# Patient Record
Sex: MALE | Race: WHITE | Employment: UNEMPLOYED | URBAN - METROPOLITAN AREA
[De-identification: names, ages, dates, MRNs, and addresses within clinical notes are randomized per-mention and may not be internally consistent; named-entity substitution may affect disease eponyms.]

---

## 2017-11-29 PROBLEM — E10.10 DKA, TYPE 1, NOT AT GOAL (HCC): Status: ACTIVE | Noted: 2017-11-29

## 2018-03-14 ENCOUNTER — HOSPITAL ENCOUNTER (INPATIENT)
Age: 48
LOS: 2 days | Discharge: HOME | DRG: 420 | End: 2018-03-16
Attending: EMERGENCY MEDICINE | Admitting: INTERNAL MEDICINE
Payer: MEDICARE

## 2018-03-14 ENCOUNTER — APPOINTMENT (OUTPATIENT)
Dept: GENERAL RADIOLOGY | Age: 48
DRG: 420 | End: 2018-03-14
Payer: MEDICARE

## 2018-03-14 DIAGNOSIS — E10.10 DIABETIC KETOACIDOSIS WITHOUT COMA ASSOCIATED WITH TYPE 1 DIABETES MELLITUS (HCC): Primary | ICD-10-CM

## 2018-03-14 LAB
ALBUMIN SERPL-MCNC: 5.8 G/DL (ref 3.5–5.2)
ALP BLD-CCNC: 129 U/L (ref 40–129)
ALT SERPL-CCNC: 28 U/L (ref 0–40)
ANION GAP SERPL CALCULATED.3IONS-SCNC: 35 MMOL/L (ref 7–16)
ANION GAP SERPL CALCULATED.3IONS-SCNC: 46 MMOL/L (ref 7–16)
AST SERPL-CCNC: 19 U/L (ref 0–39)
BACTERIA: ABNORMAL /HPF
BETA-HYDROXYBUTYRATE: >4.5 MMOL/L (ref 0.02–0.27)
BILIRUB SERPL-MCNC: 1 MG/DL (ref 0–1.2)
BILIRUBIN URINE: ABNORMAL
BLOOD, URINE: ABNORMAL
BUN BLDV-MCNC: 24 MG/DL (ref 6–20)
BUN BLDV-MCNC: 27 MG/DL (ref 6–20)
CALCIUM SERPL-MCNC: 8.4 MG/DL (ref 8.6–10.2)
CALCIUM SERPL-MCNC: 9.8 MG/DL (ref 8.6–10.2)
CHLORIDE BLD-SCNC: 80 MMOL/L (ref 98–107)
CHLORIDE BLD-SCNC: 91 MMOL/L (ref 98–107)
CLARITY: CLEAR
CO2: 7 MMOL/L (ref 22–29)
CO2: 8 MMOL/L (ref 22–29)
COLOR: YELLOW
CREAT SERPL-MCNC: 1.1 MG/DL (ref 0.7–1.2)
CREAT SERPL-MCNC: 1.5 MG/DL (ref 0.7–1.2)
GFR AFRICAN AMERICAN: 44
GFR AFRICAN AMERICAN: >60
GFR AFRICAN AMERICAN: >60
GFR NON-AFRICAN AMERICAN: 36 ML/MIN/1.73
GFR NON-AFRICAN AMERICAN: 50 ML/MIN/1.73
GFR NON-AFRICAN AMERICAN: >60 ML/MIN/1.73
GLUCOSE BLD-MCNC: 396 MG/DL (ref 74–109)
GLUCOSE BLD-MCNC: 594 MG/DL (ref 74–109)
GLUCOSE BLD-MCNC: 622 MG/DL (ref 74–109)
GLUCOSE URINE: >=1000 MG/DL
HCT VFR BLD CALC: 53.3 % (ref 37–54)
HEMOGLOBIN: 17.1 G/DL (ref 12.5–16.5)
KETONES, URINE: >=80 MG/DL
LACTIC ACID: 3.2 MMOL/L (ref 0.5–2.2)
LEUKOCYTE ESTERASE, URINE: NEGATIVE
MAGNESIUM: 2 MG/DL (ref 1.6–2.6)
MCH RBC QN AUTO: 29.9 PG (ref 26–35)
MCHC RBC AUTO-ENTMCNC: 32.1 % (ref 32–34.5)
MCV RBC AUTO: 93.2 FL (ref 80–99.9)
METER GLUCOSE: 246 MG/DL (ref 70–110)
METER GLUCOSE: 285 MG/DL (ref 70–110)
METER GLUCOSE: 340 MG/DL (ref 70–110)
METER GLUCOSE: 410 MG/DL (ref 70–110)
METER GLUCOSE: >500 MG/DL (ref 70–110)
NITRITE, URINE: NEGATIVE
PDW BLD-RTO: 13.6 FL (ref 11.5–15)
PH UA: 5.5 (ref 5–9)
PH VENOUS: 7.1 (ref 7.3–7.42)
PHOSPHORUS: 3 MG/DL (ref 2.5–4.5)
PLATELET # BLD: 419 E9/L (ref 130–450)
PMV BLD AUTO: 8.7 FL (ref 7–12)
POC CHLORIDE: 103 MMOL/L (ref 100–108)
POC CREATININE: 2 MG/DL (ref 0.7–1.2)
POC POTASSIUM: 5.6 MMOL/L (ref 3.5–5)
POC SODIUM: 127 MMOL/L (ref 132–146)
POTASSIUM REFLEX MAGNESIUM: 4.7 MMOL/L (ref 3.5–5)
POTASSIUM SERPL-SCNC: 5.5 MMOL/L (ref 3.5–5)
PROTEIN UA: ABNORMAL MG/DL
RBC # BLD: 5.72 E12/L (ref 3.8–5.8)
RBC UA: ABNORMAL /HPF (ref 0–2)
SODIUM BLD-SCNC: 133 MMOL/L (ref 132–146)
SODIUM BLD-SCNC: 134 MMOL/L (ref 132–146)
SPECIFIC GRAVITY UA: 1.02 (ref 1–1.03)
TOTAL PROTEIN: 9 G/DL (ref 6.4–8.3)
TROPONIN: <0.01 NG/ML (ref 0–0.03)
UROBILINOGEN, URINE: 0.2 E.U./DL
WBC # BLD: 14.7 E9/L (ref 4.5–11.5)
WBC UA: ABNORMAL /HPF (ref 0–5)

## 2018-03-14 PROCEDURE — 82947 ASSAY GLUCOSE BLOOD QUANT: CPT

## 2018-03-14 PROCEDURE — 96375 TX/PRO/DX INJ NEW DRUG ADDON: CPT

## 2018-03-14 PROCEDURE — 99291 CRITICAL CARE FIRST HOUR: CPT

## 2018-03-14 PROCEDURE — 96365 THER/PROPH/DIAG IV INF INIT: CPT

## 2018-03-14 PROCEDURE — 82962 GLUCOSE BLOOD TEST: CPT

## 2018-03-14 PROCEDURE — 84295 ASSAY OF SERUM SODIUM: CPT

## 2018-03-14 PROCEDURE — 87486 CHLMYD PNEUM DNA AMP PROBE: CPT

## 2018-03-14 PROCEDURE — 94761 N-INVAS EAR/PLS OXIMETRY MLT: CPT

## 2018-03-14 PROCEDURE — 80307 DRUG TEST PRSMV CHEM ANLYZR: CPT

## 2018-03-14 PROCEDURE — 87501 INFLUENZA DNA AMP PROB 1+: CPT

## 2018-03-14 PROCEDURE — 84484 ASSAY OF TROPONIN QUANT: CPT

## 2018-03-14 PROCEDURE — 82010 KETONE BODYS QUAN: CPT

## 2018-03-14 PROCEDURE — 85027 COMPLETE CBC AUTOMATED: CPT

## 2018-03-14 PROCEDURE — 80053 COMPREHEN METABOLIC PANEL: CPT

## 2018-03-14 PROCEDURE — 83735 ASSAY OF MAGNESIUM: CPT

## 2018-03-14 PROCEDURE — 81001 URINALYSIS AUTO W/SCOPE: CPT

## 2018-03-14 PROCEDURE — 6360000002 HC RX W HCPCS: Performed by: EMERGENCY MEDICINE

## 2018-03-14 PROCEDURE — 82565 ASSAY OF CREATININE: CPT

## 2018-03-14 PROCEDURE — 84100 ASSAY OF PHOSPHORUS: CPT

## 2018-03-14 PROCEDURE — 2580000003 HC RX 258: Performed by: EMERGENCY MEDICINE

## 2018-03-14 PROCEDURE — 6370000000 HC RX 637 (ALT 250 FOR IP): Performed by: EMERGENCY MEDICINE

## 2018-03-14 PROCEDURE — 83690 ASSAY OF LIPASE: CPT

## 2018-03-14 PROCEDURE — 83930 ASSAY OF BLOOD OSMOLALITY: CPT

## 2018-03-14 PROCEDURE — 2500000003 HC RX 250 WO HCPCS: Performed by: EMERGENCY MEDICINE

## 2018-03-14 PROCEDURE — 83605 ASSAY OF LACTIC ACID: CPT

## 2018-03-14 PROCEDURE — 87503 INFLUENZA DNA AMP PROB ADDL: CPT

## 2018-03-14 PROCEDURE — 87798 DETECT AGENT NOS DNA AMP: CPT

## 2018-03-14 PROCEDURE — 84132 ASSAY OF SERUM POTASSIUM: CPT

## 2018-03-14 PROCEDURE — 2000000000 HC ICU R&B

## 2018-03-14 PROCEDURE — 82435 ASSAY OF BLOOD CHLORIDE: CPT

## 2018-03-14 PROCEDURE — 82800 BLOOD PH: CPT

## 2018-03-14 PROCEDURE — 71045 X-RAY EXAM CHEST 1 VIEW: CPT

## 2018-03-14 PROCEDURE — 96366 THER/PROPH/DIAG IV INF ADDON: CPT

## 2018-03-14 PROCEDURE — 80048 BASIC METABOLIC PNL TOTAL CA: CPT

## 2018-03-14 PROCEDURE — 93005 ELECTROCARDIOGRAM TRACING: CPT | Performed by: NURSE PRACTITIONER

## 2018-03-14 PROCEDURE — 36415 COLL VENOUS BLD VENIPUNCTURE: CPT

## 2018-03-14 PROCEDURE — 87581 M.PNEUMON DNA AMP PROBE: CPT

## 2018-03-14 RX ORDER — SODIUM CHLORIDE 0.9 % (FLUSH) 0.9 %
10 SYRINGE (ML) INJECTION EVERY 12 HOURS SCHEDULED
Status: DISCONTINUED | OUTPATIENT
Start: 2018-03-14 | End: 2018-03-16 | Stop reason: HOSPADM

## 2018-03-14 RX ORDER — DEXTROSE MONOHYDRATE 25 G/50ML
12.5 INJECTION, SOLUTION INTRAVENOUS PRN
Status: DISCONTINUED | OUTPATIENT
Start: 2018-03-14 | End: 2018-03-15

## 2018-03-14 RX ORDER — POTASSIUM CHLORIDE 7.45 MG/ML
10 INJECTION INTRAVENOUS PRN
Status: DISCONTINUED | OUTPATIENT
Start: 2018-03-14 | End: 2018-03-15

## 2018-03-14 RX ORDER — ONDANSETRON 2 MG/ML
4 INJECTION INTRAMUSCULAR; INTRAVENOUS ONCE
Status: COMPLETED | OUTPATIENT
Start: 2018-03-14 | End: 2018-03-14

## 2018-03-14 RX ORDER — DEXTROSE, SODIUM CHLORIDE, AND POTASSIUM CHLORIDE 5; .45; .15 G/100ML; G/100ML; G/100ML
INJECTION INTRAVENOUS CONTINUOUS PRN
Status: DISCONTINUED | OUTPATIENT
Start: 2018-03-14 | End: 2018-03-15

## 2018-03-14 RX ORDER — 0.9 % SODIUM CHLORIDE 0.9 %
1000 INTRAVENOUS SOLUTION INTRAVENOUS ONCE
Status: COMPLETED | OUTPATIENT
Start: 2018-03-14 | End: 2018-03-14

## 2018-03-14 RX ORDER — SODIUM CHLORIDE 0.9 % (FLUSH) 0.9 %
10 SYRINGE (ML) INJECTION PRN
Status: DISCONTINUED | OUTPATIENT
Start: 2018-03-14 | End: 2018-03-14 | Stop reason: SDUPTHER

## 2018-03-14 RX ORDER — MAGNESIUM SULFATE 1 G/100ML
1 INJECTION INTRAVENOUS PRN
Status: DISCONTINUED | OUTPATIENT
Start: 2018-03-14 | End: 2018-03-15

## 2018-03-14 RX ORDER — SODIUM CHLORIDE 450 MG/100ML
INJECTION, SOLUTION INTRAVENOUS CONTINUOUS
Status: DISCONTINUED | OUTPATIENT
Start: 2018-03-14 | End: 2018-03-15

## 2018-03-14 RX ORDER — SODIUM CHLORIDE 0.9 % (FLUSH) 0.9 %
10 SYRINGE (ML) INJECTION EVERY 12 HOURS SCHEDULED
Status: DISCONTINUED | OUTPATIENT
Start: 2018-03-14 | End: 2018-03-14 | Stop reason: SDUPTHER

## 2018-03-14 RX ORDER — ACETAMINOPHEN 325 MG/1
650 TABLET ORAL EVERY 4 HOURS PRN
Status: DISCONTINUED | OUTPATIENT
Start: 2018-03-14 | End: 2018-03-14 | Stop reason: SDUPTHER

## 2018-03-14 RX ORDER — ACETAMINOPHEN 325 MG/1
650 TABLET ORAL EVERY 4 HOURS PRN
Status: DISCONTINUED | OUTPATIENT
Start: 2018-03-14 | End: 2018-03-16 | Stop reason: HOSPADM

## 2018-03-14 RX ORDER — SODIUM CHLORIDE 0.9 % (FLUSH) 0.9 %
10 SYRINGE (ML) INJECTION PRN
Status: DISCONTINUED | OUTPATIENT
Start: 2018-03-14 | End: 2018-03-16 | Stop reason: HOSPADM

## 2018-03-14 RX ORDER — METOCLOPRAMIDE HYDROCHLORIDE 5 MG/ML
10 INJECTION INTRAMUSCULAR; INTRAVENOUS ONCE
Status: DISCONTINUED | OUTPATIENT
Start: 2018-03-14 | End: 2018-03-15

## 2018-03-14 RX ORDER — ONDANSETRON 2 MG/ML
4 INJECTION INTRAMUSCULAR; INTRAVENOUS EVERY 6 HOURS PRN
Status: DISCONTINUED | OUTPATIENT
Start: 2018-03-14 | End: 2018-03-16 | Stop reason: HOSPADM

## 2018-03-14 RX ADMIN — SODIUM CHLORIDE: 4.5 INJECTION, SOLUTION INTRAVENOUS at 20:02

## 2018-03-14 RX ADMIN — SODIUM CHLORIDE 1000 ML: 9 INJECTION, SOLUTION INTRAVENOUS at 18:27

## 2018-03-14 RX ADMIN — SODIUM CHLORIDE 0.1 UNITS/KG/HR: 9 INJECTION, SOLUTION INTRAVENOUS at 18:39

## 2018-03-14 RX ADMIN — SODIUM CHLORIDE 1000 ML: 9 INJECTION, SOLUTION INTRAVENOUS at 17:21

## 2018-03-14 RX ADMIN — DEXTROSE, SODIUM CHLORIDE, AND POTASSIUM CHLORIDE: 5; .45; .15 INJECTION INTRAVENOUS at 23:06

## 2018-03-14 RX ADMIN — ONDANSETRON 4 MG: 2 INJECTION INTRAMUSCULAR; INTRAVENOUS at 17:35

## 2018-03-14 ASSESSMENT — PAIN SCALES - GENERAL: PAINLEVEL_OUTOF10: 0

## 2018-03-14 ASSESSMENT — ENCOUNTER SYMPTOMS
SINUS PRESSURE: 0
COUGH: 0
CONSTIPATION: 0
EYE DISCHARGE: 0
DIARRHEA: 0
NAUSEA: 1
EYE REDNESS: 0
ABDOMINAL PAIN: 0
BLOOD IN STOOL: 0
RHINORRHEA: 0
BACK PAIN: 0
VOMITING: 1
WHEEZING: 0
EYE PAIN: 0
SORE THROAT: 0
SHORTNESS OF BREATH: 1

## 2018-03-14 NOTE — ED NOTES
FIRST PROVIDER CONTACT ASSESSMENT NOTE      Department of Emergency Medicine   3/14/18  3:26 PM    Chief Complaint: Emesis (patient has been throwing up, thinks he may in DKA, has been having increased BGL.)      History of Present Illness:    Nadege Campbell is a 56 y.o. male who presents to the ED by private car for Hyperglycemia and vomiting. Patient believes he is in DKA. Focused Screening Exam:  Constitutional:  Alert, ill appearing. NAD  CV:  tachycardia    *ALLERGIES*     Patient has no known allergies.      ED Triage Vitals [03/14/18 1524]   BP Temp Temp src Pulse Resp SpO2 Height Weight   (!) 149/103 98.2 °F (36.8 °C) -- 130 12 100 % 5' 6\" (1.676 m) 145 lb (65.8 kg)        Initial Plan of Care:  Initiate Treatment-Testing, Proceed toTreatment Area When Bed Available for ED Attending/MLP to Continue Care    -----------------END OF FIRST PROVIDER CONTACT ASSESSMENT NOTE--------------  Electronically signed by CHAYO Sharma   DD: 3/14/18       CHAYO Sharma  03/14/18 1520

## 2018-03-14 NOTE — ED PROVIDER NOTES
Patient is a 60-year-old male here with nausea and vomiting. He states that he was nauseated prior to arrival to the ED and vomited couple of times after arrival to the ER. He also complains of intermittent shortness of breath. He states nothing makes his symptoms worse or better. He denies fever, chest pain/pressure, abdominal pain, diarrhea, constipation, blood in stool or urine. He states he has insulin pump and has been using his insulin normally. He denies any other medical problems. He denies history of heart problems. He states he smokes weed sometimes. He states he occasionally drinks alcohol. He denies use of any other recreational drugs. Review of Systems   Constitutional: Positive for chills and diaphoresis. Negative for fever. HENT: Negative for ear pain, hearing loss, rhinorrhea, sinus pressure and sore throat. Eyes: Negative for pain, discharge, redness and visual disturbance. Respiratory: Positive for shortness of breath. Negative for cough and wheezing. Cardiovascular: Negative for chest pain. Gastrointestinal: Positive for nausea and vomiting. Negative for abdominal pain, blood in stool, constipation and diarrhea. Genitourinary: Negative for dysuria, flank pain, frequency and hematuria. Musculoskeletal: Negative for arthralgias, back pain and neck pain. Skin: Negative for rash and wound. Neurological: Negative for syncope, weakness, light-headedness, numbness and headaches. Hematological: Negative for adenopathy. Psychiatric/Behavioral: Negative for confusion. All other systems reviewed and are negative. Physical Exam   Constitutional: He is oriented to person, place, and time. He appears well-developed and well-nourished. HENT:   Head: Normocephalic and atraumatic. Head is without raccoon's eyes and without Lowe's sign. Nose: No rhinorrhea or nasal deformity. Mouth/Throat: Uvula is midline. Mucous membranes are dry.  No posterior oropharyngeal Nitrite, Urine Negative Negative    Leukocyte Esterase, Urine Negative Negative   Troponin   Result Value Ref Range    Troponin <0.01 0.00 - 0.03 ng/mL   pH, venous   Result Value Ref Range    pH, Gage 7.10 (LL) 7.30 - 7.42   Microscopic Urinalysis   Result Value Ref Range    WBC, UA 0-1 0 - 5 /HPF    RBC, UA 0-1 0 - 2 /HPF    Bacteria, UA RARE (A) /HPF   POCT Venous   Result Value Ref Range    POC Sodium 127 (L) 132 - 146 mmol/L    POC Potassium 5.6 (H) 3.5 - 5.0 mmol/L    POC Chloride 103 100 - 108 mmol/L    POC Glucose 622 (HH) 74 - 109 mg/dl    POC Creatinine 2.0 (H) 0.7 - 1.2 mg/dL    GFR Non-African American 36 >=60 mL/min/1.73    GFR African American 44    POCT Glucose   Result Value Ref Range    Meter Glucose >500 (H) 70 - 110 mg/dL   POCT Glucose   Result Value Ref Range    Meter Glucose 410 (H) 70 - 110 mg/dL       Radiology  No orders to display       ------------------------- NURSING NOTES AND VITALS REVIEWED ---------------------------  Date / Time Roomed:  3/14/2018  4:39 PM  ED Bed Assignment:  11/11    The nursing notes within the ED encounter and vital signs as below have been reviewed.    Patient Vitals for the past 24 hrs:   BP Temp Pulse Resp SpO2 Height Weight   03/14/18 1945 (!) 156/86 - 125 18 100 % - -   03/14/18 1841 (!) 150/85 - 126 16 - - -   03/14/18 1747 - - 119 16 100 % - -   03/14/18 1524 (!) 149/103 98.2 °F (36.8 °C) 130 12 100 % 5' 6\" (1.676 m) 145 lb (65.8 kg)       Oxygen Saturation Interpretation: Normal      ------------------------------------------ PROGRESS NOTES ------------------------------------------  Re-evaluation(s):  Time: 5:23 PM.  Patients symptoms show no change  Repeat physical examination is not changed    Time: 6:14 PM.  Patients symptoms show no change  Repeat physical examination is not changed    Time: 7:49 PM.  Patients symptoms show no change  Repeat physical examination is not changed    I have spoken with the patient and discussed todays results, in

## 2018-03-15 LAB
ACETAMINOPHEN LEVEL: <15 MCG/ML (ref 10–30)
ANION GAP SERPL CALCULATED.3IONS-SCNC: 12 MMOL/L (ref 7–16)
ANION GAP SERPL CALCULATED.3IONS-SCNC: 14 MMOL/L (ref 7–16)
ANION GAP SERPL CALCULATED.3IONS-SCNC: 15 MMOL/L (ref 7–16)
ANION GAP SERPL CALCULATED.3IONS-SCNC: 16 MMOL/L (ref 7–16)
ANION GAP SERPL CALCULATED.3IONS-SCNC: 16 MMOL/L (ref 7–16)
ANION GAP SERPL CALCULATED.3IONS-SCNC: 26 MMOL/L (ref 7–16)
BUN BLDV-MCNC: 11 MG/DL (ref 6–20)
BUN BLDV-MCNC: 12 MG/DL (ref 6–20)
BUN BLDV-MCNC: 14 MG/DL (ref 6–20)
BUN BLDV-MCNC: 16 MG/DL (ref 6–20)
BUN BLDV-MCNC: 20 MG/DL (ref 6–20)
BUN BLDV-MCNC: 23 MG/DL (ref 6–20)
CALCIUM SERPL-MCNC: 8.2 MG/DL (ref 8.6–10.2)
CALCIUM SERPL-MCNC: 8.2 MG/DL (ref 8.6–10.2)
CALCIUM SERPL-MCNC: 8.4 MG/DL (ref 8.6–10.2)
CALCIUM SERPL-MCNC: 8.6 MG/DL (ref 8.6–10.2)
CALCIUM SERPL-MCNC: 8.6 MG/DL (ref 8.6–10.2)
CALCIUM SERPL-MCNC: 8.7 MG/DL (ref 8.6–10.2)
CHLORIDE BLD-SCNC: 100 MMOL/L (ref 98–107)
CHLORIDE BLD-SCNC: 100 MMOL/L (ref 98–107)
CHLORIDE BLD-SCNC: 95 MMOL/L (ref 98–107)
CHLORIDE BLD-SCNC: 96 MMOL/L (ref 98–107)
CHLORIDE BLD-SCNC: 98 MMOL/L (ref 98–107)
CHLORIDE BLD-SCNC: 99 MMOL/L (ref 98–107)
CO2: 12 MMOL/L (ref 22–29)
CO2: 16 MMOL/L (ref 22–29)
CO2: 17 MMOL/L (ref 22–29)
CO2: 19 MMOL/L (ref 22–29)
CO2: 20 MMOL/L (ref 22–29)
CO2: 21 MMOL/L (ref 22–29)
CREAT SERPL-MCNC: 0.5 MG/DL (ref 0.7–1.2)
CREAT SERPL-MCNC: 0.6 MG/DL (ref 0.7–1.2)
CREAT SERPL-MCNC: 0.7 MG/DL (ref 0.7–1.2)
CREAT SERPL-MCNC: 0.9 MG/DL (ref 0.7–1.2)
ETHANOL: <10 MG/DL (ref 0–0.08)
FILM ARRAY ADENOVIRUS: NORMAL
FILM ARRAY BORDETELLA PERTUSSIS: NORMAL
FILM ARRAY CHLAMYDOPHILIA PNEUMONIAE: NORMAL
FILM ARRAY CORONAVIRUS 229E: NORMAL
FILM ARRAY CORONAVIRUS HKU1: NORMAL
FILM ARRAY CORONAVIRUS NL63: NORMAL
FILM ARRAY CORONAVIRUS OC43: NORMAL
FILM ARRAY INFLUENZA A VIRUS 09H1: NORMAL
FILM ARRAY INFLUENZA A VIRUS H1: NORMAL
FILM ARRAY INFLUENZA A VIRUS H3: NORMAL
FILM ARRAY INFLUENZA A VIRUS: NORMAL
FILM ARRAY INFLUENZA B: NORMAL
FILM ARRAY METAPNEUMOVIRUS: NORMAL
FILM ARRAY MYCOPLASMA PNEUMONIAE: NORMAL
FILM ARRAY PARAINFLUENZA VIRUS 1: NORMAL
FILM ARRAY PARAINFLUENZA VIRUS 2: NORMAL
FILM ARRAY PARAINFLUENZA VIRUS 3: NORMAL
FILM ARRAY PARAINFLUENZA VIRUS 4: NORMAL
FILM ARRAY RESPIRATORY SYNCITIAL VIRUS: NORMAL
FILM ARRAY RHINOVIRUS/ENTEROVIRUS: NORMAL
GFR AFRICAN AMERICAN: >60
GFR NON-AFRICAN AMERICAN: >60 ML/MIN/1.73
GLUCOSE BLD-MCNC: 114 MG/DL (ref 74–109)
GLUCOSE BLD-MCNC: 148 MG/DL (ref 74–109)
GLUCOSE BLD-MCNC: 155 MG/DL (ref 74–109)
GLUCOSE BLD-MCNC: 160 MG/DL (ref 74–109)
GLUCOSE BLD-MCNC: 184 MG/DL (ref 74–109)
GLUCOSE BLD-MCNC: 258 MG/DL (ref 74–109)
HBA1C MFR BLD: 9.7 % (ref 4.8–5.9)
LACTIC ACID: 1 MMOL/L (ref 0.5–2.2)
LIPASE: 68 U/L (ref 13–60)
MAGNESIUM: 1.9 MG/DL (ref 1.6–2.6)
MAGNESIUM: 1.9 MG/DL (ref 1.6–2.6)
MAGNESIUM: 2 MG/DL (ref 1.6–2.6)
MAGNESIUM: 2.1 MG/DL (ref 1.6–2.6)
METER GLUCOSE: 108 MG/DL (ref 70–110)
METER GLUCOSE: 109 MG/DL (ref 70–110)
METER GLUCOSE: 110 MG/DL (ref 70–110)
METER GLUCOSE: 134 MG/DL (ref 70–110)
METER GLUCOSE: 135 MG/DL (ref 70–110)
METER GLUCOSE: 137 MG/DL (ref 70–110)
METER GLUCOSE: 141 MG/DL (ref 70–110)
METER GLUCOSE: 145 MG/DL (ref 70–110)
METER GLUCOSE: 151 MG/DL (ref 70–110)
METER GLUCOSE: 156 MG/DL (ref 70–110)
METER GLUCOSE: 169 MG/DL (ref 70–110)
METER GLUCOSE: 170 MG/DL (ref 70–110)
METER GLUCOSE: 173 MG/DL (ref 70–110)
METER GLUCOSE: 176 MG/DL (ref 70–110)
METER GLUCOSE: 178 MG/DL (ref 70–110)
METER GLUCOSE: 182 MG/DL (ref 70–110)
METER GLUCOSE: 219 MG/DL (ref 70–110)
METER GLUCOSE: 244 MG/DL (ref 70–110)
METER GLUCOSE: 254 MG/DL (ref 70–110)
METER GLUCOSE: 95 MG/DL (ref 70–110)
OSMOLALITY: 308 MOSM/KG (ref 285–310)
PHOSPHORUS: 1.2 MG/DL (ref 2.5–4.5)
PHOSPHORUS: 1.5 MG/DL (ref 2.5–4.5)
PHOSPHORUS: 2.1 MG/DL (ref 2.5–4.5)
PHOSPHORUS: 2.3 MG/DL (ref 2.5–4.5)
PHOSPHORUS: 2.4 MG/DL (ref 2.5–4.5)
PHOSPHORUS: 2.7 MG/DL (ref 2.5–4.5)
POTASSIUM REFLEX MAGNESIUM: 3.9 MMOL/L (ref 3.5–5)
POTASSIUM REFLEX MAGNESIUM: 4 MMOL/L (ref 3.5–5)
POTASSIUM REFLEX MAGNESIUM: 4.2 MMOL/L (ref 3.5–5)
POTASSIUM REFLEX MAGNESIUM: 4.2 MMOL/L (ref 3.5–5)
POTASSIUM REFLEX MAGNESIUM: 5.2 MMOL/L (ref 3.5–5)
POTASSIUM REFLEX MAGNESIUM: 6.9 MMOL/L (ref 3.5–5)
PROCALCITONIN: 0.16 NG/ML (ref 0–0.08)
REPORT: NORMAL
REPORT: NORMAL
SALICYLATE, SERUM: <0.3 MG/DL (ref 0–30)
SODIUM BLD-SCNC: 129 MMOL/L (ref 132–146)
SODIUM BLD-SCNC: 131 MMOL/L (ref 132–146)
SODIUM BLD-SCNC: 132 MMOL/L (ref 132–146)
SODIUM BLD-SCNC: 133 MMOL/L (ref 132–146)
SODIUM BLD-SCNC: 133 MMOL/L (ref 132–146)
SODIUM BLD-SCNC: 134 MMOL/L (ref 132–146)
TRICYCLIC ANTIDEPRESSANTS SCREEN SERUM: NEGATIVE NG/ML
TROPONIN: <0.01 NG/ML (ref 0–0.03)

## 2018-03-15 PROCEDURE — 2580000003 HC RX 258: Performed by: INTERNAL MEDICINE

## 2018-03-15 PROCEDURE — 36415 COLL VENOUS BLD VENIPUNCTURE: CPT

## 2018-03-15 PROCEDURE — 99255 IP/OBS CONSLTJ NEW/EST HI 80: CPT | Performed by: INTERNAL MEDICINE

## 2018-03-15 PROCEDURE — 87040 BLOOD CULTURE FOR BACTERIA: CPT

## 2018-03-15 PROCEDURE — 2580000003 HC RX 258: Performed by: EMERGENCY MEDICINE

## 2018-03-15 PROCEDURE — 87186 SC STD MICRODIL/AGAR DIL: CPT

## 2018-03-15 PROCEDURE — 82962 GLUCOSE BLOOD TEST: CPT

## 2018-03-15 PROCEDURE — 84100 ASSAY OF PHOSPHORUS: CPT

## 2018-03-15 PROCEDURE — 80048 BASIC METABOLIC PNL TOTAL CA: CPT

## 2018-03-15 PROCEDURE — 83735 ASSAY OF MAGNESIUM: CPT

## 2018-03-15 PROCEDURE — 6360000002 HC RX W HCPCS: Performed by: INTERNAL MEDICINE

## 2018-03-15 PROCEDURE — 6360000002 HC RX W HCPCS: Performed by: EMERGENCY MEDICINE

## 2018-03-15 PROCEDURE — 2500000003 HC RX 250 WO HCPCS: Performed by: EMERGENCY MEDICINE

## 2018-03-15 PROCEDURE — 82693 ASSAY OF ETHYLENE GLYCOL: CPT

## 2018-03-15 PROCEDURE — 83036 HEMOGLOBIN GLYCOSYLATED A1C: CPT

## 2018-03-15 PROCEDURE — 87088 URINE BACTERIA CULTURE: CPT

## 2018-03-15 PROCEDURE — G0480 DRUG TEST DEF 1-7 CLASSES: HCPCS

## 2018-03-15 PROCEDURE — 6370000000 HC RX 637 (ALT 250 FOR IP): Performed by: EMERGENCY MEDICINE

## 2018-03-15 PROCEDURE — 84145 PROCALCITONIN (PCT): CPT

## 2018-03-15 PROCEDURE — 94761 N-INVAS EAR/PLS OXIMETRY MLT: CPT

## 2018-03-15 PROCEDURE — 6370000000 HC RX 637 (ALT 250 FOR IP): Performed by: INTERNAL MEDICINE

## 2018-03-15 PROCEDURE — 87077 CULTURE AEROBIC IDENTIFY: CPT

## 2018-03-15 PROCEDURE — 2000000000 HC ICU R&B

## 2018-03-15 RX ORDER — DEXTROSE MONOHYDRATE 25 G/50ML
25 INJECTION, SOLUTION INTRAVENOUS PRN
Status: DISCONTINUED | OUTPATIENT
Start: 2018-03-15 | End: 2018-03-16 | Stop reason: HOSPADM

## 2018-03-15 RX ORDER — INSULIN GLARGINE 100 [IU]/ML
20 INJECTION, SOLUTION SUBCUTANEOUS 2 TIMES DAILY
Status: DISCONTINUED | OUTPATIENT
Start: 2018-03-15 | End: 2018-03-16 | Stop reason: HOSPADM

## 2018-03-15 RX ORDER — DEXTROSE MONOHYDRATE 25 G/50ML
12.5 INJECTION, SOLUTION INTRAVENOUS PRN
Status: DISCONTINUED | OUTPATIENT
Start: 2018-03-15 | End: 2018-03-15

## 2018-03-15 RX ORDER — DEXTROSE MONOHYDRATE 50 MG/ML
100 INJECTION, SOLUTION INTRAVENOUS PRN
Status: DISCONTINUED | OUTPATIENT
Start: 2018-03-15 | End: 2018-03-16 | Stop reason: HOSPADM

## 2018-03-15 RX ORDER — NICOTINE POLACRILEX 4 MG
15 LOZENGE BUCCAL PRN
Status: DISCONTINUED | OUTPATIENT
Start: 2018-03-15 | End: 2018-03-16 | Stop reason: HOSPADM

## 2018-03-15 RX ADMIN — Medication 10 ML: at 08:26

## 2018-03-15 RX ADMIN — INSULIN LISPRO 2 UNITS: 100 INJECTION, SOLUTION INTRAVENOUS; SUBCUTANEOUS at 21:16

## 2018-03-15 RX ADMIN — SODIUM CHLORIDE 2.46 UNITS/HR: 9 INJECTION, SOLUTION INTRAVENOUS at 13:58

## 2018-03-15 RX ADMIN — POTASSIUM CHLORIDE 10 MEQ: 10 INJECTION, SOLUTION INTRAVENOUS at 06:12

## 2018-03-15 RX ADMIN — INSULIN GLARGINE 20 UNITS: 100 INJECTION, SOLUTION SUBCUTANEOUS at 18:20

## 2018-03-15 RX ADMIN — ENOXAPARIN SODIUM 40 MG: 40 INJECTION SUBCUTANEOUS at 08:26

## 2018-03-15 RX ADMIN — FOMEPIZOLE 990 MG: 1 INJECTION, SOLUTION INTRAVENOUS at 02:31

## 2018-03-15 RX ADMIN — SODIUM CHLORIDE 2 UNITS/HR: 9 INJECTION, SOLUTION INTRAVENOUS at 07:22

## 2018-03-15 RX ADMIN — POTASSIUM CHLORIDE 10 MEQ: 10 INJECTION, SOLUTION INTRAVENOUS at 07:22

## 2018-03-15 RX ADMIN — Medication 10 ML: at 06:35

## 2018-03-15 RX ADMIN — DEXTROSE, SODIUM CHLORIDE, AND POTASSIUM CHLORIDE: 5; .45; .15 INJECTION INTRAVENOUS at 12:50

## 2018-03-15 RX ADMIN — DEXTROSE, SODIUM CHLORIDE, AND POTASSIUM CHLORIDE: 5; .45; .15 INJECTION INTRAVENOUS at 05:38

## 2018-03-15 RX ADMIN — SODIUM PHOSPHATE, MONOBASIC, MONOHYDRATE 20 MMOL: 276; 142 INJECTION, SOLUTION INTRAVENOUS at 06:32

## 2018-03-15 RX ADMIN — POTASSIUM CHLORIDE 10 MEQ: 10 INJECTION, SOLUTION INTRAVENOUS at 09:12

## 2018-03-15 ASSESSMENT — PAIN SCALES - GENERAL
PAINLEVEL_OUTOF10: 0

## 2018-03-15 NOTE — H&P
[address]  Department of Internal Medicine  Internal Medicine Residency Program  Critical care Consult    Patient:  Venita Saleh 56 y.o. male MRN: [id number]     Date of Service: 3/14/2018      Chief complaint: Diarrhea / N / V    History obtained from: the patient    History of Present Illness     The patient is a 56 y.o. male with PMH of diabetes mellitus with insulin dependence, hypertension, peripheral neuropathy and polysubstance abuse presented to the ER complaining of nausea and vomiting. The patient got a history of type II diabetes currently having insulin pump. He said he has developed some episodes of diarrhea for the last few days, twice a day, and kind of watery. He has not had bowel movements since Monday. His appetite decreased since then and he has not eaten anything. He states that his insulin problems is working normally, and his glucose levels at home run around 200s mg/dL. Today, he felt more fatigue, nauseated and vomited with dark biliary like vomitus, but no bloody emesis. He also complains of thirsty. He decided to come into the ED. Otherwise, he denies chest pain, cough, shortness of breath, palpitation, fever, headache, new onset focal weaknesses, any active pain. Normal urination. ED Course: Vitals are stable. Labs were remarkable for glucose of 622 mg/dL, CO2 7, AG 46, ABG pH 7.1, BHB > 4.5. Insulin drip with DKA protocol was initiated. He was transferred to MICU for further management. Past Medical History:      Diagnosis Date    Alcoholism ([facility] [address]     Cocaine abuse     Diabetes mellitus ([facility] [address]     Hypertension     Idiopathic peripheral neuropathy 9/21/2016    Marijuana abuse        Past Surgical History:    No past surgical history on file. Medications Prior to Admission:    Prior to Admission medications    Medication Sig Start Date End Date Taking?  Authorizing Provider   Insulin Pump - insulin lispro Inject 1 Units/hr into the skin continuous Indications: basal rate 1.05 units/hr, bolus dose is 1 unit per 10 grams of carb, insulin sensitivity for bolus wizard is 35:1   Yes Historical Provider, MD   Insulin Infusion Pump KIT by Does not apply route    Historical Provider, MD       Allergies:  Patient has no known allergies. Social History:   TOBACCO:   reports that he has been smoking Cigarettes. He has a 15.00 pack-year smoking history. He has never used smokeless tobacco.  ETOH:   reports that he drinks alcohol. OCCUPATION:      Family History:       Problem Relation Age of Onset    Cancer Maternal Grandmother        REVIEW OF SYSTEMS:  · Constitutional: No fever, no chill or change in weight; good appetite  · HEENT: No blurred vision, no ear problems, no sore throat, no running nose. · Respiratory: No cough, no sputum, no pleuritic chest pain, no shortness of breath  · Cardiology: No angina, no dyspnea on exertion, no paroxysmal nocturnal dyspnea, no orthopnea, no palpitation, no leg swelling. · Gastroenterology: nausea / vomiting / abdominal pain / decreased appetite  · Genitourinary: No dysuria, no frequency, hesitancy; no hematuria  · Musculoskeletal: no joint pain, no myalgia, no change in range of movement  · Neurology: no focal weakness in extremities, no slurred speech, no double vision, no tingling or numbness sensation. · Endocrinology: polyuria & thirsty, no temperature intolerance, no polyphagia. · Hematology: no increased bleeding, no bruising, no lymphadenopathy  · Skin: no skin change noticed by patient  · Psychology: no depressed mood, no suicidal ideation    Physical Exam   · Vitals: BP (!) 143/81   Pulse 110   Temp 99.9 °F (37.7 °C) (Temporal)   Resp 18   Ht 5' 6\" (1.676 m)   Wt 145 lb (65.8 kg)   SpO2 99%   BMI 23.40 kg/m²   · General Appearance: Alert, cooperative, no acute distress. · HEENT: Normocephalic, atraumatic. MARINA, conjunctiva/corneas clear, EOM's intact, no pallor  or icterus.    · Neck: Supple, symmetrical, trachea midline, no cervical LAD. No thyroid enlargement/tenderness/nodules. No carotid bruit or JVD  · Chest wall: No tenderness or deformity, full & symmetric excursion  · Lung: Clear to auscultation bilaterally,  respirations unlabored. No rales/wheezing/rubs  · Heart: Tachycardia, regular rate and rhythm, S1 and S2 normal, no murmur. · Abdomen: Soft, non-tender, bowel sounds active all four quadrants, no masses, no organomegaly, insulin pump on site at [facility]. · Genital and rectal exam: deferred  · Extremities:  Extremities normal, atraumatic, no cyanosis or edema. Pulses equal bilaterally  · Skin: Skin color, texture, turgor normal, no rashes or lesions  · Musculokeletal: No joint swelling, no muscle tenderness. ROM normal in all joints of extremities. · Lymph nodes: no lymph node enlargement appreciated  · Neurologic:   Alert&Oriented. CNII-XII intact. Normal and symmetric  strength in UEs and LEs,   DTRs 2+ and symmetric, Babinski sign is absent (flexor)  normal gait, coordination with finger to nose, heel to shin and repetitive movement is intact.  Romberg negative  sensation intact     Labs and Imaging Studies   Basic Labs  CBC:   Lab Results   Component Value Date    WBC 14.7 03/14/2018    RBC 5.72 03/14/2018    HGB 17.1 03/14/2018    HCT 53.3 03/14/2018     03/14/2018    MCV 93.2 03/14/2018     BMP:    Lab Results   Component Value Date     03/14/2018    K 4.7 03/14/2018    CL 91 03/14/2018    CO2 8 03/14/2018    BUN 24 03/14/2018    CREATININE 1.1 03/14/2018    GLUCOSE 396 03/14/2018    GLUCOSE 83 04/11/2012    CALCIUM 8.4 03/14/2018     CBC:   Lab Results   Component Value Date    WBC 14.7 03/14/2018    RBC 5.72 03/14/2018    HGB 17.1 03/14/2018    HCT 53.3 03/14/2018    MCV 93.2 03/14/2018    MCH 29.9 03/14/2018    MCHC 32.1 03/14/2018    RDW 13.6 03/14/2018     03/14/2018    MPV 8.7 03/14/2018     CMP:    Lab Results   Component Value Date     factors: pan-cultures, CXR, cardiac enzyme trending, UDS-SDS, resp panel, lipase    HAGMA  2/2 lactic acidosis & DKA  Trend lactate q6hr  Manage underlying conditions  F/u AG    KIRSTEN   Cr 1.5 -> 1.1  Pre-renal azotemia 2/2 dehydration from hyperglycemic crisis  Cont IVF   Might not need urine studies yield that KIRSTEN resolving  BMP per DKA protocol, check urine output, I/O    HTN  BP (!) 143/81   Goal < 140/90 at this age  No home regimen noticed  Might initiate BP meds later once stabilized    Polysubstance abuse  H/o cocain abuse  Using marijuana  Check UDS & SDS    Diabetic neuropathy  Optimize diabetes management  Might add gapapentinoids    DVT/GI Prophylaxis: lovenox / not indicated    Disposition: MICU    Case discussed with Dr. Lele Quijano M.D., PGY 1    Attending physician: Dr. Serafin Ly     Senior Resident Statement  I have seen and examined the patient with the intern. I have discussed the case, including pertinent history and exam findings with the intern. I agree with the assessment, plan and orders as documented by the intern. A/P    1. DKA-severe  2. AGMA- likely lactic acid and ketones with elevated OSM gap  3. KIRSTEN- prerenal  4. Leukocytosis  5. Sinus tachycardia  6. Polysubstance abuse    1. DKA protocol. Does not admit to infectious symptoms, but has leukocytosis, will check Cultures and procalcitonin, EKG no signs of ischemia, trend troponin x 2. Check SDS, UDS. Etiology could be malfunctioning insulin pump, but pt states he checks it on a regular basis, changes his site every 3 days and follows with an Endocrinologist recently. 2. Improving with IVF and Insulin, check SDS and UDS, check urine and serum osm, . OSM gap elevated, EtOH negative, will give Antizol 15mg/kg then 10mg/kg q12hr, STAT Methyl alcohol and Ethylene glycol sent  3. Resolved  4. Check procalcitonin and panculture, could just be due to volume depletion  5. Likely 2/2 volume depletion, improving with IVF  6.  Admits to

## 2018-03-16 VITALS
TEMPERATURE: 99.4 F | HEIGHT: 66 IN | SYSTOLIC BLOOD PRESSURE: 142 MMHG | HEART RATE: 104 BPM | RESPIRATION RATE: 16 BRPM | WEIGHT: 140.3 LBS | BODY MASS INDEX: 22.55 KG/M2 | DIASTOLIC BLOOD PRESSURE: 72 MMHG | OXYGEN SATURATION: 98 %

## 2018-03-16 LAB
ANION GAP SERPL CALCULATED.3IONS-SCNC: 14 MMOL/L (ref 7–16)
BUN BLDV-MCNC: 7 MG/DL (ref 6–20)
CALCIUM SERPL-MCNC: 8.7 MG/DL (ref 8.6–10.2)
CHLORIDE BLD-SCNC: 97 MMOL/L (ref 98–107)
CO2: 22 MMOL/L (ref 22–29)
CREAT SERPL-MCNC: 0.5 MG/DL (ref 0.7–1.2)
GFR AFRICAN AMERICAN: >60
GFR NON-AFRICAN AMERICAN: >60 ML/MIN/1.73
GLUCOSE BLD-MCNC: 137 MG/DL (ref 74–109)
METER GLUCOSE: 102 MG/DL (ref 70–110)
METER GLUCOSE: 162 MG/DL (ref 70–110)
METER GLUCOSE: 217 MG/DL (ref 70–110)
METER GLUCOSE: 48 MG/DL (ref 70–110)
PHOSPHORUS: 2.7 MG/DL (ref 2.5–4.5)
POTASSIUM REFLEX MAGNESIUM: 4.1 MMOL/L (ref 3.5–5)
SODIUM BLD-SCNC: 133 MMOL/L (ref 132–146)

## 2018-03-16 PROCEDURE — 2580000003 HC RX 258: Performed by: INTERNAL MEDICINE

## 2018-03-16 PROCEDURE — 84100 ASSAY OF PHOSPHORUS: CPT

## 2018-03-16 PROCEDURE — 6370000000 HC RX 637 (ALT 250 FOR IP): Performed by: INTERNAL MEDICINE

## 2018-03-16 PROCEDURE — 2500000003 HC RX 250 WO HCPCS: Performed by: INTERNAL MEDICINE

## 2018-03-16 PROCEDURE — 6360000002 HC RX W HCPCS: Performed by: INTERNAL MEDICINE

## 2018-03-16 PROCEDURE — 82962 GLUCOSE BLOOD TEST: CPT

## 2018-03-16 PROCEDURE — 80048 BASIC METABOLIC PNL TOTAL CA: CPT

## 2018-03-16 PROCEDURE — 36415 COLL VENOUS BLD VENIPUNCTURE: CPT

## 2018-03-16 RX ORDER — HYDRALAZINE HYDROCHLORIDE 20 MG/ML
5 INJECTION INTRAMUSCULAR; INTRAVENOUS ONCE
Status: COMPLETED | OUTPATIENT
Start: 2018-03-16 | End: 2018-03-16

## 2018-03-16 RX ADMIN — INSULIN LISPRO 6 UNITS: 100 INJECTION, SOLUTION INTRAVENOUS; SUBCUTANEOUS at 09:02

## 2018-03-16 RX ADMIN — HYDRALAZINE HYDROCHLORIDE 5 MG: 20 INJECTION INTRAMUSCULAR; INTRAVENOUS at 00:57

## 2018-03-16 RX ADMIN — Medication 10 ML: at 01:00

## 2018-03-16 RX ADMIN — SODIUM PHOSPHATE, MONOBASIC, MONOHYDRATE 20 MMOL: 276; 142 INJECTION, SOLUTION INTRAVENOUS at 03:40

## 2018-03-16 ASSESSMENT — PAIN SCALES - GENERAL
PAINLEVEL_OUTOF10: 0
PAINLEVEL_OUTOF10: 0

## 2018-03-16 NOTE — CONSULTS
[address]  Department of Internal Medicine  Internal Medicine Residency Program  Critical care Consult    Patient:  Jarett Rubi 56 y.o. male MRN: [id number]     Date of Service: 3/15/2018      Chief complaint: Diarrhea / N / V    History obtained from: the patient    History of Present Illness     The patient is a 56 y.o. male with PMH of diabetes mellitus with insulin dependence, hypertension, peripheral neuropathy and polysubstance abuse presented to the ER complaining of nausea and vomiting. The patient got a history of type II diabetes currently having insulin pump. He said he has developed some episodes of diarrhea for the last few days, twice a day, and kind of watery. He has not had bowel movements since Monday. His appetite decreased since then and he has not eaten anything. He states that his insulin problems is working normally, and his glucose levels at home run around 200s mg/dL. Today, he felt more fatigue, nauseated and vomited with dark biliary like vomitus, but no bloody emesis. He also complains of thirsty. He decided to come into the ED. Otherwise, he denies chest pain, cough, shortness of breath, palpitation, fever, headache, new onset focal weaknesses, any active pain. Normal urination. ED Course: Vitals are stable. Labs were remarkable for glucose of 622 mg/dL, CO2 7, AG 46, ABG pH 7.1, BHB > 4.5. Insulin drip with DKA protocol was initiated. He was transferred to MICU for further management. Past Medical History:      Diagnosis Date    [facility]ism ([facility] [address]     Cocaine abuse     Diabetes mellitus ([facility] [address]     Hypertension     Idiopathic peripheral neuropathy 9/21/2016    Marijuana abuse        Past Surgical History:    History reviewed. No pertinent surgical history. Medications Prior to Admission:    Prior to Admission medications    Medication Sig Start Date End Date Taking?  Authorizing Provider   Insulin Pump - insulin lispro Inject 1 Units/hr icterus. · Neck: Supple, symmetrical, trachea midline, no cervical LAD. No thyroid enlargement/tenderness/nodules. No carotid bruit or JVD  · Chest wall: No tenderness or deformity, full & symmetric excursion  · Lung: Clear to auscultation bilaterally,  respirations unlabored. No rales/wheezing/rubs  · Heart: Tachycardia, regular rate and rhythm, S1 and S2 normal, no murmur. · Abdomen: Soft, non-tender, bowel sounds active all four quadrants, no masses, no organomegaly, insulin pump [facility]. · Genital and rectal exam: deferred  · Extremities:  Extremities normal, atraumatic, no cyanosis or edema. Pulses equal bilaterally  · Skin: Skin color, texture, turgor normal, no rashes or lesions  · Musculokeletal: No joint swelling, no muscle tenderness. ROM normal in all joints of extremities. · Lymph nodes: no lymph node enlargement appreciated  · Neurologic:   Alert&Oriented. CNII-XII intact. Normal and symmetric  strength in UEs and LEs,   DTRs 2+ and symmetric, Babinski sign is absent (flexor)  normal gait, coordination with finger to nose, heel to shin and repetitive movement is intact.  Romberg negative  sensation intact     Labs and Imaging Studies   Basic Labs  CBC:   Lab Results   Component Value Date    WBC 14.7 03/14/2018    RBC 5.72 03/14/2018    HGB 17.1 03/14/2018    HCT 53.3 03/14/2018     03/14/2018    MCV 93.2 03/14/2018     BMP:    Lab Results   Component Value Date     03/15/2018    K 3.9 03/15/2018    CL 96 03/15/2018    CO2 21 03/15/2018    BUN 11 03/15/2018    CREATININE 0.6 03/15/2018    GLUCOSE 148 03/15/2018    GLUCOSE 83 04/11/2012    CALCIUM 8.4 03/15/2018     CBC:   Lab Results   Component Value Date    WBC 14.7 03/14/2018    RBC 5.72 03/14/2018    HGB 17.1 03/14/2018    HCT 53.3 03/14/2018    MCV 93.2 03/14/2018    MCH 29.9 03/14/2018    MCHC 32.1 03/14/2018    RDW 13.6 03/14/2018     03/14/2018    MPV 8.7 03/14/2018     CMP:    Lab Results   Component Value

## 2018-03-18 LAB
ORGANISM: ABNORMAL
ORGANISM: ABNORMAL
URINE CULTURE, ROUTINE: ABNORMAL

## 2018-03-20 LAB
BLOOD CULTURE, ROUTINE: NORMAL
CULTURE, BLOOD 2: NORMAL

## 2018-04-07 LAB
EKG ATRIAL RATE: 120 BPM
EKG P AXIS: 77 DEGREES
EKG P-R INTERVAL: 120 MS
EKG Q-T INTERVAL: 324 MS
EKG QRS DURATION: 84 MS
EKG QTC CALCULATION (BAZETT): 457 MS
EKG R AXIS: 78 DEGREES
EKG T AXIS: 5 DEGREES
EKG VENTRICULAR RATE: 120 BPM

## 2018-06-08 ENCOUNTER — HOSPITAL ENCOUNTER (INPATIENT)
Age: 48
LOS: 3 days | Discharge: HOME | DRG: 420 | End: 2018-06-12
Attending: EMERGENCY MEDICINE | Admitting: INTERNAL MEDICINE
Payer: MEDICARE

## 2018-06-08 DIAGNOSIS — E87.29 HIGH ANION GAP METABOLIC ACIDOSIS: ICD-10-CM

## 2018-06-08 DIAGNOSIS — E10.10 DIABETIC KETOACIDOSIS WITHOUT COMA ASSOCIATED WITH TYPE 1 DIABETES MELLITUS (HCC): Primary | ICD-10-CM

## 2018-06-08 LAB
ALBUMIN SERPL-MCNC: 5.1 G/DL (ref 3.5–5.2)
ALP BLD-CCNC: 103 U/L (ref 40–129)
ALT SERPL-CCNC: 30 U/L (ref 0–40)
ANION GAP SERPL CALCULATED.3IONS-SCNC: 37 MMOL/L (ref 7–16)
AST SERPL-CCNC: 17 U/L (ref 0–39)
BACTERIA: NORMAL /HPF
BASOPHILS ABSOLUTE: 0.08 E9/L (ref 0–0.2)
BASOPHILS RELATIVE PERCENT: 0.9 % (ref 0–2)
BETA-HYDROXYBUTYRATE: >4.5 MMOL/L (ref 0.02–0.27)
BILIRUB SERPL-MCNC: 0.6 MG/DL (ref 0–1.2)
BILIRUBIN URINE: ABNORMAL
BLOOD, URINE: ABNORMAL
BUN BLDV-MCNC: 15 MG/DL (ref 6–20)
CALCIUM SERPL-MCNC: 9.3 MG/DL (ref 8.6–10.2)
CASTS: NORMAL /LPF
CHLORIDE BLD-SCNC: 88 MMOL/L (ref 98–107)
CLARITY: CLEAR
CO2: 8 MMOL/L (ref 22–29)
COLOR: YELLOW
CREAT SERPL-MCNC: 0.9 MG/DL (ref 0.7–1.2)
EKG ATRIAL RATE: 112 BPM
EKG P AXIS: 74 DEGREES
EKG P-R INTERVAL: 124 MS
EKG Q-T INTERVAL: 330 MS
EKG QRS DURATION: 98 MS
EKG QTC CALCULATION (BAZETT): 450 MS
EKG R AXIS: 73 DEGREES
EKG T AXIS: -32 DEGREES
EKG VENTRICULAR RATE: 112 BPM
EOSINOPHILS ABSOLUTE: 0.09 E9/L (ref 0.05–0.5)
EOSINOPHILS RELATIVE PERCENT: 1 % (ref 0–6)
GFR AFRICAN AMERICAN: >60
GFR NON-AFRICAN AMERICAN: >60 ML/MIN/1.73
GLUCOSE BLD-MCNC: 398 MG/DL (ref 74–109)
GLUCOSE URINE: >=1000 MG/DL
HCT VFR BLD CALC: 50.6 % (ref 37–54)
HEMOGLOBIN: 16.8 G/DL (ref 12.5–16.5)
IMMATURE GRANULOCYTES #: 0.05 E9/L
IMMATURE GRANULOCYTES %: 0.6 % (ref 0–5)
KETONES, URINE: >=80 MG/DL
LACTIC ACID: 1.6 MMOL/L (ref 0.5–2.2)
LEUKOCYTE ESTERASE, URINE: NEGATIVE
LYMPHOCYTES ABSOLUTE: 1.85 E9/L (ref 1.5–4)
LYMPHOCYTES RELATIVE PERCENT: 21.1 % (ref 20–42)
MAGNESIUM: 2.1 MG/DL (ref 1.6–2.6)
MCH RBC QN AUTO: 31.2 PG (ref 26–35)
MCHC RBC AUTO-ENTMCNC: 33.2 % (ref 32–34.5)
MCV RBC AUTO: 93.9 FL (ref 80–99.9)
MONOCYTES ABSOLUTE: 0.6 E9/L (ref 0.1–0.95)
MONOCYTES RELATIVE PERCENT: 6.8 % (ref 2–12)
NEUTROPHILS ABSOLUTE: 6.11 E9/L (ref 1.8–7.3)
NEUTROPHILS RELATIVE PERCENT: 69.6 % (ref 43–80)
NITRITE, URINE: NEGATIVE
PDW BLD-RTO: 12.8 FL (ref 11.5–15)
PH UA: 5.5 (ref 5–9)
PH VENOUS: 7.07 (ref 7.3–7.42)
PLATELET # BLD: 517 E9/L (ref 130–450)
PMV BLD AUTO: 8.8 FL (ref 7–12)
POTASSIUM SERPL-SCNC: 4.6 MMOL/L (ref 3.5–5)
PROTEIN UA: 100 MG/DL
RBC # BLD: 5.39 E12/L (ref 3.8–5.8)
RBC UA: NORMAL /HPF (ref 0–2)
SODIUM BLD-SCNC: 133 MMOL/L (ref 132–146)
SPECIFIC GRAVITY UA: >=1.03 (ref 1–1.03)
TOTAL PROTEIN: 8 G/DL (ref 6.4–8.3)
TROPONIN: <0.01 NG/ML (ref 0–0.03)
UROBILINOGEN, URINE: 0.2 E.U./DL
WBC # BLD: 8.8 E9/L (ref 4.5–11.5)
WBC UA: NORMAL /HPF (ref 0–5)

## 2018-06-08 PROCEDURE — 85025 COMPLETE CBC W/AUTO DIFF WBC: CPT

## 2018-06-08 PROCEDURE — 83605 ASSAY OF LACTIC ACID: CPT

## 2018-06-08 PROCEDURE — 94761 N-INVAS EAR/PLS OXIMETRY MLT: CPT

## 2018-06-08 PROCEDURE — 84484 ASSAY OF TROPONIN QUANT: CPT

## 2018-06-08 PROCEDURE — 02HV33Z INSERTION OF INFUSION DEVICE INTO SUPERIOR VENA CAVA, PERCUTANEOUS APPROACH: ICD-10-PCS | Performed by: EMERGENCY MEDICINE

## 2018-06-08 PROCEDURE — 80053 COMPREHEN METABOLIC PANEL: CPT

## 2018-06-08 PROCEDURE — 82010 KETONE BODYS QUAN: CPT

## 2018-06-08 PROCEDURE — 36556 INSERT NON-TUNNEL CV CATH: CPT

## 2018-06-08 PROCEDURE — 81001 URINALYSIS AUTO W/SCOPE: CPT

## 2018-06-08 PROCEDURE — 83735 ASSAY OF MAGNESIUM: CPT

## 2018-06-08 PROCEDURE — 36415 COLL VENOUS BLD VENIPUNCTURE: CPT

## 2018-06-08 PROCEDURE — 99285 EMERGENCY DEPT VISIT HI MDM: CPT

## 2018-06-08 PROCEDURE — 93005 ELECTROCARDIOGRAM TRACING: CPT | Performed by: NURSE PRACTITIONER

## 2018-06-08 PROCEDURE — 82800 BLOOD PH: CPT

## 2018-06-08 RX ORDER — SODIUM CHLORIDE 9 MG/ML
INJECTION, SOLUTION INTRAVENOUS CONTINUOUS
Status: DISCONTINUED | OUTPATIENT
Start: 2018-06-09 | End: 2018-06-10

## 2018-06-08 RX ORDER — MAGNESIUM SULFATE 1 G/100ML
1 INJECTION INTRAVENOUS PRN
Status: DISCONTINUED | OUTPATIENT
Start: 2018-06-08 | End: 2018-06-11

## 2018-06-08 RX ORDER — DEXTROSE MONOHYDRATE 25 G/50ML
12.5 INJECTION, SOLUTION INTRAVENOUS PRN
Status: DISCONTINUED | OUTPATIENT
Start: 2018-06-08 | End: 2018-06-11

## 2018-06-08 RX ORDER — 0.9 % SODIUM CHLORIDE 0.9 %
30 INTRAVENOUS SOLUTION INTRAVENOUS ONCE
Status: COMPLETED | OUTPATIENT
Start: 2018-06-08 | End: 2018-06-09

## 2018-06-08 RX ORDER — POTASSIUM CHLORIDE 7.45 MG/ML
10 INJECTION INTRAVENOUS PRN
Status: DISCONTINUED | OUTPATIENT
Start: 2018-06-08 | End: 2018-06-11

## 2018-06-08 RX ORDER — DEXTROSE AND SODIUM CHLORIDE 5; .45 G/100ML; G/100ML
INJECTION, SOLUTION INTRAVENOUS CONTINUOUS PRN
Status: DISCONTINUED | OUTPATIENT
Start: 2018-06-08 | End: 2018-06-11

## 2018-06-09 ENCOUNTER — APPOINTMENT (OUTPATIENT)
Dept: GENERAL RADIOLOGY | Age: 48
DRG: 420 | End: 2018-06-09
Payer: MEDICARE

## 2018-06-09 LAB
ANION GAP SERPL CALCULATED.3IONS-SCNC: 13 MMOL/L (ref 7–16)
ANION GAP SERPL CALCULATED.3IONS-SCNC: 16 MMOL/L (ref 7–16)
ANION GAP SERPL CALCULATED.3IONS-SCNC: 25 MMOL/L (ref 7–16)
ANION GAP SERPL CALCULATED.3IONS-SCNC: 27 MMOL/L (ref 7–16)
BUN BLDV-MCNC: 11 MG/DL (ref 6–20)
BUN BLDV-MCNC: 12 MG/DL (ref 6–20)
BUN BLDV-MCNC: 14 MG/DL (ref 6–20)
BUN BLDV-MCNC: 14 MG/DL (ref 6–20)
CALCIUM SERPL-MCNC: 7.5 MG/DL (ref 8.6–10.2)
CALCIUM SERPL-MCNC: 8 MG/DL (ref 8.6–10.2)
CALCIUM SERPL-MCNC: 8.1 MG/DL (ref 8.6–10.2)
CALCIUM SERPL-MCNC: 8.2 MG/DL (ref 8.6–10.2)
CHLORIDE BLD-SCNC: 93 MMOL/L (ref 98–107)
CHLORIDE BLD-SCNC: 95 MMOL/L (ref 98–107)
CHLORIDE BLD-SCNC: 96 MMOL/L (ref 98–107)
CHLORIDE BLD-SCNC: 99 MMOL/L (ref 98–107)
CHP ED QC CHECK: YES
CO2: 10 MMOL/L (ref 22–29)
CO2: 11 MMOL/L (ref 22–29)
CO2: 18 MMOL/L (ref 22–29)
CO2: 19 MMOL/L (ref 22–29)
CREAT SERPL-MCNC: 0.6 MG/DL (ref 0.7–1.2)
CREAT SERPL-MCNC: 0.7 MG/DL (ref 0.7–1.2)
GFR AFRICAN AMERICAN: >60
GFR NON-AFRICAN AMERICAN: >60 ML/MIN/1.73
GLUCOSE BLD-MCNC: 140 MG/DL
GLUCOSE BLD-MCNC: 141 MG/DL
GLUCOSE BLD-MCNC: 144 MG/DL (ref 74–109)
GLUCOSE BLD-MCNC: 164 MG/DL
GLUCOSE BLD-MCNC: 189 MG/DL (ref 74–109)
GLUCOSE BLD-MCNC: 227 MG/DL (ref 74–109)
GLUCOSE BLD-MCNC: 261 MG/DL (ref 74–109)
GLUCOSE BLD-MCNC: 381 MG/DL (ref 74–109)
MAGNESIUM: 1.7 MG/DL (ref 1.6–2.6)
MAGNESIUM: 1.8 MG/DL (ref 1.6–2.6)
METER GLUCOSE: 140 MG/DL (ref 70–110)
METER GLUCOSE: 141 MG/DL (ref 70–110)
METER GLUCOSE: 164 MG/DL (ref 70–110)
METER GLUCOSE: 186 MG/DL (ref 70–110)
METER GLUCOSE: 189 MG/DL (ref 70–110)
METER GLUCOSE: 210 MG/DL (ref 70–110)
METER GLUCOSE: 219 MG/DL (ref 70–110)
METER GLUCOSE: 250 MG/DL (ref 70–110)
METER GLUCOSE: 269 MG/DL (ref 70–110)
METER GLUCOSE: 310 MG/DL (ref 70–110)
METER GLUCOSE: 334 MG/DL (ref 70–110)
METER GLUCOSE: 389 MG/DL (ref 70–110)
METER GLUCOSE: 426 MG/DL (ref 70–110)
PHOSPHORUS: 1.5 MG/DL (ref 2.5–4.5)
PHOSPHORUS: 1.9 MG/DL (ref 2.5–4.5)
PHOSPHORUS: 2.4 MG/DL (ref 2.5–4.5)
PHOSPHORUS: 3 MG/DL (ref 2.5–4.5)
POC CHLORIDE: 104 MMOL/L (ref 100–108)
POC CREATININE: 0.6 MG/DL (ref 0.7–1.2)
POC POTASSIUM: 3.8 MMOL/L (ref 3.5–5)
POC SODIUM: 135 MMOL/L (ref 132–146)
POTASSIUM SERPL-SCNC: 4 MMOL/L (ref 3.5–5)
POTASSIUM SERPL-SCNC: 4.1 MMOL/L (ref 3.5–5)
POTASSIUM SERPL-SCNC: 4.4 MMOL/L (ref 3.5–5)
POTASSIUM SERPL-SCNC: 4.6 MMOL/L (ref 3.5–5)
SODIUM BLD-SCNC: 130 MMOL/L (ref 132–146)
SODIUM BLD-SCNC: 130 MMOL/L (ref 132–146)
SODIUM BLD-SCNC: 131 MMOL/L (ref 132–146)
SODIUM BLD-SCNC: 131 MMOL/L (ref 132–146)

## 2018-06-09 PROCEDURE — 84132 ASSAY OF SERUM POTASSIUM: CPT

## 2018-06-09 PROCEDURE — G0378 HOSPITAL OBSERVATION PER HR: HCPCS

## 2018-06-09 PROCEDURE — 71045 X-RAY EXAM CHEST 1 VIEW: CPT

## 2018-06-09 PROCEDURE — 83735 ASSAY OF MAGNESIUM: CPT

## 2018-06-09 PROCEDURE — 84295 ASSAY OF SERUM SODIUM: CPT

## 2018-06-09 PROCEDURE — 6370000000 HC RX 637 (ALT 250 FOR IP): Performed by: EMERGENCY MEDICINE

## 2018-06-09 PROCEDURE — 84100 ASSAY OF PHOSPHORUS: CPT

## 2018-06-09 PROCEDURE — 99223 1ST HOSP IP/OBS HIGH 75: CPT | Performed by: INTERNAL MEDICINE

## 2018-06-09 PROCEDURE — 6370000000 HC RX 637 (ALT 250 FOR IP): Performed by: STUDENT IN AN ORGANIZED HEALTH CARE EDUCATION/TRAINING PROGRAM

## 2018-06-09 PROCEDURE — 82435 ASSAY OF BLOOD CHLORIDE: CPT

## 2018-06-09 PROCEDURE — 6360000002 HC RX W HCPCS: Performed by: EMERGENCY MEDICINE

## 2018-06-09 PROCEDURE — 82962 GLUCOSE BLOOD TEST: CPT

## 2018-06-09 PROCEDURE — 2580000003 HC RX 258: Performed by: EMERGENCY MEDICINE

## 2018-06-09 PROCEDURE — 82565 ASSAY OF CREATININE: CPT

## 2018-06-09 PROCEDURE — 82947 ASSAY GLUCOSE BLOOD QUANT: CPT

## 2018-06-09 PROCEDURE — 80048 BASIC METABOLIC PNL TOTAL CA: CPT

## 2018-06-09 PROCEDURE — 36415 COLL VENOUS BLD VENIPUNCTURE: CPT

## 2018-06-09 PROCEDURE — 2580000003 HC RX 258: Performed by: STUDENT IN AN ORGANIZED HEALTH CARE EDUCATION/TRAINING PROGRAM

## 2018-06-09 RX ORDER — PANTOPRAZOLE SODIUM 40 MG/1
40 TABLET, DELAYED RELEASE ORAL
Status: DISCONTINUED | OUTPATIENT
Start: 2018-06-10 | End: 2018-06-12 | Stop reason: HOSPADM

## 2018-06-09 RX ADMIN — SODIUM CHLORIDE 0.01 UNITS/KG/HR: 9 INJECTION, SOLUTION INTRAVENOUS at 09:39

## 2018-06-09 RX ADMIN — SODIUM CHLORIDE 1905 ML: 9 INJECTION, SOLUTION INTRAVENOUS at 00:24

## 2018-06-09 RX ADMIN — LIDOCAINE HYDROCHLORIDE: 20 SOLUTION ORAL; TOPICAL at 01:43

## 2018-06-09 RX ADMIN — SODIUM CHLORIDE: 9 INJECTION, SOLUTION INTRAVENOUS at 20:39

## 2018-06-09 RX ADMIN — POTASSIUM CHLORIDE 10 MEQ: 10 INJECTION, SOLUTION INTRAVENOUS at 05:38

## 2018-06-09 RX ADMIN — POTASSIUM CHLORIDE 10 MEQ: 10 INJECTION, SOLUTION INTRAVENOUS at 06:15

## 2018-06-09 RX ADMIN — DEXTROSE AND SODIUM CHLORIDE: 5; 450 INJECTION, SOLUTION INTRAVENOUS at 04:07

## 2018-06-09 RX ADMIN — POTASSIUM CHLORIDE 30 MEQ: 10 INJECTION, SOLUTION INTRAVENOUS at 04:59

## 2018-06-09 RX ADMIN — SODIUM CHLORIDE 0.1 UNITS/KG/HR: 9 INJECTION, SOLUTION INTRAVENOUS at 00:38

## 2018-06-09 RX ADMIN — SODIUM CHLORIDE: 9 INJECTION, SOLUTION INTRAVENOUS at 03:02

## 2018-06-09 ASSESSMENT — PAIN SCALES - GENERAL: PAINLEVEL_OUTOF10: 0

## 2018-06-09 ASSESSMENT — ENCOUNTER SYMPTOMS
COUGH: 0
COLOR CHANGE: 0
CONSTIPATION: 0
BACK PAIN: 0
WHEEZING: 0
CHOKING: 0
DIARRHEA: 0
VOMITING: 1
SHORTNESS OF BREATH: 0
NAUSEA: 1

## 2018-06-10 LAB
ANION GAP SERPL CALCULATED.3IONS-SCNC: 11 MMOL/L (ref 7–16)
ANION GAP SERPL CALCULATED.3IONS-SCNC: 12 MMOL/L (ref 7–16)
ANION GAP SERPL CALCULATED.3IONS-SCNC: 14 MMOL/L (ref 7–16)
ANION GAP SERPL CALCULATED.3IONS-SCNC: 16 MMOL/L (ref 7–16)
ANION GAP SERPL CALCULATED.3IONS-SCNC: 30 MMOL/L (ref 7–16)
B.E.: -9.2 MMOL/L (ref -3–3)
BUN BLDV-MCNC: 10 MG/DL (ref 6–20)
BUN BLDV-MCNC: 10 MG/DL (ref 6–20)
BUN BLDV-MCNC: 12 MG/DL (ref 6–20)
BUN BLDV-MCNC: 8 MG/DL (ref 6–20)
BUN BLDV-MCNC: 8 MG/DL (ref 6–20)
C-REACTIVE PROTEIN: <0.1 MG/DL (ref 0–0.4)
CALCIUM SERPL-MCNC: 7.6 MG/DL (ref 8.6–10.2)
CALCIUM SERPL-MCNC: 7.7 MG/DL (ref 8.6–10.2)
CALCIUM SERPL-MCNC: 7.8 MG/DL (ref 8.6–10.2)
CALCIUM SERPL-MCNC: 8.1 MG/DL (ref 8.6–10.2)
CALCIUM SERPL-MCNC: 8.3 MG/DL (ref 8.6–10.2)
CHLORIDE BLD-SCNC: 100 MMOL/L (ref 98–107)
CHLORIDE BLD-SCNC: 100 MMOL/L (ref 98–107)
CHLORIDE BLD-SCNC: 102 MMOL/L (ref 98–107)
CHLORIDE BLD-SCNC: 103 MMOL/L (ref 98–107)
CHLORIDE BLD-SCNC: 93 MMOL/L (ref 98–107)
CO2: 17 MMOL/L (ref 22–29)
CO2: 18 MMOL/L (ref 22–29)
CO2: 20 MMOL/L (ref 22–29)
CO2: 21 MMOL/L (ref 22–29)
CO2: 6 MMOL/L (ref 22–29)
COHB: 0.3 % (ref 0–1.5)
CREAT SERPL-MCNC: 0.5 MG/DL (ref 0.7–1.2)
CREAT SERPL-MCNC: 0.5 MG/DL (ref 0.7–1.2)
CREAT SERPL-MCNC: 0.6 MG/DL (ref 0.7–1.2)
CREAT SERPL-MCNC: 0.6 MG/DL (ref 0.7–1.2)
CREAT SERPL-MCNC: 0.8 MG/DL (ref 0.7–1.2)
CREATININE URINE: 26 MG/DL (ref 40–278)
CRITICAL: ABNORMAL
DATE ANALYZED: ABNORMAL
DATE OF COLLECTION: ABNORMAL
GFR AFRICAN AMERICAN: >60
GFR NON-AFRICAN AMERICAN: >60 ML/MIN/1.73
GLUCOSE BLD-MCNC: 119 MG/DL (ref 74–109)
GLUCOSE BLD-MCNC: 121 MG/DL (ref 74–109)
GLUCOSE BLD-MCNC: 196 MG/DL (ref 74–109)
GLUCOSE BLD-MCNC: 244 MG/DL (ref 74–109)
GLUCOSE BLD-MCNC: 300 MG/DL (ref 74–109)
HBA1C MFR BLD: 10.2 % (ref 4.8–5.9)
HCO3: 15.1 MMOL/L (ref 22–26)
HHB: 3 % (ref 0–5)
LAB: 9558
Lab: 636
MAGNESIUM: 1.7 MG/DL (ref 1.6–2.6)
MAGNESIUM: 1.8 MG/DL (ref 1.6–2.6)
MAGNESIUM: 2.1 MG/DL (ref 1.6–2.6)
METER GLUCOSE: 115 MG/DL (ref 70–110)
METER GLUCOSE: 115 MG/DL (ref 70–110)
METER GLUCOSE: 118 MG/DL (ref 70–110)
METER GLUCOSE: 119 MG/DL (ref 70–110)
METER GLUCOSE: 152 MG/DL (ref 70–110)
METER GLUCOSE: 181 MG/DL (ref 70–110)
METER GLUCOSE: 191 MG/DL (ref 70–110)
METER GLUCOSE: 203 MG/DL (ref 70–110)
METER GLUCOSE: 205 MG/DL (ref 70–110)
METER GLUCOSE: 208 MG/DL (ref 70–110)
METER GLUCOSE: 221 MG/DL (ref 70–110)
METER GLUCOSE: 262 MG/DL (ref 70–110)
METER GLUCOSE: 291 MG/DL (ref 70–110)
METER GLUCOSE: 370 MG/DL (ref 70–110)
METHB: 0.2 % (ref 0–1.5)
MICROALBUMIN UR-MCNC: <12 MG/L
MICROALBUMIN/CREAT UR-RTO: ABNORMAL (ref 0–30)
MODE: ABNORMAL
O2 CONTENT: 17.3 ML/DL
O2 SATURATION: 97 % (ref 92–98.5)
O2HB: 96.5 % (ref 94–97)
OPERATOR ID: ABNORMAL
OSMOLALITY: 290 MOSM/KG (ref 285–310)
PATIENT TEMP: 37 C
PCO2: 28.5 MMHG (ref 35–45)
PH BLOOD GAS: 7.34 (ref 7.35–7.45)
PHOSPHORUS: 1.9 MG/DL (ref 2.5–4.5)
PHOSPHORUS: 1.9 MG/DL (ref 2.5–4.5)
PHOSPHORUS: 2.4 MG/DL (ref 2.5–4.5)
PHOSPHORUS: 2.5 MG/DL (ref 2.5–4.5)
PHOSPHORUS: 3.3 MG/DL (ref 2.5–4.5)
PO2: 82.5 MMHG (ref 60–100)
POTASSIUM SERPL-SCNC: 3.8 MMOL/L (ref 3.5–5)
POTASSIUM SERPL-SCNC: 3.8 MMOL/L (ref 3.5–5)
POTASSIUM SERPL-SCNC: 4.1 MMOL/L (ref 3.5–5)
POTASSIUM SERPL-SCNC: 4.2 MMOL/L (ref 3.5–5)
POTASSIUM SERPL-SCNC: 4.4 MMOL/L (ref 3.5–5)
PRO-BNP: 22 PG/ML (ref 0–125)
PROCALCITONIN: 0.11 NG/ML (ref 0–0.08)
SODIUM BLD-SCNC: 129 MMOL/L (ref 132–146)
SODIUM BLD-SCNC: 131 MMOL/L (ref 132–146)
SODIUM BLD-SCNC: 132 MMOL/L (ref 132–146)
SODIUM BLD-SCNC: 135 MMOL/L (ref 132–146)
SODIUM BLD-SCNC: 136 MMOL/L (ref 132–146)
SOURCE, BLOOD GAS: ABNORMAL
THB: 12.7 G/DL (ref 11.5–16.5)
TIME ANALYZED: 636

## 2018-06-10 PROCEDURE — 84145 PROCALCITONIN (PCT): CPT

## 2018-06-10 PROCEDURE — 86140 C-REACTIVE PROTEIN: CPT

## 2018-06-10 PROCEDURE — 6370000000 HC RX 637 (ALT 250 FOR IP)

## 2018-06-10 PROCEDURE — 84100 ASSAY OF PHOSPHORUS: CPT

## 2018-06-10 PROCEDURE — 2500000003 HC RX 250 WO HCPCS: Performed by: INTERNAL MEDICINE

## 2018-06-10 PROCEDURE — 83880 ASSAY OF NATRIURETIC PEPTIDE: CPT

## 2018-06-10 PROCEDURE — 80048 BASIC METABOLIC PNL TOTAL CA: CPT

## 2018-06-10 PROCEDURE — 6360000002 HC RX W HCPCS: Performed by: INTERNAL MEDICINE

## 2018-06-10 PROCEDURE — 82044 UR ALBUMIN SEMIQUANTITATIVE: CPT

## 2018-06-10 PROCEDURE — 87081 CULTURE SCREEN ONLY: CPT

## 2018-06-10 PROCEDURE — 83735 ASSAY OF MAGNESIUM: CPT

## 2018-06-10 PROCEDURE — 2580000003 HC RX 258: Performed by: INTERNAL MEDICINE

## 2018-06-10 PROCEDURE — 6370000000 HC RX 637 (ALT 250 FOR IP): Performed by: INTERNAL MEDICINE

## 2018-06-10 PROCEDURE — 6360000002 HC RX W HCPCS: Performed by: EMERGENCY MEDICINE

## 2018-06-10 PROCEDURE — 82570 ASSAY OF URINE CREATININE: CPT

## 2018-06-10 PROCEDURE — 82962 GLUCOSE BLOOD TEST: CPT

## 2018-06-10 PROCEDURE — 2000000000 HC ICU R&B

## 2018-06-10 PROCEDURE — 99233 SBSQ HOSP IP/OBS HIGH 50: CPT | Performed by: INTERNAL MEDICINE

## 2018-06-10 PROCEDURE — 82805 BLOOD GASES W/O2 SATURATION: CPT

## 2018-06-10 PROCEDURE — 6370000000 HC RX 637 (ALT 250 FOR IP): Performed by: EMERGENCY MEDICINE

## 2018-06-10 PROCEDURE — 6360000002 HC RX W HCPCS: Performed by: STUDENT IN AN ORGANIZED HEALTH CARE EDUCATION/TRAINING PROGRAM

## 2018-06-10 PROCEDURE — 83930 ASSAY OF BLOOD OSMOLALITY: CPT

## 2018-06-10 PROCEDURE — 36415 COLL VENOUS BLD VENIPUNCTURE: CPT

## 2018-06-10 PROCEDURE — 87040 BLOOD CULTURE FOR BACTERIA: CPT

## 2018-06-10 PROCEDURE — 2580000003 HC RX 258: Performed by: EMERGENCY MEDICINE

## 2018-06-10 PROCEDURE — 83036 HEMOGLOBIN GLYCOSYLATED A1C: CPT

## 2018-06-10 RX ORDER — DEXTROSE MONOHYDRATE 50 MG/ML
100 INJECTION, SOLUTION INTRAVENOUS PRN
Status: DISCONTINUED | OUTPATIENT
Start: 2018-06-10 | End: 2018-06-11

## 2018-06-10 RX ORDER — INSULIN GLARGINE 100 [IU]/ML
20 INJECTION, SOLUTION SUBCUTANEOUS NIGHTLY
Status: DISCONTINUED | OUTPATIENT
Start: 2018-06-10 | End: 2018-06-11

## 2018-06-10 RX ORDER — DEXTROSE MONOHYDRATE 25 G/50ML
12.5 INJECTION, SOLUTION INTRAVENOUS PRN
Status: DISCONTINUED | OUTPATIENT
Start: 2018-06-10 | End: 2018-06-11

## 2018-06-10 RX ORDER — MAGNESIUM SULFATE IN WATER 40 MG/ML
2 INJECTION, SOLUTION INTRAVENOUS ONCE
Status: COMPLETED | OUTPATIENT
Start: 2018-06-10 | End: 2018-06-10

## 2018-06-10 RX ORDER — INSULIN GLARGINE 100 [IU]/ML
20 INJECTION, SOLUTION SUBCUTANEOUS 2 TIMES DAILY
Status: DISCONTINUED | OUTPATIENT
Start: 2018-06-10 | End: 2018-06-10

## 2018-06-10 RX ORDER — NICOTINE POLACRILEX 4 MG
15 LOZENGE BUCCAL PRN
Status: DISCONTINUED | OUTPATIENT
Start: 2018-06-10 | End: 2018-06-12 | Stop reason: HOSPADM

## 2018-06-10 RX ADMIN — SODIUM CHLORIDE: 9 INJECTION, SOLUTION INTRAVENOUS at 01:15

## 2018-06-10 RX ADMIN — POTASSIUM CHLORIDE 10 MEQ: 10 INJECTION, SOLUTION INTRAVENOUS at 04:16

## 2018-06-10 RX ADMIN — ENOXAPARIN SODIUM 40 MG: 100 INJECTION SUBCUTANEOUS at 08:30

## 2018-06-10 RX ADMIN — POTASSIUM CHLORIDE 10 MEQ: 10 INJECTION, SOLUTION INTRAVENOUS at 10:10

## 2018-06-10 RX ADMIN — SODIUM CHLORIDE 6.35 UNITS/HR: 9 INJECTION, SOLUTION INTRAVENOUS at 00:59

## 2018-06-10 RX ADMIN — DEXTROSE AND SODIUM CHLORIDE: 5; 450 INJECTION, SOLUTION INTRAVENOUS at 03:17

## 2018-06-10 RX ADMIN — INSULIN LISPRO 2 UNITS: 100 INJECTION, SOLUTION INTRAVENOUS; SUBCUTANEOUS at 17:08

## 2018-06-10 RX ADMIN — POTASSIUM CHLORIDE 10 MEQ: 10 INJECTION, SOLUTION INTRAVENOUS at 03:30

## 2018-06-10 RX ADMIN — MAGNESIUM SULFATE HEPTAHYDRATE 2 G: 40 INJECTION, SOLUTION INTRAVENOUS at 14:07

## 2018-06-10 RX ADMIN — INSULIN GLARGINE 20 UNITS: 100 INJECTION, SOLUTION SUBCUTANEOUS at 12:19

## 2018-06-10 RX ADMIN — SODIUM PHOSPHATE, MONOBASIC, MONOHYDRATE 20 MMOL: 276; 142 INJECTION, SOLUTION INTRAVENOUS at 03:20

## 2018-06-10 RX ADMIN — SODIUM BICARBONATE: 84 INJECTION, SOLUTION INTRAVENOUS at 06:34

## 2018-06-10 RX ADMIN — INSULIN LISPRO 2 UNITS: 100 INJECTION, SOLUTION INTRAVENOUS; SUBCUTANEOUS at 21:16

## 2018-06-10 RX ADMIN — POTASSIUM CHLORIDE 10 MEQ: 10 INJECTION, SOLUTION INTRAVENOUS at 09:15

## 2018-06-10 RX ADMIN — POTASSIUM CHLORIDE 10 MEQ: 10 INJECTION, SOLUTION INTRAVENOUS at 02:33

## 2018-06-10 RX ADMIN — DEXTROSE AND SODIUM CHLORIDE: 5; 450 INJECTION, SOLUTION INTRAVENOUS at 10:11

## 2018-06-10 RX ADMIN — PANTOPRAZOLE SODIUM 40 MG: 40 TABLET, DELAYED RELEASE ORAL at 06:51

## 2018-06-10 RX ADMIN — POTASSIUM CHLORIDE 10 MEQ: 10 INJECTION, SOLUTION INTRAVENOUS at 08:19

## 2018-06-10 ASSESSMENT — PAIN SCALES - GENERAL
PAINLEVEL_OUTOF10: 0

## 2018-06-11 ENCOUNTER — APPOINTMENT (OUTPATIENT)
Dept: CT IMAGING | Age: 48
DRG: 420 | End: 2018-06-11
Payer: MEDICARE

## 2018-06-11 PROBLEM — D69.6 THROMBOCYTOPENIA (HCC): Status: ACTIVE | Noted: 2018-06-11

## 2018-06-11 LAB
ANION GAP SERPL CALCULATED.3IONS-SCNC: 10 MMOL/L (ref 7–16)
ANION GAP SERPL CALCULATED.3IONS-SCNC: 11 MMOL/L (ref 7–16)
BUN BLDV-MCNC: 14 MG/DL (ref 6–20)
BUN BLDV-MCNC: 8 MG/DL (ref 6–20)
CALCIUM SERPL-MCNC: 8.4 MG/DL (ref 8.6–10.2)
CALCIUM SERPL-MCNC: 8.8 MG/DL (ref 8.6–10.2)
CHLORIDE BLD-SCNC: 97 MMOL/L (ref 98–107)
CHLORIDE BLD-SCNC: 99 MMOL/L (ref 98–107)
CO2: 25 MMOL/L (ref 22–29)
CO2: 26 MMOL/L (ref 22–29)
CREAT SERPL-MCNC: 0.5 MG/DL (ref 0.7–1.2)
CREAT SERPL-MCNC: 0.6 MG/DL (ref 0.7–1.2)
GFR AFRICAN AMERICAN: >60
GFR AFRICAN AMERICAN: >60
GFR NON-AFRICAN AMERICAN: >60 ML/MIN/1.73
GFR NON-AFRICAN AMERICAN: >60 ML/MIN/1.73
GLUCOSE BLD-MCNC: 347 MG/DL (ref 74–109)
GLUCOSE BLD-MCNC: 92 MG/DL (ref 74–109)
MAGNESIUM: 1.8 MG/DL (ref 1.6–2.6)
METER GLUCOSE: 221 MG/DL (ref 70–110)
METER GLUCOSE: 351 MG/DL (ref 70–110)
METER GLUCOSE: 383 MG/DL (ref 70–110)
MRSA CULTURE ONLY: NORMAL
PHOSPHORUS: 2.7 MG/DL (ref 2.5–4.5)
POTASSIUM SERPL-SCNC: 3.6 MMOL/L (ref 3.5–5)
POTASSIUM SERPL-SCNC: 4 MMOL/L (ref 3.5–5)
SODIUM BLD-SCNC: 133 MMOL/L (ref 132–146)
SODIUM BLD-SCNC: 135 MMOL/L (ref 132–146)

## 2018-06-11 PROCEDURE — 83735 ASSAY OF MAGNESIUM: CPT

## 2018-06-11 PROCEDURE — 36592 COLLECT BLOOD FROM PICC: CPT

## 2018-06-11 PROCEDURE — 6360000002 HC RX W HCPCS: Performed by: INTERNAL MEDICINE

## 2018-06-11 PROCEDURE — 74150 CT ABDOMEN W/O CONTRAST: CPT

## 2018-06-11 PROCEDURE — 6370000000 HC RX 637 (ALT 250 FOR IP): Performed by: INTERNAL MEDICINE

## 2018-06-11 PROCEDURE — 82962 GLUCOSE BLOOD TEST: CPT

## 2018-06-11 PROCEDURE — 6370000000 HC RX 637 (ALT 250 FOR IP): Performed by: STUDENT IN AN ORGANIZED HEALTH CARE EDUCATION/TRAINING PROGRAM

## 2018-06-11 PROCEDURE — 84100 ASSAY OF PHOSPHORUS: CPT

## 2018-06-11 PROCEDURE — 1200000000 HC SEMI PRIVATE

## 2018-06-11 PROCEDURE — 36415 COLL VENOUS BLD VENIPUNCTURE: CPT

## 2018-06-11 PROCEDURE — 80048 BASIC METABOLIC PNL TOTAL CA: CPT

## 2018-06-11 PROCEDURE — 6370000000 HC RX 637 (ALT 250 FOR IP)

## 2018-06-11 RX ORDER — INSULIN GLARGINE 100 [IU]/ML
30 INJECTION, SOLUTION SUBCUTANEOUS NIGHTLY
Status: DISCONTINUED | OUTPATIENT
Start: 2018-06-11 | End: 2018-06-12 | Stop reason: HOSPADM

## 2018-06-11 RX ORDER — LISINOPRIL 10 MG/1
10 TABLET ORAL DAILY
Status: DISCONTINUED | OUTPATIENT
Start: 2018-06-11 | End: 2018-06-11

## 2018-06-11 RX ORDER — AMLODIPINE BESYLATE 10 MG/1
10 TABLET ORAL DAILY
Status: DISCONTINUED | OUTPATIENT
Start: 2018-06-11 | End: 2018-06-11

## 2018-06-11 RX ORDER — SODIUM CHLORIDE 0.9 % (FLUSH) 0.9 %
SYRINGE (ML) INJECTION
Status: DISCONTINUED
Start: 2018-06-11 | End: 2018-06-11

## 2018-06-11 RX ORDER — AMLODIPINE BESYLATE 5 MG/1
5 TABLET ORAL DAILY
Status: DISCONTINUED | OUTPATIENT
Start: 2018-06-11 | End: 2018-06-12 | Stop reason: HOSPADM

## 2018-06-11 RX ORDER — LISINOPRIL 10 MG/1
10 TABLET ORAL DAILY
Status: DISCONTINUED | OUTPATIENT
Start: 2018-06-12 | End: 2018-06-12 | Stop reason: HOSPADM

## 2018-06-11 RX ORDER — LISINOPRIL 5 MG/1
2.5 TABLET ORAL DAILY
Status: DISCONTINUED | OUTPATIENT
Start: 2018-06-11 | End: 2018-06-11

## 2018-06-11 RX ORDER — HYDRALAZINE HYDROCHLORIDE 20 MG/ML
10 INJECTION INTRAMUSCULAR; INTRAVENOUS EVERY 6 HOURS PRN
Status: DISCONTINUED | OUTPATIENT
Start: 2018-06-11 | End: 2018-06-11

## 2018-06-11 RX ADMIN — HYDRALAZINE HYDROCHLORIDE 10 MG: 20 INJECTION INTRAMUSCULAR; INTRAVENOUS at 07:16

## 2018-06-11 RX ADMIN — LISINOPRIL 2.5 MG: 5 TABLET ORAL at 11:50

## 2018-06-11 RX ADMIN — PANTOPRAZOLE SODIUM 40 MG: 40 TABLET, DELAYED RELEASE ORAL at 07:15

## 2018-06-11 RX ADMIN — INSULIN LISPRO 2 UNITS: 100 INJECTION, SOLUTION INTRAVENOUS; SUBCUTANEOUS at 20:52

## 2018-06-11 RX ADMIN — INSULIN LISPRO 5 UNITS: 100 INJECTION, SOLUTION INTRAVENOUS; SUBCUTANEOUS at 16:38

## 2018-06-11 RX ADMIN — INSULIN GLARGINE 30 UNITS: 100 INJECTION, SOLUTION SUBCUTANEOUS at 20:52

## 2018-06-11 RX ADMIN — INSULIN LISPRO 10 UNITS: 100 INJECTION, SOLUTION INTRAVENOUS; SUBCUTANEOUS at 13:37

## 2018-06-11 RX ADMIN — ENOXAPARIN SODIUM 40 MG: 100 INJECTION SUBCUTANEOUS at 11:50

## 2018-06-11 RX ADMIN — AMLODIPINE BESYLATE 5 MG: 5 TABLET ORAL at 12:24

## 2018-06-11 RX ADMIN — INSULIN LISPRO 10 UNITS: 100 INJECTION, SOLUTION INTRAVENOUS; SUBCUTANEOUS at 16:38

## 2018-06-11 ASSESSMENT — PAIN SCALES - GENERAL
PAINLEVEL_OUTOF10: 0

## 2018-06-12 VITALS
RESPIRATION RATE: 16 BRPM | BODY MASS INDEX: 22.5 KG/M2 | OXYGEN SATURATION: 99 % | HEART RATE: 96 BPM | HEIGHT: 66 IN | TEMPERATURE: 97.8 F | WEIGHT: 140 LBS | DIASTOLIC BLOOD PRESSURE: 68 MMHG | SYSTOLIC BLOOD PRESSURE: 110 MMHG

## 2018-06-12 LAB
ANION GAP SERPL CALCULATED.3IONS-SCNC: 10 MMOL/L (ref 7–16)
BUN BLDV-MCNC: 14 MG/DL (ref 6–20)
CALCIUM SERPL-MCNC: 8.9 MG/DL (ref 8.6–10.2)
CHLORIDE BLD-SCNC: 99 MMOL/L (ref 98–107)
CO2: 30 MMOL/L (ref 22–29)
CREAT SERPL-MCNC: 0.6 MG/DL (ref 0.7–1.2)
GFR AFRICAN AMERICAN: >60
GFR NON-AFRICAN AMERICAN: >60 ML/MIN/1.73
GLUCOSE BLD-MCNC: 44 MG/DL (ref 74–109)
MAGNESIUM: 1.9 MG/DL (ref 1.6–2.6)
METER GLUCOSE: 128 MG/DL (ref 70–110)
METER GLUCOSE: 134 MG/DL (ref 70–110)
METER GLUCOSE: 82 MG/DL (ref 70–110)
PHOSPHORUS: 4.1 MG/DL (ref 2.5–4.5)
POTASSIUM SERPL-SCNC: 3.4 MMOL/L (ref 3.5–5)
SODIUM BLD-SCNC: 139 MMOL/L (ref 132–146)

## 2018-06-12 PROCEDURE — 6370000000 HC RX 637 (ALT 250 FOR IP)

## 2018-06-12 PROCEDURE — 36415 COLL VENOUS BLD VENIPUNCTURE: CPT

## 2018-06-12 PROCEDURE — 80048 BASIC METABOLIC PNL TOTAL CA: CPT

## 2018-06-12 PROCEDURE — 83735 ASSAY OF MAGNESIUM: CPT

## 2018-06-12 PROCEDURE — 6370000000 HC RX 637 (ALT 250 FOR IP): Performed by: STUDENT IN AN ORGANIZED HEALTH CARE EDUCATION/TRAINING PROGRAM

## 2018-06-12 PROCEDURE — 6370000000 HC RX 637 (ALT 250 FOR IP): Performed by: INTERNAL MEDICINE

## 2018-06-12 PROCEDURE — 82962 GLUCOSE BLOOD TEST: CPT

## 2018-06-12 PROCEDURE — 99232 SBSQ HOSP IP/OBS MODERATE 35: CPT | Performed by: INTERNAL MEDICINE

## 2018-06-12 PROCEDURE — 84100 ASSAY OF PHOSPHORUS: CPT

## 2018-06-12 RX ORDER — INSULIN GLARGINE 100 [IU]/ML
20 INJECTION, SOLUTION SUBCUTANEOUS NIGHTLY
Qty: 1 VIAL | Refills: 3 | Status: ON HOLD | OUTPATIENT
Start: 2018-06-12 | End: 2018-11-12 | Stop reason: HOSPADM

## 2018-06-12 RX ORDER — LISINOPRIL 10 MG/1
2.5 TABLET ORAL DAILY
Qty: 30 TABLET | Refills: 3 | Status: ON HOLD | OUTPATIENT
Start: 2018-06-13 | End: 2018-11-12 | Stop reason: HOSPADM

## 2018-06-12 RX ADMIN — INSULIN LISPRO 5 UNITS: 100 INJECTION, SOLUTION INTRAVENOUS; SUBCUTANEOUS at 08:48

## 2018-06-12 RX ADMIN — PANTOPRAZOLE SODIUM 40 MG: 40 TABLET, DELAYED RELEASE ORAL at 06:43

## 2018-06-12 RX ADMIN — AMLODIPINE BESYLATE 5 MG: 5 TABLET ORAL at 08:47

## 2018-06-12 RX ADMIN — LISINOPRIL 10 MG: 10 TABLET ORAL at 08:47

## 2018-06-12 ASSESSMENT — PAIN SCALES - GENERAL: PAINLEVEL_OUTOF10: 0

## 2018-06-16 LAB — BLOOD CULTURE, ROUTINE: NORMAL

## 2018-11-10 ENCOUNTER — APPOINTMENT (OUTPATIENT)
Dept: CT IMAGING | Age: 48
DRG: 064 | End: 2018-11-10

## 2018-11-10 ENCOUNTER — HOSPITAL ENCOUNTER (INPATIENT)
Age: 48
LOS: 2 days | Discharge: HOME | DRG: 064 | End: 2018-11-12
Attending: EMERGENCY MEDICINE | Admitting: INTERNAL MEDICINE

## 2018-11-10 DIAGNOSIS — G45.9 TIA (TRANSIENT ISCHEMIC ATTACK): Primary | ICD-10-CM

## 2018-11-10 LAB
ALBUMIN SERPL-MCNC: 4.3 G/DL (ref 3.5–5.2)
ALP BLD-CCNC: 79 U/L (ref 40–129)
ALT SERPL-CCNC: 20 U/L (ref 0–40)
AMPHETAMINE SCREEN, URINE: NOT DETECTED
ANION GAP SERPL CALCULATED.3IONS-SCNC: 15 MMOL/L (ref 7–16)
ANION GAP SERPL CALCULATED.3IONS-SCNC: 23 MMOL/L (ref 7–16)
AST SERPL-CCNC: 17 U/L (ref 0–39)
BACTERIA: NORMAL /HPF
BARBITURATE SCREEN URINE: NOT DETECTED
BASOPHILS ABSOLUTE: 0.06 E9/L (ref 0–0.2)
BASOPHILS RELATIVE PERCENT: 0.6 % (ref 0–2)
BENZODIAZEPINE SCREEN, URINE: NOT DETECTED
BETA-HYDROXYBUTYRATE: >4.5 MMOL/L (ref 0.02–0.27)
BILIRUB SERPL-MCNC: 1 MG/DL (ref 0–1.2)
BILIRUBIN URINE: ABNORMAL
BLOOD, URINE: ABNORMAL
BUN BLDV-MCNC: 12 MG/DL (ref 6–20)
BUN BLDV-MCNC: 15 MG/DL (ref 6–20)
CALCIUM SERPL-MCNC: 9 MG/DL (ref 8.6–10.2)
CALCIUM SERPL-MCNC: 9.2 MG/DL (ref 8.6–10.2)
CANNABINOID SCREEN URINE: POSITIVE
CHLORIDE BLD-SCNC: 95 MMOL/L (ref 98–107)
CHLORIDE BLD-SCNC: 98 MMOL/L (ref 98–107)
CLARITY: CLEAR
CO2: 18 MMOL/L (ref 22–29)
CO2: 25 MMOL/L (ref 22–29)
COCAINE METABOLITE SCREEN URINE: NOT DETECTED
COLOR: YELLOW
CREAT SERPL-MCNC: 0.6 MG/DL (ref 0.7–1.2)
CREAT SERPL-MCNC: 0.8 MG/DL (ref 0.7–1.2)
EOSINOPHILS ABSOLUTE: 0.15 E9/L (ref 0.05–0.5)
EOSINOPHILS RELATIVE PERCENT: 1.6 % (ref 0–6)
GFR AFRICAN AMERICAN: >60
GFR AFRICAN AMERICAN: >60
GFR NON-AFRICAN AMERICAN: >60 ML/MIN/1.73
GFR NON-AFRICAN AMERICAN: >60 ML/MIN/1.73
GLUCOSE BLD-MCNC: 264 MG/DL (ref 74–99)
GLUCOSE BLD-MCNC: 266 MG/DL (ref 74–99)
GLUCOSE URINE: >=1000 MG/DL
HCT VFR BLD CALC: 42.5 % (ref 37–54)
HEMOGLOBIN: 14.3 G/DL (ref 12.5–16.5)
IMMATURE GRANULOCYTES #: 0.03 E9/L
IMMATURE GRANULOCYTES %: 0.3 % (ref 0–5)
INR BLD: 1
KETONES, URINE: >=80 MG/DL
LACTIC ACID: 0.8 MMOL/L (ref 0.5–2.2)
LEUKOCYTE ESTERASE, URINE: NEGATIVE
LYMPHOCYTES ABSOLUTE: 1.29 E9/L (ref 1.5–4)
LYMPHOCYTES RELATIVE PERCENT: 13.5 % (ref 20–42)
MAGNESIUM: 1.9 MG/DL (ref 1.6–2.6)
MCH RBC QN AUTO: 29.7 PG (ref 26–35)
MCHC RBC AUTO-ENTMCNC: 33.6 % (ref 32–34.5)
MCV RBC AUTO: 88.4 FL (ref 80–99.9)
METER GLUCOSE: 173 MG/DL (ref 74–99)
METER GLUCOSE: 173 MG/DL (ref 74–99)
METER GLUCOSE: 211 MG/DL (ref 74–99)
METER GLUCOSE: 245 MG/DL (ref 74–99)
METER GLUCOSE: 259 MG/DL (ref 74–99)
METER GLUCOSE: 317 MG/DL (ref 74–99)
METHADONE SCREEN, URINE: NOT DETECTED
MONOCYTES ABSOLUTE: 0.62 E9/L (ref 0.1–0.95)
MONOCYTES RELATIVE PERCENT: 6.5 % (ref 2–12)
NEUTROPHILS ABSOLUTE: 7.38 E9/L (ref 1.8–7.3)
NEUTROPHILS RELATIVE PERCENT: 77.5 % (ref 43–80)
NITRITE, URINE: NEGATIVE
OPIATE SCREEN URINE: NOT DETECTED
OSMOLALITY URINE: 671 MOSM/KG (ref 300–900)
OSMOLALITY: 299 MOSM/KG (ref 285–310)
PDW BLD-RTO: 13.2 FL (ref 11.5–15)
PH UA: 5.5 (ref 5–9)
PHENCYCLIDINE SCREEN URINE: NOT DETECTED
PHOSPHORUS: 3.3 MG/DL (ref 2.5–4.5)
PLATELET # BLD: 366 E9/L (ref 130–450)
PMV BLD AUTO: 8.4 FL (ref 7–12)
POTASSIUM REFLEX MAGNESIUM: 4.6 MMOL/L (ref 3.5–5)
POTASSIUM SERPL-SCNC: 4.2 MMOL/L (ref 3.5–5)
PROPOXYPHENE SCREEN: NOT DETECTED
PROTEIN UA: NEGATIVE MG/DL
PROTHROMBIN TIME: 11.3 SEC (ref 9.3–12.4)
RBC # BLD: 4.81 E12/L (ref 3.8–5.8)
RBC UA: NORMAL /HPF (ref 0–2)
SODIUM BLD-SCNC: 136 MMOL/L (ref 132–146)
SODIUM BLD-SCNC: 138 MMOL/L (ref 132–146)
SPECIFIC GRAVITY UA: 1.02 (ref 1–1.03)
TOTAL PROTEIN: 6.6 G/DL (ref 6.4–8.3)
UROBILINOGEN, URINE: 0.2 E.U./DL
WBC # BLD: 9.5 E9/L (ref 4.5–11.5)
WBC UA: NORMAL /HPF (ref 0–5)

## 2018-11-10 PROCEDURE — 85610 PROTHROMBIN TIME: CPT

## 2018-11-10 PROCEDURE — 2580000003 HC RX 258: Performed by: INTERNAL MEDICINE

## 2018-11-10 PROCEDURE — G0480 DRUG TEST DEF 1-7 CLASSES: HCPCS

## 2018-11-10 PROCEDURE — 85025 COMPLETE CBC W/AUTO DIFF WBC: CPT

## 2018-11-10 PROCEDURE — 82010 KETONE BODYS QUAN: CPT

## 2018-11-10 PROCEDURE — 6370000000 HC RX 637 (ALT 250 FOR IP): Performed by: INTERNAL MEDICINE

## 2018-11-10 PROCEDURE — 2580000003 HC RX 258: Performed by: EMERGENCY MEDICINE

## 2018-11-10 PROCEDURE — 84100 ASSAY OF PHOSPHORUS: CPT

## 2018-11-10 PROCEDURE — 81001 URINALYSIS AUTO W/SCOPE: CPT

## 2018-11-10 PROCEDURE — 80048 BASIC METABOLIC PNL TOTAL CA: CPT

## 2018-11-10 PROCEDURE — 94761 N-INVAS EAR/PLS OXIMETRY MLT: CPT

## 2018-11-10 PROCEDURE — 99285 EMERGENCY DEPT VISIT HI MDM: CPT

## 2018-11-10 PROCEDURE — 70450 CT HEAD/BRAIN W/O DYE: CPT

## 2018-11-10 PROCEDURE — 36415 COLL VENOUS BLD VENIPUNCTURE: CPT

## 2018-11-10 PROCEDURE — 83735 ASSAY OF MAGNESIUM: CPT

## 2018-11-10 PROCEDURE — 80307 DRUG TEST PRSMV CHEM ANLYZR: CPT

## 2018-11-10 PROCEDURE — 82962 GLUCOSE BLOOD TEST: CPT

## 2018-11-10 PROCEDURE — 83935 ASSAY OF URINE OSMOLALITY: CPT

## 2018-11-10 PROCEDURE — 83605 ASSAY OF LACTIC ACID: CPT

## 2018-11-10 PROCEDURE — 2000000000 HC ICU R&B

## 2018-11-10 PROCEDURE — 80053 COMPREHEN METABOLIC PANEL: CPT

## 2018-11-10 PROCEDURE — 83930 ASSAY OF BLOOD OSMOLALITY: CPT

## 2018-11-10 RX ORDER — ATORVASTATIN CALCIUM 40 MG/1
40 TABLET, FILM COATED ORAL NIGHTLY
Status: DISCONTINUED | OUTPATIENT
Start: 2018-11-11 | End: 2018-11-12

## 2018-11-10 RX ORDER — POTASSIUM CHLORIDE 7.45 MG/ML
10 INJECTION INTRAVENOUS PRN
Status: DISCONTINUED | OUTPATIENT
Start: 2018-11-10 | End: 2018-11-12 | Stop reason: HOSPADM

## 2018-11-10 RX ORDER — DEXTROSE MONOHYDRATE 25 G/50ML
12.5 INJECTION, SOLUTION INTRAVENOUS PRN
Status: DISCONTINUED | OUTPATIENT
Start: 2018-11-10 | End: 2018-11-12 | Stop reason: HOSPADM

## 2018-11-10 RX ORDER — SODIUM CHLORIDE 0.9 % (FLUSH) 0.9 %
10 SYRINGE (ML) INJECTION EVERY 12 HOURS SCHEDULED
Status: DISCONTINUED | OUTPATIENT
Start: 2018-11-10 | End: 2018-11-12 | Stop reason: HOSPADM

## 2018-11-10 RX ORDER — THIAMINE HYDROCHLORIDE 100 MG/ML
100 INJECTION, SOLUTION INTRAMUSCULAR; INTRAVENOUS DAILY
Status: DISCONTINUED | OUTPATIENT
Start: 2018-11-11 | End: 2018-11-12 | Stop reason: CLARIF

## 2018-11-10 RX ORDER — LISINOPRIL 10 MG/1
10 TABLET ORAL DAILY
Status: DISCONTINUED | OUTPATIENT
Start: 2018-11-11 | End: 2018-11-12 | Stop reason: HOSPADM

## 2018-11-10 RX ORDER — SODIUM CHLORIDE 0.9 % (FLUSH) 0.9 %
10 SYRINGE (ML) INJECTION PRN
Status: DISCONTINUED | OUTPATIENT
Start: 2018-11-10 | End: 2018-11-12 | Stop reason: HOSPADM

## 2018-11-10 RX ORDER — DEXTROSE AND SODIUM CHLORIDE 5; .45 G/100ML; G/100ML
INJECTION, SOLUTION INTRAVENOUS CONTINUOUS PRN
Status: DISCONTINUED | OUTPATIENT
Start: 2018-11-10 | End: 2018-11-11

## 2018-11-10 RX ORDER — THIAMINE MONONITRATE (VIT B1) 100 MG
100 TABLET ORAL DAILY
Status: CANCELLED | OUTPATIENT
Start: 2018-11-10

## 2018-11-10 RX ORDER — SODIUM CHLORIDE 9 MG/ML
INJECTION, SOLUTION INTRAVENOUS CONTINUOUS
Status: DISCONTINUED | OUTPATIENT
Start: 2018-11-10 | End: 2018-11-11

## 2018-11-10 RX ORDER — 0.9 % SODIUM CHLORIDE 0.9 %
1000 INTRAVENOUS SOLUTION INTRAVENOUS ONCE
Status: COMPLETED | OUTPATIENT
Start: 2018-11-10 | End: 2018-11-10

## 2018-11-10 RX ORDER — ASPIRIN 81 MG/1
81 TABLET ORAL DAILY
Status: DISCONTINUED | OUTPATIENT
Start: 2018-11-10 | End: 2018-11-12 | Stop reason: HOSPADM

## 2018-11-10 RX ORDER — MAGNESIUM SULFATE 1 G/100ML
1 INJECTION INTRAVENOUS PRN
Status: DISCONTINUED | OUTPATIENT
Start: 2018-11-10 | End: 2018-11-12 | Stop reason: HOSPADM

## 2018-11-10 RX ORDER — ONDANSETRON 2 MG/ML
4 INJECTION INTRAMUSCULAR; INTRAVENOUS EVERY 4 HOURS PRN
Status: DISCONTINUED | OUTPATIENT
Start: 2018-11-10 | End: 2018-11-12 | Stop reason: HOSPADM

## 2018-11-10 RX ADMIN — INSULIN HUMAN 10 UNITS: 100 INJECTION, SOLUTION PARENTERAL at 18:45

## 2018-11-10 RX ADMIN — ASPIRIN 81 MG: 81 TABLET, COATED ORAL at 21:07

## 2018-11-10 RX ADMIN — SODIUM CHLORIDE 1000 ML: 9 INJECTION, SOLUTION INTRAVENOUS at 18:44

## 2018-11-10 RX ADMIN — DEXTROSE AND SODIUM CHLORIDE: 5; 450 INJECTION, SOLUTION INTRAVENOUS at 19:46

## 2018-11-10 RX ADMIN — Medication 10 ML: at 23:00

## 2018-11-10 RX ADMIN — SODIUM CHLORIDE 6.8 UNITS/HR: 9 INJECTION, SOLUTION INTRAVENOUS at 19:49

## 2018-11-10 ASSESSMENT — ENCOUNTER SYMPTOMS
COUGH: 0
CHOKING: 0
SHORTNESS OF BREATH: 0
VOMITING: 0
STRIDOR: 0
NAUSEA: 0
ABDOMINAL PAIN: 0
CONSTIPATION: 0
CHEST TIGHTNESS: 0
ABDOMINAL DISTENTION: 0
DIARRHEA: 0
WHEEZING: 0

## 2018-11-10 ASSESSMENT — PAIN SCALES - GENERAL: PAINLEVEL_OUTOF10: 0

## 2018-11-10 NOTE — H&P
symmetric and unremarkable. No acute calvarial fracture evident. No more substantial inflammatory disease evident at partially and incidentally visible portions of paranasal sinus chambers. CONCLUSION: NO ACUTE INTRACRANIAL ABNORMALITY EVIDENT. EKG: NSR    Resident's Assessment and Plan     Aparna Abbott is a 53 y.o. male    Neurology  Stroke vs TIA   - hx of right arm weakness, numbness, slurred speech almost completely resolved prior to ED visit, still with some mild residual right sided weakness  - CT negative for any acute intracranial process  - EKG showed showed normal sinus rhythym  - hx of tobacco and marijuana smoking, HTN, Type 1 DM  - start ASA 81 mg now and Lipitor 40 mg tomorrow  - start high dose statin therapy  - obtain bilateral carotid US  - calculate ASCVD risk score  - obtain lipid profile, HbA1c  - obtain MRI  - neuro checks    Hx of polyneuropathy  - likely 2/2 DM vs alcohol related  - recheck b12, folate levels  - start thiamine   - consider neurontin if symptoms worsen    Endocrinology  DKA  - in a type 2 diabetic, poorly controlled, in the setting of ? TIA vs stroke with alcohol and marijuana abuse  - BG on presentation 317, AG 23, HCO3 19, Betahydroxybutyrate >4.5, glycosuria >1000, ketonuria >80, LA normal  - UDS positive for cannabinoids  - A1C 10.2 06/18  - started on DKA protocol, currently insulin gtt with D5 as blood glucose is 243  - bridge with home regimen upon AG closing x2  - NPO for now  - monitor blood glucose, BMP, Mg/Po4  Q 4hr  - repeat A1C  - recheck ABG in am     Cardiology  HTN  - blood pressure elevated at presentation 171/112  - now down to 140/86  - increase lisinopril to 10 mg daily    DVT prophylaxis: Lovenox  Disposition: MILLA Valnecia MD, PGY-1   Attending physician: Dr. Patricio Gómez

## 2018-11-11 ENCOUNTER — APPOINTMENT (OUTPATIENT)
Dept: ULTRASOUND IMAGING | Age: 48
DRG: 064 | End: 2018-11-11

## 2018-11-11 LAB
ANION GAP SERPL CALCULATED.3IONS-SCNC: 12 MMOL/L (ref 7–16)
ANION GAP SERPL CALCULATED.3IONS-SCNC: 13 MMOL/L (ref 7–16)
ANION GAP SERPL CALCULATED.3IONS-SCNC: 14 MMOL/L (ref 7–16)
ANION GAP SERPL CALCULATED.3IONS-SCNC: 14 MMOL/L (ref 7–16)
BASOPHILS ABSOLUTE: 0.05 E9/L (ref 0–0.2)
BASOPHILS RELATIVE PERCENT: 0.7 % (ref 0–2)
BUN BLDV-MCNC: 11 MG/DL (ref 6–20)
BUN BLDV-MCNC: 13 MG/DL (ref 6–20)
BUN BLDV-MCNC: 13 MG/DL (ref 6–20)
BUN BLDV-MCNC: 16 MG/DL (ref 6–20)
BUN BLDV-MCNC: 9 MG/DL (ref 6–20)
BUN BLDV-MCNC: 9 MG/DL (ref 6–20)
CALCIUM SERPL-MCNC: 8 MG/DL (ref 8.6–10.2)
CALCIUM SERPL-MCNC: 8.3 MG/DL (ref 8.6–10.2)
CALCIUM SERPL-MCNC: 8.4 MG/DL (ref 8.6–10.2)
CALCIUM SERPL-MCNC: 8.5 MG/DL (ref 8.6–10.2)
CALCIUM SERPL-MCNC: 8.6 MG/DL (ref 8.6–10.2)
CALCIUM SERPL-MCNC: 8.8 MG/DL (ref 8.6–10.2)
CHLORIDE BLD-SCNC: 95 MMOL/L (ref 98–107)
CHLORIDE BLD-SCNC: 96 MMOL/L (ref 98–107)
CHLORIDE BLD-SCNC: 98 MMOL/L (ref 98–107)
CHOLESTEROL, TOTAL: 144 MG/DL (ref 0–199)
CO2: 21 MMOL/L (ref 22–29)
CO2: 22 MMOL/L (ref 22–29)
CO2: 23 MMOL/L (ref 22–29)
CO2: 23 MMOL/L (ref 22–29)
CREAT SERPL-MCNC: 0.5 MG/DL (ref 0.7–1.2)
CREAT SERPL-MCNC: 0.5 MG/DL (ref 0.7–1.2)
CREAT SERPL-MCNC: 0.6 MG/DL (ref 0.7–1.2)
EOSINOPHILS ABSOLUTE: 0.3 E9/L (ref 0.05–0.5)
EOSINOPHILS RELATIVE PERCENT: 4 % (ref 0–6)
FOLATE: 9.1 NG/ML (ref 4.8–24.2)
GFR AFRICAN AMERICAN: >60
GFR NON-AFRICAN AMERICAN: >60 ML/MIN/1.73
GLUCOSE BLD-MCNC: 118 MG/DL (ref 74–99)
GLUCOSE BLD-MCNC: 128 MG/DL (ref 74–99)
GLUCOSE BLD-MCNC: 209 MG/DL (ref 74–99)
GLUCOSE BLD-MCNC: 221 MG/DL (ref 74–99)
GLUCOSE BLD-MCNC: 264 MG/DL (ref 74–99)
GLUCOSE BLD-MCNC: 330 MG/DL (ref 74–99)
HBA1C MFR BLD: 10.6 % (ref 4–5.6)
HCT VFR BLD CALC: 36.7 % (ref 37–54)
HDLC SERPL-MCNC: 65 MG/DL
HEMOGLOBIN: 12.1 G/DL (ref 12.5–16.5)
IMMATURE GRANULOCYTES #: 0.01 E9/L
IMMATURE GRANULOCYTES %: 0.1 % (ref 0–5)
LDL CHOLESTEROL CALCULATED: 64 MG/DL (ref 0–99)
LYMPHOCYTES ABSOLUTE: 2.11 E9/L (ref 1.5–4)
LYMPHOCYTES RELATIVE PERCENT: 27.9 % (ref 20–42)
MAGNESIUM: 1.8 MG/DL (ref 1.6–2.6)
MAGNESIUM: 1.8 MG/DL (ref 1.6–2.6)
MAGNESIUM: 1.9 MG/DL (ref 1.6–2.6)
MCH RBC QN AUTO: 29.9 PG (ref 26–35)
MCHC RBC AUTO-ENTMCNC: 33 % (ref 32–34.5)
MCV RBC AUTO: 90.6 FL (ref 80–99.9)
METER GLUCOSE: 127 MG/DL (ref 74–99)
METER GLUCOSE: 206 MG/DL (ref 74–99)
METER GLUCOSE: 210 MG/DL (ref 74–99)
METER GLUCOSE: 212 MG/DL (ref 74–99)
METER GLUCOSE: 213 MG/DL (ref 74–99)
METER GLUCOSE: 227 MG/DL (ref 74–99)
METER GLUCOSE: 233 MG/DL (ref 74–99)
METER GLUCOSE: 235 MG/DL (ref 74–99)
METER GLUCOSE: 261 MG/DL (ref 74–99)
METER GLUCOSE: 286 MG/DL (ref 74–99)
METER GLUCOSE: 288 MG/DL (ref 74–99)
METER GLUCOSE: 292 MG/DL (ref 74–99)
METER GLUCOSE: 308 MG/DL (ref 74–99)
METER GLUCOSE: 65 MG/DL (ref 74–99)
METER GLUCOSE: 80 MG/DL (ref 74–99)
METER GLUCOSE: 92 MG/DL (ref 74–99)
MONOCYTES ABSOLUTE: 0.65 E9/L (ref 0.1–0.95)
MONOCYTES RELATIVE PERCENT: 8.6 % (ref 2–12)
NEUTROPHILS ABSOLUTE: 4.43 E9/L (ref 1.8–7.3)
NEUTROPHILS RELATIVE PERCENT: 58.7 % (ref 43–80)
PDW BLD-RTO: 13.4 FL (ref 11.5–15)
PHOSPHORUS: 2.3 MG/DL (ref 2.5–4.5)
PHOSPHORUS: 3.3 MG/DL (ref 2.5–4.5)
PHOSPHORUS: 3.6 MG/DL (ref 2.5–4.5)
PHOSPHORUS: 3.8 MG/DL (ref 2.5–4.5)
PHOSPHORUS: 4.6 MG/DL (ref 2.5–4.5)
PLATELET # BLD: 324 E9/L (ref 130–450)
PMV BLD AUTO: 9.2 FL (ref 7–12)
POTASSIUM REFLEX MAGNESIUM: 4.3 MMOL/L (ref 3.5–5)
POTASSIUM SERPL-SCNC: 3.8 MMOL/L (ref 3.5–5)
POTASSIUM SERPL-SCNC: 4 MMOL/L (ref 3.5–5)
POTASSIUM SERPL-SCNC: 4.1 MMOL/L (ref 3.5–5)
POTASSIUM SERPL-SCNC: 4.6 MMOL/L (ref 3.5–5)
POTASSIUM SERPL-SCNC: >10 MMOL/L (ref 3.5–5)
RBC # BLD: 4.05 E12/L (ref 3.8–5.8)
SODIUM BLD-SCNC: 129 MMOL/L (ref 132–146)
SODIUM BLD-SCNC: 132 MMOL/L (ref 132–146)
SODIUM BLD-SCNC: 132 MMOL/L (ref 132–146)
SODIUM BLD-SCNC: 133 MMOL/L (ref 132–146)
SODIUM BLD-SCNC: 134 MMOL/L (ref 132–146)
SODIUM BLD-SCNC: 135 MMOL/L (ref 132–146)
TRIGL SERPL-MCNC: 73 MG/DL (ref 0–149)
TROPONIN: <0.01 NG/ML (ref 0–0.03)
VITAMIN B-12: 451 PG/ML (ref 211–946)
VLDLC SERPL CALC-MCNC: 15 MG/DL
WBC # BLD: 7.6 E9/L (ref 4.5–11.5)

## 2018-11-11 PROCEDURE — 6370000000 HC RX 637 (ALT 250 FOR IP): Performed by: INTERNAL MEDICINE

## 2018-11-11 PROCEDURE — 80061 LIPID PANEL: CPT

## 2018-11-11 PROCEDURE — 80048 BASIC METABOLIC PNL TOTAL CA: CPT

## 2018-11-11 PROCEDURE — 84100 ASSAY OF PHOSPHORUS: CPT

## 2018-11-11 PROCEDURE — 2060000000 HC ICU INTERMEDIATE R&B

## 2018-11-11 PROCEDURE — 2580000003 HC RX 258: Performed by: INTERNAL MEDICINE

## 2018-11-11 PROCEDURE — 85025 COMPLETE CBC W/AUTO DIFF WBC: CPT

## 2018-11-11 PROCEDURE — 99222 1ST HOSP IP/OBS MODERATE 55: CPT | Performed by: INTERNAL MEDICINE

## 2018-11-11 PROCEDURE — 83735 ASSAY OF MAGNESIUM: CPT

## 2018-11-11 PROCEDURE — 82746 ASSAY OF FOLIC ACID SERUM: CPT

## 2018-11-11 PROCEDURE — 82607 VITAMIN B-12: CPT

## 2018-11-11 PROCEDURE — 36415 COLL VENOUS BLD VENIPUNCTURE: CPT

## 2018-11-11 PROCEDURE — 87040 BLOOD CULTURE FOR BACTERIA: CPT

## 2018-11-11 PROCEDURE — 83036 HEMOGLOBIN GLYCOSYLATED A1C: CPT

## 2018-11-11 PROCEDURE — 84484 ASSAY OF TROPONIN QUANT: CPT

## 2018-11-11 PROCEDURE — 6360000002 HC RX W HCPCS: Performed by: INTERNAL MEDICINE

## 2018-11-11 PROCEDURE — 93880 EXTRACRANIAL BILAT STUDY: CPT

## 2018-11-11 PROCEDURE — 82962 GLUCOSE BLOOD TEST: CPT

## 2018-11-11 RX ADMIN — Medication 10 ML: at 09:15

## 2018-11-11 RX ADMIN — POTASSIUM CHLORIDE 10 MEQ: 7.46 INJECTION, SOLUTION INTRAVENOUS at 02:20

## 2018-11-11 RX ADMIN — THIAMINE HYDROCHLORIDE 100 MG: 100 INJECTION, SOLUTION INTRAMUSCULAR; INTRAVENOUS at 09:15

## 2018-11-11 RX ADMIN — ASPIRIN 81 MG: 81 TABLET, COATED ORAL at 09:14

## 2018-11-11 RX ADMIN — POTASSIUM CHLORIDE 10 MEQ: 7.46 INJECTION, SOLUTION INTRAVENOUS at 06:46

## 2018-11-11 RX ADMIN — SODIUM CHLORIDE 3.46 UNITS/HR: 9 INJECTION, SOLUTION INTRAVENOUS at 03:19

## 2018-11-11 RX ADMIN — Medication 10 ML: at 20:54

## 2018-11-11 RX ADMIN — POTASSIUM CHLORIDE 10 MEQ: 7.46 INJECTION, SOLUTION INTRAVENOUS at 08:03

## 2018-11-11 RX ADMIN — DEXTROSE AND SODIUM CHLORIDE: 5; 450 INJECTION, SOLUTION INTRAVENOUS at 09:15

## 2018-11-11 RX ADMIN — ATORVASTATIN CALCIUM 40 MG: 40 TABLET, FILM COATED ORAL at 21:42

## 2018-11-11 RX ADMIN — ENOXAPARIN SODIUM 40 MG: 40 INJECTION SUBCUTANEOUS at 09:14

## 2018-11-11 RX ADMIN — LISINOPRIL 10 MG: 10 TABLET ORAL at 09:14

## 2018-11-11 RX ADMIN — POTASSIUM CHLORIDE 10 MEQ: 7.46 INJECTION, SOLUTION INTRAVENOUS at 01:13

## 2018-11-11 RX ADMIN — POTASSIUM CHLORIDE 10 MEQ: 7.46 INJECTION, SOLUTION INTRAVENOUS at 08:02

## 2018-11-11 ASSESSMENT — PAIN SCALES - GENERAL
PAINLEVEL_OUTOF10: 0

## 2018-11-11 NOTE — PROGRESS NOTES
[facility]  Internal Medicine Residency Program  Progress Note - House Team 1    Patient:  Bradley Jacobo 53 y.o. male MRN: [id number]     Date of Service: 11/11/2018     CC: Stroke-like symptoms f/u DKA  Overnight events: none     Subjective     Patient was seen and examined today. Patient stated that his symptoms have resolved and that he currently feels better. We discussed HPI and he stated it didn't feel like DKA because he has been in DKA before and could tell. Patient states he is feeling hungry and would like to eat. He is able to ambulate to the rest room. Objective     Physical Exam:  · Vitals: BP (!) 141/91   Pulse 73   Temp 98.3 °F (36.8 °C) (Temporal)   Resp 20   Ht 5' 6\" (1.676 m)   Wt 147 lb 4.3 oz (66.8 kg)   SpO2 96%   BMI 23.77 kg/m²     · I & O - 24hr: I/O this shift:  · In: -   · Out: 400 [Urine:400]   · General Appearance: alert, appears stated age, cooperative and no distress  · HEENT:  Head: Normocephalic, no lesions, without obvious abnormality. · Neck: no adenopathy, no carotid bruit, no JVD and thyroid not enlarged, symmetric, no tenderness/mass/nodules  · Lung: clear to auscultation bilaterally  · Heart: regular rate and rhythm, S1, S2 normal, no murmur, click, rub or gallop  · Abdomen: soft, non-tender; bowel sounds normal; no masses,  no organomegaly  · Extremities:  extremities normal, atraumatic, no cyanosis or edema  · Musculokeletal: No joint swelling, no muscle tenderness. ROM normal in all joints of extremities. · Neurologic: Mental status: Alert, oriented, thought content appropriate, Good insight; Good function of R. UE.   No weakness, noted 5/5 strength, Speech normal  Subject  Pertinent Labs & Imaging Studies   chika  CBC with Differential:    Lab Results   Component Value Date    WBC 7.6 11/11/2018    RBC 4.05 11/11/2018    HGB 12.1 11/11/2018    HCT 36.7 11/11/2018     11/11/2018    MCV 90.6 11/11/2018    MCH 29.9 11/11/2018 home regimen upon AG closing x2  - monitor blood glucose, BMP, Mg/Po4  Q 4hr  - F/u blood cultures  - 3 episode of DKA this year; consider diabetes educator     HTN  - blood pressure elevated at presentation 171/112  - Stable in 140's  - increase lisinopril to 10 mg daily    Hx of polyneuropathy  - likely 2/2 DM vs alcohol related  - recheck b12, folate levels  - on thiamine    PT/OT evaluation: none  DVT prophylaxis/ GI prophylaxis: Lovenox/ Diet  Disposition: continue ICU    Isabelle Matute MD  PGY-1  Attending physician: Dr. Rosaura Hand

## 2018-11-11 NOTE — PLAN OF CARE
Patient bridged this am by restarting insulin pump and overlapping with insulin gtt for 30 minutes. Diet has been resumed. Will repeat BMP x 2 more, if bicarb and AG remain stable, he can be transferred. Continue stroke work up.      Electronically signed by Anthony Moy MD on 11/11/2018 at 1:28 PM Stable

## 2018-11-11 NOTE — CONSULTS
11/11/2018     Phosphorus:   Lab Results   Component Value Date    PHOS 4.6 11/11/2018     Ionized Calcium:   Lab Results   Component Value Date    CAION 1.23 02/17/2017      Troponin:   Recent Labs      11/11/18   0015   TROPONINI  <0.01       Microbiology:  Cultures during this admission:     Blood cultures:                 [] Drawn      [] Negative             []  Positive (Details:  )  Urine Culture:                   [] Drawn     [] Negative             []  Positive (Details:  )  Sputum Culture:               [] Drawn       [] Negative             []  Positive (Details:  )   Endotracheal aspirate:     [] Drawn       [] Negative             []  Positive (Details:  )       ASSESSMENT  And PLAN:     DKA   - in a type 2 diabetic, poorly controlled, in the setting of ? TIA vs stroke with alcohol and marijuana abuse  - BG on presentation 317, AG 23, HCO3 19, Betahydroxybutyrate >4.5, glycosuria >1000, ketonuria >80, LA normal  - UDS positive for cannabinoids  - A1C 10.2 06/18  - started on DKA protocol, currently insulin gtt with D5 as blood glucose is 243  - bridge with home regimen upon AG closing x2  - NPO for now  - monitor blood glucose, BMP, Mg/Po4  Q 4hr  - repeat A1C    Stroke vs TIA   - hx of right arm weakness, numbness, slurred speech almost completely resolved prior to ED visit, still with some mild right sided weakness  - CT negative for any acute intracranial process  - EKG showed showed normal sinus rhythym  - hx of tobacco and marijuana smoking, HTN, Type 1 DM  - start ASA 81 mg now and Lipitor 40 mg tomorrow  - start high dose statin therapy  - obtain bilateral carotid US  - calculate ASCVD risk score  - obtain lipid profile, HbA1c  - obtain MRI brain wo contrast  - neuro checks     Hx of polyneuropathy  - likely 2/2 DM vs alcohol related  - recheck b12, folate levels  - start thiamine   - consider neurontin if symptoms worsen    HTN  - blood pressure elevated at presentation 171/112  - now down to

## 2018-11-12 ENCOUNTER — APPOINTMENT (OUTPATIENT)
Dept: GENERAL RADIOLOGY | Age: 48
DRG: 064 | End: 2018-11-12

## 2018-11-12 ENCOUNTER — APPOINTMENT (OUTPATIENT)
Dept: MRI IMAGING | Age: 48
DRG: 064 | End: 2018-11-12

## 2018-11-12 VITALS
WEIGHT: 142.2 LBS | SYSTOLIC BLOOD PRESSURE: 150 MMHG | HEART RATE: 84 BPM | DIASTOLIC BLOOD PRESSURE: 90 MMHG | OXYGEN SATURATION: 98 % | BODY MASS INDEX: 22.85 KG/M2 | HEIGHT: 66 IN | RESPIRATION RATE: 16 BRPM | TEMPERATURE: 97.6 F

## 2018-11-12 PROBLEM — G81.94 LEFT HEMIPLEGIA (HCC): Status: ACTIVE | Noted: 2018-11-12

## 2018-11-12 PROBLEM — I63.89 ACUTE ISCHEMIC MULTIFOCAL MULTIPLE VASCULAR TERRITORIES STROKE (HCC): Status: ACTIVE | Noted: 2018-11-10

## 2018-11-12 LAB
ANION GAP SERPL CALCULATED.3IONS-SCNC: 16 MMOL/L (ref 7–16)
BUN BLDV-MCNC: 12 MG/DL (ref 6–20)
CALCIUM SERPL-MCNC: 9 MG/DL (ref 8.6–10.2)
CHLORIDE BLD-SCNC: 92 MMOL/L (ref 98–107)
CO2: 24 MMOL/L (ref 22–29)
CREAT SERPL-MCNC: 0.8 MG/DL (ref 0.7–1.2)
GFR AFRICAN AMERICAN: >60
GFR NON-AFRICAN AMERICAN: >60 ML/MIN/1.73
GLUCOSE BLD-MCNC: 275 MG/DL (ref 74–99)
METER GLUCOSE: 241 MG/DL (ref 74–99)
METER GLUCOSE: 261 MG/DL (ref 74–99)
METER GLUCOSE: 293 MG/DL (ref 74–99)
METER GLUCOSE: 310 MG/DL (ref 74–99)
POTASSIUM SERPL-SCNC: 4.4 MMOL/L (ref 3.5–5)
SODIUM BLD-SCNC: 132 MMOL/L (ref 132–146)

## 2018-11-12 PROCEDURE — G8988 SELF CARE GOAL STATUS: HCPCS

## 2018-11-12 PROCEDURE — 6360000002 HC RX W HCPCS: Performed by: INTERNAL MEDICINE

## 2018-11-12 PROCEDURE — 6370000000 HC RX 637 (ALT 250 FOR IP): Performed by: INTERNAL MEDICINE

## 2018-11-12 PROCEDURE — 2580000003 HC RX 258: Performed by: INTERNAL MEDICINE

## 2018-11-12 PROCEDURE — G8979 MOBILITY GOAL STATUS: HCPCS

## 2018-11-12 PROCEDURE — 97161 PT EVAL LOW COMPLEX 20 MIN: CPT

## 2018-11-12 PROCEDURE — 97165 OT EVAL LOW COMPLEX 30 MIN: CPT

## 2018-11-12 PROCEDURE — 82962 GLUCOSE BLOOD TEST: CPT

## 2018-11-12 PROCEDURE — G8978 MOBILITY CURRENT STATUS: HCPCS

## 2018-11-12 PROCEDURE — 80048 BASIC METABOLIC PNL TOTAL CA: CPT

## 2018-11-12 PROCEDURE — G8989 SELF CARE D/C STATUS: HCPCS

## 2018-11-12 PROCEDURE — 99232 SBSQ HOSP IP/OBS MODERATE 35: CPT | Performed by: INTERNAL MEDICINE

## 2018-11-12 PROCEDURE — G8980 MOBILITY D/C STATUS: HCPCS

## 2018-11-12 PROCEDURE — G8987 SELF CARE CURRENT STATUS: HCPCS

## 2018-11-12 PROCEDURE — 70551 MRI BRAIN STEM W/O DYE: CPT

## 2018-11-12 PROCEDURE — 70030 X-RAY EYE FOR FOREIGN BODY: CPT

## 2018-11-12 PROCEDURE — 36415 COLL VENOUS BLD VENIPUNCTURE: CPT

## 2018-11-12 RX ORDER — LISINOPRIL 10 MG/1
10 TABLET ORAL DAILY
Qty: 30 TABLET | Refills: 3 | Status: ON HOLD | OUTPATIENT
Start: 2018-11-13 | End: 2018-12-28 | Stop reason: HOSPADM

## 2018-11-12 RX ORDER — ATORVASTATIN CALCIUM 80 MG/1
80 TABLET, FILM COATED ORAL NIGHTLY
Qty: 30 TABLET | Refills: 3 | Status: SHIPPED | OUTPATIENT
Start: 2018-11-12 | End: 2018-11-27

## 2018-11-12 RX ORDER — AMLODIPINE BESYLATE 5 MG/1
5 TABLET ORAL DAILY
Qty: 30 TABLET | Refills: 3 | Status: SHIPPED | OUTPATIENT
Start: 2018-11-12 | End: 2018-11-27

## 2018-11-12 RX ORDER — ATORVASTATIN CALCIUM 40 MG/1
80 TABLET, FILM COATED ORAL NIGHTLY
Status: DISCONTINUED | OUTPATIENT
Start: 2018-11-12 | End: 2018-11-12 | Stop reason: HOSPADM

## 2018-11-12 RX ORDER — AMLODIPINE BESYLATE 5 MG/1
5 TABLET ORAL DAILY
Status: DISCONTINUED | OUTPATIENT
Start: 2018-11-12 | End: 2018-11-12 | Stop reason: HOSPADM

## 2018-11-12 RX ORDER — ASPIRIN 81 MG/1
81 TABLET ORAL DAILY
Qty: 30 TABLET | Refills: 3 | Status: ON HOLD | OUTPATIENT
Start: 2018-11-13 | End: 2021-09-26

## 2018-11-12 RX ORDER — THIAMINE MONONITRATE (VIT B1) 100 MG
100 TABLET ORAL DAILY
Status: DISCONTINUED | OUTPATIENT
Start: 2018-11-13 | End: 2018-11-12 | Stop reason: HOSPADM

## 2018-11-12 RX ADMIN — LISINOPRIL 10 MG: 10 TABLET ORAL at 09:42

## 2018-11-12 RX ADMIN — AMLODIPINE BESYLATE 5 MG: 5 TABLET ORAL at 14:12

## 2018-11-12 RX ADMIN — ASPIRIN 81 MG: 81 TABLET, COATED ORAL at 09:42

## 2018-11-12 RX ADMIN — ENOXAPARIN SODIUM 40 MG: 40 INJECTION SUBCUTANEOUS at 09:43

## 2018-11-12 RX ADMIN — THIAMINE HYDROCHLORIDE 100 MG: 100 INJECTION, SOLUTION INTRAMUSCULAR; INTRAVENOUS at 09:43

## 2018-11-12 RX ADMIN — Medication 10 ML: at 09:43

## 2018-11-12 ASSESSMENT — ENCOUNTER SYMPTOMS
DIARRHEA: 0
RESPIRATORY NEGATIVE: 1
ABDOMINAL PAIN: 0
WHEEZING: 0
NAUSEA: 0
EYES NEGATIVE: 1
BACK PAIN: 0
CONSTIPATION: 0
VOMITING: 0
COUGH: 0

## 2018-11-12 ASSESSMENT — PAIN SCALES - GENERAL
PAINLEVEL_OUTOF10: 0
PAINLEVEL_OUTOF10: 0

## 2018-11-12 NOTE — PROGRESS NOTES
[facility]  Internal Medicine Residency Program  Progress Note  - House Team 1    Patient:  Sid Samaniego 53 y.o. male MRN: [id number]     Date of Service: 11/12/2018     CC: Stroke-like symptoms   Overnight events: Patient was bridged- restarting insulin pump and overlapping insulin gtt for 30 minutes     Subjective       Patient was examined at bedside this AM.  Patient does not complain of any ongoing weakness nor has he had any aphasic episodes since admission. Patient states that he isn't experiencing any lightheadedness, SOB, tingling sensations in fingers, fevers, chills, nightsweats, headaches. Does not need anything at this time. Objective     Physical Exam:  · Vitals: BP (!) 150/90   Pulse 84   Temp 97.6 °F (36.4 °C) (Temporal)   Resp 16   Ht 5' 6\" (1.676 m)   Wt 142 lb 3.2 oz (64.5 kg)   SpO2 98%   BMI 22.95 kg/m²     · I & O - 24hr: No intake/output data recorded. · General Appearance: alert, appears stated age and cooperative  · Lung: clear to auscultation bilaterally  · Heart: regular rate and rhythm, S1, S2 normal, no murmur, click, rub or gallop  · Extremities:  extremities normal, atraumatic, no cyanosis or edema  · Musculokeletal: No joint swelling, no muscle tenderness. ROM normal in all joints of extremities.  Patient exhibited symmetric muscle strength 5/5 bilateral, no drift, intact sensation throughout  · Neurologic: Mental status: Alert, oriented, thought content appropriate    Pertinent Labs & Imaging Studies     CBC:   Lab Results   Component Value Date    WBC 7.6 11/11/2018    RBC 4.05 11/11/2018    HGB 12.1 11/11/2018    HCT 36.7 11/11/2018    MCV 90.6 11/11/2018    MCH 29.9 11/11/2018    MCHC 33.0 11/11/2018    RDW 13.4 11/11/2018     11/11/2018    MPV 9.2 11/11/2018     CMP:    Lab Results   Component Value Date     11/11/2018    K 4.3 11/11/2018    CL 96 11/11/2018    CO2 23 11/11/2018    BUN 9 11/11/2018    CREATININE 0.6 11/11/2018 10mg daily on 11/10  - consider increase to lisinopril 20mg daily    PT/OT evaluation: patient able to ambulate on his own  DVT prophylaxis/ GI prophylaxis: N/A  Disposition: discharge to home    Trever Jacobo  MS3  Attending physician: Dr. Ronny Hatch

## 2018-11-12 NOTE — PROGRESS NOTES
on pt's current level of functional independence, this pt is not a candidate for continued skilled PT services. Please re-consult if pt experiences functional decline. Thank you.     Time in: 1350  Time out: 584 Luis Bronson, PT, [place]  [id number]

## 2018-11-12 NOTE — PROGRESS NOTES
distress. Neurologic deficits have resolved. Recent Labs      11/11/18   0930  11/11/18   1400  11/11/18   1713   NA  132  133  132   K  4.0  4.6  4.3   CL  98  98  96*   CO2  22  22  23   BUN  11  9  9   CREATININE  0.5*  0.6*  0.6*   GLUCOSE  118*  221*  209*   CALCIUM  8.3*  8.6  8.8     Recent Labs      11/10/18   1500  11/11/18   0410   WBC  9.5  7.6   RBC  4.81  4.05   HGB  14.3  12.1*   HCT  42.5  36.7*   MCV  88.4  90.6   MCH  29.7  29.9   MCHC  33.6  33.0   RDW  13.2  13.4   PLT  366  324   MPV  8.4  9.2           Active Problems:    DKA, type 1, not at goal [facility])    Acute ischemic multifocal multiple vascular territories stroke    Left hemiplegia (HCC)  Resolved Problems:    * No resolved hospital problems. *      Plan:  Patient MRI indicating an acute ischemic stroke. There are multiple areas of acute and chronic stroke. Neurology consultation prior to potential discharge. 2-D echo can be performed as outpatient.   Improve insulin pump/glycemic control  Possible discharge in 12 hours    Electronically signed by Kate Vizcaino MD on 11/12/2018 at 10:59 AM

## 2018-11-12 NOTE — PROGRESS NOTES
OCCUPATIONAL THERAPY INITIAL EVALUATION      Date:2018  Patient Name: Stephan Ferrara  MRN: [id number]  : 1970  Room: [address] [place] [address]      Evaluating OT: NILA Lucas/L #[id number]    AM-PAC Daily Activity Raw Score:   G-Code 8987: [address]  Recommended Adaptive Equipment: none     Diagnosis:   1. TIA (transient ischemic attack)          Precautions:  Falls,      Home Living: Pt lives alone in a 1 story with 8  step(s) to enter and B rail(s); bed/bath on maine   Bathroom setup: tub shower  Equipment owned: none  Prior Level of Function: Independent  with ADLs , Independent  with IADLs; using no AD for ambulation. Driving: yes    Pain Level: 0/10   Cognition: A&O: 4/4; Follows 2 step directions   Memory:  good    Sequencing:  good    Problem solving:  good    Judgement/safety:  good      Functional Assessment:   Initial Eval Status  Date: 18   Feeding Independent    Grooming Independent    UB Dressing Independent    LB Dressing Independent    Bathing Independent   Toileting Independent    Bed Mobility  Supine to sit: Independent   Sit to supine: Independent    Functional Transfers Sit to stand: Independent   Stand to sit: Independent   Stand pivot: Independent    Functional Mobility indep with noAD   Balance Sitting:     Static:  wlf    Dynamic:wfl  Standing: wfl   Activity Tolerance wfl   Visual/  Perceptual Glasses: reading            Hand dominance: R  UE ROM: RUE:  WFL  LUE:  WFL  Strength: RUE: grossly 4/5 LUE: grossly 5/5   Strength: B WFL  Fine Motor Coordination:  WFL    Hearing: WFL  Sensation:  c/o numbness or tingling B feet  Tone:  WFL  Edema: none                            Comments/Treatment: Upon arrival, patient in bed. No OT needs at this time. D/c OT. At end of session, patient in bed with call light and phone within reach, all lines and tubes intact.       Eval Complexity: Low      Time in: 1:50  Time out: 200  Evaluation + 0 timed treatment minutes    NILA Lucas/KAILASH

## 2018-11-15 LAB — CANNABINOIDS CONF, URINE: 101 NG/ML

## 2018-11-16 ENCOUNTER — TELEPHONE (OUTPATIENT)
Dept: ADMINISTRATIVE | Age: 48
End: 2018-11-16

## 2018-11-16 DIAGNOSIS — I47.1 SUPRAVENTRICULAR TACHYCARDIA (HCC): Primary | ICD-10-CM

## 2018-11-16 LAB
BLOOD CULTURE, ROUTINE: NORMAL
CULTURE, BLOOD 2: NORMAL

## 2018-11-18 LAB
EKG ATRIAL RATE: 83 BPM
EKG P AXIS: 58 DEGREES
EKG P-R INTERVAL: 132 MS
EKG Q-T INTERVAL: 384 MS
EKG QRS DURATION: 84 MS
EKG QTC CALCULATION (BAZETT): 451 MS
EKG R AXIS: 41 DEGREES
EKG T AXIS: 46 DEGREES
EKG VENTRICULAR RATE: 83 BPM

## 2018-11-19 ENCOUNTER — NURSE ONLY (OUTPATIENT)
Dept: CARDIOLOGY CLINIC | Age: 48
End: 2018-11-19

## 2018-11-19 ENCOUNTER — OFFICE VISIT (OUTPATIENT)
Dept: CARDIOLOGY CLINIC | Age: 48
End: 2018-11-19

## 2018-11-19 VITALS
WEIGHT: 142.2 LBS | HEIGHT: 66 IN | BODY MASS INDEX: 22.85 KG/M2 | RESPIRATION RATE: 18 BRPM | HEART RATE: 86 BPM | DIASTOLIC BLOOD PRESSURE: 80 MMHG | SYSTOLIC BLOOD PRESSURE: 118 MMHG

## 2018-11-19 DIAGNOSIS — R06.02 SHORTNESS OF BREATH: ICD-10-CM

## 2018-11-19 DIAGNOSIS — R00.2 PALPITATIONS: ICD-10-CM

## 2018-11-19 DIAGNOSIS — R00.0 TACHYCARDIA: Primary | ICD-10-CM

## 2018-11-19 PROCEDURE — 99203 OFFICE O/P NEW LOW 30 MIN: CPT | Performed by: INTERNAL MEDICINE

## 2018-11-19 PROCEDURE — 93000 ELECTROCARDIOGRAM COMPLETE: CPT | Performed by: INTERNAL MEDICINE

## 2018-11-19 RX ORDER — DIGOXIN 125 MCG
125 TABLET ORAL DAILY
Status: ON HOLD | COMMUNITY
End: 2020-01-11 | Stop reason: HOSPADM

## 2018-11-19 NOTE — PROGRESS NOTES
tablet by mouth daily (Patient taking differently: Take 10 mg by mouth Taking 1/2 tablet daily), Disp: 30 tablet, Rfl: 3      Jovita Waldrop MD

## 2018-12-05 ENCOUNTER — OFFICE VISIT (OUTPATIENT)
Dept: NEUROLOGY | Age: 48
End: 2018-12-05

## 2018-12-05 VITALS
DIASTOLIC BLOOD PRESSURE: 89 MMHG | SYSTOLIC BLOOD PRESSURE: 138 MMHG | HEIGHT: 66 IN | OXYGEN SATURATION: 96 % | HEART RATE: 96 BPM | BODY MASS INDEX: 23.63 KG/M2 | WEIGHT: 147 LBS

## 2018-12-05 DIAGNOSIS — G63 POLYNEUROPATHY ASSOCIATED WITH UNDERLYING DISEASE (HCC): ICD-10-CM

## 2018-12-05 DIAGNOSIS — I63.81 CEREBROVASCULAR ACCIDENT (CVA) DUE TO OCCLUSION OF SMALL ARTERY (HCC): Primary | ICD-10-CM

## 2018-12-05 DIAGNOSIS — H53.8 BLURRED VISION, BILATERAL: ICD-10-CM

## 2018-12-05 PROCEDURE — 99214 OFFICE O/P EST MOD 30 MIN: CPT | Performed by: NURSE PRACTITIONER

## 2018-12-05 NOTE — PROGRESS NOTES
XI: sternocleidomastoid strength 5/5   XI: neck extension strength  5/5   XII: tongue strength  Normal     Motor:  5/5 throughout  No abnormal movements  No drift   Normal bulk and tone     Sensory:  LT decreased below mid-thigh  Vibration severely decreased below the knee     Coordination:   FN and FFM normal   HS normal    Gait:  Normal    DTR:   Right Brachioradialis reflex 1+  Left Brachioradialis reflex 1+  Right Biceps reflex 2+  Left Biceps reflex 2+  Right Quadriceps reflex 2+   Left Quadriceps reflex 2+  Right Achilles reflex 0  Left Achilles reflex 0  No Neal's  No other pathological reflexes    Laboratory/Radiology:     CBC:   Lab Results   Component Value Date    WBC 7.6 11/11/2018    RBC 4.05 11/11/2018    HGB 12.1 11/11/2018    HCT 36.7 11/11/2018    MCV 90.6 11/11/2018    MCH 29.9 11/11/2018    MCHC 33.0 11/11/2018    RDW 13.4 11/11/2018     11/11/2018    MPV 9.2 11/11/2018     CMP:    Lab Results   Component Value Date     11/12/2018    K 4.4 11/12/2018    K 4.3 11/11/2018    CL 92 11/12/2018    CO2 24 11/12/2018    BUN 12 11/12/2018    CREATININE 0.8 11/12/2018    GFRAA >60 11/12/2018    LABGLOM >60 11/12/2018    GLUCOSE 275 11/12/2018    PROT 6.6 11/10/2018    LABALBU 4.3 11/10/2018    CALCIUM 9.0 11/12/2018    BILITOT 1.0 11/10/2018    ALKPHOS 79 11/10/2018    AST 17 11/10/2018    ALT 20 11/10/2018     HgBA1c:    Lab Results   Component Value Date    LABA1C 10.6 11/11/2018     FLP:    Lab Results   Component Value Date    TRIG 73 11/11/2018    HDL 65 11/11/2018    LDLCALC 64 11/11/2018    LABVLDL 15 11/11/2018       CT head:  NO ACUTE INTRACRANIAL ABNORMALITY EVIDENT. CTA head/neck:  1. No evidence to suggest hemodynamically significant stenosis. MRI brain:  1. Acute nonhemorrhagic lacunar infarcts of the posterior lateral left   thalamus and posterior left internal capsule. 2. Remote lacunar infarct of the right corona radiata.    3. Scattered hyperintensities on FLAIR/T2 identified are:        [x] Hypertension       [] High cholesterol       [x] Smoking       [] Overweight       [x] Lack of Exercise       [] Atrial fibrillation       [x] Diabetes       [x] Excessive alcohol use       [x] Use of illicit drugs       [] Sleep apnea       [x] Personal history of previous TIA or stroke       [] Personal history of heart disease       [] Family history of stroke or heart disease    Advised patient that you can reduce your risk for stroke/TIA by modifying/controlling the risk factors that you have. Patient advised to take the medications as prescribed, which will be detailed in the discharge instructions, and to not stop taking them without consulting their physician. In addition, pt. advised to maintain a healthy diet, exercise regularly and to not smoke. Jane Russo MSN, APRN, FNP-C  7:58 AM  12/5/18    I spent 25 minutes with this patient obtaining the HPI, discussing the exam as well as discussing our plan of action. All questions were answered prior to leaving my office.

## 2018-12-14 ENCOUNTER — TELEPHONE (OUTPATIENT)
Dept: CARDIOLOGY CLINIC | Age: 48
End: 2018-12-14

## 2018-12-14 DIAGNOSIS — R00.2 PALPITATIONS: ICD-10-CM

## 2018-12-14 DIAGNOSIS — R06.02 SHORTNESS OF BREATH: ICD-10-CM

## 2018-12-24 ENCOUNTER — HOSPITAL ENCOUNTER (INPATIENT)
Age: 48
LOS: 4 days | Discharge: HOME | DRG: 871 | End: 2018-12-28
Attending: EMERGENCY MEDICINE | Admitting: INTERNAL MEDICINE

## 2018-12-24 ENCOUNTER — APPOINTMENT (OUTPATIENT)
Dept: GENERAL RADIOLOGY | Age: 48
DRG: 871 | End: 2018-12-24

## 2018-12-24 DIAGNOSIS — N17.9 ACUTE KIDNEY INJURY (HCC): ICD-10-CM

## 2018-12-24 DIAGNOSIS — E10.10 TYPE 1 DIABETES MELLITUS WITH KETOACIDOSIS WITHOUT COMA (HCC): Primary | ICD-10-CM

## 2018-12-24 PROBLEM — E10.11 DIABETIC KETOACIDOSIS WITH COMA ASSOCIATED WITH TYPE 1 DIABETES MELLITUS (HCC): Status: ACTIVE | Noted: 2018-12-24

## 2018-12-24 LAB
ACETAMINOPHEN LEVEL: <5 MCG/ML (ref 10–30)
ALBUMIN SERPL-MCNC: 4.4 G/DL (ref 3.5–5.2)
ALP BLD-CCNC: 138 U/L (ref 40–129)
ALT SERPL-CCNC: 20 U/L (ref 0–40)
AMPHETAMINE SCREEN, URINE: NOT DETECTED
ANION GAP SERPL CALCULATED.3IONS-SCNC: 31 MMOL/L (ref 7–16)
ANION GAP SERPL CALCULATED.3IONS-SCNC: 47 MMOL/L (ref 7–16)
ANION GAP SERPL CALCULATED.3IONS-SCNC: 53 MMOL/L (ref 7–16)
AST SERPL-CCNC: 31 U/L (ref 0–39)
BACTERIA: ABNORMAL /HPF
BACTERIA: ABNORMAL /HPF
BARBITURATE SCREEN URINE: NOT DETECTED
BASOPHILS ABSOLUTE: 0 E9/L (ref 0–0.2)
BASOPHILS RELATIVE PERCENT: 0.6 % (ref 0–2)
BENZODIAZEPINE SCREEN, URINE: NOT DETECTED
BETA-HYDROXYBUTYRATE: >4.5 MMOL/L (ref 0.02–0.27)
BILIRUB SERPL-MCNC: 0.5 MG/DL (ref 0–1.2)
BILIRUBIN URINE: NEGATIVE
BILIRUBIN URINE: NEGATIVE
BLOOD, URINE: ABNORMAL
BLOOD, URINE: ABNORMAL
BUN BLDV-MCNC: 44 MG/DL (ref 6–20)
BUN BLDV-MCNC: 48 MG/DL (ref 6–20)
BUN BLDV-MCNC: 50 MG/DL (ref 6–20)
BURR CELLS: ABNORMAL
CALCIUM SERPL-MCNC: 7.3 MG/DL (ref 8.6–10.2)
CALCIUM SERPL-MCNC: 7.7 MG/DL (ref 8.6–10.2)
CALCIUM SERPL-MCNC: 8.6 MG/DL (ref 8.6–10.2)
CANNABINOID SCREEN URINE: NOT DETECTED
CHLORIDE BLD-SCNC: 70 MMOL/L (ref 98–107)
CHLORIDE BLD-SCNC: 80 MMOL/L (ref 98–107)
CHLORIDE BLD-SCNC: 96 MMOL/L (ref 98–107)
CHLORIDE URINE RANDOM: <20 MMOL/L
CHP ED QC CHECK: YES
CLARITY: CLEAR
CLARITY: CLEAR
CO2: 10 MMOL/L (ref 22–29)
CO2: 3 MMOL/L (ref 22–29)
CO2: 4 MMOL/L (ref 22–29)
COCAINE METABOLITE SCREEN URINE: NOT DETECTED
COHB: 0.1 % (ref 0–1.5)
COLOR: YELLOW
COLOR: YELLOW
CREAT SERPL-MCNC: 1.6 MG/DL (ref 0.7–1.2)
CREAT SERPL-MCNC: 2.2 MG/DL (ref 0.7–1.2)
CREAT SERPL-MCNC: 2.3 MG/DL (ref 0.7–1.2)
CREATININE URINE: 17 MG/DL (ref 40–278)
CRITICAL: ABNORMAL
DATE ANALYZED: ABNORMAL
DATE OF COLLECTION: ABNORMAL
DIGOXIN LEVEL: <0.3 NG/ML (ref 0.8–2)
EOSINOPHILS ABSOLUTE: 0 E9/L (ref 0.05–0.5)
EOSINOPHILS RELATIVE PERCENT: 0.1 % (ref 0–6)
ETHANOL: <10 MG/DL (ref 0–0.08)
GFR AFRICAN AMERICAN: 28
GFR AFRICAN AMERICAN: 37
GFR AFRICAN AMERICAN: 39
GFR AFRICAN AMERICAN: 56
GFR NON-AFRICAN AMERICAN: 23 ML/MIN/1.73
GFR NON-AFRICAN AMERICAN: 31 ML/MIN/1.73
GFR NON-AFRICAN AMERICAN: 32 ML/MIN/1.73
GFR NON-AFRICAN AMERICAN: 46 ML/MIN/1.73
GLUCOSE BLD-MCNC: 1152 MG/DL (ref 74–99)
GLUCOSE BLD-MCNC: 542 MG/DL (ref 74–99)
GLUCOSE BLD-MCNC: 597 MG/DL (ref 74–99)
GLUCOSE BLD-MCNC: 603 MG/DL (ref 74–99)
GLUCOSE BLD-MCNC: 720 MG/DL (ref 74–99)
GLUCOSE BLD-MCNC: 925 MG/DL (ref 74–99)
GLUCOSE BLD-MCNC: >700 MG/DL (ref 74–99)
GLUCOSE BLD-MCNC: NORMAL MG/DL
GLUCOSE URINE: >=1000 MG/DL
GLUCOSE URINE: >=1000 MG/DL
HBA1C MFR BLD: 9.1 % (ref 4–5.6)
HCT VFR BLD CALC: 45.2 % (ref 37–54)
HEMOGLOBIN: 13.2 G/DL (ref 12.5–16.5)
HHB: 2.3 % (ref 0–5)
KETONES, URINE: >=80 MG/DL
KETONES, URINE: >=80 MG/DL
LAB: ABNORMAL
LACTIC ACID: 1.4 MMOL/L (ref 0.5–2.2)
LACTIC ACID: 2.9 MMOL/L (ref 0.5–2.2)
LACTIC ACID: 6.5 MMOL/L (ref 0.5–2.2)
LEUKOCYTE ESTERASE, URINE: NEGATIVE
LEUKOCYTE ESTERASE, URINE: NEGATIVE
LIPASE: 94 U/L (ref 13–60)
LYMPHOCYTES ABSOLUTE: 2 E9/L (ref 1.5–4)
LYMPHOCYTES RELATIVE PERCENT: 6.1 % (ref 20–42)
Lab: ABNORMAL
MAGNESIUM: 2.2 MG/DL (ref 1.6–2.6)
MAGNESIUM: 2.6 MG/DL (ref 1.6–2.6)
MCH RBC QN AUTO: 30.6 PG (ref 26–35)
MCHC RBC AUTO-ENTMCNC: 29.2 % (ref 32–34.5)
MCV RBC AUTO: 104.6 FL (ref 80–99.9)
METER GLUCOSE: 430 MG/DL (ref 74–99)
METER GLUCOSE: 447 MG/DL (ref 74–99)
METER GLUCOSE: 454 MG/DL (ref 74–99)
METER GLUCOSE: >500 MG/DL (ref 74–99)
METHADONE SCREEN, URINE: NOT DETECTED
METHB: 0.5 % (ref 0–1.5)
MODE: ABNORMAL
MONOCYTES ABSOLUTE: 2 E9/L (ref 0.1–0.95)
MONOCYTES RELATIVE PERCENT: 6.1 % (ref 2–12)
MYELOCYTE PERCENT: 0.9 % (ref 0–0)
NEUTROPHILS ABSOLUTE: 29.39 E9/L (ref 1.8–7.3)
NEUTROPHILS RELATIVE PERCENT: 87 % (ref 43–80)
NITRITE, URINE: NEGATIVE
NITRITE, URINE: NEGATIVE
O2 CONTENT: 19.3 ML/DL
O2 SATURATION: 97.7 % (ref 92–98.5)
O2HB: 97.1 % (ref 94–97)
OPERATOR ID: 2593
OPIATE SCREEN URINE: NOT DETECTED
OSMOLALITY: 359 MOSM/KG (ref 285–310)
PATIENT TEMP: 37 C
PCO2: <15 MMHG (ref 35–45)
PDW BLD-RTO: 15.1 FL (ref 11.5–15)
PH BLOOD GAS: 6.97 (ref 7.35–7.45)
PH UA: 5.5 (ref 5–9)
PH UA: 5.5 (ref 5–9)
PHENCYCLIDINE SCREEN URINE: NOT DETECTED
PHOSPHORUS: 2 MG/DL (ref 2.5–4.5)
PHOSPHORUS: 8.5 MG/DL (ref 2.5–4.5)
PLATELET # BLD: 540 E9/L (ref 130–450)
PMV BLD AUTO: 9.6 FL (ref 7–12)
PO2: 136.3 MMHG (ref 60–100)
POC CHLORIDE: 97 MMOL/L (ref 100–108)
POC CREATININE: 2.9 MG/DL (ref 0.7–1.2)
POC POTASSIUM: 6.9 MMOL/L (ref 3.5–5)
POC SODIUM: 118 MMOL/L (ref 132–146)
POIKILOCYTES: ABNORMAL
POLYCHROMASIA: ABNORMAL
POTASSIUM SERPL-SCNC: 4.2 MMOL/L (ref 3.5–5)
POTASSIUM SERPL-SCNC: 4.6 MMOL/L (ref 3.5–5)
POTASSIUM SERPL-SCNC: 5.42 MMOL/L (ref 3.3–5.1)
POTASSIUM SERPL-SCNC: 6.4 MMOL/L (ref 3.5–5)
POTASSIUM, UR: 13.7 MMOL/L
PROPOXYPHENE SCREEN: NOT DETECTED
PROTEIN UA: NEGATIVE MG/DL
PROTEIN UA: NEGATIVE MG/DL
RBC # BLD: 4.32 E12/L (ref 3.8–5.8)
RBC UA: ABNORMAL /HPF (ref 0–2)
RBC UA: ABNORMAL /HPF (ref 0–2)
SALICYLATE, SERUM: <0.3 MG/DL (ref 0–30)
SCHISTOCYTES: ABNORMAL
SODIUM BLD-SCNC: 126 MMOL/L (ref 132–146)
SODIUM BLD-SCNC: 131 MMOL/L (ref 132–146)
SODIUM BLD-SCNC: 137 MMOL/L (ref 132–146)
SODIUM URINE: 45 MMOL/L
SOURCE, BLOOD GAS: ABNORMAL
SPECIFIC GRAVITY UA: 1.02 (ref 1–1.03)
SPECIFIC GRAVITY UA: 1.02 (ref 1–1.03)
THB: 14 G/DL (ref 11.5–16.5)
TIME ANALYZED: 1142
TOTAL PROTEIN: 7.2 G/DL (ref 6.4–8.3)
TRICYCLIC ANTIDEPRESSANTS SCREEN SERUM: NEGATIVE NG/ML
TROPONIN: 0.01 NG/ML (ref 0–0.03)
TROPONIN: 0.02 NG/ML (ref 0–0.03)
UREA NITROGEN, UR: 197 MG/DL (ref 800–1666)
UROBILINOGEN, URINE: 0.2 E.U./DL
UROBILINOGEN, URINE: 0.2 E.U./DL
WBC # BLD: 33.4 E9/L (ref 4.5–11.5)
WBC UA: ABNORMAL /HPF (ref 0–5)
WBC UA: ABNORMAL /HPF (ref 0–5)

## 2018-12-24 PROCEDURE — 82010 KETONE BODYS QUAN: CPT

## 2018-12-24 PROCEDURE — 80162 ASSAY OF DIGOXIN TOTAL: CPT

## 2018-12-24 PROCEDURE — 6370000000 HC RX 637 (ALT 250 FOR IP): Performed by: EMERGENCY MEDICINE

## 2018-12-24 PROCEDURE — 99285 EMERGENCY DEPT VISIT HI MDM: CPT

## 2018-12-24 PROCEDURE — 82947 ASSAY GLUCOSE BLOOD QUANT: CPT

## 2018-12-24 PROCEDURE — 82436 ASSAY OF URINE CHLORIDE: CPT

## 2018-12-24 PROCEDURE — 2580000003 HC RX 258: Performed by: INTERNAL MEDICINE

## 2018-12-24 PROCEDURE — 80048 BASIC METABOLIC PNL TOTAL CA: CPT

## 2018-12-24 PROCEDURE — 6360000002 HC RX W HCPCS: Performed by: INTERNAL MEDICINE

## 2018-12-24 PROCEDURE — 84133 ASSAY OF URINE POTASSIUM: CPT

## 2018-12-24 PROCEDURE — 2500000003 HC RX 250 WO HCPCS: Performed by: INTERNAL MEDICINE

## 2018-12-24 PROCEDURE — 2500000003 HC RX 250 WO HCPCS: Performed by: EMERGENCY MEDICINE

## 2018-12-24 PROCEDURE — 6360000002 HC RX W HCPCS: Performed by: EMERGENCY MEDICINE

## 2018-12-24 PROCEDURE — 84300 ASSAY OF URINE SODIUM: CPT

## 2018-12-24 PROCEDURE — 84540 ASSAY OF URINE/UREA-N: CPT

## 2018-12-24 PROCEDURE — 2580000003 HC RX 258: Performed by: EMERGENCY MEDICINE

## 2018-12-24 PROCEDURE — 84295 ASSAY OF SERUM SODIUM: CPT

## 2018-12-24 PROCEDURE — 82805 BLOOD GASES W/O2 SATURATION: CPT

## 2018-12-24 PROCEDURE — 84132 ASSAY OF SERUM POTASSIUM: CPT

## 2018-12-24 PROCEDURE — 83930 ASSAY OF BLOOD OSMOLALITY: CPT

## 2018-12-24 PROCEDURE — 85025 COMPLETE CBC W/AUTO DIFF WBC: CPT

## 2018-12-24 PROCEDURE — C9113 INJ PANTOPRAZOLE SODIUM, VIA: HCPCS | Performed by: INTERNAL MEDICINE

## 2018-12-24 PROCEDURE — 83690 ASSAY OF LIPASE: CPT

## 2018-12-24 PROCEDURE — 83735 ASSAY OF MAGNESIUM: CPT

## 2018-12-24 PROCEDURE — 51702 INSERT TEMP BLADDER CATH: CPT

## 2018-12-24 PROCEDURE — 36415 COLL VENOUS BLD VENIPUNCTURE: CPT

## 2018-12-24 PROCEDURE — 82435 ASSAY OF BLOOD CHLORIDE: CPT

## 2018-12-24 PROCEDURE — 80307 DRUG TEST PRSMV CHEM ANLYZR: CPT

## 2018-12-24 PROCEDURE — G0480 DRUG TEST DEF 1-7 CLASSES: HCPCS

## 2018-12-24 PROCEDURE — 83036 HEMOGLOBIN GLYCOSYLATED A1C: CPT

## 2018-12-24 PROCEDURE — 82570 ASSAY OF URINE CREATININE: CPT

## 2018-12-24 PROCEDURE — 93005 ELECTROCARDIOGRAM TRACING: CPT | Performed by: INTERNAL MEDICINE

## 2018-12-24 PROCEDURE — 80053 COMPREHEN METABOLIC PANEL: CPT

## 2018-12-24 PROCEDURE — 81001 URINALYSIS AUTO W/SCOPE: CPT

## 2018-12-24 PROCEDURE — 71045 X-RAY EXAM CHEST 1 VIEW: CPT

## 2018-12-24 PROCEDURE — 84484 ASSAY OF TROPONIN QUANT: CPT

## 2018-12-24 PROCEDURE — 82565 ASSAY OF CREATININE: CPT

## 2018-12-24 PROCEDURE — 82962 GLUCOSE BLOOD TEST: CPT

## 2018-12-24 PROCEDURE — 87040 BLOOD CULTURE FOR BACTERIA: CPT

## 2018-12-24 PROCEDURE — 2500000003 HC RX 250 WO HCPCS

## 2018-12-24 PROCEDURE — 84100 ASSAY OF PHOSPHORUS: CPT

## 2018-12-24 PROCEDURE — 83605 ASSAY OF LACTIC ACID: CPT

## 2018-12-24 PROCEDURE — 2000000000 HC ICU R&B

## 2018-12-24 RX ORDER — PANTOPRAZOLE SODIUM 40 MG/10ML
40 INJECTION, POWDER, LYOPHILIZED, FOR SOLUTION INTRAVENOUS DAILY
Status: DISCONTINUED | OUTPATIENT
Start: 2018-12-24 | End: 2018-12-26 | Stop reason: CLARIF

## 2018-12-24 RX ORDER — 0.9 % SODIUM CHLORIDE 0.9 %
10 VIAL (ML) INJECTION DAILY
Status: DISCONTINUED | OUTPATIENT
Start: 2018-12-24 | End: 2018-12-26 | Stop reason: CLARIF

## 2018-12-24 RX ORDER — DEXTROSE MONOHYDRATE 25 G/50ML
12.5 INJECTION, SOLUTION INTRAVENOUS PRN
Status: DISCONTINUED | OUTPATIENT
Start: 2018-12-24 | End: 2018-12-26 | Stop reason: SDUPTHER

## 2018-12-24 RX ORDER — MAGNESIUM SULFATE 1 G/100ML
1 INJECTION INTRAVENOUS PRN
Status: DISCONTINUED | OUTPATIENT
Start: 2018-12-24 | End: 2018-12-28 | Stop reason: HOSPADM

## 2018-12-24 RX ORDER — ASPIRIN 81 MG/1
81 TABLET ORAL DAILY
Status: DISCONTINUED | OUTPATIENT
Start: 2018-12-24 | End: 2018-12-28 | Stop reason: HOSPADM

## 2018-12-24 RX ORDER — POTASSIUM CHLORIDE 7.45 MG/ML
10 INJECTION INTRAVENOUS PRN
Status: DISCONTINUED | OUTPATIENT
Start: 2018-12-24 | End: 2018-12-28 | Stop reason: HOSPADM

## 2018-12-24 RX ORDER — SODIUM CHLORIDE 9 MG/ML
INJECTION, SOLUTION INTRAVENOUS CONTINUOUS
Status: DISCONTINUED | OUTPATIENT
Start: 2018-12-24 | End: 2018-12-25

## 2018-12-24 RX ORDER — ONDANSETRON 2 MG/ML
4 INJECTION INTRAMUSCULAR; INTRAVENOUS EVERY 6 HOURS PRN
Status: DISCONTINUED | OUTPATIENT
Start: 2018-12-24 | End: 2018-12-25

## 2018-12-24 RX ORDER — SODIUM CHLORIDE 0.9 % (FLUSH) 0.9 %
10 SYRINGE (ML) INJECTION PRN
Status: DISCONTINUED | OUTPATIENT
Start: 2018-12-24 | End: 2018-12-28 | Stop reason: HOSPADM

## 2018-12-24 RX ORDER — SODIUM CHLORIDE 0.9 % (FLUSH) 0.9 %
10 SYRINGE (ML) INJECTION EVERY 12 HOURS SCHEDULED
Status: DISCONTINUED | OUTPATIENT
Start: 2018-12-24 | End: 2018-12-28 | Stop reason: HOSPADM

## 2018-12-24 RX ORDER — PIPERACILLIN SODIUM, TAZOBACTAM SODIUM 3; .375 G/15ML; G/15ML
3.38 INJECTION, POWDER, LYOPHILIZED, FOR SOLUTION INTRAVENOUS EVERY 6 HOURS
Status: DISCONTINUED | OUTPATIENT
Start: 2018-12-24 | End: 2018-12-24 | Stop reason: CLARIF

## 2018-12-24 RX ORDER — 0.9 % SODIUM CHLORIDE 0.9 %
1000 INTRAVENOUS SOLUTION INTRAVENOUS ONCE
Status: COMPLETED | OUTPATIENT
Start: 2018-12-24 | End: 2018-12-24

## 2018-12-24 RX ORDER — DEXTROSE, SODIUM CHLORIDE, AND POTASSIUM CHLORIDE 5; .45; .15 G/100ML; G/100ML; G/100ML
INJECTION INTRAVENOUS CONTINUOUS PRN
Status: DISCONTINUED | OUTPATIENT
Start: 2018-12-24 | End: 2018-12-28 | Stop reason: HOSPADM

## 2018-12-24 RX ORDER — 0.9 % SODIUM CHLORIDE 0.9 %
15 INTRAVENOUS SOLUTION INTRAVENOUS ONCE
Status: COMPLETED | OUTPATIENT
Start: 2018-12-24 | End: 2018-12-24

## 2018-12-24 RX ADMIN — SODIUM CHLORIDE: 9 INJECTION, SOLUTION INTRAVENOUS at 19:03

## 2018-12-24 RX ADMIN — SODIUM CHLORIDE 0.1 UNITS/KG/HR: 9 INJECTION, SOLUTION INTRAVENOUS at 12:18

## 2018-12-24 RX ADMIN — SODIUM BICARBONATE: 84 INJECTION, SOLUTION INTRAVENOUS at 12:50

## 2018-12-24 RX ADMIN — Medication 50 MEQ: at 11:55

## 2018-12-24 RX ADMIN — POTASSIUM PHOSPHATE, MONOBASIC AND POTASSIUM PHOSPHATE, DIBASIC 15 MMOL: 224; 236 INJECTION, SOLUTION, CONCENTRATE INTRAVENOUS at 21:03

## 2018-12-24 RX ADMIN — Medication 10 ML: at 16:52

## 2018-12-24 RX ADMIN — PANTOPRAZOLE SODIUM 40 MG: 40 INJECTION, POWDER, FOR SOLUTION INTRAVENOUS at 16:52

## 2018-12-24 RX ADMIN — SODIUM BICARBONATE: 84 INJECTION, SOLUTION INTRAVENOUS at 21:03

## 2018-12-24 RX ADMIN — PIPERACILLIN SODIUM, TAZOBACTAM SODIUM 3.38 G: 3; .375 INJECTION, POWDER, LYOPHILIZED, FOR SOLUTION INTRAVENOUS at 16:44

## 2018-12-24 RX ADMIN — SODIUM CHLORIDE: 9 INJECTION, SOLUTION INTRAVENOUS at 23:04

## 2018-12-24 RX ADMIN — CEFEPIME HYDROCHLORIDE 2 G: 2 INJECTION, POWDER, FOR SOLUTION INTRAVENOUS at 13:11

## 2018-12-24 RX ADMIN — SODIUM CHLORIDE 1000 ML: 9 INJECTION, SOLUTION INTRAVENOUS at 11:41

## 2018-12-24 RX ADMIN — SODIUM CHLORIDE 1089 ML: 9 INJECTION, SOLUTION INTRAVENOUS at 11:55

## 2018-12-24 RX ADMIN — POTASSIUM CHLORIDE 10 MEQ: 7.46 INJECTION, SOLUTION INTRAVENOUS at 21:16

## 2018-12-24 RX ADMIN — SODIUM CHLORIDE: 9 INJECTION, SOLUTION INTRAVENOUS at 15:14

## 2018-12-24 RX ADMIN — POTASSIUM CHLORIDE 10 MEQ: 7.46 INJECTION, SOLUTION INTRAVENOUS at 19:51

## 2018-12-24 RX ADMIN — VANCOMYCIN HYDROCHLORIDE 1.5 G: 10 INJECTION, POWDER, LYOPHILIZED, FOR SOLUTION INTRAVENOUS at 15:18

## 2018-12-24 RX ADMIN — Medication 10 ML: at 21:03

## 2018-12-24 RX ADMIN — SODIUM BICARBONATE 50 MEQ: 84 INJECTION, SOLUTION INTRAVENOUS at 11:55

## 2018-12-24 RX ADMIN — POTASSIUM CHLORIDE 10 MEQ: 7.46 INJECTION, SOLUTION INTRAVENOUS at 16:39

## 2018-12-24 ASSESSMENT — PAIN SCALES - GENERAL
PAINLEVEL_OUTOF10: 0

## 2018-12-25 ENCOUNTER — APPOINTMENT (OUTPATIENT)
Dept: GENERAL RADIOLOGY | Age: 48
DRG: 871 | End: 2018-12-25

## 2018-12-25 LAB
ALBUMIN SERPL-MCNC: 3.6 G/DL (ref 3.5–5.2)
ALP BLD-CCNC: 87 U/L (ref 40–129)
ALT SERPL-CCNC: 17 U/L (ref 0–40)
ANION GAP SERPL CALCULATED.3IONS-SCNC: 14 MMOL/L (ref 7–16)
ANION GAP SERPL CALCULATED.3IONS-SCNC: 15 MMOL/L (ref 7–16)
ANION GAP SERPL CALCULATED.3IONS-SCNC: 21 MMOL/L (ref 7–16)
ANION GAP SERPL CALCULATED.3IONS-SCNC: 28 MMOL/L (ref 7–16)
ANION GAP SERPL CALCULATED.3IONS-SCNC: 36 MMOL/L (ref 7–16)
ANION GAP SERPL CALCULATED.3IONS-SCNC: 9 MMOL/L (ref 7–16)
ANISOCYTOSIS: ABNORMAL
AST SERPL-CCNC: 30 U/L (ref 0–39)
B.E.: -1.8 MMOL/L (ref -3–3)
B.E.: -18.2 MMOL/L (ref -3–3)
BASOPHILS ABSOLUTE: 0.04 E9/L (ref 0–0.2)
BASOPHILS RELATIVE PERCENT: 0.2 % (ref 0–2)
BILIRUB SERPL-MCNC: 1.4 MG/DL (ref 0–1.2)
BUN BLDV-MCNC: 16 MG/DL (ref 6–20)
BUN BLDV-MCNC: 20 MG/DL (ref 6–20)
BUN BLDV-MCNC: 25 MG/DL (ref 6–20)
BUN BLDV-MCNC: 32 MG/DL (ref 6–20)
BUN BLDV-MCNC: 36 MG/DL (ref 6–20)
BUN BLDV-MCNC: 37 MG/DL (ref 6–20)
BURR CELLS: ABNORMAL
CALCIUM SERPL-MCNC: 7.2 MG/DL (ref 8.6–10.2)
CALCIUM SERPL-MCNC: 7.4 MG/DL (ref 8.6–10.2)
CALCIUM SERPL-MCNC: 7.4 MG/DL (ref 8.6–10.2)
CALCIUM SERPL-MCNC: 7.5 MG/DL (ref 8.6–10.2)
CALCIUM SERPL-MCNC: 7.6 MG/DL (ref 8.6–10.2)
CALCIUM SERPL-MCNC: 7.6 MG/DL (ref 8.6–10.2)
CHLORIDE BLD-SCNC: 102 MMOL/L (ref 98–107)
CHLORIDE BLD-SCNC: 102 MMOL/L (ref 98–107)
CHLORIDE BLD-SCNC: 103 MMOL/L (ref 98–107)
CHLORIDE BLD-SCNC: 107 MMOL/L (ref 98–107)
CHLORIDE BLD-SCNC: 99 MMOL/L (ref 98–107)
CHLORIDE BLD-SCNC: 99 MMOL/L (ref 98–107)
CO2: 13 MMOL/L (ref 22–29)
CO2: 14 MMOL/L (ref 22–29)
CO2: 18 MMOL/L (ref 22–29)
CO2: 20 MMOL/L (ref 22–29)
CO2: 21 MMOL/L (ref 22–29)
CO2: 6 MMOL/L (ref 22–29)
COHB: 1.1 % (ref 0–1.5)
COHB: 1.3 % (ref 0–1.5)
CREAT SERPL-MCNC: 0.7 MG/DL (ref 0.7–1.2)
CREAT SERPL-MCNC: 0.7 MG/DL (ref 0.7–1.2)
CREAT SERPL-MCNC: 0.8 MG/DL (ref 0.7–1.2)
CREAT SERPL-MCNC: 1 MG/DL (ref 0.7–1.2)
CREAT SERPL-MCNC: 1.2 MG/DL (ref 0.7–1.2)
CREAT SERPL-MCNC: 1.3 MG/DL (ref 0.7–1.2)
CRITICAL: ABNORMAL
CRITICAL: ABNORMAL
DATE ANALYZED: ABNORMAL
DATE ANALYZED: ABNORMAL
DATE OF COLLECTION: ABNORMAL
DATE OF COLLECTION: ABNORMAL
EOSINOPHILS ABSOLUTE: 0.01 E9/L (ref 0.05–0.5)
EOSINOPHILS RELATIVE PERCENT: 0 % (ref 0–6)
ETHANOL: <10 MG/DL (ref 0–0.08)
FILM ARRAY ADENOVIRUS: NORMAL
FILM ARRAY BORDETELLA PERTUSSIS: NORMAL
FILM ARRAY CHLAMYDOPHILIA PNEUMONIAE: NORMAL
FILM ARRAY CORONAVIRUS 229E: NORMAL
FILM ARRAY CORONAVIRUS HKU1: NORMAL
FILM ARRAY CORONAVIRUS NL63: NORMAL
FILM ARRAY CORONAVIRUS OC43: NORMAL
FILM ARRAY INFLUENZA A VIRUS 09H1: NORMAL
FILM ARRAY INFLUENZA A VIRUS H1: NORMAL
FILM ARRAY INFLUENZA A VIRUS H3: NORMAL
FILM ARRAY INFLUENZA A VIRUS: NORMAL
FILM ARRAY INFLUENZA B: NORMAL
FILM ARRAY METAPNEUMOVIRUS: NORMAL
FILM ARRAY MYCOPLASMA PNEUMONIAE: NORMAL
FILM ARRAY PARAINFLUENZA VIRUS 1: NORMAL
FILM ARRAY PARAINFLUENZA VIRUS 2: NORMAL
FILM ARRAY PARAINFLUENZA VIRUS 3: NORMAL
FILM ARRAY PARAINFLUENZA VIRUS 4: NORMAL
FILM ARRAY RESPIRATORY SYNCITIAL VIRUS: NORMAL
FILM ARRAY RHINOVIRUS/ENTEROVIRUS: NORMAL
GFR AFRICAN AMERICAN: >60
GFR NON-AFRICAN AMERICAN: 59 ML/MIN/1.73
GFR NON-AFRICAN AMERICAN: >60 ML/MIN/1.73
GLUCOSE BLD-MCNC: 166 MG/DL (ref 74–99)
GLUCOSE BLD-MCNC: 181 MG/DL (ref 74–99)
GLUCOSE BLD-MCNC: 187 MG/DL (ref 74–99)
GLUCOSE BLD-MCNC: 272 MG/DL (ref 74–99)
GLUCOSE BLD-MCNC: 446 MG/DL (ref 74–99)
GLUCOSE BLD-MCNC: 522 MG/DL (ref 74–99)
HCO3: 21.1 MMOL/L (ref 22–26)
HCO3: 7.2 MMOL/L (ref 22–26)
HCT VFR BLD CALC: 34.2 % (ref 37–54)
HEMOGLOBIN: 10.9 G/DL (ref 12.5–16.5)
HHB: 7.1 % (ref 0–5)
HHB: 7.2 % (ref 0–5)
IMMATURE GRANULOCYTES #: 0.5 E9/L
IMMATURE GRANULOCYTES %: 2.2 % (ref 0–5)
LAB: ABNORMAL
LAB: ABNORMAL
LACTIC ACID: 1.1 MMOL/L (ref 0.5–2.2)
LYMPHOCYTES ABSOLUTE: 0.81 E9/L (ref 1.5–4)
LYMPHOCYTES RELATIVE PERCENT: 3.6 % (ref 20–42)
Lab: ABNORMAL
Lab: ABNORMAL
MAGNESIUM: 1.9 MG/DL (ref 1.6–2.6)
MAGNESIUM: 2.2 MG/DL (ref 1.6–2.6)
MCH RBC QN AUTO: 30.2 PG (ref 26–35)
MCHC RBC AUTO-ENTMCNC: 31.9 % (ref 32–34.5)
MCV RBC AUTO: 94.7 FL (ref 80–99.9)
METER GLUCOSE: 128 MG/DL (ref 74–99)
METER GLUCOSE: 137 MG/DL (ref 74–99)
METER GLUCOSE: 141 MG/DL (ref 74–99)
METER GLUCOSE: 142 MG/DL (ref 74–99)
METER GLUCOSE: 160 MG/DL (ref 74–99)
METER GLUCOSE: 168 MG/DL (ref 74–99)
METER GLUCOSE: 169 MG/DL (ref 74–99)
METER GLUCOSE: 174 MG/DL (ref 74–99)
METER GLUCOSE: 184 MG/DL (ref 74–99)
METER GLUCOSE: 185 MG/DL (ref 74–99)
METER GLUCOSE: 185 MG/DL (ref 74–99)
METER GLUCOSE: 186 MG/DL (ref 74–99)
METER GLUCOSE: 200 MG/DL (ref 74–99)
METER GLUCOSE: 203 MG/DL (ref 74–99)
METER GLUCOSE: 238 MG/DL (ref 74–99)
METER GLUCOSE: 267 MG/DL (ref 74–99)
METER GLUCOSE: 306 MG/DL (ref 74–99)
METER GLUCOSE: 376 MG/DL (ref 74–99)
METER GLUCOSE: 413 MG/DL (ref 74–99)
METER GLUCOSE: 424 MG/DL (ref 74–99)
METER GLUCOSE: 426 MG/DL (ref 74–99)
METER GLUCOSE: 454 MG/DL (ref 74–99)
METER GLUCOSE: 468 MG/DL (ref 74–99)
METER GLUCOSE: 489 MG/DL (ref 74–99)
METER GLUCOSE: >500 MG/DL (ref 74–99)
METHB: 0.3 % (ref 0–1.5)
METHB: 0.4 % (ref 0–1.5)
MODE: ABNORMAL
MODE: ABNORMAL
MONOCYTES ABSOLUTE: 2.16 E9/L (ref 0.1–0.95)
MONOCYTES RELATIVE PERCENT: 9.6 % (ref 2–12)
NEUTROPHILS ABSOLUTE: 19.03 E9/L (ref 1.8–7.3)
NEUTROPHILS RELATIVE PERCENT: 84.4 % (ref 43–80)
O2 SATURATION: 92.7 % (ref 92–98.5)
O2 SATURATION: 92.8 % (ref 92–98.5)
O2HB: 91.2 % (ref 94–97)
O2HB: 91.4 % (ref 94–97)
OPERATOR ID: 1874
OPERATOR ID: ABNORMAL
PATIENT TEMP: 37 C
PATIENT TEMP: 37 C
PCO2: 17.4 MMHG (ref 35–45)
PCO2: 29.9 MMHG (ref 35–45)
PDW BLD-RTO: 14.9 FL (ref 11.5–15)
PH BLOOD GAS: 7.23 (ref 7.35–7.45)
PH BLOOD GAS: 7.47 (ref 7.35–7.45)
PHOSPHORUS: 1.5 MG/DL (ref 2.5–4.5)
PHOSPHORUS: 1.6 MG/DL (ref 2.5–4.5)
PHOSPHORUS: 1.7 MG/DL (ref 2.5–4.5)
PHOSPHORUS: 2 MG/DL (ref 2.5–4.5)
PHOSPHORUS: 2.8 MG/DL (ref 2.5–4.5)
PHOSPHORUS: 4.1 MG/DL (ref 2.5–4.5)
PLATELET # BLD: 376 E9/L (ref 130–450)
PMV BLD AUTO: 9.2 FL (ref 7–12)
PO2: 59.7 MMHG (ref 60–100)
PO2: 71.7 MMHG (ref 60–100)
POIKILOCYTES: ABNORMAL
POLYCHROMASIA: ABNORMAL
POTASSIUM REFLEX MAGNESIUM: 5.1 MMOL/L (ref 3.5–5)
POTASSIUM SERPL-SCNC: 3.7 MMOL/L (ref 3.5–5)
POTASSIUM SERPL-SCNC: 3.9 MMOL/L (ref 3.5–5)
POTASSIUM SERPL-SCNC: 4.2 MMOL/L (ref 3.5–5)
POTASSIUM SERPL-SCNC: 4.7 MMOL/L (ref 3.5–5)
POTASSIUM SERPL-SCNC: 5.3 MMOL/L (ref 3.5–5)
PROCALCITONIN: 1.14 NG/ML (ref 0–0.08)
PROCALCITONIN: 1.3 NG/ML (ref 0–0.08)
RBC # BLD: 3.61 E12/L (ref 3.8–5.8)
SODIUM BLD-SCNC: 133 MMOL/L (ref 132–146)
SODIUM BLD-SCNC: 135 MMOL/L (ref 132–146)
SODIUM BLD-SCNC: 136 MMOL/L (ref 132–146)
SODIUM BLD-SCNC: 140 MMOL/L (ref 132–146)
SODIUM BLD-SCNC: 141 MMOL/L (ref 132–146)
SODIUM BLD-SCNC: 142 MMOL/L (ref 132–146)
SOURCE, BLOOD GAS: ABNORMAL
SOURCE, BLOOD GAS: ABNORMAL
THB: 11 G/DL (ref 11.5–16.5)
THB: 11.9 G/DL (ref 11.5–16.5)
TIME ANALYZED: 1414
TIME ANALYZED: 411
TOTAL PROTEIN: 5.5 G/DL (ref 6.4–8.3)
TROPONIN: <0.01 NG/ML (ref 0–0.03)
WBC # BLD: 22.6 E9/L (ref 4.5–11.5)

## 2018-12-25 PROCEDURE — 82805 BLOOD GASES W/O2 SATURATION: CPT

## 2018-12-25 PROCEDURE — 82693 ASSAY OF ETHYLENE GLYCOL: CPT

## 2018-12-25 PROCEDURE — 80048 BASIC METABOLIC PNL TOTAL CA: CPT

## 2018-12-25 PROCEDURE — 99223 1ST HOSP IP/OBS HIGH 75: CPT | Performed by: INTERNAL MEDICINE

## 2018-12-25 PROCEDURE — 6370000000 HC RX 637 (ALT 250 FOR IP): Performed by: EMERGENCY MEDICINE

## 2018-12-25 PROCEDURE — C9113 INJ PANTOPRAZOLE SODIUM, VIA: HCPCS | Performed by: INTERNAL MEDICINE

## 2018-12-25 PROCEDURE — 6360000002 HC RX W HCPCS: Performed by: INTERNAL MEDICINE

## 2018-12-25 PROCEDURE — 83735 ASSAY OF MAGNESIUM: CPT

## 2018-12-25 PROCEDURE — G0480 DRUG TEST DEF 1-7 CLASSES: HCPCS

## 2018-12-25 PROCEDURE — 71045 X-RAY EXAM CHEST 1 VIEW: CPT

## 2018-12-25 PROCEDURE — 6360000002 HC RX W HCPCS: Performed by: EMERGENCY MEDICINE

## 2018-12-25 PROCEDURE — 84484 ASSAY OF TROPONIN QUANT: CPT

## 2018-12-25 PROCEDURE — 2580000003 HC RX 258: Performed by: INTERNAL MEDICINE

## 2018-12-25 PROCEDURE — 6370000000 HC RX 637 (ALT 250 FOR IP): Performed by: INTERNAL MEDICINE

## 2018-12-25 PROCEDURE — 2500000003 HC RX 250 WO HCPCS: Performed by: INTERNAL MEDICINE

## 2018-12-25 PROCEDURE — 84100 ASSAY OF PHOSPHORUS: CPT

## 2018-12-25 PROCEDURE — 36415 COLL VENOUS BLD VENIPUNCTURE: CPT

## 2018-12-25 PROCEDURE — 82962 GLUCOSE BLOOD TEST: CPT

## 2018-12-25 PROCEDURE — 84145 PROCALCITONIN (PCT): CPT

## 2018-12-25 PROCEDURE — 85025 COMPLETE CBC W/AUTO DIFF WBC: CPT

## 2018-12-25 PROCEDURE — 87486 CHLMYD PNEUM DNA AMP PROBE: CPT

## 2018-12-25 PROCEDURE — 2580000003 HC RX 258: Performed by: EMERGENCY MEDICINE

## 2018-12-25 PROCEDURE — 87633 RESP VIRUS 12-25 TARGETS: CPT

## 2018-12-25 PROCEDURE — 2500000003 HC RX 250 WO HCPCS: Performed by: EMERGENCY MEDICINE

## 2018-12-25 PROCEDURE — 87581 M.PNEUMON DNA AMP PROBE: CPT

## 2018-12-25 PROCEDURE — 80053 COMPREHEN METABOLIC PANEL: CPT

## 2018-12-25 PROCEDURE — 87798 DETECT AGENT NOS DNA AMP: CPT

## 2018-12-25 PROCEDURE — 2000000000 HC ICU R&B

## 2018-12-25 PROCEDURE — 83605 ASSAY OF LACTIC ACID: CPT

## 2018-12-25 RX ORDER — FOLIC ACID 1 MG/1
1 TABLET ORAL DAILY
Status: DISCONTINUED | OUTPATIENT
Start: 2018-12-25 | End: 2018-12-28 | Stop reason: HOSPADM

## 2018-12-25 RX ORDER — THIAMINE MONONITRATE (VIT B1) 100 MG
100 TABLET ORAL DAILY
Status: DISCONTINUED | OUTPATIENT
Start: 2018-12-25 | End: 2018-12-28 | Stop reason: HOSPADM

## 2018-12-25 RX ORDER — ATORVASTATIN CALCIUM 40 MG/1
80 TABLET, FILM COATED ORAL NIGHTLY
Status: DISCONTINUED | OUTPATIENT
Start: 2018-12-25 | End: 2018-12-28 | Stop reason: HOSPADM

## 2018-12-25 RX ADMIN — POTASSIUM CHLORIDE 10 MEQ: 7.46 INJECTION, SOLUTION INTRAVENOUS at 13:19

## 2018-12-25 RX ADMIN — DEXTROSE, SODIUM CHLORIDE, AND POTASSIUM CHLORIDE: 5; .45; .15 INJECTION INTRAVENOUS at 15:58

## 2018-12-25 RX ADMIN — Medication 10 ML: at 07:54

## 2018-12-25 RX ADMIN — POTASSIUM CHLORIDE 10 MEQ: 7.46 INJECTION, SOLUTION INTRAVENOUS at 14:19

## 2018-12-25 RX ADMIN — Medication 10 ML: at 20:19

## 2018-12-25 RX ADMIN — Medication 100 MG: at 09:33

## 2018-12-25 RX ADMIN — SODIUM CHLORIDE 12.2 UNITS/HR: 9 INJECTION, SOLUTION INTRAVENOUS at 08:08

## 2018-12-25 RX ADMIN — DEXTROSE, SODIUM CHLORIDE, AND POTASSIUM CHLORIDE: 5; .45; .15 INJECTION INTRAVENOUS at 09:06

## 2018-12-25 RX ADMIN — PIPERACILLIN SODIUM, TAZOBACTAM SODIUM 3.38 G: 3; .375 INJECTION, POWDER, LYOPHILIZED, FOR SOLUTION INTRAVENOUS at 23:57

## 2018-12-25 RX ADMIN — SODIUM BICARBONATE: 84 INJECTION, SOLUTION INTRAVENOUS at 13:59

## 2018-12-25 RX ADMIN — ASPIRIN 81 MG: 81 TABLET, COATED ORAL at 07:54

## 2018-12-25 RX ADMIN — Medication 10 ML: at 07:55

## 2018-12-25 RX ADMIN — SODIUM CHLORIDE: 9 INJECTION, SOLUTION INTRAVENOUS at 07:08

## 2018-12-25 RX ADMIN — POTASSIUM CHLORIDE 10 MEQ: 7.46 INJECTION, SOLUTION INTRAVENOUS at 02:17

## 2018-12-25 RX ADMIN — VANCOMYCIN HYDROCHLORIDE 1000 MG: 1 INJECTION, POWDER, LYOPHILIZED, FOR SOLUTION INTRAVENOUS at 13:19

## 2018-12-25 RX ADMIN — POTASSIUM CHLORIDE 10 MEQ: 7.46 INJECTION, SOLUTION INTRAVENOUS at 17:38

## 2018-12-25 RX ADMIN — PIPERACILLIN SODIUM, TAZOBACTAM SODIUM 3.38 G: 3; .375 INJECTION, POWDER, LYOPHILIZED, FOR SOLUTION INTRAVENOUS at 07:54

## 2018-12-25 RX ADMIN — PANTOPRAZOLE SODIUM 40 MG: 40 INJECTION, POWDER, FOR SOLUTION INTRAVENOUS at 07:55

## 2018-12-25 RX ADMIN — SODIUM CHLORIDE: 9 INJECTION, SOLUTION INTRAVENOUS at 03:08

## 2018-12-25 RX ADMIN — PIPERACILLIN SODIUM, TAZOBACTAM SODIUM 3.38 G: 3; .375 INJECTION, POWDER, LYOPHILIZED, FOR SOLUTION INTRAVENOUS at 15:41

## 2018-12-25 RX ADMIN — SODIUM BICARBONATE: 84 INJECTION, SOLUTION INTRAVENOUS at 06:04

## 2018-12-25 RX ADMIN — POTASSIUM CHLORIDE 10 MEQ: 7.46 INJECTION, SOLUTION INTRAVENOUS at 19:01

## 2018-12-25 RX ADMIN — POTASSIUM CHLORIDE 10 MEQ: 7.46 INJECTION, SOLUTION INTRAVENOUS at 14:52

## 2018-12-25 RX ADMIN — POTASSIUM CHLORIDE 10 MEQ: 7.46 INJECTION, SOLUTION INTRAVENOUS at 10:34

## 2018-12-25 RX ADMIN — PIPERACILLIN SODIUM, TAZOBACTAM SODIUM 3.38 G: 3; .375 INJECTION, POWDER, LYOPHILIZED, FOR SOLUTION INTRAVENOUS at 00:24

## 2018-12-25 RX ADMIN — Medication 10 ML: at 23:57

## 2018-12-25 RX ADMIN — POTASSIUM CHLORIDE 10 MEQ: 7.46 INJECTION, SOLUTION INTRAVENOUS at 18:09

## 2018-12-25 RX ADMIN — POTASSIUM PHOSPHATE, MONOBASIC AND POTASSIUM PHOSPHATE, DIBASIC 15 MMOL: 224; 236 INJECTION, SOLUTION, CONCENTRATE INTRAVENOUS at 12:44

## 2018-12-25 RX ADMIN — POTASSIUM PHOSPHATE, MONOBASIC AND POTASSIUM PHOSPHATE, DIBASIC 15 MMOL: 224; 236 INJECTION, SOLUTION, CONCENTRATE INTRAVENOUS at 21:57

## 2018-12-25 RX ADMIN — POTASSIUM CHLORIDE 10 MEQ: 7.46 INJECTION, SOLUTION INTRAVENOUS at 11:51

## 2018-12-25 RX ADMIN — POTASSIUM CHLORIDE 10 MEQ: 7.46 INJECTION, SOLUTION INTRAVENOUS at 09:27

## 2018-12-25 RX ADMIN — ATORVASTATIN CALCIUM 80 MG: 40 TABLET, FILM COATED ORAL at 20:18

## 2018-12-25 RX ADMIN — DEXTROSE, SODIUM CHLORIDE, AND POTASSIUM CHLORIDE: 5; .45; .15 INJECTION INTRAVENOUS at 22:04

## 2018-12-25 RX ADMIN — FOLIC ACID 1 MG: 1 TABLET ORAL at 09:33

## 2018-12-25 RX ADMIN — Medication 10 ML: at 00:24

## 2018-12-25 ASSESSMENT — PAIN SCALES - GENERAL
PAINLEVEL_OUTOF10: 0

## 2018-12-26 LAB
ALBUMIN SERPL-MCNC: 3.2 G/DL (ref 3.5–5.2)
ALP BLD-CCNC: 72 U/L (ref 40–129)
ALT SERPL-CCNC: 22 U/L (ref 0–40)
ANION GAP SERPL CALCULATED.3IONS-SCNC: 13 MMOL/L (ref 7–16)
ANION GAP SERPL CALCULATED.3IONS-SCNC: 13 MMOL/L (ref 7–16)
ANION GAP SERPL CALCULATED.3IONS-SCNC: 14 MMOL/L (ref 7–16)
AST SERPL-CCNC: 33 U/L (ref 0–39)
BASOPHILS ABSOLUTE: 0.03 E9/L (ref 0–0.2)
BASOPHILS RELATIVE PERCENT: 0.2 % (ref 0–2)
BILIRUB SERPL-MCNC: 1.4 MG/DL (ref 0–1.2)
BUN BLDV-MCNC: 10 MG/DL (ref 6–20)
BUN BLDV-MCNC: 10 MG/DL (ref 6–20)
BUN BLDV-MCNC: 13 MG/DL (ref 6–20)
C DIFFICILE TOXIN, EIA: NORMAL
CALCIUM SERPL-MCNC: 7.7 MG/DL (ref 8.6–10.2)
CALCIUM SERPL-MCNC: 7.8 MG/DL (ref 8.6–10.2)
CALCIUM SERPL-MCNC: 7.9 MG/DL (ref 8.6–10.2)
CHLORIDE BLD-SCNC: 100 MMOL/L (ref 98–107)
CHLORIDE BLD-SCNC: 101 MMOL/L (ref 98–107)
CHLORIDE BLD-SCNC: 102 MMOL/L (ref 98–107)
CO2: 19 MMOL/L (ref 22–29)
CO2: 19 MMOL/L (ref 22–29)
CO2: 20 MMOL/L (ref 22–29)
CREAT SERPL-MCNC: 0.6 MG/DL (ref 0.7–1.2)
EOSINOPHILS ABSOLUTE: 0.05 E9/L (ref 0.05–0.5)
EOSINOPHILS RELATIVE PERCENT: 0.3 % (ref 0–6)
GFR AFRICAN AMERICAN: >60
GFR NON-AFRICAN AMERICAN: >60 ML/MIN/1.73
GLUCOSE BLD-MCNC: 197 MG/DL (ref 74–99)
GLUCOSE BLD-MCNC: 197 MG/DL (ref 74–99)
GLUCOSE BLD-MCNC: 225 MG/DL (ref 74–99)
HCT VFR BLD CALC: 32 % (ref 37–54)
HEMOGLOBIN: 10.7 G/DL (ref 12.5–16.5)
IMMATURE GRANULOCYTES #: 0.11 E9/L
IMMATURE GRANULOCYTES %: 0.7 % (ref 0–5)
LYMPHOCYTES ABSOLUTE: 1.29 E9/L (ref 1.5–4)
LYMPHOCYTES RELATIVE PERCENT: 8.6 % (ref 20–42)
MAGNESIUM: 1.9 MG/DL (ref 1.6–2.6)
MAGNESIUM: 1.9 MG/DL (ref 1.6–2.6)
MCH RBC QN AUTO: 30.3 PG (ref 26–35)
MCHC RBC AUTO-ENTMCNC: 33.4 % (ref 32–34.5)
MCV RBC AUTO: 90.7 FL (ref 80–99.9)
METER GLUCOSE: 159 MG/DL (ref 74–99)
METER GLUCOSE: 177 MG/DL (ref 74–99)
METER GLUCOSE: 189 MG/DL (ref 74–99)
METER GLUCOSE: 200 MG/DL (ref 74–99)
METER GLUCOSE: 200 MG/DL (ref 74–99)
METER GLUCOSE: 208 MG/DL (ref 74–99)
METER GLUCOSE: 209 MG/DL (ref 74–99)
METER GLUCOSE: 209 MG/DL (ref 74–99)
METER GLUCOSE: 229 MG/DL (ref 74–99)
MONOCYTES ABSOLUTE: 0.83 E9/L (ref 0.1–0.95)
MONOCYTES RELATIVE PERCENT: 5.5 % (ref 2–12)
NEUTROPHILS ABSOLUTE: 12.72 E9/L (ref 1.8–7.3)
NEUTROPHILS RELATIVE PERCENT: 84.7 % (ref 43–80)
PDW BLD-RTO: 15.3 FL (ref 11.5–15)
PHOSPHORUS: 1.4 MG/DL (ref 2.5–4.5)
PHOSPHORUS: 1.6 MG/DL (ref 2.5–4.5)
PLATELET # BLD: 291 E9/L (ref 130–450)
PMV BLD AUTO: 9 FL (ref 7–12)
POTASSIUM REFLEX MAGNESIUM: 3.8 MMOL/L (ref 3.5–5)
POTASSIUM SERPL-SCNC: 3.8 MMOL/L (ref 3.5–5)
POTASSIUM SERPL-SCNC: 4.1 MMOL/L (ref 3.5–5)
RBC # BLD: 3.53 E12/L (ref 3.8–5.8)
REPORT: NORMAL
REPORT: NORMAL
SODIUM BLD-SCNC: 133 MMOL/L (ref 132–146)
SODIUM BLD-SCNC: 134 MMOL/L (ref 132–146)
SODIUM BLD-SCNC: 134 MMOL/L (ref 132–146)
TOTAL PROTEIN: 5.2 G/DL (ref 6.4–8.3)
WBC # BLD: 15 E9/L (ref 4.5–11.5)

## 2018-12-26 PROCEDURE — 6360000002 HC RX W HCPCS: Performed by: INTERNAL MEDICINE

## 2018-12-26 PROCEDURE — 2580000003 HC RX 258: Performed by: INTERNAL MEDICINE

## 2018-12-26 PROCEDURE — 6370000000 HC RX 637 (ALT 250 FOR IP): Performed by: INTERNAL MEDICINE

## 2018-12-26 PROCEDURE — C9113 INJ PANTOPRAZOLE SODIUM, VIA: HCPCS | Performed by: INTERNAL MEDICINE

## 2018-12-26 PROCEDURE — 2060000000 HC ICU INTERMEDIATE R&B

## 2018-12-26 PROCEDURE — 85025 COMPLETE CBC W/AUTO DIFF WBC: CPT

## 2018-12-26 PROCEDURE — 84100 ASSAY OF PHOSPHORUS: CPT

## 2018-12-26 PROCEDURE — 99231 SBSQ HOSP IP/OBS SF/LOW 25: CPT | Performed by: INTERNAL MEDICINE

## 2018-12-26 PROCEDURE — 82962 GLUCOSE BLOOD TEST: CPT

## 2018-12-26 PROCEDURE — 36415 COLL VENOUS BLD VENIPUNCTURE: CPT

## 2018-12-26 PROCEDURE — 93005 ELECTROCARDIOGRAM TRACING: CPT | Performed by: INTERNAL MEDICINE

## 2018-12-26 PROCEDURE — 80053 COMPREHEN METABOLIC PANEL: CPT

## 2018-12-26 PROCEDURE — 87324 CLOSTRIDIUM AG IA: CPT

## 2018-12-26 PROCEDURE — 80048 BASIC METABOLIC PNL TOTAL CA: CPT

## 2018-12-26 PROCEDURE — 83735 ASSAY OF MAGNESIUM: CPT

## 2018-12-26 RX ORDER — PANTOPRAZOLE SODIUM 40 MG/1
40 TABLET, DELAYED RELEASE ORAL
Status: DISCONTINUED | OUTPATIENT
Start: 2018-12-27 | End: 2018-12-28 | Stop reason: HOSPADM

## 2018-12-26 RX ORDER — DIPHENOXYLATE HCL/ATROPINE 2.5-.025/5
5 LIQUID (ML) ORAL 4 TIMES DAILY PRN
Status: DISCONTINUED | OUTPATIENT
Start: 2018-12-26 | End: 2018-12-26

## 2018-12-26 RX ORDER — LOPERAMIDE HYDROCHLORIDE 2 MG/1
2 CAPSULE ORAL 4 TIMES DAILY PRN
Status: DISCONTINUED | OUTPATIENT
Start: 2018-12-26 | End: 2018-12-28 | Stop reason: HOSPADM

## 2018-12-26 RX ORDER — LISINOPRIL 10 MG/1
10 TABLET ORAL DAILY
Status: DISCONTINUED | OUTPATIENT
Start: 2018-12-26 | End: 2018-12-27

## 2018-12-26 RX ORDER — DEXTROSE MONOHYDRATE 25 G/50ML
12.5 INJECTION, SOLUTION INTRAVENOUS PRN
Status: DISCONTINUED | OUTPATIENT
Start: 2018-12-26 | End: 2018-12-28 | Stop reason: HOSPADM

## 2018-12-26 RX ORDER — INSULIN GLARGINE 100 [IU]/ML
17 INJECTION, SOLUTION SUBCUTANEOUS NIGHTLY
Status: DISCONTINUED | OUTPATIENT
Start: 2018-12-26 | End: 2018-12-27

## 2018-12-26 RX ORDER — LEVOFLOXACIN 5 MG/ML
500 INJECTION, SOLUTION INTRAVENOUS EVERY 24 HOURS
Status: DISCONTINUED | OUTPATIENT
Start: 2018-12-26 | End: 2018-12-27

## 2018-12-26 RX ORDER — NICOTINE POLACRILEX 4 MG
15 LOZENGE BUCCAL PRN
Status: DISCONTINUED | OUTPATIENT
Start: 2018-12-26 | End: 2018-12-28 | Stop reason: HOSPADM

## 2018-12-26 RX ORDER — DEXTROSE MONOHYDRATE 50 MG/ML
100 INJECTION, SOLUTION INTRAVENOUS PRN
Status: DISCONTINUED | OUTPATIENT
Start: 2018-12-26 | End: 2018-12-28 | Stop reason: HOSPADM

## 2018-12-26 RX ADMIN — FOLIC ACID 1 MG: 1 TABLET ORAL at 08:57

## 2018-12-26 RX ADMIN — INSULIN LISPRO 1 UNITS: 100 INJECTION, SOLUTION INTRAVENOUS; SUBCUTANEOUS at 17:28

## 2018-12-26 RX ADMIN — PIPERACILLIN SODIUM, TAZOBACTAM SODIUM 3.38 G: 3; .375 INJECTION, POWDER, LYOPHILIZED, FOR SOLUTION INTRAVENOUS at 08:42

## 2018-12-26 RX ADMIN — VANCOMYCIN HYDROCHLORIDE 1000 MG: 1 INJECTION, POWDER, LYOPHILIZED, FOR SOLUTION INTRAVENOUS at 18:31

## 2018-12-26 RX ADMIN — LISINOPRIL 10 MG: 10 TABLET ORAL at 11:23

## 2018-12-26 RX ADMIN — PANTOPRAZOLE SODIUM 40 MG: 40 INJECTION, POWDER, FOR SOLUTION INTRAVENOUS at 08:58

## 2018-12-26 RX ADMIN — LEVOFLOXACIN 500 MG: 5 INJECTION, SOLUTION INTRAVENOUS at 17:38

## 2018-12-26 RX ADMIN — Medication 100 MG: at 08:58

## 2018-12-26 RX ADMIN — PIPERACILLIN SODIUM, TAZOBACTAM SODIUM 3.38 G: 3; .375 INJECTION, POWDER, LYOPHILIZED, FOR SOLUTION INTRAVENOUS at 20:43

## 2018-12-26 RX ADMIN — INSULIN GLARGINE 17 UNITS: 100 INJECTION, SOLUTION SUBCUTANEOUS at 20:43

## 2018-12-26 RX ADMIN — INSULIN LISPRO 2 UNITS: 100 INJECTION, SOLUTION INTRAVENOUS; SUBCUTANEOUS at 11:46

## 2018-12-26 RX ADMIN — Medication 10 ML: at 08:58

## 2018-12-26 RX ADMIN — INSULIN LISPRO 1 UNITS: 100 INJECTION, SOLUTION INTRAVENOUS; SUBCUTANEOUS at 08:37

## 2018-12-26 RX ADMIN — LOPERAMIDE HYDROCHLORIDE 2 MG: 2 CAPSULE ORAL at 06:32

## 2018-12-26 RX ADMIN — ASPIRIN 81 MG: 81 TABLET, COATED ORAL at 08:57

## 2018-12-26 RX ADMIN — INSULIN GLARGINE 17 UNITS: 100 INJECTION, SOLUTION SUBCUTANEOUS at 02:08

## 2018-12-26 ASSESSMENT — PAIN SCALES - GENERAL
PAINLEVEL_OUTOF10: 0

## 2018-12-27 LAB
ALBUMIN SERPL-MCNC: 3.3 G/DL (ref 3.5–5.2)
ALP BLD-CCNC: 76 U/L (ref 40–129)
ALT SERPL-CCNC: 27 U/L (ref 0–40)
ANION GAP SERPL CALCULATED.3IONS-SCNC: 10 MMOL/L (ref 7–16)
AST SERPL-CCNC: 33 U/L (ref 0–39)
BASOPHILS ABSOLUTE: 0.03 E9/L (ref 0–0.2)
BASOPHILS RELATIVE PERCENT: 0.3 % (ref 0–2)
BILIRUB SERPL-MCNC: 1.7 MG/DL (ref 0–1.2)
BUN BLDV-MCNC: 6 MG/DL (ref 6–20)
CALCIUM SERPL-MCNC: 8.3 MG/DL (ref 8.6–10.2)
CHLORIDE BLD-SCNC: 100 MMOL/L (ref 98–107)
CO2: 25 MMOL/L (ref 22–29)
CREAT SERPL-MCNC: 0.6 MG/DL (ref 0.7–1.2)
EKG ATRIAL RATE: 110 BPM
EKG ATRIAL RATE: 93 BPM
EKG P AXIS: 62 DEGREES
EKG P-R INTERVAL: 124 MS
EKG Q-T INTERVAL: 374 MS
EKG Q-T INTERVAL: 450 MS
EKG QRS DURATION: 82 MS
EKG QRS DURATION: 94 MS
EKG QTC CALCULATION (BAZETT): 465 MS
EKG QTC CALCULATION (BAZETT): 622 MS
EKG R AXIS: 59 DEGREES
EKG R AXIS: 70 DEGREES
EKG T AXIS: 22 DEGREES
EKG T AXIS: 48 DEGREES
EKG VENTRICULAR RATE: 115 BPM
EKG VENTRICULAR RATE: 93 BPM
EOSINOPHILS ABSOLUTE: 0.1 E9/L (ref 0.05–0.5)
EOSINOPHILS RELATIVE PERCENT: 1.1 % (ref 0–6)
GFR AFRICAN AMERICAN: >60
GFR NON-AFRICAN AMERICAN: >60 ML/MIN/1.73
GLUCOSE BLD-MCNC: 161 MG/DL (ref 74–99)
HCT VFR BLD CALC: 33.3 % (ref 37–54)
HEMOGLOBIN: 11.4 G/DL (ref 12.5–16.5)
IMMATURE GRANULOCYTES #: 0.04 E9/L
IMMATURE GRANULOCYTES %: 0.5 % (ref 0–5)
LYMPHOCYTES ABSOLUTE: 1.39 E9/L (ref 1.5–4)
LYMPHOCYTES RELATIVE PERCENT: 15.7 % (ref 20–42)
MAGNESIUM: 2.1 MG/DL (ref 1.6–2.6)
MCH RBC QN AUTO: 30.5 PG (ref 26–35)
MCHC RBC AUTO-ENTMCNC: 34.2 % (ref 32–34.5)
MCV RBC AUTO: 89 FL (ref 80–99.9)
METER GLUCOSE: 139 MG/DL (ref 74–99)
METER GLUCOSE: 154 MG/DL (ref 74–99)
METER GLUCOSE: 202 MG/DL (ref 74–99)
METER GLUCOSE: 207 MG/DL (ref 74–99)
MONOCYTES ABSOLUTE: 0.55 E9/L (ref 0.1–0.95)
MONOCYTES RELATIVE PERCENT: 6.2 % (ref 2–12)
NEUTROPHILS ABSOLUTE: 6.77 E9/L (ref 1.8–7.3)
NEUTROPHILS RELATIVE PERCENT: 76.2 % (ref 43–80)
PDW BLD-RTO: 14.2 FL (ref 11.5–15)
PHOSPHORUS: 1.6 MG/DL (ref 2.5–4.5)
PLATELET # BLD: 276 E9/L (ref 130–450)
PMV BLD AUTO: 8.7 FL (ref 7–12)
POTASSIUM REFLEX MAGNESIUM: 3.5 MMOL/L (ref 3.5–5)
POTASSIUM SERPL-SCNC: 3.5 MMOL/L (ref 3.5–5)
PROCALCITONIN: 0.2 NG/ML (ref 0–0.08)
RBC # BLD: 3.74 E12/L (ref 3.8–5.8)
SODIUM BLD-SCNC: 135 MMOL/L (ref 132–146)
TOTAL PROTEIN: 5.4 G/DL (ref 6.4–8.3)
VITAMIN D 25-HYDROXY: 11 NG/ML (ref 30–100)
WBC # BLD: 8.9 E9/L (ref 4.5–11.5)

## 2018-12-27 PROCEDURE — 83735 ASSAY OF MAGNESIUM: CPT

## 2018-12-27 PROCEDURE — 80048 BASIC METABOLIC PNL TOTAL CA: CPT

## 2018-12-27 PROCEDURE — 80053 COMPREHEN METABOLIC PANEL: CPT

## 2018-12-27 PROCEDURE — 99406 BEHAV CHNG SMOKING 3-10 MIN: CPT

## 2018-12-27 PROCEDURE — 6370000000 HC RX 637 (ALT 250 FOR IP): Performed by: INTERNAL MEDICINE

## 2018-12-27 PROCEDURE — 84100 ASSAY OF PHOSPHORUS: CPT

## 2018-12-27 PROCEDURE — 6360000002 HC RX W HCPCS: Performed by: INTERNAL MEDICINE

## 2018-12-27 PROCEDURE — 2580000003 HC RX 258: Performed by: INTERNAL MEDICINE

## 2018-12-27 PROCEDURE — 93010 ELECTROCARDIOGRAM REPORT: CPT | Performed by: INTERNAL MEDICINE

## 2018-12-27 PROCEDURE — 84145 PROCALCITONIN (PCT): CPT

## 2018-12-27 PROCEDURE — 85025 COMPLETE CBC W/AUTO DIFF WBC: CPT

## 2018-12-27 PROCEDURE — 82306 VITAMIN D 25 HYDROXY: CPT

## 2018-12-27 PROCEDURE — 36415 COLL VENOUS BLD VENIPUNCTURE: CPT

## 2018-12-27 PROCEDURE — 82962 GLUCOSE BLOOD TEST: CPT

## 2018-12-27 PROCEDURE — 2060000000 HC ICU INTERMEDIATE R&B

## 2018-12-27 RX ORDER — ERGOCALCIFEROL 1.25 MG/1
50000 CAPSULE ORAL WEEKLY
Status: DISCONTINUED | OUTPATIENT
Start: 2018-12-27 | End: 2018-12-28 | Stop reason: HOSPADM

## 2018-12-27 RX ORDER — LISINOPRIL 20 MG/1
20 TABLET ORAL DAILY
Status: DISCONTINUED | OUTPATIENT
Start: 2018-12-28 | End: 2018-12-28 | Stop reason: HOSPADM

## 2018-12-27 RX ORDER — ALBUTEROL SULFATE 2.5 MG/3ML
2.5 SOLUTION RESPIRATORY (INHALATION) EVERY 4 HOURS PRN
Status: DISCONTINUED | OUTPATIENT
Start: 2018-12-27 | End: 2018-12-28 | Stop reason: HOSPADM

## 2018-12-27 RX ORDER — POTASSIUM CHLORIDE 20 MEQ/1
40 TABLET, EXTENDED RELEASE ORAL ONCE
Status: COMPLETED | OUTPATIENT
Start: 2018-12-27 | End: 2018-12-27

## 2018-12-27 RX ADMIN — INSULIN LISPRO 1.7 UNITS: 100 INJECTION, SOLUTION INTRAVENOUS; SUBCUTANEOUS at 11:57

## 2018-12-27 RX ADMIN — Medication 10 ML: at 08:03

## 2018-12-27 RX ADMIN — FOLIC ACID 1 MG: 1 TABLET ORAL at 08:03

## 2018-12-27 RX ADMIN — PIPERACILLIN SODIUM, TAZOBACTAM SODIUM 3.38 G: 3; .375 INJECTION, POWDER, LYOPHILIZED, FOR SOLUTION INTRAVENOUS at 08:11

## 2018-12-27 RX ADMIN — Medication 100 MG: at 08:04

## 2018-12-27 RX ADMIN — ATORVASTATIN CALCIUM 80 MG: 40 TABLET, FILM COATED ORAL at 22:21

## 2018-12-27 RX ADMIN — ASPIRIN 81 MG: 81 TABLET, COATED ORAL at 08:03

## 2018-12-27 RX ADMIN — Medication 10 ML: at 22:21

## 2018-12-27 RX ADMIN — LISINOPRIL 10 MG: 10 TABLET ORAL at 08:03

## 2018-12-27 RX ADMIN — POTASSIUM CHLORIDE 40 MEQ: 20 TABLET, EXTENDED RELEASE ORAL at 08:21

## 2018-12-27 RX ADMIN — ERGOCALCIFEROL 50000 UNITS: 1.25 CAPSULE, LIQUID FILLED ORAL at 15:09

## 2018-12-27 RX ADMIN — PANTOPRAZOLE SODIUM 40 MG: 40 TABLET, DELAYED RELEASE ORAL at 06:32

## 2018-12-27 RX ADMIN — INSULIN LISPRO 1 UNITS: 100 INJECTION, SOLUTION INTRAVENOUS; SUBCUTANEOUS at 08:04

## 2018-12-27 ASSESSMENT — PAIN SCALES - GENERAL
PAINLEVEL_OUTOF10: 0

## 2018-12-28 VITALS
HEIGHT: 66 IN | TEMPERATURE: 97.6 F | RESPIRATION RATE: 13 BRPM | HEART RATE: 96 BPM | SYSTOLIC BLOOD PRESSURE: 134 MMHG | DIASTOLIC BLOOD PRESSURE: 90 MMHG | BODY MASS INDEX: 23.42 KG/M2 | OXYGEN SATURATION: 96 % | WEIGHT: 145.7 LBS

## 2018-12-28 LAB
ANION GAP SERPL CALCULATED.3IONS-SCNC: 10 MMOL/L (ref 7–16)
BUN BLDV-MCNC: 6 MG/DL (ref 6–20)
CALCIUM SERPL-MCNC: 8.6 MG/DL (ref 8.6–10.2)
CHLORIDE BLD-SCNC: 102 MMOL/L (ref 98–107)
CO2: 25 MMOL/L (ref 22–29)
CREAT SERPL-MCNC: 0.6 MG/DL (ref 0.7–1.2)
GFR AFRICAN AMERICAN: >60
GFR NON-AFRICAN AMERICAN: >60 ML/MIN/1.73
GLUCOSE BLD-MCNC: 48 MG/DL (ref 74–99)
MAGNESIUM: 2.1 MG/DL (ref 1.6–2.6)
METER GLUCOSE: 105 MG/DL (ref 74–99)
METER GLUCOSE: 124 MG/DL (ref 74–99)
POTASSIUM SERPL-SCNC: 3.4 MMOL/L (ref 3.5–5)
SODIUM BLD-SCNC: 137 MMOL/L (ref 132–146)

## 2018-12-28 PROCEDURE — 2580000003 HC RX 258: Performed by: INTERNAL MEDICINE

## 2018-12-28 PROCEDURE — 6370000000 HC RX 637 (ALT 250 FOR IP): Performed by: INTERNAL MEDICINE

## 2018-12-28 PROCEDURE — 80048 BASIC METABOLIC PNL TOTAL CA: CPT

## 2018-12-28 PROCEDURE — 36415 COLL VENOUS BLD VENIPUNCTURE: CPT

## 2018-12-28 PROCEDURE — 83735 ASSAY OF MAGNESIUM: CPT

## 2018-12-28 PROCEDURE — 82962 GLUCOSE BLOOD TEST: CPT

## 2018-12-28 RX ORDER — ERGOCALCIFEROL (VITAMIN D2) 1250 MCG
50000 CAPSULE ORAL WEEKLY
Qty: 4 CAPSULE | Refills: 2 | Status: SHIPPED | OUTPATIENT
Start: 2019-01-03 | End: 2019-04-12

## 2018-12-28 RX ORDER — ATORVASTATIN CALCIUM 80 MG/1
80 TABLET, FILM COATED ORAL NIGHTLY
Qty: 30 TABLET | Refills: 3 | Status: ON HOLD | OUTPATIENT
Start: 2018-12-28 | End: 2021-09-26

## 2018-12-28 RX ORDER — PANTOPRAZOLE SODIUM 40 MG/1
40 TABLET, DELAYED RELEASE ORAL
Qty: 30 TABLET | Refills: 3 | Status: SHIPPED | OUTPATIENT
Start: 2018-12-29 | End: 2019-04-12

## 2018-12-28 RX ORDER — POTASSIUM CHLORIDE 20 MEQ/1
20 TABLET, EXTENDED RELEASE ORAL 2 TIMES DAILY WITH MEALS
Status: DISCONTINUED | OUTPATIENT
Start: 2018-12-28 | End: 2018-12-28 | Stop reason: HOSPADM

## 2018-12-28 RX ORDER — FOLIC ACID 1 MG/1
1 TABLET ORAL DAILY
Qty: 30 TABLET | Refills: 3 | Status: SHIPPED | OUTPATIENT
Start: 2018-12-29 | End: 2019-04-12

## 2018-12-28 RX ORDER — LANOLIN ALCOHOL/MO/W.PET/CERES
100 CREAM (GRAM) TOPICAL DAILY
Qty: 30 TABLET | Refills: 3 | Status: SHIPPED | OUTPATIENT
Start: 2018-12-29 | End: 2019-04-12

## 2018-12-28 RX ORDER — LISINOPRIL 20 MG/1
20 TABLET ORAL DAILY
Qty: 30 TABLET | Refills: 3 | Status: ON HOLD | OUTPATIENT
Start: 2018-12-29 | End: 2020-07-22 | Stop reason: HOSPADM

## 2018-12-28 RX ORDER — ALBUTEROL SULFATE 90 UG/1
2 AEROSOL, METERED RESPIRATORY (INHALATION) EVERY 6 HOURS PRN
Qty: 1 INHALER | Refills: 0 | Status: ON HOLD | OUTPATIENT
Start: 2018-12-28 | End: 2019-07-26

## 2018-12-28 RX ADMIN — Medication 100 MG: at 09:26

## 2018-12-28 RX ADMIN — LISINOPRIL 20 MG: 20 TABLET ORAL at 09:26

## 2018-12-28 RX ADMIN — ASPIRIN 81 MG: 81 TABLET, COATED ORAL at 09:25

## 2018-12-28 RX ADMIN — PANTOPRAZOLE SODIUM 40 MG: 40 TABLET, DELAYED RELEASE ORAL at 05:14

## 2018-12-28 RX ADMIN — Medication 10 ML: at 09:26

## 2018-12-28 RX ADMIN — POTASSIUM CHLORIDE 20 MEQ: 20 TABLET, EXTENDED RELEASE ORAL at 09:26

## 2018-12-28 RX ADMIN — FOLIC ACID 1 MG: 1 TABLET ORAL at 09:26

## 2018-12-28 ASSESSMENT — PAIN SCALES - GENERAL
PAINLEVEL_OUTOF10: 0

## 2018-12-29 LAB
BLOOD CULTURE, ROUTINE: NORMAL
CULTURE, BLOOD 2: NORMAL

## 2019-02-08 ENCOUNTER — HOSPITAL ENCOUNTER (INPATIENT)
Age: 49
LOS: 2 days | Discharge: HOME | DRG: 638 | End: 2019-02-11
Attending: EMERGENCY MEDICINE | Admitting: INTERNAL MEDICINE

## 2019-02-08 DIAGNOSIS — E10.10 DIABETIC KETOACIDOSIS WITHOUT COMA ASSOCIATED WITH TYPE 1 DIABETES MELLITUS (HCC): Primary | ICD-10-CM

## 2019-02-08 LAB
GFR AFRICAN AMERICAN: >60
GFR NON-AFRICAN AMERICAN: >60 ML/MIN/1.73
GLUCOSE BLD-MCNC: 689 MG/DL (ref 74–99)
LACTIC ACID: 3.9 MMOL/L (ref 0.5–2.2)
POC CHLORIDE: 104 MMOL/L (ref 100–108)
POC CREATININE: 1.1 MG/DL (ref 0.7–1.2)
POC POTASSIUM: 5.6 MMOL/L (ref 3.5–5)
POC SODIUM: 125 MMOL/L (ref 132–146)

## 2019-02-08 PROCEDURE — 82010 KETONE BODYS QUAN: CPT

## 2019-02-08 PROCEDURE — 84132 ASSAY OF SERUM POTASSIUM: CPT

## 2019-02-08 PROCEDURE — 93005 ELECTROCARDIOGRAM TRACING: CPT | Performed by: PHYSICIAN ASSISTANT

## 2019-02-08 PROCEDURE — 99285 EMERGENCY DEPT VISIT HI MDM: CPT

## 2019-02-08 PROCEDURE — 82565 ASSAY OF CREATININE: CPT

## 2019-02-08 PROCEDURE — 83605 ASSAY OF LACTIC ACID: CPT

## 2019-02-08 PROCEDURE — 6370000000 HC RX 637 (ALT 250 FOR IP): Performed by: PHYSICIAN ASSISTANT

## 2019-02-08 PROCEDURE — 82947 ASSAY GLUCOSE BLOOD QUANT: CPT

## 2019-02-08 PROCEDURE — 85025 COMPLETE CBC W/AUTO DIFF WBC: CPT

## 2019-02-08 PROCEDURE — 36415 COLL VENOUS BLD VENIPUNCTURE: CPT

## 2019-02-08 PROCEDURE — 84295 ASSAY OF SERUM SODIUM: CPT

## 2019-02-08 PROCEDURE — 80048 BASIC METABOLIC PNL TOTAL CA: CPT

## 2019-02-08 PROCEDURE — 82435 ASSAY OF BLOOD CHLORIDE: CPT

## 2019-02-08 RX ORDER — NICOTINE POLACRILEX 4 MG
15 LOZENGE BUCCAL PRN
Status: DISCONTINUED | OUTPATIENT
Start: 2019-02-08 | End: 2019-02-11 | Stop reason: HOSPADM

## 2019-02-08 RX ORDER — DEXTROSE MONOHYDRATE 25 G/50ML
12.5 INJECTION, SOLUTION INTRAVENOUS PRN
Status: DISCONTINUED | OUTPATIENT
Start: 2019-02-08 | End: 2019-02-09 | Stop reason: SDUPTHER

## 2019-02-08 RX ORDER — ONDANSETRON 4 MG/1
4 TABLET, ORALLY DISINTEGRATING ORAL ONCE
Status: COMPLETED | OUTPATIENT
Start: 2019-02-08 | End: 2019-02-08

## 2019-02-08 RX ORDER — DEXTROSE MONOHYDRATE 50 MG/ML
100 INJECTION, SOLUTION INTRAVENOUS PRN
Status: DISCONTINUED | OUTPATIENT
Start: 2019-02-08 | End: 2019-02-11 | Stop reason: HOSPADM

## 2019-02-08 RX ORDER — 0.9 % SODIUM CHLORIDE 0.9 %
30 INTRAVENOUS SOLUTION INTRAVENOUS ONCE
Status: COMPLETED | OUTPATIENT
Start: 2019-02-08 | End: 2019-02-09

## 2019-02-08 RX ORDER — 0.9 % SODIUM CHLORIDE 0.9 %
1000 INTRAVENOUS SOLUTION INTRAVENOUS ONCE
Status: DISCONTINUED | OUTPATIENT
Start: 2019-02-08 | End: 2019-02-08

## 2019-02-08 RX ADMIN — ONDANSETRON 4 MG: 4 TABLET, ORALLY DISINTEGRATING ORAL at 23:55

## 2019-02-08 ASSESSMENT — ENCOUNTER SYMPTOMS
EYE REDNESS: 0
ABDOMINAL PAIN: 0
EYE DISCHARGE: 0
EYE PAIN: 0
BACK PAIN: 0
SORE THROAT: 0
COUGH: 1
VOMITING: 1
WHEEZING: 0
SHORTNESS OF BREATH: 0
NAUSEA: 1
SINUS PRESSURE: 0
DIARRHEA: 0

## 2019-02-09 ENCOUNTER — APPOINTMENT (OUTPATIENT)
Dept: GENERAL RADIOLOGY | Age: 49
DRG: 638 | End: 2019-02-09

## 2019-02-09 PROBLEM — E10.10 DKA, TYPE 1, NOT AT GOAL (HCC): Status: ACTIVE | Noted: 2019-02-09

## 2019-02-09 LAB
ACETAMINOPHEN LEVEL: <5 MCG/ML (ref 10–30)
AMPHETAMINE SCREEN, URINE: NOT DETECTED
ANION GAP SERPL CALCULATED.3IONS-SCNC: -1 MMOL/L (ref 7–16)
ANION GAP SERPL CALCULATED.3IONS-SCNC: 14 MMOL/L (ref 7–16)
ANION GAP SERPL CALCULATED.3IONS-SCNC: 31 MMOL/L (ref 7–16)
ANION GAP SERPL CALCULATED.3IONS-SCNC: 34 MMOL/L (ref 7–16)
ANION GAP SERPL CALCULATED.3IONS-SCNC: 36 MMOL/L (ref 7–16)
ANION GAP SERPL CALCULATED.3IONS-SCNC: 8 MMOL/L (ref 7–16)
ANION GAP SERPL CALCULATED.3IONS-SCNC: 9 MMOL/L (ref 7–16)
BACTERIA: NORMAL /HPF
BARBITURATE SCREEN URINE: NOT DETECTED
BASOPHILS ABSOLUTE: 0.05 E9/L (ref 0–0.2)
BASOPHILS ABSOLUTE: 0.06 E9/L (ref 0–0.2)
BASOPHILS RELATIVE PERCENT: 0.3 % (ref 0–2)
BASOPHILS RELATIVE PERCENT: 0.3 % (ref 0–2)
BENZODIAZEPINE SCREEN, URINE: NOT DETECTED
BETA-HYDROXYBUTYRATE: >4.5 MMOL/L (ref 0.02–0.27)
BILIRUBIN URINE: ABNORMAL
BLOOD, URINE: ABNORMAL
BUN BLDV-MCNC: 13 MG/DL (ref 6–20)
BUN BLDV-MCNC: 16 MG/DL (ref 6–20)
BUN BLDV-MCNC: 17 MG/DL (ref 6–20)
BUN BLDV-MCNC: 21 MG/DL (ref 6–20)
BUN BLDV-MCNC: 23 MG/DL (ref 6–20)
BUN BLDV-MCNC: 25 MG/DL (ref 6–20)
BUN BLDV-MCNC: 26 MG/DL (ref 6–20)
CALCIUM SERPL-MCNC: 7 MG/DL (ref 8.6–10.2)
CALCIUM SERPL-MCNC: 7.9 MG/DL (ref 8.6–10.2)
CALCIUM SERPL-MCNC: 8.1 MG/DL (ref 8.6–10.2)
CALCIUM SERPL-MCNC: 8.2 MG/DL (ref 8.6–10.2)
CALCIUM SERPL-MCNC: 8.2 MG/DL (ref 8.6–10.2)
CALCIUM SERPL-MCNC: 8.9 MG/DL (ref 8.6–10.2)
CALCIUM SERPL-MCNC: 9.4 MG/DL (ref 8.6–10.2)
CANNABINOID SCREEN URINE: POSITIVE
CHLORIDE BLD-SCNC: 100 MMOL/L (ref 98–107)
CHLORIDE BLD-SCNC: 101 MMOL/L (ref 98–107)
CHLORIDE BLD-SCNC: 103 MMOL/L (ref 98–107)
CHLORIDE BLD-SCNC: 85 MMOL/L (ref 98–107)
CHLORIDE BLD-SCNC: 87 MMOL/L (ref 98–107)
CHLORIDE BLD-SCNC: 96 MMOL/L (ref 98–107)
CHLORIDE BLD-SCNC: 99 MMOL/L (ref 98–107)
CHP ED QC CHECK: YES
CLARITY: CLEAR
CO2: 10 MMOL/L (ref 22–29)
CO2: 17 MMOL/L (ref 22–29)
CO2: 18 MMOL/L (ref 22–29)
CO2: 19 MMOL/L (ref 22–29)
CO2: 20 MMOL/L (ref 22–29)
CO2: 5 MMOL/L (ref 22–29)
CO2: 7 MMOL/L (ref 22–29)
COCAINE METABOLITE SCREEN URINE: NOT DETECTED
COLOR: ABNORMAL
CREAT SERPL-MCNC: 0.6 MG/DL (ref 0.7–1.2)
CREAT SERPL-MCNC: 0.7 MG/DL (ref 0.7–1.2)
CREAT SERPL-MCNC: 0.7 MG/DL (ref 0.7–1.2)
CREAT SERPL-MCNC: 0.9 MG/DL (ref 0.7–1.2)
CREAT SERPL-MCNC: 1.2 MG/DL (ref 0.7–1.2)
EKG ATRIAL RATE: 125 BPM
EKG P AXIS: 80 DEGREES
EKG P-R INTERVAL: 128 MS
EKG Q-T INTERVAL: 336 MS
EKG QRS DURATION: 94 MS
EKG QTC CALCULATION (BAZETT): 484 MS
EKG R AXIS: 76 DEGREES
EKG T AXIS: 24 DEGREES
EKG VENTRICULAR RATE: 125 BPM
EOSINOPHILS ABSOLUTE: 0.01 E9/L (ref 0.05–0.5)
EOSINOPHILS ABSOLUTE: 0.02 E9/L (ref 0.05–0.5)
EOSINOPHILS RELATIVE PERCENT: 0.1 % (ref 0–6)
EOSINOPHILS RELATIVE PERCENT: 0.1 % (ref 0–6)
ETHANOL: <10 MG/DL (ref 0–0.08)
FILM ARRAY ADENOVIRUS: NORMAL
FILM ARRAY BORDETELLA PERTUSSIS: NORMAL
FILM ARRAY CHLAMYDOPHILIA PNEUMONIAE: NORMAL
FILM ARRAY CORONAVIRUS 229E: NORMAL
FILM ARRAY CORONAVIRUS HKU1: NORMAL
FILM ARRAY CORONAVIRUS NL63: NORMAL
FILM ARRAY CORONAVIRUS OC43: NORMAL
FILM ARRAY INFLUENZA A VIRUS 09H1: NORMAL
FILM ARRAY INFLUENZA A VIRUS H1: NORMAL
FILM ARRAY INFLUENZA A VIRUS H3: NORMAL
FILM ARRAY INFLUENZA A VIRUS: NORMAL
FILM ARRAY INFLUENZA B: NORMAL
FILM ARRAY METAPNEUMOVIRUS: NORMAL
FILM ARRAY MYCOPLASMA PNEUMONIAE: NORMAL
FILM ARRAY PARAINFLUENZA VIRUS 1: NORMAL
FILM ARRAY PARAINFLUENZA VIRUS 2: NORMAL
FILM ARRAY PARAINFLUENZA VIRUS 3: NORMAL
FILM ARRAY PARAINFLUENZA VIRUS 4: NORMAL
FILM ARRAY RESPIRATORY SYNCITIAL VIRUS: NORMAL
FILM ARRAY RHINOVIRUS/ENTEROVIRUS: NORMAL
GFR AFRICAN AMERICAN: >60
GFR NON-AFRICAN AMERICAN: >60 ML/MIN/1.73
GLUCOSE BLD-MCNC: 154 MG/DL (ref 74–99)
GLUCOSE BLD-MCNC: 178 MG/DL (ref 74–99)
GLUCOSE BLD-MCNC: 226 MG/DL (ref 74–99)
GLUCOSE BLD-MCNC: 233 MG/DL (ref 74–99)
GLUCOSE BLD-MCNC: 429 MG/DL (ref 74–99)
GLUCOSE BLD-MCNC: 513 MG/DL (ref 74–99)
GLUCOSE BLD-MCNC: 566 MG/DL (ref 74–99)
GLUCOSE BLD-MCNC: 574 MG/DL (ref 74–99)
GLUCOSE BLD-MCNC: 600 MG/DL
GLUCOSE URINE: >=1000 MG/DL
HBA1C MFR BLD: 8.2 % (ref 4–5.6)
HCT VFR BLD CALC: 38.6 % (ref 37–54)
HCT VFR BLD CALC: 46.9 % (ref 37–54)
HEMOGLOBIN: 12.6 G/DL (ref 12.5–16.5)
HEMOGLOBIN: 14.7 G/DL (ref 12.5–16.5)
IMMATURE GRANULOCYTES #: 0.13 E9/L
IMMATURE GRANULOCYTES #: 0.16 E9/L
IMMATURE GRANULOCYTES %: 0.7 % (ref 0–5)
IMMATURE GRANULOCYTES %: 0.9 % (ref 0–5)
KETONES, URINE: >=80 MG/DL
LACTIC ACID: 0.6 MMOL/L (ref 0.5–2.2)
LACTIC ACID: 0.6 MMOL/L (ref 0.5–2.2)
LACTIC ACID: 1.9 MMOL/L (ref 0.5–2.2)
LEUKOCYTE ESTERASE, URINE: NEGATIVE
LYMPHOCYTES ABSOLUTE: 1.09 E9/L (ref 1.5–4)
LYMPHOCYTES ABSOLUTE: 1.82 E9/L (ref 1.5–4)
LYMPHOCYTES RELATIVE PERCENT: 10 % (ref 20–42)
LYMPHOCYTES RELATIVE PERCENT: 6.1 % (ref 20–42)
MAGNESIUM: 1.7 MG/DL (ref 1.6–2.6)
MAGNESIUM: 1.9 MG/DL (ref 1.6–2.6)
MAGNESIUM: 1.9 MG/DL (ref 1.6–2.6)
MAGNESIUM: 2.2 MG/DL (ref 1.6–2.6)
MCH RBC QN AUTO: 30.4 PG (ref 26–35)
MCH RBC QN AUTO: 30.4 PG (ref 26–35)
MCHC RBC AUTO-ENTMCNC: 31.3 % (ref 32–34.5)
MCHC RBC AUTO-ENTMCNC: 32.6 % (ref 32–34.5)
MCV RBC AUTO: 93.2 FL (ref 80–99.9)
MCV RBC AUTO: 97.1 FL (ref 80–99.9)
METER GLUCOSE: 138 MG/DL (ref 74–99)
METER GLUCOSE: 147 MG/DL (ref 74–99)
METER GLUCOSE: 176 MG/DL (ref 74–99)
METER GLUCOSE: 187 MG/DL (ref 74–99)
METER GLUCOSE: 191 MG/DL (ref 74–99)
METER GLUCOSE: 197 MG/DL (ref 74–99)
METER GLUCOSE: 210 MG/DL (ref 74–99)
METER GLUCOSE: 210 MG/DL (ref 74–99)
METER GLUCOSE: 221 MG/DL (ref 74–99)
METER GLUCOSE: 259 MG/DL (ref 74–99)
METER GLUCOSE: 292 MG/DL (ref 74–99)
METER GLUCOSE: 341 MG/DL (ref 74–99)
METER GLUCOSE: 400 MG/DL (ref 74–99)
METER GLUCOSE: 417 MG/DL (ref 74–99)
METER GLUCOSE: >500 MG/DL (ref 74–99)
METER GLUCOSE: >500 MG/DL (ref 74–99)
METHADONE SCREEN, URINE: NOT DETECTED
MONOCYTES ABSOLUTE: 1.05 E9/L (ref 0.1–0.95)
MONOCYTES ABSOLUTE: 1.2 E9/L (ref 0.1–0.95)
MONOCYTES RELATIVE PERCENT: 5.8 % (ref 2–12)
MONOCYTES RELATIVE PERCENT: 6.6 % (ref 2–12)
NEUTROPHILS ABSOLUTE: 15.07 E9/L (ref 1.8–7.3)
NEUTROPHILS ABSOLUTE: 15.57 E9/L (ref 1.8–7.3)
NEUTROPHILS RELATIVE PERCENT: 82.3 % (ref 43–80)
NEUTROPHILS RELATIVE PERCENT: 86.8 % (ref 43–80)
NITRITE, URINE: NEGATIVE
OPIATE SCREEN URINE: NOT DETECTED
PDW BLD-RTO: 13.4 FL (ref 11.5–15)
PDW BLD-RTO: 13.7 FL (ref 11.5–15)
PH UA: 5.5 (ref 5–9)
PH VENOUS: 7.08 (ref 7.3–7.42)
PHENCYCLIDINE SCREEN URINE: NOT DETECTED
PHOSPHORUS: 2 MG/DL (ref 2.5–4.5)
PHOSPHORUS: 2.1 MG/DL (ref 2.5–4.5)
PHOSPHORUS: 2.2 MG/DL (ref 2.5–4.5)
PHOSPHORUS: 3.4 MG/DL (ref 2.5–4.5)
PLATELET # BLD: 420 E9/L (ref 130–450)
PLATELET # BLD: 472 E9/L (ref 130–450)
PMV BLD AUTO: 8.5 FL (ref 7–12)
PMV BLD AUTO: 9 FL (ref 7–12)
POTASSIUM REFLEX MAGNESIUM: 5.6 MMOL/L (ref 3.5–5)
POTASSIUM REFLEX MAGNESIUM: 5.6 MMOL/L (ref 3.5–5)
POTASSIUM SERPL-SCNC: 4.1 MMOL/L (ref 3.5–5)
POTASSIUM SERPL-SCNC: 4.1 MMOL/L (ref 3.5–5)
POTASSIUM SERPL-SCNC: 4.2 MMOL/L (ref 3.5–5)
POTASSIUM SERPL-SCNC: 4.8 MMOL/L (ref 3.5–5)
POTASSIUM SERPL-SCNC: >10 MMOL/L (ref 3.5–5)
PROCALCITONIN: 0.28 NG/ML (ref 0–0.08)
PROPOXYPHENE SCREEN: NOT DETECTED
PROTEIN UA: ABNORMAL MG/DL
RBC # BLD: 4.14 E12/L (ref 3.8–5.8)
RBC # BLD: 4.83 E12/L (ref 3.8–5.8)
RBC UA: NORMAL /HPF (ref 0–2)
SALICYLATE, SERUM: <0.3 MG/DL (ref 0–30)
SODIUM BLD-SCNC: 120 MMOL/L (ref 132–146)
SODIUM BLD-SCNC: 126 MMOL/L (ref 132–146)
SODIUM BLD-SCNC: 127 MMOL/L (ref 132–146)
SODIUM BLD-SCNC: 128 MMOL/L (ref 132–146)
SODIUM BLD-SCNC: 128 MMOL/L (ref 132–146)
SODIUM BLD-SCNC: 132 MMOL/L (ref 132–146)
SODIUM BLD-SCNC: 137 MMOL/L (ref 132–146)
SPECIFIC GRAVITY UA: 1.02 (ref 1–1.03)
TRICYCLIC ANTIDEPRESSANTS SCREEN SERUM: NEGATIVE NG/ML
UROBILINOGEN, URINE: 0.2 E.U./DL
WBC # BLD: 18 E9/L (ref 4.5–11.5)
WBC # BLD: 18.3 E9/L (ref 4.5–11.5)
WBC UA: NORMAL /HPF (ref 0–5)

## 2019-02-09 PROCEDURE — 83036 HEMOGLOBIN GLYCOSYLATED A1C: CPT

## 2019-02-09 PROCEDURE — 87633 RESP VIRUS 12-25 TARGETS: CPT

## 2019-02-09 PROCEDURE — 2580000003 HC RX 258: Performed by: INTERNAL MEDICINE

## 2019-02-09 PROCEDURE — 6360000002 HC RX W HCPCS: Performed by: INTERNAL MEDICINE

## 2019-02-09 PROCEDURE — 87486 CHLMYD PNEUM DNA AMP PROBE: CPT

## 2019-02-09 PROCEDURE — 36415 COLL VENOUS BLD VENIPUNCTURE: CPT

## 2019-02-09 PROCEDURE — 83735 ASSAY OF MAGNESIUM: CPT

## 2019-02-09 PROCEDURE — 80048 BASIC METABOLIC PNL TOTAL CA: CPT

## 2019-02-09 PROCEDURE — 85025 COMPLETE CBC W/AUTO DIFF WBC: CPT

## 2019-02-09 PROCEDURE — 2000000000 HC ICU R&B

## 2019-02-09 PROCEDURE — G0480 DRUG TEST DEF 1-7 CLASSES: HCPCS

## 2019-02-09 PROCEDURE — 71045 X-RAY EXAM CHEST 1 VIEW: CPT

## 2019-02-09 PROCEDURE — 99223 1ST HOSP IP/OBS HIGH 75: CPT | Performed by: INTERNAL MEDICINE

## 2019-02-09 PROCEDURE — 83605 ASSAY OF LACTIC ACID: CPT

## 2019-02-09 PROCEDURE — C9113 INJ PANTOPRAZOLE SODIUM, VIA: HCPCS | Performed by: INTERNAL MEDICINE

## 2019-02-09 PROCEDURE — 82962 GLUCOSE BLOOD TEST: CPT

## 2019-02-09 PROCEDURE — 81001 URINALYSIS AUTO W/SCOPE: CPT

## 2019-02-09 PROCEDURE — 80307 DRUG TEST PRSMV CHEM ANLYZR: CPT

## 2019-02-09 PROCEDURE — 87798 DETECT AGENT NOS DNA AMP: CPT

## 2019-02-09 PROCEDURE — 82800 BLOOD PH: CPT

## 2019-02-09 PROCEDURE — 2500000003 HC RX 250 WO HCPCS: Performed by: INTERNAL MEDICINE

## 2019-02-09 PROCEDURE — 2580000003 HC RX 258: Performed by: EMERGENCY MEDICINE

## 2019-02-09 PROCEDURE — 87581 M.PNEUMON DNA AMP PROBE: CPT

## 2019-02-09 PROCEDURE — 93005 ELECTROCARDIOGRAM TRACING: CPT | Performed by: INTERNAL MEDICINE

## 2019-02-09 PROCEDURE — 84100 ASSAY OF PHOSPHORUS: CPT

## 2019-02-09 PROCEDURE — 87081 CULTURE SCREEN ONLY: CPT

## 2019-02-09 PROCEDURE — 82947 ASSAY GLUCOSE BLOOD QUANT: CPT

## 2019-02-09 PROCEDURE — 6370000000 HC RX 637 (ALT 250 FOR IP): Performed by: EMERGENCY MEDICINE

## 2019-02-09 PROCEDURE — 6370000000 HC RX 637 (ALT 250 FOR IP): Performed by: INTERNAL MEDICINE

## 2019-02-09 PROCEDURE — 84145 PROCALCITONIN (PCT): CPT

## 2019-02-09 RX ORDER — PANTOPRAZOLE SODIUM 40 MG/10ML
40 INJECTION, POWDER, LYOPHILIZED, FOR SOLUTION INTRAVENOUS DAILY
Status: DISCONTINUED | OUTPATIENT
Start: 2019-02-09 | End: 2019-02-11 | Stop reason: CLARIF

## 2019-02-09 RX ORDER — DIGOXIN 125 MCG
125 TABLET ORAL DAILY
Status: DISCONTINUED | OUTPATIENT
Start: 2019-02-09 | End: 2019-02-11 | Stop reason: HOSPADM

## 2019-02-09 RX ORDER — DEXTROSE MONOHYDRATE 25 G/50ML
12.5 INJECTION, SOLUTION INTRAVENOUS PRN
Status: DISCONTINUED | OUTPATIENT
Start: 2019-02-09 | End: 2019-02-11 | Stop reason: HOSPADM

## 2019-02-09 RX ORDER — SODIUM CHLORIDE 9 MG/ML
INJECTION, SOLUTION INTRAVENOUS CONTINUOUS
Status: DISCONTINUED | OUTPATIENT
Start: 2019-02-09 | End: 2019-02-10 | Stop reason: SDUPTHER

## 2019-02-09 RX ORDER — DEXTROSE AND SODIUM CHLORIDE 5; .45 G/100ML; G/100ML
INJECTION, SOLUTION INTRAVENOUS CONTINUOUS PRN
Status: DISCONTINUED | OUTPATIENT
Start: 2019-02-09 | End: 2019-02-10

## 2019-02-09 RX ORDER — MAGNESIUM SULFATE 1 G/100ML
1 INJECTION INTRAVENOUS PRN
Status: DISCONTINUED | OUTPATIENT
Start: 2019-02-09 | End: 2019-02-10

## 2019-02-09 RX ORDER — 0.9 % SODIUM CHLORIDE 0.9 %
10 VIAL (ML) INJECTION DAILY
Status: DISCONTINUED | OUTPATIENT
Start: 2019-02-09 | End: 2019-02-11 | Stop reason: CLARIF

## 2019-02-09 RX ORDER — POTASSIUM CHLORIDE 7.45 MG/ML
10 INJECTION INTRAVENOUS PRN
Status: DISCONTINUED | OUTPATIENT
Start: 2019-02-09 | End: 2019-02-10

## 2019-02-09 RX ORDER — THIAMINE MONONITRATE (VIT B1) 100 MG
100 TABLET ORAL DAILY
Status: DISCONTINUED | OUTPATIENT
Start: 2019-02-09 | End: 2019-02-11 | Stop reason: HOSPADM

## 2019-02-09 RX ORDER — 0.9 % SODIUM CHLORIDE 0.9 %
15 INTRAVENOUS SOLUTION INTRAVENOUS ONCE
Status: COMPLETED | OUTPATIENT
Start: 2019-02-09 | End: 2019-02-09

## 2019-02-09 RX ORDER — ATORVASTATIN CALCIUM 40 MG/1
80 TABLET, FILM COATED ORAL NIGHTLY
Status: DISCONTINUED | OUTPATIENT
Start: 2019-02-09 | End: 2019-02-11 | Stop reason: HOSPADM

## 2019-02-09 RX ORDER — FOLIC ACID 1 MG/1
1 TABLET ORAL DAILY
Status: DISCONTINUED | OUTPATIENT
Start: 2019-02-09 | End: 2019-02-11 | Stop reason: HOSPADM

## 2019-02-09 RX ORDER — LISINOPRIL 20 MG/1
20 TABLET ORAL DAILY
Status: DISCONTINUED | OUTPATIENT
Start: 2019-02-09 | End: 2019-02-11 | Stop reason: HOSPADM

## 2019-02-09 RX ADMIN — DIGOXIN 125 MCG: 125 TABLET ORAL at 09:00

## 2019-02-09 RX ADMIN — SODIUM CHLORIDE 0.1 UNITS/KG/HR: 9 INJECTION, SOLUTION INTRAVENOUS at 01:08

## 2019-02-09 RX ADMIN — POTASSIUM CHLORIDE 10 MEQ: 7.46 INJECTION, SOLUTION INTRAVENOUS at 08:07

## 2019-02-09 RX ADMIN — ATORVASTATIN CALCIUM 80 MG: 40 TABLET, FILM COATED ORAL at 20:48

## 2019-02-09 RX ADMIN — SODIUM PHOSPHATE, MONOBASIC, MONOHYDRATE 15 MMOL: 276; 142 INJECTION, SOLUTION INTRAVENOUS at 11:19

## 2019-02-09 RX ADMIN — SODIUM CHLORIDE 884 ML: 9 INJECTION, SOLUTION INTRAVENOUS at 04:15

## 2019-02-09 RX ADMIN — FOLIC ACID 1 MG: 1 TABLET ORAL at 08:59

## 2019-02-09 RX ADMIN — Medication 100 MG: at 08:59

## 2019-02-09 RX ADMIN — PANTOPRAZOLE SODIUM 40 MG: 40 INJECTION, POWDER, FOR SOLUTION INTRAVENOUS at 09:00

## 2019-02-09 RX ADMIN — DEXTROSE AND SODIUM CHLORIDE: 5; 450 INJECTION, SOLUTION INTRAVENOUS at 07:09

## 2019-02-09 RX ADMIN — POTASSIUM CHLORIDE 10 MEQ: 7.46 INJECTION, SOLUTION INTRAVENOUS at 06:34

## 2019-02-09 RX ADMIN — ENOXAPARIN SODIUM 40 MG: 40 INJECTION SUBCUTANEOUS at 08:59

## 2019-02-09 RX ADMIN — POTASSIUM CHLORIDE 10 MEQ: 7.46 INJECTION, SOLUTION INTRAVENOUS at 12:06

## 2019-02-09 RX ADMIN — SODIUM CHLORIDE 2040 ML: 9 INJECTION, SOLUTION INTRAVENOUS at 00:56

## 2019-02-09 RX ADMIN — LISINOPRIL 20 MG: 20 TABLET ORAL at 08:59

## 2019-02-09 RX ADMIN — SODIUM CHLORIDE: 9 INJECTION, SOLUTION INTRAVENOUS at 05:09

## 2019-02-09 RX ADMIN — Medication 10 ML: at 09:00

## 2019-02-09 RX ADMIN — POTASSIUM CHLORIDE 10 MEQ: 7.46 INJECTION, SOLUTION INTRAVENOUS at 10:26

## 2019-02-09 ASSESSMENT — PAIN SCALES - GENERAL
PAINLEVEL_OUTOF10: 0

## 2019-02-10 LAB
ANION GAP SERPL CALCULATED.3IONS-SCNC: 18 MMOL/L (ref 7–16)
BUN BLDV-MCNC: 10 MG/DL (ref 6–20)
CALCIUM IONIZED: 1.23 MMOL/L (ref 1.15–1.33)
CALCIUM SERPL-MCNC: 8.5 MG/DL (ref 8.6–10.2)
CHLORIDE BLD-SCNC: 96 MMOL/L (ref 98–107)
CO2: 19 MMOL/L (ref 22–29)
CREAT SERPL-MCNC: 0.6 MG/DL (ref 0.7–1.2)
EKG ATRIAL RATE: 111 BPM
EKG P AXIS: 72 DEGREES
EKG P-R INTERVAL: 126 MS
EKG Q-T INTERVAL: 312 MS
EKG QRS DURATION: 80 MS
EKG QTC CALCULATION (BAZETT): 424 MS
EKG R AXIS: 63 DEGREES
EKG T AXIS: -8 DEGREES
EKG VENTRICULAR RATE: 111 BPM
GFR AFRICAN AMERICAN: >60
GFR NON-AFRICAN AMERICAN: >60 ML/MIN/1.73
GLUCOSE BLD-MCNC: 326 MG/DL (ref 74–99)
MAGNESIUM: 2.1 MG/DL (ref 1.6–2.6)
METER GLUCOSE: 100 MG/DL (ref 74–99)
METER GLUCOSE: 133 MG/DL (ref 74–99)
METER GLUCOSE: 224 MG/DL (ref 74–99)
METER GLUCOSE: 283 MG/DL (ref 74–99)
METER GLUCOSE: 283 MG/DL (ref 74–99)
METER GLUCOSE: 295 MG/DL (ref 74–99)
MRSA CULTURE ONLY: NORMAL
PHOSPHORUS: 1.7 MG/DL (ref 2.5–4.5)
POTASSIUM SERPL-SCNC: 4.4 MMOL/L (ref 3.5–5)
SODIUM BLD-SCNC: 133 MMOL/L (ref 132–146)

## 2019-02-10 PROCEDURE — 6370000000 HC RX 637 (ALT 250 FOR IP): Performed by: INTERNAL MEDICINE

## 2019-02-10 PROCEDURE — 2580000003 HC RX 258: Performed by: INTERNAL MEDICINE

## 2019-02-10 PROCEDURE — 2500000003 HC RX 250 WO HCPCS: Performed by: INTERNAL MEDICINE

## 2019-02-10 PROCEDURE — 36415 COLL VENOUS BLD VENIPUNCTURE: CPT

## 2019-02-10 PROCEDURE — 83735 ASSAY OF MAGNESIUM: CPT

## 2019-02-10 PROCEDURE — 6360000002 HC RX W HCPCS: Performed by: INTERNAL MEDICINE

## 2019-02-10 PROCEDURE — 84100 ASSAY OF PHOSPHORUS: CPT

## 2019-02-10 PROCEDURE — 99406 BEHAV CHNG SMOKING 3-10 MIN: CPT

## 2019-02-10 PROCEDURE — 80048 BASIC METABOLIC PNL TOTAL CA: CPT

## 2019-02-10 PROCEDURE — C9113 INJ PANTOPRAZOLE SODIUM, VIA: HCPCS | Performed by: INTERNAL MEDICINE

## 2019-02-10 PROCEDURE — 1200000000 HC SEMI PRIVATE

## 2019-02-10 PROCEDURE — 93010 ELECTROCARDIOGRAM REPORT: CPT | Performed by: INTERNAL MEDICINE

## 2019-02-10 PROCEDURE — 82330 ASSAY OF CALCIUM: CPT

## 2019-02-10 PROCEDURE — 82962 GLUCOSE BLOOD TEST: CPT

## 2019-02-10 PROCEDURE — 99232 SBSQ HOSP IP/OBS MODERATE 35: CPT | Performed by: INTERNAL MEDICINE

## 2019-02-10 RX ORDER — SODIUM CHLORIDE 9 MG/ML
INJECTION, SOLUTION INTRAVENOUS CONTINUOUS
Status: DISCONTINUED | OUTPATIENT
Start: 2019-02-10 | End: 2019-02-10

## 2019-02-10 RX ORDER — SODIUM CHLORIDE 9 MG/ML
INJECTION, SOLUTION INTRAVENOUS CONTINUOUS
Status: DISCONTINUED | OUTPATIENT
Start: 2019-02-10 | End: 2019-02-11

## 2019-02-10 RX ORDER — SODIUM CHLORIDE 9 MG/ML
INJECTION, SOLUTION INTRAVENOUS CONTINUOUS
Status: DISCONTINUED | OUTPATIENT
Start: 2019-02-10 | End: 2019-02-10 | Stop reason: SDUPTHER

## 2019-02-10 RX ADMIN — POTASSIUM PHOSPHATE, MONOBASIC AND POTASSIUM PHOSPHATE, DIBASIC 20 MMOL: 224; 236 INJECTION, SOLUTION, CONCENTRATE INTRAVENOUS at 11:38

## 2019-02-10 RX ADMIN — LISINOPRIL 20 MG: 20 TABLET ORAL at 09:08

## 2019-02-10 RX ADMIN — ATORVASTATIN CALCIUM 80 MG: 40 TABLET, FILM COATED ORAL at 20:44

## 2019-02-10 RX ADMIN — INSULIN LISPRO 3 UNITS: 100 INJECTION, SOLUTION INTRAVENOUS; SUBCUTANEOUS at 11:32

## 2019-02-10 RX ADMIN — INSULIN LISPRO 2 UNITS: 100 INJECTION, SOLUTION INTRAVENOUS; SUBCUTANEOUS at 15:58

## 2019-02-10 RX ADMIN — Medication 10 ML: at 09:07

## 2019-02-10 RX ADMIN — Medication 100 MG: at 09:08

## 2019-02-10 RX ADMIN — SODIUM CHLORIDE: 9 INJECTION, SOLUTION INTRAVENOUS at 09:06

## 2019-02-10 RX ADMIN — ENOXAPARIN SODIUM 40 MG: 40 INJECTION SUBCUTANEOUS at 09:07

## 2019-02-10 RX ADMIN — FOLIC ACID 1 MG: 1 TABLET ORAL at 09:08

## 2019-02-10 RX ADMIN — PANTOPRAZOLE SODIUM 40 MG: 40 INJECTION, POWDER, FOR SOLUTION INTRAVENOUS at 09:07

## 2019-02-10 RX ADMIN — DIGOXIN 125 MCG: 125 TABLET ORAL at 09:08

## 2019-02-10 RX ADMIN — INSULIN HUMAN 5 UNITS: 100 INJECTION, SOLUTION PARENTERAL at 11:33

## 2019-02-10 ASSESSMENT — PAIN SCALES - GENERAL
PAINLEVEL_OUTOF10: 0

## 2019-02-11 VITALS
SYSTOLIC BLOOD PRESSURE: 122 MMHG | OXYGEN SATURATION: 97 % | HEIGHT: 66 IN | WEIGHT: 141.3 LBS | BODY MASS INDEX: 22.71 KG/M2 | TEMPERATURE: 98.1 F | DIASTOLIC BLOOD PRESSURE: 78 MMHG | RESPIRATION RATE: 16 BRPM | HEART RATE: 69 BPM

## 2019-02-11 LAB
ANION GAP SERPL CALCULATED.3IONS-SCNC: 10 MMOL/L (ref 7–16)
BUN BLDV-MCNC: 9 MG/DL (ref 6–20)
CALCIUM SERPL-MCNC: 8.8 MG/DL (ref 8.6–10.2)
CHLORIDE BLD-SCNC: 99 MMOL/L (ref 98–107)
CO2: 27 MMOL/L (ref 22–29)
CREAT SERPL-MCNC: 0.6 MG/DL (ref 0.7–1.2)
DIGOXIN LEVEL: 0.5 NG/ML (ref 0.8–2)
GFR AFRICAN AMERICAN: >60
GFR NON-AFRICAN AMERICAN: >60 ML/MIN/1.73
GLUCOSE BLD-MCNC: 194 MG/DL (ref 74–99)
MAGNESIUM: 1.9 MG/DL (ref 1.6–2.6)
METER GLUCOSE: 180 MG/DL (ref 74–99)
METER GLUCOSE: 215 MG/DL (ref 74–99)
METER GLUCOSE: 227 MG/DL (ref 74–99)
METER GLUCOSE: 59 MG/DL (ref 74–99)
PHOSPHORUS: 2.6 MG/DL (ref 2.5–4.5)
POTASSIUM SERPL-SCNC: 4.1 MMOL/L (ref 3.5–5)
SODIUM BLD-SCNC: 136 MMOL/L (ref 132–146)

## 2019-02-11 PROCEDURE — 80162 ASSAY OF DIGOXIN TOTAL: CPT

## 2019-02-11 PROCEDURE — C9113 INJ PANTOPRAZOLE SODIUM, VIA: HCPCS | Performed by: INTERNAL MEDICINE

## 2019-02-11 PROCEDURE — 6360000002 HC RX W HCPCS: Performed by: INTERNAL MEDICINE

## 2019-02-11 PROCEDURE — 80048 BASIC METABOLIC PNL TOTAL CA: CPT

## 2019-02-11 PROCEDURE — 82962 GLUCOSE BLOOD TEST: CPT

## 2019-02-11 PROCEDURE — 83735 ASSAY OF MAGNESIUM: CPT

## 2019-02-11 PROCEDURE — 36415 COLL VENOUS BLD VENIPUNCTURE: CPT

## 2019-02-11 PROCEDURE — 2580000003 HC RX 258: Performed by: INTERNAL MEDICINE

## 2019-02-11 PROCEDURE — 99231 SBSQ HOSP IP/OBS SF/LOW 25: CPT | Performed by: INTERNAL MEDICINE

## 2019-02-11 PROCEDURE — 6370000000 HC RX 637 (ALT 250 FOR IP): Performed by: INTERNAL MEDICINE

## 2019-02-11 PROCEDURE — 84100 ASSAY OF PHOSPHORUS: CPT

## 2019-02-11 RX ORDER — PANTOPRAZOLE SODIUM 40 MG/1
40 TABLET, DELAYED RELEASE ORAL
Status: DISCONTINUED | OUTPATIENT
Start: 2019-02-12 | End: 2019-02-11 | Stop reason: HOSPADM

## 2019-02-11 RX ADMIN — INSULIN LISPRO 2 UNITS: 100 INJECTION, SOLUTION INTRAVENOUS; SUBCUTANEOUS at 11:13

## 2019-02-11 RX ADMIN — FOLIC ACID 1 MG: 1 TABLET ORAL at 08:36

## 2019-02-11 RX ADMIN — DIGOXIN 125 MCG: 125 TABLET ORAL at 08:36

## 2019-02-11 RX ADMIN — Medication 100 MG: at 08:36

## 2019-02-11 RX ADMIN — INSULIN LISPRO 2 UNITS: 100 INJECTION, SOLUTION INTRAVENOUS; SUBCUTANEOUS at 08:45

## 2019-02-11 RX ADMIN — LISINOPRIL 20 MG: 20 TABLET ORAL at 08:36

## 2019-02-11 RX ADMIN — ENOXAPARIN SODIUM 40 MG: 40 INJECTION SUBCUTANEOUS at 08:36

## 2019-02-11 RX ADMIN — PANTOPRAZOLE SODIUM 40 MG: 40 INJECTION, POWDER, FOR SOLUTION INTRAVENOUS at 08:36

## 2019-02-11 RX ADMIN — Medication 10 ML: at 08:36

## 2019-02-11 ASSESSMENT — PAIN SCALES - GENERAL
PAINLEVEL_OUTOF10: 0
PAINLEVEL_OUTOF10: 0

## 2019-02-16 LAB — CANNABINOIDS CONF, URINE: 16 NG/ML

## 2019-03-06 ENCOUNTER — TELEPHONE (OUTPATIENT)
Dept: CARDIOLOGY | Age: 49
End: 2019-03-06

## 2019-03-26 ENCOUNTER — TELEPHONE (OUTPATIENT)
Dept: CARDIOLOGY | Age: 49
End: 2019-03-26

## 2019-04-12 ENCOUNTER — APPOINTMENT (OUTPATIENT)
Dept: GENERAL RADIOLOGY | Age: 49
DRG: 638 | End: 2019-04-12

## 2019-04-12 ENCOUNTER — HOSPITAL ENCOUNTER (INPATIENT)
Age: 49
LOS: 2 days | Discharge: HOME | DRG: 638 | End: 2019-04-14
Attending: EMERGENCY MEDICINE | Admitting: INTERNAL MEDICINE

## 2019-04-12 DIAGNOSIS — E10.10 TYPE 1 DIABETES MELLITUS WITH KETOACIDOSIS WITHOUT COMA (HCC): Primary | ICD-10-CM

## 2019-04-12 LAB
ACETAMINOPHEN LEVEL: <5 MCG/ML (ref 10–30)
AMPHETAMINE SCREEN, URINE: NOT DETECTED
ANION GAP SERPL CALCULATED.3IONS-SCNC: 27 MMOL/L (ref 7–16)
ANION GAP SERPL CALCULATED.3IONS-SCNC: 38 MMOL/L (ref 7–16)
ANISOCYTOSIS: ABNORMAL
BACTERIA: NORMAL /HPF
BARBITURATE SCREEN URINE: NOT DETECTED
BASOPHILS ABSOLUTE: 0.07 E9/L (ref 0–0.2)
BASOPHILS ABSOLUTE: 0.1 E9/L (ref 0–0.2)
BASOPHILS RELATIVE PERCENT: 0.2 % (ref 0–2)
BASOPHILS RELATIVE PERCENT: 0.4 % (ref 0–2)
BENZODIAZEPINE SCREEN, URINE: NOT DETECTED
BETA-HYDROXYBUTYRATE: >4.5 MMOL/L (ref 0.02–0.27)
BILIRUBIN URINE: ABNORMAL
BLOOD, URINE: ABNORMAL
BUN BLDV-MCNC: 32 MG/DL (ref 6–20)
BUN BLDV-MCNC: 33 MG/DL (ref 6–20)
BURR CELLS: ABNORMAL
BURR CELLS: ABNORMAL
CALCIUM SERPL-MCNC: 8.5 MG/DL (ref 8.6–10.2)
CALCIUM SERPL-MCNC: 8.5 MG/DL (ref 8.6–10.2)
CANNABINOID SCREEN URINE: POSITIVE
CHLORIDE BLD-SCNC: 89 MMOL/L (ref 98–107)
CHLORIDE BLD-SCNC: 97 MMOL/L (ref 98–107)
CHP ED QC CHECK: YES
CLARITY: CLEAR
CO2: 11 MMOL/L (ref 22–29)
CO2: 5 MMOL/L (ref 22–29)
COCAINE METABOLITE SCREEN URINE: NOT DETECTED
COLOR: YELLOW
CREAT SERPL-MCNC: 1.2 MG/DL (ref 0.7–1.2)
CREAT SERPL-MCNC: 1.3 MG/DL (ref 0.7–1.2)
EOSINOPHILS ABSOLUTE: 0.01 E9/L (ref 0.05–0.5)
EOSINOPHILS ABSOLUTE: 0.06 E9/L (ref 0.05–0.5)
EOSINOPHILS RELATIVE PERCENT: 0 % (ref 0–6)
EOSINOPHILS RELATIVE PERCENT: 0.3 % (ref 0–6)
EPITHELIAL CELLS, UA: NORMAL /HPF
ETHANOL: <10 MG/DL (ref 0–0.08)
GFR AFRICAN AMERICAN: >60
GFR NON-AFRICAN AMERICAN: 59 ML/MIN/1.73
GFR NON-AFRICAN AMERICAN: >60 ML/MIN/1.73
GFR NON-AFRICAN AMERICAN: >60 ML/MIN/1.73
GLUCOSE BLD-MCNC: 359 MG/DL (ref 74–99)
GLUCOSE BLD-MCNC: 501 MG/DL (ref 74–99)
GLUCOSE BLD-MCNC: 616 MG/DL (ref 74–99)
GLUCOSE BLD-MCNC: NORMAL MG/DL
GLUCOSE URINE: >=1000 MG/DL
HBA1C MFR BLD: 9 % (ref 4–5.6)
HCT VFR BLD CALC: 43.5 % (ref 37–54)
HCT VFR BLD CALC: 48.6 % (ref 37–54)
HEMOGLOBIN: 14.4 G/DL (ref 12.5–16.5)
HEMOGLOBIN: 15.5 G/DL (ref 12.5–16.5)
HOWELL-JOLLY BODIES: ABNORMAL
IMMATURE GRANULOCYTES #: 0.4 E9/L
IMMATURE GRANULOCYTES #: 0.45 E9/L
IMMATURE GRANULOCYTES %: 1.4 % (ref 0–5)
IMMATURE GRANULOCYTES %: 1.9 % (ref 0–5)
INFLUENZA A BY PCR: NOT DETECTED
INFLUENZA B BY PCR: NOT DETECTED
KETONES, URINE: >=80 MG/DL
LACTIC ACID: 3.9 MMOL/L (ref 0.5–2.2)
LACTIC ACID: 4.1 MMOL/L (ref 0.5–2.2)
LEUKOCYTE ESTERASE, URINE: NEGATIVE
LYMPHOCYTES ABSOLUTE: 0.94 E9/L (ref 1.5–4)
LYMPHOCYTES ABSOLUTE: 1.63 E9/L (ref 1.5–4)
LYMPHOCYTES RELATIVE PERCENT: 4 % (ref 20–42)
LYMPHOCYTES RELATIVE PERCENT: 5.7 % (ref 20–42)
MAGNESIUM: 1.9 MG/DL (ref 1.6–2.6)
MAGNESIUM: 2 MG/DL (ref 1.6–2.6)
MCH RBC QN AUTO: 30.3 PG (ref 26–35)
MCH RBC QN AUTO: 31.3 PG (ref 26–35)
MCHC RBC AUTO-ENTMCNC: 31.9 % (ref 32–34.5)
MCHC RBC AUTO-ENTMCNC: 33.1 % (ref 32–34.5)
MCV RBC AUTO: 94.6 FL (ref 80–99.9)
MCV RBC AUTO: 95.1 FL (ref 80–99.9)
METER GLUCOSE: 282 MG/DL (ref 74–99)
METER GLUCOSE: 345 MG/DL (ref 74–99)
METER GLUCOSE: 375 MG/DL (ref 74–99)
METER GLUCOSE: 492 MG/DL (ref 74–99)
METER GLUCOSE: >500 MG/DL (ref 74–99)
METHADONE SCREEN, URINE: NOT DETECTED
MONOCYTES ABSOLUTE: 1.71 E9/L (ref 0.1–0.95)
MONOCYTES ABSOLUTE: 1.74 E9/L (ref 0.1–0.95)
MONOCYTES RELATIVE PERCENT: 5.9 % (ref 2–12)
MONOCYTES RELATIVE PERCENT: 7.4 % (ref 2–12)
NEUTROPHILS ABSOLUTE: 20.24 E9/L (ref 1.8–7.3)
NEUTROPHILS ABSOLUTE: 24.96 E9/L (ref 1.8–7.3)
NEUTROPHILS RELATIVE PERCENT: 86 % (ref 43–80)
NEUTROPHILS RELATIVE PERCENT: 86.8 % (ref 43–80)
NITRITE, URINE: NEGATIVE
OPIATE SCREEN URINE: NOT DETECTED
OSMOLALITY: 325 MOSM/KG (ref 285–310)
PDW BLD-RTO: 14.2 FL (ref 11.5–15)
PDW BLD-RTO: 14.2 FL (ref 11.5–15)
PERFORMED ON: ABNORMAL
PH UA: 5.5 (ref 5–9)
PH VENOUS: 7.06 (ref 7.3–7.42)
PHENCYCLIDINE SCREEN URINE: NOT DETECTED
PHOSPHORUS: 3.4 MG/DL (ref 2.5–4.5)
PLATELET # BLD: 382 E9/L (ref 130–450)
PLATELET # BLD: 415 E9/L (ref 130–450)
PMV BLD AUTO: 9 FL (ref 7–12)
PMV BLD AUTO: 9.3 FL (ref 7–12)
POC CHLORIDE: 111 MMOL/L (ref 100–108)
POC CREATININE: 1.2 MG/DL (ref 0.7–1.2)
POC POTASSIUM: 4.2 MMOL/L (ref 3.5–5)
POC SODIUM: 130 MMOL/L (ref 132–146)
POIKILOCYTES: ABNORMAL
POIKILOCYTES: ABNORMAL
POLYCHROMASIA: ABNORMAL
POLYCHROMASIA: ABNORMAL
POTASSIUM SERPL-SCNC: 4.5 MMOL/L (ref 3.5–5)
POTASSIUM SERPL-SCNC: 5.7 MMOL/L (ref 3.5–5)
PROPOXYPHENE SCREEN: NOT DETECTED
PROTEIN UA: 30 MG/DL
RBC # BLD: 4.6 E12/L (ref 3.8–5.8)
RBC # BLD: 5.11 E12/L (ref 3.8–5.8)
RBC UA: NORMAL /HPF (ref 0–2)
REASON FOR REJECTION: NORMAL
REASON FOR REJECTION: NORMAL
REJECTED TEST: NORMAL
REJECTED TEST: NORMAL
SALICYLATE, SERUM: <0.3 MG/DL (ref 0–30)
SODIUM BLD-SCNC: 132 MMOL/L (ref 132–146)
SODIUM BLD-SCNC: 135 MMOL/L (ref 132–146)
SPECIFIC GRAVITY UA: 1.02 (ref 1–1.03)
TEAR DROP CELLS: ABNORMAL
TRICYCLIC ANTIDEPRESSANTS SCREEN SERUM: NEGATIVE NG/ML
TROPONIN: <0.01 NG/ML (ref 0–0.03)
UROBILINOGEN, URINE: 0.2 E.U./DL
WBC # BLD: 23.5 E9/L (ref 4.5–11.5)
WBC # BLD: 28.8 E9/L (ref 4.5–11.5)
WBC UA: NORMAL /HPF (ref 0–5)

## 2019-04-12 PROCEDURE — 96374 THER/PROPH/DIAG INJ IV PUSH: CPT

## 2019-04-12 PROCEDURE — 82565 ASSAY OF CREATININE: CPT

## 2019-04-12 PROCEDURE — 2580000003 HC RX 258: Performed by: EMERGENCY MEDICINE

## 2019-04-12 PROCEDURE — 96375 TX/PRO/DX INJ NEW DRUG ADDON: CPT

## 2019-04-12 PROCEDURE — 82435 ASSAY OF BLOOD CHLORIDE: CPT

## 2019-04-12 PROCEDURE — 6360000002 HC RX W HCPCS: Performed by: STUDENT IN AN ORGANIZED HEALTH CARE EDUCATION/TRAINING PROGRAM

## 2019-04-12 PROCEDURE — 83735 ASSAY OF MAGNESIUM: CPT

## 2019-04-12 PROCEDURE — 99285 EMERGENCY DEPT VISIT HI MDM: CPT

## 2019-04-12 PROCEDURE — 6360000002 HC RX W HCPCS: Performed by: INTERNAL MEDICINE

## 2019-04-12 PROCEDURE — 84100 ASSAY OF PHOSPHORUS: CPT

## 2019-04-12 PROCEDURE — 6370000000 HC RX 637 (ALT 250 FOR IP): Performed by: EMERGENCY MEDICINE

## 2019-04-12 PROCEDURE — 84132 ASSAY OF SERUM POTASSIUM: CPT

## 2019-04-12 PROCEDURE — 83036 HEMOGLOBIN GLYCOSYLATED A1C: CPT

## 2019-04-12 PROCEDURE — 80307 DRUG TEST PRSMV CHEM ANLYZR: CPT

## 2019-04-12 PROCEDURE — 84295 ASSAY OF SERUM SODIUM: CPT

## 2019-04-12 PROCEDURE — 82800 BLOOD PH: CPT

## 2019-04-12 PROCEDURE — 83605 ASSAY OF LACTIC ACID: CPT

## 2019-04-12 PROCEDURE — 82010 KETONE BODYS QUAN: CPT

## 2019-04-12 PROCEDURE — 2000000000 HC ICU R&B

## 2019-04-12 PROCEDURE — 2580000003 HC RX 258: Performed by: INTERNAL MEDICINE

## 2019-04-12 PROCEDURE — 85025 COMPLETE CBC W/AUTO DIFF WBC: CPT

## 2019-04-12 PROCEDURE — 87088 URINE BACTERIA CULTURE: CPT

## 2019-04-12 PROCEDURE — 81001 URINALYSIS AUTO W/SCOPE: CPT

## 2019-04-12 PROCEDURE — 6370000000 HC RX 637 (ALT 250 FOR IP): Performed by: INTERNAL MEDICINE

## 2019-04-12 PROCEDURE — 82962 GLUCOSE BLOOD TEST: CPT

## 2019-04-12 PROCEDURE — 71045 X-RAY EXAM CHEST 1 VIEW: CPT

## 2019-04-12 PROCEDURE — 82947 ASSAY GLUCOSE BLOOD QUANT: CPT

## 2019-04-12 PROCEDURE — 83930 ASSAY OF BLOOD OSMOLALITY: CPT

## 2019-04-12 PROCEDURE — 87040 BLOOD CULTURE FOR BACTERIA: CPT

## 2019-04-12 PROCEDURE — 84484 ASSAY OF TROPONIN QUANT: CPT

## 2019-04-12 PROCEDURE — 87502 INFLUENZA DNA AMP PROBE: CPT

## 2019-04-12 PROCEDURE — 36415 COLL VENOUS BLD VENIPUNCTURE: CPT

## 2019-04-12 PROCEDURE — 2500000003 HC RX 250 WO HCPCS: Performed by: EMERGENCY MEDICINE

## 2019-04-12 PROCEDURE — 2580000003 HC RX 258: Performed by: STUDENT IN AN ORGANIZED HEALTH CARE EDUCATION/TRAINING PROGRAM

## 2019-04-12 PROCEDURE — 80048 BASIC METABOLIC PNL TOTAL CA: CPT

## 2019-04-12 PROCEDURE — G0480 DRUG TEST DEF 1-7 CLASSES: HCPCS

## 2019-04-12 PROCEDURE — 93005 ELECTROCARDIOGRAM TRACING: CPT

## 2019-04-12 PROCEDURE — 6360000002 HC RX W HCPCS: Performed by: EMERGENCY MEDICINE

## 2019-04-12 RX ORDER — SODIUM CHLORIDE 450 MG/100ML
INJECTION, SOLUTION INTRAVENOUS CONTINUOUS
Status: DISCONTINUED | OUTPATIENT
Start: 2019-04-12 | End: 2019-04-13

## 2019-04-12 RX ORDER — SULFAMETHOXAZOLE AND TRIMETHOPRIM 800; 160 MG/1; MG/1
1 TABLET ORAL EVERY 12 HOURS SCHEDULED
Status: DISCONTINUED | OUTPATIENT
Start: 2019-04-12 | End: 2019-04-12

## 2019-04-12 RX ORDER — CEPHALEXIN 250 MG/1
250 CAPSULE ORAL EVERY 6 HOURS SCHEDULED
Status: DISCONTINUED | OUTPATIENT
Start: 2019-04-12 | End: 2019-04-12

## 2019-04-12 RX ORDER — 0.9 % SODIUM CHLORIDE 0.9 %
1000 INTRAVENOUS SOLUTION INTRAVENOUS ONCE
Status: COMPLETED | OUTPATIENT
Start: 2019-04-12 | End: 2019-04-12

## 2019-04-12 RX ORDER — POTASSIUM CHLORIDE 7.45 MG/ML
10 INJECTION INTRAVENOUS PRN
Status: DISCONTINUED | OUTPATIENT
Start: 2019-04-12 | End: 2019-04-13

## 2019-04-12 RX ORDER — DEXTROSE MONOHYDRATE 25 G/50ML
12.5 INJECTION, SOLUTION INTRAVENOUS PRN
Status: DISCONTINUED | OUTPATIENT
Start: 2019-04-12 | End: 2019-04-14 | Stop reason: HOSPADM

## 2019-04-12 RX ORDER — PROMETHAZINE HYDROCHLORIDE 25 MG/ML
12.5 INJECTION, SOLUTION INTRAMUSCULAR; INTRAVENOUS ONCE
Status: COMPLETED | OUTPATIENT
Start: 2019-04-12 | End: 2019-04-12

## 2019-04-12 RX ORDER — DEXTROSE AND SODIUM CHLORIDE 5; .45 G/100ML; G/100ML
INJECTION, SOLUTION INTRAVENOUS CONTINUOUS PRN
Status: DISCONTINUED | OUTPATIENT
Start: 2019-04-12 | End: 2019-04-13

## 2019-04-12 RX ORDER — MAGNESIUM SULFATE 1 G/100ML
1 INJECTION INTRAVENOUS PRN
Status: DISCONTINUED | OUTPATIENT
Start: 2019-04-12 | End: 2019-04-13

## 2019-04-12 RX ORDER — ONDANSETRON 2 MG/ML
4 INJECTION INTRAMUSCULAR; INTRAVENOUS EVERY 6 HOURS PRN
Status: DISCONTINUED | OUTPATIENT
Start: 2019-04-12 | End: 2019-04-14 | Stop reason: HOSPADM

## 2019-04-12 RX ADMIN — PROMETHAZINE HYDROCHLORIDE 12.5 MG: 25 INJECTION INTRAMUSCULAR; INTRAVENOUS at 16:17

## 2019-04-12 RX ADMIN — Medication: at 17:30

## 2019-04-12 RX ADMIN — SULFAMETHOXAZOLE AND TRIMETHOPRIM 1 TABLET: 800; 160 TABLET ORAL at 22:33

## 2019-04-12 RX ADMIN — CEPHALEXIN 250 MG: 250 CAPSULE ORAL at 23:00

## 2019-04-12 RX ADMIN — INSULIN HUMAN 10 UNITS: 100 INJECTION, SOLUTION PARENTERAL at 17:29

## 2019-04-12 RX ADMIN — POTASSIUM CHLORIDE 10 MEQ: 7.46 INJECTION, SOLUTION INTRAVENOUS at 22:33

## 2019-04-12 RX ADMIN — CEFTRIAXONE SODIUM 1 G: 1 INJECTION, POWDER, FOR SOLUTION INTRAMUSCULAR; INTRAVENOUS at 20:39

## 2019-04-12 RX ADMIN — SODIUM CHLORIDE 1000 ML: 9 INJECTION, SOLUTION INTRAVENOUS at 15:38

## 2019-04-12 RX ADMIN — SODIUM CHLORIDE 6.33 UNITS/HR: 9 INJECTION, SOLUTION INTRAVENOUS at 17:30

## 2019-04-12 RX ADMIN — SODIUM CHLORIDE: 4.5 INJECTION, SOLUTION INTRAVENOUS at 17:31

## 2019-04-12 RX ADMIN — SODIUM CHLORIDE 6.33 UNITS/HR: 9 INJECTION, SOLUTION INTRAVENOUS at 20:11

## 2019-04-12 RX ADMIN — SODIUM CHLORIDE 1000 ML: 9 INJECTION, SOLUTION INTRAVENOUS at 14:20

## 2019-04-12 RX ADMIN — ONDANSETRON 4 MG: 2 INJECTION INTRAMUSCULAR; INTRAVENOUS at 14:20

## 2019-04-12 RX ADMIN — VANCOMYCIN HYDROCHLORIDE 1250 MG: 10 INJECTION, POWDER, LYOPHILIZED, FOR SOLUTION INTRAVENOUS at 23:04

## 2019-04-12 ASSESSMENT — PAIN DESCRIPTION - DESCRIPTORS: DESCRIPTORS: ACHING

## 2019-04-12 ASSESSMENT — PAIN DESCRIPTION - LOCATION: LOCATION: ABDOMEN

## 2019-04-12 ASSESSMENT — ENCOUNTER SYMPTOMS
VOMITING: 1
RHINORRHEA: 0
CONSTIPATION: 0
COUGH: 0
CHEST TIGHTNESS: 1
NAUSEA: 1
EYE DISCHARGE: 0
SHORTNESS OF BREATH: 0
BLOOD IN STOOL: 0
ABDOMINAL PAIN: 1
SORE THROAT: 0
WHEEZING: 0

## 2019-04-12 ASSESSMENT — PAIN SCALES - GENERAL
PAINLEVEL_OUTOF10: 3
PAINLEVEL_OUTOF10: 0

## 2019-04-12 ASSESSMENT — PAIN DESCRIPTION - FREQUENCY: FREQUENCY: CONTINUOUS

## 2019-04-12 ASSESSMENT — PAIN DESCRIPTION - PAIN TYPE: TYPE: ACUTE PAIN

## 2019-04-12 NOTE — ED PROVIDER NOTES
file     Minutes per session: Not on file    Stress: Not on file   Relationships    Social connections:     Talks on phone: Not on file     Gets together: Not on file     Attends Mu-ism service: Not on file     Active member of club or organization: Not on file     Attends meetings of clubs or organizations: Not on file     Relationship status: Not on file    Intimate partner violence:     Fear of current or ex partner: Not on file     Emotionally abused: Not on file     Physically abused: Not on file     Forced sexual activity: Not on file   Other Topics Concern    Not on file   Social History Narrative    Not on file        Current Facility-Administered Medications:     0.9 % sodium chloride bolus, 1,000 mL, Intravenous, Once, Daisha Blackburn MD, Last Rate: 1,000 mL/hr at 04/12/19 1420, 1,000 mL at 04/12/19 1420    ondansetron (ZOFRAN) injection 4 mg, 4 mg, Intravenous, Q6H PRN, Daisha Blackburn MD, 4 mg at 04/12/19 1420    Current Outpatient Medications:     albuterol sulfate  (90 Base) MCG/ACT inhaler, Inhale 2 puffs into the lungs every 6 hours as needed for Wheezing or Shortness of Breath, Disp: 1 Inhaler, Rfl: 0    atorvastatin (LIPITOR) 80 MG tablet, Take 1 tablet by mouth nightly, Disp: 30 tablet, Rfl: 3    lisinopril (PRINIVIL;ZESTRIL) 20 MG tablet, Take 1 tablet by mouth daily, Disp: 30 tablet, Rfl: 3    digoxin (LANOXIN) 125 MCG tablet, Take 125 mcg by mouth daily, Disp: , Rfl:     aspirin 81 MG EC tablet, Take 1 tablet by mouth daily, Disp: 30 tablet, Rfl: 3    insulin aspart (NOVOLOG) 100 UNIT/ML injection vial, Inject 4 Units into the skin 3 times daily (before meals) Insulin pump, Disp: 1 vial, Rfl: 3   Allergies   Allergen Reactions    No Known Allergies         Review of Systems:   Review of Systems   Constitutional: Positive for chills. Negative for appetite change, fatigue and fever.    HENT: Negative for congestion, ear discharge, postnasal drip, rhinorrhea, sneezing and sore throat. Eyes: Negative for discharge. Respiratory: Positive for chest tightness. Negative for cough, shortness of breath and wheezing. Cardiovascular: Negative for chest pain, palpitations and leg swelling. Gastrointestinal: Positive for abdominal pain, nausea and vomiting. Negative for blood in stool and constipation. Endocrine: Negative for polyuria. Genitourinary: Negative for dysuria and frequency. Musculoskeletal: Negative for arthralgias and myalgias. Physical Exam:   BP (!) 141/87   Pulse 131   Temp 97.8 °F (36.6 °C) (Axillary)   Resp 18   Ht 5' 6\" (1.676 m)   Wt 139 lb 8.8 oz (63.3 kg)   SpO2 99%   BMI 22.52 kg/m²      Physical Exam (PE)   · Constitutional: A&Ox3, NAD, mild to moderate dehydrated. · HEENT: Normocephalic, Atraumatic, PERRLA, EOMI, Conjunctiva pink and clear. No epistaxis or rhinorrhea. Throat clear. Airway patent. MMM. · Neck: FROM active without pain. Supple with no midline tenderness. · Respiratory: Bilateral CTA, good air entry, no retractions, no wheezes, rales or rhonchi. · CV: RRR. (+) S1S2.   · GI: Soft. No tenderness. No rebound, guarding, distention. Bowel sounds normal. No masses - solid or pulsatile. · Chest: No lesions or deformity. Symmetrical expansion. · Back: No lesions or deformity. No CVAT. No midline tenderness. · Skin: Normal for age. Mild dehydration. · Psych: Speech fluent, appropriate and non-pressured. Mood and Affect normal for environment. Impression/Plan:  1.  DKA 2/2 likely Sepsis  Lab testing - POCT glucose, BMP, Beta-hydroxybutyrate, serum-osm, Urine ketone, Blood culture, troponin, Rapid-flu, VBG, EKG - Hyperglycemia, Glycosuria, ketonuria, hyperosmolar serum, Beta hydroxy butyrate - greater than 4.5  Imaging - CXR  Med's - Normal saline bolus  DKA protocol  Decision to admit - Inpatient consult to Internal medicine, Inpatient consult to MICU       Keila Asnari MD  Resident  04/12/19 [id number]

## 2019-04-12 NOTE — ED NOTES
Bed: 11  Expected date:   Expected time:   Means of arrival:   Comments:  Triage       Jacobo James RN  04/12/19 [id number]

## 2019-04-12 NOTE — ED NOTES
No change in insulin rate at this time, [facility] drawn     Delvis Newman RN  04/12/19 0616

## 2019-04-13 PROBLEM — F12.10 MARIJUANA ABUSE: Status: ACTIVE | Noted: 2019-04-13

## 2019-04-13 PROBLEM — E87.20 LACTIC ACIDOSIS: Status: ACTIVE | Noted: 2019-04-13

## 2019-04-13 LAB
ANION GAP SERPL CALCULATED.3IONS-SCNC: 10 MMOL/L (ref 7–16)
ANION GAP SERPL CALCULATED.3IONS-SCNC: 11 MMOL/L (ref 7–16)
ANION GAP SERPL CALCULATED.3IONS-SCNC: 12 MMOL/L (ref 7–16)
ANION GAP SERPL CALCULATED.3IONS-SCNC: 14 MMOL/L (ref 7–16)
ANION GAP SERPL CALCULATED.3IONS-SCNC: 19 MMOL/L (ref 7–16)
ANION GAP SERPL CALCULATED.3IONS-SCNC: 9 MMOL/L (ref 7–16)
ANISOCYTOSIS: ABNORMAL
BASOPHILS ABSOLUTE: 0.04 E9/L (ref 0–0.2)
BASOPHILS RELATIVE PERCENT: 0.2 % (ref 0–2)
BUN BLDV-MCNC: 12 MG/DL (ref 6–20)
BUN BLDV-MCNC: 14 MG/DL (ref 6–20)
BUN BLDV-MCNC: 17 MG/DL (ref 6–20)
BUN BLDV-MCNC: 21 MG/DL (ref 6–20)
BUN BLDV-MCNC: 27 MG/DL (ref 6–20)
BUN BLDV-MCNC: 9 MG/DL (ref 6–20)
CALCIUM SERPL-MCNC: 8.1 MG/DL (ref 8.6–10.2)
CALCIUM SERPL-MCNC: 8.2 MG/DL (ref 8.6–10.2)
CALCIUM SERPL-MCNC: 8.3 MG/DL (ref 8.6–10.2)
CALCIUM SERPL-MCNC: 8.3 MG/DL (ref 8.6–10.2)
CALCIUM SERPL-MCNC: 8.4 MG/DL (ref 8.6–10.2)
CALCIUM SERPL-MCNC: 8.5 MG/DL (ref 8.6–10.2)
CHLORIDE BLD-SCNC: 100 MMOL/L (ref 98–107)
CHLORIDE BLD-SCNC: 101 MMOL/L (ref 98–107)
CHLORIDE BLD-SCNC: 102 MMOL/L (ref 98–107)
CHLORIDE BLD-SCNC: 103 MMOL/L (ref 98–107)
CHLORIDE BLD-SCNC: 99 MMOL/L (ref 98–107)
CHLORIDE BLD-SCNC: 99 MMOL/L (ref 98–107)
CO2: 14 MMOL/L (ref 22–29)
CO2: 17 MMOL/L (ref 22–29)
CO2: 19 MMOL/L (ref 22–29)
CO2: 20 MMOL/L (ref 22–29)
CO2: 22 MMOL/L (ref 22–29)
CO2: 23 MMOL/L (ref 22–29)
CREAT SERPL-MCNC: 0.7 MG/DL (ref 0.7–1.2)
CREAT SERPL-MCNC: 0.8 MG/DL (ref 0.7–1.2)
CREAT SERPL-MCNC: 0.8 MG/DL (ref 0.7–1.2)
CREAT SERPL-MCNC: 1 MG/DL (ref 0.7–1.2)
EOSINOPHILS ABSOLUTE: 0.02 E9/L (ref 0.05–0.5)
EOSINOPHILS RELATIVE PERCENT: 0.1 % (ref 0–6)
FILM ARRAY ADENOVIRUS: NORMAL
FILM ARRAY BORDETELLA PERTUSSIS: NORMAL
FILM ARRAY CHLAMYDOPHILIA PNEUMONIAE: NORMAL
FILM ARRAY CORONAVIRUS 229E: NORMAL
FILM ARRAY CORONAVIRUS HKU1: NORMAL
FILM ARRAY CORONAVIRUS NL63: NORMAL
FILM ARRAY CORONAVIRUS OC43: NORMAL
FILM ARRAY INFLUENZA A VIRUS 09H1: NORMAL
FILM ARRAY INFLUENZA A VIRUS H1: NORMAL
FILM ARRAY INFLUENZA A VIRUS H3: NORMAL
FILM ARRAY INFLUENZA A VIRUS: NORMAL
FILM ARRAY INFLUENZA B: NORMAL
FILM ARRAY METAPNEUMOVIRUS: NORMAL
FILM ARRAY MYCOPLASMA PNEUMONIAE: NORMAL
FILM ARRAY PARAINFLUENZA VIRUS 1: NORMAL
FILM ARRAY PARAINFLUENZA VIRUS 2: NORMAL
FILM ARRAY PARAINFLUENZA VIRUS 3: NORMAL
FILM ARRAY PARAINFLUENZA VIRUS 4: NORMAL
FILM ARRAY RESPIRATORY SYNCITIAL VIRUS: NORMAL
FILM ARRAY RHINOVIRUS/ENTEROVIRUS: NORMAL
GFR AFRICAN AMERICAN: >60
GFR NON-AFRICAN AMERICAN: >60 ML/MIN/1.73
GLUCOSE BLD-MCNC: 106 MG/DL (ref 74–99)
GLUCOSE BLD-MCNC: 121 MG/DL (ref 74–99)
GLUCOSE BLD-MCNC: 179 MG/DL (ref 74–99)
GLUCOSE BLD-MCNC: 230 MG/DL (ref 74–99)
GLUCOSE BLD-MCNC: 254 MG/DL (ref 74–99)
GLUCOSE BLD-MCNC: 286 MG/DL (ref 74–99)
HCT VFR BLD CALC: 38.6 % (ref 37–54)
HEMOGLOBIN: 12.8 G/DL (ref 12.5–16.5)
IMMATURE GRANULOCYTES #: 0.16 E9/L
IMMATURE GRANULOCYTES %: 0.7 % (ref 0–5)
LACTIC ACID: 0.8 MMOL/L (ref 0.5–2.2)
LACTIC ACID: 1.1 MMOL/L (ref 0.5–2.2)
LACTIC ACID: 1.4 MMOL/L (ref 0.5–2.2)
LYMPHOCYTES ABSOLUTE: 1.49 E9/L (ref 1.5–4)
LYMPHOCYTES RELATIVE PERCENT: 6.7 % (ref 20–42)
MAGNESIUM: 1.6 MG/DL (ref 1.6–2.6)
MAGNESIUM: 1.7 MG/DL (ref 1.6–2.6)
MAGNESIUM: 1.8 MG/DL (ref 1.6–2.6)
MAGNESIUM: 2.2 MG/DL (ref 1.6–2.6)
MCH RBC QN AUTO: 30.9 PG (ref 26–35)
MCHC RBC AUTO-ENTMCNC: 33.2 % (ref 32–34.5)
MCV RBC AUTO: 93.2 FL (ref 80–99.9)
METER GLUCOSE: 100 MG/DL (ref 74–99)
METER GLUCOSE: 118 MG/DL (ref 74–99)
METER GLUCOSE: 119 MG/DL (ref 74–99)
METER GLUCOSE: 125 MG/DL (ref 74–99)
METER GLUCOSE: 164 MG/DL (ref 74–99)
METER GLUCOSE: 171 MG/DL (ref 74–99)
METER GLUCOSE: 187 MG/DL (ref 74–99)
METER GLUCOSE: 198 MG/DL (ref 74–99)
METER GLUCOSE: 202 MG/DL (ref 74–99)
METER GLUCOSE: 208 MG/DL (ref 74–99)
METER GLUCOSE: 221 MG/DL (ref 74–99)
METER GLUCOSE: 232 MG/DL (ref 74–99)
METER GLUCOSE: 233 MG/DL (ref 74–99)
METER GLUCOSE: 240 MG/DL (ref 74–99)
METER GLUCOSE: 244 MG/DL (ref 74–99)
METER GLUCOSE: 249 MG/DL (ref 74–99)
METER GLUCOSE: 261 MG/DL (ref 74–99)
METER GLUCOSE: 265 MG/DL (ref 74–99)
METER GLUCOSE: 274 MG/DL (ref 74–99)
METER GLUCOSE: 297 MG/DL (ref 74–99)
METER GLUCOSE: 97 MG/DL (ref 74–99)
MONOCYTES ABSOLUTE: 1.53 E9/L (ref 0.1–0.95)
MONOCYTES RELATIVE PERCENT: 6.9 % (ref 2–12)
NEUTROPHILS ABSOLUTE: 19.09 E9/L (ref 1.8–7.3)
NEUTROPHILS RELATIVE PERCENT: 85.4 % (ref 43–80)
PDW BLD-RTO: 14.2 FL (ref 11.5–15)
PHOSPHORUS: 1.8 MG/DL (ref 2.5–4.5)
PHOSPHORUS: 2.1 MG/DL (ref 2.5–4.5)
PHOSPHORUS: 2.2 MG/DL (ref 2.5–4.5)
PHOSPHORUS: 2.2 MG/DL (ref 2.5–4.5)
PHOSPHORUS: 2.4 MG/DL (ref 2.5–4.5)
PHOSPHORUS: 2.9 MG/DL (ref 2.5–4.5)
PLATELET # BLD: 302 E9/L (ref 130–450)
PMV BLD AUTO: 9.3 FL (ref 7–12)
POIKILOCYTES: ABNORMAL
POLYCHROMASIA: ABNORMAL
POTASSIUM SERPL-SCNC: 3.9 MMOL/L (ref 3.5–5)
POTASSIUM SERPL-SCNC: 3.9 MMOL/L (ref 3.5–5)
POTASSIUM SERPL-SCNC: 4.2 MMOL/L (ref 3.5–5)
POTASSIUM SERPL-SCNC: 4.2 MMOL/L (ref 3.5–5)
POTASSIUM SERPL-SCNC: 4.3 MMOL/L (ref 3.5–5)
POTASSIUM SERPL-SCNC: 5.3 MMOL/L (ref 3.5–5)
RBC # BLD: 4.14 E12/L (ref 3.8–5.8)
SODIUM BLD-SCNC: 130 MMOL/L (ref 132–146)
SODIUM BLD-SCNC: 131 MMOL/L (ref 132–146)
SODIUM BLD-SCNC: 132 MMOL/L (ref 132–146)
SODIUM BLD-SCNC: 132 MMOL/L (ref 132–146)
SODIUM BLD-SCNC: 134 MMOL/L (ref 132–146)
SODIUM BLD-SCNC: 135 MMOL/L (ref 132–146)
WBC # BLD: 22.3 E9/L (ref 4.5–11.5)

## 2019-04-13 PROCEDURE — 2500000003 HC RX 250 WO HCPCS: Performed by: INTERNAL MEDICINE

## 2019-04-13 PROCEDURE — 0H91XZX DRAINAGE OF FACE SKIN, EXTERNAL APPROACH, DIAGNOSTIC: ICD-10-PCS | Performed by: SURGERY

## 2019-04-13 PROCEDURE — 85025 COMPLETE CBC W/AUTO DIFF WBC: CPT

## 2019-04-13 PROCEDURE — 83605 ASSAY OF LACTIC ACID: CPT

## 2019-04-13 PROCEDURE — 87581 M.PNEUMON DNA AMP PROBE: CPT

## 2019-04-13 PROCEDURE — 2500000003 HC RX 250 WO HCPCS: Performed by: EMERGENCY MEDICINE

## 2019-04-13 PROCEDURE — 84100 ASSAY OF PHOSPHORUS: CPT

## 2019-04-13 PROCEDURE — 99253 IP/OBS CNSLTJ NEW/EST LOW 45: CPT | Performed by: SURGERY

## 2019-04-13 PROCEDURE — 83735 ASSAY OF MAGNESIUM: CPT

## 2019-04-13 PROCEDURE — 6370000000 HC RX 637 (ALT 250 FOR IP): Performed by: INTERNAL MEDICINE

## 2019-04-13 PROCEDURE — 2000000000 HC ICU R&B

## 2019-04-13 PROCEDURE — 6360000002 HC RX W HCPCS: Performed by: INTERNAL MEDICINE

## 2019-04-13 PROCEDURE — 36415 COLL VENOUS BLD VENIPUNCTURE: CPT

## 2019-04-13 PROCEDURE — 10061 I&D ABSCESS COMP/MULTIPLE: CPT | Performed by: SURGERY

## 2019-04-13 PROCEDURE — 87798 DETECT AGENT NOS DNA AMP: CPT

## 2019-04-13 PROCEDURE — 80048 BASIC METABOLIC PNL TOTAL CA: CPT

## 2019-04-13 PROCEDURE — 0H94XZX DRAINAGE OF NECK SKIN, EXTERNAL APPROACH, DIAGNOSTIC: ICD-10-PCS | Performed by: SURGERY

## 2019-04-13 PROCEDURE — 82962 GLUCOSE BLOOD TEST: CPT

## 2019-04-13 PROCEDURE — 87486 CHLMYD PNEUM DNA AMP PROBE: CPT

## 2019-04-13 PROCEDURE — 6360000002 HC RX W HCPCS: Performed by: EMERGENCY MEDICINE

## 2019-04-13 PROCEDURE — 2580000003 HC RX 258: Performed by: INTERNAL MEDICINE

## 2019-04-13 PROCEDURE — 6370000000 HC RX 637 (ALT 250 FOR IP): Performed by: EMERGENCY MEDICINE

## 2019-04-13 PROCEDURE — 87633 RESP VIRUS 12-25 TARGETS: CPT

## 2019-04-13 PROCEDURE — 2580000003 HC RX 258: Performed by: EMERGENCY MEDICINE

## 2019-04-13 RX ORDER — THIAMINE HYDROCHLORIDE 100 MG/ML
100 INJECTION, SOLUTION INTRAMUSCULAR; INTRAVENOUS DAILY
Status: DISCONTINUED | OUTPATIENT
Start: 2019-04-13 | End: 2019-04-13

## 2019-04-13 RX ORDER — FOLIC ACID 1 MG/1
1 TABLET ORAL DAILY
Status: DISCONTINUED | OUTPATIENT
Start: 2019-04-14 | End: 2019-04-14 | Stop reason: HOSPADM

## 2019-04-13 RX ORDER — ATORVASTATIN CALCIUM 40 MG/1
80 TABLET, FILM COATED ORAL NIGHTLY
Status: DISCONTINUED | OUTPATIENT
Start: 2019-04-13 | End: 2019-04-14 | Stop reason: HOSPADM

## 2019-04-13 RX ORDER — LISINOPRIL 20 MG/1
20 TABLET ORAL DAILY
Status: DISCONTINUED | OUTPATIENT
Start: 2019-04-13 | End: 2019-04-14 | Stop reason: HOSPADM

## 2019-04-13 RX ORDER — THIAMINE MONONITRATE (VIT B1) 100 MG
100 TABLET ORAL DAILY
Status: CANCELLED | OUTPATIENT
Start: 2019-04-14

## 2019-04-13 RX ORDER — PANTOPRAZOLE SODIUM 40 MG/1
40 TABLET, DELAYED RELEASE ORAL
Status: DISCONTINUED | OUTPATIENT
Start: 2019-04-13 | End: 2019-04-14 | Stop reason: HOSPADM

## 2019-04-13 RX ORDER — FOLIC ACID 5 MG/ML
1 INJECTION, SOLUTION INTRAMUSCULAR; INTRAVENOUS; SUBCUTANEOUS DAILY
Status: DISCONTINUED | OUTPATIENT
Start: 2019-04-13 | End: 2019-04-13

## 2019-04-13 RX ORDER — FOLIC ACID 1 MG/1
1 TABLET ORAL DAILY
Status: CANCELLED | OUTPATIENT
Start: 2019-04-14

## 2019-04-13 RX ORDER — SODIUM CHLORIDE 0.9 % (FLUSH) 0.9 %
10 SYRINGE (ML) INJECTION PRN
Status: DISCONTINUED | OUTPATIENT
Start: 2019-04-13 | End: 2019-04-14 | Stop reason: HOSPADM

## 2019-04-13 RX ORDER — SODIUM CHLORIDE 0.9 % (FLUSH) 0.9 %
10 SYRINGE (ML) INJECTION EVERY 12 HOURS SCHEDULED
Status: DISCONTINUED | OUTPATIENT
Start: 2019-04-13 | End: 2019-04-14 | Stop reason: HOSPADM

## 2019-04-13 RX ORDER — ASPIRIN 81 MG/1
81 TABLET ORAL DAILY
Status: DISCONTINUED | OUTPATIENT
Start: 2019-04-13 | End: 2019-04-14 | Stop reason: HOSPADM

## 2019-04-13 RX ORDER — THIAMINE MONONITRATE (VIT B1) 100 MG
100 TABLET ORAL DAILY
Status: DISCONTINUED | OUTPATIENT
Start: 2019-04-14 | End: 2019-04-14 | Stop reason: HOSPADM

## 2019-04-13 RX ADMIN — DEXTROSE AND SODIUM CHLORIDE: 5; 450 INJECTION, SOLUTION INTRAVENOUS at 08:01

## 2019-04-13 RX ADMIN — SODIUM CHLORIDE 11 UNITS/HR: 9 INJECTION, SOLUTION INTRAVENOUS at 05:05

## 2019-04-13 RX ADMIN — PANTOPRAZOLE SODIUM 40 MG: 40 TABLET, DELAYED RELEASE ORAL at 07:53

## 2019-04-13 RX ADMIN — POTASSIUM CHLORIDE 10 MEQ: 7.46 INJECTION, SOLUTION INTRAVENOUS at 08:14

## 2019-04-13 RX ADMIN — POTASSIUM CHLORIDE 10 MEQ: 7.46 INJECTION, SOLUTION INTRAVENOUS at 07:07

## 2019-04-13 RX ADMIN — MAGNESIUM SULFATE HEPTAHYDRATE 1 G: 1 INJECTION, SOLUTION INTRAVENOUS at 17:52

## 2019-04-13 RX ADMIN — SODIUM PHOSPHATE, MONOBASIC, MONOHYDRATE 15 MMOL: 276; 142 INJECTION, SOLUTION INTRAVENOUS at 11:58

## 2019-04-13 RX ADMIN — ASPIRIN 81 MG: 81 TABLET ORAL at 07:54

## 2019-04-13 RX ADMIN — Medication 10 ML: at 07:53

## 2019-04-13 RX ADMIN — POTASSIUM CHLORIDE 10 MEQ: 7.46 INJECTION, SOLUTION INTRAVENOUS at 04:06

## 2019-04-13 RX ADMIN — LISINOPRIL 20 MG: 20 TABLET ORAL at 20:58

## 2019-04-13 RX ADMIN — ENOXAPARIN SODIUM 40 MG: 40 INJECTION SUBCUTANEOUS at 07:54

## 2019-04-13 RX ADMIN — ATORVASTATIN CALCIUM 80 MG: 40 TABLET, FILM COATED ORAL at 20:58

## 2019-04-13 RX ADMIN — Medication: at 04:06

## 2019-04-13 RX ADMIN — POTASSIUM CHLORIDE 10 MEQ: 7.46 INJECTION, SOLUTION INTRAVENOUS at 18:49

## 2019-04-13 RX ADMIN — THIAMINE HYDROCHLORIDE 100 MG: 100 INJECTION, SOLUTION INTRAMUSCULAR; INTRAVENOUS at 07:54

## 2019-04-13 RX ADMIN — POTASSIUM CHLORIDE 10 MEQ: 7.46 INJECTION, SOLUTION INTRAVENOUS at 10:38

## 2019-04-13 RX ADMIN — SODIUM PHOSPHATE, MONOBASIC, MONOHYDRATE 15 MMOL: 276; 142 INJECTION, SOLUTION INTRAVENOUS at 04:12

## 2019-04-13 RX ADMIN — POTASSIUM CHLORIDE 10 MEQ: 7.46 INJECTION, SOLUTION INTRAVENOUS at 11:58

## 2019-04-13 RX ADMIN — POTASSIUM CHLORIDE 10 MEQ: 7.46 INJECTION, SOLUTION INTRAVENOUS at 09:21

## 2019-04-13 RX ADMIN — FOLIC ACID 1 MG: 5 INJECTION, SOLUTION INTRAMUSCULAR; INTRAVENOUS; SUBCUTANEOUS at 09:05

## 2019-04-13 RX ADMIN — DEXTROSE AND SODIUM CHLORIDE: 5; 450 INJECTION, SOLUTION INTRAVENOUS at 01:09

## 2019-04-13 RX ADMIN — POTASSIUM CHLORIDE 10 MEQ: 7.46 INJECTION, SOLUTION INTRAVENOUS at 05:14

## 2019-04-13 RX ADMIN — MAGNESIUM SULFATE HEPTAHYDRATE 1 G: 1 INJECTION, SOLUTION INTRAVENOUS at 16:32

## 2019-04-13 RX ADMIN — POTASSIUM CHLORIDE 10 MEQ: 7.46 INJECTION, SOLUTION INTRAVENOUS at 02:47

## 2019-04-13 RX ADMIN — POTASSIUM CHLORIDE 10 MEQ: 7.46 INJECTION, SOLUTION INTRAVENOUS at 10:04

## 2019-04-13 ASSESSMENT — PAIN SCALES - GENERAL
PAINLEVEL_OUTOF10: 0

## 2019-04-13 NOTE — CONSULTS
GENERAL SURGERY  CONSULT NOTE  4/13/2019    Physician Consulted: Dr. Ese Perkins  Reason for Consult: \"face nodules\"  Referring Physician: Dr. Harrison MAYA  Brandy Andujar is a 53 y.o. male who presents for evaluation of facial nodules. He is admitted for DKA with a leukocytosis. He has chronic cysts on his face which he pops from time to time and general surgery was consulted to rule out infectious source. He gets these and these are chronic he states. Past Medical History:   Diagnosis Date    [facility]ism ([facility]     [facility]     Diabetes mellitus ([facility]     Hypertension     Idiopathic peripheral neuropathy 9/21/2016    Marijuana abuse        History reviewed. No pertinent surgical history. Medications Prior to Admission:    Prior to Admission medications    Medication Sig Start Date End Date Taking? Authorizing Provider   albuterol sulfate  (90 Base) MCG/ACT inhaler Inhale 2 puffs into the lungs every 6 hours as needed for Wheezing or Shortness of Breath 12/28/18  Yes Eleonora Orellana MD   atorvastatin (LIPITOR) 80 MG tablet Take 1 tablet by mouth nightly 12/28/18  Yes Twin Sanders MD   lisinopril (PRINIVIL;ZESTRIL) 20 MG tablet Take 1 tablet by mouth daily 12/29/18  Yes Twin Sanders MD   digoxin (LANOXIN) 125 MCG tablet Take 125 mcg by mouth daily   Yes Historical Provider, MD   aspirin 81 MG EC tablet Take 1 tablet by mouth daily 11/13/18  Yes Adolfo Vann MD   insulin aspart (NOVOLOG) 100 UNIT/ML injection vial Inject 4 Units into the skin 3 times daily (before meals) Insulin pump 12/28/18   Twin Sanders MD       Allergies   Allergen Reactions    No Known Allergies        Family History   Problem Relation Age of Onset    Cancer Maternal Grandmother        Social History     Tobacco Use    Smoking status: Current Every Day Smoker     Packs/day: 1.00     Years: 15.00     Pack years: 15.00     Types: Cigarettes    Smokeless tobacco: Never Used   Substance Use Topics    Alcohol use:  Yes signed by Yao Walker DO on 4/13/19 at 1:15 AM

## 2019-04-13 NOTE — H&P
[address] [facility]   Department of Internal Medicine   Internal Medicine Residency   H&P Note    Patient:  Delfina Sue 53 y.o. male   MRN: [id number]       Date of Service: 4/13/2019      Chief Complaint:  Hyperglycemia and DKA symptoms    History of Present Illness   - The History is taken from patient    The patient is a 53 y. o. male who presented to ED for elevated blood glucose. He has PMH of type I DM on insulin pump (1.2 unit/hr during the day, and 1.8 unit/hr during night, HTN, recent ischemic stroke(possibly from ? AF), polysubstance abuse, and alcohol use. Patient didn't skip his insulin, and his pump was functioning well. Last night he drank couple of beers, pops and ate hamburger and went to sleep normally. This morning he woke up and felt he is going to A, and his sugar was > 500. He came to ER. He reported nausea vomiting, abdominal pain. He denied recent sickness. No cough. When I went to through [facility], he mentioned he has facial lump over his right face and left neck for months, and he can smell bad odor. He said those lump has been stable for couple of months. He sometimes squeezes them with purulent drainage. He drinks couple of times a week, 3X 16 oz beer each time. He denied any history of alcohol withdrawal.         ED Course:  /67   Pulse 108   Temp 98.9 °F (37.2 °C)   Resp 28   Ht 5' 6\" (1.676 m)   Wt 141 lb 14.4 oz (64.4 kg)   SpO2 93%   BMI 22.90 kg/m²   He was started on DKA protocol in ER    Past Medical History:      Diagnosis Date    Alcoholism ([address]     [facility] abuse ([facility]     Diabetes mellitus ([facility]     Hypertension     Idiopathic peripheral neuropathy 9/21/2016    Marijuana abuse        Past Surgical History:    History reviewed. No pertinent surgical history. Prior to Admission medications    Medication Sig Start Date End Date Taking?  Authorizing Provider   albuterol sulfate  (90 Base) MCG/ACT inhaler Inhale 2 puffs into the lungs every 6 hours as needed for Wheezing or Shortness of Breath 12/28/18  Yes Franco Gonzales MD   atorvastatin (LIPITOR) 80 MG tablet Take 1 tablet by mouth nightly 12/28/18  Yes Seamus Finley MD   lisinopril (PRINIVIL;ZESTRIL) 20 MG tablet Take 1 tablet by mouth daily 12/29/18  Yes Seamus Finley MD   digoxin (LANOXIN) 125 MCG tablet Take 125 mcg by mouth daily   Yes Historical MD Nanette   aspirin 81 MG EC tablet Take 1 tablet by mouth daily 11/13/18  Yes Aristeo Montanez MD   insulin aspart (NOVOLOG) 100 UNIT/ML injection vial Inject 4 Units into the skin 3 times daily (before meals) Insulin pump 12/28/18   Seamus Finley MD       Allergies:  No known allergies    Social History:   TOBACCO:   reports that he has been smoking cigarettes. He has a 15.00 pack-year smoking history. He has never used smokeless tobacco.  ETOH:   reports that he drinks about 3.0 oz of alcohol per week. OCCUPATION:      Family History:       Problem Relation Age of Onset    Cancer Maternal Grandmother       REVIEW OF SYSTEMS:  Constitutional: No fever, no chill or change in weight; good appetite  HEENT: No blurred vision, no ear problems, no sore throat, no running nose. Respiratory: scattered dry cough for long time, no recent changess  Cardiology: No angina, no dyspnea on exertion, no paroxysmal nocturnal dyspnea, no orthopnea, no palpitation, no leg swelling. Gastroenterology: nausea vomiting abdominal pain. Genitourinary: No dysuria, no frequency, hesitancy; no hematuria  Musculoskeletal: no joint pain, no myalgia, no change in range of movement  Neurology: no focal weakness in extremities, no slurred speech, no double vision, no tingling or numbness sensation.    Endocrinology: no temperature intolerance, no polyphagia, polydipsia or polyuria  Hematology: no increased bleeding, no bruising, no lymphadenopathy  Skin: skin lump as mentioned in HPI part  Psychology: no depressed mood, no suicidal ideation    Physical Exam       VS: /67 Pulse 108   Temp 98.9 °F (37.2 °C)   Resp 28   Ht 5' 6\" (1.676 m)   Wt 141 lb 14.4 oz (64.4 kg)   SpO2 93%   BMI 22.90 kg/m²   General Appearance:    Alert, cooperative, no acute distress. HEENT:   NC/AT, PERRLA, no pallor no icterus, moist mucous membrane   Neck:   Multiple nodular lumps palpable over right face with heavy beard, mild purulent fluid seen, over left neck nodules palpable, not draining   Resp:     CTAB, no wheezes no rhonchi   Heart:    RRR, S1 & S2 normal, no murmur, rub or gallop.    Abdomen:     Soft, non-tender, BS +, no organomegaly   Extremities:   Normal, atraumatic, no cyanosis or edema   Pulses:   2+ and symmetric all extremities   Skin:   No needle marks seen   Neurologic:   AAOx3, no focal motor deficit     Labs     CBC:   Lab Results   Component Value Date    WBC 22.3 04/13/2019    RBC 4.14 04/13/2019    HGB 12.8 04/13/2019    HCT 38.6 04/13/2019     04/13/2019    MCV 93.2 04/13/2019     BMP:    Lab Results   Component Value Date     04/13/2019    K 4.2 04/13/2019    K 5.6 02/09/2019     04/13/2019    CO2 17 04/13/2019    BUN 21 04/13/2019    CREATININE 0.8 04/13/2019    GLUCOSE 254 04/13/2019    GLUCOSE 83 04/11/2012    CALCIUM 8.1 04/13/2019     Hepatic Function Panel:    Lab Results   Component Value Date    ALKPHOS 76 12/27/2018    AST 33 12/27/2018    ALT 27 12/27/2018    PROT 5.4 12/27/2018    LABALBU 3.3 12/27/2018    LABALBU 4.3 12/30/2011    BILITOT 1.7 12/27/2018     Cardiac Enzymes:   Lab Results   Component Value Date    CKTOTAL 65 11/29/2017    CKTOTAL 56 03/02/2017    CKTOTAL 433 (H) 02/11/2017    CKMB 3.7 11/29/2017    CKMB 3.2 03/02/2017    CKMB 12.5 (H) 02/11/2017    TROPONINI <0.01 04/12/2019    TROPONINI <0.01 12/25/2018    TROPONINI 0.01 12/24/2018     PT/INR:    Lab Results   Component Value Date    PROTIME 11.3 11/10/2018    INR 1.0 11/10/2018     ABG:    Lab Results   Component Value Date    F8HGVFTK 88.4 07/19/2012     TSH:    Lab Results Component Value Date    TSH 2.910 2017     Xr Chest Portable    Result Date: 2019  Patient MRN: [id number] : 1970 Age:  53 years Gender: Male Order Date: 2019 2:15 PM Exam: XR CHEST PORTABLE Number of Images: 1 views Indication:   DKA suspected DKA suspected Comparison: 2019 Findings: Portable anterior view demonstrates normal size heart with normal pulmonary vascularity. Lungs are clear. There has been no interim change. No active process and no interim change     Resident's Assessment & Plan     Assessment  DKA, type I, cause unsure, apparently he has significantly leukocytosis, with several face and neck lumps, per general surgery, they are cyst rather than furuncles/carbuncles, CXR was unremarkable, lung sounds clear, urinalysis showed no sign of UTI  Leukocytosis with WBC trending up from 23 k to 28k  Lactic acidosis, trending down  Type I DM, on insulin pump, glucose not controlled well  Alcoholism  Hx of HTN, on lisinopril at home, will hold for now    Plan  On DKA protocol with BMP Mg, Phos every 6 hours  Will restart insulin pump once AG closed  Gave one dose of vanc and rocephin, because I was concerning he might have some skin abscess, however, GS evaluated him, don't think he has skin infection, will discuss again in the morning about abx coverage  Blood culture sent, will follow  Resume lisinopril once DKA resolved  Resume lipitor  Resume folic acid and thiamine  Watch for alcohol withdrawal         - GI & DVT prophylaxis: protonix/lovenox      Eli Monroy M.D., PGY-2  Internal Medicine resident    Attending Physician: Dr. Mary Morrell  [facility]    Attending Physician Statement:  Belem Valdez M.D., F.A.C.P. I have discussed the case, including pertinent history and exam findings with the resident/NP. I have seen and examined the patient and the key elements of the encounter have been performed by me.   I agree with the resident ROS, PMHx, PSHx, meds reviewed and assessment, plan and orders as documented by the resident/NP      Hospital charts reviewed, including other providers notes, relevant labs and imaging. DM1- recurrent dka. - on pump  -- 9.0 aic -- poorly controlled dm easily provoked, with certain life style choices -- ?binging etoh ketosis/dehydration. ?provoked DKA (case reports s/p marijuana -- ketoacidosis higher risk,  Also went out drinking)  Rule out infection-- sebaceous cyst likely- not pus, sebaceous material -- will hold off abx? [employer] [facility] WBC, viral panel etc. pending  Abuse use disorder    ponce - dehydration and dka, LA much improved AG 14 down to 11--cr improving  >50% of time spent coordinating care with other providers and/or counseling patient/family  Remainder of medical problems as per resident note.

## 2019-04-13 NOTE — CONSULTS
[facility]   Department of Internal Medicine   Internal Medicine Residency  MICU H&P Note    Patient:  Alison Alex 53 y.o. male   MRN: [id number]       Date of Service: 4/12/2019      Chief Complaint:  Hyperglycemia and DKA symptoms    History of Present Illness   - The History is taken from patient    The patient is a 53 y. o. male who presented to ED for elevated blood glucose. He has PMH of type I DM on insulin pump (1.2 unit/hr during the day, and 1.8 unit/hr during night, HTN, recent ischemic stroke(possibly from ? AF), polysubstance abuse, and alcohol use. Patient didn't skip his insulin, and his pump was functioning well. Last night he drank couple of beers, pops and ate hamburger and went to sleep normally. This morning he woke up and felt he is going to DKA, and his sugar was > 500. He came to ER. He reported nausea vomiting, abdominal pain. He denied recent sickness. No cough. When I went to through [facility], he mentioned he has facial lump over his right face and left neck for months, and he can smell bad odor. He said those lump has been stable for couple of months. He sometimes squeezes them with purulent drainage. He drinks couple of times a week, 3X 16 oz beer each time. He denied any history of alcohol withdrawal.         ED Course:  BP (!) 159/98   Pulse 133   Temp 99 °F (37.2 °C) (Axillary)   Resp 25   Ht 5' 6\" (1.676 m)   Wt 141 lb 14.4 oz (64.4 kg)   SpO2 98%   BMI 22.90 kg/m²   He was started on DKA protocol in ER    Past Medical History:      Diagnosis Date    Alcoholism ([address]     Cocaine abuse ([facility] [address]     Diabetes mellitus ([facility] [address]     Hypertension     Idiopathic peripheral neuropathy 9/21/2016    Marijuana abuse        Past Surgical History:    History reviewed. No pertinent surgical history. Prior to Admission medications    Medication Sig Start Date End Date Taking?  Authorizing Provider   albuterol sulfate  (90 Base) MCG/ACT inhaler Inhale 2 puffs into the lungs every 6 hours as needed for Wheezing or Shortness of Breath 12/28/18  Yes Rodney Phelan MD   atorvastatin (LIPITOR) 80 MG tablet Take 1 tablet by mouth nightly 12/28/18  Yes Benjamin Sims MD   lisinopril (PRINIVIL;ZESTRIL) 20 MG tablet Take 1 tablet by mouth daily 12/29/18  Yes Benjamin Sims MD   digoxin (LANOXIN) 125 MCG tablet Take 125 mcg by mouth daily   Yes Demarcus Fitzpatrick MD   aspirin 81 MG EC tablet Take 1 tablet by mouth daily 11/13/18  Yes Ana Paula Avila MD   insulin aspart (NOVOLOG) 100 UNIT/ML injection vial Inject 4 Units into the skin 3 times daily (before meals) Insulin pump 12/28/18   Benjamin Sims MD       Allergies:  No known allergies    Social History:   TOBACCO:   reports that he has been smoking cigarettes. He has a 15.00 pack-year smoking history. He has never used smokeless tobacco.  ETOH:   reports that he drinks about 3.0 oz of alcohol per week. OCCUPATION:      Family History:       Problem Relation Age of Onset    Cancer Maternal Grandmother       REVIEW OF SYSTEMS:  Constitutional: No fever, no chill or change in weight; good appetite  HEENT: No blurred vision, no ear problems, no sore throat, no running nose. Respiratory: scattered dry cough for long time, no recent changess  Cardiology: No angina, no dyspnea on exertion, no paroxysmal nocturnal dyspnea, no orthopnea, no palpitation, no leg swelling. Gastroenterology: nausea vomiting abdominal pain. Genitourinary: No dysuria, no frequency, hesitancy; no hematuria  Musculoskeletal: no joint pain, no myalgia, no change in range of movement  Neurology: no focal weakness in extremities, no slurred speech, no double vision, no tingling or numbness sensation.    Endocrinology: no temperature intolerance, no polyphagia, polydipsia or polyuria  Hematology: no increased bleeding, no bruising, no lymphadenopathy  Skin: skin lump as mentioned in HPI part  Psychology: no depressed mood, no suicidal ideation    Physical Exam       VS: BP (!) 159/98   Pulse 133   Temp 99 °F (37.2 °C) (Axillary)   Resp 25   Ht 5' 6\" (1.676 m)   Wt 141 lb 14.4 oz (64.4 kg)   SpO2 98%   BMI 22.90 kg/m²   General Appearance:    Alert, cooperative, no acute distress. HEENT:   NC/AT, PERRLA, no pallor no icterus, moist mucous membrane   Neck:   Multiple nodular lumps palpable over right face with heavy beard, mild purulent fluid seen, over left neck nodules palpable, not draining   Resp:     CTAB, no wheezes no rhonchi   Heart:    RRR, S1 & S2 normal, no murmur, rub or gallop.    Abdomen:     Soft, non-tender, BS +, no organomegaly   Extremities:   Normal, atraumatic, no cyanosis or edema   Pulses:   2+ and symmetric all extremities   Skin:   No needle marks seen   Neurologic:   AAOx3, no focal motor deficit     Labs     CBC:   Lab Results   Component Value Date    WBC 23.5 04/12/2019    RBC 5.11 04/12/2019    HGB 15.5 04/12/2019    HCT 48.6 04/12/2019     04/12/2019    MCV 95.1 04/12/2019     BMP:    Lab Results   Component Value Date     04/12/2019    K 5.7 04/12/2019    K 5.6 02/09/2019    CL 89 04/12/2019    CO2 5 04/12/2019    BUN 33 04/12/2019    CREATININE 1.2 04/12/2019    CREATININE 1.3 04/12/2019    GLUCOSE  04/12/2019      Comment:      RRHI    GLUCOSE 83 04/11/2012    CALCIUM 8.5 04/12/2019     Hepatic Function Panel:    Lab Results   Component Value Date    ALKPHOS 76 12/27/2018    AST 33 12/27/2018    ALT 27 12/27/2018    PROT 5.4 12/27/2018    LABALBU 3.3 12/27/2018    LABALBU 4.3 12/30/2011    BILITOT 1.7 12/27/2018     Cardiac Enzymes:   Lab Results   Component Value Date    CKTOTAL 65 11/29/2017    CKTOTAL 56 03/02/2017    CKTOTAL 433 (H) 02/11/2017    CKMB 3.7 11/29/2017    CKMB 3.2 03/02/2017    CKMB 12.5 (H) 02/11/2017    TROPONINI <0.01 04/12/2019    TROPONINI <0.01 12/25/2018    TROPONINI 0.01 12/24/2018     PT/INR:    Lab Results   Component Value Date    PROTIME 11.3 11/10/2018    INR 1.0 11/10/2018     ABG:    Lab Results Component Value Date    A7RHPIIF 88.4 2012     TSH:    Lab Results   Component Value Date    TSH 2.910 2017     Xr Chest Portable    Result Date: 2019  Patient MRN: [id number] : 1970 Age:  53 years Gender: Male Order Date: 2019 2:15 PM Exam: XR CHEST PORTABLE Number of Images: 1 views Indication:   DKA suspected DKA suspected Comparison: 2019 Findings: Portable anterior view demonstrates normal size heart with normal pulmonary vascularity. Lungs are clear. There has been no interim change. No active process and no interim change     Resident's Assessment & Plan     Assessment  DKA, type I, cause unsure, apparently he has significantly leukocytosis, with several face and neck lumps, per general surgery, they are cyst rather than furuncles/carbuncles, CXR was unremarkable, lung sounds clear, urinalysis showed no sign of UTI  Leukocytosis with WBC trending up from 23 k to 28k  Lactic acidosis, trending down  Type I DM, on insulin pump, glucose not controlled well  Alcoholism  Hx of HTN, on lisinopril at home, will hold for now    Plan  On DKA protocol with BMP Mg, Phos every 6 hours  Will restart insulin pump once AG closed  Gave one dose of vanc and rocephin, because I was concerning he might have some skin abscess, however, GS evaluated him, don't think he has skin infection, will discuss again in the morning about abx coverage  Blood culture sent, will follow  Resume lisinopril once DKA resolved  Resume lipitor  Resume folic acid and thiamine  Watch for alcohol withdrawal         - GI & DVT prophylaxis: protonix/lovenox    Case discussed with ICU attending Dr. Fuad Vicente M.D., PGY-2  Internal Medicine resident    Attending Physician: Dr. Wood So    I personally saw, examined and provided care for the patient. Radiographs, labs and medication list were reviewed by me independently. I spoke with bedside nursing, therapists and consultants.  Critical care services and

## 2019-04-13 NOTE — PLAN OF CARE
Problem: Fluid Volume - Imbalance:  Goal: Will remain free of signs and symptoms of dehydration  Description  Will remain free of signs and symptoms of dehydration  4/12/2019 2363 by Feliciano Ordonez RN  Outcome: Ongoing  4/12/2019 2009 by Karlie Dutton RN  Outcome: Met This Shift  Goal: Absence of imbalanced fluid volume signs and symptoms  Description  Absence of imbalanced fluid volume signs and symptoms  Outcome: Ongoing     Problem: Serum Glucose Level - Abnormal:  Goal: Ability to maintain appropriate glucose levels will improve  Description  Ability to maintain appropriate glucose levels will improve  Outcome: Ongoing     Problem: Injury - Acid Base Imbalance:  Goal: Acid-base balance  Description  Acid-base balance  Outcome: Ongoing

## 2019-04-14 VITALS
HEIGHT: 66 IN | RESPIRATION RATE: 18 BRPM | DIASTOLIC BLOOD PRESSURE: 79 MMHG | TEMPERATURE: 98.1 F | OXYGEN SATURATION: 96 % | SYSTOLIC BLOOD PRESSURE: 125 MMHG | BODY MASS INDEX: 24.04 KG/M2 | WEIGHT: 149.6 LBS | HEART RATE: 94 BPM

## 2019-04-14 LAB
ANION GAP SERPL CALCULATED.3IONS-SCNC: 10 MMOL/L (ref 7–16)
BASOPHILS ABSOLUTE: 0.03 E9/L (ref 0–0.2)
BASOPHILS RELATIVE PERCENT: 0.2 % (ref 0–2)
BUN BLDV-MCNC: 7 MG/DL (ref 6–20)
CALCIUM SERPL-MCNC: 8.8 MG/DL (ref 8.6–10.2)
CHLORIDE BLD-SCNC: 106 MMOL/L (ref 98–107)
CO2: 24 MMOL/L (ref 22–29)
CREAT SERPL-MCNC: 0.6 MG/DL (ref 0.7–1.2)
EOSINOPHILS ABSOLUTE: 0.08 E9/L (ref 0.05–0.5)
EOSINOPHILS RELATIVE PERCENT: 0.6 % (ref 0–6)
GFR AFRICAN AMERICAN: >60
GFR NON-AFRICAN AMERICAN: >60 ML/MIN/1.73
GLUCOSE BLD-MCNC: 84 MG/DL (ref 74–99)
HCT VFR BLD CALC: 37.7 % (ref 37–54)
HEMOGLOBIN: 13 G/DL (ref 12.5–16.5)
IMMATURE GRANULOCYTES #: 0.06 E9/L
IMMATURE GRANULOCYTES %: 0.4 % (ref 0–5)
LYMPHOCYTES ABSOLUTE: 1.93 E9/L (ref 1.5–4)
LYMPHOCYTES RELATIVE PERCENT: 13.7 % (ref 20–42)
MAGNESIUM: 2.2 MG/DL (ref 1.6–2.6)
MCH RBC QN AUTO: 30.2 PG (ref 26–35)
MCHC RBC AUTO-ENTMCNC: 34.5 % (ref 32–34.5)
MCV RBC AUTO: 87.5 FL (ref 80–99.9)
METER GLUCOSE: 85 MG/DL (ref 74–99)
METER GLUCOSE: 95 MG/DL (ref 74–99)
MONOCYTES ABSOLUTE: 0.88 E9/L (ref 0.1–0.95)
MONOCYTES RELATIVE PERCENT: 6.3 % (ref 2–12)
NEUTROPHILS ABSOLUTE: 11.09 E9/L (ref 1.8–7.3)
NEUTROPHILS RELATIVE PERCENT: 78.8 % (ref 43–80)
PDW BLD-RTO: 14 FL (ref 11.5–15)
PHOSPHORUS: 2.4 MG/DL (ref 2.5–4.5)
PLATELET # BLD: 325 E9/L (ref 130–450)
PMV BLD AUTO: 8.4 FL (ref 7–12)
POTASSIUM SERPL-SCNC: 3.9 MMOL/L (ref 3.5–5)
RBC # BLD: 4.31 E12/L (ref 3.8–5.8)
SODIUM BLD-SCNC: 140 MMOL/L (ref 132–146)
URINE CULTURE, ROUTINE: NORMAL
WBC # BLD: 14.1 E9/L (ref 4.5–11.5)

## 2019-04-14 PROCEDURE — 82962 GLUCOSE BLOOD TEST: CPT

## 2019-04-14 PROCEDURE — 6370000000 HC RX 637 (ALT 250 FOR IP): Performed by: INTERNAL MEDICINE

## 2019-04-14 PROCEDURE — 2580000003 HC RX 258: Performed by: INTERNAL MEDICINE

## 2019-04-14 PROCEDURE — 6360000002 HC RX W HCPCS: Performed by: INTERNAL MEDICINE

## 2019-04-14 PROCEDURE — 36415 COLL VENOUS BLD VENIPUNCTURE: CPT

## 2019-04-14 PROCEDURE — 80048 BASIC METABOLIC PNL TOTAL CA: CPT

## 2019-04-14 PROCEDURE — 99232 SBSQ HOSP IP/OBS MODERATE 35: CPT | Performed by: INTERNAL MEDICINE

## 2019-04-14 PROCEDURE — 85025 COMPLETE CBC W/AUTO DIFF WBC: CPT

## 2019-04-14 PROCEDURE — 84100 ASSAY OF PHOSPHORUS: CPT

## 2019-04-14 PROCEDURE — 83735 ASSAY OF MAGNESIUM: CPT

## 2019-04-14 RX ADMIN — ENOXAPARIN SODIUM 40 MG: 40 INJECTION SUBCUTANEOUS at 08:54

## 2019-04-14 RX ADMIN — LISINOPRIL 20 MG: 20 TABLET ORAL at 08:55

## 2019-04-14 RX ADMIN — ASPIRIN 81 MG: 81 TABLET ORAL at 08:53

## 2019-04-14 RX ADMIN — Medication 10 ML: at 08:55

## 2019-04-14 RX ADMIN — Medication 100 MG: at 08:55

## 2019-04-14 RX ADMIN — PANTOPRAZOLE SODIUM 40 MG: 40 TABLET, DELAYED RELEASE ORAL at 05:30

## 2019-04-14 RX ADMIN — FOLIC ACID 1 MG: 1 TABLET ORAL at 08:55

## 2019-04-14 ASSESSMENT — PAIN SCALES - GENERAL: PAINLEVEL_OUTOF10: 0

## 2019-04-14 NOTE — PLAN OF CARE
Problem: Fluid Volume - Imbalance:  Goal: Will remain free of signs and symptoms of dehydration  Description  Will remain free of signs and symptoms of dehydration  Outcome: Met This Shift     Problem: Serum Glucose Level - Abnormal:  Goal: Ability to maintain appropriate glucose levels will improve  Description  Ability to maintain appropriate glucose levels will improve  Outcome: Met This Shift     Problem: Falls - Risk of:  Goal: Will remain free from falls  Description  Will remain free from falls  Outcome: Met This Shift     Problem: Falls - Risk of:  Goal: Absence of physical injury  Description  Absence of physical injury  Outcome: Met This Shift   Plan of care discussed with patient / family.

## 2019-04-14 NOTE — DISCHARGE SUMMARY
and felt he is going to DKA, and his sugar was > 500. He came to ER. He reported nausea vomiting, abdominal pain. He denied recent sickness. No cough. Was found to be markedly hyperglycemic and in DKA. He was admitted to MICU. He also noted worsening facial lumps over right face and left neck, with drainage. He was given 1x Vancomycin and Rocephn due to concern about abscess and he had leukocytosis. Surgery was consulted and performed I&D and believed it was just cyst and did not require further antibiotics. His leukocytosis by next next was much better. Patient underwent DKA protocol in the MICU without complication. His anion gap closed, his glucose levels improved, and he was tolerating diet. His insulin pump was adjusted to 1.4 unit/hr daytime and nighttime stayed same at 1.8 unit/hr. He understood importance of follow up with his endocrinologist and PCP. He was stable for discharge to home. Discharge Exam:  Please see exam of progress note    I/O last 3 completed shifts:   In: 0064 [P.O.:1080; I.V.:3484]  Out: 4770 [Urine:4770]  I/O this shift:  In: 300 [P.O.:300]  Out: 350 [Urine:350]      LABS:  Recent Labs     19  1550 19  2100 19  0530   * 134 140   K 4.3 3.9 3.9   CL 99 102 106   CO2 19* 23 24   BUN 12 9 7   CREATININE 0.7 0.8 0.6*   GLUCOSE 230* 179* 84   CALCIUM 8.3* 8.5* 8.8       Recent Labs     19  2152 19  0510 19  0530   WBC 28.8* 22.3* 14.1*   RBC 4.60 4.14 4.31   HGB 14.4 12.8 13.0   HCT 43.5 38.6 37.7   MCV 94.6 93.2 87.5   MCH 31.3 30.9 30.2   MCHC 33.1 33.2 34.5   RDW 14.2 14.2 14.0    302 325   MPV 9.0 9.3 8.4       Recent Labs     19  0836   GLUMET 95           Imaging:  Xr Chest Portable    Result Date: 2019  Patient MRN: [id number] : 1970 Age:  53 years Gender: Male Order Date: 2019 2:15 PM Exam: XR CHEST PORTABLE Number of Images: 1 views Indication:   DKA suspected DKA suspected Comparison: 2019 Findings: Portable anterior view demonstrates normal size heart with normal pulmonary vascularity. Lungs are clear. There has been no interim change.      No active process and no interim change       Patient Instructions:   Discharge Medication List as of 4/14/2019  9:55 AM      CONTINUE these medications which have NOT CHANGED    Details   albuterol sulfate  (90 Base) MCG/ACT inhaler Inhale 2 puffs into the lungs every 6 hours as needed for Wheezing or Shortness of Breath, Disp-1 Inhaler, R-0Normal      atorvastatin (LIPITOR) 80 MG tablet Take 1 tablet by mouth nightly, Disp-30 tablet, R-3Normal      lisinopril (PRINIVIL;ZESTRIL) 20 MG tablet Take 1 tablet by mouth daily, Disp-30 tablet, R-3Normal      digoxin (LANOXIN) 125 MCG tablet Take 125 mcg by mouth dailyHistorical Med      aspirin 81 MG EC tablet Take 1 tablet by mouth daily, Disp-30 tablet, R-3Normal      insulin aspart (NOVOLOG) 100 UNIT/ML injection vial Inject 4 Units into the skin 3 times daily (before meals) Insulin pump, Disp-1 vial, R-3Normal         STOP taking these medications       folic acid (FOLVITE) 1 MG tablet Comments:   Reason for Stopping:         pantoprazole (PROTONIX) 40 MG tablet Comments:   Reason for Stopping:         vitamin B-1 100 MG tablet Comments:   Reason for Stopping:         vitamin D (ERGOCALCIFEROL) 96203 units capsule Comments:   Reason for Stopping:                 Diet: diabetic  Activity: as tolerated  Disposition: stable for discharge to home  Please take medication as prescribed      Attending Physician: Dr. Jaye Calix DO PGY1  4/14/2019 12:28 PM

## 2019-04-14 NOTE — PROGRESS NOTES
[facility]  Internal Medicine Residency Program  Progress Note - House Team 2    Patient:  Jessica Avila 53 y.o. male MRN: [id number]     Date of Service: 4/14/2019     CC: hyperglycemia, DKA symptoms  Overnight events: Colt Coronado is resting comfortably in bed this morning. Says he is feeling much better, no nausea, vomiting, or abdominal pain. Says he has started diet and is tolerating it well. Anxious to go home. Denies any pain. No new complaints. Objective     Physical Exam:  · Vitals: BP (!) 151/92   Pulse 71   Temp 98.6 °F (37 °C)   Resp 23   Ht 5' 6\" (1.676 m)   Wt 149 lb 9.6 oz (67.9 kg)   SpO2 98%   BMI 24.15 kg/m²     · I & O - 24hr: No intake/output data recorded. General Appearance:    Alert, cooperative, no acute distress. HEENT:   NC/AT, PERRLA, no pallor no icterus, moist mucous membrane   Neck:   Multiple small nodular lumps palpable over right face with heavy beard, over left neck nodules palpable, not draining, s/p I&D   Resp:     CTAB, no wheezes no rhonchi   Heart:    RRR, S1 & S2 normal, no murmur, rub or gallop.    Abdomen:     Soft, non-tender, BS +, no organomegaly   Extremities:   Normal, atraumatic, no cyanosis or edema   Pulses:   2+ and symmetric all extremities   Skin:   No needle marks seen   Neurologic:   AAOx3, no focal motor deficit   Subject  Pertinent Labs & Imaging Studies   chika  Basic Labs  CBC:   Lab Results   Component Value Date    WBC 14.1 04/14/2019    RBC 4.31 04/14/2019    HGB 13.0 04/14/2019    HCT 37.7 04/14/2019     04/14/2019    MCV 87.5 04/14/2019     BMP:    Lab Results   Component Value Date     04/14/2019    K 3.9 04/14/2019    K 5.6 02/09/2019     04/14/2019    CO2 24 04/14/2019    BUN 7 04/14/2019    CREATININE 0.6 04/14/2019    GLUCOSE 84 04/14/2019    GLUCOSE 83 04/11/2012    CALCIUM 8.8 04/14/2019     Hepatic Function Panel:    Lab Results   Component Value Date    ALKPHOS 76 12/27/2018 AST 33 2018    ALT 27 2018    PROT 5.4 2018    LABALBU 3.3 2018    LABALBU 4.3 2011    BILITOT 1.7 2018     Cardiac Enzymes:   Lab Results   Component Value Date    CKTOTAL 65 2017    CKTOTAL 56 2017    CKTOTAL 433 (H) 2017    CKMB 3.7 2017    CKMB 3.2 2017    CKMB 12.5 (H) 2017    TROPONINI <0.01 2019    TROPONINI <0.01 2018    TROPONINI 0.01 2018     PT/INR:    Lab Results   Component Value Date    PROTIME 11.3 11/10/2018    INR 1.0 11/10/2018     TSH:    Lab Results   Component Value Date    TSH 2.910 2017       Imaging Studies:     Xr Chest Portable    Result Date: 2019  Patient MRN: [id number] : 1970 Age:  53 years Gender: Male Order Date: 2019 2:15 PM Exam: XR CHEST PORTABLE Number of Images: 1 views Indication:   DKA suspected DKA suspected Comparison: 2019 Findings: Portable anterior view demonstrates normal size heart with normal pulmonary vascularity. Lungs are clear. There has been no interim change. No active process and no interim change       Resident's Assessment and Plan     Dre Alexandra is a 53 y.o. male with:    1. DKA, type I, resolved and bridged back to insulin pump and tolerating diet  2. Facial cysts, s/p I&D per gen surg  3. Leukocytosis with WBC trending down, possibly due to cysts, CXR and urine benign  4. Lactic acidosis, resolved  5. Type I DM, on insulin pump, glucose not controlled well, rates adjusted to 1.4 u/hr day and remain on 1.8 u/hr night  6. Alcoholism, on thiamine/folate  7. Hypertension      Plan:    1. Off DKA protocol, bridged back to insulin pump, tolerating diet  2. Insulin pump rates adjusted to 1.4 u/hr day and remain on 1.8 u/hr night  3. Glucose check q4 hours  4. Continue folate/thiamine, watch for alcohol withdrawals  5. Continue home BP meds, statin  6. Monitor BMP, CBCs  7.  Rest of care per ICU team          DVT / GI prophylaxis: Lovenox/diet  Disposition: possible transfer out of ICU    Elizabeth Todd  , PGY-1  Attending physician: Dr. Adelso Solomon  [facility]    Attending Physician Statement:  Jeanie Erickson M.D., F.A.C.P. I have discussed the case, including pertinent history and exam findings with the resident/NP. I have seen and examined the patient and the key elements of the encounter have been performed by me. I agree with the resident ROS, PMHx, PSHx, meds reviewed and assessment, plan and orders as documented by the resident/NP      Hospital charts reviewed, including other providers notes, relevant labs and imaging. dka much improved  Back on insulin pump  Discussed dehydration issues, inc pump insulin requirements etc.    >50% of time spent coordinating care with other providers and/or counseling patient/family  Remainder of medical problems as per resident note.

## 2019-04-14 NOTE — PROGRESS NOTES
Pulse 74   Temp 98.1 °F (36.7 °C) (Temporal)   Resp 21   Ht 5' 6\" (1.676 m)   Wt 149 lb 9.6 oz (67.9 kg)   SpO2 96%   BMI 24.15 kg/m²   General Appearance:    Alert, cooperative, no acute distress. HEENT:   NC/AT, PERRLA, no pallor no icterus, moist mucous membrane   Neck:   Multiple nodular lumps palpable over right face with heavy beard, mild purulent fluid seen, over left neck nodules palpable, not draining   Resp:     CTAB, no wheezes no rhonchi   Heart:    RRR, S1 & S2 normal, no murmur, rub or gallop.    Abdomen:     Soft, non-tender, BS +, no organomegaly   Extremities:   Normal, atraumatic, no cyanosis or edema   Pulses:   2+ and symmetric all extremities   Skin:   No needle marks seen   Neurologic:   AAOx3, no focal motor deficit     Labs     CBC:   Lab Results   Component Value Date    WBC 14.1 04/14/2019    RBC 4.31 04/14/2019    HGB 13.0 04/14/2019    HCT 37.7 04/14/2019     04/14/2019    MCV 87.5 04/14/2019     BMP:    Lab Results   Component Value Date     04/14/2019    K 3.9 04/14/2019    K 5.6 02/09/2019     04/14/2019    CO2 24 04/14/2019    BUN 7 04/14/2019    CREATININE 0.6 04/14/2019    GLUCOSE 84 04/14/2019    GLUCOSE 83 04/11/2012    CALCIUM 8.8 04/14/2019     Hepatic Function Panel:    Lab Results   Component Value Date    ALKPHOS 76 12/27/2018    AST 33 12/27/2018    ALT 27 12/27/2018    PROT 5.4 12/27/2018    LABALBU 3.3 12/27/2018    LABALBU 4.3 12/30/2011    BILITOT 1.7 12/27/2018     Cardiac Enzymes:   Lab Results   Component Value Date    CKTOTAL 65 11/29/2017    CKTOTAL 56 03/02/2017    CKTOTAL 433 (H) 02/11/2017    CKMB 3.7 11/29/2017    CKMB 3.2 03/02/2017    CKMB 12.5 (H) 02/11/2017    TROPONINI <0.01 04/12/2019    TROPONINI <0.01 12/25/2018    TROPONINI 0.01 12/24/2018     PT/INR:    Lab Results   Component Value Date    PROTIME 11.3 11/10/2018    INR 1.0 11/10/2018     ABG:    Lab Results   Component Value Date    V8HTAJJN 88.4 07/19/2012     TSH:    Lab Results   Component Value Date    TSH 2.910 2017     Xr Chest Portable    Result Date: 2019  Patient MRN: [id number] : 1970 Age:  53 years Gender: Male Order Date: 2019 2:15 PM Exam: XR CHEST PORTABLE Number of Images: 1 views Indication:   DKA suspected DKA suspected Comparison: 2019 Findings: Portable anterior view demonstrates normal size heart with normal pulmonary vascularity. Lungs are clear. There has been no interim change. No active process and no interim change     Resident's Assessment & Plan      53 y. o. male who presented to ED for elevated blood glucose. He has PMH of type I DM on insulin pump (1.2 unit/hr during the day, and 1.8 unit/hr during night, HTN, recent ischemic stroke(possibly from ? AF), polysubstance abuse, and alcohol use admitted to the ICU for DKA management. Assessment  DKA, type I, possibly 2/2 furuncle?, resolved  Type I DM, on insulin pump, glucose not controlled well  Alcoholism  Hx of HTN, controlled    Plan  I&D per gen surg  S/p DKA protocol  Off abx  Continued on insulin pump   Resume home meds  Watch for alcohol withdrawal      - GI & DVT prophylaxis: protonix/lovenox    Transfer out of the ICU today. Andi Moreau M.D., PGY-2  Internal Medicine resident    Attending Physician: Dr. Bryan Patient    I personally saw, examined and provided care for the patient. Radiographs, labs and medication list were reviewed by me independently. I spoke with bedside nursing, therapists and consultants. Critical care services and times documented are independent of procedures and multidisciplinary rounds with Residents. Additionally comprehensive, multidisciplinary rounds were conducted with the MICU team. The case was discussed in detail and plans for care were established. Review of Residents documentation was conducted and revisions were made as appropriate. I agree with the above documented exam, problem list and plan of care.     Transfer to general

## 2019-04-17 LAB
BLOOD CULTURE, ROUTINE: NORMAL
CULTURE, BLOOD 2: NORMAL
EKG ATRIAL RATE: 128 BPM
EKG P AXIS: 78 DEGREES
EKG P-R INTERVAL: 128 MS
EKG Q-T INTERVAL: 336 MS
EKG QRS DURATION: 92 MS
EKG QTC CALCULATION (BAZETT): 490 MS
EKG R AXIS: 74 DEGREES
EKG T AXIS: 58 DEGREES
EKG VENTRICULAR RATE: 128 BPM

## 2019-04-18 LAB — CANNABINOIDS CONF, URINE: 16 NG/ML

## 2019-07-26 ENCOUNTER — HOSPITAL ENCOUNTER (INPATIENT)
Age: 49
LOS: 3 days | Discharge: HOME | DRG: 871 | End: 2019-07-29
Attending: EMERGENCY MEDICINE | Admitting: INTERNAL MEDICINE

## 2019-07-26 ENCOUNTER — APPOINTMENT (OUTPATIENT)
Dept: GENERAL RADIOLOGY | Age: 49
DRG: 871 | End: 2019-07-26

## 2019-07-26 DIAGNOSIS — E10.10 DKA, TYPE 1, NOT AT GOAL (HCC): ICD-10-CM

## 2019-07-26 DIAGNOSIS — N28.9 ACUTE RENAL INSUFFICIENCY: ICD-10-CM

## 2019-07-26 DIAGNOSIS — E10.10 DIABETIC KETOACIDOSIS WITHOUT COMA ASSOCIATED WITH TYPE 1 DIABETES MELLITUS (HCC): Primary | ICD-10-CM

## 2019-07-26 DIAGNOSIS — E87.5 HYPERKALEMIA: ICD-10-CM

## 2019-07-26 LAB
AMORPHOUS: ABNORMAL
AMPHETAMINE SCREEN, URINE: NOT DETECTED
ANION GAP SERPL CALCULATED.3IONS-SCNC: 18 MMOL/L (ref 7–16)
ANION GAP SERPL CALCULATED.3IONS-SCNC: 21 MMOL/L (ref 7–16)
ANION GAP SERPL CALCULATED.3IONS-SCNC: 25 MMOL/L (ref 7–16)
ANION GAP SERPL CALCULATED.3IONS-SCNC: 32 MMOL/L (ref 7–16)
ANION GAP SERPL CALCULATED.3IONS-SCNC: 37 MMOL/L (ref 7–16)
ANION GAP SERPL CALCULATED.3IONS-SCNC: 46 MMOL/L (ref 7–16)
B.E.: -13.6 MMOL/L
BACTERIA: ABNORMAL /HPF
BARBITURATE SCREEN URINE: NOT DETECTED
BENZODIAZEPINE SCREEN, URINE: NOT DETECTED
BILIRUBIN URINE: NEGATIVE
BLOOD, URINE: ABNORMAL
BUN BLDV-MCNC: 41 MG/DL (ref 6–20)
BUN BLDV-MCNC: 43 MG/DL (ref 6–20)
BUN BLDV-MCNC: 49 MG/DL (ref 6–20)
BUN BLDV-MCNC: 51 MG/DL (ref 6–20)
BUN BLDV-MCNC: 52 MG/DL (ref 6–20)
BUN BLDV-MCNC: 52 MG/DL (ref 6–20)
CALCIUM SERPL-MCNC: 7.6 MG/DL (ref 8.6–10.2)
CALCIUM SERPL-MCNC: 7.6 MG/DL (ref 8.6–10.2)
CALCIUM SERPL-MCNC: 7.7 MG/DL (ref 8.6–10.2)
CALCIUM SERPL-MCNC: 7.9 MG/DL (ref 8.6–10.2)
CALCIUM SERPL-MCNC: 8.2 MG/DL (ref 8.6–10.2)
CALCIUM SERPL-MCNC: 8.4 MG/DL (ref 8.6–10.2)
CANNABINOID SCREEN URINE: NOT DETECTED
CHLORIDE BLD-SCNC: 100 MMOL/L (ref 98–107)
CHLORIDE BLD-SCNC: 102 MMOL/L (ref 98–107)
CHLORIDE BLD-SCNC: 102 MMOL/L (ref 98–107)
CHLORIDE BLD-SCNC: 106 MMOL/L (ref 98–107)
CHLORIDE BLD-SCNC: 86 MMOL/L (ref 98–107)
CHLORIDE BLD-SCNC: 96 MMOL/L (ref 98–107)
CHP ED QC CHECK: YES
CK MB: 15 NG/ML (ref 0–7.7)
CK MB: 16.5 NG/ML (ref 0–7.7)
CLARITY: CLEAR
CO2: 11 MMOL/L (ref 22–29)
CO2: 12 MMOL/L (ref 22–29)
CO2: 16 MMOL/L (ref 22–29)
CO2: 3 MMOL/L (ref 22–29)
CO2: 7 MMOL/L (ref 22–29)
CO2: 9 MMOL/L (ref 22–29)
COCAINE METABOLITE SCREEN URINE: NOT DETECTED
COHB: 0.3 % (ref 0–1.5)
COHB: 1.2 % (ref 0–1.5)
COLOR: YELLOW
COMMENT: ABNORMAL
CREAT SERPL-MCNC: 1.4 MG/DL (ref 0.7–1.2)
CREAT SERPL-MCNC: 1.4 MG/DL (ref 0.7–1.2)
CREAT SERPL-MCNC: 1.8 MG/DL (ref 0.7–1.2)
CREAT SERPL-MCNC: 2.5 MG/DL (ref 0.7–1.2)
CREAT SERPL-MCNC: 2.5 MG/DL (ref 0.7–1.2)
CREAT SERPL-MCNC: 3.2 MG/DL (ref 0.7–1.2)
CRITICAL: ABNORMAL
CRITICAL: ABNORMAL
DATE ANALYZED: ABNORMAL
DATE ANALYZED: ABNORMAL
DATE OF COLLECTION: ABNORMAL
DATE OF COLLECTION: ABNORMAL
DIGOXIN LEVEL: <0.3 NG/ML (ref 0.8–2)
EKG ATRIAL RATE: 100 BPM
EKG P AXIS: 68 DEGREES
EKG P-R INTERVAL: 164 MS
EKG Q-T INTERVAL: 376 MS
EKG QRS DURATION: 110 MS
EKG QTC CALCULATION (BAZETT): 485 MS
EKG R AXIS: 74 DEGREES
EKG T AXIS: 21 DEGREES
EKG VENTRICULAR RATE: 100 BPM
FOLATE: 12 NG/ML (ref 4.8–24.2)
GFR AFRICAN AMERICAN: 25
GFR AFRICAN AMERICAN: 33
GFR AFRICAN AMERICAN: 33
GFR AFRICAN AMERICAN: 49
GFR AFRICAN AMERICAN: >60
GFR AFRICAN AMERICAN: >60
GFR NON-AFRICAN AMERICAN: 21 ML/MIN/1.73
GFR NON-AFRICAN AMERICAN: 28 ML/MIN/1.73
GFR NON-AFRICAN AMERICAN: 28 ML/MIN/1.73
GFR NON-AFRICAN AMERICAN: 40 ML/MIN/1.73
GFR NON-AFRICAN AMERICAN: 54 ML/MIN/1.73
GFR NON-AFRICAN AMERICAN: 54 ML/MIN/1.73
GLUCOSE BLD-MCNC: 1030 MG/DL (ref 74–99)
GLUCOSE BLD-MCNC: 395 MG/DL (ref 74–99)
GLUCOSE BLD-MCNC: 414 MG/DL (ref 74–99)
GLUCOSE BLD-MCNC: 515 MG/DL (ref 74–99)
GLUCOSE BLD-MCNC: 678 MG/DL (ref 74–99)
GLUCOSE BLD-MCNC: 706 MG/DL (ref 74–99)
GLUCOSE BLD-MCNC: 865 MG/DL (ref 74–99)
GLUCOSE BLD-MCNC: NORMAL MG/DL
GLUCOSE URINE: >=1000 MG/DL
HBA1C MFR BLD: 9.1 % (ref 4–5.6)
HCO3: 11 MMOL/L
HHB: 3.5 % (ref 0–5)
HHB: 31.4 %
KETONES, URINE: 15 MG/DL
LAB: ABNORMAL
LAB: ABNORMAL
LACTIC ACID: 1 MMOL/L (ref 0.5–2.2)
LACTIC ACID: 1.5 MMOL/L (ref 0.5–2.2)
LEUKOCYTE ESTERASE, URINE: NEGATIVE
Lab: ABNORMAL
Lab: ABNORMAL
MAGNESIUM: 2 MG/DL (ref 1.6–2.6)
MAGNESIUM: 2.2 MG/DL (ref 1.6–2.6)
MAGNESIUM: 2.4 MG/DL (ref 1.6–2.6)
MAGNESIUM: 2.4 MG/DL (ref 1.6–2.6)
METER GLUCOSE: 318 MG/DL (ref 74–99)
METER GLUCOSE: 336 MG/DL (ref 74–99)
METER GLUCOSE: 351 MG/DL (ref 74–99)
METER GLUCOSE: 393 MG/DL (ref 74–99)
METER GLUCOSE: 397 MG/DL (ref 74–99)
METER GLUCOSE: 413 MG/DL (ref 74–99)
METER GLUCOSE: 451 MG/DL (ref 74–99)
METER GLUCOSE: 492 MG/DL (ref 74–99)
METER GLUCOSE: >500 MG/DL (ref 74–99)
METHADONE SCREEN, URINE: NOT DETECTED
METHB: 0.4 % (ref 0–1.5)
METHB: 0.6 % (ref 0–1.5)
MODE: ABNORMAL
NITRITE, URINE: NEGATIVE
O2 CONTENT: 17.7 ML/DL
O2 SATURATION: 68.1 %
O2 SATURATION: 96.5 % (ref 92–98.5)
O2HB: 67 %
O2HB: 95.6 % (ref 94–97)
OPERATOR ID: 1868
OPERATOR ID: 1874
OPIATE SCREEN URINE: NOT DETECTED
PATIENT TEMP: 37 C
PATIENT TEMP: 37 C
PCO2: 23.5 MMHG
PCO2: <15 MMHG (ref 35–45)
PH BLOOD GAS: 6.91 (ref 7.35–7.45)
PH BLOOD GAS: 7.29 (ref 7.3–7.42)
PH UA: 5 (ref 5–9)
PHENCYCLIDINE SCREEN URINE: NOT DETECTED
PHOSPHORUS: 10.6 MG/DL (ref 2.5–4.5)
PHOSPHORUS: 2 MG/DL (ref 2.5–4.5)
PHOSPHORUS: 2.7 MG/DL (ref 2.5–4.5)
PHOSPHORUS: 2.7 MG/DL (ref 2.5–4.5)
PO2: 103.5 MMHG (ref 60–100)
PO2: 31.8 MMHG
POTASSIUM SERPL-SCNC: 4.2 MMOL/L (ref 3.5–5)
POTASSIUM SERPL-SCNC: 4.3 MMOL/L (ref 3.5–5)
POTASSIUM SERPL-SCNC: 4.4 MMOL/L (ref 3.5–5)
POTASSIUM SERPL-SCNC: 4.5 MMOL/L (ref 3.5–5)
POTASSIUM SERPL-SCNC: 5.4 MMOL/L (ref 3.5–5)
POTASSIUM SERPL-SCNC: 6.9 MMOL/L (ref 3.5–5)
PROCALCITONIN: 10.46 NG/ML (ref 0–0.08)
PROPOXYPHENE SCREEN: NOT DETECTED
PROTEIN UA: ABNORMAL MG/DL
RBC UA: ABNORMAL /HPF (ref 0–2)
SODIUM BLD-SCNC: 135 MMOL/L (ref 132–146)
SODIUM BLD-SCNC: 135 MMOL/L (ref 132–146)
SODIUM BLD-SCNC: 136 MMOL/L (ref 132–146)
SODIUM BLD-SCNC: 140 MMOL/L (ref 132–146)
SODIUM BLD-SCNC: 141 MMOL/L (ref 132–146)
SODIUM BLD-SCNC: 142 MMOL/L (ref 132–146)
SOURCE, BLOOD GAS: ABNORMAL
SOURCE, BLOOD GAS: ABNORMAL
SPECIFIC GRAVITY UA: 1.02 (ref 1–1.03)
THB: 12.8 G/DL (ref 11.5–16.5)
THB: 13.1 G/DL (ref 11.5–16.5)
TIME ANALYZED: 1608
TIME ANALYZED: 548
TOTAL CK: 499 U/L (ref 20–200)
TOTAL CK: 502 U/L (ref 20–200)
TROPONIN: 0.07 NG/ML (ref 0–0.03)
TROPONIN: 0.08 NG/ML (ref 0–0.03)
TROPONIN: 0.08 NG/ML (ref 0–0.03)
UROBILINOGEN, URINE: 0.2 E.U./DL
VITAMIN B-12: 542 PG/ML (ref 211–946)
WBC UA: ABNORMAL /HPF (ref 0–5)

## 2019-07-26 PROCEDURE — 87081 CULTURE SCREEN ONLY: CPT

## 2019-07-26 PROCEDURE — 81001 URINALYSIS AUTO W/SCOPE: CPT

## 2019-07-26 PROCEDURE — 87088 URINE BACTERIA CULTURE: CPT

## 2019-07-26 PROCEDURE — 82746 ASSAY OF FOLIC ACID SERUM: CPT

## 2019-07-26 PROCEDURE — C9113 INJ PANTOPRAZOLE SODIUM, VIA: HCPCS | Performed by: INTERNAL MEDICINE

## 2019-07-26 PROCEDURE — 83605 ASSAY OF LACTIC ACID: CPT

## 2019-07-26 PROCEDURE — 87040 BLOOD CULTURE FOR BACTERIA: CPT

## 2019-07-26 PROCEDURE — 83690 ASSAY OF LIPASE: CPT

## 2019-07-26 PROCEDURE — 82805 BLOOD GASES W/O2 SATURATION: CPT

## 2019-07-26 PROCEDURE — 6370000000 HC RX 637 (ALT 250 FOR IP): Performed by: INTERNAL MEDICINE

## 2019-07-26 PROCEDURE — 2580000003 HC RX 258: Performed by: INTERNAL MEDICINE

## 2019-07-26 PROCEDURE — 99223 1ST HOSP IP/OBS HIGH 75: CPT | Performed by: INTERNAL MEDICINE

## 2019-07-26 PROCEDURE — 71045 X-RAY EXAM CHEST 1 VIEW: CPT

## 2019-07-26 PROCEDURE — 6360000002 HC RX W HCPCS: Performed by: EMERGENCY MEDICINE

## 2019-07-26 PROCEDURE — 82962 GLUCOSE BLOOD TEST: CPT

## 2019-07-26 PROCEDURE — 2000000000 HC ICU R&B

## 2019-07-26 PROCEDURE — G0480 DRUG TEST DEF 1-7 CLASSES: HCPCS

## 2019-07-26 PROCEDURE — 96365 THER/PROPH/DIAG IV INF INIT: CPT

## 2019-07-26 PROCEDURE — 6370000000 HC RX 637 (ALT 250 FOR IP): Performed by: EMERGENCY MEDICINE

## 2019-07-26 PROCEDURE — 84100 ASSAY OF PHOSPHORUS: CPT

## 2019-07-26 PROCEDURE — 80053 COMPREHEN METABOLIC PANEL: CPT

## 2019-07-26 PROCEDURE — 82550 ASSAY OF CK (CPK): CPT

## 2019-07-26 PROCEDURE — 99255 IP/OBS CONSLTJ NEW/EST HI 80: CPT | Performed by: INTERNAL MEDICINE

## 2019-07-26 PROCEDURE — 85027 COMPLETE CBC AUTOMATED: CPT

## 2019-07-26 PROCEDURE — 82607 VITAMIN B-12: CPT

## 2019-07-26 PROCEDURE — 6360000002 HC RX W HCPCS: Performed by: INTERNAL MEDICINE

## 2019-07-26 PROCEDURE — 87450 HC DIRECT STREP B ANTIGEN: CPT

## 2019-07-26 PROCEDURE — 83735 ASSAY OF MAGNESIUM: CPT

## 2019-07-26 PROCEDURE — 82553 CREATINE MB FRACTION: CPT

## 2019-07-26 PROCEDURE — 93005 ELECTROCARDIOGRAM TRACING: CPT | Performed by: EMERGENCY MEDICINE

## 2019-07-26 PROCEDURE — 36415 COLL VENOUS BLD VENIPUNCTURE: CPT

## 2019-07-26 PROCEDURE — 99285 EMERGENCY DEPT VISIT HI MDM: CPT

## 2019-07-26 PROCEDURE — 2500000003 HC RX 250 WO HCPCS: Performed by: INTERNAL MEDICINE

## 2019-07-26 PROCEDURE — 2580000003 HC RX 258: Performed by: EMERGENCY MEDICINE

## 2019-07-26 PROCEDURE — 93010 ELECTROCARDIOGRAM REPORT: CPT | Performed by: INTERNAL MEDICINE

## 2019-07-26 PROCEDURE — 80162 ASSAY OF DIGOXIN TOTAL: CPT

## 2019-07-26 PROCEDURE — 82947 ASSAY GLUCOSE BLOOD QUANT: CPT

## 2019-07-26 PROCEDURE — 51702 INSERT TEMP BLADDER CATH: CPT

## 2019-07-26 PROCEDURE — 2500000003 HC RX 250 WO HCPCS: Performed by: EMERGENCY MEDICINE

## 2019-07-26 PROCEDURE — 80307 DRUG TEST PRSMV CHEM ANLYZR: CPT

## 2019-07-26 PROCEDURE — 83036 HEMOGLOBIN GLYCOSYLATED A1C: CPT

## 2019-07-26 PROCEDURE — 84484 ASSAY OF TROPONIN QUANT: CPT

## 2019-07-26 PROCEDURE — 84145 PROCALCITONIN (PCT): CPT

## 2019-07-26 PROCEDURE — 96375 TX/PRO/DX INJ NEW DRUG ADDON: CPT

## 2019-07-26 PROCEDURE — 99195 PHLEBOTOMY: CPT

## 2019-07-26 PROCEDURE — 80048 BASIC METABOLIC PNL TOTAL CA: CPT

## 2019-07-26 RX ORDER — 0.9 % SODIUM CHLORIDE 0.9 %
10 VIAL (ML) INJECTION DAILY
Status: DISCONTINUED | OUTPATIENT
Start: 2019-07-26 | End: 2019-07-28

## 2019-07-26 RX ORDER — DEXTROSE AND SODIUM CHLORIDE 5; .45 G/100ML; G/100ML
INJECTION, SOLUTION INTRAVENOUS CONTINUOUS PRN
Status: CANCELLED | OUTPATIENT
Start: 2019-07-26

## 2019-07-26 RX ORDER — MAGNESIUM SULFATE 1 G/100ML
1 INJECTION INTRAVENOUS PRN
Status: DISCONTINUED | OUTPATIENT
Start: 2019-07-26 | End: 2019-07-29 | Stop reason: HOSPADM

## 2019-07-26 RX ORDER — 0.9 % SODIUM CHLORIDE 0.9 %
1000 INTRAVENOUS SOLUTION INTRAVENOUS ONCE
Status: DISCONTINUED | OUTPATIENT
Start: 2019-07-26 | End: 2019-07-26

## 2019-07-26 RX ORDER — DEXTROSE MONOHYDRATE 25 G/50ML
12.5 INJECTION, SOLUTION INTRAVENOUS PRN
Status: DISCONTINUED | OUTPATIENT
Start: 2019-07-26 | End: 2019-07-29 | Stop reason: HOSPADM

## 2019-07-26 RX ORDER — POTASSIUM CHLORIDE 7.45 MG/ML
10 INJECTION INTRAVENOUS PRN
Status: DISCONTINUED | OUTPATIENT
Start: 2019-07-26 | End: 2019-07-29

## 2019-07-26 RX ORDER — SODIUM CHLORIDE 9 MG/ML
INJECTION, SOLUTION INTRAVENOUS CONTINUOUS
Status: CANCELLED | OUTPATIENT
Start: 2019-07-26

## 2019-07-26 RX ORDER — HEPARIN SODIUM 10000 [USP'U]/ML
5000 INJECTION, SOLUTION INTRAVENOUS; SUBCUTANEOUS EVERY 8 HOURS SCHEDULED
Status: DISCONTINUED | OUTPATIENT
Start: 2019-07-26 | End: 2019-07-29 | Stop reason: HOSPADM

## 2019-07-26 RX ORDER — ATORVASTATIN CALCIUM 40 MG/1
80 TABLET, FILM COATED ORAL NIGHTLY
Status: DISCONTINUED | OUTPATIENT
Start: 2019-07-26 | End: 2019-07-29 | Stop reason: HOSPADM

## 2019-07-26 RX ORDER — SODIUM CHLORIDE 9 MG/ML
INJECTION, SOLUTION INTRAVENOUS CONTINUOUS
Status: DISCONTINUED | OUTPATIENT
Start: 2019-07-26 | End: 2019-07-28

## 2019-07-26 RX ORDER — PANTOPRAZOLE SODIUM 40 MG/10ML
40 INJECTION, POWDER, LYOPHILIZED, FOR SOLUTION INTRAVENOUS DAILY
Status: DISCONTINUED | OUTPATIENT
Start: 2019-07-26 | End: 2019-07-28

## 2019-07-26 RX ORDER — 0.9 % SODIUM CHLORIDE 0.9 %
15 INTRAVENOUS SOLUTION INTRAVENOUS ONCE
Status: CANCELLED | OUTPATIENT
Start: 2019-07-26 | End: 2019-07-26

## 2019-07-26 RX ORDER — SODIUM CHLORIDE 0.9 % (FLUSH) 0.9 %
10 SYRINGE (ML) INJECTION EVERY 12 HOURS SCHEDULED
Status: DISCONTINUED | OUTPATIENT
Start: 2019-07-26 | End: 2019-07-28

## 2019-07-26 RX ORDER — THIAMINE HYDROCHLORIDE 100 MG/ML
100 INJECTION, SOLUTION INTRAMUSCULAR; INTRAVENOUS DAILY
Status: DISCONTINUED | OUTPATIENT
Start: 2019-07-26 | End: 2019-07-29 | Stop reason: CLARIF

## 2019-07-26 RX ORDER — ASPIRIN 81 MG/1
81 TABLET ORAL DAILY
Status: DISCONTINUED | OUTPATIENT
Start: 2019-07-26 | End: 2019-07-29 | Stop reason: HOSPADM

## 2019-07-26 RX ORDER — 0.9 % SODIUM CHLORIDE 0.9 %
15 INTRAVENOUS SOLUTION INTRAVENOUS ONCE
Status: COMPLETED | OUTPATIENT
Start: 2019-07-26 | End: 2019-07-26

## 2019-07-26 RX ORDER — SODIUM CHLORIDE 0.9 % (FLUSH) 0.9 %
10 SYRINGE (ML) INJECTION PRN
Status: DISCONTINUED | OUTPATIENT
Start: 2019-07-26 | End: 2019-07-29 | Stop reason: HOSPADM

## 2019-07-26 RX ORDER — ALBUTEROL SULFATE 2.5 MG/3ML
2.5 SOLUTION RESPIRATORY (INHALATION) EVERY 6 HOURS PRN
Status: DISCONTINUED | OUTPATIENT
Start: 2019-07-26 | End: 2019-07-29 | Stop reason: HOSPADM

## 2019-07-26 RX ORDER — DEXTROSE AND SODIUM CHLORIDE 5; .45 G/100ML; G/100ML
INJECTION, SOLUTION INTRAVENOUS CONTINUOUS PRN
Status: DISCONTINUED | OUTPATIENT
Start: 2019-07-26 | End: 2019-07-28

## 2019-07-26 RX ORDER — ONDANSETRON 2 MG/ML
4 INJECTION INTRAMUSCULAR; INTRAVENOUS EVERY 6 HOURS PRN
Status: DISCONTINUED | OUTPATIENT
Start: 2019-07-26 | End: 2019-07-29 | Stop reason: HOSPADM

## 2019-07-26 RX ADMIN — VANCOMYCIN HYDROCHLORIDE 1000 MG: 1 INJECTION, POWDER, LYOPHILIZED, FOR SOLUTION INTRAVENOUS at 09:29

## 2019-07-26 RX ADMIN — SODIUM CHLORIDE: 9 INJECTION, SOLUTION INTRAVENOUS at 12:02

## 2019-07-26 RX ADMIN — HEPARIN SODIUM 5000 UNITS: 10000 INJECTION INTRAVENOUS; SUBCUTANEOUS at 13:38

## 2019-07-26 RX ADMIN — THIAMINE HYDROCHLORIDE 100 MG: 100 INJECTION, SOLUTION INTRAMUSCULAR; INTRAVENOUS at 17:19

## 2019-07-26 RX ADMIN — SODIUM CHLORIDE: 9 INJECTION, SOLUTION INTRAVENOUS at 14:54

## 2019-07-26 RX ADMIN — SODIUM CHLORIDE 915 ML: 9 INJECTION, SOLUTION INTRAVENOUS at 07:02

## 2019-07-26 RX ADMIN — PANTOPRAZOLE SODIUM 40 MG: 40 INJECTION, POWDER, FOR SOLUTION INTRAVENOUS at 13:38

## 2019-07-26 RX ADMIN — POTASSIUM CHLORIDE 10 MEQ: 7.46 INJECTION, SOLUTION INTRAVENOUS at 17:01

## 2019-07-26 RX ADMIN — POTASSIUM CHLORIDE 10 MEQ: 7.46 INJECTION, SOLUTION INTRAVENOUS at 22:39

## 2019-07-26 RX ADMIN — POTASSIUM CHLORIDE 20 MEQ: 7.46 INJECTION, SOLUTION INTRAVENOUS at 14:55

## 2019-07-26 RX ADMIN — ASPIRIN 81 MG: 81 TABLET ORAL at 09:29

## 2019-07-26 RX ADMIN — CALCIUM GLUCONATE 1 G: 98 INJECTION, SOLUTION INTRAVENOUS at 07:21

## 2019-07-26 RX ADMIN — SODIUM PHOSPHATE, MONOBASIC, MONOHYDRATE 10 MMOL: 276; 142 INJECTION, SOLUTION INTRAVENOUS at 17:13

## 2019-07-26 RX ADMIN — SODIUM CHLORIDE: 9 INJECTION, SOLUTION INTRAVENOUS at 19:02

## 2019-07-26 RX ADMIN — POTASSIUM CHLORIDE 10 MEQ: 7.46 INJECTION, SOLUTION INTRAVENOUS at 18:11

## 2019-07-26 RX ADMIN — HEPARIN SODIUM 5000 UNITS: 10000 INJECTION INTRAVENOUS; SUBCUTANEOUS at 22:13

## 2019-07-26 RX ADMIN — Medication 10 ML: at 20:37

## 2019-07-26 RX ADMIN — Medication 10 ML: at 13:39

## 2019-07-26 RX ADMIN — SODIUM CHLORIDE: 9 INJECTION, SOLUTION INTRAVENOUS at 07:38

## 2019-07-26 RX ADMIN — ATORVASTATIN CALCIUM 80 MG: 40 TABLET, FILM COATED ORAL at 20:37

## 2019-07-26 RX ADMIN — SODIUM CHLORIDE 1000 ML: 9 INJECTION, SOLUTION INTRAVENOUS at 05:58

## 2019-07-26 RX ADMIN — CEFEPIME 2 G: 2 INJECTION, POWDER, FOR SOLUTION INTRAVENOUS at 19:57

## 2019-07-26 RX ADMIN — SODIUM CHLORIDE 6.1 UNITS/HR: 9 INJECTION, SOLUTION INTRAVENOUS at 07:09

## 2019-07-26 RX ADMIN — ENOXAPARIN SODIUM 30 MG: 30 INJECTION SUBCUTANEOUS at 09:29

## 2019-07-26 RX ADMIN — SODIUM PHOSPHATE, MONOBASIC, MONOHYDRATE 10 MMOL: 276; 142 INJECTION, SOLUTION INTRAVENOUS at 21:43

## 2019-07-26 RX ADMIN — INSULIN HUMAN 6 UNITS: 100 INJECTION, SOLUTION PARENTERAL at 06:08

## 2019-07-26 RX ADMIN — CEFEPIME HYDROCHLORIDE 2 G: 2 INJECTION, POWDER, FOR SOLUTION INTRAVENOUS at 08:19

## 2019-07-26 RX ADMIN — Medication 50 MEQ: at 07:15

## 2019-07-26 ASSESSMENT — PAIN SCALES - GENERAL
PAINLEVEL_OUTOF10: 0

## 2019-07-26 NOTE — ED PROVIDER NOTES
IVPB (has no administration in time range)     Or   sodium phosphate 15 mmol in dextrose 5 % 250 mL IVPB (has no administration in time range)     Or   sodium phosphate 20 mmol in dextrose 5 % 500 mL IVPB (has no administration in time range)   0.9 % sodium chloride bolus (915 mLs Intravenous New Bag 7/26/19 0702)     Followed by   0.9 % sodium chloride infusion (has no administration in time range)   dextrose 5 % and 0.45 % sodium chloride infusion (has no administration in time range)   insulin regular (HUMULIN R;NOVOLIN R) 100 Units in sodium chloride 0.9 % 100 mL infusion (6.1 Units/hr Intravenous New Bag 7/26/19 0709)   insulin regular (HUMULIN R;NOVOLIN R) injection 6 Units (6 Units Intravenous Given 7/26/19 0608)   calcium gluconate 1 g in dextrose 5 % 100 mL IVPB (0 g Intravenous Stopped 7/26/19 0735)   sodium bicarbonate 8.4 % injection 50 mEq (50 mEq Intravenous Given 7/26/19 0715)             Medical Decision Making:    Patient's labs reviewed and patient hyperkalemic. Patient treated with insulin as well as calcium and bicarb. At the time of this dictation chest x-ray is still pending and awaiting for x-ray to be done. Pulse ox within normal limits    Re-Evaluations:             Patient's vital signs noted patient was started on IV fluids and his pump was deactivated and patient was started on insulin drip here patient awake alert oriented to person and place and at times again has garbled speech but currently speech is clear and patient moving all extremities. Patient reporting no chest pain or abdominal pain. Abdomen is soft and nontender. Made aware of findings and admission      Consultations:             spoke to house attending and resident as well as intensivist    Critical Care:       Please note that the withdrawal or failure to initiate urgent interventions for this patient would likely result in a life threatening deterioration or permanent disability.       Accordingly this patient received 30 minutes of critical care time, excluding separately billable procedures. This patient's ED course included: a personal history and physicial eaxmination    This patient has been closely monitored during their ED course. Counseling: The emergency provider has spoken with the patient and discussed todays results, in addition to providing specific details for the plan of care and counseling regarding the diagnosis and prognosis. Questions are answered at this time and they are agreeable with the plan.       --------------------------------- IMPRESSION AND DISPOSITION ---------------------------------    IMPRESSION  1. Diabetic ketoacidosis without coma associated with type 1 diabetes mellitus ([facility] [address]    2. Hyperkalemia    3. Acute renal insufficiency        DISPOSITION  Disposition: admit to ICU  Patient condition is fair        NOTE: This report was transcribed using voice recognition software.  Every effort was made to ensure accuracy; however, inadvertent computerized transcription errors may be present          Reza Doan MD  07/26/19 17300 Donny Tao MD  07/26/19 1241

## 2019-07-26 NOTE — ED NOTES
Insulin pump removed from pt placed in sealed plastic bag with optio.      Rafael Dozier RN  07/26/19 0152

## 2019-07-26 NOTE — PLAN OF CARE
Problem: Fluid Volume - Imbalance:  Goal: Will remain free of signs and symptoms of dehydration  Description  Will remain free of signs and symptoms of dehydration  Outcome: Not Met This Shift  Goal: Absence of imbalanced fluid volume signs and symptoms  Description  Absence of imbalanced fluid volume signs and symptoms  Outcome: Not Met This Shift     Problem: Serum Glucose Level - Abnormal:  Goal: Ability to maintain appropriate glucose levels will improve  Description  Ability to maintain appropriate glucose levels will improve  Outcome: Not Met This Shift     Problem: Injury - Acid Base Imbalance:  Goal: Acid-base balance  Description  Acid-base balance  Outcome: Not Met This Shift     Problem: Fluid Volume - Imbalance:  Goal: Will remain free of signs and symptoms of dehydration  Description  Will remain free of signs and symptoms of dehydration  Outcome: Not Met This Shift  Goal: Absence of imbalanced fluid volume signs and symptoms  Description  Absence of imbalanced fluid volume signs and symptoms  Outcome: Not Met This Shift     Problem: Serum Glucose Level - Abnormal:  Goal: Ability to maintain appropriate glucose levels will improve  Description  Ability to maintain appropriate glucose levels will improve  Outcome: Not Met This Shift     Problem: Injury - Acid Base Imbalance:  Goal: Acid-base balance  Description  Acid-base balance  Outcome: Not Met This Shift  Problem: Fluid Volume - Imbalance:  Goal: Will remain free of signs and symptoms of dehydration  Description  Will remain free of signs and symptoms of dehydration  Outcome: Not Met This Shift  Goal: Absence of imbalanced fluid volume signs and symptoms  Description  Absence of imbalanced fluid volume signs and symptoms  Outcome: Not Met This Shift     Problem: Serum Glucose Level - Abnormal:  Goal: Ability to maintain appropriate glucose levels will improve  Description  Ability to maintain appropriate glucose levels will improve  Outcome: Not

## 2019-07-27 LAB
ALBUMIN SERPL-MCNC: 3.3 G/DL (ref 3.5–5.2)
ALP BLD-CCNC: 80 U/L (ref 40–129)
ALT SERPL-CCNC: 27 U/L (ref 0–40)
ANION GAP SERPL CALCULATED.3IONS-SCNC: 12 MMOL/L (ref 7–16)
ANION GAP SERPL CALCULATED.3IONS-SCNC: 18 MMOL/L (ref 7–16)
AST SERPL-CCNC: 40 U/L (ref 0–39)
BILIRUB SERPL-MCNC: 0.7 MG/DL (ref 0–1.2)
BUN BLDV-MCNC: 23 MG/DL (ref 6–20)
BUN BLDV-MCNC: 26 MG/DL (ref 6–20)
BUN BLDV-MCNC: 31 MG/DL (ref 6–20)
BUN BLDV-MCNC: 35 MG/DL (ref 6–20)
CALCIUM SERPL-MCNC: 7.9 MG/DL (ref 8.6–10.2)
CALCIUM SERPL-MCNC: 8 MG/DL (ref 8.6–10.2)
CALCIUM SERPL-MCNC: 8.1 MG/DL (ref 8.6–10.2)
CALCIUM SERPL-MCNC: 8.3 MG/DL (ref 8.6–10.2)
CHLORIDE BLD-SCNC: 104 MMOL/L (ref 98–107)
CHLORIDE BLD-SCNC: 104 MMOL/L (ref 98–107)
CHLORIDE BLD-SCNC: 105 MMOL/L (ref 98–107)
CHLORIDE BLD-SCNC: 107 MMOL/L (ref 98–107)
CO2: 16 MMOL/L (ref 22–29)
CO2: 18 MMOL/L (ref 22–29)
CO2: 19 MMOL/L (ref 22–29)
CO2: 19 MMOL/L (ref 22–29)
CREAT SERPL-MCNC: 0.8 MG/DL (ref 0.7–1.2)
CREAT SERPL-MCNC: 0.9 MG/DL (ref 0.7–1.2)
CREAT SERPL-MCNC: 1 MG/DL (ref 0.7–1.2)
CREAT SERPL-MCNC: 1.2 MG/DL (ref 0.7–1.2)
GFR AFRICAN AMERICAN: >60
GFR NON-AFRICAN AMERICAN: >60 ML/MIN/1.73
GLUCOSE BLD-MCNC: 164 MG/DL (ref 74–99)
GLUCOSE BLD-MCNC: 193 MG/DL (ref 74–99)
GLUCOSE BLD-MCNC: 213 MG/DL (ref 74–99)
GLUCOSE BLD-MCNC: 274 MG/DL (ref 74–99)
L. PNEUMOPHILA SEROGP 1 UR AG: NORMAL
LACTIC ACID: 0.9 MMOL/L (ref 0.5–2.2)
LACTIC ACID: 1 MMOL/L (ref 0.5–2.2)
MAGNESIUM: 1.8 MG/DL (ref 1.6–2.6)
MAGNESIUM: 1.9 MG/DL (ref 1.6–2.6)
MAGNESIUM: 1.9 MG/DL (ref 1.6–2.6)
MAGNESIUM: 2 MG/DL (ref 1.6–2.6)
METER GLUCOSE: 146 MG/DL (ref 74–99)
METER GLUCOSE: 159 MG/DL (ref 74–99)
METER GLUCOSE: 170 MG/DL (ref 74–99)
METER GLUCOSE: 177 MG/DL (ref 74–99)
METER GLUCOSE: 182 MG/DL (ref 74–99)
METER GLUCOSE: 184 MG/DL (ref 74–99)
METER GLUCOSE: 186 MG/DL (ref 74–99)
METER GLUCOSE: 189 MG/DL (ref 74–99)
METER GLUCOSE: 197 MG/DL (ref 74–99)
METER GLUCOSE: 209 MG/DL (ref 74–99)
METER GLUCOSE: 213 MG/DL (ref 74–99)
METER GLUCOSE: 215 MG/DL (ref 74–99)
METER GLUCOSE: 253 MG/DL (ref 74–99)
METER GLUCOSE: 287 MG/DL (ref 74–99)
METER GLUCOSE: 288 MG/DL (ref 74–99)
MRSA CULTURE ONLY: NORMAL
PHOSPHORUS: 1.8 MG/DL (ref 2.5–4.5)
PHOSPHORUS: 2.1 MG/DL (ref 2.5–4.5)
PHOSPHORUS: 2.2 MG/DL (ref 2.5–4.5)
PHOSPHORUS: 2.3 MG/DL (ref 2.5–4.5)
POTASSIUM SERPL-SCNC: 3.6 MMOL/L (ref 3.5–5)
POTASSIUM SERPL-SCNC: 3.9 MMOL/L (ref 3.5–5)
POTASSIUM SERPL-SCNC: 4.1 MMOL/L (ref 3.5–5)
POTASSIUM SERPL-SCNC: 4.1 MMOL/L (ref 3.5–5)
REASON FOR REJECTION: NORMAL
REASON FOR REJECTION: NORMAL
REJECTED TEST: NORMAL
REJECTED TEST: NORMAL
SODIUM BLD-SCNC: 135 MMOL/L (ref 132–146)
SODIUM BLD-SCNC: 136 MMOL/L (ref 132–146)
SODIUM BLD-SCNC: 137 MMOL/L (ref 132–146)
SODIUM BLD-SCNC: 138 MMOL/L (ref 132–146)
STREP PNEUMONIAE ANTIGEN, URINE: NORMAL
TOTAL PROTEIN: 5.3 G/DL (ref 6.4–8.3)
TROPONIN: 0.04 NG/ML (ref 0–0.03)
TROPONIN: 0.05 NG/ML (ref 0–0.03)
TROPONIN: 0.06 NG/ML (ref 0–0.03)

## 2019-07-27 PROCEDURE — C9113 INJ PANTOPRAZOLE SODIUM, VIA: HCPCS | Performed by: INTERNAL MEDICINE

## 2019-07-27 PROCEDURE — 99233 SBSQ HOSP IP/OBS HIGH 50: CPT | Performed by: INTERNAL MEDICINE

## 2019-07-27 PROCEDURE — 2580000003 HC RX 258: Performed by: INTERNAL MEDICINE

## 2019-07-27 PROCEDURE — 2500000003 HC RX 250 WO HCPCS: Performed by: INTERNAL MEDICINE

## 2019-07-27 PROCEDURE — 84484 ASSAY OF TROPONIN QUANT: CPT

## 2019-07-27 PROCEDURE — 51702 INSERT TEMP BLADDER CATH: CPT

## 2019-07-27 PROCEDURE — 83735 ASSAY OF MAGNESIUM: CPT

## 2019-07-27 PROCEDURE — 84100 ASSAY OF PHOSPHORUS: CPT

## 2019-07-27 PROCEDURE — 82962 GLUCOSE BLOOD TEST: CPT

## 2019-07-27 PROCEDURE — 6360000002 HC RX W HCPCS: Performed by: INTERNAL MEDICINE

## 2019-07-27 PROCEDURE — 80048 BASIC METABOLIC PNL TOTAL CA: CPT

## 2019-07-27 PROCEDURE — 80053 COMPREHEN METABOLIC PANEL: CPT

## 2019-07-27 PROCEDURE — 36415 COLL VENOUS BLD VENIPUNCTURE: CPT

## 2019-07-27 PROCEDURE — 6370000000 HC RX 637 (ALT 250 FOR IP): Performed by: INTERNAL MEDICINE

## 2019-07-27 PROCEDURE — 83605 ASSAY OF LACTIC ACID: CPT

## 2019-07-27 PROCEDURE — 1200000000 HC SEMI PRIVATE

## 2019-07-27 PROCEDURE — 99232 SBSQ HOSP IP/OBS MODERATE 35: CPT | Performed by: INTERNAL MEDICINE

## 2019-07-27 RX ORDER — NICOTINE POLACRILEX 4 MG
15 LOZENGE BUCCAL PRN
Status: DISCONTINUED | OUTPATIENT
Start: 2019-07-27 | End: 2019-07-29 | Stop reason: HOSPADM

## 2019-07-27 RX ORDER — INSULIN GLARGINE 100 [IU]/ML
25 INJECTION, SOLUTION SUBCUTANEOUS NIGHTLY
Status: CANCELLED | OUTPATIENT
Start: 2019-07-27

## 2019-07-27 RX ORDER — DEXTROSE MONOHYDRATE 50 MG/ML
100 INJECTION, SOLUTION INTRAVENOUS PRN
Status: DISCONTINUED | OUTPATIENT
Start: 2019-07-27 | End: 2019-07-29 | Stop reason: HOSPADM

## 2019-07-27 RX ORDER — INSULIN GLARGINE 100 [IU]/ML
10 INJECTION, SOLUTION SUBCUTANEOUS NIGHTLY
Status: DISCONTINUED | OUTPATIENT
Start: 2019-07-27 | End: 2019-07-27

## 2019-07-27 RX ORDER — INSULIN GLARGINE 100 [IU]/ML
20 INJECTION, SOLUTION SUBCUTANEOUS NIGHTLY
Status: DISCONTINUED | OUTPATIENT
Start: 2019-07-27 | End: 2019-07-28

## 2019-07-27 RX ORDER — DEXTROSE MONOHYDRATE 25 G/50ML
12.5 INJECTION, SOLUTION INTRAVENOUS PRN
Status: DISCONTINUED | OUTPATIENT
Start: 2019-07-27 | End: 2019-07-29 | Stop reason: HOSPADM

## 2019-07-27 RX ORDER — LISINOPRIL 20 MG/1
20 TABLET ORAL DAILY
Status: DISCONTINUED | OUTPATIENT
Start: 2019-07-27 | End: 2019-07-29 | Stop reason: HOSPADM

## 2019-07-27 RX ADMIN — Medication 10 ML: at 09:14

## 2019-07-27 RX ADMIN — PANTOPRAZOLE SODIUM 40 MG: 40 INJECTION, POWDER, FOR SOLUTION INTRAVENOUS at 08:05

## 2019-07-27 RX ADMIN — SODIUM PHOSPHATE, MONOBASIC, MONOHYDRATE 10 MMOL: 276; 142 INJECTION, SOLUTION INTRAVENOUS at 04:17

## 2019-07-27 RX ADMIN — INSULIN GLARGINE 10 UNITS: 100 INJECTION, SOLUTION SUBCUTANEOUS at 11:39

## 2019-07-27 RX ADMIN — POTASSIUM CHLORIDE 10 MEQ: 7.46 INJECTION, SOLUTION INTRAVENOUS at 03:11

## 2019-07-27 RX ADMIN — INSULIN LISPRO 1 UNITS: 100 INJECTION, SOLUTION INTRAVENOUS; SUBCUTANEOUS at 16:29

## 2019-07-27 RX ADMIN — INSULIN GLARGINE 20 UNITS: 100 INJECTION, SOLUTION SUBCUTANEOUS at 23:58

## 2019-07-27 RX ADMIN — THIAMINE HYDROCHLORIDE 100 MG: 100 INJECTION, SOLUTION INTRAMUSCULAR; INTRAVENOUS at 08:05

## 2019-07-27 RX ADMIN — Medication 10 ML: at 19:14

## 2019-07-27 RX ADMIN — HEPARIN SODIUM 5000 UNITS: 10000 INJECTION INTRAVENOUS; SUBCUTANEOUS at 21:46

## 2019-07-27 RX ADMIN — POTASSIUM CHLORIDE 10 MEQ: 7.46 INJECTION, SOLUTION INTRAVENOUS at 06:18

## 2019-07-27 RX ADMIN — Medication 10 ML: at 08:06

## 2019-07-27 RX ADMIN — CEFEPIME 2 G: 2 INJECTION, POWDER, FOR SOLUTION INTRAVENOUS at 19:30

## 2019-07-27 RX ADMIN — POTASSIUM CHLORIDE 10 MEQ: 7.46 INJECTION, SOLUTION INTRAVENOUS at 07:07

## 2019-07-27 RX ADMIN — HEPARIN SODIUM 5000 UNITS: 10000 INJECTION INTRAVENOUS; SUBCUTANEOUS at 06:20

## 2019-07-27 RX ADMIN — Medication 10 ML: at 20:59

## 2019-07-27 RX ADMIN — DEXTROSE AND SODIUM CHLORIDE: 5; 450 INJECTION, SOLUTION INTRAVENOUS at 02:12

## 2019-07-27 RX ADMIN — SODIUM CHLORIDE 1500 MG: 9 INJECTION, SOLUTION INTRAVENOUS at 08:05

## 2019-07-27 RX ADMIN — POTASSIUM CHLORIDE 10 MEQ: 7.46 INJECTION, SOLUTION INTRAVENOUS at 04:08

## 2019-07-27 RX ADMIN — LISINOPRIL 20 MG: 20 TABLET ORAL at 19:10

## 2019-07-27 RX ADMIN — HEPARIN SODIUM 5000 UNITS: 10000 INJECTION INTRAVENOUS; SUBCUTANEOUS at 13:48

## 2019-07-27 RX ADMIN — SODIUM CHLORIDE: 9 INJECTION, SOLUTION INTRAVENOUS at 00:25

## 2019-07-27 RX ADMIN — INSULIN LISPRO 2 UNITS: 100 INJECTION, SOLUTION INTRAVENOUS; SUBCUTANEOUS at 20:57

## 2019-07-27 RX ADMIN — SODIUM CHLORIDE 11.4 UNITS/HR: 9 INJECTION, SOLUTION INTRAVENOUS at 00:24

## 2019-07-27 RX ADMIN — ASPIRIN 81 MG: 81 TABLET ORAL at 08:04

## 2019-07-27 RX ADMIN — POTASSIUM CHLORIDE 10 MEQ: 7.46 INJECTION, SOLUTION INTRAVENOUS at 05:00

## 2019-07-27 RX ADMIN — CEFEPIME 2 G: 2 INJECTION, POWDER, FOR SOLUTION INTRAVENOUS at 08:05

## 2019-07-27 RX ADMIN — POTASSIUM CHLORIDE 10 MEQ: 7.46 INJECTION, SOLUTION INTRAVENOUS at 02:16

## 2019-07-27 RX ADMIN — ATORVASTATIN CALCIUM 80 MG: 40 TABLET, FILM COATED ORAL at 20:57

## 2019-07-27 ASSESSMENT — PAIN SCALES - GENERAL
PAINLEVEL_OUTOF10: 0

## 2019-07-27 NOTE — PLAN OF CARE
Problem: Fluid Volume - Imbalance:  Goal: Will remain free of signs and symptoms of dehydration  Description  Will remain free of signs and symptoms of dehydration  Outcome: Met This Shift  Goal: Absence of imbalanced fluid volume signs and symptoms  Description  Absence of imbalanced fluid volume signs and symptoms  Outcome: Met This Shift     Problem: Serum Glucose Level - Abnormal:  Goal: Ability to maintain appropriate glucose levels will improve  Description  Ability to maintain appropriate glucose levels will improve  Outcome: Met This Shift     Problem: Injury - Acid Base Imbalance:  Goal: Acid-base balance  Description  Acid-base balance  Outcome: Met This Shift

## 2019-07-27 NOTE — CONSULTS
DKA  Hyperkalemia  Refer to resident note for full consult  Work up for infection  Cover for pneumonia  Further details to follow  Stop his pump,he said he is compliant  DKA protocol
rash or erythema  · Head: normocephalic and atraumatic  · Eyes: pupils equal, round, and reactive to light, extraocular eye movements intact, conjunctivae normal  · ENT: tympanic membrane, external ear and ear canal normal bilaterally, oropharynx clear and moist with normal mucous membranes  · Neck: neck supple and non tender without mass, no thyromegaly or thyroid nodules, no cervical lymphadenopathy   · Pulmonary/Chest: crackles bilaterally  · Cardiovascular: normal rate, normal S1 and S2, no gallops, intact distal pulses and no carotid bruits  · Abdomen: soft, non-tender, non-distended, normal bowel sounds, no masses or organomegaly  · Extremities: no cyanosis and no clubbing     Lines     site day    Art line   None    TLC None    PICC None    Hemoaccess None    Oxygen:    Lab Results   Component Value Date    PH 6.914 2019    PH 7.208 2012    PCO2 <15.0 2019    PO2 103.5 2019    HCO3 21.1 2018    BE -1.8 2018    THB 13.1 2019    K6MYSCZK 88.4 2012    O2SAT 96.5 2019     Labs and Imaging Studies   Basic Labs  CBC:   Lab Results   Component Value Date    WBC 33.7 2019    RBC 4.16 2019    HGB 13.0 2019    HCT 45.6 2019    .6 2019    RDW 14.6 2019     2019     CMP:  Lab Results   Component Value Date     2019    K 4.1 2019    K 5.6 2019     2019    CO2 18 2019    BUN 26 2019    PROT 5.3 2019       Imaging Studies:     Xr Chest Portable    Result Date: 2019  Patient MRN: [id number] : 1970 Age:  51 years Gender: Male Order Date: 2019 6:30 AM Exam: XR CHEST PORTABLE Number of Views: 1 Indication:   Diabetic ketoacidosis Comparison: 2019 Findings: There is a stable cardiomediastinal silhouette with airspace disease in the right lung base with mild central pulmonary vascular congestion and thoracic aortic vascular calcifications. . No

## 2019-07-27 NOTE — PLAN OF CARE
[facility] Quality Flow/Interdisciplinary Rounds Progress Note        Quality Flow Rounds held on July 27, 2019    Disciplines Attending:  Disciplines Attending:  Bedside Nurse, Nursing Unit Leadership, Dr. Sai Miranda and ICU team  Luis Enrique Alejandro was admitted on 7/26/2019  5:26 AM    Anticipated Discharge Date:  Expected Discharge Date: 07/31/19    Disposition: Telemetry    Roberto Score:  Roberto Scale Score: 21    Readmission Risk              Risk of Unplanned Readmission:        32           Discussed patient goal for the day, patient clinical progression, and barriers to discharge. The following Goal(s) of the Day/Commitment(s) have been identified: Bridge to Lantus. Transfer this afternoon.    Zeus Kohler  July 27, 2019

## 2019-07-28 LAB
ACETAMINOPHEN LEVEL: ABNORMAL MCG/ML (ref 10–30)
ALBUMIN SERPL-MCNC: ABNORMAL G/DL (ref 3.5–5.2)
ALP BLD-CCNC: ABNORMAL U/L (ref 40–129)
ALT SERPL-CCNC: ABNORMAL U/L (ref 0–40)
ANION GAP SERPL CALCULATED.3IONS-SCNC: 11 MMOL/L (ref 7–16)
ANION GAP SERPL CALCULATED.3IONS-SCNC: 14 MMOL/L (ref 7–16)
ANION GAP SERPL CALCULATED.3IONS-SCNC: ABNORMAL MMOL/L (ref 7–16)
AST SERPL-CCNC: ABNORMAL U/L (ref 0–39)
BASOPHILS ABSOLUTE: 0.04 E9/L (ref 0–0.2)
BASOPHILS RELATIVE PERCENT: 0.3 % (ref 0–2)
BILIRUB SERPL-MCNC: ABNORMAL MG/DL (ref 0–1.2)
BUN BLDV-MCNC: 11 MG/DL (ref 6–20)
BUN BLDV-MCNC: 15 MG/DL (ref 6–20)
BUN BLDV-MCNC: ABNORMAL MG/DL (ref 6–20)
CALCIUM SERPL-MCNC: 8.7 MG/DL (ref 8.6–10.2)
CALCIUM SERPL-MCNC: 9.2 MG/DL (ref 8.6–10.2)
CALCIUM SERPL-MCNC: ABNORMAL MG/DL (ref 8.6–10.2)
CHLORIDE BLD-SCNC: 103 MMOL/L (ref 98–107)
CHLORIDE BLD-SCNC: 103 MMOL/L (ref 98–107)
CHLORIDE BLD-SCNC: ABNORMAL MMOL/L (ref 98–107)
CO2: 19 MMOL/L (ref 22–29)
CO2: 26 MMOL/L (ref 22–29)
CO2: ABNORMAL MMOL/L (ref 22–29)
CREAT SERPL-MCNC: 0.7 MG/DL (ref 0.7–1.2)
CREAT SERPL-MCNC: 0.7 MG/DL (ref 0.7–1.2)
CREAT SERPL-MCNC: ABNORMAL MG/DL (ref 0.7–1.2)
EOSINOPHILS ABSOLUTE: 0.46 E9/L (ref 0.05–0.5)
EOSINOPHILS RELATIVE PERCENT: 3.7 % (ref 0–6)
ETHANOL: ABNORMAL MG/DL (ref 0–0.08)
GFR AFRICAN AMERICAN: >60
GFR AFRICAN AMERICAN: >60
GFR AFRICAN AMERICAN: ABNORMAL
GFR NON-AFRICAN AMERICAN: >60 ML/MIN/1.73
GFR NON-AFRICAN AMERICAN: >60 ML/MIN/1.73
GFR NON-AFRICAN AMERICAN: ABNORMAL ML/MIN/1.73
GLUCOSE BLD-MCNC: 159 MG/DL (ref 74–99)
GLUCOSE BLD-MCNC: 240 MG/DL (ref 74–99)
GLUCOSE BLD-MCNC: ABNORMAL MG/DL (ref 74–99)
HCT VFR BLD CALC: 35 % (ref 37–54)
HCT VFR BLD CALC: ABNORMAL % (ref 37–54)
HEMOGLOBIN: 11.8 G/DL (ref 12.5–16.5)
HEMOGLOBIN: ABNORMAL G/DL (ref 12.5–16.5)
IMMATURE GRANULOCYTES #: 0.07 E9/L
IMMATURE GRANULOCYTES %: 0.6 % (ref 0–5)
LACTIC ACID: ABNORMAL MMOL/L (ref 0.5–2.2)
LIPASE: ABNORMAL U/L (ref 13–60)
LYMPHOCYTES ABSOLUTE: 1.21 E9/L (ref 1.5–4)
LYMPHOCYTES RELATIVE PERCENT: 9.8 % (ref 20–42)
MAGNESIUM: 2 MG/DL (ref 1.6–2.6)
MCH RBC QN AUTO: 30.6 PG (ref 26–35)
MCH RBC QN AUTO: ABNORMAL PG (ref 26–35)
MCHC RBC AUTO-ENTMCNC: 33.7 % (ref 32–34.5)
MCHC RBC AUTO-ENTMCNC: ABNORMAL % (ref 32–34.5)
MCV RBC AUTO: 90.7 FL (ref 80–99.9)
MCV RBC AUTO: ABNORMAL FL (ref 80–99.9)
METER GLUCOSE: 153 MG/DL (ref 74–99)
METER GLUCOSE: 219 MG/DL (ref 74–99)
METER GLUCOSE: 228 MG/DL (ref 74–99)
METER GLUCOSE: 242 MG/DL (ref 74–99)
METER GLUCOSE: 244 MG/DL (ref 74–99)
MONOCYTES ABSOLUTE: 0.69 E9/L (ref 0.1–0.95)
MONOCYTES RELATIVE PERCENT: 5.6 % (ref 2–12)
NEUTROPHILS ABSOLUTE: 9.88 E9/L (ref 1.8–7.3)
NEUTROPHILS RELATIVE PERCENT: 80 % (ref 43–80)
PDW BLD-RTO: 14.3 FL (ref 11.5–15)
PDW BLD-RTO: ABNORMAL FL (ref 11.5–15)
PHOSPHORUS: 1.2 MG/DL (ref 2.5–4.5)
PLATELET # BLD: 269 E9/L (ref 130–450)
PLATELET # BLD: ABNORMAL E9/L (ref 130–450)
PMV BLD AUTO: 8.8 FL (ref 7–12)
PMV BLD AUTO: ABNORMAL FL (ref 7–12)
POTASSIUM SERPL-SCNC: 4.1 MMOL/L (ref 3.5–5)
POTASSIUM SERPL-SCNC: 4.1 MMOL/L (ref 3.5–5)
POTASSIUM SERPL-SCNC: ABNORMAL MMOL/L (ref 3.5–5)
PROCALCITONIN: 1.23 NG/ML (ref 0–0.08)
RBC # BLD: 3.86 E12/L (ref 3.8–5.8)
RBC # BLD: ABNORMAL E12/L (ref 3.8–5.8)
SALICYLATE, SERUM: ABNORMAL MG/DL (ref 0–30)
SODIUM BLD-SCNC: 136 MMOL/L (ref 132–146)
SODIUM BLD-SCNC: 140 MMOL/L (ref 132–146)
SODIUM BLD-SCNC: ABNORMAL MMOL/L (ref 132–146)
TOTAL PROTEIN: ABNORMAL G/DL (ref 6.4–8.3)
TRICYCLIC ANTIDEPRESSANTS SCREEN SERUM: ABNORMAL NG/ML
URINE CULTURE, ROUTINE: NORMAL
VANCOMYCIN TROUGH: <4 MCG/ML (ref 5–16)
WBC # BLD: 12.4 E9/L (ref 4.5–11.5)
WBC # BLD: ABNORMAL E9/L (ref 4.5–11.5)

## 2019-07-28 PROCEDURE — 85025 COMPLETE CBC W/AUTO DIFF WBC: CPT

## 2019-07-28 PROCEDURE — 6360000002 HC RX W HCPCS: Performed by: INTERNAL MEDICINE

## 2019-07-28 PROCEDURE — 83735 ASSAY OF MAGNESIUM: CPT

## 2019-07-28 PROCEDURE — 36415 COLL VENOUS BLD VENIPUNCTURE: CPT

## 2019-07-28 PROCEDURE — 82962 GLUCOSE BLOOD TEST: CPT

## 2019-07-28 PROCEDURE — 6370000000 HC RX 637 (ALT 250 FOR IP): Performed by: INTERNAL MEDICINE

## 2019-07-28 PROCEDURE — 84145 PROCALCITONIN (PCT): CPT

## 2019-07-28 PROCEDURE — 84100 ASSAY OF PHOSPHORUS: CPT

## 2019-07-28 PROCEDURE — 80202 ASSAY OF VANCOMYCIN: CPT

## 2019-07-28 PROCEDURE — 1200000000 HC SEMI PRIVATE

## 2019-07-28 PROCEDURE — 2580000003 HC RX 258: Performed by: INTERNAL MEDICINE

## 2019-07-28 PROCEDURE — 80048 BASIC METABOLIC PNL TOTAL CA: CPT

## 2019-07-28 PROCEDURE — 99232 SBSQ HOSP IP/OBS MODERATE 35: CPT | Performed by: INTERNAL MEDICINE

## 2019-07-28 RX ORDER — INSULIN GLARGINE 100 [IU]/ML
35 INJECTION, SOLUTION SUBCUTANEOUS NIGHTLY
Status: DISCONTINUED | OUTPATIENT
Start: 2019-07-28 | End: 2019-07-28

## 2019-07-28 RX ORDER — PANTOPRAZOLE SODIUM 40 MG/1
40 TABLET, DELAYED RELEASE ORAL
Status: DISCONTINUED | OUTPATIENT
Start: 2019-07-28 | End: 2019-07-29 | Stop reason: HOSPADM

## 2019-07-28 RX ORDER — AMOXICILLIN AND CLAVULANATE POTASSIUM 875; 125 MG/1; MG/1
1 TABLET, FILM COATED ORAL EVERY 12 HOURS SCHEDULED
Status: DISCONTINUED | OUTPATIENT
Start: 2019-07-28 | End: 2019-07-29 | Stop reason: HOSPADM

## 2019-07-28 RX ORDER — INSULIN GLARGINE 100 [IU]/ML
15 INJECTION, SOLUTION SUBCUTANEOUS 2 TIMES DAILY
Status: DISCONTINUED | OUTPATIENT
Start: 2019-07-28 | End: 2019-07-29 | Stop reason: HOSPADM

## 2019-07-28 RX ADMIN — LISINOPRIL 20 MG: 20 TABLET ORAL at 09:21

## 2019-07-28 RX ADMIN — Medication 10 ML: at 21:51

## 2019-07-28 RX ADMIN — INSULIN GLARGINE 15 UNITS: 100 INJECTION, SOLUTION SUBCUTANEOUS at 12:54

## 2019-07-28 RX ADMIN — AMOXICILLIN AND CLAVULANATE POTASSIUM 1 TABLET: 875; 125 TABLET, FILM COATED ORAL at 21:48

## 2019-07-28 RX ADMIN — INSULIN GLARGINE 15 UNITS: 100 INJECTION, SOLUTION SUBCUTANEOUS at 21:54

## 2019-07-28 RX ADMIN — Medication 10 ML: at 09:21

## 2019-07-28 RX ADMIN — ASPIRIN 81 MG: 81 TABLET ORAL at 09:21

## 2019-07-28 RX ADMIN — INSULIN LISPRO 2 UNITS: 100 INJECTION, SOLUTION INTRAVENOUS; SUBCUTANEOUS at 03:10

## 2019-07-28 RX ADMIN — INSULIN LISPRO 4 UNITS: 100 INJECTION, SOLUTION INTRAVENOUS; SUBCUTANEOUS at 12:53

## 2019-07-28 RX ADMIN — HEPARIN SODIUM 5000 UNITS: 10000 INJECTION INTRAVENOUS; SUBCUTANEOUS at 05:47

## 2019-07-28 RX ADMIN — AMOXICILLIN AND CLAVULANATE POTASSIUM 1 TABLET: 875; 125 TABLET, FILM COATED ORAL at 12:53

## 2019-07-28 RX ADMIN — INSULIN LISPRO 2 UNITS: 100 INJECTION, SOLUTION INTRAVENOUS; SUBCUTANEOUS at 21:55

## 2019-07-28 RX ADMIN — PANTOPRAZOLE SODIUM 40 MG: 40 TABLET, DELAYED RELEASE ORAL at 05:47

## 2019-07-28 RX ADMIN — CEFEPIME 2 G: 2 INJECTION, POWDER, FOR SOLUTION INTRAVENOUS at 09:21

## 2019-07-28 RX ADMIN — ATORVASTATIN CALCIUM 80 MG: 40 TABLET, FILM COATED ORAL at 21:49

## 2019-07-28 RX ADMIN — THIAMINE HYDROCHLORIDE 100 MG: 100 INJECTION, SOLUTION INTRAMUSCULAR; INTRAVENOUS at 09:21

## 2019-07-28 RX ADMIN — HEPARIN SODIUM 5000 UNITS: 10000 INJECTION INTRAVENOUS; SUBCUTANEOUS at 21:51

## 2019-07-28 RX ADMIN — INSULIN LISPRO 2 UNITS: 100 INJECTION, SOLUTION INTRAVENOUS; SUBCUTANEOUS at 09:22

## 2019-07-28 RX ADMIN — INSULIN LISPRO 4 UNITS: 100 INJECTION, SOLUTION INTRAVENOUS; SUBCUTANEOUS at 18:16

## 2019-07-28 RX ADMIN — HEPARIN SODIUM 5000 UNITS: 10000 INJECTION INTRAVENOUS; SUBCUTANEOUS at 15:37

## 2019-07-28 ASSESSMENT — PAIN SCALES - GENERAL
PAINLEVEL_OUTOF10: 0

## 2019-07-29 VITALS
WEIGHT: 139.7 LBS | HEART RATE: 84 BPM | DIASTOLIC BLOOD PRESSURE: 98 MMHG | BODY MASS INDEX: 22.45 KG/M2 | SYSTOLIC BLOOD PRESSURE: 150 MMHG | HEIGHT: 66 IN | OXYGEN SATURATION: 97 % | TEMPERATURE: 98.1 F | RESPIRATION RATE: 16 BRPM

## 2019-07-29 LAB
ANION GAP SERPL CALCULATED.3IONS-SCNC: 11 MMOL/L (ref 7–16)
BUN BLDV-MCNC: 9 MG/DL (ref 6–20)
CALCIUM SERPL-MCNC: 9 MG/DL (ref 8.6–10.2)
CHLORIDE BLD-SCNC: 99 MMOL/L (ref 98–107)
CO2: 27 MMOL/L (ref 22–29)
CREAT SERPL-MCNC: 0.6 MG/DL (ref 0.7–1.2)
GFR AFRICAN AMERICAN: >60
GFR NON-AFRICAN AMERICAN: >60 ML/MIN/1.73
GLUCOSE BLD-MCNC: 71 MG/DL (ref 74–99)
MAGNESIUM: 2 MG/DL (ref 1.6–2.6)
METER GLUCOSE: 130 MG/DL (ref 74–99)
METER GLUCOSE: 75 MG/DL (ref 74–99)
METER GLUCOSE: 84 MG/DL (ref 74–99)
PHOSPHORUS: 1.8 MG/DL (ref 2.5–4.5)
POTASSIUM SERPL-SCNC: 3.2 MMOL/L (ref 3.5–5)
SODIUM BLD-SCNC: 137 MMOL/L (ref 132–146)

## 2019-07-29 PROCEDURE — 6370000000 HC RX 637 (ALT 250 FOR IP): Performed by: INTERNAL MEDICINE

## 2019-07-29 PROCEDURE — 83735 ASSAY OF MAGNESIUM: CPT

## 2019-07-29 PROCEDURE — 82962 GLUCOSE BLOOD TEST: CPT

## 2019-07-29 PROCEDURE — 84100 ASSAY OF PHOSPHORUS: CPT

## 2019-07-29 PROCEDURE — 80048 BASIC METABOLIC PNL TOTAL CA: CPT

## 2019-07-29 PROCEDURE — 99238 HOSP IP/OBS DSCHRG MGMT 30/<: CPT | Performed by: INTERNAL MEDICINE

## 2019-07-29 PROCEDURE — 36415 COLL VENOUS BLD VENIPUNCTURE: CPT

## 2019-07-29 PROCEDURE — 6360000002 HC RX W HCPCS: Performed by: INTERNAL MEDICINE

## 2019-07-29 RX ORDER — LANCETS 30 GAUGE
1 EACH MISCELLANEOUS 2 TIMES DAILY
Qty: 300 EACH | Refills: 3 | Status: ON HOLD | OUTPATIENT
Start: 2019-07-29 | End: 2021-09-28

## 2019-07-29 RX ORDER — THIAMINE MONONITRATE (VIT B1) 100 MG
100 TABLET ORAL DAILY
Status: DISCONTINUED | OUTPATIENT
Start: 2019-07-29 | End: 2019-07-29 | Stop reason: HOSPADM

## 2019-07-29 RX ORDER — POTASSIUM CHLORIDE 20 MEQ/1
40 TABLET, EXTENDED RELEASE ORAL ONCE
Status: COMPLETED | OUTPATIENT
Start: 2019-07-29 | End: 2019-07-29

## 2019-07-29 RX ORDER — AMOXICILLIN AND CLAVULANATE POTASSIUM 875; 125 MG/1; MG/1
1 TABLET, FILM COATED ORAL EVERY 12 HOURS SCHEDULED
Qty: 8 TABLET | Refills: 0 | Status: ON HOLD | OUTPATIENT
Start: 2019-07-29 | End: 2019-08-04 | Stop reason: HOSPADM

## 2019-07-29 RX ORDER — ALBUTEROL SULFATE 90 UG/1
2 AEROSOL, METERED RESPIRATORY (INHALATION) EVERY 6 HOURS PRN
Qty: 1 INHALER | Refills: 0 | Status: SHIPPED | OUTPATIENT
Start: 2019-07-29 | End: 2019-12-08

## 2019-07-29 RX ADMIN — LISINOPRIL 20 MG: 20 TABLET ORAL at 09:46

## 2019-07-29 RX ADMIN — ASPIRIN 81 MG: 81 TABLET ORAL at 09:46

## 2019-07-29 RX ADMIN — INSULIN GLARGINE 15 UNITS: 100 INJECTION, SOLUTION SUBCUTANEOUS at 09:46

## 2019-07-29 RX ADMIN — POTASSIUM & SODIUM PHOSPHATES POWDER PACK 280-160-250 MG 500 MG: 280-160-250 PACK at 12:21

## 2019-07-29 RX ADMIN — AMOXICILLIN AND CLAVULANATE POTASSIUM 1 TABLET: 875; 125 TABLET, FILM COATED ORAL at 09:46

## 2019-07-29 RX ADMIN — Medication 100 MG: at 12:19

## 2019-07-29 RX ADMIN — HEPARIN SODIUM 5000 UNITS: 10000 INJECTION INTRAVENOUS; SUBCUTANEOUS at 05:50

## 2019-07-29 RX ADMIN — POTASSIUM CHLORIDE 40 MEQ: 20 TABLET, EXTENDED RELEASE ORAL at 12:21

## 2019-07-29 RX ADMIN — PANTOPRAZOLE SODIUM 40 MG: 40 TABLET, DELAYED RELEASE ORAL at 05:51

## 2019-07-29 NOTE — PROGRESS NOTES
[facility]  Internal Medicine Residency / [address]    Attending Physician Statement  I have discussed the case, including pertinent history and exam findings with the resident and the team.  I have seen and examined the patient and the key elements of the encounter have been performed by me. I agree with the assessment, plan and orders as documented by the resident. Case Discussed During AM Rounds   Covering for Dr. Kirsten Rosas and House Team 2 this weekend    Presented for DKA admission- unclear etiology- ? Pump failure   No longer following with previous Endocrinologist   States receiving refills of insulin via pump by PCP- Dr. Car Moore    DKA protocol initiated and patient was transitioned to ICU    Continued management with insulin gtt this am    States feeling improved- but still fatigued   No noted N/V/abdominal pain     DKA with history of Type I DM    Insulin gtt per protocol continued    Plan for transition to subcutaneous insulin   Coordinate with ICU team for further discussion   Monitor for hypoglycemia and recurrent DKA   Advance diet when conversion to take place     Aspiration Pneumonia    Empiric coverage continued     Lactic Acidosis- resolved     Leukocytosis likely related to above   + empiric aspiration pneumonia coverage   Repeat CBC     Nonanion gap metabolic acidosis    Hyperchloremic finding likely etiology from IVF resuscitation    Monitor     Disposition- continue inpatient management     Remainder of medical problems as per resident note.       Haylie Anders  Internal Medicine Residency Faculty
[facility]  Internal Medicine Residency Program  Progress Note - House Team 2    Patient:  Lia Styles 51 y.o. male MRN: [id number]     Date of Service: 7/28/2019     CC: Lethargy, AMS, high blood glucose  Overnight events: Patient was put on DKA protocol on admission on 7/26; protocol completed on 7/27, started on lantus 10 units at 11:39, received 3units of lispro through the day on low dose sliding scale and was given 20units lantus at 23:01. BG overnight stable ranging from  170 to 240. Subjective     The patient was seen and examined in the morning. Lying comfortably in the bed. Well oriented to time, place, and person. He doesn't have any new complaints and says he's ready for being discharged. We want the patient to see an endocrinologist on outpatient basis before he is started on his insulin pump. He is currently started on Lantus 15 units BD along with medium dose sliding scale. He will be set up with an endocrinologist at [facility] for follow up at his discharge tomorrow. Hospital course / Updated HPI:  7/26 5:30am: Patient presented to ED for hyperglycemia, not oriented to time or place and only able to answer yes or not to questions. Patient unsure if his insulin pump is working properly. Denies any symptoms of URI, chest pain, dysuria, diarrhea. In ED, patient was hypotensive 90/51, BG 1193, ketonuria, pH 6.9, bicarb 4,  K 7.2 (partially hemolyzed), anion gap of 50, tall peaked T waves (no STEMI), troponin0.08, Cr 3.4 (KIRSTEN, baseline 0.6), urine drug screen negative, leukocytosis and procalcitonin 10.46. Patient started on DKA protocol, bicarb drip, Ca gluconate  and admitted to ICU for further management. Patients urine analysis shows no signs of UTI, CXR shows some infiltrates on right lower lung region. Patient started on cefepime and vancomycin for possible aspiration pneumonia. Patient started on thiamine 100mg IV OD for 5days (macrocytosis and alcohol abuse).   At
Report called for [id number] to RADHA Whittaker Askew at this time
Range: 1.6 - 2.6 mg/dL 2.0   Glucose Latest Ref Range: 74 - 99 mg/dL 71 (L)   Calcium Latest Ref Range: 8.6 - 10.2 mg/dL 9.0   Phosphorus Latest Ref Range: 2.5 - 4.5 mg/dL 1.8 (L)     CBC:   Ref. Range 7/28/2019 09:18   WBC Latest Ref Range: 4.5 - 11.5 E9/L 12.4 (H)   RBC Latest Ref Range: 3.80 - 5.80 E12/L 3.86   Hemoglobin Quant Latest Ref Range: 12.5 - 16.5 g/dL 11.8 (L)   Hematocrit Latest Ref Range: 37.0 - 54.0 % 35.0 (L)   MCV Latest Ref Range: 80.0 - 99.9 fL 90.7   MCH Latest Ref Range: 26.0 - 35.0 pg 30.6   MCHC Latest Ref Range: 32.0 - 34.5 % 33.7   MPV Latest Ref Range: 7.0 - 12.0 fL 8.8   RDW Latest Ref Range: 11.5 - 15.0 fL 14.3   Platelet Count Latest Ref Range: 130 - 450 E9/L 269   Neutrophils % Latest Ref Range: 43.0 - 80.0 % 80. 0   Immature Granulocytes % Latest Ref Range: 0.0 - 5.0 % 0.6   Lymphocyte % Latest Ref Range: 20.0 - 42.0 % 9.8 (L)   Monocytes % Latest Ref Range: 2.0 - 12.0 % 5.6   Eosinophils % Latest Ref Range: 0.0 - 6.0 % 3.7   Basophils % Latest Ref Range: 0.0 - 2.0 % 0.3   Neutrophils # Latest Ref Range: 1.80 - 7.30 E9/L 9.88 (H)   Immature Granulocytes # Latest Units: E9/L 0.07   Lymphocytes # Latest Ref Range: 1.50 - 4.00 E9/L 1.21 (L)   Monocytes # Latest Ref Range: 0.10 - 0.95 E9/L 0.69   Eosinophils # Latest Ref Range: 0.05 - 0.50 E9/L 0.46   Basophils # Latest Ref Range: 0.00 - 0.20 E9/L 0.04         Resident's Assessment and Plan     49yo male presented to the ED with lethargy, AMS, high blood glucose. Patient started on DKA protocol.  PMH of IDDM, on insulin pump 1.2/h 6am-12midnight and 1.6/hr midnight to 6am, HTN (on lisinopril), lacunar stroke, L hemiplagia (on ASA, Statin), alcohol and marijuana abuse.      Acute metabolic encephalopathy - resolved  -2/2 to DKA  -Patient alert, oriented now  -DKA protocol completed: patient given Lantus, completed insulin drip, low dose corrective insulin lispro started  -Continue Lantus 15units BID along with medium dose sliding
BILIRUBINUR NEGATIVE 04/09/2012    BLOODU LARGE 07/26/2019    GLUCOSEU >=1000 07/26/2019    GLUCOSEU >=1000 04/09/2012    AMORPHOUS MODERATE 07/26/2019     HgBA1c:    Lab Results   Component Value Date    LABA1C 9.1 07/26/2019       Resident's Assessment and Plan     1. DKA, DMT1, on insulin pump, to r/o malfunction vs infectious source. 2. KIRSTEN, 2/2 dehydration in the setting of #1, resolved  3. HAGMA 2/2 DKA , resolved  4. Leukocytosis dehydration vs Sepsis? Pending cultures, currently no growth. 5. Alcohol abuse,     Plan:   DKA protocol complete, bridged with Lantus, will continue Lantus 35 nightly and MDSS. BG w meals.  Continue cefepime and vancomycin until tomorrow and deescalate or stop if no source found.  Follow cbc in the morning   Consider restarting insulin pump.      DVT/GI prophylaxis: heparin/PPI  PT/OT: not ordered  Code status: Full  Disposition:MICU transfer to gen floor    Lazarus Blind, MD, PGY-2    Attending physician: Dr. Tammy Vinson

## 2019-07-29 NOTE — PLAN OF CARE
Problem: Fluid Volume - Imbalance:  Goal: Will remain free of signs and symptoms of dehydration  Description  Will remain free of signs and symptoms of dehydration  Outcome: Met This Shift     Problem: Falls - Risk of:  Goal: Will remain free from falls  Description  Will remain free from falls  Outcome: Met This Shift     Problem: Falls - Risk of:  Goal: Absence of physical injury  Description  Absence of physical injury  Outcome: Met This Shift

## 2019-07-30 ENCOUNTER — OFFICE VISIT (OUTPATIENT)
Dept: ENDOCRINOLOGY | Age: 49
End: 2019-07-30

## 2019-07-30 VITALS
OXYGEN SATURATION: 98 % | DIASTOLIC BLOOD PRESSURE: 76 MMHG | WEIGHT: 140.8 LBS | RESPIRATION RATE: 16 BRPM | HEART RATE: 99 BPM | HEIGHT: 66 IN | BODY MASS INDEX: 22.63 KG/M2 | SYSTOLIC BLOOD PRESSURE: 126 MMHG

## 2019-07-30 DIAGNOSIS — E10.3399: Primary | Chronic | ICD-10-CM

## 2019-07-30 DIAGNOSIS — E10.8 TYPE 1 DIABETES MELLITUS WITH COMPLICATION (HCC): ICD-10-CM

## 2019-07-30 PROCEDURE — 99203 OFFICE O/P NEW LOW 30 MIN: CPT | Performed by: INTERNAL MEDICINE

## 2019-07-30 NOTE — PATIENT INSTRUCTIONS
Recommendations for today's visit  · Tonight take only 7 units of basaglar   · Today continue using Humalog 1 u:10g carbs + sliding scale  If blood sugars are 150-200 -add 1 units of Humalog   If blood sugars are 201-250 - add 2 units of Humalog   If blood sugars are 251-300 - add 3 units of Humalog   If blood sugars are 301-350 - add 4 units of Humalog   If blood sugars are above 350 - add 5 units of Humalog  · Restart insulin tomorrow morning    · Check Blood sugar 3 times/day before meals and at bedtime and send us sugar log in a week or two    These are your blood sugar, blood pressure, cholesterol and A1c goals:  · Blood sugar fastin mg/dl to 130 mg/dl  · Blood sugar before meals: <150 mg/dl  · Peak blood sugar lower than 180 mg/dl  · Bad cholesterol (LDL cholesterol): less than 100 mg/dl  · Blood pressure: less than 140/80 mmHg\  · A1c: between 6.5 - 7%      Steps for managing low blood sugar  1. Eat 15 grams of glucose of simple carbohydrate, as found in:   1 tablespoon sugar, Jennifer or corn syrup    4 oz (1/2 cup) of juice or regular soda   Glucose Tablet or gel (follow package instruction)   2. Wait 15 min and check blood sugar again   3. Repeat until blood sugar within range  4.  Once within range, follow up with snack or meal within 1 hour      I you have any questions please call Dr. Ozzie Lira office       Alexandr Yañez MD  Endocrinologist, [facility])   [facility] [address] [facility] [address]   Phone: [phone]  Fax: [phone]

## 2019-07-30 NOTE — PROGRESS NOTES
[address] [facility] Department of Endocrinology Diabetes and Metabolism   [address] [facility] [address]   Phone: [phone]  Fax: [phone]    Date of Service: 7/30/2019    Primary Care Physician: Dae Mauricio MD  Referring physician: Emerald Dickerson MD  Provider: Alan Abreu MD     Reason for the visit:  DM type 1     History of Present Illness: The history is provided by the patient. No  was used. Accuracy of the patient data is excellent. Rigoberto Baron is a very pleasant 51 y.o. male seen in Endocrine clinic today for diabetes management     Rigoberto Baron was diagnosed with diabetes at age 30   The patient is currently on Basaglar 15 units BID, Humalog 1:10      The patient was recently admitted with DKA. Pump was discotnihued during recent hospitalization  Pt has no insurance and self pay for insulin and pump supplies   His Pump settings: basal rate 12a 1.8, 7a 1.4, CR 10, ISF 35, Goal 120-140, active insulin time 4 hrs     The patient rarely checks BS due to cost of test strips. When checks BS usually running high   Most recent A1c results summarized below  Lab Results   Component Value Date    LABA1C 9.1 07/26/2019    LABA1C 9.0 04/12/2019    LABA1C 8.2 02/09/2019     Patient reported hypoglycemic episodes  The patient hasn't been mindful of what has been eating and wasn't following diabetes diet as encouraged   I reviewed current medications and the patient has no issues with diabetes medications  Rigoberto Baron is due for an eye exam and denied any history of diabetic retinopathy.  + retinopathy s/p laser treatment   The patient performs his own feet care and not seeing podiatry   Microvascular complications:  + Retinopathy, Nephropathy + Neuropathy   Macrovascular complications: no CAD, PVD, + TIA 2/2019   The patient receives Flushot every year and up to date with the Pneumonia vaccine     PAST MEDICAL HISTORY   Past Medical History: Diagnosis Date    Alcoholism ([facility]     Cocaine abuse ([facility]     Diabetes mellitus ([facility]     Hypertension     Idiopathic peripheral neuropathy 9/21/2016    Marijuana abuse      PAST SURGICAL HISTORY   No past surgical history on file. SOCIAL HISTORY   Social History     Socioeconomic History    Marital status: Single     Spouse name: Not on file    Number of children: Not on file    Years of education: Not on file    Highest education level: Not on file   Occupational History    Not on file   Social Needs    Financial resource strain: Not on file    Food insecurity:     Worry: Not on file     Inability: Not on file    Transportation needs:     Medical: Not on file     Non-medical: Not on file   Tobacco Use    Smoking status: Current Every Day Smoker     Packs/day: 1.00     Years: 15.00     Pack years: 15.00     Types: Cigarettes    Smokeless tobacco: Never Used   Substance and Sexual Activity    Alcohol use:  Yes     Alcohol/week: 5.0 standard drinks     Types: 5 Cans of beer per week    Drug use: Yes     Types: Marijuana     Comment: smokes week a couple times a day per pt    Sexual activity: Not on file   Lifestyle    Physical activity:     Days per week: Not on file     Minutes per session: Not on file    Stress: Not on file   Relationships    Social connections:     Talks on phone: Not on file     Gets together: Not on file     Attends Worship service: Not on file     Active member of club or organization: Not on file     Attends meetings of clubs or organizations: Not on file     Relationship status: Not on file    Intimate partner violence:     Fear of current or ex partner: Not on file     Emotionally abused: Not on file     Physically abused: Not on file     Forced sexual activity: Not on file   Other Topics Concern    Not on file   Social History Narrative    Not on file     FAMILY HISTORY   Family History   Problem Relation Age of Onset    Diabetes type 2  Mother     Cancer self-blood glucose monitoring and eating consistent carb diet to avoid blood sugar fluctuations   · Patient is due for routine diabetes maintenance and prevention  · Discussed lifestyle changes including diet and exercise with patient; recommended 150 minutes of moderate intensity exercise per week. · Diabetes labs before next visit     HLP  · Continue statin     HTN  · At goal, continue lisinopril     Return in about 8 weeks (around 9/24/2019) for DM type 1 on insulin Pump . The above issues were reviewed with the patient who understood and agreed with the plan. Thank you for allowing us to participate in the care of this patient. Please do not hesitate to contact us with any additional questions. Diagnosis Orders   1. Moderate nonproliferative diabetic retinopathy associated with type 1 diabetes mellitus, macular edema presence unspecified, unspecified laterality ([facility] [address]     2.  Type 1 diabetes mellitus with complication (HCC)       Abner Barrientos MD  Endocrinologist, [facility]   [address] [place] [address]   Phone: [phone]  Fax: [phone]  --------------------------------------------  Electronically signed by Krysta Menezes MD

## 2019-07-31 PROBLEM — E10.8 TYPE 1 DIABETES MELLITUS WITH COMPLICATION (HCC): Status: ACTIVE | Noted: 2019-02-09

## 2019-07-31 LAB
BLOOD CULTURE, ROUTINE: NORMAL
CULTURE, BLOOD 2: NORMAL

## 2019-08-01 ENCOUNTER — APPOINTMENT (OUTPATIENT)
Dept: GENERAL RADIOLOGY | Age: 49
DRG: 637 | End: 2019-08-01

## 2019-08-01 ENCOUNTER — HOSPITAL ENCOUNTER (INPATIENT)
Age: 49
LOS: 3 days | Discharge: HOME | DRG: 637 | End: 2019-08-04
Attending: EMERGENCY MEDICINE | Admitting: INTERNAL MEDICINE

## 2019-08-01 DIAGNOSIS — D72.829 LEUKOCYTOSIS, UNSPECIFIED TYPE: ICD-10-CM

## 2019-08-01 DIAGNOSIS — E10.10 DIABETIC KETOACIDOSIS WITHOUT COMA ASSOCIATED WITH TYPE 1 DIABETES MELLITUS (HCC): Primary | ICD-10-CM

## 2019-08-01 DIAGNOSIS — E87.20 LACTIC ACIDOSIS: ICD-10-CM

## 2019-08-01 DIAGNOSIS — E87.5 HYPERKALEMIA: ICD-10-CM

## 2019-08-01 DIAGNOSIS — N17.9 AKI (ACUTE KIDNEY INJURY) (HCC): ICD-10-CM

## 2019-08-01 LAB
ACETAMINOPHEN LEVEL: <5 MCG/ML (ref 10–30)
ALBUMIN SERPL-MCNC: 4.5 G/DL (ref 3.5–5.2)
ALP BLD-CCNC: 136 U/L (ref 40–129)
ALT SERPL-CCNC: 96 U/L (ref 0–40)
ANION GAP SERPL CALCULATED.3IONS-SCNC: 43 MMOL/L (ref 7–16)
ANION GAP SERPL CALCULATED.3IONS-SCNC: 50 MMOL/L (ref 7–16)
ANION GAP SERPL CALCULATED.3IONS-SCNC: 52 MMOL/L (ref 7–16)
ANISOCYTOSIS: ABNORMAL
AST SERPL-CCNC: 76 U/L (ref 0–39)
BASOPHILS ABSOLUTE: 0 E9/L (ref 0–0.2)
BASOPHILS RELATIVE PERCENT: 0.6 % (ref 0–2)
BETA-HYDROXYBUTYRATE: >4.5 MMOL/L (ref 0.02–0.27)
BILIRUB SERPL-MCNC: 0.6 MG/DL (ref 0–1.2)
BILIRUBIN URINE: NEGATIVE
BLOOD, URINE: NEGATIVE
BUN BLDV-MCNC: 41 MG/DL (ref 6–20)
BUN BLDV-MCNC: 43 MG/DL (ref 6–20)
BUN BLDV-MCNC: 44 MG/DL (ref 6–20)
CALCIUM SERPL-MCNC: 7.6 MG/DL (ref 8.6–10.2)
CALCIUM SERPL-MCNC: 9.3 MG/DL (ref 8.6–10.2)
CALCIUM SERPL-MCNC: 9.8 MG/DL (ref 8.6–10.2)
CHLORIDE BLD-SCNC: 79 MMOL/L (ref 98–107)
CHLORIDE BLD-SCNC: 80 MMOL/L (ref 98–107)
CHLORIDE BLD-SCNC: 88 MMOL/L (ref 98–107)
CK MB: 6.1 NG/ML (ref 0–7.7)
CLARITY: CLEAR
CO2: 4 MMOL/L (ref 22–29)
CO2: 5 MMOL/L (ref 22–29)
CO2: 6 MMOL/L (ref 22–29)
COLOR: YELLOW
CREAT SERPL-MCNC: 1.3 MG/DL (ref 0.7–1.2)
CREAT SERPL-MCNC: 1.4 MG/DL (ref 0.7–1.2)
CREAT SERPL-MCNC: 1.5 MG/DL (ref 0.7–1.2)
DIGOXIN LEVEL: <0.3 NG/ML (ref 0.8–2)
EKG ATRIAL RATE: 114 BPM
EKG P AXIS: 64 DEGREES
EKG P-R INTERVAL: 130 MS
EKG Q-T INTERVAL: 370 MS
EKG QRS DURATION: 88 MS
EKG QTC CALCULATION (BAZETT): 509 MS
EKG R AXIS: 68 DEGREES
EKG T AXIS: 52 DEGREES
EKG VENTRICULAR RATE: 114 BPM
EOSINOPHILS ABSOLUTE: 0 E9/L (ref 0.05–0.5)
EOSINOPHILS RELATIVE PERCENT: 0.2 % (ref 0–6)
ETHANOL: <10 MG/DL (ref 0–0.08)
ETHANOL: <10 MG/DL (ref 0–0.08)
GFR AFRICAN AMERICAN: >60
GFR NON-AFRICAN AMERICAN: 50 ML/MIN/1.73
GFR NON-AFRICAN AMERICAN: 54 ML/MIN/1.73
GFR NON-AFRICAN AMERICAN: 59 ML/MIN/1.73
GLUCOSE BLD-MCNC: 573 MG/DL (ref 74–99)
GLUCOSE BLD-MCNC: 622 MG/DL (ref 74–99)
GLUCOSE BLD-MCNC: 660 MG/DL (ref 74–99)
GLUCOSE URINE: >=1000 MG/DL
HCT VFR BLD CALC: 41.6 % (ref 37–54)
HEMOGLOBIN: 13 G/DL (ref 12.5–16.5)
KETONES, URINE: >=80 MG/DL
LACTIC ACID: 3.9 MMOL/L (ref 0.5–2.2)
LEUKOCYTE ESTERASE, URINE: NEGATIVE
LIPASE: 63 U/L (ref 13–60)
LYMPHOCYTES ABSOLUTE: 1.5 E9/L (ref 1.5–4)
LYMPHOCYTES RELATIVE PERCENT: 7.9 % (ref 20–42)
MAGNESIUM: 2.4 MG/DL (ref 1.6–2.6)
MAGNESIUM: 2.8 MG/DL (ref 1.6–2.6)
MCH RBC QN AUTO: 31 PG (ref 26–35)
MCHC RBC AUTO-ENTMCNC: 31.3 % (ref 32–34.5)
MCV RBC AUTO: 99 FL (ref 80–99.9)
METAMYELOCYTES RELATIVE PERCENT: 1.8 % (ref 0–1)
METER GLUCOSE: 327 MG/DL (ref 74–99)
METER GLUCOSE: 346 MG/DL (ref 74–99)
METER GLUCOSE: 357 MG/DL (ref 74–99)
METER GLUCOSE: 479 MG/DL (ref 74–99)
METER GLUCOSE: 490 MG/DL (ref 74–99)
METER GLUCOSE: >500 MG/DL (ref 74–99)
METER GLUCOSE: >500 MG/DL (ref 74–99)
MONOCYTES ABSOLUTE: 1.31 E9/L (ref 0.1–0.95)
MONOCYTES RELATIVE PERCENT: 7 % (ref 2–12)
NEUTROPHILS ABSOLUTE: 15.9 E9/L (ref 1.8–7.3)
NEUTROPHILS RELATIVE PERCENT: 83.3 % (ref 43–80)
NITRITE, URINE: NEGATIVE
OVALOCYTES: ABNORMAL
PDW BLD-RTO: 14.1 FL (ref 11.5–15)
PH UA: 5 (ref 5–9)
PH VENOUS: 7.14 (ref 7.35–7.45)
PHOSPHORUS: 6.3 MG/DL (ref 2.5–4.5)
PHOSPHORUS: 8.3 MG/DL (ref 2.5–4.5)
PLATELET # BLD: 546 E9/L (ref 130–450)
PMV BLD AUTO: 9.1 FL (ref 7–12)
POIKILOCYTES: ABNORMAL
POLYCHROMASIA: ABNORMAL
POTASSIUM SERPL-SCNC: 5 MMOL/L (ref 3.5–5)
POTASSIUM SERPL-SCNC: 5.8 MMOL/L (ref 3.5–5)
POTASSIUM SERPL-SCNC: 6.7 MMOL/L (ref 3.5–5)
PROCALCITONIN: 0.45 NG/ML (ref 0–0.08)
PROTEIN UA: NEGATIVE MG/DL
RBC # BLD: 4.2 E12/L (ref 3.8–5.8)
SALICYLATE, SERUM: <0.3 MG/DL (ref 0–30)
SCHISTOCYTES: ABNORMAL
SODIUM BLD-SCNC: 135 MMOL/L (ref 132–146)
SODIUM BLD-SCNC: 136 MMOL/L (ref 132–146)
SODIUM BLD-SCNC: 136 MMOL/L (ref 132–146)
SPECIFIC GRAVITY UA: 1.02 (ref 1–1.03)
TOTAL CK: 80 U/L (ref 20–200)
TOTAL PROTEIN: 7.7 G/DL (ref 6.4–8.3)
TRICYCLIC ANTIDEPRESSANTS SCREEN SERUM: NEGATIVE NG/ML
TROPONIN: 0.03 NG/ML (ref 0–0.03)
TROPONIN: 0.07 NG/ML (ref 0–0.03)
UROBILINOGEN, URINE: 0.2 E.U./DL
WBC # BLD: 18.7 E9/L (ref 4.5–11.5)

## 2019-08-01 PROCEDURE — 83605 ASSAY OF LACTIC ACID: CPT

## 2019-08-01 PROCEDURE — 2580000003 HC RX 258: Performed by: INTERNAL MEDICINE

## 2019-08-01 PROCEDURE — 82800 BLOOD PH: CPT

## 2019-08-01 PROCEDURE — 2500000003 HC RX 250 WO HCPCS: Performed by: STUDENT IN AN ORGANIZED HEALTH CARE EDUCATION/TRAINING PROGRAM

## 2019-08-01 PROCEDURE — 82436 ASSAY OF URINE CHLORIDE: CPT

## 2019-08-01 PROCEDURE — 85025 COMPLETE CBC W/AUTO DIFF WBC: CPT

## 2019-08-01 PROCEDURE — 84540 ASSAY OF URINE/UREA-N: CPT

## 2019-08-01 PROCEDURE — 87486 CHLMYD PNEUM DNA AMP PROBE: CPT

## 2019-08-01 PROCEDURE — 87633 RESP VIRUS 12-25 TARGETS: CPT

## 2019-08-01 PROCEDURE — 2580000003 HC RX 258: Performed by: PHYSICIAN ASSISTANT

## 2019-08-01 PROCEDURE — 80162 ASSAY OF DIGOXIN TOTAL: CPT

## 2019-08-01 PROCEDURE — 87081 CULTURE SCREEN ONLY: CPT

## 2019-08-01 PROCEDURE — 84145 PROCALCITONIN (PCT): CPT

## 2019-08-01 PROCEDURE — 84484 ASSAY OF TROPONIN QUANT: CPT

## 2019-08-01 PROCEDURE — 84300 ASSAY OF URINE SODIUM: CPT

## 2019-08-01 PROCEDURE — 82553 CREATINE MB FRACTION: CPT

## 2019-08-01 PROCEDURE — 2000000000 HC ICU R&B

## 2019-08-01 PROCEDURE — 82962 GLUCOSE BLOOD TEST: CPT

## 2019-08-01 PROCEDURE — 2580000003 HC RX 258: Performed by: STUDENT IN AN ORGANIZED HEALTH CARE EDUCATION/TRAINING PROGRAM

## 2019-08-01 PROCEDURE — 80048 BASIC METABOLIC PNL TOTAL CA: CPT

## 2019-08-01 PROCEDURE — 87581 M.PNEUMON DNA AMP PROBE: CPT

## 2019-08-01 PROCEDURE — 83690 ASSAY OF LIPASE: CPT

## 2019-08-01 PROCEDURE — G0480 DRUG TEST DEF 1-7 CLASSES: HCPCS

## 2019-08-01 PROCEDURE — 93010 ELECTROCARDIOGRAM REPORT: CPT | Performed by: INTERNAL MEDICINE

## 2019-08-01 PROCEDURE — 83735 ASSAY OF MAGNESIUM: CPT

## 2019-08-01 PROCEDURE — 99285 EMERGENCY DEPT VISIT HI MDM: CPT

## 2019-08-01 PROCEDURE — 6360000002 HC RX W HCPCS: Performed by: INTERNAL MEDICINE

## 2019-08-01 PROCEDURE — 84100 ASSAY OF PHOSPHORUS: CPT

## 2019-08-01 PROCEDURE — 6370000000 HC RX 637 (ALT 250 FOR IP): Performed by: INTERNAL MEDICINE

## 2019-08-01 PROCEDURE — 87798 DETECT AGENT NOS DNA AMP: CPT

## 2019-08-01 PROCEDURE — 84133 ASSAY OF URINE POTASSIUM: CPT

## 2019-08-01 PROCEDURE — 94664 DEMO&/EVAL PT USE INHALER: CPT

## 2019-08-01 PROCEDURE — 82010 KETONE BODYS QUAN: CPT

## 2019-08-01 PROCEDURE — 6360000002 HC RX W HCPCS: Performed by: EMERGENCY MEDICINE

## 2019-08-01 PROCEDURE — 81003 URINALYSIS AUTO W/O SCOPE: CPT

## 2019-08-01 PROCEDURE — 80307 DRUG TEST PRSMV CHEM ANLYZR: CPT

## 2019-08-01 PROCEDURE — 87450 HC DIRECT STREP B ANTIGEN: CPT

## 2019-08-01 PROCEDURE — 71045 X-RAY EXAM CHEST 1 VIEW: CPT

## 2019-08-01 PROCEDURE — 80053 COMPREHEN METABOLIC PANEL: CPT

## 2019-08-01 PROCEDURE — 82570 ASSAY OF URINE CREATININE: CPT

## 2019-08-01 PROCEDURE — 6360000002 HC RX W HCPCS: Performed by: STUDENT IN AN ORGANIZED HEALTH CARE EDUCATION/TRAINING PROGRAM

## 2019-08-01 PROCEDURE — 93005 ELECTROCARDIOGRAM TRACING: CPT | Performed by: STUDENT IN AN ORGANIZED HEALTH CARE EDUCATION/TRAINING PROGRAM

## 2019-08-01 PROCEDURE — 02HV33Z INSERTION OF INFUSION DEVICE INTO SUPERIOR VENA CAVA, PERCUTANEOUS APPROACH: ICD-10-PCS | Performed by: INTERNAL MEDICINE

## 2019-08-01 PROCEDURE — 82550 ASSAY OF CK (CPK): CPT

## 2019-08-01 PROCEDURE — 87040 BLOOD CULTURE FOR BACTERIA: CPT

## 2019-08-01 PROCEDURE — 36415 COLL VENOUS BLD VENIPUNCTURE: CPT

## 2019-08-01 PROCEDURE — 6370000000 HC RX 637 (ALT 250 FOR IP): Performed by: STUDENT IN AN ORGANIZED HEALTH CARE EDUCATION/TRAINING PROGRAM

## 2019-08-01 RX ORDER — HEPARIN SODIUM 5000 [USP'U]/ML
5000 INJECTION, SOLUTION INTRAVENOUS; SUBCUTANEOUS EVERY 8 HOURS SCHEDULED
Status: DISCONTINUED | OUTPATIENT
Start: 2019-08-01 | End: 2019-08-01 | Stop reason: SDUPTHER

## 2019-08-01 RX ORDER — MAGNESIUM SULFATE 1 G/100ML
1 INJECTION INTRAVENOUS PRN
Status: DISCONTINUED | OUTPATIENT
Start: 2019-08-01 | End: 2019-08-01

## 2019-08-01 RX ORDER — DEXTROSE AND SODIUM CHLORIDE 5; .45 G/100ML; G/100ML
INJECTION, SOLUTION INTRAVENOUS CONTINUOUS PRN
Status: DISCONTINUED | OUTPATIENT
Start: 2019-08-01 | End: 2019-08-01

## 2019-08-01 RX ORDER — SODIUM CHLORIDE 450 MG/100ML
INJECTION, SOLUTION INTRAVENOUS CONTINUOUS
Status: DISCONTINUED | OUTPATIENT
Start: 2019-08-01 | End: 2019-08-03

## 2019-08-01 RX ORDER — PROCHLORPERAZINE EDISYLATE 5 MG/ML
10 INJECTION INTRAMUSCULAR; INTRAVENOUS ONCE
Status: COMPLETED | OUTPATIENT
Start: 2019-08-01 | End: 2019-08-01

## 2019-08-01 RX ORDER — ATORVASTATIN CALCIUM 40 MG/1
80 TABLET, FILM COATED ORAL NIGHTLY
Status: DISCONTINUED | OUTPATIENT
Start: 2019-08-01 | End: 2019-08-04 | Stop reason: HOSPADM

## 2019-08-01 RX ORDER — DIGOXIN 125 MCG
125 TABLET ORAL DAILY
Status: DISCONTINUED | OUTPATIENT
Start: 2019-08-02 | End: 2019-08-03

## 2019-08-01 RX ORDER — DEXTROSE MONOHYDRATE 25 G/50ML
12.5 INJECTION, SOLUTION INTRAVENOUS PRN
Status: DISCONTINUED | OUTPATIENT
Start: 2019-08-01 | End: 2019-08-01

## 2019-08-01 RX ORDER — HEPARIN SODIUM 10000 [USP'U]/ML
5000 INJECTION, SOLUTION INTRAVENOUS; SUBCUTANEOUS EVERY 8 HOURS SCHEDULED
Status: DISCONTINUED | OUTPATIENT
Start: 2019-08-01 | End: 2019-08-04 | Stop reason: HOSPADM

## 2019-08-01 RX ORDER — POTASSIUM CHLORIDE 7.45 MG/ML
10 INJECTION INTRAVENOUS PRN
Status: DISCONTINUED | OUTPATIENT
Start: 2019-08-01 | End: 2019-08-01

## 2019-08-01 RX ORDER — SODIUM CHLORIDE 9 MG/ML
INJECTION, SOLUTION INTRAVENOUS ONCE
Status: COMPLETED | OUTPATIENT
Start: 2019-08-01 | End: 2019-08-01

## 2019-08-01 RX ORDER — POTASSIUM CHLORIDE 7.45 MG/ML
10 INJECTION INTRAVENOUS PRN
Status: DISCONTINUED | OUTPATIENT
Start: 2019-08-01 | End: 2019-08-03

## 2019-08-01 RX ORDER — ONDANSETRON 2 MG/ML
4 INJECTION INTRAMUSCULAR; INTRAVENOUS ONCE
Status: COMPLETED | OUTPATIENT
Start: 2019-08-01 | End: 2019-08-01

## 2019-08-01 RX ORDER — DEXTROSE AND SODIUM CHLORIDE 5; .45 G/100ML; G/100ML
INJECTION, SOLUTION INTRAVENOUS CONTINUOUS PRN
Status: DISCONTINUED | OUTPATIENT
Start: 2019-08-01 | End: 2019-08-04 | Stop reason: HOSPADM

## 2019-08-01 RX ORDER — SODIUM CHLORIDE 9 MG/ML
INJECTION, SOLUTION INTRAVENOUS CONTINUOUS
Status: DISCONTINUED | OUTPATIENT
Start: 2019-08-01 | End: 2019-08-01

## 2019-08-01 RX ORDER — LISINOPRIL 20 MG/1
20 TABLET ORAL DAILY
Status: DISCONTINUED | OUTPATIENT
Start: 2019-08-02 | End: 2019-08-04 | Stop reason: HOSPADM

## 2019-08-01 RX ORDER — MAGNESIUM SULFATE 1 G/100ML
1 INJECTION INTRAVENOUS PRN
Status: DISCONTINUED | OUTPATIENT
Start: 2019-08-01 | End: 2019-08-03

## 2019-08-01 RX ORDER — ASPIRIN 81 MG/1
81 TABLET ORAL DAILY
Status: DISCONTINUED | OUTPATIENT
Start: 2019-08-01 | End: 2019-08-04 | Stop reason: HOSPADM

## 2019-08-01 RX ORDER — 0.9 % SODIUM CHLORIDE 0.9 %
15 INTRAVENOUS SOLUTION INTRAVENOUS ONCE
Status: COMPLETED | OUTPATIENT
Start: 2019-08-01 | End: 2019-08-01

## 2019-08-01 RX ORDER — DEXTROSE MONOHYDRATE 25 G/50ML
12.5 INJECTION, SOLUTION INTRAVENOUS PRN
Status: DISCONTINUED | OUTPATIENT
Start: 2019-08-01 | End: 2019-08-02 | Stop reason: SDUPTHER

## 2019-08-01 RX ADMIN — POTASSIUM CHLORIDE 10 MEQ: 7.46 INJECTION, SOLUTION INTRAVENOUS at 20:58

## 2019-08-01 RX ADMIN — HEPARIN SODIUM 5000 UNITS: 10000 INJECTION INTRAVENOUS; SUBCUTANEOUS at 23:32

## 2019-08-01 RX ADMIN — SODIUM CHLORIDE: 9 INJECTION, SOLUTION INTRAVENOUS at 14:47

## 2019-08-01 RX ADMIN — ATORVASTATIN CALCIUM 80 MG: 40 TABLET, FILM COATED ORAL at 20:57

## 2019-08-01 RX ADMIN — Medication 50 MEQ: at 16:33

## 2019-08-01 RX ADMIN — ASPIRIN 81 MG: 81 TABLET ORAL at 20:57

## 2019-08-01 RX ADMIN — POTASSIUM CHLORIDE 10 MEQ: 7.46 INJECTION, SOLUTION INTRAVENOUS at 19:51

## 2019-08-01 RX ADMIN — SODIUM CHLORIDE 3.4 UNITS/HR: 9 INJECTION, SOLUTION INTRAVENOUS at 19:00

## 2019-08-01 RX ADMIN — SODIUM CHLORIDE: 4.5 INJECTION, SOLUTION INTRAVENOUS at 20:57

## 2019-08-01 RX ADMIN — IPRATROPIUM BROMIDE 0.5 MG: 0.5 SOLUTION RESPIRATORY (INHALATION) at 20:45

## 2019-08-01 RX ADMIN — SODIUM CHLORIDE 0.1 UNITS/KG/HR: 9 INJECTION, SOLUTION INTRAVENOUS at 18:35

## 2019-08-01 RX ADMIN — SODIUM CHLORIDE 1000 ML: 9 INJECTION, SOLUTION INTRAVENOUS at 16:08

## 2019-08-01 RX ADMIN — PROCHLORPERAZINE EDISYLATE 10 MG: 5 INJECTION INTRAMUSCULAR; INTRAVENOUS at 17:15

## 2019-08-01 RX ADMIN — SODIUM CHLORIDE: 9 INJECTION, SOLUTION INTRAVENOUS at 18:33

## 2019-08-01 RX ADMIN — ONDANSETRON HYDROCHLORIDE 4 MG: 2 SOLUTION INTRAMUSCULAR; INTRAVENOUS at 16:07

## 2019-08-01 ASSESSMENT — PAIN DESCRIPTION - LOCATION
LOCATION: CHEST
LOCATION: ABDOMEN;CHEST

## 2019-08-01 ASSESSMENT — ENCOUNTER SYMPTOMS
BLURRED VISION: 0
SINUS PRESSURE: 0
EYE PAIN: 0
WHEEZING: 0
COUGH: 0
ABDOMINAL PAIN: 1
SHORTNESS OF BREATH: 0
EYE DISCHARGE: 0
BACK PAIN: 0
DIARRHEA: 0
SORE THROAT: 0
NAUSEA: 1
EYE REDNESS: 0
VOMITING: 1

## 2019-08-01 ASSESSMENT — PAIN SCALES - GENERAL
PAINLEVEL_OUTOF10: 7
PAINLEVEL_OUTOF10: 6

## 2019-08-01 ASSESSMENT — PAIN DESCRIPTION - PAIN TYPE: TYPE: ACUTE PAIN

## 2019-08-01 NOTE — ED NOTES
Dr. Alba Walker notified of all critical labs called in for this patient. Dayana Calloway Bun  08/01/19 1551

## 2019-08-01 NOTE — ED PROVIDER NOTES
however, patient's peripheral line infiltrated without nursing staff knowing. The patient is found to be hyperkalemic with potassium of 5.8, hyperglycemic with a glucose of 660, leukocytosis of 18.7, AK I with a creatinine of 1.4, bicarb is 4, anion gap is 50. Central line was placed in the right internal jugular vein, patient then finally received his initial IV fluid bolus. The patient received a total of 4 L of IV fluids in the emergency department, 1 amp of bicarb, and insulin in the emergency department prior to admission. Consulted with ICU who agreed to admit the patient. Discussed plan of care with patient and family at bedside, they verbalized understanding and agreement to treatment plan. EKG Interpretation. EKG: This EKG is signed and interpreted by me. Rate: 114  Rhythm: Sinus  Interpretation: Sinus tachycardia, peaked T waves in the septal leads  Comparison: stable as compared to patient's most recent EKG 04/26/19    ED Course as of Aug 01 1808   Thu Aug 01, 2019   1645 Consulted with Dr. Suman Magana, house med attending, who agreed on admission. [KG]   80 Consulted with Dr. Alton Mata, who agreed to admit the patient to the ICU    [KG]   1736 Dr. Carolina Perez consulted with Dr. Kwame Wise, resident. [KG]   1736 Reevaluated the patient. He currently has received 2 L of IV fluids. The second 2 L of IV fluids are in process at this time. Discussed with nurse to give the insulin after these 2 L are administered. The patient was also given an amp of bicarb. [KG]      ED Course User Index  [KG] Carol Moralez,        --------------------------------------------- PAST HISTORY ---------------------------------------------  Past Medical History:  has a past medical history of Alcoholism ([facility] Cocaine abuse ([facility] Diabetes mellitus ([facility] Hypertension, Idiopathic peripheral neuropathy, and Marijuana abuse. Past Surgical History:  has no past surgical history on file.     Social History:  reports that he has been smoking cigarettes. He has a 30.00 pack-year smoking history. He has never used smokeless tobacco. He reports that he drinks about 5.0 standard drinks of alcohol per week. He reports that he has current or past drug history. Drug: Marijuana. Family History: family history includes Cancer in his maternal grandmother; Diabetes type 2  in his mother and nephew. The patients home medications have been reviewed.     Allergies: No known allergies    -------------------------------------------------- RESULTS -------------------------------------------------    LABS:  Results for orders placed or performed during the hospital encounter of 08/01/19   CBC Auto Differential   Result Value Ref Range    WBC 18.7 (H) 4.5 - 11.5 E9/L    RBC 4.20 3.80 - 5.80 E12/L    Hemoglobin 13.0 12.5 - 16.5 g/dL    Hematocrit 41.6 37.0 - 54.0 %    MCV 99.0 80.0 - 99.9 fL    MCH 31.0 26.0 - 35.0 pg    MCHC 31.3 (L) 32.0 - 34.5 %    RDW 14.1 11.5 - 15.0 fL    Platelets 318 (H) 753 - 450 E9/L    MPV 9.1 7.0 - 12.0 fL   Comprehensive Metabolic Panel   Result Value Ref Range    Sodium 136 132 - 146 mmol/L    Potassium 6.7 (HH) 3.5 - 5.0 mmol/L    Chloride 80 (L) 98 - 107 mmol/L    CO2 6 (LL) 22 - 29 mmol/L    Anion Gap 50 (H) 7 - 16 mmol/L    Glucose 622 (HH) 74 - 99 mg/dL    BUN 43 (H) 6 - 20 mg/dL    CREATININE 1.4 (H) 0.7 - 1.2 mg/dL    GFR Non-African American 54 >=60 mL/min/1.73    GFR African American >60     Calcium 9.8 8.6 - 10.2 mg/dL    Total Protein 7.7 6.4 - 8.3 g/dL    Alb 4.5 3.5 - 5.2 g/dL    Total Bilirubin 0.6 0.0 - 1.2 mg/dL    Alkaline Phosphatase 136 (H) 40 - 129 U/L    ALT 96 (H) 0 - 40 U/L    AST 76 (H) 0 - 39 U/L   Lipase   Result Value Ref Range    Lipase 63 (H) 13 - 60 U/L   pH, venous   Result Value Ref Range    pH, Gage 7.14 (LL) 7.35 - 7.45   Beta-Hydroxybutyrate   Result Value Ref Range    Beta-Hydroxybutyrate >4.50 (H) 0.02 - 0.27 mmol/L   Urinalysis   Result Value Ref Range Color, UA Yellow Straw/Yellow    Clarity, UA Clear Clear    Glucose, Ur >=1000 (A) Negative mg/dL    Bilirubin Urine Negative Negative    Ketones, Urine >=80 (A) Negative mg/dL    Specific Gravity, UA 1.025 1.005 - 1.030    Blood, Urine Negative Negative    pH, UA 5.0 5.0 - 9.0    Protein, UA Negative Negative mg/dL    Urobilinogen, Urine 0.2 <2.0 E.U./dL    Nitrite, Urine Negative Negative    Leukocyte Esterase, Urine Negative Negative   Lactic Acid, Plasma   Result Value Ref Range    Lactic Acid 3.9 (H) 0.5 - 2.2 mmol/L   Troponin   Result Value Ref Range    Troponin 0.07 (H) 0.00 - 0.03 ng/mL   Ethanol   Result Value Ref Range    Ethanol Lvl <10 mg/dL   Basic Metabolic Panel   Result Value Ref Range    Sodium 135 132 - 146 mmol/L    Potassium 5.8 (H) 3.5 - 5.0 mmol/L    Chloride 79 (L) 98 - 107 mmol/L    CO2 4 (LL) 22 - 29 mmol/L    Anion Gap 52 (H) 7 - 16 mmol/L    Glucose 660 (HH) 74 - 99 mg/dL    BUN 44 (H) 6 - 20 mg/dL    CREATININE 1.5 (H) 0.7 - 1.2 mg/dL    GFR Non-African American 50 >=60 mL/min/1.73    GFR African American >60     Calcium 9.3 8.6 - 10.2 mg/dL   Magnesium   Result Value Ref Range    Magnesium 2.8 (H) 1.6 - 2.6 mg/dL   Phosphorus   Result Value Ref Range    Phosphorus 8.3 (H) 2.5 - 4.5 mg/dL   POCT Glucose   Result Value Ref Range    Meter Glucose >500 (H) 74 - 99 mg/dL   EKG 12 Lead   Result Value Ref Range    Ventricular Rate 114 BPM    Atrial Rate 114 BPM    P-R Interval 130 ms    QRS Duration 88 ms    Q-T Interval 370 ms    QTc Calculation (Bazett) 509 ms    P Axis 64 degrees    R Axis 68 degrees    T Axis 52 degrees       RADIOLOGY:  XR CHEST PORTABLE   Final Result   Right internal jugular central venous catheter tip in SVC      No pneumothorax on the right or on the left.       XR CHEST PORTABLE   Final Result      No airspace opacities or pleural effusion.                    ------------------------- NURSING NOTES AND VITALS REVIEWED ---------------------------  Date / Time Roomed:

## 2019-08-01 NOTE — H&P
electrolytes to calculate FENA  - Cr today was 1.4 and baseline is . 9  - will continue to trend cr    3. Leukocytosis   -WBC was 18.7 on admission  - pt reports cough and white sputum production  - f/u with blood cultures, legionella antigen, strep penumo antigen, resp panel with culture, pro calcitonin and gram stain    4. Paroxismal Atrial fibrillation   - EKG shows sinus tachycardia  - HOLD digoxin for now in setting of KIRSTEN because digoxin is renally cleared   -  digoxin level ordered  - may consider adding B Blocker if pt is tachycardic  - pt reports to being on eliquis but it was discontinued at some point  - CADVASC score is 4 and considered a moderate to high risk and should be anticoagulated    5. Essential HTN  HOLD lisinopril in setting of KIRSTEN  Continue to monitor BP    6. Elevated troponin  - troponin >.07   - ordered CK and CKmb   - continue to trend and monitor    Elevated liver enzymes 2/2 ETOH abuse  - Alk phos 136, Alt 96 and ast 76  - continue to follow    7. Polysubstance abuse  - ordered urine drug screen to assess    8 Tobacco abuse  - 25 pack yo smoking hx  - educate pt on smoking cessation    9. Hx of stroke  - continue on Asprin and atorvastatin 80 mg     10.  ETOH abuse  -watch for withdrawal and consider CIWA proticol      PT/OT evaluation: not ordered  DVT prophylaxis: heparin  Disposition: icu     Arianne Garcia DO, PGY-1   Attending physician: Dr. Darius Castillo

## 2019-08-02 ENCOUNTER — APPOINTMENT (OUTPATIENT)
Dept: GENERAL RADIOLOGY | Age: 49
DRG: 637 | End: 2019-08-02

## 2019-08-02 LAB
AMPHETAMINE SCREEN, URINE: NOT DETECTED
ANION GAP SERPL CALCULATED.3IONS-SCNC: 10 MMOL/L (ref 7–16)
ANION GAP SERPL CALCULATED.3IONS-SCNC: 12 MMOL/L (ref 7–16)
ANION GAP SERPL CALCULATED.3IONS-SCNC: 12 MMOL/L (ref 7–16)
ANION GAP SERPL CALCULATED.3IONS-SCNC: 23 MMOL/L (ref 7–16)
ANION GAP SERPL CALCULATED.3IONS-SCNC: 35 MMOL/L (ref 7–16)
ANION GAP SERPL CALCULATED.3IONS-SCNC: 9 MMOL/L (ref 7–16)
ANISOCYTOSIS: ABNORMAL
BARBITURATE SCREEN URINE: NOT DETECTED
BASOPHILS ABSOLUTE: 0 E9/L (ref 0–0.2)
BASOPHILS RELATIVE PERCENT: 0.2 % (ref 0–2)
BENZODIAZEPINE SCREEN, URINE: NOT DETECTED
BUN BLDV-MCNC: 12 MG/DL (ref 6–20)
BUN BLDV-MCNC: 14 MG/DL (ref 6–20)
BUN BLDV-MCNC: 17 MG/DL (ref 6–20)
BUN BLDV-MCNC: 22 MG/DL (ref 6–20)
BUN BLDV-MCNC: 27 MG/DL (ref 6–20)
BUN BLDV-MCNC: 34 MG/DL (ref 6–20)
CALCIUM SERPL-MCNC: 7.5 MG/DL (ref 8.6–10.2)
CALCIUM SERPL-MCNC: 7.6 MG/DL (ref 8.6–10.2)
CALCIUM SERPL-MCNC: 7.6 MG/DL (ref 8.6–10.2)
CALCIUM SERPL-MCNC: 7.9 MG/DL (ref 8.6–10.2)
CALCIUM SERPL-MCNC: 8 MG/DL (ref 8.6–10.2)
CALCIUM SERPL-MCNC: 8.1 MG/DL (ref 8.6–10.2)
CANNABINOID SCREEN URINE: NOT DETECTED
CHLORIDE BLD-SCNC: 100 MMOL/L (ref 98–107)
CHLORIDE BLD-SCNC: 101 MMOL/L (ref 98–107)
CHLORIDE BLD-SCNC: 101 MMOL/L (ref 98–107)
CHLORIDE BLD-SCNC: 95 MMOL/L (ref 98–107)
CHLORIDE BLD-SCNC: 96 MMOL/L (ref 98–107)
CHLORIDE BLD-SCNC: 97 MMOL/L (ref 98–107)
CHLORIDE URINE RANDOM: <20 MMOL/L
CK MB: 4.5 NG/ML (ref 0–7.7)
CK MB: 4.9 NG/ML (ref 0–7.7)
CO2: 14 MMOL/L (ref 22–29)
CO2: 22 MMOL/L (ref 22–29)
CO2: 23 MMOL/L (ref 22–29)
CO2: 23 MMOL/L (ref 22–29)
CO2: 24 MMOL/L (ref 22–29)
CO2: 6 MMOL/L (ref 22–29)
COCAINE METABOLITE SCREEN URINE: POSITIVE
CREAT SERPL-MCNC: 0.7 MG/DL (ref 0.7–1.2)
CREAT SERPL-MCNC: 0.8 MG/DL (ref 0.7–1.2)
CREAT SERPL-MCNC: 0.9 MG/DL (ref 0.7–1.2)
CREAT SERPL-MCNC: 1.1 MG/DL (ref 0.7–1.2)
CREATININE URINE: 27 MG/DL (ref 40–278)
DIGOXIN LEVEL: 0.3 NG/ML (ref 0.8–2)
EOSINOPHILS ABSOLUTE: 0 E9/L (ref 0.05–0.5)
EOSINOPHILS RELATIVE PERCENT: 0.2 % (ref 0–6)
FILM ARRAY ADENOVIRUS: NORMAL
FILM ARRAY BORDETELLA PERTUSSIS: NORMAL
FILM ARRAY CHLAMYDOPHILIA PNEUMONIAE: NORMAL
FILM ARRAY CORONAVIRUS 229E: NORMAL
FILM ARRAY CORONAVIRUS HKU1: NORMAL
FILM ARRAY CORONAVIRUS NL63: NORMAL
FILM ARRAY CORONAVIRUS OC43: NORMAL
FILM ARRAY INFLUENZA A VIRUS 09H1: NORMAL
FILM ARRAY INFLUENZA A VIRUS H1: NORMAL
FILM ARRAY INFLUENZA A VIRUS H3: NORMAL
FILM ARRAY INFLUENZA A VIRUS: NORMAL
FILM ARRAY INFLUENZA B: NORMAL
FILM ARRAY METAPNEUMOVIRUS: NORMAL
FILM ARRAY MYCOPLASMA PNEUMONIAE: NORMAL
FILM ARRAY PARAINFLUENZA VIRUS 1: NORMAL
FILM ARRAY PARAINFLUENZA VIRUS 2: NORMAL
FILM ARRAY PARAINFLUENZA VIRUS 3: NORMAL
FILM ARRAY PARAINFLUENZA VIRUS 4: NORMAL
FILM ARRAY RESPIRATORY SYNCITIAL VIRUS: NORMAL
FILM ARRAY RHINOVIRUS/ENTEROVIRUS: NORMAL
GFR AFRICAN AMERICAN: >60
GFR NON-AFRICAN AMERICAN: >60 ML/MIN/1.73
GLUCOSE BLD-MCNC: 151 MG/DL (ref 74–99)
GLUCOSE BLD-MCNC: 200 MG/DL (ref 74–99)
GLUCOSE BLD-MCNC: 241 MG/DL (ref 74–99)
GLUCOSE BLD-MCNC: 268 MG/DL (ref 74–99)
GLUCOSE BLD-MCNC: 302 MG/DL (ref 74–99)
GLUCOSE BLD-MCNC: 337 MG/DL (ref 74–99)
HCT VFR BLD CALC: 29 % (ref 37–54)
HEMOGLOBIN: 9.5 G/DL (ref 12.5–16.5)
HYPOCHROMIA: ABNORMAL
L. PNEUMOPHILA SEROGP 1 UR AG: NORMAL
LACTIC ACID: 0.8 MMOL/L (ref 0.5–2.2)
LACTIC ACID: 0.9 MMOL/L (ref 0.5–2.2)
LACTIC ACID: 1.2 MMOL/L (ref 0.5–2.2)
LYMPHOCYTES ABSOLUTE: 1.93 E9/L (ref 1.5–4)
LYMPHOCYTES RELATIVE PERCENT: 10.4 % (ref 20–42)
MAGNESIUM: 2 MG/DL (ref 1.6–2.6)
MAGNESIUM: 2.1 MG/DL (ref 1.6–2.6)
MAGNESIUM: 2.2 MG/DL (ref 1.6–2.6)
MCH RBC QN AUTO: 30.9 PG (ref 26–35)
MCHC RBC AUTO-ENTMCNC: 32.8 % (ref 32–34.5)
MCV RBC AUTO: 94.5 FL (ref 80–99.9)
METAMYELOCYTES RELATIVE PERCENT: 0.9 % (ref 0–1)
METER GLUCOSE: 113 MG/DL (ref 74–99)
METER GLUCOSE: 114 MG/DL (ref 74–99)
METER GLUCOSE: 117 MG/DL (ref 74–99)
METER GLUCOSE: 151 MG/DL (ref 74–99)
METER GLUCOSE: 167 MG/DL (ref 74–99)
METER GLUCOSE: 177 MG/DL (ref 74–99)
METER GLUCOSE: 194 MG/DL (ref 74–99)
METER GLUCOSE: 214 MG/DL (ref 74–99)
METER GLUCOSE: 225 MG/DL (ref 74–99)
METER GLUCOSE: 226 MG/DL (ref 74–99)
METER GLUCOSE: 232 MG/DL (ref 74–99)
METER GLUCOSE: 246 MG/DL (ref 74–99)
METER GLUCOSE: 247 MG/DL (ref 74–99)
METER GLUCOSE: 270 MG/DL (ref 74–99)
METER GLUCOSE: 270 MG/DL (ref 74–99)
METER GLUCOSE: 277 MG/DL (ref 74–99)
METER GLUCOSE: 283 MG/DL (ref 74–99)
METER GLUCOSE: 288 MG/DL (ref 74–99)
METER GLUCOSE: 292 MG/DL (ref 74–99)
METER GLUCOSE: 302 MG/DL (ref 74–99)
METHADONE SCREEN, URINE: NOT DETECTED
MONOCYTES ABSOLUTE: 1.74 E9/L (ref 0.1–0.95)
MONOCYTES RELATIVE PERCENT: 8.7 % (ref 2–12)
MYELOCYTE PERCENT: 0.9 % (ref 0–0)
NEUTROPHILS ABSOLUTE: 15.63 E9/L (ref 1.8–7.3)
NEUTROPHILS RELATIVE PERCENT: 79.1 % (ref 43–80)
OPIATE SCREEN URINE: NOT DETECTED
PDW BLD-RTO: 14.2 FL (ref 11.5–15)
PHENCYCLIDINE SCREEN URINE: NOT DETECTED
PHOSPHORUS: 1.6 MG/DL (ref 2.5–4.5)
PHOSPHORUS: 1.6 MG/DL (ref 2.5–4.5)
PHOSPHORUS: 1.9 MG/DL (ref 2.5–4.5)
PHOSPHORUS: 2.1 MG/DL (ref 2.5–4.5)
PHOSPHORUS: 2.3 MG/DL (ref 2.5–4.5)
PLATELET # BLD: 420 E9/L (ref 130–450)
PMV BLD AUTO: 8.4 FL (ref 7–12)
POIKILOCYTES: ABNORMAL
POLYCHROMASIA: ABNORMAL
POTASSIUM SERPL-SCNC: 3.4 MMOL/L (ref 3.5–5)
POTASSIUM SERPL-SCNC: 3.8 MMOL/L (ref 3.5–5)
POTASSIUM SERPL-SCNC: 3.9 MMOL/L (ref 3.5–5)
POTASSIUM SERPL-SCNC: 4 MMOL/L (ref 3.5–5)
POTASSIUM SERPL-SCNC: 4.2 MMOL/L (ref 3.5–5)
POTASSIUM SERPL-SCNC: 4.5 MMOL/L (ref 3.5–5)
POTASSIUM, UR: 38.6 MMOL/L
PROPOXYPHENE SCREEN: NOT DETECTED
RBC # BLD: 3.07 E12/L (ref 3.8–5.8)
SODIUM BLD-SCNC: 132 MMOL/L (ref 132–146)
SODIUM BLD-SCNC: 132 MMOL/L (ref 132–146)
SODIUM BLD-SCNC: 133 MMOL/L (ref 132–146)
SODIUM BLD-SCNC: 135 MMOL/L (ref 132–146)
SODIUM BLD-SCNC: 135 MMOL/L (ref 132–146)
SODIUM BLD-SCNC: 136 MMOL/L (ref 132–146)
SODIUM URINE: 64 MMOL/L
STREP PNEUMONIAE ANTIGEN, URINE: NORMAL
TOTAL CK: 107 U/L (ref 20–200)
TOTAL CK: 93 U/L (ref 20–200)
TROPONIN: 0.02 NG/ML (ref 0–0.03)
TROPONIN: 0.02 NG/ML (ref 0–0.03)
UREA NITROGEN, UR: 185 MG/DL (ref 800–1666)
VITAMIN D 25-HYDROXY: 23 NG/ML (ref 30–100)
WBC # BLD: 19.3 E9/L (ref 4.5–11.5)

## 2019-08-02 PROCEDURE — 36592 COLLECT BLOOD FROM PICC: CPT

## 2019-08-02 PROCEDURE — 94640 AIRWAY INHALATION TREATMENT: CPT

## 2019-08-02 PROCEDURE — 6360000002 HC RX W HCPCS: Performed by: INTERNAL MEDICINE

## 2019-08-02 PROCEDURE — 93005 ELECTROCARDIOGRAM TRACING: CPT | Performed by: INTERNAL MEDICINE

## 2019-08-02 PROCEDURE — 2500000003 HC RX 250 WO HCPCS: Performed by: INTERNAL MEDICINE

## 2019-08-02 PROCEDURE — 2000000000 HC ICU R&B

## 2019-08-02 PROCEDURE — 71045 X-RAY EXAM CHEST 1 VIEW: CPT

## 2019-08-02 PROCEDURE — 83735 ASSAY OF MAGNESIUM: CPT

## 2019-08-02 PROCEDURE — 84100 ASSAY OF PHOSPHORUS: CPT

## 2019-08-02 PROCEDURE — 82553 CREATINE MB FRACTION: CPT

## 2019-08-02 PROCEDURE — 99223 1ST HOSP IP/OBS HIGH 75: CPT | Performed by: INTERNAL MEDICINE

## 2019-08-02 PROCEDURE — 2580000003 HC RX 258

## 2019-08-02 PROCEDURE — 82306 VITAMIN D 25 HYDROXY: CPT

## 2019-08-02 PROCEDURE — 85025 COMPLETE CBC W/AUTO DIFF WBC: CPT

## 2019-08-02 PROCEDURE — 83605 ASSAY OF LACTIC ACID: CPT

## 2019-08-02 PROCEDURE — 2500000003 HC RX 250 WO HCPCS

## 2019-08-02 PROCEDURE — 2580000003 HC RX 258: Performed by: INTERNAL MEDICINE

## 2019-08-02 PROCEDURE — 84484 ASSAY OF TROPONIN QUANT: CPT

## 2019-08-02 PROCEDURE — 80162 ASSAY OF DIGOXIN TOTAL: CPT

## 2019-08-02 PROCEDURE — 6370000000 HC RX 637 (ALT 250 FOR IP): Performed by: INTERNAL MEDICINE

## 2019-08-02 PROCEDURE — 36415 COLL VENOUS BLD VENIPUNCTURE: CPT

## 2019-08-02 PROCEDURE — 80048 BASIC METABOLIC PNL TOTAL CA: CPT

## 2019-08-02 PROCEDURE — 82962 GLUCOSE BLOOD TEST: CPT

## 2019-08-02 PROCEDURE — 82550 ASSAY OF CK (CPK): CPT

## 2019-08-02 RX ORDER — DEXTROSE MONOHYDRATE 25 G/50ML
12.5 INJECTION, SOLUTION INTRAVENOUS PRN
Status: DISCONTINUED | OUTPATIENT
Start: 2019-08-02 | End: 2019-08-04 | Stop reason: HOSPADM

## 2019-08-02 RX ORDER — INSULIN GLARGINE 100 [IU]/ML
20 INJECTION, SOLUTION SUBCUTANEOUS NIGHTLY
Status: DISCONTINUED | OUTPATIENT
Start: 2019-08-02 | End: 2019-08-02

## 2019-08-02 RX ORDER — NICOTINE POLACRILEX 4 MG
15 LOZENGE BUCCAL PRN
Status: DISCONTINUED | OUTPATIENT
Start: 2019-08-02 | End: 2019-08-02 | Stop reason: SDUPTHER

## 2019-08-02 RX ORDER — DEXTROSE MONOHYDRATE 50 MG/ML
100 INJECTION, SOLUTION INTRAVENOUS PRN
Status: DISCONTINUED | OUTPATIENT
Start: 2019-08-02 | End: 2019-08-04 | Stop reason: HOSPADM

## 2019-08-02 RX ORDER — NICOTINE POLACRILEX 4 MG
15 LOZENGE BUCCAL PRN
Status: DISCONTINUED | OUTPATIENT
Start: 2019-08-02 | End: 2019-08-04 | Stop reason: HOSPADM

## 2019-08-02 RX ORDER — DIGOXIN 125 MCG
125 TABLET ORAL ONCE
Status: COMPLETED | OUTPATIENT
Start: 2019-08-02 | End: 2019-08-02

## 2019-08-02 RX ORDER — SODIUM CHLORIDE 0.9 % (FLUSH) 0.9 %
SYRINGE (ML) INJECTION
Status: COMPLETED
Start: 2019-08-02 | End: 2019-08-02

## 2019-08-02 RX ORDER — PANTOPRAZOLE SODIUM 40 MG/1
40 TABLET, DELAYED RELEASE ORAL
Status: DISCONTINUED | OUTPATIENT
Start: 2019-08-02 | End: 2019-08-03

## 2019-08-02 RX ORDER — INSULIN GLARGINE 100 [IU]/ML
36 INJECTION, SOLUTION SUBCUTANEOUS NIGHTLY
Status: DISCONTINUED | OUTPATIENT
Start: 2019-08-02 | End: 2019-08-04 | Stop reason: HOSPADM

## 2019-08-02 RX ORDER — DEXTROSE MONOHYDRATE 25 G/50ML
12.5 INJECTION, SOLUTION INTRAVENOUS PRN
Status: DISCONTINUED | OUTPATIENT
Start: 2019-08-02 | End: 2019-08-02 | Stop reason: SDUPTHER

## 2019-08-02 RX ADMIN — VANCOMYCIN HYDROCHLORIDE 1250 MG: 10 INJECTION, POWDER, LYOPHILIZED, FOR SOLUTION INTRAVENOUS at 00:19

## 2019-08-02 RX ADMIN — INSULIN GLARGINE 36 UNITS: 100 INJECTION, SOLUTION SUBCUTANEOUS at 13:53

## 2019-08-02 RX ADMIN — INSULIN LISPRO 4 UNITS: 100 INJECTION, SOLUTION INTRAVENOUS; SUBCUTANEOUS at 16:25

## 2019-08-02 RX ADMIN — PIPERACILLIN SODIUM AND TAZOBACTAM SODIUM 3.38 G: 3; .375 INJECTION, POWDER, LYOPHILIZED, FOR SOLUTION INTRAVENOUS at 08:10

## 2019-08-02 RX ADMIN — PIPERACILLIN SODIUM AND TAZOBACTAM SODIUM 3.38 G: 3; .375 INJECTION, POWDER, LYOPHILIZED, FOR SOLUTION INTRAVENOUS at 17:15

## 2019-08-02 RX ADMIN — ATORVASTATIN CALCIUM 80 MG: 40 TABLET, FILM COATED ORAL at 20:34

## 2019-08-02 RX ADMIN — IPRATROPIUM BROMIDE 0.5 MG: 0.5 SOLUTION RESPIRATORY (INHALATION) at 08:12

## 2019-08-02 RX ADMIN — POTASSIUM CHLORIDE 10 MEQ: 7.46 INJECTION, SOLUTION INTRAVENOUS at 06:16

## 2019-08-02 RX ADMIN — ASPIRIN 81 MG: 81 TABLET ORAL at 08:14

## 2019-08-02 RX ADMIN — DIGOXIN 125 MCG: 125 TABLET ORAL at 09:46

## 2019-08-02 RX ADMIN — Medication 10 ML: at 08:12

## 2019-08-02 RX ADMIN — PANTOPRAZOLE SODIUM 40 MG: 40 TABLET, DELAYED RELEASE ORAL at 08:14

## 2019-08-02 RX ADMIN — SODIUM PHOSPHATE, MONOBASIC, MONOHYDRATE 10 MMOL: 276; 142 INJECTION, SOLUTION INTRAVENOUS at 02:32

## 2019-08-02 RX ADMIN — DIGOXIN 125 MCG: 125 TABLET ORAL at 02:00

## 2019-08-02 RX ADMIN — LISINOPRIL 20 MG: 20 TABLET ORAL at 08:14

## 2019-08-02 RX ADMIN — IPRATROPIUM BROMIDE 0.5 MG: 0.5 SOLUTION RESPIRATORY (INHALATION) at 13:29

## 2019-08-02 RX ADMIN — HEPARIN SODIUM 5000 UNITS: 10000 INJECTION INTRAVENOUS; SUBCUTANEOUS at 22:02

## 2019-08-02 RX ADMIN — SODIUM PHOSPHATE, MONOBASIC, MONOHYDRATE 20 MMOL: 276; 142 INJECTION, SOLUTION INTRAVENOUS at 16:36

## 2019-08-02 RX ADMIN — IPRATROPIUM BROMIDE 0.5 MG: 0.5 SOLUTION RESPIRATORY (INHALATION) at 21:14

## 2019-08-02 RX ADMIN — SODIUM PHOSPHATE, MONOBASIC, MONOHYDRATE 15 MMOL: 276; 142 INJECTION, SOLUTION INTRAVENOUS at 06:16

## 2019-08-02 RX ADMIN — INSULIN LISPRO 5 UNITS: 100 INJECTION, SOLUTION INTRAVENOUS; SUBCUTANEOUS at 20:34

## 2019-08-02 RX ADMIN — CALCIUM GLUCONATE 1 G: 98 INJECTION, SOLUTION INTRAVENOUS at 16:32

## 2019-08-02 RX ADMIN — SODIUM CHLORIDE 0.17 UNITS/KG/HR: 9 INJECTION, SOLUTION INTRAVENOUS at 07:53

## 2019-08-02 RX ADMIN — POTASSIUM CHLORIDE 10 MEQ: 7.46 INJECTION, SOLUTION INTRAVENOUS at 02:15

## 2019-08-02 RX ADMIN — POTASSIUM CHLORIDE 10 MEQ: 7.46 INJECTION, SOLUTION INTRAVENOUS at 06:41

## 2019-08-02 RX ADMIN — HEPARIN SODIUM 5000 UNITS: 10000 INJECTION INTRAVENOUS; SUBCUTANEOUS at 06:39

## 2019-08-02 RX ADMIN — Medication 50 MEQ: at 01:48

## 2019-08-02 RX ADMIN — CALCIUM GLUCONATE 1 G: 98 INJECTION, SOLUTION INTRAVENOUS at 11:00

## 2019-08-02 RX ADMIN — PIPERACILLIN SODIUM AND TAZOBACTAM SODIUM 3.38 G: 3; .375 INJECTION, POWDER, LYOPHILIZED, FOR SOLUTION INTRAVENOUS at 00:03

## 2019-08-02 RX ADMIN — IPRATROPIUM BROMIDE 0.5 MG: 0.5 SOLUTION RESPIRATORY (INHALATION) at 16:01

## 2019-08-02 RX ADMIN — HEPARIN SODIUM 5000 UNITS: 10000 INJECTION INTRAVENOUS; SUBCUTANEOUS at 13:52

## 2019-08-02 RX ADMIN — DEXTROSE AND SODIUM CHLORIDE: 5; 450 INJECTION, SOLUTION INTRAVENOUS at 18:21

## 2019-08-02 RX ADMIN — DEXTROSE AND SODIUM CHLORIDE: 5; 450 INJECTION, SOLUTION INTRAVENOUS at 11:00

## 2019-08-02 RX ADMIN — SODIUM CHLORIDE: 4.5 INJECTION, SOLUTION INTRAVENOUS at 01:29

## 2019-08-02 RX ADMIN — POTASSIUM CHLORIDE 10 MEQ: 7.46 INJECTION, SOLUTION INTRAVENOUS at 01:52

## 2019-08-02 RX ADMIN — POTASSIUM CHLORIDE 10 MEQ: 7.46 INJECTION, SOLUTION INTRAVENOUS at 07:17

## 2019-08-02 RX ADMIN — DEXTROSE AND SODIUM CHLORIDE: 5; 450 INJECTION, SOLUTION INTRAVENOUS at 03:22

## 2019-08-02 ASSESSMENT — ENCOUNTER SYMPTOMS
NAUSEA: 1
ABDOMINAL PAIN: 0
WHEEZING: 0
SHORTNESS OF BREATH: 0
DIARRHEA: 0
SINUS PAIN: 0
VOMITING: 1
CONSTIPATION: 0
BLOOD IN STOOL: 0
RHINORRHEA: 0
COUGH: 1
STRIDOR: 0

## 2019-08-02 ASSESSMENT — PAIN SCALES - GENERAL
PAINLEVEL_OUTOF10: 0

## 2019-08-03 ENCOUNTER — APPOINTMENT (OUTPATIENT)
Dept: GENERAL RADIOLOGY | Age: 49
DRG: 637 | End: 2019-08-03

## 2019-08-03 LAB
ANION GAP SERPL CALCULATED.3IONS-SCNC: 11 MMOL/L (ref 7–16)
ANION GAP SERPL CALCULATED.3IONS-SCNC: 11 MMOL/L (ref 7–16)
BASOPHILS ABSOLUTE: 0.01 E9/L (ref 0–0.2)
BASOPHILS RELATIVE PERCENT: 0.1 % (ref 0–2)
BUN BLDV-MCNC: 10 MG/DL (ref 6–20)
BUN BLDV-MCNC: 9 MG/DL (ref 6–20)
CALCIUM SERPL-MCNC: 8 MG/DL (ref 8.6–10.2)
CALCIUM SERPL-MCNC: 8.1 MG/DL (ref 8.6–10.2)
CHLORIDE BLD-SCNC: 100 MMOL/L (ref 98–107)
CHLORIDE BLD-SCNC: 99 MMOL/L (ref 98–107)
CO2: 23 MMOL/L (ref 22–29)
CO2: 24 MMOL/L (ref 22–29)
CREAT SERPL-MCNC: 0.6 MG/DL (ref 0.7–1.2)
CREAT SERPL-MCNC: 0.7 MG/DL (ref 0.7–1.2)
DIGOXIN LEVEL: <0.3 NG/ML (ref 0.8–2)
EOSINOPHILS ABSOLUTE: 0.05 E9/L (ref 0.05–0.5)
EOSINOPHILS RELATIVE PERCENT: 0.5 % (ref 0–6)
GFR AFRICAN AMERICAN: >60
GFR AFRICAN AMERICAN: >60
GFR NON-AFRICAN AMERICAN: >60 ML/MIN/1.73
GFR NON-AFRICAN AMERICAN: >60 ML/MIN/1.73
GLUCOSE BLD-MCNC: 219 MG/DL (ref 74–99)
GLUCOSE BLD-MCNC: 235 MG/DL (ref 74–99)
HCT VFR BLD CALC: 29.1 % (ref 37–54)
HEMOGLOBIN: 9.8 G/DL (ref 12.5–16.5)
IMMATURE GRANULOCYTES #: 0.06 E9/L
IMMATURE GRANULOCYTES %: 0.7 % (ref 0–5)
LYMPHOCYTES ABSOLUTE: 1.13 E9/L (ref 1.5–4)
LYMPHOCYTES RELATIVE PERCENT: 12.3 % (ref 20–42)
MAGNESIUM: 2.1 MG/DL (ref 1.6–2.6)
MAGNESIUM: 2.1 MG/DL (ref 1.6–2.6)
MCH RBC QN AUTO: 30.9 PG (ref 26–35)
MCHC RBC AUTO-ENTMCNC: 33.7 % (ref 32–34.5)
MCV RBC AUTO: 91.8 FL (ref 80–99.9)
METER GLUCOSE: 172 MG/DL (ref 74–99)
METER GLUCOSE: 174 MG/DL (ref 74–99)
METER GLUCOSE: 185 MG/DL (ref 74–99)
METER GLUCOSE: 197 MG/DL (ref 74–99)
METER GLUCOSE: 213 MG/DL (ref 74–99)
METER GLUCOSE: 221 MG/DL (ref 74–99)
METER GLUCOSE: 232 MG/DL (ref 74–99)
MONOCYTES ABSOLUTE: 1.18 E9/L (ref 0.1–0.95)
MONOCYTES RELATIVE PERCENT: 12.9 % (ref 2–12)
MRSA CULTURE ONLY: NORMAL
NEUTROPHILS ABSOLUTE: 6.74 E9/L (ref 1.8–7.3)
NEUTROPHILS RELATIVE PERCENT: 73.5 % (ref 43–80)
PDW BLD-RTO: 13.7 FL (ref 11.5–15)
PHOSPHORUS: 1.3 MG/DL (ref 2.5–4.5)
PHOSPHORUS: 1.6 MG/DL (ref 2.5–4.5)
PLATELET # BLD: 407 E9/L (ref 130–450)
PMV BLD AUTO: 8.6 FL (ref 7–12)
POTASSIUM SERPL-SCNC: 3.4 MMOL/L (ref 3.5–5)
POTASSIUM SERPL-SCNC: 3.5 MMOL/L (ref 3.5–5)
RBC # BLD: 3.17 E12/L (ref 3.8–5.8)
SODIUM BLD-SCNC: 133 MMOL/L (ref 132–146)
SODIUM BLD-SCNC: 135 MMOL/L (ref 132–146)
WBC # BLD: 9.2 E9/L (ref 4.5–11.5)

## 2019-08-03 PROCEDURE — 87070 CULTURE OTHR SPECIMN AEROBIC: CPT

## 2019-08-03 PROCEDURE — 2140000000 HC CCU INTERMEDIATE R&B

## 2019-08-03 PROCEDURE — 36415 COLL VENOUS BLD VENIPUNCTURE: CPT

## 2019-08-03 PROCEDURE — 6370000000 HC RX 637 (ALT 250 FOR IP): Performed by: INTERNAL MEDICINE

## 2019-08-03 PROCEDURE — 83735 ASSAY OF MAGNESIUM: CPT

## 2019-08-03 PROCEDURE — 87205 SMEAR GRAM STAIN: CPT

## 2019-08-03 PROCEDURE — 2580000003 HC RX 258: Performed by: INTERNAL MEDICINE

## 2019-08-03 PROCEDURE — 6360000002 HC RX W HCPCS: Performed by: INTERNAL MEDICINE

## 2019-08-03 PROCEDURE — 36592 COLLECT BLOOD FROM PICC: CPT

## 2019-08-03 PROCEDURE — 82962 GLUCOSE BLOOD TEST: CPT

## 2019-08-03 PROCEDURE — 2500000003 HC RX 250 WO HCPCS: Performed by: INTERNAL MEDICINE

## 2019-08-03 PROCEDURE — 93005 ELECTROCARDIOGRAM TRACING: CPT | Performed by: INTERNAL MEDICINE

## 2019-08-03 PROCEDURE — 71045 X-RAY EXAM CHEST 1 VIEW: CPT

## 2019-08-03 PROCEDURE — 87206 SMEAR FLUORESCENT/ACID STAI: CPT

## 2019-08-03 PROCEDURE — 99232 SBSQ HOSP IP/OBS MODERATE 35: CPT | Performed by: INTERNAL MEDICINE

## 2019-08-03 PROCEDURE — 80048 BASIC METABOLIC PNL TOTAL CA: CPT

## 2019-08-03 PROCEDURE — 85025 COMPLETE CBC W/AUTO DIFF WBC: CPT

## 2019-08-03 PROCEDURE — 94640 AIRWAY INHALATION TREATMENT: CPT

## 2019-08-03 PROCEDURE — 84100 ASSAY OF PHOSPHORUS: CPT

## 2019-08-03 PROCEDURE — 80162 ASSAY OF DIGOXIN TOTAL: CPT

## 2019-08-03 RX ORDER — PANTOPRAZOLE SODIUM 40 MG/1
40 TABLET, DELAYED RELEASE ORAL
Status: DISCONTINUED | OUTPATIENT
Start: 2019-08-03 | End: 2019-08-04 | Stop reason: HOSPADM

## 2019-08-03 RX ORDER — DIPHENHYDRAMINE HCL 25 MG
25 TABLET ORAL ONCE
Status: COMPLETED | OUTPATIENT
Start: 2019-08-04 | End: 2019-08-03

## 2019-08-03 RX ADMIN — SODIUM PHOSPHATE, MONOBASIC, MONOHYDRATE 20 MMOL: 276; 142 INJECTION, SOLUTION INTRAVENOUS at 02:27

## 2019-08-03 RX ADMIN — PIPERACILLIN SODIUM AND TAZOBACTAM SODIUM 3.38 G: 3; .375 INJECTION, POWDER, LYOPHILIZED, FOR SOLUTION INTRAVENOUS at 08:35

## 2019-08-03 RX ADMIN — INSULIN LISPRO 5 UNITS: 100 INJECTION, SOLUTION INTRAVENOUS; SUBCUTANEOUS at 16:46

## 2019-08-03 RX ADMIN — POTASSIUM CHLORIDE 10 MEQ: 7.46 INJECTION, SOLUTION INTRAVENOUS at 07:40

## 2019-08-03 RX ADMIN — PANTOPRAZOLE SODIUM 40 MG: 40 TABLET, DELAYED RELEASE ORAL at 06:16

## 2019-08-03 RX ADMIN — POTASSIUM CHLORIDE 10 MEQ: 7.46 INJECTION, SOLUTION INTRAVENOUS at 08:36

## 2019-08-03 RX ADMIN — LISINOPRIL 20 MG: 20 TABLET ORAL at 08:35

## 2019-08-03 RX ADMIN — INSULIN GLARGINE 36 UNITS: 100 INJECTION, SOLUTION SUBCUTANEOUS at 21:06

## 2019-08-03 RX ADMIN — PANTOPRAZOLE SODIUM 40 MG: 40 TABLET, DELAYED RELEASE ORAL at 16:45

## 2019-08-03 RX ADMIN — INSULIN LISPRO 1 UNITS: 100 INJECTION, SOLUTION INTRAVENOUS; SUBCUTANEOUS at 21:06

## 2019-08-03 RX ADMIN — INSULIN LISPRO 2 UNITS: 100 INJECTION, SOLUTION INTRAVENOUS; SUBCUTANEOUS at 08:30

## 2019-08-03 RX ADMIN — INSULIN LISPRO 5 UNITS: 100 INJECTION, SOLUTION INTRAVENOUS; SUBCUTANEOUS at 13:27

## 2019-08-03 RX ADMIN — DIPHENHYDRAMINE HCL 25 MG: 25 TABLET ORAL at 23:56

## 2019-08-03 RX ADMIN — IPRATROPIUM BROMIDE 0.5 MG: 0.5 SOLUTION RESPIRATORY (INHALATION) at 08:34

## 2019-08-03 RX ADMIN — ATORVASTATIN CALCIUM 80 MG: 40 TABLET, FILM COATED ORAL at 21:01

## 2019-08-03 RX ADMIN — PIPERACILLIN SODIUM AND TAZOBACTAM SODIUM 3.38 G: 3; .375 INJECTION, POWDER, LYOPHILIZED, FOR SOLUTION INTRAVENOUS at 00:05

## 2019-08-03 RX ADMIN — INSULIN LISPRO 4 UNITS: 100 INJECTION, SOLUTION INTRAVENOUS; SUBCUTANEOUS at 13:28

## 2019-08-03 RX ADMIN — DIGOXIN 125 MCG: 125 TABLET ORAL at 08:35

## 2019-08-03 RX ADMIN — POTASSIUM CHLORIDE 10 MEQ: 7.46 INJECTION, SOLUTION INTRAVENOUS at 06:16

## 2019-08-03 RX ADMIN — INSULIN LISPRO 2 UNITS: 100 INJECTION, SOLUTION INTRAVENOUS; SUBCUTANEOUS at 16:52

## 2019-08-03 RX ADMIN — PIPERACILLIN SODIUM AND TAZOBACTAM SODIUM 3.38 G: 3; .375 INJECTION, POWDER, LYOPHILIZED, FOR SOLUTION INTRAVENOUS at 23:59

## 2019-08-03 RX ADMIN — PIPERACILLIN SODIUM AND TAZOBACTAM SODIUM 3.38 G: 3; .375 INJECTION, POWDER, LYOPHILIZED, FOR SOLUTION INTRAVENOUS at 16:45

## 2019-08-03 RX ADMIN — HEPARIN SODIUM 5000 UNITS: 10000 INJECTION INTRAVENOUS; SUBCUTANEOUS at 06:16

## 2019-08-03 RX ADMIN — ASPIRIN 81 MG: 81 TABLET ORAL at 08:35

## 2019-08-03 ASSESSMENT — PAIN SCALES - GENERAL
PAINLEVEL_OUTOF10: 0

## 2019-08-04 VITALS
SYSTOLIC BLOOD PRESSURE: 154 MMHG | WEIGHT: 138.7 LBS | RESPIRATION RATE: 18 BRPM | HEIGHT: 66 IN | HEART RATE: 92 BPM | OXYGEN SATURATION: 96 % | TEMPERATURE: 99 F | BODY MASS INDEX: 22.29 KG/M2 | DIASTOLIC BLOOD PRESSURE: 86 MMHG

## 2019-08-04 LAB
AMPHETAMINE SCREEN, URINE: NOT DETECTED
ANION GAP SERPL CALCULATED.3IONS-SCNC: 16 MMOL/L (ref 7–16)
BARBITURATE SCREEN URINE: NOT DETECTED
BASOPHILS ABSOLUTE: 0.02 E9/L (ref 0–0.2)
BASOPHILS RELATIVE PERCENT: 0.3 % (ref 0–2)
BENZODIAZEPINE SCREEN, URINE: NOT DETECTED
BUN BLDV-MCNC: 6 MG/DL (ref 6–20)
CALCIUM SERPL-MCNC: 8.6 MG/DL (ref 8.6–10.2)
CANNABINOID SCREEN URINE: NOT DETECTED
CHLORIDE BLD-SCNC: 101 MMOL/L (ref 98–107)
CO2: 22 MMOL/L (ref 22–29)
COCAINE METABOLITE SCREEN URINE: NOT DETECTED
CREAT SERPL-MCNC: 0.6 MG/DL (ref 0.7–1.2)
EOSINOPHILS ABSOLUTE: 0.08 E9/L (ref 0.05–0.5)
EOSINOPHILS RELATIVE PERCENT: 1.1 % (ref 0–6)
GFR AFRICAN AMERICAN: >60
GFR NON-AFRICAN AMERICAN: >60 ML/MIN/1.73
GLUCOSE BLD-MCNC: 192 MG/DL (ref 74–99)
GRAM STAIN ORDERABLE: NORMAL
HCT VFR BLD CALC: 32.5 % (ref 37–54)
HEMOGLOBIN: 10.9 G/DL (ref 12.5–16.5)
IMMATURE GRANULOCYTES #: 0.05 E9/L
IMMATURE GRANULOCYTES %: 0.7 % (ref 0–5)
LYMPHOCYTES ABSOLUTE: 0.89 E9/L (ref 1.5–4)
LYMPHOCYTES RELATIVE PERCENT: 12.7 % (ref 20–42)
MAGNESIUM: 2 MG/DL (ref 1.6–2.6)
MCH RBC QN AUTO: 30.9 PG (ref 26–35)
MCHC RBC AUTO-ENTMCNC: 33.5 % (ref 32–34.5)
MCV RBC AUTO: 92.1 FL (ref 80–99.9)
METER GLUCOSE: 118 MG/DL (ref 74–99)
METER GLUCOSE: 210 MG/DL (ref 74–99)
METER GLUCOSE: 227 MG/DL (ref 74–99)
METHADONE SCREEN, URINE: NOT DETECTED
MONOCYTES ABSOLUTE: 0.63 E9/L (ref 0.1–0.95)
MONOCYTES RELATIVE PERCENT: 9 % (ref 2–12)
NEUTROPHILS ABSOLUTE: 5.36 E9/L (ref 1.8–7.3)
NEUTROPHILS RELATIVE PERCENT: 76.2 % (ref 43–80)
OPIATE SCREEN URINE: NOT DETECTED
PDW BLD-RTO: 13.7 FL (ref 11.5–15)
PHENCYCLIDINE SCREEN URINE: NOT DETECTED
PLATELET # BLD: 410 E9/L (ref 130–450)
PMV BLD AUTO: 8.6 FL (ref 7–12)
POTASSIUM SERPL-SCNC: 4 MMOL/L (ref 3.5–5)
PROPOXYPHENE SCREEN: NOT DETECTED
RBC # BLD: 3.53 E12/L (ref 3.8–5.8)
SODIUM BLD-SCNC: 139 MMOL/L (ref 132–146)
WBC # BLD: 7 E9/L (ref 4.5–11.5)

## 2019-08-04 PROCEDURE — 6370000000 HC RX 637 (ALT 250 FOR IP): Performed by: INTERNAL MEDICINE

## 2019-08-04 PROCEDURE — 99232 SBSQ HOSP IP/OBS MODERATE 35: CPT | Performed by: INTERNAL MEDICINE

## 2019-08-04 PROCEDURE — 36415 COLL VENOUS BLD VENIPUNCTURE: CPT

## 2019-08-04 PROCEDURE — 83735 ASSAY OF MAGNESIUM: CPT

## 2019-08-04 PROCEDURE — 85025 COMPLETE CBC W/AUTO DIFF WBC: CPT

## 2019-08-04 PROCEDURE — 82962 GLUCOSE BLOOD TEST: CPT

## 2019-08-04 PROCEDURE — 80048 BASIC METABOLIC PNL TOTAL CA: CPT

## 2019-08-04 PROCEDURE — 80307 DRUG TEST PRSMV CHEM ANLYZR: CPT

## 2019-08-04 RX ORDER — DIPHENHYDRAMINE HCL 25 MG
25 TABLET ORAL EVERY 6 HOURS PRN
Status: DISCONTINUED | OUTPATIENT
Start: 2019-08-04 | End: 2019-08-04 | Stop reason: HOSPADM

## 2019-08-04 RX ORDER — AMOXICILLIN AND CLAVULANATE POTASSIUM 875; 125 MG/1; MG/1
1 TABLET, FILM COATED ORAL EVERY 12 HOURS SCHEDULED
Qty: 20 TABLET | Refills: 0 | Status: SHIPPED | OUTPATIENT
Start: 2019-08-04 | End: 2019-08-04

## 2019-08-04 RX ORDER — AMOXICILLIN AND CLAVULANATE POTASSIUM 875; 125 MG/1; MG/1
1 TABLET, FILM COATED ORAL EVERY 12 HOURS SCHEDULED
Qty: 20 TABLET | Refills: 0 | Status: SHIPPED | OUTPATIENT
Start: 2019-08-04 | End: 2019-08-14

## 2019-08-04 RX ORDER — AMOXICILLIN AND CLAVULANATE POTASSIUM 875; 125 MG/1; MG/1
1 TABLET, FILM COATED ORAL EVERY 12 HOURS SCHEDULED
Status: DISCONTINUED | OUTPATIENT
Start: 2019-08-04 | End: 2019-08-04 | Stop reason: HOSPADM

## 2019-08-04 RX ADMIN — ASPIRIN 81 MG: 81 TABLET ORAL at 09:31

## 2019-08-04 RX ADMIN — INSULIN LISPRO 5 UNITS: 100 INJECTION, SOLUTION INTRAVENOUS; SUBCUTANEOUS at 06:57

## 2019-08-04 RX ADMIN — INSULIN LISPRO 4 UNITS: 100 INJECTION, SOLUTION INTRAVENOUS; SUBCUTANEOUS at 11:27

## 2019-08-04 RX ADMIN — LISINOPRIL 20 MG: 20 TABLET ORAL at 09:31

## 2019-08-04 RX ADMIN — INSULIN LISPRO 10 UNITS: 100 INJECTION, SOLUTION INTRAVENOUS; SUBCUTANEOUS at 17:28

## 2019-08-04 RX ADMIN — DIPHENHYDRAMINE HCL 25 MG: 25 TABLET ORAL at 10:01

## 2019-08-04 RX ADMIN — PANTOPRAZOLE SODIUM 40 MG: 40 TABLET, DELAYED RELEASE ORAL at 06:56

## 2019-08-04 RX ADMIN — INSULIN LISPRO 4 UNITS: 100 INJECTION, SOLUTION INTRAVENOUS; SUBCUTANEOUS at 17:27

## 2019-08-04 RX ADMIN — AMOXICILLIN AND CLAVULANATE POTASSIUM 1 TABLET: 875; 125 TABLET, FILM COATED ORAL at 11:28

## 2019-08-04 RX ADMIN — INSULIN LISPRO 10 UNITS: 100 INJECTION, SOLUTION INTRAVENOUS; SUBCUTANEOUS at 11:26

## 2019-08-04 RX ADMIN — PANTOPRAZOLE SODIUM 40 MG: 40 TABLET, DELAYED RELEASE ORAL at 17:26

## 2019-08-04 ASSESSMENT — PAIN SCALES - GENERAL: PAINLEVEL_OUTOF10: 0

## 2019-08-04 NOTE — PROGRESS NOTES
Discharge instructions given. Discussed medications and follow up appointments. No further questions at this time.  IV and monitor d/c
Dr. Minal Rodriguez updated on pt POC BG and recent labs.
Pt states he is leaving the floor to go smoke. Pt advised against it, education provided. States he \"doesn't care and is going anyways. \"
Remains on DKA protocol.
Transport requested. TLC removed.
Component Value Date    WBC 19.3 08/02/2019    RBC 3.07 08/02/2019    HGB 9.5 08/02/2019    HCT 29.0 08/02/2019     08/02/2019    MCV 94.5 08/02/2019    MCH 30.9 08/02/2019    MCHC 32.8 08/02/2019    RDW 14.2 08/02/2019    SEGSPCT 53 01/25/2014    SEGSPCT 86 10/11/2010    BANDSPCT 3 10/10/2010    METASPCT 0.9 08/02/2019    LYMPHOPCT 10.4 08/02/2019    MONOPCT 8.7 08/02/2019    MYELOPCT 0.9 08/02/2019    EOSPCT 2 10/12/2010    BASOPCT 0.2 08/02/2019    MONOSABS 1.74 08/02/2019    LYMPHSABS 1.93 08/02/2019    EOSABS 0.00 08/02/2019    BASOSABS 0.00 08/02/2019     CMP:    Lab Results   Component Value Date     08/02/2019    K 4.0 08/02/2019    K 5.6 02/09/2019     08/02/2019    CO2 23 08/02/2019    BUN 22 08/02/2019    CREATININE 0.8 08/02/2019    GFRAA >60 08/02/2019    LABGLOM >60 08/02/2019    GLUCOSE 200 08/02/2019    GLUCOSE 83 04/11/2012    PROT 7.7 08/01/2019    LABALBU 4.5 08/01/2019    LABALBU 4.3 12/30/2011    CALCIUM 7.5 08/02/2019    BILITOT 0.6 08/01/2019    ALKPHOS 136 08/01/2019    AST 76 08/01/2019    ALT 96 08/01/2019     Calcium:    Lab Results   Component Value Date    CALCIUM 7.5 08/02/2019     Magnesium:    Lab Results   Component Value Date    MG 2.0 08/02/2019     Phosphorus:    Lab Results   Component Value Date    PHOS 1.9 08/02/2019     Last 3 Troponin:    Lab Results   Component Value Date    TROPONINI 0.02 08/02/2019    TROPONINI 0.02 08/02/2019    TROPONINI 0.03 08/01/2019     VBG:    Lab Results   Component Value Date    PHVEN 7.14 08/01/2019     HgBA1c:    Lab Results   Component Value Date    LABA1C 9.1 07/26/2019     CXR 8/2  1. Nonspecific left basilar airspace disease, findings can be seen   infiltrate/pneumonia and/or atelectasis, slightly more prominent than   on the previous exam.         Student's Assessment and Plan     Evelio Moses is a 51 y.o. male admitted with DKA, cough with brown sputum, nausea, and vomiting.  His most recent admission was for DKA
HCAP  -WBC was 18.7 on admission- today WBC 19.3  -LA 3.9-> .8  - pt reports cough and brown sputum production  - f/u with blood cultures, legionella antigen, strep penumo antigen, resp panel with culture, pro calcitonin and gram stain  - chest x ray shows nonspecific left basilar airspace disease  - repeat chest x ray tomorrow  - Day 1 of vancomycin and zosyn to cover for HCAP    4. Possible hx of Paroxismal Atrial fibrillation   - EKG shows sinus tachycardia  -  digoxin was held for in setting of KIRSTEN because digoxin is renally cleared   -  digoxin level ordered was   - restarted digoxin  - pt reports to being on eliquis but it was discontinued at some point  - CADVASC score is 4 and considered a moderate to high risk and should be anticoagulated  - pt was evaluated for the need for eliquis, a 48 hr Holter monitor done in 2018 done by cardiology deemed he had no ongoing need for anticoagulation     5. Essential HTN  -held lisinopril in setting of KIRSTEN,   - resumed lisinopril today   -Continue to monitor BP  - BP today 113/65    6. Possible hx of COPD  - pt  is not on home o2 and does not feel short of breath  - continue on atrovent nebulizers  - requiring o2 on 3L sat in 100%  - may consider outpatient work up      6. Elevated troponin-RESOLVED  - troponin .02>.07   - ordered CK 93 and CKmb 4.9  - continue to trend and monitor  - pt remains asymptomatic     Elevated liver enzymes 2/2 ETOH abuse vs   - Alk phos 136, Alt 96 and ast 76  - continue to follow      7. Polysubstance abuse  - urine drug screen + cocaine     8 Tobacco abuse  - 25 pack yo smoking hx  - educate pt on smoking cessation     9.  Hx of stroke  - continue on Asprin and atorvastatin 80 mg                10. ETOH abuse  -watch for withdrawal and consider CIWA proticol    PT/OT evaluation: not ordered  DVT prophylaxis: heparin,   Disposition: pending    Raphael Knutson DO, PGY-1  Attending physician: Dr. Stella Burton
resolved  After evaluation in 2018 by cardiology need for Eliquis not found      5. Essential hypertension  Patient on lisinopril now  Monitpr BP    6. Elevated troponin, resolved   Troponin0.02>0.07  Continue to monitor    7. Elevated liver enzymes  Likely secondary to ETOH use   Monitor     8. Polysubstance abuse  The patient has a habit going out of the unit to smoke  UDS repeated in the background of new rash     9. Hx of stroke  - continue on aspirin and atorvastatin        PT/OT evaluation:  DVT prophylaxis/ GI prophylaxis: heparin   Disposition: Milana Ashton MD, PGY-1  Attending physician: Dr. Daniel Philip

## 2019-08-04 NOTE — DISCHARGE SUMMARY
[address]  Discharge Summary    PCP: Jc Mabry MD    Admit Date:8/1/2019  Discharge Date: 8/4/2019    Admission Diagnosis:   1. DKA   2. KIRSTEN  3. Hypertension  4. Paroxysmal atrial fibrillation  5. Polysubstance abuse   6. Hx of stroke     Discharge Diagnosis:  1. DKA, resolved  2. KIRSTEN, resolved  3. Hypertension, stable  4. Paroxysmal AF, controlled  5. Polysubstance abuse, counselled  6. Hx of stroke, on aspirin  7. Urticarial rash, resolved     Hospital Course:   Kevin Aragon is a 51 y.o. male with PMH of paroxysmal afib, Type I DM, HTN and ETOh abuse and neuropathy presented to the ED on 8/1 with BS>500 in his home glucose monitoring. He had multiple episodes of vomiting complicated with weakness and lightheadedness. He has had a cough which sarted on Monday and had been bringing up whitish sputum. He was recently admitted for DKA likely due to complications of insulin pump. And it was discontinued during the hospital visit and he was sent home on basaglar and humalog on 7/29/19. He followed up with Dr Mally Huerta on 7/30/19 whose notes reflects instructions to to restart the insulin pump at a basal rate of 12:00a 1.8, 7:00 1.4, CR 10, ISF 35, Goal 120-140, active insulin time 4 hrs a day. The patient however tells he was told to take 7 U of Basaglar the evening of 7/31/19 and restart pump on 8/1. In the ED the patient was stable. He was afebrile , Bp>125/73. He was found to have , troponin 0.07, hydroxybutyrate >4.5, AG 50 and WBC 18.7 and K 6.7 Cr1. 4 . DKA protocol was started and 1 amp od bicarbonate was given. He was transferred to the ICU for further management. Dr Mally Huerta was contacted regarding plan for him and he suggested lantus 36 U daily and Humalog 10 U before meals+ high dose sliding scale. Urine electrolytes showed FENA of 1.6% suggesting intrinsic renal due to ATN. Cr trended down to 0.9 which was his baseline after a day of admission.  CXR

## 2019-08-05 LAB
CULTURE, RESPIRATORY: NORMAL
EKG ATRIAL RATE: 109 BPM
EKG ATRIAL RATE: 77 BPM
EKG P AXIS: 39 DEGREES
EKG P AXIS: 71 DEGREES
EKG P-R INTERVAL: 120 MS
EKG P-R INTERVAL: 124 MS
EKG Q-T INTERVAL: 344 MS
EKG Q-T INTERVAL: 430 MS
EKG QRS DURATION: 100 MS
EKG QRS DURATION: 104 MS
EKG QTC CALCULATION (BAZETT): 463 MS
EKG QTC CALCULATION (BAZETT): 486 MS
EKG R AXIS: 33 DEGREES
EKG R AXIS: 59 DEGREES
EKG T AXIS: -60 DEGREES
EKG T AXIS: 38 DEGREES
EKG VENTRICULAR RATE: 109 BPM
EKG VENTRICULAR RATE: 77 BPM
SMEAR, RESPIRATORY: NORMAL

## 2019-08-05 PROCEDURE — 93010 ELECTROCARDIOGRAM REPORT: CPT | Performed by: INTERNAL MEDICINE

## 2019-08-06 LAB
BLOOD CULTURE, ROUTINE: NORMAL
CULTURE, BLOOD 2: NORMAL

## 2019-08-07 LAB — COCAINE, CONFIRM, URINE: 199 NG/ML

## 2019-09-12 ENCOUNTER — TELEPHONE (OUTPATIENT)
Dept: ENDOCRINOLOGY | Age: 49
End: 2019-09-12

## 2019-09-13 NOTE — TELEPHONE ENCOUNTER
I contacted Whitney Toscano to meet with pt along with the pt's contact info and . I also left a message on the pt's voicemail to expect a phone call from her.

## 2019-09-25 ENCOUNTER — OFFICE VISIT (OUTPATIENT)
Dept: ENDOCRINOLOGY | Age: 49
End: 2019-09-25

## 2019-09-25 VITALS
WEIGHT: 144.4 LBS | HEIGHT: 66 IN | HEART RATE: 84 BPM | RESPIRATION RATE: 16 BRPM | BODY MASS INDEX: 23.21 KG/M2 | DIASTOLIC BLOOD PRESSURE: 84 MMHG | SYSTOLIC BLOOD PRESSURE: 142 MMHG

## 2019-09-25 DIAGNOSIS — E10.8 TYPE 1 DIABETES MELLITUS WITH COMPLICATION (HCC): Primary | ICD-10-CM

## 2019-09-25 DIAGNOSIS — Z96.41 INSULIN PUMP IN PLACE: ICD-10-CM

## 2019-09-25 DIAGNOSIS — E55.9 VITAMIN D DEFICIENCY: ICD-10-CM

## 2019-09-25 PROCEDURE — 99213 OFFICE O/P EST LOW 20 MIN: CPT | Performed by: INTERNAL MEDICINE

## 2019-09-25 NOTE — PROGRESS NOTES
Cocaine abuse ([facility]     Diabetes mellitus ([facility]     Hypertension     Idiopathic peripheral neuropathy 9/21/2016    Marijuana abuse      PAST SURGICAL HISTORY   No past surgical history on file. SOCIAL HISTORY   Social History     Socioeconomic History    Marital status: Single     Spouse name: Not on file    Number of children: Not on file    Years of education: Not on file    Highest education level: Not on file   Occupational History    Not on file   Social Needs    Financial resource strain: Not on file    Food insecurity:     Worry: Not on file     Inability: Not on file    Transportation needs:     Medical: Not on file     Non-medical: Not on file   Tobacco Use    Smoking status: Current Every Day Smoker     Packs/day: 2.00     Years: 15.00     Pack years: 30.00     Types: Cigarettes    Smokeless tobacco: Never Used   Substance and Sexual Activity    Alcohol use:  Yes     Alcohol/week: 5.0 standard drinks     Types: 5 Cans of beer per week    Drug use: Yes     Types: Marijuana     Comment: smokes week a couple times a day per pt    Sexual activity: Not on file   Lifestyle    Physical activity:     Days per week: Not on file     Minutes per session: Not on file    Stress: Not on file   Relationships    Social connections:     Talks on phone: Not on file     Gets together: Not on file     Attends [employer] service: Not on file     Active member of club or organization: Not on file     Attends meetings of clubs or organizations: Not on file     Relationship status: Not on file    Intimate partner violence:     Fear of current or ex partner: Not on file     Emotionally abused: Not on file     Physically abused: Not on file     Forced sexual activity: Not on file   Other Topics Concern    Not on file   Social History Narrative    Not on file     FAMILY HISTORY   Family History   Problem Relation Age of Onset    Diabetes type 2  Mother     Cancer Maternal Grandmother     Diabetes type 2 Nephew      ALLERGIES AND DRUG REACTIONS   Allergies   Allergen Reactions    No Known Allergies        CURRENT MEDICATIONS   Current Outpatient Medications   Medication Sig Dispense Refill    insulin glargine (BASAGLAR KWIKPEN) 100 UNIT/ML injection pen Inject 36 Units into the skin nightly 5 pen 3    insulin lispro (HUMALOG) 100 UNIT/ML injection vial Inject 10 Units into the skin 3 times daily (with meals) 1 vial 3    albuterol sulfate  (90 Base) MCG/ACT inhaler Inhale 2 puffs into the lungs every 6 hours as needed for Wheezing or Shortness of Breath 1 Inhaler 0    Insulin Syringe-Needle U-100 28G X 1/2\" 1 ML MISC Use as directed 100 each 11    Lancets MISC 1 each by Does not apply route 2 times daily 300 each 3    atorvastatin (LIPITOR) 80 MG tablet Take 1 tablet by mouth nightly 30 tablet 3    lisinopril (PRINIVIL;ZESTRIL) 20 MG tablet Take 1 tablet by mouth daily 30 tablet 3    digoxin (LANOXIN) 125 MCG tablet Take 125 mcg by mouth daily      aspirin 81 MG EC tablet Take 1 tablet by mouth daily 30 tablet 3     No current facility-administered medications for this visit. Review of Systems  Constitutional: No fever, no chills, no diaphoresis, no generalized weakness. HEENT: No blurred vision, No sore throat, no ear pain, no hair loss  Neck: denied any neck swelling, difficulty swallowing,   Cardio-pulmonary: No CP, SOB or palpitation, No orthopnea or PND. No cough or wheezing. GI: No N/V/D, no constipation, No abdominal pain, no melena or hematochezia   : Denied any dysuria, hematuria, flank pain, discharge, or incontinence. Skin: denied any rash, ulcer, Hirsute, or hyperpigmentation. MSK: denied any joint deformity, joint pain/swelling, muscle pain, or back pain.   Neuro: no numbness, no tingling, no weakness, _    OBJECTIVE    BP (!) 142/84   Pulse 84   Resp 16   Ht 5' 6\" (1.676 m)   Wt 144 lb 6.4 oz (65.5 kg)   BMI 23.31 kg/m²   BP Readings from Last 4 Encounters:   09/25/19 Bryon Kruse ) 142/84   08/04/19 (!) 154/86   07/30/19 126/76   07/29/19 (!) 150/98     Wt Readings from Last 6 Encounters:   09/25/19 144 lb 6.4 oz (65.5 kg)   08/04/19 138 lb 11.2 oz (62.9 kg)   07/30/19 140 lb 12.8 oz (63.9 kg)   07/29/19 139 lb 11.2 oz (63.4 kg)   04/14/19 149 lb 9.6 oz (67.9 kg)   02/11/19 141 lb 4.8 oz (64.1 kg)       Physical examination:  General: awake alert, oriented x3, no abnormal position or movements. HEENT: normocephalic non-traumatic, no exophthalmos   Neck: supple, no LN enlargement, no thyromegaly, no thyroid tenderness, no JVD. Pulm: Clear equal air entry no added sounds, no wheezing or rhonchi    CVS: S1 + S2, no murmur, no heave. Dorsalis pedis pulse palpable   Abd: soft lax, no tenderness, no organomegaly, audible bowel sounds. Skin: warm, no lesions, no rash.  Long toe nails, + callus, no Ulcers, No acanthosis nigricans  Musculoskeletal: No back tenderness, no kyphosis/scoliosis    Neuro: CN intact, Monofilament sensation decreased bilateral , muscle power normal  Psych: normal mood, and affect      Review of Laboratory Data:  I personally reviewed the following lab:  Lab Results   Component Value Date/Time    WBC 7.0 08/04/2019 08:51 AM    RBC 3.53 (L) 08/04/2019 08:51 AM    HGB 10.9 (L) 08/04/2019 08:51 AM    HCT 32.5 (L) 08/04/2019 08:51 AM    MCV 92.1 08/04/2019 08:51 AM    MCH 30.9 08/04/2019 08:51 AM    MCHC 33.5 08/04/2019 08:51 AM    RDW 13.7 08/04/2019 08:51 AM     08/04/2019 08:51 AM    MPV 8.6 08/04/2019 08:51 AM    BANDS 1 04/10/2012 04:30 AM      Lab Results   Component Value Date/Time     08/04/2019 08:51 AM    K 4.0 08/04/2019 08:51 AM    K 5.6 (H) 02/09/2019 12:58 AM    CO2 22 08/04/2019 08:51 AM    BUN 6 08/04/2019 08:51 AM    CREATININE 0.6 (L) 08/04/2019 08:51 AM    CALCIUM 8.6 08/04/2019 08:51 AM    LABGLOM >60 08/04/2019 08:51 AM    GFRAA >60 08/04/2019 08:51 AM      Lab Results   Component Value Date/Time    TSH 2.910 03/02/2017 05:41 AM    T4FREE 0.97 07/19/2012 02:00 AM     Lab Results   Component Value Date    LABA1C 9.1 07/26/2019    GLUCOSE 192 08/04/2019    GLUCOSE 83 04/11/2012    MALBCR - 06/10/2018    LABMICR <12.0 06/10/2018    LABCREA 27 08/01/2019     Lab Results   Component Value Date    LABA1C 9.1 07/26/2019    LABA1C 9.0 04/12/2019    LABA1C 8.2 02/09/2019     Lab Results   Component Value Date    CHOL 144 11/11/2018    CHOL 113 02/18/2011    CHOL 162 10/11/2010    TRIG 73 11/11/2018    TRIG 94 02/18/2011    HDL 65 11/11/2018    HDL 46.0 02/18/2011     Lab Results   Component Value Date    VITD25 23 08/02/2019    VITD25 11 12/27/2018       Medical Records/Labs/Images review:   I personally reviewed and summarized previous records   All labs were independently reviewed     [id number] [facility] [address] [facility] 51 [facility] seen in for the following issues     Diabetes Mellitus Type 1     · Patient's diabetes is poorly controlled with both micro and macrovascular complications. Last A1c 9.1% 7/26/2019   · Pt has no insurance at this time and self pay for all DM and pump supplies. We are helping him as much as we can by providing free samples of insulin and Pump samples but he will need to get insurance to be able to take care of his diabetes   · Will adjust insulin pump settings to  basal rate 12a 1.0 CR 15, , ISF 55 , Goal 110-130, active insulin time 4 hrs   · The patient was advised to check blood sugars 4 times a day before meals and at bedtime and bring pump for download in a week   · Discussed with patient A1c and blood sugar goals   · Optimal blood sugars: 100-140 pre-prandial, < 180 peak post-prandial  · The patient counseled about the complications of uncontrolled diabetes   · Patient was counselled about the importance of self-blood glucose monitoring and eating consistent carb diet to avoid blood sugar fluctuations   · Patient is due for routine diabetes maintenance and prevention.  He has no insurance to complete eye

## 2019-10-18 ENCOUNTER — APPOINTMENT (OUTPATIENT)
Dept: GENERAL RADIOLOGY | Age: 49
DRG: 638 | End: 2019-10-18

## 2019-10-18 ENCOUNTER — HOSPITAL ENCOUNTER (INPATIENT)
Age: 49
LOS: 3 days | Discharge: HOME | DRG: 638 | End: 2019-10-21
Attending: EMERGENCY MEDICINE | Admitting: INTERNAL MEDICINE

## 2019-10-18 DIAGNOSIS — R00.0 TACHYCARDIA: ICD-10-CM

## 2019-10-18 DIAGNOSIS — N17.9 AKI (ACUTE KIDNEY INJURY) (HCC): ICD-10-CM

## 2019-10-18 DIAGNOSIS — E87.20 LACTIC ACIDOSIS: ICD-10-CM

## 2019-10-18 DIAGNOSIS — E10.10 TYPE 1 DIABETES MELLITUS WITH KETOACIDOSIS WITHOUT COMA (HCC): Primary | ICD-10-CM

## 2019-10-18 DIAGNOSIS — D72.829 LEUKOCYTOSIS, UNSPECIFIED TYPE: ICD-10-CM

## 2019-10-18 LAB
ALBUMIN SERPL-MCNC: 4.6 G/DL (ref 3.5–5.2)
ALBUMIN SERPL-MCNC: 5.3 G/DL (ref 3.5–5.2)
ALP BLD-CCNC: 110 U/L (ref 40–129)
ALP BLD-CCNC: 125 U/L (ref 40–129)
ALT SERPL-CCNC: 15 U/L (ref 0–40)
ALT SERPL-CCNC: 21 U/L (ref 0–40)
AMPHETAMINE SCREEN, URINE: NOT DETECTED
ANION GAP SERPL CALCULATED.3IONS-SCNC: 43 MMOL/L (ref 7–16)
ANION GAP SERPL CALCULATED.3IONS-SCNC: 45 MMOL/L (ref 7–16)
ANION GAP SERPL CALCULATED.3IONS-SCNC: 47 MMOL/L (ref 7–16)
ANISOCYTOSIS: ABNORMAL
AST SERPL-CCNC: 12 U/L (ref 0–39)
AST SERPL-CCNC: 27 U/L (ref 0–39)
BACTERIA: ABNORMAL /HPF
BARBITURATE SCREEN URINE: NOT DETECTED
BASOPHILS ABSOLUTE: 0.15 E9/L (ref 0–0.2)
BASOPHILS RELATIVE PERCENT: 0.8 % (ref 0–2)
BENZODIAZEPINE SCREEN, URINE: NOT DETECTED
BETA-HYDROXYBUTYRATE: 4.5 MMOL/L (ref 0.02–0.27)
BILIRUB SERPL-MCNC: 0.4 MG/DL (ref 0–1.2)
BILIRUB SERPL-MCNC: 0.6 MG/DL (ref 0–1.2)
BILIRUBIN URINE: ABNORMAL
BLOOD, URINE: ABNORMAL
BUN BLDV-MCNC: 26 MG/DL (ref 6–20)
BUN BLDV-MCNC: 28 MG/DL (ref 6–20)
BUN BLDV-MCNC: 29 MG/DL (ref 6–20)
BURR CELLS: ABNORMAL
CALCIUM SERPL-MCNC: 10.1 MG/DL (ref 8.6–10.2)
CALCIUM SERPL-MCNC: 8.3 MG/DL (ref 8.6–10.2)
CALCIUM SERPL-MCNC: 8.7 MG/DL (ref 8.6–10.2)
CANNABINOID SCREEN URINE: NOT DETECTED
CHLORIDE BLD-SCNC: 79 MMOL/L (ref 98–107)
CHLORIDE BLD-SCNC: 84 MMOL/L (ref 98–107)
CHLORIDE BLD-SCNC: 89 MMOL/L (ref 98–107)
CHP ED QC CHECK: NORMAL
CLARITY: ABNORMAL
CO2: 3 MMOL/L (ref 22–29)
CO2: 4 MMOL/L (ref 22–29)
CO2: 6 MMOL/L (ref 22–29)
COCAINE METABOLITE SCREEN URINE: POSITIVE
COLOR: ABNORMAL
CREAT SERPL-MCNC: 1.9 MG/DL (ref 0.7–1.2)
CREAT SERPL-MCNC: 2 MG/DL (ref 0.7–1.2)
CREAT SERPL-MCNC: 2 MG/DL (ref 0.7–1.2)
DIGOXIN LEVEL: <0.3 NG/ML (ref 0.8–2)
EKG ATRIAL RATE: 124 BPM
EKG P AXIS: 69 DEGREES
EKG P-R INTERVAL: 124 MS
EKG Q-T INTERVAL: 336 MS
EKG QRS DURATION: 84 MS
EKG QTC CALCULATION (BAZETT): 482 MS
EKG R AXIS: 70 DEGREES
EKG T AXIS: 31 DEGREES
EKG VENTRICULAR RATE: 124 BPM
EOSINOPHILS ABSOLUTE: 0.03 E9/L (ref 0.05–0.5)
EOSINOPHILS RELATIVE PERCENT: 0.2 % (ref 0–6)
GFR AFRICAN AMERICAN: 43
GFR AFRICAN AMERICAN: 43
GFR AFRICAN AMERICAN: 46
GFR AFRICAN AMERICAN: 49
GFR NON-AFRICAN AMERICAN: 36 ML/MIN/1.73
GFR NON-AFRICAN AMERICAN: 36 ML/MIN/1.73
GFR NON-AFRICAN AMERICAN: 38 ML/MIN/1.73
GFR NON-AFRICAN AMERICAN: 40 ML/MIN/1.73
GLUCOSE BLD-MCNC: 491 MG/DL
GLUCOSE BLD-MCNC: 570 MG/DL (ref 74–99)
GLUCOSE BLD-MCNC: 613 MG/DL (ref 74–99)
GLUCOSE BLD-MCNC: 655 MG/DL (ref 74–99)
GLUCOSE BLD-MCNC: 657 MG/DL (ref 74–99)
GLUCOSE URINE: 250 MG/DL
HCT VFR BLD CALC: 48.8 % (ref 37–54)
HEMOGLOBIN: 15.8 G/DL (ref 12.5–16.5)
IMMATURE GRANULOCYTES #: 0.24 E9/L
IMMATURE GRANULOCYTES %: 1.2 % (ref 0–5)
KETONES, URINE: 15 MG/DL
LACTIC ACID: 3 MMOL/L (ref 0.5–2.2)
LACTIC ACID: 3.2 MMOL/L (ref 0.5–2.2)
LACTIC ACID: 3.9 MMOL/L (ref 0.5–2.2)
LEUKOCYTE ESTERASE, URINE: NEGATIVE
LIPASE: 20 U/L (ref 13–60)
LYMPHOCYTES ABSOLUTE: 2.37 E9/L (ref 1.5–4)
LYMPHOCYTES RELATIVE PERCENT: 12 % (ref 20–42)
Lab: ABNORMAL
MAGNESIUM: 2.1 MG/DL (ref 1.6–2.6)
MCH RBC QN AUTO: 31.2 PG (ref 26–35)
MCHC RBC AUTO-ENTMCNC: 32.4 % (ref 32–34.5)
MCV RBC AUTO: 96.3 FL (ref 80–99.9)
METER GLUCOSE: 128 MG/DL (ref 74–99)
METER GLUCOSE: 180 MG/DL (ref 74–99)
METER GLUCOSE: 199 MG/DL (ref 74–99)
METER GLUCOSE: 220 MG/DL (ref 74–99)
METER GLUCOSE: 315 MG/DL (ref 74–99)
METER GLUCOSE: 428 MG/DL (ref 74–99)
METER GLUCOSE: 491 MG/DL (ref 74–99)
METER GLUCOSE: >500 MG/DL (ref 74–99)
METER GLUCOSE: >500 MG/DL (ref 74–99)
METHADONE SCREEN, URINE: NOT DETECTED
MONOCYTES ABSOLUTE: 1.53 E9/L (ref 0.1–0.95)
MONOCYTES RELATIVE PERCENT: 7.7 % (ref 2–12)
MUCUS: PRESENT
NEUTROPHILS ABSOLUTE: 15.49 E9/L (ref 1.8–7.3)
NEUTROPHILS RELATIVE PERCENT: 78.1 % (ref 43–80)
NITRITE, URINE: POSITIVE
OPIATE SCREEN URINE: NOT DETECTED
OVALOCYTES: ABNORMAL
PDW BLD-RTO: 14 FL (ref 11.5–15)
PERFORMED ON: ABNORMAL
PH UA: 6.5 (ref 5–9)
PH VENOUS: 7.13 (ref 7.35–7.45)
PHENCYCLIDINE SCREEN URINE: NOT DETECTED
PHOSPHORUS: 6.6 MG/DL (ref 2.5–4.5)
PLATELET # BLD: 586 E9/L (ref 130–450)
PMV BLD AUTO: 9.4 FL (ref 7–12)
POC CHLORIDE: 105 MMOL/L (ref 100–108)
POC CREATININE: 1.8 MG/DL (ref 0.7–1.2)
POC POTASSIUM: 5.1 MMOL/L (ref 3.5–5)
POC SODIUM: 129 MMOL/L (ref 132–146)
POIKILOCYTES: ABNORMAL
POLYCHROMASIA: ABNORMAL
POTASSIUM REFLEX MAGNESIUM: 5.7 MMOL/L (ref 3.5–5)
POTASSIUM SERPL-SCNC: 5.3 MMOL/L (ref 3.5–5)
POTASSIUM SERPL-SCNC: 5.4 MMOL/L (ref 3.5–5)
PROPOXYPHENE SCREEN: NOT DETECTED
PROTEIN UA: 30 MG/DL
RBC # BLD: 5.07 E12/L (ref 3.8–5.8)
RBC UA: >20 /HPF (ref 0–2)
SODIUM BLD-SCNC: 132 MMOL/L (ref 132–146)
SODIUM BLD-SCNC: 133 MMOL/L (ref 132–146)
SODIUM BLD-SCNC: 135 MMOL/L (ref 132–146)
SPECIFIC GRAVITY UA: >=1.03 (ref 1–1.03)
TEAR DROP CELLS: ABNORMAL
TOTAL PROTEIN: 6.8 G/DL (ref 6.4–8.3)
TOTAL PROTEIN: 8.4 G/DL (ref 6.4–8.3)
TROPONIN: 0.03 NG/ML (ref 0–0.03)
UROBILINOGEN, URINE: 1 E.U./DL
WBC # BLD: 19.8 E9/L (ref 4.5–11.5)
WBC UA: ABNORMAL /HPF (ref 0–5)

## 2019-10-18 PROCEDURE — 84484 ASSAY OF TROPONIN QUANT: CPT

## 2019-10-18 PROCEDURE — 82435 ASSAY OF BLOOD CHLORIDE: CPT

## 2019-10-18 PROCEDURE — 80162 ASSAY OF DIGOXIN TOTAL: CPT

## 2019-10-18 PROCEDURE — 80053 COMPREHEN METABOLIC PANEL: CPT

## 2019-10-18 PROCEDURE — 96361 HYDRATE IV INFUSION ADD-ON: CPT

## 2019-10-18 PROCEDURE — 84295 ASSAY OF SERUM SODIUM: CPT

## 2019-10-18 PROCEDURE — 99291 CRITICAL CARE FIRST HOUR: CPT

## 2019-10-18 PROCEDURE — 84100 ASSAY OF PHOSPHORUS: CPT

## 2019-10-18 PROCEDURE — 82800 BLOOD PH: CPT

## 2019-10-18 PROCEDURE — 87070 CULTURE OTHR SPECIMN AEROBIC: CPT

## 2019-10-18 PROCEDURE — C9113 INJ PANTOPRAZOLE SODIUM, VIA: HCPCS | Performed by: INTERNAL MEDICINE

## 2019-10-18 PROCEDURE — 83735 ASSAY OF MAGNESIUM: CPT

## 2019-10-18 PROCEDURE — 80048 BASIC METABOLIC PNL TOTAL CA: CPT

## 2019-10-18 PROCEDURE — 36415 COLL VENOUS BLD VENIPUNCTURE: CPT

## 2019-10-18 PROCEDURE — 82565 ASSAY OF CREATININE: CPT

## 2019-10-18 PROCEDURE — 87081 CULTURE SCREEN ONLY: CPT

## 2019-10-18 PROCEDURE — 99223 1ST HOSP IP/OBS HIGH 75: CPT | Performed by: INTERNAL MEDICINE

## 2019-10-18 PROCEDURE — 83605 ASSAY OF LACTIC ACID: CPT

## 2019-10-18 PROCEDURE — 2580000003 HC RX 258: Performed by: INTERNAL MEDICINE

## 2019-10-18 PROCEDURE — 80307 DRUG TEST PRSMV CHEM ANLYZR: CPT

## 2019-10-18 PROCEDURE — G0480 DRUG TEST DEF 1-7 CLASSES: HCPCS

## 2019-10-18 PROCEDURE — 87040 BLOOD CULTURE FOR BACTERIA: CPT

## 2019-10-18 PROCEDURE — 6360000002 HC RX W HCPCS: Performed by: EMERGENCY MEDICINE

## 2019-10-18 PROCEDURE — 83690 ASSAY OF LIPASE: CPT

## 2019-10-18 PROCEDURE — 96376 TX/PRO/DX INJ SAME DRUG ADON: CPT

## 2019-10-18 PROCEDURE — 6360000002 HC RX W HCPCS: Performed by: INTERNAL MEDICINE

## 2019-10-18 PROCEDURE — 85025 COMPLETE CBC W/AUTO DIFF WBC: CPT

## 2019-10-18 PROCEDURE — 84132 ASSAY OF SERUM POTASSIUM: CPT

## 2019-10-18 PROCEDURE — 82947 ASSAY GLUCOSE BLOOD QUANT: CPT

## 2019-10-18 PROCEDURE — 93010 ELECTROCARDIOGRAM REPORT: CPT | Performed by: INTERNAL MEDICINE

## 2019-10-18 PROCEDURE — 87206 SMEAR FLUORESCENT/ACID STAI: CPT

## 2019-10-18 PROCEDURE — 93005 ELECTROCARDIOGRAM TRACING: CPT | Performed by: EMERGENCY MEDICINE

## 2019-10-18 PROCEDURE — 82962 GLUCOSE BLOOD TEST: CPT

## 2019-10-18 PROCEDURE — 71045 X-RAY EXAM CHEST 1 VIEW: CPT

## 2019-10-18 PROCEDURE — 82010 KETONE BODYS QUAN: CPT

## 2019-10-18 PROCEDURE — 2580000003 HC RX 258: Performed by: EMERGENCY MEDICINE

## 2019-10-18 PROCEDURE — 81001 URINALYSIS AUTO W/SCOPE: CPT

## 2019-10-18 PROCEDURE — 96375 TX/PRO/DX INJ NEW DRUG ADDON: CPT

## 2019-10-18 PROCEDURE — 96374 THER/PROPH/DIAG INJ IV PUSH: CPT

## 2019-10-18 PROCEDURE — 87088 URINE BACTERIA CULTURE: CPT

## 2019-10-18 PROCEDURE — 2000000000 HC ICU R&B

## 2019-10-18 PROCEDURE — 6370000000 HC RX 637 (ALT 250 FOR IP): Performed by: EMERGENCY MEDICINE

## 2019-10-18 RX ORDER — 0.9 % SODIUM CHLORIDE 0.9 %
1000 INTRAVENOUS SOLUTION INTRAVENOUS ONCE
Status: COMPLETED | OUTPATIENT
Start: 2019-10-18 | End: 2019-10-18

## 2019-10-18 RX ORDER — SODIUM CHLORIDE 9 MG/ML
INJECTION, SOLUTION INTRAVENOUS CONTINUOUS
Status: DISCONTINUED | OUTPATIENT
Start: 2019-10-18 | End: 2019-10-18 | Stop reason: DRUGHIGH

## 2019-10-18 RX ORDER — ONDANSETRON 2 MG/ML
4 INJECTION INTRAMUSCULAR; INTRAVENOUS ONCE
Status: COMPLETED | OUTPATIENT
Start: 2019-10-18 | End: 2019-10-18

## 2019-10-18 RX ORDER — DEXTROSE MONOHYDRATE 25 G/50ML
12.5 INJECTION, SOLUTION INTRAVENOUS PRN
Status: DISCONTINUED | OUTPATIENT
Start: 2019-10-18 | End: 2019-10-21 | Stop reason: HOSPADM

## 2019-10-18 RX ORDER — 0.9 % SODIUM CHLORIDE 0.9 %
10 VIAL (ML) INJECTION DAILY
Status: DISCONTINUED | OUTPATIENT
Start: 2019-10-18 | End: 2019-10-19

## 2019-10-18 RX ORDER — DEXTROSE AND SODIUM CHLORIDE 5; .45 G/100ML; G/100ML
INJECTION, SOLUTION INTRAVENOUS CONTINUOUS PRN
Status: DISCONTINUED | OUTPATIENT
Start: 2019-10-18 | End: 2019-10-19

## 2019-10-18 RX ORDER — PROMETHAZINE HYDROCHLORIDE 25 MG/ML
12.5 INJECTION, SOLUTION INTRAMUSCULAR; INTRAVENOUS ONCE
Status: COMPLETED | OUTPATIENT
Start: 2019-10-18 | End: 2019-10-18

## 2019-10-18 RX ORDER — ONDANSETRON 2 MG/ML
4 INJECTION INTRAMUSCULAR; INTRAVENOUS EVERY 6 HOURS PRN
Status: DISCONTINUED | OUTPATIENT
Start: 2019-10-18 | End: 2019-10-21 | Stop reason: HOSPADM

## 2019-10-18 RX ORDER — DEXTROSE MONOHYDRATE 50 MG/ML
100 INJECTION, SOLUTION INTRAVENOUS PRN
Status: DISCONTINUED | OUTPATIENT
Start: 2019-10-18 | End: 2019-10-21 | Stop reason: HOSPADM

## 2019-10-18 RX ORDER — HEPARIN SODIUM 10000 [USP'U]/ML
5000 INJECTION, SOLUTION INTRAVENOUS; SUBCUTANEOUS EVERY 8 HOURS SCHEDULED
Status: DISCONTINUED | OUTPATIENT
Start: 2019-10-18 | End: 2019-10-21 | Stop reason: HOSPADM

## 2019-10-18 RX ORDER — DEXTROSE MONOHYDRATE 25 G/50ML
12.5 INJECTION, SOLUTION INTRAVENOUS PRN
Status: DISCONTINUED | OUTPATIENT
Start: 2019-10-18 | End: 2019-10-18 | Stop reason: SDUPTHER

## 2019-10-18 RX ORDER — SODIUM CHLORIDE 0.9 % (FLUSH) 0.9 %
10 SYRINGE (ML) INJECTION PRN
Status: DISCONTINUED | OUTPATIENT
Start: 2019-10-18 | End: 2019-10-21 | Stop reason: HOSPADM

## 2019-10-18 RX ORDER — PROMETHAZINE HYDROCHLORIDE 25 MG/ML
INJECTION, SOLUTION INTRAMUSCULAR; INTRAVENOUS
Status: DISPENSED
Start: 2019-10-18 | End: 2019-10-18

## 2019-10-18 RX ORDER — PANTOPRAZOLE SODIUM 40 MG/10ML
40 INJECTION, POWDER, LYOPHILIZED, FOR SOLUTION INTRAVENOUS DAILY
Status: DISCONTINUED | OUTPATIENT
Start: 2019-10-18 | End: 2019-10-19

## 2019-10-18 RX ORDER — SODIUM CHLORIDE 0.9 % (FLUSH) 0.9 %
10 SYRINGE (ML) INJECTION EVERY 12 HOURS SCHEDULED
Status: DISCONTINUED | OUTPATIENT
Start: 2019-10-18 | End: 2019-10-21 | Stop reason: HOSPADM

## 2019-10-18 RX ORDER — SODIUM CHLORIDE 450 MG/100ML
INJECTION, SOLUTION INTRAVENOUS CONTINUOUS
Status: DISCONTINUED | OUTPATIENT
Start: 2019-10-18 | End: 2019-10-19

## 2019-10-18 RX ORDER — MAGNESIUM SULFATE 1 G/100ML
1 INJECTION INTRAVENOUS PRN
Status: DISCONTINUED | OUTPATIENT
Start: 2019-10-18 | End: 2019-10-19

## 2019-10-18 RX ORDER — NICOTINE POLACRILEX 4 MG
15 LOZENGE BUCCAL PRN
Status: DISCONTINUED | OUTPATIENT
Start: 2019-10-18 | End: 2019-10-21 | Stop reason: HOSPADM

## 2019-10-18 RX ORDER — SODIUM CHLORIDE 9 MG/ML
INJECTION, SOLUTION INTRAVENOUS CONTINUOUS
Status: DISCONTINUED | OUTPATIENT
Start: 2019-10-18 | End: 2019-10-18 | Stop reason: ALTCHOICE

## 2019-10-18 RX ADMIN — SODIUM CHLORIDE: 9 INJECTION, SOLUTION INTRAVENOUS at 13:34

## 2019-10-18 RX ADMIN — DEXTROSE AND SODIUM CHLORIDE: 5; 450 INJECTION, SOLUTION INTRAVENOUS at 19:55

## 2019-10-18 RX ADMIN — SODIUM CHLORIDE: 9 INJECTION, SOLUTION INTRAVENOUS at 08:58

## 2019-10-18 RX ADMIN — SODIUM CHLORIDE: 9 INJECTION, SOLUTION INTRAVENOUS at 10:45

## 2019-10-18 RX ADMIN — SODIUM CHLORIDE 1000 ML: 9 INJECTION, SOLUTION INTRAVENOUS at 08:58

## 2019-10-18 RX ADMIN — SODIUM CHLORIDE 1000 ML: 9 INJECTION, SOLUTION INTRAVENOUS at 06:37

## 2019-10-18 RX ADMIN — Medication 10 ML: at 22:00

## 2019-10-18 RX ADMIN — ONDANSETRON 4 MG: 2 INJECTION INTRAMUSCULAR; INTRAVENOUS at 06:26

## 2019-10-18 RX ADMIN — PROMETHAZINE HYDROCHLORIDE 12.5 MG: 25 INJECTION INTRAMUSCULAR; INTRAVENOUS at 09:17

## 2019-10-18 RX ADMIN — PANTOPRAZOLE SODIUM 40 MG: 40 INJECTION, POWDER, FOR SOLUTION INTRAVENOUS at 17:34

## 2019-10-18 RX ADMIN — SODIUM CHLORIDE: 4.5 INJECTION, SOLUTION INTRAVENOUS at 17:20

## 2019-10-18 RX ADMIN — HEPARIN SODIUM 5000 UNITS: 10000 INJECTION, SOLUTION INTRAVENOUS; SUBCUTANEOUS at 22:00

## 2019-10-18 RX ADMIN — Medication 10 ML: at 17:34

## 2019-10-18 RX ADMIN — PROMETHAZINE HYDROCHLORIDE 12.5 MG: 25 INJECTION INTRAMUSCULAR; INTRAVENOUS at 06:58

## 2019-10-18 RX ADMIN — HEPARIN SODIUM 5000 UNITS: 10000 INJECTION, SOLUTION INTRAVENOUS; SUBCUTANEOUS at 17:33

## 2019-10-18 RX ADMIN — SODIUM CHLORIDE 1000 ML: 9 INJECTION, SOLUTION INTRAVENOUS at 06:26

## 2019-10-18 RX ADMIN — SODIUM CHLORIDE 0.1 UNITS/KG/HR: 9 INJECTION, SOLUTION INTRAVENOUS at 09:43

## 2019-10-18 ASSESSMENT — ENCOUNTER SYMPTOMS
BACK PAIN: 0
SINUS PAIN: 0
SORE THROAT: 0
COUGH: 0
DIARRHEA: 0
NAUSEA: 1
ABDOMINAL PAIN: 0
VOMITING: 1
SHORTNESS OF BREATH: 0
CHEST TIGHTNESS: 0

## 2019-10-18 ASSESSMENT — PAIN SCALES - GENERAL
PAINLEVEL_OUTOF10: 0

## 2019-10-19 LAB
ANION GAP SERPL CALCULATED.3IONS-SCNC: 11 MMOL/L (ref 7–16)
ANION GAP SERPL CALCULATED.3IONS-SCNC: 12 MMOL/L (ref 7–16)
ANION GAP SERPL CALCULATED.3IONS-SCNC: 12 MMOL/L (ref 7–16)
ANION GAP SERPL CALCULATED.3IONS-SCNC: 14 MMOL/L (ref 7–16)
ANION GAP SERPL CALCULATED.3IONS-SCNC: 21 MMOL/L (ref 7–16)
ANISOCYTOSIS: ABNORMAL
BACTERIA: ABNORMAL /HPF
BASOPHILS ABSOLUTE: 0 E9/L (ref 0–0.2)
BASOPHILS RELATIVE PERCENT: 0.2 % (ref 0–2)
BILIRUBIN URINE: ABNORMAL
BLOOD, URINE: NEGATIVE
BUN BLDV-MCNC: 14 MG/DL (ref 6–20)
BUN BLDV-MCNC: 18 MG/DL (ref 6–20)
BUN BLDV-MCNC: 21 MG/DL (ref 6–20)
BUN BLDV-MCNC: 23 MG/DL (ref 6–20)
BUN BLDV-MCNC: 25 MG/DL (ref 6–20)
BURR CELLS: ABNORMAL
C-REACTIVE PROTEIN: 0.7 MG/DL (ref 0–0.4)
CALCIUM SERPL-MCNC: 8.2 MG/DL (ref 8.6–10.2)
CALCIUM SERPL-MCNC: 8.3 MG/DL (ref 8.6–10.2)
CALCIUM SERPL-MCNC: 8.3 MG/DL (ref 8.6–10.2)
CALCIUM SERPL-MCNC: 8.4 MG/DL (ref 8.6–10.2)
CALCIUM SERPL-MCNC: 9 MG/DL (ref 8.6–10.2)
CHLORIDE BLD-SCNC: 102 MMOL/L (ref 98–107)
CHLORIDE BLD-SCNC: 104 MMOL/L (ref 98–107)
CHLORIDE BLD-SCNC: 104 MMOL/L (ref 98–107)
CHLORIDE BLD-SCNC: 106 MMOL/L (ref 98–107)
CHLORIDE BLD-SCNC: 99 MMOL/L (ref 98–107)
CLARITY: CLEAR
CO2: 15 MMOL/L (ref 22–29)
CO2: 17 MMOL/L (ref 22–29)
CO2: 18 MMOL/L (ref 22–29)
CO2: 19 MMOL/L (ref 22–29)
CO2: 23 MMOL/L (ref 22–29)
COLOR: YELLOW
CREAT SERPL-MCNC: 0.8 MG/DL (ref 0.7–1.2)
CREAT SERPL-MCNC: 0.9 MG/DL (ref 0.7–1.2)
CREAT SERPL-MCNC: 1.1 MG/DL (ref 0.7–1.2)
EOSINOPHILS ABSOLUTE: 0 E9/L (ref 0.05–0.5)
EOSINOPHILS RELATIVE PERCENT: 0.1 % (ref 0–6)
GFR AFRICAN AMERICAN: >60
GFR NON-AFRICAN AMERICAN: >60 ML/MIN/1.73
GLUCOSE BLD-MCNC: 185 MG/DL (ref 74–99)
GLUCOSE BLD-MCNC: 191 MG/DL (ref 74–99)
GLUCOSE BLD-MCNC: 198 MG/DL (ref 74–99)
GLUCOSE BLD-MCNC: 204 MG/DL (ref 74–99)
GLUCOSE BLD-MCNC: 284 MG/DL (ref 74–99)
GLUCOSE URINE: 500 MG/DL
HCT VFR BLD CALC: 34.4 % (ref 37–54)
HEMOGLOBIN: 11.4 G/DL (ref 12.5–16.5)
KETONES, URINE: 40 MG/DL
LACTIC ACID: 2.1 MMOL/L (ref 0.5–2.2)
LEUKOCYTE ESTERASE, URINE: NEGATIVE
LYMPHOCYTES ABSOLUTE: 1.6 E9/L (ref 1.5–4)
LYMPHOCYTES RELATIVE PERCENT: 8.7 % (ref 20–42)
MAGNESIUM: 1.7 MG/DL (ref 1.6–2.6)
MAGNESIUM: 1.7 MG/DL (ref 1.6–2.6)
MAGNESIUM: 1.9 MG/DL (ref 1.6–2.6)
MAGNESIUM: 2 MG/DL (ref 1.6–2.6)
MCH RBC QN AUTO: 31.5 PG (ref 26–35)
MCHC RBC AUTO-ENTMCNC: 33.1 % (ref 32–34.5)
MCV RBC AUTO: 95 FL (ref 80–99.9)
METAMYELOCYTES RELATIVE PERCENT: 0.9 % (ref 0–1)
METER GLUCOSE: 148 MG/DL (ref 74–99)
METER GLUCOSE: 149 MG/DL (ref 74–99)
METER GLUCOSE: 151 MG/DL (ref 74–99)
METER GLUCOSE: 161 MG/DL (ref 74–99)
METER GLUCOSE: 165 MG/DL (ref 74–99)
METER GLUCOSE: 166 MG/DL (ref 74–99)
METER GLUCOSE: 171 MG/DL (ref 74–99)
METER GLUCOSE: 173 MG/DL (ref 74–99)
METER GLUCOSE: 177 MG/DL (ref 74–99)
METER GLUCOSE: 179 MG/DL (ref 74–99)
METER GLUCOSE: 186 MG/DL (ref 74–99)
METER GLUCOSE: 189 MG/DL (ref 74–99)
METER GLUCOSE: 194 MG/DL (ref 74–99)
METER GLUCOSE: 203 MG/DL (ref 74–99)
METER GLUCOSE: 211 MG/DL (ref 74–99)
METER GLUCOSE: 215 MG/DL (ref 74–99)
MONOCYTES ABSOLUTE: 0.71 E9/L (ref 0.1–0.95)
MONOCYTES RELATIVE PERCENT: 4.3 % (ref 2–12)
MRSA CULTURE ONLY: NORMAL
NEUTROPHILS ABSOLUTE: 15.49 E9/L (ref 1.8–7.3)
NEUTROPHILS RELATIVE PERCENT: 86.1 % (ref 43–80)
NITRITE, URINE: NEGATIVE
PDW BLD-RTO: 13.8 FL (ref 11.5–15)
PH UA: 6 (ref 5–9)
PHOSPHORUS: 2 MG/DL (ref 2.5–4.5)
PHOSPHORUS: 2.2 MG/DL (ref 2.5–4.5)
PHOSPHORUS: 2.2 MG/DL (ref 2.5–4.5)
PHOSPHORUS: 2.6 MG/DL (ref 2.5–4.5)
PLATELET # BLD: 411 E9/L (ref 130–450)
PMV BLD AUTO: 9 FL (ref 7–12)
POIKILOCYTES: ABNORMAL
POTASSIUM SERPL-SCNC: 3.5 MMOL/L (ref 3.5–5)
POTASSIUM SERPL-SCNC: 3.7 MMOL/L (ref 3.5–5)
POTASSIUM SERPL-SCNC: 3.8 MMOL/L (ref 3.5–5)
POTASSIUM SERPL-SCNC: 3.8 MMOL/L (ref 3.5–5)
POTASSIUM SERPL-SCNC: 4.6 MMOL/L (ref 3.5–5)
PROCALCITONIN: 0.49 NG/ML (ref 0–0.08)
PROTEIN UA: ABNORMAL MG/DL
RBC # BLD: 3.62 E12/L (ref 3.8–5.8)
RBC UA: ABNORMAL /HPF (ref 0–2)
SODIUM BLD-SCNC: 133 MMOL/L (ref 132–146)
SODIUM BLD-SCNC: 133 MMOL/L (ref 132–146)
SODIUM BLD-SCNC: 134 MMOL/L (ref 132–146)
SODIUM BLD-SCNC: 135 MMOL/L (ref 132–146)
SODIUM BLD-SCNC: 142 MMOL/L (ref 132–146)
SPECIFIC GRAVITY UA: 1.02 (ref 1–1.03)
TARGET CELLS: ABNORMAL
UROBILINOGEN, URINE: 0.2 E.U./DL
WBC # BLD: 17.8 E9/L (ref 4.5–11.5)
WBC UA: ABNORMAL /HPF (ref 0–5)

## 2019-10-19 PROCEDURE — 83735 ASSAY OF MAGNESIUM: CPT

## 2019-10-19 PROCEDURE — 86140 C-REACTIVE PROTEIN: CPT

## 2019-10-19 PROCEDURE — 99233 SBSQ HOSP IP/OBS HIGH 50: CPT | Performed by: INTERNAL MEDICINE

## 2019-10-19 PROCEDURE — 99232 SBSQ HOSP IP/OBS MODERATE 35: CPT | Performed by: INTERNAL MEDICINE

## 2019-10-19 PROCEDURE — 2580000003 HC RX 258: Performed by: INTERNAL MEDICINE

## 2019-10-19 PROCEDURE — 84145 PROCALCITONIN (PCT): CPT

## 2019-10-19 PROCEDURE — 2000000000 HC ICU R&B

## 2019-10-19 PROCEDURE — 83605 ASSAY OF LACTIC ACID: CPT

## 2019-10-19 PROCEDURE — 2580000003 HC RX 258: Performed by: EMERGENCY MEDICINE

## 2019-10-19 PROCEDURE — 85025 COMPLETE CBC W/AUTO DIFF WBC: CPT

## 2019-10-19 PROCEDURE — 2500000003 HC RX 250 WO HCPCS: Performed by: INTERNAL MEDICINE

## 2019-10-19 PROCEDURE — 81001 URINALYSIS AUTO W/SCOPE: CPT

## 2019-10-19 PROCEDURE — 6370000000 HC RX 637 (ALT 250 FOR IP): Performed by: EMERGENCY MEDICINE

## 2019-10-19 PROCEDURE — 36415 COLL VENOUS BLD VENIPUNCTURE: CPT

## 2019-10-19 PROCEDURE — 6360000002 HC RX W HCPCS: Performed by: INTERNAL MEDICINE

## 2019-10-19 PROCEDURE — 84100 ASSAY OF PHOSPHORUS: CPT

## 2019-10-19 PROCEDURE — 82962 GLUCOSE BLOOD TEST: CPT

## 2019-10-19 PROCEDURE — 80048 BASIC METABOLIC PNL TOTAL CA: CPT

## 2019-10-19 RX ORDER — POTASSIUM CHLORIDE 7.45 MG/ML
10 INJECTION INTRAVENOUS PRN
Status: DISCONTINUED | OUTPATIENT
Start: 2019-10-19 | End: 2019-10-19

## 2019-10-19 RX ORDER — SODIUM CHLORIDE 450 MG/100ML
INJECTION, SOLUTION INTRAVENOUS CONTINUOUS
Status: DISCONTINUED | OUTPATIENT
Start: 2019-10-19 | End: 2019-10-19

## 2019-10-19 RX ORDER — DEXTROSE AND SODIUM CHLORIDE 5; .45 G/100ML; G/100ML
INJECTION, SOLUTION INTRAVENOUS CONTINUOUS PRN
Status: DISCONTINUED | OUTPATIENT
Start: 2019-10-19 | End: 2019-10-19 | Stop reason: SDUPTHER

## 2019-10-19 RX ORDER — PANTOPRAZOLE SODIUM 40 MG/1
40 TABLET, DELAYED RELEASE ORAL
Status: DISCONTINUED | OUTPATIENT
Start: 2019-10-20 | End: 2019-10-21 | Stop reason: HOSPADM

## 2019-10-19 RX ORDER — MAGNESIUM SULFATE IN WATER 40 MG/ML
2 INJECTION, SOLUTION INTRAVENOUS ONCE
Status: DISCONTINUED | OUTPATIENT
Start: 2019-10-19 | End: 2019-10-19

## 2019-10-19 RX ORDER — DEXTROSE MONOHYDRATE 25 G/50ML
12.5 INJECTION, SOLUTION INTRAVENOUS PRN
Status: DISCONTINUED | OUTPATIENT
Start: 2019-10-19 | End: 2019-10-19 | Stop reason: SDUPTHER

## 2019-10-19 RX ADMIN — Medication 10 ML: at 21:44

## 2019-10-19 RX ADMIN — POTASSIUM CHLORIDE 10 MEQ: 7.46 INJECTION, SOLUTION INTRAVENOUS at 04:13

## 2019-10-19 RX ADMIN — HEPARIN SODIUM 5000 UNITS: 10000 INJECTION, SOLUTION INTRAVENOUS; SUBCUTANEOUS at 21:43

## 2019-10-19 RX ADMIN — SODIUM CHLORIDE 4.02 UNITS/HR: 9 INJECTION, SOLUTION INTRAVENOUS at 00:28

## 2019-10-19 RX ADMIN — POTASSIUM CHLORIDE 10 MEQ: 7.46 INJECTION, SOLUTION INTRAVENOUS at 03:05

## 2019-10-19 RX ADMIN — POTASSIUM PHOSPHATE, MONOBASIC AND POTASSIUM PHOSPHATE, DIBASIC 15 MMOL: 224; 236 INJECTION, SOLUTION, CONCENTRATE INTRAVENOUS at 09:16

## 2019-10-19 RX ADMIN — POTASSIUM CHLORIDE 10 MEQ: 7.46 INJECTION, SOLUTION INTRAVENOUS at 02:24

## 2019-10-19 RX ADMIN — HEPARIN SODIUM 5000 UNITS: 10000 INJECTION, SOLUTION INTRAVENOUS; SUBCUTANEOUS at 06:00

## 2019-10-19 RX ADMIN — HEPARIN SODIUM 5000 UNITS: 10000 INJECTION, SOLUTION INTRAVENOUS; SUBCUTANEOUS at 17:57

## 2019-10-19 RX ADMIN — SODIUM PHOSPHATE, MONOBASIC, MONOHYDRATE 15 MMOL: 276; 142 INJECTION, SOLUTION INTRAVENOUS at 03:04

## 2019-10-19 RX ADMIN — CEFTRIAXONE SODIUM 1 G: 1 INJECTION, POWDER, FOR SOLUTION INTRAMUSCULAR; INTRAVENOUS at 17:56

## 2019-10-19 RX ADMIN — DEXTROSE AND SODIUM CHLORIDE: 5; 450 INJECTION, SOLUTION INTRAVENOUS at 03:04

## 2019-10-19 RX ADMIN — POTASSIUM CHLORIDE 10 MEQ: 7.46 INJECTION, SOLUTION INTRAVENOUS at 06:38

## 2019-10-19 ASSESSMENT — PAIN SCALES - GENERAL
PAINLEVEL_OUTOF10: 0

## 2019-10-20 LAB
ANION GAP SERPL CALCULATED.3IONS-SCNC: 10 MMOL/L (ref 7–16)
ANION GAP SERPL CALCULATED.3IONS-SCNC: 11 MMOL/L (ref 7–16)
BASOPHILS ABSOLUTE: 0.04 E9/L (ref 0–0.2)
BASOPHILS RELATIVE PERCENT: 0.4 % (ref 0–2)
BUN BLDV-MCNC: 10 MG/DL (ref 6–20)
BUN BLDV-MCNC: 12 MG/DL (ref 6–20)
CALCIUM SERPL-MCNC: 9 MG/DL (ref 8.6–10.2)
CALCIUM SERPL-MCNC: 9 MG/DL (ref 8.6–10.2)
CHLORIDE BLD-SCNC: 101 MMOL/L (ref 98–107)
CHLORIDE BLD-SCNC: 105 MMOL/L (ref 98–107)
CO2: 23 MMOL/L (ref 22–29)
CO2: 29 MMOL/L (ref 22–29)
CREAT SERPL-MCNC: 0.7 MG/DL (ref 0.7–1.2)
CREAT SERPL-MCNC: 0.8 MG/DL (ref 0.7–1.2)
CULTURE, RESPIRATORY: NORMAL
EOSINOPHILS ABSOLUTE: 0.13 E9/L (ref 0.05–0.5)
EOSINOPHILS RELATIVE PERCENT: 1.4 % (ref 0–6)
GFR AFRICAN AMERICAN: >60
GFR AFRICAN AMERICAN: >60
GFR NON-AFRICAN AMERICAN: >60 ML/MIN/1.73
GFR NON-AFRICAN AMERICAN: >60 ML/MIN/1.73
GLUCOSE BLD-MCNC: 120 MG/DL (ref 74–99)
GLUCOSE BLD-MCNC: 151 MG/DL (ref 74–99)
HCT VFR BLD CALC: 36.1 % (ref 37–54)
HEMOGLOBIN: 11.8 G/DL (ref 12.5–16.5)
IMMATURE GRANULOCYTES #: 0.02 E9/L
IMMATURE GRANULOCYTES %: 0.2 % (ref 0–5)
LYMPHOCYTES ABSOLUTE: 2.49 E9/L (ref 1.5–4)
LYMPHOCYTES RELATIVE PERCENT: 26.7 % (ref 20–42)
MCH RBC QN AUTO: 30.2 PG (ref 26–35)
MCHC RBC AUTO-ENTMCNC: 32.7 % (ref 32–34.5)
MCV RBC AUTO: 92.3 FL (ref 80–99.9)
METER GLUCOSE: 130 MG/DL (ref 74–99)
METER GLUCOSE: 148 MG/DL (ref 74–99)
METER GLUCOSE: 163 MG/DL (ref 74–99)
METER GLUCOSE: 171 MG/DL (ref 74–99)
METER GLUCOSE: 208 MG/DL (ref 74–99)
METER GLUCOSE: 245 MG/DL (ref 74–99)
MONOCYTES ABSOLUTE: 0.73 E9/L (ref 0.1–0.95)
MONOCYTES RELATIVE PERCENT: 7.8 % (ref 2–12)
NEUTROPHILS ABSOLUTE: 5.91 E9/L (ref 1.8–7.3)
NEUTROPHILS RELATIVE PERCENT: 63.5 % (ref 43–80)
PDW BLD-RTO: 13.6 FL (ref 11.5–15)
PHOSPHORUS: 2.1 MG/DL (ref 2.5–4.5)
PLATELET # BLD: 349 E9/L (ref 130–450)
PMV BLD AUTO: 8.8 FL (ref 7–12)
POTASSIUM SERPL-SCNC: 3.7 MMOL/L (ref 3.5–5)
POTASSIUM SERPL-SCNC: 4.3 MMOL/L (ref 3.5–5)
RBC # BLD: 3.91 E12/L (ref 3.8–5.8)
SMEAR, RESPIRATORY: NORMAL
SODIUM BLD-SCNC: 139 MMOL/L (ref 132–146)
SODIUM BLD-SCNC: 140 MMOL/L (ref 132–146)
URINE CULTURE, ROUTINE: NORMAL
URINE CULTURE, ROUTINE: NORMAL
WBC # BLD: 9.3 E9/L (ref 4.5–11.5)

## 2019-10-20 PROCEDURE — 6370000000 HC RX 637 (ALT 250 FOR IP): Performed by: INTERNAL MEDICINE

## 2019-10-20 PROCEDURE — 80048 BASIC METABOLIC PNL TOTAL CA: CPT

## 2019-10-20 PROCEDURE — 2580000003 HC RX 258: Performed by: INTERNAL MEDICINE

## 2019-10-20 PROCEDURE — 99233 SBSQ HOSP IP/OBS HIGH 50: CPT | Performed by: INTERNAL MEDICINE

## 2019-10-20 PROCEDURE — 84100 ASSAY OF PHOSPHORUS: CPT

## 2019-10-20 PROCEDURE — 6360000002 HC RX W HCPCS: Performed by: INTERNAL MEDICINE

## 2019-10-20 PROCEDURE — 85025 COMPLETE CBC W/AUTO DIFF WBC: CPT

## 2019-10-20 PROCEDURE — 82962 GLUCOSE BLOOD TEST: CPT

## 2019-10-20 PROCEDURE — 1200000000 HC SEMI PRIVATE

## 2019-10-20 PROCEDURE — 2500000003 HC RX 250 WO HCPCS: Performed by: INTERNAL MEDICINE

## 2019-10-20 PROCEDURE — 36415 COLL VENOUS BLD VENIPUNCTURE: CPT

## 2019-10-20 PROCEDURE — 99232 SBSQ HOSP IP/OBS MODERATE 35: CPT | Performed by: INTERNAL MEDICINE

## 2019-10-20 RX ORDER — LISINOPRIL 20 MG/1
20 TABLET ORAL DAILY
Status: DISCONTINUED | OUTPATIENT
Start: 2019-10-20 | End: 2019-10-21 | Stop reason: HOSPADM

## 2019-10-20 RX ADMIN — CEFTRIAXONE SODIUM 1 G: 1 INJECTION, POWDER, FOR SOLUTION INTRAMUSCULAR; INTRAVENOUS at 15:14

## 2019-10-20 RX ADMIN — HEPARIN SODIUM 5000 UNITS: 10000 INJECTION, SOLUTION INTRAVENOUS; SUBCUTANEOUS at 05:51

## 2019-10-20 RX ADMIN — POTASSIUM PHOSPHATE, MONOBASIC AND POTASSIUM PHOSPHATE, DIBASIC 15 MMOL: 224; 236 INJECTION, SOLUTION, CONCENTRATE INTRAVENOUS at 15:14

## 2019-10-20 RX ADMIN — LISINOPRIL 20 MG: 20 TABLET ORAL at 15:15

## 2019-10-20 RX ADMIN — HEPARIN SODIUM 5000 UNITS: 10000 INJECTION, SOLUTION INTRAVENOUS; SUBCUTANEOUS at 21:28

## 2019-10-20 RX ADMIN — PANTOPRAZOLE SODIUM 40 MG: 40 TABLET, DELAYED RELEASE ORAL at 05:51

## 2019-10-20 RX ADMIN — HEPARIN SODIUM 5000 UNITS: 10000 INJECTION, SOLUTION INTRAVENOUS; SUBCUTANEOUS at 15:15

## 2019-10-20 RX ADMIN — Medication 10 ML: at 15:19

## 2019-10-20 RX ADMIN — Medication 10 ML: at 21:28

## 2019-10-20 ASSESSMENT — PAIN SCALES - GENERAL
PAINLEVEL_OUTOF10: 0

## 2019-10-21 VITALS
WEIGHT: 136.24 LBS | HEART RATE: 77 BPM | DIASTOLIC BLOOD PRESSURE: 96 MMHG | RESPIRATION RATE: 17 BRPM | TEMPERATURE: 98.4 F | SYSTOLIC BLOOD PRESSURE: 156 MMHG | BODY MASS INDEX: 21.9 KG/M2 | OXYGEN SATURATION: 92 % | HEIGHT: 66 IN

## 2019-10-21 LAB
METER GLUCOSE: 128 MG/DL (ref 74–99)
METER GLUCOSE: 97 MG/DL (ref 74–99)

## 2019-10-21 PROCEDURE — 99231 SBSQ HOSP IP/OBS SF/LOW 25: CPT | Performed by: INTERNAL MEDICINE

## 2019-10-21 PROCEDURE — 6370000000 HC RX 637 (ALT 250 FOR IP): Performed by: INTERNAL MEDICINE

## 2019-10-21 PROCEDURE — 82962 GLUCOSE BLOOD TEST: CPT

## 2019-10-21 PROCEDURE — 6360000002 HC RX W HCPCS: Performed by: INTERNAL MEDICINE

## 2019-10-21 PROCEDURE — 2580000003 HC RX 258: Performed by: INTERNAL MEDICINE

## 2019-10-21 RX ORDER — CEPHALEXIN 500 MG/1
500 CAPSULE ORAL 2 TIMES DAILY
Qty: 10 CAPSULE | Refills: 0 | Status: SHIPPED | OUTPATIENT
Start: 2019-10-21 | End: 2019-10-26

## 2019-10-21 RX ADMIN — PANTOPRAZOLE SODIUM 40 MG: 40 TABLET, DELAYED RELEASE ORAL at 06:48

## 2019-10-21 RX ADMIN — CEFTRIAXONE SODIUM 1 G: 1 INJECTION, POWDER, FOR SOLUTION INTRAMUSCULAR; INTRAVENOUS at 12:37

## 2019-10-21 RX ADMIN — LISINOPRIL 20 MG: 20 TABLET ORAL at 09:22

## 2019-10-21 RX ADMIN — HEPARIN SODIUM 5000 UNITS: 10000 INJECTION, SOLUTION INTRAVENOUS; SUBCUTANEOUS at 06:48

## 2019-10-23 LAB
BLOOD CULTURE, ROUTINE: NORMAL
CULTURE, BLOOD 2: NORMAL

## 2019-10-27 LAB — COCAINE, CONFIRM, URINE: 647 NG/ML

## 2019-12-08 ENCOUNTER — HOSPITAL ENCOUNTER (INPATIENT)
Age: 49
LOS: 4 days | Discharge: HOME | DRG: 637 | End: 2019-12-12
Attending: EMERGENCY MEDICINE | Admitting: INTERNAL MEDICINE

## 2019-12-08 ENCOUNTER — APPOINTMENT (OUTPATIENT)
Dept: GENERAL RADIOLOGY | Age: 49
DRG: 637 | End: 2019-12-08

## 2019-12-08 DIAGNOSIS — N17.9 AKI (ACUTE KIDNEY INJURY) (HCC): ICD-10-CM

## 2019-12-08 DIAGNOSIS — E10.10 TYPE 1 DIABETES MELLITUS WITH KETOACIDOSIS WITHOUT COMA (HCC): Primary | ICD-10-CM

## 2019-12-08 PROBLEM — E08.10 DIABETIC KETOACIDOSIS WITHOUT COMA ASSOCIATED WITH DIABETES MELLITUS DUE TO UNDERLYING CONDITION (HCC): Status: ACTIVE | Noted: 2019-12-08

## 2019-12-08 LAB
ACETAMINOPHEN LEVEL: <5 MCG/ML (ref 10–30)
ADENOVIRUS BY PCR: NOT DETECTED
ALBUMIN SERPL-MCNC: 4.8 G/DL (ref 3.5–5.2)
ALP BLD-CCNC: 127 U/L (ref 40–129)
ALT SERPL-CCNC: 53 U/L (ref 0–40)
AMPHETAMINE SCREEN, URINE: NOT DETECTED
ANION GAP SERPL CALCULATED.3IONS-SCNC: 29 MMOL/L (ref 7–16)
ANION GAP SERPL CALCULATED.3IONS-SCNC: 40 MMOL/L (ref 7–16)
ANION GAP SERPL CALCULATED.3IONS-SCNC: 44 MMOL/L (ref 7–16)
ANION GAP SERPL CALCULATED.3IONS-SCNC: 47 MMOL/L (ref 7–16)
AST SERPL-CCNC: 15 U/L (ref 0–39)
BACTERIA: NORMAL /HPF
BARBITURATE SCREEN URINE: NOT DETECTED
BENZODIAZEPINE SCREEN, URINE: NOT DETECTED
BETA-HYDROXYBUTYRATE: >4.5 MMOL/L (ref 0.02–0.27)
BILIRUB SERPL-MCNC: 1.9 MG/DL (ref 0–1.2)
BILIRUBIN URINE: ABNORMAL
BLOOD, URINE: ABNORMAL
BORDETELLA PARAPERTUSSIS BY PCR: NOT DETECTED
BORDETELLA PERTUSSIS BY PCR: NOT DETECTED
BUN BLDV-MCNC: 39 MG/DL (ref 6–20)
BUN BLDV-MCNC: 39 MG/DL (ref 6–20)
BUN BLDV-MCNC: 42 MG/DL (ref 6–20)
BUN BLDV-MCNC: 44 MG/DL (ref 6–20)
CALCIUM SERPL-MCNC: 7.6 MG/DL (ref 8.6–10.2)
CALCIUM SERPL-MCNC: 7.9 MG/DL (ref 8.6–10.2)
CALCIUM SERPL-MCNC: 8.2 MG/DL (ref 8.6–10.2)
CALCIUM SERPL-MCNC: 9.3 MG/DL (ref 8.6–10.2)
CANNABINOID SCREEN URINE: NOT DETECTED
CASTS: NORMAL /LPF
CHLAMYDOPHILIA PNEUMONIAE BY PCR: NOT DETECTED
CHLORIDE BLD-SCNC: 81 MMOL/L (ref 98–107)
CHLORIDE BLD-SCNC: 87 MMOL/L (ref 98–107)
CHLORIDE BLD-SCNC: 89 MMOL/L (ref 98–107)
CHLORIDE BLD-SCNC: 97 MMOL/L (ref 98–107)
CHLORIDE URINE RANDOM: <20 MMOL/L
CHP ED QC CHECK: YES
CHP ED QC CHECK: YES
CK MB: 6.1 NG/ML (ref 0–7.7)
CLARITY: CLEAR
CO2: 13 MMOL/L (ref 22–29)
CO2: 3 MMOL/L (ref 22–29)
CO2: 5 MMOL/L (ref 22–29)
CO2: 9 MMOL/L (ref 22–29)
COCAINE METABOLITE SCREEN URINE: NOT DETECTED
COHB: 0.3 % (ref 0–1.5)
COHB: 0.9 % (ref 0–1.5)
COLOR: YELLOW
CORONAVIRUS 229E BY PCR: NOT DETECTED
CORONAVIRUS HKU1 BY PCR: NOT DETECTED
CORONAVIRUS NL63 BY PCR: NOT DETECTED
CORONAVIRUS OC43 BY PCR: NOT DETECTED
CREAT SERPL-MCNC: 1.7 MG/DL (ref 0.7–1.2)
CREAT SERPL-MCNC: 2 MG/DL (ref 0.7–1.2)
CREATININE URINE: 35 MG/DL (ref 40–278)
CRITICAL: ABNORMAL
CRITICAL: ABNORMAL
DATE ANALYZED: ABNORMAL
DATE ANALYZED: ABNORMAL
DATE OF COLLECTION: ABNORMAL
DATE OF COLLECTION: ABNORMAL
DIGOXIN LEVEL: <0.3 NG/ML (ref 0.8–2)
ETHANOL: <10 MG/DL (ref 0–0.08)
FENTANYL SCREEN, URINE: NOT DETECTED
GFR AFRICAN AMERICAN: 43
GFR AFRICAN AMERICAN: 46
GFR AFRICAN AMERICAN: 52
GFR NON-AFRICAN AMERICAN: 36 ML/MIN/1.73
GFR NON-AFRICAN AMERICAN: 38 ML/MIN/1.73
GFR NON-AFRICAN AMERICAN: 43 ML/MIN/1.73
GLUCOSE BLD-MCNC: 378 MG/DL (ref 74–99)
GLUCOSE BLD-MCNC: 440 MG/DL (ref 74–99)
GLUCOSE BLD-MCNC: 500 MG/DL
GLUCOSE BLD-MCNC: 541 MG/DL (ref 74–99)
GLUCOSE BLD-MCNC: 594 MG/DL (ref 74–99)
GLUCOSE BLD-MCNC: 636 MG/DL (ref 74–99)
GLUCOSE BLD-MCNC: 670 MG/DL (ref 74–99)
GLUCOSE BLD-MCNC: 697 MG/DL (ref 74–99)
GLUCOSE BLD-MCNC: NORMAL MG/DL
GLUCOSE URINE: >=1000 MG/DL
HBA1C MFR BLD: 10.7 % (ref 4–5.6)
HCT VFR BLD CALC: 50.3 % (ref 37–54)
HEMOGLOBIN: 15.7 G/DL (ref 12.5–16.5)
HHB: 1.7 % (ref 0–5)
HHB: 3.7 % (ref 0–5)
HUMAN METAPNEUMOVIRUS BY PCR: NOT DETECTED
HUMAN RHINOVIRUS/ENTEROVIRUS BY PCR: NOT DETECTED
INFLUENZA A BY PCR: NOT DETECTED
INFLUENZA B BY PCR: NOT DETECTED
KETONES, URINE: >=80 MG/DL
LAB: ABNORMAL
LAB: ABNORMAL
LACTIC ACID, SEPSIS: 4.2 MMOL/L (ref 0.5–1.9)
LACTIC ACID, SEPSIS: 6.6 MMOL/L (ref 0.5–1.9)
LACTIC ACID: 3 MMOL/L (ref 0.5–2.2)
LEUKOCYTE ESTERASE, URINE: NEGATIVE
LIPASE: 16 U/L (ref 13–60)
Lab: ABNORMAL
Lab: ABNORMAL
Lab: NORMAL
MAGNESIUM: 1.6 MG/DL (ref 1.6–2.6)
MAGNESIUM: 1.8 MG/DL (ref 1.6–2.6)
MAGNESIUM: 2 MG/DL (ref 1.6–2.6)
MAGNESIUM: 2.2 MG/DL (ref 1.6–2.6)
MCH RBC QN AUTO: 30.2 PG (ref 26–35)
MCHC RBC AUTO-ENTMCNC: 31.2 % (ref 32–34.5)
MCV RBC AUTO: 96.7 FL (ref 80–99.9)
METER GLUCOSE: 328 MG/DL (ref 74–99)
METER GLUCOSE: 334 MG/DL (ref 74–99)
METER GLUCOSE: 349 MG/DL (ref 74–99)
METER GLUCOSE: 388 MG/DL (ref 74–99)
METER GLUCOSE: 476 MG/DL (ref 74–99)
METER GLUCOSE: >500 MG/DL (ref 74–99)
METHADONE SCREEN, URINE: NOT DETECTED
METHB: 0.4 % (ref 0–1.5)
METHB: 0.4 % (ref 0–1.5)
MODE: ABNORMAL
MYCOPLASMA PNEUMONIAE BY PCR: NOT DETECTED
NITRITE, URINE: NEGATIVE
O2 CONTENT: 20.7 ML/DL
O2 CONTENT: 22.6 ML/DL
O2 SATURATION: 96.3 % (ref 92–98.5)
O2 SATURATION: 98.3 % (ref 92–98.5)
O2HB: 95 % (ref 94–97)
O2HB: 97.6 % (ref 94–97)
OPERATOR ID: 421
OPERATOR ID: 467
OPIATE SCREEN URINE: NOT DETECTED
OSMOLALITY: 338 MOSM/KG (ref 285–310)
OXYCODONE URINE: NOT DETECTED
PARAINFLUENZA VIRUS 1 BY PCR: NOT DETECTED
PARAINFLUENZA VIRUS 2 BY PCR: NOT DETECTED
PARAINFLUENZA VIRUS 3 BY PCR: NOT DETECTED
PARAINFLUENZA VIRUS 4 BY PCR: NOT DETECTED
PATIENT TEMP: 37 C
PATIENT TEMP: 37 C
PCO2: <15 MMHG (ref 35–45)
PCO2: <15 MMHG (ref 35–45)
PDW BLD-RTO: 13.4 FL (ref 11.5–15)
PERFORMED ON: ABNORMAL
PH BLOOD GAS: 6.98 (ref 7.35–7.45)
PH BLOOD GAS: 7.04 (ref 7.35–7.45)
PH UA: 5 (ref 5–9)
PHENCYCLIDINE SCREEN URINE: NOT DETECTED
PHOSPHORUS: 1.9 MG/DL (ref 2.5–4.5)
PHOSPHORUS: 4.1 MG/DL (ref 2.5–4.5)
PHOSPHORUS: 7.4 MG/DL (ref 2.5–4.5)
PHOSPHORUS: 8.3 MG/DL (ref 2.5–4.5)
PLATELET # BLD: 524 E9/L (ref 130–450)
PMV BLD AUTO: 9.4 FL (ref 7–12)
PO2: 101.2 MMHG (ref 60–100)
PO2: 147.1 MMHG (ref 60–100)
POC CHLORIDE: 105 MMOL/L (ref 100–108)
POC CREATININE: 1.9 MG/DL (ref 0.7–1.2)
POC POTASSIUM: 3.8 MMOL/L (ref 3.5–5)
POC SODIUM: 131 MMOL/L (ref 132–146)
POTASSIUM SERPL-SCNC: 3.6 MMOL/L (ref 3.5–5)
POTASSIUM SERPL-SCNC: 3.7 MMOL/L (ref 3.5–5)
POTASSIUM SERPL-SCNC: 4.1 MMOL/L (ref 3.5–5)
POTASSIUM SERPL-SCNC: 4.12 MMOL/L (ref 3.3–5.1)
POTASSIUM SERPL-SCNC: 4.2 MMOL/L (ref 3.5–5)
POTASSIUM, UR: 27.4 MMOL/L
PROCALCITONIN: 3.51 NG/ML (ref 0–0.08)
PROTEIN UA: 30 MG/DL
RBC # BLD: 5.2 E12/L (ref 3.8–5.8)
RBC UA: NORMAL /HPF (ref 0–2)
RESPIRATORY SYNCYTIAL VIRUS BY PCR: NOT DETECTED
SALICYLATE, SERUM: 1.8 MG/DL (ref 0–30)
SODIUM BLD-SCNC: 131 MMOL/L (ref 132–146)
SODIUM BLD-SCNC: 136 MMOL/L (ref 132–146)
SODIUM BLD-SCNC: 138 MMOL/L (ref 132–146)
SODIUM BLD-SCNC: 139 MMOL/L (ref 132–146)
SODIUM URINE: 60 MMOL/L
SOURCE, BLOOD GAS: ABNORMAL
SOURCE, BLOOD GAS: ABNORMAL
SPECIFIC GRAVITY UA: 1.02 (ref 1–1.03)
THB: 15.4 G/DL (ref 11.5–16.5)
THB: 16.3 G/DL (ref 11.5–16.5)
TIME ANALYZED: 1237
TIME ANALYZED: 857
TOTAL CK: 192 U/L (ref 20–200)
TOTAL PROTEIN: 7.8 G/DL (ref 6.4–8.3)
TRICYCLIC ANTIDEPRESSANTS SCREEN SERUM: NEGATIVE NG/ML
TROPONIN: 0.01 NG/ML (ref 0–0.03)
UREA NITROGEN, UR: 209 MG/DL (ref 800–1666)
UROBILINOGEN, URINE: 0.2 E.U./DL
WBC # BLD: 26.4 E9/L (ref 4.5–11.5)
WBC UA: NORMAL /HPF (ref 0–5)

## 2019-12-08 PROCEDURE — 51702 INSERT TEMP BLADDER CATH: CPT

## 2019-12-08 PROCEDURE — 84295 ASSAY OF SERUM SODIUM: CPT

## 2019-12-08 PROCEDURE — 6360000002 HC RX W HCPCS: Performed by: INTERNAL MEDICINE

## 2019-12-08 PROCEDURE — 80307 DRUG TEST PRSMV CHEM ANLYZR: CPT

## 2019-12-08 PROCEDURE — 2580000003 HC RX 258: Performed by: INTERNAL MEDICINE

## 2019-12-08 PROCEDURE — 87088 URINE BACTERIA CULTURE: CPT

## 2019-12-08 PROCEDURE — 80162 ASSAY OF DIGOXIN TOTAL: CPT

## 2019-12-08 PROCEDURE — 36592 COLLECT BLOOD FROM PICC: CPT

## 2019-12-08 PROCEDURE — 36415 COLL VENOUS BLD VENIPUNCTURE: CPT

## 2019-12-08 PROCEDURE — 84132 ASSAY OF SERUM POTASSIUM: CPT

## 2019-12-08 PROCEDURE — 99285 EMERGENCY DEPT VISIT HI MDM: CPT

## 2019-12-08 PROCEDURE — 83036 HEMOGLOBIN GLYCOSYLATED A1C: CPT

## 2019-12-08 PROCEDURE — 82553 CREATINE MB FRACTION: CPT

## 2019-12-08 PROCEDURE — G0480 DRUG TEST DEF 1-7 CLASSES: HCPCS

## 2019-12-08 PROCEDURE — 83930 ASSAY OF BLOOD OSMOLALITY: CPT

## 2019-12-08 PROCEDURE — 2500000003 HC RX 250 WO HCPCS: Performed by: EMERGENCY MEDICINE

## 2019-12-08 PROCEDURE — 82565 ASSAY OF CREATININE: CPT

## 2019-12-08 PROCEDURE — 80048 BASIC METABOLIC PNL TOTAL CA: CPT

## 2019-12-08 PROCEDURE — 2580000003 HC RX 258: Performed by: EMERGENCY MEDICINE

## 2019-12-08 PROCEDURE — 82435 ASSAY OF BLOOD CHLORIDE: CPT

## 2019-12-08 PROCEDURE — 6370000000 HC RX 637 (ALT 250 FOR IP): Performed by: INTERNAL MEDICINE

## 2019-12-08 PROCEDURE — 2000000000 HC ICU R&B

## 2019-12-08 PROCEDURE — 36556 INSERT NON-TUNNEL CV CATH: CPT

## 2019-12-08 PROCEDURE — 82550 ASSAY OF CK (CPK): CPT

## 2019-12-08 PROCEDURE — 81001 URINALYSIS AUTO W/SCOPE: CPT

## 2019-12-08 PROCEDURE — 82436 ASSAY OF URINE CHLORIDE: CPT

## 2019-12-08 PROCEDURE — 82962 GLUCOSE BLOOD TEST: CPT

## 2019-12-08 PROCEDURE — 71045 X-RAY EXAM CHEST 1 VIEW: CPT

## 2019-12-08 PROCEDURE — 6360000002 HC RX W HCPCS: Performed by: EMERGENCY MEDICINE

## 2019-12-08 PROCEDURE — 6360000002 HC RX W HCPCS

## 2019-12-08 PROCEDURE — 96374 THER/PROPH/DIAG INJ IV PUSH: CPT

## 2019-12-08 PROCEDURE — 82947 ASSAY GLUCOSE BLOOD QUANT: CPT

## 2019-12-08 PROCEDURE — 84100 ASSAY OF PHOSPHORUS: CPT

## 2019-12-08 PROCEDURE — 80053 COMPREHEN METABOLIC PANEL: CPT

## 2019-12-08 PROCEDURE — 82805 BLOOD GASES W/O2 SATURATION: CPT

## 2019-12-08 PROCEDURE — 82010 KETONE BODYS QUAN: CPT

## 2019-12-08 PROCEDURE — 82570 ASSAY OF URINE CREATININE: CPT

## 2019-12-08 PROCEDURE — 0100U HC RESPIRPTHGN MULT REV TRANS & AMP PRB TECH 21 TRGT: CPT

## 2019-12-08 PROCEDURE — 82693 ASSAY OF ETHYLENE GLYCOL: CPT

## 2019-12-08 PROCEDURE — 83735 ASSAY OF MAGNESIUM: CPT

## 2019-12-08 PROCEDURE — 84300 ASSAY OF URINE SODIUM: CPT

## 2019-12-08 PROCEDURE — 84484 ASSAY OF TROPONIN QUANT: CPT

## 2019-12-08 PROCEDURE — 85027 COMPLETE CBC AUTOMATED: CPT

## 2019-12-08 PROCEDURE — 84540 ASSAY OF URINE/UREA-N: CPT

## 2019-12-08 PROCEDURE — 93005 ELECTROCARDIOGRAM TRACING: CPT | Performed by: INTERNAL MEDICINE

## 2019-12-08 PROCEDURE — 83605 ASSAY OF LACTIC ACID: CPT

## 2019-12-08 PROCEDURE — 05H733Z INSERTION OF INFUSION DEVICE INTO RIGHT AXILLARY VEIN, PERCUTANEOUS APPROACH: ICD-10-PCS | Performed by: RADIOLOGY

## 2019-12-08 PROCEDURE — 87040 BLOOD CULTURE FOR BACTERIA: CPT

## 2019-12-08 PROCEDURE — 83690 ASSAY OF LIPASE: CPT

## 2019-12-08 PROCEDURE — 6370000000 HC RX 637 (ALT 250 FOR IP): Performed by: EMERGENCY MEDICINE

## 2019-12-08 PROCEDURE — 84145 PROCALCITONIN (PCT): CPT

## 2019-12-08 PROCEDURE — 87150 DNA/RNA AMPLIFIED PROBE: CPT

## 2019-12-08 PROCEDURE — 84133 ASSAY OF URINE POTASSIUM: CPT

## 2019-12-08 RX ORDER — DEXTROSE MONOHYDRATE 25 G/50ML
12.5 INJECTION, SOLUTION INTRAVENOUS PRN
Status: DISCONTINUED | OUTPATIENT
Start: 2019-12-08 | End: 2019-12-12 | Stop reason: HOSPADM

## 2019-12-08 RX ORDER — MAGNESIUM SULFATE 1 G/100ML
1 INJECTION INTRAVENOUS PRN
Status: DISCONTINUED | OUTPATIENT
Start: 2019-12-08 | End: 2019-12-09

## 2019-12-08 RX ORDER — IPRATROPIUM BROMIDE AND ALBUTEROL SULFATE 2.5; .5 MG/3ML; MG/3ML
1 SOLUTION RESPIRATORY (INHALATION)
Status: DISCONTINUED | OUTPATIENT
Start: 2019-12-09 | End: 2019-12-09

## 2019-12-08 RX ORDER — SODIUM CHLORIDE 9 MG/ML
INJECTION, SOLUTION INTRAVENOUS CONTINUOUS
Status: DISCONTINUED | OUTPATIENT
Start: 2019-12-08 | End: 2019-12-09

## 2019-12-08 RX ORDER — ASPIRIN 81 MG/1
81 TABLET ORAL DAILY
Status: DISCONTINUED | OUTPATIENT
Start: 2019-12-08 | End: 2019-12-12 | Stop reason: HOSPADM

## 2019-12-08 RX ORDER — ACETAMINOPHEN 325 MG/1
650 TABLET ORAL EVERY 4 HOURS PRN
Status: DISCONTINUED | OUTPATIENT
Start: 2019-12-08 | End: 2019-12-12 | Stop reason: HOSPADM

## 2019-12-08 RX ORDER — DEXTROSE AND SODIUM CHLORIDE 5; .45 G/100ML; G/100ML
INJECTION, SOLUTION INTRAVENOUS CONTINUOUS PRN
Status: DISCONTINUED | OUTPATIENT
Start: 2019-12-08 | End: 2019-12-09

## 2019-12-08 RX ORDER — POTASSIUM CHLORIDE 7.45 MG/ML
10 INJECTION INTRAVENOUS PRN
Status: DISCONTINUED | OUTPATIENT
Start: 2019-12-08 | End: 2019-12-09

## 2019-12-08 RX ORDER — ONDANSETRON 2 MG/ML
INJECTION INTRAMUSCULAR; INTRAVENOUS
Status: COMPLETED
Start: 2019-12-08 | End: 2019-12-08

## 2019-12-08 RX ORDER — METHYLPREDNISOLONE SODIUM SUCCINATE 40 MG/ML
40 INJECTION, POWDER, LYOPHILIZED, FOR SOLUTION INTRAMUSCULAR; INTRAVENOUS EVERY 8 HOURS SCHEDULED
Status: DISCONTINUED | OUTPATIENT
Start: 2019-12-08 | End: 2019-12-09

## 2019-12-08 RX ORDER — SODIUM CHLORIDE 0.9 % (FLUSH) 0.9 %
10 SYRINGE (ML) INJECTION EVERY 12 HOURS SCHEDULED
Status: DISCONTINUED | OUTPATIENT
Start: 2019-12-08 | End: 2019-12-12 | Stop reason: HOSPADM

## 2019-12-08 RX ORDER — ATORVASTATIN CALCIUM 40 MG/1
80 TABLET, FILM COATED ORAL NIGHTLY
Status: DISCONTINUED | OUTPATIENT
Start: 2019-12-08 | End: 2019-12-12 | Stop reason: HOSPADM

## 2019-12-08 RX ORDER — ONDANSETRON 2 MG/ML
4 INJECTION INTRAMUSCULAR; INTRAVENOUS ONCE
Status: COMPLETED | OUTPATIENT
Start: 2019-12-08 | End: 2019-12-08

## 2019-12-08 RX ORDER — 0.9 % SODIUM CHLORIDE 0.9 %
1000 INTRAVENOUS SOLUTION INTRAVENOUS ONCE
Status: COMPLETED | OUTPATIENT
Start: 2019-12-08 | End: 2019-12-08

## 2019-12-08 RX ORDER — 0.9 % SODIUM CHLORIDE 0.9 %
1000 INTRAVENOUS SOLUTION INTRAVENOUS ONCE
Status: DISCONTINUED | OUTPATIENT
Start: 2019-12-08 | End: 2019-12-12 | Stop reason: HOSPADM

## 2019-12-08 RX ORDER — SODIUM CHLORIDE 0.9 % (FLUSH) 0.9 %
10 SYRINGE (ML) INJECTION PRN
Status: DISCONTINUED | OUTPATIENT
Start: 2019-12-08 | End: 2019-12-12 | Stop reason: HOSPADM

## 2019-12-08 RX ORDER — POTASSIUM CHLORIDE AND SODIUM CHLORIDE 900; 300 MG/100ML; MG/100ML
INJECTION, SOLUTION INTRAVENOUS CONTINUOUS
Status: DISCONTINUED | OUTPATIENT
Start: 2019-12-08 | End: 2019-12-09

## 2019-12-08 RX ORDER — SODIUM CHLORIDE 450 MG/100ML
INJECTION, SOLUTION INTRAVENOUS CONTINUOUS
Status: DISCONTINUED | OUTPATIENT
Start: 2019-12-08 | End: 2019-12-08

## 2019-12-08 RX ORDER — DIGOXIN 125 MCG
125 TABLET ORAL DAILY
Status: DISCONTINUED | OUTPATIENT
Start: 2019-12-08 | End: 2019-12-12 | Stop reason: HOSPADM

## 2019-12-08 RX ADMIN — POTASSIUM CHLORIDE 10 MEQ: 7.46 INJECTION, SOLUTION INTRAVENOUS at 19:43

## 2019-12-08 RX ADMIN — SODIUM PHOSPHATE, MONOBASIC, MONOHYDRATE 15 MMOL: 276; 142 INJECTION, SOLUTION INTRAVENOUS at 21:37

## 2019-12-08 RX ADMIN — VANCOMYCIN HYDROCHLORIDE 1000 MG: 1 INJECTION, POWDER, LYOPHILIZED, FOR SOLUTION INTRAVENOUS at 19:52

## 2019-12-08 RX ADMIN — ATORVASTATIN CALCIUM 80 MG: 40 TABLET, FILM COATED ORAL at 20:09

## 2019-12-08 RX ADMIN — POTASSIUM CHLORIDE AND SODIUM CHLORIDE: 900; 300 INJECTION, SOLUTION INTRAVENOUS at 12:28

## 2019-12-08 RX ADMIN — CEFEPIME HYDROCHLORIDE 2 G: 2 INJECTION, POWDER, FOR SOLUTION INTRAVENOUS at 20:10

## 2019-12-08 RX ADMIN — Medication 50 MEQ: at 12:49

## 2019-12-08 RX ADMIN — POTASSIUM CHLORIDE 10 MEQ: 7.46 INJECTION, SOLUTION INTRAVENOUS at 14:35

## 2019-12-08 RX ADMIN — POTASSIUM CHLORIDE 10 MEQ: 7.46 INJECTION, SOLUTION INTRAVENOUS at 16:17

## 2019-12-08 RX ADMIN — SODIUM CHLORIDE: 4.5 INJECTION, SOLUTION INTRAVENOUS at 14:23

## 2019-12-08 RX ADMIN — METHYLPREDNISOLONE SODIUM SUCCINATE 40 MG: 40 INJECTION, POWDER, FOR SOLUTION INTRAMUSCULAR; INTRAVENOUS at 22:14

## 2019-12-08 RX ADMIN — ASPIRIN 81 MG: 81 TABLET, COATED ORAL at 16:40

## 2019-12-08 RX ADMIN — Medication 50 MEQ: at 12:51

## 2019-12-08 RX ADMIN — POTASSIUM CHLORIDE 10 MEQ: 7.46 INJECTION, SOLUTION INTRAVENOUS at 21:45

## 2019-12-08 RX ADMIN — SODIUM BICARBONATE: 84 INJECTION, SOLUTION INTRAVENOUS at 22:13

## 2019-12-08 RX ADMIN — POTASSIUM CHLORIDE 10 MEQ: 7.46 INJECTION, SOLUTION INTRAVENOUS at 17:08

## 2019-12-08 RX ADMIN — MAGNESIUM SULFATE 1 G: 1 INJECTION INTRAVENOUS at 20:58

## 2019-12-08 RX ADMIN — ENOXAPARIN SODIUM 40 MG: 40 INJECTION SUBCUTANEOUS at 16:40

## 2019-12-08 RX ADMIN — Medication 50 MEQ: at 12:48

## 2019-12-08 RX ADMIN — Medication 50 MEQ: at 09:03

## 2019-12-08 RX ADMIN — SODIUM CHLORIDE 1000 ML: 9 INJECTION, SOLUTION INTRAVENOUS at 09:03

## 2019-12-08 RX ADMIN — ONDANSETRON HYDROCHLORIDE 4 MG: 2 SOLUTION INTRAMUSCULAR; INTRAVENOUS at 10:00

## 2019-12-08 RX ADMIN — SODIUM CHLORIDE 5.72 UNITS/HR: 9 INJECTION, SOLUTION INTRAVENOUS at 12:29

## 2019-12-08 RX ADMIN — POTASSIUM CHLORIDE 10 MEQ: 7.46 INJECTION, SOLUTION INTRAVENOUS at 20:45

## 2019-12-08 RX ADMIN — POTASSIUM CHLORIDE 10 MEQ: 7.46 INJECTION, SOLUTION INTRAVENOUS at 23:56

## 2019-12-08 RX ADMIN — SODIUM BICARBONATE: 84 INJECTION, SOLUTION INTRAVENOUS at 14:33

## 2019-12-08 RX ADMIN — SODIUM CHLORIDE: 9 INJECTION, SOLUTION INTRAVENOUS at 16:40

## 2019-12-08 RX ADMIN — ONDANSETRON 4 MG: 2 INJECTION INTRAMUSCULAR; INTRAVENOUS at 10:00

## 2019-12-08 RX ADMIN — Medication 10 ML: at 21:01

## 2019-12-08 RX ADMIN — SODIUM CHLORIDE 1000 ML: 9 INJECTION, SOLUTION INTRAVENOUS at 09:55

## 2019-12-08 ASSESSMENT — PAIN SCALES - GENERAL
PAINLEVEL_OUTOF10: 0

## 2019-12-08 ASSESSMENT — ENCOUNTER SYMPTOMS
CHEST TIGHTNESS: 0
SHORTNESS OF BREATH: 0
VOMITING: 1
NAUSEA: 1

## 2019-12-09 LAB
ANION GAP SERPL CALCULATED.3IONS-SCNC: 11 MMOL/L (ref 7–16)
ANION GAP SERPL CALCULATED.3IONS-SCNC: 12 MMOL/L (ref 7–16)
ANION GAP SERPL CALCULATED.3IONS-SCNC: 16 MMOL/L (ref 7–16)
ANION GAP SERPL CALCULATED.3IONS-SCNC: 18 MMOL/L (ref 7–16)
ANION GAP SERPL CALCULATED.3IONS-SCNC: 8 MMOL/L (ref 7–16)
ANION GAP SERPL CALCULATED.3IONS-SCNC: 8 MMOL/L (ref 7–16)
BASOPHILS ABSOLUTE: 0.04 E9/L (ref 0–0.2)
BASOPHILS RELATIVE PERCENT: 0.2 % (ref 0–2)
BUN BLDV-MCNC: 19 MG/DL (ref 6–20)
BUN BLDV-MCNC: 22 MG/DL (ref 6–20)
BUN BLDV-MCNC: 27 MG/DL (ref 6–20)
BUN BLDV-MCNC: 29 MG/DL (ref 6–20)
BUN BLDV-MCNC: 35 MG/DL (ref 6–20)
BUN BLDV-MCNC: 43 MG/DL (ref 6–20)
CALCIUM SERPL-MCNC: 7.1 MG/DL (ref 8.6–10.2)
CALCIUM SERPL-MCNC: 7.3 MG/DL (ref 8.6–10.2)
CALCIUM SERPL-MCNC: 7.4 MG/DL (ref 8.6–10.2)
CALCIUM SERPL-MCNC: 7.4 MG/DL (ref 8.6–10.2)
CALCIUM SERPL-MCNC: 7.5 MG/DL (ref 8.6–10.2)
CALCIUM SERPL-MCNC: 7.5 MG/DL (ref 8.6–10.2)
CHLORIDE BLD-SCNC: 100 MMOL/L (ref 98–107)
CHLORIDE BLD-SCNC: 100 MMOL/L (ref 98–107)
CHLORIDE BLD-SCNC: 96 MMOL/L (ref 98–107)
CHLORIDE BLD-SCNC: 98 MMOL/L (ref 98–107)
CHLORIDE BLD-SCNC: 98 MMOL/L (ref 98–107)
CHLORIDE BLD-SCNC: 99 MMOL/L (ref 98–107)
CO2: 19 MMOL/L (ref 22–29)
CO2: 21 MMOL/L (ref 22–29)
CO2: 24 MMOL/L (ref 22–29)
CO2: 25 MMOL/L (ref 22–29)
CO2: 28 MMOL/L (ref 22–29)
CO2: 31 MMOL/L (ref 22–29)
CREAT SERPL-MCNC: 0.7 MG/DL (ref 0.7–1.2)
CREAT SERPL-MCNC: 0.8 MG/DL (ref 0.7–1.2)
CREAT SERPL-MCNC: 1 MG/DL (ref 0.7–1.2)
CREAT SERPL-MCNC: 1.3 MG/DL (ref 0.7–1.2)
EKG ATRIAL RATE: 113 BPM
EKG P AXIS: 64 DEGREES
EKG P-R INTERVAL: 118 MS
EKG Q-T INTERVAL: 376 MS
EKG QRS DURATION: 100 MS
EKG QTC CALCULATION (BAZETT): 515 MS
EKG R AXIS: 55 DEGREES
EKG T AXIS: -28 DEGREES
EKG VENTRICULAR RATE: 113 BPM
EOSINOPHILS ABSOLUTE: 0 E9/L (ref 0.05–0.5)
EOSINOPHILS RELATIVE PERCENT: 0 % (ref 0–6)
GFR AFRICAN AMERICAN: >60
GFR NON-AFRICAN AMERICAN: 59 ML/MIN/1.73
GFR NON-AFRICAN AMERICAN: >60 ML/MIN/1.73
GLUCOSE BLD-MCNC: 181 MG/DL (ref 74–99)
GLUCOSE BLD-MCNC: 183 MG/DL (ref 74–99)
GLUCOSE BLD-MCNC: 184 MG/DL (ref 74–99)
GLUCOSE BLD-MCNC: 217 MG/DL (ref 74–99)
GLUCOSE BLD-MCNC: 231 MG/DL (ref 74–99)
GLUCOSE BLD-MCNC: 314 MG/DL (ref 74–99)
HCT VFR BLD CALC: 37.3 % (ref 37–54)
HEMOGLOBIN: 12.9 G/DL (ref 12.5–16.5)
IMMATURE GRANULOCYTES #: 0.24 E9/L
IMMATURE GRANULOCYTES %: 1.2 % (ref 0–5)
LACTIC ACID: 1.5 MMOL/L (ref 0.5–2.2)
LACTIC ACID: 1.7 MMOL/L (ref 0.5–2.2)
LYMPHOCYTES ABSOLUTE: 0.79 E9/L (ref 1.5–4)
LYMPHOCYTES RELATIVE PERCENT: 4 % (ref 20–42)
MAGNESIUM: 1.6 MG/DL (ref 1.6–2.6)
MAGNESIUM: 1.8 MG/DL (ref 1.6–2.6)
MAGNESIUM: 2 MG/DL (ref 1.6–2.6)
MAGNESIUM: 2.1 MG/DL (ref 1.6–2.6)
MAGNESIUM: 2.1 MG/DL (ref 1.6–2.6)
MAGNESIUM: 2.2 MG/DL (ref 1.6–2.6)
MCH RBC QN AUTO: 30.1 PG (ref 26–35)
MCHC RBC AUTO-ENTMCNC: 34.6 % (ref 32–34.5)
MCV RBC AUTO: 87.1 FL (ref 80–99.9)
METER GLUCOSE: 156 MG/DL (ref 74–99)
METER GLUCOSE: 158 MG/DL (ref 74–99)
METER GLUCOSE: 164 MG/DL (ref 74–99)
METER GLUCOSE: 170 MG/DL (ref 74–99)
METER GLUCOSE: 172 MG/DL (ref 74–99)
METER GLUCOSE: 174 MG/DL (ref 74–99)
METER GLUCOSE: 176 MG/DL (ref 74–99)
METER GLUCOSE: 180 MG/DL (ref 74–99)
METER GLUCOSE: 182 MG/DL (ref 74–99)
METER GLUCOSE: 198 MG/DL (ref 74–99)
METER GLUCOSE: 207 MG/DL (ref 74–99)
METER GLUCOSE: 213 MG/DL (ref 74–99)
METER GLUCOSE: 217 MG/DL (ref 74–99)
METER GLUCOSE: 227 MG/DL (ref 74–99)
METER GLUCOSE: 237 MG/DL (ref 74–99)
METER GLUCOSE: 262 MG/DL (ref 74–99)
METER GLUCOSE: 271 MG/DL (ref 74–99)
METER GLUCOSE: 296 MG/DL (ref 74–99)
MONOCYTES ABSOLUTE: 0.79 E9/L (ref 0.1–0.95)
MONOCYTES RELATIVE PERCENT: 4 % (ref 2–12)
NEUTROPHILS ABSOLUTE: 17.92 E9/L (ref 1.8–7.3)
NEUTROPHILS RELATIVE PERCENT: 90.6 % (ref 43–80)
PDW BLD-RTO: 13 FL (ref 11.5–15)
PHOSPHORUS: 1.2 MG/DL (ref 2.5–4.5)
PHOSPHORUS: 1.4 MG/DL (ref 2.5–4.5)
PHOSPHORUS: 1.9 MG/DL (ref 2.5–4.5)
PHOSPHORUS: 1.9 MG/DL (ref 2.5–4.5)
PHOSPHORUS: 2 MG/DL (ref 2.5–4.5)
PHOSPHORUS: 8.4 MG/DL (ref 2.5–4.5)
PLATELET # BLD: 322 E9/L (ref 130–450)
PMV BLD AUTO: 9.7 FL (ref 7–12)
POTASSIUM SERPL-SCNC: 3.4 MMOL/L (ref 3.5–5)
POTASSIUM SERPL-SCNC: 3.5 MMOL/L (ref 3.5–5)
POTASSIUM SERPL-SCNC: 3.8 MMOL/L (ref 3.5–5)
POTASSIUM SERPL-SCNC: 3.9 MMOL/L (ref 3.5–5)
RBC # BLD: 4.28 E12/L (ref 3.8–5.8)
REPORT: NORMAL
SODIUM BLD-SCNC: 132 MMOL/L (ref 132–146)
SODIUM BLD-SCNC: 133 MMOL/L (ref 132–146)
SODIUM BLD-SCNC: 135 MMOL/L (ref 132–146)
SODIUM BLD-SCNC: 137 MMOL/L (ref 132–146)
SODIUM BLD-SCNC: 137 MMOL/L (ref 132–146)
SODIUM BLD-SCNC: 138 MMOL/L (ref 132–146)
WBC # BLD: 19.8 E9/L (ref 4.5–11.5)

## 2019-12-09 PROCEDURE — 6360000002 HC RX W HCPCS: Performed by: EMERGENCY MEDICINE

## 2019-12-09 PROCEDURE — 94640 AIRWAY INHALATION TREATMENT: CPT

## 2019-12-09 PROCEDURE — 99233 SBSQ HOSP IP/OBS HIGH 50: CPT | Performed by: INTERNAL MEDICINE

## 2019-12-09 PROCEDURE — 6370000000 HC RX 637 (ALT 250 FOR IP): Performed by: INTERNAL MEDICINE

## 2019-12-09 PROCEDURE — 6360000002 HC RX W HCPCS: Performed by: INTERNAL MEDICINE

## 2019-12-09 PROCEDURE — 82962 GLUCOSE BLOOD TEST: CPT

## 2019-12-09 PROCEDURE — 6370000000 HC RX 637 (ALT 250 FOR IP): Performed by: EMERGENCY MEDICINE

## 2019-12-09 PROCEDURE — 2580000003 HC RX 258: Performed by: EMERGENCY MEDICINE

## 2019-12-09 PROCEDURE — 85025 COMPLETE CBC W/AUTO DIFF WBC: CPT

## 2019-12-09 PROCEDURE — 94664 DEMO&/EVAL PT USE INHALER: CPT

## 2019-12-09 PROCEDURE — 93010 ELECTROCARDIOGRAM REPORT: CPT | Performed by: INTERNAL MEDICINE

## 2019-12-09 PROCEDURE — 2000000000 HC ICU R&B

## 2019-12-09 PROCEDURE — 83605 ASSAY OF LACTIC ACID: CPT

## 2019-12-09 PROCEDURE — 2580000003 HC RX 258: Performed by: INTERNAL MEDICINE

## 2019-12-09 PROCEDURE — 83735 ASSAY OF MAGNESIUM: CPT

## 2019-12-09 PROCEDURE — 80048 BASIC METABOLIC PNL TOTAL CA: CPT

## 2019-12-09 PROCEDURE — 36415 COLL VENOUS BLD VENIPUNCTURE: CPT

## 2019-12-09 PROCEDURE — 36592 COLLECT BLOOD FROM PICC: CPT

## 2019-12-09 PROCEDURE — 2700000000 HC OXYGEN THERAPY PER DAY

## 2019-12-09 PROCEDURE — 84100 ASSAY OF PHOSPHORUS: CPT

## 2019-12-09 PROCEDURE — 2500000003 HC RX 250 WO HCPCS: Performed by: EMERGENCY MEDICINE

## 2019-12-09 RX ORDER — NICOTINE POLACRILEX 4 MG
15 LOZENGE BUCCAL PRN
Status: DISCONTINUED | OUTPATIENT
Start: 2019-12-09 | End: 2019-12-12 | Stop reason: HOSPADM

## 2019-12-09 RX ORDER — IPRATROPIUM BROMIDE AND ALBUTEROL SULFATE 2.5; .5 MG/3ML; MG/3ML
1 SOLUTION RESPIRATORY (INHALATION)
Status: DISCONTINUED | OUTPATIENT
Start: 2019-12-09 | End: 2019-12-12 | Stop reason: HOSPADM

## 2019-12-09 RX ORDER — DEXTROSE MONOHYDRATE 50 MG/ML
100 INJECTION, SOLUTION INTRAVENOUS PRN
Status: DISCONTINUED | OUTPATIENT
Start: 2019-12-09 | End: 2019-12-12 | Stop reason: HOSPADM

## 2019-12-09 RX ORDER — INSULIN GLARGINE 100 [IU]/ML
30 INJECTION, SOLUTION SUBCUTANEOUS NIGHTLY
Status: DISCONTINUED | OUTPATIENT
Start: 2019-12-09 | End: 2019-12-11

## 2019-12-09 RX ORDER — THIAMINE MONONITRATE (VIT B1) 100 MG
100 TABLET ORAL DAILY
Status: DISCONTINUED | OUTPATIENT
Start: 2019-12-09 | End: 2019-12-12 | Stop reason: HOSPADM

## 2019-12-09 RX ORDER — FOLIC ACID 1 MG/1
1 TABLET ORAL DAILY
Status: DISCONTINUED | OUTPATIENT
Start: 2019-12-09 | End: 2019-12-12 | Stop reason: HOSPADM

## 2019-12-09 RX ORDER — M-VIT,TX,IRON,MINS/CALC/FOLIC 27MG-0.4MG
1 TABLET ORAL DAILY
Status: DISCONTINUED | OUTPATIENT
Start: 2019-12-09 | End: 2019-12-12 | Stop reason: HOSPADM

## 2019-12-09 RX ORDER — DEXTROSE MONOHYDRATE 25 G/50ML
12.5 INJECTION, SOLUTION INTRAVENOUS PRN
Status: DISCONTINUED | OUTPATIENT
Start: 2019-12-09 | End: 2019-12-12 | Stop reason: HOSPADM

## 2019-12-09 RX ORDER — METHYLPREDNISOLONE SODIUM SUCCINATE 40 MG/ML
40 INJECTION, POWDER, LYOPHILIZED, FOR SOLUTION INTRAMUSCULAR; INTRAVENOUS EVERY 12 HOURS
Status: DISCONTINUED | OUTPATIENT
Start: 2019-12-10 | End: 2019-12-10

## 2019-12-09 RX ORDER — PANTOPRAZOLE SODIUM 40 MG/1
40 TABLET, DELAYED RELEASE ORAL
Status: DISCONTINUED | OUTPATIENT
Start: 2019-12-10 | End: 2019-12-12 | Stop reason: HOSPADM

## 2019-12-09 RX ADMIN — POTASSIUM CHLORIDE 10 MEQ: 7.46 INJECTION, SOLUTION INTRAVENOUS at 09:25

## 2019-12-09 RX ADMIN — ASPIRIN 81 MG: 81 TABLET, COATED ORAL at 08:37

## 2019-12-09 RX ADMIN — IPRATROPIUM BROMIDE AND ALBUTEROL SULFATE 1 AMPULE: 2.5; .5 SOLUTION RESPIRATORY (INHALATION) at 13:23

## 2019-12-09 RX ADMIN — SODIUM CHLORIDE 8.35 UNITS/HR: 9 INJECTION, SOLUTION INTRAVENOUS at 05:02

## 2019-12-09 RX ADMIN — METHYLPREDNISOLONE SODIUM SUCCINATE 40 MG: 40 INJECTION, POWDER, FOR SOLUTION INTRAMUSCULAR; INTRAVENOUS at 13:45

## 2019-12-09 RX ADMIN — INSULIN LISPRO 1 UNITS: 100 INJECTION, SOLUTION INTRAVENOUS; SUBCUTANEOUS at 19:59

## 2019-12-09 RX ADMIN — POTASSIUM CHLORIDE 10 MEQ: 7.46 INJECTION, SOLUTION INTRAVENOUS at 01:02

## 2019-12-09 RX ADMIN — INSULIN LISPRO 5 UNITS: 100 INJECTION, SOLUTION INTRAVENOUS; SUBCUTANEOUS at 17:12

## 2019-12-09 RX ADMIN — MAGNESIUM SULFATE 1 G: 1 INJECTION INTRAVENOUS at 06:59

## 2019-12-09 RX ADMIN — Medication 10 ML: at 21:00

## 2019-12-09 RX ADMIN — IPRATROPIUM BROMIDE AND ALBUTEROL SULFATE 1 AMPULE: 2.5; .5 SOLUTION RESPIRATORY (INHALATION) at 20:36

## 2019-12-09 RX ADMIN — POTASSIUM CHLORIDE 10 MEQ: 7.46 INJECTION, SOLUTION INTRAVENOUS at 00:00

## 2019-12-09 RX ADMIN — FOLIC ACID 1 MG: 1 TABLET ORAL at 15:59

## 2019-12-09 RX ADMIN — IPRATROPIUM BROMIDE AND ALBUTEROL SULFATE 1 AMPULE: 2.5; .5 SOLUTION RESPIRATORY (INHALATION) at 17:34

## 2019-12-09 RX ADMIN — POTASSIUM CHLORIDE 10 MEQ: 7.46 INJECTION, SOLUTION INTRAVENOUS at 08:43

## 2019-12-09 RX ADMIN — ATORVASTATIN CALCIUM 80 MG: 40 TABLET, FILM COATED ORAL at 19:59

## 2019-12-09 RX ADMIN — Medication 10 ML: at 08:37

## 2019-12-09 RX ADMIN — DEXTROSE AND SODIUM CHLORIDE: 5; 450 INJECTION, SOLUTION INTRAVENOUS at 03:03

## 2019-12-09 RX ADMIN — METHYLPREDNISOLONE SODIUM SUCCINATE 40 MG: 40 INJECTION, POWDER, FOR SOLUTION INTRAMUSCULAR; INTRAVENOUS at 06:02

## 2019-12-09 RX ADMIN — POTASSIUM CHLORIDE 10 MEQ: 7.46 INJECTION, SOLUTION INTRAVENOUS at 02:02

## 2019-12-09 RX ADMIN — ENOXAPARIN SODIUM 40 MG: 40 INJECTION SUBCUTANEOUS at 08:47

## 2019-12-09 RX ADMIN — POTASSIUM CHLORIDE 10 MEQ: 7.46 INJECTION, SOLUTION INTRAVENOUS at 05:30

## 2019-12-09 RX ADMIN — DEXTROSE AND SODIUM CHLORIDE: 5; 450 INJECTION, SOLUTION INTRAVENOUS at 11:56

## 2019-12-09 RX ADMIN — POTASSIUM CHLORIDE 10 MEQ: 7.46 INJECTION, SOLUTION INTRAVENOUS at 15:26

## 2019-12-09 RX ADMIN — SODIUM CHLORIDE: 9 INJECTION, SOLUTION INTRAVENOUS at 02:42

## 2019-12-09 RX ADMIN — SODIUM PHOSPHATE, MONOBASIC, MONOHYDRATE 20 MMOL: 276; 142 INJECTION, SOLUTION INTRAVENOUS at 10:07

## 2019-12-09 RX ADMIN — POTASSIUM CHLORIDE 10 MEQ: 7.46 INJECTION, SOLUTION INTRAVENOUS at 10:24

## 2019-12-09 RX ADMIN — POTASSIUM CHLORIDE 10 MEQ: 7.46 INJECTION, SOLUTION INTRAVENOUS at 11:37

## 2019-12-09 RX ADMIN — POTASSIUM CHLORIDE 10 MEQ: 7.46 INJECTION, SOLUTION INTRAVENOUS at 14:24

## 2019-12-09 RX ADMIN — Medication 100 MG: at 15:59

## 2019-12-09 RX ADMIN — SODIUM BICARBONATE: 84 INJECTION, SOLUTION INTRAVENOUS at 06:01

## 2019-12-09 RX ADMIN — VANCOMYCIN HYDROCHLORIDE 750 MG: 1 INJECTION, POWDER, LYOPHILIZED, FOR SOLUTION INTRAVENOUS at 15:43

## 2019-12-09 RX ADMIN — MAGNESIUM SULFATE 1 G: 1 INJECTION INTRAVENOUS at 05:38

## 2019-12-09 RX ADMIN — CEFEPIME HYDROCHLORIDE 2 G: 2 INJECTION, POWDER, FOR SOLUTION INTRAVENOUS at 08:36

## 2019-12-09 RX ADMIN — INSULIN GLARGINE 30 UNITS: 100 INJECTION, SOLUTION SUBCUTANEOUS at 14:33

## 2019-12-09 RX ADMIN — POTASSIUM CHLORIDE 10 MEQ: 7.46 INJECTION, SOLUTION INTRAVENOUS at 06:30

## 2019-12-09 RX ADMIN — IPRATROPIUM BROMIDE AND ALBUTEROL SULFATE 1 AMPULE: 2.5; .5 SOLUTION RESPIRATORY (INHALATION) at 08:55

## 2019-12-09 RX ADMIN — MULTIPLE VITAMINS W/ MINERALS TAB 1 TABLET: TAB at 16:36

## 2019-12-09 RX ADMIN — CEFEPIME HYDROCHLORIDE 2 G: 2 INJECTION, POWDER, FOR SOLUTION INTRAVENOUS at 16:36

## 2019-12-09 RX ADMIN — INSULIN LISPRO 2 UNITS: 100 INJECTION, SOLUTION INTRAVENOUS; SUBCUTANEOUS at 17:14

## 2019-12-09 RX ADMIN — IPRATROPIUM BROMIDE AND ALBUTEROL SULFATE 1 AMPULE: 2.5; .5 SOLUTION RESPIRATORY (INHALATION) at 00:36

## 2019-12-09 RX ADMIN — POTASSIUM CHLORIDE 10 MEQ: 7.46 INJECTION, SOLUTION INTRAVENOUS at 13:22

## 2019-12-09 ASSESSMENT — PAIN SCALES - GENERAL
PAINLEVEL_OUTOF10: 0

## 2019-12-10 LAB
ACINETOBACTER BAUMANNII BY PCR: NOT DETECTED
ANION GAP SERPL CALCULATED.3IONS-SCNC: 10 MMOL/L (ref 7–16)
ANION GAP SERPL CALCULATED.3IONS-SCNC: 11 MMOL/L (ref 7–16)
ANION GAP SERPL CALCULATED.3IONS-SCNC: 12 MMOL/L (ref 7–16)
ANISOCYTOSIS: ABNORMAL
BASOPHILS ABSOLUTE: 0 E9/L (ref 0–0.2)
BASOPHILS RELATIVE PERCENT: 0.1 % (ref 0–2)
BOTTLE TYPE: ABNORMAL
BUN BLDV-MCNC: 12 MG/DL (ref 6–20)
BUN BLDV-MCNC: 14 MG/DL (ref 6–20)
BUN BLDV-MCNC: 15 MG/DL (ref 6–20)
BURR CELLS: ABNORMAL
CALCIUM SERPL-MCNC: 7.9 MG/DL (ref 8.6–10.2)
CALCIUM SERPL-MCNC: 8.1 MG/DL (ref 8.6–10.2)
CALCIUM SERPL-MCNC: 8.4 MG/DL (ref 8.6–10.2)
CANDIDA ALBICANS BY PCR: NOT DETECTED
CANDIDA GLABRATA BY PCR: NOT DETECTED
CANDIDA KRUSEI BY PCR: NOT DETECTED
CANDIDA PARAPSILOSIS BY PCR: NOT DETECTED
CANDIDA TROPICALIS BY PCR: NOT DETECTED
CHLORIDE BLD-SCNC: 100 MMOL/L (ref 98–107)
CHLORIDE BLD-SCNC: 102 MMOL/L (ref 98–107)
CHLORIDE BLD-SCNC: 102 MMOL/L (ref 98–107)
CO2: 24 MMOL/L (ref 22–29)
CO2: 25 MMOL/L (ref 22–29)
CO2: 25 MMOL/L (ref 22–29)
CREAT SERPL-MCNC: 0.6 MG/DL (ref 0.7–1.2)
CREAT SERPL-MCNC: 0.6 MG/DL (ref 0.7–1.2)
CREAT SERPL-MCNC: 0.7 MG/DL (ref 0.7–1.2)
ENTEROBACTER CLOACAE COMPLEX BY PCR: NOT DETECTED
ENTEROBACTERALES BY PCR: NOT DETECTED
ENTEROCOCCUS BY PCR: NOT DETECTED
EOSINOPHILS ABSOLUTE: 0.14 E9/L (ref 0.05–0.5)
EOSINOPHILS RELATIVE PERCENT: 0.9 % (ref 0–6)
ESCHERICHIA COLI BY PCR: NOT DETECTED
GFR AFRICAN AMERICAN: >60
GFR NON-AFRICAN AMERICAN: >60 ML/MIN/1.73
GLUCOSE BLD-MCNC: 181 MG/DL (ref 74–99)
GLUCOSE BLD-MCNC: 188 MG/DL (ref 74–99)
GLUCOSE BLD-MCNC: 204 MG/DL (ref 74–99)
HAEMOPHILUS INFLUENZAE BY PCR: NOT DETECTED
HCT VFR BLD CALC: 30.6 % (ref 37–54)
HEMOGLOBIN: 10.4 G/DL (ref 12.5–16.5)
KLEBSIELLA OXYTOCA BY PCR: NOT DETECTED
KLEBSIELLA PNEUMONIAE GROUP BY PCR: NOT DETECTED
LISTERIA MONOCYTOGENES BY PCR: NOT DETECTED
LYMPHOCYTES ABSOLUTE: 0.96 E9/L (ref 1.5–4)
LYMPHOCYTES RELATIVE PERCENT: 6.1 % (ref 20–42)
MAGNESIUM: 2.2 MG/DL (ref 1.6–2.6)
MCH RBC QN AUTO: 30.1 PG (ref 26–35)
MCHC RBC AUTO-ENTMCNC: 34 % (ref 32–34.5)
MCV RBC AUTO: 88.7 FL (ref 80–99.9)
METER GLUCOSE: 163 MG/DL (ref 74–99)
METER GLUCOSE: 169 MG/DL (ref 74–99)
METER GLUCOSE: 179 MG/DL (ref 74–99)
METER GLUCOSE: 186 MG/DL (ref 74–99)
METER GLUCOSE: 191 MG/DL (ref 74–99)
METER GLUCOSE: 193 MG/DL (ref 74–99)
METER GLUCOSE: 246 MG/DL (ref 74–99)
MONOCYTES ABSOLUTE: 0.32 E9/L (ref 0.1–0.95)
MONOCYTES RELATIVE PERCENT: 1.7 % (ref 2–12)
NEISSERIA MENINGITIDIS BY PCR: NOT DETECTED
NEUTROPHILS ABSOLUTE: 14.56 E9/L (ref 1.8–7.3)
NEUTROPHILS RELATIVE PERCENT: 91.3 % (ref 43–80)
ORDER NUMBER: ABNORMAL
OVALOCYTES: ABNORMAL
PDW BLD-RTO: 13.2 FL (ref 11.5–15)
PHOSPHORUS: 2 MG/DL (ref 2.5–4.5)
PHOSPHORUS: 2.5 MG/DL (ref 2.5–4.5)
PLATELET # BLD: 228 E9/L (ref 130–450)
PMV BLD AUTO: 9.2 FL (ref 7–12)
POIKILOCYTES: ABNORMAL
POTASSIUM SERPL-SCNC: 3.7 MMOL/L (ref 3.5–5)
POTASSIUM SERPL-SCNC: 3.7 MMOL/L (ref 3.5–5)
POTASSIUM SERPL-SCNC: 4 MMOL/L (ref 3.5–5)
PROCALCITONIN: 1.73 NG/ML (ref 0–0.08)
PROTEUS BY PCR: NOT DETECTED
PSEUDOMONAS AERUGINOSA BY PCR: NOT DETECTED
RBC # BLD: 3.45 E12/L (ref 3.8–5.8)
SCHISTOCYTES: ABNORMAL
SERRATIA MARCESCENS BY PCR: NOT DETECTED
SODIUM BLD-SCNC: 137 MMOL/L (ref 132–146)
SOURCE OF BLOOD CULTURE: ABNORMAL
STAPHYLOCOCCUS AUREUS BY PCR: NOT DETECTED
STAPHYLOCOCCUS SPECIES BY PCR: DETECTED
STREPTOCOCCUS AGALACTIAE BY PCR: NOT DETECTED
STREPTOCOCCUS PNEUMONIAE BY PCR: NOT DETECTED
STREPTOCOCCUS PYOGENES  BY PCR: NOT DETECTED
STREPTOCOCCUS SPECIES BY PCR: NOT DETECTED
TARGET CELLS: ABNORMAL
URINE CULTURE, ROUTINE: NORMAL
VANCOMYCIN TROUGH: 5.5 MCG/ML (ref 5–16)
WBC # BLD: 16 E9/L (ref 4.5–11.5)

## 2019-12-10 PROCEDURE — 6360000002 HC RX W HCPCS: Performed by: INTERNAL MEDICINE

## 2019-12-10 PROCEDURE — 94640 AIRWAY INHALATION TREATMENT: CPT

## 2019-12-10 PROCEDURE — 2580000003 HC RX 258: Performed by: INTERNAL MEDICINE

## 2019-12-10 PROCEDURE — 6370000000 HC RX 637 (ALT 250 FOR IP): Performed by: INTERNAL MEDICINE

## 2019-12-10 PROCEDURE — 85025 COMPLETE CBC W/AUTO DIFF WBC: CPT

## 2019-12-10 PROCEDURE — 2700000000 HC OXYGEN THERAPY PER DAY

## 2019-12-10 PROCEDURE — 87081 CULTURE SCREEN ONLY: CPT

## 2019-12-10 PROCEDURE — 99233 SBSQ HOSP IP/OBS HIGH 50: CPT | Performed by: INTERNAL MEDICINE

## 2019-12-10 PROCEDURE — 36592 COLLECT BLOOD FROM PICC: CPT

## 2019-12-10 PROCEDURE — 83735 ASSAY OF MAGNESIUM: CPT

## 2019-12-10 PROCEDURE — 80048 BASIC METABOLIC PNL TOTAL CA: CPT

## 2019-12-10 PROCEDURE — 87070 CULTURE OTHR SPECIMN AEROBIC: CPT

## 2019-12-10 PROCEDURE — 87206 SMEAR FLUORESCENT/ACID STAI: CPT

## 2019-12-10 PROCEDURE — 87040 BLOOD CULTURE FOR BACTERIA: CPT

## 2019-12-10 PROCEDURE — 36415 COLL VENOUS BLD VENIPUNCTURE: CPT

## 2019-12-10 PROCEDURE — 2060000000 HC ICU INTERMEDIATE R&B

## 2019-12-10 PROCEDURE — 84100 ASSAY OF PHOSPHORUS: CPT

## 2019-12-10 PROCEDURE — 82962 GLUCOSE BLOOD TEST: CPT

## 2019-12-10 PROCEDURE — 2500000003 HC RX 250 WO HCPCS: Performed by: INTERNAL MEDICINE

## 2019-12-10 PROCEDURE — 84145 PROCALCITONIN (PCT): CPT

## 2019-12-10 PROCEDURE — 80202 ASSAY OF VANCOMYCIN: CPT

## 2019-12-10 PROCEDURE — 93005 ELECTROCARDIOGRAM TRACING: CPT | Performed by: INTERNAL MEDICINE

## 2019-12-10 RX ORDER — CEFAZOLIN SODIUM 2 G/50ML
2 SOLUTION INTRAVENOUS EVERY 8 HOURS
Status: DISCONTINUED | OUTPATIENT
Start: 2019-12-10 | End: 2019-12-11 | Stop reason: ALTCHOICE

## 2019-12-10 RX ORDER — METHYLPREDNISOLONE SODIUM SUCCINATE 40 MG/ML
40 INJECTION, POWDER, LYOPHILIZED, FOR SOLUTION INTRAMUSCULAR; INTRAVENOUS DAILY
Status: DISCONTINUED | OUTPATIENT
Start: 2019-12-11 | End: 2019-12-12 | Stop reason: HOSPADM

## 2019-12-10 RX ADMIN — IPRATROPIUM BROMIDE AND ALBUTEROL SULFATE 1 AMPULE: 2.5; .5 SOLUTION RESPIRATORY (INHALATION) at 13:10

## 2019-12-10 RX ADMIN — MULTIPLE VITAMINS W/ MINERALS TAB 1 TABLET: TAB at 08:19

## 2019-12-10 RX ADMIN — CEFEPIME HYDROCHLORIDE 2 G: 2 INJECTION, POWDER, FOR SOLUTION INTRAVENOUS at 12:09

## 2019-12-10 RX ADMIN — IPRATROPIUM BROMIDE AND ALBUTEROL SULFATE 1 AMPULE: 2.5; .5 SOLUTION RESPIRATORY (INHALATION) at 21:13

## 2019-12-10 RX ADMIN — INSULIN LISPRO 7 UNITS: 100 INJECTION, SOLUTION INTRAVENOUS; SUBCUTANEOUS at 16:56

## 2019-12-10 RX ADMIN — IPRATROPIUM BROMIDE AND ALBUTEROL SULFATE 1 AMPULE: 2.5; .5 SOLUTION RESPIRATORY (INHALATION) at 16:06

## 2019-12-10 RX ADMIN — VANCOMYCIN HYDROCHLORIDE 750 MG: 1 INJECTION, POWDER, LYOPHILIZED, FOR SOLUTION INTRAVENOUS at 03:02

## 2019-12-10 RX ADMIN — ATORVASTATIN CALCIUM 80 MG: 40 TABLET, FILM COATED ORAL at 20:32

## 2019-12-10 RX ADMIN — INSULIN GLARGINE 30 UNITS: 100 INJECTION, SOLUTION SUBCUTANEOUS at 20:33

## 2019-12-10 RX ADMIN — INSULIN LISPRO 1 UNITS: 100 INJECTION, SOLUTION INTRAVENOUS; SUBCUTANEOUS at 09:06

## 2019-12-10 RX ADMIN — DIGOXIN 125 MCG: 125 TABLET ORAL at 08:11

## 2019-12-10 RX ADMIN — METHYLPREDNISOLONE SODIUM SUCCINATE 40 MG: 40 INJECTION, POWDER, FOR SOLUTION INTRAMUSCULAR; INTRAVENOUS at 02:59

## 2019-12-10 RX ADMIN — CEFAZOLIN SODIUM 2 G: 2 SOLUTION INTRAVENOUS at 19:40

## 2019-12-10 RX ADMIN — INSULIN LISPRO 5 UNITS: 100 INJECTION, SOLUTION INTRAVENOUS; SUBCUTANEOUS at 13:20

## 2019-12-10 RX ADMIN — VANCOMYCIN HYDROCHLORIDE 750 MG: 1 INJECTION, POWDER, LYOPHILIZED, FOR SOLUTION INTRAVENOUS at 16:26

## 2019-12-10 RX ADMIN — INSULIN LISPRO 1 UNITS: 100 INJECTION, SOLUTION INTRAVENOUS; SUBCUTANEOUS at 16:58

## 2019-12-10 RX ADMIN — INSULIN LISPRO 5 UNITS: 100 INJECTION, SOLUTION INTRAVENOUS; SUBCUTANEOUS at 09:06

## 2019-12-10 RX ADMIN — PANTOPRAZOLE SODIUM 40 MG: 40 TABLET, DELAYED RELEASE ORAL at 06:01

## 2019-12-10 RX ADMIN — FOLIC ACID 1 MG: 1 TABLET ORAL at 08:10

## 2019-12-10 RX ADMIN — IPRATROPIUM BROMIDE AND ALBUTEROL SULFATE 1 AMPULE: 2.5; .5 SOLUTION RESPIRATORY (INHALATION) at 08:51

## 2019-12-10 RX ADMIN — LIDOCAINE HYDROCHLORIDE: 20 SOLUTION ORAL; TOPICAL at 10:49

## 2019-12-10 RX ADMIN — CEFEPIME HYDROCHLORIDE 2 G: 2 INJECTION, POWDER, FOR SOLUTION INTRAVENOUS at 00:15

## 2019-12-10 RX ADMIN — ASPIRIN 81 MG: 81 TABLET, COATED ORAL at 08:10

## 2019-12-10 RX ADMIN — INSULIN LISPRO 1 UNITS: 100 INJECTION, SOLUTION INTRAVENOUS; SUBCUTANEOUS at 20:33

## 2019-12-10 RX ADMIN — Medication 10 ML: at 08:11

## 2019-12-10 RX ADMIN — CALCIUM GLUCONATE 1 G: 98 INJECTION, SOLUTION INTRAVENOUS at 01:55

## 2019-12-10 RX ADMIN — ENOXAPARIN SODIUM 40 MG: 40 INJECTION SUBCUTANEOUS at 08:11

## 2019-12-10 RX ADMIN — Medication 100 MG: at 08:10

## 2019-12-10 RX ADMIN — INSULIN LISPRO 1 UNITS: 100 INJECTION, SOLUTION INTRAVENOUS; SUBCUTANEOUS at 13:18

## 2019-12-10 RX ADMIN — POTASSIUM PHOSPHATE, MONOBASIC AND POTASSIUM PHOSPHATE, DIBASIC 15 MMOL: 224; 236 INJECTION, SOLUTION, CONCENTRATE INTRAVENOUS at 02:55

## 2019-12-10 ASSESSMENT — PAIN SCALES - GENERAL
PAINLEVEL_OUTOF10: 0

## 2019-12-11 LAB
ANION GAP SERPL CALCULATED.3IONS-SCNC: 13 MMOL/L (ref 7–16)
BASOPHILS ABSOLUTE: 0.02 E9/L (ref 0–0.2)
BASOPHILS RELATIVE PERCENT: 0.1 % (ref 0–2)
BUN BLDV-MCNC: 11 MG/DL (ref 6–20)
CALCIUM SERPL-MCNC: 8.5 MG/DL (ref 8.6–10.2)
CHLORIDE BLD-SCNC: 101 MMOL/L (ref 98–107)
CO2: 27 MMOL/L (ref 22–29)
CREAT SERPL-MCNC: 0.6 MG/DL (ref 0.7–1.2)
EOSINOPHILS ABSOLUTE: 0.38 E9/L (ref 0.05–0.5)
EOSINOPHILS RELATIVE PERCENT: 2.3 % (ref 0–6)
GFR AFRICAN AMERICAN: >60
GFR NON-AFRICAN AMERICAN: >60 ML/MIN/1.73
GLUCOSE BLD-MCNC: 63 MG/DL (ref 74–99)
HCT VFR BLD CALC: 31.3 % (ref 37–54)
HEMOGLOBIN: 10.4 G/DL (ref 12.5–16.5)
IMMATURE GRANULOCYTES #: 0.2 E9/L
IMMATURE GRANULOCYTES %: 1.2 % (ref 0–5)
LYMPHOCYTES ABSOLUTE: 1.1 E9/L (ref 1.5–4)
LYMPHOCYTES RELATIVE PERCENT: 6.6 % (ref 20–42)
MAGNESIUM: 2.1 MG/DL (ref 1.6–2.6)
MCH RBC QN AUTO: 30.3 PG (ref 26–35)
MCHC RBC AUTO-ENTMCNC: 33.2 % (ref 32–34.5)
MCV RBC AUTO: 91.3 FL (ref 80–99.9)
METER GLUCOSE: 130 MG/DL (ref 74–99)
METER GLUCOSE: 210 MG/DL (ref 74–99)
METER GLUCOSE: 341 MG/DL (ref 74–99)
METER GLUCOSE: 394 MG/DL (ref 74–99)
METER GLUCOSE: 69 MG/DL (ref 74–99)
MONOCYTES ABSOLUTE: 0.24 E9/L (ref 0.1–0.95)
MONOCYTES RELATIVE PERCENT: 1.4 % (ref 2–12)
MRSA CULTURE ONLY: NORMAL
NEUTROPHILS ABSOLUTE: 14.7 E9/L (ref 1.8–7.3)
NEUTROPHILS RELATIVE PERCENT: 88.4 % (ref 43–80)
PDW BLD-RTO: 13.5 FL (ref 11.5–15)
PHOSPHORUS: 1.4 MG/DL (ref 2.5–4.5)
PLATELET # BLD: 209 E9/L (ref 130–450)
PMV BLD AUTO: 9.7 FL (ref 7–12)
POTASSIUM SERPL-SCNC: 3.3 MMOL/L (ref 3.5–5)
RBC # BLD: 3.43 E12/L (ref 3.8–5.8)
SODIUM BLD-SCNC: 141 MMOL/L (ref 132–146)
VANCOMYCIN TROUGH: 8.6 MCG/ML (ref 5–16)
WBC # BLD: 16.6 E9/L (ref 4.5–11.5)

## 2019-12-11 PROCEDURE — 97530 THERAPEUTIC ACTIVITIES: CPT

## 2019-12-11 PROCEDURE — 83735 ASSAY OF MAGNESIUM: CPT

## 2019-12-11 PROCEDURE — 2060000000 HC ICU INTERMEDIATE R&B

## 2019-12-11 PROCEDURE — 84100 ASSAY OF PHOSPHORUS: CPT

## 2019-12-11 PROCEDURE — 85025 COMPLETE CBC W/AUTO DIFF WBC: CPT

## 2019-12-11 PROCEDURE — 97166 OT EVAL MOD COMPLEX 45 MIN: CPT

## 2019-12-11 PROCEDURE — 6370000000 HC RX 637 (ALT 250 FOR IP): Performed by: INTERNAL MEDICINE

## 2019-12-11 PROCEDURE — 6360000002 HC RX W HCPCS: Performed by: INTERNAL MEDICINE

## 2019-12-11 PROCEDURE — 36415 COLL VENOUS BLD VENIPUNCTURE: CPT

## 2019-12-11 PROCEDURE — 2700000000 HC OXYGEN THERAPY PER DAY

## 2019-12-11 PROCEDURE — 97530 THERAPEUTIC ACTIVITIES: CPT | Performed by: PHYSICAL THERAPIST

## 2019-12-11 PROCEDURE — 97161 PT EVAL LOW COMPLEX 20 MIN: CPT | Performed by: PHYSICAL THERAPIST

## 2019-12-11 PROCEDURE — 80202 ASSAY OF VANCOMYCIN: CPT

## 2019-12-11 PROCEDURE — 82962 GLUCOSE BLOOD TEST: CPT

## 2019-12-11 PROCEDURE — 80048 BASIC METABOLIC PNL TOTAL CA: CPT

## 2019-12-11 PROCEDURE — 2580000003 HC RX 258: Performed by: INTERNAL MEDICINE

## 2019-12-11 PROCEDURE — 94640 AIRWAY INHALATION TREATMENT: CPT

## 2019-12-11 RX ORDER — INSULIN GLARGINE 100 [IU]/ML
25 INJECTION, SOLUTION SUBCUTANEOUS NIGHTLY
Status: DISCONTINUED | OUTPATIENT
Start: 2019-12-11 | End: 2019-12-12 | Stop reason: HOSPADM

## 2019-12-11 RX ADMIN — DIGOXIN 125 MCG: 125 TABLET ORAL at 09:04

## 2019-12-11 RX ADMIN — ENOXAPARIN SODIUM 40 MG: 40 INJECTION SUBCUTANEOUS at 09:03

## 2019-12-11 RX ADMIN — Medication 10 ML: at 20:47

## 2019-12-11 RX ADMIN — FOLIC ACID 1 MG: 1 TABLET ORAL at 09:04

## 2019-12-11 RX ADMIN — MULTIPLE VITAMINS W/ MINERALS TAB 1 TABLET: TAB at 09:04

## 2019-12-11 RX ADMIN — ASPIRIN 81 MG: 81 TABLET, COATED ORAL at 09:04

## 2019-12-11 RX ADMIN — Medication 100 MG: at 09:04

## 2019-12-11 RX ADMIN — INSULIN LISPRO 7 UNITS: 100 INJECTION, SOLUTION INTRAVENOUS; SUBCUTANEOUS at 09:14

## 2019-12-11 RX ADMIN — VANCOMYCIN HYDROCHLORIDE 750 MG: 1 INJECTION, POWDER, LYOPHILIZED, FOR SOLUTION INTRAVENOUS at 00:28

## 2019-12-11 RX ADMIN — PANTOPRAZOLE SODIUM 40 MG: 40 TABLET, DELAYED RELEASE ORAL at 06:08

## 2019-12-11 RX ADMIN — Medication 10 ML: at 09:02

## 2019-12-11 RX ADMIN — INSULIN GLARGINE 25 UNITS: 100 INJECTION, SOLUTION SUBCUTANEOUS at 20:44

## 2019-12-11 RX ADMIN — VANCOMYCIN HYDROCHLORIDE 750 MG: 1 INJECTION, POWDER, LYOPHILIZED, FOR SOLUTION INTRAVENOUS at 09:04

## 2019-12-11 RX ADMIN — IPRATROPIUM BROMIDE AND ALBUTEROL SULFATE 1 AMPULE: 2.5; .5 SOLUTION RESPIRATORY (INHALATION) at 07:57

## 2019-12-11 RX ADMIN — IPRATROPIUM BROMIDE AND ALBUTEROL SULFATE 1 AMPULE: 2.5; .5 SOLUTION RESPIRATORY (INHALATION) at 16:33

## 2019-12-11 RX ADMIN — INSULIN LISPRO 4 UNITS: 100 INJECTION, SOLUTION INTRAVENOUS; SUBCUTANEOUS at 17:03

## 2019-12-11 RX ADMIN — CEFAZOLIN SODIUM 2 G: 2 SOLUTION INTRAVENOUS at 00:00

## 2019-12-11 RX ADMIN — ATORVASTATIN CALCIUM 80 MG: 40 TABLET, FILM COATED ORAL at 20:39

## 2019-12-11 RX ADMIN — METHYLPREDNISOLONE SODIUM SUCCINATE 40 MG: 40 INJECTION, POWDER, FOR SOLUTION INTRAMUSCULAR; INTRAVENOUS at 09:03

## 2019-12-11 RX ADMIN — INSULIN LISPRO 3 UNITS: 100 INJECTION, SOLUTION INTRAVENOUS; SUBCUTANEOUS at 20:43

## 2019-12-11 RX ADMIN — CEFAZOLIN SODIUM 2 G: 2 SOLUTION INTRAVENOUS at 11:28

## 2019-12-11 RX ADMIN — IPRATROPIUM BROMIDE AND ALBUTEROL SULFATE 1 AMPULE: 2.5; .5 SOLUTION RESPIRATORY (INHALATION) at 12:15

## 2019-12-11 RX ADMIN — IPRATROPIUM BROMIDE AND ALBUTEROL SULFATE 1 AMPULE: 2.5; .5 SOLUTION RESPIRATORY (INHALATION) at 20:53

## 2019-12-11 ASSESSMENT — PAIN SCALES - GENERAL
PAINLEVEL_OUTOF10: 0

## 2019-12-12 VITALS
DIASTOLIC BLOOD PRESSURE: 74 MMHG | BODY MASS INDEX: 22.05 KG/M2 | HEART RATE: 99 BPM | OXYGEN SATURATION: 93 % | TEMPERATURE: 98 F | RESPIRATION RATE: 16 BRPM | HEIGHT: 66 IN | SYSTOLIC BLOOD PRESSURE: 123 MMHG | WEIGHT: 137.2 LBS

## 2019-12-12 LAB
ANION GAP SERPL CALCULATED.3IONS-SCNC: 15 MMOL/L (ref 7–16)
BASOPHILS ABSOLUTE: 0.02 E9/L (ref 0–0.2)
BASOPHILS RELATIVE PERCENT: 0.1 % (ref 0–2)
BUN BLDV-MCNC: 11 MG/DL (ref 6–20)
CALCIUM SERPL-MCNC: 8.8 MG/DL (ref 8.6–10.2)
CHLORIDE BLD-SCNC: 100 MMOL/L (ref 98–107)
CO2: 25 MMOL/L (ref 22–29)
CREAT SERPL-MCNC: 0.6 MG/DL (ref 0.7–1.2)
CULTURE, RESPIRATORY: NORMAL
EOSINOPHILS ABSOLUTE: 0.65 E9/L (ref 0.05–0.5)
EOSINOPHILS RELATIVE PERCENT: 4.8 % (ref 0–6)
GFR AFRICAN AMERICAN: >60
GFR NON-AFRICAN AMERICAN: >60 ML/MIN/1.73
GLUCOSE BLD-MCNC: 146 MG/DL (ref 74–99)
HCT VFR BLD CALC: 29.1 % (ref 37–54)
HEMOGLOBIN: 9.7 G/DL (ref 12.5–16.5)
IMMATURE GRANULOCYTES #: 0.06 E9/L
IMMATURE GRANULOCYTES %: 0.4 % (ref 0–5)
LYMPHOCYTES ABSOLUTE: 1.39 E9/L (ref 1.5–4)
LYMPHOCYTES RELATIVE PERCENT: 10.4 % (ref 20–42)
MAGNESIUM: 2 MG/DL (ref 1.6–2.6)
MCH RBC QN AUTO: 30.2 PG (ref 26–35)
MCHC RBC AUTO-ENTMCNC: 33.3 % (ref 32–34.5)
MCV RBC AUTO: 90.7 FL (ref 80–99.9)
METER GLUCOSE: 155 MG/DL (ref 74–99)
METER GLUCOSE: 222 MG/DL (ref 74–99)
MONOCYTES ABSOLUTE: 0.37 E9/L (ref 0.1–0.95)
MONOCYTES RELATIVE PERCENT: 2.8 % (ref 2–12)
NEUTROPHILS ABSOLUTE: 10.93 E9/L (ref 1.8–7.3)
NEUTROPHILS RELATIVE PERCENT: 81.5 % (ref 43–80)
PDW BLD-RTO: 13.2 FL (ref 11.5–15)
PHOSPHORUS: 2.8 MG/DL (ref 2.5–4.5)
PLATELET # BLD: 214 E9/L (ref 130–450)
PMV BLD AUTO: 10 FL (ref 7–12)
POTASSIUM SERPL-SCNC: 3.5 MMOL/L (ref 3.5–5)
RBC # BLD: 3.21 E12/L (ref 3.8–5.8)
SMEAR, RESPIRATORY: NORMAL
SODIUM BLD-SCNC: 140 MMOL/L (ref 132–146)
WBC # BLD: 13.4 E9/L (ref 4.5–11.5)

## 2019-12-12 PROCEDURE — 94640 AIRWAY INHALATION TREATMENT: CPT

## 2019-12-12 PROCEDURE — 6360000002 HC RX W HCPCS: Performed by: INTERNAL MEDICINE

## 2019-12-12 PROCEDURE — 6370000000 HC RX 637 (ALT 250 FOR IP): Performed by: INTERNAL MEDICINE

## 2019-12-12 PROCEDURE — 2700000000 HC OXYGEN THERAPY PER DAY

## 2019-12-12 PROCEDURE — 84100 ASSAY OF PHOSPHORUS: CPT

## 2019-12-12 PROCEDURE — 80048 BASIC METABOLIC PNL TOTAL CA: CPT

## 2019-12-12 PROCEDURE — 83735 ASSAY OF MAGNESIUM: CPT

## 2019-12-12 PROCEDURE — 85025 COMPLETE CBC W/AUTO DIFF WBC: CPT

## 2019-12-12 PROCEDURE — 2580000003 HC RX 258: Performed by: INTERNAL MEDICINE

## 2019-12-12 PROCEDURE — 82962 GLUCOSE BLOOD TEST: CPT

## 2019-12-12 PROCEDURE — 36415 COLL VENOUS BLD VENIPUNCTURE: CPT

## 2019-12-12 RX ORDER — ALBUTEROL SULFATE 90 UG/1
2 AEROSOL, METERED RESPIRATORY (INHALATION) EVERY 6 HOURS PRN
Qty: 1 INHALER | Refills: 0 | Status: ON HOLD | OUTPATIENT
Start: 2019-12-12 | End: 2021-09-26

## 2019-12-12 RX ORDER — INSULIN GLARGINE 100 [IU]/ML
25 INJECTION, SOLUTION SUBCUTANEOUS NIGHTLY
Qty: 1 VIAL | Refills: 3 | Status: SHIPPED | OUTPATIENT
Start: 2019-12-12 | End: 2020-04-22

## 2019-12-12 RX ADMIN — PANTOPRAZOLE SODIUM 40 MG: 40 TABLET, DELAYED RELEASE ORAL at 06:46

## 2019-12-12 RX ADMIN — MULTIPLE VITAMINS W/ MINERALS TAB 1 TABLET: TAB at 08:56

## 2019-12-12 RX ADMIN — METHYLPREDNISOLONE SODIUM SUCCINATE 40 MG: 40 INJECTION, POWDER, FOR SOLUTION INTRAMUSCULAR; INTRAVENOUS at 08:56

## 2019-12-12 RX ADMIN — DIGOXIN 125 MCG: 125 TABLET ORAL at 08:56

## 2019-12-12 RX ADMIN — IPRATROPIUM BROMIDE AND ALBUTEROL SULFATE 1 AMPULE: 2.5; .5 SOLUTION RESPIRATORY (INHALATION) at 09:10

## 2019-12-12 RX ADMIN — FOLIC ACID 1 MG: 1 TABLET ORAL at 08:56

## 2019-12-12 RX ADMIN — Medication 10 ML: at 08:56

## 2019-12-12 RX ADMIN — ASPIRIN 81 MG: 81 TABLET, COATED ORAL at 08:56

## 2019-12-12 RX ADMIN — ENOXAPARIN SODIUM 40 MG: 40 INJECTION SUBCUTANEOUS at 08:56

## 2019-12-12 RX ADMIN — Medication 100 MG: at 08:56

## 2019-12-12 RX ADMIN — INSULIN LISPRO 1 UNITS: 100 INJECTION, SOLUTION INTRAVENOUS; SUBCUTANEOUS at 06:46

## 2019-12-12 ASSESSMENT — PAIN SCALES - GENERAL: PAINLEVEL_OUTOF10: 0

## 2019-12-13 LAB
CULTURE, BLOOD 2: NORMAL
EKG ATRIAL RATE: 91 BPM
EKG P AXIS: 48 DEGREES
EKG P-R INTERVAL: 124 MS
EKG Q-T INTERVAL: 380 MS
EKG QRS DURATION: 94 MS
EKG QTC CALCULATION (BAZETT): 467 MS
EKG R AXIS: 64 DEGREES
EKG T AXIS: 60 DEGREES
EKG VENTRICULAR RATE: 91 BPM

## 2019-12-13 PROCEDURE — 93010 ELECTROCARDIOGRAM REPORT: CPT | Performed by: INTERNAL MEDICINE

## 2019-12-14 LAB
BLOOD CULTURE, ROUTINE: ABNORMAL
ORGANISM: ABNORMAL

## 2019-12-15 LAB — BLOOD CULTURE, ROUTINE: NORMAL

## 2019-12-18 LAB — REPORT: NORMAL

## 2020-01-04 ENCOUNTER — HOSPITAL ENCOUNTER (INPATIENT)
Age: 50
LOS: 7 days | Discharge: HOME | DRG: 917 | End: 2020-01-11
Attending: EMERGENCY MEDICINE | Admitting: INTERNAL MEDICINE

## 2020-01-04 ENCOUNTER — APPOINTMENT (OUTPATIENT)
Dept: GENERAL RADIOLOGY | Age: 50
DRG: 917 | End: 2020-01-04

## 2020-01-04 ENCOUNTER — APPOINTMENT (OUTPATIENT)
Dept: CT IMAGING | Age: 50
DRG: 917 | End: 2020-01-04

## 2020-01-04 DIAGNOSIS — N28.9 ACUTE RENAL INSUFFICIENCY: ICD-10-CM

## 2020-01-04 DIAGNOSIS — E87.5 HYPERKALEMIA: ICD-10-CM

## 2020-01-04 DIAGNOSIS — E10.11 DIABETIC KETOACIDOSIS WITH COMA ASSOCIATED WITH TYPE 1 DIABETES MELLITUS (HCC): ICD-10-CM

## 2020-01-04 DIAGNOSIS — E87.20 LACTIC ACIDOSIS: ICD-10-CM

## 2020-01-04 DIAGNOSIS — J96.01 ACUTE RESPIRATORY FAILURE WITH HYPOXIA (HCC): ICD-10-CM

## 2020-01-04 DIAGNOSIS — T68.XXXA HYPOTHERMIA, INITIAL ENCOUNTER: ICD-10-CM

## 2020-01-04 DIAGNOSIS — R57.9 SHOCK (HCC): ICD-10-CM

## 2020-01-04 DIAGNOSIS — N17.9 AKI (ACUTE KIDNEY INJURY) (HCC): ICD-10-CM

## 2020-01-04 DIAGNOSIS — I46.9 CARDIAC ARREST (HCC): Primary | ICD-10-CM

## 2020-01-04 LAB
AADO2: 118.1 MMHG
AADO2: 269 MMHG
AADO2: 334.4 MMHG
ACETAMINOPHEN LEVEL: <5 MCG/ML (ref 10–30)
ALBUMIN SERPL-MCNC: 3.7 G/DL (ref 3.5–5.2)
ALP BLD-CCNC: 157 U/L (ref 40–129)
ALT SERPL-CCNC: 30 U/L (ref 0–40)
AMMONIA: 162 UMOL/L (ref 16–60)
AMPHETAMINE SCREEN, URINE: NOT DETECTED
ANION GAP SERPL CALCULATED.3IONS-SCNC: 35 MMOL/L (ref 7–16)
ANION GAP SERPL CALCULATED.3IONS-SCNC: 46 MMOL/L (ref 7–16)
ANION GAP SERPL CALCULATED.3IONS-SCNC: 47 MMOL/L (ref 7–16)
ANISOCYTOSIS: ABNORMAL
APTT: 35 SEC (ref 24.5–35.1)
APTT: 51.8 SEC (ref 24.5–35.1)
AST SERPL-CCNC: 26 U/L (ref 0–39)
B.E.: -19.5 MMOL/L (ref -3–3)
B.E.: -19.8 MMOL/L (ref -3–3)
B.E.: -28.9 MMOL/L (ref -3–3)
BACTERIA: ABNORMAL /HPF
BARBITURATE SCREEN URINE: NOT DETECTED
BASOPHILS ABSOLUTE: 0 E9/L (ref 0–0.2)
BASOPHILS RELATIVE PERCENT: 0.8 % (ref 0–2)
BENZODIAZEPINE SCREEN, URINE: NOT DETECTED
BETA-HYDROXYBUTYRATE: >4.5 MMOL/L (ref 0.02–0.27)
BILIRUB SERPL-MCNC: 0.3 MG/DL (ref 0–1.2)
BILIRUBIN URINE: ABNORMAL
BLOOD, URINE: ABNORMAL
BUN BLDV-MCNC: 45 MG/DL (ref 6–20)
BUN BLDV-MCNC: 47 MG/DL (ref 6–20)
BUN BLDV-MCNC: 47 MG/DL (ref 6–20)
CALCIUM SERPL-MCNC: 10.7 MG/DL (ref 8.6–10.2)
CALCIUM SERPL-MCNC: 9 MG/DL (ref 8.6–10.2)
CALCIUM SERPL-MCNC: 9.7 MG/DL (ref 8.6–10.2)
CANNABINOID SCREEN URINE: NOT DETECTED
CHLORIDE BLD-SCNC: 82 MMOL/L (ref 98–107)
CHLORIDE BLD-SCNC: 86 MMOL/L (ref 98–107)
CHLORIDE BLD-SCNC: 99 MMOL/L (ref 98–107)
CLARITY: CLEAR
CO2: 10 MMOL/L (ref 22–29)
CO2: 11 MMOL/L (ref 22–29)
CO2: 5 MMOL/L (ref 22–29)
COCAINE METABOLITE SCREEN URINE: POSITIVE
COHB: 0.2 % (ref 0–1.5)
COHB: 0.3 % (ref 0–1.5)
COHB: 0.6 % (ref 0–1.5)
COLOR: YELLOW
COMMENT: ABNORMAL
CREAT SERPL-MCNC: 1.9 MG/DL (ref 0.7–1.2)
CREAT SERPL-MCNC: 2.1 MG/DL (ref 0.7–1.2)
CREAT SERPL-MCNC: 2.3 MG/DL (ref 0.7–1.2)
CRITICAL: ABNORMAL
DATE ANALYZED: ABNORMAL
DATE OF COLLECTION: ABNORMAL
DIGOXIN LEVEL: <0.3 NG/ML (ref 0.8–2)
EOSINOPHILS ABSOLUTE: 0 E9/L (ref 0.05–0.5)
EOSINOPHILS RELATIVE PERCENT: 0.4 % (ref 0–6)
ETHANOL: <10 MG/DL (ref 0–0.08)
FENTANYL SCREEN, URINE: NOT DETECTED
FIO2: 100 %
FIO2: 60 %
FIO2: 80 %
GFR AFRICAN AMERICAN: 12
GFR AFRICAN AMERICAN: 17
GFR AFRICAN AMERICAN: 29
GFR AFRICAN AMERICAN: 37
GFR AFRICAN AMERICAN: 41
GFR AFRICAN AMERICAN: 46
GFR NON-AFRICAN AMERICAN: 10 ML/MIN/1.73
GFR NON-AFRICAN AMERICAN: 14 ML/MIN/1.73
GFR NON-AFRICAN AMERICAN: 24 ML/MIN/1.73
GFR NON-AFRICAN AMERICAN: 30 ML/MIN/1.73
GFR NON-AFRICAN AMERICAN: 34 ML/MIN/1.73
GFR NON-AFRICAN AMERICAN: 38 ML/MIN/1.73
GLUCOSE BLD-MCNC: 1036 MG/DL (ref 74–99)
GLUCOSE BLD-MCNC: 1094 MG/DL (ref 74–99)
GLUCOSE BLD-MCNC: 878 MG/DL (ref 74–99)
GLUCOSE BLD-MCNC: 930 MG/DL (ref 74–99)
GLUCOSE BLD-MCNC: >700 MG/DL (ref 74–99)
GLUCOSE URINE: >=1000 MG/DL
HCO3: 12.7 MMOL/L (ref 22–26)
HCO3: 5.2 MMOL/L (ref 22–26)
HCO3: 8.9 MMOL/L (ref 22–26)
HCT VFR BLD CALC: 40.1 % (ref 37–54)
HCT VFR BLD CALC: 42.9 % (ref 37–54)
HEMOGLOBIN: 11.7 G/DL (ref 12.5–16.5)
HEMOGLOBIN: 12 G/DL (ref 12.5–16.5)
HHB: 0.5 % (ref 0–5)
HHB: 0.7 % (ref 0–5)
HHB: 1.4 % (ref 0–5)
INR BLD: 1
INR BLD: 1.1
KETONES, URINE: >=80 MG/DL
LAB: ABNORMAL
LACTIC ACID, SEPSIS: 7.1 MMOL/L (ref 0.5–1.9)
LACTIC ACID: 4.9 MMOL/L (ref 0.5–2.2)
LEUKOCYTE ESTERASE, URINE: NEGATIVE
LYMPHOCYTES ABSOLUTE: 2.06 E9/L (ref 1.5–4)
LYMPHOCYTES RELATIVE PERCENT: 11.2 % (ref 20–42)
Lab: ABNORMAL
MAGNESIUM: 2.6 MG/DL (ref 1.6–2.6)
MAGNESIUM: 3 MG/DL (ref 1.6–2.6)
MCH RBC QN AUTO: 30.9 PG (ref 26–35)
MCH RBC QN AUTO: 31.4 PG (ref 26–35)
MCHC RBC AUTO-ENTMCNC: 28 % (ref 32–34.5)
MCHC RBC AUTO-ENTMCNC: 29.2 % (ref 32–34.5)
MCV RBC AUTO: 107.5 FL (ref 80–99.9)
MCV RBC AUTO: 110.6 FL (ref 80–99.9)
METAMYELOCYTES RELATIVE PERCENT: 0.9 % (ref 0–1)
METER GLUCOSE: >500 MG/DL (ref 74–99)
METER GLUCOSE: >500 MG/DL (ref 74–99)
METHADONE SCREEN, URINE: NOT DETECTED
METHB: 0.2 % (ref 0–1.5)
METHB: 0.3 % (ref 0–1.5)
METHB: 0.5 % (ref 0–1.5)
MODE: ABNORMAL
MODE: AC
MODE: AC
MONOCYTES ABSOLUTE: 0.94 E9/L (ref 0.1–0.95)
MONOCYTES RELATIVE PERCENT: 5.2 % (ref 2–12)
MYELOCYTE PERCENT: 2.6 % (ref 0–0)
NEUTROPHILS ABSOLUTE: 15.71 E9/L (ref 1.8–7.3)
NEUTROPHILS RELATIVE PERCENT: 80.2 % (ref 43–80)
NITRITE, URINE: NEGATIVE
O2 CONTENT: 17.5 ML/DL
O2 CONTENT: 18.5 ML/DL
O2 CONTENT: 18.9 ML/DL
O2 SATURATION: 98.6 % (ref 92–98.5)
O2 SATURATION: 99.3 % (ref 92–98.5)
O2 SATURATION: 99.5 % (ref 92–98.5)
O2HB: 97.9 % (ref 94–97)
O2HB: 98.6 % (ref 94–97)
O2HB: 98.8 % (ref 94–97)
OPERATOR ID: 187
OPERATOR ID: 187
OPERATOR ID: 359
OPIATE SCREEN URINE: NOT DETECTED
OXYCODONE URINE: NOT DETECTED
PATIENT TEMP: 33.1 C
PATIENT TEMP: 37 C
PATIENT TEMP: 37 C
PCO2: 29.6 MMHG (ref 35–45)
PCO2: 34.4 MMHG (ref 35–45)
PCO2: 61.3 MMHG (ref 35–45)
PDW BLD-RTO: 14.4 FL (ref 11.5–15)
PDW BLD-RTO: 14.6 FL (ref 11.5–15)
PEEP/CPAP: 5 CMH2O
PEEP/CPAP: 5 CMH2O
PERFORMED ON: ABNORMAL
PFO2: 2.39 MMHG/%
PFO2: 2.92 MMHG/%
PFO2: 4.95 MMHG/%
PH BLOOD GAS: 6.8 (ref 7.35–7.45)
PH BLOOD GAS: 6.93 (ref 7.35–7.45)
PH BLOOD GAS: 7.1 (ref 7.35–7.45)
PH UA: 5 (ref 5–9)
PHENCYCLIDINE SCREEN URINE: NOT DETECTED
PHOSPHORUS: 12.3 MG/DL (ref 2.5–4.5)
PHOSPHORUS: 7.7 MG/DL (ref 2.5–4.5)
PLATELET # BLD: 278 E9/L (ref 130–450)
PLATELET # BLD: 375 E9/L (ref 130–450)
PMV BLD AUTO: 8.8 FL (ref 7–12)
PMV BLD AUTO: 9.5 FL (ref 7–12)
PO2: 143.6 MMHG (ref 60–100)
PO2: 292.3 MMHG (ref 60–100)
PO2: 396.2 MMHG (ref 60–100)
POC CHLORIDE: 105 MMOL/L (ref 100–108)
POC CHLORIDE: 87 MMOL/L (ref 100–108)
POC CHLORIDE: 94 MMOL/L (ref 100–108)
POC CREATININE: 2.8 MG/DL (ref 0.7–1.2)
POC CREATININE: 4.4 MG/DL (ref 0.7–1.2)
POC CREATININE: 6.1 MG/DL (ref 0.7–1.2)
POC POTASSIUM: 2.8 MMOL/L (ref 3.5–5)
POC POTASSIUM: 4.1 MMOL/L (ref 3.5–5)
POC POTASSIUM: 4.7 MMOL/L (ref 3.5–5)
POC SODIUM: 139 MMOL/L (ref 132–146)
POC SODIUM: 147 MMOL/L (ref 132–146)
POC SODIUM: 170 MMOL/L (ref 132–146)
POLYCHROMASIA: ABNORMAL
POTASSIUM SERPL-SCNC: 3.4 MMOL/L (ref 3.5–5)
POTASSIUM SERPL-SCNC: 4.5 MMOL/L (ref 3.3–5.1)
POTASSIUM SERPL-SCNC: 4.6 MMOL/L (ref 3.5–5)
POTASSIUM SERPL-SCNC: 4.7 MMOL/L (ref 3.5–5)
POTASSIUM SERPL-SCNC: 5.84 MMOL/L (ref 3.3–5.1)
PROCALCITONIN: 0.6 NG/ML (ref 0–0.08)
PROTEIN UA: ABNORMAL MG/DL
PROTHROMBIN TIME: 11.5 SEC (ref 9.3–12.4)
PROTHROMBIN TIME: 11.9 SEC (ref 9.3–12.4)
RBC # BLD: 3.73 E12/L (ref 3.8–5.8)
RBC # BLD: 3.88 E12/L (ref 3.8–5.8)
RBC UA: ABNORMAL /HPF (ref 0–2)
RI(T): 0.3
RI(T): 114 %
RI(T): 2.21
RR MECHANICAL: 22 B/MIN
RR MECHANICAL: 22 B/MIN
SALICYLATE, SERUM: <0.3 MG/DL (ref 0–30)
SODIUM BLD-SCNC: 137 MMOL/L (ref 132–146)
SODIUM BLD-SCNC: 140 MMOL/L (ref 132–146)
SODIUM BLD-SCNC: 144 MMOL/L (ref 132–146)
SOURCE, BLOOD GAS: ABNORMAL
SPECIFIC GRAVITY UA: 1.02 (ref 1–1.03)
THB: 12.5 G/DL (ref 11.5–16.5)
THB: 12.8 G/DL (ref 11.5–16.5)
THB: 12.9 G/DL (ref 11.5–16.5)
TIME ANALYZED: 1649
TIME ANALYZED: 2016
TIME ANALYZED: 2255
TOTAL CK: 141 U/L (ref 20–200)
TOTAL CK: 607 U/L (ref 20–200)
TOTAL PROTEIN: 6 G/DL (ref 6.4–8.3)
TRICYCLIC ANTIDEPRESSANTS SCREEN SERUM: NEGATIVE NG/ML
TROPONIN: 0.03 NG/ML (ref 0–0.03)
TSH SERPL DL<=0.05 MIU/L-ACNC: 3.33 UIU/ML (ref 0.27–4.2)
UROBILINOGEN, URINE: 0.2 E.U./DL
VT MECHANICAL: 500 ML
VT MECHANICAL: 500 ML
WBC # BLD: 16.9 E9/L (ref 4.5–11.5)
WBC # BLD: 18.7 E9/L (ref 4.5–11.5)
WBC UA: ABNORMAL /HPF (ref 0–5)

## 2020-01-04 PROCEDURE — 84132 ASSAY OF SERUM POTASSIUM: CPT

## 2020-01-04 PROCEDURE — 6360000002 HC RX W HCPCS: Performed by: EMERGENCY MEDICINE

## 2020-01-04 PROCEDURE — 37799 UNLISTED PX VASCULAR SURGERY: CPT

## 2020-01-04 PROCEDURE — 36569 INSJ PICC 5 YR+ W/O IMAGING: CPT

## 2020-01-04 PROCEDURE — 85730 THROMBOPLASTIN TIME PARTIAL: CPT

## 2020-01-04 PROCEDURE — 94002 VENT MGMT INPAT INIT DAY: CPT

## 2020-01-04 PROCEDURE — 2000000000 HC ICU R&B

## 2020-01-04 PROCEDURE — 6360000002 HC RX W HCPCS

## 2020-01-04 PROCEDURE — 82010 KETONE BODYS QUAN: CPT

## 2020-01-04 PROCEDURE — 87206 SMEAR FLUORESCENT/ACID STAI: CPT

## 2020-01-04 PROCEDURE — 87081 CULTURE SCREEN ONLY: CPT

## 2020-01-04 PROCEDURE — 93005 ELECTROCARDIOGRAM TRACING: CPT | Performed by: EMERGENCY MEDICINE

## 2020-01-04 PROCEDURE — 2500000003 HC RX 250 WO HCPCS

## 2020-01-04 PROCEDURE — 51702 INSERT TEMP BLADDER CATH: CPT

## 2020-01-04 PROCEDURE — 2500000003 HC RX 250 WO HCPCS: Performed by: EMERGENCY MEDICINE

## 2020-01-04 PROCEDURE — 80048 BASIC METABOLIC PNL TOTAL CA: CPT

## 2020-01-04 PROCEDURE — 94640 AIRWAY INHALATION TREATMENT: CPT

## 2020-01-04 PROCEDURE — 2580000003 HC RX 258: Performed by: EMERGENCY MEDICINE

## 2020-01-04 PROCEDURE — 82947 ASSAY GLUCOSE BLOOD QUANT: CPT

## 2020-01-04 PROCEDURE — 85027 COMPLETE CBC AUTOMATED: CPT

## 2020-01-04 PROCEDURE — 84484 ASSAY OF TROPONIN QUANT: CPT

## 2020-01-04 PROCEDURE — 85025 COMPLETE CBC W/AUTO DIFF WBC: CPT

## 2020-01-04 PROCEDURE — 82140 ASSAY OF AMMONIA: CPT

## 2020-01-04 PROCEDURE — 82550 ASSAY OF CK (CPK): CPT

## 2020-01-04 PROCEDURE — 99292 CRITICAL CARE ADDL 30 MIN: CPT

## 2020-01-04 PROCEDURE — 99255 IP/OBS CONSLTJ NEW/EST HI 80: CPT | Performed by: INTERNAL MEDICINE

## 2020-01-04 PROCEDURE — 82805 BLOOD GASES W/O2 SATURATION: CPT

## 2020-01-04 PROCEDURE — 94760 N-INVAS EAR/PLS OXIMETRY 1: CPT

## 2020-01-04 PROCEDURE — 84295 ASSAY OF SERUM SODIUM: CPT

## 2020-01-04 PROCEDURE — 6360000002 HC RX W HCPCS: Performed by: INTERNAL MEDICINE

## 2020-01-04 PROCEDURE — 99291 CRITICAL CARE FIRST HOUR: CPT

## 2020-01-04 PROCEDURE — 36415 COLL VENOUS BLD VENIPUNCTURE: CPT

## 2020-01-04 PROCEDURE — 84100 ASSAY OF PHOSPHORUS: CPT

## 2020-01-04 PROCEDURE — 87088 URINE BACTERIA CULTURE: CPT

## 2020-01-04 PROCEDURE — 80053 COMPREHEN METABOLIC PANEL: CPT

## 2020-01-04 PROCEDURE — 80307 DRUG TEST PRSMV CHEM ANLYZR: CPT

## 2020-01-04 PROCEDURE — 2580000003 HC RX 258: Performed by: INTERNAL MEDICINE

## 2020-01-04 PROCEDURE — 87040 BLOOD CULTURE FOR BACTERIA: CPT

## 2020-01-04 PROCEDURE — G0480 DRUG TEST DEF 1-7 CLASSES: HCPCS

## 2020-01-04 PROCEDURE — 81001 URINALYSIS AUTO W/SCOPE: CPT

## 2020-01-04 PROCEDURE — 0BH17EZ INSERTION OF ENDOTRACHEAL AIRWAY INTO TRACHEA, VIA NATURAL OR ARTIFICIAL OPENING: ICD-10-PCS | Performed by: EMERGENCY MEDICINE

## 2020-01-04 PROCEDURE — 83735 ASSAY OF MAGNESIUM: CPT

## 2020-01-04 PROCEDURE — 71045 X-RAY EXAM CHEST 1 VIEW: CPT

## 2020-01-04 PROCEDURE — 2500000003 HC RX 250 WO HCPCS: Performed by: INTERNAL MEDICINE

## 2020-01-04 PROCEDURE — 31500 INSERT EMERGENCY AIRWAY: CPT

## 2020-01-04 PROCEDURE — 83605 ASSAY OF LACTIC ACID: CPT

## 2020-01-04 PROCEDURE — 80162 ASSAY OF DIGOXIN TOTAL: CPT

## 2020-01-04 PROCEDURE — C9113 INJ PANTOPRAZOLE SODIUM, VIA: HCPCS | Performed by: EMERGENCY MEDICINE

## 2020-01-04 PROCEDURE — 84145 PROCALCITONIN (PCT): CPT

## 2020-01-04 PROCEDURE — 2580000003 HC RX 258

## 2020-01-04 PROCEDURE — 87070 CULTURE OTHR SPECIMN AEROBIC: CPT

## 2020-01-04 PROCEDURE — 85610 PROTHROMBIN TIME: CPT

## 2020-01-04 PROCEDURE — 82565 ASSAY OF CREATININE: CPT

## 2020-01-04 PROCEDURE — 84443 ASSAY THYROID STIM HORMONE: CPT

## 2020-01-04 PROCEDURE — 6370000000 HC RX 637 (ALT 250 FOR IP): Performed by: EMERGENCY MEDICINE

## 2020-01-04 PROCEDURE — 82435 ASSAY OF BLOOD CHLORIDE: CPT

## 2020-01-04 PROCEDURE — 70450 CT HEAD/BRAIN W/O DYE: CPT

## 2020-01-04 PROCEDURE — 0100U HC RESPIRPTHGN MULT REV TRANS & AMP PRB TECH 21 TRGT: CPT

## 2020-01-04 PROCEDURE — 82962 GLUCOSE BLOOD TEST: CPT

## 2020-01-04 RX ORDER — DEXTROSE MONOHYDRATE 25 G/50ML
12.5 INJECTION, SOLUTION INTRAVENOUS PRN
Status: DISCONTINUED | OUTPATIENT
Start: 2020-01-04 | End: 2020-01-06

## 2020-01-04 RX ORDER — ROCURONIUM BROMIDE 10 MG/ML
INJECTION, SOLUTION INTRAVENOUS DAILY PRN
Status: COMPLETED | OUTPATIENT
Start: 2020-01-04 | End: 2020-01-04

## 2020-01-04 RX ORDER — ATROPINE SULFATE 0.1 MG/ML
INJECTION INTRAVENOUS DAILY PRN
Status: COMPLETED | OUTPATIENT
Start: 2020-01-04 | End: 2020-01-04

## 2020-01-04 RX ORDER — CHLORHEXIDINE GLUCONATE 0.12 MG/ML
15 RINSE ORAL 2 TIMES DAILY
Status: DISCONTINUED | OUTPATIENT
Start: 2020-01-04 | End: 2020-01-07 | Stop reason: ALTCHOICE

## 2020-01-04 RX ORDER — 0.9 % SODIUM CHLORIDE 0.9 %
1000 INTRAVENOUS SOLUTION INTRAVENOUS ONCE
Status: COMPLETED | OUTPATIENT
Start: 2020-01-04 | End: 2020-01-04

## 2020-01-04 RX ORDER — ONDANSETRON 2 MG/ML
4 INJECTION INTRAMUSCULAR; INTRAVENOUS EVERY 6 HOURS PRN
Status: DISCONTINUED | OUTPATIENT
Start: 2020-01-04 | End: 2020-01-06

## 2020-01-04 RX ORDER — PROPOFOL 10 MG/ML
10 INJECTION, EMULSION INTRAVENOUS
Status: DISCONTINUED | OUTPATIENT
Start: 2020-01-04 | End: 2020-01-06 | Stop reason: ALTCHOICE

## 2020-01-04 RX ORDER — DEXTROSE MONOHYDRATE 50 MG/ML
100 INJECTION, SOLUTION INTRAVENOUS PRN
Status: DISCONTINUED | OUTPATIENT
Start: 2020-01-04 | End: 2020-01-11 | Stop reason: HOSPADM

## 2020-01-04 RX ORDER — DEXTROSE AND SODIUM CHLORIDE 5; .45 G/100ML; G/100ML
INJECTION, SOLUTION INTRAVENOUS CONTINUOUS PRN
Status: DISCONTINUED | OUTPATIENT
Start: 2020-01-04 | End: 2020-01-04

## 2020-01-04 RX ORDER — DEXTROSE MONOHYDRATE 25 G/50ML
12.5 INJECTION, SOLUTION INTRAVENOUS PRN
Status: DISCONTINUED | OUTPATIENT
Start: 2020-01-04 | End: 2020-01-11 | Stop reason: HOSPADM

## 2020-01-04 RX ORDER — PANTOPRAZOLE SODIUM 40 MG/10ML
40 INJECTION, POWDER, LYOPHILIZED, FOR SOLUTION INTRAVENOUS DAILY
Status: DISCONTINUED | OUTPATIENT
Start: 2020-01-05 | End: 2020-01-09

## 2020-01-04 RX ORDER — POTASSIUM CHLORIDE 29.8 MG/ML
40 INJECTION INTRAVENOUS
Status: DISPENSED | OUTPATIENT
Start: 2020-01-04 | End: 2020-01-05

## 2020-01-04 RX ORDER — SODIUM CHLORIDE 9 MG/ML
10 INJECTION INTRAVENOUS DAILY
Status: DISCONTINUED | OUTPATIENT
Start: 2020-01-05 | End: 2020-01-09

## 2020-01-04 RX ORDER — SODIUM CHLORIDE 9 MG/ML
INJECTION, SOLUTION INTRAVENOUS CONTINUOUS
Status: DISCONTINUED | OUTPATIENT
Start: 2020-01-04 | End: 2020-01-04

## 2020-01-04 RX ORDER — SODIUM CHLORIDE 450 MG/100ML
INJECTION, SOLUTION INTRAVENOUS CONTINUOUS
Status: DISCONTINUED | OUTPATIENT
Start: 2020-01-04 | End: 2020-01-04

## 2020-01-04 RX ORDER — POTASSIUM CHLORIDE 7.45 MG/ML
10 INJECTION INTRAVENOUS PRN
Status: DISCONTINUED | OUTPATIENT
Start: 2020-01-04 | End: 2020-01-04

## 2020-01-04 RX ORDER — 0.9 % SODIUM CHLORIDE 0.9 %
15 INTRAVENOUS SOLUTION INTRAVENOUS ONCE
Status: COMPLETED | OUTPATIENT
Start: 2020-01-04 | End: 2020-01-04

## 2020-01-04 RX ORDER — DEXTROSE, SODIUM CHLORIDE, AND POTASSIUM CHLORIDE 5; .45; .15 G/100ML; G/100ML; G/100ML
INJECTION INTRAVENOUS CONTINUOUS PRN
Status: DISCONTINUED | OUTPATIENT
Start: 2020-01-04 | End: 2020-01-06

## 2020-01-04 RX ORDER — DEXTROSE AND SODIUM CHLORIDE 5; .45 G/100ML; G/100ML
INJECTION, SOLUTION INTRAVENOUS CONTINUOUS
Status: DISCONTINUED | OUTPATIENT
Start: 2020-01-04 | End: 2020-01-06

## 2020-01-04 RX ORDER — 0.9 % SODIUM CHLORIDE 0.9 %
15 INTRAVENOUS SOLUTION INTRAVENOUS ONCE
Status: DISCONTINUED | OUTPATIENT
Start: 2020-01-04 | End: 2020-01-04

## 2020-01-04 RX ORDER — ALBUTEROL SULFATE 2.5 MG/3ML
10 SOLUTION RESPIRATORY (INHALATION) ONCE
Status: COMPLETED | OUTPATIENT
Start: 2020-01-04 | End: 2020-01-04

## 2020-01-04 RX ORDER — NICOTINE POLACRILEX 4 MG
15 LOZENGE BUCCAL PRN
Status: DISCONTINUED | OUTPATIENT
Start: 2020-01-04 | End: 2020-01-11 | Stop reason: HOSPADM

## 2020-01-04 RX ORDER — POTASSIUM CHLORIDE 7.45 MG/ML
10 INJECTION INTRAVENOUS PRN
Status: DISCONTINUED | OUTPATIENT
Start: 2020-01-04 | End: 2020-01-07 | Stop reason: ALTCHOICE

## 2020-01-04 RX ORDER — MAGNESIUM SULFATE 1 G/100ML
1 INJECTION INTRAVENOUS PRN
Status: DISCONTINUED | OUTPATIENT
Start: 2020-01-04 | End: 2020-01-07 | Stop reason: ALTCHOICE

## 2020-01-04 RX ORDER — PANTOPRAZOLE SODIUM 40 MG/10ML
80 INJECTION, POWDER, LYOPHILIZED, FOR SOLUTION INTRAVENOUS ONCE
Status: COMPLETED | OUTPATIENT
Start: 2020-01-04 | End: 2020-01-04

## 2020-01-04 RX ORDER — PROPOFOL 10 MG/ML
INJECTION, EMULSION INTRAVENOUS
Status: COMPLETED
Start: 2020-01-04 | End: 2020-01-04

## 2020-01-04 RX ORDER — CALCIUM CHLORIDE 100 MG/ML
INJECTION INTRAVENOUS; INTRAVENTRICULAR DAILY PRN
Status: COMPLETED | OUTPATIENT
Start: 2020-01-04 | End: 2020-01-04

## 2020-01-04 RX ORDER — SODIUM POLYSTYRENE SULFONATE 15 G/60ML
15 SUSPENSION ORAL; RECTAL ONCE
Status: DISCONTINUED | OUTPATIENT
Start: 2020-01-04 | End: 2020-01-04

## 2020-01-04 RX ADMIN — ALBUTEROL SULFATE 10 MG: 2.5 SOLUTION RESPIRATORY (INHALATION) at 18:20

## 2020-01-04 RX ADMIN — DEXTROSE AND SODIUM CHLORIDE: 5; 450 INJECTION, SOLUTION INTRAVENOUS at 22:36

## 2020-01-04 RX ADMIN — PIPERACILLIN AND TAZOBACTAM 4.5 G: 4; .5 INJECTION, POWDER, FOR SOLUTION INTRAVENOUS at 18:48

## 2020-01-04 RX ADMIN — POTASSIUM CHLORIDE 10 MEQ: 7.46 INJECTION, SOLUTION INTRAVENOUS at 19:00

## 2020-01-04 RX ADMIN — SODIUM BICARBONATE 50 MEQ: 84 INJECTION, SOLUTION INTRAVENOUS at 16:32

## 2020-01-04 RX ADMIN — SODIUM BICARBONATE: 84 INJECTION, SOLUTION INTRAVENOUS at 17:15

## 2020-01-04 RX ADMIN — SODIUM CHLORIDE 933 ML: 9 INJECTION, SOLUTION INTRAVENOUS at 20:18

## 2020-01-04 RX ADMIN — EPINEPHRINE 1 MG: 0.1 INJECTION, SOLUTION ENDOTRACHEAL; INTRACARDIAC; INTRAVENOUS at 16:36

## 2020-01-04 RX ADMIN — PANTOPRAZOLE SODIUM 80 MG: 40 INJECTION, POWDER, FOR SOLUTION INTRAVENOUS at 17:16

## 2020-01-04 RX ADMIN — SODIUM CHLORIDE: 9 INJECTION, SOLUTION INTRAVENOUS at 20:30

## 2020-01-04 RX ADMIN — SODIUM BICARBONATE 50 MEQ: 84 INJECTION, SOLUTION INTRAVENOUS at 20:29

## 2020-01-04 RX ADMIN — EPINEPHRINE 1 MG: 0.1 INJECTION, SOLUTION ENDOTRACHEAL; INTRACARDIAC; INTRAVENOUS at 16:30

## 2020-01-04 RX ADMIN — ROCURONIUM BROMIDE 50 MG: 10 INJECTION INTRAVENOUS at 16:35

## 2020-01-04 RX ADMIN — SODIUM CHLORIDE 0.1 UNITS/KG/HR: 9 INJECTION, SOLUTION INTRAVENOUS at 18:11

## 2020-01-04 RX ADMIN — PROPOFOL 10 MCG/KG/MIN: 10 INJECTION, EMULSION INTRAVENOUS at 23:05

## 2020-01-04 RX ADMIN — SODIUM BICARBONATE 50 MEQ: 84 INJECTION, SOLUTION INTRAVENOUS at 20:25

## 2020-01-04 RX ADMIN — EPINEPHRINE 1 MCG/MIN: 1 INJECTION INTRAMUSCULAR; INTRAVENOUS; SUBCUTANEOUS at 17:43

## 2020-01-04 RX ADMIN — SODIUM CHLORIDE 1000 ML: 9 INJECTION, SOLUTION INTRAVENOUS at 17:16

## 2020-01-04 RX ADMIN — POTASSIUM CHLORIDE 40 MEQ: 29.8 INJECTION, SOLUTION INTRAVENOUS at 20:40

## 2020-01-04 RX ADMIN — SODIUM BICARBONATE 50 MEQ: 84 INJECTION, SOLUTION INTRAVENOUS at 16:55

## 2020-01-04 RX ADMIN — INSULIN HUMAN 10 UNITS: 100 INJECTION, SOLUTION PARENTERAL at 18:14

## 2020-01-04 RX ADMIN — CHLORHEXIDINE GLUCONATE 0.12% ORAL RINSE 15 ML: 1.2 LIQUID ORAL at 20:31

## 2020-01-04 RX ADMIN — POTASSIUM CHLORIDE 10 MEQ: 7.46 INJECTION, SOLUTION INTRAVENOUS at 19:34

## 2020-01-04 RX ADMIN — SODIUM BICARBONATE 50 MEQ: 84 INJECTION, SOLUTION INTRAVENOUS at 16:40

## 2020-01-04 RX ADMIN — CALCIUM CHLORIDE 1 G: 100 INJECTION, SOLUTION INTRAVENOUS; INTRAVENTRICULAR at 16:30

## 2020-01-04 RX ADMIN — EPINEPHRINE 1 MG: 0.1 INJECTION, SOLUTION ENDOTRACHEAL; INTRACARDIAC; INTRAVENOUS at 16:39

## 2020-01-04 RX ADMIN — Medication 20 MCG/MIN: at 17:15

## 2020-01-04 RX ADMIN — ATROPINE SULFATE 0.5 MG: 0.1 INJECTION, SOLUTION ENDOTRACHEAL; INTRAMUSCULAR; INTRAVENOUS; SUBCUTANEOUS at 16:35

## 2020-01-04 RX ADMIN — SODIUM CHLORIDE 1000 ML: 9 INJECTION, SOLUTION INTRAVENOUS at 17:55

## 2020-01-04 RX ADMIN — CALCIUM CHLORIDE 1 G: 100 INJECTION, SOLUTION INTRAVENOUS; INTRAVENTRICULAR at 16:55

## 2020-01-04 ASSESSMENT — PULMONARY FUNCTION TESTS
PIF_VALUE: 17
PIF_VALUE: 23
PIF_VALUE: 18
PIF_VALUE: 19
PIF_VALUE: 13
PIF_VALUE: 18
PIF_VALUE: 20
PIF_VALUE: 13
PIF_VALUE: 16

## 2020-01-04 NOTE — H&P
[facility]  Internal Medicine Residency Program  History and Physical    Patient:  Brielle Wade 51 y.o. male MRN: [id number]     Date of Service: 1/4/2020    Hospital Day: 1      Chief complaint: had concerns including Cardiac Arrest.    History of Present Illness   History was obtained from EMR     The patient is a 51 y.o. male with PMH of T1DM (poorly controlled) , cocaine abuse, cigarette smoker and alcoholism , presented to the ED after a cardiac arrest. Unknown down time. Last spoke to mother 14 hours prior to being found. Was unresponsive and cold. EMS contacted and arrived. Patient was PEA with intermittent nitin. Was brought to [facility]. Upon arrival was in PEA. ACLS started. ROSC after 2 rounds of epinephine. Sedated and intubated. ED Course: Initial vitals showed Temp 78.3, RR 24, HR 58, BP 68/52. Initial labs showed bicarb 11 Cr 2.3, BUN 47 AG 47, Lactic acid 7.1, glucose 1094, ,Trop0.03 ammonia 162 TSH normal, negative ethanol, UDS positive for cocaine, Hg 12,  and WBC 18.7. UA positive for Ketones, small bilirubin and > 1000 glucose. ABG showed metabolic and respiratory acidosis. CT head negative. ED Meds: Patient was given Protonix 80 mg, 10 units Novolin, albuterol. Started on vasopressin, bicarb and epinephrine drips   ED Fluids: Patient was given 2L NS    Past Medical History:      Diagnosis Date    Alcoholism ([facility]     Cocaine abuse ([facility]     Diabetes mellitus ([facility]     Hypertension     Idiopathic peripheral neuropathy 9/21/2016    Marijuana abuse        Past Surgical History:    History reviewed. No pertinent surgical history. Medications Prior to Admission:    Prior to Admission medications    Medication Sig Start Date End Date Taking?  Authorizing Provider   albuterol sulfate  (90 Base) MCG/ACT inhaler Inhale 2 puffs into the lungs every 6 hours as needed for Wheezing or Shortness of Breath 12/12/19   Estuardo Martin MD   insulin glargine (LANTUS) 100 UNIT/ML injection vial Inject 25 Units into the skin nightly 12/12/19   Mikki Conteh MD   insulin lispro (HUMALOG) 100 UNIT/ML injection vial Inject into the skin 3 times daily (before meals)    Historical Provider, MD   Insulin Syringe-Needle U-100 28G X 1/2\" 1 ML MISC Use as directed 7/29/19   Thu Hermosillo MD   Lancets MISC 1 each by Does not apply route 2 times daily 7/29/19   Thu Hermosillo MD   atorvastatin (LIPITOR) 80 MG tablet Take 1 tablet by mouth nightly 12/28/18   Kavya Hill MD   lisinopril (PRINIVIL;ZESTRIL) 20 MG tablet Take 1 tablet by mouth daily 12/29/18   Kavya Hill MD   digoxin (LANOXIN) 125 MCG tablet Take 125 mcg by mouth daily    Historical Provider, MD   aspirin 81 MG EC tablet Take 1 tablet by mouth daily 11/13/18   Samra Faye MD       Allergies:  No known allergies    Social History:   TOBACCO:   reports that he has been smoking cigarettes. He has a 30.00 pack-year smoking history. He has never used smokeless tobacco.  ETOH:   reports current alcohol use of about 5.0 standard drinks of alcohol per week. Family History:       Problem Relation Age of Onset    Diabetes type 2  Mother     Cancer Maternal Grandmother     Diabetes type 2  Nephew        REVIEW OF SYSTEMS:     Unable to preform     Physical Exam     · Vitals: BP (!) 97/53   Pulse 76   Temp (!) 79.9 °F (26.6 °C) (Core)   Resp 22   Wt 137 lb 2 oz (62.2 kg)   SpO2 100%   BMI 22.13 kg/m²       · Constitutional: Sedated and intubated  · Head: Normocephalic and atraumatic. · Eyes: Pupils dialated, nonresponsive, (-) corneal reflex, (-) conjunctivitis (-) scleral icterus. Mucus membranes moist.  · Mouth: Intubated   · Neck: (-)  swelling, (-) JVD    · Respiratory: Lungs clear to auscultation bilaterally. (-)  wheezes, (-)  rales, (-)  Rhonchi. · Cardiovascular: RRR. (-)  murmurs, (-) gallops,  (-) rubs.  S1 and S2 were normal.   · GI:  Abdomen soft, (-) tenderness, (-)distention. (+) BS. (-) rebound, (-) guarding, (-) rigidity. · Extremities: Cold poorly perfused  (-) clubbing, (-) cyanosis. 2+ distal pulses. (-) peripheral edema. · Neurologic:  Does not follow commands, Does not withdrawal from pain, (-) gag (-) cough (-) Babinski     Labs and Imaging Studies   Basic Labs  Recent Labs     20  1649 20  1651 20  1707 20  1810   NA  --   --  140  --    K 5.84*  --  4.7  --    CL  --   --  82*  --    CO2  --   --  11*  --    BUN  --   --  47*  --    CREATININE  --  4.4* 2.3* 6.1*   GLUCOSE  --   --  1,094*  --    CALCIUM  --   --  10.7*  --        Recent Labs     20   WBC 18.7*   RBC 3.88   HGB 12.0*   HCT 42.9   .6*   MCH 30.9   MCHC 28.0*   RDW 14.6      MPV 9.5      Imaging Studies:     Xr Chest Portable    Result Date: 2020  Patient MRN:  [id number] : 1970 Age: 51 years Gender: Male Order Date:  2020 5:00 PM EXAM: XR CHEST PORTABLE COMPARISON: 2019 INDICATION:  altered mental status altered mental status FINDINGS: Endotracheal tube is present with distal tip approximately 4.2 cm above the jesus. Nasogastric tube courses below the level of the diaphragm. Side hole appears to be just above gastroesophageal junction. This tube can be advanced approximately 3 or 4 cm. No airspace opacity or pleural effusion. The heart is normal in size. No pneumothorax. 1. Endotracheal tube is 4.2 cm above the jesus. 2. Nasogastric tube courses below the level of the diaphragm. Side hole is just above level of gastroesophageal junction. This tube can be advanced approximately 3 or 4 cm. Xr Chest Portable    Result Date: 2019  Patient MRN: [id number] : 1970 Age:  51 years Gender: Male Order Date: 2019 10:30 AM Exam: XR CHEST PORTABLE Number of Images: 1 view Indication:   conform right IJ placement conform right IJ placement Comparison: XR CHEST PORTABLE 10/18/2019 Findings:  There is a right IJ central venous catheter resolved with iv hydration  8. Hyperammoniemia insetting of hypothermia   Ammonia 162  9. Shock, hypovolemic vs septic shock    WBC 18.7   Most likely 2/2 cardiac arrest , DKA,suspected sepsis   UA negative   CXR negative   Continue Vanc and zosyn,follow culture   Wean pressors to Map >65, urine out put monitor   PT/OT evaluation: Not indicated at this time   DVT prophylaxis/ GI prophylaxis: Lovenox hold/ Protonix   Disposition: MICU      Timi Ramírez MD, PGY-1  Attending physician: Dr. Paulette Long  I have seen and examined the patient with the intern. I have discussed the case, including pertinent history and exam findings with the intern. I agree with the assessment, plan and orders as documented by the intern. I have also discussed the plan with the attending on call, Dr. Herberth Armstrong of medical problems as per intern note above. Tessa Jones MD, PGY 2  Internal Medicine Resident  Attending physician: Dr. Wu Castorena  Internal Medicine Residency    Internal Medicine     Attending Physician Statement  I have discussed the case, including pertinent history and exam findings with the resident and the team.  I have seen and examined the patient and the key elements of the encounter have been performed by me. I agree with the assessment, plan and orders as documented by the resident. 52 yo male with history of DM1, cocaine abuse presented in cardiac arrest.   His initial anion gap was 47. Drug screen positive for cocaine.  - Currently being rewarmed, intubated, sedated  - On levophed    2. Acute kidney injury    3. DKA- currently on insulin drip    4. AGMA- DKA, lactic acidosis    5.  Risk of infection: Marylen Cost MD

## 2020-01-04 NOTE — ED PROVIDER NOTES
Department of Emergency Medicine   ED  Provider Note  Admit Date/RoomTime: 1/4/2020  4:33 PM  ED Room: QUIRINO/QUIRINO    1/4/20  5:17 PM      HISTORY OF PRESENT ILLNESS:  (Nurses Notes Reviewed)    Chief Complaint:   Cardiac Arrest      Source of history provided by:  EMS personnel. History/Exam Limitations: mental status and due to condition. Renee Delarosa is a 51 y.o. old male presenting to the emergency department, for cardiac arrest, which occured unknown time prior to arrival.   He has a history of diabetes. Code Status on file: Full Code. Duration:  Down time from arrest until ambulance arrival unknown. Total Time from arrest until hospital arrival unknown. Onset:  unknown. Witnessed: no.    Available History:      Prior Cardiac Disease:   Unknown. Drug Use/Overdose ? Unknown. Drowning ? No.     GI Bleeding ? Unknown. Hypothermia ? Yes. Other:   N/A. Prehospital Care:  As below. Initial Rhythm: PEA. Prehospital Treatment:       CPR:   yes. Intubation:   no     IV Established:   IO     Defibrillation:   no     External Pacer:   no     Epinephrine:   yes     Atropine:   yes     Response to Treatment:  Transient return of pulse: Yes. Sustained return of pulse:  No.  Associated Signs and Symptoms (preceding arrest):  unknown. Other History:   Insulin-dependent diabetes mellitus. PAST MEDICAL, SURGICAL, SOCIAL AND FAMILY HISTORY SECTION    Past Medical History:  has a past medical history of Alcoholism ([facility] Cocaine abuse ([facility] Diabetes mellitus ([facility] Hypertension, Idiopathic peripheral neuropathy, and Marijuana abuse. Past Surgical History:  has no past surgical history on file. Social History:  reports that he has been smoking cigarettes. He has a 30.00 pack-year smoking history. He has never used smokeless tobacco. He reports current alcohol use of about 5.0 standard drinks of alcohol per week.  He reports current drug use. Drug: Marijuana. Family History: family history includes Cancer in his maternal grandmother; Diabetes type 2  in his mother and nephew. The patients home medications have been reviewed. Allergies: No known allergies    Review of Systems:   Pertinent positives and negatives are stated within HPI, all other systems reviewed and are negative. PHYSICAL EXAM:   General: Unresponsive. Head: Atraumatic. Eyes: Fixed and dilated  ENT: Airway patent. Bagging by BVM  Cardiovascular: Heart sounds are Absent. Pulses are Absent. Respiratory: No spontaneous respirations. Abdominal: Not distended. Musculoskeletal: No deformities. Skin: Pallor, cool. Neurological: Unresponsive.  Neurological exam limited due to clinical condition.       ------------------------------------------------ RESULTS ---------------------------------------------------    LABS  Results for orders placed or performed during the hospital encounter of 01/04/20   Blood Gas, Arterial   Result Value Ref Range    Date Analyzed [id number]     Time Analyzed 1649     Source: Blood Arterial     pH, Blood Gas 6.935 (LL) 7.350 - 7.450    PCO2 61.3 (H) 35.0 - 45.0 mmHg    PO2 292.3 (H) 60.0 - 100.0 mmHg    HCO3 12.7 (L) 22.0 - 26.0 mmol/L    B.E. -19.8 (L) -3.0 - 3.0 mmol/L    O2 Sat 99.3 (H) 92.0 - 98.5 %    PO2/FIO2 2.92 mmHg/%    AaDO2 334.4 mmHg    RI(T) 114 %    O2Hb 98.8 (H) 94.0 - 97.0 %    COHb 0.3 0.0 - 1.5 %    MetHb 0.2 0.0 - 1.5 %    O2 Content 18.5 mL/dL    HHb 0.7 0.0 - 5.0 %    tHb (est) 12.8 11.5 - 16.5 g/dL    Potassium 5.84 (H) 3.30 - 5.10 mmol/L    Mode Bagging     FIO2 100.0 %    Date Of Collection      Time Collected      Pt Temp 37.0 C     ID [id number]     Lab [id number]     Critical(s) Notified Handed report to Dr/RN    CBC Auto Differential   Result Value Ref Range    WBC 18.7 (H) 4.5 - 11.5 E9/L    RBC 3.88 3.80 - 5.80 E12/L    Hemoglobin 12.0 (L) 12.5 - 16.5 g/dL    Hematocrit 42.9 37.0 - 54.0 %    MCV 110.6 (H) 80.0 - 99.9 fL    MCH 30.9 26.0 - 35.0 pg    MCHC 28.0 (L) 32.0 - 34.5 %    RDW 14.6 11.5 - 15.0 fL    Platelets 101 194 - 579 E9/L    MPV 9.5 7.0 - 12.0 fL    Neutrophils % 80.2 (H) 43.0 - 80.0 %    Lymphocytes % 11.2 (L) 20.0 - 42.0 %    Monocytes % 5.2 2.0 - 12.0 %    Eosinophils % 0.4 0.0 - 6.0 %    Basophils % 0.8 0.0 - 2.0 %    Neutrophils Absolute 15.71 (H) 1.80 - 7.30 E9/L    Lymphocytes Absolute 2.06 1.50 - 4.00 E9/L    Monocytes Absolute 0.94 0.10 - 0.95 E9/L    Eosinophils Absolute 0.00 (L) 0.05 - 0.50 E9/L    Basophils Absolute 0.00 0.00 - 0.20 E9/L    Metamyelocytes Relative 0.9 0.0 - 1.0 %    Myelocyte Percent 2.6 0 - 0 %    Anisocytosis 1+     Polychromasia 1+    Comprehensive Metabolic Panel   Result Value Ref Range    Sodium 140 132 - 146 mmol/L    Potassium 4.7 3.5 - 5.0 mmol/L    Chloride 82 (L) 98 - 107 mmol/L    CO2 11 (L) 22 - 29 mmol/L    Anion Gap 47 (H) 7 - 16 mmol/L    Glucose 1,094 (HH) 74 - 99 mg/dL    BUN 47 (H) 6 - 20 mg/dL    CREATININE 2.3 (H) 0.7 - 1.2 mg/dL    GFR Non-African American 30 >=60 mL/min/1.73    GFR African American 37     Calcium 10.7 (H) 8.6 - 10.2 mg/dL    Total Protein 6.0 (L) 6.4 - 8.3 g/dL    Alb 3.7 3.5 - 5.2 g/dL    Total Bilirubin 0.3 0.0 - 1.2 mg/dL    Alkaline Phosphatase 157 (H) 40 - 129 U/L    ALT 30 0 - 40 U/L    AST 26 0 - 39 U/L   Ammonia   Result Value Ref Range    Ammonia 162.0 (H) 16.0 - 60.0 umol/L   TSH without Reflex   Result Value Ref Range    TSH 3.330 0.270 - 4.200 uIU/mL   Protime-INR   Result Value Ref Range    Protime 11.9 9.3 - 12.4 sec    INR 1.1    APTT   Result Value Ref Range    aPTT 51.8 (H) 24.5 - 35.1 sec   Troponin   Result Value Ref Range    Troponin 0.03 0.00 - 0.03 ng/mL   Lactate, Sepsis   Result Value Ref Range    Lactic Acid, Sepsis 7.1 (HH) 0.5 - 1.9 mmol/L   Urinalysis   Result Value Ref Range    Color, UA Yellow Straw/Yellow    Clarity, UA Clear Clear    Glucose, Ur >=1000 (A) Negative mg/dL    Bilirubin Urine SMALL (A) Negative    Ketones, Urine >=80 (A) Negative mg/dL    Specific Gravity, UA 1.025 1.005 - 1.030    Blood, Urine TRACE-INTACT Negative    pH, UA 5.0 5.0 - 9.0    Protein, UA TRACE Negative mg/dL    Urobilinogen, Urine 0.2 <2.0 E.U./dL    Nitrite, Urine Negative Negative    Leukocyte Esterase, Urine Negative Negative   Urine Drug Screen   Result Value Ref Range    Amphetamine Screen, Urine NOT DETECTED Negative <1000 ng/mL    Barbiturate Screen, Ur NOT DETECTED Negative < 200 ng/mL    Benzodiazepine Screen, Urine NOT DETECTED Negative < 200 ng/mL    Cannabinoid Scrn, Ur NOT DETECTED Negative < 50ng/mL    Cocaine Metabolite Screen, Urine POSITIVE (A) Negative < 300 ng/mL    Opiate Scrn, Ur NOT DETECTED Negative < 300ng/mL    PCP Screen, Urine NOT DETECTED Negative < 25 ng/mL    Methadone Screen, Urine NOT DETECTED Negative <300 ng/mL    Oxycodone Urine NOT DETECTED Negative <100 ng/mL    FENTANYL SCREEN, URINE NOT DETECTED Negative <1 ng/mL    Drug Screen Comment: see below    Serum Drug Screen   Result Value Ref Range    Ethanol Lvl <10 mg/dL    Acetaminophen Level <5.0 (L) 10.0 - 99.9 mcg/mL    Salicylate, Serum <7.7 0.0 - 30.0 mg/dL    TCA Scrn NEGATIVE Cutoff:300 ng/mL   CK   Result Value Ref Range    Total  20 - 200 U/L   Digoxin level   Result Value Ref Range    Digoxin Lvl <0.3 (L) 0.8 - 2.0 ng/mL   Microscopic Urinalysis   Result Value Ref Range    WBC, UA NONE 0 - 5 /HPF    RBC, UA 0-1 0 - 2 /HPF    Bacteria, UA RARE (A) /HPF   POCT Glucose   Result Value Ref Range    Meter Glucose >500 (H) 74 - 99 mg/dL   POCT Venous   Result Value Ref Range    POC Sodium 147 (H) 132 - 146 mmol/L    POC Potassium 4.7 3.5 - 5.0 mmol/L    POC Chloride 94 (L) 100 - 108 mmol/L    POC Glucose >700 (HH) 74 - 99 mg/dl    POC Creatinine 4.4 (H) 0.7 - 1.2 mg/dL    GFR Non-African American 14 >=60 mL/min/1.73    GFR  17     Performed on SEE BELOW    POCT Venous   Result Value Ref Range    POC Sodium 170 (HH) 132 - 146 mmol/L    POC Potassium 4.1 3.5 - 5.0 mmol/L    POC Chloride 87 (L) 100 - 108 mmol/L    POC Glucose >700 (HH) 74 - 99 mg/dl    POC Creatinine 6.1 (H) 0.7 - 1.2 mg/dL    GFR Non-African American 10 >=60 mL/min/1.73    GFR  12     Performed on SEE BELOW    POCT Venous   Result Value Ref Range    POC Sodium 139 132 - 146 mmol/L    POC Potassium 2.8 (L) 3.5 - 5.0 mmol/L    POC Chloride 105 100 - 108 mmol/L    POC Glucose >700 (HH) 74 - 99 mg/dl    POC Creatinine 2.8 (H) 0.7 - 1.2 mg/dL    GFR Non-African American 24 >=60 mL/min/1.73    GFR  29     Performed on SEE BELOW        RADIOLOGY  XR CHEST PORTABLE   Final Result      1. Endotracheal tube is 4.2 cm above the jesus. 2. Nasogastric tube courses below the level of the diaphragm. Side   hole is just above level of gastroesophageal junction. This tube can   be advanced approximately 3 or 4 cm. CT Head WO Contrast    (Results Pending)       ---------------------------- NURSING NOTES AND VITALS REVIEWED -------------------------   The nursing notes within the ED encounter and vital signs as below have been reviewed.    BP (!) 102/59   Pulse 77   Temp (!) 81.1 °F (27.3 °C) (Core)   Resp 22   Wt 137 lb 2 oz (62.2 kg)   SpO2 99%   BMI 22.13 kg/m²   Oxygen Saturation Interpretation: Abnormal      ------------------------------------------PROGRESS NOTES -------------------------------------------    ED COURSE MEDICATIONS:                Medications   sodium bicarbonate 150 mEq in dextrose 5 % 1,000 mL infusion ( Intravenous New Bag 1/4/20 6003)   norepinephrine (LEVOPHED) 16 mg in dextrose 5% 250 mL infusion (30 mcg/min Intravenous Rate/Dose Change 1/4/20 1148)   ondansetron (ZOFRAN) injection 4 mg (has no administration in time range)   chlorhexidine (PERIDEX) 0.12 % solution 15 mL (has no administration in time range)   dextrose 50 % IV solution (has no administration in time range)   potassium chloride 10 mEq/100 mL IVPB (Peripheral Line) (10 mEq Intravenous New Bag 1/4/20 1900)   magnesium sulfate 1 g in dextrose 5% 100 mL IVPB (has no administration in time range)   sodium phosphate 10 mmol in dextrose 5 % 250 mL IVPB (has no administration in time range)     Or   sodium phosphate 15 mmol in dextrose 5 % 250 mL IVPB (has no administration in time range)     Or   sodium phosphate 20 mmol in dextrose 5 % 500 mL IVPB (has no administration in time range)   0.9 % sodium chloride bolus (has no administration in time range)     Followed by   0.9 % sodium chloride infusion (has no administration in time range)   dextrose 5 % and 0.45 % sodium chloride infusion (has no administration in time range)   insulin regular (HUMULIN R;NOVOLIN R) 100 Units in sodium chloride 0.9 % 100 mL infusion (0.1 Units/kg/hr × 62.2 kg Intravenous New Bag 1/4/20 1811)   EPINEPHrine (EPINEPHrine HCL) 5 mg in dextrose 5 % 250 mL infusion (10 mcg/min Intravenous Rate/Dose Change 1/4/20 1828)   glucose (GLUTOSE) 40 % oral gel 15 g (has no administration in time range)   dextrose 50 % IV solution (has no administration in time range)   glucagon (rDNA) injection 1 mg (has no administration in time range)   dextrose 5 % solution (has no administration in time range)   piperacillin-tazobactam (ZOSYN) 4.5 g in sodium chloride 0.9 % 100 mL IVPB (mini-bag) (4.5 g Intravenous New Bag 1/4/20 1848)   vancomycin (VANCOCIN) 1,250 mg in dextrose 5 % 250 mL IVPB (has no administration in time range)   calcium chloride 10 % injection (1 g Intravenous Given 1/4/20 1630)   EPINEPHrine PF 1 MG/10ML injection (1 mg Intraosseous Given 1/4/20 1639)   sodium bicarbonate 8.4 % injection (50 mEq Intravenous Given 1/4/20 1640)   atropine injection (0.5 mg Intravenous Given 1/4/20 1635)   rocuronium (ZEMURON) injection (50 mg Intravenous Given 1/4/20 1635)   0.9 % sodium chloride bolus (0 mLs Intravenous Stopped 1/4/20 1753)   0.9 % sodium chloride bolus (1,000 mLs Intravenous New Bag 1/4/20 1755)   pantoprazole (PROTONIX) injection 80 mg (80 mg Intravenous Given 1/4/20 1716)   sodium bicarbonate 8.4 % injection (50 mEq Intravenous Given 1/4/20 1655)   calcium chloride 10 % injection (1 g Intravenous Given 1/4/20 1655)   insulin regular (HUMULIN R;NOVOLIN R) injection 10 Units (10 Units Intravenous Given 1/4/20 1814)   albuterol (PROVENTIL) nebulizer solution 10 mg (10 mg Nebulization Given 1/4/20 1820)       Medical Decision Making/ED Course:  Presents emergency department by EMS in richi-cardiac arrest.  He was found down at home by family members and it is unclear how long he was down. EMS was called and found the patient to be intermittently in PEA but intermittently bradycardic with a pulse. Patient was not intubated by EMS. Upon arrival to the emergency department, patient was in PEA without pulses. ACLS was initiated and after 2 rounds of ACLS, a pulse was felt. Patient was intubated in the emergency department. She was started on vasopressors due to hypotension after ROSC. Patient had a femoral central line placed in the emergency department as well for poor peripheral access. He remains critically ill and will require admission to the medical ICU. He does have a history of insulin-dependent diabetes and DKA with hyperkalemia or profound acidemia is highly likely. Patient given calcium gluconate and multiple rounds of sodium bicarbonate. Family updated as available and they do express that he is a full code. This patient's ED course included: re-evaluation prior to disposition, multiple bedside re-evaluations, IV medications, cardiac monitoring, continuous pulse oximetry and complex medical decision making and emergency management    ED Course as of Jan 04 1929   Sat Jan 04, 2020 1827 Patient now on multiple pressors and is still hypotensive. He is be rewarmed. Insulin infusion started. Patient remains critically ill and will require admission to the MICU.  The PCP and intensive care will be contacted. [BM]   [id number] with Dr. Angela Longoria who will admit the patient. [BM]   [id number] Spoke with Dr. Pegge Nyhan with intensive care who accepts the patient to the ICU. [BM]      ED Course User Index  [BM] Britany Hopson,      PROCEDURES:           Intubation Procedure Note    Indication: Respiratory failure    Consent: Unable to be obtained due to the emergent nature of this procedure. Medications Used: rocuronium intravenously    Procedure: The patient was placed in the appropriate position. Cricoid pressure was not required. Intubation was performed by video laryngoscopy and an 8.0 cuffed endotracheal tube. The tube was then secured appropriately at a distance of 24 cm to the dental ridge. Initial confirmation of placement included bilateral breath sounds, an end tidal CO2 detector, absence of sounds over the stomach, tube fogging, adequate chest rise and adequate pulse oximetry reading. A chest x-ray to verify correct placement of the tube showed appropriate tube position. The patient tolerated the procedure well. Complications: None    Central Line Placement Procedure Note    Indication: poor peripheral access, hypovolemia, centrally administered medications and need for frequent blood draws    Consent: Unable to be obtained due to the emergent nature of this procedure. Procedure: The patient was positioned appropriately and the skin over the right femoral vein was prepped with betadine and draped in a sterile fashion. Local anesthesia was not performed due to the emergent nature of this procedure. A large bore needle was used to identify the vein. A guide wire was then inserted into the vein through the needle. A triple lumen catheter was then inserted into the vessel over the guide wire using the Seldinger technique. All ports showed good, free flowing blood return and were flushed with saline solution.    The catheter was then securely fastened to the skin with suture at the hub. Two sutures were placed into the proximal eyelets. An antibiotic disk was placed and the site was then covered with a sterile dressing. A post procedure X-ray was not indicated. The patient tolerated the procedure well. Complications: None      Arterial Line Placement Procedure Note                     Indication: Need for serial blood work, metabolic derangement, arterial blood gases, mechanical ventilation and severe hypotension    Consent: Unable to be obtained due to the emergent nature of this procedure. Roger's Test: Not indicated in this particular procedure    Procedure: The skin over the left femoral artery was prepped with betadine and draped in a sterile fashion. Local anesthesia was not performed due to the emergent nature of this procedure. A 20 gauge angiocath was then inserted, using a modified Seldinger technique, into the vessel. The transducer set was then attached and securely fastened to the skin with sutures. Waveforms on the monitor were observed and found to be adequate. The patient had good distal perfusion after the procedure. The site was then dressed in a sterile fashion. The patient tolerated the procedure well. Complications: None      CRITICAL CARE:  75 minutes    Counseling:   I have spoken with the mother and brother regarding the patient's treatment/condition at this time. --------------------------------------- IMPRESSION & DISPOSITION --------------------------------     IMPRESSION:  1. Cardiac arrest ([facility]    2. Acute respiratory failure with hypoxia (HCC)    3. Diabetic ketoacidosis with coma associated with type 1 diabetes mellitus ([facility]    4. Hypothermia, initial encounter    5. Shock ([facility]        DISPOSITION:  Disposition: Admit to CCU/ICU. Patient condition is critical.    CRITICAL CARE:   75 MINUTES.           Please note that the withdrawal or failure to initiate urgent interventions for this patient would likely result in a life threatening deterioration or permanent disability. Accordingly this patient received the above mentioned time, excluding separately billable procedures.             Susie Eldridge DO  01/04/20 1929

## 2020-01-05 ENCOUNTER — APPOINTMENT (OUTPATIENT)
Dept: GENERAL RADIOLOGY | Age: 50
DRG: 917 | End: 2020-01-05

## 2020-01-05 LAB
AADO2: 110.8 MMHG
AADO2: 133.5 MMHG
AADO2: 81.7 MMHG
ADENOVIRUS BY PCR: NOT DETECTED
AMMONIA: 38 UMOL/L (ref 16–60)
ANION GAP SERPL CALCULATED.3IONS-SCNC: 13 MMOL/L (ref 7–16)
ANION GAP SERPL CALCULATED.3IONS-SCNC: 14 MMOL/L (ref 7–16)
ANION GAP SERPL CALCULATED.3IONS-SCNC: 16 MMOL/L (ref 7–16)
ANION GAP SERPL CALCULATED.3IONS-SCNC: 26 MMOL/L (ref 7–16)
ANION GAP SERPL CALCULATED.3IONS-SCNC: 39 MMOL/L (ref 7–16)
ANION GAP SERPL CALCULATED.3IONS-SCNC: 8 MMOL/L (ref 7–16)
B.E.: -2.7 MMOL/L (ref -3–3)
B.E.: 4.2 MMOL/L (ref -3–3)
B.E.: 4.2 MMOL/L (ref -3–3)
BACTERIA: ABNORMAL /HPF
BILIRUBIN URINE: ABNORMAL
BLOOD, URINE: ABNORMAL
BORDETELLA PARAPERTUSSIS BY PCR: NOT DETECTED
BORDETELLA PERTUSSIS BY PCR: NOT DETECTED
BUN BLDV-MCNC: 39 MG/DL (ref 6–20)
BUN BLDV-MCNC: 40 MG/DL (ref 6–20)
BUN BLDV-MCNC: 40 MG/DL (ref 6–20)
BUN BLDV-MCNC: 41 MG/DL (ref 6–20)
BUN BLDV-MCNC: 43 MG/DL (ref 6–20)
BUN BLDV-MCNC: 45 MG/DL (ref 6–20)
CALCIUM SERPL-MCNC: 8 MG/DL (ref 8.6–10.2)
CALCIUM SERPL-MCNC: 8.3 MG/DL (ref 8.6–10.2)
CALCIUM SERPL-MCNC: 8.6 MG/DL (ref 8.6–10.2)
CALCIUM SERPL-MCNC: 8.8 MG/DL (ref 8.6–10.2)
CALCIUM SERPL-MCNC: 8.9 MG/DL (ref 8.6–10.2)
CALCIUM SERPL-MCNC: 9.2 MG/DL (ref 8.6–10.2)
CHLAMYDOPHILIA PNEUMONIAE BY PCR: NOT DETECTED
CHLORIDE BLD-SCNC: 101 MMOL/L (ref 98–107)
CHLORIDE BLD-SCNC: 107 MMOL/L (ref 98–107)
CHLORIDE BLD-SCNC: 109 MMOL/L (ref 98–107)
CHLORIDE BLD-SCNC: 110 MMOL/L (ref 98–107)
CHLORIDE BLD-SCNC: 113 MMOL/L (ref 98–107)
CHLORIDE BLD-SCNC: 93 MMOL/L (ref 98–107)
CLARITY: ABNORMAL
CO2: 19 MMOL/L (ref 22–29)
CO2: 24 MMOL/L (ref 22–29)
CO2: 25 MMOL/L (ref 22–29)
CO2: 25 MMOL/L (ref 22–29)
CO2: 26 MMOL/L (ref 22–29)
CO2: 9 MMOL/L (ref 22–29)
COHB: 0.1 % (ref 0–1.5)
COHB: 0.3 % (ref 0–1.5)
COHB: 0.5 % (ref 0–1.5)
COLOR: YELLOW
COMMENT: ABNORMAL
CORONAVIRUS 229E BY PCR: NOT DETECTED
CORONAVIRUS HKU1 BY PCR: NOT DETECTED
CORONAVIRUS NL63 BY PCR: NOT DETECTED
CORONAVIRUS OC43 BY PCR: NOT DETECTED
CORTISOL TOTAL: 75.1 MCG/DL (ref 2.68–18.4)
CREAT SERPL-MCNC: 1.4 MG/DL (ref 0.7–1.2)
CREAT SERPL-MCNC: 1.5 MG/DL (ref 0.7–1.2)
CREAT SERPL-MCNC: 1.6 MG/DL (ref 0.7–1.2)
CREAT SERPL-MCNC: 1.7 MG/DL (ref 0.7–1.2)
CREAT SERPL-MCNC: 1.8 MG/DL (ref 0.7–1.2)
CREAT SERPL-MCNC: 2 MG/DL (ref 0.7–1.2)
CRITICAL: ABNORMAL
CRYSTALS, UA: ABNORMAL
DATE ANALYZED: ABNORMAL
DATE OF COLLECTION: ABNORMAL
EKG ATRIAL RATE: 68 BPM
EKG Q-T INTERVAL: 628 MS
EKG QRS DURATION: 188 MS
EKG QTC CALCULATION (BAZETT): 673 MS
EKG R AXIS: 97 DEGREES
EKG T AXIS: -58 DEGREES
EKG VENTRICULAR RATE: 69 BPM
FIO2: 40 %
FIO2: 50 %
FIO2: 60 %
GFR AFRICAN AMERICAN: 43
GFR AFRICAN AMERICAN: 49
GFR AFRICAN AMERICAN: 52
GFR AFRICAN AMERICAN: 56
GFR AFRICAN AMERICAN: >60
GFR AFRICAN AMERICAN: >60
GFR NON-AFRICAN AMERICAN: 36 ML/MIN/1.73
GFR NON-AFRICAN AMERICAN: 40 ML/MIN/1.73
GFR NON-AFRICAN AMERICAN: 43 ML/MIN/1.73
GFR NON-AFRICAN AMERICAN: 46 ML/MIN/1.73
GFR NON-AFRICAN AMERICAN: 50 ML/MIN/1.73
GFR NON-AFRICAN AMERICAN: 54 ML/MIN/1.73
GLUCOSE BLD-MCNC: 171 MG/DL (ref 74–99)
GLUCOSE BLD-MCNC: 179 MG/DL (ref 74–99)
GLUCOSE BLD-MCNC: 333 MG/DL (ref 74–99)
GLUCOSE BLD-MCNC: 465 MG/DL (ref 74–99)
GLUCOSE BLD-MCNC: 572 MG/DL (ref 74–99)
GLUCOSE BLD-MCNC: 604 MG/DL (ref 74–99)
GLUCOSE BLD-MCNC: 626 MG/DL (ref 74–99)
GLUCOSE BLD-MCNC: 727 MG/DL (ref 74–99)
GLUCOSE BLD-MCNC: 761 MG/DL (ref 74–99)
GLUCOSE BLD-MCNC: 834 MG/DL (ref 74–99)
GLUCOSE URINE: 500 MG/DL
HCO3: 19.2 MMOL/L (ref 22–26)
HCO3: 26.6 MMOL/L (ref 22–26)
HCO3: 27.1 MMOL/L (ref 22–26)
HCT VFR BLD CALC: 32.9 % (ref 37–54)
HEMOGLOBIN: 10.9 G/DL (ref 12.5–16.5)
HHB: 0.8 % (ref 0–5)
HHB: 1.1 % (ref 0–5)
HHB: 1.5 % (ref 0–5)
HUMAN METAPNEUMOVIRUS BY PCR: NOT DETECTED
HUMAN RHINOVIRUS/ENTEROVIRUS BY PCR: NOT DETECTED
INFLUENZA A BY PCR: NOT DETECTED
INFLUENZA B BY PCR: NOT DETECTED
KETONES, URINE: 15 MG/DL
LAB: ABNORMAL
LACTIC ACID: 1.5 MMOL/L (ref 0.5–2.2)
LACTIC ACID: 2.4 MMOL/L (ref 0.5–2.2)
LACTIC ACID: 2.4 MMOL/L (ref 0.5–2.2)
LACTIC ACID: 3 MMOL/L (ref 0.5–2.2)
LEUKOCYTE ESTERASE, URINE: NEGATIVE
Lab: ABNORMAL
MAGNESIUM: 1.8 MG/DL (ref 1.6–2.6)
MAGNESIUM: 1.8 MG/DL (ref 1.6–2.6)
MAGNESIUM: 1.9 MG/DL (ref 1.6–2.6)
MAGNESIUM: 1.9 MG/DL (ref 1.6–2.6)
MAGNESIUM: 2.3 MG/DL (ref 1.6–2.6)
MAGNESIUM: 2.6 MG/DL (ref 1.6–2.6)
MCH RBC QN AUTO: 31.1 PG (ref 26–35)
MCHC RBC AUTO-ENTMCNC: 33.1 % (ref 32–34.5)
MCV RBC AUTO: 94 FL (ref 80–99.9)
METER GLUCOSE: 133 MG/DL (ref 74–99)
METER GLUCOSE: 142 MG/DL (ref 74–99)
METER GLUCOSE: 148 MG/DL (ref 74–99)
METER GLUCOSE: 156 MG/DL (ref 74–99)
METER GLUCOSE: 156 MG/DL (ref 74–99)
METER GLUCOSE: 161 MG/DL (ref 74–99)
METER GLUCOSE: 176 MG/DL (ref 74–99)
METER GLUCOSE: 192 MG/DL (ref 74–99)
METER GLUCOSE: 213 MG/DL (ref 74–99)
METER GLUCOSE: 243 MG/DL (ref 74–99)
METER GLUCOSE: 282 MG/DL (ref 74–99)
METER GLUCOSE: 328 MG/DL (ref 74–99)
METER GLUCOSE: 376 MG/DL (ref 74–99)
METER GLUCOSE: 382 MG/DL (ref 74–99)
METER GLUCOSE: 435 MG/DL (ref 74–99)
METER GLUCOSE: 478 MG/DL (ref 74–99)
METER GLUCOSE: 486 MG/DL (ref 74–99)
METER GLUCOSE: 491 MG/DL (ref 74–99)
METHB: 0.4 % (ref 0–1.5)
MODE: AC
MYCOPLASMA PNEUMONIAE BY PCR: NOT DETECTED
NITRITE, URINE: NEGATIVE
O2 CONTENT: 15.6 ML/DL
O2 CONTENT: 15.8 ML/DL
O2 CONTENT: 16.7 ML/DL
O2 SATURATION: 98.5 % (ref 92–98.5)
O2 SATURATION: 98.9 % (ref 92–98.5)
O2 SATURATION: 99.2 % (ref 92–98.5)
O2HB: 98 % (ref 94–97)
O2HB: 98.2 % (ref 94–97)
O2HB: 98.3 % (ref 94–97)
OPERATOR ID: 359
OPERATOR ID: 359
OPERATOR ID: ABNORMAL
OSMOLALITY: 358 MOSM/KG (ref 285–310)
PARAINFLUENZA VIRUS 1 BY PCR: NOT DETECTED
PARAINFLUENZA VIRUS 2 BY PCR: NOT DETECTED
PARAINFLUENZA VIRUS 3 BY PCR: NOT DETECTED
PARAINFLUENZA VIRUS 4 BY PCR: NOT DETECTED
PATIENT TEMP: 37 C
PATIENT TEMP: 37 C
PATIENT TEMP: 38.6 C
PCO2: 25.2 MMHG (ref 35–45)
PCO2: 32.4 MMHG (ref 35–45)
PCO2: 34.4 MMHG (ref 35–45)
PDW BLD-RTO: 14.6 FL (ref 11.5–15)
PEEP/CPAP: 5 CMH2O
PFO2: 3.85 MMHG/%
PFO2: 3.94 MMHG/%
PFO2: 3.99 MMHG/%
PH BLOOD GAS: 7.5 (ref 7.35–7.45)
PH BLOOD GAS: 7.51 (ref 7.35–7.45)
PH BLOOD GAS: 7.53 (ref 7.35–7.45)
PH UA: 6.5 (ref 5–9)
PHOSPHORUS: 0.9 MG/DL (ref 2.5–4.5)
PHOSPHORUS: 0.9 MG/DL (ref 2.5–4.5)
PHOSPHORUS: 1.2 MG/DL (ref 2.5–4.5)
PHOSPHORUS: 2.2 MG/DL (ref 2.5–4.5)
PHOSPHORUS: 3.7 MG/DL (ref 2.5–4.5)
PHOSPHORUS: 5.3 MG/DL (ref 2.5–4.5)
PLATELET # BLD: 378 E9/L (ref 130–450)
PMV BLD AUTO: 8.5 FL (ref 7–12)
PO2: 153.9 MMHG (ref 60–100)
PO2: 196.8 MMHG (ref 60–100)
PO2: 239.1 MMHG (ref 60–100)
POTASSIUM SERPL-SCNC: 3.4 MMOL/L (ref 3.5–5)
POTASSIUM SERPL-SCNC: 3.6 MMOL/L (ref 3.5–5)
POTASSIUM SERPL-SCNC: 3.6 MMOL/L (ref 3.5–5)
POTASSIUM SERPL-SCNC: 4.2 MMOL/L (ref 3.5–5)
POTASSIUM SERPL-SCNC: 4.3 MMOL/L (ref 3.5–5)
POTASSIUM SERPL-SCNC: 5 MMOL/L (ref 3.5–5)
PROTEIN UA: 30 MG/DL
RBC # BLD: 3.5 E12/L (ref 3.8–5.8)
RBC UA: ABNORMAL /HPF (ref 0–2)
RESPIRATORY SYNCYTIAL VIRUS BY PCR: NOT DETECTED
RI(T): 0.53
RI(T): 0.54
RI(T): 0.56
RR MECHANICAL: 12 B/MIN
RR MECHANICAL: 18 B/MIN
RR MECHANICAL: 22 B/MIN
SODIUM BLD-SCNC: 141 MMOL/L (ref 132–146)
SODIUM BLD-SCNC: 146 MMOL/L (ref 132–146)
SODIUM BLD-SCNC: 147 MMOL/L (ref 132–146)
SODIUM BLD-SCNC: 147 MMOL/L (ref 132–146)
SODIUM BLD-SCNC: 148 MMOL/L (ref 132–146)
SODIUM BLD-SCNC: 148 MMOL/L (ref 132–146)
SOURCE, BLOOD GAS: ABNORMAL
SPECIFIC GRAVITY UA: 1.01 (ref 1–1.03)
THB: 11.1 G/DL (ref 11.5–16.5)
THB: 11.1 G/DL (ref 11.5–16.5)
THB: 11.7 G/DL (ref 11.5–16.5)
TIME ANALYZED: 1012
TIME ANALYZED: 1129
TIME ANALYZED: 428
UROBILINOGEN, URINE: 0.2 E.U./DL
VT MECHANICAL: 500 ML
WBC # BLD: 11.4 E9/L (ref 4.5–11.5)
WBC UA: ABNORMAL /HPF (ref 0–5)

## 2020-01-05 PROCEDURE — 6360000002 HC RX W HCPCS: Performed by: INTERNAL MEDICINE

## 2020-01-05 PROCEDURE — 83735 ASSAY OF MAGNESIUM: CPT

## 2020-01-05 PROCEDURE — 87088 URINE BACTERIA CULTURE: CPT

## 2020-01-05 PROCEDURE — 37799 UNLISTED PX VASCULAR SURGERY: CPT

## 2020-01-05 PROCEDURE — 80048 BASIC METABOLIC PNL TOTAL CA: CPT

## 2020-01-05 PROCEDURE — 2000000000 HC ICU R&B

## 2020-01-05 PROCEDURE — 2580000003 HC RX 258: Performed by: INTERNAL MEDICINE

## 2020-01-05 PROCEDURE — 2500000003 HC RX 250 WO HCPCS

## 2020-01-05 PROCEDURE — 6370000000 HC RX 637 (ALT 250 FOR IP): Performed by: EMERGENCY MEDICINE

## 2020-01-05 PROCEDURE — 94003 VENT MGMT INPAT SUBQ DAY: CPT

## 2020-01-05 PROCEDURE — 83605 ASSAY OF LACTIC ACID: CPT

## 2020-01-05 PROCEDURE — 99223 1ST HOSP IP/OBS HIGH 75: CPT | Performed by: INTERNAL MEDICINE

## 2020-01-05 PROCEDURE — G0480 DRUG TEST DEF 1-7 CLASSES: HCPCS

## 2020-01-05 PROCEDURE — 2500000003 HC RX 250 WO HCPCS: Performed by: INTERNAL MEDICINE

## 2020-01-05 PROCEDURE — 6360000002 HC RX W HCPCS: Performed by: EMERGENCY MEDICINE

## 2020-01-05 PROCEDURE — 36415 COLL VENOUS BLD VENIPUNCTURE: CPT

## 2020-01-05 PROCEDURE — 99291 CRITICAL CARE FIRST HOUR: CPT | Performed by: INTERNAL MEDICINE

## 2020-01-05 PROCEDURE — 2580000003 HC RX 258: Performed by: EMERGENCY MEDICINE

## 2020-01-05 PROCEDURE — 81001 URINALYSIS AUTO W/SCOPE: CPT

## 2020-01-05 PROCEDURE — 71045 X-RAY EXAM CHEST 1 VIEW: CPT

## 2020-01-05 PROCEDURE — 82947 ASSAY GLUCOSE BLOOD QUANT: CPT

## 2020-01-05 PROCEDURE — 82962 GLUCOSE BLOOD TEST: CPT

## 2020-01-05 PROCEDURE — C9113 INJ PANTOPRAZOLE SODIUM, VIA: HCPCS | Performed by: INTERNAL MEDICINE

## 2020-01-05 PROCEDURE — 83930 ASSAY OF BLOOD OSMOLALITY: CPT

## 2020-01-05 PROCEDURE — 84100 ASSAY OF PHOSPHORUS: CPT

## 2020-01-05 PROCEDURE — 82533 TOTAL CORTISOL: CPT

## 2020-01-05 PROCEDURE — 2500000003 HC RX 250 WO HCPCS: Performed by: EMERGENCY MEDICINE

## 2020-01-05 PROCEDURE — 93010 ELECTROCARDIOGRAM REPORT: CPT | Performed by: INTERNAL MEDICINE

## 2020-01-05 PROCEDURE — 82805 BLOOD GASES W/O2 SATURATION: CPT

## 2020-01-05 PROCEDURE — 82140 ASSAY OF AMMONIA: CPT

## 2020-01-05 PROCEDURE — 85027 COMPLETE CBC AUTOMATED: CPT

## 2020-01-05 PROCEDURE — 5A1945Z RESPIRATORY VENTILATION, 24-96 CONSECUTIVE HOURS: ICD-10-PCS | Performed by: EMERGENCY MEDICINE

## 2020-01-05 RX ORDER — 0.9 % SODIUM CHLORIDE 0.9 %
500 INTRAVENOUS SOLUTION INTRAVENOUS ONCE
Status: COMPLETED | OUTPATIENT
Start: 2020-01-05 | End: 2020-01-05

## 2020-01-05 RX ORDER — MIDAZOLAM HYDROCHLORIDE 1 MG/ML
2 INJECTION INTRAMUSCULAR; INTRAVENOUS ONCE
Status: COMPLETED | OUTPATIENT
Start: 2020-01-05 | End: 2020-01-05

## 2020-01-05 RX ADMIN — PIPERACILLIN AND TAZOBACTAM 3.38 G: 3; .375 INJECTION, POWDER, LYOPHILIZED, FOR SOLUTION INTRAVENOUS at 10:45

## 2020-01-05 RX ADMIN — SODIUM CHLORIDE 500 ML: 9 INJECTION, SOLUTION INTRAVENOUS at 12:12

## 2020-01-05 RX ADMIN — SODIUM CHLORIDE 11.8 UNITS/HR: 9 INJECTION, SOLUTION INTRAVENOUS at 06:54

## 2020-01-05 RX ADMIN — PIPERACILLIN AND TAZOBACTAM 3.38 G: 3; .375 INJECTION, POWDER, LYOPHILIZED, FOR SOLUTION INTRAVENOUS at 02:51

## 2020-01-05 RX ADMIN — DEXMEDETOMIDINE 1.2 MCG/KG/HR: 100 INJECTION, SOLUTION, CONCENTRATE INTRAVENOUS at 13:40

## 2020-01-05 RX ADMIN — DEXMEDETOMIDINE 1.1 MCG/KG/HR: 100 INJECTION, SOLUTION, CONCENTRATE INTRAVENOUS at 18:49

## 2020-01-05 RX ADMIN — POTASSIUM CHLORIDE 10 MEQ: 7.46 INJECTION, SOLUTION INTRAVENOUS at 15:30

## 2020-01-05 RX ADMIN — SODIUM CHLORIDE 26.5 UNITS/HR: 9 INJECTION, SOLUTION INTRAVENOUS at 12:50

## 2020-01-05 RX ADMIN — SODIUM PHOSPHATE, MONOBASIC, MONOHYDRATE 20 MMOL: 276; 142 INJECTION, SOLUTION INTRAVENOUS at 06:00

## 2020-01-05 RX ADMIN — POTASSIUM CHLORIDE 10 MEQ: 7.46 INJECTION, SOLUTION INTRAVENOUS at 10:08

## 2020-01-05 RX ADMIN — Medication 50 MEQ: at 01:24

## 2020-01-05 RX ADMIN — DEXMEDETOMIDINE 1 MCG/KG/HR: 100 INJECTION, SOLUTION, CONCENTRATE INTRAVENOUS at 06:40

## 2020-01-05 RX ADMIN — PIPERACILLIN AND TAZOBACTAM 3.38 G: 3; .375 INJECTION, POWDER, LYOPHILIZED, FOR SOLUTION INTRAVENOUS at 18:49

## 2020-01-05 RX ADMIN — SODIUM CHLORIDE 500 ML: 9 INJECTION, SOLUTION INTRAVENOUS at 17:35

## 2020-01-05 RX ADMIN — POTASSIUM CHLORIDE 10 MEQ: 7.46 INJECTION, SOLUTION INTRAVENOUS at 06:15

## 2020-01-05 RX ADMIN — SODIUM PHOSPHATE, MONOBASIC, MONOHYDRATE 20 MMOL: 276; 142 INJECTION, SOLUTION INTRAVENOUS at 13:39

## 2020-01-05 RX ADMIN — CHLORHEXIDINE GLUCONATE 0.12% ORAL RINSE 15 ML: 1.2 LIQUID ORAL at 20:22

## 2020-01-05 RX ADMIN — POTASSIUM CHLORIDE 10 MEQ: 7.46 INJECTION, SOLUTION INTRAVENOUS at 19:29

## 2020-01-05 RX ADMIN — POTASSIUM CHLORIDE 10 MEQ: 7.46 INJECTION, SOLUTION INTRAVENOUS at 06:42

## 2020-01-05 RX ADMIN — SODIUM BICARBONATE 50 MEQ: 84 INJECTION, SOLUTION INTRAVENOUS at 01:24

## 2020-01-05 RX ADMIN — SODIUM CHLORIDE, PRESERVATIVE FREE 10 ML: 5 INJECTION INTRAVENOUS at 08:21

## 2020-01-05 RX ADMIN — CHLORHEXIDINE GLUCONATE 0.12% ORAL RINSE 15 ML: 1.2 LIQUID ORAL at 08:21

## 2020-01-05 RX ADMIN — POTASSIUM CHLORIDE 10 MEQ: 7.46 INJECTION, SOLUTION INTRAVENOUS at 05:17

## 2020-01-05 RX ADMIN — DEXTROSE MONOHYDRATE, SODIUM CHLORIDE, AND POTASSIUM CHLORIDE: 50; 4.5; 1.49 INJECTION, SOLUTION INTRAVENOUS at 14:05

## 2020-01-05 RX ADMIN — MIDAZOLAM 2 MG: 1 INJECTION INTRAMUSCULAR; INTRAVENOUS at 01:26

## 2020-01-05 RX ADMIN — POTASSIUM CHLORIDE 10 MEQ: 7.46 INJECTION, SOLUTION INTRAVENOUS at 13:04

## 2020-01-05 RX ADMIN — DEXMEDETOMIDINE 0.5 MCG/KG/HR: 100 INJECTION, SOLUTION, CONCENTRATE INTRAVENOUS at 00:44

## 2020-01-05 RX ADMIN — POTASSIUM CHLORIDE 10 MEQ: 7.46 INJECTION, SOLUTION INTRAVENOUS at 17:31

## 2020-01-05 RX ADMIN — PANTOPRAZOLE SODIUM 40 MG: 40 INJECTION, POWDER, FOR SOLUTION INTRAVENOUS at 08:21

## 2020-01-05 RX ADMIN — POTASSIUM CHLORIDE 10 MEQ: 7.46 INJECTION, SOLUTION INTRAVENOUS at 11:10

## 2020-01-05 RX ADMIN — POTASSIUM CHLORIDE 10 MEQ: 7.46 INJECTION, SOLUTION INTRAVENOUS at 18:36

## 2020-01-05 RX ADMIN — POTASSIUM CHLORIDE 10 MEQ: 7.46 INJECTION, SOLUTION INTRAVENOUS at 14:20

## 2020-01-05 RX ADMIN — DEXTROSE MONOHYDRATE, SODIUM CHLORIDE, AND POTASSIUM CHLORIDE: 50; 4.5; 1.49 INJECTION, SOLUTION INTRAVENOUS at 21:56

## 2020-01-05 RX ADMIN — DEXTROSE AND SODIUM CHLORIDE: 5; 450 INJECTION, SOLUTION INTRAVENOUS at 09:51

## 2020-01-05 RX ADMIN — MIDAZOLAM 2 MG/HR: 5 INJECTION INTRAMUSCULAR; INTRAVENOUS at 01:47

## 2020-01-05 RX ADMIN — POTASSIUM CHLORIDE 10 MEQ: 7.46 INJECTION, SOLUTION INTRAVENOUS at 09:10

## 2020-01-05 ASSESSMENT — PULMONARY FUNCTION TESTS
PIF_VALUE: 18
PIF_VALUE: 17
PIF_VALUE: 18
PIF_VALUE: 20
PIF_VALUE: 15
PIF_VALUE: 18
PIF_VALUE: 16
PIF_VALUE: 18
PIF_VALUE: 18
PIF_VALUE: 15
PIF_VALUE: 35
PIF_VALUE: 19
PIF_VALUE: 17
PIF_VALUE: 18
PIF_VALUE: 15
PIF_VALUE: 16
PIF_VALUE: 19
PIF_VALUE: 18
PIF_VALUE: 16
PIF_VALUE: 21
PIF_VALUE: 18
PIF_VALUE: 20
PIF_VALUE: 12
PIF_VALUE: 15
PIF_VALUE: 24
PIF_VALUE: 18
PIF_VALUE: 17
PIF_VALUE: 18

## 2020-01-05 ASSESSMENT — PAIN SCALES - GENERAL
PAINLEVEL_OUTOF10: 0

## 2020-01-05 NOTE — PROGRESS NOTES
Patient continues to pull at lines and tubes when restraints are loosened. Restraints remain in place for patient safety.

## 2020-01-05 NOTE — PLAN OF CARE
Problem: Risk for Impaired Skin Integrity  Goal: Tissue integrity - skin and mucous membranes  Description  Structural intactness and normal physiological function of skin and  mucous membranes.   Outcome: Met This Shift     Problem: Falls - Risk of:  Goal: Will remain free from falls  Description  Will remain free from falls  Outcome: Met This Shift     Problem: Restraint Use - Nonviolent/Non-Self-Destructive Behavior:  Goal: Absence of restraint-related injury  Description  Absence of restraint-related injury  1/5/2020 1821 by Jackie Shabazz RN  Outcome: Met This Shift     Problem: Restraint Use - Nonviolent/Non-Self-Destructive Behavior:  Goal: Absence of restraint indications  Description  Absence of restraint indications  1/5/2020 1821 by Jackie Shabazz RN  Outcome: Not Met This Shift

## 2020-01-05 NOTE — PLAN OF CARE
Reaches for ETT with both hands when restraints released for patient care. Continued for patient safety.      Problem: Restraint Use - Nonviolent/Non-Self-Destructive Behavior:  Goal: Absence of restraint-related injury  Description  Absence of restraint-related injury  1/5/2020 0555 by Jenny Shafer RN  Outcome: Met This Shift     Problem: Restraint Use - Nonviolent/Non-Self-Destructive Behavior:  Goal: Absence of restraint indications  Description  Absence of restraint indications  1/5/2020 0555 by Jenny Shafer RN  Outcome: Not Met This Shift

## 2020-01-05 NOTE — H&P
[facility]  Internal Medicine Residency Program  Progress Note - House Team 1    Patient:  Christian Hobbs 51 y.o. male MRN: [id number]     Date of Service: 1/5/2020     CC: Unwitnessed cardiac arrest  Overnight events: Temperature restored to normal, improving mentation    Subjective   Christian Hobbs is a 51 y.o. male on day 1 of hospital admission for unwitnessed cardiac arrest    Patient is currently sedated and intubated. Overnight patient was rewarmed. Patient became awake and agitated due to intubation and was subsequently sedated. Reflexes are now intact. Blood glucose 382 with normal anion gap. Objective     Physical Exam:   · Vitals: BP (!) 98/59   Pulse 77   Temp 100.4 °F (38 °C) (Bladder)   Resp 16   Wt 140 lb 4.8 oz (63.6 kg)   SpO2 100%   BMI 22.65 kg/m²       · Constitutional: Sedated and intubated  · Head: Normocephalic and atraumatic. · Eyes: Pupils pinpoint with minimal response to light  (-) conjunctivitis (-) scleral icterus. Mucus membranes moist.  · Mouth: Intubated   · Neck: (-)  swelling, (-) JVD    · Respiratory: Lungs clear to auscultation bilaterally. (-)  wheezes, (-)  rales, (-)  Rhonchi. · Cardiovascular: RRR. (-)  murmurs, (-) gallops,  (-) rubs. S1 and S2 were normal.   · GI:  Abdomen soft, (-) tenderness, (-)distention. (+) BS. (-)  rebound, (-) guarding, (-) rigidity.     · Extremities:  Warm well perfused (-) clubbing, (-) cyanosis. 2+ distal pulses. (-) peripheral edema.   · Neurologic:  Does not follow commands,  withdrawals from pain     Pertinent Labs & Imaging Studies   chika  CBC:   Lab Results   Component Value Date    WBC 11.4 01/05/2020    RBC 3.50 01/05/2020    HGB 10.9 01/05/2020    HCT 32.9 01/05/2020    MCV 94.0 01/05/2020    MCH 31.1 01/05/2020    MCHC 33.1 01/05/2020    RDW 14.6 01/05/2020     01/05/2020    MPV 8.5 01/05/2020     CMP:    Lab Results   Component Value Date     01/05/2020    K 3.6 01/05/2020    K 5.7 10/18/2019     01/05/2020    CO2 24 01/05/2020    BUN 41 01/05/2020    CREATININE 1.5 01/05/2020    GFRAA >60 01/05/2020    LABGLOM 50 01/05/2020    GLUCOSE 465 01/05/2020    GLUCOSE 83 04/11/2012    PROT 6.0 01/04/2020    LABALBU 3.7 01/04/2020    LABALBU 4.3 12/30/2011    CALCIUM 8.9 01/05/2020    BILITOT 0.3 01/04/2020    ALKPHOS 157 01/04/2020    AST 26 01/04/2020    ALT 30 01/04/2020       Resident's Assessment and Plan     Zeyad Ramos is a 51 y.o. male presented to the ED with unwitnessed cardiac arrest and was admitted for thermia, DKA and cardiac arrest    1. Acute encephalopathy ,s/p cardiac arrest,likely insetting of hypothermia, HHS/DKA, sepsis,drug intoxication,uremia  · Mentation improved requiring sedation due to intubation  · Patient was rewarmed  · CT head wnl  · Continue DKA protocol    · Continue vancomycin and zosyn   2. Cardiac arrest  S/p PEA and intermittent bradycardia,likely related to severe hypothermia  · Trop 0.03           · UDS positive for cocaine ,h/o alcohol abuse/marijuana  · EKG : showed bradycardia with wide QRS /QTC,   · Last Echo 2/11/2017 EF 65%   · Continue levophed  · Follow up repeat troponin, monitor cardiac function, wean pressor as tolerated   3. Severe Hypothermia( <28'c) (resolved)  · Was actively rewarming to normal body temperature  4. HAGMA 2/2 DKA/HHS,LA, uremia, hypothermia  · AG 47 today 16  · Elevated lactic acid 7.1  · Bicarb 11  · Ethanol < 10   · Tylenol and salicylate levels normal   · Serum osmolarity 358, osmolar gap 19  · beta hydroxybutyrate >4.5  · Continue bicarb drip , DKA protocol  5. Acute hypoxic hypercapnic respiratory failure in setting of #2  ·  intubated for airway protection  · Last ABG show metabolic acidosis  · Currently sedated on Precedex and Versed  6. Corrected Hypernatremia   · Initial corrected sodium 156  · Current sodium 147  · TSH normal  · Continue DKA protocol with D5 and 1/2 NS  7. Acute kidney injury 2/2 poor renal perfusion , likely pre renal  · Cr 1.5 ( baseline 0.6-1)  · Follow up urine studies   · Consider Nephrology if not resolved with iv hydration  8. Hyperammoniemia insetting of hypothermia  · Ammonia 162  9. Shock, hypovolemic vs septic shock   · WBC 11.4 today  · Most likely 2/2 cardiac arrest , DKA,suspected sepsis  · UA negative  · CXR negative  · Continue Vanc and zosyn,follow culture  · Wean pressors to Map >65,   · Monitor urine output     PT/OT evaluation: Not indicated at this time   DVT prophylaxis/ GI prophylaxis: Lovenox hold/ Protonix   Disposition: MICU       Matt Yang MD, PGY-1  Attending physician: Dr. Jc Mcnamara

## 2020-01-05 NOTE — ED NOTES
Lab called regarding lab sample, no yellow top drawn. Informed them that the patient is no longer down here and ask them to call the floor and they said okay.      Princess Coleman RN  01/04/20 1924

## 2020-01-05 NOTE — PROGRESS NOTES
[address] [facility]  Department of Internal Medicine   Internal Medicine Residency   MICU Progress Note    Patient:  Christian Hobbs 51 y.o. male  MRN: [id number]     Date of Service: 1/5/2020    Allergy: No known allergies    Subjective     Seen and examined this morning.  24h change:   -Remains on DKA protocol  -Patient was warmed overnight, current temperature 100.4  - Noted to be moving and agitated on the vent overnight, resumed on Precedex and Versed for sedation, currently being weaned  - Remains on 6 mcgs of Levophed  - Repeat ammonia level 38  - Lactic acid this a.m. 2.4  -Creatinine today 1.5, sodium today 153 corrected for hyperglycemia  - Anion gap closed x1-16  -ABG today: 7.499/25.2/239.1/19.2, vent settings adjusted  -Awaiting repeat echo    ROS: Denies Fever/chills/CP/SOB/N/V/D/C/Dysuria/Blood in stool or urine  Objective     VS: BP (!) 98/59   Pulse 81   Temp 100.4 °F (38 °C) (Bladder)   Resp 22   Wt 140 lb 4.8 oz (63.6 kg)   SpO2 100%   BMI 22.65 kg/m²   ABP (Arterial line BP): 98/59  ABP mean (Arterial line mean): 72 mmHg    I & O - 24hr:     Intake/Output Summary (Last 24 hours) at 1/5/2020 0839  Last data filed at 1/5/2020 0800  Gross per 24 hour   Intake 2317 ml   Output 2520 ml   Net -203 ml       Physical Exam:  · General Appearance: Intubated, sedated and mechanically ventilated. Patient currently unable to follow commands due to sedation. · Neck: no adenopathy, no carotid bruit, no JVD, supple, symmetrical, trachea midline and thyroid not enlarged, symmetric, no tenderness/mass/nodules  · Lung: clear to auscultation bilaterally  · Heart: regular rate and rhythm, S1, S2 normal, no murmur, click, rub or gallop  · Abdomen: soft, non-tender; bowel sounds normal; no masses,  no organomegaly  · Extremities:  extremities normal, atraumatic, no cyanosis or edema  · Musculoskeletal: No joint swelling, no muscle tenderness. ROM normal in all joints of extremities.    · Neurologic: Mental status: Sedated, limiting neurologic exam.    Lines     site day    Art line   L Femoral    TLC R Fem    PICC None    Hemoaccess None    Oxygen:     none  Mechanical Ventilation:   Mode: A/C SIMV  PS  low tidal   TV:500 ml RR: 13  PEEP 5cmH2O PS 0  FiO2 50    ABG:     Lab Results   Component Value Date    PH 7.499 01/05/2020    PH 7.208 07/19/2012    PCO2 25.2 01/05/2020    PO2 239.1 01/05/2020    HCO3 19.2 01/05/2020    BE -2.7 01/05/2020    THB 11.7 01/05/2020    S9IYIDSI 88.4 07/19/2012    O2SAT 99.2 01/05/2020        Medications     Infusions: (Fluid, Sedation, Vasopressors)  IVF:    D/5/ NaCl 0.4.5%: heplock/rate 250 ml/h   Vasopressors   Levophed at rate 4 mcg/min  Sedation   Versed at rate of 3mcg/hr; precedex at rate of 1.2mcg/kr/hr    Nutrition:   NPO  ATB:   Antibiotics  Days   vanc x 1 dose    zosyn          Skin issues: Per nursing  Patient currently has   Urinary cath  Isolation  Restraints  DVT prophylaxis/ GI prophylaxis,    PT/OT  SNF/NH placement  Palliative care consult   Labs     CBC with Differential:    Lab Results   Component Value Date    WBC 11.4 01/05/2020    RBC 3.50 01/05/2020    HGB 10.9 01/05/2020    HCT 32.9 01/05/2020     01/05/2020    MCV 94.0 01/05/2020    MCH 31.1 01/05/2020    MCHC 33.1 01/05/2020    RDW 14.6 01/05/2020    SEGSPCT 53 01/25/2014    SEGSPCT 86 10/11/2010    BANDSPCT 3 10/10/2010    METASPCT 0.9 01/04/2020    LYMPHOPCT 11.2 01/04/2020    MONOPCT 5.2 01/04/2020    MYELOPCT 2.6 01/04/2020    EOSPCT 2 10/12/2010    BASOPCT 0.8 01/04/2020    MONOSABS 0.94 01/04/2020    LYMPHSABS 2.06 01/04/2020    EOSABS 0.00 01/04/2020    BASOSABS 0.00 01/04/2020     CMP:    Lab Results   Component Value Date     01/05/2020    K 3.4 01/05/2020    K 5.7 10/18/2019     01/05/2020    CO2 25 01/05/2020    BUN 40 01/05/2020    CREATININE 1.4 01/05/2020    GFRAA >60 01/05/2020    LABGLOM 54 01/05/2020    GLUCOSE 333 01/05/2020    GLUCOSE 83 04/11/2012    PROT 6.0 2020    LABALBU 3.7 2020    LABALBU 4.3 2011    CALCIUM 8.6 2020    BILITOT 0.3 2020    ALKPHOS 157 2020    AST 26 2020    ALT 30 2020     BMP:    Lab Results   Component Value Date     2020    K 3.4 2020    K 5.7 10/18/2019     2020    CO2 25 2020    BUN 40 2020    LABALBU 3.7 2020    LABALBU 4.3 2011    CREATININE 1.4 2020    CALCIUM 8.6 2020    GFRAA >60 2020    LABGLOM 54 2020    GLUCOSE 333 2020    GLUCOSE 83 2012     Last 3 Troponin:    Lab Results   Component Value Date    TROPONINI 0.03 2020    TROPONINI 0.01 2019    TROPONINI 0.03 10/18/2019     ABG:    Lab Results   Component Value Date    PH 7.514 2020    PH 7.208 2012    PCO2 34.4 2020    PO2 153.9 2020    HCO3 27.1 2020    BE 4.2 2020    O2SAT 98.5 2020     HgBA1c:    Lab Results   Component Value Date    LABA1C 10.7 2019       Imaging Studies:  Ct Head Wo Contrast     Result Date: 2020  Patient MRN: [id number] : 1970 Age:  51 years Gender: Male Order Date: 2020 5:00 PM Exam: CT HEAD WO CONTRAST Number of Images: 113 views Indication:   altered altered Comparison: Prior CT from 11/10/2018 is available Technique: Sequential axial CT of the head was obtained from the base of the skull to the vertex without IV contrast.  Radiation Output: CTDIvol 76.6 (mGy); DLP 1434 (mGy-cm) Findings: The study demonstrates the fourth ventricle to be midline. The posterior fossa appears to be normal. There is mild global atrophy. There is no mass, mass effect or midline shift. The ventricles, sulci and cisterns are normal in appearance for patient's age. The gray-white matter differentiation is preserved throughout. There is no acute intracranial hemorrhage. No extra-axial fluid collection is identified. The bony calvarium is intact.  The visualized portion of which requires the highest level of physician preparedness to intervene urgently. I managed/supervised life or organ supporting interventions that required frequent physician assessment. I devoted my full attention to the direct care of this patient for the period of time indicated below. Time I spent with family of surrogate(s) is included only if the patient was incapable of providing the necessary information or participating in medical decision making. Time devoted to teaching and to any procedure. CCT 41 minutes    In addition Alpha Broad was seen and examined. All imaging was independently reviewed. Patient discussed during comprehensive ICU rounds. Medications and testing was reviewed. Above findings corroborated, plan discussed and where needed changes made.

## 2020-01-05 NOTE — PROGRESS NOTES
[facility] Quality Flow/Interdisciplinary Rounds Progress Note        Quality Flow Rounds held on January 5, 2020    Disciplines Attending:  Bedside Nurse, Nursing Unit Leadership; ICU Medical Team.      Finesse Pierre was admitted on 1/4/2020  4:33 PM    Anticipated Discharge Date:  Expected Discharge Date: 01/08/20    Disposition:    Roberto Score:  Roberto Scale Score: 14    Readmission Risk              Risk of Unplanned Readmission:        43           Discussed patient goal for the day, patient clinical progression, and barriers to discharge. The following Goal(s) of the Day/Commitment(s) have been identified:  Wean sedation as tolerated to assess neuro status; continue to monitor labs and treat.       [facility]  January 5, 2020

## 2020-01-05 NOTE — PROGRESS NOTES
Dr. Josué Koroma notified of low urine output over last two hours and that patient was placed back on [facility] [place] mode. No orders received.

## 2020-01-05 NOTE — PLAN OF CARE
Pt w repeated non-purposeful movements and arms rising up to ETT, often times catching femoral lines in his fingers while doing so. Placed in bilateral soft wrist restraints for patient safety.        Problem: Restraint Use - Nonviolent/Non-Self-Destructive Behavior:  Goal: Absence of restraint-related injury  Description  Absence of restraint-related injury  Outcome: Met This Shift     Problem: Restraint Use - Nonviolent/Non-Self-Destructive Behavior:  Goal: Absence of restraint indications  Description  Absence of restraint indications  Outcome: Not Met This Shift

## 2020-01-05 NOTE — CONSULTS
[facility]  Internal Medicine Residency Program  [facility]ult  MICU    Patient:  Tanya Brewer 51 y.o. male MRN: [id number]     Date of Service: 1/4/2020    Hospital Day: 1      Chief complaint: Unresponsive  History of Present Illness   The patient is a 51 y.o. male with past medical history of type 1 diabetes on insulin pump ,hypertension, cocaine abuse, alcohol use , smoker, scented to the ED after he was found unresponsive in his home for unknown period of time. According to the patient mother he was found down,lying on restroom floor, in his home for unknown duration of that time. Family called the EMS, and EMS reported patient in PEA with intermittent bradycardia, he was not responding, found cold. Patient was brought into the ED, and rhythm was PEA . ACLS protocol was started was achieved after 2 rounds of epi. He was subsequently intubated for to MICU for the further management. In the ED his vitals shows temperature of 38.3 °F respiratory rate of 40, heart rate of 58, pressure of 68/52. Initial labs shows metabolic acidosis, lactic acidosis, hyperglycemia, ammonia of 162, normal TSH, screen was positive for cocaine, patient CT of head was negative. ED Course: He was started on DKA protocol, empirical antibiotic was started, patient was started on vasopressor, received 2 L of normal saline in ED. Past Medical History:      Diagnosis Date    Alcoholism ([facility] [address]     Cocaine abuse ([facility] [address]     Diabetes mellitus ([facility] [address]     Hypertension     Idiopathic peripheral neuropathy 9/21/2016    Marijuana abuse        Past Surgical History:    History reviewed. No pertinent surgical history. Medications Prior to Admission:    Prior to Admission medications    Medication Sig Start Date End Date Taking?  Authorizing Provider   albuterol sulfate  (90 Base) MCG/ACT inhaler Inhale 2 puffs into the lungs every 6 hours as needed for Wheezing or Shortness of Breath 12/12/19   Susy Campos Nadir Robles MD   insulin glargine (LANTUS) 100 UNIT/ML injection vial Inject 25 Units into the skin nightly 12/12/19   Gary Rao MD   insulin lispro (HUMALOG) 100 UNIT/ML injection vial Inject into the skin 3 times daily (before meals)    Historical Provider, MD   Insulin Syringe-Needle U-100 28G X 1/2\" 1 ML MISC Use as directed 7/29/19   Tete Pressley MD   Lancets MISC 1 each by Does not apply route 2 times daily 7/29/19   Tete Pressley MD   atorvastatin (LIPITOR) 80 MG tablet Take 1 tablet by mouth nightly 12/28/18   Juliann Aj MD   lisinopril (PRINIVIL;ZESTRIL) 20 MG tablet Take 1 tablet by mouth daily 12/29/18   Juliann Aj MD   digoxin (LANOXIN) 125 MCG tablet Take 125 mcg by mouth daily    Historical Provider, MD   aspirin 81 MG EC tablet Take 1 tablet by mouth daily 11/13/18   Sandip De Santiago MD       Allergies:  No known allergies    Social History:   TOBACCO:   reports that he has been smoking cigarettes. He has a 30.00 pack-year smoking history. He has never used smokeless tobacco.  ETOH:   reports current alcohol use of about 5.0 standard drinks of alcohol per week.       Family History:       Problem Relation Age of Onset    Diabetes type 2  Mother     Cancer Maternal Grandmother     Diabetes type 2  Nephew        REVIEW OF SYSTEMS:    · Unable to obtain    Physical Exam   · Vitals: BP (!) 102/59   Pulse 117   Temp 92.5 °F (33.6 °C) (Bladder)   Resp 30   Wt 137 lb 2 oz (62.2 kg)   SpO2 100%   BMI 22.13 kg/m²   · ABP (Arterial line BP): 109/57  · ABP mean (Arterial line mean): 74 mmHg    · General Appearance: Intubated, cold  · Head: normocephalic and atraumatic  · Eyes: Dilated pupil ,nonresponsive, no corneal reflex   · Pulmonary/Chest: clear to auscultation bilaterally- no wheezes, rales or rhonchi, normal air movement, no respiratory distress  · Cardiovascular: normal rate, normal S1 and S2, no gallops, intact distal pulses and no carotid bruits  · Abdomen: soft, non-tender, non-distended, normal bowel sounds, no masses or organomegaly  · Extremities: no cyanosis, no clubbing and no edema  · Musculoskeletal: Unresponsive,, not able to follow any commands  · Neurologic: no cranial nerve deficit and , no corneal reflex, no pupillary reflex, oculocephalic reflex, no gag reflex , no motor response and sensation. Lines     site day    Art line   L Femoral    TLC R Fem    PICC None    Hemoaccess None    Oxygen:      Mechanical Ventilation:   Mode: A/C SIMV  PS  low tidal   TV: 500 ml RR: 22  PEEP 5 cmH2O PS   [id number]   *  ABG:     Lab Results   Component Value Date    PH 7.097 2020    PH 7.208 2012    PCO2 29.6 2020    PO2 143.6 2020    HCO3 8.9 2020    BE -19.5 2020    THB 12.5 2020    A2ARIYXN 88.4 2012    O2SAT 98.6 2020     Labs and Imaging Studies   Basic Labs  CBC:   Lab Results   Component Value Date    WBC 16.9 2020    RBC 3.73 2020    HGB 11.7 2020    HCT 40.1 2020    .5 2020    RDW 14.4 2020     2020     BMP:    Lab Results   Component Value Date     2020    K 4.50 2020    K 5.7 10/18/2019    CL 99 2020    CO2 10 2020    BUN 45 2020     TSH:    Lab Results   Component Value Date    TSH 3.330 2020     PT/INR:  No results found for: PTINR  HgBA1c:  No components found for: HGBA1C    Imaging Studies:     Ct Head Wo Contrast    Result Date: 2020  Patient MRN: [id number] : 1970 Age:  51 years Gender: Male Order Date: 2020 5:00 PM Exam: CT HEAD WO CONTRAST Number of Images: 113 views Indication:   altered altered Comparison: Prior CT from 11/10/2018 is available Technique: Sequential axial CT of the head was obtained from the base of the skull to the vertex without IV contrast.  Radiation Output: CTDIvol 76.6 (mGy); DLP 1434 (mGy-cm) Findings: The study demonstrates the fourth ventricle to be midline.  The posterior fossa changed if felt dis-concordant with the exam or history. The above findings were corroborated, plans confirmed and changes made if needed. Family was updated at the bedside as available. Key issues of the case were discussed among consultants. Critical Care time is documented if appropriate.       Jeronimo Ricardo DO, MPH  Professor of Medicine

## 2020-01-06 ENCOUNTER — APPOINTMENT (OUTPATIENT)
Dept: GENERAL RADIOLOGY | Age: 50
DRG: 917 | End: 2020-01-06

## 2020-01-06 LAB
AADO2: 91.6 MMHG
ANION GAP SERPL CALCULATED.3IONS-SCNC: 11 MMOL/L (ref 7–16)
ANION GAP SERPL CALCULATED.3IONS-SCNC: 12 MMOL/L (ref 7–16)
ANION GAP SERPL CALCULATED.3IONS-SCNC: 13 MMOL/L (ref 7–16)
ANION GAP SERPL CALCULATED.3IONS-SCNC: 14 MMOL/L (ref 7–16)
ANION GAP SERPL CALCULATED.3IONS-SCNC: 15 MMOL/L (ref 7–16)
ANION GAP SERPL CALCULATED.3IONS-SCNC: 8 MMOL/L (ref 7–16)
B.E.: -1.4 MMOL/L (ref -3–3)
BUN BLDV-MCNC: 39 MG/DL (ref 6–20)
BUN BLDV-MCNC: 40 MG/DL (ref 6–20)
BUN BLDV-MCNC: 41 MG/DL (ref 6–20)
BUN BLDV-MCNC: 41 MG/DL (ref 6–20)
BUN BLDV-MCNC: 44 MG/DL (ref 6–20)
BUN BLDV-MCNC: 46 MG/DL (ref 6–20)
CALCIUM IONIZED: 1.16 MMOL/L (ref 1.15–1.33)
CALCIUM SERPL-MCNC: 7.7 MG/DL (ref 8.6–10.2)
CALCIUM SERPL-MCNC: 7.8 MG/DL (ref 8.6–10.2)
CALCIUM SERPL-MCNC: 7.8 MG/DL (ref 8.6–10.2)
CALCIUM SERPL-MCNC: 7.9 MG/DL (ref 8.6–10.2)
CALCIUM SERPL-MCNC: 8 MG/DL (ref 8.6–10.2)
CALCIUM SERPL-MCNC: 8 MG/DL (ref 8.6–10.2)
CHLORIDE BLD-SCNC: 109 MMOL/L (ref 98–107)
CHLORIDE BLD-SCNC: 111 MMOL/L (ref 98–107)
CHLORIDE BLD-SCNC: 111 MMOL/L (ref 98–107)
CHLORIDE BLD-SCNC: 112 MMOL/L (ref 98–107)
CHLORIDE BLD-SCNC: 113 MMOL/L (ref 98–107)
CHLORIDE BLD-SCNC: 114 MMOL/L (ref 98–107)
CO2: 19 MMOL/L (ref 22–29)
CO2: 20 MMOL/L (ref 22–29)
CO2: 21 MMOL/L (ref 22–29)
CO2: 22 MMOL/L (ref 22–29)
COHB: 0.9 % (ref 0–1.5)
CREAT SERPL-MCNC: 2.3 MG/DL (ref 0.7–1.2)
CREAT SERPL-MCNC: 2.7 MG/DL (ref 0.7–1.2)
CREAT SERPL-MCNC: 2.9 MG/DL (ref 0.7–1.2)
CREAT SERPL-MCNC: 3.2 MG/DL (ref 0.7–1.2)
CREAT SERPL-MCNC: 3.5 MG/DL (ref 0.7–1.2)
CREAT SERPL-MCNC: 3.7 MG/DL (ref 0.7–1.2)
CRITICAL: ABNORMAL
CULTURE, RESPIRATORY: NORMAL
DATE ANALYZED: ABNORMAL
DATE OF COLLECTION: ABNORMAL
EKG ATRIAL RATE: 40 BPM
EKG Q-T INTERVAL: 620 MS
EKG QRS DURATION: 128 MS
EKG QTC CALCULATION (BAZETT): 581 MS
EKG R AXIS: 76 DEGREES
EKG T AXIS: 86 DEGREES
EKG VENTRICULAR RATE: 53 BPM
FIO2: 40 %
GFR AFRICAN AMERICAN: 21
GFR AFRICAN AMERICAN: 23
GFR AFRICAN AMERICAN: 25
GFR AFRICAN AMERICAN: 28
GFR AFRICAN AMERICAN: 31
GFR AFRICAN AMERICAN: 37
GFR NON-AFRICAN AMERICAN: 18 ML/MIN/1.73
GFR NON-AFRICAN AMERICAN: 19 ML/MIN/1.73
GFR NON-AFRICAN AMERICAN: 21 ML/MIN/1.73
GFR NON-AFRICAN AMERICAN: 23 ML/MIN/1.73
GFR NON-AFRICAN AMERICAN: 25 ML/MIN/1.73
GFR NON-AFRICAN AMERICAN: 30 ML/MIN/1.73
GLUCOSE BLD-MCNC: 116 MG/DL (ref 74–99)
GLUCOSE BLD-MCNC: 173 MG/DL (ref 74–99)
GLUCOSE BLD-MCNC: 229 MG/DL (ref 74–99)
GLUCOSE BLD-MCNC: 230 MG/DL (ref 74–99)
GLUCOSE BLD-MCNC: 238 MG/DL (ref 74–99)
GLUCOSE BLD-MCNC: 242 MG/DL (ref 74–99)
HCO3: 21.7 MMOL/L (ref 22–26)
HCT VFR BLD CALC: 31.2 % (ref 37–54)
HEMOGLOBIN: 10.2 G/DL (ref 12.5–16.5)
HHB: 1.1 % (ref 0–5)
LAB: ABNORMAL
LACTIC ACID: 1.6 MMOL/L (ref 0.5–2.2)
LV EF: 38 %
LVEF MODALITY: NORMAL
Lab: ABNORMAL
MAGNESIUM: 1.6 MG/DL (ref 1.6–2.6)
MAGNESIUM: 1.7 MG/DL (ref 1.6–2.6)
MAGNESIUM: 1.8 MG/DL (ref 1.6–2.6)
MAGNESIUM: 1.9 MG/DL (ref 1.6–2.6)
MAGNESIUM: 2 MG/DL (ref 1.6–2.6)
MAGNESIUM: 2 MG/DL (ref 1.6–2.6)
MCH RBC QN AUTO: 31.2 PG (ref 26–35)
MCHC RBC AUTO-ENTMCNC: 32.7 % (ref 32–34.5)
MCV RBC AUTO: 95.4 FL (ref 80–99.9)
METER GLUCOSE: 109 MG/DL (ref 74–99)
METER GLUCOSE: 122 MG/DL (ref 74–99)
METER GLUCOSE: 129 MG/DL (ref 74–99)
METER GLUCOSE: 142 MG/DL (ref 74–99)
METER GLUCOSE: 144 MG/DL (ref 74–99)
METER GLUCOSE: 151 MG/DL (ref 74–99)
METER GLUCOSE: 155 MG/DL (ref 74–99)
METER GLUCOSE: 166 MG/DL (ref 74–99)
METER GLUCOSE: 173 MG/DL (ref 74–99)
METER GLUCOSE: 177 MG/DL (ref 74–99)
METER GLUCOSE: 190 MG/DL (ref 74–99)
METER GLUCOSE: 190 MG/DL (ref 74–99)
METER GLUCOSE: 192 MG/DL (ref 74–99)
METER GLUCOSE: 196 MG/DL (ref 74–99)
METER GLUCOSE: 200 MG/DL (ref 74–99)
METER GLUCOSE: 205 MG/DL (ref 74–99)
METER GLUCOSE: 211 MG/DL (ref 74–99)
METER GLUCOSE: 212 MG/DL (ref 74–99)
METER GLUCOSE: 221 MG/DL (ref 74–99)
METER GLUCOSE: 229 MG/DL (ref 74–99)
METER GLUCOSE: 230 MG/DL (ref 74–99)
METHB: 0.3 % (ref 0–1.5)
MODE: AC
MRSA CULTURE ONLY: NORMAL
O2 CONTENT: 15.5 ML/DL
O2 SATURATION: 98.9 % (ref 92–98.5)
O2HB: 97.7 % (ref 94–97)
OPERATOR ID: ABNORMAL
PATIENT TEMP: 37 C
PCO2: 31.1 MMHG (ref 35–45)
PDW BLD-RTO: 15.8 FL (ref 11.5–15)
PEEP/CPAP: 5 CMH2O
PFO2: 3.7 MMHG/%
PH BLOOD GAS: 7.46 (ref 7.35–7.45)
PHOSPHORUS: 2.7 MG/DL (ref 2.5–4.5)
PHOSPHORUS: 2.8 MG/DL (ref 2.5–4.5)
PHOSPHORUS: 3 MG/DL (ref 2.5–4.5)
PHOSPHORUS: 3.1 MG/DL (ref 2.5–4.5)
PHOSPHORUS: 3.2 MG/DL (ref 2.5–4.5)
PHOSPHORUS: 3.3 MG/DL (ref 2.5–4.5)
PLATELET # BLD: 258 E9/L (ref 130–450)
PMV BLD AUTO: 8.7 FL (ref 7–12)
PO2: 147.8 MMHG (ref 60–100)
POTASSIUM SERPL-SCNC: 4.6 MMOL/L (ref 3.5–5)
POTASSIUM SERPL-SCNC: 4.8 MMOL/L (ref 3.5–5)
POTASSIUM SERPL-SCNC: 5.4 MMOL/L (ref 3.5–5)
POTASSIUM SERPL-SCNC: 5.6 MMOL/L (ref 3.5–5)
RBC # BLD: 3.27 E12/L (ref 3.8–5.8)
RI(T): 0.62
RR MECHANICAL: 12 B/MIN
SMEAR, RESPIRATORY: NORMAL
SODIUM BLD-SCNC: 142 MMOL/L (ref 132–146)
SODIUM BLD-SCNC: 143 MMOL/L (ref 132–146)
SODIUM BLD-SCNC: 144 MMOL/L (ref 132–146)
SODIUM BLD-SCNC: 144 MMOL/L (ref 132–146)
SODIUM BLD-SCNC: 145 MMOL/L (ref 132–146)
SODIUM BLD-SCNC: 147 MMOL/L (ref 132–146)
SOURCE, BLOOD GAS: ABNORMAL
THB: 11.1 G/DL (ref 11.5–16.5)
TIME ANALYZED: 412
URINE CULTURE, ROUTINE: NORMAL
VT MECHANICAL: 500 ML
WBC # BLD: 11.9 E9/L (ref 4.5–11.5)

## 2020-01-06 PROCEDURE — 6370000000 HC RX 637 (ALT 250 FOR IP): Performed by: INTERNAL MEDICINE

## 2020-01-06 PROCEDURE — P9047 ALBUMIN (HUMAN), 25%, 50ML: HCPCS | Performed by: INTERNAL MEDICINE

## 2020-01-06 PROCEDURE — 36556 INSERT NON-TUNNEL CV CATH: CPT | Performed by: SURGERY

## 2020-01-06 PROCEDURE — 2580000003 HC RX 258: Performed by: INTERNAL MEDICINE

## 2020-01-06 PROCEDURE — 71045 X-RAY EXAM CHEST 1 VIEW: CPT

## 2020-01-06 PROCEDURE — 2580000003 HC RX 258: Performed by: EMERGENCY MEDICINE

## 2020-01-06 PROCEDURE — 84100 ASSAY OF PHOSPHORUS: CPT

## 2020-01-06 PROCEDURE — 2500000003 HC RX 250 WO HCPCS: Performed by: INTERNAL MEDICINE

## 2020-01-06 PROCEDURE — 6360000002 HC RX W HCPCS: Performed by: INTERNAL MEDICINE

## 2020-01-06 PROCEDURE — 85027 COMPLETE CBC AUTOMATED: CPT

## 2020-01-06 PROCEDURE — 93010 ELECTROCARDIOGRAM REPORT: CPT | Performed by: INTERNAL MEDICINE

## 2020-01-06 PROCEDURE — 6370000000 HC RX 637 (ALT 250 FOR IP): Performed by: EMERGENCY MEDICINE

## 2020-01-06 PROCEDURE — 86706 HEP B SURFACE ANTIBODY: CPT

## 2020-01-06 PROCEDURE — 02HV33Z INSERTION OF INFUSION DEVICE INTO SUPERIOR VENA CAVA, PERCUTANEOUS APPROACH: ICD-10-PCS | Performed by: STUDENT IN AN ORGANIZED HEALTH CARE EDUCATION/TRAINING PROGRAM

## 2020-01-06 PROCEDURE — 83605 ASSAY OF LACTIC ACID: CPT

## 2020-01-06 PROCEDURE — C9113 INJ PANTOPRAZOLE SODIUM, VIA: HCPCS | Performed by: INTERNAL MEDICINE

## 2020-01-06 PROCEDURE — 80048 BASIC METABOLIC PNL TOTAL CA: CPT

## 2020-01-06 PROCEDURE — 82962 GLUCOSE BLOOD TEST: CPT

## 2020-01-06 PROCEDURE — 6360000002 HC RX W HCPCS: Performed by: EMERGENCY MEDICINE

## 2020-01-06 PROCEDURE — 82805 BLOOD GASES W/O2 SATURATION: CPT

## 2020-01-06 PROCEDURE — 82330 ASSAY OF CALCIUM: CPT

## 2020-01-06 PROCEDURE — 36415 COLL VENOUS BLD VENIPUNCTURE: CPT

## 2020-01-06 PROCEDURE — 83735 ASSAY OF MAGNESIUM: CPT

## 2020-01-06 PROCEDURE — 99252 IP/OBS CONSLTJ NEW/EST SF 35: CPT | Performed by: SURGERY

## 2020-01-06 PROCEDURE — 93306 TTE W/DOPPLER COMPLETE: CPT

## 2020-01-06 PROCEDURE — 80074 ACUTE HEPATITIS PANEL: CPT

## 2020-01-06 PROCEDURE — 94003 VENT MGMT INPAT SUBQ DAY: CPT

## 2020-01-06 PROCEDURE — 5A1D70Z PERFORMANCE OF URINARY FILTRATION, INTERMITTENT, LESS THAN 6 HOURS PER DAY: ICD-10-PCS | Performed by: INTERNAL MEDICINE

## 2020-01-06 PROCEDURE — 37799 UNLISTED PX VASCULAR SURGERY: CPT

## 2020-01-06 PROCEDURE — 2000000000 HC ICU R&B

## 2020-01-06 PROCEDURE — 90935 HEMODIALYSIS ONE EVALUATION: CPT | Performed by: INTERNAL MEDICINE

## 2020-01-06 RX ORDER — MIDODRINE HYDROCHLORIDE 5 MG/1
2.5 TABLET ORAL
Status: DISCONTINUED | OUTPATIENT
Start: 2020-01-06 | End: 2020-01-07

## 2020-01-06 RX ORDER — DEXTROSE AND SODIUM CHLORIDE 5; .45 G/100ML; G/100ML
INJECTION, SOLUTION INTRAVENOUS CONTINUOUS
Status: DISCONTINUED | OUTPATIENT
Start: 2020-01-06 | End: 2020-01-08

## 2020-01-06 RX ORDER — MAGNESIUM SULFATE 1 G/100ML
1 INJECTION INTRAVENOUS ONCE
Status: COMPLETED | OUTPATIENT
Start: 2020-01-06 | End: 2020-01-06

## 2020-01-06 RX ORDER — INSULIN GLARGINE 100 [IU]/ML
25 INJECTION, SOLUTION SUBCUTANEOUS ONCE
Status: COMPLETED | OUTPATIENT
Start: 2020-01-06 | End: 2020-01-06

## 2020-01-06 RX ORDER — MIDAZOLAM HYDROCHLORIDE 1 MG/ML
1 INJECTION INTRAMUSCULAR; INTRAVENOUS EVERY 4 HOURS PRN
Status: DISCONTINUED | OUTPATIENT
Start: 2020-01-06 | End: 2020-01-09

## 2020-01-06 RX ORDER — ALBUMIN (HUMAN) 12.5 G/50ML
25 SOLUTION INTRAVENOUS ONCE
Status: COMPLETED | OUTPATIENT
Start: 2020-01-06 | End: 2020-01-06

## 2020-01-06 RX ORDER — FUROSEMIDE 10 MG/ML
60 INJECTION INTRAMUSCULAR; INTRAVENOUS ONCE
Status: COMPLETED | OUTPATIENT
Start: 2020-01-06 | End: 2020-01-06

## 2020-01-06 RX ORDER — HEPARIN SODIUM 1000 [USP'U]/ML
4500 INJECTION, SOLUTION INTRAVENOUS; SUBCUTANEOUS PRN
Status: DISCONTINUED | OUTPATIENT
Start: 2020-01-06 | End: 2020-01-11 | Stop reason: HOSPADM

## 2020-01-06 RX ADMIN — DEXMEDETOMIDINE 1.4 MCG/KG/HR: 100 INJECTION, SOLUTION, CONCENTRATE INTRAVENOUS at 22:00

## 2020-01-06 RX ADMIN — INSULIN LISPRO 2 UNITS: 100 INJECTION, SOLUTION INTRAVENOUS; SUBCUTANEOUS at 21:12

## 2020-01-06 RX ADMIN — PANTOPRAZOLE SODIUM 40 MG: 40 INJECTION, POWDER, FOR SOLUTION INTRAVENOUS at 08:54

## 2020-01-06 RX ADMIN — MIDODRINE HYDROCHLORIDE 2.5 MG: 5 TABLET ORAL at 12:48

## 2020-01-06 RX ADMIN — SODIUM CHLORIDE, PRESERVATIVE FREE 10 ML: 5 INJECTION INTRAVENOUS at 08:54

## 2020-01-06 RX ADMIN — DEXTROSE MONOHYDRATE, SODIUM CHLORIDE, AND POTASSIUM CHLORIDE: 50; 4.5; 1.49 INJECTION, SOLUTION INTRAVENOUS at 05:02

## 2020-01-06 RX ADMIN — INSULIN GLARGINE 25 UNITS: 100 INJECTION, SOLUTION SUBCUTANEOUS at 17:06

## 2020-01-06 RX ADMIN — MIDODRINE HYDROCHLORIDE 2.5 MG: 5 TABLET ORAL at 17:38

## 2020-01-06 RX ADMIN — ALBUMIN (HUMAN) 25 G: 0.25 INJECTION, SOLUTION INTRAVENOUS at 11:58

## 2020-01-06 RX ADMIN — CHLORHEXIDINE GLUCONATE 0.12% ORAL RINSE 15 ML: 1.2 LIQUID ORAL at 21:30

## 2020-01-06 RX ADMIN — DEXMEDETOMIDINE 1.3 MCG/KG/HR: 100 INJECTION, SOLUTION, CONCENTRATE INTRAVENOUS at 12:02

## 2020-01-06 RX ADMIN — CHLORHEXIDINE GLUCONATE 0.12% ORAL RINSE 15 ML: 1.2 LIQUID ORAL at 08:54

## 2020-01-06 RX ADMIN — FUROSEMIDE 60 MG: 10 INJECTION, SOLUTION INTRAMUSCULAR; INTRAVENOUS at 13:36

## 2020-01-06 RX ADMIN — DEXMEDETOMIDINE 1.1 MCG/KG/HR: 100 INJECTION, SOLUTION, CONCENTRATE INTRAVENOUS at 01:02

## 2020-01-06 RX ADMIN — MAGNESIUM SULFATE 1 G: 1 INJECTION INTRAVENOUS at 13:36

## 2020-01-06 RX ADMIN — POTASSIUM CHLORIDE 10 MEQ: 7.46 INJECTION, SOLUTION INTRAVENOUS at 13:35

## 2020-01-06 RX ADMIN — MIDAZOLAM 1 MG: 1 INJECTION INTRAMUSCULAR; INTRAVENOUS at 15:55

## 2020-01-06 RX ADMIN — DEXTROSE AND SODIUM CHLORIDE: 5; 450 INJECTION, SOLUTION INTRAVENOUS at 09:29

## 2020-01-06 RX ADMIN — PIPERACILLIN AND TAZOBACTAM 3.38 G: 3; .375 INJECTION, POWDER, LYOPHILIZED, FOR SOLUTION INTRAVENOUS at 03:30

## 2020-01-06 RX ADMIN — POTASSIUM CHLORIDE 10 MEQ: 7.46 INJECTION, SOLUTION INTRAVENOUS at 14:38

## 2020-01-06 RX ADMIN — PIPERACILLIN AND TAZOBACTAM 3.38 G: 3; .375 INJECTION, POWDER, LYOPHILIZED, FOR SOLUTION INTRAVENOUS at 19:06

## 2020-01-06 RX ADMIN — Medication 1 MCG/MIN: at 15:47

## 2020-01-06 RX ADMIN — MIDAZOLAM 1 MG: 1 INJECTION INTRAMUSCULAR; INTRAVENOUS at 20:23

## 2020-01-06 RX ADMIN — DEXMEDETOMIDINE 1.3 MCG/HR: 100 INJECTION, SOLUTION, CONCENTRATE INTRAVENOUS at 17:32

## 2020-01-06 RX ADMIN — DEXMEDETOMIDINE 1.1 MCG/KG/HR: 100 INJECTION, SOLUTION, CONCENTRATE INTRAVENOUS at 06:55

## 2020-01-06 RX ADMIN — ENOXAPARIN SODIUM 40 MG: 40 INJECTION SUBCUTANEOUS at 08:54

## 2020-01-06 RX ADMIN — PIPERACILLIN AND TAZOBACTAM 3.38 G: 3; .375 INJECTION, POWDER, LYOPHILIZED, FOR SOLUTION INTRAVENOUS at 10:55

## 2020-01-06 ASSESSMENT — PULMONARY FUNCTION TESTS
PIF_VALUE: 19
PIF_VALUE: 16
PIF_VALUE: 18
PIF_VALUE: 18
PIF_VALUE: 17
PIF_VALUE: 14
PIF_VALUE: 17
PIF_VALUE: 15
PIF_VALUE: 14
PIF_VALUE: 16
PIF_VALUE: 16
PIF_VALUE: 15
PIF_VALUE: 17
PIF_VALUE: 19
PIF_VALUE: 16
PIF_VALUE: 14
PIF_VALUE: 18
PIF_VALUE: 18
PIF_VALUE: 20
PIF_VALUE: 14
PIF_VALUE: 18
PIF_VALUE: 16
PIF_VALUE: 16
PIF_VALUE: 21
PIF_VALUE: 19
PIF_VALUE: 16
PIF_VALUE: 19
PIF_VALUE: 18
PIF_VALUE: 17

## 2020-01-06 ASSESSMENT — PAIN SCALES - GENERAL
PAINLEVEL_OUTOF10: 0

## 2020-01-06 NOTE — PLAN OF CARE
Problem: Risk for Impaired Skin Integrity  Goal: Tissue integrity - skin and mucous membranes  Description  Structural intactness and normal physiological function of skin and  mucous membranes.   Outcome: Met This Shift     Problem: Falls - Risk of:  Goal: Will remain free from falls  Description  Will remain free from falls  Outcome: Met This Shift     Problem: Falls - Risk of:  Goal: Absence of physical injury  Description  Absence of physical injury  Outcome: Met This Shift     Problem: Restraint Use - Nonviolent/Non-Self-Destructive Behavior:  Goal: Absence of restraint-related injury  Description  Absence of restraint-related injury  1/6/2020 1131 by Jordyn Cordova RN  Outcome: Met This Shift     Problem: Restraint Use - Nonviolent/Non-Self-Destructive Behavior:  Goal: Absence of restraint indications  Description  Absence of restraint indications  1/6/2020 1131 by Jordyn Cordova RN  Outcome: Not Met This Shift

## 2020-01-06 NOTE — CONSULTS
Department of Internal Medicine  Nephrology Attending Consult Note      Reason for Consult: Oliguria and worsening renal function  Requesting Physician:  Dr. Brooklyn Booker    CHIEF COMPLAINT: Status post cardiac arrest    History Obtained From:  electronic medical record, reason patient could not give history:  on ventilator    HISTORY OF PRESENT ILLNESS: Briefly Mr. Tha Garcia is a 25-year-old man with history of HTN, type I DM, alcohol abuse, cocaine abuse, who was admitted on January 4, 2020 after he was brought to the ER by EMS services, patient was found in the floor unresponsive he was bradycardic with PEA, he received multiple doses of epinephrine and bicarbonate. In the ER his glucose was 1094 mg/dL, creatinine was 2.3 mg/dL. Since admission he has received large volume resuscitation with about 7.3 L in the last 24 hours and his urine output has decreased only to 502 cc in the last 24 hours, reasons for this consultation. Past Medical History:        Diagnosis Date    [facility]ism ([facility] [address]     Cocaine abuse ([facility] [address]     Diabetes mellitus ([facility] [address]     Hypertension     Idiopathic peripheral neuropathy 9/21/2016    Marijuana abuse      Past Surgical History:    History reviewed. No pertinent surgical history.   Current Medications:    Current Facility-Administered Medications: dextrose 5 % and 0.45 % sodium chloride infusion, , Intravenous, Continuous  dexmedetomidine (PRECEDEX) 400 mcg in sodium chloride 0.9 % 100 mL infusion, 0.2 mcg/kg/hr, Intravenous, Continuous  piperacillin-tazobactam (ZOSYN) 3.375 g in dextrose 5 % 100 mL IVPB extended infusion (mini-bag), 3.375 g, Intravenous, Q8H  norepinephrine (LEVOPHED) 16 mg in dextrose 5% 250 mL infusion, 2 mcg/min, Intravenous, Continuous  perflutren lipid microspheres (DEFINITY) injection 1.65 mg, 1.5 mL, Intravenous, ONCE PRN  enoxaparin (LOVENOX) injection 40 mg, 40 mg, Subcutaneous, Daily  chlorhexidine (PERIDEX) 0.12 % solution 15 mL, 15 mL, Mouth/Throat, BID  dextrose 50 % IV solution, 12.5 g, Intravenous, PRN  magnesium sulfate 1 g in dextrose 5% 100 mL IVPB, 1 g, Intravenous, PRN  sodium phosphate 10 mmol in dextrose 5 % 250 mL IVPB, 10 mmol, Intravenous, PRN **OR** sodium phosphate 15 mmol in dextrose 5 % 250 mL IVPB, 15 mmol, Intravenous, PRN **OR** sodium phosphate 20 mmol in dextrose 5 % 500 mL IVPB, 20 mmol, Intravenous, PRN  insulin regular (HUMULIN R;NOVOLIN R) 100 Units in sodium chloride 0.9 % 100 mL infusion, 0.1 Units/kg/hr, Intravenous, Continuous  glucose (GLUTOSE) 40 % oral gel 15 g, 15 g, Oral, PRN  dextrose 50 % IV solution, 12.5 g, Intravenous, PRN  glucagon (rDNA) injection 1 mg, 1 mg, Intramuscular, PRN  dextrose 5 % solution, 100 mL/hr, Intravenous, PRN  vancomycin (VANCOCIN) 1,250 mg in dextrose 5 % 250 mL IVPB, 20 mg/kg, Intravenous, Once  dextrose 50 % IV solution, 12.5 g, Intravenous, PRN  potassium chloride 10 mEq/100 mL IVPB (Peripheral Line), 10 mEq, Intravenous, PRN  pantoprazole (PROTONIX) injection 40 mg, 40 mg, Intravenous, Daily **AND** sodium chloride (PF) 0.9 % injection 10 mL, 10 mL, Intravenous, Daily  Allergies:  No known allergies    Social History:    TOBACCO:   reports that he has been smoking cigarettes. He has a 30.00 pack-year smoking history. He has never used smokeless tobacco.  ETOH:   reports current alcohol use of about 5.0 standard drinks of alcohol per week.     Family History:       Problem Relation Age of Onset    Diabetes type 2  Mother     Cancer Maternal Grandmother     Diabetes type 2  Nephew      REVIEW OF SYSTEMS:    Review of systems not obtained due to patient factors - intubation  PHYSICAL EXAM:      Vitals:    VITALS:  BP 95/61   Pulse 66   Temp 100.2 °F (37.9 °C) (Bladder)   Resp 14   Wt 149 lb (67.6 kg)   SpO2 100%   BMI 24.05 kg/m²   24HR INTAKE/OUTPUT:    Intake/Output Summary (Last 24 hours) at 1/6/2020 1033  Last data filed at 1/6/2020 1000  Gross per 24 hour   Intake 7300 ml   Output 201 ml   Net 7099 ml       Constitutional: Patient is intubated, sedated  HEENT: Pupils are equal reactive, endotracheal tube in place  Respiratory: Lungs are clear  Cardiovascular/Edema: Heart sounds are regular rate  Gastrointestinal: Abdomen soft  Neurologic: Patient sedated  Skin: No lesions  Other: No edema    DATA:    CBC:   Lab Results   Component Value Date    WBC 11.9 01/06/2020    RBC 3.27 01/06/2020    HGB 10.2 01/06/2020    HCT 31.2 01/06/2020    MCV 95.4 01/06/2020    MCH 31.2 01/06/2020    MCHC 32.7 01/06/2020    RDW 15.8 01/06/2020     01/06/2020    MPV 8.7 01/06/2020     CMP:    Lab Results   Component Value Date     01/06/2020    K 5.4 01/06/2020    K 5.7 10/18/2019     01/06/2020    CO2 22 01/06/2020    BUN 41 01/06/2020    CREATININE 2.9 01/06/2020    GFRAA 28 01/06/2020    LABGLOM 23 01/06/2020    GLUCOSE 238 01/06/2020    GLUCOSE 83 04/11/2012    PROT 6.0 01/04/2020    LABALBU 3.7 01/04/2020    LABALBU 4.3 12/30/2011    CALCIUM 7.8 01/06/2020    BILITOT 0.3 01/04/2020    ALKPHOS 157 01/04/2020    AST 26 01/04/2020    ALT 30 01/04/2020     Magnesium:    Lab Results   Component Value Date    MG 1.9 01/06/2020     Phosphorus:    Lab Results   Component Value Date    PHOS 3.0 01/06/2020     Radiology Review:      Chest x-ray January 6/2020   No interval change                     IMPRESSION/RECOMMENDATIONS:      Briefly Mr. Joanne Cherry is a 56-year-old man with history of HTN, type I DM, alcohol abuse, cocaine abuse, who was admitted on January 4, 2020 after he was brought to the ER by EMS services, patient was found in the floor unresponsive he was bradycardic with PEA, he received multiple doses of epinephrine and bicarbonate. In the ER his glucose was 1094 mg/dL, creatinine was 2.3 mg/dL.   Since admission he has received large volume resuscitation with about 7.3 L in the last 24 hours and his urine output has decreased only to 502 cc in the last 24 hours, reasons for this consultation. 1. KIRSTEN stage III, established ischemic ATN status post cardiac arrest.  2. Status post cardiac arrest  3. Respiratory failure, status post intubation  4. Alkalemia (pH: 7.462), with respiratory alkalosis HAGMA (DKA)  5. DKA, improved on insulin drip  6. Hemodynamic shock, multifactorial, hypovolemic, probably sepsis, rule out cardiogenic    Plan:    · Change IV fluids to D5 1/2 NS at 60 cc/hour  · Albumin 25 g IV x1  · Monitor renal function for recovery  · Review echo results  · May need renal replacement therapy if no renal function recovery soon    Thank you very much Dr. Carson Kendrick for allowing us to participate in the care of Mr. Sherlie Lombard.

## 2020-01-06 NOTE — PLAN OF CARE
Attempts to self extubate when bilateral soft wrist restraints released for patient care. Continued for airway safety.       Problem: Restraint Use - Nonviolent/Non-Self-Destructive Behavior:  Goal: Absence of restraint-related injury  Outcome: Met This Shift     Problem: Restraint Use - Nonviolent/Non-Self-Destructive Behavior:  Goal: Absence of restraint indications  Outcome: Not Met This Shift

## 2020-01-06 NOTE — PROGRESS NOTES
[address]   Department of Internal Medicine   Internal Medicine Residency   MICU Progress Note    Patient:  Laura Barrera 51 y.o. male  MRN: [id number]     Date of Service: 1/6/2020    Allergy: No known allergies    Subjective     Patient seen and examined at bedside this morning, wanting restraints removed, when loosened, attempted to pull ET tube. Will do weaning parameters,      24h change:   -Remains on DKA protocol, will bridge him to SQ insulin. - fever overnight highest 38C  - sedation vacation, pt on PS has apneic episodes. -started midodrine, dcd levophed.   -waiting ECHO result. ROS: Denies Fever/chills/CP/SOB/N/V/D/C/Dysuria/Blood in stool or urine  Objective     VS: /70   Pulse 70   Temp 100 °F (37.8 °C) (Bladder)   Resp 14   Wt 149 lb (67.6 kg)   SpO2 100%   BMI 24.05 kg/m²   ABP (Arterial line BP): 111/70  ABP mean (Arterial line mean): 83 mmHg    I & O - 24hr:     Intake/Output Summary (Last 24 hours) at 1/6/2020 1338  Last data filed at 1/6/2020 1300  Gross per 24 hour   Intake 7300 ml   Output 138 ml   Net 7162 ml       Physical Exam:  · General Appearance: Intubated, sedated and mechanically ventilated. · Neck: no adenopathy, no carotid bruit, no JVD, supple, symmetrical, trachea midline and thyroid not enlarged, symmetric, no tenderness/mass/nodules  · Lung: clear to auscultation bilaterally  · Heart: regular rate and rhythm, S1, S2 normal, no murmur, click, rub or gallop  · Abdomen: soft, non-tender; bowel sounds normal; no masses,  no organomegaly  · Extremities:  extremities normal, atraumatic, no cyanosis or edema  · Musculoskeletal: No joint swelling, no muscle tenderness. ROM normal in all joints of extremities.    · Neurologic: Mental status: Sedated, limiting neurologic exam.    Lines     site day    Art line   L Femoral    TLC R Fem    PICC None    Hemoaccess None    Oxygen:     none  Mechanical Ventilation:   Mode: A/C    TV:500 ml RR: 13  PEEP 5cmH2O PS 0  FiO2 50    ABG:     Lab Results   Component Value Date    PH 7.462 01/06/2020    PH 7.208 07/19/2012    PCO2 31.1 01/06/2020    PO2 147.8 01/06/2020    HCO3 21.7 01/06/2020    BE -1.4 01/06/2020    THB 11.1 01/06/2020    W1UNNXFZ 88.4 07/19/2012    O2SAT 98.9 01/06/2020        Medications     Infusions: (Fluid, Sedation, Vasopressors)  IVF:    D/5/ NaCl 0.4.5%: heplock/rate 250 ml/h   Vasopressors   Stopped levoped  Sedation   Precedex, and versed PRN.      Nutrition:   NPO    ATB:   Antibiotics  Days   vanc x 1 dose dcd    Zosyn dcd 2         Skin issues: Per nursing  Patient currently has   Urinary cath  Isolation  Restraints  DVT prophylaxis/ GI prophylaxis,    PT/OT  SNF/NH placement  Palliative care consult   Labs     CBC with Differential:    Lab Results   Component Value Date    WBC 11.9 01/06/2020    RBC 3.27 01/06/2020    HGB 10.2 01/06/2020    HCT 31.2 01/06/2020     01/06/2020    MCV 95.4 01/06/2020    MCH 31.2 01/06/2020    MCHC 32.7 01/06/2020    RDW 15.8 01/06/2020    SEGSPCT 53 01/25/2014    SEGSPCT 86 10/11/2010    BANDSPCT 3 10/10/2010    METASPCT 0.9 01/04/2020    LYMPHOPCT 11.2 01/04/2020    MONOPCT 5.2 01/04/2020    MYELOPCT 2.6 01/04/2020    EOSPCT 2 10/12/2010    BASOPCT 0.8 01/04/2020    MONOSABS 0.94 01/04/2020    LYMPHSABS 2.06 01/04/2020    EOSABS 0.00 01/04/2020    BASOSABS 0.00 01/04/2020     CMP:    Lab Results   Component Value Date     01/06/2020    K 4.8 01/06/2020    K 5.7 10/18/2019     01/06/2020    CO2 20 01/06/2020    BUN 41 01/06/2020    CREATININE 3.2 01/06/2020    GFRAA 25 01/06/2020    LABGLOM 21 01/06/2020    GLUCOSE 173 01/06/2020    GLUCOSE 83 04/11/2012    PROT 6.0 01/04/2020    LABALBU 3.7 01/04/2020    LABALBU 4.3 12/30/2011    CALCIUM 7.8 01/06/2020    BILITOT 0.3 01/04/2020    ALKPHOS 157 01/04/2020    AST 26 01/04/2020    ALT 30 01/04/2020     BMP:    Lab Results   Component Value Date     01/06/2020    K 4.8 01/06/2020    K 5.7 10/18/2019     2020    CO2 20 2020    BUN 41 2020    LABALBU 3.7 2020    LABALBU 4.3 2011    CREATININE 3.2 2020    CALCIUM 7.8 2020    GFRAA 25 2020    LABGLOM 21 2020    GLUCOSE 173 2020    GLUCOSE 83 2012     Last 3 Troponin:    Lab Results   Component Value Date    TROPONINI 0.03 2020    TROPONINI 0.01 2019    TROPONINI 0.03 10/18/2019     ABG:    Lab Results   Component Value Date    PH 7.462 2020    PH 7.208 2012    PCO2 31.1 2020    PO2 147.8 2020    HCO3 21.7 2020    BE -1.4 2020    O2SAT 98.9 2020     HgBA1c:    Lab Results   Component Value Date    LABA1C 10.7 2019       Imaging Studies:  Ct Head Wo Contrast     Result Date: 2020  Patient MRN: [id number] : 1970 Age:  51 years Gender: Male Order Date: 2020 5:00 PM Exam: CT HEAD WO CONTRAST Number of Images: 241 views Indication:   altered altered Comparison: Prior CT from 11/10/2018 is available Technique: Sequential axial CT of the head was obtained from the base of the skull to the vertex without IV contrast.  Radiation Output: CTDIvol 76.6 (mGy); DLP 1434 (mGy-cm) Findings: The study demonstrates the fourth ventricle to be midline. The posterior fossa appears to be normal. There is mild global atrophy. There is no mass, mass effect or midline shift. The ventricles, sulci and cisterns are normal in appearance for patient's age. The gray-white matter differentiation is preserved throughout. There is no acute intracranial hemorrhage. No extra-axial fluid collection is identified. The bony calvarium is intact. The visualized portion of the paranasal sinuses and the mastoid air cells are clear. There is no focal extracranial soft tissue swelling.  The graft the study is unchanged from prior CT      NO ACUTE INTRACRANIAL PROCESS      Xr Chest Portable     Result Date: 2020  Patient MRN:  [id number] : controlled type 1 diabetes mellitus, last A1c in 10.7  4. Severe Hypothermia( <28'c) - rewarmed. Pt now presenting fever. 5. Acute hypoxic hypercapnic respiratory failure in setting of #2 -now intubated and mechanically ventilated. On Zosyn for empiric coverage. 6. Shock, hypovolemic vs septic shock -discontinued Levophed, and added midodrine until improved BP.   7. HAGMA 2/2 DKA/HHS,LA, uremia, hypothermia, other alcohol toxicity -patient also had an osmolar gap of 14. Resolved. 8. Acute kidney injury 2/2 poor renal perfusion , likely pre renal ATN s/p cardiac arrest -creatinine today 3.2, baseline 0.6  9. Hypernatremia, resolved.      Plan  -Monitor mental status, sedation vacation daily. -Monitor respiratory status, wean ventilation as able, daily weaning parameters.   -Follow echo results. -Monitor blood glucose, will bridge to SQ Lantus. + LDSS. With BG checks q4hr. -Continue zosyn,follow culture sensitivity  - nephrology consulted due to anuric, with possible renal replacement therapy if not responsive to initial tx, will give 60 mg IV Lasix, Albumin IV, monitor renal function and urine output.   - Bmp q 4 x1 after bridge, then q12h. Mag daily, phos daily, cbc daily.   -daily ABG and CXR     PT/OT evaluation: not yet ordered  DVT prophylaxis/ GI prophylaxis: lovenox/Protonix  Disposition: MICU    Maurice Bruner MD, PGY-2    Attending physician: Dr. Misa Pinedo. I personally saw, examined and provided care for the patient. Radiographs, labs and medication list were reviewed by me independently. I spoke with bedside nursing, therapists and consultants. Critical care services and times documented are independent of procedures and multidisciplinary rounds with Residents. Additionally comprehensive, multidisciplinary rounds were conducted with the MICU team. The case was discussed in detail and plans for care were established.  Review of Residents documentation was conducted and revisions were made as appropriate. I agree with the above documented exam, problem list and plan of care. psv trial this am with apneic episodes   Stop sedation monitor  Albumin and lasix combination attempt to diurese if no improvement consult nephrology     Sherri Andrews M.D.    Pulmonary/Critical Care Medicine   36 min cct excluding procedures

## 2020-01-06 NOTE — PROGRESS NOTES
[facility]  Internal Medicine Residency Program  Progress Note - House Team 1    Patient:  Moi Hawkinsr 51 y.o. male MRN: [id number]     Date of Service: 1/6/2020     CC: sedated and intubated    Subjective     Pt sedated and intubated, tried to wean off levo, but dropped BP, so again titrated back up. ECHO being done at the time of exam. AG closed. Objective     Physical Exam:  · Vitals: BP 95/61   Pulse 66   Temp 100.2 °F (37.9 °C) (Bladder)   Resp 23   Wt 149 lb (67.6 kg)   SpO2 100%   BMI 24.05 kg/m²     · I & O - 24hr: No intake/output data recorded. · General Appearance: sedated and intubated  · HEENT:  Head: Normal, normocephalic, atraumatic. · Neck: no adenopathy, no carotid bruit, no JVD and supple, symmetrical, trachea midline  · Lung: clear to auscultation bilaterally  · Heart: regular rate and rhythm, S1, S2 normal, no murmur, click, rub or gallop  · Abdomen: soft, non-tender; bowel sounds normal; no masses,  no organomegaly  · Extremities:  extremities normal, atraumatic, no cyanosis or edema  · Musculokeletal: No joint swelling, no muscle tenderness. ROM normal in all joints of extremities.    · Neurologic: Mental status: sedated  Subject  Pertinent Labs & Imaging Studies   chika  CBC with Differential:    Lab Results   Component Value Date    WBC 11.9 01/06/2020    RBC 3.27 01/06/2020    HGB 10.2 01/06/2020    HCT 31.2 01/06/2020     01/06/2020    MCV 95.4 01/06/2020    MCH 31.2 01/06/2020    MCHC 32.7 01/06/2020    RDW 15.8 01/06/2020    SEGSPCT 53 01/25/2014    SEGSPCT 86 10/11/2010    BANDSPCT 3 10/10/2010    METASPCT 0.9 01/04/2020    LYMPHOPCT 11.2 01/04/2020    MONOPCT 5.2 01/04/2020    MYELOPCT 2.6 01/04/2020    EOSPCT 2 10/12/2010    BASOPCT 0.8 01/04/2020    MONOSABS 0.94 01/04/2020    LYMPHSABS 2.06 01/04/2020    EOSABS 0.00 01/04/2020    BASOSABS 0.00 01/04/2020     BMP:    Lab Results   Component Value Date     01/06/2020    K 4.6 01/06/2020 K 5.7 10/18/2019     01/06/2020    CO2 19 01/06/2020    BUN 39 01/06/2020    LABALBU 3.7 01/04/2020    LABALBU 4.3 12/30/2011    CREATININE 2.7 01/06/2020    CALCIUM 7.9 01/06/2020    GFRAA 31 01/06/2020    LABGLOM 25 01/06/2020    GLUCOSE 116 01/06/2020    GLUCOSE 83 04/11/2012       Resident's Assessment and Plan     Christian Hobbs is a 51 y.o. male presented to the ED with unwitnessed cardiac arrest and was admitted for thermia, DKA and cardiac arrest     1. Acute encephalopathy ,s/p cardiac arrest,likely insetting of hypothermia, HHS/DKA, sepsis,drug intoxication,uremia  · Mentation improved requiring sedation due to intubation  · Patient was rewarmed  · CT head wnl  · Continue DKA protocol    · Continue vancomycin and zosyn   · Possible bridging today as per MICU team    2. Cardiac arrest  S/p PEA and intermittent bradycardia,likely related to severe hypothermia  · Trop 0.03           · UDS positive for cocaine ,h/o alcohol abuse/marijuana  · EKG : showed bradycardia with wide QRS /QTC,   · Last Echo 2/11/2017 EF 65%   · ECHO results pending today  · Continue levophed  · Follow up repeat troponin, monitor cardiac function, wean pressor as tolerated     3.Severe Hypothermia( <28'c) (resolved)  · Was actively rewarming to normal body temperature  4. HAGMA 2/2 DKA/HHS,LA, uremia, hypothermia  · AG 47 today 15  · Elevated lactic acid resolved  · Bicarb 11  · Ethanol < 10   · Tylenol and salicylate levels normal   · Serum osmolarity 358, osmolar gap 15  · beta hydroxybutyrate >4.5  · Continue  DKA protocol    5. Acute hypoxic hypercapnic respiratory failure in setting of #2  ·  intubated for airway protection  · Last ABG show metabolic acidosis  · Currently sedated on Precedex and Versed    6. Corrected Hypernatremia   · Initial corrected sodium 156  · Current sodium 147  · TSH normal  · Continue DKA protocol with D5 and 1/2 NS    7. Acute kidney injury 2/2 poor renal perfusion , likely pre renal  · Cr 1.5 ( baseline 0.6-1)  · Follow up urine studies   · Creatinine increasing 2.7, with only 500 ml urine output  · Nephrology following, recommendation to CRRT if no renal function improved  8. Hyperammoniemia insetting of hypothermia  · Ammonia 162  · Ammonia level last one 38  9. Shock, hypovolemic vs septic shock   · WBC 11.9 today  · Most likely 2/2 cardiac arrest , DKA,suspected sepsis  · UA negative  · CXR negative  · Continue Vanc and zosyn,follow culture  · Wean pressors to Map >65,   · Monitor urine output      PT/OT evaluation: Not indicated at this time   DVT prophylaxis/ GI prophylaxis: Lovenox hold/ Derral Yesseniat, M.D.   Internal Medicine Resident - PGY 1    Attending physician: Dr. Prabhakar Zuniga

## 2020-01-06 NOTE — CONSULTS
Vascular Surgery Consultation Note    Reason for Consult:  Placement of temporary HD catheter     HPI:    This is a 51 y.o. male who is admitted to the hospital for treatment of cardiac arrest at home, found down for unknown amount of time, hypothermic, in DKA. He has since been re-warmed and has been oligoanuric. He is to be initiated on hemodialysis. He had a right femoral triple lumen and a left femoral arterial line. He has not been on hemodialysis before, he has not had prior central venous catheters. Vascular surgery is consulted for evaluation and treatment of placement of temporary hemodialysis catheter. ROS: Negative if blank [], Positive if [x]  General Vascular   [] Fevers [] Claudication (Blocks)   [] Chills [] Rest Pain   [] Weight Loss [] Tissue Loss   [] Chest Pain [] Clotting Disorder   [] SOB at rest [] Leg Swelling   [] SOB with exertion [] DVT/PE      [] Nausea    [] Vomiting [] Stroke/TIA   [] Abdominal Pain [] Focal weakness   [] Melena [] Slurred Speech   [] Hematochezia [] Vision Changes   [] Hematuria    [] Dysuria [x] Hx of Central Catheters   [] Wears Glasses/Contacts [] Dialysis and If so date initiated   [] Blindness    [x] Right Hand Dominant   [] Difficulty swallowing        Past Medical History:   Diagnosis Date    [facility]ism [facility]     Cocaine abuse ([facility] [facility]     Diabetes mellitus ([facility] [address] [facility]     Hypertension     Idiopathic peripheral neuropathy 9/21/2016    Marijuana abuse         History reviewed. No pertinent surgical history.     Current Medications:    dextrose 5 % and 0.45 % NaCl 60 mL/hr at 01/06/20 0929    norepinephrine      dexmedetomidine (PRECEDEX) IV infusion 1.3 mcg/kg/hr (01/06/20 1202)    insulin 1.3 Units/hr (01/06/20 1700)    dextrose        midazolam, perflutren lipid microspheres, dextrose, magnesium sulfate, sodium phosphate IVPB **OR** sodium phosphate IVPB **OR** sodium phosphate IVPB, glucose, dextrose, glucagon (rDNA), dextrose, dextrose, potassium chloride    midodrine  2.5 mg Oral TID WC    insulin lispro  0-6 Units Subcutaneous Q4H    piperacillin-tazobactam  3.375 g Intravenous Q8H    enoxaparin  40 mg Subcutaneous Daily    chlorhexidine  15 mL Mouth/Throat BID    pantoprazole  40 mg Intravenous Daily    And    sodium chloride (PF)  10 mL Intravenous Daily        Allergies:  No known allergies    Social History     Socioeconomic History    Marital status: Single     Spouse name: Not on file    Number of children: Not on file    Years of education: Not on file    Highest education level: Not on file   Occupational History    Not on file   Social Needs    Financial resource strain: Not on file    Food insecurity:     Worry: Not on file     Inability: Not on file    Transportation needs:     Medical: Not on file     Non-medical: Not on file   Tobacco Use    Smoking status: Current Every Day Smoker     Packs/day: 2.00     Years: 15.00     Pack years: 30.00     Types: Cigarettes    Smokeless tobacco: Never Used   Substance and Sexual Activity    Alcohol use:  Yes     Alcohol/week: 5.0 standard drinks     Types: 5 Cans of beer per week    Drug use: Yes     Types: Marijuana     Comment: smokes week a couple times a day per pt    Sexual activity: Not on file   Lifestyle    Physical activity:     Days per week: Not on file     Minutes per session: Not on file    Stress: Not on file   Relationships    Social connections:     Talks on phone: Not on file     Gets together: Not on file     Attends [employer] service: Not on file     Active member of club or organization: Not on file     Attends meetings of clubs or organizations: Not on file     Relationship status: Not on file    Intimate partner violence:     Fear of current or ex partner: Not on file     Emotionally abused: Not on file     Physically abused: Not on file     Forced sexual activity: Not on file   Other Topics Concern    Not on file   Social History Narrative    Not on file Family History   Problem Relation Age of Onset    Diabetes type 2  Mother     Cancer Maternal Grandmother     Diabetes type 2  Nephew        PHYSICAL EXAM:    /83   Pulse 65   Temp 100.2 °F (37.9 °C) (Bladder)   Resp 16   Wt 149 lb (67.6 kg)   SpO2 100%   BMI 24.05 kg/m²   CONSTITUTIONAL:  Awake, alert on vent  NEURO:  Normal  EYES:  lids and lashes normal, sclera clear and conjunctiva normal  ENT:  normocepalic, without obvious abnormality, external ears without lesions  NECK:  supple, symmetrical, trachea midline, no jugular venous distension, no carotid bruits  LUNGS:  Intubated on vent   CARDIOVASCULAR:  regular rate and rhythm  ABDOMEN:  soft, non-distended, non-tender, Aorta is not palpable  SKIN:  no bruising or bleeding    EXTREMITIES:    R UE Swelling absent Incisions absent       3/5 Strength    L UE Swelling absent Incisions absent       3/5 Strength    R LE Edema absent     Incisions absent    Varicose veins absent    Wounds absent- right femoral triple lumen catheter    normalcaprefill   3/5 Strength   Neuropathy is unable to assess    L LE Edema absent     Incisions absent    Varicose veins absent    Wounds absent- left femoral arterial line   normalcaprefill   3/5 Strength   Neuropathy is unable to assess    R brachial 2+ L brachial 2+   R radial 2+ L radial 2+   R femoral 2+ L femoral 2+       LABS:    Lab Results   Component Value Date    WBC 11.9 (H) 2020    HGB 10.2 (L) 2020    HCT 31.2 (L) 2020     2020    PROTIME 11.5 2020    INR 1.0 2020    K 4.8 2020    BUN 41 (H) 2020    CREATININE 3.2 (H) 2020       RADIOLOGY:  Ct Head Wo Contrast    Result Date: 2020  Patient MRN: [id number] : 1970 Age:  51 years Gender: Male Order Date: 2020 5:00 PM Exam: CT HEAD WO CONTRAST Number of Images: 113 views Indication:   altered altered Comparison: Prior CT from 11/10/2018 is available Technique: Sequential axial CT of the head was obtained from the base of the skull to the vertex without IV contrast.  Radiation Output: CTDIvol 76.6 (mGy); DLP 1434 (mGy-cm) Findings: The study demonstrates the fourth ventricle to be midline. The posterior fossa appears to be normal. There is mild global atrophy. There is no mass, mass effect or midline shift. The ventricles, sulci and cisterns are normal in appearance for patient's age. The gray-white matter differentiation is preserved throughout. There is no acute intracranial hemorrhage. No extra-axial fluid collection is identified. The bony calvarium is intact. The visualized portion of the paranasal sinuses and the mastoid air cells are clear. There is no focal extracranial soft tissue swelling. The graft the study is unchanged from prior CT     NO ACUTE INTRACRANIAL PROCESS     Xr Chest Portable    Result Date: 2020  Patient MRN: [id number] : 1970 Age:  51 years Gender: Male Order Date: 2020 6:00 AM Exam: XR CHEST PORTABLE Number of Images: 1 view Indication:   intubated intubated Comparison: XR CHEST PORTABLE 2020 Findings: There is an endotracheal tube with the tip about 4 cm above the jesus. There is an enteric tube with the tip overlying the fundus the stomach. Stable cardiomegaly. The lung fields are unremarkable. The aorta is unremarkable. No significant change from the previous exam.     Xr Chest Portable    Result Date: 2020  Patient MRN:  [id number] : 1970 Age: 51 years Gender: Male Order Date:  2020 5:00 PM EXAM: XR CHEST PORTABLE COMPARISON: 2019 INDICATION:  altered mental status altered mental status FINDINGS: Endotracheal tube is present with distal tip approximately 4.2 cm above the jesus. Nasogastric tube courses below the level of the diaphragm. Side hole appears to be just above gastroesophageal junction. This tube can be advanced approximately 3 or 4 cm. No airspace opacity or pleural effusion. The heart is normal in size. No pneumothorax. 1. Endotracheal tube is 4.2 cm above the jesus. 2. Nasogastric tube courses below the level of the diaphragm. Side hole is just above level of gastroesophageal junction. This tube can be advanced approximately 3 or 4 cm.         Assesment/Plan  51 y.o. male with KIRSTEN, admitted with cardiac arrest, DKA with DM1, hypothermia    Placement of left femoral temporary HD catheter  Consent given by wife over the phone  Ok to use catheter after placement  D/w Dr. Tania Trivedi, DO  1/6/20  5:11 PM    Pt seen and examined  L femoral cath in place - no hematoma  Will place tunn hd cath if needed    Arminda Pineda

## 2020-01-06 NOTE — PROGRESS NOTES
[address] [facility]  Internal Medicine Residency / [address]    Attending Physician Statement  I have discussed the case, including pertinent history and exam findings with the resident and the team.  I have seen and examined the patient and the key elements of the encounter have been performed by me. I agree with the assessment, plan and orders as documented by the resident. Intubated and sedated  And on Levophed  VS stable  H&L clear  S/P Cardiac arrest and hypothermia  DKA resolved  Plan:Continue same ICU care    Remainder of medical problems as per resident note.       Bertram Browne  Internal Medicine Residency Faculty

## 2020-01-06 NOTE — PLAN OF CARE
Attempts to self extubate when restraints released for patient care. Continued for safety.      Problem: Restraint Use - Nonviolent/Non-Self-Destructive Behavior:  Goal: Absence of restraint-related injury  Outcome: Met This Shift     Problem: Restraint Use - Nonviolent/Non-Self-Destructive Behavior:  Goal: Absence of restraint indications  Outcome: Not Met This Shift

## 2020-01-06 NOTE — PROCEDURES
Cori Solis is a 51 y.o. male patient. 1. Cardiac arrest ([facility]    2. Acute respiratory failure with hypoxia (HCC)    3. Diabetic ketoacidosis with coma associated with type 1 diabetes mellitus ([facility]    4. Hypothermia, initial encounter    5. Shock [facility])      Past Medical History:   Diagnosis Date    [facility]     Cocaine abuse ([facility]     Diabetes mellitus ([facility]     Hypertension     Idiopathic peripheral neuropathy 9/21/2016    Marijuana abuse      Blood pressure 106/83, pulse 65, temperature 100.2 °F (37.9 °C), temperature source Bladder, resp. rate 16, weight 149 lb (67.6 kg), SpO2 100 %. Central Line  Date/Time: 1/6/2020 5:10 PM  Performed by: Adolfo Giles DO  Authorized by: Babs Lira MD   Consent: Written consent obtained. Risks and benefits: risks, benefits and alternatives were discussed  Consent given by: spouse  Patient understanding: patient states understanding of the procedure being performed  Patient consent: the patient's understanding of the procedure matches consent given  Procedure consent: procedure consent matches procedure scheduled  Relevant documents: relevant documents present and verified  Test results: test results available and properly labeled  Site marked: the operative site was marked  Imaging studies: imaging studies available  Required items: required blood products, implants, devices, and special equipment available  Patient identity confirmed: arm band  Time out: Immediately prior to procedure a \"time out\" was called to verify the correct patient, procedure, equipment, support staff and site/side marked as required. Indications: HD access.   Anesthesia: local infiltration    Anesthesia:  Local Anesthetic: lidocaine 1% without epinephrine  Preparation: skin prepped with ChloraPrep  Skin prep agent dried: skin prep agent completely dried prior to procedure  Sterile barriers: all five maximum sterile barriers used - cap, mask, sterile gown, sterile gloves, and large sterile sheet  Hand hygiene: hand hygiene performed prior to central venous catheter insertion  Location details: left femoral  Site selection rationale: right femoral triple lumen catheter   Patient position: flat  Catheter type: double lumen  Catheter size: 14 Fr  Pre-procedure: landmarks identified  Ultrasound guidance: yes  Sterile ultrasound techniques: sterile gel and sterile probe covers were used  Number of attempts: 1  Successful placement: yes  Post-procedure: line sutured and dressing applied  Assessment: blood return through all ports and free fluid flow  Patient tolerance: Patient tolerated the procedure well with no immediate complications          Jose M Napier DO  1/6/2020    Rigoberto Ricardo

## 2020-01-07 ENCOUNTER — APPOINTMENT (OUTPATIENT)
Dept: GENERAL RADIOLOGY | Age: 50
DRG: 917 | End: 2020-01-07

## 2020-01-07 LAB
AADO2: 118.6 MMHG
ANION GAP SERPL CALCULATED.3IONS-SCNC: 13 MMOL/L (ref 7–16)
ANION GAP SERPL CALCULATED.3IONS-SCNC: 15 MMOL/L (ref 7–16)
ANION GAP SERPL CALCULATED.3IONS-SCNC: 16 MMOL/L (ref 7–16)
B.E.: 0.3 MMOL/L (ref -3–3)
BUN BLDV-MCNC: 24 MG/DL (ref 6–20)
BUN BLDV-MCNC: 27 MG/DL (ref 6–20)
BUN BLDV-MCNC: 31 MG/DL (ref 6–20)
CALCIUM SERPL-MCNC: 7.7 MG/DL (ref 8.6–10.2)
CALCIUM SERPL-MCNC: 7.8 MG/DL (ref 8.6–10.2)
CALCIUM SERPL-MCNC: 7.8 MG/DL (ref 8.6–10.2)
CHLORIDE BLD-SCNC: 104 MMOL/L (ref 98–107)
CHLORIDE BLD-SCNC: 104 MMOL/L (ref 98–107)
CHLORIDE BLD-SCNC: 105 MMOL/L (ref 98–107)
CO2: 20 MMOL/L (ref 22–29)
CO2: 22 MMOL/L (ref 22–29)
CO2: 22 MMOL/L (ref 22–29)
COHB: 0.9 % (ref 0–1.5)
CREAT SERPL-MCNC: 2.6 MG/DL (ref 0.7–1.2)
CREAT SERPL-MCNC: 2.7 MG/DL (ref 0.7–1.2)
CREAT SERPL-MCNC: 2.9 MG/DL (ref 0.7–1.2)
CRITICAL: ABNORMAL
DATE ANALYZED: ABNORMAL
DATE OF COLLECTION: ABNORMAL
EKG ATRIAL RATE: 62 BPM
EKG P AXIS: 38 DEGREES
EKG P-R INTERVAL: 124 MS
EKG Q-T INTERVAL: 492 MS
EKG QRS DURATION: 100 MS
EKG QTC CALCULATION (BAZETT): 499 MS
EKG R AXIS: 49 DEGREES
EKG T AXIS: 40 DEGREES
EKG VENTRICULAR RATE: 62 BPM
FIO2: 40 %
GFR AFRICAN AMERICAN: 28
GFR AFRICAN AMERICAN: 31
GFR AFRICAN AMERICAN: 32
GFR NON-AFRICAN AMERICAN: 23 ML/MIN/1.73
GFR NON-AFRICAN AMERICAN: 25 ML/MIN/1.73
GFR NON-AFRICAN AMERICAN: 26 ML/MIN/1.73
GLUCOSE BLD-MCNC: 172 MG/DL (ref 74–99)
GLUCOSE BLD-MCNC: 197 MG/DL (ref 74–99)
GLUCOSE BLD-MCNC: 200 MG/DL (ref 74–99)
HAV IGM SER IA-ACNC: NORMAL
HBV SURFACE AB TITR SER: NORMAL {TITER}
HCO3: 23.6 MMOL/L (ref 22–26)
HCT VFR BLD CALC: 31.3 % (ref 37–54)
HEMOGLOBIN: 10 G/DL (ref 12.5–16.5)
HEPATITIS B CORE IGM ANTIBODY: NORMAL
HEPATITIS B SURFACE ANTIGEN INTERPRETATION: NORMAL
HEPATITIS C ANTIBODY INTERPRETATION: NORMAL
HHB: 1.6 % (ref 0–5)
LAB: ABNORMAL
Lab: ABNORMAL
MAGNESIUM: 1.8 MG/DL (ref 1.6–2.6)
MAGNESIUM: 1.8 MG/DL (ref 1.6–2.6)
MCH RBC QN AUTO: 31 PG (ref 26–35)
MCHC RBC AUTO-ENTMCNC: 31.9 % (ref 32–34.5)
MCV RBC AUTO: 96.9 FL (ref 80–99.9)
METER GLUCOSE: 107 MG/DL (ref 74–99)
METER GLUCOSE: 111 MG/DL (ref 74–99)
METER GLUCOSE: 161 MG/DL (ref 74–99)
METER GLUCOSE: 168 MG/DL (ref 74–99)
METER GLUCOSE: 179 MG/DL (ref 74–99)
METER GLUCOSE: 197 MG/DL (ref 74–99)
METHB: 0.2 % (ref 0–1.5)
MODE: AC
O2 CONTENT: 15.4 ML/DL
O2 SATURATION: 98.4 % (ref 92–98.5)
O2HB: 97.3 % (ref 94–97)
OPERATOR ID: ABNORMAL
PATIENT TEMP: 37 C
PCO2: 33.2 MMHG (ref 35–45)
PDW BLD-RTO: 16 FL (ref 11.5–15)
PEEP/CPAP: 5 CMH2O
PFO2: 2.96 MMHG/%
PH BLOOD GAS: 7.47 (ref 7.35–7.45)
PHOSPHORUS: 2.2 MG/DL (ref 2.5–4.5)
PHOSPHORUS: 3.3 MG/DL (ref 2.5–4.5)
PLATELET # BLD: 123 E9/L (ref 130–450)
PMV BLD AUTO: 8.7 FL (ref 7–12)
PO2: 118.4 MMHG (ref 60–100)
POTASSIUM SERPL-SCNC: 3.9 MMOL/L (ref 3.5–5)
POTASSIUM SERPL-SCNC: 4.2 MMOL/L (ref 3.5–5)
POTASSIUM SERPL-SCNC: 4.2 MMOL/L (ref 3.5–5)
RBC # BLD: 3.23 E12/L (ref 3.8–5.8)
RI(T): 1
RR MECHANICAL: 12 B/MIN
SODIUM BLD-SCNC: 140 MMOL/L (ref 132–146)
SODIUM BLD-SCNC: 140 MMOL/L (ref 132–146)
SODIUM BLD-SCNC: 141 MMOL/L (ref 132–146)
SOURCE, BLOOD GAS: ABNORMAL
THB: 11.1 G/DL (ref 11.5–16.5)
TIME ANALYZED: 503
URINE CULTURE, ROUTINE: NORMAL
VT MECHANICAL: 500 ML
WBC # BLD: 10.8 E9/L (ref 4.5–11.5)

## 2020-01-07 PROCEDURE — 94003 VENT MGMT INPAT SUBQ DAY: CPT

## 2020-01-07 PROCEDURE — 6370000000 HC RX 637 (ALT 250 FOR IP): Performed by: EMERGENCY MEDICINE

## 2020-01-07 PROCEDURE — 2000000000 HC ICU R&B

## 2020-01-07 PROCEDURE — 90935 HEMODIALYSIS ONE EVALUATION: CPT | Performed by: INTERNAL MEDICINE

## 2020-01-07 PROCEDURE — 6360000002 HC RX W HCPCS: Performed by: INTERNAL MEDICINE

## 2020-01-07 PROCEDURE — 6370000000 HC RX 637 (ALT 250 FOR IP): Performed by: INTERNAL MEDICINE

## 2020-01-07 PROCEDURE — 99254 IP/OBS CNSLTJ NEW/EST MOD 60: CPT | Performed by: INTERNAL MEDICINE

## 2020-01-07 PROCEDURE — 2580000003 HC RX 258: Performed by: INTERNAL MEDICINE

## 2020-01-07 PROCEDURE — 93010 ELECTROCARDIOGRAM REPORT: CPT | Performed by: INTERNAL MEDICINE

## 2020-01-07 PROCEDURE — 2580000003 HC RX 258: Performed by: EMERGENCY MEDICINE

## 2020-01-07 PROCEDURE — C9113 INJ PANTOPRAZOLE SODIUM, VIA: HCPCS | Performed by: INTERNAL MEDICINE

## 2020-01-07 PROCEDURE — 2700000000 HC OXYGEN THERAPY PER DAY

## 2020-01-07 PROCEDURE — 37799 UNLISTED PX VASCULAR SURGERY: CPT

## 2020-01-07 PROCEDURE — 2500000003 HC RX 250 WO HCPCS: Performed by: INTERNAL MEDICINE

## 2020-01-07 PROCEDURE — 83735 ASSAY OF MAGNESIUM: CPT

## 2020-01-07 PROCEDURE — P9047 ALBUMIN (HUMAN), 25%, 50ML: HCPCS | Performed by: INTERNAL MEDICINE

## 2020-01-07 PROCEDURE — 85027 COMPLETE CBC AUTOMATED: CPT

## 2020-01-07 PROCEDURE — 93005 ELECTROCARDIOGRAM TRACING: CPT | Performed by: INTERNAL MEDICINE

## 2020-01-07 PROCEDURE — 82962 GLUCOSE BLOOD TEST: CPT

## 2020-01-07 PROCEDURE — 82805 BLOOD GASES W/O2 SATURATION: CPT

## 2020-01-07 PROCEDURE — APPSS60 APP SPLIT SHARED TIME 46-60 MINUTES: Performed by: NURSE PRACTITIONER

## 2020-01-07 PROCEDURE — 80048 BASIC METABOLIC PNL TOTAL CA: CPT

## 2020-01-07 PROCEDURE — 99231 SBSQ HOSP IP/OBS SF/LOW 25: CPT | Performed by: INTERNAL MEDICINE

## 2020-01-07 PROCEDURE — 84100 ASSAY OF PHOSPHORUS: CPT

## 2020-01-07 PROCEDURE — 71045 X-RAY EXAM CHEST 1 VIEW: CPT

## 2020-01-07 PROCEDURE — 6360000002 HC RX W HCPCS: Performed by: EMERGENCY MEDICINE

## 2020-01-07 PROCEDURE — 36415 COLL VENOUS BLD VENIPUNCTURE: CPT

## 2020-01-07 RX ORDER — DIPHENHYDRAMINE HCL 25 MG
25 TABLET ORAL EVERY 6 HOURS PRN
Status: DISCONTINUED | OUTPATIENT
Start: 2020-01-07 | End: 2020-01-08

## 2020-01-07 RX ORDER — ALBUMIN (HUMAN) 12.5 G/50ML
25 SOLUTION INTRAVENOUS ONCE
Status: COMPLETED | OUTPATIENT
Start: 2020-01-07 | End: 2020-01-07

## 2020-01-07 RX ORDER — MAGNESIUM SULFATE 1 G/100ML
1 INJECTION INTRAVENOUS ONCE
Status: COMPLETED | OUTPATIENT
Start: 2020-01-07 | End: 2020-01-07

## 2020-01-07 RX ORDER — MIDODRINE HYDROCHLORIDE 5 MG/1
5 TABLET ORAL
Status: DISCONTINUED | OUTPATIENT
Start: 2020-01-07 | End: 2020-01-09

## 2020-01-07 RX ORDER — INSULIN GLARGINE 100 [IU]/ML
20 INJECTION, SOLUTION SUBCUTANEOUS NIGHTLY
Status: DISCONTINUED | OUTPATIENT
Start: 2020-01-07 | End: 2020-01-08

## 2020-01-07 RX ADMIN — DEXMEDETOMIDINE 0.8 MCG/KG/HR: 100 INJECTION, SOLUTION, CONCENTRATE INTRAVENOUS at 15:19

## 2020-01-07 RX ADMIN — DEXMEDETOMIDINE 1.4 MCG/KG/HR: 100 INJECTION, SOLUTION, CONCENTRATE INTRAVENOUS at 02:29

## 2020-01-07 RX ADMIN — PIPERACILLIN AND TAZOBACTAM 3.38 G: 3; .375 INJECTION, POWDER, LYOPHILIZED, FOR SOLUTION INTRAVENOUS at 03:08

## 2020-01-07 RX ADMIN — INSULIN LISPRO 1 UNITS: 100 INJECTION, SOLUTION INTRAVENOUS; SUBCUTANEOUS at 17:51

## 2020-01-07 RX ADMIN — CHLORHEXIDINE GLUCONATE 0.12% ORAL RINSE 15 ML: 1.2 LIQUID ORAL at 10:02

## 2020-01-07 RX ADMIN — MAGNESIUM SULFATE 1 G: 1 INJECTION INTRAVENOUS at 10:02

## 2020-01-07 RX ADMIN — SODIUM CHLORIDE, PRESERVATIVE FREE 10 ML: 5 INJECTION INTRAVENOUS at 10:03

## 2020-01-07 RX ADMIN — AMPICILLIN SODIUM AND SULBACTAM SODIUM 3 G: 2; 1 INJECTION, POWDER, FOR SOLUTION INTRAMUSCULAR; INTRAVENOUS at 13:13

## 2020-01-07 RX ADMIN — Medication 50 MCG/HR: at 03:38

## 2020-01-07 RX ADMIN — INSULIN LISPRO 1 UNITS: 100 INJECTION, SOLUTION INTRAVENOUS; SUBCUTANEOUS at 05:08

## 2020-01-07 RX ADMIN — MIDODRINE HYDROCHLORIDE 5 MG: 5 TABLET ORAL at 13:13

## 2020-01-07 RX ADMIN — MIDODRINE HYDROCHLORIDE 2.5 MG: 5 TABLET ORAL at 08:07

## 2020-01-07 RX ADMIN — MIDODRINE HYDROCHLORIDE 5 MG: 5 TABLET ORAL at 17:53

## 2020-01-07 RX ADMIN — DEXTROSE AND SODIUM CHLORIDE: 5; 450 INJECTION, SOLUTION INTRAVENOUS at 13:34

## 2020-01-07 RX ADMIN — DIPHENHYDRAMINE HCL 25 MG: 25 TABLET ORAL at 20:39

## 2020-01-07 RX ADMIN — MIDAZOLAM 1 MG: 1 INJECTION INTRAMUSCULAR; INTRAVENOUS at 04:39

## 2020-01-07 RX ADMIN — ALBUMIN (HUMAN) 25 G: 0.25 INJECTION, SOLUTION INTRAVENOUS at 09:19

## 2020-01-07 RX ADMIN — DEXMEDETOMIDINE 1.4 MCG/KG/HR: 100 INJECTION, SOLUTION, CONCENTRATE INTRAVENOUS at 07:02

## 2020-01-07 RX ADMIN — PANTOPRAZOLE SODIUM 40 MG: 40 INJECTION, POWDER, FOR SOLUTION INTRAVENOUS at 10:04

## 2020-01-07 RX ADMIN — INSULIN LISPRO 1 UNITS: 100 INJECTION, SOLUTION INTRAVENOUS; SUBCUTANEOUS at 00:52

## 2020-01-07 RX ADMIN — MIDAZOLAM 1 MG: 1 INJECTION INTRAMUSCULAR; INTRAVENOUS at 00:25

## 2020-01-07 ASSESSMENT — PAIN SCALES - GENERAL
PAINLEVEL_OUTOF10: 0

## 2020-01-07 ASSESSMENT — PULMONARY FUNCTION TESTS
PIF_VALUE: 16
PIF_VALUE: 20
PIF_VALUE: 18
PIF_VALUE: 17
PIF_VALUE: 19
PIF_VALUE: 19
PIF_VALUE: 18
PIF_VALUE: 18
PIF_VALUE: 16
PIF_VALUE: 14
PIF_VALUE: 15
PIF_VALUE: 18
PIF_VALUE: 20
PIF_VALUE: 20
PIF_VALUE: 17
PIF_VALUE: 20
PIF_VALUE: 20
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
## 2020-01-07 NOTE — PROGRESS NOTES
Attempts to self extubate when bilateral soft wrist restraints released for patient care. Restraints continued for safety.

## 2020-01-07 NOTE — CARE COORDINATION
SW spoke with Pt about Discharge Plan. Pt is from home alone with 10 steps to enter. Pt was independent prior to admission. NO DME at home. PCP: Dr. Jodelle Saint. Pharmacy: [facility]. SW and Pt discussed choices for discharge. Pt will take [facility] and [address] lists to see which would be better for him. Dependneing on how therapy goes, Pt would prefer to return home with [address] but would go to rehab if reccommended. SW gave Pt a list of [facility] and [facility]. SW/CM to follow.

## 2020-01-07 NOTE — PLAN OF CARE
Attempts to self extubate with both hands when bilateral soft wrist restraints released. Continued for airway safety.        Problem: Restraint Use - Nonviolent/Non-Self-Destructive Behavior:  Goal: Absence of restraint indications  Outcome: Not Met This Shift     Problem: Restraint Use - Nonviolent/Non-Self-Destructive Behavior:  Goal: Absence of restraint-related injury  Outcome: Met This Shift

## 2020-01-07 NOTE — FLOWSHEET NOTE
01/06/20 2349   Vital Signs   /85   Temp 99.3 °F (37.4 °C)   Pulse 66   Resp 12   Height 5' 6\" (1.676 m)   Weight 149 lb 0.5 oz (67.6 kg)   Weight Method Bed scale   Percent Weight Change 0   Post-Hemodialysis Assessment   Post-Treatment Procedures Blood returned;Catheter capped, clamped and heparinized x 2 ports   Machine Disinfection Process Acid/Vinegar Clean;Bleach; Exterior Machine Disinfection   Rinseback Volume (ml) 250 ml   Total Liters Processed (l/min) 41.7 l/min   Dialyzer Clearance Lightly streaked   Duration of Treatment (minutes) 180 minutes   Hemodialysis Intake (ml) 250 ml   Hemodialysis Output (ml) 500 ml   NET Removed (ml) 0 ml   Tolerated Treatment Good   Patient Response to Treatment Dialysis tolerated well.  report toCaitlin, no fluid removal   Bilateral Breath Sounds Clear   Edema None   Edema Generalized None
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
## 2020-01-07 NOTE — PLAN OF CARE
Makes repeated attempts to self extubate, pull central line and HD cath, and kick staff. Bilateral soft wrist and ankle restraints continued for patient and staff safety.      Problem: Restraint Use - Nonviolent/Non-Self-Destructive Behavior:  Goal: Absence of restraint-related injury  Outcome: Met This Shift     Problem: Restraint Use - Nonviolent/Non-Self-Destructive Behavior:  Goal: Absence of restraint indications  Outcome: Not Met This Shift

## 2020-01-07 NOTE — PLAN OF CARE
[facility] Quality Flow/Interdisciplinary Rounds Progress Note        Quality Flow Rounds held on January 7, 2020    Disciplines Attending:   Dr. Quirino Ibarra, ICU team, pharmacist, bedside nurse, charge nurse. Dung Aden was admitted on 1/4/2020  4:33 PM    Anticipated Discharge Date:  Expected Discharge Date: 01/08/20    Disposition: ICU    Roberto Score:  Roberto Scale Score: 14    Readmission Risk              Risk of Unplanned Readmission:        49           Discussed patient goal for the day, patient clinical progression, and barriers to discharge. The following Goal(s) of the Day/Commitment(s) have been identified: Consult cardiology. Extubate.     Martha Saravia  January 7, 2020

## 2020-01-07 NOTE — PROGRESS NOTES
Department of Internal Medicine  Nephrology Attending Consult Note    Events reviewed. Had HD last night. SUBJECTIVE: We are following MrMinisterio Cherry for KIRSTEN. Remains intubated.      PHYSICAL EXAM:      Vitals:    VITALS:  /85   Pulse 62   Temp 99 °F (37.2 °C)   Resp 10   Ht 5' 6\" (1.676 m)   Wt 154 lb 1.6 oz (69.9 kg)   SpO2 100%   BMI 24.87 kg/m²   24HR INTAKE/OUTPUT:      Intake/Output Summary (Last 24 hours) at 1/7/2020 4998  Last data filed at 1/7/2020 0700  Gross per 24 hour   Intake 3009 ml   Output 831 ml   Net 2178 ml       Access: Left femoral vein temporary dialysis catheter in place   Constitutional: Patient is intubated, sedated  HEENT: Pupils are equal reactive, endotracheal tube in place  Respiratory: Lungs are clear  Cardiovascular/Edema: Heart sounds are regular rate  Gastrointestinal: Abdomen soft  Neurologic: Patient sedated  Skin: No lesions  Other: No edema    Scheduled Meds:   magnesium sulfate  1 g Intravenous Once    midodrine  5 mg Oral TID WC    insulin lispro  0-6 Units Subcutaneous Q4H    piperacillin-tazobactam  3.375 g Intravenous Q8H    chlorhexidine  15 mL Mouth/Throat BID    pantoprazole  40 mg Intravenous Daily    And    sodium chloride (PF)  10 mL Intravenous Daily     Continuous Infusions:   fentaNYL 5 mcg/ml in 0.9%  ml infusion 50 mcg/hr (01/07/20 0338)    [Held by provider] dextrose 5 % and 0.45 % NaCl Stopped (01/07/20 0030)    norepinephrine Stopped (01/06/20 1750)    dexmedetomidine (PRECEDEX) IV infusion 0.8 mcg/kg/hr (01/07/20 0816)    dextrose       PRN Meds:.midazolam, heparin (porcine), perflutren lipid microspheres, magnesium sulfate, sodium phosphate IVPB **OR** sodium phosphate IVPB **OR** sodium phosphate IVPB, glucose, dextrose, glucagon (rDNA), dextrose, potassium chloride    DATA:    CBC:   Lab Results   Component Value Date    WBC 10.8 01/07/2020    RBC 3.23 01/07/2020    HGB 10.0 01/07/2020    HCT 31.3 01/07/2020    MCV 96.9 01/07/2020    MCH 31.0 01/07/2020    MCHC 31.9 01/07/2020    RDW 16.0 01/07/2020     01/07/2020    MPV 8.7 01/07/2020     CMP:    Lab Results   Component Value Date     01/07/2020    K 4.2 01/07/2020    K 5.7 10/18/2019     01/07/2020    CO2 22 01/07/2020    BUN 31 01/07/2020    CREATININE 2.9 01/07/2020    GFRAA 28 01/07/2020    LABGLOM 23 01/07/2020    GLUCOSE 172 01/07/2020    GLUCOSE 83 04/11/2012    PROT 6.0 01/04/2020    LABALBU 3.7 01/04/2020    LABALBU 4.3 12/30/2011    CALCIUM 7.8 01/07/2020    BILITOT 0.3 01/04/2020    ALKPHOS 157 01/04/2020    AST 26 01/04/2020    ALT 30 01/04/2020     Magnesium:    Lab Results   Component Value Date    MG 1.8 01/07/2020     Phosphorus:    Lab Results   Component Value Date    PHOS 3.3 01/07/2020     Radiology Review:      Chest x-ray January 6/2020   No interval change                     BRIEF SUMMARY OF INITIAL CONSULT:    Briefly Mr. Kadie Strong is a 18-year-old man with history of HTN, type I DM, alcohol abuse, cocaine abuse, who was admitted on January 4, 2020 after he was brought to the ER by EMS services, patient was found in the floor unresponsive he was bradycardic with PEA, he received multiple doses of epinephrine and bicarbonate. In the ER his glucose was 1094 mg/dL, creatinine was 2.3 mg/dL. Since admission he has received large volume resuscitation with about 7.3 L in the last 24 hours and his urine output has decreased only to 502 cc in the last 24 hours, reasons for this consultation. IMPRESSION/RECOMMENDATIONS:      1. KIRSTEN stage III, established ischemic ATN status post cardiac arrest.  Started on renal replacement therapy on January 6, 2020. HD today x3 hours. 2. Status post cardiac arrest  3. Respiratory failure, status post intubation  4. Alkalemia (pH: 7.469), with respiratory alkalosis HAGMA (DKA) and metabolic alkalosis (HD)    5. DKA, improved on insulin drip  6.  Hemodynamic shock, multifactorial, hypovolemic, probably sepsis, rule out cardiogenic    Plan:    · HD x 3 hours today.     · Continue D5 1/2 NS at 60 cc/hour  · Continue to monitor renal function for recovery

## 2020-01-07 NOTE — CONSULTS
Inpatient Cardiology Consultation      Reason for Consult:  S/p Cardiac arrest     Consulting Physician: Dr. Demian Snow    Requesting Physician:  Dr. Alberto Gaytan    Date of Consultation: 1/7/2020    HISTORY OF PRESENT ILLNESS:   Mr. Wilfredo Garcia is a 51year old male who is known to Dr. Adrian Obrien; seen in outpatient for palpitations (with no prior documentation of atrial fibrillation/flutter). Stress test and echocardiogram were ordered due to risk for ischemic as well of structural heart disease -- (not completed). He was ordered a 48 hour Holter monitor showed sinus with intermittent episodes of sinus arrhythmia. No follow-up since that time. PMH: DM (with multiple admissions for DKA) Type I, HTN, Polysubstance abuse (cocaine/EtOH), HLD and medical noncompliance. Patient presented to the ED on 1/4/2020 as a cardiopulmonary arrest.  Patient lives with his mom and was last seen well by his mother at approximately 5 PM on 1/3/2020. Approximately, 14 hours later patient's mom went to check on him and found him lying on the bathroom floor unresponsive in an area of the house that did not have heat. EMS was summoned. Per EMS records patient had intermittent bradycardia and PEA. CPR was initiated. Per records EMS gave multiple doses of epinephrine, atropine and bicarbonate. On arrival to the ED patient had fixed and dilated pupils. Pulses were lost and ACLS protocol was initiated. Again multiple doses of epinephrine carb bicarbonate and calcium were given. Patient was intubated. He was noted to be hypotensive and Severely hypothermic. He was initiated on DKA protocol. Urine tox positive for cocaine. ER vital signs: Temperature 78.3, RR 24, heart rate 58, blood pressure 60/52. Initial bicarb 11, creatinine 2.3, BUN 47, lactic acid 7.1, glucose 1094, , troponin 0.03, ammonia 162. TSH-normal.  Negative ethanol. Hemoglobin 12, WBC 18.7. Urinalysis positive for ketones, small bilirubin and glucose.   ABGs 1/6/2020 (Dr. Lei Congress): Mod MR, EF 35-40%, NWMA. 15. Admitted with DKA 2/11/2019  16. Admitted with DKA 4/12/2019  17. Admitted with DKA, KIRSTEN 7/26/2019  18. Admitted with DKA, UTI, positive cocaine UDS    Medications Prior to admit:  Prior to Admission medications    Medication Sig Start Date End Date Taking?  Authorizing Provider   albuterol sulfate  (90 Base) MCG/ACT inhaler Inhale 2 puffs into the lungs every 6 hours as needed for Wheezing or Shortness of Breath 12/12/19   Ayaz Vences MD   insulin glargine (LANTUS) 100 UNIT/ML injection vial Inject 25 Units into the skin nightly 12/12/19   Ayaz Vences MD   insulin lispro (HUMALOG) 100 UNIT/ML injection vial Inject into the skin 3 times daily (before meals)    Historical Provider, MD   Insulin Syringe-Needle U-100 28G X 1/2\" 1 ML MISC Use as directed 7/29/19   Shila Parry MD   Lancets MISC 1 each by Does not apply route 2 times daily 7/29/19   Shila Parry MD   atorvastatin (LIPITOR) 80 MG tablet Take 1 tablet by mouth nightly 12/28/18   Matt Berumen MD   lisinopril (PRINIVIL;ZESTRIL) 20 MG tablet Take 1 tablet by mouth daily 12/29/18   Matt Berumen MD   digoxin (LANOXIN) 125 MCG tablet Take 125 mcg by mouth daily    Historical Provider, MD   aspirin 81 MG EC tablet Take 1 tablet by mouth daily 11/13/18   Maria Eugenia Young MD       Current Medications:    Current Facility-Administered Medications: midodrine (PROAMATINE) tablet 5 mg, 5 mg, Oral, TID WC  ampicillin-sulbactam (UNASYN) 3 g ivpb minibag, 3 g, Intravenous, Q12H  insulin glargine (LANTUS) injection vial 20 Units, 20 Units, Subcutaneous, Nightly  dextrose 5 % and 0.45 % sodium chloride infusion, , Intravenous, Continuous  midazolam (VERSED) injection 1 mg, 1 mg, Intravenous, Q4H PRN  insulin lispro (HUMALOG) injection vial 0-6 Units, 0-6 Units, Subcutaneous, Q4H  heparin (porcine) injection 4,500 Units, 4,500 Units, Intercatheter, PRN  dexmedetomidine (PRECEDEX) 400 mcg in sodium chloride 0.9 % 100 mL infusion, 0.2 mcg/kg/hr, Intravenous, Continuous  perflutren lipid microspheres (DEFINITY) injection 1.65 mg, 1.5 mL, Intravenous, ONCE PRN  chlorhexidine (PERIDEX) 0.12 % solution 15 mL, 15 mL, Mouth/Throat, BID  glucose (GLUTOSE) 40 % oral gel 15 g, 15 g, Oral, PRN  dextrose 50 % IV solution, 12.5 g, Intravenous, PRN  glucagon (rDNA) injection 1 mg, 1 mg, Intramuscular, PRN  dextrose 5 % solution, 100 mL/hr, Intravenous, PRN  pantoprazole (PROTONIX) injection 40 mg, 40 mg, Intravenous, Daily **AND** sodium chloride (PF) 0.9 % injection 10 mL, 10 mL, Intravenous, Daily    Allergies:  No known allergies    Social History: These note the following information was obtained from the patient's medical record due to the patient is intubated and no family is at the bedside. Current smoker  History of marijuana use  Current cocaine use (positive on urine tox screen 1/4/2020)  Current alcohol use  He lives with his mother    Family History: Unable to fully assess due to the patient is intubated and no family at the bedside. Family History   Problem Relation Age of Onset    Diabetes type 2  Mother     Cancer Maternal Grandmother     Diabetes type 2  Nephew        REVIEW OF SYSTEMS:   Please note a full review of systems was unable to be obtained due to the patient is intubated. · Constitutional: Denies fatigue, fevers, chills or night sweat  · Cardiovascular: Denies chest pain, pressure or palpitations. No lower extremity swelling. · Respiratory: Denies GUTIERREZ, cough, orthopnea or PND. No hemoptysis       PHYSICAL EXAM:   BP (!) 93/59   Pulse 66   Temp 98.8 °F (37.1 °C)   Resp 18   Ht 5' 6\" (1.676 m)   Wt 156 lb 4.9 oz (70.9 kg)   SpO2 100%   BMI 25.23 kg/m²   CONST:  Well developed, well nourished middle-aged  male who appears of stated age. Awake, alert and is able to nod to yes and no actions. Appears to be in no apparent distress.    HEENT:   Head- Normocephalic, atraumatic Eyes- Conjunctivae pink, anicteric  Throat- Oral mucosa pink and moist. +OG. Orally intubated. Neck-  No stridor, trachea midline, no jugular venous distention. No carotid bruit. CHEST: Chest symmetrical and non-tender to palpation. No accessory muscle use or intercostal retractions  RESPIRATORY: Lung sounds -scattered rhonchi. CARDIOVASCULAR:     Heart Inspection- shows no noted pulsations  Heart Palpation- no heaves or thrills; PMI is non-displaced   Heart Ausculation- Regular rate and rhythm, no murmur. No s3, s4 or rub   PV: No lower extremity edema. No varicosities. Pedal pulses palpable, no clubbing or cyanosis. Four-point restraints  ABDOMEN: Soft, non-tender to light palpation. Bowel sounds present. No palpable masses no organomegaly; no abdominal bruit  MS: Good muscle strength and tone. No atrophy or abnormal movements. : Estrada to gravity draining clear yellow urine  SKIN: Warm and dry no statis dermatitis or ulcers   NEURO / PSYCH: Unable to assess due to the patient is intubated. He is awake and alert and is able to respond to yes and no questions by nodding.     DATA:    Tele strips: Sinus rhythm  EKG: Per Dr. Lydia Rodrigez below  Diagnostic:      Intake/Output Summary (Last 24 hours) at 1/7/2020 1206  Last data filed at 1/7/2020 0700  Gross per 24 hour   Intake 3009 ml   Output 805 ml   Net 2204 ml       Labs:   CBC:   Recent Labs     01/06/20  0405 01/07/20  0455   WBC 11.9* 10.8   HGB 10.2* 10.0*   HCT 31.2* 31.3*    123*     BMP:   Recent Labs     01/07/20  0005 01/07/20  0455    141   K 3.9 4.2   CO2 20* 22   BUN 27* 31*   CREATININE 2.7* 2.9*   LABGLOM 25 23   CALCIUM 7.8* 7.8*     Mag:   Recent Labs     01/07/20  0005 01/07/20  0455   MG 1.8 1.8     Phos:   Recent Labs     01/07/20  0005 01/07/20  0455   PHOS 2.2* 3.3     TSH:   Recent Labs     01/04/20  1707   TSH 3.330     HgA1c:   Lab Results   Component Value Date    LABA1C 10.7 (H) 12/08/2019     No results found for: EAG  proBNP: No results for input(s): PROBNP in the last 72 hours. PT/INR:   Recent Labs     01/04/20  1707 01/04/20 2015   PROTIME 11.9 11.5   INR 1.1 1.0     APTT:  Recent Labs     01/04/20  1707 01/04/20 2015   APTT 51.8* 35.0     CARDIAC ENZYMES:  Recent Labs     01/04/20 1707 01/04/20 2015   CKTOTAL 141 607*   TROPONINI 0.03  --      FASTING LIPID PANEL:  Lab Results   Component Value Date    CHOL 144 11/11/2018    HDL 65 11/11/2018    LDLCALC 64 11/11/2018    TRIG 73 11/11/2018     LIVER PROFILE:  Recent Labs     01/04/20 1707   AST 26   ALT 30   LABALBU 3.7       Chest x-ray: No acute process and unchanged 1/7/2020    Chest x-ray: 1/6/2020 no acute cardiopulmonary process.     CT head without contrast: 1/4/2020: No acute intracranial process    Assessment/plan as per Dr. Marlin Vieira to follow  Electronically signed by CHAYO Worthington CNP on 1/7/20 at 1:34 PM

## 2020-01-07 NOTE — PROGRESS NOTES
[facility] Residency Program  Progress Note - House Team 1    Patient:  Luisana Blackburn 51 y.o. male MRN: [id number]     Date of Service: 1/7/2020     CC: sedated and intubated    Subjective     Pt sedated and intubated. Follows commands. Weaning trials ongoing. Off pressors. Objective     Physical Exam:  · Vitals: BP (!) 93/59   Pulse 63   Temp 98.8 °F (37.1 °C)   Resp 18   Ht 5' 6\" (1.676 m)   Wt 156 lb 4.9 oz (70.9 kg)   SpO2 100%   BMI 25.23 kg/m²     · I & O - 24hr: No intake/output data recorded. · General Appearance: sedated and intubated  · HEENT:  Head: Normal, normocephalic, atraumatic. · Neck: no adenopathy, no carotid bruit, no JVD and supple, symmetrical, trachea midline  · Lung: clear to auscultation bilaterally  · Heart: regular rate and rhythm, S1, S2 normal, no murmur, click, rub or gallop  · Abdomen: soft, non-tender; bowel sounds normal; no masses,  no organomegaly  · Extremities:  extremities normal, atraumatic, no cyanosis or edema  · Musculokeletal: No joint swelling, no muscle tenderness. ROM normal in all joints of extremities.    · Neurologic: Mental status: follows commands  Subject  Pertinent Labs & Imaging Studies   chika  CBC with Differential:    Lab Results   Component Value Date    WBC 10.8 01/07/2020    RBC 3.23 01/07/2020    HGB 10.0 01/07/2020    HCT 31.3 01/07/2020     01/07/2020    MCV 96.9 01/07/2020    MCH 31.0 01/07/2020    MCHC 31.9 01/07/2020    RDW 16.0 01/07/2020    SEGSPCT 53 01/25/2014    SEGSPCT 86 10/11/2010    BANDSPCT 3 10/10/2010    METASPCT 0.9 01/04/2020    LYMPHOPCT 11.2 01/04/2020    MONOPCT 5.2 01/04/2020    MYELOPCT 2.6 01/04/2020    EOSPCT 2 10/12/2010    BASOPCT 0.8 01/04/2020    MONOSABS 0.94 01/04/2020    LYMPHSABS 2.06 01/04/2020    EOSABS 0.00 01/04/2020    BASOSABS 0.00 01/04/2020     BMP:    Lab Results   Component Value Date     01/07/2020    K 4.2 01/07/2020    K 5.7 10/18/2019     01/07/2020    CO2 22 01/07/2020    BUN 31 01/07/2020    LABALBU 3.7 01/04/2020    LABALBU 4.3 12/30/2011    CREATININE 2.9 01/07/2020    CALCIUM 7.8 01/07/2020    GFRAA 28 01/07/2020    LABGLOM 23 01/07/2020    GLUCOSE 172 01/07/2020    GLUCOSE 83 04/11/2012       Resident's Assessment and Plan     Kashif Almanzar is a 51 y.o. male presented to the ED with unwitnessed cardiac arrest and was admitted for thermia, DKA and cardiac arrest     1. Acute encephalopathy ,s/p cardiac arrest,likely insetting of hypothermia, HHS/DKA, sepsis,drug intoxication,uremia--improving  · Mentation improved requiring sedation due to intubation  · CT head wnl    2. Cardiac arrest  S/p PEA and intermittent bradycardia,likely related to severe hypothermia        · UDS positive for cocaine ,h/o alcohol abuse/marijuana  · EKG : showed bradycardia with wide QRS /QTC,   · Last Echo 2/11/2017 EF 65%   · ECHO results pending    3. Severe Hypothermia( <28'c) (resolved)  · Was actively rewarming to normal body temperature    4. HAGMA 2/2 DKA/HHS,LA, uremia, hypothermia--resolved    5. Acute hypoxic hypercapnic respiratory failure in setting of #2  ·  intubated for airway protection  · Daily weaning trials  · Zosyn per icu team; continue until final cultures are back    6. Hypernatremia-resolved    7. Acute kidney injury 2/2 poor renal perfusion , likely pre renal now on HD  · Cr baseline 0.6-1  · Nephrology following     PT/OT evaluation: Ordered  DVT prophylaxis/ GI prophylaxis: Lovenox on hold-will discuss with icu team to resume/ Gudelia Huang M.D.   Internal Medicine Resident - PGY 3    Attending physician: Dr. Luan Montano
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
## 2020-01-07 NOTE — PLAN OF CARE
Problem: Risk for Impaired Skin Integrity  Goal: Tissue integrity - skin and mucous membranes  Description  Structural intactness and normal physiological function of skin and  mucous membranes.   1/7/2020 1608 by Freddy Pool RN  Outcome: Met This Shift  1/7/2020 0929 by Freddy Pool RN  Outcome: Met This Shift     Problem: Falls - Risk of:  Goal: Will remain free from falls  Description  Will remain free from falls  1/7/2020 1608 by Freddy Pool RN  Outcome: Met This Shift  1/7/2020 0929 by Freddy Pool RN  Outcome: Met This Shift  Goal: Absence of physical injury  Description  Absence of physical injury  1/7/2020 1608 by Freddy Pool RN  Outcome: Met This Shift  1/7/2020 0929 by Freddy Pool RN  Outcome: Met This Shift     Problem: Restraint Use - Nonviolent/Non-Self-Destructive Behavior:  Goal: Absence of restraint indications  Description  Absence of restraint indications  1/7/2020 1608 by Freddy Pool RN  Outcome: Met This Shift  1/7/2020 0929 by Freddy Pool RN  Outcome: Not Met This Shift  1/7/2020 0606 by Jenny Shafer RN  Outcome: Not Met This Shift  Goal: Absence of restraint-related injury  Description  Absence of restraint-related injury  1/7/2020 1608 by Freddy Pool RN  Outcome: Met This Shift  1/7/2020 0929 by Freddy Pool RN  Outcome: Met This Shift  1/7/2020 0606 by Jenny Shafer RN  Outcome: Met This Shift

## 2020-01-07 NOTE — PLAN OF CARE
Problem: Risk for Impaired Skin Integrity  Goal: Tissue integrity - skin and mucous membranes  Description  Structural intactness and normal physiological function of skin and  mucous membranes.   Outcome: Met This Shift     Problem: Falls - Risk of:  Goal: Will remain free from falls  Description  Will remain free from falls  Outcome: Met This Shift  Goal: Absence of physical injury  Description  Absence of physical injury  Outcome: Met This Shift     Problem: Restraint Use - Nonviolent/Non-Self-Destructive Behavior:  Goal: Absence of restraint-related injury  Description  Absence of restraint-related injury  1/7/2020 0929 by Ashkan Almaraz RN  Outcome: Met This Shift  1/7/2020 0606 by Sarah Zavala RN  Outcome: Met This Shift  1/6/2020 2222 by Sarah Zavala RN  Outcome: Met This Shift     Problem: Restraint Use - Nonviolent/Non-Self-Destructive Behavior:  Goal: Absence of restraint indications  Description  Absence of restraint indications  1/7/2020 0929 by Ashkan Almaraz RN  Outcome: Not Met This Shift  1/7/2020 0606 by Sarah Zavala RN  Outcome: Not Met This Shift  1/6/2020 2222 by Sarah Zavala RN  Outcome: Not Met This Shift

## 2020-01-08 ENCOUNTER — APPOINTMENT (OUTPATIENT)
Dept: GENERAL RADIOLOGY | Age: 50
DRG: 917 | End: 2020-01-08

## 2020-01-08 PROBLEM — E43 SEVERE PROTEIN-CALORIE MALNUTRITION (HCC): Chronic | Status: ACTIVE | Noted: 2020-01-08

## 2020-01-08 LAB
ANION GAP SERPL CALCULATED.3IONS-SCNC: 12 MMOL/L (ref 7–16)
ANION GAP SERPL CALCULATED.3IONS-SCNC: 13 MMOL/L (ref 7–16)
BUN BLDV-MCNC: 18 MG/DL (ref 6–20)
BUN BLDV-MCNC: 26 MG/DL (ref 6–20)
CALCIUM IONIZED: 1.11 MMOL/L (ref 1.15–1.33)
CALCIUM SERPL-MCNC: 7.5 MG/DL (ref 8.6–10.2)
CALCIUM SERPL-MCNC: 8.2 MG/DL (ref 8.6–10.2)
CHLORIDE BLD-SCNC: 100 MMOL/L (ref 98–107)
CHLORIDE BLD-SCNC: 104 MMOL/L (ref 98–107)
CO2: 22 MMOL/L (ref 22–29)
CO2: 24 MMOL/L (ref 22–29)
CREAT SERPL-MCNC: 2.6 MG/DL (ref 0.7–1.2)
CREAT SERPL-MCNC: 3.2 MG/DL (ref 0.7–1.2)
GFR AFRICAN AMERICAN: 25
GFR AFRICAN AMERICAN: 32
GFR NON-AFRICAN AMERICAN: 21 ML/MIN/1.73
GFR NON-AFRICAN AMERICAN: 26 ML/MIN/1.73
GLUCOSE BLD-MCNC: 199 MG/DL (ref 74–99)
GLUCOSE BLD-MCNC: 308 MG/DL (ref 74–99)
HCT VFR BLD CALC: 29.8 % (ref 37–54)
HEMOGLOBIN: 9.6 G/DL (ref 12.5–16.5)
MAGNESIUM: 1.7 MG/DL (ref 1.6–2.6)
MCH RBC QN AUTO: 31.3 PG (ref 26–35)
MCHC RBC AUTO-ENTMCNC: 32.2 % (ref 32–34.5)
MCV RBC AUTO: 97.1 FL (ref 80–99.9)
METER GLUCOSE: 184 MG/DL (ref 74–99)
METER GLUCOSE: 186 MG/DL (ref 74–99)
METER GLUCOSE: 200 MG/DL (ref 74–99)
METER GLUCOSE: 205 MG/DL (ref 74–99)
METER GLUCOSE: 271 MG/DL (ref 74–99)
METER GLUCOSE: 282 MG/DL (ref 74–99)
PDW BLD-RTO: 15.2 FL (ref 11.5–15)
PHOSPHORUS: 2.8 MG/DL (ref 2.5–4.5)
PLATELET # BLD: 120 E9/L (ref 130–450)
PMV BLD AUTO: 9.4 FL (ref 7–12)
POTASSIUM SERPL-SCNC: 4.1 MMOL/L (ref 3.5–5)
POTASSIUM SERPL-SCNC: 4.4 MMOL/L (ref 3.5–5)
RBC # BLD: 3.07 E12/L (ref 3.8–5.8)
SODIUM BLD-SCNC: 134 MMOL/L (ref 132–146)
SODIUM BLD-SCNC: 141 MMOL/L (ref 132–146)
WBC # BLD: 12 E9/L (ref 4.5–11.5)

## 2020-01-08 PROCEDURE — 2500000003 HC RX 250 WO HCPCS: Performed by: INTERNAL MEDICINE

## 2020-01-08 PROCEDURE — 37799 UNLISTED PX VASCULAR SURGERY: CPT

## 2020-01-08 PROCEDURE — 6360000002 HC RX W HCPCS: Performed by: INTERNAL MEDICINE

## 2020-01-08 PROCEDURE — 2580000003 HC RX 258: Performed by: INTERNAL MEDICINE

## 2020-01-08 PROCEDURE — 99231 SBSQ HOSP IP/OBS SF/LOW 25: CPT | Performed by: INTERNAL MEDICINE

## 2020-01-08 PROCEDURE — 6370000000 HC RX 637 (ALT 250 FOR IP): Performed by: INTERNAL MEDICINE

## 2020-01-08 PROCEDURE — 82330 ASSAY OF CALCIUM: CPT

## 2020-01-08 PROCEDURE — 71045 X-RAY EXAM CHEST 1 VIEW: CPT

## 2020-01-08 PROCEDURE — 99232 SBSQ HOSP IP/OBS MODERATE 35: CPT | Performed by: INTERNAL MEDICINE

## 2020-01-08 PROCEDURE — 93005 ELECTROCARDIOGRAM TRACING: CPT | Performed by: INTERNAL MEDICINE

## 2020-01-08 PROCEDURE — 90935 HEMODIALYSIS ONE EVALUATION: CPT

## 2020-01-08 PROCEDURE — 82962 GLUCOSE BLOOD TEST: CPT

## 2020-01-08 PROCEDURE — C9113 INJ PANTOPRAZOLE SODIUM, VIA: HCPCS | Performed by: INTERNAL MEDICINE

## 2020-01-08 PROCEDURE — 83735 ASSAY OF MAGNESIUM: CPT

## 2020-01-08 PROCEDURE — 80048 BASIC METABOLIC PNL TOTAL CA: CPT

## 2020-01-08 PROCEDURE — 2700000000 HC OXYGEN THERAPY PER DAY

## 2020-01-08 PROCEDURE — 2060000000 HC ICU INTERMEDIATE R&B

## 2020-01-08 PROCEDURE — 36415 COLL VENOUS BLD VENIPUNCTURE: CPT

## 2020-01-08 PROCEDURE — 85027 COMPLETE CBC AUTOMATED: CPT

## 2020-01-08 PROCEDURE — 84100 ASSAY OF PHOSPHORUS: CPT

## 2020-01-08 RX ORDER — DIPHENHYDRAMINE HYDROCHLORIDE 50 MG/ML
25 INJECTION INTRAMUSCULAR; INTRAVENOUS ONCE
Status: COMPLETED | OUTPATIENT
Start: 2020-01-08 | End: 2020-01-08

## 2020-01-08 RX ORDER — HEPARIN SODIUM 10000 [USP'U]/ML
5000 INJECTION, SOLUTION INTRAVENOUS; SUBCUTANEOUS EVERY 8 HOURS
Status: DISCONTINUED | OUTPATIENT
Start: 2020-01-08 | End: 2020-01-11 | Stop reason: HOSPADM

## 2020-01-08 RX ORDER — MAGNESIUM SULFATE IN WATER 40 MG/ML
2 INJECTION, SOLUTION INTRAVENOUS ONCE
Status: COMPLETED | OUTPATIENT
Start: 2020-01-08 | End: 2020-01-08

## 2020-01-08 RX ORDER — INSULIN GLARGINE 100 [IU]/ML
25 INJECTION, SOLUTION SUBCUTANEOUS NIGHTLY
Status: DISCONTINUED | OUTPATIENT
Start: 2020-01-08 | End: 2020-01-11 | Stop reason: HOSPADM

## 2020-01-08 RX ORDER — CETIRIZINE HYDROCHLORIDE 10 MG/1
10 TABLET ORAL DAILY
Status: DISCONTINUED | OUTPATIENT
Start: 2020-01-08 | End: 2020-01-11 | Stop reason: HOSPADM

## 2020-01-08 RX ADMIN — SODIUM CHLORIDE, PRESERVATIVE FREE 10 ML: 5 INJECTION INTRAVENOUS at 08:18

## 2020-01-08 RX ADMIN — INSULIN GLARGINE 25 UNITS: 100 INJECTION, SOLUTION SUBCUTANEOUS at 21:26

## 2020-01-08 RX ADMIN — MAGNESIUM SULFATE HEPTAHYDRATE 2 G: 40 INJECTION, SOLUTION INTRAVENOUS at 08:02

## 2020-01-08 RX ADMIN — MIDODRINE HYDROCHLORIDE 5 MG: 5 TABLET ORAL at 17:00

## 2020-01-08 RX ADMIN — AMPICILLIN SODIUM AND SULBACTAM SODIUM 3 G: 2; 1 INJECTION, POWDER, FOR SOLUTION INTRAMUSCULAR; INTRAVENOUS at 00:06

## 2020-01-08 RX ADMIN — INSULIN LISPRO 4 UNITS: 100 INJECTION, SOLUTION INTRAVENOUS; SUBCUTANEOUS at 13:00

## 2020-01-08 RX ADMIN — INSULIN LISPRO 3 UNITS: 100 INJECTION, SOLUTION INTRAVENOUS; SUBCUTANEOUS at 21:26

## 2020-01-08 RX ADMIN — DEXTROSE AND SODIUM CHLORIDE: 5; 450 INJECTION, SOLUTION INTRAVENOUS at 05:58

## 2020-01-08 RX ADMIN — DEXMEDETOMIDINE 0.8 MCG/KG/HR: 100 INJECTION, SOLUTION, CONCENTRATE INTRAVENOUS at 00:06

## 2020-01-08 RX ADMIN — INSULIN LISPRO 1 UNITS: 100 INJECTION, SOLUTION INTRAVENOUS; SUBCUTANEOUS at 04:26

## 2020-01-08 RX ADMIN — INSULIN LISPRO 2 UNITS: 100 INJECTION, SOLUTION INTRAVENOUS; SUBCUTANEOUS at 00:17

## 2020-01-08 RX ADMIN — MIDODRINE HYDROCHLORIDE 5 MG: 5 TABLET ORAL at 08:02

## 2020-01-08 RX ADMIN — HEPARIN SODIUM 5000 UNITS: 10000 INJECTION INTRAVENOUS; SUBCUTANEOUS at 15:30

## 2020-01-08 RX ADMIN — INSULIN LISPRO 6 UNITS: 100 INJECTION, SOLUTION INTRAVENOUS; SUBCUTANEOUS at 18:16

## 2020-01-08 RX ADMIN — DIPHENHYDRAMINE HYDROCHLORIDE 25 MG: 50 INJECTION, SOLUTION INTRAMUSCULAR; INTRAVENOUS at 08:02

## 2020-01-08 RX ADMIN — INSULIN LISPRO 2 UNITS: 100 INJECTION, SOLUTION INTRAVENOUS; SUBCUTANEOUS at 08:19

## 2020-01-08 RX ADMIN — PANTOPRAZOLE SODIUM 40 MG: 40 INJECTION, POWDER, FOR SOLUTION INTRAVENOUS at 08:15

## 2020-01-08 RX ADMIN — MIDODRINE HYDROCHLORIDE 5 MG: 5 TABLET ORAL at 13:00

## 2020-01-08 RX ADMIN — HEPARIN SODIUM 5000 UNITS: 10000 INJECTION INTRAVENOUS; SUBCUTANEOUS at 22:49

## 2020-01-08 RX ADMIN — AMPICILLIN SODIUM AND SULBACTAM SODIUM 3 G: 2; 1 INJECTION, POWDER, FOR SOLUTION INTRAMUSCULAR; INTRAVENOUS at 15:17

## 2020-01-08 RX ADMIN — CETIRIZINE HYDROCHLORIDE 10 MG: 10 TABLET, FILM COATED ORAL at 08:02

## 2020-01-08 ASSESSMENT — PAIN SCALES - GENERAL
PAINLEVEL_OUTOF10: 0

## 2020-01-08 NOTE — PLAN OF CARE
Problem: Falls - Risk of:  Goal: Will remain free from falls  Description  Will remain free from falls  1/8/2020 0308 by Angel Solo RN  Outcome: Met This Shift     Problem: Falls - Risk of:  Goal: Absence of physical injury  Description  Absence of physical injury  1/8/2020 0308 by Angel Solo RN  Outcome: Met This Shift     Problem: Breathing Pattern - Ineffective:  Goal: Ability to achieve and maintain a regular respiratory rate will improve  Description  Ability to achieve and maintain a regular respiratory rate will improve  Outcome: Met This Shift

## 2020-01-08 NOTE — CARE COORDINATION
[id number] returned call. Pt is from facility-  rehab side. Facility is willing to accept Pt back. SW will check with Son and let Tex Watson know.

## 2020-01-08 NOTE — PLAN OF CARE
Problem: Risk for Impaired Skin Integrity  Goal: Tissue integrity - skin and mucous membranes  Description  Structural intactness and normal physiological function of skin and  mucous membranes.   Outcome: Met This Shift     Problem: Falls - Risk of:  Goal: Will remain free from falls  Description  Will remain free from falls  1/8/2020 1509 by Shirley Hodge RN  Outcome: Met This Shift     Problem: Falls - Risk of:  Goal: Absence of physical injury  Description  Absence of physical injury  1/8/2020 1509 by Shirley Hodge RN  Outcome: Met This Shift     Problem: Breathing Pattern - Ineffective:  Goal: Ability to achieve and maintain a regular respiratory rate will improve  Description  Ability to achieve and maintain a regular respiratory rate will improve  1/8/2020 1509 by Shirley Hodge RN  Outcome: Met This Shift

## 2020-01-08 NOTE — PROGRESS NOTES
INPATIENT CARDIOLOGY FOLLOW-UP    Name: Josephine Gonzales    Age: 51 y.o. Date of Admission: 1/4/2020  4:33 PM    Date of Service: 1/8/2020    Chief Complaint: Follow-up for Status post cardiac arrest.      Interim History:  He was extubated yesterday feeling much better. Still has decreased urine output he is on hemodialysis. He had hypotension this morning and was initiated on midodrine after a brief IV Levophed. No new overnight cardiac complaints. Currently with no complaints of CP, SOB, palpitations, dizziness, or lightheadedness. SR on telemetry.     Review of Systems:   Cardiac: As per HPI  General: No fever, chills  Pulmonary: As per HPI  HEENT: No visual disturbances, difficult swallowing  GI: No nausea, vomiting  Endocrine: No thyroid disease or DM  Musculoskeletal: WALLACE x 4, no focal motor deficits  Skin: Intact, no rashes  Neuro/Psych: No headache or seizures    Problem List:  Patient Active Problem List   Diagnosis    Tobacco abuse    Diabetes mellitus type 1, uncontrolled ([address]    Moderate nonproliferative diabetic retinopathy associated with type 1 diabetes mellitus ([address]    Essential hypertension    Diabetic ketoacidosis without coma associated with type 1 diabetes mellitus (HCC)    Idiopathic peripheral neuropathy    Thrombocytopenia (HCC)    Acute ischemic multifocal multiple vascular territories stroke ([address]    Left hemiplegia ([address]    Diabetic ketoacidosis with coma associated with type 1 diabetes mellitus ([address]    KIRSTEN (acute kidney injury) ([address]    High anion gap metabolic acidosis    Leukocytosis    Lacunar stroke ([address]    ETOH abuse    Type 1 diabetes mellitus with complication (HCC)    Marijuana abuse    Lactic acidosis    Acute renal insufficiency    Hyperkalemia    Hypokalemia    Hypophosphatemia    Community acquired pneumonia of right lower lobe of lung ([address]    DKA, type 1, not at goal [facility])    Mixed hyperlipidemia    PAF (paroxysmal atrial fibrillation) ([facility]    Dark stools    Urticaria    Insulin pump in place    Diabetic ketoacidosis without coma associated with diabetes mellitus due to underlying condition ([facility]    Cardiac arrest ([facility]    Acute respiratory failure with hypoxia (HCC)    Hypothermia    Shock ([facility]    Severe protein-calorie malnutrition (HCC)       Allergies:   Allergies   Allergen Reactions    No Known Allergies        Current Medications:  Current Facility-Administered Medications   Medication Dose Route Frequency Provider Last Rate Last Dose    cetirizine (ZYRTEC) tablet 10 mg  10 mg Oral Daily Benito Saucedo MD   10 mg at 01/08/20 0802    insulin glargine (LANTUS) injection vial 25 Units  25 Units Subcutaneous Nightly Herbie Rao MD        insulin lispro (HUMALOG) injection vial 0-12 Units  0-12 Units Subcutaneous TID  Herbie Rao MD   6 Units at 01/08/20 1816    insulin lispro (HUMALOG) injection vial 0-6 Units  0-6 Units Subcutaneous Nightly Herbie Rao MD        heparin (porcine) injection 5,000 Units  5,000 Units Subcutaneous Q8H Herbie Rao MD   5,000 Units at 01/08/20 1530    midodrine (PROAMATINE) tablet 5 mg  5 mg Oral TID NIMO Rao MD   5 mg at 01/08/20 1300    ampicillin-sulbactam (UNASYN) 3 g ivpb minibag  3 g Intravenous Q12H Herbie Rao MD   Stopped at 01/08/20 1619    midazolam (VERSED) injection 1 mg  1 mg Intravenous Q4H PRN Herbie Rao MD   1 mg at 01/07/20 0439    heparin (porcine) injection 4,500 Units  4,500 Units Intercatheter PRN Celsa Geiger MD        dexmedetomidine (PRECEDEX) 400 mcg in sodium chloride 0.9 % 100 mL infusion  0.2 mcg/kg/hr Intravenous Continuous Ambar Trent MD   Stopped at 01/08/20 1100    perflutren lipid microspheres (DEFINITY) injection 1.65 mg  1.5 mL Intravenous ONCE PRN Sanjana Ferrer MD        glucose (GLUTOSE) 40 % oral gel 15 g  15 g Oral PRN Michelle Lawrence MD        dextrose 50 % IV solution  12.5 g Intravenous PRN Denice Bey MD        glucagon (rDNA) injection 1 mg  1 mg Intramuscular PRN Denice Bey MD        dextrose 5 % solution  100 mL/hr Intravenous PRN Denice Bey MD        pantoprazole (PROTONIX) injection 40 mg  40 mg Intravenous Daily Td Benavides MD   40 mg at 01/08/20 0815    And    sodium chloride (PF) 0.9 % injection 10 mL  10 mL Intravenous Daily Td Benavides MD   10 mL at 01/08/20 0818      dexmedetomidine (PRECEDEX) IV infusion Stopped (01/08/20 1100)    dextrose         Physical Exam:  /78   Pulse 99   Temp 99.7 °F (37.6 °C) (Bladder)   Resp 22   Ht 5' 6\" (1.676 m)   Wt 152 lb 1.9 oz (69 kg)   SpO2 95%   BMI 24.55 kg/m²   Wt Readings from Last 3 Encounters:   01/08/20 152 lb 1.9 oz (69 kg)   12/12/19 137 lb 3.2 oz (62.2 kg)   10/20/19 136 lb 3.9 oz (61.8 kg)     Appearance: Awake, alert, no acute respiratory distress  Skin: Intact, no rash  Head: Normocephalic, atraumatic  Eyes: EOMI, no conjunctival erythema  ENMT: No pharyngeal erythema, MMM, no rhinorrhea  Neck: Supple, no elevated JVP, no carotid bruits  Lungs: Has bilateral rhonchi cardiac: Regular rate and rhythm, +S1S2, no murmurs apparent  Abdomen: Soft, nontender, +bowel sounds  Extremities: Moves all extremities x 4, no lower extremity edema  Neurologic: No focal motor deficits apparent, normal mood and affect  Peripheral Pulses: Intact posterior tibial pulses bilaterally    Intake/Output:    Intake/Output Summary (Last 24 hours) at 1/8/2020 1825  Last data filed at 1/8/2020 1800  Gross per 24 hour   Intake 1885 ml   Output 2943 ml   Net -1058 ml     I/O this shift:   In: 500   Out: 2552 [Urine:52]    Laboratory Tests:  Recent Labs     01/07/20  0455 01/07/20  1940 01/08/20  0600    140 134   K 4.2 4.2 4.1    105 100   CO2 22 22 22   BUN 31* 24* 26*   CREATININE 2.9* 2.6* 3.2*   GLUCOSE 172* 197* 199*   CALCIUM 7.8* 7.7* 7.5*     Lab Results   Component Value Date platelets 885>> 282, urine drug screen positive for cocaine      Echocardiogram: LVEF 35 to 40%, abnormal diastolic function with indeterminate grade mildly dilated RV with reduced systolic function, moderate MR and trace TR, unable to estimate PA systolic pressure        Assessment:  · Status post cardiac arrest secondary to DKA and hypothermia now improved  · Acute respiratory failure status post intubation, improved  · New onset cardiomyopathy, unclear etiology, EF 35 to 40%  · Type 1 diabetes with recurrent episodes of DKA secondary to noncompliance>> improved/resolved  · History of polysubstance abuse  · Alcohol abuse  · History of hypertension  · Hyperlipidemia  · Acute kidney injury on hemodialysis     Plan:  · Supportive care as per critical care service   · Keep him n.p.o. and will schedule him for cardiac cath tomorrow morning for ischemic evaluation. ·  Wean off midodrine. Will start him on afterload reducing agents once he is hemodynamically stable. We are also going to initiate him on guideline directed medical therapy of for heart failure with reduced ejection fraction including beta-blockers. · He was advised not to use any cocaine. Will discuss with him again before starting on beta-blocker therapy. · He needs ischemic work-up prior to his discharge, preferably heart cath. Now, he does have acute kidney injury so we will avoid cath and will consider Lexiscan nuclear stress test.  · Continue rest of his medications, will consider adding statin therapy once he is able to take oral medication  · Dialysis as per nephrology service. He needs he hemodialysis after the cardiac cath. Jhon Hernández MD., [address] [facility]) Cardiology

## 2020-01-08 NOTE — PLAN OF CARE
Problem: Restraint Use - Nonviolent/Non-Self-Destructive Behavior:  Goal: Absence of restraint indications  Description  Absence of restraint indications  1/7/2020 2148 by Enriqueta Swann RN  Outcome: Completed     Problem: Restraint Use - Nonviolent/Non-Self-Destructive Behavior:  Goal: Absence of restraint-related injury  Description  Absence of restraint-related injury  1/7/2020 2148 by Enriqueta Swann, RN  Outcome: Completed

## 2020-01-08 NOTE — PROGRESS NOTES
Department of Internal Medicine  Nephrology Attending Consult Note    Events reviewed. Events reviewed, patient was extubated yesterday. SUBJECTIVE: We are following Mr. Pepe Campos for KIRSTEN. Reports no complaints. PHYSICAL EXAM:      Vitals:    VITALS:  /69   Pulse 82   Temp 98 °F (36.7 °C) (Bladder)   Resp 29   Ht 5' 6\" (1.676 m)   Wt 155 lb 3.3 oz (70.4 kg)   SpO2 94%   BMI 25.05 kg/m²   24HR INTAKE/OUTPUT:      Intake/Output Summary (Last 24 hours) at 1/8/2020 1128  Last data filed at 1/8/2020 1000  Gross per 24 hour   Intake 2677 ml   Output 1105 ml   Net 1572 ml       Access: Left femoral vein temporary dialysis catheter in place   Constitutional: Patient is awake alert in no distress.   HEENT: Pupils are equal reactive, endotracheal tube in place  Respiratory: Lungs are clear  Cardiovascular/Edema: Heart sounds are regular rate  Gastrointestinal: Abdomen soft  Neurologic: Patient sedated  Skin: No lesions  Other: No edema    Scheduled Meds:   cetirizine  10 mg Oral Daily    insulin glargine  25 Units Subcutaneous Nightly    insulin lispro  0-12 Units Subcutaneous TID WC    insulin lispro  0-6 Units Subcutaneous Nightly    midodrine  5 mg Oral TID WC    ampicillin-sulbactam  3 g Intravenous Q12H    pantoprazole  40 mg Intravenous Daily    And    sodium chloride (PF)  10 mL Intravenous Daily     Continuous Infusions:   dextrose 5 % and 0.45 % NaCl 60 mL/hr at 01/08/20 0558    dexmedetomidine (PRECEDEX) IV infusion 0.7 mcg/kg/hr (01/08/20 0849)    dextrose       PRN Meds:.midazolam, heparin (porcine), perflutren lipid microspheres, glucose, dextrose, glucagon (rDNA), dextrose    DATA:    CBC:   Lab Results   Component Value Date    WBC 12.0 01/08/2020    RBC 3.07 01/08/2020    HGB 9.6 01/08/2020    HCT 29.8 01/08/2020    MCV 97.1 01/08/2020    MCH 31.3 01/08/2020    MCHC 32.2 01/08/2020    RDW 15.2 01/08/2020     01/08/2020    MPV 9.4 01/08/2020     CMP:    Lab Results Component Value Date     01/08/2020    K 4.1 01/08/2020    K 5.7 10/18/2019     01/08/2020    CO2 22 01/08/2020    BUN 26 01/08/2020    CREATININE 3.2 01/08/2020    GFRAA 25 01/08/2020    LABGLOM 21 01/08/2020    GLUCOSE 199 01/08/2020    GLUCOSE 83 04/11/2012    PROT 6.0 01/04/2020    LABALBU 3.7 01/04/2020    LABALBU 4.3 12/30/2011    CALCIUM 7.5 01/08/2020    BILITOT 0.3 01/04/2020    ALKPHOS 157 01/04/2020    AST 26 01/04/2020    ALT 30 01/04/2020     Magnesium:    Lab Results   Component Value Date    MG 1.7 01/08/2020     Phosphorus:    Lab Results   Component Value Date    PHOS 2.8 01/08/2020     Radiology Review:      Chest x-ray January 6/2020   No interval change                 Echo: Ejection fraction 35 to 40%    BRIEF SUMMARY OF INITIAL CONSULT:    Briefly Mr. Evelio Rivera is a 16-year-old man with history of HTN, type I DM, alcohol abuse, cocaine abuse, who was admitted on January 4, 2020 after he was brought to the ER by EMS services, patient was found in the floor unresponsive he was bradycardic with PEA, he received multiple doses of epinephrine and bicarbonate. In the ER his glucose was 1094 mg/dL, creatinine was 2.3 mg/dL. Since admission he has received large volume resuscitation with about 7.3 L in the last 24 hours and his urine output has decreased only to 502 cc in the last 24 hours, reasons for this consultation. Problems resolved:    · Respiratory failure, status post intubation  · Alkalemia (pH: 7.469), with respiratory alkalosis HAGMA (DKA) and metabolic alkalosis (HD)    · Hemodynamic shock, multifactorial, hypovolemic, probably sepsis, rule out cardiogenic    IMPRESSION/RECOMMENDATIONS:      1. KIRSTEN stage III, established ischemic ATN status post cardiac arrest.  Started on renal replacement therapy on January 6, 2020. HD today x4 hours. 2. Status post cardiac arrest  3. DKA, improved on insulin drip  4. HFrEF 35-40%    Plan:    · HD x 4 hours today.     · Discontinue IV fluids  · Continue to monitor renal function for recovery

## 2020-01-08 NOTE — PROGRESS NOTES
[facility] Quality Flow/Interdisciplinary Rounds Progress Note        Quality Flow Rounds held on January 8, 2020    Disciplines Attending:  Bedside Nurse, , , Nursing Unit Leadership, Physical Therapy, Occupational Therapy and ICU team    Maxx Kaiser was admitted on 1/4/2020  4:33 PM    Anticipated Discharge Date:  Expected Discharge Date: 01/08/20    Disposition:    Roberto Score:  Roberto Scale Score: 16    Readmission Risk              Risk of Unplanned Readmission:        48           Discussed patient goal for the day, patient clinical progression, and barriers to discharge. The following Goal(s) of the Day/Commitment(s) have been identified:  Wean off precedex, possible transfer.        Renate Devine  January 8, 2020

## 2020-01-08 NOTE — PROGRESS NOTES
01/08/2020    CO2 22 01/08/2020    BUN 26 01/08/2020    LABALBU 3.7 01/04/2020    LABALBU 4.3 12/30/2011    CREATININE 3.2 01/08/2020    CALCIUM 7.5 01/08/2020    GFRAA 25 01/08/2020    LABGLOM 21 01/08/2020    GLUCOSE 199 01/08/2020    GLUCOSE 83 04/11/2012       Resident's Assessment and Plan     Chiki Ortiz is a 51 y.o. male presented to the ED with unwitnessed cardiac arrest and was admitted for thermia, DKA and cardiac arrest     1. Acute encephalopathy ,s/p cardiac arrest,likely insetting of hypothermia, HHS/DKA, sepsis,drug intoxication,uremia-resolved  · Mentation improved requiring sedation due to intubation  · CT head wnl  · Continue vancomycin and zosyn     2. Cardiac arrest  S/p PEA and intermittent bradycardia,likely related to severe hypothermia  · Trop 0.03           · UDS positive for cocaine ,h/o alcohol abuse/marijuana  · EKG : showed bradycardia with wide QRS /QTC,   · Last Echo 2/11/2017 EF 65%   · ECHO results 35-40% EF with moderate MR    3. Severe Hypothermia( <28'c) (resolved)  · Was actively rewarmed to normal body temperature  4. HAGMA 2/2 DKA/HHS,LA, uremia, hypothermia-resolved    5. Acute hypoxic hypercapnic respiratory failure in setting of #2  ·  on nasal canula  · Extubated yesterday    6. Corrected Hypernatremia -resolved    7. Acute kidney injury 2/2 poor renal perfusion , likely pre renal  · Cr 1.5 ( baseline 0.6-1)  · Nephrology following, and on dialysis  · Will follow nephrology recommendation    8. Hyperammoniemia insetting of hypothermia-resolved    9. Shock, hypovolemic vs septic shock   · resolved     PT/OT evaluation: ordered  DVT prophylaxis/ GI prophylaxis: Lovenox hold/ Zackary Pickett M.D.   Internal Medicine Resident - PGY 1    Attending physician: Dr. Holger Bond

## 2020-01-08 NOTE — PLAN OF CARE
Problem: Malnutrition  (NI-5.2)  Goal: Food and/or Nutrient Delivery  Description- ONS TID   Individualized approach for food/nutrient provision.   Outcome: Met This Shift

## 2020-01-08 NOTE — PROGRESS NOTES
Physical Therapy    PT consult to evaluate/treat received and appreciated. Pt chart reviewed and evaluation attempted. Pt is currently on dialysis. Will check back as able. Thank you.         Sierra Lindsay, PT, DPT   [id number]

## 2020-01-08 NOTE — PROGRESS NOTES
Nutrition Assessment    Type and Reason for Visit: Initial(LOS ICU)    Nutrition Recommendations: Continue current diet, Start ONS    Nutrition Assessment: Pt w/ severe malnutrition on admit AEB fat/muscle wasting 2/2 polysub abuse. Noted adm w/ DKA/ cardiac arrest now s/p extubation. Further compromised w/ HD initiated. Will add Glucerna TID & monitor intake. Malnutrition Assessment:  · Malnutrition Status: Meets the criteria for severe malnutrition  · Context: Chronic illness  · Findings of the 6 clinical characteristics of malnutrition (Minimum of 2 out of 6 clinical characteristics is required to make the diagnosis of moderate or severe Protein Calorie Malnutrition based on AND/ASPEN Guidelines):  1. Energy Intake-Less than or equal to 50% of estimated energy requirement(average ), (since admit )    2. Weight Loss-Unable to assess wt changes   3. Fat Loss-Severe subcutaneous fat loss, Orbital, Triceps  4. Muscle Loss-Severe muscle mass loss, Temples (temporalis muscle), Clavicles (pectoralis and deltoids), Thigh (quadriceps), Calf (gastrocnemius)  5. Fluid Accumulation-No significant fluid accumulation  6.  Strength-Not measured    Nutrition Risk Level:  Moderate    Nutrient Needs:  · Estimated Daily Total Kcal: 1322-1569(MSJ REE 1439 x 1.3 SF)  · Estimated Daily Protein (g): 85-95(1.3-1.5 g/kg )  · Estimated Daily Total Fluid (ml/day): per renal     Nutrition Diagnosis:   · Problem: Severe malnutrition, In context of chronic illness  · Etiology: related to Insufficient energy/nutrient consumption(2/2 polysubstance abuse )     Signs and symptoms:  as evidenced by Severe muscle loss, Severe loss of subcutaneous fat    Objective Information:  · Nutrition-Focused Physical Findings: Pt resting s/p extubation, agitation/combative behavior resolved, noted polysub, +I/O's 10L, trace edema, weakness, active BS, HD      · Wound Type: None     · Current Nutrition Therapies:  · Oral Diet Orders: Carb Control 4 Carbs/Meal   · Oral Diet intake: %(x 1 meal since diet advanced per RN (prev NPO) ROMULO PTA diet hx )     · Anthropometric Measures:  · Ht: 5' 6\" (167.6 cm)   · Current Body Wt: 156 lb (70.8 kg)(1/8 actual - elevated)  · Admission Body Wt: 137 lb (62.1 kg)(1/4 first measured )  · Usual Body Wt: 141 lb (64 kg)(2/2019 EMR actual )  · % Weight Change:  CBW elevated since admit +fluids at this time, unable to properly assess   · Ideal Body Wt: 142 lb (64.4 kg), % Ideal Body 96%(using adm wt )  · BMI Classification: BMI 18.5 - 24.9 Normal Weight(using adm wt 22.1)    Nutrition Interventions:   Continued Inpatient Monitoring, Education not appropriate at this time, Coordination of Care(Pt status & po intake d/w RN)    Nutrition Evaluation:   · Evaluation: Goals set   · Goals: Pt to consume >75% meals and ONS    · Monitoring: Meal Intake, Supplement Intake, Diet Tolerance, Skin Integrity, I&O, Weight, Mental Status/Confusion, Pertinent Labs, Monitor Bowel Function      Electronically signed by Constantine Kaiser RD, LD on 1/8/20 at 3:51 PM    Contact Number: Ext [phone]

## 2020-01-08 NOTE — PROGRESS NOTES
Occupational Therapy  OT consult received to eval/treat. Chart review complete. Evaluation attempted- pt currently on dialysis. OT to re-attempt at a later time. Thank you.     [place]arson OTR/L [id number]

## 2020-01-08 NOTE — PROGRESS NOTES
01/07/2020    HCO3 23.6 01/07/2020    BE 0.3 01/07/2020    THB 11.1 01/07/2020    A3YKGUHQ 88.4 07/19/2012    O2SAT 98.4 01/07/2020        Medications     Infusions: (Fluid, Sedation, Vasopressors)  IVF:    D/5/ NaCl 0.4.5%: heplock/rate 60 ml/h, will dc  Vasopressors   None   Sedation   None     Nutrition:   Carb control.      ATB:   Antibiotics  Days   vanc x 1 dose dcd    Zosyn dcd 3   unasyn 3     Skin issues: Per nursing  Patient currently has   Urinary cath  Isolation  Restraints  DVT prophylaxis/ GI prophylaxis,    PT/OT  SNF/NH placement  Palliative care consult   Labs     CBC with Differential:    Lab Results   Component Value Date    WBC 12.0 01/08/2020    RBC 3.07 01/08/2020    HGB 9.6 01/08/2020    HCT 29.8 01/08/2020     01/08/2020    MCV 97.1 01/08/2020    MCH 31.3 01/08/2020    MCHC 32.2 01/08/2020    RDW 15.2 01/08/2020    SEGSPCT 53 01/25/2014    SEGSPCT 86 10/11/2010    BANDSPCT 3 10/10/2010    METASPCT 0.9 01/04/2020    LYMPHOPCT 11.2 01/04/2020    MONOPCT 5.2 01/04/2020    MYELOPCT 2.6 01/04/2020    EOSPCT 2 10/12/2010    BASOPCT 0.8 01/04/2020    MONOSABS 0.94 01/04/2020    LYMPHSABS 2.06 01/04/2020    EOSABS 0.00 01/04/2020    BASOSABS 0.00 01/04/2020     CMP:    Lab Results   Component Value Date     01/08/2020    K 4.1 01/08/2020    K 5.7 10/18/2019     01/08/2020    CO2 22 01/08/2020    BUN 26 01/08/2020    CREATININE 3.2 01/08/2020    GFRAA 25 01/08/2020    LABGLOM 21 01/08/2020    GLUCOSE 199 01/08/2020    GLUCOSE 83 04/11/2012    PROT 6.0 01/04/2020    LABALBU 3.7 01/04/2020    LABALBU 4.3 12/30/2011    CALCIUM 7.5 01/08/2020    BILITOT 0.3 01/04/2020    ALKPHOS 157 01/04/2020    AST 26 01/04/2020    ALT 30 01/04/2020     BMP:    Lab Results   Component Value Date     01/08/2020    K 4.1 01/08/2020    K 5.7 10/18/2019     01/08/2020    CO2 22 01/08/2020    BUN 26 01/08/2020    LABALBU 3.7 01/04/2020    LABALBU 4.3 12/30/2011    CREATININE 3.2 01/08/2020 CALCIUM 7.5 2020    GFRAA 25 2020    LABGLOM 21 2020    GLUCOSE 199 2020    GLUCOSE 83 2012     Last 3 Troponin:    Lab Results   Component Value Date    TROPONINI 0.03 2020    TROPONINI 0.01 2019    TROPONINI 0.03 10/18/2019     ABG:    Lab Results   Component Value Date    PH 7.469 2020    PH 7.208 2012    PCO2 33.2 2020    PO2 118.4 2020    HCO3 23.6 2020    BE 0.3 2020    O2SAT 98.4 2020     HgBA1c:    Lab Results   Component Value Date    LABA1C 10.7 2019       Imaging Studies:  Ct Head Wo Contrast     Result Date: 2020  Patient MRN: [id number] : 1970 Age:  51 years Gender: Male Order Date: 2020 5:00 PM Exam: CT HEAD WO CONTRAST Number of Images: 113 views Indication:   altered altered Comparison: Prior CT from 11/10/2018 is available Technique: Sequential axial CT of the head was obtained from the base of the skull to the vertex without IV contrast.  Radiation Output: CTDIvol 76.6 (mGy); DLP 1434 (mGy-cm) Findings: The study demonstrates the fourth ventricle to be midline. The posterior fossa appears to be normal. There is mild global atrophy. There is no mass, mass effect or midline shift. The ventricles, sulci and cisterns are normal in appearance for patient's age. The gray-white matter differentiation is preserved throughout. There is no acute intracranial hemorrhage. No extra-axial fluid collection is identified. The bony calvarium is intact. The visualized portion of the paranasal sinuses and the mastoid air cells are clear. There is no focal extracranial soft tissue swelling.  The graft the study is unchanged from prior CT      NO ACUTE INTRACRANIAL PROCESS      Xr Chest Portable     Result Date: 2020  Patient MRN:  [id number] : 1970 Age: 51 years Gender: Male Order Date:  2020 5:00 PM EXAM: XR CHEST PORTABLE COMPARISON: 2019 INDICATION:  altered mental status altered mental status FINDINGS: Endotracheal tube is present with distal tip approximately 4.2 cm above the jesus. Nasogastric tube courses below the level of the diaphragm. Side hole appears to be just above gastroesophageal junction. This tube can be advanced approximately 3 or 4 cm. No airspace opacity or pleural effusion. The heart is normal in size. No pneumothorax.      1. Endotracheal tube is 4.2 cm above the jesus. 2. Nasogastric tube courses below the level of the diaphragm. Side hole is just above level of gastroesophageal junction. This tube can be advanced approximately 3 or 4 cm.     Xr Chest Portable     Result Date: 2019  Patient MRN: [id number] : 1970 Age:  51 years Gender: Male Order Date: 2019 10:30 AM Exam: XR CHEST PORTABLE Number of Images: 1 view Indication:   conform right IJ placement conform right IJ placement Comparison: XR CHEST PORTABLE 10/18/2019 Findings: There is a right IJ central venous catheter with the tip in the right axillary vein. The heart is unremarkable. The lung fields are unremarkable. The aorta is unremarkable.      There is a right IJ central venous catheter with the tip in the right axillary vein. Recommend redirecting the tip into the superior vena cava.         EKG: sinus bradycardia, prolonged QT interval     Resident's Assessment and Plan     Assessment:  1. Acute encephalopathy- resolved. s/p cardiac arrest, DKA, sepsis, drug intoxication, and hyperammonemia. NH3 level wnl , completed DKA protocol and rewarmed, patient alert and responsive on minimal sedation, following commands, no neuro deficits. 2. Cardiac arrest  S/p PEA likely drug overdose, vs CAD in the setting of DMT1 uncontrolled, and intermittent bradycardia, likely related to severe hypothermia  3. QTc Prolongation- avoid QTc prolonging meds. , resolved Qtc today 466.    4. HFrEF 35-40%, indeterminate diastolic dysfunction, mildly dilated RV, with reduced systolic function, mod MR, trace TR.   5. DKA, resolved. poorly controlled type 1 diabetes mellitus, last A1c in 10.7, 2/2 non compliance, polysubstance abuse  6. Severe Hypothermia( <28'c) - rewarmed. Euthermic. 7. Acute hypoxic hypercapnic respiratory failure in setting of #2 -extubated on 01/07/20  8. Shock, hypovolemic vs septic shock,vs cardiogenic.  resolved.  -discontinued Levophed, and added midodrine until improved BP. 9. HAGMA, resolved.  2/2 DKA/HHS,LA, uremia, hypothermia, other alcohol toxicity -patient also had an osmolar gap of 14.   10. Acute kidney injury 2/2 poor renal perfusion , likely pre renal ATN s/p cardiac arrest -creatinine today 3.2, baseline 0.6. on RRT. 11. Hypernatremia, resolved. 12. Polysubstance abuse- out of window for withdrawal. Pt previously receiving BZD for sedation, has versed PRN. If needed. 13. Thrombocytopenia. HIT score 3, low probability. Platelets stable. Possible adverse effect of Zosyn. Will continue to monitor. Ok to resume heparin. 14. Urticaria- continue cetirizine qd as needed. No history of allergy to medications.      Plan  - Monitor respiratory status. - Follow cardiology recommendations. Need cath but in [facility]IRSTEN setting unable o perform, and will get [facility] before discharge. Discuss with nephrology the possibility for cath. Pt on RRT. - Continue Lantus 25 U nightly with MDSS. Resumed diet. - Complete 4/5 days abx, Unasyn. - on RRT,  Remains anuric, <0.2 cc/kg/hr. discontinue IVF  Cbc daily, monitor platelets. Ok to resume heparin.   - ok to transfer to monitored floor.      PT/OT evaluation: ordered. DVT prophylaxis/ GI prophylaxis: heparin /Protonix  Disposition: Transfer to monitored floor. Jessie Peñaloza MD, PGY-2  Attending physician: Dr. Jose M Carrizales. I personally saw, examined and provided care for the patient. Radiographs, labs and medication list were reviewed by me independently. I spoke with bedside nursing, therapists and consultants.  Critical care services and times documented are independent of procedures and multidisciplinary rounds with Residents. Additionally comprehensive, multidisciplinary rounds were conducted with the MICU team. The case was discussed in detail and plans for care were established. Review of Residents documentation was conducted and revisions were made as appropriate. I agree with the above documented exam, problem list and plan of care. Doing well today   Discussed with cardiology need for LHC. Hd for anuric ponce   Abx 1 more day for aspiration pna     Jud Riddle M.D.    Pulmonary/Critical Care Medicine   31 min cct excluding procedures

## 2020-01-09 LAB
ABO/RH: NORMAL
ANION GAP SERPL CALCULATED.3IONS-SCNC: 16 MMOL/L (ref 7–16)
ANTIBODY SCREEN: NORMAL
BLOOD CULTURE, ROUTINE: NORMAL
BUN BLDV-MCNC: 21 MG/DL (ref 6–20)
CALCIUM SERPL-MCNC: 8.4 MG/DL (ref 8.6–10.2)
CHLORIDE BLD-SCNC: 101 MMOL/L (ref 98–107)
CO2: 23 MMOL/L (ref 22–29)
CREAT SERPL-MCNC: 3.1 MG/DL (ref 0.7–1.2)
CULTURE, BLOOD 2: NORMAL
EKG ATRIAL RATE: 77 BPM
EKG P AXIS: 52 DEGREES
EKG P-R INTERVAL: 128 MS
EKG Q-T INTERVAL: 412 MS
EKG QRS DURATION: 88 MS
EKG QTC CALCULATION (BAZETT): 466 MS
EKG R AXIS: 25 DEGREES
EKG T AXIS: 10 DEGREES
EKG VENTRICULAR RATE: 77 BPM
GFR AFRICAN AMERICAN: 26
GFR NON-AFRICAN AMERICAN: 22 ML/MIN/1.73
GLUCOSE BLD-MCNC: 215 MG/DL (ref 74–99)
HCT VFR BLD CALC: 29.9 % (ref 37–54)
HEMOGLOBIN: 9.7 G/DL (ref 12.5–16.5)
MAGNESIUM: 1.8 MG/DL (ref 1.6–2.6)
MCH RBC QN AUTO: 31.5 PG (ref 26–35)
MCHC RBC AUTO-ENTMCNC: 32.4 % (ref 32–34.5)
MCV RBC AUTO: 97.1 FL (ref 80–99.9)
METER GLUCOSE: 184 MG/DL (ref 74–99)
METER GLUCOSE: 224 MG/DL (ref 74–99)
METER GLUCOSE: 243 MG/DL (ref 74–99)
METER GLUCOSE: 266 MG/DL (ref 74–99)
METER GLUCOSE: 382 MG/DL (ref 74–99)
METER GLUCOSE: 386 MG/DL (ref 74–99)
PDW BLD-RTO: 14.8 FL (ref 11.5–15)
PHOSPHORUS: 2.9 MG/DL (ref 2.5–4.5)
PLATELET # BLD: 200 E9/L (ref 130–450)
PMV BLD AUTO: 9.9 FL (ref 7–12)
POTASSIUM SERPL-SCNC: 4 MMOL/L (ref 3.5–5)
RBC # BLD: 3.08 E12/L (ref 3.8–5.8)
SODIUM BLD-SCNC: 140 MMOL/L (ref 132–146)
WBC # BLD: 11.6 E9/L (ref 4.5–11.5)

## 2020-01-09 PROCEDURE — 6360000002 HC RX W HCPCS: Performed by: INTERNAL MEDICINE

## 2020-01-09 PROCEDURE — 6370000000 HC RX 637 (ALT 250 FOR IP): Performed by: INTERNAL MEDICINE

## 2020-01-09 PROCEDURE — 86900 BLOOD TYPING SEROLOGIC ABO: CPT

## 2020-01-09 PROCEDURE — 84100 ASSAY OF PHOSPHORUS: CPT

## 2020-01-09 PROCEDURE — 86901 BLOOD TYPING SEROLOGIC RH(D): CPT

## 2020-01-09 PROCEDURE — 94669 MECHANICAL CHEST WALL OSCILL: CPT

## 2020-01-09 PROCEDURE — 97161 PT EVAL LOW COMPLEX 20 MIN: CPT

## 2020-01-09 PROCEDURE — C1894 INTRO/SHEATH, NON-LASER: HCPCS

## 2020-01-09 PROCEDURE — 85027 COMPLETE CBC AUTOMATED: CPT

## 2020-01-09 PROCEDURE — 93454 CORONARY ARTERY ANGIO S&I: CPT | Performed by: INTERNAL MEDICINE

## 2020-01-09 PROCEDURE — 2580000003 HC RX 258: Performed by: INTERNAL MEDICINE

## 2020-01-09 PROCEDURE — 6360000002 HC RX W HCPCS

## 2020-01-09 PROCEDURE — 97535 SELF CARE MNGMENT TRAINING: CPT

## 2020-01-09 PROCEDURE — B2111ZZ FLUOROSCOPY OF MULTIPLE CORONARY ARTERIES USING LOW OSMOLAR CONTRAST: ICD-10-PCS | Performed by: INTERNAL MEDICINE

## 2020-01-09 PROCEDURE — C1725 CATH, TRANSLUMIN NON-LASER: HCPCS

## 2020-01-09 PROCEDURE — 93010 ELECTROCARDIOGRAM REPORT: CPT | Performed by: INTERNAL MEDICINE

## 2020-01-09 PROCEDURE — 2140000000 HC CCU INTERMEDIATE R&B

## 2020-01-09 PROCEDURE — 94640 AIRWAY INHALATION TREATMENT: CPT

## 2020-01-09 PROCEDURE — 99024 POSTOP FOLLOW-UP VISIT: CPT | Performed by: INTERNAL MEDICINE

## 2020-01-09 PROCEDURE — 2709999900 HC NON-CHARGEABLE SUPPLY

## 2020-01-09 PROCEDURE — 86850 RBC ANTIBODY SCREEN: CPT

## 2020-01-09 PROCEDURE — 80048 BASIC METABOLIC PNL TOTAL CA: CPT

## 2020-01-09 PROCEDURE — 2500000003 HC RX 250 WO HCPCS

## 2020-01-09 PROCEDURE — C1769 GUIDE WIRE: HCPCS

## 2020-01-09 PROCEDURE — 82962 GLUCOSE BLOOD TEST: CPT

## 2020-01-09 PROCEDURE — 36415 COLL VENOUS BLD VENIPUNCTURE: CPT

## 2020-01-09 PROCEDURE — 83735 ASSAY OF MAGNESIUM: CPT

## 2020-01-09 PROCEDURE — 6370000000 HC RX 637 (ALT 250 FOR IP): Performed by: CLINICAL NURSE SPECIALIST

## 2020-01-09 PROCEDURE — 97165 OT EVAL LOW COMPLEX 30 MIN: CPT

## 2020-01-09 RX ORDER — PETROLATUM 42 G/100G
OINTMENT TOPICAL 2 TIMES DAILY PRN
Status: DISCONTINUED | OUTPATIENT
Start: 2020-01-09 | End: 2020-01-11 | Stop reason: HOSPADM

## 2020-01-09 RX ORDER — SODIUM CHLORIDE 0.9 % (FLUSH) 0.9 %
10 SYRINGE (ML) INJECTION EVERY 12 HOURS SCHEDULED
Status: DISCONTINUED | OUTPATIENT
Start: 2020-01-09 | End: 2020-01-11 | Stop reason: HOSPADM

## 2020-01-09 RX ORDER — MIDODRINE HYDROCHLORIDE 5 MG/1
2.5 TABLET ORAL
Status: DISCONTINUED | OUTPATIENT
Start: 2020-01-09 | End: 2020-01-10

## 2020-01-09 RX ORDER — PANTOPRAZOLE SODIUM 40 MG/1
40 TABLET, DELAYED RELEASE ORAL
Status: DISCONTINUED | OUTPATIENT
Start: 2020-01-09 | End: 2020-01-11 | Stop reason: HOSPADM

## 2020-01-09 RX ORDER — SODIUM CHLORIDE 0.9 % (FLUSH) 0.9 %
10 SYRINGE (ML) INJECTION PRN
Status: DISCONTINUED | OUTPATIENT
Start: 2020-01-09 | End: 2020-01-11 | Stop reason: HOSPADM

## 2020-01-09 RX ORDER — IPRATROPIUM BROMIDE AND ALBUTEROL SULFATE 2.5; .5 MG/3ML; MG/3ML
1 SOLUTION RESPIRATORY (INHALATION)
Status: DISCONTINUED | OUTPATIENT
Start: 2020-01-09 | End: 2020-01-11 | Stop reason: HOSPADM

## 2020-01-09 RX ORDER — ACETAMINOPHEN 325 MG/1
650 TABLET ORAL EVERY 4 HOURS PRN
Status: DISCONTINUED | OUTPATIENT
Start: 2020-01-09 | End: 2020-01-11 | Stop reason: HOSPADM

## 2020-01-09 RX ORDER — SODIUM CHLORIDE 0.9 % (FLUSH) 0.9 %
SYRINGE (ML) INJECTION
Status: DISCONTINUED
Start: 2020-01-09 | End: 2020-01-09

## 2020-01-09 RX ADMIN — AMPICILLIN SODIUM AND SULBACTAM SODIUM 3 G: 2; 1 INJECTION, POWDER, FOR SOLUTION INTRAMUSCULAR; INTRAVENOUS at 12:44

## 2020-01-09 RX ADMIN — IPRATROPIUM BROMIDE AND ALBUTEROL SULFATE 1 AMPULE: 2.5; .5 SOLUTION RESPIRATORY (INHALATION) at 21:06

## 2020-01-09 RX ADMIN — HEPARIN SODIUM 5000 UNITS: 10000 INJECTION INTRAVENOUS; SUBCUTANEOUS at 16:09

## 2020-01-09 RX ADMIN — Medication 10 ML: at 21:04

## 2020-01-09 RX ADMIN — AMPICILLIN SODIUM AND SULBACTAM SODIUM 3 G: 2; 1 INJECTION, POWDER, FOR SOLUTION INTRAMUSCULAR; INTRAVENOUS at 22:56

## 2020-01-09 RX ADMIN — HEPARIN SODIUM 5000 UNITS: 10000 INJECTION INTRAVENOUS; SUBCUTANEOUS at 22:53

## 2020-01-09 RX ADMIN — IPRATROPIUM BROMIDE AND ALBUTEROL SULFATE 1 AMPULE: 2.5; .5 SOLUTION RESPIRATORY (INHALATION) at 12:58

## 2020-01-09 RX ADMIN — INSULIN LISPRO 5 UNITS: 100 INJECTION, SOLUTION INTRAVENOUS; SUBCUTANEOUS at 20:07

## 2020-01-09 RX ADMIN — MIDODRINE HYDROCHLORIDE 2.5 MG: 5 TABLET ORAL at 16:08

## 2020-01-09 RX ADMIN — INSULIN LISPRO 4 UNITS: 100 INJECTION, SOLUTION INTRAVENOUS; SUBCUTANEOUS at 16:08

## 2020-01-09 RX ADMIN — INSULIN GLARGINE 25 UNITS: 100 INJECTION, SOLUTION SUBCUTANEOUS at 20:07

## 2020-01-09 RX ADMIN — AMPICILLIN SODIUM AND SULBACTAM SODIUM 3 G: 2; 1 INJECTION, POWDER, FOR SOLUTION INTRAMUSCULAR; INTRAVENOUS at 00:10

## 2020-01-09 RX ADMIN — IPRATROPIUM BROMIDE AND ALBUTEROL SULFATE 1 AMPULE: 2.5; .5 SOLUTION RESPIRATORY (INHALATION) at 18:00

## 2020-01-09 RX ADMIN — MIDODRINE HYDROCHLORIDE 2.5 MG: 5 TABLET ORAL at 12:43

## 2020-01-09 RX ADMIN — INSULIN LISPRO 2 UNITS: 100 INJECTION, SOLUTION INTRAVENOUS; SUBCUTANEOUS at 12:44

## 2020-01-09 ASSESSMENT — PAIN SCALES - GENERAL
PAINLEVEL_OUTOF10: 2
PAINLEVEL_OUTOF10: 0
PAINLEVEL_OUTOF10: 0

## 2020-01-09 ASSESSMENT — PAIN DESCRIPTION - LOCATION: LOCATION: CHEST

## 2020-01-09 NOTE — CARE COORDINATION
SOCIAL WORK/CASEMANAGEMENT TRANSITION OF CARE PLANNING:  Spoke with pt. At bedside. Pt. Reports he doesn't think he is in need of any further Dialysis. Sw. will check with Dr to see if pt needs outpt. Dialysis set up.

## 2020-01-09 NOTE — PROGRESS NOTES
Department of Internal Medicine  Nephrology Attending Consult Note    Events reviewed. Had cardiac catheterization earlier today. SUBJECTIVE: We are following Mr. Sherlie Lombard for KIRSTEN. Reports no complaints. PHYSICAL EXAM:      Vitals:    VITALS:  BP (!) 148/82   Pulse 103   Temp 98.4 °F (36.9 °C)   Resp 18   Ht 5' 6\" (1.676 m)   Wt 144 lb (65.3 kg)   SpO2 97%   BMI 23.24 kg/m²   24HR INTAKE/OUTPUT:      Intake/Output Summary (Last 24 hours) at 1/9/2020 1829  Last data filed at 1/9/2020 1325  Gross per 24 hour   Intake 1070 ml   Output 1290 ml   Net -220 ml       Access: Left femoral vein temporary dialysis catheter in place   Constitutional: Patient is awake alert in no distress.   HEENT: Pupils are equal reactive, endotracheal tube in place  Respiratory: Lungs are clear  Cardiovascular/Edema: Heart sounds are regular rate  Gastrointestinal: Abdomen soft  Neurologic: Patient sedated  Skin: No lesions  Other: No edema    Scheduled Meds:   pantoprazole  40 mg Oral QAM AC    ipratropium-albuterol  1 ampule Inhalation Q4H WA    midodrine  2.5 mg Oral TID WC    sodium chloride flush  10 mL Intravenous 2 times per day    cetirizine  10 mg Oral Daily    insulin glargine  25 Units Subcutaneous Nightly    insulin lispro  0-12 Units Subcutaneous TID WC    insulin lispro  0-6 Units Subcutaneous Nightly    heparin (porcine)  5,000 Units Subcutaneous Q8H    ampicillin-sulbactam  3 g Intravenous Q12H     Continuous Infusions:   dextrose       PRN Meds:.acetaminophen, magnesium hydroxide, sodium chloride flush, mineral oil-hydrophilic petrolatum, heparin (porcine), perflutren lipid microspheres, glucose, dextrose, glucagon (rDNA), dextrose    DATA:    CBC:   Lab Results   Component Value Date    WBC 11.6 01/09/2020    RBC 3.08 01/09/2020    HGB 9.7 01/09/2020    HCT 29.9 01/09/2020    MCV 97.1 01/09/2020    MCH 31.5 01/09/2020    MCHC 32.4 01/09/2020    RDW 14.8 01/09/2020     01/09/2020    MPV 9.9 01/09/2020     CMP:    Lab Results   Component Value Date     01/09/2020    K 4.0 01/09/2020    K 5.7 10/18/2019     01/09/2020    CO2 23 01/09/2020    BUN 21 01/09/2020    CREATININE 3.1 01/09/2020    GFRAA 26 01/09/2020    LABGLOM 22 01/09/2020    GLUCOSE 215 01/09/2020    GLUCOSE 83 04/11/2012    PROT 6.0 01/04/2020    LABALBU 3.7 01/04/2020    LABALBU 4.3 12/30/2011    CALCIUM 8.4 01/09/2020    BILITOT 0.3 01/04/2020    ALKPHOS 157 01/04/2020    AST 26 01/04/2020    ALT 30 01/04/2020     Magnesium:    Lab Results   Component Value Date    MG 1.8 01/09/2020     Phosphorus:    Lab Results   Component Value Date    PHOS 2.9 01/09/2020     Radiology Review:      Chest x-ray January 6/2020   No interval change                 Echo: Ejection fraction 35 to 40%    BRIEF SUMMARY OF INITIAL CONSULT:    Briefly Mr. Lexii Cage is a 56-year-old man with history of HTN, type I DM, alcohol abuse, cocaine abuse, who was admitted on January 4, 2020 after he was brought to the ER by EMS services, patient was found in the floor unresponsive he was bradycardic with PEA, he received multiple doses of epinephrine and bicarbonate. In the ER his glucose was 1094 mg/dL, creatinine was 2.3 mg/dL. Since admission he has received large volume resuscitation with about 7.3 L in the last 24 hours and his urine output has decreased only to 502 cc in the last 24 hours, reasons for this consultation. Problems resolved:    · Respiratory failure, status post intubation  · Alkalemia (pH: 7.469), with respiratory alkalosis HAGMA (DKA) and metabolic alkalosis (HD)    · Hemodynamic shock, multifactorial, hypovolemic, probably sepsis, rule out cardiogenic    IMPRESSION/RECOMMENDATIONS:      1. KIRSTEN stage III, established ischemic ATN status post cardiac arrest.  Started on renal replacement therapy on January 6, 2020. Urine output has increased. Had IV contrast study today.     2. Status post cardiac arrest, status post cardiac

## 2020-01-09 NOTE — PROGRESS NOTES
Pt requesting jenkins catheter removal. Upon assessment, noticed jenkins catheter had begun to leak. Jenkins removed at this time with no complication.  Pt provided with urinal.

## 2020-01-09 NOTE — PROGRESS NOTES
[facility]  Internal Medicine Residency Program  Progress Note - House Team 1    Patient:  Kelsi Carpenter 51 y.o. male MRN: [id number]     Date of Service: 1/9/2020     CC: cardiac arrest    Subjective     Pt doing well today, alert, awake and oriented x3, eating well on nasal canula, denies any symp    Objective     Physical Exam:  · Vitals: BP (!) 136/96   Pulse 93   Temp 99.8 °F (37.7 °C) (Oral)   Resp 18   Ht 5' 6\" (1.676 m)   Wt 144 lb (65.3 kg)   SpO2 95%   BMI 23.24 kg/m²     I & O - 24hr: I/O this shift:  In: 180 [P.O.:180]  · Out: 600 [Urine:600]   · General Appearance: alert and awake  · HEENT:  Head: Normal, normocephalic, atraumatic. · Neck: no adenopathy, no carotid bruit, no JVD and supple, symmetrical, trachea midline  · Lung: clear to auscultation bilaterally  · Heart: regular rate and rhythm, S1, S2 normal, no murmur, click, rub or gallop  · Abdomen: soft, non-tender; bowel sounds normal; no masses,  no organomegaly  · Extremities:  extremities normal, atraumatic, no cyanosis or edema  · Musculokeletal: No joint swelling, no muscle tenderness. ROM normal in all joints of extremities.    · Neurologic: Mental status: AAOx3  Subject  Pertinent Labs & Imaging Studies   chika  CBC with Differential:    Lab Results   Component Value Date    WBC 11.6 01/09/2020    RBC 3.08 01/09/2020    HGB 9.7 01/09/2020    HCT 29.9 01/09/2020     01/09/2020    MCV 97.1 01/09/2020    MCH 31.5 01/09/2020    MCHC 32.4 01/09/2020    RDW 14.8 01/09/2020    SEGSPCT 53 01/25/2014    SEGSPCT 86 10/11/2010    BANDSPCT 3 10/10/2010    METASPCT 0.9 01/04/2020    LYMPHOPCT 11.2 01/04/2020    MONOPCT 5.2 01/04/2020    MYELOPCT 2.6 01/04/2020    EOSPCT 2 10/12/2010    BASOPCT 0.8 01/04/2020    MONOSABS 0.94 01/04/2020    LYMPHSABS 2.06 01/04/2020    EOSABS 0.00 01/04/2020    BASOSABS 0.00 01/04/2020     BMP:    Lab Results   Component Value Date     01/09/2020    K 4.0 01/09/2020    K 5.7 10/18/2019     01/09/2020    CO2 23 01/09/2020    BUN 21 01/09/2020    LABALBU 3.7 01/04/2020    LABALBU 4.3 12/30/2011    CREATININE 3.1 01/09/2020    CALCIUM 8.4 01/09/2020    GFRAA 26 01/09/2020    LABGLOM 22 01/09/2020    GLUCOSE 215 01/09/2020    GLUCOSE 83 04/11/2012       Resident's Assessment and Plan     Brielle Wade is a 51 y.o. male presented to the ED with unwitnessed cardiac arrest and was admitted for thermia, DKA and cardiac arrest     1. Acute encephalopathy ,s/p cardiac arrest,likely insetting of hypothermia, HHS/DKA, sepsis,drug intoxication,uremia-resolved  · Mentation improved requiring sedation due to intubation  · CT head wnl  · Continue vancomycin and zosyn     2. Cardiac arrest  S/p PEA and intermittent bradycardia,likely related to severe hypothermia  · Trop 0.03           · UDS positive for cocaine ,h/o alcohol abuse/marijuana  · EKG : showed bradycardia with wide QRS /QTC,   · Last Echo 2/11/2017 EF 65%   · ECHO results 35-40% EF with moderate MR  · LHC done today: mild disease involving the subbranch of the diagonal, no CAD otherwise    3. Severe Hypothermia( <28'c) (resolved)  · Was actively rewarmed to normal body temperature  4. HAGMA 2/2 DKA/HHS,LA, uremia, hypothermia-resolved    5. Acute hypoxic hypercapnic respiratory failure in setting of #2  ·  on nasal canula  · Extubated yesterday    6. Corrected Hypernatremia -resolved    7. Acute kidney injury 2/2 poor renal perfusion , likely pre renal  · Cr 1.5 ( baseline 0.6-1)  · Nephrology following, and on dialysis  · Will follow nephrology recommendation    8. Hyperammoniemia insetting of hypothermia-resolved    9. Shock, hypovolemic vs septic shock   · resolved     PT/OT evaluation: ordered  DVT prophylaxis/ GI prophylaxis: Lovenox ion/ Steven Vigil M.D.   Internal Medicine Resident - PGY 1    Attending physician: Dr. Ferny Chang

## 2020-01-09 NOTE — PROCEDURES
PROVIDER:     Mary Delgado MD     DATE OF PROCEDURE:    1/9/2020     PROCEDURES PERFORMED:  1. Coronary angiography. 2.  Conscious sedation using Versed and fentanyl. ASC indication #4, score of 8. INDICATION:    1. Cardiac arrest, newly diagnosed cardiomyopathy, several risk factors for CAD  2. AUC score  for dianostic procedure is 4, AUC indication  for diagnostic procedure is 8    The patient is   51years old male type 1 diabetes mellitus, alcohol abuse, paroxysmal atrial fibrillation, was brought to the hospital because he was found unresponsive, was found by EMS to have bradycardia then PEA, CPR and ACLS protocol was initiated, patient regained spontaneous circulation, patient was found to have cardiomyopathy on echocardiogram,patient was brought to the cath lab for further evaluation. DESCRIPTION OF PROCEDURE:  After the appropriate informed consent, the  right wrist area was prepped and draped in the usual sterile fashion. Roger test was normal,   timeout was called. The right wrist area was locally anesthetized with  3 mL of 2% lidocaine. A 21-gauge needle was used to access the right  radial artery. 6-Ukrainian introducer sheath was used to cannulate the right radial artery. 6-Ukrainian JL3.5 and 6-Ukrainian JR4 catheters were used for coronary angiography in multiple projections. At the end of the procedure the catheter and the wire were pulled out from the body, a VASC band was applied to the right wrist area with effective hemostasis. ANGIOGRAPHIC FINDINGS:  1. The left main coronary artery arises normally from the left sinus of  Valsalva, it is a large vessel, no disease, trifurcates into left anterior descending artery left circumflex artery and ramus intermedius artery. 2.  The left anterior descending artery extends down to the apex, it gives off a large diagonal branch that bifurcates shortly after its origin into 2 midsize subbranches it.   It then feels a branch has very mild disease in the mid segment. The rest of the LAD and its diagonal branches have no significant disease. 3.  The left circumflex artery is mid size vessel gives off 2 small OM branches, the left circumflex artery and its branches have no significant disease    4. The ramus intermedius artery is a large vessel that subdivided distally due to subbranches, the ramus intermedius artery and its branches have no significant disease    5. The right coronary artery is large vessel, dominant circulation, gives off a large right PLV and large PDA, the right coronary artery and its branches have no significant disease     Hemodynamics: Aortic pressure was 178/81 mmHg    COMPLICATIONS:  None. ESTIMATED TOTAL BLOOD LOSS:   15-20 mL. MEDICATIONS USED DURING THE PROCEDURE:  We used intraarterial heparin  for anticoagulation for the diagnostic procedure. We used intraarterial verapamil to prevent vasospasm. CONSCIOUS SEDATION:  We used Versed and fentanyl for conscious sedation. First dose was given at 1201  . Procedure ended at  1220   . There were  19 minutes of direct face-to-face supervision during conscious sedation  Administration. Total fluoroscopy time was  1.3  minutes. Total contrast used was   55 mL. IMPRESSION:  1. Very mild disease involving the subbranch of the diagonal branch, otherwise no angiographic CAD was noted       RECOMMENDATIONS:  1.   Continue current treatment          France Hobbs MD

## 2020-01-09 NOTE — PROGRESS NOTES
at goal [facility])    Mixed hyperlipidemia    PAF (paroxysmal atrial fibrillation) (HCC)    Dark stools    Urticaria    Insulin pump in place    Diabetic ketoacidosis without coma associated with diabetes mellitus due to underlying condition ([facility]    Cardiac arrest ([facility]    Acute respiratory failure with hypoxia (HCC)    Hypothermia    Shock ([facility]    Severe protein-calorie malnutrition (HCC)       Allergies:   Allergies   Allergen Reactions    No Known Allergies        Current Medications:  Current Facility-Administered Medications   Medication Dose Route Frequency Provider Last Rate Last Dose    pantoprazole (PROTONIX) tablet 40 mg  40 mg Oral QAM AC Maurice Bruner MD        sodium chloride flush 0.9 % injection             ipratropium-albuterol (DUONEB) nebulizer solution 1 ampule  1 ampule Inhalation Q4H CHAYO Alcantar        cetirizine (ZYRTEC) tablet 10 mg  10 mg Oral Daily Maurice Bruner MD   10 mg at 01/08/20 0802    insulin glargine (LANTUS) injection vial 25 Units  25 Units Subcutaneous Nightly Maurice Bruner MD   25 Units at 01/08/20 2126    insulin lispro (HUMALOG) injection vial 0-12 Units  0-12 Units Subcutaneous TID  Maurice Bruner MD   6 Units at 01/08/20 1816    insulin lispro (HUMALOG) injection vial 0-6 Units  0-6 Units Subcutaneous Nightly Maurice Bruner MD   3 Units at 01/08/20 2126    heparin (porcine) injection 5,000 Units  5,000 Units Subcutaneous Q8H Maurice Bruner MD   Stopped at 01/09/20 0645    midodrine (PROAMATINE) tablet 5 mg  5 mg Oral TID  Maurice Bruner MD   5 mg at 01/08/20 1700    ampicillin-sulbactam (UNASYN) 3 g ivpb minibag  3 g Intravenous Q12H Maurice Bruner MD   Stopped at 01/09/20 0128    heparin (porcine) injection 4,500 Units  4,500 Units Intercatheter PRN Maurice Bruner MD        perflutren lipid microspheres (DEFINITY) injection 1.65 mg  1.5 mL Intravenous ONCE PRN Maurice Bruner WBC 10.8 12.0* 11.6*   RBC 3.23* 3.07* 3.08*   HGB 10.0* 9.6* 9.7*   HCT 31.3* 29.8* 29.9*   MCV 96.9 97.1 97.1   MCH 31.0 31.3 31.5   MCHC 31.9* 32.2 32.4   RDW 16.0* 15.2* 14.8   * 120* 200   MPV 8.7 9.4 9.9     Lab Results   Component Value Date    CKTOTAL 607 (H) 01/04/2020    CKMB 6.1 12/08/2019    TROPONINI 0.03 01/04/2020    TROPONINI 0.01 12/08/2019    TROPONINI 0.03 10/18/2019     Lab Results   Component Value Date    INR 1.0 01/04/2020    INR 1.1 01/04/2020    INR 1.0 11/10/2018    PROTIME 11.5 01/04/2020    PROTIME 11.9 01/04/2020    PROTIME 11.3 11/10/2018     Lab Results   Component Value Date    TSH 3.330 01/04/2020     Lab Results   Component Value Date    LABA1C 10.7 (H) 12/08/2019     No results found for: EAG  Lab Results   Component Value Date    CHOL 144 11/11/2018    CHOL 113 02/18/2011    CHOL 162 10/11/2010     Lab Results   Component Value Date    TRIG 73 11/11/2018    TRIG 94 02/18/2011    TRIG 192 (H) 10/11/2010     Lab Results   Component Value Date    HDL 65 11/11/2018    HDL 46.0 02/18/2011    HDL 50.0 10/11/2010     Lab Results   Component Value Date    LDLCALC 64 11/11/2018    LDLCALC 48 02/18/2011    LDLCALC 74 10/11/2010     Lab Results   Component Value Date    LABVLDL 15 11/11/2018     No results found for: CHOLHDLRATIO    Cardiac Tests:  Telemetry findings reviewed: SR at rate 90s, no new tachy/bradyarrhythmias overnight.     EKG: Wide-complex junctional rhythm with premature ventricular complexes     Repeat EKG showed normal sinus rhythm, prolonged QT interval, abnormal EKG.    Vitals: Blood pressure 102/61 with heart rate of 63>> 133/78 and heart rate 99>>136/96. RR85     Labs were reviewed: Bun/creatinine 31/2.9>> 26/3.2>>21/3.1, electrolytes normal, potassium 4.2, WBC 12.   H&H 10/31.3>> 9.6/29.8>>9.7/29.9 platelets 563>> 618, urine drug screen positive for cocaine      Echocardiogram: LVEF 35 to 40%, abnormal diastolic function with indeterminate grade mildly dilated RV with reduced systolic function, moderate MR and trace TR, unable to estimate PA systolic pressure        Assessment:  · Status post cardiac arrest secondary to DKA and hypothermia now improved  · Acute respiratory failure status post intubation, improved  · New onset cardiomyopathy, unclear etiology, EF 35 to 40%  · Type 1 diabetes with recurrent episodes of DKA secondary to noncompliance>> improved/resolved  · History of polysubstance abuse  · Alcohol abuse  · History of hypertension  · Hyperlipidemia  · Acute kidney injury on hemodialysis  · Hypotension - improving on midodrine, will wean off. · Moderate anemia-stable     Plan:  · Supportive care as per critical care service   · Keep him n.p.o. and will schedule him for cardiac cath tomorrow morning for ischemic evaluation. HD after heart cath. ·  Wean off midodrine and decrease midodrine to 2.5 mg po TID. Will start him on afterload reducing agents once he is hemodynamically stable. We are also going to initiate him on guideline directed medical therapy of for heart failure with reduced ejection fraction including beta-blockers. · He was advised not to use any cocaine. Will discuss with him again before starting on beta-blocker therapy. · Continue rest of his medications, will consider adding statin therapy once he is able to take oral medication  · Dialysis as per nephrology service. He needs he hemodialysis after the cardiac cath. Brock Rivera MD., [facility] [place].   [facility]) Cardiology

## 2020-01-09 NOTE — PROGRESS NOTES
Patient was given flutter valve at this time and educated on use. Patient demonstrates good understanding.

## 2020-01-09 NOTE — PROGRESS NOTES
Physical Therapy    Facility/Department: [facility] 6SE PCCU 1  Initial Assessment    NAME: Kailyn Stock  : 1970  MRN: [id number]    Date of Service: 2020    Evaluating Therapist: Rosa Dyer PT, DPT    Room #: 8270/5593-N  DIAGNOSIS: Cardiac Arrest  PRECAUTIONS: Falls  PMHx: Alcoholism, Cocaine abuse, DM, HTN, Peripheral neuropathy  PROCEDURES: NA    Social:  Pt lives alone in a 2nd floor apartment with 10 step(s) and 1 rail(s) to enter. Prior to admission pt walked with no device and was Independent. Initial Evaluation  Date: 20 Treatment  Date:     Short Term/ Long Term   Goals   AM-PAC 6 Clicks      Does pt have pain? No c/o pain     Bed Mobility  Rolling: NT  Supine to sit: NT  Sit to supine: NT  Scooting: NT  Mod Independent   Transfers Sit to stand: SBA  Stand to sit: SBA  Stand pivot: SBA no device  Independent   Ambulation   400 feet with SBA with no device  >400 feet Independently   Stair negotiation: ascended and descended 4 steps with 1 rail with SBA  >10 steps with 1 rail with Mod Independent   BLE ROM WNL     BLE strength Grossly 4+/5  Increase by 1/3 MMT grade   Balance Sitting: Independent  Standing: SBA no device  Sitting: NA  Standing: Independent     Pt is alert and oriented x 4  Sensation: paresthesias in feet due to neuropathy  Edema: WNL    ASSESSMENT  Pt displays functional ability as noted in the objective portion of this evaluation. Comments/Treatment:  Pt was sitting EOB upon arrival, agreeable to initial evaluation. Pt ambulated with mild unsteadiness and a staggering gait. Pt occasional reached for hallway railing when available but no physical assistance required. Pt ascended stairs with reciprocal pattern and descended with non-reciprocal pattern. Pt returned to sitting EOB per request with all needs met and call light in reach. Educated pt on safety with mobility.   Will add to gait team.     Patient education  Pt educated on safety    Patient response to education:   Pt verbalized understanding Pt demonstrated skill Pt requires further education in this area   x x x       Pts/ family goals   1. Return home    Patient and or family understand(s) diagnosis, prognosis, and plan of care. PLAN  PT care will be provided in accordance with the objectives noted above. Whenever appropriate, clear delegation orders will be provided for nursing staff. Exercises and functional mobility practice will be used as well as appropriate assistive devices or modalities to obtain goals. Patient and family education will also be administered as needed. Frequency of treatments will be 2-5x/week as able.     Time in: 0745  Time out: Clarisse Mcfadden, PT, DPT  [id number]

## 2020-01-09 NOTE — PLAN OF CARE
Problem: Falls - Risk of:  Goal: Will remain free from falls  Description  Will remain free from falls  1/9/2020 0135 by Wu Cuevas RN  Outcome: Met This Shift     Problem: Breathing Pattern - Ineffective:  Goal: Ability to achieve and maintain a regular respiratory rate will improve  Description  Ability to achieve and maintain a regular respiratory rate will improve  1/9/2020 0135 by Wu Cuevas RN  Outcome: Met This Shift

## 2020-01-09 NOTE — PROGRESS NOTES
Occupational Therapy  OCCUPATIONAL THERAPY INITIAL EVALUATION      Date:2020  Patient Name: Kailyn Stock  MRN: [id number]  : 1970  Room: [facility]    Evaluating OT: Riley Marilynrosalino OTR/L #[id number]     AM-PAC Daily Activity Raw Score:     Recommended Adaptive Equipment:  TBD     Diagnosis: cardiac arrest   Patient presented to ED following being found unresponsive on bathroom floor; intermittent bradycardia and PEA requiring CPR. Pt required intubation and ICU stay. + cocaine and daily ETOH     Pertinent Medical History:    Past Medical History:   Diagnosis Date    Alcoholism ([facility]     Cocaine abuse ([facility]     Diabetes mellitus ([facility]     Hypertension     Idiopathic peripheral neuropathy 2016    Marijuana abuse       Precautions:  Falls     Home Living: Pt lives alone in a 2nd floor apartment with 10 DIONICIO and B wide handrails. Bathroom setup: walk-in shower   Equipment owned: none    Prior Level of Function: Independent with ADLs , Independent with IADLs; ambulated without AD  Driving: yes  Occupation: none    Pain Level: Pt reports chest pain from CPR rated at 9/10 ; Pt reports nursing is aware.   Therapist facilitated repositioning to address pain      Cognition: A&O: 4/4; Follows 2 step directions   Memory:  good   Sequencing:  good   Problem solving:  good   Judgement/safety:  fair     Functional Assessment:   Initial Eval Status  Date: 20 Treatment Status  Date: Short Term Goals  Treatment frequency: PRN   Feeding Independent       Grooming Stand by Assist   (standing)  Modified [place]    UB Dressing Independent       LB Dressing Stand by Assist   Modified [place]    Bathing Stand by Assist  Modified [place]    Toileting Stand by Assist   Modified [place]    Bed Mobility  Supine to sit: Stand by Assist   Sit to supine: Stand by Assist     Log roll technique due to chest / Sternal pain  Supine to sit: Modified [place]   Sit to supine: Modified [place] Functional Transfers Stand by Assist   Modified [place]    Functional Mobility Stand by Assist     Ambulated in room without AD   Modified [place]    Balance Sitting:     Static:  indep    Dynamic:supervision  Standing: SBA     Activity Tolerance F  F+   Visual/  Perceptual Glasses:  Yes  wfl                  Hand dominance: right      Strength ROM Additional Info:    RUE  wfl wfl good  and wfl FMC/dexterity noted during ADL tasks     LUE wfl wfl good  and wfl FMC/dexterity noted during ADL tasks   Deferred formal MMT due to sternal / chest pain;  Strength observed through functional tasks  Hearing: wfl  Sensation chronic B UE / LE neuropathy reported  Tone: wfl  Edema:none noted                             Comments/Treatment: Upon arrival, patient supine in bed and agreeable to OT session. Therapist facilitated bed mobility, functional transfers (various surfaces), standing tolerance tasks (addressing posture, balance and activity tolerance) and functional ambulation task in room without AD - skilled cuing on hand placement, posture, body mechanics, energy conservation techniques and safety. Therapist facilitated self-care retraining: UB/LB self-care tasks (robe, socks), simulated toileting task and standing grooming task at sink while educating pt on modified techniques, posture, safety and energy conservation techniques. Skilled monitoring of HR, O2 sats and pts response to treatment. Pt demonstrating fair+ understanding of education/techniques, requiring additional training / education. At end of session, patient supine in bed with call light and phone within reach, all lines intact. Pt would benefit from continued skilled OT to increase functional independence and quality of life.     Eval Complexity: Low    (Evaluation time includes thorough review of current medical information, gathering information on past medical history/social history and prior level of function, completion of standardized testing/informal observation of tasks, assessment of data, and development of POC/Goals.)      Assessment of current deficits   Functional mobility [x]  ADLs [x] Strength [x]  Cognition []  Functional transfers  [x] IADLs [x] Safety Awareness [x]  Endurance [x]  Fine Motor Coordination [] Balance [x] Vision/perception [] Sensation []   Gross Motor Coordination [] ROM [] Delirium []                  Motor Control []    Plan of Care:   ADL retraining [x]   Equipment needs [x]   Neuromuscular re-education [x] Energy Conservation Techniques [x]  Functional Transfer training [x] Patient and/or Family Education [x]  Functional Mobility training [x]  Environmental Modifications [x]  Cognitive re-training []   Compensatory techniques for ADLs [x]  Splinting Needs []   Positioning to improve overall function [x]   Therapeutic Activity [x]  Therapeutic Exercise  [x]  Visual/Perceptual: []    Delirium prevention/treatment  []   Other:  []    Rehab Potential: Good for established goals     Patient / Family Goal:  Not stated     Patient and/or family were instructed diagnosis, prognosis/goals and plan of care. Demonstrated fair+ understanding. [] Malnutrition indicators have been identified and nursing has been notified to ensure a dietitian consult is ordered.        AM-PAC Daily Activity Inpatient   How much help for putting on and taking off regular lower body clothing?: A Little  How much help for Bathing?: A Little  How much help for Toileting?: A Little  How much help for putting on and taking off regular upper body clothing?: None  How much help for taking care of personal grooming?: A Little  How much help for eating meals?: None  AM-[facility] Inpatient Daily Activity Raw Score: 20  AM-PAC Inpatient ADL T-Scale Score : 42.03  ADL Inpatient CMS 0-100% Score: 38.32  ADL Inpatient CMS G-Code Modifier : CJ       low Evaluation + 9 timed treatment minutes  Tx Time in: 901  Tx Time out: [address] OTR/L #[id number]

## 2020-01-10 ENCOUNTER — APPOINTMENT (OUTPATIENT)
Dept: GENERAL RADIOLOGY | Age: 50
DRG: 917 | End: 2020-01-10

## 2020-01-10 LAB
ANION GAP SERPL CALCULATED.3IONS-SCNC: 16 MMOL/L (ref 7–16)
ANION GAP SERPL CALCULATED.3IONS-SCNC: 16 MMOL/L (ref 7–16)
BUN BLDV-MCNC: 24 MG/DL (ref 6–20)
BUN BLDV-MCNC: 27 MG/DL (ref 6–20)
CALCIUM SERPL-MCNC: 8.3 MG/DL (ref 8.6–10.2)
CALCIUM SERPL-MCNC: 8.8 MG/DL (ref 8.6–10.2)
CHLORIDE BLD-SCNC: 97 MMOL/L (ref 98–107)
CHLORIDE BLD-SCNC: 97 MMOL/L (ref 98–107)
CO2: 21 MMOL/L (ref 22–29)
CO2: 24 MMOL/L (ref 22–29)
CREAT SERPL-MCNC: 2.8 MG/DL (ref 0.7–1.2)
CREAT SERPL-MCNC: 3.2 MG/DL (ref 0.7–1.2)
GFR AFRICAN AMERICAN: 25
GFR AFRICAN AMERICAN: 29
GFR NON-AFRICAN AMERICAN: 21 ML/MIN/1.73
GFR NON-AFRICAN AMERICAN: 24 ML/MIN/1.73
GLUCOSE BLD-MCNC: 251 MG/DL (ref 74–99)
GLUCOSE BLD-MCNC: 380 MG/DL (ref 74–99)
HCT VFR BLD CALC: 27.7 % (ref 37–54)
HEMOGLOBIN: 8.9 G/DL (ref 12.5–16.5)
Lab: NORMAL
MAGNESIUM: 1.9 MG/DL (ref 1.6–2.6)
MCH RBC QN AUTO: 30.7 PG (ref 26–35)
MCHC RBC AUTO-ENTMCNC: 32.1 % (ref 32–34.5)
MCV RBC AUTO: 95.5 FL (ref 80–99.9)
METER GLUCOSE: 279 MG/DL (ref 74–99)
METER GLUCOSE: 289 MG/DL (ref 74–99)
METER GLUCOSE: 329 MG/DL (ref 74–99)
METER GLUCOSE: 349 MG/DL (ref 74–99)
PDW BLD-RTO: 14.7 FL (ref 11.5–15)
PHOSPHORUS: 4.3 MG/DL (ref 2.5–4.5)
PLATELET # BLD: 263 E9/L (ref 130–450)
PMV BLD AUTO: 9.4 FL (ref 7–12)
POTASSIUM SERPL-SCNC: 3.9 MMOL/L (ref 3.5–5)
POTASSIUM SERPL-SCNC: 4.3 MMOL/L (ref 3.5–5)
RBC # BLD: 2.9 E12/L (ref 3.8–5.8)
REPORT: NORMAL
SODIUM BLD-SCNC: 134 MMOL/L (ref 132–146)
SODIUM BLD-SCNC: 137 MMOL/L (ref 132–146)
THIS TEST SENT TO: NORMAL
WBC # BLD: 8.7 E9/L (ref 4.5–11.5)

## 2020-01-10 PROCEDURE — 82962 GLUCOSE BLOOD TEST: CPT

## 2020-01-10 PROCEDURE — 6360000002 HC RX W HCPCS: Performed by: INTERNAL MEDICINE

## 2020-01-10 PROCEDURE — 99232 SBSQ HOSP IP/OBS MODERATE 35: CPT | Performed by: INTERNAL MEDICINE

## 2020-01-10 PROCEDURE — 83735 ASSAY OF MAGNESIUM: CPT

## 2020-01-10 PROCEDURE — 80048 BASIC METABOLIC PNL TOTAL CA: CPT

## 2020-01-10 PROCEDURE — 6370000000 HC RX 637 (ALT 250 FOR IP): Performed by: INTERNAL MEDICINE

## 2020-01-10 PROCEDURE — 84100 ASSAY OF PHOSPHORUS: CPT

## 2020-01-10 PROCEDURE — 85027 COMPLETE CBC AUTOMATED: CPT

## 2020-01-10 PROCEDURE — 99231 SBSQ HOSP IP/OBS SF/LOW 25: CPT | Performed by: INTERNAL MEDICINE

## 2020-01-10 PROCEDURE — 71045 X-RAY EXAM CHEST 1 VIEW: CPT

## 2020-01-10 PROCEDURE — 94640 AIRWAY INHALATION TREATMENT: CPT

## 2020-01-10 PROCEDURE — 36415 COLL VENOUS BLD VENIPUNCTURE: CPT

## 2020-01-10 PROCEDURE — 2580000003 HC RX 258: Performed by: INTERNAL MEDICINE

## 2020-01-10 PROCEDURE — 2140000000 HC CCU INTERMEDIATE R&B

## 2020-01-10 PROCEDURE — 94669 MECHANICAL CHEST WALL OSCILL: CPT

## 2020-01-10 RX ORDER — METOPROLOL SUCCINATE 25 MG/1
25 TABLET, EXTENDED RELEASE ORAL DAILY
Status: DISCONTINUED | OUTPATIENT
Start: 2020-01-10 | End: 2020-01-11 | Stop reason: HOSPADM

## 2020-01-10 RX ORDER — HYDRALAZINE HYDROCHLORIDE 10 MG/1
10 TABLET, FILM COATED ORAL EVERY 8 HOURS SCHEDULED
Status: DISCONTINUED | OUTPATIENT
Start: 2020-01-10 | End: 2020-01-11

## 2020-01-10 RX ADMIN — CETIRIZINE HYDROCHLORIDE 10 MG: 10 TABLET, FILM COATED ORAL at 07:51

## 2020-01-10 RX ADMIN — HEPARIN SODIUM 5000 UNITS: 10000 INJECTION INTRAVENOUS; SUBCUTANEOUS at 06:02

## 2020-01-10 RX ADMIN — IPRATROPIUM BROMIDE AND ALBUTEROL SULFATE 1 AMPULE: 2.5; .5 SOLUTION RESPIRATORY (INHALATION) at 16:33

## 2020-01-10 RX ADMIN — MIDODRINE HYDROCHLORIDE 2.5 MG: 5 TABLET ORAL at 07:51

## 2020-01-10 RX ADMIN — INSULIN GLARGINE 25 UNITS: 100 INJECTION, SOLUTION SUBCUTANEOUS at 21:08

## 2020-01-10 RX ADMIN — PANTOPRAZOLE SODIUM 40 MG: 40 TABLET, DELAYED RELEASE ORAL at 06:02

## 2020-01-10 RX ADMIN — IPRATROPIUM BROMIDE AND ALBUTEROL SULFATE 1 AMPULE: 2.5; .5 SOLUTION RESPIRATORY (INHALATION) at 20:43

## 2020-01-10 RX ADMIN — HYDRALAZINE HYDROCHLORIDE 10 MG: 10 TABLET, FILM COATED ORAL at 21:15

## 2020-01-10 RX ADMIN — INSULIN LISPRO 6 UNITS: 100 INJECTION, SOLUTION INTRAVENOUS; SUBCUTANEOUS at 17:24

## 2020-01-10 RX ADMIN — Medication 10 ML: at 21:28

## 2020-01-10 RX ADMIN — HEPARIN SODIUM 5000 UNITS: 10000 INJECTION INTRAVENOUS; SUBCUTANEOUS at 21:16

## 2020-01-10 RX ADMIN — Medication 10 ML: at 07:52

## 2020-01-10 RX ADMIN — INSULIN LISPRO 6 UNITS: 100 INJECTION, SOLUTION INTRAVENOUS; SUBCUTANEOUS at 21:07

## 2020-01-10 RX ADMIN — INSULIN LISPRO 6 UNITS: 100 INJECTION, SOLUTION INTRAVENOUS; SUBCUTANEOUS at 11:47

## 2020-01-10 RX ADMIN — HYDRALAZINE HYDROCHLORIDE 10 MG: 10 TABLET, FILM COATED ORAL at 14:52

## 2020-01-10 RX ADMIN — PETROLATUM: 42 OINTMENT TOPICAL at 07:51

## 2020-01-10 RX ADMIN — INSULIN LISPRO 12 UNITS: 100 INJECTION, SOLUTION INTRAVENOUS; SUBCUTANEOUS at 07:51

## 2020-01-10 RX ADMIN — IPRATROPIUM BROMIDE AND ALBUTEROL SULFATE 1 AMPULE: 2.5; .5 SOLUTION RESPIRATORY (INHALATION) at 09:52

## 2020-01-10 RX ADMIN — HEPARIN SODIUM 5000 UNITS: 10000 INJECTION INTRAVENOUS; SUBCUTANEOUS at 14:52

## 2020-01-10 RX ADMIN — METOPROLOL SUCCINATE 25 MG: 25 TABLET, EXTENDED RELEASE ORAL at 12:14

## 2020-01-10 ASSESSMENT — PAIN SCALES - GENERAL: PAINLEVEL_OUTOF10: 0

## 2020-01-10 NOTE — PROGRESS NOTES
Department of Internal Medicine  Nephrology Attending Consult Note    Events reviewed. SUBJECTIVE: We are following MrMinisterio Mccarthy for KIRSTEN. Reports no complaints. PHYSICAL EXAM:      Vitals:    VITALS:  BP (!) 167/89   Pulse 86   Temp 98.8 °F (37.1 °C)   Resp 16   Ht 5' 6\" (1.676 m)   Wt 144 lb 8 oz (65.5 kg)   SpO2 96%   BMI 23.32 kg/m²   24HR INTAKE/OUTPUT:      Intake/Output Summary (Last 24 hours) at 1/10/2020 1047  Last data filed at 1/10/2020 1046  Gross per 24 hour   Intake 1980 ml   Output 1800 ml   Net 180 ml       Access: Left femoral vein temporary dialysis catheter in place   Constitutional: Patient is awake alert in no distress.   HEENT: Pupils are equal reactive, endotracheal tube in place  Respiratory: Lungs are clear  Cardiovascular/Edema: Heart sounds are regular rate  Gastrointestinal: Abdomen soft  Neurologic: Patient sedated  Skin: No lesions  Other: No edema    Scheduled Meds:   pantoprazole  40 mg Oral QAM AC    ipratropium-albuterol  1 ampule Inhalation Q4H WA    [Held by provider] midodrine  2.5 mg Oral TID WC    sodium chloride flush  10 mL Intravenous 2 times per day    insulin lispro  0-18 Units Subcutaneous TID WC    insulin lispro  0-9 Units Subcutaneous Nightly    cetirizine  10 mg Oral Daily    insulin glargine  25 Units Subcutaneous Nightly    heparin (porcine)  5,000 Units Subcutaneous Q8H     Continuous Infusions:   dextrose       PRN Meds:.acetaminophen, magnesium hydroxide, sodium chloride flush, mineral oil-hydrophilic petrolatum, heparin (porcine), perflutren lipid microspheres, glucose, dextrose, glucagon (rDNA), dextrose    DATA:    CBC:   Lab Results   Component Value Date    WBC 8.7 01/10/2020    RBC 2.90 01/10/2020    HGB 8.9 01/10/2020    HCT 27.7 01/10/2020    MCV 95.5 01/10/2020    MCH 30.7 01/10/2020    MCHC 32.1 01/10/2020    RDW 14.7 01/10/2020     01/10/2020    MPV 9.4 01/10/2020     CMP:    Lab Results   Component Value Date     01/10/2020    K 3.9 01/10/2020    K 5.7 10/18/2019    CL 97 01/10/2020    CO2 24 01/10/2020    BUN 27 01/10/2020    CREATININE 3.2 01/10/2020    GFRAA 25 01/10/2020    LABGLOM 21 01/10/2020    GLUCOSE 380 01/10/2020    GLUCOSE 83 04/11/2012    PROT 6.0 01/04/2020    LABALBU 3.7 01/04/2020    LABALBU 4.3 12/30/2011    CALCIUM 8.3 01/10/2020    BILITOT 0.3 01/04/2020    ALKPHOS 157 01/04/2020    AST 26 01/04/2020    ALT 30 01/04/2020     Magnesium:    Lab Results   Component Value Date    MG 1.9 01/10/2020     Phosphorus:    Lab Results   Component Value Date    PHOS 4.3 01/10/2020     Radiology Review:      Chest x-ray January 6/2020   No interval change                 Echo: Ejection fraction 35 to 40%    BRIEF SUMMARY OF INITIAL CONSULT:    Briefly Mr. Raimundo Silveira is a 64-year-old man with history of HTN, type I DM, alcohol abuse, cocaine abuse, who was admitted on January 4, 2020 after he was brought to the ER by EMS services, patient was found in the floor unresponsive he was bradycardic with PEA, he received multiple doses of epinephrine and bicarbonate. In the ER his glucose was 1094 mg/dL, creatinine was 2.3 mg/dL. Since admission he has received large volume resuscitation with about 7.3 L in the last 24 hours and his urine output has decreased only to 502 cc in the last 24 hours, reasons for this consultation. Problems resolved:    · Respiratory failure, status post intubation  · Alkalemia (pH: 7.469), with respiratory alkalosis HAGMA (DKA) and metabolic alkalosis (HD)    · Hemodynamic shock, multifactorial, hypovolemic, probably sepsis, rule out cardiogenic  · DKA, improved on insulin drip    IMPRESSION/RECOMMENDATIONS:      1. KIRSTEN stage III, established ischemic ATN status post cardiac arrest.  Started on renal replacement therapy on January 6, 2020. Urine output continues to increase with a stable creatinine. We will hold dialysis today.     2. Status post cardiac arrest, status post cardiac catheterization, no evidence of CAD. 3. HFrEF 35-40%, nonischemic cardiomyopathy    Plan:    · No dialysis today.   · BMP at 2 PM  · Remove temporary dialysis catheter if BMP at 2 PM improved

## 2020-01-10 NOTE — PROGRESS NOTES
[address]   Internal Medicine Residency / [address]    Attending Physician Statement  I have discussed the case, including pertinent history and exam findings with the resident and the team.  I have seen and examined the patient and the key elements of the encounter have been performed by me. I agree with the assessment, plan and orders as documented by the resident. A&O  Cardiac cath free of CAD  H&L clear  Ribs tender post CPR  ATN and nephropathy DM  Impression; DM 1 and nonischemic Cardiomyopathy                      S/P Cardiac arrest  Plan; Determine need for ICD/Cocaine  abuse           Dialysis prn   Remainder of medical problems as per resident note.       Flavio Stahl  Internal Medicine Residency Faculty

## 2020-01-10 NOTE — PROGRESS NOTES
[facility]  Internal Medicine Residency Program  Progress Note - House Team 1    Patient:  Thelma Giles 51 y.o. male MRN: [id number]     Date of Service: 1/10/2020     CC: cardiac arrest    Subjective     Pt doing well today, alert, awake and oriented x3, eating well on nasal canula, denies any symp    Objective     Physical Exam:  · Vitals: /84   Pulse 96   Temp 98.4 °F (36.9 °C) (Temporal)   Resp 18   Ht 5' 6\" (1.676 m)   Wt 144 lb 8 oz (65.5 kg)   SpO2 96%   BMI 23.32 kg/m²     · I & O - 24hr: No intake/output data recorded. · General Appearance: alert and awake  · HEENT:  Head: Normal, normocephalic, atraumatic. · Neck: no adenopathy, no carotid bruit, no JVD and supple, symmetrical, trachea midline  · Lung: clear to auscultation bilaterally  · Heart: regular rate and rhythm, S1, S2 normal, no murmur, click, rub or gallop  · Abdomen: soft, non-tender; bowel sounds normal; no masses,  no organomegaly  · Extremities:  extremities normal, atraumatic, no cyanosis or edema  · Musculokeletal: No joint swelling, no muscle tenderness. ROM normal in all joints of extremities.    · Neurologic: Mental status: AAOx3  Subject  Pertinent Labs & Imaging Studies   chika  CBC with Differential:    Lab Results   Component Value Date    WBC 8.7 01/10/2020    RBC 2.90 01/10/2020    HGB 8.9 01/10/2020    HCT 27.7 01/10/2020     01/10/2020    MCV 95.5 01/10/2020    MCH 30.7 01/10/2020    MCHC 32.1 01/10/2020    RDW 14.7 01/10/2020    SEGSPCT 53 01/25/2014    SEGSPCT 86 10/11/2010    BANDSPCT 3 10/10/2010    METASPCT 0.9 01/04/2020    LYMPHOPCT 11.2 01/04/2020    MONOPCT 5.2 01/04/2020    MYELOPCT 2.6 01/04/2020    EOSPCT 2 10/12/2010    BASOPCT 0.8 01/04/2020    MONOSABS 0.94 01/04/2020    LYMPHSABS 2.06 01/04/2020    EOSABS 0.00 01/04/2020    BASOSABS 0.00 01/04/2020     BMP:    Lab Results   Component Value Date     01/09/2020    K 4.0 01/09/2020    K 5.7 10/18/2019

## 2020-01-10 NOTE — PROGRESS NOTES
INPATIENT CARDIOLOGY FOLLOW-UP    Name: Teresa Godinez    Age: 51 y.o. Date of Admission: 1/4/2020  4:33 PM    Date of Service: 1/10/2020    Chief Complaint: Follow-up for Status post cardiac arrest.      Interim History:   He was told he does not need anymore dialysis. Denies any new cardiac symptoms. .  He had hypotension this morning and was initiated on midodrine after a brief IV Levophed. No new overnight cardiac complaints. Still has some chest discomfort from CPR heart and breathing. Currently with no complaints of SOB, palpitations, dizziness, or lightheadedness. SR on telemetry.     Review of Systems:   Cardiac: As per HPI  General: No fever, chills  Pulmonary: As per HPI  HEENT: No visual disturbances, difficult swallowing  GI: No nausea, vomiting  Endocrine: No thyroid disease or DM  Musculoskeletal: WALLACE x 4, no focal motor deficits  Skin: Intact, no rashes  Neuro/Psych: No headache or seizures    Problem List:  Patient Active Problem List   Diagnosis    Tobacco abuse    Diabetes mellitus type 1, uncontrolled ([address]    Moderate nonproliferative diabetic retinopathy associated with type 1 diabetes mellitus ([address]    Essential hypertension    Diabetic ketoacidosis without coma associated with type 1 diabetes mellitus (HCC)    Idiopathic peripheral neuropathy    Thrombocytopenia (HCC)    Acute ischemic multifocal multiple vascular territories stroke ([address]    Left hemiplegia ([address]    Diabetic ketoacidosis with coma associated with type 1 diabetes mellitus ([address]    KIRSTEN (acute kidney injury) ([address]    High anion gap metabolic acidosis    Leukocytosis    Lacunar stroke ([address]    ETOH abuse    Type 1 diabetes mellitus with complication (HCC)    Marijuana abuse    Lactic acidosis    Acute renal insufficiency    Hyperkalemia    Hypokalemia    Hypophosphatemia    Community acquired pneumonia of right lower lobe of lung ([address]    DKA, type 1, not at goal [facility])    Mixed hyperlipidemia    PAF (paroxysmal atrial fibrillation) ([facility])    Dark stools    Urticaria    Insulin pump in place    Diabetic ketoacidosis without coma associated with diabetes mellitus due to underlying condition ([facility]    [facility] arrest [facility]    Acute respiratory failure with hypoxia ([facility])    Hypothermia    Shock ([facility]    Severe protein-calorie malnutrition (HCC)       Allergies:   Allergies   Allergen Reactions    No Known Allergies        Current Medications:  Current Facility-Administered Medications   Medication Dose Route Frequency Provider Last Rate Last Dose    insulin lispro (HUMALOG) injection vial 0-12 Units  0-12 Units Subcutaneous TID  Oj Hein MD        insulin lispro (HUMALOG) injection vial 0-6 Units  0-6 Units Subcutaneous Nightly Oj Hein MD        pantoprazole (PROTONIX) tablet 40 mg  40 mg Oral QAM AC Les Cage MD   40 mg at 01/10/20 0602    ipratropium-albuterol (DUONEB) nebulizer solution 1 ampule  1 ampule Inhalation Q4H WA Les Cage MD   1 ampule at 01/10/20 2747    acetaminophen (TYLENOL) tablet 650 mg  650 mg Oral Q4H PRN Les Cage MD        magnesium hydroxide (MILK OF MAGNESIA) 400 MG/5ML suspension 30 mL  30 mL Oral Daily PRN Les Cage MD        sodium chloride flush 0.9 % injection 10 mL  10 mL Intravenous 2 times per day Les Cage MD   10 mL at 01/10/20 0752    sodium chloride flush 0.9 % injection 10 mL  10 mL Intravenous PRN Les Cage MD        mineral oil-hydrophilic petrolatum (HYDROPHOR) ointment   Topical BID PRN Rebecca Suly Galeana MD        cetirizine (ZYRTEC) tablet 10 mg  10 mg Oral Daily Les Cage MD   10 mg at 01/10/20 0751    insulin glargine (LANTUS) injection vial 25 Units  25 Units Subcutaneous Nightly Lse Cage MD   25 Units at 01/09/20 2007    heparin (porcine) injection 5,000 Units  5,000 Units Subcutaneous Q8H Les Cage MD   5,000 Units at 01/10/20 0602    heparin (porcine) injection 4,500 Units 4,500 Units Intercatheter PRN Lynette Downs MD        perflutren lipid microspheres (DEFINITY) injection 1.65 mg  1.5 mL Intravenous ONCE PRN Lynette Downs MD        glucose (GLUTOSE) 40 % oral gel 15 g  15 g Oral PRN Lynette Downs MD        dextrose 50 % IV solution  12.5 g Intravenous PRN Lynette Downs MD        glucagon (rDNA) injection 1 mg  1 mg Intramuscular PRN Lynette Downs MD        dextrose 5 % solution  100 mL/hr Intravenous PRN Lynette Downs MD          dextrose         Physical Exam:  BP (!) 167/89   Pulse 86   Temp 98.8 °F (37.1 °C)   Resp 16   Ht 5' 6\" (1.676 m)   Wt 144 lb 8 oz (65.5 kg)   SpO2 96%   BMI 23.32 kg/m²   Wt Readings from Last 3 Encounters:   01/10/20 144 lb 8 oz (65.5 kg)   12/12/19 137 lb 3.2 oz (62.2 kg)   10/20/19 136 lb 3.9 oz (61.8 kg)     Appearance: Awake, alert, no acute respiratory distress  Skin: Intact, no rash  Head: Normocephalic, atraumatic  Eyes: EOMI, no conjunctival erythema  ENMT: No pharyngeal erythema, MMM, no rhinorrhea  Neck: Supple, no elevated JVP, no carotid bruits  Lungs: Has bilateral rhonchi cardiac: Regular rate and rhythm, +S1S2, no murmurs apparent  Abdomen: Soft, nontender, +bowel sounds  Extremities: Moves all extremities x 4, no lower extremity edema  Neurologic: No focal motor deficits apparent, normal mood and affect  Peripheral Pulses: Intact posterior tibial pulses bilaterally    Intake/Output:    Intake/Output Summary (Last 24 hours) at 1/10/2020 1133  Last data filed at 1/10/2020 1046  Gross per 24 hour   Intake 1980 ml   Output 1800 ml   Net 180 ml     I/O this shift:  In: 600 [P.O.:600]  Out: -     Laboratory Tests:  Recent Labs     01/08/20  2121 01/09/20  0756 01/10/20  0622    140 137   K 4.4 4.0 3.9    101 97*   CO2 24 23 24   BUN 18 21* 27*   CREATININE 2.6* 3.1* 3.2*   GLUCOSE 308* 215* 380*   CALCIUM 8.2* 8.4* 8.3*     Lab Results   Component Value Date    MG 1.9 01/10/2020     No results for input(s): ALKPHOS, ALT, AST, PROT, BILITOT, BILIDIR, LABALBU in the last 72 hours. Recent Labs     01/08/20  0600 01/09/20  0549 01/10/20  0622   WBC 12.0* 11.6* 8.7   RBC 3.07* 3.08* 2.90*   HGB 9.6* 9.7* 8.9*   HCT 29.8* 29.9* 27.7*   MCV 97.1 97.1 95.5   MCH 31.3 31.5 30.7   MCHC 32.2 32.4 32.1   RDW 15.2* 14.8 14.7   * 200 263   MPV 9.4 9.9 9.4     Lab Results   Component Value Date    CKTOTAL 607 (H) 01/04/2020    CKMB 6.1 12/08/2019    TROPONINI 0.03 01/04/2020    TROPONINI 0.01 12/08/2019    TROPONINI 0.03 10/18/2019     Lab Results   Component Value Date    INR 1.0 01/04/2020    INR 1.1 01/04/2020    INR 1.0 11/10/2018    PROTIME 11.5 01/04/2020    PROTIME 11.9 01/04/2020    PROTIME 11.3 11/10/2018     Lab Results   Component Value Date    TSH 3.330 01/04/2020     Lab Results   Component Value Date    LABA1C 10.7 (H) 12/08/2019     No results found for: EAG  Lab Results   Component Value Date    CHOL 144 11/11/2018    CHOL 113 02/18/2011    CHOL 162 10/11/2010     Lab Results   Component Value Date    TRIG 73 11/11/2018    TRIG 94 02/18/2011    TRIG 192 (H) 10/11/2010     Lab Results   Component Value Date    HDL 65 11/11/2018    HDL 46.0 02/18/2011    HDL 50.0 10/11/2010     Lab Results   Component Value Date    LDLCALC 64 11/11/2018    LDLCALC 48 02/18/2011    LDLCALC 74 10/11/2010     Lab Results   Component Value Date    LABVLDL 15 11/11/2018     No results found for: CHOLHDLRATIO    Cardiac Tests:  Telemetry findings reviewed: SR at rate 90s, no new tachy/bradyarrhythmias overnight.     EKG: Wide-complex junctional rhythm with premature ventricular complexes     Repeat EKG showed normal sinus rhythm, prolonged QT interval, abnormal EKG.    Vitals: Blood pressure 102/61 with heart rate of 63>> 133/78 and heart rate 99>>136/96. HR93>> 167/89 with heart rate of 86     Labs were reviewed: Bun/creatinine 31/2.9>> 26/3.2>>21/3.1>> 27/3.2, electrolytes normal, potassium 4.2, WBC 12.   H&H 10/31.3>> 9.6/29.8>>9.7/29.9>> 8. 9/27.7 platelets 453>> 931, urine drug screen positive for cocaine      Echocardiogram: LVEF 35 to 40%, abnormal diastolic function with indeterminate grade mildly dilated RV with reduced systolic function, moderate MR and trace TR, unable to estimate PA systolic pressure    Left heart catheterization no significant disease.     Assessment:  · Status post cardiac arrest secondary to DKA and hypothermia now improved  · Acute respiratory failure status post intubation, improved  · New onset cardiomyopathy, EF 35 to 40%, normal coronaries. 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 · Type 1 diabetes with recurrent episodes of DKA secondary to noncompliance>> improved/resolved  · History of polysubstance abuse  · Alcohol abuse  · History of hypertension  · Hyperlipidemia  · Acute kidney injury on hemodialysis. Urine output is increased he urinated about 2 L yesterday. · Hypotension - improving on midodrine, will wean off. · Moderate anemia-stable     Plan:  · Midodrine was weaned off his blood pressure is elevated. We will start him on Toprol-XL 25 mg. Daily and hydralazine 10 mg p.o. 3 times daily with holding parameters. He told me that he is not going to use anymore cocaine. .  Will start him on afterload reducing agents once he is hemodynamically stable. We are also going to initiate him on guideline directed medical therapy of for heart failure with reduced ejection fraction including beta-blockers. · He was advised not to use any cocaine. He promised that he is not going to use cocaine anymore. · Continue rest of his medications, will consider adding statin therapy once he is able to take oral medication  · Renal failure management as per nephrology service now his urine output picking up. Dr. Queen Romano recommended no dialysis. · He stable from a cardiology standpoint and appears euvolemic and hemodynamically stable. Anabella Duenas MD., [facility].   [facility]) Cardiology

## 2020-01-11 VITALS
BODY MASS INDEX: 23.29 KG/M2 | WEIGHT: 144.9 LBS | OXYGEN SATURATION: 98 % | SYSTOLIC BLOOD PRESSURE: 120 MMHG | TEMPERATURE: 98.4 F | RESPIRATION RATE: 16 BRPM | DIASTOLIC BLOOD PRESSURE: 81 MMHG | HEIGHT: 66 IN | HEART RATE: 88 BPM

## 2020-01-11 LAB
ANION GAP SERPL CALCULATED.3IONS-SCNC: 14 MMOL/L (ref 7–16)
BUN BLDV-MCNC: 23 MG/DL (ref 6–20)
CALCIUM SERPL-MCNC: 8.7 MG/DL (ref 8.6–10.2)
CHLORIDE BLD-SCNC: 99 MMOL/L (ref 98–107)
CO2: 25 MMOL/L (ref 22–29)
CREAT SERPL-MCNC: 2.3 MG/DL (ref 0.7–1.2)
GFR AFRICAN AMERICAN: 37
GFR NON-AFRICAN AMERICAN: 30 ML/MIN/1.73
GLUCOSE BLD-MCNC: 180 MG/DL (ref 74–99)
HCT VFR BLD CALC: 29.3 % (ref 37–54)
HEMOGLOBIN: 9.4 G/DL (ref 12.5–16.5)
MAGNESIUM: 1.9 MG/DL (ref 1.6–2.6)
MCH RBC QN AUTO: 30.9 PG (ref 26–35)
MCHC RBC AUTO-ENTMCNC: 32.1 % (ref 32–34.5)
MCV RBC AUTO: 96.4 FL (ref 80–99.9)
METER GLUCOSE: 108 MG/DL (ref 74–99)
METER GLUCOSE: 184 MG/DL (ref 74–99)
PDW BLD-RTO: 15 FL (ref 11.5–15)
PHOSPHORUS: 4.2 MG/DL (ref 2.5–4.5)
PLATELET # BLD: 331 E9/L (ref 130–450)
PMV BLD AUTO: 9 FL (ref 7–12)
POTASSIUM SERPL-SCNC: 4.3 MMOL/L (ref 3.5–5)
RBC # BLD: 3.04 E12/L (ref 3.8–5.8)
SODIUM BLD-SCNC: 138 MMOL/L (ref 132–146)
WBC # BLD: 6.2 E9/L (ref 4.5–11.5)

## 2020-01-11 PROCEDURE — 6370000000 HC RX 637 (ALT 250 FOR IP): Performed by: INTERNAL MEDICINE

## 2020-01-11 PROCEDURE — 85027 COMPLETE CBC AUTOMATED: CPT

## 2020-01-11 PROCEDURE — 36415 COLL VENOUS BLD VENIPUNCTURE: CPT

## 2020-01-11 PROCEDURE — 99238 HOSP IP/OBS DSCHRG MGMT 30/<: CPT | Performed by: INTERNAL MEDICINE

## 2020-01-11 PROCEDURE — 80048 BASIC METABOLIC PNL TOTAL CA: CPT

## 2020-01-11 PROCEDURE — 94640 AIRWAY INHALATION TREATMENT: CPT

## 2020-01-11 PROCEDURE — 6360000002 HC RX W HCPCS: Performed by: INTERNAL MEDICINE

## 2020-01-11 PROCEDURE — 2580000003 HC RX 258: Performed by: INTERNAL MEDICINE

## 2020-01-11 PROCEDURE — 99233 SBSQ HOSP IP/OBS HIGH 50: CPT | Performed by: INTERNAL MEDICINE

## 2020-01-11 PROCEDURE — 84100 ASSAY OF PHOSPHORUS: CPT

## 2020-01-11 PROCEDURE — 83735 ASSAY OF MAGNESIUM: CPT

## 2020-01-11 PROCEDURE — 82962 GLUCOSE BLOOD TEST: CPT

## 2020-01-11 PROCEDURE — 94669 MECHANICAL CHEST WALL OSCILL: CPT

## 2020-01-11 RX ORDER — HYDRALAZINE HYDROCHLORIDE 25 MG/1
25 TABLET, FILM COATED ORAL EVERY 8 HOURS SCHEDULED
Status: DISCONTINUED | OUTPATIENT
Start: 2020-01-11 | End: 2020-01-11 | Stop reason: HOSPADM

## 2020-01-11 RX ORDER — HYDRALAZINE HYDROCHLORIDE 25 MG/1
25 TABLET, FILM COATED ORAL EVERY 8 HOURS SCHEDULED
Qty: 90 TABLET | Refills: 3 | Status: SHIPPED | OUTPATIENT
Start: 2020-01-11 | End: 2020-07-20

## 2020-01-11 RX ORDER — METOPROLOL SUCCINATE 25 MG/1
25 TABLET, EXTENDED RELEASE ORAL DAILY
Qty: 30 TABLET | Refills: 3 | Status: ON HOLD | OUTPATIENT
Start: 2020-01-12 | End: 2020-06-22 | Stop reason: HOSPADM

## 2020-01-11 RX ADMIN — Medication 10 ML: at 08:10

## 2020-01-11 RX ADMIN — IPRATROPIUM BROMIDE AND ALBUTEROL SULFATE 1 AMPULE: 2.5; .5 SOLUTION RESPIRATORY (INHALATION) at 13:52

## 2020-01-11 RX ADMIN — HYDRALAZINE HYDROCHLORIDE 25 MG: 25 TABLET, FILM COATED ORAL at 13:50

## 2020-01-11 RX ADMIN — PANTOPRAZOLE SODIUM 40 MG: 40 TABLET, DELAYED RELEASE ORAL at 05:38

## 2020-01-11 RX ADMIN — HYDRALAZINE HYDROCHLORIDE 10 MG: 10 TABLET, FILM COATED ORAL at 05:38

## 2020-01-11 RX ADMIN — INSULIN LISPRO 3 UNITS: 100 INJECTION, SOLUTION INTRAVENOUS; SUBCUTANEOUS at 08:10

## 2020-01-11 RX ADMIN — HEPARIN SODIUM 5000 UNITS: 10000 INJECTION INTRAVENOUS; SUBCUTANEOUS at 05:38

## 2020-01-11 RX ADMIN — METOPROLOL SUCCINATE 25 MG: 25 TABLET, EXTENDED RELEASE ORAL at 08:10

## 2020-01-11 RX ADMIN — CETIRIZINE HYDROCHLORIDE 10 MG: 10 TABLET, FILM COATED ORAL at 08:10

## 2020-01-11 RX ADMIN — IPRATROPIUM BROMIDE AND ALBUTEROL SULFATE 1 AMPULE: 2.5; .5 SOLUTION RESPIRATORY (INHALATION) at 09:34

## 2020-01-11 ASSESSMENT — PAIN SCALES - GENERAL: PAINLEVEL_OUTOF10: 0

## 2020-01-11 NOTE — PLAN OF CARE
Problem: Risk for Impaired Skin Integrity  Goal: Tissue integrity - skin and mucous membranes  Description  Structural intactness and normal physiological function of skin and  mucous membranes.   Outcome: Met This Shift     Problem: Musculor/Skeletal Functional Status  Goal: Highest potential functional level  Outcome: Met This Shift  Goal: Absence of falls  Outcome: Met This Shift

## 2020-01-11 NOTE — PROGRESS NOTES
INPATIENT CARDIOLOGY FOLLOW-UP    Name: Anat Walton    Age: 51 y.o.     Date of Admission: 1/4/2020  4:33 PM    Date of Service: 1/11/2020    Chief Complaint: Follow-up for Status post cardiac arrest.      Interim History:  Doing well  Sitting bedside about to eat lunch  No complaints  Anxious to be discharged  Discussed avoiding illicit drugs, alcohol and cigarettes for awhile  He still smokes 2 PPD and will try and wean this but will abstain from cocaine and alcohol  No orthopnea, no PND  Ambulating around room and rodriguez without issues  Making good urine  Told him to monitor for symptoms of HF and to monitor the concentration of his urine  He demonstrated an understanding of all this      Review of Systems:   Cardiac: As per HPI  General: No fever, chills  Pulmonary: As per HPI  HEENT: No visual disturbances, difficult swallowing  GI: No nausea, vomiting  Endocrine: No thyroid disease or DM  Musculoskeletal: WALLACE x 4, no focal motor deficits  Skin: Intact, no rashes  Neuro/Psych: No headache or seizures    Problem List:  Patient Active Problem List   Diagnosis    Tobacco abuse    Diabetes mellitus type 1, uncontrolled ([address]    Moderate nonproliferative diabetic retinopathy associated with type 1 diabetes mellitus ([address]    Essential hypertension    Diabetic ketoacidosis without coma associated with type 1 diabetes mellitus ([address]    Idiopathic peripheral neuropathy    Thrombocytopenia ([address]    Acute ischemic multifocal multiple vascular territories stroke ([address]    Left hemiplegia ([address]    Diabetic ketoacidosis with coma associated with type 1 diabetes mellitus ([address]    KIRSTEN (acute kidney injury) ([address]    High anion gap metabolic acidosis    Leukocytosis    Lacunar stroke ([address]    ETOH abuse    Type 1 diabetes mellitus with complication ([address]    Marijuana abuse    Lactic acidosis    Acute renal insufficiency    Hyperkalemia    Hypokalemia    Hypophosphatemia    [facility] right [facility]    DKA, type 1, not at goal [facility])    Mixed hyperlipidemia    PAF (paroxysmal atrial fibrillation) (HCC)    Dark stools    Urticaria    Insulin pump in place    Diabetic ketoacidosis without coma associated with diabetes mellitus due to underlying condition [facility]    Cardiac arrest ([facility]    Acute respiratory failure with hypoxia ([facility]    Hypothermia    Shock ([facility]    Severe protein-calorie malnutrition ([facility]       Allergies:   Allergies   Allergen Reactions    No Known Allergies        Current Medications:  Current Facility-Administered Medications   Medication Dose Route Frequency Provider Last Rate Last Dose    hydrALAZINE (APRESOLINE) tablet 25 mg  25 mg Oral 3 times per day Angelica Miranda MD        metoprolol succinate (TOPROL XL) extended release tablet 25 mg  25 mg Oral Daily Benton Ramirez MD   25 mg at 01/11/20 0810    insulin lispro (HUMALOG) injection vial 0-18 Units  0-18 Units Subcutaneous TID NIMO Guy, DO   3 Units at 01/11/20 0810    insulin lispro (HUMALOG) injection vial 0-9 Units  0-9 Units Subcutaneous Nightly Dony Amado Jr., DO   6 Units at 01/10/20 2107    pantoprazole (PROTONIX) tablet 40 mg  40 mg Oral QAM AC Bascom Cushing, MD   40 mg at 01/11/20 0538    ipratropium-albuterol (DUONEB) nebulizer solution 1 ampule  1 ampule Inhalation Q4H WA Bascom Cushing, MD   1 ampule at 01/11/20 0934    acetaminophen (TYLENOL) tablet 650 mg  650 mg Oral Q4H PRN Bascom Cushing, MD        magnesium hydroxide (MILK OF MAGNESIA) 400 MG/5ML suspension 30 mL  30 mL Oral Daily PRN Bascom Cushing, MD        sodium chloride flush 0.9 % injection 10 mL  10 mL Intravenous 2 times per day Bascom Cushing, MD   10 mL at 01/11/20 0810    sodium chloride flush 0.9 % injection 10 mL  10 mL Intravenous PRN Bascom Cushing, MD        mineral oil-hydrophilic petrolatum (HYDROPHOR) ointment   Topical BID PRN Rebecca Suly John MD        cetirizine (ZYRTEC) tablet 10 mg  10 mg Oral Daily Soundra Opitz, MD   10 mg at 01/11/20 0810    insulin glargine (LANTUS) injection vial 25 Units  25 Units Subcutaneous Nightly Soundra Opitz, MD   25 Units at 01/10/20 2108    heparin (porcine) injection 5,000 Units  5,000 Units Subcutaneous Q8H Soundra Opitz, MD   5,000 Units at 01/11/20 0538    heparin (porcine) injection 4,500 Units  4,500 Units Intercatheter PRN Soundra Opitz, MD        perflutren lipid microspheres (DEFINITY) injection 1.65 mg  1.5 mL Intravenous ONCE PRN Soundra Opitz, MD        glucose (GLUTOSE) 40 % oral gel 15 g  15 g Oral PRN Soundra Opitz, MD        dextrose 50 % IV solution  12.5 g Intravenous PRN Soundra Opitz, MD        glucagon (rDNA) injection 1 mg  1 mg Intramuscular PRN Soundra Opitz, MD        dextrose 5 % solution  100 mL/hr Intravenous PRN Soundra Opitz, MD          dextrose         Physical Exam:  BP (!) 142/80   Pulse 88   Temp 98.4 °F (36.9 °C)   Resp 16   Ht 5' 6\" (1.676 m)   Wt 144 lb 14.4 oz (65.7 kg)   SpO2 98%   BMI 23.39 kg/m²   Wt Readings from Last 3 Encounters:   01/11/20 144 lb 14.4 oz (65.7 kg)   12/12/19 137 lb 3.2 oz (62.2 kg)   10/20/19 136 lb 3.9 oz (61.8 kg)     Appearance: Awake, alert, no acute respiratory distress  Skin: Intact, no rash  Head: Normocephalic, atraumatic  Eyes: EOMI, no conjunctival erythema  ENMT: No pharyngeal erythema, MMM, no rhinorrhea  Neck: Supple, no elevated JVP, no carotid bruits  Lungs: Has bilateral rhonchi cardiac: Regular rate and rhythm, +S1S2, no murmurs apparent  Abdomen: Soft, nontender, +bowel sounds  Extremities: Moves all extremities x 4, no lower extremity edema  Neurologic: No focal motor deficits apparent, normal mood and affect  Peripheral Pulses: Intact posterior tibial pulses bilaterally    Intake/Output:    Intake/Output Summary (Last 24 hours) at 1/11/2020 1138  Last data filed at 1/11/2020 1024  Gross per 24 hour   Intake 1320 ml   Output 2420 ml   Net -1100 ml     I/O this

## 2020-01-11 NOTE — PROGRESS NOTES
01/11/2020    CO2 25 01/11/2020    BUN 23 01/11/2020    LABALBU 3.7 01/04/2020    LABALBU 4.3 12/30/2011    CREATININE 2.3 01/11/2020    CALCIUM 8.7 01/11/2020    GFRAA 37 01/11/2020    LABGLOM 30 01/11/2020    GLUCOSE 180 01/11/2020    GLUCOSE 83 04/11/2012       Resident's Assessment and Plan     Dariel Fleming is a 51 y.o. male presented to the ED with unwitnessed cardiac arrest and was admitted for thermia, DKA and cardiac arrest     1. Acute encephalopathy ,s/p cardiac arrest,likely insetting of hypothermia, HHS/DKA, sepsis,drug intoxication,uremia-resolved  · Mentation improved requiring sedation due to intubation  · CT head wnl  · Completed vancomycin and zosyn     2. Cardiac arrest  S/p PEA and intermittent bradycardia,likely related to severe hypothermia  · Trop 0.03           · UDS positive for cocaine ,h/o alcohol abuse/marijuana  · EKG : showed bradycardia with wide QRS /QTC,   · Last Echo 2/11/2017 EF 65%   · ECHO results 35-40% EF with moderate MR  · LHC done today: mild disease involving the subbranch of the diagonal, no CAD otherwise  · Pt advised not to do anymore cocaine, he promised not to do anymore, advised by both cardiology and primary team.     3.Severe Hypothermia( <28'c) (resolved)  · Was actively rewarmed to normal body temperature  4. HAGMA 2/2 DKA/HHS,LA, uremia, hypothermia-resolved    5. Acute hypoxic hypercapnic respiratory failure in setting of #2  ·  on nasal canula  · Extubated yesterday    6. Corrected Hypernatremia -resolved    7. Acute kidney injury 2/2 poor renal perfusion , likely pre renal  · Cr 3.1 ( baseline 0.6-1)  · Nephrology following, and no dialysis   · Urine output 2.1L, creatinine 2.3 today  · Will follow nephrology recommendation    8. Hyperammoniemia insetting of hypothermia-resolved    9. Shock, hypovolemic vs septic shock   · resolved     PT/OT evaluation: ordered  DVT prophylaxis/ GI prophylaxis: Lovenox hold/ Protonix   Disposition: MICU      Tracy Baltazar M.D.  Internal Medicine Resident - PGY 1    Attending physician: Dr. Gardenia Pedro

## 2020-01-11 NOTE — PROGRESS NOTES
Department of Internal Medicine  Nephrology Attending Progress Note    Events reviewed. SUBJECTIVE: We are following Mr. Duggan Notice for KIRSTEN. Reports no complaints. HD catheter was removed yesterday    PHYSICAL EXAM:      Vitals:    VITALS:  BP (!) 142/80   Pulse 88   Temp 98.4 °F (36.9 °C)   Resp 16   Ht 5' 6\" (1.676 m)   Wt 144 lb 14.4 oz (65.7 kg)   SpO2 98%   BMI 23.39 kg/m²   24HR INTAKE/OUTPUT:      Intake/Output Summary (Last 24 hours) at 1/11/2020 1253  Last data filed at 1/11/2020 1024  Gross per 24 hour   Intake 1320 ml   Output 2420 ml   Net -1100 ml       Access: Left femoral vein temporary dialysis catheter site with dressing  Constitutional: Patient is awake alert in no distress.   HEENT: Pupils are equal reactive, endotracheal tube in place  Respiratory: Lungs are clear  Cardiovascular/Edema: Heart sounds are regular rate  Gastrointestinal: Abdomen soft  Neurologic: Patient sedated  Skin: No lesions  Other: No edema    Scheduled Meds:   hydrALAZINE  25 mg Oral 3 times per day    metoprolol succinate  25 mg Oral Daily    insulin lispro  0-18 Units Subcutaneous TID WC    insulin lispro  0-9 Units Subcutaneous Nightly    pantoprazole  40 mg Oral QAM AC    ipratropium-albuterol  1 ampule Inhalation Q4H WA    sodium chloride flush  10 mL Intravenous 2 times per day    cetirizine  10 mg Oral Daily    insulin glargine  25 Units Subcutaneous Nightly    heparin (porcine)  5,000 Units Subcutaneous Q8H     Continuous Infusions:   dextrose       PRN Meds:.acetaminophen, magnesium hydroxide, sodium chloride flush, mineral oil-hydrophilic petrolatum, heparin (porcine), perflutren lipid microspheres, glucose, dextrose, glucagon (rDNA), dextrose    DATA:    CBC:   Lab Results   Component Value Date    WBC 6.2 01/11/2020    RBC 3.04 01/11/2020    HGB 9.4 01/11/2020    HCT 29.3 01/11/2020    MCV 96.4 01/11/2020    MCH 30.9 01/11/2020    MCHC 32.1 01/11/2020    RDW 15.0 01/11/2020     01/11/2020    MPV 9.0 01/11/2020     CMP:    Lab Results   Component Value Date     01/11/2020    K 4.3 01/11/2020    K 5.7 10/18/2019    CL 99 01/11/2020    CO2 25 01/11/2020    BUN 23 01/11/2020    CREATININE 2.3 01/11/2020    GFRAA 37 01/11/2020    LABGLOM 30 01/11/2020    GLUCOSE 180 01/11/2020    GLUCOSE 83 04/11/2012    PROT 6.0 01/04/2020    LABALBU 3.7 01/04/2020    LABALBU 4.3 12/30/2011    CALCIUM 8.7 01/11/2020    BILITOT 0.3 01/04/2020    ALKPHOS 157 01/04/2020    AST 26 01/04/2020    ALT 30 01/04/2020     Magnesium:    Lab Results   Component Value Date    MG 1.9 01/11/2020     Phosphorus:    Lab Results   Component Value Date    PHOS 4.2 01/11/2020     Radiology Review:      Chest x-ray January 6/2020   No interval change                 Echo: Ejection fraction 35 to 40%    BRIEF SUMMARY OF INITIAL CONSULT:    Briefly Mr. Prem Evans is a 81-year-old man with history of HTN, type I DM, alcohol abuse, cocaine abuse, who was admitted on January 4, 2020 after he was brought to the ER by EMS services, patient was found in the floor unresponsive he was bradycardic with PEA, he received multiple doses of epinephrine and bicarbonate. In the ER his glucose was 1094 mg/dL, creatinine was 2.3 mg/dL. Since admission he has received large volume resuscitation with about 7.3 L in the last 24 hours and his urine output has decreased only to 502 cc in the last 24 hours, reasons for this consultation. Problems resolved:    · Respiratory failure, status post intubation  · Alkalemia (pH: 7.469), with respiratory alkalosis HAGMA (DKA) and metabolic alkalosis (HD)    · Hemodynamic shock, multifactorial, hypovolemic, probably sepsis, rule out cardiogenic  · DKA, improved on insulin drip    IMPRESSION/RECOMMENDATIONS:      1. KIRSTEN stage III, established ischemic ATN status post cardiac arrest.  Started on renal replacement therapy on January 6, 2020. Urine output continues to increase with improving serum creatinine. We will stop dialysis . 2. Status post cardiac arrest, status post cardiac catheterization, no evidence of CAD. 3. HFrEF 35-40%, nonischemic cardiomyopathy    Plan:    · No further  dialysis needed.   · BMP in one week  · Follow up with Dr Gary Maire in 2 weeks    Discussed with RADHA Mendez MD

## 2020-01-12 NOTE — DISCHARGE SUMMARY
Discharge Summary Note  Patient ID:  Rachana Madrid  [id number]  51 y.o.  1970    Admit date: 1/4/2020    Discharge date and time: 1/11/2020  3:54 PM     Admitting Physician: Asia Santos MD     Discharge Physician: Alena Treviño MD    Admission Diagnoses:   Cardiac arrest from drug intoxication  Severe hypothermia  HAGMA 2/2 DKA  DM type I  Acute hypoxic resp failure  Hypernatremia  KIRSTEN from cardiac arrest  Hyperammonemia  Cardiogenic shock    Discharge Diagnoses:   Cardiac arrest from drug intoxication  Severe hypothermia-resolved  Cocaine abuse  Tobacco abuse  HAGMA 2/2 DKA-resolved  DM type I  Acute hypoxic resp failure- resolved  Hypernatremia-resolved  KIRSTEN from cardiac arrest-improving  Hyperammonemia-resolved  Cardiogenic shock-resolved      Admission Condition: poor    Discharged Condition: stable    Hospital Course: Patient is a 51year old male, presented to ED with unwitnessed cardiac arrest and admitted for hypothermia, DKA with cardiac arrest, he was altered when he came in, intubated, on pressors, was placed on DKA protocol, his UDS was positive for cocaine, EKG showed bradycardia with wide QTc, ECHO in 02/2017 showed EF of 65%, repeat ECHO this admission, showed 35-40% EF, possible from drug abuse, he was rewarmed to normal body temperature, AG on admission was 47, elevated LA 7.1, he was placed on bicarb drip, eventually he required less pressor support and levophed was weaned off, he was extubated, his AG closed, and he was bridged with lantus and sliding scale, he also had hyperammonemia on admission which resolved, he underwent LHC which did not show evidence of CAD, but he had KIRSTEN from cardiac arrest which was likely pre renal, he required dialysis, initiallym transferred from MICU to telemetry floor, cardiology and nephrology was following him, last two days he did not require any dialysis, he was making good amount of urine, his creatinine was trending down, his vascular access was removed today, and he was discharged to home, to follow up with IM, nephrology and cardiology as out patient. Pt advised not to do anymore cocaine, he promised not to do anymore, advised by both cardiology and primary team    Consults: cardiology, pulmonary/intensive care and nephrology    Significant Diagnostic Studies: LHC : mild disease involving the subbranch of the diagonal, no evidence of CAD, ECHO: 35-40% EF with moderate MR, CT head wnl    Treatments: IV hydration, cardiac meds: metoprolol XL and dialysis: Hemodialysis  Completed vanc and zosyn treatment. Discharge Exam:  CONSTITUTIONAL:  awake, alert, cooperative, no apparent distress, and appears stated age  NECK:  supple, symmetrical, trachea midline  LUNGS:  No increased work of breathing, good air exchange, clear to auscultation bilaterally, no crackles or wheezing  CARDIOVASCULAR:  Normal apical impulse, regular rate and rhythm, normal S1 and S2, no S3 or S4, and no murmur noted  ABDOMEN:  No scars, normal bowel sounds, soft, non-distended, non-tender, no masses palpated, no hepatosplenomegally  MUSCULOSKELETAL:  there is no redness, warmth, or swelling of the joints  NEUROLOGIC:  Awake, alert, oriented to name, place and time. Cranial nerves II-XII are grossly intact. Motor is 5 out of 5 bilaterally. Cerebellar finger to nose, heel to shin intact. Sensory is intact. Babinski down going, Romberg negative, and gait is normal.  SKIN:  no bruising or bleeding    Disposition: home    Patient Instructions:   [facility] Internal Medicine Resident Service    Discharge to:    Diet: diabetic and cardiac  Activity: activity as tolerated   Exercise: As tolerated   Drive with seat belt on  Be compliant with medication  Follow up with Ambulatory Clinic, house team, Call [phone] ([address]  Internal Medicine Clinic)for appointment if there are any appointment changes or other issues.   It is very important that you keep this appointment.     Follow up with: cardiology and nephrology with in 2 weeks of discharge  BMP in one week      Discharge Medications:   Marleen Chambers   Home Medication Instructions MZD:[id number]    Printed on:01/12/20 0805   Medication Information                      albuterol sulfate  (90 Base) MCG/ACT inhaler  Inhale 2 puffs into the lungs every 6 hours as needed for Wheezing or Shortness of Breath             aspirin 81 MG EC tablet  Take 1 tablet by mouth daily             atorvastatin (LIPITOR) 80 MG tablet  Take 1 tablet by mouth nightly             hydrALAZINE (APRESOLINE) 25 MG tablet  Take 1 tablet by mouth every 8 hours             insulin glargine (LANTUS) 100 UNIT/ML injection vial  Inject 25 Units into the skin nightly             insulin lispro (HUMALOG) 100 UNIT/ML injection vial  Inject into the skin 3 times daily (before meals)             Insulin Syringe-Needle U-100 28G X 1/2\" 1 ML MISC  Use as directed             Lancets MISC  1 each by Does not apply route 2 times daily             lisinopril (PRINIVIL;ZESTRIL) 20 MG tablet  Take 1 tablet by mouth daily             metoprolol succinate (TOPROL XL) 25 MG extended release tablet  Take 1 tablet by mouth daily                 Activity: activity as tolerated    Diet: cardiac diet and diabetic diet    Wound Care: none needed    Follow-up:  Within a week with  ambulatory clinic and nephrology in 2 weeks as well as cardiology    Electronically signed by Jennifer Moses MD on 1/12/20 at 8:05 AM

## 2020-01-16 ENCOUNTER — OFFICE VISIT (OUTPATIENT)
Dept: ENDOCRINOLOGY | Age: 50
End: 2020-01-16

## 2020-01-16 VITALS
OXYGEN SATURATION: 99 % | HEART RATE: 84 BPM | WEIGHT: 157.2 LBS | DIASTOLIC BLOOD PRESSURE: 78 MMHG | BODY MASS INDEX: 25.26 KG/M2 | SYSTOLIC BLOOD PRESSURE: 118 MMHG | HEIGHT: 66 IN | RESPIRATION RATE: 16 BRPM

## 2020-01-16 PROCEDURE — 99214 OFFICE O/P EST MOD 30 MIN: CPT | Performed by: INTERNAL MEDICINE

## 2020-01-16 NOTE — PATIENT INSTRUCTIONS
Recommendations for today's visit  · Increase lantus to 34 units at bedtime   · Continue Humalog 1 units per 10 grams of Carbs + sliding scale   If blood sugars are 150-200 -add 1 units of Humalog   If blood sugars are 201-250 - add 2 units of Humalog   If blood sugars are 251-300 - add 3 units of Humalog   If blood sugars are 301-350 - add 4 units of Humalog   If blood sugars are above 350 - add 5 units of Humalog    · Check Blood sugar 4 times/day before meals and at bedtime and send us sugar log in a week or two. You can fax, mail or email your sugar log     Email:  [email or web address]     These are your blood sugar, blood pressure, cholesterol and A1c goals:  · Blood sugar fastin mg/dl to 130 mg/dl  · Blood sugar before meals: <150 mg/dl  · Peak blood sugar lower than 180 mg/dl  · Bad cholesterol (LDL cholesterol): less than 100 mg/dl  · Blood pressure: less than 140/80 mmHg\  · A1c: between 6.5 - 7%      Steps for managing low blood sugar  1. Eat 15 grams of glucose of simple carbohydrate, as found in:   1 tablespoon sugar, Jennifer or corn syrup    4 oz (1/2 cup) of juice or regular soda   Glucose Tablet or gel (follow package instruction)   2. Wait 15 min and check blood sugar again   3. Repeat until blood sugar within range  4. Once within range, follow up with snack or meal within 1 hour      I you have any questions please call Dr. Cisneros Estimable office       Jhonatan Delong MD  Endocrinologist, [facility])   [facility] [address] [facility] [address]   Phone: [phone]  Fax: [phone]  Email: [email or web address]

## 2020-01-16 NOTE — PROGRESS NOTES
MEDICATIONS   Current Outpatient Medications   Medication Sig Dispense Refill    metoprolol succinate (TOPROL XL) 25 MG extended release tablet Take 1 tablet by mouth daily 30 tablet 3    hydrALAZINE (APRESOLINE) 25 MG tablet Take 1 tablet by mouth every 8 hours 90 tablet 3    albuterol sulfate  (90 Base) MCG/ACT inhaler Inhale 2 puffs into the lungs every 6 hours as needed for Wheezing or Shortness of Breath 1 Inhaler 0    insulin glargine (LANTUS) 100 UNIT/ML injection vial Inject 25 Units into the skin nightly 1 vial 3    insulin lispro (HUMALOG) 100 UNIT/ML injection vial Inject into the skin 3 times daily (before meals)      Insulin Syringe-Needle U-100 28G X 1/2\" 1 ML MISC Use as directed 100 each 11    Lancets MISC 1 each by Does not apply route 2 times daily 300 each 3    atorvastatin (LIPITOR) 80 MG tablet Take 1 tablet by mouth nightly 30 tablet 3    lisinopril (PRINIVIL;ZESTRIL) 20 MG tablet Take 1 tablet by mouth daily 30 tablet 3    aspirin 81 MG EC tablet Take 1 tablet by mouth daily 30 tablet 3     No current facility-administered medications for this visit. Review of Systems  Constitutional: No fever, no chills, no diaphoresis, no generalized weakness. HEENT: No blurred vision, No sore throat, no ear pain, no hair loss  Neck: denied any neck swelling, difficulty swallowing,   Cardio-pulmonary: No CP, SOB or palpitation, No orthopnea or PND. No cough or wheezing. GI: No N/V/D, no constipation, No abdominal pain, no melena or hematochezia   : Denied any dysuria, hematuria, flank pain, discharge, or incontinence. Skin: denied any rash, ulcer, Hirsute, or hyperpigmentation. MSK: denied any joint deformity, joint pain/swelling, muscle pain, or back pain.   Neuro: no numbness, no tingling, no weakness, _    OBJECTIVE    /78 (Site: Left Upper Arm, Position: Sitting, Cuff Size: Medium Adult)   Pulse 84   Resp 16   Ht 5' 6\" (1.676 m)   Wt 157 lb 3.2 oz (71.3 kg) SpO2 99%   BMI 25.37 kg/m²   BP Readings from Last 4 Encounters:   01/16/20 118/78   01/11/20 120/81   12/12/19 123/74   10/21/19 (!) 156/96     Wt Readings from Last 6 Encounters:   01/16/20 157 lb 3.2 oz (71.3 kg)   01/11/20 144 lb 14.4 oz (65.7 kg)   12/12/19 137 lb 3.2 oz (62.2 kg)   10/20/19 136 lb 3.9 oz (61.8 kg)   09/25/19 144 lb 6.4 oz (65.5 kg)   08/04/19 138 lb 11.2 oz (62.9 kg)       Physical examination:  General: awake alert, oriented x3, no abnormal position or movements. HEENT: normocephalic non-traumatic, no exophthalmos   Neck: supple, no LN enlargement, no thyromegaly, no thyroid tenderness, no JVD. Pulm: Clear equal air entry no added sounds, no wheezing or rhonchi    CVS: S1 + S2, no murmur, no heave. Dorsalis pedis pulse palpable   Abd: soft lax, no tenderness, no organomegaly, audible bowel sounds. Skin: warm, no lesions, no rash.  Long toe nails, + callus, no Ulcers, No acanthosis nigricans  Musculoskeletal: No back tenderness, no kyphosis/scoliosis    Neuro: CN intact, Monofilament sensation decreased bilateral , muscle power normal  Psych: normal mood, and affect      Review of Laboratory Data:  I personally reviewed the following lab:  Lab Results   Component Value Date/Time    WBC 6.2 01/11/2020 06:20 AM    RBC 3.04 (L) 01/11/2020 06:20 AM    HGB 9.4 (L) 01/11/2020 06:20 AM    HCT 29.3 (L) 01/11/2020 06:20 AM    MCV 96.4 01/11/2020 06:20 AM    MCH 30.9 01/11/2020 06:20 AM    MCHC 32.1 01/11/2020 06:20 AM    RDW 15.0 01/11/2020 06:20 AM     01/11/2020 06:20 AM    MPV 9.0 01/11/2020 06:20 AM    BANDS 1 04/10/2012 04:30 AM      Lab Results   Component Value Date/Time     01/11/2020 06:20 AM    K 4.3 01/11/2020 06:20 AM    K 5.7 (H) 10/18/2019 06:20 AM    CO2 25 01/11/2020 06:20 AM    BUN 23 (H) 01/11/2020 06:20 AM    CREATININE 2.3 (H) 01/11/2020 06:20 AM    CALCIUM 8.7 01/11/2020 06:20 AM    LABGLOM 30 01/11/2020 06:20 AM    GFRAA 37 01/11/2020 06:20 AM      Lab Results  in few weeks   · Advised to check BS and send us sugar log in a wk     HLP  · Continue Lipitor 80 mg daily     Return in about 3 months (around 4/16/2020) for DM type 1 . The above issues were reviewed with the patient who understood and agreed with the plan. Thank you for allowing us to participate in the care of this patient. Please do not hesitate to contact us with any additional questions. Diagnosis Orders   1. Insulin pump in place     2. Vitamin D deficiency     3. Type 1 diabetes mellitus with complication (HCC)     4.  Moderate nonproliferative diabetic retinopathy associated with type 1 diabetes mellitus, macular edema presence unspecified, unspecified laterality (HCC)       Jyoti Gamble MD  Endocrinologist, [place] [address] [facility]   [address] [place] [address]   Phone: [phone]  Fax: [phone]  --------------------------------------------  Electronically signed by Brock Mercer MD

## 2020-04-22 ENCOUNTER — VIRTUAL VISIT (OUTPATIENT)
Dept: ENDOCRINOLOGY | Age: 50
End: 2020-04-22

## 2020-04-22 PROCEDURE — 99214 OFFICE O/P EST MOD 30 MIN: CPT | Performed by: INTERNAL MEDICINE

## 2020-06-20 ENCOUNTER — APPOINTMENT (OUTPATIENT)
Dept: GENERAL RADIOLOGY | Age: 50
End: 2020-06-20

## 2020-06-20 ENCOUNTER — HOSPITAL ENCOUNTER (OUTPATIENT)
Age: 50
Setting detail: OBSERVATION
Discharge: HOME | End: 2020-06-22
Attending: EMERGENCY MEDICINE | Admitting: INTERNAL MEDICINE

## 2020-06-20 ENCOUNTER — APPOINTMENT (OUTPATIENT)
Dept: CT IMAGING | Age: 50
End: 2020-06-20

## 2020-06-20 PROBLEM — R55 SYNCOPE AND COLLAPSE: Status: ACTIVE | Noted: 2020-06-20

## 2020-06-20 LAB
ACETAMINOPHEN LEVEL: <5 MCG/ML (ref 10–30)
ALBUMIN SERPL-MCNC: 3.5 G/DL (ref 3.5–5.2)
ALP BLD-CCNC: 77 U/L (ref 40–129)
ALT SERPL-CCNC: 14 U/L (ref 0–40)
AMPHETAMINE SCREEN, URINE: NOT DETECTED
ANION GAP SERPL CALCULATED.3IONS-SCNC: 10 MMOL/L (ref 7–16)
AST SERPL-CCNC: 13 U/L (ref 0–39)
BACTERIA: ABNORMAL /HPF
BARBITURATE SCREEN URINE: NOT DETECTED
BASOPHILS ABSOLUTE: 0.03 E9/L (ref 0–0.2)
BASOPHILS RELATIVE PERCENT: 0.4 % (ref 0–2)
BENZODIAZEPINE SCREEN, URINE: NOT DETECTED
BILIRUB SERPL-MCNC: 0.7 MG/DL (ref 0–1.2)
BILIRUBIN URINE: NEGATIVE
BLOOD, URINE: ABNORMAL
BUN BLDV-MCNC: 15 MG/DL (ref 6–20)
CALCIUM SERPL-MCNC: 8 MG/DL (ref 8.6–10.2)
CANNABINOID SCREEN URINE: POSITIVE
CHLORIDE BLD-SCNC: 110 MMOL/L (ref 98–107)
CLARITY: CLEAR
CO2: 21 MMOL/L (ref 22–29)
COCAINE METABOLITE SCREEN URINE: NOT DETECTED
COLOR: YELLOW
CREAT SERPL-MCNC: 0.7 MG/DL (ref 0.7–1.2)
EOSINOPHILS ABSOLUTE: 0.34 E9/L (ref 0.05–0.5)
EOSINOPHILS RELATIVE PERCENT: 4.1 % (ref 0–6)
ETHANOL: <10 MG/DL (ref 0–0.08)
FENTANYL SCREEN, URINE: NOT DETECTED
GFR AFRICAN AMERICAN: >60
GFR NON-AFRICAN AMERICAN: >60 ML/MIN/1.73
GLUCOSE BLD-MCNC: 153 MG/DL (ref 74–99)
GLUCOSE URINE: NEGATIVE MG/DL
HCT VFR BLD CALC: 43.2 % (ref 37–54)
HEMOGLOBIN: 14.4 G/DL (ref 12.5–16.5)
IMMATURE GRANULOCYTES #: 0.04 E9/L
IMMATURE GRANULOCYTES %: 0.5 % (ref 0–5)
KETONES, URINE: ABNORMAL MG/DL
LEUKOCYTE ESTERASE, URINE: NEGATIVE
LYMPHOCYTES ABSOLUTE: 1.67 E9/L (ref 1.5–4)
LYMPHOCYTES RELATIVE PERCENT: 20.1 % (ref 20–42)
Lab: ABNORMAL
MAGNESIUM: 1.7 MG/DL (ref 1.6–2.6)
MCH RBC QN AUTO: 30.4 PG (ref 26–35)
MCHC RBC AUTO-ENTMCNC: 33.3 % (ref 32–34.5)
MCV RBC AUTO: 91.3 FL (ref 80–99.9)
METER GLUCOSE: 162 MG/DL (ref 74–99)
METHADONE SCREEN, URINE: NOT DETECTED
MONOCYTES ABSOLUTE: 0.85 E9/L (ref 0.1–0.95)
MONOCYTES RELATIVE PERCENT: 10.3 % (ref 2–12)
NEUTROPHILS ABSOLUTE: 5.36 E9/L (ref 1.8–7.3)
NEUTROPHILS RELATIVE PERCENT: 64.6 % (ref 43–80)
NITRITE, URINE: NEGATIVE
OPIATE SCREEN URINE: NOT DETECTED
OXYCODONE URINE: NOT DETECTED
PDW BLD-RTO: 14.2 FL (ref 11.5–15)
PH UA: 6.5 (ref 5–9)
PHENCYCLIDINE SCREEN URINE: NOT DETECTED
PLATELET # BLD: 381 E9/L (ref 130–450)
PMV BLD AUTO: 8.5 FL (ref 7–12)
POTASSIUM REFLEX MAGNESIUM: 3.4 MMOL/L (ref 3.5–5)
PROTEIN UA: 30 MG/DL
RBC # BLD: 4.73 E12/L (ref 3.8–5.8)
RBC # BLD: NORMAL 10*6/UL
RBC UA: ABNORMAL /HPF (ref 0–2)
SALICYLATE, SERUM: <0.3 MG/DL (ref 0–30)
SODIUM BLD-SCNC: 141 MMOL/L (ref 132–146)
SPECIFIC GRAVITY UA: 1.02 (ref 1–1.03)
TOTAL PROTEIN: 5.5 G/DL (ref 6.4–8.3)
TRICYCLIC ANTIDEPRESSANTS SCREEN SERUM: NEGATIVE NG/ML
TROPONIN: <0.01 NG/ML (ref 0–0.03)
UROBILINOGEN, URINE: 1 E.U./DL
WBC # BLD: 8.3 E9/L (ref 4.5–11.5)
WBC UA: ABNORMAL /HPF (ref 0–5)

## 2020-06-20 PROCEDURE — G0378 HOSPITAL OBSERVATION PER HR: HCPCS

## 2020-06-20 PROCEDURE — 84484 ASSAY OF TROPONIN QUANT: CPT

## 2020-06-20 PROCEDURE — 80053 COMPREHEN METABOLIC PANEL: CPT

## 2020-06-20 PROCEDURE — 96365 THER/PROPH/DIAG IV INF INIT: CPT

## 2020-06-20 PROCEDURE — 73562 X-RAY EXAM OF KNEE 3: CPT

## 2020-06-20 PROCEDURE — 96368 THER/DIAG CONCURRENT INF: CPT

## 2020-06-20 PROCEDURE — 70450 CT HEAD/BRAIN W/O DYE: CPT

## 2020-06-20 PROCEDURE — 82962 GLUCOSE BLOOD TEST: CPT

## 2020-06-20 PROCEDURE — 93005 ELECTROCARDIOGRAM TRACING: CPT | Performed by: PHYSICIAN ASSISTANT

## 2020-06-20 PROCEDURE — 6370000000 HC RX 637 (ALT 250 FOR IP): Performed by: INTERNAL MEDICINE

## 2020-06-20 PROCEDURE — 2580000003 HC RX 258: Performed by: INTERNAL MEDICINE

## 2020-06-20 PROCEDURE — G0480 DRUG TEST DEF 1-7 CLASSES: HCPCS

## 2020-06-20 PROCEDURE — 6360000002 HC RX W HCPCS: Performed by: INTERNAL MEDICINE

## 2020-06-20 PROCEDURE — 83735 ASSAY OF MAGNESIUM: CPT

## 2020-06-20 PROCEDURE — 85025 COMPLETE CBC W/AUTO DIFF WBC: CPT

## 2020-06-20 PROCEDURE — 80307 DRUG TEST PRSMV CHEM ANLYZR: CPT

## 2020-06-20 PROCEDURE — 96366 THER/PROPH/DIAG IV INF ADDON: CPT

## 2020-06-20 PROCEDURE — 71046 X-RAY EXAM CHEST 2 VIEWS: CPT

## 2020-06-20 PROCEDURE — 81001 URINALYSIS AUTO W/SCOPE: CPT

## 2020-06-20 PROCEDURE — 99285 EMERGENCY DEPT VISIT HI MDM: CPT

## 2020-06-20 PROCEDURE — 36415 COLL VENOUS BLD VENIPUNCTURE: CPT

## 2020-06-20 RX ORDER — SODIUM CHLORIDE 0.9 % (FLUSH) 0.9 %
10 SYRINGE (ML) INJECTION PRN
Status: DISCONTINUED | OUTPATIENT
Start: 2020-06-20 | End: 2020-06-22 | Stop reason: HOSPADM

## 2020-06-20 RX ORDER — PROMETHAZINE HYDROCHLORIDE 25 MG/1
12.5 TABLET ORAL EVERY 6 HOURS PRN
Status: DISCONTINUED | OUTPATIENT
Start: 2020-06-20 | End: 2020-06-22 | Stop reason: HOSPADM

## 2020-06-20 RX ORDER — POTASSIUM CHLORIDE 7.45 MG/ML
20 INJECTION INTRAVENOUS ONCE
Status: COMPLETED | OUTPATIENT
Start: 2020-06-20 | End: 2020-06-21

## 2020-06-20 RX ORDER — SODIUM CHLORIDE 0.9 % (FLUSH) 0.9 %
10 SYRINGE (ML) INJECTION EVERY 12 HOURS SCHEDULED
Status: DISCONTINUED | OUTPATIENT
Start: 2020-06-20 | End: 2020-06-22 | Stop reason: HOSPADM

## 2020-06-20 RX ORDER — MAGNESIUM SULFATE IN WATER 40 MG/ML
2 INJECTION, SOLUTION INTRAVENOUS ONCE
Status: COMPLETED | OUTPATIENT
Start: 2020-06-20 | End: 2020-06-20

## 2020-06-20 RX ORDER — ATORVASTATIN CALCIUM 80 MG/1
80 TABLET, FILM COATED ORAL NIGHTLY
Status: DISCONTINUED | OUTPATIENT
Start: 2020-06-20 | End: 2020-06-22 | Stop reason: HOSPADM

## 2020-06-20 RX ORDER — ACETAMINOPHEN 325 MG/1
650 TABLET ORAL EVERY 6 HOURS PRN
Status: DISCONTINUED | OUTPATIENT
Start: 2020-06-20 | End: 2020-06-22 | Stop reason: HOSPADM

## 2020-06-20 RX ORDER — HYDRALAZINE HYDROCHLORIDE 25 MG/1
25 TABLET, FILM COATED ORAL EVERY 8 HOURS SCHEDULED
Status: DISCONTINUED | OUTPATIENT
Start: 2020-06-20 | End: 2020-06-22 | Stop reason: HOSPADM

## 2020-06-20 RX ORDER — POLYETHYLENE GLYCOL 3350 17 G/17G
17 POWDER, FOR SOLUTION ORAL DAILY PRN
Status: DISCONTINUED | OUTPATIENT
Start: 2020-06-20 | End: 2020-06-22 | Stop reason: HOSPADM

## 2020-06-20 RX ORDER — ALBUTEROL SULFATE 2.5 MG/3ML
2.5 SOLUTION RESPIRATORY (INHALATION) EVERY 6 HOURS PRN
Status: DISCONTINUED | OUTPATIENT
Start: 2020-06-20 | End: 2020-06-22 | Stop reason: HOSPADM

## 2020-06-20 RX ORDER — NICOTINE POLACRILEX 4 MG
15 LOZENGE BUCCAL PRN
Status: DISCONTINUED | OUTPATIENT
Start: 2020-06-20 | End: 2020-06-22 | Stop reason: HOSPADM

## 2020-06-20 RX ORDER — ACETAMINOPHEN 650 MG/1
650 SUPPOSITORY RECTAL EVERY 6 HOURS PRN
Status: DISCONTINUED | OUTPATIENT
Start: 2020-06-20 | End: 2020-06-22 | Stop reason: HOSPADM

## 2020-06-20 RX ORDER — LISINOPRIL 20 MG/1
20 TABLET ORAL DAILY
Status: DISCONTINUED | OUTPATIENT
Start: 2020-06-21 | End: 2020-06-22 | Stop reason: HOSPADM

## 2020-06-20 RX ORDER — DEXTROSE MONOHYDRATE 50 MG/ML
100 INJECTION, SOLUTION INTRAVENOUS PRN
Status: DISCONTINUED | OUTPATIENT
Start: 2020-06-20 | End: 2020-06-22 | Stop reason: HOSPADM

## 2020-06-20 RX ORDER — ASPIRIN 81 MG/1
81 TABLET ORAL DAILY
Status: DISCONTINUED | OUTPATIENT
Start: 2020-06-21 | End: 2020-06-22 | Stop reason: HOSPADM

## 2020-06-20 RX ORDER — ONDANSETRON 2 MG/ML
4 INJECTION INTRAMUSCULAR; INTRAVENOUS EVERY 6 HOURS PRN
Status: DISCONTINUED | OUTPATIENT
Start: 2020-06-20 | End: 2020-06-22 | Stop reason: HOSPADM

## 2020-06-20 RX ORDER — POTASSIUM CHLORIDE 20 MEQ/1
40 TABLET, EXTENDED RELEASE ORAL 2 TIMES DAILY WITH MEALS
Status: DISCONTINUED | OUTPATIENT
Start: 2020-06-21 | End: 2020-06-22 | Stop reason: HOSPADM

## 2020-06-20 RX ORDER — DEXTROSE MONOHYDRATE 25 G/50ML
12.5 INJECTION, SOLUTION INTRAVENOUS PRN
Status: DISCONTINUED | OUTPATIENT
Start: 2020-06-20 | End: 2020-06-22 | Stop reason: HOSPADM

## 2020-06-20 RX ADMIN — SODIUM CHLORIDE, PRESERVATIVE FREE 10 ML: 5 INJECTION INTRAVENOUS at 21:52

## 2020-06-20 RX ADMIN — ACETAMINOPHEN 650 MG: 325 TABLET ORAL at 21:58

## 2020-06-20 RX ADMIN — HYDRALAZINE HYDROCHLORIDE 25 MG: 25 TABLET, FILM COATED ORAL at 21:59

## 2020-06-20 RX ADMIN — ATORVASTATIN CALCIUM 80 MG: 80 TABLET, FILM COATED ORAL at 22:00

## 2020-06-20 RX ADMIN — POTASSIUM CHLORIDE 10 MEQ: 10 INJECTION, SOLUTION INTRAVENOUS at 23:43

## 2020-06-20 RX ADMIN — MAGNESIUM SULFATE HEPTAHYDRATE 2 G: 40 INJECTION, SOLUTION INTRAVENOUS at 21:51

## 2020-06-20 ASSESSMENT — PAIN SCALES - GENERAL
PAINLEVEL_OUTOF10: 4
PAINLEVEL_OUTOF10: 8
PAINLEVEL_OUTOF10: 8

## 2020-06-20 ASSESSMENT — PAIN DESCRIPTION - LOCATION
LOCATION: KNEE
LOCATION: KNEE

## 2020-06-20 ASSESSMENT — PAIN DESCRIPTION - PAIN TYPE
TYPE: ACUTE PAIN
TYPE: ACUTE PAIN

## 2020-06-20 ASSESSMENT — PAIN DESCRIPTION - ORIENTATION
ORIENTATION: RIGHT
ORIENTATION: RIGHT

## 2020-06-20 NOTE — ED PROVIDER NOTES
3.4 (L) 3.5 - 5.0 mmol/L    Chloride 110 (H) 98 - 107 mmol/L    CO2 21 (L) 22 - 29 mmol/L    Anion Gap 10 7 - 16 mmol/L    Glucose 153 (H) 74 - 99 mg/dL    BUN 15 6 - 20 mg/dL    CREATININE 0.7 0.7 - 1.2 mg/dL    GFR Non-African American >60 >=60 mL/min/1.73    GFR African American >60     Calcium 8.0 (L) 8.6 - 10.2 mg/dL    Total Protein 5.5 (L) 6.4 - 8.3 g/dL    Alb 3.5 3.5 - 5.2 g/dL    Total Bilirubin 0.7 0.0 - 1.2 mg/dL    Alkaline Phosphatase 77 40 - 129 U/L    ALT 14 0 - 40 U/L    AST 13 0 - 39 U/L   CBC Auto Differential   Result Value Ref Range    WBC 8.3 4.5 - 11.5 E9/L    RBC 4.73 3.80 - 5.80 E12/L    Hemoglobin 14.4 12.5 - 16.5 g/dL    Hematocrit 43.2 37.0 - 54.0 %    MCV 91.3 80.0 - 99.9 fL    MCH 30.4 26.0 - 35.0 pg    MCHC 33.3 32.0 - 34.5 %    RDW 14.2 11.5 - 15.0 fL    Platelets 845 817 - 935 E9/L    MPV 8.5 7.0 - 12.0 fL    Neutrophils % 64.6 43.0 - 80.0 %    Immature Granulocytes % 0.5 0.0 - 5.0 %    Lymphocytes % 20.1 20.0 - 42.0 %    Monocytes % 10.3 2.0 - 12.0 %    Eosinophils % 4.1 0.0 - 6.0 %    Basophils % 0.4 0.0 - 2.0 %    Neutrophils Absolute 5.36 1.80 - 7.30 E9/L    Immature Granulocytes # 0.04 E9/L    Lymphocytes Absolute 1.67 1.50 - 4.00 E9/L    Monocytes Absolute 0.85 0.10 - 0.95 E9/L    Eosinophils Absolute 0.34 0.05 - 0.50 E9/L    Basophils Absolute 0.03 0.00 - 0.20 E9/L    RBC Morphology Normal    Troponin   Result Value Ref Range    Troponin <0.01 0.00 - 0.03 ng/mL   Urine Drug Screen   Result Value Ref Range    Drug Screen Comment: see below    Serum Drug Screen   Result Value Ref Range    Ethanol Lvl <10 mg/dL    Acetaminophen Level <5.0 (L) 10.0 - 01.3 mcg/mL    Salicylate, Serum <4.3 0.0 - 30.0 mg/dL    TCA Scrn NEGATIVE Cutoff:300 ng/mL   Magnesium   Result Value Ref Range    Magnesium 1.7 1.6 - 2.6 mg/dL     Imaging: All Radiology results interpreted by Radiologist unless otherwise noted.   CT Head WO Contrast   Final Result      No CT evidence of an acute intracranial process or hemorrhage. Chronic ischemic changes. XR KNEE RIGHT (3 VIEWS)   Final Result      No fracture or dislocation. XR CHEST STANDARD (2 VW)   Final Result      No airspace opacities or pleural effusion. EKG #1:  Interpreted by emergency department physician unless otherwise noted. Time:  1719    Rate: 61  Rhythm: Sinus. Interpretation: no acute changes. Comparison: stable as compared to patient's most recent EKG. ED Course / Medical Decision Making   Medications - No data to display     Re-Evaluations:  6/20/20      Time: Patient was seen and evaluated my attending and she is agrees with the plan of management. Awaiting orthostatics. Consultations:             IP CONSULT TO INTERNAL MEDICINE  IP CONSULT TO CARDIOLOGY      Procedures:   none    MDM: Patient is a 78-year-old male that presented to the emergency department with a episode of a syncope and collapse. The episode was witnessed by family members. Patient was brought in for evaluation. Patient had a full examination including lab work, EKG, chest x-ray as well as orthostatics which was all found to be negative. Patient has an impressive recent cardiac history with an MI in January. Patient did recently start on metoprolol today. I spoke to the patient's family doctor who agreed that he like to keep the patient overnight for observation and consult cardiology to ensure there was no cardiac event. Repeat troponin placed. Patient made aware the plan of management, voiced understanding. Admission placed. Patient is currently chest pain-free laying comfortably in his bed. Patient has had no headaches, blurry vision, chest pain, shortness of breath, fever, chills, nausea, vomiting, severe abdominal pain, change in bowel or bladder movements or any numbness or weakness bilaterally in his extremities. Patient is stable to be moved to the floor.     Counseling:   I have spoken with the

## 2020-06-20 NOTE — PROGRESS NOTES
Patient's jewelry removed and placed in an envelope. Envelope given to patient. Patient left CT department with jewelry. (2 earrings)

## 2020-06-20 NOTE — H&P
XL) 25 MG extended release tablet Take 1 tablet by mouth daily 1/12/20   Ness Mera MD   hydrALAZINE (APRESOLINE) 25 MG tablet Take 1 tablet by mouth every 8 hours 1/11/20   Ness Mera MD   albuterol sulfate  (90 Base) MCG/ACT inhaler Inhale 2 puffs into the lungs every 6 hours as needed for Wheezing or Shortness of Breath 12/12/19   Kimber Kincaid MD   Insulin Syringe-Needle U-100 28G X 1/2\" 1 ML MISC Use as directed 7/29/19   Rebeca Pope MD   Lancets MISC 1 each by Does not apply route 2 times daily 7/29/19   Rebeca Pope MD   atorvastatin (LIPITOR) 80 MG tablet Take 1 tablet by mouth nightly 12/28/18   Emily Rice MD   lisinopril (PRINIVIL;ZESTRIL) 20 MG tablet Take 1 tablet by mouth daily 12/29/18   Emily Rice MD   aspirin 81 MG EC tablet Take 1 tablet by mouth daily 11/13/18   Bryanna Red MD       Allergies:  No known allergies    Social History:   TOBACCO:   reports that he has been smoking cigarettes. He has a 30.00 pack-year smoking history. He has never used smokeless tobacco.  ETOH:   reports current alcohol use of about 5.0 standard drinks of alcohol per week. Family History:       Problem Relation Age of Onset    Diabetes type 2  Mother     Cancer Maternal Grandmother     Diabetes type 2  Nephew        REVIEW OF SYSTEMS:     Constitutional: (-) fever, (-) chills (-) weight loss.  Head: (-) headache, (-) light headedness and (-) dizziness.  Eyes: (-) changes in vision.  Mouth: (-) difficulty swallowing.  Neck: (-) stiffness, (-) swelling and (-) pain.  Respiratory: (-) SOB and (-) cough.  Cardiovascular: (-) chest pain and (-) palpitations.  GI:  (-) nausea, (-) vomiting, (-) diarrhea, (-) constipation and (-) abdomen pain.  Urology: (-) pain with urination, (-) difficulty urinating, (-) urinary incontinence and (-) increased urination.  Musculoskeletal: (-) muscle weakness (-) new joint pain.     Hematology: (-) bruising (-)

## 2020-06-21 LAB
ANION GAP SERPL CALCULATED.3IONS-SCNC: 16 MMOL/L (ref 7–16)
BASOPHILS ABSOLUTE: 0.05 E9/L (ref 0–0.2)
BASOPHILS RELATIVE PERCENT: 0.4 % (ref 0–2)
BUN BLDV-MCNC: 16 MG/DL (ref 6–20)
CALCIUM SERPL-MCNC: 8.6 MG/DL (ref 8.6–10.2)
CHLORIDE BLD-SCNC: 100 MMOL/L (ref 98–107)
CO2: 20 MMOL/L (ref 22–29)
CREAT SERPL-MCNC: 0.7 MG/DL (ref 0.7–1.2)
EKG ATRIAL RATE: 61 BPM
EKG P AXIS: 53 DEGREES
EKG P-R INTERVAL: 130 MS
EKG Q-T INTERVAL: 440 MS
EKG QRS DURATION: 94 MS
EKG QTC CALCULATION (BAZETT): 442 MS
EKG R AXIS: 55 DEGREES
EKG T AXIS: 45 DEGREES
EKG VENTRICULAR RATE: 61 BPM
EOSINOPHILS ABSOLUTE: 0.37 E9/L (ref 0.05–0.5)
EOSINOPHILS RELATIVE PERCENT: 2.8 % (ref 0–6)
GFR AFRICAN AMERICAN: >60
GFR NON-AFRICAN AMERICAN: >60 ML/MIN/1.73
GLUCOSE BLD-MCNC: 285 MG/DL (ref 74–99)
HCT VFR BLD CALC: 40.1 % (ref 37–54)
HEMOGLOBIN: 13.4 G/DL (ref 12.5–16.5)
IMMATURE GRANULOCYTES #: 0.06 E9/L
IMMATURE GRANULOCYTES %: 0.4 % (ref 0–5)
LYMPHOCYTES ABSOLUTE: 1.88 E9/L (ref 1.5–4)
LYMPHOCYTES RELATIVE PERCENT: 14 % (ref 20–42)
MAGNESIUM: 2.1 MG/DL (ref 1.6–2.6)
MCH RBC QN AUTO: 30.5 PG (ref 26–35)
MCHC RBC AUTO-ENTMCNC: 33.4 % (ref 32–34.5)
MCV RBC AUTO: 91.1 FL (ref 80–99.9)
METER GLUCOSE: 152 MG/DL (ref 74–99)
METER GLUCOSE: 230 MG/DL (ref 74–99)
METER GLUCOSE: 267 MG/DL (ref 74–99)
MONOCYTES ABSOLUTE: 1.11 E9/L (ref 0.1–0.95)
MONOCYTES RELATIVE PERCENT: 8.3 % (ref 2–12)
NEUTROPHILS ABSOLUTE: 9.95 E9/L (ref 1.8–7.3)
NEUTROPHILS RELATIVE PERCENT: 74.1 % (ref 43–80)
PDW BLD-RTO: 14.4 FL (ref 11.5–15)
PLATELET # BLD: 386 E9/L (ref 130–450)
PMV BLD AUTO: 8.9 FL (ref 7–12)
POTASSIUM SERPL-SCNC: 4.1 MMOL/L (ref 3.5–5)
RBC # BLD: 4.4 E12/L (ref 3.8–5.8)
SODIUM BLD-SCNC: 136 MMOL/L (ref 132–146)
TROPONIN: <0.01 NG/ML (ref 0–0.03)
TROPONIN: <0.01 NG/ML (ref 0–0.03)
WBC # BLD: 13.4 E9/L (ref 4.5–11.5)

## 2020-06-21 PROCEDURE — 99224 PR SBSQ OBSERVATION CARE/DAY 15 MINUTES: CPT | Performed by: INTERNAL MEDICINE

## 2020-06-21 PROCEDURE — 2580000003 HC RX 258: Performed by: INTERNAL MEDICINE

## 2020-06-21 PROCEDURE — 82962 GLUCOSE BLOOD TEST: CPT

## 2020-06-21 PROCEDURE — 84484 ASSAY OF TROPONIN QUANT: CPT

## 2020-06-21 PROCEDURE — 93010 ELECTROCARDIOGRAM REPORT: CPT | Performed by: INTERNAL MEDICINE

## 2020-06-21 PROCEDURE — 36415 COLL VENOUS BLD VENIPUNCTURE: CPT

## 2020-06-21 PROCEDURE — 6360000002 HC RX W HCPCS: Performed by: INTERNAL MEDICINE

## 2020-06-21 PROCEDURE — 6370000000 HC RX 637 (ALT 250 FOR IP): Performed by: INTERNAL MEDICINE

## 2020-06-21 PROCEDURE — 99214 OFFICE O/P EST MOD 30 MIN: CPT | Performed by: INTERNAL MEDICINE

## 2020-06-21 PROCEDURE — G0378 HOSPITAL OBSERVATION PER HR: HCPCS

## 2020-06-21 PROCEDURE — 96366 THER/PROPH/DIAG IV INF ADDON: CPT

## 2020-06-21 PROCEDURE — 85025 COMPLETE CBC W/AUTO DIFF WBC: CPT

## 2020-06-21 PROCEDURE — 80048 BASIC METABOLIC PNL TOTAL CA: CPT

## 2020-06-21 PROCEDURE — 83735 ASSAY OF MAGNESIUM: CPT

## 2020-06-21 RX ORDER — POTASSIUM CHLORIDE 7.45 MG/ML
10 INJECTION INTRAVENOUS ONCE
Status: COMPLETED | OUTPATIENT
Start: 2020-06-21 | End: 2020-06-21

## 2020-06-21 RX ORDER — 0.9 % SODIUM CHLORIDE 0.9 %
1000 INTRAVENOUS SOLUTION INTRAVENOUS ONCE
Status: COMPLETED | OUTPATIENT
Start: 2020-06-21 | End: 2020-06-21

## 2020-06-21 RX ADMIN — POTASSIUM CHLORIDE 10 MEQ: 10 INJECTION, SOLUTION INTRAVENOUS at 01:58

## 2020-06-21 RX ADMIN — ATORVASTATIN CALCIUM 80 MG: 80 TABLET, FILM COATED ORAL at 22:10

## 2020-06-21 RX ADMIN — ACETAMINOPHEN 650 MG: 325 TABLET ORAL at 05:22

## 2020-06-21 RX ADMIN — SODIUM CHLORIDE, PRESERVATIVE FREE 10 ML: 5 INJECTION INTRAVENOUS at 22:11

## 2020-06-21 RX ADMIN — SODIUM CHLORIDE 1000 ML: 9 INJECTION, SOLUTION INTRAVENOUS at 11:33

## 2020-06-21 RX ADMIN — POTASSIUM CHLORIDE 40 MEQ: 20 TABLET, EXTENDED RELEASE ORAL at 17:00

## 2020-06-21 RX ADMIN — HYDRALAZINE HYDROCHLORIDE 25 MG: 25 TABLET, FILM COATED ORAL at 05:19

## 2020-06-21 RX ADMIN — ACETAMINOPHEN 650 MG: 325 TABLET ORAL at 22:10

## 2020-06-21 RX ADMIN — POTASSIUM CHLORIDE 40 MEQ: 20 TABLET, EXTENDED RELEASE ORAL at 11:34

## 2020-06-21 ASSESSMENT — PAIN DESCRIPTION - PAIN TYPE
TYPE: ACUTE PAIN
TYPE: CHRONIC PAIN
TYPE: ACUTE PAIN

## 2020-06-21 ASSESSMENT — PAIN SCALES - GENERAL
PAINLEVEL_OUTOF10: 8
PAINLEVEL_OUTOF10: 8
PAINLEVEL_OUTOF10: 7
PAINLEVEL_OUTOF10: 6

## 2020-06-21 ASSESSMENT — PAIN DESCRIPTION - ONSET
ONSET: ON-GOING
ONSET: ON-GOING

## 2020-06-21 ASSESSMENT — PAIN DESCRIPTION - LOCATION
LOCATION: KNEE

## 2020-06-21 ASSESSMENT — PAIN DESCRIPTION - DESCRIPTORS
DESCRIPTORS: ACHING
DESCRIPTORS: ACHING

## 2020-06-21 ASSESSMENT — PAIN DESCRIPTION - PROGRESSION
CLINICAL_PROGRESSION: NOT CHANGED

## 2020-06-21 ASSESSMENT — PAIN DESCRIPTION - FREQUENCY
FREQUENCY: CONTINUOUS
FREQUENCY: CONTINUOUS

## 2020-06-21 ASSESSMENT — PAIN - FUNCTIONAL ASSESSMENT
PAIN_FUNCTIONAL_ASSESSMENT: ACTIVITIES ARE NOT PREVENTED
PAIN_FUNCTIONAL_ASSESSMENT: ACTIVITIES ARE NOT PREVENTED

## 2020-06-21 ASSESSMENT — PAIN DESCRIPTION - ORIENTATION
ORIENTATION: RIGHT

## 2020-06-21 NOTE — CONSULTS
CHIEF COMPLAINT: Syncope    HISTORY OF PRESENT ILLNESS: Patient is a 53 y.o. male seen at the request of Kimber Kincaid MD and followed at our office by Dr. Nallely Sullivan. Patient admitted for syncope. Syncope upon standing. No palpitations. No SOB or CP. Admitted 1/2020 for drug overdose and cardiac arrest. Echo showed moderately reduced left ventricular function mildly reduced right ventricular function and moderate mitral regurg with EF of 35-40%.    Cardiac catheterization was performed and minimal atherosclerosis was seen, no stents were placed at that time    Past Medical History:   Diagnosis Date    Alcoholism ([address]     Cocaine abuse ([address]     Diabetes mellitus ([address]     Hypertension     Idiopathic peripheral neuropathy 9/21/2016    Marijuana abuse        Patient Active Problem List   Diagnosis    Tobacco abuse    Diabetes mellitus type 1, uncontrolled ([address]    Moderate nonproliferative diabetic retinopathy associated with type 1 diabetes mellitus ([address]    Essential hypertension    Diabetic ketoacidosis without coma associated with type 1 diabetes mellitus ([address]    Idiopathic peripheral neuropathy    Thrombocytopenia (HCC)    Acute ischemic multifocal multiple vascular territories stroke ([address]    Left hemiplegia ([address]    Diabetic ketoacidosis with coma associated with type 1 diabetes mellitus ([address]    KIRSTEN (acute kidney injury) ([address]    High anion gap metabolic acidosis    Leukocytosis    Lacunar stroke ([address]    ETOH abuse    Type 1 diabetes mellitus with complication (HCC)    Marijuana abuse    Lactic acidosis    Acute renal insufficiency    Hyperkalemia    Hypokalemia    Hypophosphatemia    Community acquired pneumonia of right lower lobe of lung ([address]    DKA, type 1, not at goal [facility])    Mixed hyperlipidemia    PAF (paroxysmal atrial fibrillation) (HCC)    Dark stools    Urticaria    Insulin pump in place    Diabetic ketoacidosis without coma associated with diabetes mellitus due to underlying condition [facility])    Card[facility] arrest [facility]    Acute respiratory failure with hypoxia (HCC)    Hypothermia    Shock ([facility]    Severe protein-calorie malnutrition (HCC)    Normochromic normocytic anemia    Syncope and collapse       Allergies   Allergen Reactions    No Known Allergies        Current Facility-Administered Medications   Medication Dose Route Frequency Provider Last Rate Last Dose    albuterol (PROVENTIL) nebulizer solution 2.5 mg  2.5 mg Nebulization Q6H PRN Tye Vogel MD        aspirin EC tablet 81 mg  81 mg Oral Daily Tye Vogel MD        atorvastatin (LIPITOR) tablet 80 mg  80 mg Oral Nightly Tye Vogel MD   80 mg at 06/20/20 2200    hydrALAZINE (APRESOLINE) tablet 25 mg  25 mg Oral 3 times per day Tye Vogel MD   25 mg at 06/21/20 0519    lisinopril (PRINIVIL;ZESTRIL) tablet 20 mg  20 mg Oral Daily Tye Vogel MD        glucose (GLUTOSE) 40 % oral gel 15 g  15 g Oral PRN Tye Vogel MD        dextrose 50 % IV solution  12.5 g Intravenous PRN Tye Vogel MD        glucagon (rDNA) injection 1 mg  1 mg Intramuscular PRN Tye Vogel MD        dextrose 5 % solution  100 mL/hr Intravenous PRN Tye Vogel MD        sodium chloride flush 0.9 % injection 10 mL  10 mL Intravenous 2 times per day Tye Vogel MD   10 mL at 06/20/20 2152    sodium chloride flush 0.9 % injection 10 mL  10 mL Intravenous PRN Tye Vogel MD        acetaminophen (TYLENOL) tablet 650 mg  650 mg Oral Q6H PRN Tye Vogel MD   650 mg at 06/21/20 0522    Or    acetaminophen (TYLENOL) suppository 650 mg  650 mg Rectal Q6H PRN Tye Vogel MD        polyethylene glycol (GLYCOLAX) packet 17 g  17 g Oral Daily PRN Tye Vogel MD        promethazine (PHENERGAN) tablet 12.5 mg  12.5 mg Oral Q6H PRN Tye Vogel MD        Or    ondansetron [facility]) injection 4 mg  4 mg Intravenous Q6H PRN Tye Vogel MD        enoxaparin (LOVENOX) injection 40 mg  40 mg Subcutaneous Daily Stefanie Flowers MD        potassium chloride (KLOR-CON M) extended release tablet 40 mEq  40 mEq Oral BID WC Stefanie Flowers MD           Social History     Socioeconomic History    Marital status: Single     Spouse name: Not on file    Number of children: Not on file    Years of education: Not on file    Highest education level: Not on file   Occupational History    Not on file   Social Needs    Financial resource strain: Not on file    Food insecurity     Worry: Not on file     Inability: Not on file    Transportation needs     Medical: Not on file     Non-medical: Not on file   Tobacco Use    Smoking status: Current Every Day Smoker     Packs/day: 2.00     Years: 15.00     Pack years: 30.00     Types: Cigarettes    Smokeless tobacco: Never Used   Substance and Sexual Activity    Alcohol use:  Yes     Alcohol/week: 5.0 standard drinks     Types: 5 Cans of beer per week    Drug use: Yes     Types: Marijuana     Comment: smokes week a couple times a day per pt    Sexual activity: Not on file   Lifestyle    Physical activity     Days per week: Not on file     Minutes per session: Not on file    Stress: Not on file   Relationships    Social connections     Talks on phone: Not on file     Gets together: Not on file     Attends Muslim service: Not on file     Active member of club or organization: Not on file     Attends meetings of clubs or organizations: Not on file     Relationship status: Not on file    Intimate partner violence     Fear of current or ex partner: Not on file     Emotionally abused: Not on file     Physically abused: Not on file     Forced sexual activity: Not on file   Other Topics Concern    Not on file   Social History Narrative    Not on file       Family History   Problem Relation Age of Onset    Diabetes type 2  Mother     Cancer Maternal Grandmother     Diabetes type 2  Nephew        Review of Systems:   Heart: as 06/21/2020    MPV 8.9 06/21/2020     BMP:   Lab Results   Component Value Date     06/21/2020    K 4.1 06/21/2020    K 3.4 06/20/2020     06/21/2020    CO2 20 06/21/2020    BUN 16 06/21/2020    LABALBU 3.5 06/20/2020    LABALBU 4.3 12/30/2011    CREATININE 0.7 06/21/2020    CALCIUM 8.6 06/21/2020    GFRAA >60 06/21/2020    LABGLOM >60 06/21/2020     Magnesium:    Lab Results   Component Value Date    MG 2.1 06/21/2020     Cardiac Enzymes:   Lab Results   Component Value Date    CKTOTAL 607 (H) 01/04/2020    CKTOTAL 141 01/04/2020    CKTOTAL 192 12/08/2019    CKMB 6.1 12/08/2019    CKMB 4.5 08/02/2019    CKMB 4.9 08/02/2019    TROPONINI <0.01 06/21/2020    TROPONINI <0.01 06/20/2020    TROPONINI <0.01 06/20/2020      PT/INR:    Lab Results   Component Value Date    PROTIME 11.5 01/04/2020    INR 1.0 01/04/2020     TSH:    Lab Results   Component Value Date    TSH 3.330 01/04/2020     Rhythm Strip: normal sinus rhythm. EKG:  normal sinus rhythm. Stable cath 1/4/20. Echo Summary 1/6/2020:   LVEF 35-40%   Moderately reduced left ventricular systolic function. Mildly reduced right ventricle systolic function. Moderate mitral regurgitation.      ASSESSMENT AND PLAN:  Patient Active Problem List   Diagnosis    Tobacco abuse    Diabetes mellitus type 1, uncontrolled ([address]    Moderate nonproliferative diabetic retinopathy associated with type 1 diabetes mellitus ([address]    Essential hypertension    Diabetic ketoacidosis without coma associated with type 1 diabetes mellitus (HCC)    Idiopathic peripheral neuropathy    Thrombocytopenia (HCC)    Acute ischemic multifocal multiple vascular territories stroke ([address]    Left hemiplegia ([address]    Diabetic ketoacidosis with coma associated with type 1 diabetes mellitus ([address]    KIRSTEN (acute kidney injury) ([address]    High anion gap metabolic acidosis    Leukocytosis    Lacunar stroke ([address]    ETOH abuse    Type 1 diabetes mellitus with complication ([facility]    Marijuana abuse    Lactic acidosis    Acute renal insufficiency    Hyperkalemia    Hypokalemia    Hypophosphatemia    Community acquired pneumonia of right [facility] of [facility]    DKA, type 1, not at goal [facility])    Mixed hyperlipidemia    PA[facility]ox[facility] fibrillation) ([facility])    Dark stools    Urticaria    Insulin pump in place    Diabetic ketoacidosis without coma associated with diabetes mellitus due to underlying condition ([facility]    Cardiac arrest ([facility]    Acute respiratory failure with hypoxia (HCC)    Hypothermia    Shock ([facility]    Severe protein-calorie malnutrition (HCC)    Normochromic normocytic anemia    Syncope and collapse     1. Syncope: Limited study. EKG okay. Monitor. 2. CMP: Reassess limited echo. 3. DM: Per primary service. 4. HTN: Observe. 5. Lipids: Statin. 6. ETOH    Bolivar Allan D.O.   Cardiologist  Cardiology, [address] [place] [address]

## 2020-06-21 NOTE — PROGRESS NOTES
[address] [facility]  Internal Medicine Residency / [address]    Attending Physician Statement  I have discussed the case, including pertinent history and exam findings with the resident and the team.  I have seen and examined the patient and the key elements of the encounter have been performed by me. I agree with the assessment, plan and orders as documented by the resident. Syncopal event at home after initially starting Metoprolol tartrate   For  as documented by his mother a nurse  Longstanding DM 1 with stigmata  On continuous insulin pump  No evidence of MI, Coronaries evaluatuted  January 2020 and normal  R/O Hypotensive effect of Betablocker  Vs hidden SSS evoked by betablocker  CArdiology consult and discharge    Remainder of medical problems as per resident note.       Ward Ordoñez  Internal Medicine Residency Faculty

## 2020-06-21 NOTE — PROGRESS NOTES
low attenuation are present within supratentorial white matter which is a nonspecific finding but likely represents mild microvascular ischemia. Parenchyma: There is no significant volume loss. Ventricles: The ventricles are within normal limits of size and configuration for age. Paranasal sinuses and skull base: The visualized paranasal sinuses are grossly clear. The skull base and imaged soft tissues are unremarkable. No CT evidence of an acute intracranial process or hemorrhage. Chronic ischemic changes. Resident's Assessment and Plan   The patient is a 53 y.o. male with PMH of diabetes mellitus, hypertension, idiopathic peripheral neuropathy, alcoholism, cocaine abuse, marijuana abuse, tobacco use (30-pack-year history) presented to the ED complaining of syncopal episode. .   Recently admitted to the hospital for drug overdose and cardiac arrest.  Cardiac work-up performed on 1/4/2020. Echocardiogram showed moderately reduced left ventricular function mildly reduced right ventricular function and moderate mitral regurg with EF of 35-40%. Cardiac catheterization was performed and minimal atherosclerosis was seen, no stents were placed at that time  ED Course: Initial vitals showed afebrile RR 16 HR 62 /81 saturating well on room air. Initial labs showed potassium 3.4 chloride 110 bicarb 21 troponin negative, CBC within normal limits. CXR right knee negative for acute events. CXR no acute events    Assessment   1. Witnessed syncope vs seizure: Jerky movents after episode, no post ictal, no urine or bowel loss. 2. HTN  3. Poly substance abuse  4. HX CAD   5. HFrEF last Echo 1/2020 35%  6.  DM on insulin pump     Plan  · EEG  · Follow up telemetry   · Follow up cardio --> to do echo  · Follow repeat ortho negative after 1 L normal bolus  · Home metoprolol on hold     PT/OT evaluation:  Not indicated at this time    DVT prophylaxis/ GI prophylaxis: Lovenox/ Diet  Disposition: Continue

## 2020-06-21 NOTE — PROGRESS NOTES
Consult placed through [facility] to [facility] Cardiology for syncope and collapse.

## 2020-06-21 NOTE — PLAN OF CARE
Problem: Safety:  Goal: Free from accidental physical injury  Description: Free from accidental physical injury  Outcome: Met This Shift     Problem: Pain:  Goal: Patient's pain/discomfort is manageable  Description: Patient's pain/discomfort is manageable  Outcome: Met This Shift     Problem: Falls - Risk of:  Goal: Will remain free from falls  Description: Will remain free from falls  Outcome: Met This Shift  Goal: Absence of physical injury  Description: Absence of physical injury  Outcome: Met This Shift

## 2020-06-22 ENCOUNTER — APPOINTMENT (OUTPATIENT)
Dept: NEUROLOGY | Age: 50
End: 2020-06-22

## 2020-06-22 VITALS
TEMPERATURE: 98.9 F | DIASTOLIC BLOOD PRESSURE: 90 MMHG | OXYGEN SATURATION: 96 % | RESPIRATION RATE: 18 BRPM | HEART RATE: 94 BPM | HEIGHT: 66 IN | BODY MASS INDEX: 22.92 KG/M2 | WEIGHT: 142.6 LBS | SYSTOLIC BLOOD PRESSURE: 145 MMHG

## 2020-06-22 LAB
ANION GAP SERPL CALCULATED.3IONS-SCNC: 13 MMOL/L (ref 7–16)
BASOPHILS ABSOLUTE: 0.05 E9/L (ref 0–0.2)
BASOPHILS RELATIVE PERCENT: 0.6 % (ref 0–2)
BUN BLDV-MCNC: 10 MG/DL (ref 6–20)
CALCIUM SERPL-MCNC: 9 MG/DL (ref 8.6–10.2)
CHLORIDE BLD-SCNC: 99 MMOL/L (ref 98–107)
CO2: 23 MMOL/L (ref 22–29)
CREAT SERPL-MCNC: 0.7 MG/DL (ref 0.7–1.2)
EOSINOPHILS ABSOLUTE: 0.28 E9/L (ref 0.05–0.5)
EOSINOPHILS RELATIVE PERCENT: 3.6 % (ref 0–6)
GFR AFRICAN AMERICAN: >60
GFR NON-AFRICAN AMERICAN: >60 ML/MIN/1.73
GLUCOSE BLD-MCNC: 302 MG/DL (ref 74–99)
HCT VFR BLD CALC: 39.9 % (ref 37–54)
HEMOGLOBIN: 13.2 G/DL (ref 12.5–16.5)
IMMATURE GRANULOCYTES #: 0.03 E9/L
IMMATURE GRANULOCYTES %: 0.4 % (ref 0–5)
LYMPHOCYTES ABSOLUTE: 1.7 E9/L (ref 1.5–4)
LYMPHOCYTES RELATIVE PERCENT: 21.7 % (ref 20–42)
MCH RBC QN AUTO: 30.3 PG (ref 26–35)
MCHC RBC AUTO-ENTMCNC: 33.1 % (ref 32–34.5)
MCV RBC AUTO: 91.7 FL (ref 80–99.9)
METER GLUCOSE: 226 MG/DL (ref 74–99)
METER GLUCOSE: 279 MG/DL (ref 74–99)
METER GLUCOSE: 351 MG/DL (ref 74–99)
MONOCYTES ABSOLUTE: 0.75 E9/L (ref 0.1–0.95)
MONOCYTES RELATIVE PERCENT: 9.6 % (ref 2–12)
NEUTROPHILS ABSOLUTE: 5.03 E9/L (ref 1.8–7.3)
NEUTROPHILS RELATIVE PERCENT: 64.1 % (ref 43–80)
PDW BLD-RTO: 14.1 FL (ref 11.5–15)
PLATELET # BLD: 350 E9/L (ref 130–450)
PMV BLD AUTO: 8.9 FL (ref 7–12)
POTASSIUM SERPL-SCNC: 4.1 MMOL/L (ref 3.5–5)
RBC # BLD: 4.35 E12/L (ref 3.8–5.8)
SODIUM BLD-SCNC: 135 MMOL/L (ref 132–146)
WBC # BLD: 7.8 E9/L (ref 4.5–11.5)

## 2020-06-22 PROCEDURE — 2580000003 HC RX 258: Performed by: INTERNAL MEDICINE

## 2020-06-22 PROCEDURE — 99212 OFFICE O/P EST SF 10 MIN: CPT | Performed by: INTERNAL MEDICINE

## 2020-06-22 PROCEDURE — APPSS60 APP SPLIT SHARED TIME 46-60 MINUTES: Performed by: CLINICAL NURSE SPECIALIST

## 2020-06-22 PROCEDURE — G0378 HOSPITAL OBSERVATION PER HR: HCPCS

## 2020-06-22 PROCEDURE — 85025 COMPLETE CBC W/AUTO DIFF WBC: CPT

## 2020-06-22 PROCEDURE — 36415 COLL VENOUS BLD VENIPUNCTURE: CPT

## 2020-06-22 PROCEDURE — 99224 PR SBSQ OBSERVATION CARE/DAY 15 MINUTES: CPT | Performed by: INTERNAL MEDICINE

## 2020-06-22 PROCEDURE — 93308 TTE F-UP OR LMTD: CPT

## 2020-06-22 PROCEDURE — 80048 BASIC METABOLIC PNL TOTAL CA: CPT

## 2020-06-22 PROCEDURE — 6370000000 HC RX 637 (ALT 250 FOR IP): Performed by: INTERNAL MEDICINE

## 2020-06-22 PROCEDURE — 95819 EEG AWAKE AND ASLEEP: CPT | Performed by: PSYCHIATRY & NEUROLOGY

## 2020-06-22 PROCEDURE — 95819 EEG AWAKE AND ASLEEP: CPT

## 2020-06-22 PROCEDURE — 82962 GLUCOSE BLOOD TEST: CPT

## 2020-06-22 RX ADMIN — POTASSIUM CHLORIDE 40 MEQ: 20 TABLET, EXTENDED RELEASE ORAL at 08:52

## 2020-06-22 RX ADMIN — SODIUM CHLORIDE, PRESERVATIVE FREE 10 ML: 5 INJECTION INTRAVENOUS at 08:52

## 2020-06-22 RX ADMIN — ASPIRIN 81 MG: 81 TABLET, COATED ORAL at 08:52

## 2020-06-22 RX ADMIN — ACETAMINOPHEN 650 MG: 325 TABLET ORAL at 08:52

## 2020-06-22 RX ADMIN — INSULIN LISPRO 10 UNITS: 100 INJECTION, SOLUTION INTRAVENOUS; SUBCUTANEOUS at 17:40

## 2020-06-22 RX ADMIN — LISINOPRIL 20 MG: 20 TABLET ORAL at 08:52

## 2020-06-22 ASSESSMENT — PAIN SCALES - GENERAL: PAINLEVEL_OUTOF10: 7

## 2020-06-22 NOTE — PLAN OF CARE
Problem: Cardiac:  Goal: Ability to maintain vital signs within normal range will improve  Description: Ability to maintain vital signs within normal range will improve  Outcome: Met This Shift  Goal: Cardiovascular alteration will improve  Description: Cardiovascular alteration will improve  Outcome: Met This Shift

## 2020-06-22 NOTE — PROGRESS NOTES
[address] [facility]  Internal Medicine Residency / [address]    Attending Physician Statement  I have discussed the case, including pertinent history and exam findings with the resident and the team.  I have seen and examined the patient and the key elements of the encounter have been performed by me. I agree with the assessment, plan and orders as documented by the resident. Off metoprolol  And EEG done Awaiting ECHO  HAs had a negative heart cath recently  Plan: No futher metoprolol            Cardiology opinion before discharge     Remainder of medical problems as per resident note.       Frederick Crawley  Internal Medicine Residency Faculty

## 2020-06-22 NOTE — PROGRESS NOTES
Inpatient Cardiology Progress Note     PATIENT IS BEING FOLLOWED FOR: syncope    Raymond Lopez is a 53 y.o. male who is followed at our practice by Dr. Juliocesar Schulz. SUBJECTIVE: He denies further dizziness or lightheadedness. He only feels short of breath when wearing a mask. Anxious for discharge. OBJECTIVE: No apparent distress     ROS:  Consist: Denies fevers, chills or night sweats  Heart: Denies chest pain, palpitations, lightheadedness, dizziness or syncope  Lungs: Denies SOB, cough, wheezing, orthopnea or PND  GI: Denies abdominal pain, vomiting or diarrhea  All others negative     PHYSICAL EXAM:   BP (!) 145/90   Pulse 94   Temp 98.9 °F (37.2 °C) (Temporal)   Resp 18   Ht 5' 6\" (1.676 m)   Wt 142 lb 9.6 oz (64.7 kg)   SpO2 96%   BMI 23.02 kg/m²    B/P Range last 24 hours: Systolic (00ZND), DWA:648 , Min:121 , YML:238    Diastolic (40HHV), CTD:50, Min:78, Max:97    CONST: Well developed, well nourished gentleman who appears of stated age. Awake, alert and cooperative. No apparent distress  HEENT:   Head- Normocephalic, atraumatic   Throat- Oral mucosa pink and moist  Neck-  No stridor,  jugular venous distention or carotid bruit  CHEST: Chest symmetrical and non-tender to palpation. Respirations unlabored  RESPIRATORY:  Breath sounds clear throughout   CARDIOVASCULAR:     Heart Ausculation- Regular rate and rhythm, no murmur,  s3, s4 or rub   PV: No lower extremity edema. No varicosities. Pedal pulses palpable  ABDOMEN: Soft, non-tender to light palpation. Bowel sounds present. No palpable masses   MS: Good muscle strength and tone. No atrophy or abnormal movements. : Deferred  SKIN: Warm and dry  NEURO / PSYCH: Oriented to person, place and time. Speech clear and appropriate. Follows all commands.  Pleasant affect       Intake/Output Summary (Last 24 hours) at 6/22/2020 1449  Last data filed at 6/22/2020 1359  Gross per 24 hour   Intake 960 ml   Output 2100 ml   Net -1140 ml   urinary output last three shifts: 650, 750, 1400 mls  I/O negative 1120 mls ongoing     Weight:   Wt Readings from Last 3 Encounters:   06/20/20 142 lb 9.6 oz (64.7 kg)   01/16/20 157 lb 3.2 oz (71.3 kg)   01/11/20 144 lb 14.4 oz (65.7 kg)     Current Inpatient Medications:   aspirin  81 mg Oral Daily    atorvastatin  80 mg Oral Nightly    [Held by provider] hydrALAZINE  25 mg Oral 3 times per day    lisinopril  20 mg Oral Daily    sodium chloride flush  10 mL Intravenous 2 times per day    enoxaparin  40 mg Subcutaneous Daily    potassium chloride  40 mEq Oral BID WC       IV Infusions (if any):   dextrose         DIAGNOSTIC/ LABORATORY DATA:  Labs:   CBC:   Recent Labs     06/21/20  0353 06/22/20  0856   WBC 13.4* 7.8   HGB 13.4 13.2   HCT 40.1 39.9    350     BMP:   Recent Labs     06/21/20  0353 06/22/20  0856    135   K 4.1 4.1   CO2 20* 23   BUN 16 10   CREATININE 0.7 0.7   LABGLOM >60 >60   CALCIUM 8.6 9.0     Mag:   Recent Labs     06/20/20  1726 06/21/20  0353   MG 1.7 2.1     HgA1c:   Lab Results   Component Value Date    LABA1C 10.7 (H) 12/08/2019     CARDIAC ENZYMES:  Recent Labs     06/20/20  1726 06/20/20  2310 06/21/20  0353   TROPONINI <0.01 <0.01 <0.01     LIVER PROFILE:  Recent Labs     06/20/20  1726   AST 13   ALT 14   LABALBU 3.5       CXR 6/20/2020: Impression:     No airspace opacities or pleural effusion. ASSESSMENT:   1. Syncope: orthostatics mildly positive. Tox screen positive for marijuana   2. Nonischemic cardiomyopathy  · EF 35 to 40%   · Felt to be cocaine induced  · History of cardiac arrest secondary to DKA and hypothermia   3. Moderate MR, secondary to #2   3. Diabetes mellitus with recurrent episodes of DKA  4. Hypertension  5. Hyperlipidemia  6. History of polysubstance abuse    7. KIRSTEN (post arrest) requiring HD in 1/2020       PLAN:  1. Review echo   2.  Recommend continuing beta blocker due to his cardiomyopathy, as well as his ACE-I for both the cardiomyopathy and

## 2020-06-23 NOTE — DISCHARGE SUMMARY
refer to H and P    Treatments: IV hydration    Discharge Exam:  CONSTITUTIONAL:  awake, alert, cooperative, no apparent distress, and appears stated age  NECK:  supple, symmetrical, trachea midline  LUNGS:  No increased work of breathing, good air exchange, clear to auscultation bilaterally, no crackles or wheezing  CARDIOVASCULAR:  Normal apical impulse, regular rate and rhythm, normal S1 and S2, no S3 or S4, and no murmur noted  ABDOMEN:  No scars, normal bowel sounds, soft, non-distended, non-tender, no masses palpated, no hepatosplenomegally  MUSCULOSKELETAL:  there is no redness, warmth, or swelling of the joints  NEUROLOGIC:  Awake, alert, oriented to name, place and time.     SKIN:  no bruising or bleeding    Disposition: home    Patient Instructions:   [facility] Internal Medicine Resident Service    Discharge to:  Home  Diet: cardiac  Activity: activity as tolerated   Exercise: As tolerated   Drive with seat belt on  Be compliant with medication  Follow up with Dr. Gilford Mandril  Follow up with: PCP and cardiology    Discharge Medications:   George Scot   Home Medication Instructions [id number]:[id number]    Printed on:06/23/20 0810   Medication Information                      albuterol sulfate  (90 Base) MCG/ACT inhaler  Inhale 2 puffs into the lungs every 6 hours as needed for Wheezing or Shortness of Breath             aspirin 81 MG EC tablet  Take 1 tablet by mouth daily             atorvastatin (LIPITOR) 80 MG tablet  Take 1 tablet by mouth nightly             hydrALAZINE (APRESOLINE) 25 MG tablet  Take 1 tablet by mouth every 8 hours             insulin lispro (HUMALOG) 100 UNIT/ML injection vial  Inject into the skin MEDTRONIC PUMP             Insulin Syringe-Needle U-100 28G X 1/2\" 1 ML MISC  Use as directed             Lancets MISC  1 each by Does not apply route 2 times daily             lisinopril (PRINIVIL;ZESTRIL) 20 MG tablet  Take 1 tablet by mouth daily                 Activity: activity as tolerated    Diet: cardiac diet    Wound Care: none needed    Follow-up:   With cardiology and PCP    Electronically signed by Kimberly Pugh MD on 6/23/20 at 8:04 AM EDT

## 2020-07-20 ENCOUNTER — HOSPITAL ENCOUNTER (INPATIENT)
Age: 50
LOS: 2 days | Discharge: HOME | DRG: 919 | End: 2020-07-22
Attending: EMERGENCY MEDICINE | Admitting: INTERNAL MEDICINE

## 2020-07-20 ENCOUNTER — APPOINTMENT (OUTPATIENT)
Dept: GENERAL RADIOLOGY | Age: 50
DRG: 919 | End: 2020-07-20

## 2020-07-20 LAB
ACETAMINOPHEN LEVEL: <5 MCG/ML (ref 10–30)
ALBUMIN SERPL-MCNC: 4.4 G/DL (ref 3.5–5.2)
ALP BLD-CCNC: 128 U/L (ref 40–129)
ALT SERPL-CCNC: 21 U/L (ref 0–40)
ANION GAP SERPL CALCULATED.3IONS-SCNC: 32 MMOL/L (ref 7–16)
ANION GAP SERPL CALCULATED.3IONS-SCNC: 46 MMOL/L (ref 7–16)
ANISOCYTOSIS: ABNORMAL
AST SERPL-CCNC: 21 U/L (ref 0–39)
B.E.: -18.3 MMOL/L (ref -3–3)
B.E.: -31 MMOL/L (ref -3–3)
BACTERIA: ABNORMAL /HPF
BASOPHILS ABSOLUTE: 0 E9/L (ref 0–0.2)
BASOPHILS RELATIVE PERCENT: 0.7 % (ref 0–2)
BETA-HYDROXYBUTYRATE: >4.5 MMOL/L (ref 0.02–0.27)
BILIRUB SERPL-MCNC: 0.5 MG/DL (ref 0–1.2)
BILIRUBIN URINE: ABNORMAL
BLOOD, URINE: ABNORMAL
BUN BLDV-MCNC: 30 MG/DL (ref 6–20)
BUN BLDV-MCNC: 36 MG/DL (ref 6–20)
BURR CELLS: ABNORMAL
C-REACTIVE PROTEIN: <0.1 MG/DL (ref 0–0.4)
CALCIUM SERPL-MCNC: 8.5 MG/DL (ref 8.6–10.2)
CALCIUM SERPL-MCNC: 9 MG/DL (ref 8.6–10.2)
CHLORIDE BLD-SCNC: 101 MMOL/L (ref 98–107)
CHLORIDE BLD-SCNC: 86 MMOL/L (ref 98–107)
CK MB: 4.5 NG/ML (ref 0–7.7)
CLARITY: CLEAR
CO2: 2 MMOL/L (ref 22–29)
CO2: 8 MMOL/L (ref 22–29)
COHB: 0 % (ref 0–1.5)
COHB: 0.5 % (ref 0–1.5)
COLOR: YELLOW
CREAT SERPL-MCNC: 1.4 MG/DL (ref 0.7–1.2)
CREAT SERPL-MCNC: 2.1 MG/DL (ref 0.7–1.2)
CRITICAL: ABNORMAL
CRITICAL: ABNORMAL
DATE ANALYZED: ABNORMAL
DATE ANALYZED: ABNORMAL
DATE OF COLLECTION: ABNORMAL
DATE OF COLLECTION: ABNORMAL
EKG ATRIAL RATE: 124 BPM
EKG P AXIS: 74 DEGREES
EKG P-R INTERVAL: 136 MS
EKG Q-T INTERVAL: 340 MS
EKG QRS DURATION: 90 MS
EKG QTC CALCULATION (BAZETT): 488 MS
EKG R AXIS: 64 DEGREES
EKG T AXIS: 6 DEGREES
EKG VENTRICULAR RATE: 124 BPM
EOSINOPHILS ABSOLUTE: 0.44 E9/L (ref 0.05–0.5)
EOSINOPHILS RELATIVE PERCENT: 1.7 % (ref 0–6)
EPITHELIAL CELLS, UA: ABNORMAL /HPF
ETHANOL: <10 MG/DL (ref 0–0.08)
GFR AFRICAN AMERICAN: 41
GFR AFRICAN AMERICAN: 46
GFR AFRICAN AMERICAN: 49
GFR AFRICAN AMERICAN: >60
GFR NON-AFRICAN AMERICAN: 34 ML/MIN/1.73
GFR NON-AFRICAN AMERICAN: 38 ML/MIN/1.73
GFR NON-AFRICAN AMERICAN: 40 ML/MIN/1.73
GFR NON-AFRICAN AMERICAN: 54 ML/MIN/1.73
GLUCOSE BLD-MCNC: 364 MG/DL (ref 74–99)
GLUCOSE BLD-MCNC: 577 MG/DL (ref 74–99)
GLUCOSE BLD-MCNC: 672 MG/DL (ref 74–99)
GLUCOSE BLD-MCNC: 728 MG/DL (ref 74–99)
GLUCOSE URINE: >=1000 MG/DL
HBA1C MFR BLD: 10 % (ref 4–5.6)
HCO3: 3.4 MMOL/L (ref 22–26)
HCO3: 6.7 MMOL/L (ref 22–26)
HCT VFR BLD CALC: 38.4 % (ref 37–54)
HCT VFR BLD CALC: 44.3 % (ref 37–54)
HEMOGLOBIN: 12.5 G/DL (ref 12.5–16.5)
HEMOGLOBIN: 13.5 G/DL (ref 12.5–16.5)
HHB: 1.8 % (ref 0–5)
HHB: 12.4 % (ref 0–5)
KETONES, URINE: >=80 MG/DL
LAB: ABNORMAL
LAB: ABNORMAL
LEUKOCYTE ESTERASE, URINE: NEGATIVE
LYMPHOCYTES ABSOLUTE: 1.04 E9/L (ref 1.5–4)
LYMPHOCYTES RELATIVE PERCENT: 4.3 % (ref 20–42)
Lab: ABNORMAL
Lab: ABNORMAL
MAGNESIUM: 1.9 MG/DL (ref 1.6–2.6)
MAGNESIUM: 2 MG/DL (ref 1.6–2.6)
MCH RBC QN AUTO: 30.7 PG (ref 26–35)
MCHC RBC AUTO-ENTMCNC: 30.5 % (ref 32–34.5)
MCV RBC AUTO: 100.7 FL (ref 80–99.9)
METAMYELOCYTES RELATIVE PERCENT: 1.7 % (ref 0–1)
METER GLUCOSE: 317 MG/DL (ref 74–99)
METER GLUCOSE: 318 MG/DL (ref 74–99)
METER GLUCOSE: 321 MG/DL (ref 74–99)
METER GLUCOSE: 368 MG/DL (ref 74–99)
METER GLUCOSE: 444 MG/DL (ref 74–99)
METER GLUCOSE: >500 MG/DL (ref 74–99)
METHB: 0.1 % (ref 0–1.5)
METHB: 0.4 % (ref 0–1.5)
MODE: ABNORMAL
MODE: ABNORMAL
MONOCYTES ABSOLUTE: 0.52 E9/L (ref 0.1–0.95)
MONOCYTES RELATIVE PERCENT: 1.7 % (ref 2–12)
NEUTROPHILS ABSOLUTE: 24.01 E9/L (ref 1.8–7.3)
NEUTROPHILS RELATIVE PERCENT: 90.4 % (ref 43–80)
NITRITE, URINE: NEGATIVE
O2 CONTENT: 16.9 ML/DL
O2 CONTENT: 18.2 ML/DL
O2 SATURATION: 87.5 % (ref 92–98.5)
O2 SATURATION: 98.2 % (ref 92–98.5)
O2HB: 87 % (ref 94–97)
O2HB: 97.8 % (ref 94–97)
OPERATOR ID: 1632
OPERATOR ID: 1768
PATIENT TEMP: 37 C
PATIENT TEMP: 37 C
PCO2: 16.2 MMHG (ref 35–45)
PCO2: 23.8 MMHG (ref 35–45)
PDW BLD-RTO: 13.9 FL (ref 11.5–15)
PERFORMED ON: ABNORMAL
PERFORMED ON: ABNORMAL
PH BLOOD GAS: 6.78 (ref 7.35–7.45)
PH BLOOD GAS: 7.24 (ref 7.35–7.45)
PH UA: 5.5 (ref 5–9)
PH VENOUS: 6.85 (ref 7.35–7.45)
PHOSPHORUS: 3.2 MG/DL (ref 2.5–4.5)
PHOSPHORUS: 8.7 MG/DL (ref 2.5–4.5)
PLATELET # BLD: 450 E9/L (ref 130–450)
PMV BLD AUTO: 9.6 FL (ref 7–12)
PO2: 119.3 MMHG (ref 75–100)
PO2: 73.8 MMHG (ref 75–100)
POC CHLORIDE: 109 MMOL/L (ref 100–108)
POC CHLORIDE: 110 MMOL/L (ref 100–108)
POC CREATININE: 1.8 MG/DL (ref 0.7–1.2)
POC CREATININE: 1.9 MG/DL (ref 0.7–1.2)
POC POTASSIUM: 4.7 MMOL/L (ref 3.5–5)
POC POTASSIUM: 5.2 MMOL/L (ref 3.5–5)
POC SODIUM: 133 MMOL/L (ref 132–146)
POC SODIUM: 133 MMOL/L (ref 132–146)
POIKILOCYTES: ABNORMAL
POTASSIUM REFLEX MAGNESIUM: 5.5 MMOL/L (ref 3.5–5)
POTASSIUM SERPL-SCNC: 4.7 MMOL/L (ref 3.5–5)
PRO-BNP: 291 PG/ML (ref 0–125)
PROCALCITONIN: 1.09 NG/ML (ref 0–0.08)
PROTEIN UA: 30 MG/DL
RBC # BLD: 4.4 E12/L (ref 3.8–5.8)
RBC UA: ABNORMAL /HPF (ref 0–2)
SALICYLATE, SERUM: <0.3 MG/DL (ref 0–30)
SEDIMENTATION RATE, ERYTHROCYTE: 0 MM/HR (ref 0–15)
SODIUM BLD-SCNC: 134 MMOL/L (ref 132–146)
SODIUM BLD-SCNC: 141 MMOL/L (ref 132–146)
SOURCE, BLOOD GAS: ABNORMAL
SOURCE, BLOOD GAS: ABNORMAL
SPECIFIC GRAVITY UA: 1.02 (ref 1–1.03)
THB: 13.1 G/DL (ref 11.5–16.5)
THB: 13.8 G/DL (ref 11.5–16.5)
TIME ANALYZED: 1556
TIME ANALYZED: 2157
TOTAL CK: 90 U/L (ref 20–200)
TOTAL PROTEIN: 6.8 G/DL (ref 6.4–8.3)
TRICYCLIC ANTIDEPRESSANTS SCREEN SERUM: NEGATIVE NG/ML
TROPONIN: 0.04 NG/ML (ref 0–0.03)
UROBILINOGEN, URINE: 0.2 E.U./DL
WBC # BLD: 26.1 E9/L (ref 4.5–11.5)
WBC UA: ABNORMAL /HPF (ref 0–5)

## 2020-07-20 PROCEDURE — 2500000003 HC RX 250 WO HCPCS: Performed by: EMERGENCY MEDICINE

## 2020-07-20 PROCEDURE — 82553 CREATINE MB FRACTION: CPT

## 2020-07-20 PROCEDURE — 84132 ASSAY OF SERUM POTASSIUM: CPT

## 2020-07-20 PROCEDURE — 84484 ASSAY OF TROPONIN QUANT: CPT

## 2020-07-20 PROCEDURE — 80048 BASIC METABOLIC PNL TOTAL CA: CPT

## 2020-07-20 PROCEDURE — 82010 KETONE BODYS QUAN: CPT

## 2020-07-20 PROCEDURE — C9113 INJ PANTOPRAZOLE SODIUM, VIA: HCPCS | Performed by: NURSE PRACTITIONER

## 2020-07-20 PROCEDURE — U0002 COVID-19 LAB TEST NON-CDC: HCPCS

## 2020-07-20 PROCEDURE — 2000000000 HC ICU R&B

## 2020-07-20 PROCEDURE — 96374 THER/PROPH/DIAG INJ IV PUSH: CPT

## 2020-07-20 PROCEDURE — 80307 DRUG TEST PRSMV CHEM ANLYZR: CPT

## 2020-07-20 PROCEDURE — 81001 URINALYSIS AUTO W/SCOPE: CPT

## 2020-07-20 PROCEDURE — 83735 ASSAY OF MAGNESIUM: CPT

## 2020-07-20 PROCEDURE — 82805 BLOOD GASES W/O2 SATURATION: CPT

## 2020-07-20 PROCEDURE — 2580000003 HC RX 258: Performed by: INTERNAL MEDICINE

## 2020-07-20 PROCEDURE — 99285 EMERGENCY DEPT VISIT HI MDM: CPT

## 2020-07-20 PROCEDURE — 82565 ASSAY OF CREATININE: CPT

## 2020-07-20 PROCEDURE — 80053 COMPREHEN METABOLIC PANEL: CPT

## 2020-07-20 PROCEDURE — 36415 COLL VENOUS BLD VENIPUNCTURE: CPT

## 2020-07-20 PROCEDURE — 6360000002 HC RX W HCPCS: Performed by: NURSE PRACTITIONER

## 2020-07-20 PROCEDURE — G0480 DRUG TEST DEF 1-7 CLASSES: HCPCS

## 2020-07-20 PROCEDURE — 2580000003 HC RX 258: Performed by: EMERGENCY MEDICINE

## 2020-07-20 PROCEDURE — 85025 COMPLETE CBC W/AUTO DIFF WBC: CPT

## 2020-07-20 PROCEDURE — 83036 HEMOGLOBIN GLYCOSYLATED A1C: CPT

## 2020-07-20 PROCEDURE — 82435 ASSAY OF BLOOD CHLORIDE: CPT

## 2020-07-20 PROCEDURE — 82800 BLOOD PH: CPT

## 2020-07-20 PROCEDURE — 93005 ELECTROCARDIOGRAM TRACING: CPT | Performed by: EMERGENCY MEDICINE

## 2020-07-20 PROCEDURE — 86140 C-REACTIVE PROTEIN: CPT

## 2020-07-20 PROCEDURE — 93010 ELECTROCARDIOGRAM REPORT: CPT | Performed by: INTERNAL MEDICINE

## 2020-07-20 PROCEDURE — 84100 ASSAY OF PHOSPHORUS: CPT

## 2020-07-20 PROCEDURE — 82947 ASSAY GLUCOSE BLOOD QUANT: CPT

## 2020-07-20 PROCEDURE — 83880 ASSAY OF NATRIURETIC PEPTIDE: CPT

## 2020-07-20 PROCEDURE — 85651 RBC SED RATE NONAUTOMATED: CPT

## 2020-07-20 PROCEDURE — 82550 ASSAY OF CK (CPK): CPT

## 2020-07-20 PROCEDURE — 84145 PROCALCITONIN (PCT): CPT

## 2020-07-20 PROCEDURE — 6360000002 HC RX W HCPCS: Performed by: EMERGENCY MEDICINE

## 2020-07-20 PROCEDURE — 82962 GLUCOSE BLOOD TEST: CPT

## 2020-07-20 PROCEDURE — 85014 HEMATOCRIT: CPT

## 2020-07-20 PROCEDURE — 85018 HEMOGLOBIN: CPT

## 2020-07-20 PROCEDURE — 2700000000 HC OXYGEN THERAPY PER DAY

## 2020-07-20 PROCEDURE — 84295 ASSAY OF SERUM SODIUM: CPT

## 2020-07-20 PROCEDURE — 6370000000 HC RX 637 (ALT 250 FOR IP): Performed by: EMERGENCY MEDICINE

## 2020-07-20 PROCEDURE — 71045 X-RAY EXAM CHEST 1 VIEW: CPT

## 2020-07-20 RX ORDER — POTASSIUM CHLORIDE 7.45 MG/ML
10 INJECTION INTRAVENOUS PRN
Status: DISCONTINUED | OUTPATIENT
Start: 2020-07-20 | End: 2020-07-21

## 2020-07-20 RX ORDER — MAGNESIUM SULFATE 1 G/100ML
1 INJECTION INTRAVENOUS PRN
Status: DISCONTINUED | OUTPATIENT
Start: 2020-07-20 | End: 2020-07-21

## 2020-07-20 RX ORDER — LORAZEPAM 2 MG/ML
3 INJECTION INTRAMUSCULAR
Status: DISCONTINUED | OUTPATIENT
Start: 2020-07-20 | End: 2020-07-22 | Stop reason: HOSPADM

## 2020-07-20 RX ORDER — THIAMINE HYDROCHLORIDE 100 MG/ML
100 INJECTION, SOLUTION INTRAMUSCULAR; INTRAVENOUS DAILY
Status: DISCONTINUED | OUTPATIENT
Start: 2020-07-20 | End: 2020-07-22 | Stop reason: CLARIF

## 2020-07-20 RX ORDER — 0.9 % SODIUM CHLORIDE 0.9 %
15 INTRAVENOUS SOLUTION INTRAVENOUS ONCE
Status: COMPLETED | OUTPATIENT
Start: 2020-07-20 | End: 2020-07-20

## 2020-07-20 RX ORDER — DEXTROSE MONOHYDRATE 25 G/50ML
12.5 INJECTION, SOLUTION INTRAVENOUS PRN
Status: DISCONTINUED | OUTPATIENT
Start: 2020-07-20 | End: 2020-07-22 | Stop reason: SDUPTHER

## 2020-07-20 RX ORDER — 0.9 % SODIUM CHLORIDE 0.9 %
1000 INTRAVENOUS SOLUTION INTRAVENOUS ONCE
Status: COMPLETED | OUTPATIENT
Start: 2020-07-20 | End: 2020-07-20

## 2020-07-20 RX ORDER — LORAZEPAM 1 MG/1
3 TABLET ORAL
Status: DISCONTINUED | OUTPATIENT
Start: 2020-07-20 | End: 2020-07-22 | Stop reason: HOSPADM

## 2020-07-20 RX ORDER — ONDANSETRON 2 MG/ML
4 INJECTION INTRAMUSCULAR; INTRAVENOUS ONCE
Status: COMPLETED | OUTPATIENT
Start: 2020-07-20 | End: 2020-07-20

## 2020-07-20 RX ORDER — DEXTROSE AND SODIUM CHLORIDE 5; .45 G/100ML; G/100ML
INJECTION, SOLUTION INTRAVENOUS CONTINUOUS PRN
Status: DISCONTINUED | OUTPATIENT
Start: 2020-07-20 | End: 2020-07-21

## 2020-07-20 RX ORDER — HEPARIN SODIUM 10000 [USP'U]/ML
5000 INJECTION, SOLUTION INTRAVENOUS; SUBCUTANEOUS 3 TIMES DAILY
Status: DISCONTINUED | OUTPATIENT
Start: 2020-07-20 | End: 2020-07-21

## 2020-07-20 RX ORDER — PANTOPRAZOLE SODIUM 40 MG/10ML
40 INJECTION, POWDER, LYOPHILIZED, FOR SOLUTION INTRAVENOUS DAILY
Status: DISCONTINUED | OUTPATIENT
Start: 2020-07-21 | End: 2020-07-21 | Stop reason: SDUPTHER

## 2020-07-20 RX ORDER — LORAZEPAM 2 MG/ML
1 INJECTION INTRAMUSCULAR
Status: DISCONTINUED | OUTPATIENT
Start: 2020-07-20 | End: 2020-07-22 | Stop reason: HOSPADM

## 2020-07-20 RX ORDER — LORAZEPAM 2 MG/ML
2 INJECTION INTRAMUSCULAR
Status: DISCONTINUED | OUTPATIENT
Start: 2020-07-20 | End: 2020-07-22 | Stop reason: HOSPADM

## 2020-07-20 RX ORDER — PANTOPRAZOLE SODIUM 40 MG/10ML
40 INJECTION, POWDER, LYOPHILIZED, FOR SOLUTION INTRAVENOUS ONCE
Status: COMPLETED | OUTPATIENT
Start: 2020-07-20 | End: 2020-07-20

## 2020-07-20 RX ORDER — LORAZEPAM 2 MG/ML
4 INJECTION INTRAMUSCULAR
Status: DISCONTINUED | OUTPATIENT
Start: 2020-07-20 | End: 2020-07-22 | Stop reason: HOSPADM

## 2020-07-20 RX ORDER — SODIUM CHLORIDE 9 MG/ML
INJECTION, SOLUTION INTRAVENOUS CONTINUOUS
Status: DISCONTINUED | OUTPATIENT
Start: 2020-07-20 | End: 2020-07-21

## 2020-07-20 RX ORDER — LORAZEPAM 1 MG/1
4 TABLET ORAL
Status: DISCONTINUED | OUTPATIENT
Start: 2020-07-20 | End: 2020-07-22 | Stop reason: HOSPADM

## 2020-07-20 RX ORDER — SODIUM CHLORIDE 0.9 % (FLUSH) 0.9 %
10 SYRINGE (ML) INJECTION PRN
Status: DISCONTINUED | OUTPATIENT
Start: 2020-07-20 | End: 2020-07-21

## 2020-07-20 RX ORDER — FOLIC ACID 5 MG/ML
1 INJECTION, SOLUTION INTRAMUSCULAR; INTRAVENOUS; SUBCUTANEOUS DAILY
Status: DISCONTINUED | OUTPATIENT
Start: 2020-07-20 | End: 2020-07-22 | Stop reason: CLARIF

## 2020-07-20 RX ORDER — LORAZEPAM 1 MG/1
2 TABLET ORAL
Status: DISCONTINUED | OUTPATIENT
Start: 2020-07-20 | End: 2020-07-22 | Stop reason: HOSPADM

## 2020-07-20 RX ORDER — SODIUM CHLORIDE 0.9 % (FLUSH) 0.9 %
10 SYRINGE (ML) INJECTION EVERY 12 HOURS SCHEDULED
Status: DISCONTINUED | OUTPATIENT
Start: 2020-07-20 | End: 2020-07-22 | Stop reason: HOSPADM

## 2020-07-20 RX ORDER — LORAZEPAM 1 MG/1
1 TABLET ORAL
Status: DISCONTINUED | OUTPATIENT
Start: 2020-07-20 | End: 2020-07-22 | Stop reason: HOSPADM

## 2020-07-20 RX ADMIN — ONDANSETRON 4 MG: 2 INJECTION INTRAMUSCULAR; INTRAVENOUS at 12:21

## 2020-07-20 RX ADMIN — ONDANSETRON 4 MG: 2 INJECTION INTRAMUSCULAR; INTRAVENOUS at 21:13

## 2020-07-20 RX ADMIN — PANTOPRAZOLE SODIUM 40 MG: 40 INJECTION, POWDER, FOR SOLUTION INTRAVENOUS at 21:13

## 2020-07-20 RX ADMIN — SODIUM CHLORIDE 987 ML: 9 INJECTION, SOLUTION INTRAVENOUS at 13:32

## 2020-07-20 RX ADMIN — SODIUM CHLORIDE 1000 ML: 9 INJECTION, SOLUTION INTRAVENOUS at 12:21

## 2020-07-20 RX ADMIN — SODIUM CHLORIDE 0.1 UNITS/KG/HR: 9 INJECTION, SOLUTION INTRAVENOUS at 13:32

## 2020-07-20 RX ADMIN — SODIUM CHLORIDE: 9 INJECTION, SOLUTION INTRAVENOUS at 15:40

## 2020-07-20 RX ADMIN — SODIUM CHLORIDE, PRESERVATIVE FREE 10 ML: 5 INJECTION INTRAVENOUS at 22:00

## 2020-07-20 RX ADMIN — SODIUM BICARBONATE: 84 INJECTION, SOLUTION INTRAVENOUS at 13:55

## 2020-07-20 ASSESSMENT — ENCOUNTER SYMPTOMS
EYE PAIN: 0
SORE THROAT: 0
ALLERGIC/IMMUNOLOGIC NEGATIVE: 1
COUGH: 0
SHORTNESS OF BREATH: 0
PHOTOPHOBIA: 0
CONSTIPATION: 0
CHEST TIGHTNESS: 0
DIARRHEA: 0
EYE REDNESS: 0
VOMITING: 1
FACIAL SWELLING: 0
NAUSEA: 1
ABDOMINAL PAIN: 0

## 2020-07-20 NOTE — ED NOTES
Mother  [facility] called would like update when available [phone]     Enmanuel   [address]  20 [id number]

## 2020-07-20 NOTE — ED NOTES
Bed: 09  Expected date:   Expected time:   Means of arrival:   Comments:     Nika Montoya RN  07/20/20 1143

## 2020-07-20 NOTE — ED PROVIDER NOTES
Patient is a 78-year-old male with type 1 diabetes presenting to the hospital due to hyperglycemia and DKA. Patient states that he started feeling this way for seeing this morning does admit to being a chronic alcoholic as well. His last drink was a day and a half ago. Does have a history of DTs. Patient states he started being nauseous and having deep breathing and feeling like he was going into DKA. Patient states he then came to emergency department. He denies changing his insulin regimen give himself insulin for seeing this morning and denies any recent illness or sickness. He denies vomiting. Symptoms are worsened by nothing       Symptoms are improved by nothing    Denies any associated chest pain, shortness of breath, fever, chills, change in bowel bladder, abdominal pain, back pain, weakness in the extremities, injury or trauma, or any other symptoms    Review of Systems   Constitutional: Positive for fatigue. Negative for chills and fever. HENT: Negative for congestion, facial swelling, hearing loss and sore throat. Eyes: Negative for photophobia, pain and redness. Respiratory: Negative for cough, chest tightness and shortness of breath. Cardiovascular: Negative for chest pain and palpitations. Gastrointestinal: Positive for nausea and vomiting. Negative for abdominal pain, constipation and diarrhea. Endocrine: Negative for cold intolerance, polydipsia and polyuria. Genitourinary: Positive for frequency. Negative for dysuria and flank pain. Musculoskeletal: Negative for arthralgias, joint swelling and myalgias. Skin: Negative. Allergic/Immunologic: Negative. Neurological: Negative. Physical Exam  Vitals signs and nursing note reviewed. Constitutional:       General: He is not in acute distress. Appearance: He is well-developed and normal weight. He is ill-appearing. HENT:      Head: Normocephalic and atraumatic.       Right Ear: Tympanic membrane normal.      Nose: Nose normal. No congestion. Mouth/Throat:      Mouth: Mucous membranes are dry. Pharynx: Oropharynx is clear. Eyes:      Pupils: Pupils are equal, round, and reactive to light. Neck:      Musculoskeletal: Normal range of motion and neck supple. Cardiovascular:      Rate and Rhythm: Regular rhythm. Tachycardia present. Pulses: Normal pulses. Heart sounds: Normal heart sounds. No murmur. Pulmonary:      Effort: Respiratory distress present. Breath sounds: Normal breath sounds. No wheezing or rales. Abdominal:      General: Abdomen is flat. Bowel sounds are normal.      Palpations: Abdomen is soft. Tenderness: There is no abdominal tenderness. There is no guarding or rebound. Musculoskeletal:         General: No swelling or tenderness. Skin:     General: Skin is warm and dry. Neurological:      Mental Status: He is alert and oriented to person, place, and time. Cranial Nerves: No cranial nerve deficit. Coordination: Coordination normal.          Procedures     EKG: This EKG is signed and interpreted by me. Rate: 124  Rhythm: Sinus  Interpretation: sinus tachycardia  Comparison: stable as compared to patient's most recent EKG       MDM  Number of Diagnoses or Management Options  Diabetic ketoacidosis without coma associated with type 1 diabetes mellitus [facility]):   Diagnosis management comments: Patient found to be in DKA with a bicarb that was extremely low. Patient was started on DKA protocol and placed on a bicarb drip due to the low bicarb and the blood acidosis. Patient admitted to the ICU for continued treatment. Patient is also a chronic alcoholic and was started on CIWA protocol. ED Course as of Jul 22 2350   Mon Jul 20, 2020 2119 Patient had an episode of coffee-ground emesis. Gastroccult positive. Patient given Zofran and pantoprazole. H&H pending. Will increase bicarbonate drip to 150 cc/h. New ABG ordered.     [BM] 2203 ABG repeated. pH improving. Current pH at 2203 is 7.236. Patient alert and oriented with normal respirations. We will plan on transferring to MICU soon. [BM]      ED Course User Index  [BM] Rohit Mathews MD     ED Course as of Jul 22 2350   Mon Jul 20, 2020 2119 Patient had an episode of coffee-ground emesis. Gastroccult positive. Patient given Zofran and pantoprazole. H&H pending. Will increase bicarbonate drip to 150 cc/h. New ABG ordered. [BM]   2203 ABG repeated. pH improving. Current pH at 2203 is 7.236. Patient alert and oriented with normal respirations. We will plan on transferring to MICU soon. [BM]      ED Course User Index  [BM] Rohit Mathews MD       --------------------------------------------- PAST HISTORY ---------------------------------------------  Past Medical History:  has a past medical history of Alcoholism ([facility] [address] [facility] Diabetes mellitus ([facility] Hypertension, Idiopathic peripheral neuropathy, Lacunar stroke ([facility] and Marijuana abuse. Past Surgical History:  has no past surgical history on file. Social History:  reports that he has been smoking cigarettes. He has a 30.00 pack-year smoking history. He has never used smokeless tobacco. He reports current alcohol use of about 5.0 standard drinks of alcohol per week. He reports current drug use. Drug: Marijuana. Family History: family history includes Cancer in his maternal grandmother; Diabetes type 2  in his mother and nephew. The patients home medications have been reviewed.     Allergies: Metoprolol and No known allergies    -------------------------------------------------- RESULTS -------------------------------------------------    Lab  Results for orders placed or performed during the hospital encounter of 07/20/20   Culture, Blood 1    Specimen: Blood   Result Value Ref Range    Blood Culture, Routine 24 Hours no growth    Culture, Blood 2    Specimen: Blood   Result Value Ref Range    Culture, Blood 2 24 Hours no growth    CBC Auto Differential   Result Value Ref Range    WBC 26.1 (H) 4.5 - 11.5 E9/L    RBC 4.40 3.80 - 5.80 E12/L    Hemoglobin 13.5 12.5 - 16.5 g/dL    Hematocrit 44.3 37.0 - 54.0 %    .7 (H) 80.0 - 99.9 fL    MCH 30.7 26.0 - 35.0 pg    MCHC 30.5 (L) 32.0 - 34.5 %    RDW 13.9 11.5 - 15.0 fL    Platelets 401 658 - 335 E9/L    MPV 9.6 7.0 - 12.0 fL    Neutrophils % 90.4 (H) 43.0 - 80.0 %    Lymphocytes % 4.3 (L) 20.0 - 42.0 %    Monocytes % 1.7 (L) 2.0 - 12.0 %    Eosinophils % 1.7 0.0 - 6.0 %    Basophils % 0.7 0.0 - 2.0 %    Neutrophils Absolute 24.01 (H) 1.80 - 7.30 E9/L    Lymphocytes Absolute 1.04 (L) 1.50 - 4.00 E9/L    Monocytes Absolute 0.52 0.10 - 0.95 E9/L    Eosinophils Absolute 0.44 0.05 - 0.50 E9/L    Basophils Absolute 0.00 0.00 - 0.20 E9/L    Metamyelocytes Relative 1.7 (H) 0.0 - 1.0 %    Anisocytosis 1+     Poikilocytes 1+     Binh Cells 1+    Comprehensive Metabolic Panel w/ Reflex to MG   Result Value Ref Range    Sodium 134 132 - 146 mmol/L    Potassium reflex Magnesium 5.5 (H) 3.5 - 5.0 mmol/L    Chloride 86 (L) 98 - 107 mmol/L    CO2 2 (LL) 22 - 29 mmol/L    Anion Gap 46 (H) 7 - 16 mmol/L    Glucose 728 (HH) 74 - 99 mg/dL    BUN 36 (H) 6 - 20 mg/dL    CREATININE 2.1 (H) 0.7 - 1.2 mg/dL    GFR Non-African American 34 >=60 mL/min/1.73    GFR African American 41     Calcium 9.0 8.6 - 10.2 mg/dL    Total Protein 6.8 6.4 - 8.3 g/dL    Alb 4.4 3.5 - 5.2 g/dL    Total Bilirubin 0.5 0.0 - 1.2 mg/dL    Alkaline Phosphatase 128 40 - 129 U/L    ALT 21 0 - 40 U/L    AST 21 0 - 39 U/L   Troponin   Result Value Ref Range    Troponin 0.04 (H) 0.00 - 0.03 ng/mL   Brain Natriuretic Peptide   Result Value Ref Range    Pro- (H) 0 - 125 pg/mL   Urinalysis, reflex to microscopic   Result Value Ref Range    Color, UA Yellow Straw/Yellow    Clarity, UA Clear Clear    Glucose, Ur >=1000 (A) Negative mg/dL    Bilirubin Urine SMALL (A) Negative    Ketones, Urine >=80 (A) Negative mg/dL    Specific Gravity, UA 1.025 1.005 - 1.030    Blood, Urine MODERATE (A) Negative    pH, UA 5.5 5.0 - 9.0    Protein, UA 30 (A) Negative mg/dL    Urobilinogen, Urine 0.2 <2.0 E.U./dL    Nitrite, Urine Negative Negative    Leukocyte Esterase, Urine Negative Negative   pH, venous   Result Value Ref Range    pH, Gage 6.85 (LL) 7.35 - 7.45   Beta-Hydroxybutyrate   Result Value Ref Range    Beta-Hydroxybutyrate >4.50 (H) 0.02 - 0.27 mmol/L   Basic Metabolic Panel   Result Value Ref Range    Sodium 141 132 - 146 mmol/L    Potassium 4.7 3.5 - 5.0 mmol/L    Chloride 101 98 - 107 mmol/L    CO2 8 (LL) 22 - 29 mmol/L    Anion Gap 32 (H) 7 - 16 mmol/L    Glucose 364 (H) 74 - 99 mg/dL    BUN 30 (H) 6 - 20 mg/dL    CREATININE 1.4 (H) 0.7 - 1.2 mg/dL    GFR Non-African American 54 >=60 mL/min/1.73    GFR African American >60     Calcium 8.5 (L) 8.6 - 10.2 mg/dL   Basic Metabolic Panel   Result Value Ref Range    Sodium 141 132 - 146 mmol/L    Potassium 4.5 3.5 - 5.0 mmol/L    Chloride 105 98 - 107 mmol/L    CO2 11 (L) 22 - 29 mmol/L    Anion Gap 25 (H) 7 - 16 mmol/L    Glucose 295 (H) 74 - 99 mg/dL    BUN 24 (H) 6 - 20 mg/dL    CREATININE 1.2 0.7 - 1.2 mg/dL    GFR Non-African American >60 >=60 mL/min/1.73    GFR African American >60     Calcium 8.6 8.6 - 10.2 mg/dL   Magnesium   Result Value Ref Range    Magnesium 1.9 1.6 - 2.6 mg/dL   Magnesium   Result Value Ref Range    Magnesium 1.8 1.6 - 2.6 mg/dL   Phosphorus   Result Value Ref Range    Phosphorus 3.2 2.5 - 4.5 mg/dL   Phosphorus   Result Value Ref Range    Phosphorus 2.4 (L) 2.5 - 4.5 mg/dL   Microscopic Urinalysis   Result Value Ref Range    WBC, UA 1-3 0 - 5 /HPF    RBC, UA 1-3 0 - 2 /HPF    Epithelial Cells, UA FEW /HPF    Bacteria, UA FEW (A) None Seen /HPF   Procalcitonin   Result Value Ref Range    Procalcitonin 1.09 (H) 0.00 - 0.08 ng/mL   SEDIMENTATION RATE   Result Value Ref Range    Sed Rate 0 0 - 15 mm/Hr   C-reactive protein   Result Value Ref Range    CRP <0.1 0.0 - 0.4 mg/dL   Magnesium   Result Value Ref Range    Magnesium 2.0 1.6 - 2.6 mg/dL   Phosphorus   Result Value Ref Range    Phosphorus 8.7 (H) 2.5 - 4.5 mg/dL   Serum Drug Screen   Result Value Ref Range    Ethanol Lvl <10 mg/dL    Acetaminophen Level <5.0 (L) 10.0 - 18.7 mcg/mL    Salicylate, Serum <0.4 0.0 - 30.0 mg/dL    TCA Scrn NEGATIVE Cutoff:300 ng/mL   CK-MB   Result Value Ref Range    CK-MB 4.5 0.0 - 7.7 ng/mL   CK   Result Value Ref Range    Total CK 90 20 - 200 U/L   Blood Gas, Arterial   Result Value Ref Range    Date Analyzed 20200720     Time Analyzed 1556     Source: Blood Arterial     pH, Blood Gas 6.777 (LL) 7.350 - 7.450    PCO2 23.8 (L) 35.0 - 45.0 mmHg    PO2 73.8 (L) 75.0 - 100.0 mmHg    HCO3 3.4 (L) 22.0 - 26.0 mmol/L    B.E. -31.0 (L) -3.0 - 3.0 mmol/L    O2 Sat 87.5 (L) 92.0 - 98.5 %    O2Hb 87.0 (L) 94.0 - 97.0 %    COHb 0.5 0.0 - 1.5 %    MetHb 0.1 0.0 - 1.5 %    O2 Content 16.9 mL/dL    HHb 12.4 (H) 0.0 - 5.0 %    tHb (est) 13.8 11.5 - 16.5 g/dL    Mode NC-2  L     Date Of Collection      Time Collected      Pt Temp 37.0 C     ID [id number]     Lab [id number]     Critical(s) Notified Handed report to Dr/RN    Hemoglobin A1c   Result Value Ref Range    Hemoglobin A1C 10.0 (H) 4.0 - 5.6 %   Hemoglobin and hematocrit, blood   Result Value Ref Range    Hemoglobin 12.5 12.5 - 16.5 g/dL    Hematocrit 38.4 37.0 - 54.0 %   Basic Metabolic Panel   Result Value Ref Range    Sodium 138 132 - 146 mmol/L    Potassium 5.0 3.5 - 5.0 mmol/L    Chloride 105 98 - 107 mmol/L    CO2 23 22 - 29 mmol/L    Anion Gap 10 7 - 16 mmol/L    Glucose 191 (H) 74 - 99 mg/dL    BUN 18 6 - 20 mg/dL    CREATININE 0.7 0.7 - 1.2 mg/dL    GFR Non-African American >60 >=60 mL/min/1.73    GFR African American >60     Calcium 8.0 (L) 8.6 - 10.2 mg/dL   Magnesium   Result Value Ref Range    Magnesium 1.5 (L) 1.6 - 2.6 mg/dL   Phosphorus   Result Value Ref Range    Phosphorus 2.2 (L) 2.5 - 4.5 mg/dL Blood Gas, Arterial   Result Value Ref Range    Date Analyzed 20200720     Time Analyzed 2157     Source: Blood Arterial     pH, Blood Gas 7.236 (L) 7.350 - 7.450    PCO2 16.2 (LL) 35.0 - 45.0 mmHg    PO2 119.3 (H) 75.0 - 100.0 mmHg    HCO3 6.7 (L) 22.0 - 26.0 mmol/L    B.E. -18.3 (L) -3.0 - 3.0 mmol/L    O2 Sat 98.2 92.0 - 98.5 %    O2Hb 97.8 (H) 94.0 - 97.0 %    COHb 0.0 0.0 - 1.5 %    MetHb 0.4 0.0 - 1.5 %    O2 Content 18.2 mL/dL    HHb 1.8 0.0 - 5.0 %    tHb (est) 13.1 11.5 - 16.5 g/dL    Mode NC-  4L     Date Of Collection      Time Collected      Pt Temp 37.0 C     ID [id number]     Lab [id number]     Critical(s) Notified Handed report to Dr/RN    COVID-19   Result Value Ref Range    SARS-CoV-2, NAAT Not Detected Not Detected   Basic Metabolic Panel   Result Value Ref Range    Sodium 140 132 - 146 mmol/L    Potassium 4.7 3.5 - 5.0 mmol/L    Chloride 103 98 - 107 mmol/L    CO2 23 22 - 29 mmol/L    Anion Gap 14 7 - 16 mmol/L    Glucose 144 (H) 74 - 99 mg/dL    BUN 16 6 - 20 mg/dL    CREATININE 0.7 0.7 - 1.2 mg/dL    GFR Non-African American >60 >=60 mL/min/1.73    GFR African American >60     Calcium 8.1 (L) 8.6 - 10.2 mg/dL   Magnesium   Result Value Ref Range    Magnesium 1.4 (L) 1.6 - 2.6 mg/dL   Phosphorus   Result Value Ref Range    Phosphorus 2.5 2.5 - 4.5 mg/dL   Troponin   Result Value Ref Range    Troponin 0.07 (H) 0.00 - 0.03 ng/mL   Troponin   Result Value Ref Range    Troponin 0.02 0.00 - 0.03 ng/mL   Basic Metabolic Panel   Result Value Ref Range    Sodium 134 132 - 146 mmol/L    Potassium 6.0 (H) 3.5 - 5.0 mmol/L    Chloride 101 98 - 107 mmol/L    CO2 25 22 - 29 mmol/L    Anion Gap 8 7 - 16 mmol/L    Glucose 178 (H) 74 - 99 mg/dL    BUN 14 6 - 20 mg/dL    CREATININE 0.6 (L) 0.7 - 1.2 mg/dL    GFR Non-African American >60 >=60 mL/min/1.73    GFR African American >60     Calcium 8.0 (L) 8.6 - 10.2 mg/dL   Magnesium   Result Value Ref Range    Magnesium 1.7 1.6 - 2.6 mg/dL   Phosphorus   Result Value Ref Range    Phosphorus 2.2 (L) 2.5 - 4.5 mg/dL   Magnesium   Result Value Ref Range    Magnesium 1.7 1.6 - 2.6 mg/dL   Phosphorus   Result Value Ref Range    Phosphorus 1.8 (L) 2.5 - 4.5 mg/dL   Basic Metabolic Panel   Result Value Ref Range    Sodium 141 132 - 146 mmol/L    Potassium 3.7 3.5 - 5.0 mmol/L    Chloride 104 98 - 107 mmol/L    CO2 18 (L) 22 - 29 mmol/L    Anion Gap 19 (H) 7 - 16 mmol/L    Glucose 295 (H) 74 - 99 mg/dL    BUN 21 (H) 6 - 20 mg/dL    CREATININE 0.9 0.7 - 1.2 mg/dL    GFR Non-African American >60 >=60 mL/min/1.73    GFR African American >60     Calcium 8.0 (L) 8.6 - 10.2 mg/dL   Troponin   Result Value Ref Range    Troponin 0.04 (H) 0.00 - 0.03 ng/mL   CBC WITH AUTO DIFFERENTIAL   Result Value Ref Range    WBC 14.1 (H) 4.5 - 11.5 E9/L    RBC 3.54 (L) 3.80 - 5.80 E12/L    Hemoglobin 10.8 (L) 12.5 - 16.5 g/dL    Hematocrit 31.6 (L) 37.0 - 54.0 %    MCV 89.3 80.0 - 99.9 fL    MCH 30.5 26.0 - 35.0 pg    MCHC 34.2 32.0 - 34.5 %    RDW 13.6 11.5 - 15.0 fL    Platelets 149 649 - 114 E9/L    MPV 9.1 7.0 - 12.0 fL    Neutrophils % 79.1 43.0 - 80.0 %    Immature Granulocytes % 0.6 0.0 - 5.0 %    Lymphocytes % 13.7 (L) 20.0 - 42.0 %    Monocytes % 6.0 2.0 - 12.0 %    Eosinophils % 0.5 0.0 - 6.0 %    Basophils % 0.1 0.0 - 2.0 %    Neutrophils Absolute 11.12 (H) 1.80 - 7.30 E9/L    Immature Granulocytes # 0.08 E9/L    Lymphocytes Absolute 1.93 1.50 - 4.00 E9/L    Monocytes Absolute 0.84 0.10 - 0.95 E9/L    Eosinophils Absolute 0.07 0.05 - 0.50 E9/L    Basophils Absolute 0.02 0.00 - 0.20 E9/L   CBC   Result Value Ref Range    WBC 17.3 (H) 4.5 - 11.5 E9/L    RBC 3.45 (L) 3.80 - 5.80 E12/L    Hemoglobin 10.6 (L) 12.5 - 16.5 g/dL    Hematocrit 31.1 (L) 37.0 - 54.0 %    MCV 90.1 80.0 - 99.9 fL    MCH 30.7 26.0 - 35.0 pg    MCHC 34.1 32.0 - 34.5 %    RDW 13.8 11.5 - 15.0 fL    Platelets 995 131 - 083 E9/L    MPV 8.6 7.0 - 12.0 fL   APTT   Result Value Ref Range    aPTT 33.0 24.5 - 35.1 sec   APTT   Result Value Ref Range    aPTT 63.9 (H) 24.5 - 35.1 sec   Basic Metabolic Panel   Result Value Ref Range    Sodium 136 132 - 146 mmol/L    Potassium 4.8 3.5 - 5.0 mmol/L    Chloride 101 98 - 107 mmol/L    CO2 25 22 - 29 mmol/L    Anion Gap 10 7 - 16 mmol/L    Glucose 242 (H) 74 - 99 mg/dL    BUN 12 6 - 20 mg/dL    CREATININE 0.6 (L) 0.7 - 1.2 mg/dL    GFR Non-African American >60 >=60 mL/min/1.73    GFR African American >60     Calcium 8.2 (L) 8.6 - 10.2 mg/dL   Magnesium   Result Value Ref Range    Magnesium 2.0 1.6 - 2.6 mg/dL   Phosphorus   Result Value Ref Range    Phosphorus 2.0 (L) 2.5 - 4.5 mg/dL   Troponin   Result Value Ref Range    Troponin <0.01 0.00 - 0.03 ng/mL   APTT   Result Value Ref Range    aPTT 50.3 (H) 24.5 - 35.1 sec   Hemoglobin and hematocrit, blood   Result Value Ref Range    Hemoglobin 10.8 (L) 12.5 - 16.5 g/dL    Hematocrit 31.5 (L) 37.0 - 54.0 %   Procalcitonin   Result Value Ref Range    Procalcitonin 5.17 (H) 0.00 - 0.08 ng/mL   APTT   Result Value Ref Range    aPTT 63.3 (H) 24.5 - 35.1 sec   Troponin   Result Value Ref Range    Troponin <0.01 0.00 - 0.03 ng/mL   CBC WITH AUTO DIFFERENTIAL   Result Value Ref Range    WBC 11.4 4.5 - 11.5 E9/L    RBC 3.58 (L) 3.80 - 5.80 E12/L    Hemoglobin 10.9 (L) 12.5 - 16.5 g/dL    Hematocrit 32.0 (L) 37.0 - 54.0 %    MCV 89.4 80.0 - 99.9 fL    MCH 30.4 26.0 - 35.0 pg    MCHC 34.1 32.0 - 34.5 %    RDW 13.5 11.5 - 15.0 fL    Platelets 710 028 - 807 E9/L    MPV 8.9 7.0 - 12.0 fL    Neutrophils % 74.8 43.0 - 80.0 %    Immature Granulocytes % 0.4 0.0 - 5.0 %    Lymphocytes % 16.9 (L) 20.0 - 42.0 %    Monocytes % 6.5 2.0 - 12.0 %    Eosinophils % 1.1 0.0 - 6.0 %    Basophils % 0.3 0.0 - 2.0 %    Neutrophils Absolute 8.51 (H) 1.80 - 7.30 E9/L    Immature Granulocytes # 0.04 E9/L    Lymphocytes Absolute 1.92 1.50 - 4.00 E9/L    Monocytes Absolute 0.74 0.10 - 0.95 E9/L    Eosinophils Absolute 0.12 0.05 - 0.50 E9/L    Basophils Absolute 0.03 0.00 - 0.20 E9/L SPECIMEN REJECTION   Result Value Ref Range    Rejected Test TROP     Reason for Rejection see below    Basic Metabolic Panel   Result Value Ref Range    Sodium 140 132 - 146 mmol/L    Potassium 3.1 (L) 3.5 - 5.0 mmol/L    Chloride 102 98 - 107 mmol/L    CO2 26 22 - 29 mmol/L    Anion Gap 12 7 - 16 mmol/L    Glucose 222 (H) 74 - 99 mg/dL    BUN 8 6 - 20 mg/dL    CREATININE 0.6 (L) 0.7 - 1.2 mg/dL    GFR Non-African American >60 >=60 mL/min/1.73    GFR African American >60     Calcium 8.3 (L) 8.6 - 10.2 mg/dL   Magnesium   Result Value Ref Range    Magnesium 2.0 1.6 - 2.6 mg/dL   Phosphorus   Result Value Ref Range    Phosphorus 2.8 2.5 - 4.5 mg/dL   Calcium, ionized   Result Value Ref Range    Calcium, Ion 1.23 1.15 - 1.33 mmol/L   Procalcitonin   Result Value Ref Range    Procalcitonin 3.16 (H) 0.00 - 0.08 ng/mL   Vitamin D 25 Hydroxy   Result Value Ref Range    Vit D, 25-Hydroxy 25 (L) 30 - 100 ng/mL   POCT Glucose   Result Value Ref Range    Meter Glucose >500 (H) 74 - 99 mg/dL   POCT Glucose   Result Value Ref Range    Meter Glucose >500 (H) 74 - 99 mg/dL   POCT Venous   Result Value Ref Range    POC Sodium 133 132 - 146 mmol/L    POC Potassium 5.2 (H) 3.5 - 5.0 mmol/L    POC Chloride 110 (H) 100 - 108 mmol/L    POC Glucose 672 (HH) 74 - 99 mg/dl    POC Creatinine 1.9 (H) 0.7 - 1.2 mg/dL    GFR Non-African American 38 >=60 mL/min/1.73    GFR  46     Performed on SEE BELOW    POCT Glucose   Result Value Ref Range    Meter Glucose >500 (H) 74 - 99 mg/dL   POCT Venous   Result Value Ref Range    POC Sodium 133 132 - 146 mmol/L    POC Potassium 4.7 3.5 - 5.0 mmol/L    POC Chloride 109 (H) 100 - 108 mmol/L    POC Glucose 577 (HH) 74 - 99 mg/dl    POC Creatinine 1.8 (H) 0.7 - 1.2 mg/dL    GFR Non-African American 40 >=60 mL/min/1.73    GFR  49     Performed on SEE BELOW    POCT Glucose   Result Value Ref Range    Meter Glucose 444 (H) 74 - 99 mg/dL   POCT Glucose   Result Value Ref Range    Meter Glucose 368 (H) 74 - 99 mg/dL   POCT Glucose   Result Value Ref Range    Meter Glucose 317 (H) 74 - 99 mg/dL   POCT Glucose   Result Value Ref Range    Meter Glucose 321 (H) 74 - 99 mg/dL   POCT Glucose   Result Value Ref Range    Meter Glucose 318 (H) 74 - 99 mg/dL   POCT Glucose   Result Value Ref Range    Meter Glucose 311 (H) 74 - 99 mg/dL   POCT Glucose   Result Value Ref Range    Meter Glucose 275 (H) 74 - 99 mg/dL   POCT Glucose   Result Value Ref Range    Meter Glucose 236 (H) 74 - 99 mg/dL   POCT Glucose   Result Value Ref Range    Meter Glucose 254 (H) 74 - 99 mg/dL   POCT Glucose   Result Value Ref Range    Meter Glucose 265 (H) 74 - 99 mg/dL   POCT Glucose   Result Value Ref Range    Meter Glucose 299 (H) 74 - 99 mg/dL   POCT Glucose   Result Value Ref Range    Meter Glucose 283 (H) 74 - 99 mg/dL   POCT Glucose   Result Value Ref Range    Meter Glucose 220 (H) 74 - 99 mg/dL   POCT Glucose   Result Value Ref Range    Meter Glucose 175 (H) 74 - 99 mg/dL   POCT Glucose   Result Value Ref Range    Meter Glucose 181 (H) 74 - 99 mg/dL   POCT Glucose   Result Value Ref Range    Meter Glucose 165 (H) 74 - 99 mg/dL   POCT Glucose   Result Value Ref Range    Meter Glucose 132 (H) 74 - 99 mg/dL   POCT Glucose   Result Value Ref Range    Meter Glucose 151 (H) 74 - 99 mg/dL   POCT Glucose   Result Value Ref Range    Meter Glucose 115 (H) 74 - 99 mg/dL   POCT Glucose   Result Value Ref Range    Meter Glucose 103 (H) 74 - 99 mg/dL   POCT Glucose   Result Value Ref Range    Meter Glucose 144 (H) 74 - 99 mg/dL   POCT Glucose   Result Value Ref Range    Meter Glucose 161 (H) 74 - 99 mg/dL   POCT Glucose   Result Value Ref Range    Meter Glucose 152 (H) 74 - 99 mg/dL   POCT Glucose   Result Value Ref Range    Meter Glucose 191 (H) 74 - 99 mg/dL   POCT Glucose   Result Value Ref Range    Meter Glucose 185 (H) 74 - 99 mg/dL   POCT Glucose   Result Value Ref Range    Meter Glucose 220 (H) 74 - 99 mg/dL   POCT Glucose   Result Value Ref Range    Meter Glucose 207 (H) 74 - 99 mg/dL   EKG 12 Lead   Result Value Ref Range    Ventricular Rate 124 BPM    Atrial Rate 124 BPM    P-R Interval 136 ms    QRS Duration 90 ms    Q-T Interval 340 ms    QTc Calculation (Bazett) 488 ms    P Axis 74 degrees    R Axis 64 degrees    T Axis 6 degrees   EKG 12 Lead   Result Value Ref Range    Ventricular Rate 104 BPM    Atrial Rate 104 BPM    P-R Interval 120 ms    QRS Duration 96 ms    Q-T Interval 370 ms    QTc Calculation (Bazett) 486 ms    P Axis 62 degrees    R Axis 63 degrees    T Axis -79 degrees       Radiology  XR CHEST PORTABLE   Final Result      NO ACUTE CARDIOPULMONARY PROCESS                 ------------------------- NURSING NOTES AND VITALS REVIEWED ---------------------------  Date / Time Roomed:  7/20/2020 11:48 AM  ED Bed Assignment:  [id number]    The nursing notes within the ED encounter and vital signs as below have been reviewed. Patient Vitals for the past 24 hrs:   BP Temp Temp src Pulse Resp SpO2 Weight   07/22/20 1000 122/80 -- -- 67 24 94 % --   07/22/20 0900 139/84 -- -- 76 20 96 % --   07/22/20 0859 (!) 144/89 -- -- 76 -- -- --   07/22/20 0820 (!) 144/89 98.2 °F (36.8 °C) Oral 70 20 92 % --   07/22/20 0700 132/79 -- -- 73 20 93 % --   07/22/20 0600 (!) 141/95 -- -- 74 19 94 % 147 lb 11.3 oz (67 kg)   07/22/20 0500 (!) 143/97 -- -- 84 18 95 % --   07/22/20 0400 (!) 144/80 98.7 °F (37.1 °C) Oral 74 21 95 % --   07/22/20 0300 -- -- -- 77 19 94 % --   07/22/20 0200 (!) 144/96 -- -- 74 18 99 % --   07/22/20 0100 119/74 -- -- 75 17 96 % --   07/22/20 0000 119/75 98.8 °F (37.1 °C) -- 75 23 97 % --       Oxygen Saturation Interpretation: Normal      ------------------------------------------ PROGRESS NOTES ------------------------------------------  Re-evaluation(s):  Time: 1235. Patients symptoms show no change  Repeat physical examination is not changed    Time: 1346.   Patients symptoms are improving  Repeat physical examination is improved    I have spoken with the patient and discussed todays results, in addition to providing specific details for the plan of care and counseling regarding the diagnosis and prognosis. Their questions are answered at this time and they are agreeable with the plan.      --------------------------------- ADDITIONAL PROVIDER NOTES ---------------------------------  Consultations:  Spoke with Dr. Albania Millan,  They will admit this patient. This patient's ED course included: a personal history and physicial examination, re-evaluation prior to disposition, multiple bedside re-evaluations, IV medications, cardiac monitoring, continuous pulse oximetry and complex medical decision making and emergency management    This patient has initially deteriorated, but then stabilized during their ED course. Please note that the withdrawal or failure to initiate urgent interventions for this patient would likely result in a life threatening deterioration or permanent disability. Accordingly this patient received 43 minutes of critical care time, excluding separately billable procedures. Clinical Impression  1. Diabetic ketoacidosis without coma associated with type 1 diabetes mellitus ([facility]    2. Alcohol dependence with unspecified alcohol-induced disorder ([facility]    3. DKA, type 1, not at goal [facility])    4. Uncontrolled type 1 diabetes mellitus with hyperglycemia ([facility]    5. Hypokalemia          Disposition  Patient's disposition: Admit to CCU/ICU  Patient's condition is stable. Zen Tripathi DO  Resident  07/20/20 5764      ATTENDING PROVIDER ATTESTATION:     Swathi Godoy presented to the emergency department for evaluation of Hyperglycemia (hx diabetes,  per EMS, 1000ml fluids per EMS)   and was initially evaluated by the Medical Resident. See Original ED Note for H&P and ED course above.      I have reviewed and discussed the case, including pertinent history (medical, surgical, family and social) and exam findings with the Medical Resident assigned to Tank Kim. I have personally performed and/or participated in the history, exam, medical decision making, and procedures and agree with all pertinent clinical information. I have reviewed my findings and recommendations with the assigned Medical Resident, Jhonvince Judy and members of family present at the time of disposition. My findings/plan: The primary encounter diagnosis was Diabetic ketoacidosis without coma associated with type 1 diabetes mellitus ([facility] Diagnoses of Alcohol dependence with unspecified alcohol-induced disorder ([facility] DKA, type 1, not at goal [facility]), Uncontrolled type 1 diabetes mellitus with hyperglycemia ([facility] and Hypokalemia were also pertinent to this visit. Discharge Medication List as of 7/22/2020 12:44 PM      START taking these medications    Details   folic acid (FOLVITE) 1 MG tablet Take 1 tablet by mouth daily, Disp-30 tablet,R-3Normal      vitamin B-1 100 MG tablet Take 1 tablet by mouth daily, Disp-30 tablet,R-3Normal      cefdinir (OMNICEF) 300 MG capsule Take 1 capsule by mouth 2 times daily for 7 days, Disp-14 capsule,R-0Normal      vitamin D3 400 UNIT TABS tablet Take 2.5 tablets by mouth daily, Disp-30 tablet,R-0Normal           Critical care time: 37 minutes    MD Ann Hoang MD  07/20/20 5800    ATTENDING PROVIDER ATTESTATION:     Tank Kim presented to the emergency department for evaluation of Hyperglycemia (hx diabetes,  per EMS, 1000ml fluids per EMS)   and was initially evaluated by the Medical Resident. See Original ED Note for H&P and ED course above. I have reviewed and discussed the case, including pertinent history (medical, surgical, family and social) and exam findings with the Medical Resident assigned to Tank Kim.   I have personally performed and/or participated in the history, exam, medical decision making, and procedures and agree with all pertinent clinical information. cial history unless otherwise noted. I, Dr. Khurram Coon, am the primary provider of record for this patient. I have reviewed my findings and recommendations with the assigned Medical Resident, Brown Villatoro and members of family present at the time of disposition. My findings/plan: The primary encounter diagnosis was Diabetic ketoacidosis without coma associated with type 1 diabetes mellitus ([facility] Diagnoses of Alcohol dependence with unspecified alcohol-induced disorder ([facility] DKA, type 1, not at goal [facility]), Uncontrolled type 1 diabetes mellitus with hyperglycemia ([facility] and Hypokalemia were also pertinent to this visit.   Discharge Medication List as of 7/22/2020 12:44 PM      START taking these medications    Details   folic acid (FOLVITE) 1 MG tablet Take 1 tablet by mouth daily, Disp-30 tablet,R-3Normal      vitamin B-1 100 MG tablet Take 1 tablet by mouth daily, Disp-30 tablet,R-3Normal      cefdinir (OMNICEF) 300 MG capsule Take 1 capsule by mouth 2 times daily for 7 days, Disp-14 capsule,R-0Normal      vitamin D3 400 UNIT TABS tablet Take 2.5 tablets by mouth daily, Disp-30 tablet,R-0Normal           Critical Care Time: 43 minutes      MD Asia Guerrero MD  07/22/20 1991

## 2020-07-20 NOTE — ED NOTES
Bed: 35  Expected date:   Expected time:   Means of arrival:   Comments:  Toñito Keith RN  07/20/20 1146

## 2020-07-20 NOTE — H&P
[facility]  Internal Medicine Residency Program  History and Physical    Patient:  Conchis Mccormick 53 y.o. male MRN: [id number]     Date of Service: 7/20/2020    Hospital Day: 1      Chief complaint: had concerns including Hyperglycemia (hx diabetes,  per EMS, 1000ml fluids per EMS). History of Present Illness   The patient is a 53 y.o. male with a PMHx of Type 1 DM, CAD, HFrEF (EF 55% as of 06/20/2020), HTN, and polysubstance abuse, who presented to the ED on 7/20/2020 for nausea and vomiting. The patient states that he began to experience symptoms of worsening nausea and vomiting between 0900 and 1100 this AM. He attempted to give himself extra insulin via the pump, but he experienced no symptom alleviation. At that time, his BG was in the 300s. He has been in DKA approximately 3 times in the past. He believes that his pump is working correctly, but states that he does not know if/when it malfunctions. The pt admits to drinking approximately  6 cans of [employer] beer per day. His last drink was 3 days ago. He denies ever experiencing withdrawal symptoms in the past.     During interview, the pt was endorsing SoB and non-radiating CP, n/v (approximately 2 episodes of nonbloody emesis today), loss of balance, photophobia, blurry vision, and increasing LE swelling, which he states has been ongoing for the past \"few years\". He denied subjective fever/chills, diarrhea, hematuria/dysuria, change in color/smell of urine, paresthesia of extremities. In the ED, the pt was found to have a BG of 728. Anion Gap of 46, CO2 of 2, beta-hydroxybutyrate >4.5. K+ 5.5, Cr of 2.1, and leukocytosis to 26.1. Troponin elevated to 0.04. UA showed glucosuria >1,000, small bilirubin, >80 ketones, 30 protein, and moderate blood. Small bacteria without pyuria and negative leukocyte esterase and nitrites. The pt was placed on insulin gtt, and transferred to the ICU for further medical management. Past Medical History:      Diagnosis Date    Alcoholism ([facility]     Cocaine abuse ([facility]     Diabetes mellitus ([facility]     Hypertension     Idiopathic peripheral neuropathy 9/21/2016    Lacunar stroke ([facility]     Marijuana abuse        Past Surgical History:    No past surgical history on file. Medications Prior to Admission:    Prior to Admission medications    Medication Sig Start Date End Date Taking? Authorizing Provider   insulin lispro (HUMALOG) 100 UNIT/ML injection vial Inject into the skin MEDTRONIC PUMP    Historical Provider, MD   hydrALAZINE (APRESOLINE) 25 MG tablet Take 1 tablet by mouth every 8 hours 1/11/20   Lindsay Dey MD   albuterol sulfate  (90 Base) MCG/ACT inhaler Inhale 2 puffs into the lungs every 6 hours as needed for Wheezing or Shortness of Breath 12/12/19   Sukhjinder Herrera MD   Insulin Syringe-Needle U-100 28G X 1/2\" 1 ML MISC Use as directed 7/29/19   Anjali Burnette MD   Lancets MISC 1 each by Does not apply route 2 times daily 7/29/19   Anjali Burnette MD   atorvastatin (LIPITOR) 80 MG tablet Take 1 tablet by mouth nightly 12/28/18   Shivam Krishnan MD   lisinopril (PRINIVIL;ZESTRIL) 20 MG tablet Take 1 tablet by mouth daily 12/29/18   Shivam Krishnan MD   aspirin 81 MG EC tablet Take 1 tablet by mouth daily 11/13/18   Luis Porras MD       Allergies:  Metoprolol and No known allergies    Social History:   TOBACCO:   reports that he has been smoking cigarettes. He has a 30.00 pack-year smoking history. He has never used smokeless tobacco.  ETOH:   reports current alcohol use of about 5.0 standard drinks of alcohol per week.     Family History:       Problem Relation Age of Onset    Diabetes type 2  Mother     Cancer Maternal Grandmother     Diabetes type 2  Nephew        REVIEW OF SYSTEMS: Per HPI     Physical Exam   · Vitals: BP (!) 140/79   Pulse 128   Temp 97.5 °F (36.4 °C)   Resp 28   Ht 5' 6\" (1.676 m)   Wt 145 lb (65.8 kg)   SpO2 97%   BMI 23.40 kg/m² · General Appearance: alert, pale and in moderate distress  · Skin: warm and dry, no rash or erythema  · Head: normocephalic and atraumatic  · Eyes: conjunctivae normal, sclera anicteric and no discharge bilaterally   · Pulmonary/Chest: using accessory muscles of respiration, tachypnea, lung sound CTAB, no wheezing,ronchi, or rales. · Cardiovascular: Tachycardic, normal S1 and S2, no gallops, intact distal pulses and no carotid bruits  · Abdomen: non-distended and bowel sounds normal  · Extremities: no cyanosis, clubbing or edema  · Musculoskeletal: no swollen joints and no joint deformity. Strength 5/5 in bilateral upper and lower extremities   · Neurologic: no focal neurological deficits, speech normal and no tremor     Labs and Imaging Studies   Basic Labs  Recent Labs     20  1212 20  1504 20  1624     --   --    K 5.5*  --   --    CL 86*  --   --    CO2 2*  --   --    BUN 36*  --   --    CREATININE 2.1* 1.9* 1.8*   GLUCOSE 728*  --   --    CALCIUM 9.0  --   --        Recent Labs     20  1212   WBC 26.1*   RBC 4.40   HGB 13.5   HCT 44.3   .7*   MCH 30.7   MCHC 30.5*   RDW 13.9      MPV 9.6     Troponin   Troponin  0. 04High    0.00 - 0.03 ng/mL  Final  2020 12:12 PM   - [facility]      Beta-hydroxybutyrate  Beta-Hydroxybutyrate  >4. 50High    0.02 - 0.27 mmol/L  Final  2020 12:12 PM   - [address] [facility]          Imaging Studies:     Xr Chest Standard (2 Vw)    Result Date: 2020  Patient MRN:  [id number] : 1970 Age: 53 years Gender: Male Order Date:  2020 5:30 PM EXAM: XR CHEST (2 VW) COMPARISON: January 10, 2020 INDICATION: Dizziness FINDINGS: The heart is normal in size. No focal airspace opacity. There is no pleural effusion. There is no pneumothorax. No free air beneath the diaphragms. No airspace opacities or pleural effusion.      Ct Head Wo Contrast    Result Date: 2020  Patient MRN: [id number] : 1970 Age: 53 years Gender: Male Order Date:  2020 5:30 PM EXAM: CT HEAD WO CONTRAST NUMBER OF IMAGES:  150 INDICATION:  LOC LOC COMPARISON: 2020 TECHNIQUE:  Serial axial images without IV contrast were obtained from the vertex to the foramen magnum. CT Dose-Length Product (DLP): 1370  mGy*cm CT Dose Reduction Employed: Yes RESULT: Post-operative change:  None. Acute change:   No evidence of an acute infarct or other acute parenchymal process. Hemorrhage:    No evidence of acute intracranial hemorrhage. Mass Lesion / Mass Effect:   There is no evidence of an intracranial mass or extraaxial fluid collection. No significant mass effect. Chronic change:   Scattered patchy foci of low attenuation are present within supratentorial white matter which is a nonspecific finding but likely represents mild microvascular ischemia. Parenchyma: There is no significant volume loss. Ventricles: The ventricles are within normal limits of size and configuration for age. Paranasal sinuses and skull base: The visualized paranasal sinuses are grossly clear. The skull base and imaged soft tissues are unremarkable. No CT evidence of an acute intracranial process or hemorrhage. Chronic ischemic changes. Xr Chest Portable    Result Date: 2020  Patient MRN: [id number] : 1970 Age:  53 years Gender: Male Order Date: 2020 12:15 PM Exam: XR CHEST PORTABLE Number of Images: 1 view Indication:   Shortness of breath Shortness of breath Comparison: Prior study from 2020 is available. Findings: The lungs are clear. There is no evidence of pulmonary infiltrate or pleural effusion. The pulmonary vascularity is unremarkable. The cardiac, hilar and mediastinal silhouettes are satisfactory. The bony thorax demonstrates demonstrate osteoarthritic changes of thoracic spine. .     NO ACUTE CARDIOPULMONARY PROCESS       Resident's Assessment and Plan     Nona Padgett is a 53 y.o. male with a PMHx of DMT2, CAD, HTN, Hx of lacunar stoke, and polysubstance abuse (EtOH, marijuana, and cocaine),     1. Diabetic Ketoacidosis: D/t insulin pump malfunction vs. EtOH vs less likely infection. On admission, pt's VBG showed pH of 6.85. BG of 728, CO2 of 2 and AG of 46. WBCs 26.1.    -Continue Insulin gtt    -Continue IVNS at 250cc/hr until BG reads <250   -initiate D5 1/2NS when BG <250    -f/u POCT glucose q1hr    -f/u BMP, Mg, Phos q4hr    -replace K, Mg, and Phos PRN   -Continue NPO    -will restart low-carb diet when AG closed x2 readings    -will bridge to SC insulin when AG closed x2 and pt tolerating PO diet    -Continue to monitor pt vitals    -Continue ICU management      2. KIRSTEN: Likely prerenal 2/2 intravascular depletion. Pt Cr elevated to 2.1 on admission, baseline 0.7. Currently 1.8 on repeat BMP. -Continue IVNS    -will calculate FeNa    -f/u urine electrolytes    -continue to monitor BMP q4hrs      3. Hyperkalemia: Likely 2/2 KIRSTEN and DKA. On admission, K of 5.5.    -Continue IVNS    -Continue to monitor BMP q4hs    4. Leukocytosis: Likely reactive vs hemoconcentration. No infectious source suspected. CXR showing no acute cardiopulmonary abnormalities. UA showing few bacteria with negative leukocyte esterase and negative nitrites. On admission, WBCs of 26.1.    - Continue to monitor CBC daily    -f/u urine culture      5. ST Segment Depressions - d/t hyperkalemia vs. NSTEMI. EKG showing tachycardic rate, normal axis, and ST segment depressions in anterior leads V4-V5. Troponin 0.04 on admission. -f/u repeat troponin    -f/u CK:CK-MB ratio      6. Type 1 DM: Pt has a PMHx of T1 DM, uses an insulin pump. Unaware if/when pump malfunctions. -f/u A1c      7. HTN: Pt on Hydralazine and Lisinopril at home    -Hold home meds    -will consider PRN labetalol for HTN given pt's current tachycardia      8.  PMHx of CAD: On aspirin and Lipitor at home.     -hold home meds 2/2 DKA and NPO status    -will resume on resolution of DKA     9. Hx of polysubstance abuse: Pt reports history of EtOH abuse, cocaine, and marijuana use. He reports drinking approximately 6 cans of beers per day. Last drink approximately 3 days ago. Serum drug screen negative for ethanol, TCA, Salicylates. Acetaminophen <5.0.    -f/u UDS    -will initiate CIWA if pt shows signs of DT's     DVT prophylaxis: Lovenox   GI prophylaxis: Protonix     Arielle Rider DO, PGY-1   Attending physician: Dr. June Larry           Senior Resident Addendum:  I have seen and examined the patient with the intern. I have discussed the case, including pertinent history and exam findings with the intern.  I agree with the assessment, plan and orders as documented above with the following additions:      Assessment:    1.) DKA   - 2/2 insulin pump malfunction vs EtOH use   - no discernible source of infection; CXR and UA clear    2.) Elevated Troponin   - likely KIRSTEN mediated vs possible NSTEMI   - ST depressions noted V3-4    3.) KIRSTEN Stage III   - likely pre-renal in setting of intravascular volume depletion d/t decreased PO intake   - baseline Cr 0.7    4.) Leukocytosis   - likely reactive + hemoconcentration    - otherwise afebrile and no clear source of infection    5.) H/o Type I DM   - A1c 10.7% December 2019   - on insulin pump     6.) H/o HTN   - on lisinopril and hydralazine as OP    7.) H/o CAD    8.) H/o Lacunar CVA    9.) H/o Polysubstance Abuse    - EtOH, cocaine, MJ   - last drink Saturday, no prior withdrawal symptoms when abstinent from EtOH      Plan:    · DKA protocol  · Continue bicarb gtt until pH > 6.9  · BMP, mag and phos q4h; replace per prn orders  · BG checks q1h  · Hypoglycemia treatment orders in place  · Will bridge to SQ per 24 hr insulin gtt requirements  · Check A1c  · Trend troponin x 2 more occurrences  · F/u CKMB-CK ratio  · Consider cardiology consult and initiation of heparin gtt x 48hrs (low-dose algorithm) if increasing cardiac markers  · F/u procalcitonin   · F/u urine studies, calculate FENA  · Monitor UOP  · Will hold home antihypertensives, Lipitor for now; will consider resuming once DKA resolved  · Consider CIWA scale if patient develops withdrawal symptoms      Rebecca Banks MD  7/20/2020  5:48 PM

## 2020-07-20 NOTE — ED NOTES
Pt asked multiple times to pee. Pt keeps stating that he can't pee at this time.        Irma Cano RN  07/20/20 [id number]

## 2020-07-21 LAB
ANION GAP SERPL CALCULATED.3IONS-SCNC: 10 MMOL/L (ref 7–16)
ANION GAP SERPL CALCULATED.3IONS-SCNC: 10 MMOL/L (ref 7–16)
ANION GAP SERPL CALCULATED.3IONS-SCNC: 14 MMOL/L (ref 7–16)
ANION GAP SERPL CALCULATED.3IONS-SCNC: 19 MMOL/L (ref 7–16)
ANION GAP SERPL CALCULATED.3IONS-SCNC: 25 MMOL/L (ref 7–16)
ANION GAP SERPL CALCULATED.3IONS-SCNC: 8 MMOL/L (ref 7–16)
APTT: 33 SEC (ref 24.5–35.1)
APTT: 50.3 SEC (ref 24.5–35.1)
APTT: 63.9 SEC (ref 24.5–35.1)
BASOPHILS ABSOLUTE: 0.02 E9/L (ref 0–0.2)
BASOPHILS RELATIVE PERCENT: 0.1 % (ref 0–2)
BUN BLDV-MCNC: 12 MG/DL (ref 6–20)
BUN BLDV-MCNC: 14 MG/DL (ref 6–20)
BUN BLDV-MCNC: 16 MG/DL (ref 6–20)
BUN BLDV-MCNC: 18 MG/DL (ref 6–20)
BUN BLDV-MCNC: 21 MG/DL (ref 6–20)
BUN BLDV-MCNC: 24 MG/DL (ref 6–20)
CALCIUM SERPL-MCNC: 8 MG/DL (ref 8.6–10.2)
CALCIUM SERPL-MCNC: 8.1 MG/DL (ref 8.6–10.2)
CALCIUM SERPL-MCNC: 8.2 MG/DL (ref 8.6–10.2)
CALCIUM SERPL-MCNC: 8.6 MG/DL (ref 8.6–10.2)
CHLORIDE BLD-SCNC: 101 MMOL/L (ref 98–107)
CHLORIDE BLD-SCNC: 101 MMOL/L (ref 98–107)
CHLORIDE BLD-SCNC: 103 MMOL/L (ref 98–107)
CHLORIDE BLD-SCNC: 104 MMOL/L (ref 98–107)
CHLORIDE BLD-SCNC: 105 MMOL/L (ref 98–107)
CHLORIDE BLD-SCNC: 105 MMOL/L (ref 98–107)
CO2: 11 MMOL/L (ref 22–29)
CO2: 18 MMOL/L (ref 22–29)
CO2: 23 MMOL/L (ref 22–29)
CO2: 23 MMOL/L (ref 22–29)
CO2: 25 MMOL/L (ref 22–29)
CO2: 25 MMOL/L (ref 22–29)
CREAT SERPL-MCNC: 0.6 MG/DL (ref 0.7–1.2)
CREAT SERPL-MCNC: 0.6 MG/DL (ref 0.7–1.2)
CREAT SERPL-MCNC: 0.7 MG/DL (ref 0.7–1.2)
CREAT SERPL-MCNC: 0.7 MG/DL (ref 0.7–1.2)
CREAT SERPL-MCNC: 0.9 MG/DL (ref 0.7–1.2)
CREAT SERPL-MCNC: 1.2 MG/DL (ref 0.7–1.2)
EKG ATRIAL RATE: 104 BPM
EKG P AXIS: 62 DEGREES
EKG P-R INTERVAL: 120 MS
EKG Q-T INTERVAL: 370 MS
EKG QRS DURATION: 96 MS
EKG QTC CALCULATION (BAZETT): 486 MS
EKG R AXIS: 63 DEGREES
EKG T AXIS: -79 DEGREES
EKG VENTRICULAR RATE: 104 BPM
EOSINOPHILS ABSOLUTE: 0.07 E9/L (ref 0.05–0.5)
EOSINOPHILS RELATIVE PERCENT: 0.5 % (ref 0–6)
GFR AFRICAN AMERICAN: >60
GFR NON-AFRICAN AMERICAN: >60 ML/MIN/1.73
GLUCOSE BLD-MCNC: 144 MG/DL (ref 74–99)
GLUCOSE BLD-MCNC: 178 MG/DL (ref 74–99)
GLUCOSE BLD-MCNC: 191 MG/DL (ref 74–99)
GLUCOSE BLD-MCNC: 242 MG/DL (ref 74–99)
GLUCOSE BLD-MCNC: 295 MG/DL (ref 74–99)
GLUCOSE BLD-MCNC: 295 MG/DL (ref 74–99)
HCT VFR BLD CALC: 31.1 % (ref 37–54)
HCT VFR BLD CALC: 31.5 % (ref 37–54)
HCT VFR BLD CALC: 31.6 % (ref 37–54)
HEMOGLOBIN: 10.6 G/DL (ref 12.5–16.5)
HEMOGLOBIN: 10.8 G/DL (ref 12.5–16.5)
HEMOGLOBIN: 10.8 G/DL (ref 12.5–16.5)
IMMATURE GRANULOCYTES #: 0.08 E9/L
IMMATURE GRANULOCYTES %: 0.6 % (ref 0–5)
LYMPHOCYTES ABSOLUTE: 1.93 E9/L (ref 1.5–4)
LYMPHOCYTES RELATIVE PERCENT: 13.7 % (ref 20–42)
MAGNESIUM: 1.4 MG/DL (ref 1.6–2.6)
MAGNESIUM: 1.5 MG/DL (ref 1.6–2.6)
MAGNESIUM: 1.7 MG/DL (ref 1.6–2.6)
MAGNESIUM: 1.7 MG/DL (ref 1.6–2.6)
MAGNESIUM: 1.8 MG/DL (ref 1.6–2.6)
MAGNESIUM: 2 MG/DL (ref 1.6–2.6)
MCH RBC QN AUTO: 30.5 PG (ref 26–35)
MCH RBC QN AUTO: 30.7 PG (ref 26–35)
MCHC RBC AUTO-ENTMCNC: 34.1 % (ref 32–34.5)
MCHC RBC AUTO-ENTMCNC: 34.2 % (ref 32–34.5)
MCV RBC AUTO: 89.3 FL (ref 80–99.9)
MCV RBC AUTO: 90.1 FL (ref 80–99.9)
METER GLUCOSE: 103 MG/DL (ref 74–99)
METER GLUCOSE: 115 MG/DL (ref 74–99)
METER GLUCOSE: 132 MG/DL (ref 74–99)
METER GLUCOSE: 144 MG/DL (ref 74–99)
METER GLUCOSE: 151 MG/DL (ref 74–99)
METER GLUCOSE: 152 MG/DL (ref 74–99)
METER GLUCOSE: 161 MG/DL (ref 74–99)
METER GLUCOSE: 165 MG/DL (ref 74–99)
METER GLUCOSE: 175 MG/DL (ref 74–99)
METER GLUCOSE: 181 MG/DL (ref 74–99)
METER GLUCOSE: 185 MG/DL (ref 74–99)
METER GLUCOSE: 191 MG/DL (ref 74–99)
METER GLUCOSE: 220 MG/DL (ref 74–99)
METER GLUCOSE: 236 MG/DL (ref 74–99)
METER GLUCOSE: 254 MG/DL (ref 74–99)
METER GLUCOSE: 265 MG/DL (ref 74–99)
METER GLUCOSE: 275 MG/DL (ref 74–99)
METER GLUCOSE: 283 MG/DL (ref 74–99)
METER GLUCOSE: 299 MG/DL (ref 74–99)
METER GLUCOSE: 311 MG/DL (ref 74–99)
MONOCYTES ABSOLUTE: 0.84 E9/L (ref 0.1–0.95)
MONOCYTES RELATIVE PERCENT: 6 % (ref 2–12)
NEUTROPHILS ABSOLUTE: 11.12 E9/L (ref 1.8–7.3)
NEUTROPHILS RELATIVE PERCENT: 79.1 % (ref 43–80)
PDW BLD-RTO: 13.6 FL (ref 11.5–15)
PDW BLD-RTO: 13.8 FL (ref 11.5–15)
PHOSPHORUS: 1.8 MG/DL (ref 2.5–4.5)
PHOSPHORUS: 2 MG/DL (ref 2.5–4.5)
PHOSPHORUS: 2.2 MG/DL (ref 2.5–4.5)
PHOSPHORUS: 2.2 MG/DL (ref 2.5–4.5)
PHOSPHORUS: 2.4 MG/DL (ref 2.5–4.5)
PHOSPHORUS: 2.5 MG/DL (ref 2.5–4.5)
PLATELET # BLD: 289 E9/L (ref 130–450)
PLATELET # BLD: 308 E9/L (ref 130–450)
PMV BLD AUTO: 8.6 FL (ref 7–12)
PMV BLD AUTO: 9.1 FL (ref 7–12)
POTASSIUM SERPL-SCNC: 3.7 MMOL/L (ref 3.5–5)
POTASSIUM SERPL-SCNC: 4.5 MMOL/L (ref 3.5–5)
POTASSIUM SERPL-SCNC: 4.7 MMOL/L (ref 3.5–5)
POTASSIUM SERPL-SCNC: 4.8 MMOL/L (ref 3.5–5)
POTASSIUM SERPL-SCNC: 5 MMOL/L (ref 3.5–5)
POTASSIUM SERPL-SCNC: 6 MMOL/L (ref 3.5–5)
PROCALCITONIN: 5.17 NG/ML (ref 0–0.08)
RBC # BLD: 3.45 E12/L (ref 3.8–5.8)
RBC # BLD: 3.54 E12/L (ref 3.8–5.8)
REASON FOR REJECTION: NORMAL
REJECTED TEST: NORMAL
SARS-COV-2, NAAT: NOT DETECTED
SODIUM BLD-SCNC: 134 MMOL/L (ref 132–146)
SODIUM BLD-SCNC: 136 MMOL/L (ref 132–146)
SODIUM BLD-SCNC: 138 MMOL/L (ref 132–146)
SODIUM BLD-SCNC: 140 MMOL/L (ref 132–146)
SODIUM BLD-SCNC: 141 MMOL/L (ref 132–146)
SODIUM BLD-SCNC: 141 MMOL/L (ref 132–146)
TROPONIN: 0.02 NG/ML (ref 0–0.03)
TROPONIN: 0.04 NG/ML (ref 0–0.03)
TROPONIN: 0.07 NG/ML (ref 0–0.03)
TROPONIN: <0.01 NG/ML (ref 0–0.03)
WBC # BLD: 14.1 E9/L (ref 4.5–11.5)
WBC # BLD: 17.3 E9/L (ref 4.5–11.5)

## 2020-07-21 PROCEDURE — 85014 HEMATOCRIT: CPT

## 2020-07-21 PROCEDURE — 2580000003 HC RX 258: Performed by: INTERNAL MEDICINE

## 2020-07-21 PROCEDURE — 36415 COLL VENOUS BLD VENIPUNCTURE: CPT

## 2020-07-21 PROCEDURE — 6370000000 HC RX 637 (ALT 250 FOR IP): Performed by: EMERGENCY MEDICINE

## 2020-07-21 PROCEDURE — 93005 ELECTROCARDIOGRAM TRACING: CPT | Performed by: INTERNAL MEDICINE

## 2020-07-21 PROCEDURE — 6370000000 HC RX 637 (ALT 250 FOR IP): Performed by: INTERNAL MEDICINE

## 2020-07-21 PROCEDURE — 6360000002 HC RX W HCPCS: Performed by: INTERNAL MEDICINE

## 2020-07-21 PROCEDURE — APPSS45 APP SPLIT SHARED TIME 31-45 MINUTES: Performed by: NURSE PRACTITIONER

## 2020-07-21 PROCEDURE — 84100 ASSAY OF PHOSPHORUS: CPT

## 2020-07-21 PROCEDURE — 93010 ELECTROCARDIOGRAM REPORT: CPT | Performed by: INTERNAL MEDICINE

## 2020-07-21 PROCEDURE — 85025 COMPLETE CBC W/AUTO DIFF WBC: CPT

## 2020-07-21 PROCEDURE — 84484 ASSAY OF TROPONIN QUANT: CPT

## 2020-07-21 PROCEDURE — 2000000000 HC ICU R&B

## 2020-07-21 PROCEDURE — 6360000002 HC RX W HCPCS: Performed by: EMERGENCY MEDICINE

## 2020-07-21 PROCEDURE — 2500000003 HC RX 250 WO HCPCS: Performed by: EMERGENCY MEDICINE

## 2020-07-21 PROCEDURE — 2500000003 HC RX 250 WO HCPCS: Performed by: INTERNAL MEDICINE

## 2020-07-21 PROCEDURE — 2700000000 HC OXYGEN THERAPY PER DAY

## 2020-07-21 PROCEDURE — 85018 HEMOGLOBIN: CPT

## 2020-07-21 PROCEDURE — 80048 BASIC METABOLIC PNL TOTAL CA: CPT

## 2020-07-21 PROCEDURE — 85027 COMPLETE CBC AUTOMATED: CPT

## 2020-07-21 PROCEDURE — 85730 THROMBOPLASTIN TIME PARTIAL: CPT

## 2020-07-21 PROCEDURE — 87040 BLOOD CULTURE FOR BACTERIA: CPT

## 2020-07-21 PROCEDURE — 84145 PROCALCITONIN (PCT): CPT

## 2020-07-21 PROCEDURE — 99254 IP/OBS CNSLTJ NEW/EST MOD 60: CPT | Performed by: INTERNAL MEDICINE

## 2020-07-21 PROCEDURE — 82962 GLUCOSE BLOOD TEST: CPT

## 2020-07-21 PROCEDURE — 99255 IP/OBS CONSLTJ NEW/EST HI 80: CPT | Performed by: INTERNAL MEDICINE

## 2020-07-21 PROCEDURE — 83735 ASSAY OF MAGNESIUM: CPT

## 2020-07-21 PROCEDURE — 2580000003 HC RX 258: Performed by: EMERGENCY MEDICINE

## 2020-07-21 PROCEDURE — C9113 INJ PANTOPRAZOLE SODIUM, VIA: HCPCS | Performed by: INTERNAL MEDICINE

## 2020-07-21 RX ORDER — ACETAMINOPHEN 650 MG/1
650 SUPPOSITORY RECTAL EVERY 6 HOURS PRN
Status: DISCONTINUED | OUTPATIENT
Start: 2020-07-21 | End: 2020-07-22 | Stop reason: HOSPADM

## 2020-07-21 RX ORDER — LABETALOL HYDROCHLORIDE 5 MG/ML
10 INJECTION, SOLUTION INTRAVENOUS EVERY 4 HOURS PRN
Status: DISCONTINUED | OUTPATIENT
Start: 2020-07-21 | End: 2020-07-22 | Stop reason: HOSPADM

## 2020-07-21 RX ORDER — PROMETHAZINE HYDROCHLORIDE 25 MG/1
12.5 TABLET ORAL EVERY 6 HOURS PRN
Status: DISCONTINUED | OUTPATIENT
Start: 2020-07-21 | End: 2020-07-22 | Stop reason: HOSPADM

## 2020-07-21 RX ORDER — ATORVASTATIN CALCIUM 80 MG/1
80 TABLET, FILM COATED ORAL NIGHTLY
Status: DISCONTINUED | OUTPATIENT
Start: 2020-07-21 | End: 2020-07-22 | Stop reason: HOSPADM

## 2020-07-21 RX ORDER — ACETAMINOPHEN 325 MG/1
650 TABLET ORAL EVERY 6 HOURS PRN
Status: DISCONTINUED | OUTPATIENT
Start: 2020-07-21 | End: 2020-07-22 | Stop reason: HOSPADM

## 2020-07-21 RX ORDER — HEPARIN SODIUM 1000 [USP'U]/ML
30 INJECTION, SOLUTION INTRAVENOUS; SUBCUTANEOUS PRN
Status: DISCONTINUED | OUTPATIENT
Start: 2020-07-21 | End: 2020-07-22

## 2020-07-21 RX ORDER — SODIUM CHLORIDE 0.9 % (FLUSH) 0.9 %
10 SYRINGE (ML) INJECTION PRN
Status: DISCONTINUED | OUTPATIENT
Start: 2020-07-21 | End: 2020-07-22 | Stop reason: HOSPADM

## 2020-07-21 RX ORDER — SODIUM CHLORIDE 0.9 % (FLUSH) 0.9 %
10 SYRINGE (ML) INJECTION PRN
Status: CANCELLED | OUTPATIENT
Start: 2020-07-21

## 2020-07-21 RX ORDER — SODIUM CHLORIDE 9 MG/ML
10 INJECTION INTRAVENOUS DAILY
Status: DISCONTINUED | OUTPATIENT
Start: 2020-07-21 | End: 2020-07-22 | Stop reason: HOSPADM

## 2020-07-21 RX ORDER — HEPARIN SODIUM 1000 [USP'U]/ML
60 INJECTION, SOLUTION INTRAVENOUS; SUBCUTANEOUS ONCE
Status: COMPLETED | OUTPATIENT
Start: 2020-07-21 | End: 2020-07-21

## 2020-07-21 RX ORDER — HYDRALAZINE HYDROCHLORIDE 25 MG/1
25 TABLET, FILM COATED ORAL EVERY 8 HOURS SCHEDULED
Status: DISCONTINUED | OUTPATIENT
Start: 2020-07-21 | End: 2020-07-22 | Stop reason: HOSPADM

## 2020-07-21 RX ORDER — DEXTROSE MONOHYDRATE 25 G/50ML
12.5 INJECTION, SOLUTION INTRAVENOUS PRN
Status: DISCONTINUED | OUTPATIENT
Start: 2020-07-21 | End: 2020-07-22 | Stop reason: HOSPADM

## 2020-07-21 RX ORDER — POLYETHYLENE GLYCOL 3350 17 G/17G
17 POWDER, FOR SOLUTION ORAL DAILY PRN
Status: DISCONTINUED | OUTPATIENT
Start: 2020-07-21 | End: 2020-07-22 | Stop reason: HOSPADM

## 2020-07-21 RX ORDER — NICOTINE POLACRILEX 4 MG
15 LOZENGE BUCCAL PRN
Status: DISCONTINUED | OUTPATIENT
Start: 2020-07-21 | End: 2020-07-22 | Stop reason: HOSPADM

## 2020-07-21 RX ORDER — ONDANSETRON 2 MG/ML
4 INJECTION INTRAMUSCULAR; INTRAVENOUS EVERY 6 HOURS PRN
Status: DISCONTINUED | OUTPATIENT
Start: 2020-07-21 | End: 2020-07-22 | Stop reason: HOSPADM

## 2020-07-21 RX ORDER — HEPARIN SODIUM 10000 [USP'U]/100ML
12 INJECTION, SOLUTION INTRAVENOUS CONTINUOUS
Status: DISCONTINUED | OUTPATIENT
Start: 2020-07-21 | End: 2020-07-22

## 2020-07-21 RX ORDER — INSULIN GLARGINE 100 [IU]/ML
24 INJECTION, SOLUTION SUBCUTANEOUS DAILY
Status: DISCONTINUED | OUTPATIENT
Start: 2020-07-21 | End: 2020-07-22

## 2020-07-21 RX ORDER — SODIUM CHLORIDE 0.9 % (FLUSH) 0.9 %
10 SYRINGE (ML) INJECTION EVERY 12 HOURS SCHEDULED
Status: DISCONTINUED | OUTPATIENT
Start: 2020-07-21 | End: 2020-07-22 | Stop reason: HOSPADM

## 2020-07-21 RX ORDER — HEPARIN SODIUM 1000 [USP'U]/ML
60 INJECTION, SOLUTION INTRAVENOUS; SUBCUTANEOUS PRN
Status: DISCONTINUED | OUTPATIENT
Start: 2020-07-21 | End: 2020-07-22

## 2020-07-21 RX ORDER — PANTOPRAZOLE SODIUM 40 MG/10ML
40 INJECTION, POWDER, LYOPHILIZED, FOR SOLUTION INTRAVENOUS DAILY
Status: DISCONTINUED | OUTPATIENT
Start: 2020-07-21 | End: 2020-07-22

## 2020-07-21 RX ORDER — ASPIRIN 81 MG/1
81 TABLET, CHEWABLE ORAL DAILY
Status: DISCONTINUED | OUTPATIENT
Start: 2020-07-21 | End: 2020-07-22 | Stop reason: HOSPADM

## 2020-07-21 RX ORDER — DEXTROSE MONOHYDRATE 50 MG/ML
100 INJECTION, SOLUTION INTRAVENOUS PRN
Status: DISCONTINUED | OUTPATIENT
Start: 2020-07-21 | End: 2020-07-22 | Stop reason: HOSPADM

## 2020-07-21 RX ORDER — SODIUM CHLORIDE 0.9 % (FLUSH) 0.9 %
10 SYRINGE (ML) INJECTION EVERY 12 HOURS SCHEDULED
Status: CANCELLED | OUTPATIENT
Start: 2020-07-21

## 2020-07-21 RX ADMIN — SODIUM CHLORIDE: 9 INJECTION, SOLUTION INTRAVENOUS at 17:10

## 2020-07-21 RX ADMIN — SODIUM CHLORIDE, PRESERVATIVE FREE 10 ML: 5 INJECTION INTRAVENOUS at 08:40

## 2020-07-21 RX ADMIN — SODIUM CHLORIDE, PRESERVATIVE FREE 10 ML: 5 INJECTION INTRAVENOUS at 08:06

## 2020-07-21 RX ADMIN — ATORVASTATIN CALCIUM 80 MG: 80 TABLET, FILM COATED ORAL at 21:04

## 2020-07-21 RX ADMIN — ASPIRIN 81 MG: 81 TABLET, CHEWABLE ORAL at 11:46

## 2020-07-21 RX ADMIN — POTASSIUM CHLORIDE 10 MEQ: 7.46 INJECTION, SOLUTION INTRAVENOUS at 09:07

## 2020-07-21 RX ADMIN — PANTOPRAZOLE SODIUM 40 MG: 40 INJECTION, POWDER, FOR SOLUTION INTRAVENOUS at 08:06

## 2020-07-21 RX ADMIN — VANCOMYCIN HYDROCHLORIDE 1000 MG: 1 INJECTION, POWDER, LYOPHILIZED, FOR SOLUTION INTRAVENOUS at 21:53

## 2020-07-21 RX ADMIN — FOLIC ACID 1 MG: 5 INJECTION, SOLUTION INTRAMUSCULAR; INTRAVENOUS; SUBCUTANEOUS at 03:55

## 2020-07-21 RX ADMIN — HEPARIN SODIUM 3960 UNITS: 1000 INJECTION INTRAVENOUS; SUBCUTANEOUS at 09:35

## 2020-07-21 RX ADMIN — SODIUM CHLORIDE, PRESERVATIVE FREE 10 ML: 5 INJECTION INTRAVENOUS at 08:39

## 2020-07-21 RX ADMIN — SODIUM CHLORIDE, PRESERVATIVE FREE 10 ML: 5 INJECTION INTRAVENOUS at 21:05

## 2020-07-21 RX ADMIN — FOLIC ACID 1 MG: 5 INJECTION, SOLUTION INTRAMUSCULAR; INTRAVENOUS; SUBCUTANEOUS at 11:47

## 2020-07-21 RX ADMIN — INSULIN GLARGINE 24 UNITS: 100 INJECTION, SOLUTION SUBCUTANEOUS at 17:03

## 2020-07-21 RX ADMIN — SODIUM BICARBONATE: 84 INJECTION, SOLUTION INTRAVENOUS at 01:12

## 2020-07-21 RX ADMIN — CEFEPIME HYDROCHLORIDE 2 G: 2 INJECTION, POWDER, FOR SOLUTION INTRAVENOUS at 13:44

## 2020-07-21 RX ADMIN — POTASSIUM CHLORIDE 10 MEQ: 7.46 INJECTION, SOLUTION INTRAVENOUS at 02:28

## 2020-07-21 RX ADMIN — DEXTROSE AND SODIUM CHLORIDE: 5; 450 INJECTION, SOLUTION INTRAVENOUS at 02:18

## 2020-07-21 RX ADMIN — THIAMINE HYDROCHLORIDE 100 MG: 100 INJECTION, SOLUTION INTRAMUSCULAR; INTRAVENOUS at 03:56

## 2020-07-21 RX ADMIN — MAGNESIUM SULFATE HEPTAHYDRATE 1 G: 1 INJECTION, SOLUTION INTRAVENOUS at 15:12

## 2020-07-21 RX ADMIN — INSULIN LISPRO 2 UNITS: 100 INJECTION, SOLUTION INTRAVENOUS; SUBCUTANEOUS at 17:03

## 2020-07-21 RX ADMIN — CALCIUM GLUCONATE 1 G: 98 INJECTION, SOLUTION INTRAVENOUS at 11:35

## 2020-07-21 RX ADMIN — POTASSIUM CHLORIDE 10 MEQ: 7.46 INJECTION, SOLUTION INTRAVENOUS at 08:00

## 2020-07-21 RX ADMIN — HEPARIN SODIUM 5000 UNITS: 10000 INJECTION, SOLUTION INTRAVENOUS; SUBCUTANEOUS at 06:00

## 2020-07-21 RX ADMIN — DEXTROSE AND SODIUM CHLORIDE: 5; 450 INJECTION, SOLUTION INTRAVENOUS at 07:05

## 2020-07-21 RX ADMIN — INSULIN LISPRO 1 UNITS: 100 INJECTION, SOLUTION INTRAVENOUS; SUBCUTANEOUS at 21:14

## 2020-07-21 RX ADMIN — HYDRALAZINE HYDROCHLORIDE 25 MG: 25 TABLET, FILM COATED ORAL at 13:45

## 2020-07-21 RX ADMIN — METOPROLOL TARTRATE 25 MG: 25 TABLET, FILM COATED ORAL at 21:04

## 2020-07-21 RX ADMIN — SODIUM CHLORIDE: 9 INJECTION, SOLUTION INTRAVENOUS at 01:12

## 2020-07-21 RX ADMIN — CEFEPIME HYDROCHLORIDE 2 G: 2 INJECTION, POWDER, FOR SOLUTION INTRAVENOUS at 21:30

## 2020-07-21 RX ADMIN — SODIUM CHLORIDE 0.24 UNITS/KG/HR: 9 INJECTION, SOLUTION INTRAVENOUS at 07:03

## 2020-07-21 RX ADMIN — INSULIN LISPRO 6 UNITS: 100 INJECTION, SOLUTION INTRAVENOUS; SUBCUTANEOUS at 17:04

## 2020-07-21 RX ADMIN — POTASSIUM CHLORIDE 10 MEQ: 7.46 INJECTION, SOLUTION INTRAVENOUS at 03:21

## 2020-07-21 RX ADMIN — THIAMINE HYDROCHLORIDE 100 MG: 100 INJECTION, SOLUTION INTRAMUSCULAR; INTRAVENOUS at 11:46

## 2020-07-21 RX ADMIN — DEXTROSE AND SODIUM CHLORIDE: 5; 450 INJECTION, SOLUTION INTRAVENOUS at 14:21

## 2020-07-21 RX ADMIN — DEXTROSE AND SODIUM CHLORIDE: 5; 450 INJECTION, SOLUTION INTRAVENOUS at 20:34

## 2020-07-21 RX ADMIN — SODIUM PHOSPHATE, MONOBASIC, MONOHYDRATE 15 MMOL: 276; 142 INJECTION, SOLUTION INTRAVENOUS at 07:48

## 2020-07-21 RX ADMIN — POTASSIUM CHLORIDE 10 MEQ: 7.46 INJECTION, SOLUTION INTRAVENOUS at 06:55

## 2020-07-21 RX ADMIN — SODIUM BICARBONATE: 84 INJECTION, SOLUTION INTRAVENOUS at 10:30

## 2020-07-21 RX ADMIN — POTASSIUM CHLORIDE 10 MEQ: 7.46 INJECTION, SOLUTION INTRAVENOUS at 01:13

## 2020-07-21 RX ADMIN — HYDRALAZINE HYDROCHLORIDE 25 MG: 25 TABLET, FILM COATED ORAL at 21:04

## 2020-07-21 RX ADMIN — MAGNESIUM SULFATE HEPTAHYDRATE 1 G: 1 INJECTION, SOLUTION INTRAVENOUS at 16:21

## 2020-07-21 RX ADMIN — HEPARIN SODIUM 12 UNITS/KG/HR: 10000 INJECTION, SOLUTION INTRAVENOUS at 09:34

## 2020-07-21 RX ADMIN — SODIUM PHOSPHATE, MONOBASIC, MONOHYDRATE 10 MMOL: 276; 142 INJECTION, SOLUTION INTRAVENOUS at 03:20

## 2020-07-21 RX ADMIN — VANCOMYCIN HYDROCHLORIDE 1250 MG: 10 INJECTION, POWDER, LYOPHILIZED, FOR SOLUTION INTRAVENOUS at 13:45

## 2020-07-21 ASSESSMENT — PAIN SCALES - GENERAL
PAINLEVEL_OUTOF10: 0

## 2020-07-21 NOTE — CARE COORDINATION
Pt remains in MICU on Insulin and Bicarb gtts. Heparin gtt on hold. Cardiology consulted for NSTEMI. Met with patient at bedside, he is alert and oriented. He lives in an apartment alone with family support. He stated he was independent with ADL's. He has an Insulin pump. No assistive devices. He plans to return home at discharge with no anticipated needs. PCP . He uses [address] He tells me he will have transport at discharge. He is showing as self pay, he confirmed no insurance coverage, call placed to Public Benefits for Medicaid assessment. CM will continue to follow.   Sharon RADHA Huffman  PHYSICIANS [facility] SURGICAL [facility] Case Management  [phone]

## 2020-07-21 NOTE — CONSULTS
[facility]  Internal Medicine Residency Program  [address] [facility]    Patient:  Juancho Jenkins 53 y.o. male MRN: [id number]     Date of Service: 7/21/2020    Hospital Day: 2      Chief complaint: hyperglycemia and DKA  History of Present Illness   The patient is a 53 y.o. male with PMHx of type 1 diabetes mellitus, hypertension, alcoholism, idiopathic peripheral neuropathy, lacunar stroke, polysubstance abuse, presenting to the ED for concern of going into DKA. He reported polydypsia starting this morning and drank 1 gallon of water. Pt had nausea, vomited 4 times, brown vomitus. Pt had non-bloody, soft diarrhea twice and polyuria. No food intake since 10 am. Patient admits to be a chronic alcoholic and last drink was a day and a half ago. Patient reports running nose, dry cough and SOB in the past several days. Denies changing his insulin regimen and any recent illness. No fever, headache, abdominal change. ED Course: Pt was in respiratory distress and tachycardic in the ED, SpO2 > 97% with nasal cannula 2 L. Lab showing serum glucose 728, HgA1c: 10.0, Beta-hydroxybutyrate: >4.50, bicarb 2, AG 46, K 5.5, P 8.7, BUN 36, Cr 2.1, procalcitonin 1.09, Total CK 90, CK-MB 4.5, CRP 0.1, Pro-, Troponin 0.04. Blood WBC 26.1. UA showing Glucose >=1000, Ketone: >=80, PH: 5.5, RBC: 1-3, WBC: 1-3, Bacteria few, Nitrite negative, Leukocyte esterase negative. ED Meds: Patient was given insulin regular (Humulin), protonix, zofran, sodium phosphate, potassium chloride, sodium bicarbonate   ED Fluids: Patient was given normal saline, D5W        Past Medical History:      Diagnosis Date    [facility]ism ([facility]     Cocaine abuse ([facility]     Diabetes mellitus ([facility]     Hypertension     Idiopathic peripheral neuropathy 9/21/2016    Lacunar stroke ([facility]     Marijuana abuse        Past Surgical History:    No past surgical history on file.     Medications Prior to Admission:    Prior to Admission medications    Medication Sig Start Date End Date Taking? Authorizing Provider   insulin lispro (HUMALOG) 100 UNIT/ML injection vial Inject into the skin MEDTRONIC PUMP   Yes Historical Provider, MD   albuterol sulfate  (90 Base) MCG/ACT inhaler Inhale 2 puffs into the lungs every 6 hours as needed for Wheezing or Shortness of Breath 12/12/19  Yes Christine Isabel MD   atorvastatin (LIPITOR) 80 MG tablet Take 1 tablet by mouth nightly 12/28/18  Yes Petra Arnold MD   lisinopril (PRINIVIL;ZESTRIL) 20 MG tablet Take 1 tablet by mouth daily 12/29/18  Yes Petra Arnold MD   aspirin 81 MG EC tablet Take 1 tablet by mouth daily 11/13/18  Yes Layo Bower MD   Insulin Syringe-Needle U-100 28G X 1/2\" 1 ML MISC Use as directed 7/29/19   Kailey Klein MD   Lancets MISC 1 each by Does not apply route 2 times daily 7/29/19   Kailey Klein MD       Allergies:  Metoprolol and No known allergies    Social History:   TOBACCO:   reports that he has been smoking cigarettes. He has a 30.00 pack-year smoking history. He has never used smokeless tobacco.  ETOH:   reports current alcohol use of about 5.0 standard drinks of alcohol per week. Family History:       Problem Relation Age of Onset    Diabetes type 2  Mother     Cancer Maternal Grandmother     Diabetes type 2  Nephew        REVIEW OF SYSTEMS:    · Constitutional: No fever, no chills, no change in weight; good appetite  · HEENT: No sore throat, rhinorrhea. · Respiratory: Shortness of breath, dry cough, no sputum production, no pleuritic chest pain   · Cardiology: No angina, no dyspnea on exertion, no paroxysmal nocturnal dyspnea, no orthopnea, no palpitation, no leg swelling.    · Gastroenterology: No dysphagia, no reflux; no abdominal pain; no constipation  · Genitourinary: No dysuria, no frequency, hesitancy; no hematuria  · Musculoskeletal: no joint pain, no myalgia, no change in range of movement  · Neurology: weakness, tingling and numbness below the knee b/l, no slurred speech, no double vision  · Endocrinology: no polyphagia, polydipsia  · Hematology: no increased bleeding, no bruising, no lymphadenopathy  · Skin: no skin changes noticed by patient  · Psychology: no depressed mood, no suicidal ideation    Physical Exam   · Vitals: /79   Pulse 113   Temp 97.5 °F (36.4 °C)   Resp 25   Ht 5' 6\" (1.676 m)   Wt 140 lb 3.4 oz (63.6 kg)   SpO2 99%   BMI 22.63 kg/m²   ·    ·      · General Appearance: alert and oriented to person, place and time, well-developed and well-nourished, in no acute distress  · Skin: warm and dry, no rash or erythema  · Head: normocephalic and atraumatic  · Eyes: pupils equal, round, and reactive to light, extraocular eye movements intact, conjunctivae normal  · Neck: neck supple and non tender without mass, no thyromegaly or thyroid nodules, no cervical lymphadenopathy   · Pulmonary/Chest: clear to auscultation bilaterally- no wheezes, rales or rhonchi, normal air movement, no respiratory distress  · Cardiovascular: tachycardia, normal S1 and S2, no gallops, intact distal pulses and no carotid bruits  · Abdomen: soft, non-tender, non-distended, normal bowel sounds, no masses or organomegaly  · Extremities: no cyanosis and no clubbing  · Neurologic: no cranial nerve deficit, speech normal and sensation to light touch intact     Lines     site day    Art line   None    TLC None    PICC None    Hemoaccess None    Oxygen:     nasal canula at 2L/min     ABG:     Lab Results   Component Value Date    PH 7.236 07/20/2020    PH 7.208 07/19/2012    PCO2 16.2 07/20/2020    PO2 119.3 07/20/2020    HCO3 6.7 07/20/2020    BE -18.3 07/20/2020    THB 13.1 07/20/2020    S2TNURJS 88.4 07/19/2012    O2SAT 98.2 07/20/2020     Labs and Imaging Studies   Basic Labs  CBC:   Lab Results   Component Value Date    WBC 26.1 07/20/2020    RBC 4.40 07/20/2020    HGB 12.5 07/20/2020    HCT 38.4 07/20/2020    .7 07/20/2020    RDW 13.9 07/20/2020    PLT 450 2020     HFP:    Lab Results   Component Value Date    PROT 6.8 2020     CMP:  Lab Results   Component Value Date     2020    K 4.5 2020    K 5.5 2020     2020    CO2 11 2020    BUN 24 2020    PROT 6.8 2020    Total CK: 90  CK-MB: 4.5  CRP: 0.1  Pro-BNP: 291  Troponin 0.04    Beta-hydroxybutyrate: >4.50  Glucose: 728  HgBA1c:  10.0    UA  Glucose >=1000  Bilirubin: small  Ketone: >=80  PH: 5.5  Protein: 30  Urobilinogen: 0.2  Nitrite: negative  Leukocyte esterase: negative  WBC: 1-3  RBC: 1-3  Bacteria: few  Epithelial cells: few    Imaging Studies:     Xr Chest Portable    Result Date: 2020  Patient MRN: [id number] : 1970 Age:  53 years Gender: Male Order Date: 2020 12:15 PM Exam: XR CHEST PORTABLE Number of Images: 1 view Indication:   Shortness of breath Shortness of breath Comparison: Prior study from 2020 is available. Findings: The lungs are clear. There is no evidence of pulmonary infiltrate or pleural effusion. The pulmonary vascularity is unremarkable. The cardiac, hilar and mediastinal silhouettes are satisfactory. The bony thorax demonstrates demonstrate osteoarthritic changes of thoracic spine. .     NO ACUTE CARDIOPULMONARY PROCESS       EKG: sinus tachycardia, nonspecific ST abnormality. Resident's Assessment and Plan     Curt Bean is a 53 y.o. male    1.  DKA - improving   - Hx of poorly controlled type 1 diabetes mellitus with HgA1c: 10.0   - serum glucose 728, Beta-hydroxybutyrate >4.50, bicarb 2, AG 46, K 5.5 at presentation   - urine glucose >=1000, Ketone: >=80, PH: 5.5 at presentation   - nausea, vomiting, polyuria, polydipsia   - respiratory distress and tachycardic in the ED   - blood glucose trending down 728 -> 364 -> 295   - IV insulin, fluid and and electrolyte, sodium bicarbonate   - replace electrolys   - continue NPO   - monitor glucose, blood and urine electrolytes, BMP, 5. Elevated Troponin - likely 2/2 KIRSTEN vs NSTEMI  6. History of HTN/CAD/HFrecEF, on hydralazine 25 mg tid and lisinopril 20 daily at home  7. Chronic alcohol abuse with alcoholic gastritis now going through possible withdrawals  8. Leukocytosis likely 2/2 dehydration, no obvious infectious source     Plan:     1. Continue with DKA protocol, continue bicarb GTT at 100 cc an hour until acidosis improves. Repeat ABG  2. Will likely be bridged back to his insulin pump, contact endocrinologist for further management  3. Continue to monitor renal function, strict I's/O, daily BMP  4. Trend troponins, repeat EKG, resume ASA and Lipitor once able  5. Continue to monitor for withdrawals, continue folate, thiamine replacements and PRN antiemetic  6. Continue CIWA protocol, seizure and fall precaution  7. Repeat CBC, f/U blood cultures, urine cultures, respiratory panel, respiratory cultures, COVID  8. F/U urine and serum drug screen        DVT/GI Prophylaxis: Heparin/Protonix  Disposition: MILLA De M.D.     Internal Medicine Resident, PGY 3     Attending physician: Dr. Sherri Leigh  Department of Pulmonary, Critical Care and Sleep Medicine  [address] [facility]  Department of Internal Medicine      During multidisciplinary team rounds Maciel Estrada is a 53 y.o. male was seen, examined and discussed. This is confirmation that I have personally seen and examined the patient and that the key elements of the encounter were performed by me (> 85 % time). The medications & laboratory data was discussed and adjusted where necessary. The radiographic images were reviewed or with radiologist or consultant if felt dis-concordant with the exam or history. The above findings were corroborated, plans confirmed and changes made if needed. Family is updated at the bedside as available. Key issues of the case were discussed among consultants.   Critical Care time is documented if appropriate.       Lin Hill DO, FACP, FCCP, Sima grewal,

## 2020-07-21 NOTE — CONSULTS
Inpatient Cardiology Consultation      Reason for Consult: Non-ST elevation MI    Consulting Physician: Dr. Jean-Paul Perez    Requesting Physician: Dr. Félix White    Date of Consultation: 7/21/2020    HISTORY OF PRESENT ILLNESS:      This 52-year-old male is known to [facility] cardiology and is followed by Dr. Keiko Moreno. He was last evaluated by Dr. Jean-Paul Perez in January of this year when he presented with a PEA arrest and had a new cardiomyopathy. Left heart cath was done which showed no coronary artery disease. Cardiac arrest was thought to be cocaine induced. He was then admitted in June of this year with syncope and orthostatics were mildly positive. A 2D echo was done that showed normal left ventricular systolic function and no significant valvulopathy. He presented to the emergency room yesterday morning for hyperglycemia and was in DKA. He is a chronic alcoholic and had been drinking. EKG showed sinus tach cardia with a heart rate of 124. WBCs were 26.1 and hemoglobin was 13.5 with a hematocrit of 23 and serum glucose was 728. BUN and creatinine were 36 and 2.1 and a troponin was . 04 with a proBNP of 291. Phosphorus was 8.9 and ethanol level was less than 10. tox screen was negative. Arterial blood glasses were PO2 73 PCO2 23 with a pH of 6.77. Repeat blood gases were PO2 119 with a PCO2 of 16 and pH of 7.23. He was started on a bicarb drip, CO2 was 2 on BMP. He received fluids of hydration and DKA protocol  . Second troponin is 0.07 and serum glucose is now 191  With a creatinine of 0.7 after hydration   He was admitted to intensive care in DKA      Medical History:  1. Diabetes. ,  Insulin requiring with multiple DKA admissions  2. Hypertension. 3. Peripheral neuropathy. 4. Hx polysubstance abuse. Including alcohol and cocaine  5. Admitted with DKA 01/2016.  6. Admitted with DKA 07/2016.  7. Admitted with DKA 02/2017.  8. Admitted with DKA 06/2017.  9. Admitted  [facility] DKA 03/14/2018.   10. Admitted 11/10/2018 with DKA. Slurred speech. Brain MRI showed multiple lacunar infarcts. Bradycardia  11. 24-hour Holter monitor December 4, 2018 sinus arrhythmia  12. And PEA status post CPR January 4, 2020 thought to be due to DKA and hypothermia  13. 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 Mildly reduced right ventricle systolic function. Moderate mitral regurgitation. EF 35 to 40%    14. Cardiac catheterization January 9, 2020   ANGIOGRAPHIC FINDINGS:   1.  The left main coronary artery arises normally from the left   sinus of   Valsalva, it is a large vessel, no disease, trifurcates into left   anterior descending artery left circumflex artery and ramus   intermedius artery.       2.  The left anterior descending artery extends down to the apex,   it gives off a large diagonal branch that bifurcates shortly   after its origin into 2 midsize subbranches it.  It then feels a   branch has very mild disease in the mid segment.  The rest of the   LAD and its diagonal branches have no significant disease.       3.  The left circumflex artery is mid size vessel gives off 2   small OM branches, the left circumflex artery and its branches   have no significant disease     4.   The ramus intermedius artery is a large vessel that   subdivided distally due to subbranches, the ramus intermedius   artery and its branches have no significant disease     5. The right coronary artery is large vessel, dominant   circulation, gives off a large right PLV and large PDA, the right   coronary artery and its branches have no significant disease       15. Admission for syncope in June 2020, orthostatics were mildly positive and tox screen positive for marijuana  16. Hyperlipidemia  17. Limited echo June 22 2020    Compared to prior echo, significant changes noted. Technically adequate study.    Mild concentric left ventricular ondansetron (ZOFRAN) injection 4 mg, 4 mg, Intravenous, Q6H PRN  pantoprazole (PROTONIX) injection 40 mg, 40 mg, Intravenous, Daily **AND** sodium chloride (PF) 0.9 % injection 10 mL, 10 mL, Intravenous, Daily  labetalol (NORMODYNE;TRANDATE) injection 10 mg, 10 mg, Intravenous, Q4H PRN  calcium gluconate 1 g in dextrose 5 % 100 mL IVPB, 1 g, Intravenous, Once  heparin (porcine) injection 3,960 Units, 60 Units/kg, Intravenous, PRN  heparin (porcine) injection 1,980 Units, 30 Units/kg, Intravenous, PRN  heparin 25,000 units in dextrose 5% 250 mL infusion, 12 Units/kg/hr, Intravenous, Continuous  atorvastatin (LIPITOR) tablet 80 mg, 80 mg, Oral, Nightly  hydrALAZINE (APRESOLINE) tablet 25 mg, 25 mg, Oral, 3 times per day  aspirin chewable tablet 81 mg, 81 mg, Oral, Daily  metoprolol tartrate (LOPRESSOR) tablet 25 mg, 25 mg, Oral, BID  glucose (GLUTOSE) 40 % oral gel 15 g, 15 g, Oral, PRN  dextrose 50 % IV solution, 12.5 g, Intravenous, PRN  glucagon (rDNA) injection 1 mg, 1 mg, Intramuscular, PRN  dextrose 5 % solution, 100 mL/hr, Intravenous, PRN  dextrose 50 % IV solution, 12.5 g, Intravenous, PRN  potassium chloride 10 mEq/100 mL IVPB (Peripheral Line), 10 mEq, Intravenous, PRN  magnesium sulfate 1 g in dextrose 5% 100 mL IVPB, 1 g, Intravenous, PRN  sodium phosphate 10 mmol in dextrose 5 % 250 mL IVPB, 10 mmol, Intravenous, PRN **OR** sodium phosphate 15 mmol in dextrose 5 % 250 mL IVPB, 15 mmol, Intravenous, PRN **OR** sodium phosphate 20 mmol in dextrose 5 % 500 mL IVPB, 20 mmol, Intravenous, PRN  [COMPLETED] 0.9 % sodium chloride bolus, 15 mL/kg, Intravenous, Once **FOLLOWED BY** 0.9 % sodium chloride infusion, , Intravenous, Continuous  dextrose 5 % and 0.45 % sodium chloride infusion, , Intravenous, Continuous PRN  insulin regular (HUMULIN R;NOVOLIN R) 100 Units in sodium chloride 0.9 % 100 mL infusion, 0.1 Units/kg/hr, Intravenous, Continuous  sodium bicarbonate 150 mEq in sterile water 1,000 mL IVPB, ,

## 2020-07-21 NOTE — PROGRESS NOTES
[facility]  Internal Medicine Residency Program  Progress Note - House Team 1    Patient:  Kathryn Martinez 53 y.o. male MRN: [id number]     Date of Service: 7/21/2020   CC: N/V  Overnight events: None     Subjective     Patient was seen and examined this morning at bedside in no acute distress. No c/o chest pain at this time. States that he feels \"a little better\". DKA protocol continued overnight. AG now closed and acidosis resolved x 1. Awaiting follow up BMP. Troponins noted to trend down and repeat EKG with no further noted ST segment changes. No other acute complaints at this time. Objective     Physical Exam:  · Vitals: /70   Pulse 92   Temp 98.4 °F (36.9 °C) (Oral)   Resp 20   Ht 5' 6\" (1.676 m)   Wt 145 lb 8.1 oz (66 kg)   SpO2 100%   BMI 23.48 kg/m²     · I & O - 24hr: I/O this shift:  · In: -   · Out: 310 [Urine:310]   · General Appearance: alert, appears stated age, cooperative and no distress  · HEENT:  Head: Normal, normocephalic, atraumatic. · Eye: Normal external eye, conjunctiva, lids cornea, EDDI. · Pharynx: poor dental hygiene, moist oral mucosa  · Neck: no adenopathy, no JVD and supple, symmetrical, trachea midline  · Lung: clear to auscultation bilaterally  · Heart: regular rate and rhythm, S1, S2 normal, no murmur, click, rub or gallop  · Abdomen: soft, non-tender; bowel sounds normal; no masses,  no organomegaly  · Extremities:  extremities normal, atraumatic, no cyanosis or edema  · Musculokeletal: No joint swelling, no muscle tenderness. ROM normal in all joints of extremities.    · Neurologic: Mental status: Alert, oriented, thought content appropriate  Subject  Pertinent Labs & Imaging Studies   chika  CBC with Differential:    Lab Results   Component Value Date    WBC 17.3 07/21/2020    RBC 3.45 07/21/2020    HGB 10.6 07/21/2020    HCT 31.1 07/21/2020     07/21/2020    MCV 90.1 07/21/2020    MCH 30.7 07/21/2020    MCHC 34.1 07/21/2020 RDW 13.8 07/21/2020    SEGSPCT 53 01/25/2014    SEGSPCT 86 10/11/2010    BANDSPCT 3 10/10/2010    METASPCT 1.7 07/20/2020    LYMPHOPCT 4.3 07/20/2020    MONOPCT 1.7 07/20/2020    MYELOPCT 2.6 01/04/2020    EOSPCT 2 10/12/2010    BASOPCT 0.7 07/20/2020    MONOSABS 0.52 07/20/2020    LYMPHSABS 1.04 07/20/2020    EOSABS 0.44 07/20/2020    BASOSABS 0.00 07/20/2020     BMP:    Lab Results   Component Value Date     07/21/2020    K 4.7 07/21/2020    K 5.5 07/20/2020     07/21/2020    CO2 23 07/21/2020    BUN 16 07/21/2020    LABALBU 4.4 07/20/2020    LABALBU 4.3 12/30/2011    CREATININE 0.7 07/21/2020    CALCIUM 8.1 07/21/2020    GFRAA >60 07/21/2020    LABGLOM >60 07/21/2020    GLUCOSE 144 07/21/2020    GLUCOSE 83 04/11/2012       Resident's Assessment and Plan     Assessment:     1.) DKA              - 2/2 insulin pump malfunction vs EtOH use              - no discernible source of infection; CXR and UA clear     2.) Elevated Troponin              - likely KIRSTEN mediated vs possible NSTEMI     3.) KIRSTEN Stage III, RESOLVED              - likely pre-renal in setting of intravascular volume depletion d/t decreased PO intake              - baseline Cr 0.7     4.) Leukocytosis              - likely reactive + hemoconcentration               - otherwise afebrile and no clear source of infection   - procal however elevated 5.17     5.) H/o Type I DM              - A1c 10% this admission              - on insulin pump as OP     6.) H/o HTN              - on lisinopril and hydralazine as OP     7.) H/o CAD     8.) H/o Lacunar CVA     9.) H/o Polysubstance Abuse               - EtOH, cocaine, MJ              - last drink Saturday, no prior withdrawal symptoms when abstinent from EtOH        Plan:     · DKA protocol  · Continue bicarb gtt until pH > 6.9  · BMP, mag and phos q4h; replace per prn orders  · BG checks q1h  · Hypoglycemia treatment orders in place  · Start on low-carb diet once AG closed x 2  · Will bridge to SQ per 24 hr insulin gtt requirements  · Endocrinology on consult per MICU team  · Continue heparin gtt per MICU team   · Await cardiology recs  · Resume home antihypertensives, Lipitor, ASA once tolerating PO diet  · Continue CIWA for EtOH withdrawal symptoms  · Start cefepime and vancomycin  · Await cultures        PT/OT evaluation: not ordered  DVT prophylaxis/ GI prophylaxis: heparin gtt/Protonix   Disposition: continue current care    Sai Nichols MD, PGY-2  Attending physician: Dr. Carla Carter

## 2020-07-21 NOTE — PROGRESS NOTES
[address] [facility]  Internal Medicine Residency / [address]    Attending Physician Statement  I have discussed the case, including pertinent history and exam findings with the resident and the team.  I have seen and examined the patient and the key elements of the encounter have been performed by me. I agree with the assessment, plan and orders as documented by the resident. DKA resolving   On insulin drip  Endocrinology consulted  EKG changes on admission , anterior leads  Troponin elevation and following  May consider Cardiology consult, ECHO  Plan; Same ICU care. Remainder of medical problems as per resident note.       Krishna Bartlett  Internal Medicine Residency Faculty

## 2020-07-21 NOTE — CONSULTS
ENDOCRINOLOGY INITIAL CONSULTATION NOTE VIA TELEMEDICINE SERVICE       Date of admission: 7/20/2020  Date of service: 7/21/2020  Admitting physician: Monica Ba MD   Primary Care Physician: Monica Ba MD  Consultant physician: Amanda Platt MD       Reason for the consultation:  DM type 1 on insulin pump, DKA     History of Present Illness:  Services were provided through a video synchronous discussion     Chelsi Arauz is a 53 y.o. old male with PMH of type 1 diabetes on Medtronic insulin pump admitted to [facility] on 7/20/2020 because of hyperglycemia and DKA following overnight insulin pump infusion set failure. Endocrine service was consulted for DM management   The patient woke up in the morning feeling nauseous and having deep breathing and feeling like he was going into DKA. Patient admit to being a chronic alcoholic as well. His last drink was a day and a half before presentation. Patient denies any fever, chills, sick contact or recent illness or sickness, endocrine team was consulted for diabetes management. Prior to admission  The patient diagnosed with diabetes at age 30 and currently using 12g Medtronic insulin in the following pump settings: Basal rate 12a 1.3, 7a 1.5, CR 10, ISF 35, goal 120-140, active insulin time 4 hrs   He is due for pump upgrade but still struggling with insurance issues. The patient told me that he has been checks BS 2-4  times a day and readings are highly variable   Lab Results   Component Value Date    LABA1C 10.0 07/20/2020   \  He hasn't been mindful of what has been eating and wasn't following DM diet .  I reviewed current medications and the patient has no issues with diabetes medications  His diabetes is complicated with neuropathy and retinopathy s/p laser therapy   Macrovascular complications: no CAD, PVD, + TIA 2/2019       After admission   While hospitalized, anti-diabetic regiment includes insulin drip as per DKA protocol    Inpatient diet:   NPO while on insulin drip     Point of care glucose monitoring (Independently reviewed)   Recent Labs     07/21/20  1003 07/21/20  1105 07/21/20  1212 07/21/20  1309 07/21/20  1400 07/21/20  1525 07/21/20  1608 07/21/20  1655   GLUMET 165* 132* 151* 115* 103* 144* 161* 152*       Past medical history:   Past Medical History:   Diagnosis Date    [facility]ism ([facility]     Cocaine abuse ([facility]     Diabetes mellitus ([facility]     Hypertension     Idiopathic peripheral neuropathy 9/21/2016    Lacunar stroke ([facility]     Marijuana abuse        Past surgical history:  No past surgical history on file. Social history:   Tobacco:   reports that he has been smoking cigarettes. He has a 30.00 pack-year smoking history. He has never used smokeless tobacco.  Alcohol:   reports current alcohol use of about 5.0 standard drinks of alcohol per week. Drugs:   reports current drug use. Drug: Marijuana. Family history:    Family History   Problem Relation Age of Onset    Diabetes type 2  Mother     Cancer Maternal Grandmother     Diabetes type 2  Nephew        Allergy and drug reactions:    Allergies   Allergen Reactions    Metoprolol      bradycardia    No Known Allergies        Scheduled Meds:   sodium chloride flush  10 mL Intravenous 2 times per day    pantoprazole  40 mg Intravenous Daily    And    sodium chloride (PF)  10 mL Intravenous Daily    atorvastatin  80 mg Oral Nightly    hydrALAZINE  25 mg Oral 3 times per day    aspirin  81 mg Oral Daily    metoprolol tartrate  25 mg Oral BID    cefepime  2 g Intravenous Q8H    And    sodium chloride   Intravenous Q8H    insulin glargine  24 Units Subcutaneous Daily    insulin lispro  6 Units Subcutaneous TID WC    insulin lispro  0-12 Units Subcutaneous TID WC    insulin lispro  0-6 Units Subcutaneous Nightly    vancomycin  1,000 mg Intravenous Q8H    sodium chloride flush  10 mL Intravenous 2 times per day    thiamine  100 mg Intravenous Daily    folic acid  1 mg Intravenous Daily     PRN Meds:   sodium chloride flush, 10 mL, PRN  acetaminophen, 650 mg, Q6H PRN    Or  acetaminophen, 650 mg, Q6H PRN  polyethylene glycol, 17 g, Daily PRN  promethazine, 12.5 mg, Q6H PRN    Or  ondansetron, 4 mg, Q6H PRN  labetalol, 10 mg, Q4H PRN  heparin (porcine), 60 Units/kg, PRN  heparin (porcine), 30 Units/kg, PRN  glucose, 15 g, PRN  dextrose, 12.5 g, PRN  glucagon (rDNA), 1 mg, PRN  dextrose, 100 mL/hr, PRN  dextrose, 12.5 g, PRN  potassium chloride, 10 mEq, PRN  magnesium sulfate, 1 g, PRN  sodium phosphate IVPB, 10 mmol, PRN    Or  sodium phosphate IVPB, 15 mmol, PRN    Or  sodium phosphate IVPB, 20 mmol, PRN  dextrose 5 % and 0.45 % NaCl, , Continuous PRN  sodium chloride flush, 10 mL, PRN  LORazepam, 1 mg, Q1H PRN    Or  LORazepam, 1 mg, Q1H PRN    Or  LORazepam, 2 mg, Q1H PRN    Or  LORazepam, 2 mg, Q1H PRN    Or  LORazepam, 3 mg, Q1H PRN    Or  LORazepam, 3 mg, Q1H PRN    Or  LORazepam, 4 mg, Q1H PRN    Or  LORazepam, 4 mg, Q1H PRN      Continuous Infusions:   heparin (porcine) 12 Units/kg/hr (07/21/20 0934)    dextrose      sodium chloride 250 mL/hr at 07/21/20 0112    dextrose 5 % and 0.45 % NaCl 150 mL/hr at 07/21/20 1421    IVPB builder 100 mL/hr at 07/21/20 1030       Review of Systems  All systems reviewed. All negative except for symptoms mentioned in HPI     Constitutional: + generalized weakness. HEENT: No blurred vision, No sore throat, no ear pain, no hair loss  Neck: denied any neck swelling, difficulty swallowing,   Cardio-pulmonary: No CP, SOB or palpitation,   GI: + nausea, no vomiting  : Denied any dysuria, hematuria  Skin: denied any rash, ulcer, or hyperpigmentation.      OBJECTIVE    BP (!) 140/85   Pulse 84   Temp 98.5 °F (36.9 °C) (Oral)   Resp 24   Ht 5' 6\" (1.676 m)   Wt 145 lb 8.1 oz (66 kg)   SpO2 99%   BMI 23.48 kg/m²     Intake/Output Summary (Last 24 hours) at 7/21/2020 1716  Last data filed at 7/21/2020 1700  Gross per 24 hour   Intake LABCREA 35 12/08/2019     Lab Results   Component Value Date    TRIG 73 11/11/2018    HDL 65 11/11/2018    LDLCALC 64 11/11/2018    CHOL 144 11/11/2018       Blood culture   Lab Results   Component Value Date    BC 5 Days- no growth 01/04/2020    BC 5 Days- no growth 12/10/2019       Radiology:  XR CHEST PORTABLE   Final Result      NO ACUTE CARDIOPULMONARY PROCESS             Medical Records/Labs/Images review:   I personally reviewed and summarized previous records   All labs and imaging were reviewed independently     Nelly9 Bk Zapata, a 53 y.o.-old male seen today for inpatient diabetes management     Diabetes Mellitus type 1 on insulin pump    · Patient's diabetes is uncontrolled, A1c 10%  · Admitted with DKA due following overnight insulin pump infusion set failure  · Pt still a little sleepy and confused and I am concern about the ability of him to safely operate the pump. For this reason, will transition patient on SQ insulin and restart pump tomorrow  · We recommend following insulin regimen  · Lantus 24 units daily  · Humalog 6 units with meals   · Medium dose sliding scale      · Continue glucose check with meals and at bedtime   · Will titrate insulin dose based on the blood glucose trend & insulin requirement  · I will see pt at the office immediately after discharge     The above issues were reviewed with the patient who understood and agreed with the plan. Thank you for allowing us to participate in the care of this patient. Please do not hesitate to contact us with any additional questions. Isma Royal MD  Endocrinologist, [facility] Diabetes Care and Endocrinology   [address] [address]   Phone: [phone]  Fax: [phone]  --------------------------------------------  An electronic signature was used to authenticate this note.  Sofy Aviles MD on 7/21/2020 at 5:16 PM    Services were provided through a video synchronous discussion

## 2020-07-21 NOTE — PLAN OF CARE
[facility] Quality Flow/Interdisciplinary Rounds Progress Note        Quality Flow Rounds held on July 21, 2020    Disciplines Attending: Bedside Nurse, Nursing Unit Leadership, Respiratory Therapy, Pharmacist, Dr. Violette Bennett and ICU team    Maurice Avila was admitted on 7/20/2020 11:48 AM    Anticipated Discharge Date:  Expected Discharge Date: 07/23/20    Disposition: ICU    Roberto Score:  Roberto Scale Score: 16    Readmission Risk              Risk of Unplanned Readmission:        37           Discussed patient goal for the day, patient clinical progression, and barriers to discharge. The following Goal(s) of the Day/Commitment(s) have been identified: Cardiology consult. DKA protocol.       Graeme Vences  July 21, 2020

## 2020-07-21 NOTE — PROGRESS NOTES
Pharmacy Consultation Note  (Antibiotic Dosing and Monitoring)    Initial consult date: 7/21/20  Consulting physician: Dr. Pau Monzon  Drug(s):  vancomycin  Indication: empiric for poss PNA      Age/  Gender Height Weight IBW Dosing weight  Allergy Information   50 y.o./male 5' 6\" (167.6 cm) 145 lb (65.8 kg)     Ideal body weight: 63.8 kg (140 lb 10.5 oz)  Adjusted ideal body weight: 64.7 kg (142 lb 9.5 oz)  66 kg  Metoprolol and No known allergies          Other anti-infectives Start date Stop date   Cefepime 7/21                     Date  Tmax WBC BUN/CR UOP CrCL  (mL/min) Drug/Dose Time   Given Level(s)   (Time) Comments   7/21 afebrile 17.3  16/0.7 -- 115  Vancomycin 1250 mg IV x 1 dose    Vanco 1g IV q 8 hr 1345      (2200)     7/22      Vancomycin 1000 mg IV q 8 hr                                    Intake/Output Summary (Last 24 hours) at 7/21/2020 1615  Last data filed at 7/21/2020 1600  Gross per 24 hour   Intake 8377.93 ml   Output 5080 ml   Net 3297.93 ml       Average urine output:    Cultures:    Site Date Result                    No results for input(s): Isiah Villasenor in the last 72 hours. Historical Cultures:  Organism   Date Value Ref Range Status   12/08/2019 Coagulase Negative Staphylococci (A)  Final     No results for input(s): BC in the last 72 hours. Radiology:      Assessment:  · 53 yom started on vancomycin and cefepime for empiric therapy. PMH is significant for type 1 DM, HTN, ETOH/substance abuse, and lacunar stroke.    · Goal trough = 15 - 20 mcg/ml  · Scr 0.7    Plan:  · Vanco 1000 mg IV q 8 hr  · A vanco trough level will be obtained when steady-state is reached  · Pharmacist will follow and monitor/adjust dosing as necessary    Alee Helms, PharmD, BCPS 7/21/2020 4:15 PM   Phone: [phone]

## 2020-07-22 ENCOUNTER — OFFICE VISIT (OUTPATIENT)
Dept: ENDOCRINOLOGY | Age: 50
End: 2020-07-22

## 2020-07-22 VITALS
HEART RATE: 67 BPM | SYSTOLIC BLOOD PRESSURE: 122 MMHG | HEIGHT: 66 IN | TEMPERATURE: 98.2 F | WEIGHT: 147.71 LBS | DIASTOLIC BLOOD PRESSURE: 80 MMHG | BODY MASS INDEX: 23.74 KG/M2 | RESPIRATION RATE: 24 BRPM | OXYGEN SATURATION: 94 %

## 2020-07-22 VITALS
HEIGHT: 66 IN | OXYGEN SATURATION: 99 % | BODY MASS INDEX: 24.43 KG/M2 | WEIGHT: 152 LBS | RESPIRATION RATE: 16 BRPM | HEART RATE: 73 BPM | SYSTOLIC BLOOD PRESSURE: 138 MMHG | DIASTOLIC BLOOD PRESSURE: 84 MMHG

## 2020-07-22 LAB
ANION GAP SERPL CALCULATED.3IONS-SCNC: 12 MMOL/L (ref 7–16)
APTT: 63.3 SEC (ref 24.5–35.1)
BASOPHILS ABSOLUTE: 0.03 E9/L (ref 0–0.2)
BASOPHILS RELATIVE PERCENT: 0.3 % (ref 0–2)
BUN BLDV-MCNC: 8 MG/DL (ref 6–20)
CALCIUM IONIZED: 1.23 MMOL/L (ref 1.15–1.33)
CALCIUM SERPL-MCNC: 8.3 MG/DL (ref 8.6–10.2)
CHLORIDE BLD-SCNC: 102 MMOL/L (ref 98–107)
CHP ED QC CHECK: NORMAL
CHP ED QC CHECK: NORMAL
CO2: 26 MMOL/L (ref 22–29)
CREAT SERPL-MCNC: 0.6 MG/DL (ref 0.7–1.2)
EOSINOPHILS ABSOLUTE: 0.12 E9/L (ref 0.05–0.5)
EOSINOPHILS RELATIVE PERCENT: 1.1 % (ref 0–6)
GFR AFRICAN AMERICAN: >60
GFR NON-AFRICAN AMERICAN: >60 ML/MIN/1.73
GLUCOSE BLD-MCNC: 121 MG/DL
GLUCOSE BLD-MCNC: 222 MG/DL (ref 74–99)
GLUCOSE BLD-MCNC: 54 MG/DL
HCT VFR BLD CALC: 32 % (ref 37–54)
HEMOGLOBIN: 10.9 G/DL (ref 12.5–16.5)
IMMATURE GRANULOCYTES #: 0.04 E9/L
IMMATURE GRANULOCYTES %: 0.4 % (ref 0–5)
LYMPHOCYTES ABSOLUTE: 1.92 E9/L (ref 1.5–4)
LYMPHOCYTES RELATIVE PERCENT: 16.9 % (ref 20–42)
MAGNESIUM: 2 MG/DL (ref 1.6–2.6)
MCH RBC QN AUTO: 30.4 PG (ref 26–35)
MCHC RBC AUTO-ENTMCNC: 34.1 % (ref 32–34.5)
MCV RBC AUTO: 89.4 FL (ref 80–99.9)
METER GLUCOSE: 207 MG/DL (ref 74–99)
METER GLUCOSE: 220 MG/DL (ref 74–99)
MONOCYTES ABSOLUTE: 0.74 E9/L (ref 0.1–0.95)
MONOCYTES RELATIVE PERCENT: 6.5 % (ref 2–12)
NEUTROPHILS ABSOLUTE: 8.51 E9/L (ref 1.8–7.3)
NEUTROPHILS RELATIVE PERCENT: 74.8 % (ref 43–80)
PDW BLD-RTO: 13.5 FL (ref 11.5–15)
PHOSPHORUS: 2.8 MG/DL (ref 2.5–4.5)
PLATELET # BLD: 280 E9/L (ref 130–450)
PMV BLD AUTO: 8.9 FL (ref 7–12)
POTASSIUM SERPL-SCNC: 3.1 MMOL/L (ref 3.5–5)
PROCALCITONIN: 3.16 NG/ML (ref 0–0.08)
RBC # BLD: 3.58 E12/L (ref 3.8–5.8)
SODIUM BLD-SCNC: 140 MMOL/L (ref 132–146)
TROPONIN: <0.01 NG/ML (ref 0–0.03)
VITAMIN D 25-HYDROXY: 25 NG/ML (ref 30–100)
WBC # BLD: 11.4 E9/L (ref 4.5–11.5)

## 2020-07-22 PROCEDURE — 2580000003 HC RX 258: Performed by: INTERNAL MEDICINE

## 2020-07-22 PROCEDURE — 6370000000 HC RX 637 (ALT 250 FOR IP): Performed by: INTERNAL MEDICINE

## 2020-07-22 PROCEDURE — 80048 BASIC METABOLIC PNL TOTAL CA: CPT

## 2020-07-22 PROCEDURE — 85730 THROMBOPLASTIN TIME PARTIAL: CPT

## 2020-07-22 PROCEDURE — 85025 COMPLETE CBC W/AUTO DIFF WBC: CPT

## 2020-07-22 PROCEDURE — 84484 ASSAY OF TROPONIN QUANT: CPT

## 2020-07-22 PROCEDURE — 83735 ASSAY OF MAGNESIUM: CPT

## 2020-07-22 PROCEDURE — 99233 SBSQ HOSP IP/OBS HIGH 50: CPT | Performed by: INTERNAL MEDICINE

## 2020-07-22 PROCEDURE — 82306 VITAMIN D 25 HYDROXY: CPT

## 2020-07-22 PROCEDURE — 2500000003 HC RX 250 WO HCPCS: Performed by: INTERNAL MEDICINE

## 2020-07-22 PROCEDURE — 84145 PROCALCITONIN (PCT): CPT

## 2020-07-22 PROCEDURE — 82962 GLUCOSE BLOOD TEST: CPT

## 2020-07-22 PROCEDURE — 6360000002 HC RX W HCPCS: Performed by: INTERNAL MEDICINE

## 2020-07-22 PROCEDURE — 82962 GLUCOSE BLOOD TEST: CPT | Performed by: INTERNAL MEDICINE

## 2020-07-22 PROCEDURE — 84100 ASSAY OF PHOSPHORUS: CPT

## 2020-07-22 PROCEDURE — 99214 OFFICE O/P EST MOD 30 MIN: CPT | Performed by: INTERNAL MEDICINE

## 2020-07-22 PROCEDURE — 2700000000 HC OXYGEN THERAPY PER DAY

## 2020-07-22 PROCEDURE — 36415 COLL VENOUS BLD VENIPUNCTURE: CPT

## 2020-07-22 PROCEDURE — 82330 ASSAY OF CALCIUM: CPT

## 2020-07-22 RX ORDER — HYDRALAZINE HYDROCHLORIDE 25 MG/1
25 TABLET, FILM COATED ORAL EVERY 8 HOURS SCHEDULED
Qty: 90 TABLET | Refills: 3 | Status: ON HOLD | OUTPATIENT
Start: 2020-07-22 | End: 2021-09-26

## 2020-07-22 RX ORDER — POTASSIUM CHLORIDE 20 MEQ/1
40 TABLET, EXTENDED RELEASE ORAL ONCE
Status: COMPLETED | OUTPATIENT
Start: 2020-07-22 | End: 2020-07-22

## 2020-07-22 RX ORDER — FOLIC ACID 1 MG/1
1 TABLET ORAL DAILY
Qty: 30 TABLET | Refills: 3 | Status: ON HOLD | OUTPATIENT
Start: 2020-07-23 | End: 2021-09-26

## 2020-07-22 RX ORDER — CEFDINIR 300 MG/1
300 CAPSULE ORAL 2 TIMES DAILY
Qty: 14 CAPSULE | Refills: 0 | Status: SHIPPED | OUTPATIENT
Start: 2020-07-22 | End: 2020-07-29

## 2020-07-22 RX ORDER — FOLIC ACID 1 MG/1
1 TABLET ORAL DAILY
Status: DISCONTINUED | OUTPATIENT
Start: 2020-07-23 | End: 2020-07-22 | Stop reason: HOSPADM

## 2020-07-22 RX ORDER — OMEGA-3S/DHA/EPA/FISH OIL/D3 300MG-1000
1000 CAPSULE ORAL DAILY
Status: DISCONTINUED | OUTPATIENT
Start: 2020-07-22 | End: 2020-07-22 | Stop reason: HOSPADM

## 2020-07-22 RX ORDER — THIAMINE MONONITRATE (VIT B1) 100 MG
100 TABLET ORAL DAILY
Status: DISCONTINUED | OUTPATIENT
Start: 2020-07-23 | End: 2020-07-22 | Stop reason: HOSPADM

## 2020-07-22 RX ORDER — POTASSIUM CHLORIDE 7.45 MG/ML
10 INJECTION INTRAVENOUS
Status: DISCONTINUED | OUTPATIENT
Start: 2020-07-22 | End: 2020-07-22

## 2020-07-22 RX ORDER — LANOLIN ALCOHOL/MO/W.PET/CERES
100 CREAM (GRAM) TOPICAL DAILY
Qty: 30 TABLET | Refills: 3 | Status: ON HOLD | OUTPATIENT
Start: 2020-07-23 | End: 2021-09-26

## 2020-07-22 RX ORDER — PANTOPRAZOLE SODIUM 40 MG/1
40 TABLET, DELAYED RELEASE ORAL
Status: DISCONTINUED | OUTPATIENT
Start: 2020-07-22 | End: 2020-07-22 | Stop reason: HOSPADM

## 2020-07-22 RX ADMIN — INSULIN LISPRO 2 UNITS: 100 INJECTION, SOLUTION INTRAVENOUS; SUBCUTANEOUS at 12:32

## 2020-07-22 RX ADMIN — ASPIRIN 81 MG: 81 TABLET, CHEWABLE ORAL at 08:40

## 2020-07-22 RX ADMIN — POTASSIUM CHLORIDE 10 MEQ: 10 INJECTION, SOLUTION INTRAVENOUS at 10:30

## 2020-07-22 RX ADMIN — POTASSIUM CHLORIDE 10 MEQ: 10 INJECTION, SOLUTION INTRAVENOUS at 08:40

## 2020-07-22 RX ADMIN — HYDRALAZINE HYDROCHLORIDE 25 MG: 25 TABLET, FILM COATED ORAL at 06:33

## 2020-07-22 RX ADMIN — FOLIC ACID 1 MG: 5 INJECTION, SOLUTION INTRAMUSCULAR; INTRAVENOUS; SUBCUTANEOUS at 08:39

## 2020-07-22 RX ADMIN — SODIUM CHLORIDE: 9 INJECTION, SOLUTION INTRAVENOUS at 10:41

## 2020-07-22 RX ADMIN — THIAMINE HYDROCHLORIDE 100 MG: 100 INJECTION, SOLUTION INTRAMUSCULAR; INTRAVENOUS at 08:40

## 2020-07-22 RX ADMIN — SODIUM CHLORIDE, PRESERVATIVE FREE 10 ML: 5 INJECTION INTRAVENOUS at 08:54

## 2020-07-22 RX ADMIN — METOPROLOL TARTRATE 25 MG: 25 TABLET, FILM COATED ORAL at 08:59

## 2020-07-22 RX ADMIN — INSULIN GLARGINE 24 UNITS: 100 INJECTION, SOLUTION SUBCUTANEOUS at 08:29

## 2020-07-22 RX ADMIN — VANCOMYCIN HYDROCHLORIDE 1000 MG: 1 INJECTION, POWDER, LYOPHILIZED, FOR SOLUTION INTRAVENOUS at 06:21

## 2020-07-22 RX ADMIN — INSULIN LISPRO 4 UNITS: 100 INJECTION, SOLUTION INTRAVENOUS; SUBCUTANEOUS at 08:28

## 2020-07-22 RX ADMIN — POTASSIUM CHLORIDE 40 MEQ: 1500 TABLET, EXTENDED RELEASE ORAL at 12:33

## 2020-07-22 RX ADMIN — PANTOPRAZOLE SODIUM 40 MG: 40 TABLET, DELAYED RELEASE ORAL at 08:59

## 2020-07-22 RX ADMIN — SODIUM CHLORIDE, PRESERVATIVE FREE 10 ML: 5 INJECTION INTRAVENOUS at 08:40

## 2020-07-22 RX ADMIN — SODIUM CHLORIDE, PRESERVATIVE FREE 10 ML: 5 INJECTION INTRAVENOUS at 08:50

## 2020-07-22 RX ADMIN — CEFEPIME HYDROCHLORIDE 2 G: 2 INJECTION, POWDER, FOR SOLUTION INTRAVENOUS at 06:20

## 2020-07-22 RX ADMIN — SODIUM PHOSPHATE, MONOBASIC, MONOHYDRATE 15 MMOL: 276; 142 INJECTION, SOLUTION INTRAVENOUS at 00:35

## 2020-07-22 ASSESSMENT — PAIN SCALES - GENERAL: PAINLEVEL_OUTOF10: 0

## 2020-07-22 NOTE — CARE COORDINATION
Discharge order noted on chart. CM available to assist with any discharge needs that arise. Tal Cruz RN  [facility] Case Management  [phone]    PBO assessed patient, he is NOT Medicaid eligible. He is [facility] eligible. He is NOT eligible for help with meds. He is over resourced through disability.

## 2020-07-22 NOTE — PLAN OF CARE
Problem: Skin Integrity:  Goal: Will show no infection signs and symptoms  Outcome: Met This Shift     Problem: Skin Integrity:  Goal: Absence of new skin breakdown  Outcome: Met This Shift     Problem: Falls - Risk of:  Goal: Will remain free from falls  Outcome: Met This Shift     Problem: Falls - Risk of:  Goal: Absence of physical injury  Outcome: Met This Shift

## 2020-07-22 NOTE — PROGRESS NOTES
[address] [facility] [[email or web address]] Department of Endocrinology Diabetes and Metabolism   [address] [facility] [address]   Phone: [phone]  Fax: [phone]    Date of Service: 7/22/20    Primary Care Physician: Christine Isabel MD  Provider: Nisha Gupta MD     Reason for the visit:  DM type 1 on insulin Pump     History of Present Illness: The history is provided by the patient. No  was used. Accuracy of the patient data is excellent. Jonh Talamantes is a very pleasant 53 y.o. male seen today for follow up visit      Jonh Talamantes was diagnosed with diabetes at age 30 and currently Lantus 25 units daily, Humalog 1u:10g + ss 1:50> 150   He was just discharged from hospital (admitted with DKA). Received lantus 24 units in the morning   Plan to restart Pump tomorrow morning   Pt was on 530g Medtronic insulin in the past with the following --> Basal rate 12a 1.3, 7a 1.5, CR 14 , ISF 65, goal 12a 110-130, 7a 100-120, 10p 110-130, active insulin time 4 hrs   The patient has been checks BS few times a day. BS still highly variable   Most recent A1c results summarized below  Lab Results   Component Value Date    LABA1C 10.0 07/20/2020    LABA1C 10.7 12/08/2019    LABA1C 9.1 07/26/2019     Patient still experiencing hypoglycemic episodes   The patient has been mindful of what has been eating and tries to follow low carb diet   I reviewed current medications and the patient has no issues with diabetes medications  Jonh Talamantes is over due for an eye exam. Last eye exam was few years ago.  The patient performs his own feet care and doesn't see podiatry   His diabetes is complicated with neuropathy and retinopathy s/p laser therapy   Macrovascular complications: no CAD, PVD, + TIA 2/2019     PAST MEDICAL HISTORY   Past Medical History:   Diagnosis Date    Alcoholism ([address]     Cocaine abuse ([address]     Diabetes mellitus ([address]     Hypertension     Idiopathic peripheral neuropathy 9/21/2016    [facility]     Marijuana abuse      PAST SURGICAL HISTORY   No past surgical history on file. SOCIAL HISTORY   Tobacco:   reports that he has been smoking cigarettes. He has a 30.00 pack-year smoking history. He has never used smokeless tobacco.  Alcol:   reports current alcohol use of about 5.0 standard drinks of alcohol per week. Illicit Drugs:   reports current drug use. Drug: Marijuana.     FAMILY HISTORY   Family History   Problem Relation Age of Onset    Diabetes type 2  Mother     Cancer Maternal Grandmother     Diabetes type 2  Nephew      ALLERGIES AND DRUG REACTIONS   Allergies   Allergen Reactions    Metoprolol      bradycardia    No Known Allergies        CURRENT MEDICATIONS   Current Outpatient Medications   Medication Sig Dispense Refill    [START ON 6/69/5336] folic acid (FOLVITE) 1 MG tablet Take 1 tablet by mouth daily 30 tablet 3    [START ON 7/23/2020] vitamin B-1 100 MG tablet Take 1 tablet by mouth daily 30 tablet 3    cefdinir (OMNICEF) 300 MG capsule Take 1 capsule by mouth 2 times daily for 7 days 14 capsule 0    hydrALAZINE (APRESOLINE) 25 MG tablet Take 1 tablet by mouth every 8 hours 90 tablet 3    vitamin D3 400 UNIT TABS tablet Take 2.5 tablets by mouth daily 30 tablet 0    metoprolol tartrate (LOPRESSOR) 25 MG tablet Take 25 mg by mouth 2 times daily      insulin lispro (HUMALOG) 100 UNIT/ML injection vial Inject into the skin MEDTRONIC PUMP      albuterol sulfate  (90 Base) MCG/ACT inhaler Inhale 2 puffs into the lungs every 6 hours as needed for Wheezing or Shortness of Breath 1 Inhaler 0    Insulin Syringe-Needle U-100 28G X 1/2\" 1 ML MISC Use as directed 100 each 11    Lancets MISC 1 each by Does not apply route 2 times daily 300 each 3    atorvastatin (LIPITOR) 80 MG tablet Take 1 tablet by mouth nightly 30 tablet 3    aspirin 81 MG EC tablet Take 1 tablet by mouth daily 30 tablet 3     No current facility-administered medications for this visit. Review of Systems  Constitutional: No fever, no chills, no diaphoresis, no generalized weakness. HEENT: No blurred vision, No sore throat, no ear pain, no hair loss  Neck: denied any neck swelling, difficulty swallowing,   Cardio-pulmonary: No CP, SOB or palpitation, No orthopnea or PND. No cough or wheezing. GI: No N/V/D, no constipation, No abdominal pain, no melena or hematochezia   : Denied any dysuria, hematuria, flank pain, discharge, or incontinence. Skin: denied any rash, ulcer, Hirsute, or hyperpigmentation. MSK: denied any joint deformity, joint pain/swelling, muscle pain, or back pain. Neuro: no numbness, no tingling, no weakness, _    OBJECTIVE    /84   Pulse 73   Resp 16   Ht 5' 6\" (1.676 m)   Wt 152 lb (68.9 kg)   SpO2 99%   BMI 24.53 kg/m²   BP Readings from Last 4 Encounters:   07/22/20 138/84   07/22/20 122/80   06/22/20 (!) 145/90   01/16/20 118/78     Wt Readings from Last 6 Encounters:   07/22/20 152 lb (68.9 kg)   07/22/20 147 lb 11.3 oz (67 kg)   06/20/20 142 lb 9.6 oz (64.7 kg)   01/16/20 157 lb 3.2 oz (71.3 kg)   01/11/20 144 lb 14.4 oz (65.7 kg)   12/12/19 137 lb 3.2 oz (62.2 kg)     Due to this being a TeleHealth encounter, evaluation of the following organ systems is limited: Vitals/Constitutional/EENT/Resp/CV/GI//MS/Neuro/Skin/Heme-Lymph-Imm. Modified physical exam through Telemedicine camera    General: Communicating well via camera   Neck: no obvious neck mass. No obvious neck deformity     CVS: no distress   Chest: no distress. Chest is moving with respiration    Extremities:  no visible tremor  Skin: No visible rashes as seen from camera   Musculoskeletal: no visible deformity  Neuro: Alert and oriented to person, place, and time. Psychiatric: Normal mood and affect.  Behavior is normal    Review of Laboratory Data:  I personally reviewed the following lab:  Lab Results   Component Value Date/Time    WBC 11.4 07/22/2020 05:15 AM · Plan to restart pump tomorrow morning at 8 am with following settings: Basal rate 12a 1.4, 7a 1.5, CR 12, ISF 55, goal 120-140, active insulin time 4 hrs   · We provided pt with free insulin and free Pump supplies   · The patient was advised to check blood sugars 4 times a day before meals and at bedtime and bring pump for download in a week   · Discussed with patient A1c and blood sugar goals   · Optimal blood sugars: 100-140 pre-prandial, < 180 peak post-prandial  · The patient counseled about the complications of uncontrolled diabetes   · Patient was counselled about the importance of self-blood glucose monitoring and eating consistent carb diet to avoid blood sugar fluctuations   · Patient is due for routine diabetes maintenance and prevention. He has no insurance to complete eye exam or to see podiatry   · I will see him again in a wk     Hypoglycemia  · Pt received 24 units of lantus this morning, BS was 54 up on arrival to our office, pt received Juice and cracker and BS was 120 when he left clinic   · To restart insulin pump tomorrow morning   · Unfortunately he has no insurance to cover CGM     HLD  · Continue Lipitor 80 mg daily     Return in about 1 week (around 7/29/2020) for DM type 1 n insulin pump . The above issues were reviewed with the patient who understood and agreed with the plan. Thank you for allowing us to participate in the care of this patient. Please do not hesitate to contact us with any additional questions. Diagnosis Orders   1. Uncontrolled type 1 diabetes mellitus with hyperglycemia (HCC)  POCT Glucose    POCT Glucose   2. Insulin pump in place     3. Vitamin D deficiency     4. Type 1 diabetes mellitus with complication (HCC)     5.  Hyperlipidemia, unspecified hyperlipidemia type       Justyn Restrepo MD  Endocrinologist, [facility]   [address] [facility] [address]   Phone: [phone]  Fax: [phone]  --------------------------------------------  An electronic signature was used to authenticate this note.  Pool Chavez MD on 7/22/2020 at 5:43 PM

## 2020-07-22 NOTE — PROGRESS NOTES
Pharmacy Consultation Note  (Antibiotic Dosing and Monitoring)    Initial consult date: 7/21/20  Consulting physician: Dr. Tanisha Culver  Drug(s):  vancomycin  Indication: empiric for poss PNA     Vancomycin has been discontinued   [address] Physician to re-consult pharmacy if future dosing is needed    Thank you for the consult,      Valente Grove PharmD, BCPS 7/22/2020 12:31 PM   Phone: [phone]

## 2020-07-22 NOTE — DISCHARGE SUMMARY
[facility]  Discharge Summary    PCP: Hilda Arce MD    Admit Date:7/20/2020  Discharge Date: 7/22/2020    Admission Diagnosis:   1. Type 1 DM   2. DKA   3. KIRSTEN   4. Hyperkalemia  5. Polysubstance abuse  6. PMHx of lacunar stroke      Discharge Diagnosis:  1. Type 1 DM    2. Upper respiratory infection - tracheobronchitis   3. History of polysubstance abuse   4. History of lacunar stroke     Hospital Course:   Mr. Nikko Tafoya is a 54 y/o M with a PMHx of T1DM (on insulin pump at home), polsubstance abuse (EtOH, cocaine, marijuana), CAD, HTN, HFrEF (EF 55% as of 06/20/2020), who presented to the ED on 7/20/2020 complaining of intractable nausea and vomiting, beginning that day.      In the ED, the pt's BG was 728, AG of 46, CO2 of 2, beta- hydroxybutyrate >4.5. K+ 5.5, Cr of 2.1 (baseline 0.7), and leukocytosis to 26.1. Troponin elevated to 0.04. UA showed glucosuria >1,000, small bilirubin, >80 ketones, 30 protein, and moderate blood. Small bacteria without pyuria and negative leukocyte esterase and nitrites. EKG showing sinus tachycardia with ST depressions in leads V3-V4. The pt was placed on insulin gtt, made NPO, and transferred to the ICU for further medical management.      In the ICU, the pt was started on empiric Cefepime and Vancomycin for suspected pneumonia, given pt's complaint of productive cough and procalcitonin elevated to 5.17 as of 7/21/2020. CXR from ED showing no acute cardiopulmonary processes. As of today (7/21/2020), the pt's Anion Gap has closed x3, Cr decreased to 0.6, leukocytosis resolved, and blood cultures showing NGTD at 24hrs. K is wnl, and the pt is tolerating a PO diet. Pt was started on Hydralazine 25mg PO TID, with adequate BP control. Prior to discharge, Lisinopril was held d/t KIRSTEN on admission.  Pt will be discharged today and discussion on when to restart Lisinopril for nephroprotective benefit should be discussed with PCP as outpatient.      The pt will be discharged home in stable condition, with all necessary medication regimens and follow-up appointments communicated, with the pt indicating his understanding. Significant findings (history and exam, laboratory, radiological, pathology, other tests):   · General Appearance: alert, appears stated age, cooperative and no distress  · HEENT:  Head: Normocephalic, no lesions, without obvious abnormality. Eyes: sclera anicteric and non-injected, no discharge bilaterally          · Lung: course breath sounds bilaterally in all lung fields, no wheezing, no respiratory distress, no increased work of breathing   · Heart: regular rate and rhythm, S1, S2 normal, no murmur, click, rub or gallop  · Abdomen: abdomen soft, nontender, non-distended, bs +   · Extremities:  extremities normal, atraumatic, no cyanosis or edema  · Musculokeletal: No joint swelling, no muscle tenderness. ROM normal in all joints of extremities. · Neurologic: Mental status: no focal neurological deficits noted, no tremor or speech slurring   ·   Pending test results: MRSA screening    Consults:  1. Endocrine - Dr. Katarzyna Ibarra  2. Cardiology - Dr. Liliam Carlisle  3.  Critical Care - Dr. Aubrey Jolly     Procedures: N/A       Condition at discharge: Stable    Disposition: home    Discharge Medications:  Current Discharge Medication List      START taking these medications    Details   folic acid (FOLVITE) 1 MG tablet Take 1 tablet by mouth daily  Qty: 30 tablet, Refills: 3      vitamin B-1 100 MG tablet Take 1 tablet by mouth daily  Qty: 30 tablet, Refills: 3      cefdinir (OMNICEF) 300 MG capsule Take 1 capsule by mouth 2 times daily for 7 days  Qty: 14 capsule, Refills: 0         CONTINUE these medications which have CHANGED    Details   hydrALAZINE (APRESOLINE) 25 MG tablet Take 1 tablet by mouth every 8 hours  Qty: 90 tablet, Refills: 3         CONTINUE these medications which have NOT CHANGED    Details   insulin lispro (HUMALOG) 100 UNIT/ML injection vial Inject into the skin [employer] PUMP      albuterol sulfate  (90 Base) MCG/ACT inhaler Inhale 2 puffs into the lungs every 6 hours as needed for Wheezing or Shortness of Breath  Qty: 1 Inhaler, Refills: 0      atorvastatin (LIPITOR) 80 MG tablet Take 1 tablet by mouth nightly  Qty: 30 tablet, Refills: 3      aspirin 81 MG EC tablet Take 1 tablet by mouth daily  Qty: 30 tablet, Refills: 3      metoprolol tartrate (LOPRESSOR) 25 MG tablet Take 25 mg by mouth 2 times daily      Insulin Syringe-Needle U-100 28G X 1/2\" 1 ML MISC Use as directed  Qty: 100 each, Refills: 11      Lancets MISC 1 each by Does not apply route 2 times daily  Qty: 300 each, Refills: 3         STOP taking these medications       lisinopril (PRINIVIL;ZESTRIL) 20 MG tablet Comments:   Reason for Stopping:               Activity: activity as tolerated  Diet: diabetic diet    Follow-up appointments:   1. Dr. Ky Limon - Endocrinology     Patient Instructions:   1. Please follow up with Dr. Ky Limon at [facility] and Endocrinology at 2:40 pm on 7/21/2020.     2. Please call your Primary Care office to schedule a hospital follow-up appointment in 1 week. 3. Please speak with your Primary Care Physician, Dr. Mal Burnett, regarding when to restart your Lisinopril. 4. Please go to the lab and have your BMP drawn tomorrow 7/23/2020    Hospital Follow up: At [facility] with House Team on at . Call to confirm appointment Tel: [phone] ([address]  Internal Medicine Clinic)     Other Follow-Ups:    Future Appointments   Date Time Provider Clarisse Jacobson   7/22/2020  2:40 PM Brett Villasenor MD [facility]   7/24/2020 11:00 AM Barber Maradiaga MD [facility]     Other than any new prescriptions given to you today, the list of home going meds on this After Visit Summary are based on information provided to us from you.  This information, including the list, dose, and frequency of medications is only as accurate as the information you provided. If you have any questions or concerns about your home medications, please contact your Primary Care Physician for further clarification.     Alex Host, DO  PGY 1   11:53 AM 7/22/2020

## 2020-07-22 NOTE — PROGRESS NOTES
[facility] Quality Flow/Interdisciplinary Rounds Progress Note        Quality Flow Rounds held on July 22, 2020    Disciplines Attending:  Bedside nurse, charge nurse, , PT/OT, pharmacy, nursing unit leadership, , Medical residents    [place] was admitted on 7/20/2020 11:48 AM    Anticipated Discharge Date:  Expected Discharge Date: 07/23/20    Disposition:    Roberto Score:  Roberto Scale Score: 20    Readmission Risk              Risk of Unplanned Readmission:        35           Discussed patient goal for the day, patient clinical progression, and barriers to discharge.   The following Goal(s) of the Day/Commitment(s) have been identified:  Discharge home       Candy Covarrubias  July 22, 2020

## 2020-07-22 NOTE — PROGRESS NOTES
[address]   Internal Medicine Residency / [address]    Attending Physician Statement  I have discussed the case, including pertinent history and exam findings with the resident and the team.  I have seen and examined the patient and the key elements of the encounter have been performed by me. I agree with the assessment, plan and orders as documented by the resident. DKA resolved   VS stable  No evidence for infection  And antibiotics may be D/C'd  Endocrinology has seen  And he will be sent home without pump with frequent short acting insulin injections  And followed up in Endo office the day of discharge  Will follow in office after discharge     Remainder of medical problems as per resident note.       Lianna Montalvo  Internal Medicine Residency Faculty

## 2020-07-22 NOTE — FLOWSHEET NOTE
D/c instructions given to patient, patient verbalized understanding. Patient aware scripts will be  available in [id number] [address] [place] later today, stated will  tomorrow. Copy of instructions provided.      Tessa Emery RN  07/22/20 12:56 PM

## 2020-07-22 NOTE — PROGRESS NOTES
[facility]  Internal Medicine Residency Program  Progress Note  - House Team 1    Patient:  Anjana Eckert 53 y.o. male MRN: [id number]     Date of Service: 7/22/2020     CC: n/v prior to presentation  Overnight events: K 3.1 replaced,     Subjective       Pt wants to go home and said that he is uninsured. Talked with patient about a social work consult to get him registered for Medicaid prior to discharge. Pt said that he had an episode on 6/20 where he hurt his knee and fell and hit his head loosing consciousness. Pt states that he had a prior MI in January 2020. Pt complained of photophobia upon admission but that has since resolved. Pt describes slurred speech prior to coming to the ED. Pt does not currently have pain. Pt states that he had 4 bottles of water w/ 4 packets of sweetener prior to admission, which he and his mother believe may have contributed to his hyperglycemia. He also states that the nurse noticed the infusion site for his pump was not properly placed. Objective     Physical Exam:  · Vitals: /79   Pulse 73   Temp 98.7 °F (37.1 °C) (Oral)   Resp 20   Ht 5' 6\" (1.676 m)   Wt 147 lb 11.3 oz (67 kg)   SpO2 93%   BMI 23.84 kg/m²     · General Appearance: Appears pt stated age. Sitting up in bed. · HEENT:   · Neck: {neck exam:56218::\"no adenopathy\",\"no carotid bruit\",\"no JVD\",\"supple, symmetrical, trachea midline\",\"thyroid not enlarged, symmetric, no tenderness/mass/nodules\"}  · Lung: {lung exam:70082::\"clear to auscultation bilaterally\"}  · Heart: {heart exam:5510::\"regular rate and rhythm, S1, S2 normal, no murmur, click, rub or gallop\"}  · Abdomen: {abdominal exam:00552::\"soft, non-tender; bowel sounds normal; no masses,  no organomegaly\"}  · Extremities:  {extremity exam:5109::\"extremities normal, atraumatic, no cyanosis or edema\"}  · Musculokeletal: No joint swelling, no muscle tenderness. ROM normal in all joints of extremities.    · Neurologic: Alert and oriented. CN III, IV and VI intact. Remaining CN not assessed.       Student's Assessment and Plan     Félix Stern is a 53 y.o. male    1.***   -  2.***   -  3.***   -  4.***   -  5.***   -    PT/OT evaluation:  DVT prophylaxis/ GI prophylaxis:   Disposition: home +/- home health / [facility] / Vivi Delacruz / [address]  MS3  Attending physician: Dr. Cecile Samaniego

## 2020-07-26 LAB
BLOOD CULTURE, ROUTINE: NORMAL
CULTURE, BLOOD 2: NORMAL

## 2020-07-29 ENCOUNTER — VIRTUAL VISIT (OUTPATIENT)
Dept: ENDOCRINOLOGY | Age: 50
End: 2020-07-29

## 2020-07-29 PROCEDURE — 99213 OFFICE O/P EST LOW 20 MIN: CPT | Performed by: INTERNAL MEDICINE

## 2020-07-29 NOTE — PROGRESS NOTES
[address] [facility] Department of Endocrinology Diabetes and Metabolism   [address] [facility] [address]   Phone: [phone]  Fax: [phone]    Date of Service: 7/29/20    Primary Care Physician: Tessa Daugherty MD  Provider: Isabel Prabhakar MD     Reason for the visit:  DM type 1 on insulin Pump     History of Present Illness: The history is provided by the patient. No  was used. Accuracy of the patient data is excellent. Adalberto Madison is a very pleasant 53 y.o. male seen today for follow up visit      Adalberto Madison was diagnosed with diabetes at age 30 and currently Lantus 25 units daily, Humalog 1u:10g + ss 1:50> 150   Pt was on 530g Medtronic insulin in the past with the following --> Basal rate 12a 1.4, 7a 1.5, CR 14 , ISF 55 , goal 12a 110-140, active insulin time 4 hrs   The patient has been checks BS 3-5 times a day and reported better readings since recent hospital discharge. Most readings ranging b/w 100-220   Most recent A1c results summarized below  Lab Results   Component Value Date    LABA1C 10.0 07/20/2020    LABA1C 10.7 12/08/2019    LABA1C 9.1 07/26/2019     Patient still experiencing hypoglycemic episodes but less often   The patient has been mindful of what has been eating and tries to follow low carb diet   I reviewed current medications and the patient has no issues with diabetes medications  Adalberto Madison is over due for an eye exam. Last eye exam was few years ago.  The patient performs his own feet care and doesn't see podiatry   His diabetes is complicated with neuropathy and retinopathy s/p laser therapy   Macrovascular complications: no CAD, PVD, + TIA 2/2019     PAST MEDICAL HISTORY   Past Medical History:   Diagnosis Date    Alcoholism ([address]     Cocaine abuse ([address]     Diabetes mellitus ([address]     Hypertension     Idiopathic peripheral neuropathy 9/21/2016    [facility]ar [facility]     Marijuana abuse      PAST SURGICAL HISTORY No past surgical history on file. SOCIAL HISTORY   Tobacco:   reports that he has been smoking cigarettes. He has a 30.00 pack-year smoking history. He has never used smokeless tobacco.  Alcol:   reports current alcohol use of about 5.0 standard drinks of alcohol per week. Illicit Drugs:   reports current drug use. Drug: Marijuana. FAMILY HISTORY   Family History   Problem Relation Age of Onset    Diabetes type 2  Mother     Cancer Maternal Grandmother     Diabetes type 2  Nephew      ALLERGIES AND DRUG REACTIONS   Allergies   Allergen Reactions    Metoprolol      bradycardia    No Known Allergies        CURRENT MEDICATIONS   Current Outpatient Medications   Medication Sig Dispense Refill    folic acid (FOLVITE) 1 MG tablet Take 1 tablet by mouth daily 30 tablet 3    vitamin B-1 100 MG tablet Take 1 tablet by mouth daily 30 tablet 3    hydrALAZINE (APRESOLINE) 25 MG tablet Take 1 tablet by mouth every 8 hours 90 tablet 3    vitamin D3 400 UNIT TABS tablet Take 2.5 tablets by mouth daily 30 tablet 0    metoprolol tartrate (LOPRESSOR) 25 MG tablet Take 25 mg by mouth 2 times daily      insulin lispro (HUMALOG) 100 UNIT/ML injection vial Inject into the skin [email or web address] PUMP      albuterol sulfate  (90 Base) MCG/ACT inhaler Inhale 2 puffs into the lungs every 6 hours as needed for Wheezing or Shortness of Breath 1 Inhaler 0    Insulin Syringe-Needle U-100 28G X 1/2\" 1 ML MISC Use as directed 100 each 11    Lancets MISC 1 each by Does not apply route 2 times daily 300 each 3    atorvastatin (LIPITOR) 80 MG tablet Take 1 tablet by mouth nightly 30 tablet 3    aspirin 81 MG EC tablet Take 1 tablet by mouth daily 30 tablet 3     No current facility-administered medications for this visit. Review of Systems  Constitutional: No fever, no chills, no diaphoresis, no generalized weakness.   HEENT: No blurred vision, No sore throat, no ear pain, no hair loss  Neck: denied any neck swelling, difficulty swallowing,   Cardio-pulmonary: No CP, SOB or palpitation, No orthopnea or PND. No cough or wheezing. GI: No N/V/D, no constipation, No abdominal pain, no melena or hematochezia   : Denied any dysuria, hematuria, flank pain, discharge, or incontinence. Skin: denied any rash, ulcer, Hirsute, or hyperpigmentation. MSK: denied any joint deformity, joint pain/swelling, muscle pain, or back pain. Neuro: no numbness, no tingling, no weakness, _    OBJECTIVE    There were no vitals taken for this visit. BP Readings from Last 4 Encounters:   07/22/20 138/84   07/22/20 122/80   06/22/20 (!) 145/90   01/16/20 118/78     Wt Readings from Last 6 Encounters:   07/22/20 152 lb (68.9 kg)   07/22/20 147 lb 11.3 oz (67 kg)   06/20/20 142 lb 9.6 oz (64.7 kg)   01/16/20 157 lb 3.2 oz (71.3 kg)   01/11/20 144 lb 14.4 oz (65.7 kg)   12/12/19 137 lb 3.2 oz (62.2 kg)     Physical examination:  Due to this being a TeleHealth encounter, evaluation of the following organ systems is limited: EENT/Resp/CV/GI//MS/Neuro/Skin/Heme-Lymph-Imm. General: awake alert, oriented x3, no abnormal position or movements.   Pulm: move with respiration   Skin: no rash  Psych: normal mood, and affect    Review of Laboratory Data:  I personally reviewed the following lab:  Lab Results   Component Value Date/Time    WBC 11.4 07/22/2020 05:15 AM    RBC 3.58 (L) 07/22/2020 05:15 AM    HGB 10.9 (L) 07/22/2020 05:15 AM    HCT 32.0 (L) 07/22/2020 05:15 AM    MCV 89.4 07/22/2020 05:15 AM    MCH 30.4 07/22/2020 05:15 AM    MCHC 34.1 07/22/2020 05:15 AM    RDW 13.5 07/22/2020 05:15 AM     07/22/2020 05:15 AM    MPV 8.9 07/22/2020 05:15 AM    BANDS 1 04/10/2012 04:30 AM      Lab Results   Component Value Date/Time     07/22/2020 05:15 AM    K 3.1 (L) 07/22/2020 05:15 AM    K 5.5 (H) 07/20/2020 12:12 PM    CO2 26 07/22/2020 05:15 AM    BUN 8 07/22/2020 05:15 AM    CREATININE 0.6 (L) 07/22/2020 05:15 AM    CALCIUM 8.3 (L) 07/22/2020 05:15 AM    LABGLOM >60 07/22/2020 05:15 AM    GFRAA >60 07/22/2020 05:15 AM      Lab Results   Component Value Date/Time    TSH 3.330 01/04/2020 05:07 PM    T4FREE 0.97 07/19/2012 02:00 AM     Lab Results   Component Value Date    LABA1C 10.0 07/20/2020    GLUCOSE 121 07/22/2020    GLUCOSE 83 04/11/2012    MALBCR - 06/10/2018    LABMICR <12.0 06/10/2018    LABCREA 35 12/08/2019     Lab Results   Component Value Date    LABA1C 10.0 07/20/2020    LABA1C 10.7 12/08/2019    LABA1C 9.1 07/26/2019     Lab Results   Component Value Date    CHOL 144 11/11/2018    CHOL 113 02/18/2011    CHOL 162 10/11/2010    TRIG 73 11/11/2018    TRIG 94 02/18/2011    HDL 65 11/11/2018    HDL 46.0 02/18/2011     Lab Results   Component Value Date    VITD25 25 07/22/2020    VITD25 23 08/02/2019     Medical Records/Labs/Images review:   I personally reviewed and summarized previous records   All labs were independently reviewed     [address] [facility] 53 y.[facility] male seen in for the following issues     Diabetes Mellitus Type 1     · Patient's diabetes is poorly controlled with both micro and macrovascular complications  · Current pump settings: Basal rate 12a 1.4, 7a 1.5, CR 14 , ISF 55 , goal 12a 110-140, active insulin time 4 hrs   · Pt will bring his pump for download in few days to help adjusting his settings   · The patient was advised to check blood sugars 4 times a day before meals and at bedtime and bring pump for download in a week   · Discussed with patient A1c and blood sugar goals     Hypoglycemia  · Restarted insulin pump  · Unfortunately he has no insurance to cover CGM     HLD  · Continue Lipitor 80 mg daily     Return in about 3 months (around 10/29/2020) for DM type 1 on insulin pump . The above issues were reviewed with the patient who understood and agreed with the plan. Thank you for allowing us to participate in the care of this patient.  Please do not hesitate to contact us with any additional questions. Diagnosis Orders   1. Uncontrolled type 1 diabetes mellitus with hyperglycemia ([facility]     2. Vitamin D deficiency     3. Hyperlipidemia, unspecified hyperlipidemia type       Carter Dc MD  Endocrinologist, [facility] and Endocrinology   [address] [place] [address]   Phone: [phone]  Fax: [phone]  -------------------------  An electronic signature was used to authenticate this note. Dane Beard MD on 7/29/2020 at 4:08 PM  Services were provided through a video synchronous discussion virtually to substitute for in-person clinic visit. Pursuant to the emergency declaration under the [facility] waiver authority and the [employer] Act, this Virtual  Visit was conducted, with patient's consent, to reduce the patient's risk of exposure to COVID-19 and provide continuity of care.

## 2020-07-29 NOTE — PROGRESS NOTES
Félix Stern was read the following message We want to confirm that, for purposes of billing, this is a virtual visit with your provider for which we will submit a claim for reimbursement with your insurance company. You will be responsible for any copays, coinsurance amounts or other amounts not covered by your insurance company. If you do not accept this, unfortunately we will not be able to schedule a virtual visit with the provider. Do you accept?  Fili Manzano

## 2020-10-29 ENCOUNTER — OFFICE VISIT (OUTPATIENT)
Dept: ENDOCRINOLOGY | Age: 50
End: 2020-10-29

## 2020-10-29 VITALS
BODY MASS INDEX: 23.47 KG/M2 | DIASTOLIC BLOOD PRESSURE: 100 MMHG | SYSTOLIC BLOOD PRESSURE: 150 MMHG | HEART RATE: 106 BPM | TEMPERATURE: 96.3 F | WEIGHT: 145.4 LBS

## 2020-10-29 LAB — HBA1C MFR BLD: 10.1 %

## 2020-10-29 PROCEDURE — 99213 OFFICE O/P EST LOW 20 MIN: CPT | Performed by: INTERNAL MEDICINE

## 2020-10-29 PROCEDURE — 83036 HEMOGLOBIN GLYCOSYLATED A1C: CPT | Performed by: INTERNAL MEDICINE

## 2020-10-29 NOTE — PROGRESS NOTES
[address] [place] [address] Department of Endocrinology Diabetes and Metabolism   [address] [facility] [address]   Phone: [phone]  Fax: [phone]    Date of Service: 10/29/20    Primary Care Physician: Judy Lyons MD  Provider: Leopoldo Reich MD     Reason for the visit:  DM type 1 on insulin Pump     History of Present Illness: The history is provided by the patient. No  was used. Accuracy of the patient data is excellent. Moi Downs is a very pleasant 53 y.o. male seen today for follow up visit      Moi Downs was diagnosed with diabetes at age 30 and his insulin pump has been restarted during his last visit. Current pump settings: Basal rate 12a 1.4, 8a 1.5, CR 10 , ISF 60 , goal 12a 110-130, active insulin time 3 hrs. The patient has been checks BS few times a day. BS still highly variable. He wasn't entering carb in the pump      Most recent A1c results summarized below  Lab Results   Component Value Date    LABA1C 10.1 10/29/2020    LABA1C 10.0 07/20/2020    LABA1C 10.7 12/08/2019     Patient still experiencing hypoglycemic episodes   The patient has been mindful of what has been eating and tries to follow low carb diet   I reviewed current medications and the patient has no issues with diabetes medications    Moi Downs is over due for an eye exam. Last eye exam was few years ago. The patient performs his own feet care and doesn't see podiatry   His diabetes is complicated with neuropathy and retinopathy s/p laser therapy   Macrovascular complications: no CAD, PVD, + TIA 2/2019     PAST MEDICAL HISTORY   Past Medical History:   Diagnosis Date    Alcoholism ([address]     [facility]     Diabetes mellitus ([facility]     Hypertension     Idiopathic peripheral neuropathy 9/21/2016    Lacunar stroke ([facility]     Marijuana abuse      PAST SURGICAL HISTORY   No past surgical history on file.   SOCIAL HISTORY   Tobacco:   reports that he has been smoking cigarettes. He has a 30.00 pack-year smoking history. He has never used smokeless tobacco.  Alcol:   reports current alcohol use of about 5.0 standard drinks of alcohol per week. Illicit Drugs:   reports current drug use. Drug: Marijuana. FAMILY HISTORY   Family History   Problem Relation Age of Onset    Diabetes type 2  Mother     Cancer Maternal Grandmother     Diabetes type 2  Nephew      ALLERGIES AND DRUG REACTIONS   Allergies   Allergen Reactions    Metoprolol      bradycardia    No Known Allergies        CURRENT MEDICATIONS   Current Outpatient Medications   Medication Sig Dispense Refill    folic acid (FOLVITE) 1 MG tablet Take 1 tablet by mouth daily 30 tablet 3    vitamin B-1 100 MG tablet Take 1 tablet by mouth daily 30 tablet 3    hydrALAZINE (APRESOLINE) 25 MG tablet Take 1 tablet by mouth every 8 hours 90 tablet 3    vitamin D3 400 UNIT TABS tablet Take 2.5 tablets by mouth daily 30 tablet 0    metoprolol tartrate (LOPRESSOR) 25 MG tablet Take 25 mg by mouth 2 times daily      insulin lispro (HUMALOG) 100 UNIT/ML injection vial Inject into the skin [employer] PUMP      albuterol sulfate  (90 Base) MCG/ACT inhaler Inhale 2 puffs into the lungs every 6 hours as needed for Wheezing or Shortness of Breath 1 Inhaler 0    Insulin Syringe-Needle U-100 28G X 1/2\" 1 ML MISC Use as directed 100 each 11    Lancets MISC 1 each by Does not apply route 2 times daily 300 each 3    atorvastatin (LIPITOR) 80 MG tablet Take 1 tablet by mouth nightly 30 tablet 3    aspirin 81 MG EC tablet Take 1 tablet by mouth daily 30 tablet 3     No current facility-administered medications for this visit. Review of Systems  Constitutional: No fever, no chills, no diaphoresis, no generalized weakness.   HEENT: No blurred vision, No sore throat, no ear pain, no hair loss  Neck: denied any neck swelling, difficulty swallowing,   Cardio-pulmonary: No CP, SOB or palpitation, No orthopnea or PND. No cough or wheezing. GI: No N/V/D, no constipation, No abdominal pain, no melena or hematochezia   : Denied any dysuria, hematuria, flank pain, discharge, or incontinence. Skin: denied any rash, ulcer, Hirsute, or hyperpigmentation. MSK: denied any joint deformity, joint pain/swelling, muscle pain, or back pain. Neuro: no numbness, no tingling, no weakness, _    OBJECTIVE    BP (!) 150/100 (Site: Left Upper Arm, Position: Sitting, Cuff Size: Medium Adult)   Pulse 106   Temp 96.3 °F (35.7 °C) (Temporal)   Wt 145 lb 6.4 oz (66 kg)   BMI 23.47 kg/m²   BP Readings from Last 4 Encounters:   10/29/20 (!) 150/100   07/22/20 138/84   07/22/20 122/80   06/22/20 (!) 145/90     Wt Readings from Last 6 Encounters:   10/29/20 145 lb 6.4 oz (66 kg)   07/22/20 152 lb (68.9 kg)   07/22/20 147 lb 11.3 oz (67 kg)   06/20/20 142 lb 9.6 oz (64.7 kg)   01/16/20 157 lb 3.2 oz (71.3 kg)   01/11/20 144 lb 14.4 oz (65.7 kg)       Physical examination:  General: awake alert, oriented x3, no abnormal position or movements. HEENT: normocephalic non-traumatic, no exophthalmos   Neck: supple, no LN enlargement, no thyromegaly, no thyroid tenderness, no JVD. Pulm: Clear equal air entry no added sounds, no wheezing or rhonchi    CVS: S1 + S2, no murmur, no heave. Dorsalis pedis pulse palpable   Abd: soft lax, no tenderness, no organomegaly, audible bowel sounds. Skin: warm, no lesions, no rash.  No callus, no Ulcers, No acanthosis nigricans  Musculoskeletal: No back tenderness, no kyphosis/scoliosis    Neuro: CN intact , muscle power normal  Psych: normal mood, and affect    Review of Laboratory Data:  I personally reviewed the following lab:  Lab Results   Component Value Date/Time    WBC 11.4 07/22/2020 05:15 AM    RBC 3.58 (L) 07/22/2020 05:15 AM    HGB 10.9 (L) 07/22/2020 05:15 AM    HCT 32.0 (L) 07/22/2020 05:15 AM    MCV 89.4 07/22/2020 05:15 AM    MCH 30.4 07/22/2020 05:15 AM    MCHC 34.1 07/22/2020 05:15 AM    RDW 13.5 07/22/2020 05:15 AM     07/22/2020 05:15 AM    MPV 8.9 07/22/2020 05:15 AM    BANDS 1 04/10/2012 04:30 AM      Lab Results   Component Value Date/Time     07/22/2020 05:15 AM    K 3.1 (L) 07/22/2020 05:15 AM    K 5.5 (H) 07/20/2020 12:12 PM    CO2 26 07/22/2020 05:15 AM    BUN 8 07/22/2020 05:15 AM    CREATININE 0.6 (L) 07/22/2020 05:15 AM    CALCIUM 8.3 (L) 07/22/2020 05:15 AM    LABGLOM >60 07/22/2020 05:15 AM    GFRAA >60 07/22/2020 05:15 AM      Lab Results   Component Value Date/Time    TSH 3.330 01/04/2020 05:07 PM    T4FREE 0.97 07/19/2012 02:00 AM     Lab Results   Component Value Date    LABA1C 10.1 10/29/2020    GLUCOSE 121 07/22/2020    GLUCOSE 83 04/11/2012    MALBCR - 06/10/2018    LABMICR <12.0 06/10/2018    LABCREA 35 12/08/2019     Lab Results   Component Value Date    LABA1C 10.1 10/29/2020    LABA1C 10.0 07/20/2020    LABA1C 10.7 12/08/2019     Lab Results   Component Value Date    CHOL 144 11/11/2018    CHOL 113 02/18/2011    CHOL 162 10/11/2010    TRIG 73 11/11/2018    TRIG 94 02/18/2011    HDL 65 11/11/2018    HDL 46.0 02/18/2011     Lab Results   Component Value Date    VITD25 25 07/22/2020    VITD25 23 08/02/2019     Medical Records/Labs/Images review:   I personally reviewed and summarized previous records   All labs were independently reviewed     [facility] 53 [facility] seen in for the following issues     Diabetes Mellitus Type 1     · Patient's diabetes is poorly controlled with both micro and macrovascular complications. · Pt was advised to eneter BG and Carb in his pump all the time   · Current pump settings: Basal rate 12a 1.35, 8a 1.45, CR 10 , ISF 55 , goal 12a 110-130, active insulin time 3 hrs.   · The patient was advised to check blood sugars 4 times a day before meals and at bedtime and bring pump for download in a week   · Discussed with patient A1c and blood sugar goals      Hypoglycemia  · Restarted insulin pump  · Unfortunately he has no insurance to cover CGM      HLD  · Continue Lipitor 80 mg daily     Return in about 3 months (around 1/29/2021) for DM type 1 on insulin pump . The above issues were reviewed with the patient who understood and agreed with the plan. Thank you for allowing us to participate in the care of this patient. Please do not hesitate to contact us with any additional questions. Diagnosis Orders   1. Uncontrolled type 1 diabetes mellitus with hyperglycemia (HCC)  POCT glycosylated hemoglobin (Hb A1C)    POCT glycosylated hemoglobin (Hb A1C)   2. Vitamin D deficiency     3. Insulin pump in place     4. Type 1 diabetes mellitus with complication (HCC)     5. Hyperlipidemia, unspecified hyperlipidemia type       Manuel Morrell MD  Endocrinologist, [facility] Diabetes Care and Endocrinology   [address] [place] [id number]   Phone: [phone]  Fax: [phone]  --------------------------------------------  An electronic signature was used to authenticate this note.  Kenn Sandhu MD on 10/29/2020 at 9:29 AM

## 2021-02-15 ENCOUNTER — OFFICE VISIT (OUTPATIENT)
Dept: ENDOCRINOLOGY | Age: 51
End: 2021-02-15

## 2021-02-15 VITALS
HEART RATE: 97 BPM | WEIGHT: 153 LBS | DIASTOLIC BLOOD PRESSURE: 74 MMHG | BODY MASS INDEX: 24.59 KG/M2 | TEMPERATURE: 97.7 F | OXYGEN SATURATION: 98 % | SYSTOLIC BLOOD PRESSURE: 128 MMHG | HEIGHT: 66 IN

## 2021-02-15 DIAGNOSIS — E78.5 HYPERLIPIDEMIA, UNSPECIFIED HYPERLIPIDEMIA TYPE: ICD-10-CM

## 2021-02-15 DIAGNOSIS — E10.65 UNCONTROLLED TYPE 1 DIABETES MELLITUS WITH HYPERGLYCEMIA (HCC): Primary | ICD-10-CM

## 2021-02-15 DIAGNOSIS — E55.9 VITAMIN D DEFICIENCY: ICD-10-CM

## 2021-02-15 LAB — HBA1C MFR BLD: 10.2 %

## 2021-02-15 PROCEDURE — 99214 OFFICE O/P EST MOD 30 MIN: CPT | Performed by: INTERNAL MEDICINE

## 2021-02-15 PROCEDURE — 83036 HEMOGLOBIN GLYCOSYLATED A1C: CPT | Performed by: INTERNAL MEDICINE

## 2021-02-15 NOTE — PROGRESS NOTES
[address] [place] [address] Department of Endocrinology Diabetes and Metabolism   [address] [facility] [address]   Phone: [phone]  Fax: [phone]    Date of Service: 2/15/21    Primary Care Physician: Terri Menard MD  Provider: Annmarie Carrington MD     Reason for the visit:  DM type 1 on insulin Pump     History of Present Illness: The history is provided by the patient. No  was used. Accuracy of the patient data is excellent. Luis Kohler is a very pleasant 53 y.o. male seen today for follow up visit      Luis Kohler was diagnosed with diabetes at age 30 and his insulin pump has been restarted during his last visit. Current pump settings: Basal rate 12a 1.4, 8a 1.5, CR 10 , ISF 60 , goal 12a 110-130, active insulin time 3 hrs. The patient has been checks BS few times a day. BS still highly variable. He wasn't entering carb in the pump      Most recent A1c results summarized below  Lab Results   Component Value Date    LABA1C 10.2 02/15/2021    LABA1C 10.1 10/29/2020    LABA1C 10.0 07/20/2020     Patient still experiencing hypoglycemic episodes   The patient has been mindful of what has been eating and tries to follow low carb diet   I reviewed current medications and the patient has no issues with diabetes medications    Luis Kohler is over due for an eye exam. Last eye exam was few years ago. The patient performs his own feet care and doesn't see podiatry   His diabetes is complicated with neuropathy and retinopathy s/p laser therapy   Macrovascular complications: no CAD, PVD, + TIA 2/2019     PAST MEDICAL HISTORY   Past Medical History:   Diagnosis Date    Alcoholism ([address]     Cocaine abuse ([address]     Diabetes mellitus ([address]     Hypertension     Idiopathic peripheral neuropathy 9/21/2016    [facility]     Marijuana abuse      PAST SURGICAL HISTORY   No past surgical history on file.   SOCIAL HISTORY   Tobacco:   reports that he has been smoking cigarettes. He has a 30.00 pack-year smoking history. He has never used smokeless tobacco.  Alcol:   reports current alcohol use of about 5.0 standard drinks of alcohol per week. Illicit Drugs:   reports current drug use. Drug: Marijuana. FAMILY HISTORY   Family History   Problem Relation Age of Onset    Diabetes type 2  Mother     Cancer Maternal Grandmother     Diabetes type 2  Nephew      ALLERGIES AND DRUG REACTIONS   Allergies   Allergen Reactions    Metoprolol      bradycardia    No Known Allergies        CURRENT MEDICATIONS   Current Outpatient Medications   Medication Sig Dispense Refill    folic acid (FOLVITE) 1 MG tablet Take 1 tablet by mouth daily 30 tablet 3    vitamin B-1 100 MG tablet Take 1 tablet by mouth daily 30 tablet 3    vitamin D3 400 UNIT TABS tablet Take 2.5 tablets by mouth daily 30 tablet 0    insulin lispro (HUMALOG) 100 UNIT/ML injection vial Inject into the skin [employer] PUMP      Insulin Syringe-Needle U-100 28G X 1/2\" 1 ML MISC Use as directed 100 each 11    Lancets MISC 1 each by Does not apply route 2 times daily 300 each 3    aspirin 81 MG EC tablet Take 1 tablet by mouth daily 30 tablet 3    hydrALAZINE (APRESOLINE) 25 MG tablet Take 1 tablet by mouth every 8 hours (Patient not taking: Reported on 2/15/2021) 90 tablet 3    metoprolol tartrate (LOPRESSOR) 25 MG tablet Take 25 mg by mouth 2 times daily      albuterol sulfate  (90 Base) MCG/ACT inhaler Inhale 2 puffs into the lungs every 6 hours as needed for Wheezing or Shortness of Breath (Patient not taking: Reported on 2/15/2021) 1 Inhaler 0    atorvastatin (LIPITOR) 80 MG tablet Take 1 tablet by mouth nightly (Patient not taking: Reported on 2/15/2021) 30 tablet 3     No current facility-administered medications for this visit. Review of Systems  Constitutional: No fever, no chills, no diaphoresis, no generalized weakness.   HEENT: No blurred vision, No sore throat, no ear pain, no hair loss  Neck: denied any neck swelling, difficulty swallowing,   Cardio-pulmonary: No CP, SOB or palpitation, No orthopnea or PND. No cough or wheezing. GI: No N/V/D, no constipation, No abdominal pain, no melena or hematochezia   : Denied any dysuria, hematuria, flank pain, discharge, or incontinence. Skin: denied any rash, ulcer, Hirsute, or hyperpigmentation. MSK: denied any joint deformity, joint pain/swelling, muscle pain, or back pain. Neuro: no numbness, no tingling, no weakness, _    OBJECTIVE    /74   Pulse 97   Temp 97.7 °F (36.5 °C) (Temporal)   Ht 5' 6\" (1.676 m)   Wt 153 lb (69.4 kg)   SpO2 98%   BMI 24.69 kg/m²   BP Readings from Last 4 Encounters:   02/15/21 128/74   10/29/20 (!) 150/100   07/22/20 138/84   07/22/20 122/80     Wt Readings from Last 6 Encounters:   02/15/21 153 lb (69.4 kg)   10/29/20 145 lb 6.4 oz (66 kg)   07/22/20 152 lb (68.9 kg)   07/22/20 147 lb 11.3 oz (67 kg)   06/20/20 142 lb 9.6 oz (64.7 kg)   01/16/20 157 lb 3.2 oz (71.3 kg)       Physical examination:  General: awake alert, oriented x3, no abnormal position or movements. HEENT: normocephalic non-traumatic, no exophthalmos   Neck: supple, no LN enlargement, no thyromegaly, no thyroid tenderness, no JVD. Pulm: Clear equal air entry no added sounds, no wheezing or rhonchi    CVS: S1 + S2, no murmur, no heave. Dorsalis pedis pulse palpable   Abd: soft lax, no tenderness, no organomegaly, audible bowel sounds. Skin: warm, no lesions, no rash.  No callus, no Ulcers, No acanthosis nigricans  Musculoskeletal: No back tenderness, no kyphosis/scoliosis    Neuro: CN intact , muscle power normal  Psych: normal mood, and affect    Review of Laboratory Data:  I personally reviewed the following lab:  Lab Results   Component Value Date/Time    WBC 11.4 07/22/2020 05:15 AM    RBC 3.58 (L) 07/22/2020 05:15 AM    HGB 10.9 (L) 07/22/2020 05:15 AM    HCT 32.0 (L) 07/22/2020 05:15 AM    MCV 89.4 07/22/2020 05:15 AM MCH 30.4 07/22/2020 05:15 AM    MCHC 34.1 07/22/2020 05:15 AM    RDW 13.5 07/22/2020 05:15 AM     07/22/2020 05:15 AM    MPV 8.9 07/22/2020 05:15 AM    BANDS 1 04/10/2012 04:30 AM      Lab Results   Component Value Date/Time     07/22/2020 05:15 AM    K 3.1 (L) 07/22/2020 05:15 AM    K 5.5 (H) 07/20/2020 12:12 PM    CO2 26 07/22/2020 05:15 AM    BUN 8 07/22/2020 05:15 AM    CREATININE 0.6 (L) 07/22/2020 05:15 AM    CALCIUM 8.3 (L) 07/22/2020 05:15 AM    LABGLOM >60 07/22/2020 05:15 AM    GFRAA >60 07/22/2020 05:15 AM      Lab Results   Component Value Date/Time    TSH 3.330 01/04/2020 05:07 PM    T4FREE 0.97 07/19/2012 02:00 AM     Lab Results   Component Value Date    LABA1C 10.2 02/15/2021    GLUCOSE 121 07/22/2020    GLUCOSE 83 04/11/2012    MALBCR - 06/10/2018    LABMICR <12.0 06/10/2018    LABCREA 35 12/08/2019     Lab Results   Component Value Date    LABA1C 10.2 02/15/2021    LABA1C 10.1 10/29/2020    LABA1C 10.0 07/20/2020     Lab Results   Component Value Date    CHOL 144 11/11/2018    CHOL 113 02/18/2011    CHOL 162 10/11/2010    TRIG 73 11/11/2018    TRIG 94 02/18/2011    HDL 65 11/11/2018    HDL 46.0 02/18/2011     Lab Results   Component Value Date    VITD25 25 07/22/2020    VITD25 23 08/02/2019     ASSESSMENT & RECOMMENDATIONS   Joseph Calixto, a 53 y.o.-old male seen in for the following issues     Diabetes Mellitus Type 1     · Patient's diabetes is poorly controlled with both micro and macrovascular complications. · Current pump settings: Basal rate 12a 1.35, 8a 1.45, CR 10 , ISF 55 , goal 12a 110-130, active insulin time 3 hrs.   · To meet with our pump  this Thursday   · The patient was advised to check blood sugars 4 times a day before meals and at bedtime and bring pump in few days   · Discussed with patient A1c and blood sugar goals      Hypoglycemia  · Unfortunately he has no insurance to cover CGM      HLD  · Continue Lipitor 80 mg daily     I personally reviewed external notes from PCP and other patient's care team providers, and personally interpreted labs associated with the above diagnosis. I also ordered labs to further assess and manage the above addressed medical conditions    Return in about 3 months (around 5/15/2021) for DM type 1 on pump . The above issues were reviewed with the patient who understood and agreed with the plan. Thank you for allowing us to participate in the care of this patient. Please do not hesitate to contact us with any additional questions. Diagnosis Orders   1. Uncontrolled type 1 diabetes mellitus with hyperglycemia (HCC)  POCT glycosylated hemoglobin (Hb A1C)    Hemoglobin X1G    Basic Metabolic Panel   2. Vitamin D deficiency     3. Hyperlipidemia, unspecified hyperlipidemia type       Trevor Case MD  Endocrinologist, [facility] Diabetes Care and Endocrinology   [address] [place] [address]   Phone: [phone]  Fax: [phone]  --------------------------------------------  An electronic signature was used to authenticate this note.  Linh Morel MD on 2/15/2021 at 2:57 PM

## 2021-02-20 ENCOUNTER — IMMUNIZATION (OUTPATIENT)
Dept: PRIMARY CARE CLINIC | Age: 51
End: 2021-02-20

## 2021-02-20 PROCEDURE — 0001A COVID-19, PFIZER VACCINE 30MCG/0.3ML DOSE: CPT | Performed by: NURSE PRACTITIONER

## 2021-02-20 PROCEDURE — 91300 COVID-19, PFIZER VACCINE 30MCG/0.3ML DOSE: CPT | Performed by: NURSE PRACTITIONER

## 2021-03-15 ENCOUNTER — IMMUNIZATION (OUTPATIENT)
Dept: PRIMARY CARE CLINIC | Age: 51
End: 2021-03-15

## 2021-03-15 PROCEDURE — 0002A COVID-19, PFIZER VACCINE 30MCG/0.3ML DOSE: CPT | Performed by: NURSE PRACTITIONER

## 2021-03-15 PROCEDURE — 91300 COVID-19, PFIZER VACCINE 30MCG/0.3ML DOSE: CPT | Performed by: NURSE PRACTITIONER

## 2021-07-07 ENCOUNTER — HOSPITAL ENCOUNTER (OUTPATIENT)
Age: 51
Discharge: HOME | End: 2021-07-07

## 2021-07-07 DIAGNOSIS — E10.65 UNCONTROLLED TYPE 1 DIABETES MELLITUS WITH HYPERGLYCEMIA (HCC): ICD-10-CM

## 2021-07-07 LAB
ANION GAP SERPL CALCULATED.3IONS-SCNC: 12 MMOL/L (ref 7–16)
BUN BLDV-MCNC: 15 MG/DL (ref 6–20)
CALCIUM SERPL-MCNC: 9.3 MG/DL (ref 8.6–10.2)
CHLORIDE BLD-SCNC: 100 MMOL/L (ref 98–107)
CO2: 26 MMOL/L (ref 22–29)
CREAT SERPL-MCNC: 0.8 MG/DL (ref 0.7–1.2)
GFR AFRICAN AMERICAN: >60
GFR NON-AFRICAN AMERICAN: >60 ML/MIN/1.73
GLUCOSE BLD-MCNC: 166 MG/DL (ref 74–99)
HBA1C MFR BLD: 8.3 % (ref 4–5.6)
POTASSIUM SERPL-SCNC: 4.4 MMOL/L (ref 3.5–5)
SODIUM BLD-SCNC: 138 MMOL/L (ref 132–146)

## 2021-07-07 PROCEDURE — 83036 HEMOGLOBIN GLYCOSYLATED A1C: CPT

## 2021-07-07 PROCEDURE — 36415 COLL VENOUS BLD VENIPUNCTURE: CPT

## 2021-07-07 PROCEDURE — 80048 BASIC METABOLIC PNL TOTAL CA: CPT

## 2021-07-13 ENCOUNTER — TELEPHONE (OUTPATIENT)
Dept: ENDOCRINOLOGY | Age: 51
End: 2021-07-13

## 2021-09-26 ENCOUNTER — APPOINTMENT (OUTPATIENT)
Dept: GENERAL RADIOLOGY | Age: 51
DRG: 919 | End: 2021-09-26

## 2021-09-26 ENCOUNTER — HOSPITAL ENCOUNTER (INPATIENT)
Age: 51
LOS: 2 days | Discharge: HOME | DRG: 919 | End: 2021-09-28
Attending: EMERGENCY MEDICINE | Admitting: INTERNAL MEDICINE

## 2021-09-26 DIAGNOSIS — E10.10 DIABETIC KETOACIDOSIS WITHOUT COMA ASSOCIATED WITH TYPE 1 DIABETES MELLITUS (HCC): Primary | ICD-10-CM

## 2021-09-26 LAB
ACETAMINOPHEN LEVEL: <5 MCG/ML (ref 10–30)
ALBUMIN SERPL-MCNC: 4.4 G/DL (ref 3.5–5.2)
ALP BLD-CCNC: 134 U/L (ref 40–129)
ALT SERPL-CCNC: 15 U/L (ref 0–40)
AMPHETAMINE SCREEN, URINE: NOT DETECTED
ANION GAP SERPL CALCULATED.3IONS-SCNC: 16 MMOL/L (ref 7–16)
ANION GAP SERPL CALCULATED.3IONS-SCNC: 26 MMOL/L (ref 7–16)
ANION GAP SERPL CALCULATED.3IONS-SCNC: 31 MMOL/L (ref 7–16)
AST SERPL-CCNC: 12 U/L (ref 0–39)
BACTERIA: NORMAL /HPF
BARBITURATE SCREEN URINE: NOT DETECTED
BASOPHILS ABSOLUTE: 0.04 E9/L (ref 0–0.2)
BASOPHILS RELATIVE PERCENT: 0.3 % (ref 0–2)
BENZODIAZEPINE SCREEN, URINE: NOT DETECTED
BETA-HYDROXYBUTYRATE: >4.5 MMOL/L (ref 0.02–0.27)
BILIRUB SERPL-MCNC: 1.4 MG/DL (ref 0–1.2)
BILIRUBIN URINE: ABNORMAL
BLOOD, URINE: ABNORMAL
BUN BLDV-MCNC: 18 MG/DL (ref 6–20)
BUN BLDV-MCNC: 23 MG/DL (ref 6–20)
BUN BLDV-MCNC: 27 MG/DL (ref 6–20)
CALCIUM SERPL-MCNC: 8.2 MG/DL (ref 8.6–10.2)
CALCIUM SERPL-MCNC: 8.3 MG/DL (ref 8.6–10.2)
CALCIUM SERPL-MCNC: 9.2 MG/DL (ref 8.6–10.2)
CANNABINOID SCREEN URINE: POSITIVE
CHLORIDE BLD-SCNC: 88 MMOL/L (ref 98–107)
CHLORIDE BLD-SCNC: 94 MMOL/L (ref 98–107)
CHLORIDE BLD-SCNC: 99 MMOL/L (ref 98–107)
CHOLESTEROL, TOTAL: 213 MG/DL (ref 0–199)
CHP ED QC CHECK: NORMAL
CHP ED QC CHECK: YES
CLARITY: CLEAR
CO2: 12 MMOL/L (ref 22–29)
CO2: 19 MMOL/L (ref 22–29)
CO2: 21 MMOL/L (ref 22–29)
COCAINE METABOLITE SCREEN URINE: NOT DETECTED
COLOR: YELLOW
CREAT SERPL-MCNC: 1 MG/DL (ref 0.7–1.2)
CREAT SERPL-MCNC: 1.1 MG/DL (ref 0.7–1.2)
CREAT SERPL-MCNC: 1.3 MG/DL (ref 0.7–1.2)
CREATININE URINE: 79 MG/DL (ref 40–278)
EKG ATRIAL RATE: 120 BPM
EKG P AXIS: 72 DEGREES
EKG P-R INTERVAL: 124 MS
EKG Q-T INTERVAL: 342 MS
EKG QRS DURATION: 86 MS
EKG QTC CALCULATION (BAZETT): 483 MS
EKG R AXIS: 70 DEGREES
EKG T AXIS: 27 DEGREES
EKG VENTRICULAR RATE: 120 BPM
EOSINOPHILS ABSOLUTE: 0.11 E9/L (ref 0.05–0.5)
EOSINOPHILS RELATIVE PERCENT: 1 % (ref 0–6)
ETHANOL: <10 MG/DL (ref 0–0.08)
FENTANYL SCREEN, URINE: NOT DETECTED
GFR AFRICAN AMERICAN: 56
GFR AFRICAN AMERICAN: >60
GFR NON-AFRICAN AMERICAN: 46 ML/MIN/1.73
GFR NON-AFRICAN AMERICAN: 58 ML/MIN/1.73
GFR NON-AFRICAN AMERICAN: >60 ML/MIN/1.73
GFR NON-AFRICAN AMERICAN: >60 ML/MIN/1.73
GLUCOSE BLD-MCNC: 193 MG/DL (ref 74–99)
GLUCOSE BLD-MCNC: 257 MG/DL
GLUCOSE BLD-MCNC: 291 MG/DL (ref 74–99)
GLUCOSE BLD-MCNC: 301 MG/DL (ref 74–99)
GLUCOSE BLD-MCNC: 421 MG/DL (ref 74–99)
GLUCOSE BLD-MCNC: 459 MG/DL
GLUCOSE URINE: 500 MG/DL
HBA1C MFR BLD: 9.8 % (ref 4–5.6)
HCT VFR BLD CALC: 48.7 % (ref 37–54)
HDLC SERPL-MCNC: 48 MG/DL
HEMOGLOBIN: 16.5 G/DL (ref 12.5–16.5)
HYALINE CASTS: NORMAL /LPF (ref 0–2)
IMMATURE GRANULOCYTES #: 0.07 E9/L
IMMATURE GRANULOCYTES %: 0.6 % (ref 0–5)
KETONES, URINE: >=160 MG/DL
LACTIC ACID: 1.6 MMOL/L (ref 0.5–2.2)
LDL CHOLESTEROL CALCULATED: 112 MG/DL (ref 0–99)
LEUKOCYTE ESTERASE, URINE: NEGATIVE
LIPASE: 30 U/L (ref 13–60)
LYMPHOCYTES ABSOLUTE: 1.42 E9/L (ref 1.5–4)
LYMPHOCYTES RELATIVE PERCENT: 12.3 % (ref 20–42)
Lab: ABNORMAL
MAGNESIUM: 1.9 MG/DL (ref 1.6–2.6)
MAGNESIUM: 2.5 MG/DL (ref 1.6–2.6)
MCH RBC QN AUTO: 30.6 PG (ref 26–35)
MCHC RBC AUTO-ENTMCNC: 33.9 % (ref 32–34.5)
MCV RBC AUTO: 90.4 FL (ref 80–99.9)
METER GLUCOSE: 178 MG/DL (ref 74–99)
METER GLUCOSE: 183 MG/DL (ref 74–99)
METER GLUCOSE: 186 MG/DL (ref 74–99)
METER GLUCOSE: 186 MG/DL (ref 74–99)
METER GLUCOSE: 201 MG/DL (ref 74–99)
METER GLUCOSE: 222 MG/DL (ref 74–99)
METER GLUCOSE: 257 MG/DL (ref 74–99)
METER GLUCOSE: 268 MG/DL (ref 74–99)
METER GLUCOSE: 305 MG/DL (ref 74–99)
METER GLUCOSE: 459 MG/DL (ref 74–99)
METHADONE SCREEN, URINE: NOT DETECTED
MICROALBUMIN UR-MCNC: 132.3 MG/L
MICROALBUMIN/CREAT UR-RTO: 167.5 (ref 0–30)
MONOCYTES ABSOLUTE: 0.83 E9/L (ref 0.1–0.95)
MONOCYTES RELATIVE PERCENT: 7.2 % (ref 2–12)
NEUTROPHILS ABSOLUTE: 9.04 E9/L (ref 1.8–7.3)
NEUTROPHILS RELATIVE PERCENT: 78.6 % (ref 43–80)
NITRITE, URINE: NEGATIVE
OPIATE SCREEN URINE: NOT DETECTED
OSMOLALITY: 304 MOSM/KG (ref 285–310)
OXYCODONE URINE: NOT DETECTED
PDW BLD-RTO: 14.2 FL (ref 11.5–15)
PERFORMED ON: ABNORMAL
PH UA: 5 (ref 5–9)
PH VENOUS: 7.29 (ref 7.35–7.45)
PHENCYCLIDINE SCREEN URINE: NOT DETECTED
PHOSPHORUS: 1.9 MG/DL (ref 2.5–4.5)
PLATELET # BLD: 499 E9/L (ref 130–450)
PMV BLD AUTO: 8.4 FL (ref 7–12)
POC CHLORIDE: 98 MMOL/L (ref 100–108)
POC CREATININE: 1.6 MG/DL (ref 0.7–1.2)
POC POTASSIUM: 5.8 MMOL/L (ref 3.5–5)
POC SODIUM: 124 MMOL/L (ref 132–146)
POTASSIUM REFLEX MAGNESIUM: 4.1 MMOL/L (ref 3.5–5)
POTASSIUM SERPL-SCNC: 3.6 MMOL/L (ref 3.5–5)
POTASSIUM SERPL-SCNC: 4.4 MMOL/L (ref 3.5–5)
PROTEIN UA: 30 MG/DL
RBC # BLD: 5.39 E12/L (ref 3.8–5.8)
RBC UA: NORMAL /HPF (ref 0–2)
REASON FOR REJECTION: NORMAL
REJECTED TEST: NORMAL
SALICYLATE, SERUM: <0.3 MG/DL (ref 0–30)
SARS-COV-2, NAAT: NOT DETECTED
SODIUM BLD-SCNC: 134 MMOL/L (ref 132–146)
SODIUM BLD-SCNC: 135 MMOL/L (ref 132–146)
SODIUM BLD-SCNC: 137 MMOL/L (ref 132–146)
SPECIFIC GRAVITY UA: >=1.03 (ref 1–1.03)
TOTAL PROTEIN: 6.7 G/DL (ref 6.4–8.3)
TRICYCLIC ANTIDEPRESSANTS SCREEN SERUM: NEGATIVE NG/ML
TRIGL SERPL-MCNC: 266 MG/DL (ref 0–149)
TROPONIN, HIGH SENSITIVITY: 45 NG/L (ref 0–11)
UROBILINOGEN, URINE: 0.2 E.U./DL
VLDLC SERPL CALC-MCNC: 53 MG/DL
WBC # BLD: 11.5 E9/L (ref 4.5–11.5)
WBC UA: NORMAL /HPF (ref 0–5)

## 2021-09-26 PROCEDURE — 80143 DRUG ASSAY ACETAMINOPHEN: CPT

## 2021-09-26 PROCEDURE — 82077 ASSAY SPEC XCP UR&BREATH IA: CPT

## 2021-09-26 PROCEDURE — 80053 COMPREHEN METABOLIC PANEL: CPT

## 2021-09-26 PROCEDURE — 93010 ELECTROCARDIOGRAM REPORT: CPT | Performed by: INTERNAL MEDICINE

## 2021-09-26 PROCEDURE — 80048 BASIC METABOLIC PNL TOTAL CA: CPT

## 2021-09-26 PROCEDURE — 85025 COMPLETE CBC W/AUTO DIFF WBC: CPT

## 2021-09-26 PROCEDURE — 82010 KETONE BODYS QUAN: CPT

## 2021-09-26 PROCEDURE — 82800 BLOOD PH: CPT

## 2021-09-26 PROCEDURE — 82565 ASSAY OF CREATININE: CPT

## 2021-09-26 PROCEDURE — 71045 X-RAY EXAM CHEST 1 VIEW: CPT

## 2021-09-26 PROCEDURE — 82962 GLUCOSE BLOOD TEST: CPT

## 2021-09-26 PROCEDURE — 2000000000 HC ICU R&B

## 2021-09-26 PROCEDURE — 82435 ASSAY OF BLOOD CHLORIDE: CPT

## 2021-09-26 PROCEDURE — 96365 THER/PROPH/DIAG IV INF INIT: CPT

## 2021-09-26 PROCEDURE — 80179 DRUG ASSAY SALICYLATE: CPT

## 2021-09-26 PROCEDURE — 84295 ASSAY OF SERUM SODIUM: CPT

## 2021-09-26 PROCEDURE — 80061 LIPID PANEL: CPT

## 2021-09-26 PROCEDURE — 80307 DRUG TEST PRSMV CHEM ANLYZR: CPT

## 2021-09-26 PROCEDURE — 93005 ELECTROCARDIOGRAM TRACING: CPT | Performed by: STUDENT IN AN ORGANIZED HEALTH CARE EDUCATION/TRAINING PROGRAM

## 2021-09-26 PROCEDURE — 83735 ASSAY OF MAGNESIUM: CPT

## 2021-09-26 PROCEDURE — 6360000002 HC RX W HCPCS: Performed by: INTERNAL MEDICINE

## 2021-09-26 PROCEDURE — 2580000003 HC RX 258: Performed by: STUDENT IN AN ORGANIZED HEALTH CARE EDUCATION/TRAINING PROGRAM

## 2021-09-26 PROCEDURE — 87635 SARS-COV-2 COVID-19 AMP PRB: CPT

## 2021-09-26 PROCEDURE — 83036 HEMOGLOBIN GLYCOSYLATED A1C: CPT

## 2021-09-26 PROCEDURE — 83930 ASSAY OF BLOOD OSMOLALITY: CPT

## 2021-09-26 PROCEDURE — 96375 TX/PRO/DX INJ NEW DRUG ADDON: CPT

## 2021-09-26 PROCEDURE — 82947 ASSAY GLUCOSE BLOOD QUANT: CPT

## 2021-09-26 PROCEDURE — 82044 UR ALBUMIN SEMIQUANTITATIVE: CPT

## 2021-09-26 PROCEDURE — 83690 ASSAY OF LIPASE: CPT

## 2021-09-26 PROCEDURE — 84132 ASSAY OF SERUM POTASSIUM: CPT

## 2021-09-26 PROCEDURE — 99285 EMERGENCY DEPT VISIT HI MDM: CPT

## 2021-09-26 PROCEDURE — 83605 ASSAY OF LACTIC ACID: CPT

## 2021-09-26 PROCEDURE — 6370000000 HC RX 637 (ALT 250 FOR IP): Performed by: EMERGENCY MEDICINE

## 2021-09-26 PROCEDURE — 2500000003 HC RX 250 WO HCPCS: Performed by: EMERGENCY MEDICINE

## 2021-09-26 PROCEDURE — 2580000003 HC RX 258: Performed by: EMERGENCY MEDICINE

## 2021-09-26 PROCEDURE — 84100 ASSAY OF PHOSPHORUS: CPT

## 2021-09-26 PROCEDURE — 6360000002 HC RX W HCPCS: Performed by: STUDENT IN AN ORGANIZED HEALTH CARE EDUCATION/TRAINING PROGRAM

## 2021-09-26 PROCEDURE — 2580000003 HC RX 258: Performed by: INTERNAL MEDICINE

## 2021-09-26 PROCEDURE — 36415 COLL VENOUS BLD VENIPUNCTURE: CPT

## 2021-09-26 PROCEDURE — 82570 ASSAY OF URINE CREATININE: CPT

## 2021-09-26 PROCEDURE — 84484 ASSAY OF TROPONIN QUANT: CPT

## 2021-09-26 PROCEDURE — 81001 URINALYSIS AUTO W/SCOPE: CPT

## 2021-09-26 PROCEDURE — 6370000000 HC RX 637 (ALT 250 FOR IP): Performed by: STUDENT IN AN ORGANIZED HEALTH CARE EDUCATION/TRAINING PROGRAM

## 2021-09-26 PROCEDURE — 96361 HYDRATE IV INFUSION ADD-ON: CPT

## 2021-09-26 RX ORDER — ONDANSETRON 2 MG/ML
4 INJECTION INTRAMUSCULAR; INTRAVENOUS ONCE
Status: COMPLETED | OUTPATIENT
Start: 2021-09-26 | End: 2021-09-26

## 2021-09-26 RX ORDER — 0.9 % SODIUM CHLORIDE 0.9 %
1000 INTRAVENOUS SOLUTION INTRAVENOUS ONCE
Status: COMPLETED | OUTPATIENT
Start: 2021-09-26 | End: 2021-09-26

## 2021-09-26 RX ORDER — DEXTROSE MONOHYDRATE 25 G/50ML
12.5 INJECTION, SOLUTION INTRAVENOUS PRN
Status: DISCONTINUED | OUTPATIENT
Start: 2021-09-26 | End: 2021-09-28 | Stop reason: HOSPADM

## 2021-09-26 RX ORDER — CHOLECALCIFEROL (VITAMIN D3) 50 MCG
1000 TABLET ORAL DAILY
Status: DISCONTINUED | OUTPATIENT
Start: 2021-09-27 | End: 2021-09-28 | Stop reason: HOSPADM

## 2021-09-26 RX ORDER — NICOTINE 21 MG/24HR
1 PATCH, TRANSDERMAL 24 HOURS TRANSDERMAL DAILY
Status: DISCONTINUED | OUTPATIENT
Start: 2021-09-26 | End: 2021-09-28 | Stop reason: HOSPADM

## 2021-09-26 RX ORDER — SODIUM CHLORIDE 0.9 % (FLUSH) 0.9 %
SYRINGE (ML) INJECTION
Status: DISPENSED
Start: 2021-09-26 | End: 2021-09-27

## 2021-09-26 RX ORDER — FOLIC ACID 1 MG/1
1 TABLET ORAL DAILY
Status: DISCONTINUED | OUTPATIENT
Start: 2021-09-27 | End: 2021-09-28 | Stop reason: HOSPADM

## 2021-09-26 RX ORDER — ASPIRIN 81 MG/1
81 TABLET ORAL DAILY
Status: CANCELLED | OUTPATIENT
Start: 2021-09-26

## 2021-09-26 RX ORDER — 0.9 % SODIUM CHLORIDE 0.9 %
2000 INTRAVENOUS SOLUTION INTRAVENOUS ONCE
Status: COMPLETED | OUTPATIENT
Start: 2021-09-26 | End: 2021-09-26

## 2021-09-26 RX ORDER — FOLIC ACID 1 MG/1
1 TABLET ORAL DAILY
Status: CANCELLED | OUTPATIENT
Start: 2021-09-26

## 2021-09-26 RX ORDER — NICOTINE POLACRILEX 4 MG
15 LOZENGE BUCCAL PRN
Status: DISCONTINUED | OUTPATIENT
Start: 2021-09-26 | End: 2021-09-28 | Stop reason: HOSPADM

## 2021-09-26 RX ORDER — ONDANSETRON 4 MG/1
4 TABLET, ORALLY DISINTEGRATING ORAL ONCE
Status: COMPLETED | OUTPATIENT
Start: 2021-09-26 | End: 2021-09-26

## 2021-09-26 RX ORDER — SODIUM CHLORIDE 450 MG/100ML
INJECTION, SOLUTION INTRAVENOUS CONTINUOUS
Status: DISCONTINUED | OUTPATIENT
Start: 2021-09-26 | End: 2021-09-27

## 2021-09-26 RX ORDER — POLYETHYLENE GLYCOL 3350 17 G/17G
17 POWDER, FOR SOLUTION ORAL DAILY PRN
Status: DISCONTINUED | OUTPATIENT
Start: 2021-09-26 | End: 2021-09-27

## 2021-09-26 RX ORDER — DEXTROSE AND SODIUM CHLORIDE 5; .45 G/100ML; G/100ML
INJECTION, SOLUTION INTRAVENOUS CONTINUOUS PRN
Status: DISCONTINUED | OUTPATIENT
Start: 2021-09-26 | End: 2021-09-27

## 2021-09-26 RX ORDER — DEXTROSE MONOHYDRATE 50 MG/ML
100 INJECTION, SOLUTION INTRAVENOUS PRN
Status: DISCONTINUED | OUTPATIENT
Start: 2021-09-26 | End: 2021-09-28 | Stop reason: HOSPADM

## 2021-09-26 RX ORDER — POTASSIUM CHLORIDE 7.45 MG/ML
10 INJECTION INTRAVENOUS PRN
Status: DISCONTINUED | OUTPATIENT
Start: 2021-09-26 | End: 2021-09-27

## 2021-09-26 RX ORDER — ASPIRIN 81 MG/1
81 TABLET ORAL DAILY
Status: DISCONTINUED | OUTPATIENT
Start: 2021-09-27 | End: 2021-09-28 | Stop reason: HOSPADM

## 2021-09-26 RX ORDER — MAGNESIUM SULFATE 1 G/100ML
1000 INJECTION INTRAVENOUS PRN
Status: DISCONTINUED | OUTPATIENT
Start: 2021-09-26 | End: 2021-09-27

## 2021-09-26 RX ORDER — ATORVASTATIN CALCIUM 80 MG/1
80 TABLET, FILM COATED ORAL NIGHTLY
Status: CANCELLED | OUTPATIENT
Start: 2021-09-26

## 2021-09-26 RX ORDER — ATORVASTATIN CALCIUM 80 MG/1
80 TABLET, FILM COATED ORAL NIGHTLY
Status: DISCONTINUED | OUTPATIENT
Start: 2021-09-26 | End: 2021-09-28 | Stop reason: HOSPADM

## 2021-09-26 RX ORDER — 0.9 % SODIUM CHLORIDE 0.9 %
15 INTRAVENOUS SOLUTION INTRAVENOUS ONCE
Status: COMPLETED | OUTPATIENT
Start: 2021-09-26 | End: 2021-09-26

## 2021-09-26 RX ORDER — GAUZE BANDAGE 2" X 2"
100 BANDAGE TOPICAL DAILY
Status: DISCONTINUED | OUTPATIENT
Start: 2021-09-27 | End: 2021-09-28 | Stop reason: HOSPADM

## 2021-09-26 RX ADMIN — SODIUM CHLORIDE 6.8 UNITS/HR: 9 INJECTION, SOLUTION INTRAVENOUS at 15:52

## 2021-09-26 RX ADMIN — INSULIN HUMAN 7 UNITS: 100 INJECTION, SOLUTION PARENTERAL at 08:16

## 2021-09-26 RX ADMIN — ONDANSETRON 4 MG: 2 INJECTION INTRAMUSCULAR; INTRAVENOUS at 08:17

## 2021-09-26 RX ADMIN — Medication 1000 MG: at 08:17

## 2021-09-26 RX ADMIN — ONDANSETRON 4 MG: 4 TABLET, ORALLY DISINTEGRATING ORAL at 16:09

## 2021-09-26 RX ADMIN — SODIUM BICARBONATE 50 MEQ: 84 INJECTION INTRAVENOUS at 08:17

## 2021-09-26 RX ADMIN — SODIUM CHLORIDE 2000 ML: 9 INJECTION, SOLUTION INTRAVENOUS at 08:17

## 2021-09-26 RX ADMIN — DEXTROSE AND SODIUM CHLORIDE: 5; 450 INJECTION, SOLUTION INTRAVENOUS at 19:33

## 2021-09-26 RX ADMIN — SODIUM CHLORIDE 1020 ML: 9 INJECTION, SOLUTION INTRAVENOUS at 18:05

## 2021-09-26 RX ADMIN — SODIUM CHLORIDE 1000 ML: 9 INJECTION, SOLUTION INTRAVENOUS at 12:15

## 2021-09-26 RX ADMIN — ENOXAPARIN SODIUM 40 MG: 40 INJECTION SUBCUTANEOUS at 18:34

## 2021-09-26 ASSESSMENT — ENCOUNTER SYMPTOMS
DIARRHEA: 0
CHOKING: 0
VOMITING: 1
ABDOMINAL DISTENTION: 0
CHEST TIGHTNESS: 0
ABDOMINAL PAIN: 1
BACK PAIN: 0
SINUS PRESSURE: 0
EYE REDNESS: 0
ABDOMINAL PAIN: 0
COUGH: 1
SHORTNESS OF BREATH: 0
EYE PAIN: 0
DIARRHEA: 1
EYE DISCHARGE: 0
COUGH: 0
SORE THROAT: 0
WHEEZING: 0
NAUSEA: 1

## 2021-09-26 ASSESSMENT — PAIN SCALES - GENERAL
PAINLEVEL_OUTOF10: 0
PAINLEVEL_OUTOF10: 0

## 2021-09-26 NOTE — H&P
[facility]  Internal Medicine Residency Program  History and Physical    Patient:  Yan Wilson 49 y.o. male MRN: [id number]     Date of Service: 9/26/2021    Hospital Day: 1      Chief complaint: had concerns including Hyperglycemia (hx DKA, [id number], states he has a insulin pump). History of Present Illness   The patient is a 49 y.o. male with a past medical history of type 1 diabetes mellitus hypertension, peripheral neuropathy, alcohol abuse and hyperlipidemia with remote history of lacunar stroke. Presented to the ED with a 3 to 5-day history of nausea and vomiting with stomach pain. Patient also states that he has some blurry vision and headaches along with polydipsia. Patient is a type I diabetic and has an insulin pump that is managed by Dr. Haylie Chang, patient states that has not seen Dr. Haylie Chang in 6 months and has been managing his pump on his own. Patient has been having episodes of hypoglycemia and hyperglycemia on the pump saying his lowest has been in the 40s and highest being in the 500s. He is constantly experience hypoglycemia the past several days noting his blood glucose to be in the 400s and 500s every day. Patient has had DKA before and felt that is the direction that he was head as such he went to the ED. patient states that he has had multiple episodes of DKA in the past year. In the ED patient was found to have a glucose of 301 with a potassium 4.1 and anion gap of 26. Patient was given 2 L IV fluid along with hyperkalemia treatment. However patient did not improve glucose remained 291 and anion gap increased to 31. Decision was then made to admit the patient. Patient was then started on DKA protocol with an insulin drip, he was then transferred to the unit.     Past Medical History:      Diagnosis Date    [facility]ism ([facility] [address] [facility]     Cocaine abuse ([facility] [facility]     Diabetes mellitus ([facility] [address] [facility]     Hypertension     Idiopathic peripheral neuropathy 9/21/2016    Lacunar stroke ([facility] [address]     Marijuana abuse        Past Surgical History:    History reviewed. No pertinent surgical history. Medications Prior to Admission:    Prior to Admission medications    Medication Sig Start Date End Date Taking? Authorizing Provider   insulin lispro (HUMALOG) 100 UNIT/ML injection vial Inject into the skin MEDTRONIC PUMP    Historical Provider, MD   Insulin Syringe-Needle U-100 28G X 1/2\" 1 ML MISC Use as directed 7/29/19   Lidia Saleem MD   Lancets MISC 1 each by Does not apply route 2 times daily 7/29/19   Lidia Saleem MD       Allergies:  Metoprolol and No known allergies    Social History:   TOBACCO:   reports that he has been smoking cigarettes. He has a 30.00 pack-year smoking history. He has never used smokeless tobacco.  ETOH:   reports current alcohol use of about 5.0 standard drinks of alcohol per week. OCCUPATION:      Family History:       Problem Relation Age of Onset    Diabetes type 2  Mother     Cancer Maternal Grandmother     Diabetes type 2  Nephew        REVIEW OF SYSTEMS:    Review of Systems   Constitutional: Positive for appetite change. Negative for chills, fatigue and fever. Eyes: Positive for visual disturbance. Respiratory: Positive for cough. Negative for choking, chest tightness and shortness of breath. Cardiovascular: Negative for chest pain and leg swelling. Gastrointestinal: Positive for abdominal pain, diarrhea, nausea and vomiting. Negative for abdominal distention. Endocrine: Positive for polydipsia. Genitourinary: Positive for frequency. Negative for dysuria, flank pain, hematuria, penile pain and penile swelling. Musculoskeletal: Negative for arthralgias, back pain and gait problem. Neurological: Negative for dizziness, facial asymmetry, light-headedness and headaches.        Physical Exam   · Vitals: BP (!) 129/111   Pulse 118   Temp 98 °F (36.7 °C) (Temporal)   Resp 18   Ht 5' 6\" (1.676 m)   Wt 150 lb (68 kg)   SpO2 99%   BMI 24.21 kg/m²     Physical Exam  Constitutional:       General: He is awake. HENT:      Head: Normocephalic and atraumatic. Eyes:      General: Lids are normal. Lids are everted, no foreign bodies appreciated. Pupils: Pupils are equal, round, and reactive to light. Cardiovascular:      Rate and Rhythm: Normal rate and regular rhythm. Pulses: Normal pulses. Heart sounds: Normal heart sounds. Pulmonary:      Effort: Pulmonary effort is normal.      Breath sounds: Normal breath sounds. Abdominal:      General: Abdomen is flat. Bowel sounds are normal. There is no distension. Palpations: Abdomen is soft. Tenderness: There is no abdominal tenderness. Hernia: A hernia is present. Hernia is present in the umbilical area. Musculoskeletal:      Cervical back: Normal range of motion and neck supple. Right lower leg: No edema. Left lower leg: No edema. Skin:     General: Skin is warm and dry. Neurological:      Mental Status: He is alert. Psychiatric:         Behavior: Behavior is cooperative. Labs and Imaging Studies   Basic Labs  Recent Labs     09/26/21  0759 09/26/21  0801 09/26/21  0949 09/26/21  1312 09/26/21  1551   NA  --   --  135 137  --    K  --   --  4.1 4.4  --    CL  --   --  88* 94*  --    CO2  --   --  21* 12*  --    BUN  --   --  27* 23*  --    CREATININE 1.6*  --  1.3* 1.1  --    GLUCOSE  --    < > 301* 291* 257   CALCIUM  --   --  9.2 8.2*  --     < > = values in this interval not displayed. Recent Labs     09/26/21  0802   WBC 11.5   RBC 5.39   HGB 16.5   HCT 48.7   MCV 90.4   MCH 30.6   MCHC 33.9   RDW 14.2   *   MPV 8.4         Imaging Studies:  XR CHEST PORTABLE   Final Result   No acute process. EKG: normal sinus rhythm, unchanged from previous tracings.     Resident's Assessment and Plan     Assessment and Plan:  Endocrine  Type 1 diabetes mellitus DKA 2/2 insulin pump malfunction versus alcohol use  -Patient has a history of being a type I by  5 years  -Patient has been to the hospital multiple times in the past several years for episodes of DKA requiring an insulin drip  -Patient has had vomiting and stomach pain for the past 3 to 5 days  -Patient states he is followed by Dr. Vicente David however has not seen him for the past 6 months  -Patient has been adjusting his insulin pump on his own  -Patient has had episodes of hypoglycemia in the 40s and hyperglycemia as high as 500  -Patient's initial blood glucose was 301  -Patient's anion gap was 31 on admission to the unit  -Patient's potassium was 4.4 on admission to the unit  -Patient was given 2 L of fluids and a DuoNeb however this did not improve his condition  -Patient is started on DKA protocol  -Continue insulin drip until anion gap is closed  -Patient will receive 2 doses of potassium  -Mag and phosphorus will be replaced  -Continue patient on half-normal saline if glucose drops below 250 start patient on D5 half-normal  -Keep patient n.p.o. until gap is closed with glucose checks every hour and BMP every 4 hours  -BUN creatinine ratio stat  -A1c level sent  -Dr. Vicente David has been consulted and he will see the patient Monday    Cardiology  History of hypertension  -Patient was previously on metoprolol lisinopril for blood pressure management  -Patient has been normotensive since admission to the ED  -We will hold metoprolol and lisinopril at this time  -Continue to monitor blood pressure and reassess medication    History of hyperlipidemia  Patient is on atorvastatin at home  -We will continue home atorvastatin  -We will obtain lipid panel    MSK  Peripheral neuropathy 2/2 diabetes mellitus type 1  -Patient is on gabapentin 300 mg at home continue home gabapentin    Psychology  Alcohol abuse  -Patient states that he drinks roughly 6 beers a day  -Thiamine and folate ordered  -Monitor for signs of withdrawal patient states last drink was 3 days ago initiate SERGIOWA's protocol if necessary    PT/OT evaluation: None  DVT prophylaxis/ GI prophylaxis: Lovenox/Npo   Disposition: admit to ICU    Shira Valdes MD, PGY-1  Attending physician: Dr. Cesar Wadsworth

## 2021-09-26 NOTE — CONSULTS
Medical Intensive Care Unit     [facility]  Critical care consult    Date and time: 9/27/2021 8:13 AM  Patient's name:  Azra Mon  Medical Record Number: [id number]  Patient's account/billing number: [de-identified]  Patient's YOB: 1970  Age: 49 y.o. Date of Admission: 9/26/2021  7:42 AM  Length of stay during current admission: 1    Primary Care Physician: Ivis Selby MD  ICU Attending Physician: Dr. An Ragsdale Status: Full Code    Reason for ICU admission: DKA    History of Present Illness:     Richardson aClix 50-year-old male with past medical history of type 1 diabetes on insulin pump follow with Dr. Nguyen Bowens, hypertension, hyperlipidemia, alcohol use disorder, tobacco use disorder, vitamin D deficiency, multiple DKA's in the past presented to the ED with chief concern of nausea vomiting and blurring of vision for the past 3 days. Patient is complaining of nausea, blurring of vision and 5 episodes of vomiting starting 3 days ago. Patient is aware of DKA symptoms and try to self medicate with insulin and Gatorade. Symptoms got worse and and he presented to the ED. initial work-up shown to have blood glucose of 301 and anion gap of 31, hydroxybutyrate greater than 4.5 pH 7.2, bicarb 12. Patient was started on DKA protocol; insulin drip, 2 L normal saline bolus and 250 normal saline continuous. Electrolytes were in the normal limits. He was transferred to the medical ICU for further care and management. Of note patient is known to Dr. Nguyen Bowens. Last visit was 6 months ago and he supposed to follow for insulin pump titration and training. He is noncompliant with his medication.   Endorses hyper and hypoglycemia episodes in the past few months      CURRENT VENTILATION STATUS:   [] Ventilator  [] BIPAP  [] Nasal Cannula [x] Room Air      IF INTUBATED, ET TUBE MARKING AT LOWER LIP:       cms    SECRETIONS   Amount:  [] Small [] Moderate  [] Large [x] None    Color:     [] White [] Colored  [] Bloody    SEDATION:  RAAS Score:  [] Propofol gtt  [] Versed gtt  [] Ativan gtt   [x] No Sedation    PARALYZED:  [x] No    [] Yes    VASOPRESSORS:  [x] No    [] Yes    If yes -   [] Levophed       [] Dopamine     [] Vasopressin       [] Dobutamine  [] Phenylephrine         [] Epinephrine    CENTRAL LINES:     [x] No   [] Yes   (Date of Insertion:   )           If yes -     [] Right IJ     [] Left IJ [] Right Femoral [] Left Femoral                   [] Right Subclavian [] Left Subclavian     PARISH'S CATHETER:   [x] No   [] Yes  (Date of Insertion:   )     URINE OUTPUT:            [x] Good   [] Low              [] Anuric    REVIEW OF SYSTEMS:    · Constitutional: No fever, no chills, no change in weight; dec appetite  · HEENT: + blurred vision, no ear problems, no sore throat, no rhinorrhea. · Respiratory: No cough, no sputum production, no pleuritic chest pain, no shortness of breath  · Cardiology: No angina, no dyspnea on exertion, no paroxysmal nocturnal dyspnea, no orthopnea, no palpitation, no leg swelling. · Gastroenterology: No dysphagia, no reflux; no abdominal pain, + nausea + vomiting; no constipation or diarrhea. No hematochezia   · Genitourinary: No dysuria, no frequency, hesitancy; no hematuria.  + polyuria   · Musculoskeletal: no joint pain, no myalgia, no change in range of movement  · Neurology: no focal weakness in extremities, no slurred speech, no double vision, no tingling or numbness sensation  · Endocrinology: no temperature intolerance, no polyphagia, polydipsia + polyuria  · Hematology: no increased bleeding, no bruising, no lymphadenopathy  · Skin: no skin changes noticed by patient  · Psychology: no depressed mood, no suicidal ideation    OBJECTIVE:     VITAL SIGNS:  BP (!) 140/93   Pulse 78   Temp 98.5 °F (36.9 °C) (Temporal)   Resp 18   Ht 5' 6\" (1.676 m)   Wt 150 lb (68 kg)   SpO2 98%   BMI 24.21 kg/m²   Tmax over 24 hours:  Temp (24hrs), Av.8 °F (37.1 °C), Min:98 °F (36.7 °C), Max:99.8 °F (37.7 °C)      Patient Vitals for the past 6 hrs:   BP Temp Temp src Pulse Resp SpO2   21 0700 (!) 140/93 -- -- 78 18 --   21 0600 (!) 143/93 -- -- 85 (!) 4 --   21 0500 (!) 146/86 -- -- 84 19 98 %   21 0400 134/85 98.5 °F (36.9 °C) Temporal 84 20 --   21 0300 (!) 143/87 -- -- 91 18 --         Intake/Output Summary (Last 24 hours) at 2021 0813  Last data filed at 2021 0600  Gross per 24 hour   Intake 5436 ml   Output 400 ml   Net 5036 ml     Wt Readings from Last 2 Encounters:   21 150 lb (68 kg)   02/15/21 153 lb (69.4 kg)     Body mass index is 24.21 kg/m².       PHYSICAL EXAMINATION:  General appearance - alert, well appearing, and in no distress  Mental status - alert, oriented to person, place, and time  Eyes - pupils equal and reactive, extraocular eye movements intact  Ears - bilateral TM's and external ear canals normal  Nose - normal and patent, no erythema, discharge or polyps  Mouth - mucous membranes moist, pharynx normal without lesions  Neck - supple, no significant adenopathy  Chest - clear to auscultation, no wheezes, rales or rhonchi, symmetric air entry  Heart - normal rate, regular rhythm, normal S1, S2, no murmurs, rubs, clicks or gallops  Abdomen - soft, nontender, nondistended, no masses or organomegaly  Neurological - alert, oriented, normal speech, no focal findings or movement disorder noted}  Extremities - peripheral pulses normal, no pedal edema, no clubbing or cyanosis  Skin - normal coloration and turgor, no rashes, no suspicious skin lesions noted      Any additional physical findings:    MEDICATIONS:  Scheduled Meds:   potassium phosphate IVPB  20 mmol IntraVENous Once    aspirin  81 mg Oral Daily    atorvastatin  80 mg Oral Nightly    folic acid  1 mg Oral Daily    vitamin D  1,000 Units Oral Daily    thiamine mononitrate  100 mg Oral Daily    enoxaparin  40 mg not displayed. Magnesium:   Lab Results   Component Value Date    MG 1.8 09/27/2021     Phosphorus:   Lab Results   Component Value Date    PHOS 1.7 09/27/2021     Ionized Calcium:   Lab Results   Component Value Date    CAION 1.23 07/22/2020      Troponin: No results for input(s): TROPONINI in the last 72 hours. Urinalysis: Urine dipstick shows positive for protein and positive for glucose. Micro exam: negative for WBC's or RBC's. Microbiology:  Cultures during this admission:     Blood cultures:                 [] None drawn      [] Negative             []  Positive (Details:  )  Urine Culture:                   [] None drawn      [] Negative             []  Positive (Details:  )  Sputum Culture:               [] None drawn       [] Negative             []  Positive (Details:  )   Endotracheal aspirate:     [] None drawn       [] Negative             []  Positive (Details:  )     Other pertinent Labs:     Radiology/Imaging:   Chest Xray (9/27/2021):    ASSESSMENT  And PLAN:     Assessment:    Diabetic ketoacidosis secondary to pump malfunction  Type 1 diabetes mellitus, on insulin pump. Known to Dr. Marlo Servin. C/o nausea vomiting polyuria and blurring of vision. BHB greater than 4.5, anion gap 31, bicarb 12 and pH 7.2.   Has hyper and hypoglycemia episodes    History of hypertension and hyperlipidemia  Supposed to be on hydralazine and atorvastatin 80 mg    Medication noncompliance  Does not take any of the prescribed medication at home    Alcohol use disorder  6 cans of beer every day    Tobacco use disorder  2 packs/day      Plan:   · Patient started on DKA protocol, BMP q4h, blood glucose q1h  · Replace electrolytes  · Continue insulin and IV fluid infusion  · Dr. Marlo Servin and Pharmacy consulted, follow recommendations regarding bridging   · Follow Lipid panel, Hb A1c and micro albumin creatinine ratio   · Strict bed rest and keep NPO  · Started on Thiamine and folate    · Monitor for withdrawal symptoms · Nicotine patch    · Consider lisinopril     WEAN PER PROTOCOL:  [] No   [x] Yes  [] N/A    ICU PROPHYLAXIS:  Stress ulcer:  [x] PPI Agent  [] [id number] [] Sucralfate  [] Other:  VTE:   [x] Enoxaparin  [] Unfract.  Heparin Subcut  [] EPC Cuffs    NUTRITION:  [x] NPO [] Tube Feeding (Specify: ) [] TPN  [] PO (Diet: Diet NPO)    INSULIN DRIP:   [] No   [x] Yes    CONSULTATION NEEDED:   [] No   [x] Yes    FAMILY UPDATED:    [x] No   [] Yes    TRANSFER OUT OF ICU:   [x] No   [] Yes    Galo Dobbins MD, MD PGY-2  Attending Physician: Dr. Luis Enrique Barrientos  9/27/2021, 8:13 AM

## 2021-09-26 NOTE — ED NOTES
Pt asked why he is not on an insulin drip, and why we are just giving him fluids. Pt also states that he is in DKA and come in to the ER in DKA because his sugar was over 500. I tried to educate the pt about anion gap and lab values that help to determine DKA, and also tried to explain to the pt that there are protocols we are following. I tried to explain the treatment plan of giving fluids and repeating blood work, but the pt was irritated and insisted I was wrong.        Isabel Beal RN  09/26/21 [id number]

## 2021-09-26 NOTE — ED PROVIDER NOTES
Chief Complaint   Patient presents with    Hyperglycemia     hx DKA, [id number], states he has a insulin pump       Patient is a 49year old male with a PMH of diabetes on an insulin pump who presents for hyperglycemia, nausea and vomiting. States symptoms started this morning, and have continued to progress. He checked his sugar this morning and noticed it was >400. He arrives via EMS. Has not tried any medications for these symptoms. Symptoms are not better or worse by anything in specific. Has be admitted for DKA in the past.  Denies chest pain, shortness of breath, cough or fevers. The history is provided by the patient. No  was used. Review of Systems   Constitutional: Negative for chills and fever. HENT: Negative for ear pain, sinus pressure and sore throat. Eyes: Negative for pain, discharge and redness. Respiratory: Negative for cough, shortness of breath and wheezing. Cardiovascular: Negative for chest pain. Gastrointestinal: Positive for nausea and vomiting. Negative for abdominal pain and diarrhea. Genitourinary: Negative for dysuria and frequency. Musculoskeletal: Negative for arthralgias and back pain. Skin: Negative for rash and wound. Neurological: Negative for weakness and headaches. Hematological: Negative for adenopathy. All other systems reviewed and are negative. Physical Exam  Vitals and nursing note reviewed. Constitutional:       General: He is not in acute distress. Appearance: Normal appearance. He is well-developed. He is not ill-appearing, toxic-appearing or diaphoretic. HENT:      Head: Normocephalic and atraumatic. Mouth/Throat:      Mouth: Mucous membranes are dry. Eyes:      Pupils: Pupils are equal, round, and reactive to light. Cardiovascular:      Rate and Rhythm: Regular rhythm. Tachycardia present. Pulses: Normal pulses. Heart sounds: Normal heart sounds. No murmur heard.      Pulmonary: Effort: Pulmonary effort is normal. No respiratory distress. Breath sounds: Normal breath sounds. No wheezing or rales. Abdominal:      General: Bowel sounds are normal.      Palpations: Abdomen is soft. Tenderness: There is no abdominal tenderness. There is no guarding or rebound. Musculoskeletal:      Cervical back: Normal range of motion and neck supple. No rigidity or tenderness. Skin:     General: Skin is warm and dry. Capillary Refill: Capillary refill takes less than 2 seconds. Neurological:      General: No focal deficit present. Mental Status: He is alert and oriented to person, place, and time. Cranial Nerves: No cranial nerve deficit. Sensory: No sensory deficit. Motor: No weakness.       Coordination: Coordination normal.          Procedures     Labs Reviewed   CBC WITH AUTO DIFFERENTIAL - Abnormal; Notable for the following components:       Result Value    Platelets 910 (*)     Lymphocytes % 12.3 (*)     Neutrophils Absolute 9.04 (*)     Lymphocytes Absolute 1.42 (*)     All other components within normal limits   BETA-HYDROXYBUTYRATE - Abnormal; Notable for the following components:    Beta-Hydroxybutyrate >4.50 (*)     All other components within normal limits    Narrative:     CALL  Clements  H34 tel. ,  Rejected Test Name/Called to: adrienne/ attila wayne rn, 09/26/2021 08:48, by  [facility]   PH, VENOUS - Abnormal; Notable for the following components:    pH, Gage 7.29 (*)     All other components within normal limits   URINE DRUG SCREEN - Abnormal; Notable for the following components:    Cannabinoid Scrn, Ur POSITIVE (*)     All other components within normal limits   SERUM DRUG SCREEN - Abnormal; Notable for the following components:    Acetaminophen Level <5.0 (*)     All other components within normal limits    Narrative:     CALL  Clements  H34 tel. ,  Rejected Test Name/Called to: troyx/ attila wayne rn, 09/26/2021 08:48, by  Nathaniel Ramos   URINALYSIS - Abnormal; Notable for the following components:    Glucose, Ur 500 (*)     Bilirubin Urine MODERATE (*)     Blood, Urine SMALL (*)     Protein, UA 30 (*)     All other components within normal limits   COMPREHENSIVE METABOLIC PANEL W/ REFLEX TO MG FOR LOW K - Abnormal; Notable for the following components:    Chloride 88 (*)     CO2 21 (*)     Anion Gap 26 (*)     Glucose 301 (*)     BUN 27 (*)     CREATININE 1.3 (*)     Total Bilirubin 1.4 (*)     Alkaline Phosphatase 134 (*)     All other components within normal limits   BASIC METABOLIC PANEL - Abnormal; Notable for the following components:    Chloride 94 (*)     CO2 12 (*)     Anion Gap 31 (*)     Glucose 291 (*)     BUN 23 (*)     Calcium 8.2 (*)     All other components within normal limits   POCT GLUCOSE - Abnormal; Notable for the following components:    Meter Glucose 459 (*)     All other components within normal limits   POCT VENOUS - Abnormal; Notable for the following components:    POC Sodium 124 (*)     POC Potassium 5.8 (*)     POC Chloride 98 (*)     POC Glucose 421 (*)     POC Creatinine 1.6 (*)     All other components within normal limits   POCT GLUCOSE - Abnormal; Notable for the following components:    Meter Glucose 305 (*)     All other components within normal limits   POCT GLUCOSE - Normal   COVID-19, RAPID   LACTIC ACID, PLASMA   LIPASE    Narrative:     CALL  Clements  H34 tel. ,  Rejected Test Name/Called to: ginny wayne rn, 09/26/2021 08:48, by  Pop Lamar   MAGNESIUM    Narrative:     CALL  Clements  4 tel. ,  Rejected Test Name/Called to: ginny wayne rn, 09/26/2021 08:48, by  Pop Lamar   SPECIMEN REJECTION    Narrative:     CALL  Clements  4 tel. ,  Rejected Test Name/Called to: ginny wayne rn, 09/26/2021 08:48, by  Pop Lamar   MICROSCOPIC URINALYSIS   POC CHEMISTRY (NA,K,CL,GLU,CREAT)   POCT GLUCOSE   POCT GLUCOSE   POCT GLUCOSE   POCT GLUCOSE   POCT GLUCOSE   POCT GLUCOSE   POCT GLUCOSE   POCT GLUCOSE   POCT GLUCOSE   POCT GLUCOSE POCT GLUCOSE   POCT GLUCOSE   POCT GLUCOSE     XR CHEST PORTABLE   Final Result   No acute process. EKG #1:  I personally interpreted this EKG  Time:  0753    Rate: 120  Rhythm: Sinus. Interpretation: sinus tachycardia, normal axis, no ST elevation. Similar to previous. MDM  Number of Diagnoses or Management Options  Diabetic ketoacidosis without coma associated with type 1 diabetes mellitus ([facility].)  Diagnosis management comments: Patient is a 76-year-old male with history of diabetes with insulin pump presents today for nausea, vomiting and elevated blood sugar at home. Patient noted to have high blood sugar on arrival.  Vitals are stable on arrival.  Labs and imaging were obtained. Glucose noted to be 301, anion gap 26, bicarb 21. Beta hydroxy greater than 4.5. Venous pH 7.29. Electrolytes are within normal limits. Patient was given IV fluids, insulin bolus, repeat metabolic panel shows no only mild improvement of glucose of 291, however worsening of the anion gap to 31 and bicarb of 12. At this time patient was started on insulin drip, patient will be admitted to the hospital for DKA. Remainder of labs are within normal limits. Amount and/or Complexity of Data Reviewed  Clinical lab tests: reviewed                  --------------------------------------------- PAST HISTORY ---------------------------------------------  Past Medical History:  has a past medical history of Alcoholism ([facility] 75.), Cocaine abuse ([facility] 75.), Diabetes mellitus ([facility].), Hypertension, Idiopathic peripheral neuropathy, Lacunar stroke ([facility].), and Marijuana abuse. Past Surgical History:  has no past surgical history on file. Social History:  reports that he has been smoking cigarettes. He has a 30.00 pack-year smoking history. He has never used smokeless tobacco. He reports current alcohol use of about 5.0 standard drinks of alcohol per week. He reports current drug use. Drug: Marijuana.     Family History: family history includes Cancer in his maternal grandmother; Diabetes type 2  in his mother and nephew. The patients home medications have been reviewed.     Allergies: Metoprolol and No known allergies    -------------------------------------------------- RESULTS -------------------------------------------------    LABS:  Results for orders placed or performed during the hospital encounter of 09/26/21   COVID-19, Rapid    Specimen: Nasopharyngeal Swab   Result Value Ref Range    SARS-CoV-2, NAAT Not Detected Not Detected   CBC Auto Differential   Result Value Ref Range    WBC 11.5 4.5 - 11.5 E9/L    RBC 5.39 3.80 - 5.80 E12/L    Hemoglobin 16.5 12.5 - 16.5 g/dL    Hematocrit 48.7 37.0 - 54.0 %    MCV 90.4 80.0 - 99.9 fL    MCH 30.6 26.0 - 35.0 pg    MCHC 33.9 32.0 - 34.5 %    RDW 14.2 11.5 - 15.0 fL    Platelets 041 (H) 356 - 450 E9/L    MPV 8.4 7.0 - 12.0 fL    Neutrophils % 78.6 43.0 - 80.0 %    Immature Granulocytes % 0.6 0.0 - 5.0 %    Lymphocytes % 12.3 (L) 20.0 - 42.0 %    Monocytes % 7.2 2.0 - 12.0 %    Eosinophils % 1.0 0.0 - 6.0 %    Basophils % 0.3 0.0 - 2.0 %    Neutrophils Absolute 9.04 (H) 1.80 - 7.30 E9/L    Immature Granulocytes # 0.07 E9/L    Lymphocytes Absolute 1.42 (L) 1.50 - 4.00 E9/L    Monocytes Absolute 0.83 0.10 - 0.95 E9/L    Eosinophils Absolute 0.11 0.05 - 0.50 E9/L    Basophils Absolute 0.04 0.00 - 0.20 E9/L   Lactic Acid, Plasma   Result Value Ref Range    Lactic Acid 1.6 0.5 - 2.2 mmol/L   Beta-Hydroxybutyrate   Result Value Ref Range    Beta-Hydroxybutyrate >4.50 (H) 0.02 - 0.27 mmol/L   pH, venous   Result Value Ref Range    pH, Gage 7.29 (L) 7.35 - 7.45   Lipase   Result Value Ref Range    Lipase 30 13 - 60 U/L   Magnesium   Result Value Ref Range    Magnesium 2.5 1.6 - 2.6 mg/dL   Urine Drug Screen   Result Value Ref Range    Amphetamine Screen, Urine NOT DETECTED Negative <1000 ng/mL    Barbiturate Screen, Ur NOT DETECTED Negative < 200 ng/mL    Benzodiazepine Screen, Urine NOT Ref Range    Sodium 137 132 - 146 mmol/L    Potassium 4.4 3.5 - 5.0 mmol/L    Chloride 94 (L) 98 - 107 mmol/L    CO2 12 (L) 22 - 29 mmol/L    Anion Gap 31 (H) 7 - 16 mmol/L    Glucose 291 (H) 74 - 99 mg/dL    BUN 23 (H) 6 - 20 mg/dL    CREATININE 1.1 0.7 - 1.2 mg/dL    GFR Non-African American >60 >=60 mL/min/1.73    GFR African American >60     Calcium 8.2 (L) 8.6 - 10.2 mg/dL   Microscopic Urinalysis   Result Value Ref Range    Hyaline Casts, UA 0-2 0 - 2 /LPF    WBC, UA 0-1 0 - 5 /HPF    RBC, UA 0-1 0 - 2 /HPF    Bacteria, UA NONE SEEN None Seen /HPF   POCT Glucose   Result Value Ref Range    Glucose 459 mg/dL    QC OK? yes    POCT Glucose   Result Value Ref Range    Meter Glucose 459 (H) 74 - 99 mg/dL   POCT Venous   Result Value Ref Range    POC Sodium 124 (L) 132 - 146 mmol/L    POC Potassium 5.8 (H) 3.5 - 5.0 mmol/L    POC Chloride 98 (L) 100 - 108 mmol/L    POC Glucose 421 (H) 74 - 99 mg/dl    POC Creatinine 1.6 (H) 0.7 - 1.2 mg/dL    GFR Non-African American 46 >=60 mL/min/1.73    GFR  56     Performed on SEE BELOW    POCT Glucose   Result Value Ref Range    Meter Glucose 305 (H) 74 - 99 mg/dL   EKG 12 Lead   Result Value Ref Range    Ventricular Rate 120 BPM    Atrial Rate 120 BPM    P-R Interval 124 ms    QRS Duration 86 ms    Q-T Interval 342 ms    QTc Calculation (Bazett) 483 ms    P Axis 72 degrees    R Axis 70 degrees    T Axis 27 degrees       RADIOLOGY:  XR CHEST PORTABLE   Final Result   No acute process. ------------------------- NURSING NOTES AND VITALS REVIEWED ---------------------------  Date / Time Roomed:  9/26/2021  7:42 AM  ED Bed Assignment:  08/08    The nursing notes within the ED encounter and vital signs as below have been reviewed.      Patient Vitals for the past 24 hrs:   BP Temp Pulse Resp SpO2 Height Weight   09/26/21 0738 (!) 131/106 98 °F (36.7 °C) 124 20 100 % 5' 6\" (1.676 m) 150 lb (68 kg)       Oxygen Saturation Interpretation: Normal    ------------------------------------------ PROGRESS NOTES ------------------------------------------    Counseling:  I have spoken with the patient and discussed todays results, in addition to providing specific details for the plan of care and counseling regarding the diagnosis and prognosis. Their questions are answered at this time and they are agreeable with the plan of admission.    --------------------------------- ADDITIONAL PROVIDER NOTES ---------------------------------  Consultations:  Spoke with On Call. Discussed case. They will admit the patient. This patient's ED course included: a personal history and physicial examination    This patient has remained hemodynamically stable during their ED course. Medications   glucose (GLUTOSE) 40 % oral gel 15 g (has no administration in time range)   dextrose 50 % IV solution (has no administration in time range)   glucagon (rDNA) injection 1 mg (has no administration in time range)   dextrose 5 % solution (has no administration in time range)   insulin regular (HUMULIN R;NOVOLIN R) 100 Units in sodium chloride 0.9 % 100 mL infusion (has no administration in time range)   0.9 % sodium chloride bolus (0 mLs IntraVENous Stopped 9/26/21 1318)   ondansetron (ZOFRAN) injection 4 mg (4 mg IntraVENous Given 9/26/21 0817)   insulin regular (HUMULIN R;NOVOLIN R) injection 7 Units (7 Units IntraVENous Given 9/26/21 0816)   sodium bicarbonate 8.4 % injection 50 mEq (50 mEq IntraVENous Given 9/26/21 0817)   calcium gluconate 1000 mg in dextrose 5% 100 mL IVPB (0 mg IntraVENous Stopped 9/26/21 0858)   0.9 % sodium chloride bolus (0 mLs IntraVENous Stopped 9/26/21 1318)         Diagnosis:  1. Diabetic ketoacidosis without coma associated with type 1 diabetes mellitus ([facility]        Disposition:  Patient's disposition: Admit to CCU/ICU  Patient's condition is stable.              Ronald West DO  Resident  09/26/21 0825

## 2021-09-27 LAB
ANION GAP SERPL CALCULATED.3IONS-SCNC: 10 MMOL/L (ref 7–16)
ANION GAP SERPL CALCULATED.3IONS-SCNC: 14 MMOL/L (ref 7–16)
ANION GAP SERPL CALCULATED.3IONS-SCNC: 14 MMOL/L (ref 7–16)
ANION GAP SERPL CALCULATED.3IONS-SCNC: 18 MMOL/L (ref 7–16)
ANION GAP SERPL CALCULATED.3IONS-SCNC: 9 MMOL/L (ref 7–16)
BASOPHILS ABSOLUTE: 0.05 E9/L (ref 0–0.2)
BASOPHILS RELATIVE PERCENT: 0.5 % (ref 0–2)
BUN BLDV-MCNC: 13 MG/DL (ref 6–20)
BUN BLDV-MCNC: 15 MG/DL (ref 6–20)
BUN BLDV-MCNC: 15 MG/DL (ref 6–20)
BUN BLDV-MCNC: 16 MG/DL (ref 6–20)
BUN BLDV-MCNC: 17 MG/DL (ref 6–20)
CALCIUM SERPL-MCNC: 8.3 MG/DL (ref 8.6–10.2)
CALCIUM SERPL-MCNC: 8.4 MG/DL (ref 8.6–10.2)
CALCIUM SERPL-MCNC: 8.4 MG/DL (ref 8.6–10.2)
CALCIUM SERPL-MCNC: 8.8 MG/DL (ref 8.6–10.2)
CALCIUM SERPL-MCNC: 9.4 MG/DL (ref 8.6–10.2)
CHLORIDE BLD-SCNC: 100 MMOL/L (ref 98–107)
CHLORIDE BLD-SCNC: 100 MMOL/L (ref 98–107)
CHLORIDE BLD-SCNC: 103 MMOL/L (ref 98–107)
CHLORIDE BLD-SCNC: 97 MMOL/L (ref 98–107)
CHLORIDE BLD-SCNC: 98 MMOL/L (ref 98–107)
CO2: 20 MMOL/L (ref 22–29)
CO2: 21 MMOL/L (ref 22–29)
CO2: 22 MMOL/L (ref 22–29)
CO2: 23 MMOL/L (ref 22–29)
CO2: 28 MMOL/L (ref 22–29)
CREAT SERPL-MCNC: 0.8 MG/DL (ref 0.7–1.2)
CREAT SERPL-MCNC: 0.8 MG/DL (ref 0.7–1.2)
CREAT SERPL-MCNC: 0.9 MG/DL (ref 0.7–1.2)
CREAT SERPL-MCNC: 0.9 MG/DL (ref 0.7–1.2)
CREAT SERPL-MCNC: 1 MG/DL (ref 0.7–1.2)
EKG ATRIAL RATE: 84 BPM
EKG ATRIAL RATE: 85 BPM
EKG P AXIS: 47 DEGREES
EKG P AXIS: 58 DEGREES
EKG P-R INTERVAL: 118 MS
EKG P-R INTERVAL: 134 MS
EKG Q-T INTERVAL: 358 MS
EKG Q-T INTERVAL: 394 MS
EKG QRS DURATION: 74 MS
EKG QRS DURATION: 86 MS
EKG QTC CALCULATION (BAZETT): 423 MS
EKG QTC CALCULATION (BAZETT): 468 MS
EKG R AXIS: 14 DEGREES
EKG R AXIS: 37 DEGREES
EKG T AXIS: -65 DEGREES
EKG T AXIS: 1 DEGREES
EKG VENTRICULAR RATE: 84 BPM
EKG VENTRICULAR RATE: 85 BPM
EOSINOPHILS ABSOLUTE: 0.29 E9/L (ref 0.05–0.5)
EOSINOPHILS RELATIVE PERCENT: 2.8 % (ref 0–6)
GFR AFRICAN AMERICAN: >60
GFR NON-AFRICAN AMERICAN: >60 ML/MIN/1.73
GLUCOSE BLD-MCNC: 155 MG/DL (ref 74–99)
GLUCOSE BLD-MCNC: 190 MG/DL (ref 74–99)
GLUCOSE BLD-MCNC: 195 MG/DL (ref 74–99)
GLUCOSE BLD-MCNC: 239 MG/DL (ref 74–99)
GLUCOSE BLD-MCNC: 262 MG/DL (ref 74–99)
HCT VFR BLD CALC: 36.4 % (ref 37–54)
HEMOGLOBIN: 12.3 G/DL (ref 12.5–16.5)
IMMATURE GRANULOCYTES #: 0.05 E9/L
IMMATURE GRANULOCYTES %: 0.5 % (ref 0–5)
LYMPHOCYTES ABSOLUTE: 1.9 E9/L (ref 1.5–4)
LYMPHOCYTES RELATIVE PERCENT: 18.4 % (ref 20–42)
MAGNESIUM: 1.8 MG/DL (ref 1.6–2.6)
MAGNESIUM: 1.8 MG/DL (ref 1.6–2.6)
MAGNESIUM: 1.9 MG/DL (ref 1.6–2.6)
MAGNESIUM: 1.9 MG/DL (ref 1.6–2.6)
MCH RBC QN AUTO: 30.4 PG (ref 26–35)
MCHC RBC AUTO-ENTMCNC: 33.8 % (ref 32–34.5)
MCV RBC AUTO: 90.1 FL (ref 80–99.9)
METER GLUCOSE: 141 MG/DL (ref 74–99)
METER GLUCOSE: 150 MG/DL (ref 74–99)
METER GLUCOSE: 152 MG/DL (ref 74–99)
METER GLUCOSE: 152 MG/DL (ref 74–99)
METER GLUCOSE: 168 MG/DL (ref 74–99)
METER GLUCOSE: 173 MG/DL (ref 74–99)
METER GLUCOSE: 176 MG/DL (ref 74–99)
METER GLUCOSE: 179 MG/DL (ref 74–99)
METER GLUCOSE: 181 MG/DL (ref 74–99)
METER GLUCOSE: 183 MG/DL (ref 74–99)
METER GLUCOSE: 210 MG/DL (ref 74–99)
METER GLUCOSE: 252 MG/DL (ref 74–99)
METER GLUCOSE: 266 MG/DL (ref 74–99)
MONOCYTES ABSOLUTE: 1.25 E9/L (ref 0.1–0.95)
MONOCYTES RELATIVE PERCENT: 12.1 % (ref 2–12)
NEUTROPHILS ABSOLUTE: 6.81 E9/L (ref 1.8–7.3)
NEUTROPHILS RELATIVE PERCENT: 65.7 % (ref 43–80)
PDW BLD-RTO: 14.3 FL (ref 11.5–15)
PHOSPHORUS: 1.7 MG/DL (ref 2.5–4.5)
PHOSPHORUS: 1.7 MG/DL (ref 2.5–4.5)
PHOSPHORUS: 1.8 MG/DL (ref 2.5–4.5)
PHOSPHORUS: 3.1 MG/DL (ref 2.5–4.5)
PLATELET # BLD: 383 E9/L (ref 130–450)
PMV BLD AUTO: 8.7 FL (ref 7–12)
POTASSIUM SERPL-SCNC: 3.5 MMOL/L (ref 3.5–5)
POTASSIUM SERPL-SCNC: 3.7 MMOL/L (ref 3.5–5)
POTASSIUM SERPL-SCNC: 3.7 MMOL/L (ref 3.5–5)
POTASSIUM SERPL-SCNC: 3.8 MMOL/L (ref 3.5–5)
POTASSIUM SERPL-SCNC: 4.2 MMOL/L (ref 3.5–5)
RBC # BLD: 4.04 E12/L (ref 3.8–5.8)
SODIUM BLD-SCNC: 133 MMOL/L (ref 132–146)
SODIUM BLD-SCNC: 135 MMOL/L (ref 132–146)
SODIUM BLD-SCNC: 135 MMOL/L (ref 132–146)
SODIUM BLD-SCNC: 136 MMOL/L (ref 132–146)
SODIUM BLD-SCNC: 138 MMOL/L (ref 132–146)
TROPONIN, HIGH SENSITIVITY: 37 NG/L (ref 0–11)
WBC # BLD: 10.4 E9/L (ref 4.5–11.5)

## 2021-09-27 PROCEDURE — 99232 SBSQ HOSP IP/OBS MODERATE 35: CPT | Performed by: INTERNAL MEDICINE

## 2021-09-27 PROCEDURE — 84100 ASSAY OF PHOSPHORUS: CPT

## 2021-09-27 PROCEDURE — 93010 ELECTROCARDIOGRAM REPORT: CPT | Performed by: INTERNAL MEDICINE

## 2021-09-27 PROCEDURE — 2060000000 HC ICU INTERMEDIATE R&B

## 2021-09-27 PROCEDURE — 6370000000 HC RX 637 (ALT 250 FOR IP): Performed by: STUDENT IN AN ORGANIZED HEALTH CARE EDUCATION/TRAINING PROGRAM

## 2021-09-27 PROCEDURE — 84484 ASSAY OF TROPONIN QUANT: CPT

## 2021-09-27 PROCEDURE — 2580000003 HC RX 258: Performed by: STUDENT IN AN ORGANIZED HEALTH CARE EDUCATION/TRAINING PROGRAM

## 2021-09-27 PROCEDURE — 6370000000 HC RX 637 (ALT 250 FOR IP): Performed by: INTERNAL MEDICINE

## 2021-09-27 PROCEDURE — 80048 BASIC METABOLIC PNL TOTAL CA: CPT

## 2021-09-27 PROCEDURE — 82962 GLUCOSE BLOOD TEST: CPT

## 2021-09-27 PROCEDURE — 85025 COMPLETE CBC W/AUTO DIFF WBC: CPT

## 2021-09-27 PROCEDURE — 6360000002 HC RX W HCPCS: Performed by: INTERNAL MEDICINE

## 2021-09-27 PROCEDURE — 99233 SBSQ HOSP IP/OBS HIGH 50: CPT | Performed by: INTERNAL MEDICINE

## 2021-09-27 PROCEDURE — 2580000003 HC RX 258: Performed by: INTERNAL MEDICINE

## 2021-09-27 PROCEDURE — 99254 IP/OBS CNSLTJ NEW/EST MOD 60: CPT | Performed by: INTERNAL MEDICINE

## 2021-09-27 PROCEDURE — 2500000003 HC RX 250 WO HCPCS: Performed by: INTERNAL MEDICINE

## 2021-09-27 PROCEDURE — 83735 ASSAY OF MAGNESIUM: CPT

## 2021-09-27 PROCEDURE — 36415 COLL VENOUS BLD VENIPUNCTURE: CPT

## 2021-09-27 PROCEDURE — 93005 ELECTROCARDIOGRAM TRACING: CPT | Performed by: INTERNAL MEDICINE

## 2021-09-27 RX ORDER — INSULIN GLARGINE 100 [IU]/ML
25 INJECTION, SOLUTION SUBCUTANEOUS ONCE
Status: COMPLETED | OUTPATIENT
Start: 2021-09-27 | End: 2021-09-27

## 2021-09-27 RX ADMIN — INSULIN LISPRO 6 UNITS: 100 INJECTION, SOLUTION INTRAVENOUS; SUBCUTANEOUS at 17:37

## 2021-09-27 RX ADMIN — INSULIN LISPRO 5 UNITS: 100 INJECTION, SOLUTION INTRAVENOUS; SUBCUTANEOUS at 17:37

## 2021-09-27 RX ADMIN — ENOXAPARIN SODIUM 40 MG: 40 INJECTION SUBCUTANEOUS at 08:21

## 2021-09-27 RX ADMIN — POTASSIUM PHOSPHATE, MONOBASIC AND POTASSIUM PHOSPHATE, DIBASIC 20 MMOL: 224; 236 INJECTION, SOLUTION, CONCENTRATE INTRAVENOUS at 05:00

## 2021-09-27 RX ADMIN — INSULIN LISPRO 3 UNITS: 100 INJECTION, SOLUTION INTRAVENOUS; SUBCUTANEOUS at 20:23

## 2021-09-27 RX ADMIN — INSULIN GLARGINE 25 UNITS: 100 INJECTION, SOLUTION SUBCUTANEOUS at 08:42

## 2021-09-27 RX ADMIN — INSULIN LISPRO 4 UNITS: 100 INJECTION, SOLUTION INTRAVENOUS; SUBCUTANEOUS at 12:15

## 2021-09-27 RX ADMIN — METOPROLOL TARTRATE 12.5 MG: 25 TABLET, FILM COATED ORAL at 12:09

## 2021-09-27 RX ADMIN — ASPIRIN 81 MG: 81 TABLET, COATED ORAL at 08:20

## 2021-09-27 RX ADMIN — METOPROLOL TARTRATE 12.5 MG: 25 TABLET, FILM COATED ORAL at 20:23

## 2021-09-27 RX ADMIN — Medication 100 MG: at 08:22

## 2021-09-27 RX ADMIN — SODIUM PHOSPHATE, MONOBASIC, MONOHYDRATE AND SODIUM PHOSPHATE, DIBASIC, ANHYDROUS 20 MMOL: 276; 142 INJECTION, SOLUTION INTRAVENOUS at 16:29

## 2021-09-27 RX ADMIN — Medication 1000 UNITS: at 08:22

## 2021-09-27 RX ADMIN — INSULIN LISPRO 1 UNITS: 100 INJECTION, SOLUTION INTRAVENOUS; SUBCUTANEOUS at 08:43

## 2021-09-27 RX ADMIN — SODIUM CHLORIDE 1.62 UNITS/HR: 9 INJECTION, SOLUTION INTRAVENOUS at 04:00

## 2021-09-27 RX ADMIN — ATORVASTATIN CALCIUM 80 MG: 80 TABLET, FILM COATED ORAL at 20:23

## 2021-09-27 RX ADMIN — FOLIC ACID 1 MG: 1 TABLET ORAL at 08:20

## 2021-09-27 ASSESSMENT — PAIN SCALES - GENERAL
PAINLEVEL_OUTOF10: 0

## 2021-09-27 NOTE — CONSULTS
ENDOCRINOLOGY INITIAL CONSULTATION NOTE      Date of admission: 9/26/2021  Date of service: 9/27/2021  Admitting physician: Stephane Grande DO   Primary Care Physician: Orion Ernst MD  Consultant physician: Autumn Lo MD       Reason for the consultation:  Uncontrolled DM    History of Present Illness: The history is provided by the patient. Accuracy of the patient data is excellent    Danny Romano is a very pleasant 49 y.o. old male with PMH of Type 1 Diabetes mellitus on [employer] insulin pump, HTN, HLD, Marijuana abuse, Remote Hx of Lacunar stroke and others listed below who presented to the ED for abdominal pain. He was admitted to [facility] on 9/26/2021 because of hyperglycemia and DKA, endocrine team was consulted for diabetes management. Admission Labs: , AG 26, Creatinine 1.3, K 4.1, HCO2 21 BHB >4.5     Prior to admission  The patient was diagnosed with type 1 DM at the age 30. Prior to admission patient was on an insulin pump with the following pump settings: Basal rate 12 A 1.35 U/H and 8A 1.45 /H Patient has had no hypoglycemic episodes. Patient has not been eating consistent carbohydrate meals, self-blood glucose monitoring has been above goal prior to admission. In addition, patient denied macrovascular or microvascular complications. He has missed his follow ups as well and has had issues with his insurance and obtaining insulin. Lab Results   Component Value Date    LABA1C 9.8 09/26/2021     His diabetes is also complicated by neuropathy and retinopathy s/p laser therapy.  Macrovascular complications (+) TIA 4/5549, no hx of CAD, PVD      After admission   While hospitalized, anti-diabetic regiment includes insulin drip as per DKA protocol     Inpatient diet:   Carb control diet - bridged to Lantus 25, 5 units humalog premeals and LDSS     Point of care glucose monitoring (Independently reviewed)   Recent Labs     09/27/21  0113 09/27/21  0204 09/27/21  0309 09/27/21  0405 09/27/21  0501 09/27/21  0613 09/27/21  0718 09/27/21  0827   GLUMET 176* 173* 152* 141* 152* 150* 168* 183*       Past medical history:   Past Medical History:   Diagnosis Date    Alcoholism ([facility].)     Cocaine abuse ([facility].)     Diabetes mellitus ([facility].)     Hypertension     Idiopathic peripheral neuropathy 9/21/2016    Lacunar stroke ([facility]     Marijuana abuse        Past surgical history:  History reviewed. No pertinent surgical history. Social history:   Tobacco:   reports that he has been smoking cigarettes. He has a 30.00 pack-year smoking history. He has never used smokeless tobacco.  Alcohol:   reports current alcohol use of about 5.0 standard drinks of alcohol per week. Drugs:   reports current drug use. Drug: Marijuana. Family history:    Family History   Problem Relation Age of Onset    Diabetes type 2  Mother     Cancer Maternal Grandmother     Diabetes type 2  Nephew        Allergy and drug reactions:    Allergies   Allergen Reactions    Metoprolol      bradycardia    No Known Allergies        Scheduled Meds:   potassium phosphate IVPB  20 mmol IntraVENous Once    insulin lispro  0-6 Units SubCUTAneous TID WC    insulin lispro  0-3 Units SubCUTAneous Nightly    insulin lispro  5 Units SubCUTAneous 4x Daily AC & HS    aspirin  81 mg Oral Daily    atorvastatin  80 mg Oral Nightly    folic acid  1 mg Oral Daily    vitamin D  1,000 Units Oral Daily    thiamine mononitrate  100 mg Oral Daily    enoxaparin  40 mg SubCUTAneous Daily    nicotine  1 patch TransDERmal Daily    sodium chloride flush         PRN Meds:   glucose, 15 g, PRN  dextrose, 12.5 g, PRN  glucagon (rDNA), 1 mg, PRN  dextrose, 100 mL/hr, PRN  dextrose, 12.5 g, PRN  potassium chloride, 10 mEq, PRN  magnesium sulfate, 1,000 mg, PRN  sodium phosphate IVPB, 10 mmol, PRN   Or  sodium phosphate IVPB, 15 mmol, PRN   Or  sodium phosphate IVPB, 20 mmol, PRN  polyethylene glycol, 17 g, Daily PRN  dextrose 5 % and 0.45 % NaCl, , Continuous PRN      Continuous Infusions:   dextrose      insulin 3.69 Units/hr (09/27/21 0828)    sodium chloride      dextrose 5 % and 0.45 % NaCl 75 mL/hr at 09/27/21 4035       Review of Systems  All systems reviewed. All negative except for symptoms mentioned in HPI     Constitutional: No fever, no chills, no diaphoresis, no generalized weakness. HEENT: No blurred vision, No sore throat, no ear pain, no hair loss  Neck: denied any neck swelling, difficulty swallowing,   Cardio-pulmonary: No CP, SOB or palpitation, No orthopnea or PND. No cough or wheezing. GI: No N/V/D, no constipation, No abdominal pain, no melena or hematochezia   : Denied any dysuria, hematuria, flank pain, discharge, or incontinence. Skin: denied any rash, ulcer, or hyperpigmentation. MSK: denied any joint deformity, joint pain/swelling, muscle pain, or back pain. Neuro: no numbness, no tingling, no weakness, _    OBJECTIVE    BP (!) 156/97   Pulse 82   Temp 98.5 °F (36.9 °C) (Temporal)   Resp 13   Ht 5' 6\" (1.676 m)   Wt 150 lb (68 kg)   SpO2 98%   BMI 24.21 kg/m²     Intake/Output Summary (Last 24 hours) at 9/27/2021 0919  Last data filed at 9/27/2021 0600  Gross per 24 hour   Intake 5336 ml   Output 400 ml   Net 4936 ml       Physical examination:  General: awake alert, oriented x3, no abnormal position or movements. HEENT: normocephalic non-traumatic, no exophthalmos   Neck: supple, no LN enlargement, no thyromegaly, no thyroid tenderness, no JVD. Pulm: Clear equal air entry no added sounds, no wheezing or rhonchi    CVS: S1 + S2, no murmur, no heave  Abd: soft lax, no tenderness, no organomegaly, audible bowel sounds. Skin: warm, no lesions, no rash. Feet: sensory exam of the feet is present, tested with the monofilament. No ulcers or open wounds.  Good peripheral pulses  Neuro: CN intact, muscle power normal  Psych: normal mood, and affect    Review of Laboratory Data:  I personally reviewed the following labs:   Recent Labs     09/26/21  0802 09/27/21  0453   WBC 11.5 10.4   RBC 5.39 4.04   HGB 16.5 12.3*   HCT 48.7 36.4*   MCV 90.4 90.1   MCH 30.6 30.4   MCHC 33.9 33.8   RDW 14.2 14.3   * 383   MPV 8.4 8.7     Recent Labs     09/26/21  0949 09/26/21  1312 09/27/21  0120 09/27/21  0453 09/27/21  0747      < > 133 136 138   K 4.1   < > 3.7 3.5 3.8   CL 88*   < > 100 100 103   CO2 21*   < > 23 22 21*   BUN 27*   < > 17 16 15   CREATININE 1.3*   < > 0.9 0.8 0.8   GLUCOSE 301*   < > 195* 155* 190*   CALCIUM 9.2   < > 8.3* 8.4* 8.4*   PROT 6.7  --   --   --   --    LABALBU 4.4  --   --   --   --    BILITOT 1.4*  --   --   --   --    ALKPHOS 134*  --   --   --   --    AST 12  --   --   --   --    ALT 15  --   --   --   --     < > = values in this interval not displayed. Beta-Hydroxybutyrate   Date Value Ref Range Status   09/26/2021 >4.50 (H) 0.02 - 0.27 mmol/L Final   07/20/2020 >4.50 (H) 0.02 - 0.27 mmol/L Final   01/04/2020 >4.50 (H) 0.02 - 0.27 mmol/L Final     Lab Results   Component Value Date    LABA1C 9.8 09/26/2021    LABA1C 8.3 07/07/2021    LABA1C 10.2 02/15/2021     Lab Results   Component Value Date/Time    TSH 3.330 01/04/2020 05:07 PM    T4FREE 0.97 07/19/2012 02:00 AM     Lab Results   Component Value Date    LABA1C 9.8 09/26/2021    GLUCOSE 190 09/27/2021    GLUCOSE 83 04/11/2012    MALBCR 167.5 09/26/2021    LABMICR 132.3 09/26/2021    LABCREA 79 09/26/2021     Lab Results   Component Value Date    TRIG 266 09/26/2021    HDL 48 09/26/2021    LDLCALC 112 09/26/2021    CHOL 213 09/26/2021       Blood culture   Lab Results   Component Value Date    BC 5 Days no growth 07/21/2020    BC 5 Days- no growth 01/04/2020       Radiology:  XR CHEST PORTABLE   Final Result   No acute process.              Medical Records/Labs/Images review:   I personally reviewed and summarized previous records   All labs and imaging were reviewed independently     [address] Pending [address] to [facility] [employer] [facility], a 49 y.o.-old male seen today for inpatient diabetes management     Diabetes Mellitus type 1 on insulin pump   · Patient's diabetes is uncontrolled with A1c of 9.8%  · Admitted with DKA likely due to lack of insulin bolusing on his insulin pump    · Bridged at 8 AM with 25 units of Lantus   · We recommend the following diabetes regimen   · Lantus 25 U daily   · Humalog 5 U with meals  · Medium dose sliding scale with meals and at night   · Plan to Resume insulin pump tomorrow morning     · Continue glucose check with meals and at bedtime   · Will titrate insulin dose based on the blood glucose trend & insulin requirement  · Pt will follow with us after discharge. Endocrine follow up visit, Tuesday 9/12 at 8:00AM     The above issues were reviewed with the patient who understood and agreed with the plan. Thank you for allowing us to participate in the care of this patient. Please do not hesitate to contact us with any additional questions. Norma Mitchell MD   PGY-2, Internal Medicine     Case discussed with Dr. Jeri Blackwell MD  Endocrinologist, [facility]   [address] [place] [id number]   Phone: [phone]  Fax: [phone]  ---------------------------  An electronic signature was used to authenticate this note.  Genoveva Lamar MD on 9/27/2021 at 8:10 PM

## 2021-09-27 NOTE — PROGRESS NOTES
[facility]    Attending Physician Statement:  Jason Judd D.O. I have discussed the case, including pertinent history and exam findings with the resident. I agree with the assessment, plan and orders as documented by the resident. Patient here for routine follow up of medical problems. DKA: uncontrolled IDDM1: bridged with 25 U of insulin; Dr. Surya Lanier consulted for reinitiation of insulin pump prior to discharge; DKA likely 2/2 malfunctioning insulin pump vs increased insulin demands 2/2 dietary changes     HTN: evaluate for microalbuminuria prior to DC; may need to be started on ACE inhibitor or ARB; Alcohol Use Disorder: drinks 6 beers/day; will need to watch for withdrawal symptoms; on thiamine and folic acid     Remainder of medical problems as per resident note.

## 2021-09-27 NOTE — PROGRESS NOTES
[facility]  Internal Medicine Residency Program  Progress Note - House Team 1    Patient:  Garrett Wise 49 y.o. male MRN: [id number]     Date of Service: 9/27/2021     CC: DKA  Overnight events: No acute overnight event     Subjective     Margarette Duran is a 50 y/o male presenting today with DKA. Patient claims to be doing well today. He denied any N/V/D, constipation, abd pain, tremors, fevers, and CP. He does have issues w/ urinating, w/ an increased sense of urgency and low volume upon urinating. The patient stated that his last drink was five days ago and that he was having some SOB. He stated the episodes of SOB have been occurring for a while and that it is likely secondary to his smoking hx. Objective     Physical Exam:  · Vitals: BP (!) 166/108   Pulse 81   Temp 97.2 °F (36.2 °C) (Infrared)   Resp 23   Ht 5' 6\" (1.676 m)   Wt 150 lb (68 kg)   SpO2 97%   BMI 24.21 kg/m²     · I & O - 24hr: I/O this shift:  · In: -   · Out: 250 [Urine:250]   · General Appearance: alert, appears stated age and cooperative  · HEENT:  Head: Normocephalic, no lesions, without obvious abnormality. · Neck: supple, symmetrical, trachea midline and thyroid not enlarged, symmetric, no tenderness/mass/nodules  · Lung: wheezes LLL  · Heart: regular rate and rhythm, S1, S2 normal, no murmur, click, rub or gallop  · Abdomen: soft, non-tender; bowel sounds normal; no masses,  no organomegaly  · Extremities:  extremities normal, atraumatic, no cyanosis or edema  · Musculokeletal: No joint swelling, no muscle tenderness. ROM normal in all joints of extremities.    · Neurologic: Mental status: Alert, oriented, thought content appropriate  Subject  Pertinent Labs & Imaging Studies   chika  CBC with Differential:    Lab Results   Component Value Date    WBC 10.4 09/27/2021    RBC 4.04 09/27/2021    HGB 12.3 09/27/2021    HCT 36.4 09/27/2021     09/27/2021    MCV 90.1 09/27/2021    MCH 30.4 09/27/2021    MCHC 33.8 09/27/2021    RDW 14.3 09/27/2021    SEGSPCT 53 01/25/2014    SEGSPCT 86 10/11/2010    BANDSPCT 3 10/10/2010    METASPCT 1.7 07/20/2020    LYMPHOPCT 18.4 09/27/2021    MONOPCT 12.1 09/27/2021    MYELOPCT 2.6 01/04/2020    EOSPCT 2 10/12/2010    BASOPCT 0.5 09/27/2021    MONOSABS 1.25 09/27/2021    LYMPHSABS 1.90 09/27/2021    EOSABS 0.29 09/27/2021    BASOSABS 0.05 09/27/2021     CMP:    Lab Results   Component Value Date     09/27/2021    K 3.8 09/27/2021    K 4.1 09/26/2021     09/27/2021    CO2 21 09/27/2021    BUN 15 09/27/2021    CREATININE 0.8 09/27/2021    GFRAA >60 09/27/2021    LABGLOM >60 09/27/2021    GLUCOSE 190 09/27/2021    GLUCOSE 83 04/11/2012    PROT 6.7 09/26/2021    LABALBU 4.4 09/26/2021    LABALBU 4.3 12/30/2011    CALCIUM 8.4 09/27/2021    BILITOT 1.4 09/26/2021    ALKPHOS 134 09/26/2021    AST 12 09/26/2021    ALT 15 09/26/2021     BUN/Creatinine:    Lab Results   Component Value Date    BUN 15 09/27/2021    CREATININE 0.8 09/27/2021     Albumin:    Lab Results   Component Value Date    LABALBU 4.4 09/26/2021    LABALBU 4.3 12/30/2011     Magnesium:    Lab Results   Component Value Date    MG 1.9 09/27/2021     Phosphorus:    Lab Results   Component Value Date    PHOS 1.8 09/27/2021     Uric Acid:  No results found for: LABURIC, URICACID  Last 3 Troponin:    Lab Results   Component Value Date    TROPONINI <0.01 07/22/2020    TROPONINI <0.01 07/21/2020    TROPONINI 0.02 07/21/2020     U/A:    Lab Results   Component Value Date    COLORU Yellow 09/26/2021    PROTEINU 30 09/26/2021    PHUR 5.0 09/26/2021    LABCAST FEW 12/08/2019    WBCUA 0-1 09/26/2021    WBCUA NONE 04/09/2012    RBCUA 0-1 09/26/2021    RBCUA NONE 02/10/2014    MUCUS Present 10/18/2019    BACTERIA NONE SEEN 09/26/2021    CLARITYU Clear 09/26/2021    SPECGRAV >=1.030 09/26/2021    LEUKOCYTESUR Negative 09/26/2021    UROBILINOGEN 0.2 09/26/2021    BILIRUBINUR MODERATE 09/26/2021    BILIRUBINUR NEGATIVE 04/09/2012 BLOODU SMALL 09/26/2021    GLUCOSEU 500 09/26/2021    GLUCOSEU >=1000 04/09/2012    AMORPHOUS MODERATE 07/26/2019     VBG:    Lab Results   Component Value Date    PHVEN 7.29 09/26/2021     HgBA1c:    Lab Results   Component Value Date    LABA1C 9.8 09/26/2021     Microalbumen/Creatinine ratio:  No components found for: RUCREAT  IRON:    Lab Results   Component Value Date    IRON 108 02/12/2017     Iron Saturation:  No components found for: PERCENTFE  TIBC:    Lab Results   Component Value Date    TIBC 249 02/12/2017     FERRITIN:    Lab Results   Component Value Date    FERRITIN 4,160 02/12/2017     LIPASE:    Lab Results   Component Value Date    LIPASE 30 09/26/2021       Medical Student's Assessment and Plan     Claudell Mans is a 49 y.o. male    1. DKA 2/2 T1DM w/ insulin pump 2/2 alcohol use vs insulin pump malfunction vs MI   -On DKA protocol   -Supplement K, phos, and mag   -Follow BG   -Endocrinology following   -Bridged w/ 25 units of Lantus at 8 am.   -Follow endocrine recs   - today   -Follow BMP   -Follow Mag Phos   -Follow AG   - on admission   -Na 138, K 3.8 CO2 21 AG 14   -Mag 1.9, Phos 1.8   -AG 14   -Serum Osmol 304   -Beta hydroxy butyrate >4.5   -HgA1C 9.8 (from 8.3)   -Trop 37 from 45   -LA 1.6   -EtOH level <10, UDS negative   -Urine Glucose 500, Ketones >160, specific gravity >1.030, urine protein 30, no urine bacteria   -Venous pH 7.29   -CXR: No acute process   -ECG: NSR, QT prolongation  2. Increased urinary urgency w/ decreased output 2/2 BPH   -Follow I/O   -UA: Leukocyte esterase negative, Nitrite negative, Hyaline casts 0-2, WBC 0-1, Bacteria None, RBC 0-1  3. AUD   -Watch for signs of withdrawal   -Thiamine and Folate supplemented   -CIWA protocol if necessary  4. Peripheral neuropathy 2/2 DMT1   -Continue Gabapentin  5.  H/o HTN   -Continue Atorvastatin   -Lopressor held   -Follow BP    PT/OT evaluation: Not following  DVT prophylaxis/ GI prophylaxis: Lovenox  Disposition: Inpatient    Juan Carlso Gr, MS3  Attending physician: Dr. Clint Swanson

## 2021-09-27 NOTE — PROGRESS NOTES
[facility]  Internal Medicine Residency Program  Progress Note - House Team     Patient:  Claudell Mans 49 y.o. male MRN: [id number]     Date of Service: 9/27/2021     CC: DKA   Overnight events: NAOE    Subjective     Patient was seen and examined this morning at bedside in no acute distress. Overnight , no acute events. Objective     Physical Exam:  · Vitals: BP (!) 146/88   Pulse 91   Temp 98.7 °F (37.1 °C) (Temporal)   Resp (!) 0   Ht 5' 6\" (1.676 m)   Wt 150 lb (68 kg)   SpO2 100%   BMI 24.21 kg/m²     · I & O - 24hr: No intake/output data recorded. · General Appearance: alert, appears stated age and cooperative  · HEENT:  Head: Normocephalic, no lesions, without obvious abnormality. · Neck: no adenopathy, no carotid bruit, no JVD, supple, symmetrical, trachea midline and thyroid not enlarged, symmetric, no tenderness/mass/nodules  · Lung: clear to auscultation bilaterally  · Heart: regular rate and rhythm, S1, S2 normal, no murmur, click, rub or gallop  · Abdomen: soft, non-tender; bowel sounds normal; no masses,  no organomegaly  · Extremities:  extremities normal, atraumatic, no cyanosis or edema  · Musculokeletal: No joint swelling, no muscle tenderness. ROM normal in all joints of extremities.    · Neurologic: Mental status: Alert, oriented, thought content appropriate  Subject  Pertinent Labs & Imaging Studies   chika  CBC with Differential:    Lab Results   Component Value Date    WBC 11.5 09/26/2021    RBC 5.39 09/26/2021    HGB 16.5 09/26/2021    HCT 48.7 09/26/2021     09/26/2021    MCV 90.4 09/26/2021    MCH 30.6 09/26/2021    MCHC 33.9 09/26/2021    RDW 14.2 09/26/2021    SEGSPCT 53 01/25/2014    SEGSPCT 86 10/11/2010    BANDSPCT 3 10/10/2010    METASPCT 1.7 07/20/2020    LYMPHOPCT 12.3 09/26/2021    MONOPCT 7.2 09/26/2021    MYELOPCT 2.6 01/04/2020    EOSPCT 2 10/12/2010    BASOPCT 0.3 09/26/2021    MONOSABS 0.83 09/26/2021    LYMPHSABS 1.42 09/26/2021 EOSABS 0.11 09/26/2021    BASOSABS 0.04 09/26/2021     BMP:    Lab Results   Component Value Date     09/27/2021    K 3.7 09/27/2021    K 4.1 09/26/2021     09/27/2021    CO2 23 09/27/2021    BUN 17 09/27/2021    LABALBU 4.4 09/26/2021    LABALBU 4.3 12/30/2011    CREATININE 0.9 09/27/2021    CALCIUM 8.3 09/27/2021    GFRAA >60 09/27/2021    LABGLOM >60 09/27/2021    GLUCOSE 195 09/27/2021    GLUCOSE 83 04/11/2012     Ionized Calcium:    Lab Results   Component Value Date    IONCA 1.23 02/19/2011     Magnesium:    Lab Results   Component Value Date    MG 1.8 09/27/2021     Phosphorus:    Lab Results   Component Value Date    PHOS 1.7 09/27/2021     PT/INR:    Lab Results   Component Value Date    PROTIME 11.5 01/04/2020    INR 1.0 01/04/2020     Troponin:    Lab Results   Component Value Date    TROPONINI <0.01 07/22/2020     HgBA1c:    Lab Results   Component Value Date    LABA1C 9.8 09/26/2021     VITAMIN B12: No components found for: B12  FERRITIN:    Lab Results   Component Value Date    FERRITIN 4,160 02/12/2017     Urine Toxicology:  No components found for: IAMMENTA, IBARBIT, IBENZO, ICOCAINE, IMARTHC, IOPIATES, IPHENCYC  24 Hour Urine for Protein:  No components found for: RAWUPRO, UHRS3, TQRL95XQ, UTV3    XR CHEST PORTABLE   Final Result   No acute process. Resident's Assessment and Plan     Matt Rico is a 49 y.o M who currently smokes, has a significant medical h/o T1DM, HTN, h/o lacunar stroke, polysubstance abuse and alcoholism  Presents to the hosp d/t hyperglycemia and possible failure of insulin pump. Has been following with Dr Ac Chaney but has not been to the clinic in 6 months. Pt states BG was 400~500 this past few days. Pt has had previous episodes of DKA and hence believed he was in DKA and came to the ED. He has bene managed in the ICU for     Assessment and Plan:    Endocrine  1. DKA 2/2 insulin pump malfunction vs alcohol use  -H/o T1DM, pt uses insulin pump.  Pt states he has had multiple hospital admissions for DKA in the past year. -A1C 9.8%  -Follows with Dr. Trini Robin. Has not been seen in the clinic for 6 months  -Dr. Trini Robin consulted for insulins pump management and out patient f/u  -Patient has been adjusting pump by himself  -BG at ED was 301, K 4.1 and AG 26. Received 2 IV fluids.  -Patient started on DKA protocol.   -Has started insulin drip. Will bridge to subQ insuline at glucose <250  -Replace electrolytes as needed  -Follow BMP Q4  -Follow anion gap    Cardiology  2. H/o hypertension  -ASCVD score 21.5%  -Patient takes Lopressor 25 mg twice daily at home  -Continue atorvastatin 80 mg  -Patient has been normotensive since admission  -Continue to monitor blood pressure, resume BP meds if BP elevated      BPH  -Pt complains of urgency and frequency   -Out patient clinic f/u     MSK  3. Peripheral neuropathy 2/2 diabetes mellitus type 1  -Takes gabapentin 300 mg, continue gabapentin on admission    Mental, Behavioral and Neurodevelopmental disorders  4. Alcohol use disorder  -Patient states he drinks roughly 6 beers a day  -Patient started on thiamine and folate  -Patient's last drink was 3 days ago.    -Monitor for signs of withdrawal. Initiate CIWA protocol if necessary      PT/OT evaluation: N/A  DVT prophylaxis/ GI prophylaxis: Lovenox/   Disposition: continue current care     Dee Carroll MD, PGY-1  Attending physician: Dr. Gudelia Zhong

## 2021-09-27 NOTE — DISCHARGE SUMMARY
[address]  Discharge Summary    PCP: Mertie Ahumada, MD    Admit Date:9/26/2021  Discharge Date: 9/28/2021    Admission Diagnosis:   1. Type 1 diabetes mellitus DKA 2/2 insulin pump malfunction versus alcohol use  2. Hypertension  3. Hyperlipidemia  4. Peripheral neuropathy 2/2 diabetes mellitus type 1  5. Alcohol abuse    Discharge Diagnosis:  1. DKA 2/2 insulin pump malfunction vs alcohol use  2. Hypertension  3. BPH  4. Peripheral neuropathy 2/2 diabetes mellitus type 1  5. Alcohol use disorder    Hospital Course: The patient is a 49 y.o. male with a past medical history of type 1 diabetes mellitus hypertension, peripheral neuropathy, alcohol abuse and hyperlipidemia with remote history of lacunar stroke. Presented to the ED with a 3 to 5-day history of nausea and vomiting with stomach pain. Patient also states that he has some blurry vision and headaches along with polydipsia. Patient is a type I diabetic and has an insulin pump that is managed by Dr. Assunta Leyden, patient states that has not seen Dr. Assunta Leyden in 6 months and has been managing his pump on his own. Patient has been having episodes of hypoglycemia and hyperglycemia on the pump saying his lowest has been in the 40s and highest being in the 500s. He is constantly experience hypoglycemia the past several days noting his blood glucose to be in the 400s and 500s every day. Patient has had DKA before and felt that is the direction that he was head as such he went to the ED. patient states that he has had multiple episodes of DKA in the past year. In the ED patient was found to have a glucose of 301 with a potassium 4.1 and anion gap of 26. Patient was given 2 L IV fluid along with hyperkalemia treatment. However patient did not improve glucose remained 291 and anion gap increased to 31. Decision was then made to admit the patient.   Patient was then started on DKA protocol with an insulin drip, he was then transferred to the unit. 9/27/21:Patitients BG improved and pt was bridged to subQ insuline when BG dropped to <250. Dr. Perez Neal was consulted for patients insulin pump management. Dr. Perez Neal recommends to bridge pt with lantus 25 U at 8:45 am, keep glucose goal at 140-180 mg/dl, continue glucose checks with meals and at bedtime, titrate insuline dose and will reset patient's pump if needed. Dr. Perez Neal has arrange for out patient f/u in endocrinology clinic with pt.     9/28/21: Patient feeling well and eager to return home. BG has been in 200s, but patient has no symptoms. BMP, CBC wnl. Insulin pump restarted this morning by Dr. Perez Neal who agrees this patient is ready for discharge. Significant findings (history and exam, laboratory, radiological, pathology, other tests):   Physical Exam  Constitutional:       General: He is not in acute distress. Appearance: He is not ill-appearing, toxic-appearing or diaphoretic. HENT:      Head: Normocephalic and atraumatic. Eyes:      Conjunctiva/sclera: Conjunctivae normal.      Pupils: Pupils are equal, round, and reactive to light. Cardiovascular:      Rate and Rhythm: Normal rate and regular rhythm. Pulses: Normal pulses. Heart sounds: Normal heart sounds. No murmur heard. No friction rub. No gallop. Pulmonary:      Effort: No respiratory distress. Breath sounds: No stridor. No wheezing, rhonchi or rales. Chest:      Chest wall: No tenderness. Abdominal:      General: There is no distension. Palpations: There is no mass. Tenderness: There is no abdominal tenderness. There is no guarding or rebound. Hernia: No hernia is present. Musculoskeletal:      Right lower leg: No edema. Left lower leg: No edema. Skin:     Coloration: Skin is not jaundiced or pale. Findings: No rash. Neurological:      Mental Status: He is oriented to person, place, and time. Pending test results:   1.  None Consults:  1. Endocrinology  2. Critical care    Procedures:  1. None    Condition at discharge: Stable    Disposition: home    Discharge Medications:     Medication List      START taking these medications    metoprolol tartrate 25 MG tablet  Commonly known as: LOPRESSOR  Take 0.5 tablets by mouth 2 times daily        CONTINUE taking these medications    albuterol sulfate  (90 Base) MCG/ACT inhaler  Inhale 2 puffs into the lungs every 6 hours as needed for Wheezing or Shortness of Breath     aspirin 81 MG EC tablet  Take 1 tablet by mouth daily  Start taking on: September 29, 2021     atorvastatin 80 MG tablet  Commonly known as: LIPITOR  Take 1 tablet by mouth nightly     Cholecalciferol 400 UNIT Tabs tablet  Take 2.5 tablets by mouth daily     folic acid 1 MG tablet  Commonly known as: FOLVITE  Take 1 tablet by mouth daily     insulin lispro 100 UNIT/ML injection vial  Commonly known as: HUMALOG     Insulin Syringe-Needle U-100 28G X 1/2\" 1 ML Misc  Use as directed     Lancets Misc  1 each by Does not apply route 2 times daily     thiamine 100 MG tablet  Take 1 tablet by mouth daily           Where to Get Your Medications      These medications were sent to [address] [place] [address], [address] [address]    Phone: [phone]   · albuterol sulfate  (90 Base) MCG/ACT inhaler  · aspirin 81 MG EC tablet  · atorvastatin 80 MG tablet  · Cholecalciferol 400 UNIT Tabs tablet  · folic acid 1 MG tablet  · metoprolol tartrate 25 MG tablet  · thiamine 100 MG tablet          Follow Up: Louie DOMINGO at [address] on 10/12/21.  Call to make f/u appointment with PCP Dr. Sumi Schmidt MD  PGY-1   2:36 PM 9/28/2021

## 2021-09-27 NOTE — PROGRESS NOTES
07/19/2012    O2SAT 98.2 07/20/2020        Medications     Infusions: (Fluid, Sedation, Vasopressors)  IVF:    On DKA protocol, will bridge and dc IVF fluid after pt starts to eat  Vasopressors     Sedation       Nutrition:   Will order DM diet  ATB:   Antibiotics  Days                   Labs     CBC with Differential:    Lab Results   Component Value Date    WBC 10.4 09/27/2021    RBC 4.04 09/27/2021    HGB 12.3 09/27/2021    HCT 36.4 09/27/2021     09/27/2021    MCV 90.1 09/27/2021    MCH 30.4 09/27/2021    MCHC 33.8 09/27/2021    RDW 14.3 09/27/2021    SEGSPCT 53 01/25/2014    SEGSPCT 86 10/11/2010    BANDSPCT 3 10/10/2010    METASPCT 1.7 07/20/2020    LYMPHOPCT 18.4 09/27/2021    MONOPCT 12.1 09/27/2021    MYELOPCT 2.6 01/04/2020    EOSPCT 2 10/12/2010    BASOPCT 0.5 09/27/2021    MONOSABS 1.25 09/27/2021    LYMPHSABS 1.90 09/27/2021    EOSABS 0.29 09/27/2021    BASOSABS 0.05 09/27/2021     CMP:    Lab Results   Component Value Date     09/27/2021    K 3.8 09/27/2021    K 4.1 09/26/2021     09/27/2021    CO2 21 09/27/2021    BUN 15 09/27/2021    CREATININE 0.8 09/27/2021    GFRAA >60 09/27/2021    LABGLOM >60 09/27/2021    GLUCOSE 190 09/27/2021    GLUCOSE 83 04/11/2012    PROT 6.7 09/26/2021    LABALBU 4.4 09/26/2021    LABALBU 4.3 12/30/2011    CALCIUM 8.4 09/27/2021    BILITOT 1.4 09/26/2021    ALKPHOS 134 09/26/2021    AST 12 09/26/2021    ALT 15 09/26/2021       Imaging Studies:  CXR:  No acute process, no repeat needed    Resident's Assessment and Plan     Assessment:         Diabetic ketoacidosis, resolved   No overt evidence infection, stroke, MI   Elevated tropo, trending down, no CP, no EKG evidence of MI   UDS only cannobinoid, SDS -ve   Not on any meds that typically cause DKA, e.g. steroid   Pt not compliant for other meds, but reporting use pump as instructed  Hx of type 1 DM   Dr. Vicente David pt, he will see and plan to resume pump tomorrow  History of hypertension, on hydralazin and lopressor  Hx of hyperlipidemia, on statin, non compliant  Medication noncompliance   Does not take any of the prescribed medication at home  Alcohol use disorder  Tobacco use disorder       Plan:   · Bridge to subq insulin, start diet, will dc fluid after eat,   · Dr. Nguyen Bowens consulted, recs appreciated, plan to resume bump tomorrow, details below  · Lantus 25 U daily   · Humalog 5 U with meals  · Medium dose sliding scale with meals and at night   · Resume insulin pump tomorrow morning, setting will be managed by endo     · Replace electrolytes as needed  · on Thiamine and folate    · Nicotine patch    · Resume home lopressor  · Consider to resume home hydralazine on dc or inpatient if BP not control on metoprolol  · Trend troponin, current decreasing, no CP, EKG repeated, appears to be non-specific STT change    Ingris Gonzales MD, PGY-2    Attending physician: Dr. Maryana Chow

## 2021-09-27 NOTE — CARE COORDINATION
Met with patient to discuss role and transition of care. Patient is alert and oriented. Came in with DKA. Has an insulin pump which is now disconnected. Currently on insulin drip and potassium phosphate IV. Awaiting endrocrinology consult Dr Surya Lanier. 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 He has no health insurance is on long term disability. He does admit to drinking 6 beers almost everyday. He is agreeable to Public Benefits to see / screen to  if he is eligible for medicaid. Message left with Juliana Prado in Public benefits office. . Patient's PCP is . Uses [facility] pharmacy in [facility]. Patient does drive. Discharge plan is home. Patient states he will have transportation home. Electronically signed by Arie Aparicio RN on 9/27/2021 at 9:39 AM    Per Juliana Prado in public benefits-PT IS HFA ELIGIBLE AND NOT ELIGIBLE FOR HELP WITH MEDS UPON DISCHARGE.  PT IS OVER INCOME FOR MEDICAID Electronically signed by Arie Aparicio RN on 9/27/2021 at 10:51 AM

## 2021-09-28 VITALS
RESPIRATION RATE: 20 BRPM | WEIGHT: 136.3 LBS | HEART RATE: 69 BPM | BODY MASS INDEX: 21.9 KG/M2 | HEIGHT: 66 IN | SYSTOLIC BLOOD PRESSURE: 146 MMHG | OXYGEN SATURATION: 98 % | DIASTOLIC BLOOD PRESSURE: 93 MMHG | TEMPERATURE: 98 F

## 2021-09-28 LAB
ANION GAP SERPL CALCULATED.3IONS-SCNC: 8 MMOL/L (ref 7–16)
BUN BLDV-MCNC: 15 MG/DL (ref 6–20)
CALCIUM SERPL-MCNC: 8.6 MG/DL (ref 8.6–10.2)
CHLORIDE BLD-SCNC: 99 MMOL/L (ref 98–107)
CO2: 25 MMOL/L (ref 22–29)
CREAT SERPL-MCNC: 0.8 MG/DL (ref 0.7–1.2)
GFR AFRICAN AMERICAN: >60
GFR NON-AFRICAN AMERICAN: >60 ML/MIN/1.73
GLUCOSE BLD-MCNC: 245 MG/DL (ref 74–99)
MAGNESIUM: 2 MG/DL (ref 1.6–2.6)
METER GLUCOSE: 191 MG/DL (ref 74–99)
METER GLUCOSE: 265 MG/DL (ref 74–99)
PHOSPHORUS: 2.7 MG/DL (ref 2.5–4.5)
POTASSIUM SERPL-SCNC: 3.8 MMOL/L (ref 3.5–5)
SODIUM BLD-SCNC: 132 MMOL/L (ref 132–146)

## 2021-09-28 PROCEDURE — 99238 HOSP IP/OBS DSCHRG MGMT 30/<: CPT | Performed by: INTERNAL MEDICINE

## 2021-09-28 PROCEDURE — 6370000000 HC RX 637 (ALT 250 FOR IP): Performed by: INTERNAL MEDICINE

## 2021-09-28 PROCEDURE — 6370000000 HC RX 637 (ALT 250 FOR IP)

## 2021-09-28 PROCEDURE — 36415 COLL VENOUS BLD VENIPUNCTURE: CPT

## 2021-09-28 PROCEDURE — 99232 SBSQ HOSP IP/OBS MODERATE 35: CPT | Performed by: INTERNAL MEDICINE

## 2021-09-28 PROCEDURE — 83735 ASSAY OF MAGNESIUM: CPT

## 2021-09-28 PROCEDURE — 84100 ASSAY OF PHOSPHORUS: CPT

## 2021-09-28 PROCEDURE — 82962 GLUCOSE BLOOD TEST: CPT

## 2021-09-28 PROCEDURE — 80048 BASIC METABOLIC PNL TOTAL CA: CPT

## 2021-09-28 RX ORDER — ATORVASTATIN CALCIUM 80 MG/1
80 TABLET, FILM COATED ORAL NIGHTLY
Qty: 30 TABLET | Refills: 3 | Status: ON HOLD | OUTPATIENT
Start: 2021-09-28 | End: 2022-05-01 | Stop reason: HOSPADM

## 2021-09-28 RX ORDER — LANOLIN ALCOHOL/MO/W.PET/CERES
100 CREAM (GRAM) TOPICAL DAILY
Qty: 30 TABLET | Refills: 3 | Status: SHIPPED | OUTPATIENT
Start: 2021-09-28 | End: 2022-01-26

## 2021-09-28 RX ORDER — ALBUTEROL SULFATE 90 UG/1
2 AEROSOL, METERED RESPIRATORY (INHALATION) EVERY 6 HOURS PRN
Qty: 1 EACH | Refills: 1 | Status: SHIPPED | OUTPATIENT
Start: 2021-09-28

## 2021-09-28 RX ORDER — FOLIC ACID 1 MG/1
1 TABLET ORAL DAILY
Qty: 30 TABLET | Refills: 3 | Status: SHIPPED | OUTPATIENT
Start: 2021-09-28 | End: 2022-01-26

## 2021-09-28 RX ORDER — ASPIRIN 81 MG/1
81 TABLET ORAL DAILY
Qty: 30 TABLET | Refills: 3 | Status: ON HOLD | OUTPATIENT
Start: 2021-09-29 | End: 2022-08-08

## 2021-09-28 RX ADMIN — Medication 100 MG: at 08:54

## 2021-09-28 RX ADMIN — ASPIRIN 81 MG: 81 TABLET, COATED ORAL at 08:54

## 2021-09-28 RX ADMIN — FOLIC ACID 1 MG: 1 TABLET ORAL at 08:54

## 2021-09-28 RX ADMIN — INSULIN LISPRO 6 UNITS: 100 INJECTION, SOLUTION INTRAVENOUS; SUBCUTANEOUS at 06:13

## 2021-09-28 RX ADMIN — Medication 1000 UNITS: at 08:54

## 2021-09-28 RX ADMIN — METOPROLOL TARTRATE 12.5 MG: 25 TABLET, FILM COATED ORAL at 08:54

## 2021-09-28 RX ADMIN — INSULIN LISPRO 5 UNITS: 100 INJECTION, SOLUTION INTRAVENOUS; SUBCUTANEOUS at 06:15

## 2021-09-28 ASSESSMENT — PAIN SCALES - GENERAL
PAINLEVEL_OUTOF10: 0
PAINLEVEL_OUTOF10: 0

## 2021-09-28 NOTE — PROGRESS NOTES
One vial of insulin lispro (Humalog) 100 units/mL, 3 mL vial, delivered to nursing for patient to refill his insulin pump as per order by Dr. Madelaine Martinez. Bulk charge for vial so that nursing does not have to scan.

## 2021-09-28 NOTE — CARE COORDINATION
Verified pt's discharge plan at bedside; pt receives insulin from Dr Jordyn Forte office; pt's brother to provide transportation home, bedside RN has messaged attending to check for discharge.

## 2021-09-28 NOTE — PROGRESS NOTES
[facility] Glasgow [facility]  Internal Medicine Residency Program  Progress Note - House Team 1    Patient:  Lucien Angel 49 y.o. male MRN: [id number]     Date of Service: 9/28/2021     CC: DKA  Overnight events: No acute overnight event    Subjective     Meggan Hawkins is a 50 y/o male presenting today with DKA. Patient claims to be doing well today. He denied any N/V/D, constipation, abd pain, tremors, fevers, SOB outside of his norm, and CP. His urinary urgency and small volume has continued. Objective     Physical Exam:  · Vitals: BP (!) 146/93   Pulse 69   Temp 98 °F (36.7 °C) (Temporal)   Resp 20   Ht 5' 6\" (1.676 m)   Wt 136 lb 4.8 oz (61.8 kg)   SpO2 98%   BMI 22.00 kg/m²     · I & O - 24hr: I/O this shift:  · In: 360 [P.O.:360]  · Out: -    · General Appearance: alert, appears stated age and cooperative  · HEENT:  Head: Normocephalic, no lesions, without obvious abnormality. · Neck: supple, symmetrical, trachea midline and thyroid not enlarged, symmetric, no tenderness/mass/nodules  · Lung: wheezes bilaterally  · Heart: regular rate and rhythm, S1, S2 normal, no murmur, click, rub or gallop  · Abdomen: soft, non-tender; bowel sounds normal; no masses,  no organomegaly  · Extremities:  extremities normal, atraumatic, no cyanosis or edema  · Musculokeletal: No joint swelling, no muscle tenderness. ROM normal in all joints of extremities.    · Neurologic: Mental status: Alert, oriented, thought content appropriate  Subject  Pertinent Labs & Imaging Studies   chika  CBC with Differential:    Lab Results   Component Value Date    WBC 10.4 09/27/2021    RBC 4.04 09/27/2021    HGB 12.3 09/27/2021    HCT 36.4 09/27/2021     09/27/2021    MCV 90.1 09/27/2021    MCH 30.4 09/27/2021    MCHC 33.8 09/27/2021    RDW 14.3 09/27/2021    SEGSPCT 53 01/25/2014    SEGSPCT 86 10/11/2010    BANDSPCT 3 10/10/2010    METASPCT 1.7 07/20/2020    LYMPHOPCT 18.4 09/27/2021    MONOPCT 12.1 09/27/2021    MYELOPCT 2.6 01/04/2020    EOSPCT 2 10/12/2010    BASOPCT 0.5 09/27/2021    MONOSABS 1.25 09/27/2021    LYMPHSABS 1.90 09/27/2021    EOSABS 0.29 09/27/2021    BASOSABS 0.05 09/27/2021     CMP:    Lab Results   Component Value Date     09/28/2021    K 3.8 09/28/2021    K 4.1 09/26/2021    CL 99 09/28/2021    CO2 25 09/28/2021    BUN 15 09/28/2021    CREATININE 0.8 09/28/2021    GFRAA >60 09/28/2021    LABGLOM >60 09/28/2021    GLUCOSE 245 09/28/2021    GLUCOSE 83 04/11/2012    PROT 6.7 09/26/2021    LABALBU 4.4 09/26/2021    LABALBU 4.3 12/30/2011    CALCIUM 8.6 09/28/2021    BILITOT 1.4 09/26/2021    ALKPHOS 134 09/26/2021    AST 12 09/26/2021    ALT 15 09/26/2021     BUN/Creatinine:    Lab Results   Component Value Date    BUN 15 09/28/2021    CREATININE 0.8 09/28/2021     Magnesium:    Lab Results   Component Value Date    MG 2.0 09/28/2021     Phosphorus:    Lab Results   Component Value Date    PHOS 2.7 09/28/2021     Last 3 Troponin:    Lab Results   Component Value Date    TROPONINI <0.01 07/22/2020    TROPONINI <0.01 07/21/2020    TROPONINI 0.02 07/21/2020     U/A:    Lab Results   Component Value Date    COLORU Yellow 09/26/2021    PROTEINU 30 09/26/2021    PHUR 5.0 09/26/2021    LABCAST FEW 12/08/2019    WBCUA 0-1 09/26/2021    WBCUA NONE 04/09/2012    RBCUA 0-1 09/26/2021    RBCUA NONE 02/10/2014    MUCUS Present 10/18/2019    BACTERIA NONE SEEN 09/26/2021    CLARITYU Clear 09/26/2021    SPECGRAV >=1.030 09/26/2021    LEUKOCYTESUR Negative 09/26/2021    UROBILINOGEN 0.2 09/26/2021    BILIRUBINUR MODERATE 09/26/2021    BILIRUBINUR NEGATIVE 04/09/2012    BLOODU SMALL 09/26/2021    GLUCOSEU 500 09/26/2021    GLUCOSEU >=1000 04/09/2012    AMORPHOUS MODERATE 07/26/2019     HgBA1c:    Lab Results   Component Value Date    LABA1C 9.8 09/26/2021     Microalbumen/Creatinine ratio:  No components found for: RUCREAT  FLP:    Lab Results   Component Value Date    TRIG 266 09/26/2021    HDL 48 09/26/2021    LDLCALC 112 09/26/2021    LABVLDL 53 09/26/2021     VITAMIN B12: No components found for: B12  FOLATE:    Lab Results   Component Value Date    FOLATE 12.0 07/26/2019       Medical Student's Assessment and Plan     Danny Coronado is a 48 y/o male presenting today with DKA    1. DKA 2/2 T1DM w/ insulin pump 2/2 insulin pump malfunction resolved              -K, phos, and mag supplementation stopped              -Educate patient on pump management and function   -Diet education for patient   -Prepare for discharge              -Endocrinology following              -Restarted insulin pump this morning              -Follow endocrine recs              - today   -Consider an ACE or ARB to combat diabetic nephropathy              -Follow BMP              -Follow Mag Phos              -Follow AG              - on admission              -Na 132, K 3.8 CO2 25 AG 8              -Mag 2.0, Phos 2.4              -Serum Osmol 304 on admission              -Beta hydroxy butyrate >4.5 on admission              -HgA1C 9.8 (from 8.3)              -Trop 37 from 45              -LA 1.6              -EtOH level <10, UDS negative   -Creat ur 79, Microalbumin/creatinine ratio 167.5, Microalbumin 132.3              -Urine Glucose 500, Ketones >160, specific gravity >1.030, urine protein 30, no urine bacteria              -Venous pH 7.29              -CXR: No acute process              -ECG: NSR, QT prolongation  2. Increased urinary urgency w/ decreased output 2/2 BPH              -Follow I/O   -F/u outpatient w/ PCP              -UA: Leukocyte esterase negative, Nitrite negative, Hyaline casts 0-2, WBC 0-1, Bacteria None, RBC 0-1  3. AUD              -Watch for signs of withdrawal              -Thiamine and Folate supplemented              -CIWA protocol if necessary  4. Peripheral neuropathy 2/2 DMT1              -Continue Gabapentin  5.  H/o HTN              -Continue Atorvastatin              -Lopressor restarted   -Consider ACE or ARB due to increased likelihood of diabetic nephropathy              -Follow BP   -Repeat lipid panel outpatient in 1 month    PT/OT evaluation: Not following  DVT prophylaxis/ GI prophylaxis: Lovenox  Disposition: Discharge in next 24 hrs    Sanjay Bynum MS3  Attending physician: Dr. Merlene Townsend

## 2021-09-28 NOTE — PLAN OF CARE
Problem: Falls - Risk of:  Goal: Will remain free from falls  Description: Will remain free from falls  Outcome: Met This Shift     Problem: Nutritional:  Goal: Maintenance of adequate nutrition will improve  Description: Maintenance of adequate nutrition will improve  Outcome: Met This Shift     Problem: Physical Regulation:  Goal: Complications related to the disease process, condition or treatment will be avoided or minimized  Description: Complications related to the disease process, condition or treatment will be avoided or minimized  Outcome: Met This Shift

## 2021-09-28 NOTE — PROGRESS NOTES
One vial of insulin lispro (Humalog) 100 units/mL, 3 mL vial given to patient to refill insulin pump. RN assisted patient at bedside to refill pump with 300 units of insulin.

## 2021-09-28 NOTE — PROGRESS NOTES
Patient discharged home before I was able to see in consultation. Called patient via cell phone and he expressed no outpatient diabetes education needs. Pump is hooked back up and running with no troubles. Has an appointment with Dr. Carmina May in one week.  Yee Glasgow RN, BSN, Rocky Ibrahim

## 2021-09-28 NOTE — PROGRESS NOTES
ENDOCRINOLOGY PROGRESS NOTE    Date of Service: 9/28/2021  Date of Admission: 9/26/2021  Admitting Physician: Ada Brink DO   Primary Care Physician: Joceline Stahl MD  Consultant physician: Clarice Chang MD     Reason for the consultation: Uncontrolled DM       History of Present Illness: The history is provided by the patient. Accuracy of the patient data is excellent. Garrett Wise is a very pleasant 49 y.o. old male with PMH of Type 1 Diabetes mellitus on [employer] insulin pump, HTN, HLD, Marijuana abuse, Remote Hx of Lacunar stroke and others listed below who presented to the ED for abdominal pain. He was admitted to [facility] on 9/26/2021 because of hyperglycemia and DKA, endocrine team was consulted for diabetes management. Admission Labs: , AG 26, Creatinine 1.3, K 4.1, HCO2 21 BHB >4.5        Subjective:  Patient seen and examined, no overnight major events. Appetite is good. Inpatient diet:   Carb Restricted diet     Point of care glucose monitoring:   Independently reviewed   Recent Labs     09/27/21  0613 09/27/21  0718 09/27/21  0827 09/27/21  0926 09/27/21  1157 09/27/21  1735 09/27/21  2022 09/28/21  0613   GLUMET 150* 168* 183* 181* 210* 266* 252* 265*       Scheduled Meds:   metoprolol tartrate  12.5 mg Oral BID    aspirin  81 mg Oral Daily    atorvastatin  80 mg Oral Nightly    folic acid  1 mg Oral Daily    vitamin D  1,000 Units Oral Daily    thiamine mononitrate  100 mg Oral Daily    enoxaparin  40 mg SubCUTAneous Daily    nicotine  1 patch TransDERmal Daily     PRN Meds:   glucose, 15 g, PRN  dextrose, 12.5 g, PRN  glucagon (rDNA), 1 mg, PRN  dextrose, 100 mL/hr, PRN  dextrose, 12.5 g, PRN      Continuous Infusions:   dextrose         Review of Systems  All systems reviewed.  All negative except for symptoms mentioned in HPI     OBJECTIVE    BP (!) 146/93   Pulse 69   Temp 98 °F (36.7 °C) (Temporal)   Resp 20   Ht 5' 6\" (1.676 m)   Wt 136 lb 4.8 oz (61.8 kg) SpO2 98%   BMI 22.00 kg/m²     Intake/Output Summary (Last 24 hours) at 9/28/2021 1100  Last data filed at 9/28/2021 0939  Gross per 24 hour   Intake 1694 ml   Output 600 ml   Net 1094 ml       Physical examination:  General: awake alert, oriented x3, no abnormal position or movements. HEENT: normocephalic non-traumatic, no exophthalmos   Neck: supple, no LN enlargement, no thyromegaly, no thyroid tenderness, no JVD. Pulm: Clear equal air entry no added sounds, no wheezing or rhonchi    CVS: S1 + S2, no murmur, no heave  Abd: soft lax, no tenderness, no organomegaly, audible bowel sounds. Skin: warm, no lesions, no rash. Feet: sensory exam of the feet is present, tested with the monofilament. No ulcers or open wounds. Good peripheral pulses  Neuro: CN intact, muscle power normal  Psych: normal mood, and affect    Review of Laboratory Data:  I have reviewed the following:  Recent Labs     09/26/21  0802 09/27/21  0453   WBC 11.5 10.4   RBC 5.39 4.04   HGB 16.5 12.3*   HCT 48.7 36.4*   MCV 90.4 90.1   MCH 30.6 30.4   MCHC 33.9 33.8   RDW 14.2 14.3   * 383   MPV 8.4 8.7     Recent Labs     09/26/21  0949 09/26/21  1312 09/27/21  1842 09/27/21  2049 09/28/21  0032      < > 135 135 132   K 4.1   < > 3.7 4.2 3.8   CL 88*   < > 98 97* 99   CO2 21*   < > 28 20* 25   BUN 27*   < > 13 15 15   CREATININE 1.3*   < > 0.9 1.0 0.8   GLUCOSE 301*   < > 262* 239* 245*   CALCIUM 9.2   < > 8.8 9.4 8.6   PROT 6.7  --   --   --   --    LABALBU 4.4  --   --   --   --    BILITOT 1.4*  --   --   --   --    ALKPHOS 134*  --   --   --   --    AST 12  --   --   --   --    ALT 15  --   --   --   --     < > = values in this interval not displayed.      Beta-Hydroxybutyrate   Date Value Ref Range Status   09/26/2021 >4.50 (H) 0.02 - 0.27 mmol/L Final   07/20/2020 >4.50 (H) 0.02 - 0.27 mmol/L Final   01/04/2020 >4.50 (H) 0.02 - 0.27 mmol/L Final     Lab Results   Component Value Date    LABA1C 9.8 09/26/2021    LABA1C 8.3 07/07/2021    LABA1C 10.2 02/15/2021     Lab Results   Component Value Date/Time    TSH 3.330 01/04/2020 05:07 PM    T4FREE 0.97 07/19/2012 02:00 AM     Lab Results   Component Value Date    LABA1C 9.8 09/26/2021    GLUCOSE 245 09/28/2021    GLUCOSE 83 04/11/2012    MALBCR 167.5 09/26/2021    LABMICR 132.3 09/26/2021    LABCREA 79 09/26/2021     Lab Results   Component Value Date    TRIG 266 09/26/2021    HDL 48 09/26/2021    LDLCALC 112 09/26/2021    CHOL 213 09/26/2021       Blood culture   Lab Results   Component Value Date    BC 5 Days no growth 07/21/2020    BC 5 Days- no growth 01/04/2020       Radiology:  XR CHEST PORTABLE   Final Result   No acute process. Medical Records/Labs/Images review:   I personally reviewed and summarized previous records   All labs and imaging were reviewed independently     [facility] [facility] 49 y.[facility] seen in the hospital today     Diabetes Mellitus type 1 on insulin pump   · Patient's diabetes is uncontrolled with A1c of 9.8%  · Admitted with DKA likely due to lack of insulin bolusing on his insulin pump    · Resume insulin pump today  · Continue glucose check with meals and at bedtime   · Will titrate insulin dose based on the blood glucose trend & insulin requirement  · Pt will follow with us after discharge. Endocrine follow up visit, Tuesday 10/12 at 8:00AM      The above issues were reviewed with the patient who understood and agreed with the plan.     Thank you for allowing us to participate in the care of this patient. Please do not hesitate to contact us with any additional questions.      Chago Lennon MD   PGY-2, Internal Medicine     Case Discussed with Dr. Fabiola Burks MD  Endocrinologist, [facility] and Endocrinology   [facility] [address] [facility] [address]   Phone: [phone]  Fax: [phone]  ---------------  An electronic signature was used to authenticate this note.  Coby Fisher

## 2021-09-28 NOTE — PLAN OF CARE
Problem: Falls - Risk of:  Goal: Will remain free from falls  Description: Will remain free from falls  9/28/2021 0403 by Nafisa Ortiz RN  Outcome: Met This Shift     Problem: Nutritional:  Goal: Maintenance of adequate nutrition will improve  Description: Maintenance of adequate nutrition will improve  9/28/2021 0403 by Nafisa Ortiz RN  Outcome: Met This Shift     Problem: Physical Regulation:  Goal: Complications related to the disease process, condition or treatment will be avoided or minimized  Description: Complications related to the disease process, condition or treatment will be avoided or minimized  9/28/2021 0403 by Nafisa Ortiz RN  Outcome: Met This Shift

## 2021-09-29 NOTE — PROGRESS NOTES
Alex Glasgow [facility]12  Internal Medicine Clinic     Attending Physician Statement:  Jaime Leger.O.     I have discussed the case, including pertinent history and exam findings with the resident. I agree with the assessment, plan and orders as documented by the resident.       Patient here for routine follow up of medical problems.      DKA: uncontrolled IDDM1: Insulin pump initiated per Dr. Milena Vazquez instructions; blood glucose elevated today but improved; will need further titration as outpatient; consider CBG monitoring in furture for control of bolus insulin      HTN: microalbuminuria; may need to be started on ACE inhibitor or ARB as outpatient      Alcohol Use Disorder: drinks 6 beers/day; will need to watch for withdrawal symptoms; on thiamine and folic acid      Remainder of medical problems as per resident note.

## 2021-09-29 NOTE — PROGRESS NOTES
Physician Progress Note      PATIENT:               Natty Monique  [facility] #:                  [id number]  :                       1970  ADMIT DATE:       2021 7:42 AM  [facility] DATE:        2021 3:08 PM  RESPONDING  PROVIDER #:        Navi Frazier. QUERY TEXT:    Patient admitted with DM I and DKA. Noted documentation of \"Type 1 diabetes   mellitus DKA 2/2 insulin pump malfunction versus alcohol use\" per H&P and\"   Patient's diabetes is uncontrolled with A1c of 9.8%  Admitted with DKA likely   due to lack of insulin bolusing on his insulin pump\" per DR Marlo Servin   endocrinology  If possible, please document in progress notes and discharge summary if you   are evaluating and /or treating any of the following: The medical record reflects the following:  Risk Factors: DM type I ETOH abuse  Clinical Indicators: A1c of 9.8% hyperglycemia and hypoglycemia  Treatment: POCT endcrinology consult insulin gtt    Do Trevino RN BSN CCDS  Options provided:  -- DKA type I DM due to insulin pump malfunction confirmed and DKA type I DM   due to medical noncompliance ruled out  -- DKA type I DM due to medical noncompliance confirmed and DKA type I DM due   to insulin pump malfunction  ruled out  -- DKA due to insulin pump malfunction  and medical noncompliance confirmed  -- Insulin pump malfunction and medical non comliance ruled out  -- Other - I will add my own diagnosis  -- Disagree - Not applicable / Not valid  -- Disagree - Clinically unable to determine / Unknown  -- Refer to Clinical Documentation Reviewer    PROVIDER RESPONSE TEXT:    After study, DKA type I DM due to insulin pump malfunction confirmed and DKA   type I DM due to medical noncompliance ruled out.     Query created by: Jovanny Mahajan on 2021 10:52 AM      Electronically signed by:  Navi Frazier. 2021 8:48 PM

## 2021-10-12 ENCOUNTER — OFFICE VISIT (OUTPATIENT)
Dept: ENDOCRINOLOGY | Age: 51
End: 2021-10-12

## 2021-10-12 VITALS
HEART RATE: 104 BPM | HEIGHT: 66 IN | WEIGHT: 137 LBS | SYSTOLIC BLOOD PRESSURE: 160 MMHG | DIASTOLIC BLOOD PRESSURE: 94 MMHG | OXYGEN SATURATION: 95 % | BODY MASS INDEX: 22.02 KG/M2

## 2021-10-12 DIAGNOSIS — E10.42 TYPE 1 DIABETES MELLITUS WITH POLYNEUROPATHY (HCC): ICD-10-CM

## 2021-10-12 DIAGNOSIS — E10.65 UNCONTROLLED TYPE 1 DIABETES MELLITUS WITH HYPERGLYCEMIA (HCC): Primary | ICD-10-CM

## 2021-10-12 DIAGNOSIS — E10.319 TYPE 1 DIABETES MELLITUS WITH RETINOPATHY, MACULAR EDEMA PRESENCE UNSPECIFIED, UNSPECIFIED LATERALITY, UNSPECIFIED RETINOPATHY SEVERITY (HCC): ICD-10-CM

## 2021-10-12 DIAGNOSIS — Z96.41 INSULIN PUMP IN PLACE: ICD-10-CM

## 2021-10-12 DIAGNOSIS — E78.5 HYPERLIPIDEMIA, UNSPECIFIED HYPERLIPIDEMIA TYPE: ICD-10-CM

## 2021-10-12 PROCEDURE — 99214 OFFICE O/P EST MOD 30 MIN: CPT | Performed by: CLINICAL NURSE SPECIALIST

## 2021-10-12 NOTE — PROGRESS NOTES
[address] [facility] Department of Endocrinology Diabetes and Metabolism   [address] [facility] [address]   Phone: [phone]  Fax: [phone]    Date of Service: 10/12/2021    Primary Care Physician: Jairo Amador MD  Referring physician: No ref. provider found  Provider: Apoorva AKBAR    Reason for the visit:  Type 1 diabetes mellitus      History of Present Illness: The history is provided by the patient. No  was used. Accuracy of the patient data is excellent. Rubi Kuo is a very pleasant 49 y.o. male seen today for diabetes management     Rubi Kuo was diagnosed with diabetes at age 30 and on Medtronic insulin pump   Current pump settings: Basal rate 12a 1.35, 8a 1.45, CR 10 , ISF 55 , goal 12a 110-130, active insulin time 3 hrs.      There are 3 blood glucose readings in insulin pump. He does not enter carbs into insulin pump at all times  He is having issues affording test strips       Most recent A1c results summarized below  Lab Results   Component Value Date    LABA1C 9.8 09/26/2021    LABA1C 8.3 07/07/2021    LABA1C 10.2 02/15/2021     Patient still experiencing hypoglycemic episodes   The patient has been mindful of what has been eating and tries to follow low carb diet   I reviewed current medications and the patient has no issues with diabetes medications     Rubi Kuo is over due for an eye exam. Last eye exam was few years ago. The patient performs his own feet care and doesn't see podiatry   His diabetes is complicated with neuropathy and retinopathy s/p laser therapy   Macrovascular complications: no CAD, PVD, + TIA 2/2019   Multiple admissions for DKA.   Most recent hospitalization (9/21)    PAST MEDICAL HISTORY   Past Medical History:   Diagnosis Date    Alcoholism ([address]     Cocaine abuse [facility]     Diabetes mellitus ([facility]     Hypertension     Idiopathic peripheral neuropathy 9/21/2016    Lacunar stroke ([facility]     Marijuana abuse        PAST SURGICAL HISTORY   No past surgical history on file. SOCIAL HISTORY   Tobacco:   reports that he has been smoking cigarettes. He has a 30.00 pack-year smoking history. He has never used smokeless tobacco.  Alcohol:   reports current alcohol use of about 5.0 standard drinks of alcohol per week. Drugs:   reports current drug use. Drug: Marijuana. FAMILY HISTORY   Family History   Problem Relation Age of Onset    Diabetes type 2  Mother     Cancer Maternal Grandmother     Diabetes type 2  Nephew        ALLERGIES AND DRUG REACTIONS   Allergies   Allergen Reactions    Metoprolol      bradycardia    No Known Allergies        CURRENT MEDICATIONS   Current Outpatient Medications   Medication Sig Dispense Refill    insulin lispro (HUMALOG) 100 UNIT/ML injection vial Inject into the skin [employer] PUMP      aspirin 81 MG EC tablet Take 1 tablet by mouth daily 30 tablet 3    atorvastatin (LIPITOR) 80 MG tablet Take 1 tablet by mouth nightly 30 tablet 3    albuterol sulfate  (90 Base) MCG/ACT inhaler Inhale 2 puffs into the lungs every 6 hours as needed for Wheezing or Shortness of Breath 1 each 1    metoprolol tartrate (LOPRESSOR) 25 MG tablet Take 0.5 tablets by mouth 2 times daily 60 tablet 3    folic acid (FOLVITE) 1 MG tablet Take 1 tablet by mouth daily 30 tablet 3    thiamine 100 MG tablet Take 1 tablet by mouth daily 30 tablet 3    Cholecalciferol 400 UNIT TABS tablet Take 2.5 tablets by mouth daily 30 tablet 0     No current facility-administered medications for this visit. Review of Systems  Constitutional: No fever, no chills, no diaphoresis, no generalized weakness. HEENT: No blurred vision, No sore throat, no ear pain, no hair loss  Neck: denied any neck swelling, difficulty swallowing,   Cardio-pulmonary: No CP, SOB or palpitation, No orthopnea or PND. No cough or wheezing.   GI: No N/V/D, no constipation, No abdominal pain, no melena or hematochezia Date/Time     09/28/2021 12:32 AM    K 3.8 09/28/2021 12:32 AM    K 4.1 09/26/2021 09:49 AM    CO2 25 09/28/2021 12:32 AM    BUN 15 09/28/2021 12:32 AM    CREATININE 0.8 09/28/2021 12:32 AM    CALCIUM 8.6 09/28/2021 12:32 AM    LABGLOM >60 09/28/2021 12:32 AM    GFRAA >60 09/28/2021 12:32 AM      Lab Results   Component Value Date/Time    TSH 3.330 01/04/2020 05:07 PM    T4FREE 0.97 07/19/2012 02:00 AM     Lab Results   Component Value Date    LABA1C 9.8 09/26/2021    GLUCOSE 245 09/28/2021    GLUCOSE 83 04/11/2012    MALBCR 167.5 09/26/2021    LABMICR 132.3 09/26/2021    LABCREA 79 09/26/2021     Lab Results   Component Value Date    LABA1C 9.8 09/26/2021    LABA1C 8.3 07/07/2021    LABA1C 10.2 02/15/2021     Lab Results   Component Value Date    TRIG 266 09/26/2021    HDL 48 09/26/2021    LDLCALC 112 09/26/2021    CHOL 213 09/26/2021     Lab Results   Component Value Date    VITD25 25 07/22/2020    VITD25 23 08/02/2019       ASSESSMENT & [id number] [place] [address], a 49 y.o.-old male seen in for the following issues       Assessment:      Diagnosis Orders   1. Uncontrolled type 1 diabetes mellitus with hyperglycemia ([address]     2. Hyperlipidemia, unspecified hyperlipidemia type     3. Insulin pump in place     4. Type 1 diabetes mellitus with retinopathy, macular edema presence unspecified, unspecified laterality, unspecified retinopathy severity ([address]     5. Type 1 diabetes mellitus with polyneuropathy ([address]         Plan:     1.  Uncontrolled type 1 diabetes mellitus with hyperglycemia (HCC)   · Patient's diabetes is uncontrolled  · Patient is not always entering carbs into insulin pump therefore no boluses given  · There are only 3 blood glucose readings on insulin pump download  · Unfortunately patient is having trouble affording testing supplies  · Patient will go to [facility] and obtain the ReliOn meter and test strips  · Samples of insulin given   · We will continue insulin pump settings as above.  · Counseled on insulin pump use. Counseled to enter carbs each time he eats. · The patient was advised to check blood sugars 4 times a day before meals and at bedtime and send BS readings to our office in a week. · Discussed with patient A1c and blood sugar goals   · Optimal blood sugars: 100-140 pre-prandial, < 180 peak post-prandial  · The patient counseled about the complications of uncontrolled diabetes   · Patient was counselled about the importance of self-blood glucose monitoring and eating consistent carb diet to avoid blood sugar fluctuations   · Discussed lifestyle changes including diet and exercise with patient; recommended 150 minutes of moderate intensity exercise per week. 2. Hyperlipidemia, unspecified hyperlipidemia type   Continue statin. Encourage diet and exercise   3. Insulin pump in place    4. Type 1 diabetes mellitus with retinopathy, macular edema presence unspecified, unspecified laterality, unspecified retinopathy severity ([address]   Advised follow-up with ophthalmology as patient is well overdue   5. Type 1 diabetes mellitus with polyneuropathy (HCC)   Counseled on optimal foot care           I personally spent > 30 minutes  reviewed external notes from PCP and other patient's care team providers, and personally interpreted labs associated with the above diagnosis. I also ordered labs to further assess and manage the above addressed medical conditions. Return in about 3 months (around 1/12/2022). The above issues were reviewed with the patient who understood and agreed with the plan. Thank you for allowing us to participate in the care of this patient. Please do not hesitate to contact us with any additional questions. Isabella AKBAR    [facility] Diabetes Care and Endocrinology   [address] [place] [address]   Phone: [phone]  Fax: [phone]  --------------------------------------------  An electronic signature was used to authenticate this note. Kelsi AKBAR on 10/12/2021 at 8:24 AM

## 2022-01-12 ENCOUNTER — OFFICE VISIT (OUTPATIENT)
Dept: ENDOCRINOLOGY | Age: 52
End: 2022-01-12
Payer: COMMERCIAL

## 2022-01-12 VITALS
SYSTOLIC BLOOD PRESSURE: 197 MMHG | WEIGHT: 146 LBS | HEIGHT: 66 IN | DIASTOLIC BLOOD PRESSURE: 110 MMHG | BODY MASS INDEX: 23.46 KG/M2 | HEART RATE: 102 BPM

## 2022-01-12 DIAGNOSIS — R80.9 TYPE 1 DIABETES MELLITUS WITH ALBUMINURIA (HCC): ICD-10-CM

## 2022-01-12 DIAGNOSIS — E55.9 VITAMIN D DEFICIENCY: ICD-10-CM

## 2022-01-12 DIAGNOSIS — E10.42 TYPE 1 DIABETES MELLITUS WITH POLYNEUROPATHY (HCC): ICD-10-CM

## 2022-01-12 DIAGNOSIS — E78.5 HYPERLIPIDEMIA, UNSPECIFIED HYPERLIPIDEMIA TYPE: ICD-10-CM

## 2022-01-12 DIAGNOSIS — E10.29 TYPE 1 DIABETES MELLITUS WITH ALBUMINURIA (HCC): ICD-10-CM

## 2022-01-12 DIAGNOSIS — E10.319 TYPE 1 DIABETES MELLITUS WITH RETINOPATHY, MACULAR EDEMA PRESENCE UNSPECIFIED, UNSPECIFIED LATERALITY, UNSPECIFIED RETINOPATHY SEVERITY (HCC): ICD-10-CM

## 2022-01-12 DIAGNOSIS — E10.65 UNCONTROLLED TYPE 1 DIABETES MELLITUS WITH HYPERGLYCEMIA (HCC): Primary | ICD-10-CM

## 2022-01-12 LAB — HBA1C MFR BLD: 9.3 %

## 2022-01-12 PROCEDURE — 99214 OFFICE O/P EST MOD 30 MIN: CPT | Performed by: CLINICAL NURSE SPECIALIST

## 2022-01-12 PROCEDURE — 83036 HEMOGLOBIN GLYCOSYLATED A1C: CPT | Performed by: CLINICAL NURSE SPECIALIST

## 2022-01-12 PROCEDURE — 95251 CONT GLUC MNTR ANALYSIS I&R: CPT | Performed by: CLINICAL NURSE SPECIALIST

## 2022-01-12 RX ORDER — LISINOPRIL 5 MG/1
5 TABLET ORAL DAILY
Qty: 30 TABLET | Refills: 3 | Status: SHIPPED | OUTPATIENT
Start: 2022-01-12 | End: 2022-02-11

## 2022-01-12 NOTE — PROGRESS NOTES
[address] [facility] Department of Endocrinology Diabetes and Metabolism   [address] [facility] [address]   Phone: [phone]  Fax: [phone]    Date of Service: 1/12/2022    Primary Care Physician: Bethany Hoyos MD  Referring physician: No ref. provider found  Provider: CHAYO Alvarez      Reason for the visit:  Type 1 diabetes mellitus      History of Present Illness: The history is provided by the patient. No  was used. Accuracy of the patient data is excellent. Wayna Epley is a very pleasant 49 y.o. male seen today for diabetes management   He complains of left hip pain s/p fall this am. Unable to seek tx due to lack of insurance    Wayna Epley was diagnosed with diabetes at age 30 and on Medtronic insulin pump   Current pump settings: Basal rate 12a 1.35, 8a 1.45, CR 10 , ISF 55 , goal 12a 110-130, 8am 100-120, 10pm 110-130,  active insulin time 3 hrs.     He is currently on disability with an avg income of $1500/month. He is currently trying to  pursue [employer]. He is unable to pay for his insulin due to financial struggle. He relies on office samples of insulin and pump setsto use. He wears his infusion site for about 5-7 days. He has no infusion sets left at home. He checks his BS 5/6 times a day. Only  3 BS readings entered into pump per pump review. He is having issues affording test strips. He relies on office samples and friends sharing their strips.        He does enter carbs into insulin pump at all times  HbA1c 9.3% today in office via [employer].   Most recent A1c results summarized below  Lab Results   Component Value Date    LABA1C 9.3 01/12/2022    LABA1C 9.8 09/26/2021    LABA1C 8.3 07/07/2021     Patient is notl experiencing hypoglycemic episodes   The patient has been mindful of what has been eating and tries to follow low carb diet   I reviewed current medications and the patient has no issues with diabetes medications     Nancie Sue is over due for an eye exam. Last eye exam was few years ago. Financial issues to make an appointment. The patient performs his own feet care and doesn't see podiatry   His diabetes is complicated with neuropathy and retinopathy s/p laser therapy   Macrovascular complications: no CAD, PVD, + TIA 2/2019   Multiple admissions for DKA. Most recent hospitalization (9/21)    PAST MEDICAL HISTORY   Past Medical History:   Diagnosis Date    [facility]ism [facility]     [facility]     Diabetes mellitus [facility]     Hypertension     Idiopathic peripheral neuropathy 9/21/2016    Lac[facility]ar stroke ([facility]     Marijuana abuse        PAST SURGICAL HISTORY   No past surgical history on file. SOCIAL HISTORY   Tobacco:   reports that he has been smoking cigarettes. He has a 30.00 pack-year smoking history. He has never used smokeless tobacco.  Alcohol:   reports current alcohol use of about 5.0 standard drinks of alcohol per week. Drugs:   reports current drug use. Drug: Marijuana [address]).     FAMILY HISTORY   Family History   Problem Relation Age of Onset    Diabetes type 2  Mother     Cancer Maternal Grandmother     Diabetes type 2  Nephew        ALLERGIES AND DRUG REACTIONS   Allergies   Allergen Reactions    Metoprolol      bradycardia    No Known Allergies        CURRENT MEDICATIONS   Current Outpatient Medications   Medication Sig Dispense Refill    lisinopril (PRINIVIL;ZESTRIL) 5 MG tablet Take 1 tablet by mouth daily 30 tablet 3    aspirin 81 MG EC tablet Take 1 tablet by mouth daily 30 tablet 3    atorvastatin (LIPITOR) 80 MG tablet Take 1 tablet by mouth nightly 30 tablet 3    albuterol sulfate  (90 Base) MCG/ACT inhaler Inhale 2 puffs into the lungs every 6 hours as needed for Wheezing or Shortness of Breath 1 each 1    metoprolol tartrate (LOPRESSOR) 25 MG tablet Take 0.5 tablets by mouth 2 times daily 60 tablet 3    folic acid (FOLVITE) 1 MG tablet Take 1 tablet by mouth daily 30 tablet 3    thiamine 100 MG tablet Take 1 tablet by mouth daily 30 tablet 3    Cholecalciferol 400 UNIT TABS tablet Take 2.5 tablets by mouth daily 30 tablet 0    insulin lispro (HUMALOG) 100 UNIT/ML injection vial Inject into the skin MEDTRONIC PUMP       No current facility-administered medications for this visit. Review of Systems  Constitutional: No fever, no chills, no diaphoresis, no generalized weakness. HEENT: No blurred vision, No sore throat, no ear pain, no hair loss  Neck: denied any neck swelling, difficulty swallowing,   Cardio-pulmonary: No CP, SOB or palpitation, No orthopnea or PND. No cough or wheezing. GI: No N/V/D, no constipation, No abdominal pain, no melena or hematochezia   : Denied any dysuria, hematuria, flank pain, discharge, or incontinence. Skin: denied any rash, ulcer, Hirsute, or hyperpigmentation. MSK: denied any joint deformity, joint pain/swelling, muscle pain, or back pain. Neuro:  numbness, no tingling and weakness to legs    OBJECTIVE    BP (!) 197/110   Pulse 102   Ht 5' 6\" (1.676 m)   Wt 146 lb (66.2 kg)   BMI 23.57 kg/m²   BP Readings from Last 4 Encounters:   01/12/22 (!) 197/110   10/12/21 (!) 160/94   09/28/21 (!) 146/93   02/15/21 128/74     Wt Readings from Last 6 Encounters:   01/12/22 146 lb (66.2 kg)   10/12/21 137 lb (62.1 kg)   09/28/21 136 lb 4.8 oz (61.8 kg)   02/15/21 153 lb (69.4 kg)   10/29/20 145 lb 6.4 oz (66 kg)   07/22/20 152 lb (68.9 kg)       Physical examination:  General: awake alert, oriented x3, no abnormal position or movements. HEENT: normocephalic non-traumatic, no exophthalmos   Neck: supple, no LN enlargement, no thyromegaly, no thyroid tenderness, no JVD. Pulm: Clear equal air entry no added sounds, no wheezing or rhonchi    CVS: S1 + S2, no murmur, no heave. Dorsalis pedis pulse palpable   Abd: soft lax, no tenderness, no organomegaly, audible bowel sounds. Skin: warm, no lesions, no rash.  No callus, no I&R   2. Hyperlipidemia, unspecified hyperlipidemia type     3. Type 1 diabetes mellitus with polyneuropathy (HCC)  POCT glycosylated hemoglobin (Hb A1C)   4. Type 1 diabetes mellitus with retinopathy, macular edema presence unspecified, unspecified laterality, unspecified retinopathy severity ([address]     5. Vitamin D deficiency  Vitamin D 25 Hydroxy   6. 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 Uncontrolled type 1 diabetes mellitus with hyperglycemia (HCC)   · Patient's diabetes is uncontrolled HbA1c 9.3% today  · Insulin pump for therapy  · Pt wearing pump for 5-7 days due to cost of supplies. · Patient is not entering carbs into insulin pump therefore no boluses given  · There are only 3 blood glucose readings on insulin pump download  · Unfortunately patient is having trouble affording testing supplies  · Patient will go to [place] and obtain the ReliOn meter and test strips  · Continue insulin pump settings as above: Basal rate 12a 1.35, 8a 1.45, CR 10 , ISF 55 , goal 12a 110-130, active insulin time 3 hrs.   · Counseled on insulin pump use. Counseled to enter carbs each time he eats. · The patient was advised to check blood sugars 4 times a day before meals and at bedtime. · Suggested to patient returning to injections with RevoLazet Relion N & R insulin. Pt said if he cannot obtain insurance he will consider that DM regimen. · Discussed with patient A1c and blood sugar goals   · Optimal blood sugars: 100-140 pre-prandial, < 180 peak post-prandial  · The patient counseled about the complications of uncontrolled diabetes   · Patient was counselled about the importance of self-blood glucose monitoring and eating consistent carb diet to avoid blood sugar fluctuations   · Discussed lifestyle changes including diet and exercise with patient; recommended 150 minutes of moderate intensity exercise per week.    · Supplies and insulin samples provided to patient today  · Advised pt to call for Obamacare. Toll free number provided and deadline is 1/15/22. · Pt to call office for update on insurance situation  · HbA1c at next OV     2. Hyperlipidemia, unspecified hyperlipidemia type   · Continue statin. - reinforced compliance    · Levels remain elevated  · Encourage diet and exercise       3. Type 1 diabetes mellitus with retinopathy, macular edema presence unspecified, unspecified laterality, unspecified retinopathy severity ([facility]   · Advised follow-up with ophthalmology as patient is well overdue     4.      5.          6. 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 I also ordered labs to further assess and manage the above addressed medical conditions. Return in about 3 months (around 4/12/2022). The above issues were reviewed with the patient who understood and agreed with the plan. Thank you for allowing us to participate in the care of this patient. Please do not hesitate to contact us with any additional questions.      CHAYO Heard - [facility] Diabetes Care and Endocrinology   [address] [place] [address]   Phone: [phone]  Fax: [phone]  --------------------------------------------  An electronic signature was used to authenticate this note.  Nba Foss, APRYADIEL - CNS' on 1/12/2022 at 2:56 PM

## 2022-01-25 ENCOUNTER — HOSPITAL ENCOUNTER (INPATIENT)
Age: 52
LOS: 2 days | Discharge: HOME | DRG: 638 | End: 2022-01-27
Attending: EMERGENCY MEDICINE | Admitting: INTERNAL MEDICINE
Payer: COMMERCIAL

## 2022-01-25 ENCOUNTER — APPOINTMENT (OUTPATIENT)
Dept: GENERAL RADIOLOGY | Age: 52
DRG: 638 | End: 2022-01-25

## 2022-01-25 DIAGNOSIS — E10.10 DIABETIC KETOACIDOSIS WITHOUT COMA ASSOCIATED WITH TYPE 1 DIABETES MELLITUS (HCC): Primary | ICD-10-CM

## 2022-01-25 LAB
ACETAMINOPHEN LEVEL: <5 MCG/ML (ref 10–30)
ALBUMIN SERPL-MCNC: 5.4 G/DL (ref 3.5–5.2)
ALP BLD-CCNC: 196 U/L (ref 40–129)
ALT SERPL-CCNC: 21 U/L (ref 0–40)
ANION GAP SERPL CALCULATED.3IONS-SCNC: 31 MMOL/L (ref 7–16)
AST SERPL-CCNC: 18 U/L (ref 0–39)
BASOPHILS ABSOLUTE: 0.03 E9/L (ref 0–0.2)
BASOPHILS RELATIVE PERCENT: 0.1 % (ref 0–2)
BILIRUB SERPL-MCNC: 1.7 MG/DL (ref 0–1.2)
BUN BLDV-MCNC: 56 MG/DL (ref 6–20)
CALCIUM SERPL-MCNC: 10.2 MG/DL (ref 8.6–10.2)
CHLORIDE BLD-SCNC: 83 MMOL/L (ref 98–107)
CO2: 19 MMOL/L (ref 22–29)
CREAT SERPL-MCNC: 2.5 MG/DL (ref 0.7–1.2)
EOSINOPHILS ABSOLUTE: 0 E9/L (ref 0.05–0.5)
EOSINOPHILS RELATIVE PERCENT: 0 % (ref 0–6)
ETHANOL: <10 MG/DL (ref 0–0.08)
GFR AFRICAN AMERICAN: 33
GFR NON-AFRICAN AMERICAN: 27 ML/MIN/1.73
GLUCOSE BLD-MCNC: 383 MG/DL (ref 74–99)
HCT VFR BLD CALC: 52.9 % (ref 37–54)
HEMOGLOBIN: 18 G/DL (ref 12.5–16.5)
IMMATURE GRANULOCYTES #: 0.11 E9/L
IMMATURE GRANULOCYTES %: 0.5 % (ref 0–5)
LACTIC ACID: 2.4 MMOL/L (ref 0.5–2.2)
LIPASE: 27 U/L (ref 13–60)
LYMPHOCYTES ABSOLUTE: 0.93 E9/L (ref 1.5–4)
LYMPHOCYTES RELATIVE PERCENT: 4.4 % (ref 20–42)
MCH RBC QN AUTO: 30.2 PG (ref 26–35)
MCHC RBC AUTO-ENTMCNC: 34 % (ref 32–34.5)
MCV RBC AUTO: 88.8 FL (ref 80–99.9)
METER GLUCOSE: 377 MG/DL (ref 74–99)
MONOCYTES ABSOLUTE: 1.3 E9/L (ref 0.1–0.95)
MONOCYTES RELATIVE PERCENT: 6.2 % (ref 2–12)
NEUTROPHILS ABSOLUTE: 18.57 E9/L (ref 1.8–7.3)
NEUTROPHILS RELATIVE PERCENT: 88.8 % (ref 43–80)
PDW BLD-RTO: 14 FL (ref 11.5–15)
PH VENOUS: 7.39 (ref 7.35–7.45)
PLATELET # BLD: 458 E9/L (ref 130–450)
PMV BLD AUTO: 8.6 FL (ref 7–12)
POTASSIUM SERPL-SCNC: 4.1 MMOL/L (ref 3.5–5)
RBC # BLD: 5.96 E12/L (ref 3.8–5.8)
SALICYLATE, SERUM: <0.3 MG/DL (ref 0–30)
SODIUM BLD-SCNC: 133 MMOL/L (ref 132–146)
TOTAL PROTEIN: 9.2 G/DL (ref 6.4–8.3)
TRICYCLIC ANTIDEPRESSANTS SCREEN SERUM: NEGATIVE NG/ML
TROPONIN, HIGH SENSITIVITY: 197 NG/L (ref 0–11)
WBC # BLD: 20.9 E9/L (ref 4.5–11.5)

## 2022-01-25 PROCEDURE — 82800 BLOOD PH: CPT

## 2022-01-25 PROCEDURE — 2060000000 HC ICU INTERMEDIATE R&B

## 2022-01-25 PROCEDURE — 93005 ELECTROCARDIOGRAM TRACING: CPT | Performed by: EMERGENCY MEDICINE

## 2022-01-25 PROCEDURE — 83735 ASSAY OF MAGNESIUM: CPT

## 2022-01-25 PROCEDURE — 83605 ASSAY OF LACTIC ACID: CPT

## 2022-01-25 PROCEDURE — 81001 URINALYSIS AUTO W/SCOPE: CPT

## 2022-01-25 PROCEDURE — 87040 BLOOD CULTURE FOR BACTERIA: CPT

## 2022-01-25 PROCEDURE — 80307 DRUG TEST PRSMV CHEM ANLYZR: CPT

## 2022-01-25 PROCEDURE — 82962 GLUCOSE BLOOD TEST: CPT

## 2022-01-25 PROCEDURE — 71045 X-RAY EXAM CHEST 1 VIEW: CPT

## 2022-01-25 PROCEDURE — 82010 KETONE BODYS QUAN: CPT

## 2022-01-25 PROCEDURE — 80053 COMPREHEN METABOLIC PANEL: CPT

## 2022-01-25 PROCEDURE — 85025 COMPLETE CBC W/AUTO DIFF WBC: CPT

## 2022-01-25 PROCEDURE — 82077 ASSAY SPEC XCP UR&BREATH IA: CPT

## 2022-01-25 PROCEDURE — 80179 DRUG ASSAY SALICYLATE: CPT

## 2022-01-25 PROCEDURE — 80143 DRUG ASSAY ACETAMINOPHEN: CPT

## 2022-01-25 PROCEDURE — 84484 ASSAY OF TROPONIN QUANT: CPT

## 2022-01-25 PROCEDURE — 6370000000 HC RX 637 (ALT 250 FOR IP): Performed by: EMERGENCY MEDICINE

## 2022-01-25 PROCEDURE — 83690 ASSAY OF LIPASE: CPT

## 2022-01-25 PROCEDURE — 99284 EMERGENCY DEPT VISIT MOD MDM: CPT

## 2022-01-25 PROCEDURE — 2580000003 HC RX 258: Performed by: EMERGENCY MEDICINE

## 2022-01-25 RX ORDER — POTASSIUM CHLORIDE 7.45 MG/ML
10 INJECTION INTRAVENOUS PRN
Status: DISCONTINUED | OUTPATIENT
Start: 2022-01-25 | End: 2022-01-27 | Stop reason: HOSPADM

## 2022-01-25 RX ORDER — MAGNESIUM SULFATE 1 G/100ML
1000 INJECTION INTRAVENOUS PRN
Status: DISCONTINUED | OUTPATIENT
Start: 2022-01-25 | End: 2022-01-27 | Stop reason: HOSPADM

## 2022-01-25 RX ORDER — 0.9 % SODIUM CHLORIDE 0.9 %
1000 INTRAVENOUS SOLUTION INTRAVENOUS ONCE
Status: COMPLETED | OUTPATIENT
Start: 2022-01-25 | End: 2022-01-25

## 2022-01-25 RX ORDER — DEXTROSE AND SODIUM CHLORIDE 5; .45 G/100ML; G/100ML
INJECTION, SOLUTION INTRAVENOUS CONTINUOUS PRN
Status: DISCONTINUED | OUTPATIENT
Start: 2022-01-25 | End: 2022-01-27 | Stop reason: HOSPADM

## 2022-01-25 RX ORDER — 0.9 % SODIUM CHLORIDE 0.9 %
15 INTRAVENOUS SOLUTION INTRAVENOUS ONCE
Status: COMPLETED | OUTPATIENT
Start: 2022-01-25 | End: 2022-01-26

## 2022-01-25 RX ORDER — DEXTROSE MONOHYDRATE 25 G/50ML
12.5 INJECTION, SOLUTION INTRAVENOUS PRN
Status: DISCONTINUED | OUTPATIENT
Start: 2022-01-25 | End: 2022-01-27 | Stop reason: HOSPADM

## 2022-01-25 RX ORDER — SODIUM CHLORIDE 9 MG/ML
INJECTION, SOLUTION INTRAVENOUS CONTINUOUS
Status: DISCONTINUED | OUTPATIENT
Start: 2022-01-26 | End: 2022-01-27 | Stop reason: HOSPADM

## 2022-01-25 RX ADMIN — SODIUM CHLORIDE 1000 ML: 9 INJECTION, SOLUTION INTRAVENOUS at 22:10

## 2022-01-25 RX ADMIN — SODIUM CHLORIDE 987 ML: 9 INJECTION, SOLUTION INTRAVENOUS at 23:45

## 2022-01-25 RX ADMIN — SODIUM CHLORIDE 6.58 UNITS/HR: 9 INJECTION, SOLUTION INTRAVENOUS at 23:53

## 2022-01-26 LAB
ABO/RH: NORMAL
AMPHETAMINE SCREEN, URINE: NOT DETECTED
AMYLASE: 185 U/L (ref 20–100)
ANION GAP SERPL CALCULATED.3IONS-SCNC: 11 MMOL/L (ref 7–16)
ANION GAP SERPL CALCULATED.3IONS-SCNC: 12 MMOL/L (ref 7–16)
ANION GAP SERPL CALCULATED.3IONS-SCNC: 17 MMOL/L (ref 7–16)
ANION GAP SERPL CALCULATED.3IONS-SCNC: 17 MMOL/L (ref 7–16)
ANION GAP SERPL CALCULATED.3IONS-SCNC: 21 MMOL/L (ref 7–16)
ANTIBODY SCREEN: NORMAL
BACTERIA: NORMAL /HPF
BARBITURATE SCREEN URINE: NOT DETECTED
BASOPHILS ABSOLUTE: 0.03 E9/L (ref 0–0.2)
BASOPHILS RELATIVE PERCENT: 0.1 % (ref 0–2)
BENZODIAZEPINE SCREEN, URINE: NOT DETECTED
BETA-HYDROXYBUTYRATE: >4.5 MMOL/L (ref 0.02–0.27)
BILIRUBIN URINE: ABNORMAL
BLOOD, URINE: ABNORMAL
BUN BLDV-MCNC: 26 MG/DL (ref 6–20)
BUN BLDV-MCNC: 29 MG/DL (ref 6–20)
BUN BLDV-MCNC: 45 MG/DL (ref 6–20)
BUN BLDV-MCNC: 51 MG/DL (ref 6–20)
BUN BLDV-MCNC: 56 MG/DL (ref 6–20)
CALCIUM SERPL-MCNC: 7.5 MG/DL (ref 8.6–10.2)
CALCIUM SERPL-MCNC: 8.2 MG/DL (ref 8.6–10.2)
CALCIUM SERPL-MCNC: 8.4 MG/DL (ref 8.6–10.2)
CALCIUM SERPL-MCNC: 8.4 MG/DL (ref 8.6–10.2)
CALCIUM SERPL-MCNC: 8.7 MG/DL (ref 8.6–10.2)
CANNABINOID SCREEN URINE: NOT DETECTED
CHLORIDE BLD-SCNC: 89 MMOL/L (ref 98–107)
CHLORIDE BLD-SCNC: 93 MMOL/L (ref 98–107)
CHLORIDE BLD-SCNC: 96 MMOL/L (ref 98–107)
CHLORIDE BLD-SCNC: 97 MMOL/L (ref 98–107)
CHLORIDE BLD-SCNC: 99 MMOL/L (ref 98–107)
CHLORIDE URINE RANDOM: <20 MMOL/L
CK MB: 5.2 NG/ML (ref 0–7.7)
CLARITY: CLEAR
CO2: 20 MMOL/L (ref 22–29)
CO2: 22 MMOL/L (ref 22–29)
CO2: 22 MMOL/L (ref 22–29)
CO2: 27 MMOL/L (ref 22–29)
CO2: 28 MMOL/L (ref 22–29)
COCAINE METABOLITE SCREEN URINE: POSITIVE
COLOR: YELLOW
CREAT SERPL-MCNC: 0.7 MG/DL (ref 0.7–1.2)
CREAT SERPL-MCNC: 0.8 MG/DL (ref 0.7–1.2)
CREAT SERPL-MCNC: 1.3 MG/DL (ref 0.7–1.2)
CREAT SERPL-MCNC: 1.6 MG/DL (ref 0.7–1.2)
CREAT SERPL-MCNC: 2 MG/DL (ref 0.7–1.2)
CREATININE URINE: 118 MG/DL (ref 40–278)
EKG ATRIAL RATE: 100 BPM
EKG ATRIAL RATE: 113 BPM
EKG P AXIS: 74 DEGREES
EKG P AXIS: 77 DEGREES
EKG P-R INTERVAL: 126 MS
EKG P-R INTERVAL: 134 MS
EKG Q-T INTERVAL: 370 MS
EKG Q-T INTERVAL: 382 MS
EKG QRS DURATION: 90 MS
EKG QRS DURATION: 92 MS
EKG QTC CALCULATION (BAZETT): 477 MS
EKG QTC CALCULATION (BAZETT): 523 MS
EKG R AXIS: 68 DEGREES
EKG R AXIS: 70 DEGREES
EKG T AXIS: -86 DEGREES
EKG T AXIS: -90 DEGREES
EKG VENTRICULAR RATE: 100 BPM
EKG VENTRICULAR RATE: 113 BPM
EOSINOPHILS ABSOLUTE: 0 E9/L (ref 0.05–0.5)
EOSINOPHILS RELATIVE PERCENT: 0 % (ref 0–6)
EPITHELIAL CELLS, UA: NORMAL /HPF
FENTANYL SCREEN, URINE: NOT DETECTED
GFR AFRICAN AMERICAN: 43
GFR AFRICAN AMERICAN: 55
GFR AFRICAN AMERICAN: >60
GFR NON-AFRICAN AMERICAN: 35 ML/MIN/1.73
GFR NON-AFRICAN AMERICAN: 46 ML/MIN/1.73
GFR NON-AFRICAN AMERICAN: 58 ML/MIN/1.73
GFR NON-AFRICAN AMERICAN: >60 ML/MIN/1.73
GFR NON-AFRICAN AMERICAN: >60 ML/MIN/1.73
GLUCOSE BLD-MCNC: 204 MG/DL (ref 74–99)
GLUCOSE BLD-MCNC: 235 MG/DL (ref 74–99)
GLUCOSE BLD-MCNC: 346 MG/DL (ref 74–99)
GLUCOSE BLD-MCNC: 348 MG/DL (ref 74–99)
GLUCOSE BLD-MCNC: 554 MG/DL (ref 74–99)
GLUCOSE URINE: >=1000 MG/DL
HBA1C MFR BLD: 9.3 % (ref 4–5.6)
HCT VFR BLD CALC: 42.8 % (ref 37–54)
HEMOGLOBIN: 14.7 G/DL (ref 12.5–16.5)
IMMATURE GRANULOCYTES #: 0.16 E9/L
IMMATURE GRANULOCYTES %: 0.7 % (ref 0–5)
KETONES, URINE: 40 MG/DL
LACTIC ACID: 1.1 MMOL/L (ref 0.5–2.2)
LACTIC ACID: 1.4 MMOL/L (ref 0.5–2.2)
LEUKOCYTE ESTERASE, URINE: NEGATIVE
LYMPHOCYTES ABSOLUTE: 0.93 E9/L (ref 1.5–4)
LYMPHOCYTES RELATIVE PERCENT: 4.2 % (ref 20–42)
Lab: ABNORMAL
MAGNESIUM: 1.9 MG/DL (ref 1.6–2.6)
MAGNESIUM: 1.9 MG/DL (ref 1.6–2.6)
MAGNESIUM: 2 MG/DL (ref 1.6–2.6)
MAGNESIUM: 2 MG/DL (ref 1.6–2.6)
MAGNESIUM: 2.3 MG/DL (ref 1.6–2.6)
MAGNESIUM: 2.4 MG/DL (ref 1.6–2.6)
MCH RBC QN AUTO: 30.1 PG (ref 26–35)
MCHC RBC AUTO-ENTMCNC: 34.3 % (ref 32–34.5)
MCV RBC AUTO: 87.5 FL (ref 80–99.9)
METER GLUCOSE: 169 MG/DL (ref 74–99)
METER GLUCOSE: 173 MG/DL (ref 74–99)
METER GLUCOSE: 179 MG/DL (ref 74–99)
METER GLUCOSE: 180 MG/DL (ref 74–99)
METER GLUCOSE: 189 MG/DL (ref 74–99)
METER GLUCOSE: 189 MG/DL (ref 74–99)
METER GLUCOSE: 193 MG/DL (ref 74–99)
METER GLUCOSE: 193 MG/DL (ref 74–99)
METER GLUCOSE: 205 MG/DL (ref 74–99)
METER GLUCOSE: 208 MG/DL (ref 74–99)
METER GLUCOSE: 226 MG/DL (ref 74–99)
METER GLUCOSE: 228 MG/DL (ref 74–99)
METER GLUCOSE: 257 MG/DL (ref 74–99)
METER GLUCOSE: 323 MG/DL (ref 74–99)
METER GLUCOSE: 389 MG/DL (ref 74–99)
METER GLUCOSE: 393 MG/DL (ref 74–99)
METER GLUCOSE: 434 MG/DL (ref 74–99)
METHADONE SCREEN, URINE: NOT DETECTED
MONOCYTES ABSOLUTE: 1.24 E9/L (ref 0.1–0.95)
MONOCYTES RELATIVE PERCENT: 5.6 % (ref 2–12)
NEUTROPHILS ABSOLUTE: 19.97 E9/L (ref 1.8–7.3)
NEUTROPHILS RELATIVE PERCENT: 89.4 % (ref 43–80)
NITRITE, URINE: NEGATIVE
OPIATE SCREEN URINE: NOT DETECTED
OXYCODONE URINE: NOT DETECTED
PDW BLD-RTO: 14 FL (ref 11.5–15)
PH UA: 5.5 (ref 5–9)
PHENCYCLIDINE SCREEN URINE: NOT DETECTED
PHOSPHORUS: 16.3 MG/DL (ref 2.5–4.5)
PHOSPHORUS: 2 MG/DL (ref 2.5–4.5)
PHOSPHORUS: 2.2 MG/DL (ref 2.5–4.5)
PHOSPHORUS: 2.7 MG/DL (ref 2.5–4.5)
PHOSPHORUS: 9.5 MG/DL (ref 2.5–4.5)
PLATELET # BLD: 438 E9/L (ref 130–450)
PMV BLD AUTO: 8.7 FL (ref 7–12)
POTASSIUM SERPL-SCNC: 3.3 MMOL/L (ref 3.5–5)
POTASSIUM SERPL-SCNC: 3.3 MMOL/L (ref 3.5–5)
POTASSIUM SERPL-SCNC: 3.5 MMOL/L (ref 3.5–5)
POTASSIUM SERPL-SCNC: 4.3 MMOL/L (ref 3.5–5)
POTASSIUM SERPL-SCNC: 4.6 MMOL/L (ref 3.5–5)
POTASSIUM, UR: 33.3 MMOL/L
PROTEIN UA: 30 MG/DL
RBC # BLD: 4.89 E12/L (ref 3.8–5.8)
RBC UA: NORMAL /HPF (ref 0–2)
REASON FOR REJECTION: NORMAL
REJECTED TEST: NORMAL
SODIUM BLD-SCNC: 128 MMOL/L (ref 132–146)
SODIUM BLD-SCNC: 131 MMOL/L (ref 132–146)
SODIUM BLD-SCNC: 136 MMOL/L (ref 132–146)
SODIUM BLD-SCNC: 137 MMOL/L (ref 132–146)
SODIUM BLD-SCNC: 139 MMOL/L (ref 132–146)
SODIUM URINE: <20 MMOL/L
SPECIFIC GRAVITY UA: >=1.03 (ref 1–1.03)
TOTAL CK: 311 U/L (ref 20–200)
TRIGL SERPL-MCNC: 100 MG/DL (ref 0–149)
TROPONIN, HIGH SENSITIVITY: 54 NG/L (ref 0–11)
TROPONIN, HIGH SENSITIVITY: 68 NG/L (ref 0–11)
TROPONIN, HIGH SENSITIVITY: 70 NG/L (ref 0–11)
UROBILINOGEN, URINE: 0.2 E.U./DL
WBC # BLD: 22.3 E9/L (ref 4.5–11.5)
WBC UA: NORMAL /HPF (ref 0–5)

## 2022-01-26 PROCEDURE — 2500000003 HC RX 250 WO HCPCS: Performed by: EMERGENCY MEDICINE

## 2022-01-26 PROCEDURE — 83605 ASSAY OF LACTIC ACID: CPT

## 2022-01-26 PROCEDURE — 84478 ASSAY OF TRIGLYCERIDES: CPT

## 2022-01-26 PROCEDURE — 82436 ASSAY OF URINE CHLORIDE: CPT

## 2022-01-26 PROCEDURE — 83036 HEMOGLOBIN GLYCOSYLATED A1C: CPT

## 2022-01-26 PROCEDURE — 93005 ELECTROCARDIOGRAM TRACING: CPT | Performed by: INTERNAL MEDICINE

## 2022-01-26 PROCEDURE — 86850 RBC ANTIBODY SCREEN: CPT

## 2022-01-26 PROCEDURE — 93010 ELECTROCARDIOGRAM REPORT: CPT | Performed by: INTERNAL MEDICINE

## 2022-01-26 PROCEDURE — 2500000003 HC RX 250 WO HCPCS: Performed by: INTERNAL MEDICINE

## 2022-01-26 PROCEDURE — 36415 COLL VENOUS BLD VENIPUNCTURE: CPT

## 2022-01-26 PROCEDURE — C9113 INJ PANTOPRAZOLE SODIUM, VIA: HCPCS | Performed by: EMERGENCY MEDICINE

## 2022-01-26 PROCEDURE — 82553 CREATINE MB FRACTION: CPT

## 2022-01-26 PROCEDURE — 84133 ASSAY OF URINE POTASSIUM: CPT

## 2022-01-26 PROCEDURE — 82570 ASSAY OF URINE CREATININE: CPT

## 2022-01-26 PROCEDURE — S5553 INSULIN LONG ACTING 5 U: HCPCS

## 2022-01-26 PROCEDURE — 6370000000 HC RX 637 (ALT 250 FOR IP): Performed by: INTERNAL MEDICINE

## 2022-01-26 PROCEDURE — 84484 ASSAY OF TROPONIN QUANT: CPT

## 2022-01-26 PROCEDURE — 2060000000 HC ICU INTERMEDIATE R&B

## 2022-01-26 PROCEDURE — 84100 ASSAY OF PHOSPHORUS: CPT

## 2022-01-26 PROCEDURE — 82150 ASSAY OF AMYLASE: CPT

## 2022-01-26 PROCEDURE — 86900 BLOOD TYPING SEROLOGIC ABO: CPT

## 2022-01-26 PROCEDURE — 82962 GLUCOSE BLOOD TEST: CPT

## 2022-01-26 PROCEDURE — 2580000003 HC RX 258: Performed by: INTERNAL MEDICINE

## 2022-01-26 PROCEDURE — 99284 EMERGENCY DEPT VISIT MOD MDM: CPT | Performed by: INTERNAL MEDICINE

## 2022-01-26 PROCEDURE — 85025 COMPLETE CBC W/AUTO DIFF WBC: CPT

## 2022-01-26 PROCEDURE — 84300 ASSAY OF URINE SODIUM: CPT

## 2022-01-26 PROCEDURE — 0202U NFCT DS 22 TRGT SARS-COV-2: CPT

## 2022-01-26 PROCEDURE — 2580000003 HC RX 258: Performed by: EMERGENCY MEDICINE

## 2022-01-26 PROCEDURE — 99233 SBSQ HOSP IP/OBS HIGH 50: CPT | Performed by: INTERNAL MEDICINE

## 2022-01-26 PROCEDURE — 83735 ASSAY OF MAGNESIUM: CPT

## 2022-01-26 PROCEDURE — 6360000002 HC RX W HCPCS: Performed by: EMERGENCY MEDICINE

## 2022-01-26 PROCEDURE — 6360000002 HC RX W HCPCS: Performed by: INTERNAL MEDICINE

## 2022-01-26 PROCEDURE — 86901 BLOOD TYPING SEROLOGIC RH(D): CPT

## 2022-01-26 PROCEDURE — 82550 ASSAY OF CK (CPK): CPT

## 2022-01-26 PROCEDURE — 6370000000 HC RX 637 (ALT 250 FOR IP)

## 2022-01-26 PROCEDURE — 80048 BASIC METABOLIC PNL TOTAL CA: CPT

## 2022-01-26 RX ORDER — ALBUTEROL SULFATE 2.5 MG/3ML
2.5 SOLUTION RESPIRATORY (INHALATION) EVERY 6 HOURS PRN
Status: DISCONTINUED | OUTPATIENT
Start: 2022-01-26 | End: 2022-01-27 | Stop reason: HOSPADM

## 2022-01-26 RX ORDER — ATORVASTATIN CALCIUM 40 MG/1
80 TABLET, FILM COATED ORAL NIGHTLY
Status: DISCONTINUED | OUTPATIENT
Start: 2022-01-26 | End: 2022-01-27 | Stop reason: HOSPADM

## 2022-01-26 RX ORDER — PANTOPRAZOLE SODIUM 40 MG/10ML
40 INJECTION, POWDER, LYOPHILIZED, FOR SOLUTION INTRAVENOUS ONCE
Status: COMPLETED | OUTPATIENT
Start: 2022-01-26 | End: 2022-01-26

## 2022-01-26 RX ORDER — THIAMINE HYDROCHLORIDE 100 MG/ML
100 INJECTION, SOLUTION INTRAMUSCULAR; INTRAVENOUS DAILY
Status: DISCONTINUED | OUTPATIENT
Start: 2022-01-26 | End: 2022-01-27 | Stop reason: HOSPADM

## 2022-01-26 RX ORDER — INSULIN GLARGINE-YFGN 100 [IU]/ML
INJECTION, SOLUTION SUBCUTANEOUS
Status: COMPLETED
Start: 2022-01-26 | End: 2022-01-26

## 2022-01-26 RX ORDER — HEPARIN SODIUM 10000 [USP'U]/ML
5000 INJECTION, SOLUTION INTRAVENOUS; SUBCUTANEOUS EVERY 8 HOURS
Status: DISCONTINUED | OUTPATIENT
Start: 2022-01-26 | End: 2022-01-27 | Stop reason: HOSPADM

## 2022-01-26 RX ORDER — ACETAMINOPHEN 325 MG/1
650 TABLET ORAL EVERY 6 HOURS PRN
Status: DISCONTINUED | OUTPATIENT
Start: 2022-01-26 | End: 2022-01-27 | Stop reason: HOSPADM

## 2022-01-26 RX ORDER — SODIUM CHLORIDE 0.9 % (FLUSH) 0.9 %
5-40 SYRINGE (ML) INJECTION PRN
Status: DISCONTINUED | OUTPATIENT
Start: 2022-01-26 | End: 2022-01-27 | Stop reason: HOSPADM

## 2022-01-26 RX ORDER — PANTOPRAZOLE SODIUM 40 MG/10ML
40 INJECTION, POWDER, LYOPHILIZED, FOR SOLUTION INTRAVENOUS DAILY
Status: DISCONTINUED | OUTPATIENT
Start: 2022-01-26 | End: 2022-01-26

## 2022-01-26 RX ORDER — ACETAMINOPHEN 650 MG/1
650 SUPPOSITORY RECTAL EVERY 6 HOURS PRN
Status: DISCONTINUED | OUTPATIENT
Start: 2022-01-26 | End: 2022-01-27 | Stop reason: HOSPADM

## 2022-01-26 RX ORDER — INSULIN GLARGINE 100 [IU]/ML
22 INJECTION, SOLUTION SUBCUTANEOUS NIGHTLY
Status: DISCONTINUED | OUTPATIENT
Start: 2022-01-26 | End: 2022-01-27 | Stop reason: HOSPADM

## 2022-01-26 RX ORDER — LISINOPRIL 10 MG/1
5 TABLET ORAL DAILY
Status: DISCONTINUED | OUTPATIENT
Start: 2022-01-26 | End: 2022-01-27 | Stop reason: HOSPADM

## 2022-01-26 RX ORDER — HEPARIN SODIUM 10000 [USP'U]/ML
5000 INJECTION, SOLUTION INTRAVENOUS; SUBCUTANEOUS ONCE
Status: DISCONTINUED | OUTPATIENT
Start: 2022-01-26 | End: 2022-01-26

## 2022-01-26 RX ORDER — SODIUM CHLORIDE 9 MG/ML
10 INJECTION INTRAVENOUS DAILY
Status: DISCONTINUED | OUTPATIENT
Start: 2022-01-26 | End: 2022-01-26

## 2022-01-26 RX ORDER — SODIUM CHLORIDE 9 MG/ML
10 INJECTION INTRAVENOUS DAILY
Status: DISCONTINUED | OUTPATIENT
Start: 2022-01-26 | End: 2022-01-27

## 2022-01-26 RX ORDER — ASPIRIN 81 MG/1
81 TABLET ORAL DAILY
Status: DISCONTINUED | OUTPATIENT
Start: 2022-01-26 | End: 2022-01-27 | Stop reason: HOSPADM

## 2022-01-26 RX ORDER — HYDRALAZINE HYDROCHLORIDE 20 MG/ML
10 INJECTION INTRAMUSCULAR; INTRAVENOUS EVERY 6 HOURS PRN
Status: DISCONTINUED | OUTPATIENT
Start: 2022-01-26 | End: 2022-01-27 | Stop reason: HOSPADM

## 2022-01-26 RX ORDER — SODIUM CHLORIDE 0.9 % (FLUSH) 0.9 %
5-40 SYRINGE (ML) INJECTION EVERY 12 HOURS SCHEDULED
Status: DISCONTINUED | OUTPATIENT
Start: 2022-01-26 | End: 2022-01-27 | Stop reason: HOSPADM

## 2022-01-26 RX ORDER — NICOTINE 21 MG/24HR
1 PATCH, TRANSDERMAL 24 HOURS TRANSDERMAL DAILY
Status: DISCONTINUED | OUTPATIENT
Start: 2022-01-26 | End: 2022-01-27 | Stop reason: HOSPADM

## 2022-01-26 RX ORDER — POLYETHYLENE GLYCOL 3350 17 G/17G
17 POWDER, FOR SOLUTION ORAL DAILY PRN
Status: DISCONTINUED | OUTPATIENT
Start: 2022-01-26 | End: 2022-01-27 | Stop reason: HOSPADM

## 2022-01-26 RX ORDER — SODIUM CHLORIDE 9 MG/ML
25 INJECTION, SOLUTION INTRAVENOUS PRN
Status: DISCONTINUED | OUTPATIENT
Start: 2022-01-26 | End: 2022-01-27 | Stop reason: HOSPADM

## 2022-01-26 RX ORDER — VITAMIN B COMPLEX
1000 TABLET ORAL DAILY
Status: DISCONTINUED | OUTPATIENT
Start: 2022-01-26 | End: 2022-01-27 | Stop reason: HOSPADM

## 2022-01-26 RX ORDER — FOLIC ACID 5 MG/ML
1 INJECTION, SOLUTION INTRAMUSCULAR; INTRAVENOUS; SUBCUTANEOUS DAILY
Status: DISCONTINUED | OUTPATIENT
Start: 2022-01-26 | End: 2022-01-27 | Stop reason: HOSPADM

## 2022-01-26 RX ADMIN — DEXTROSE AND SODIUM CHLORIDE: 5; 450 INJECTION, SOLUTION INTRAVENOUS at 14:01

## 2022-01-26 RX ADMIN — POTASSIUM CHLORIDE 10 MEQ: 10 INJECTION, SOLUTION INTRAVENOUS at 04:07

## 2022-01-26 RX ADMIN — POTASSIUM CHLORIDE 10 MEQ: 10 INJECTION, SOLUTION INTRAVENOUS at 17:21

## 2022-01-26 RX ADMIN — POTASSIUM CHLORIDE 10 MEQ: 10 INJECTION, SOLUTION INTRAVENOUS at 10:30

## 2022-01-26 RX ADMIN — SODIUM PHOSPHATE, MONOBASIC, MONOHYDRATE AND SODIUM PHOSPHATE, DIBASIC, ANHYDROUS 10 MMOL: 276; 142 INJECTION, SOLUTION INTRAVENOUS at 17:20

## 2022-01-26 RX ADMIN — SODIUM CHLORIDE: 9 INJECTION, SOLUTION INTRAVENOUS at 04:59

## 2022-01-26 RX ADMIN — POTASSIUM CHLORIDE 10 MEQ: 10 INJECTION, SOLUTION INTRAVENOUS at 06:52

## 2022-01-26 RX ADMIN — POTASSIUM CHLORIDE 10 MEQ: 10 INJECTION, SOLUTION INTRAVENOUS at 05:14

## 2022-01-26 RX ADMIN — PANTOPRAZOLE SODIUM 40 MG: 40 INJECTION, POWDER, LYOPHILIZED, FOR SOLUTION INTRAVENOUS at 01:51

## 2022-01-26 RX ADMIN — POTASSIUM CHLORIDE 10 MEQ: 10 INJECTION, SOLUTION INTRAVENOUS at 08:29

## 2022-01-26 RX ADMIN — INSULIN GLARGINE-YFGN 22 UNITS: 100 INJECTION, SOLUTION SUBCUTANEOUS at 21:31

## 2022-01-26 RX ADMIN — MAGNESIUM SULFATE 1000 MG: 1 INJECTION INTRAVENOUS at 17:22

## 2022-01-26 RX ADMIN — DEXTROSE AND SODIUM CHLORIDE: 5; 450 INJECTION, SOLUTION INTRAVENOUS at 20:46

## 2022-01-26 RX ADMIN — HEPARIN SODIUM 5000 UNITS: 10000 INJECTION, SOLUTION INTRAVENOUS; SUBCUTANEOUS at 14:57

## 2022-01-26 RX ADMIN — ATORVASTATIN CALCIUM 80 MG: 40 TABLET, FILM COATED ORAL at 22:57

## 2022-01-26 RX ADMIN — SODIUM CHLORIDE: 9 INJECTION, SOLUTION INTRAVENOUS at 00:56

## 2022-01-26 RX ADMIN — MAGNESIUM SULFATE 1000 MG: 1 INJECTION INTRAVENOUS at 15:49

## 2022-01-26 RX ADMIN — DEXTROSE AND SODIUM CHLORIDE: 5; 450 INJECTION, SOLUTION INTRAVENOUS at 05:49

## 2022-01-26 RX ADMIN — HEPARIN SODIUM 5000 UNITS: 10000 INJECTION, SOLUTION INTRAVENOUS; SUBCUTANEOUS at 22:57

## 2022-01-26 RX ADMIN — FOLIC ACID 1 MG: 5 INJECTION, SOLUTION INTRAMUSCULAR; INTRAVENOUS; SUBCUTANEOUS at 12:19

## 2022-01-26 RX ADMIN — THIAMINE HYDROCHLORIDE 100 MG: 100 INJECTION, SOLUTION INTRAMUSCULAR; INTRAVENOUS at 12:20

## 2022-01-26 RX ADMIN — TRIMETHOBENZAMIDE HYDROCHLORIDE 200 MG: 100 INJECTION INTRAMUSCULAR at 00:56

## 2022-01-26 RX ADMIN — Medication 10 ML: at 12:20

## 2022-01-26 RX ADMIN — SODIUM PHOSPHATE, MONOBASIC, MONOHYDRATE AND SODIUM PHOSPHATE, DIBASIC, ANHYDROUS 15 MMOL: 276; 142 INJECTION, SOLUTION INTRAVENOUS at 04:58

## 2022-01-26 NOTE — ED NOTES
Verified with Pharmacy KCL is compatible with Insulin and NA Phos compatible with NS,  Pharmacy to 15mmol to replace phos of 2.2     Tran Cody RN  01/26/22 0900

## 2022-01-26 NOTE — H&P
in two, three, aVF. QTc is prolonged at 523. Blood cultures have been sent in the ED, patient was started on DKA protocol. Past Medical History:      Diagnosis Date    Alcoholism ([facility]     Cocaine abuse ([facility]     Diabetes mellitus ([facility]     Hypertension     Idiopathic peripheral neuropathy 9/21/2016    Lacunar stroke ([facility]     Marijuana abuse        Past Surgical History:    No past surgical history on file. Medications Prior to Admission:    Prior to Admission medications    Medication Sig Start Date End Date Taking? Authorizing Provider   lisinopril (PRINIVIL;ZESTRIL) 5 MG tablet Take 1 tablet by mouth daily 1/12/22 2/11/22  CHAYO Morales NP   aspirin 81 MG EC tablet Take 1 tablet by mouth daily 9/29/21   Marcus Taylor MD   atorvastatin (LIPITOR) 80 MG tablet Take 1 tablet by mouth nightly 9/28/21   Marcus Taylor MD   albuterol sulfate  (90 Base) MCG/ACT inhaler Inhale 2 puffs into the lungs every 6 hours as needed for Wheezing or Shortness of Breath 9/28/21   Marcus Taylor MD   metoprolol tartrate (LOPRESSOR) 25 MG tablet Take 0.5 tablets by mouth 2 times daily 9/28/21   Marcus Taylor MD   folic acid (FOLVITE) 1 MG tablet Take 1 tablet by mouth daily 9/28/21   Marcus Taylor MD   thiamine 100 MG tablet Take 1 tablet by mouth daily 9/28/21   Marcus Taylor MD   Cholecalciferol 400 UNIT TABS tablet Take 2.5 tablets by mouth daily 9/28/21   Marcus Taylor MD   insulin lispro (HUMALOG) 100 UNIT/ML injection vial Inject into the skin MEDTRONIC PUMP    Historical Provider, MD       Allergies:  Metoprolol and No known allergies    Social History:   TOBACCO:   reports that he has been smoking cigarettes. He has a 30.00 pack-year smoking history. He has never used smokeless tobacco.  ETOH:   reports current alcohol use of about 5.0 standard drinks of alcohol per week.     Family History:       Problem Relation Age of Onset    Diabetes type 2 Mother     Cancer Maternal Grandmother     Diabetes type 2  Nephew        REVIEW OF SYSTEMS:    · Constitutional: No fever, no chills, no change in weight; good appetite  · HEENT: No blurred vision, no ear problems, no sore throat, no rhinorrhea. · Respiratory: (+) cough, no sputum production, no pleuritic chest pain, no shortness of breath  · Cardiology: No angina, no dyspnea on exertion, no paroxysmal nocturnal dyspnea, no orthopnea, no palpitation, no leg swelling. · Gastroenterology: No dysphagia, no reflux; no abdominal pain, no nausea or vomiting; no constipation or diarrhea.  No hematochezia   · Genitourinary: No dysuria, no frequency, hesitancy; no hematuria  · Musculoskeletal: no joint pain, no myalgia, no change in range of movement  · Neurology: no focal weakness in extremities, no slurred speech, no double vision, no tingling or numbness sensation  · Endocrinology: no temperature intolerance, no polyphagia, polydipsia or polyuria  · Hematology: no increased bleeding, no bruising, no lymphadenopathy  · Skin: no skin changes noticed by patient  · Psychology: no depressed mood, no suicidal ideation    Physical Exam   · Vitals: BP (!) 142/97   Pulse 117   Temp 97.7 °F (36.5 °C) (Oral)   Resp 18   Wt 145 lb (65.8 kg)   SpO2 99%   BMI 23.40 kg/m²     · General Appearance: alert and oriented to person, place and time in no acute distress  · Skin: warm and dry, no rash or erythema, dry mucus membranes   · Pulmonary/Chest: clear to auscultation bilaterally- no wheezes, rales or rhonchi, normal air movement, no respiratory distress  · Cardiovascular: normal rate, normal S1 and S2, no gallops, intact distal pulses and no carotid bruits  · Abdomen: soft, non-tender, non-distended, normal bowel sounds, no masses or organomegaly  · Extremities: no cyanosis and no clubbing  · Neurologic: gait and coordination normal and speech normal     Labs and Imaging Studies   Basic Labs  Recent Labs     01/25/22 2122    K 4.1   CL 83*   CO2 19*   BUN 56*   CREATININE 2.5*   GLUCOSE 383*   CALCIUM 10.2       Recent Labs     01/25/22 2122   WBC 20.9*   RBC 5.96*   HGB 18.0*   HCT 52.9   MCV 88.8   MCH 30.2   MCHC 34.0   RDW 14.0   *   MPV 8.6       CBC:   Lab Results   Component Value Date    WBC 22.3 01/26/2022    RBC 4.89 01/26/2022    HGB 14.7 01/26/2022    HCT 42.8 01/26/2022    MCV 87.5 01/26/2022    RDW 14.0 01/26/2022     01/26/2022     CMP:  Lab Results   Component Value Date     01/26/2022    K 3.5 01/26/2022    K 4.1 09/26/2021    CL 97 01/26/2022    CO2 22 01/26/2022    BUN 51 01/26/2022    PROT 9.2 01/25/2022       Imaging Studies:     XR CHEST PORTABLE    Result Date: 1/25/2022  EXAMINATION: ONE XRAY VIEW OF THE CHEST 1/25/2022 10:02 pm COMPARISON: None. HISTORY: ORDERING SYSTEM PROVIDED HISTORY: Vomiting TECHNOLOGIST PROVIDED HISTORY: Reason for exam:->Vomiting What reading provider will be dictating this exam?->CRC FINDINGS: The lungs are without acute focal process. There is no effusion or pneumothorax. The cardiomediastinal silhouette is without acute process. The osseous structures are without acute process. No acute process. EKG: normal sinus rhythm, ST depression in II, III, aVF that is more pronounced from baseline. Resident's Assessment and Plan     Johny Hidalgo is a 49 y.o. male with a PMH of Type 1 Diabetes mellitus on Medtronic insulin pump, HTN, HLD, Marijuana abuse, Remote Hx of Lacunar stroke. Presented to the ED for nausea and vomiting    Assessment  1. Diabetic ketoacidosis 2/2 to alcohol intake r/o ACS - BHB >4.5, Glucose 383,   2. KIRSTEN stage II likely prerenal - Creatinine 2.5, baseline 0.8    3. Lactic acidosis - LA 3.0   4. Elevated troponins 2/2 to KIRSTEN vs ACS - more prominent ST wave depressions in II, III, aVF, troponin now 197  5. QTC prolongation - QTc 523   6. Alcohol Abuse - drinks 6 beers per day, last drink was 3 days ago  7.  Tobacco Abuse - 2 packs/day smoker   8. Diabetes Mellitus Type 2 on Insulin pump - follows with endocrinology, last seen 1/12, Basal 1.35 12 a and 1.45 8a with boluses, last hba1c 9.3% (1/12)   9. History of hypertension - on lisinopril and lopressor at home   10. History of hyperlipidemia - on lipitor     Plan   - Patient currently on DKA protocol, will continue   - Monitor K, Phos and Mg while on Insulin drip   - Bridge to SC insulin once with AG closed x 2, will consult endocrinology for transition to insulin pump   - Keep NPO for now   - Trend Lactic acid q6   - Trend Troponin, obtain CK, CK-MB, repeat EKG in AM   - Hold lisinopril for now due to KIRSTEN   - Obtain Urine electrolytes and creatinine   - Continue lipitor   - Folic acid and thiamine supplementation, likely out of alcohol withdrawal window, but will continue to monitor, consider CIWA  - Avoid QTc prolonging medications     DVT prophylaxis/ GI prophylaxis: heparin/ none   Disposition: admit to Lamar Plunkett MD, Logan Ocampo 1019 PGY-2  Attending physician: Dr. Veronica Mosqueda  Department of Internal Medicine  Internal Medicine Residency / [address] [place] [address] case was discussed, including pertinent history and examination findings with the multidisciplinary team during bedside rounds. I have seen and examined the patient and the key elements of the encounter have been performed by myself. I have read and reviewed the documentation. If needed or disputed the following findings, counseling and treatment options which have been corrected and communicated back to the patient, family if applicable and contributing physicians. I agree with the assessment, plan and orders as noted by the resident.       Austine Najjar, DO , Gertie Pu  Professor of Internal Medicine  Pulmonary, Critical Care & Sleep Medicine  Internal Medicine Academic Faculty

## 2022-01-26 NOTE — CONSULTS
ENDOCRINOLOGY INITIAL CONSULTATION NOTE  Via Tele-Health Service      Date of admission: 1/25/2022  Date of service: 1/26/2022  Admitting physician: Jatinder De Luna DO   Primary Care Physician: Shona Duane, MD  Consultant physician: Stef Jorgensen MD       Reason for the consultation:  Uncontrolled DM    History of Present Illness: The history is provided by the patient. Accuracy of the patient data is excellent    Mary Rahman is a very pleasant 49 y.o. old male with PMH of Type 1 Diabetes mellitus on Medtronic insulin pump, HTN, HLD, Marijuana abuse, Remote Hx of Lacunar stroke and others listed below who presented to the ED for abdominal pain. He was admitted to [facility] on 1/25/2022 because of nausea and vomiting and found to be in DKA, endocrine team was consulted for diabetes management. The patient denies fever, chills, N/V/D, CP or SOB   Admission Labs: , AG 31, Bicarb 19, Cr 2.5, K 4.1     Prior to admission  The patient was diagnosed with type 1 DM at the age 30. Prior to admission patient was on an insulin pump with the following pump settings: Basal rate 12 A 1.35 U/H and 8A 1.45 /H Patient has had no hypoglycemic episodes. He says that he is compliant with bolusing his insulin during meals as well. He also had some financial difficulties obtaining insulin in the past however that has resolved and he has no issues getting insulin for his pump. Patient has not been eating consistent carbohydrate meals, self-blood glucose monitoring has been above goal prior to admission. In addition, patient denied macrovascular or microvascular complications. Lab Results   Component Value Date    LABA1C 9.3 01/12/2022     His diabetes is also complicated by neuropathy and retinopathy s/p laser therapy.  Macrovascular complications (+) TIA 4/2614, no hx of CAD, PVD      Inpatient diet:   NPO while on insulin drip     Point of care glucose monitoring (Independently reviewed)   Recent Labs 01/26/22  0344 01/26/22  0449 01/26/22  0542 01/26/22  0544 01/26/22  0620 01/26/22  0759 01/26/22  1012 01/26/22  1133   GLUMET 389* 257* 169* 180* 189* 205* 228* 208*       Past medical history:   Past Medical History:   Diagnosis Date    Alcoholism ([facility]     Cocaine abuse ([place] [address].)     Diabetes mellitus ([place] [address].)     Hypertension     Idiopathic peripheral neuropathy 9/21/2016    Lacunar stroke ([place] [address].)     Marijuana abuse        Past surgical history:  No past surgical history on file. Social history:   Tobacco:   reports that he has been smoking cigarettes. He has a 30.00 pack-year smoking history. He has never used smokeless tobacco.  Alcohol:   reports current alcohol use of about 5.0 standard drinks of alcohol per week. Drugs:   reports current drug use. Drug: Marijuana Robinsonmargarita Suehilda). Family history:    Family History   Problem Relation Age of Onset    Diabetes type 2  Mother     Cancer Maternal Grandmother     Diabetes type 2  Nephew        Allergy and drug reactions:    Allergies   Allergen Reactions    Metoprolol      bradycardia    No Known Allergies        Scheduled Meds:   aspirin  81 mg Oral Daily    atorvastatin  80 mg Oral Nightly    Cholecalciferol  1,000 Units Oral Daily    folic acid  1 mg IntraVENous Daily    [Held by provider] lisinopril  5 mg Oral Daily    thiamine  100 mg IntraVENous Daily    sodium chloride flush  5-40 mL IntraVENous 2 times per day    nicotine  1 patch TransDERmal Daily    sodium chloride (PF)  10 mL IntraVENous Daily    heparin (porcine)  5,000 Units SubCUTAneous Q8H     PRN Meds:   albuterol, 2.5 mg, Q6H PRN  sodium chloride flush, 5-40 mL, PRN  sodium chloride, 25 mL, PRN  polyethylene glycol, 17 g, Daily PRN  acetaminophen, 650 mg, Q6H PRN   Or  acetaminophen, 650 mg, Q6H PRN  trimethobenzamide, 200 mg, Q6H PRN  dextrose, 12.5 g, PRN  potassium chloride, 10 mEq, PRN  magnesium sulfate, 1,000 mg, PRN  sodium phosphate IVPB, 10 mmol, PRN   Or  sodium phosphate IVPB, 15 mmol, PRN   Or  sodium phosphate IVPB, 20 mmol, PRN  dextrose 5 % and 0.45 % NaCl, , Continuous PRN      Continuous Infusions:   sodium chloride      sodium chloride Stopped (01/26/22 0549)    dextrose 5 % and 0.45 % NaCl 150 mL/hr at 01/26/22 0549    insulin 7.4 Units/hr (01/26/22 1157)       Review of Systems  All systems reviewed. All negative except for symptoms mentioned in HPI     OBJECTIVE    BP (!) 172/100   Pulse 101   Temp 98.5 °F (36.9 °C) (Oral)   Resp 19   Wt 145 lb (65.8 kg)   SpO2 97%   BMI 23.40 kg/m²   No intake or output data in the 24 hours ending 01/26/22 1311    Physical examination:  Due to this being a TeleHealth encounter, evaluation of the following organ systems is limited: Vitals/Constitutional/EENT/Resp/CV/GI//MS/Neuro/Skin/Heme-Lymph-Imm. Modified physical exam through Telemedicine camera    General: Communicating well via camera   Neck: no obvious neck mass. No obvious neck deformity     CVS: no distress   Chest: no distress. Chest is moving with respiration    Extremities:  no visible tremor  Skin: No visible rashes as seen from camera   Musculoskeletal: no visible deformity  Neuro: Alert and oriented to person, place, and time. Psychiatric: Normal mood and affect.  Behavior is normal      Review of Laboratory Data:  I personally reviewed the following labs:   Recent Labs     01/25/22 2122 01/26/22 0251   WBC 20.9* 22.3*   RBC 5.96* 4.89   HGB 18.0* 14.7   HCT 52.9 42.8   MCV 88.8 87.5   MCH 30.2 30.1   MCHC 34.0 34.3   RDW 14.0 14.0   * 438   MPV 8.6 8.7     Recent Labs     01/25/22 2122 01/25/22 2122 01/26/22  0251 01/26/22  0611 01/26/22  1116      < > 137 136 139   K 4.1   < > 3.3* 3.5 4.6   CL 83*   < > 96* 97* 99   CO2 19*   < > 20* 22 28   BUN 56*   < > 56* 51* 45*   CREATININE 2.5*   < > 2.0* 1.6* 1.3*   GLUCOSE 383*   < > 346* 204* 235*   CALCIUM 10.2   < > 8.7 8.4* 8.4*   PROT 9.2*  --   --   --   --    LABALBU 5.4*  --   --   -- service     The above issues were reviewed with the patient who understood and agreed with the plan. Thank you for allowing us to participate in the care of this patient. Please do not hesitate to contact us with any additional questions. Geena Moreno MD  Endocrinologist, [place] [address] [facility]   [address] [place] [id number]   Phone: [phone]  Fax: [phone]  ---------------------------  An electronic signature was used to authenticate this note.  Case Taylor MD on 1/26/2022 at 1:11 PM

## 2022-01-26 NOTE — PROGRESS NOTES
[facility]  Internal Medicine Residency Program  Progress Note - House Team 1    Patient:  Tracy oHlm 49 y.o. male MRN: [id number]     Date of Service: 1/26/2022     CC: N/V  Overnight events: None    Subjective     Pt states that pt has been experiencing 2 days of nausea and vomiting. Per the patient he drinks alcohol regularly about 6 packs of beer his last drink was 3 days ago. A day after he experienced nausea vomiting with minimal abdominal pain. His sugars was elevated in the 500s. Of note he denies any fevers, does have a nonproductive cough that is chronic, no frequency or difficulty in urinating or diarrhea. He also denies chest pain or shortness of breath on exertion. He follows with Dr Zahira Joyner in the endocrinology clinic and was last seen on 1/12 wherein his insulin pump settings were basal 1.35 12am 1.45 8 am.  He says that he is compliant with bolusing his insulin during meals as well. He also had some financial difficulties obtaining insulin in the past however that has resolved and he has no issues getting insulin for his pump. Objective     Physical Exam:  Vitals: BP (!) 168/105   Pulse 101   Temp 97.6 °F (36.4 °C)   Resp 19   Wt 145 lb (65.8 kg)   SpO2 96%   BMI 23.40 kg/m²     I & O - 24hr: No intake/output data recorded. General Appearance: alert, appears stated age and cooperative  HEENT:  Head: Normocephalic, no lesions, without obvious abnormality. Neck: no adenopathy, no carotid bruit, no JVD, supple, symmetrical, trachea midline and thyroid not enlarged, symmetric, no tenderness/mass/nodules  Lung: clear to auscultation bilaterally  Heart: regular rate and rhythm, S1, S2 normal, no murmur, click, rub or gallop  Abdomen: soft, non-tender; bowel sounds normal; no masses,  no organomegaly  Extremities:  extremities normal, atraumatic, no cyanosis or edema  Musculokeletal: No joint swelling, no muscle tenderness.  ROM normal in all joints of extremities. Neurologic: Mental status: Alert, oriented, thought content appropriate  Subject  Pertinent Labs & Imaging Studies   chika  CBC:   Lab Results   Component Value Date    WBC 22.3 01/26/2022    RBC 4.89 01/26/2022    HGB 14.7 01/26/2022    HCT 42.8 01/26/2022    MCV 87.5 01/26/2022    MCH 30.1 01/26/2022    MCHC 34.3 01/26/2022    RDW 14.0 01/26/2022     01/26/2022    MPV 8.7 01/26/2022     CBC with Differential:    Lab Results   Component Value Date    WBC 22.3 01/26/2022    RBC 4.89 01/26/2022    HGB 14.7 01/26/2022    HCT 42.8 01/26/2022     01/26/2022    MCV 87.5 01/26/2022    MCH 30.1 01/26/2022    MCHC 34.3 01/26/2022    RDW 14.0 01/26/2022    SEGSPCT 53 01/25/2014    SEGSPCT 86 10/11/2010    BANDSPCT 3 10/10/2010    METASPCT 1.7 07/20/2020    LYMPHOPCT 4.2 01/26/2022    MONOPCT 5.6 01/26/2022    MYELOPCT 2.6 01/04/2020    EOSPCT 2 10/12/2010    BASOPCT 0.1 01/26/2022    MONOSABS 1.24 01/26/2022    LYMPHSABS 0.93 01/26/2022    EOSABS 0.00 01/26/2022    BASOSABS 0.03 01/26/2022     Troponin:    Lab Results   Component Value Date    TROPONINI <0.01 07/22/2020       Resident's Assessment and Plan     Jacinta Jorgensen is a 49 y.o. male    1. DKA 2/2 to alcohol intake   - Continue DKA protocol; switch to subq insulin when sugars drop below 200 and AG closes    - Monitor K, Phos, and Mg while pt on insulin; trend lactic acid   - Will consult endocrinology about transition to insulin pump    - NPO    - Pt following with endocrinology Dr. Sharie Dancer outpatient   2. KIRSTEN    - Cr 2.5 on admission  -> 1.3    - Obtain urine electrolytes and cr    - Continue IV fluids   - Continue to hold lisinopril     3. Elevated troponin's    - Troponin 197 on admission -> 70 -> 68    - Continue to follow troponin    - more prominent ST wave depressions in II, III, aVF,     4. QT Prolongation   - Avoid QT prolonging medications   - Monitor     4.  Alcohol abuse    -Pt reports drinking ~6 drinks daily; last drink was 3 days ago    - Possibly led to DKA   - Folic acid and thiamine supplementation, likely out of alcohol withdrawal window   - monitor   5. HTN    - /105 today   - On lisinopril and lopressor at home    - Continue to monitor   6. HLD   - Continue atorvastatin     PT/OT evaluation:  DVT prophylaxis/ GI prophylaxis:   Disposition: home +/- home health / [facility] / [place] [address] / Shawn Grissom, MS-3  Attending physician: Dr. Karthik Winston  Department of Internal Medicine  Internal Medicine Residency / [address] [place] [address] case was discussed, including pertinent history and examination findings with the multidisciplinary team during bedside rounds. I have seen and examined the patient and the key elements of the encounter have been performed by myself. I have read and reviewed the documentation. If needed or disputed the following findings, counseling and treatment options which have been corrected and communicated back to the patient, family if applicable and contributing physicians. I agree with the assessment, plan and orders as noted by the resident.       Joe Chavarria DO , Monique Miranda  Professor of Internal Medicine  Pulmonary, Critical Care & Sleep Medicine  Internal Medicine Academic Faculty

## 2022-01-26 NOTE — ED NOTES
Spoke with pharmacist, pt K+ 3.5 for morning labs, [facility] verified to give an additional 3 bags of K+     Lashawn Colorado RN  01/26/22 9026

## 2022-01-26 NOTE — ED NOTES
Pt vomited 400cc coffee ground emesis. Hemoccult Positive for blood.  Dr Nancy Zuniga notified     Ananya Cheatham, RN  01/25/22 [address], RN  01/26/22 2252

## 2022-01-26 NOTE — ED PROVIDER NOTES
atraumatic  Eyes: PERRL, EOMI  Mouth: Oropharynx clear, handling secretions, no trismus  Neck: Supple, full ROM, non tender to palpation in the midline, no stridor, no crepitus, no meningeal signs  Pulmonary: Lungs clear to auscultation bilaterally, no wheezes, rales, or rhonchi. Not in respiratory distress  Cardiovascular: Tachycardic regular rhythm. No murmurs, gallops, or rubs. 2+ distal pulses  Chest: no chest wall tenderness  Abdomen: Soft. Non tender. Non distended. +BS. No rebound, guarding, or rigidity. No pulsatile masses appreciated. Musculoskeletal: Moves all extremities x 4. Warm and well perfused, no clubbing, cyanosis, or edema. Capillary refill <3 seconds  Skin: warm and dry. No rashes. Neurologic: GCS 15, CN 2-12 grossly intact, no focal deficits, symmetric strength 5/5 in the upper and lower extremities bilaterally  Psych: Normal Affect    -------------------------------------------------- RESULTS -------------------------------------------------  I have personally reviewed all laboratory and imaging results for this patient. Results are listed below.      LABS:  Results for orders placed or performed during the hospital encounter of 01/25/22   CBC auto differential   Result Value Ref Range    WBC 20.9 (H) 4.5 - 11.5 E9/L    RBC 5.96 (H) 3.80 - 5.80 E12/L    Hemoglobin 18.0 (H) 12.5 - 16.5 g/dL    Hematocrit 52.9 37.0 - 54.0 %    MCV 88.8 80.0 - 99.9 fL    MCH 30.2 26.0 - 35.0 pg    MCHC 34.0 32.0 - 34.5 %    RDW 14.0 11.5 - 15.0 fL    Platelets 274 (H) 479 - 450 E9/L    MPV 8.6 7.0 - 12.0 fL    Neutrophils % 88.8 (H) 43.0 - 80.0 %    Immature Granulocytes % 0.5 0.0 - 5.0 %    Lymphocytes % 4.4 (L) 20.0 - 42.0 %    Monocytes % 6.2 2.0 - 12.0 %    Eosinophils % 0.0 0.0 - 6.0 %    Basophils % 0.1 0.0 - 2.0 %    Neutrophils Absolute 18.57 (H) 1.80 - 7.30 E9/L    Immature Granulocytes # 0.11 E9/L    Lymphocytes Absolute 0.93 (L) 1.50 - 4.00 E9/L    Monocytes Absolute 1.30 (H) 0.10 - 0.95 E9/L Eosinophils Absolute 0.00 (L) 0.05 - 0.50 E9/L    Basophils Absolute 0.03 0.00 - 0.20 E9/L   Comprehensive Metabolic Panel   Result Value Ref Range    Sodium 133 132 - 146 mmol/L    Potassium 4.1 3.5 - 5.0 mmol/L    Chloride 83 (L) 98 - 107 mmol/L    CO2 19 (L) 22 - 29 mmol/L    Anion Gap 31 (H) 7 - 16 mmol/L    Glucose 383 (H) 74 - 99 mg/dL    BUN 56 (H) 6 - 20 mg/dL    CREATININE 2.5 (H) 0.7 - 1.2 mg/dL    GFR Non-African American 27 >=60 mL/min/1.73    GFR African American 33     Calcium 10.2 8.6 - 10.2 mg/dL    Total Protein 9.2 (H) 6.4 - 8.3 g/dL    Albumin 5.4 (H) 3.5 - 5.2 g/dL    Total Bilirubin 1.7 (H) 0.0 - 1.2 mg/dL    Alkaline Phosphatase 196 (H) 40 - 129 U/L    ALT 21 0 - 40 U/L    AST 18 0 - 39 U/L   Troponin   Result Value Ref Range    Troponin, High Sensitivity 197 (H) 0 - 11 ng/L   Urinalysis   Result Value Ref Range    Color, UA Yellow Straw/Yellow    Clarity, UA Clear Clear    Glucose, Ur >=1000 (A) Negative mg/dL    Bilirubin Urine MODERATE (A) Negative    Ketones, Urine 40 (A) Negative mg/dL    Specific Gravity, UA >=1.030 1.005 - 1.030    Blood, Urine TRACE-INTACT Negative    pH, UA 5.5 5.0 - 9.0    Protein, UA 30 (A) Negative mg/dL    Urobilinogen, Urine 0.2 <2.0 E.U./dL    Nitrite, Urine Negative Negative    Leukocyte Esterase, Urine Negative Negative   Lactic Acid, Plasma   Result Value Ref Range    Lactic Acid 2.4 (H) 0.5 - 2.2 mmol/L   Lipase   Result Value Ref Range    Lipase 27 13 - 60 U/L   PH, VENOUS   Result Value Ref Range    pH, Gage 7.39 7.35 - 7.45   Serum Drug Screen   Result Value Ref Range    Ethanol Lvl <10 mg/dL    Acetaminophen Level <5.0 (L) 10.0 - 74.7 mcg/mL    Salicylate, Serum <6.4 0.0 - 30.0 mg/dL    TCA Scrn NEGATIVE Cutoff:300 ng/mL   URINE DRUG SCREEN   Result Value Ref Range    Amphetamine Screen, Urine NOT DETECTED Negative <1000 ng/mL    Barbiturate Screen, Ur NOT DETECTED Negative < 200 ng/mL    Benzodiazepine Screen, Urine NOT DETECTED Negative < 200 ng/mL Cannabinoid Scrn, Ur NOT DETECTED Negative < 50ng/mL    Cocaine Metabolite Screen, Urine POSITIVE (A) Negative < 300 ng/mL    Opiate Scrn, Ur NOT DETECTED Negative < 300ng/mL    PCP Screen, Urine NOT DETECTED Negative < 25 ng/mL    Methadone Screen, Urine NOT DETECTED Negative <300 ng/mL    Oxycodone Urine NOT DETECTED Negative <100 ng/mL    FENTANYL SCREEN, URINE NOT DETECTED Negative <1 ng/mL    Drug Screen Comment: see below    Beta-Hydroxybutyrate   Result Value Ref Range    Beta-Hydroxybutyrate >4.50 (H) 0.02 - 0.27 mmol/L   Magnesium   Result Value Ref Range    Magnesium 2.0 1.6 - 2.6 mg/dL   Microscopic Urinalysis   Result Value Ref Range    WBC, UA NONE 0 - 5 /HPF    RBC, UA 1-3 0 - 2 /HPF    Epithelial Cells, UA MODERATE /HPF    Bacteria, UA NONE SEEN None Seen /HPF   POCT Glucose   Result Value Ref Range    Meter Glucose 377 (H) 74 - 99 mg/dL   POCT Glucose   Result Value Ref Range    Meter Glucose 434 (H) 74 - 99 mg/dL   POCT Glucose   Result Value Ref Range    Meter Glucose 393 (H) 74 - 99 mg/dL   EKG 12 Lead   Result Value Ref Range    Ventricular Rate 113 BPM    Atrial Rate 113 BPM    P-R Interval 134 ms    QRS Duration 90 ms    Q-T Interval 382 ms    QTc Calculation (Bazett) 523 ms    P Axis 77 degrees    R Axis 70 degrees    T Axis -86 degrees       RADIOLOGY:  Interpreted by Radiologist.  XR CHEST PORTABLE   Final Result   No acute process. EKG:  This EKG is signed and interpreted by me. Rate: 113  Rhythm: Sinus tachycardia  Interpretation: non-specific EKG, noted T wave inversion depression inferiorly as well as laterally more pronounced on today's EKG  Comparison: Compared to previous        ------------------------- NURSING NOTES AND VITALS REVIEWED ---------------------------   The nursing notes within the ED encounter and vital signs as below have been reviewed by myself.   BP (!) 164/95   Pulse 95   Temp 97.7 °F (36.5 °C) (Oral)   Resp 20   Wt 145 lb (65.8 kg)   SpO2 95% BMI 23.40 kg/m²   Oxygen Saturation Interpretation: Normal    The patients available past medical records and past encounters were reviewed. ------------------------------ ED COURSE/MEDICAL DECISION MAKING----------------------  Medications   dextrose 50 % IV solution (has no administration in time range)   potassium chloride 10 mEq/100 mL IVPB (Peripheral Line) (has no administration in time range)   magnesium sulfate 1000 mg in dextrose 5% 100 mL IVPB (has no administration in time range)   sodium phosphate 10 mmol in dextrose 5 % 250 mL IVPB (has no administration in time range)     Or   sodium phosphate 15 mmol in dextrose 5 % 250 mL IVPB (has no administration in time range)     Or   sodium phosphate 20 mmol in dextrose 5 % 500 mL IVPB (has no administration in time range)   0.9 % sodium chloride bolus (987 mLs IntraVENous New Bag 1/25/22 2345)     Followed by   0.9 % sodium chloride infusion ( IntraVENous New Bag 1/26/22 0056)   dextrose 5 % and 0.45 % sodium chloride infusion (has no administration in time range)   insulin regular (HUMULIN R;NOVOLIN R) 100 Units in sodium chloride 0.9 % 100 mL infusion (14.8 Units/hr IntraVENous Rate/Dose Change 1/26/22 0131)   trimethobenzamide (TIGAN) injection 200 mg (200 mg IntraMUSCular Given 1/26/22 0056)   pantoprazole (PROTONIX) injection 40 mg (40 mg IntraVENous Given 1/26/22 0151)     And   sodium chloride (PF) 0.9 % injection 10 mL (has no administration in time range)   0.9 % sodium chloride bolus (0 mLs IntraVENous Stopped 1/25/22 2346)             Medical Decision Making:    Presenting here because of elevated blood sugar. Patient does have history of insulin pump. Patient currently does not pump in place. Patient reportedly vomiting at home. Patient reporting no fever no chills he reports no chest pain. Patient labs noted reviewed as well as EKG. Patient troponin elevated patient. Patient having no chest pain abdomen is soft and nontender. Patient was given IV fluids as well as started on insulin drip. Patient checked on multiple times. I was notified by nursing staff patient had coffee-ground emesis here. Patient was given Protonix he was also given Tigan due to the fact that his QT was prolonged. Patient rechecked several times. Vital signs noted. Patient will be admitted to intermediate    Re-Evaluations:             Re-evaluation. Patients symptoms show no change  Patient reevaluated several times. Patient vital signs noted. Patient did have coffee-ground emesis here. Abdomen soft nontender medicated patient currently resting comfortably. Patient requesting something to drink. He was given ice chips. Patient made aware of findings and plan. Consultations:          Internal medicine attending and resident notified       Critical Care:     Please note that the withdrawal or failure to initiate urgent interventions for this patient would likely result in a life threatening deterioration or permanent disability. Accordingly this patient received 30 minutes of critical care time, excluding separately billable procedures. This patient's ED course included: a personal history and physicial eaxmination    This patient has been closely monitored during their ED course. Counseling: The emergency provider has spoken with the patient and discussed todays results, in addition to providing specific details for the plan of care and counseling regarding the diagnosis and prognosis. Questions are answered at this time and they are agreeable with the plan.       --------------------------------- IMPRESSION AND DISPOSITION ---------------------------------    IMPRESSION  1. Diabetic ketoacidosis without coma associated with type [facility] mellitus [facility]        DISPOSITION  Disposition: Admit to intermediate  Patient condition is fair        NOTE: This report was transcribed using voice recognition software.  Every effort was made to ensure accuracy; however, inadvertent computerized transcription errors may be present          Zenobia Thurman MD  01/26/22 3382

## 2022-01-27 VITALS
TEMPERATURE: 98.1 F | DIASTOLIC BLOOD PRESSURE: 100 MMHG | HEART RATE: 73 BPM | HEIGHT: 66 IN | OXYGEN SATURATION: 98 % | SYSTOLIC BLOOD PRESSURE: 157 MMHG | WEIGHT: 138.2 LBS | BODY MASS INDEX: 22.21 KG/M2 | RESPIRATION RATE: 18 BRPM

## 2022-01-27 LAB
ADENOVIRUS BY PCR: NOT DETECTED
ANION GAP SERPL CALCULATED.3IONS-SCNC: 10 MMOL/L (ref 7–16)
ANION GAP SERPL CALCULATED.3IONS-SCNC: 11 MMOL/L (ref 7–16)
ANION GAP SERPL CALCULATED.3IONS-SCNC: 9 MMOL/L (ref 7–16)
BORDETELLA PARAPERTUSSIS BY PCR: NOT DETECTED
BORDETELLA PERTUSSIS BY PCR: NOT DETECTED
BUN BLDV-MCNC: 15 MG/DL (ref 6–20)
BUN BLDV-MCNC: 15 MG/DL (ref 6–20)
BUN BLDV-MCNC: 17 MG/DL (ref 6–20)
CALCIUM SERPL-MCNC: 9 MG/DL (ref 8.6–10.2)
CALCIUM SERPL-MCNC: 9.2 MG/DL (ref 8.6–10.2)
CALCIUM SERPL-MCNC: 9.2 MG/DL (ref 8.6–10.2)
CHLAMYDOPHILIA PNEUMONIAE BY PCR: NOT DETECTED
CHLORIDE BLD-SCNC: 97 MMOL/L (ref 98–107)
CHLORIDE BLD-SCNC: 97 MMOL/L (ref 98–107)
CHLORIDE BLD-SCNC: 99 MMOL/L (ref 98–107)
CHP ED QC CHECK: NORMAL
CO2: 25 MMOL/L (ref 22–29)
CO2: 29 MMOL/L (ref 22–29)
CO2: 30 MMOL/L (ref 22–29)
CORONAVIRUS 229E BY PCR: NOT DETECTED
CORONAVIRUS HKU1 BY PCR: NOT DETECTED
CORONAVIRUS NL63 BY PCR: NOT DETECTED
CORONAVIRUS OC43 BY PCR: NOT DETECTED
CREAT SERPL-MCNC: 0.7 MG/DL (ref 0.7–1.2)
GFR AFRICAN AMERICAN: >60
GFR NON-AFRICAN AMERICAN: >60 ML/MIN/1.73
GLUCOSE BLD-MCNC: 160 MG/DL (ref 74–99)
GLUCOSE BLD-MCNC: 165 MG/DL (ref 74–99)
GLUCOSE BLD-MCNC: 196 MG/DL
GLUCOSE BLD-MCNC: 205 MG/DL
GLUCOSE BLD-MCNC: 226 MG/DL (ref 74–99)
HCT VFR BLD CALC: 39.5 % (ref 37–54)
HEMOGLOBIN: 13.4 G/DL (ref 12.5–16.5)
HUMAN METAPNEUMOVIRUS BY PCR: NOT DETECTED
HUMAN RHINOVIRUS/ENTEROVIRUS BY PCR: NOT DETECTED
INFLUENZA A BY PCR: NOT DETECTED
INFLUENZA B BY PCR: NOT DETECTED
LACTIC ACID: 1.1 MMOL/L (ref 0.5–2.2)
MAGNESIUM: 2.2 MG/DL (ref 1.6–2.6)
MAGNESIUM: 2.3 MG/DL (ref 1.6–2.6)
MAGNESIUM: 2.3 MG/DL (ref 1.6–2.6)
MCH RBC QN AUTO: 30.4 PG (ref 26–35)
MCHC RBC AUTO-ENTMCNC: 33.9 % (ref 32–34.5)
MCV RBC AUTO: 89.6 FL (ref 80–99.9)
METER GLUCOSE: 181 MG/DL (ref 74–99)
METER GLUCOSE: 196 MG/DL (ref 74–99)
METER GLUCOSE: 205 MG/DL (ref 74–99)
METER GLUCOSE: 210 MG/DL (ref 74–99)
MYCOPLASMA PNEUMONIAE BY PCR: NOT DETECTED
PARAINFLUENZA VIRUS 1 BY PCR: NOT DETECTED
PARAINFLUENZA VIRUS 2 BY PCR: NOT DETECTED
PARAINFLUENZA VIRUS 3 BY PCR: NOT DETECTED
PARAINFLUENZA VIRUS 4 BY PCR: NOT DETECTED
PDW BLD-RTO: 14 FL (ref 11.5–15)
PHOSPHORUS: 1.6 MG/DL (ref 2.5–4.5)
PHOSPHORUS: 1.6 MG/DL (ref 2.5–4.5)
PHOSPHORUS: 1.7 MG/DL (ref 2.5–4.5)
PLATELET # BLD: 316 E9/L (ref 130–450)
PMV BLD AUTO: 8.9 FL (ref 7–12)
POTASSIUM SERPL-SCNC: 3.9 MMOL/L (ref 3.5–5)
RBC # BLD: 4.41 E12/L (ref 3.8–5.8)
RESPIRATORY SYNCYTIAL VIRUS BY PCR: NOT DETECTED
SARS-COV-2, PCR: NOT DETECTED
SODIUM BLD-SCNC: 133 MMOL/L (ref 132–146)
SODIUM BLD-SCNC: 136 MMOL/L (ref 132–146)
SODIUM BLD-SCNC: 138 MMOL/L (ref 132–146)
TROPONIN, HIGH SENSITIVITY: 44 NG/L (ref 0–11)
TROPONIN, HIGH SENSITIVITY: 49 NG/L (ref 0–11)
WBC # BLD: 13.6 E9/L (ref 4.5–11.5)

## 2022-01-27 PROCEDURE — 85027 COMPLETE CBC AUTOMATED: CPT

## 2022-01-27 PROCEDURE — 80048 BASIC METABOLIC PNL TOTAL CA: CPT

## 2022-01-27 PROCEDURE — 2580000003 HC RX 258: Performed by: INTERNAL MEDICINE

## 2022-01-27 PROCEDURE — 83735 ASSAY OF MAGNESIUM: CPT

## 2022-01-27 PROCEDURE — 2500000003 HC RX 250 WO HCPCS: Performed by: INTERNAL MEDICINE

## 2022-01-27 PROCEDURE — 6370000000 HC RX 637 (ALT 250 FOR IP): Performed by: INTERNAL MEDICINE

## 2022-01-27 PROCEDURE — 83605 ASSAY OF LACTIC ACID: CPT

## 2022-01-27 PROCEDURE — 99232 SBSQ HOSP IP/OBS MODERATE 35: CPT | Performed by: INTERNAL MEDICINE

## 2022-01-27 PROCEDURE — 6360000002 HC RX W HCPCS: Performed by: INTERNAL MEDICINE

## 2022-01-27 PROCEDURE — 36415 COLL VENOUS BLD VENIPUNCTURE: CPT

## 2022-01-27 PROCEDURE — 82962 GLUCOSE BLOOD TEST: CPT

## 2022-01-27 PROCEDURE — 99239 HOSP IP/OBS DSCHRG MGMT >30: CPT | Performed by: INTERNAL MEDICINE

## 2022-01-27 PROCEDURE — 84100 ASSAY OF PHOSPHORUS: CPT

## 2022-01-27 PROCEDURE — 84484 ASSAY OF TROPONIN QUANT: CPT

## 2022-01-27 RX ORDER — FOLIC ACID 1 MG/1
1 TABLET ORAL DAILY
Qty: 30 TABLET | Refills: 3 | Status: SHIPPED | OUTPATIENT
Start: 2022-01-27

## 2022-01-27 RX ORDER — SODIUM CHLORIDE, SODIUM LACTATE, POTASSIUM CHLORIDE, CALCIUM CHLORIDE 600; 310; 30; 20 MG/100ML; MG/100ML; MG/100ML; MG/100ML
INJECTION, SOLUTION INTRAVENOUS CONTINUOUS
Status: DISCONTINUED | OUTPATIENT
Start: 2022-01-27 | End: 2022-01-27 | Stop reason: HOSPADM

## 2022-01-27 RX ORDER — LANOLIN ALCOHOL/MO/W.PET/CERES
100 CREAM (GRAM) TOPICAL DAILY
Qty: 30 TABLET | Refills: 3 | Status: SHIPPED | OUTPATIENT
Start: 2022-01-27

## 2022-01-27 RX ORDER — FOLIC ACID 1 MG/1
TABLET ORAL
Status: DISCONTINUED
Start: 2022-01-27 | End: 2022-01-27 | Stop reason: WASHOUT

## 2022-01-27 RX ADMIN — INSULIN LISPRO 4 UNITS: 100 INJECTION, SOLUTION INTRAVENOUS; SUBCUTANEOUS at 09:00

## 2022-01-27 RX ADMIN — FOLIC ACID 1 MG: 5 INJECTION, SOLUTION INTRAMUSCULAR; INTRAVENOUS; SUBCUTANEOUS at 08:58

## 2022-01-27 RX ADMIN — INSULIN LISPRO 2 UNITS: 100 INJECTION, SOLUTION INTRAVENOUS; SUBCUTANEOUS at 08:57

## 2022-01-27 RX ADMIN — Medication 10 ML: at 02:28

## 2022-01-27 RX ADMIN — THIAMINE HYDROCHLORIDE 100 MG: 100 INJECTION, SOLUTION INTRAMUSCULAR; INTRAVENOUS at 08:56

## 2022-01-27 RX ADMIN — INSULIN LISPRO 4 UNITS: 100 INJECTION, SOLUTION INTRAVENOUS; SUBCUTANEOUS at 13:19

## 2022-01-27 RX ADMIN — SODIUM CHLORIDE, PRESERVATIVE FREE 10 ML: 5 INJECTION INTRAVENOUS at 08:56

## 2022-01-27 RX ADMIN — SODIUM CHLORIDE, PRESERVATIVE FREE 10 ML: 5 INJECTION INTRAVENOUS at 16:17

## 2022-01-27 RX ADMIN — INSULIN LISPRO 4 UNITS: 100 INJECTION, SOLUTION INTRAVENOUS; SUBCUTANEOUS at 13:16

## 2022-01-27 RX ADMIN — ASPIRIN 81 MG: 81 TABLET, COATED ORAL at 08:56

## 2022-01-27 RX ADMIN — Medication 1000 UNITS: at 15:46

## 2022-01-27 RX ADMIN — HYDRALAZINE HYDROCHLORIDE 10 MG: 20 INJECTION INTRAMUSCULAR; INTRAVENOUS at 16:16

## 2022-01-27 ASSESSMENT — PAIN SCALES - GENERAL: PAINLEVEL_OUTOF10: 0

## 2022-01-27 NOTE — DISCHARGE SUMMARY
[address]  Discharge Summary    PCP: Venessa Melendez MD    Admit Date:1/25/2022  Discharge Date: 1/27/2022    Admission Diagnosis:   DKA    Discharge Diagnosis:  DKA - resolved  KIRSTEN stage II - resolved  Lactic acidosis - resolved  Elevated troponin  Ho IDDM1  Ho HTN  [address] Alcohol abuse  Ho Tobacco abuse    Hospital Course:      During this admission patient was started on DKA protocol IVF and insulin gtt. He was in ED for 24+ hrs due to lack of room availability. Patient's gap closed x2 and was bridged per endocrinology Dr. Elfego Chi recommendations. Patient to recume home insulin pump upon discharge. Lactic acidosis and Kirsten resolved with ICF. Home meds resumed and patient stable for discharge. To follow up with PCP and Endocrinology. Per initial H&P: \"The patient is a 49 y.o. male with a PMH of Type 1 Diabetes mellitus on [employer] insulin pump, HTN, HLD, Marijuana abuse, Remote Hx of Lacunar stroke. Mr. Dread Quach is presenting to the emergency department with 2 days of nausea and vomiting. Per the patient he drinks alcohol regularly about 6 packs of beer his last drink was 3 days ago. A day after he experienced nausea vomiting with minimal abdominal pain. His sugars was elevated in the 500s. Of note he denies any fevers, does have a nonproductive cough that is chronic, no frequency or difficulty in urinating or diarrhea. He also denies chest pain or shortness of breath on exertion. He follows with Dr Dasha Parker in the endocrinology clinic and was last seen on 1/12 wherein his insulin pump settings were basal 1.35 12am 1.45 8 am.  He says that he is compliant with bolusing his insulin during meals as well. He also had some financial difficulties obtaining insulin in the past however that has resolved and he has no issues getting insulin for his pump. Upon arrival to the ED his vitals were stable.   His notable lab work showed glucose of 383, anion gap of 31, bicarb of 19, potassium of 4.1 BHB >4.5. Creatinine was also elevated at 2.5, his baseline is 0.8. His CBC showing leukocytosis of 20.9, hemoglobin of 18.0, likely he has intravascular volume depletion due to the nausea vomiting and poor oral intake. His lactic acid is 2.4, troponin is 197 which is elevated from his last high-sensitivity troponin. EKG showing more prominent ST depressions in two, three, aVF. QTc is prolonged at 523. Blood cultures have been sent in the ED, patient was started on DKA protocol. \"    Significant findings (history and exam, laboratory, radiological, pathology, other tests):     Constitutional: No fever, no chills, no change in weight; good appetite  HEENT: No blurred vision, no ear problems, no sore throat, no rhinorrhea. Respiratory:  no cough, sputum production, no pleuritic chest pain, no shortness of breath  Cardiology: No angina, no dyspnea on exertion, no paroxysmal nocturnal dyspnea, no orthopnea, no palpitation, no leg swelling. Gastroenterology: No dysphagia, no reflux; no abdominal pain, no nausea or vomiting; no constipation or diarrhea.  No hematochezia   Genitourinary: No dysuria, no frequency, hesitancy; no hematuria  Musculoskeletal: no joint pain, no myalgia, no change in range of movement  Neurology: no focal weakness in extremities, no slurred speech, no double vision, no tingling or numbness sensation  Endocrinology: no temperature intolerance, no polyphagia, polydipsia or polyuria  Hematology: no increased bleeding, no bruising, no lymphadenopathy  Skin: no skin changes noticed by patient  Psychology: no depressed mood, no suicidal ideation      Pending test results: none    Consults:  Endocrinology    Procedures:  None    Condition at discharge: Stable    Disposition: home    Discharge Medications:  Current Discharge Medication List        CONTINUE these medications which have CHANGED    Details   folic acid (FOLVITE) 1 MG tablet Take 1 tablet by mouth daily  Qty: 30 tablet, Refills: 3      Cholecalciferol 400 UNIT TABS tablet Take 2.5 tablets by mouth daily  Qty: 30 tablet, Refills: 0      thiamine 100 MG tablet Take 1 tablet by mouth daily  Qty: 30 tablet, Refills: 3           CONTINUE these medications which have NOT CHANGED    Details   Insulin Pump - insulin lispro Inject into the skin continuous Insulin-to-Carb Ratio (ICR): Insulin Sensitivity Factor (ISF): mg/dL per unit of insulin  Target Blood Glucose:  mg/dL  Bolus Frequency:      aspirin 81 MG EC tablet Take 1 tablet by mouth daily  Qty: 30 tablet, Refills: 3      albuterol sulfate  (90 Base) MCG/ACT inhaler Inhale 2 puffs into the lungs every 6 hours as needed for Wheezing or Shortness of Breath  Qty: 1 each, Refills: 1      metoprolol tartrate (LOPRESSOR) 25 MG tablet Take 0.5 tablets by mouth 2 times daily  Qty: 60 tablet, Refills: 3      lisinopril (PRINIVIL;ZESTRIL) 5 MG tablet Take 1 tablet by mouth daily  Qty: 30 tablet, Refills: 3    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)      atorvastatin (LIPITOR) 80 MG tablet Take 1 tablet by mouth nightly  Qty: 30 tablet, Refills: 3             Activity: activity as tolerated  Diet: diabetic diet    Follow-up appointments: Follow-up With  Details  Why  Contact Tamiko Soni MD    [facility] follow up  [id number] [phone]     Patient Instructions:  [facility] Internal Medicine Resident Service     Discharge to:  Home  Diet: regular  Activity: activity as tolerated   Exercise: As tolerated   Drive with seat belt on  Be compliant with medication  Follow up with Ambulatory Clinic, house team  on  at    Follow up with: PCP Dr. Marilee Antonio. Please call his office at [phone]; and with endocrinology Dr. [facility] [phone]         Learning About Meal Planning for Diabetes  Why plan your meals? Meal planning can be a key part of managing diabetes.  Planning meals and snacks with the right balance of carbohydrate, protein, and fat can help you keep your blood sugar at the target level you set with your doctor. You don't have to eat special foods. You can eat what your family eats, including sweets once in a while. But you do have to pay attention to how often you eat and how much you eat of certain foods. You may want to work with a dietitian or a certified diabetes educator. He or she can give you tips and meal ideas and can answer your questions about meal planning. This health professional can also help you reach a healthy weight if that is one of your goals. What plan is right for you? Your dietitian or diabetes educator may suggest that you start with the plate format or carbohydrate counting. The plate format  The plate format is a simple way to help you manage how you eat. You plan meals by learning how much space each food should take on a plate. Using the plate format helps you spread carbohydrate throughout the day. It can make it easier to keep your blood sugar level within your target range. It also helps you see if you're eating healthy portion sizes. To use the plate format, you put non-starchy vegetables on half your plate. Add meat or meat substitutes on one-quarter of the plate. Put a grain or starchy vegetable (such as brown rice or a potato) on the final quarter of the plate. You can add a small piece of fruit and some low-fat or fat-free milk or yogurt, depending on your carbohydrate goal for each meal.  Here are some tips for using the plate format:  Make sure that you are not using an oversized plate. A 9-inch plate is best. Many restaurants use larger plates. Get used to using the plate format at home. Then you can use it when you eat out. Write down your questions about using the plate format. Talk to your doctor, a dietitian, or a diabetes educator about your concerns.   Carbohydrate counting  With carbohydrate counting, you plan meals based on the amount of carbohydrate in each food. Carbohydrate raises blood sugar higher and more quickly than any other nutrient. It is found in desserts, breads and cereals, and fruit. It's also found in starchy vegetables such as potatoes and corn, grains such as rice and pasta, and milk and yogurt. Spreading carbohydrate throughout the day helps keep your blood sugar levels within your target range. Your daily amount depends on several things, including your weight, how active you are, which diabetes medicines you take, and what your goals are for your blood sugar levels. A registered dietitian or diabetes educator can help you plan how much carbohydrate to include in each meal and snack. A guideline for your daily amount of carbohydrate is:  45 to 60 grams at each meal. That's about the same as 3 to 4 carbohydrate servings. 15 to 20 grams at each snack. That's about the same as 1 carbohydrate serving. The Nutrition Facts label on packaged foods tells you how much carbohydrate is in a serving of the food. First, look at the serving size on the food label. Is that the amount you eat in a serving? All of the nutrition information on a food label is based on that serving size. So if you eat more or less than that, you'll need to adjust the other numbers. Total carbohydrate is the next thing you need to look for on the label. If you count carbohydrate servings, one serving of carbohydrate is 15 grams. For foods that don't come with labels, such as fresh fruits and vegetables, you'll need a guide that lists carbohydrate in these foods. Ask your doctor, dietitian, or diabetes educator about books or other nutrition guides you can use. If you take insulin, you need to know how many grams of carbohydrate are in a meal. This lets you know how much rapid-acting insulin to take before you eat. If you use an insulin pump, you get a constant rate of insulin during the day.  So the pump must be programmed at meals to give you extra insulin to cover the rise in blood sugar after meals. When you know how much carbohydrate you will eat, you can take the right amount of insulin. Or, if you always use the same amount of insulin, you need to make sure that you eat the same amount of carbohydrate at meals. If you need more help to understand carbohydrate counting and food labels, ask your doctor, dietitian, or diabetes educator. How can you plan healthy meals? Here are some tips to get started:  Plan your meals a week at a time. Don't forget to include snacks too. Use cookbooks or online recipes to plan several main meals. Plan some quick meals for busy nights. You also can double some recipes that freeze well. Then you can save half for other busy nights when you don't have time to cook. Make sure you have the ingredients you need for your recipes. If you're running low on basic items, put these items on your shopping list too. List foods that you use to make breakfasts, lunches, and snacks. List plenty of fruits and vegetables. Post this list on the refrigerator. Add to it as you think of more things you need. Take the list to the store to do your weekly shopping. Follow-up care is a key part of your treatment and safety. Be sure to make and go to all appointments, and call your doctor if you are having problems. It's also a good idea to know your test results and keep a list of the medicines you take. Where can you learn more? Go to https://chtracy.TakeLessons. org and sign in to your [employer] account. Enter N837 in the [facility] box to learn more about \"Learning About Meal Planning for Diabetes. \"     If you do not have an account, please click on the \"Sign Up Now\" link. Current as of: September 8, 2021               Content Version: 13.1  © 6560-8894 Healthwise, Incorporated. Care instructions adapted under license by [facility] ([facility]).  If you have questions about a medical condition or this instruction, always ask your healthcare professional. [place] [address] any warranty or liability for your use of this information. [address], DO  PGY 3   3:55 PM 1/27/2022    [address] [facility]  Department of Internal Medicine  Internal Medicine Residency / [address] case was discussed, including pertinent history and examination findings with the multidisciplinary team during bedside rounds. I have seen and examined the patient and the key elements of the encounter have been performed by myself. I have read and reviewed the documentation. If needed or disputed the following findings, counseling and treatment options which have been corrected and communicated back to the patient, family if applicable and contributing physicians. I agree with the assessment, plan and orders as noted by the resident.       Mickey Toro DO , [facility]  Pulmonary, Critical Care & Sleep Medicine  Internal Medicine Academic Faculty

## 2022-01-27 NOTE — ED NOTES
Full bed changed performed. Pt placed in hospital gown and pants. Patients clothing placed in bag. Patient cleaned himself with provided wipes. Pt steady on feet independently while changing. Assisted into bed. No further needs made.       Domonique Jose RN  01/27/22 6545

## 2022-01-27 NOTE — ED NOTES
Endocrinologist called stating that 2 hours post lantus injection pt's insulin drip can be stopped. Will do so at 2331.     Will continue to barbara Orr RN  01/26/22 2230

## 2022-01-27 NOTE — CARE COORDINATION
Care Coordination  The patent was admitted due to emesis times 8 this am and a blood sugar of 377 the patient has an insulin pump. The patient is a daily drinker and has not  drank in the past 3 days and is not in withdraws. The patient is a type 1 DM on a Medtronic Insulin pump. He came in to the ed today  Due to a 2 days history of nausea and vomiting. His blood sugars where up to 500. The patient follows with Dr Gudelia Bates as an out patient. He has no health insurance. In ed blood sugar of 383 anion gap of b/c 4.5/2.5 31, bicarb 19. His CBC showing leukocytosis of 20.9, hemoglobin of 18.0, likely he has intravascular volume depletion due to the nausea vomiting and poor oral intake. His lactic acid is 2.4, troponin is 197 which is elevated from his last high-sensitivity . Ekg is ST depressions  and his blood c/s have been sent. bs qid follow sliding scale. Ivf at 150 cc hr. I spoke to the patient in his room and he lives alone in a 1 story  apartment that's in an old school building and his moms apt is right above his. He has no had hhc in the past or been to a skilled rehab  And he drives. His Pcp is Dr Lali Echols and his pharmacy is [facility] and his plan is to return home once he is felling better as he is alert x3.

## 2022-01-27 NOTE — ED NOTES
Pt urinated in bed and this RN offered to change bed and provide pt with gown and pants, ptstates \"im not worried about it, we can do that later in the morning\". This RN insisted once more, pt refused.       Bekah Bell RN  01/27/22 [id number]

## 2022-01-27 NOTE — ED NOTES
Spoke with pt's sister's boyfriend Solomon Muhammad with pts permission. Solomon Muhammad called stating that he was pt's son. Updated Solomon Muhammad on patient's status. Per Solomon Muhammad he has contacted the supervisor and requested a physician call back.          Carmelita Brady RN  01/26/22 8685

## 2022-01-27 NOTE — ED NOTES
Assumed care of patient.   Pt c/o hiccups but denies and other complaints     [facility], RN  01/27/22 9486

## 2022-01-27 NOTE — PROGRESS NOTES
ENDOCRINOLOGY PROGRESS NOTE    Date of Service: 1/27/2022  Date of Admission: 1/25/2022  Admitting Physician: Linda Johnston DO   Primary Care Physician: Zenon Romero MD  Consultant physician: Kaia Smallwood MD     Reason for the consultation:  DM type 1    History of Present Illness: The history is provided by the patient. Accuracy of the patient data is excellent. Mark Anthony Cook is a 49 y.o. old male with PMH of Type 1 Diabetes mellitus on Medtronic insulin pump, HTN, HLD, Marijuana abuse, Remote Hx of Lacunar stroke and others listed below who presented to the ED for abdominal pain. He was admitted to [facility] on 1/25/2022 because of nausea and vomiting and found to be in DKA, endocrine team was consulted for diabetes management. Subjective:  Patient seen and examined, no overnight major events. Appetite is good. BG this am >180. Did require MDSS this morning. Tolerating diet well.        Inpatient diet:   Carb Restricted diet     Point of care glucose monitoring:   Independently reviewed   Recent Labs     01/26/22  1404 01/26/22  1523 01/26/22  1706 01/26/22  1835 01/26/22  2037 01/27/22  0041 01/27/22  0637 01/27/22  0839   GLUMET 193* 189* 179* 173* 193* 181* 205* 196*       Scheduled Meds:   aspirin  81 mg Oral Daily    atorvastatin  80 mg Oral Nightly    Vitamin D  1,000 Units Oral Daily    folic acid  1 mg IntraVENous Daily    lisinopril  5 mg Oral Daily    thiamine  100 mg IntraVENous Daily    sodium chloride flush  5-40 mL IntraVENous 2 times per day    nicotine  1 patch TransDERmal Daily    sodium chloride (PF)  10 mL IntraVENous Daily    heparin (porcine)  5,000 Units SubCUTAneous Q8H    insulin glargine  22 Units SubCUTAneous Nightly    insulin lispro  4 Units SubCUTAneous TID WC    insulin lispro  0-12 Units SubCUTAneous TID WC    insulin lispro  0-6 Units SubCUTAneous Nightly     PRN Meds:   albuterol, 2.5 mg, Q6H PRN  sodium chloride flush, 5-40 mL, PRN  sodium chloride, 25 mL, PRN  polyethylene glycol, 17 g, Daily PRN  acetaminophen, 650 mg, Q6H PRN   Or  acetaminophen, 650 mg, Q6H PRN  trimethobenzamide, 200 mg, Q6H PRN  hydrALAZINE, 10 mg, Q6H PRN  dextrose, 12.5 g, PRN  potassium chloride, 10 mEq, PRN  magnesium sulfate, 1,000 mg, PRN  sodium phosphate IVPB, 10 mmol, PRN   Or  sodium phosphate IVPB, 15 mmol, PRN   Or  sodium phosphate IVPB, 20 mmol, PRN  dextrose 5 % and 0.45 % NaCl, , Continuous PRN      Continuous Infusions:   sodium chloride      sodium chloride Stopped (01/26/22 0549)    dextrose 5 % and 0.45 % NaCl 150 mL/hr at 01/26/22 2046    insulin Stopped (01/26/22 2345)       Review of Systems  All systems reviewed. All negative except for symptoms mentioned in HPI     OBJECTIVE    BP (!) 164/98   Pulse 94   Temp 97.6 °F (36.4 °C)   Resp 16   Wt 145 lb (65.8 kg)   SpO2 97%   BMI 23.40 kg/m²   No intake or output data in the 24 hours ending 01/27/22 0850    Physical examination:  General: awake alert, oriented x3, no abnormal position or movements. HEENT: normocephalic non-traumatic, no exophthalmos   Neck: supple, no LN enlargement, no thyromegaly, no thyroid tenderness, no JVD. Pulm: Clear equal air entry no added sounds, no wheezing or rhonchi    CVS: S1 + S2, no murmur, no heave  Abd: soft lax, no tenderness, no organomegaly, audible bowel sounds. Skin: warm, no lesions, no rash. Feet: sensory exam of the feet is present, tested with the monofilament. No ulcers or open wounds.  Good peripheral pulses  Neuro: CN intact, muscle power normal  Psych: normal mood, and affect    Review of Laboratory Data:  I have reviewed the following:  Recent Labs     01/25/22 2122 01/26/22  0251 01/27/22  0744   WBC 20.9* 22.3* 13.6*   RBC 5.96* 4.89 4.41   HGB 18.0* 14.7 13.4   HCT 52.9 42.8 39.5   MCV 88.8 87.5 89.6   MCH 30.2 30.1 30.4   MCHC 34.0 34.3 33.9   RDW 14.0 14.0 14.0   * 438 316   MPV 8.6 8.7 8.9     Recent Labs     01/25/22 2122 01/26/22  0251 01/26/22  1846 01/26/22  1846 01/26/22 2049 01/26/22 2049 01/27/22  0520 01/27/22  0640 01/27/22  0844      < > 128*  --  131*  --  133  --   --    K 4.1   < > 4.3  --  3.3*  --  3.9  --   --    CL 83*   < > 89*  --  93*  --  97*  --   --    CO2 19*   < > 22  --  27  --  25  --   --    BUN 56*   < > 29*  --  26*  --  17  --   --    CREATININE 2.5*   < > 0.8  --  0.7  --  0.7  --   --    GLUCOSE 383*   < > 554*   < > 348*   < > 226* 205 196   CALCIUM 10.2   < > 7.5*  --  8.2*  --  9.2  --   --    PROT 9.2*  --   --   --   --   --   --   --   --    LABALBU 5.4*  --   --   --   --   --   --   --   --    BILITOT 1.7*  --   --   --   --   --   --   --   --    ALKPHOS 196*  --   --   --   --   --   --   --   --    AST 18  --   --   --   --   --   --   --   --    ALT 21  --   --   --   --   --   --   --   --     < > = values in this interval not displayed. Beta-Hydroxybutyrate   Date Value Ref Range Status   01/25/2022 >4.50 (H) 0.02 - 0.27 mmol/L Final   09/26/2021 >4.50 (H) 0.02 - 0.27 mmol/L Final   07/20/2020 >4.50 (H) 0.02 - 0.27 mmol/L Final     Lab Results   Component Value Date    LABA1C 9.3 01/26/2022    LABA1C 9.3 01/12/2022    LABA1C 9.8 09/26/2021     Lab Results   Component Value Date/Time    TSH 3.330 01/04/2020 05:07 PM    T4FREE 0.97 07/19/2012 02:00 AM     Lab Results   Component Value Date    LABA1C 9.3 01/26/2022    GLUCOSE 196 01/27/2022    GLUCOSE 83 04/11/2012    MALBCR 167.5 09/26/2021    LABMICR 132.3 09/26/2021    LABCREA 118 01/26/2022     Lab Results   Component Value Date    TRIG 100 01/26/2022    HDL 48 09/26/2021    LDLCALC 112 09/26/2021    CHOL 213 09/26/2021       Blood culture   Lab Results   Component Value Date    BC 24 Hours no growth 01/25/2022    BC 5 Days no growth 07/21/2020       Radiology:  XR CHEST PORTABLE   Final Result   No acute process.              Medical Records/Labs/Images review:   I personally reviewed and summarized previous records   All labs and imaging were reviewed independently     [address] Bk Zapata, a 49 y.o.-old male seen in the hospital today for diabetes management. Diabetes Mellitus type 1 on insulin pump   1. Patient's diabetes is uncontrolled with A1C of 9.8%  2. Off DKA Protocol  3. Recommended insulin  ? Increase Lantus to 25U daily  ? Humalog 4U with meals   ? Medium dose sliding scale   ? Plan to restart insulin pump tomorrow- Insulin Pump is at home per patient. 4. Continue glucose check with meals and at bedtime   5. Will titrate insulin dose based on the blood glucose trend & insulin requirement  6. Pt will follow with us after discharge. KIRSTEN   · Improving  · Management per primary service     The above issues were reviewed with the patient who understood and agreed with the plan. Thank you for allowing us to participate in the care of this patient. Please do not hesitate to contact us with any additional questions. Jagjit Hartley MD  Endocrinologist, [facility] End[facility]   [address] [place] [address]   Phone: [phone]  Fax: [phone]  --------------------------------------------  An electronic signature was used to authenticate this note.  Lila Hwang MD on 1/27/2022 at 8:50 AM

## 2022-01-31 LAB
BLOOD CULTURE, ROUTINE: NORMAL
CULTURE, BLOOD 2: NORMAL

## 2022-03-22 DIAGNOSIS — E10.65 UNCONTROLLED TYPE 1 DIABETES MELLITUS WITH HYPERGLYCEMIA (HCC): Primary | ICD-10-CM

## 2022-03-22 PROCEDURE — 3046F HEMOGLOBIN A1C LEVEL >9.0%: CPT | Performed by: CLINICAL NURSE SPECIALIST

## 2022-03-22 RX ORDER — BLOOD SUGAR DIAGNOSTIC
1 STRIP MISCELLANEOUS
Qty: 150 EACH | Refills: 11 | Status: SHIPPED
Start: 2022-03-22 | End: 2022-05-10 | Stop reason: SDUPTHER

## 2022-03-23 ENCOUNTER — OFFICE VISIT (OUTPATIENT)
Dept: ENDOCRINOLOGY | Age: 52
End: 2022-03-23
Payer: COMMERCIAL

## 2022-03-23 VITALS
SYSTOLIC BLOOD PRESSURE: 169 MMHG | HEART RATE: 89 BPM | HEIGHT: 66 IN | BODY MASS INDEX: 23.14 KG/M2 | DIASTOLIC BLOOD PRESSURE: 110 MMHG | WEIGHT: 144 LBS

## 2022-03-23 DIAGNOSIS — E55.9 VITAMIN D DEFICIENCY: ICD-10-CM

## 2022-03-23 DIAGNOSIS — R80.9 TYPE 1 DIABETES MELLITUS WITH ALBUMINURIA (HCC): ICD-10-CM

## 2022-03-23 DIAGNOSIS — E10.65 UNCONTROLLED TYPE 1 DIABETES MELLITUS WITH HYPERGLYCEMIA (HCC): Primary | ICD-10-CM

## 2022-03-23 DIAGNOSIS — E78.5 HYPERLIPIDEMIA, UNSPECIFIED HYPERLIPIDEMIA TYPE: ICD-10-CM

## 2022-03-23 DIAGNOSIS — E10.29 TYPE 1 DIABETES MELLITUS WITH ALBUMINURIA (HCC): ICD-10-CM

## 2022-03-23 PROCEDURE — 95251 CONT GLUC MNTR ANALYSIS I&R: CPT | Performed by: NURSE PRACTITIONER

## 2022-03-23 PROCEDURE — 3046F HEMOGLOBIN A1C LEVEL >9.0%: CPT | Performed by: NURSE PRACTITIONER

## 2022-03-23 PROCEDURE — 99214 OFFICE O/P EST MOD 30 MIN: CPT | Performed by: NURSE PRACTITIONER

## 2022-03-23 RX ORDER — BLOOD-GLUCOSE,RECEIVER,CONT
EACH MISCELLANEOUS
Qty: 1 EACH | Refills: 0 | Status: SHIPPED | OUTPATIENT
Start: 2022-03-23

## 2022-03-23 RX ORDER — BLOOD-GLUCOSE SENSOR
EACH MISCELLANEOUS
Qty: 1 EACH | Refills: 0 | Status: SHIPPED | OUTPATIENT
Start: 2022-03-23

## 2022-03-23 RX ORDER — BLOOD-GLUCOSE TRANSMITTER
EACH MISCELLANEOUS
Qty: 1 EACH | Refills: 3 | Status: SHIPPED | OUTPATIENT
Start: 2022-03-23

## 2022-03-23 NOTE — PROGRESS NOTES
[address] [facility] Department of Endocrinology Diabetes and Metabolism   [address] [facility] [address]   Phone: [phone]  Fax: [phone]    Date of Service: 3/23/2022    Primary Care Physician: Kaylin Lorenzana MD  Referring physician: No ref. provider found  Provider: CHAYO Rogers NP     Reason for the visit:  Type 1 Diabetes    History of Present Illness: The history is provided by the patient. No  was used. Accuracy of the patient data is excellent. Samuel Franz is a very pleasant 49 y.o. male seen today for diabetes management. Since last appt he has acquired health insurance which it assisting him with affording medical supplies and medications. Samuel Franz was diagnosed with diabetes at age 30  and currently on Basal rate 12a 1.35, 8a 1.45, CR 10, ISF 55 , goal 12a 110-130, active insulin time 3 hrs    The patient has not been checking blood sugar, Log shows BS checked 5x in last 2 days of 14 day report ranging from 270-400  . Most recent A1c results summarized below  Lab Results   Component Value Date    LABA1C 9.3 01/26/2022    LABA1C 9.3 01/12/2022    LABA1C 9.8 09/26/2021     Patient reported hypoglycemic episodes  The patient hasn't been mindful of what has been eating and wasn't following diabetes diet    I reviewed current medications and the patient has no issues with diabetes medications  Magan Funes is over due for an eye exam. Last eye exam was few years ago. Financial issues to make an appointment. The patient performs his own feet care and doesn't see podiatry   His diabetes is complicated with neuropathy and retinopathy s/p laser therapy   Macrovascular complications: no CAD, PVD, + TIA 2/2019   Multiple admissions for DKA.   Most recent hospitalization (9/21)    PAST MEDICAL HISTORY   Past Medical History:   Diagnosis Date    Alcoholism ([address]     Cocaine abuse ([address]     Diabetes mellitus ([address]     Hypertension     Idiopathic peripheral neuropathy 9/21/2016    [facility]     Marijuana abuse        PAST SURGICAL HISTORY   No past surgical history on file. SOCIAL HISTORY   Tobacco:   reports that he has been smoking cigarettes. He has a 30.00 pack-year smoking history. He has never used smokeless tobacco.  Alcohol:   reports current alcohol use of about 5.0 standard drinks of alcohol per week. Drugs:   reports current drug use. Drug: Marijuana Narayan Meckel). FAMILY HISTORY   Family History   Problem Relation Age of Onset    Diabetes type 2  Mother     Cancer Maternal Grandmother     Diabetes type 2  Nephew        ALLERGIES AND DRUG REACTIONS   Allergies   Allergen Reactions    Metoprolol      bradycardia    No Known Allergies        CURRENT MEDICATIONS   Current Outpatient Medications   Medication Sig Dispense Refill    Continuous Blood Gluc Sensor (DEXCOM G6 SENSOR) MISC Apply every 10 days 1 each 0    Continuous Blood Gluc Transmit (DEXCOM G6 TRANSMITTER) MISC 1 transmitter for every 90 days 1 each 3    Continuous Blood Gluc  (DEXCOM G6 ) DAVID For bs monitoring 1 each 0    insulin aspart (NOVOLOG) 100 UNIT/ML injection vial 100 units/day via insulin pump 30 mL 5    blood glucose test strips (ONETOUCH VERIO) strip 1 each by In Vitro route 5 times daily As needed. 427 each 11    folic acid (FOLVITE) 1 MG tablet Take 1 tablet by mouth daily 30 tablet 3    Cholecalciferol 400 UNIT TABS tablet Take 2.5 tablets by mouth daily 30 tablet 0    thiamine 100 MG tablet Take 1 tablet by mouth daily 30 tablet 3    Insulin Pump - insulin lispro Inject into the skin continuous Insulin-to-Carb Ratio (ICR):    Insulin Sensitivity Factor (ISF): mg/dL per unit of insulin  Target Blood Glucose:  mg/dL  Bolus Frequency:      lisinopril (PRINIVIL;ZESTRIL) 5 MG tablet Take 1 tablet by mouth daily 30 tablet 3    aspirin 81 MG EC tablet Take 1 tablet by mouth daily 30 tablet 3    atorvastatin (LIPITOR) 80 MG tablet Take 1 tablet by mouth nightly 30 tablet 3    albuterol sulfate  (90 Base) MCG/ACT inhaler Inhale 2 puffs into the lungs every 6 hours as needed for Wheezing or Shortness of Breath 1 each 1    metoprolol tartrate (LOPRESSOR) 25 MG tablet Take 0.5 tablets by mouth 2 times daily 60 tablet 3     No current facility-administered medications for this visit. Review of Systems  Constitutional: No fever, no chills, no diaphoresis, no generalized weakness. HEENT: No blurred vision, No sore throat, no ear pain, no hair loss  Neck: denied any neck swelling, difficulty swallowing,   Cardio-pulmonary: No CP, SOB or palpitation, No orthopnea or PND. No cough or wheezing. GI: No N/V/D, no constipation, No abdominal pain, no melena or hematochezia   : Denied any dysuria, hematuria, flank pain, discharge, or incontinence. Skin: denied any rash, ulcer, Hirsute, or hyperpigmentation. MSK: denied any joint deformity, joint pain/swelling, muscle pain, or back pain. Neuro: no numbness, no tingling, no weakness, _    OBJECTIVE    BP (!) 169/110   Pulse 89   Ht 5' 6\" (1.676 m)   Wt 144 lb (65.3 kg)   BMI 23.24 kg/m²   BP Readings from Last 4 Encounters:   03/23/22 (!) 169/110   01/27/22 (!) 157/100   01/12/22 (!) 197/110   10/12/21 (!) 160/94     Wt Readings from Last 6 Encounters:   03/23/22 144 lb (65.3 kg)   01/27/22 138 lb 3.2 oz (62.7 kg)   01/12/22 146 lb (66.2 kg)   10/12/21 137 lb (62.1 kg)   09/28/21 136 lb 4.8 oz (61.8 kg)   02/15/21 153 lb (69.4 kg)       Physical examination:  General: awake alert, oriented x3, no abnormal position or movements. HEENT: normocephalic non-traumatic, no exophthalmos   Neck: supple, no LN enlargement, no thyromegaly, no thyroid tenderness, no JVD. Pulm: Clear equal air entry no added sounds, no wheezing or rhonchi    CVS: S1 + S2, no murmur, no heave.  Dorsalis pedis pulse palpable   Abd: soft lax, no tenderness, no organomegaly, audible bowel sounds. Skin: warm, no lesions, no rash.  No callus, no Ulcers, No acanthosis nigricans  Musculoskeletal: No back tenderness, no kyphosis/scoliosis    Neuro: CN intact, Monofilament sensation decreased bilateral , muscle power normal  Psych: normal mood, and affect      Review of Laboratory Data:  I personally reviewed the following lab:  Lab Results   Component Value Date/Time    WBC 13.6 (H) 01/27/2022 07:44 AM    RBC 4.41 01/27/2022 07:44 AM    HGB 13.4 01/27/2022 07:44 AM    HCT 39.5 01/27/2022 07:44 AM    MCV 89.6 01/27/2022 07:44 AM    MCH 30.4 01/27/2022 07:44 AM    MCHC 33.9 01/27/2022 07:44 AM    RDW 14.0 01/27/2022 07:44 AM     01/27/2022 07:44 AM    MPV 8.9 01/27/2022 07:44 AM    BANDS 1 04/10/2012 04:30 AM      Lab Results   Component Value Date/Time     01/27/2022 02:23 PM     01/27/2022 02:23 PM    K 3.9 01/27/2022 02:23 PM    K 3.9 01/27/2022 02:23 PM    K 4.1 09/26/2021 09:49 AM    CO2 29 01/27/2022 02:23 PM    CO2 30 (H) 01/27/2022 02:23 PM    BUN 15 01/27/2022 02:23 PM    BUN 15 01/27/2022 02:23 PM    CREATININE 0.7 01/27/2022 02:23 PM    CREATININE 0.7 01/27/2022 02:23 PM    CALCIUM 9.0 01/27/2022 02:23 PM    CALCIUM 9.2 01/27/2022 02:23 PM    LABGLOM >60 01/27/2022 02:23 PM    LABGLOM >60 01/27/2022 02:23 PM    GFRAA >60 01/27/2022 02:23 PM    GFRAA >60 01/27/2022 02:23 PM      Lab Results   Component Value Date/Time    TSH 3.330 01/04/2020 05:07 PM    T4FREE 0.97 07/19/2012 02:00 AM     Lab Results   Component Value Date    LABA1C 9.3 01/26/2022    GLUCOSE 165 01/27/2022    GLUCOSE 160 01/27/2022    GLUCOSE 83 04/11/2012    MALBCR 167.5 09/26/2021    LABMICR 132.3 09/26/2021    LABCREA 118 01/26/2022     Lab Results   Component Value Date    LABA1C 9.3 01/26/2022    LABA1C 9.3 01/12/2022    LABA1C 9.8 09/26/2021     Lab Results   Component Value Date    TRIG 100 01/26/2022    HDL 48 09/26/2021    LDLCALC 112 09/26/2021    CHOL 213 09/26/2021     Lab Results Component Value Date    VITD25 25 07/22/2020    VITD25 23 08/02/2019       ASSESSMENT & [id number] Jim Dinh, a 49 y.o.-old male seen in for the following issues       Assessment:      Diagnosis Orders   1. Uncontrolled type 1 diabetes mellitus with hyperglycemia (HCC)  FL CONTINUOUS GLUCOSE MONITORING ANALYSIS I&R    Continuous Blood Gluc Sensor (DEXCOM G6 SENSOR) MISC    Continuous Blood Gluc Transmit (DEXCOM G6 TRANSMITTER) MISC    Continuous Blood Gluc  (DEXCOM G6 ) DAVID   2. Hyperlipidemia, unspecified hyperlipidemia type     3. Vitamin D deficiency     4. 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 Uncontrolled type 1 diabetes mellitus with hyperglycemia (HCC)   · Patient's diabetes is uncontrolled  · -400, per download  · Insulin pump for therapy  · Pt wearing pump for 5-7 days due to cost of supplies. · Patient is not entering carbs into insulin pump therefore no boluses given  · There are only 5 blood glucose readings on insulin pump download  · Continue insulin pump settings as above: Basal rate 12a 1.35, 8a 1.45, CR 10 , ISF 55 , goal 12a 110-130, active insulin time 3 hrs.   · Will order Dexcom for CGM will check download in 2 weeks and titrate pump settings per BS eval  · Counseled on insulin pump use. Counseled to enter carbs each time he eats. · The patient was advised to check blood sugars 4 times a day before meals and at bedtime. · Suggested to patient returning to injections with [facility] [employer] N & R insulin. He is not interested at this time  · Discussed with patient A1c and blood sugar goals   · Optimal blood sugars: 100-140 pre-prandial, < 180 peak post-prandial  · The patient counseled about the complications of uncontrolled diabetes   · Patient was counselled about the importance of self-blood glucose monitoring and eating consistent carb diet to avoid blood sugar fluctuations   · Discussed lifestyle changes including diet and exercise with patient  · Supplies for pump given to pt today  · HbA1c at next OV    Continuous Glucose Monitoring (CGM) download and interpretation    I personally reviewed and interpreted continuous glucose monitor (CGM) download. CGM report was discussed with patient including blood glucose patterns, percentages of blood glucose at goal, above goal and below goal. Insulin dosages/antidiabetic regimen was adjusted according to CGM download. Full CGM was scanned under media. 2. Hyperlipidemia, unspecified hyperlipidemia type   · Continue statin. - reinforced compliance    · Levels remain elevated  · Encourage diet and exercise  ·    3. Vitamin D deficiency   · Will check level prior to next visit  · On replacement therapy, noncompliant with adherence  ·  2000 iu of Vitamin D daily     4. 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 I also ordered labs to further assess and manage the above addressed medical conditions. Return in about 3 months (around 6/23/2022). The above issues were reviewed with the patient who understood and agreed with the plan. Thank you for allowing us to participate in the care of this patient. Please do not hesitate to contact us with any additional questions. CHAYO Coleman NP [facility] Diabetes Care and Endocrinology   [address] [place] [id number]   Phone: [phone]  Fax: [phone]  --------------------------------------------  An electronic signature was used to authenticate this note.  CHAYO Coleman NP   on 3/23/2022 at 4:08 PM

## 2022-03-24 ENCOUNTER — TELEPHONE (OUTPATIENT)
Dept: ENDOCRINOLOGY | Age: 52
End: 2022-03-24

## 2022-04-12 ENCOUNTER — TELEPHONE (OUTPATIENT)
Dept: ENDOCRINOLOGY | Age: 52
End: 2022-04-12

## 2022-04-29 ENCOUNTER — APPOINTMENT (OUTPATIENT)
Dept: GENERAL RADIOLOGY | Age: 52
DRG: 194 | End: 2022-04-29
Payer: COMMERCIAL

## 2022-04-29 ENCOUNTER — HOSPITAL ENCOUNTER (INPATIENT)
Age: 52
LOS: 1 days | Discharge: HOME | DRG: 194 | End: 2022-05-01
Attending: EMERGENCY MEDICINE | Admitting: INTERNAL MEDICINE
Payer: COMMERCIAL

## 2022-04-29 DIAGNOSIS — J18.9 PNEUMONIA OF RIGHT UPPER LOBE DUE TO INFECTIOUS ORGANISM: Primary | ICD-10-CM

## 2022-04-29 LAB
ALBUMIN SERPL-MCNC: 4.2 G/DL (ref 3.5–5.2)
ALP BLD-CCNC: 136 U/L (ref 40–129)
ALT SERPL-CCNC: 13 U/L (ref 0–40)
ANION GAP SERPL CALCULATED.3IONS-SCNC: 19 MMOL/L (ref 7–16)
AST SERPL-CCNC: 24 U/L (ref 0–39)
BETA-HYDROXYBUTYRATE: 3.09 MMOL/L (ref 0.02–0.27)
BILIRUB SERPL-MCNC: 1.5 MG/DL (ref 0–1.2)
BUN BLDV-MCNC: 34 MG/DL (ref 6–20)
CALCIUM SERPL-MCNC: 9.7 MG/DL (ref 8.6–10.2)
CHLORIDE BLD-SCNC: 92 MMOL/L (ref 98–107)
CO2: 20 MMOL/L (ref 22–29)
CREAT SERPL-MCNC: 1.3 MG/DL (ref 0.7–1.2)
GFR AFRICAN AMERICAN: >60
GFR NON-AFRICAN AMERICAN: 58 ML/MIN/1.73
GLUCOSE BLD-MCNC: 297 MG/DL (ref 74–99)
INFLUENZA A BY PCR: NOT DETECTED
INFLUENZA B BY PCR: NOT DETECTED
PH VENOUS: 7.38 (ref 7.35–7.45)
POTASSIUM REFLEX MAGNESIUM: 6.2 MMOL/L (ref 3.5–5)
SARS-COV-2, NAAT: NOT DETECTED
SODIUM BLD-SCNC: 131 MMOL/L (ref 132–146)
TOTAL PROTEIN: 8.1 G/DL (ref 6.4–8.3)

## 2022-04-29 PROCEDURE — 2580000003 HC RX 258: Performed by: EMERGENCY MEDICINE

## 2022-04-29 PROCEDURE — 87502 INFLUENZA DNA AMP PROBE: CPT

## 2022-04-29 PROCEDURE — 82800 BLOOD PH: CPT

## 2022-04-29 PROCEDURE — 2500000003 HC RX 250 WO HCPCS: Performed by: EMERGENCY MEDICINE

## 2022-04-29 PROCEDURE — 93005 ELECTROCARDIOGRAM TRACING: CPT | Performed by: EMERGENCY MEDICINE

## 2022-04-29 PROCEDURE — 85025 COMPLETE CBC W/AUTO DIFF WBC: CPT

## 2022-04-29 PROCEDURE — 82010 KETONE BODYS QUAN: CPT

## 2022-04-29 PROCEDURE — 87635 SARS-COV-2 COVID-19 AMP PRB: CPT

## 2022-04-29 PROCEDURE — 99285 EMERGENCY DEPT VISIT HI MDM: CPT

## 2022-04-29 PROCEDURE — 80053 COMPREHEN METABOLIC PANEL: CPT

## 2022-04-29 PROCEDURE — 71045 X-RAY EXAM CHEST 1 VIEW: CPT

## 2022-04-29 RX ORDER — 0.9 % SODIUM CHLORIDE 0.9 %
30 INTRAVENOUS SOLUTION INTRAVENOUS ONCE
Status: COMPLETED | OUTPATIENT
Start: 2022-04-29 | End: 2022-04-30

## 2022-04-29 RX ADMIN — DOXYCYCLINE 100 MG: 100 INJECTION, POWDER, LYOPHILIZED, FOR SOLUTION INTRAVENOUS at 23:59

## 2022-04-29 ASSESSMENT — PAIN DESCRIPTION - LOCATION: LOCATION: EAR

## 2022-04-29 ASSESSMENT — PAIN SCALES - GENERAL: PAINLEVEL_OUTOF10: 6

## 2022-04-29 ASSESSMENT — PAIN - FUNCTIONAL ASSESSMENT
PAIN_FUNCTIONAL_ASSESSMENT: 0-10
PAIN_FUNCTIONAL_ASSESSMENT: NONE - DENIES PAIN
PAIN_FUNCTIONAL_ASSESSMENT: ACTIVITIES ARE NOT PREVENTED

## 2022-04-29 ASSESSMENT — PAIN DESCRIPTION - PAIN TYPE: TYPE: ACUTE PAIN

## 2022-04-29 ASSESSMENT — PAIN DESCRIPTION - DESCRIPTORS: DESCRIPTORS: ACHING

## 2022-04-29 ASSESSMENT — PAIN DESCRIPTION - ORIENTATION: ORIENTATION: RIGHT;LEFT

## 2022-04-29 ASSESSMENT — PAIN DESCRIPTION - ONSET: ONSET: ON-GOING

## 2022-04-29 ASSESSMENT — PAIN DESCRIPTION - FREQUENCY: FREQUENCY: INTERMITTENT

## 2022-04-29 NOTE — Clinical Note
Discharge Plan[de-identified] Other/[facility])   Telemetry/Cardiac Monitoring Required?: Yes

## 2022-04-30 PROBLEM — J18.9 PNEUMONIA: Status: ACTIVE | Noted: 2022-04-30

## 2022-04-30 LAB
ACETAMINOPHEN LEVEL: <5 MCG/ML (ref 10–30)
AMPHETAMINE SCREEN, URINE: NOT DETECTED
ANION GAP SERPL CALCULATED.3IONS-SCNC: 12 MMOL/L (ref 7–16)
ANION GAP SERPL CALCULATED.3IONS-SCNC: 15 MMOL/L (ref 7–16)
ANION GAP SERPL CALCULATED.3IONS-SCNC: 16 MMOL/L (ref 7–16)
ANION GAP SERPL CALCULATED.3IONS-SCNC: 16 MMOL/L (ref 7–16)
ANISOCYTOSIS: ABNORMAL
BACTERIA: NORMAL /HPF
BARBITURATE SCREEN URINE: NOT DETECTED
BASOPHILS ABSOLUTE: 0 E9/L (ref 0–0.2)
BASOPHILS RELATIVE PERCENT: 0.2 % (ref 0–2)
BENZODIAZEPINE SCREEN, URINE: NOT DETECTED
BILIRUBIN URINE: ABNORMAL
BLOOD, URINE: ABNORMAL
BUN BLDV-MCNC: 32 MG/DL (ref 6–20)
BUN BLDV-MCNC: 34 MG/DL (ref 6–20)
BUN BLDV-MCNC: 34 MG/DL (ref 6–20)
BUN BLDV-MCNC: 40 MG/DL (ref 6–20)
BURR CELLS: ABNORMAL
CALCIUM SERPL-MCNC: 8.9 MG/DL (ref 8.6–10.2)
CALCIUM SERPL-MCNC: 9 MG/DL (ref 8.6–10.2)
CALCIUM SERPL-MCNC: 9.1 MG/DL (ref 8.6–10.2)
CALCIUM SERPL-MCNC: 9.3 MG/DL (ref 8.6–10.2)
CANNABINOID SCREEN URINE: POSITIVE
CHLORIDE BLD-SCNC: 93 MMOL/L (ref 98–107)
CHLORIDE BLD-SCNC: 97 MMOL/L (ref 98–107)
CHLORIDE BLD-SCNC: 98 MMOL/L (ref 98–107)
CHLORIDE BLD-SCNC: 98 MMOL/L (ref 98–107)
CHLORIDE URINE RANDOM: <20 MMOL/L
CLARITY: CLEAR
CO2: 20 MMOL/L (ref 22–29)
CO2: 22 MMOL/L (ref 22–29)
CO2: 24 MMOL/L (ref 22–29)
CO2: 25 MMOL/L (ref 22–29)
COCAINE METABOLITE SCREEN URINE: NOT DETECTED
COLOR: YELLOW
CREAT SERPL-MCNC: 1 MG/DL (ref 0.7–1.2)
CREAT SERPL-MCNC: 1.1 MG/DL (ref 0.7–1.2)
CREAT SERPL-MCNC: 1.2 MG/DL (ref 0.7–1.2)
CREAT SERPL-MCNC: 1.3 MG/DL (ref 0.7–1.2)
CREATININE URINE: 220 MG/DL (ref 40–278)
EOSINOPHILS ABSOLUTE: 0.29 E9/L (ref 0.05–0.5)
EOSINOPHILS RELATIVE PERCENT: 0.9 % (ref 0–6)
ETHANOL: <10 MG/DL (ref 0–0.08)
FENTANYL SCREEN, URINE: NOT DETECTED
GFR AFRICAN AMERICAN: >60
GFR NON-AFRICAN AMERICAN: 58 ML/MIN/1.73
GFR NON-AFRICAN AMERICAN: >60 ML/MIN/1.73
GLUCOSE BLD-MCNC: 230 MG/DL (ref 74–99)
GLUCOSE BLD-MCNC: 246 MG/DL (ref 74–99)
GLUCOSE BLD-MCNC: 265 MG/DL (ref 74–99)
GLUCOSE BLD-MCNC: 289 MG/DL (ref 74–99)
GLUCOSE URINE: 500 MG/DL
HCT VFR BLD CALC: 47.2 % (ref 37–54)
HEMOGLOBIN: 16 G/DL (ref 12.5–16.5)
KETONES, URINE: 15 MG/DL
LACTIC ACID, SEPSIS: 1.2 MMOL/L (ref 0.5–1.9)
LEUKOCYTE ESTERASE, URINE: NEGATIVE
LYMPHOCYTES ABSOLUTE: 0.97 E9/L (ref 1.5–4)
LYMPHOCYTES RELATIVE PERCENT: 2.6 % (ref 20–42)
Lab: ABNORMAL
MAGNESIUM: 1.8 MG/DL (ref 1.6–2.6)
MAGNESIUM: 1.8 MG/DL (ref 1.6–2.6)
MAGNESIUM: 2 MG/DL (ref 1.6–2.6)
MCH RBC QN AUTO: 30 PG (ref 26–35)
MCHC RBC AUTO-ENTMCNC: 33.9 % (ref 32–34.5)
MCV RBC AUTO: 88.4 FL (ref 80–99.9)
METER GLUCOSE: 150 MG/DL (ref 74–99)
METER GLUCOSE: 239 MG/DL (ref 74–99)
METER GLUCOSE: 271 MG/DL (ref 74–99)
METER GLUCOSE: 299 MG/DL (ref 74–99)
METHADONE SCREEN, URINE: NOT DETECTED
MONOCYTES ABSOLUTE: 1.3 E9/L (ref 0.1–0.95)
MONOCYTES RELATIVE PERCENT: 4.4 % (ref 2–12)
NEUTROPHILS ABSOLUTE: 29.16 E9/L (ref 1.8–7.3)
NEUTROPHILS RELATIVE PERCENT: 90.4 % (ref 43–80)
NITRITE, URINE: NEGATIVE
OPIATE SCREEN URINE: NOT DETECTED
OXYCODONE URINE: NOT DETECTED
PDW BLD-RTO: 13.8 FL (ref 11.5–15)
PH UA: 6 (ref 5–9)
PHENCYCLIDINE SCREEN URINE: NOT DETECTED
PHOSPHORUS: 2.3 MG/DL (ref 2.5–4.5)
PHOSPHORUS: 2.7 MG/DL (ref 2.5–4.5)
PHOSPHORUS: 2.9 MG/DL (ref 2.5–4.5)
PLATELET # BLD: 454 E9/L (ref 130–450)
PMV BLD AUTO: 9.3 FL (ref 7–12)
POIKILOCYTES: ABNORMAL
POLYCHROMASIA: ABNORMAL
POTASSIUM SERPL-SCNC: 3.8 MMOL/L (ref 3.5–5)
POTASSIUM SERPL-SCNC: 3.8 MMOL/L (ref 3.5–5)
POTASSIUM SERPL-SCNC: 3.9 MMOL/L (ref 3.5–5)
POTASSIUM SERPL-SCNC: 4.2 MMOL/L (ref 3.5–5)
POTASSIUM, UR: 47 MMOL/L
PROMYELOCYTES PERCENT: 1.7 % (ref 0–0)
PROTEIN UA: 100 MG/DL
RBC # BLD: 5.34 E12/L (ref 3.8–5.8)
RBC UA: NORMAL /HPF (ref 0–2)
ROULEAUX: ABNORMAL
SALICYLATE, SERUM: <0.3 MG/DL (ref 0–30)
SCHISTOCYTES: ABNORMAL
SODIUM BLD-SCNC: 133 MMOL/L (ref 132–146)
SODIUM BLD-SCNC: 134 MMOL/L (ref 132–146)
SODIUM BLD-SCNC: 134 MMOL/L (ref 132–146)
SODIUM BLD-SCNC: 135 MMOL/L (ref 132–146)
SODIUM URINE: <20 MMOL/L
SPECIFIC GRAVITY UA: >=1.03 (ref 1–1.03)
TRICYCLIC ANTIDEPRESSANTS SCREEN SERUM: NEGATIVE NG/ML
UREA NITROGEN, UR: 1510 MG/DL (ref 800–1666)
UROBILINOGEN, URINE: 0.2 E.U./DL
WBC # BLD: 32.4 E9/L (ref 4.5–11.5)
WBC UA: NORMAL /HPF (ref 0–5)

## 2022-04-30 PROCEDURE — 6360000002 HC RX W HCPCS: Performed by: EMERGENCY MEDICINE

## 2022-04-30 PROCEDURE — 83605 ASSAY OF LACTIC ACID: CPT

## 2022-04-30 PROCEDURE — 82436 ASSAY OF URINE CHLORIDE: CPT

## 2022-04-30 PROCEDURE — 86703 HIV-1/HIV-2 1 RESULT ANTBDY: CPT

## 2022-04-30 PROCEDURE — 2580000003 HC RX 258: Performed by: INTERNAL MEDICINE

## 2022-04-30 PROCEDURE — 83735 ASSAY OF MAGNESIUM: CPT

## 2022-04-30 PROCEDURE — 99222 1ST HOSP IP/OBS MODERATE 55: CPT | Performed by: INTERNAL MEDICINE

## 2022-04-30 PROCEDURE — 87206 SMEAR FLUORESCENT/ACID STAI: CPT

## 2022-04-30 PROCEDURE — 87205 SMEAR GRAM STAIN: CPT

## 2022-04-30 PROCEDURE — 6360000002 HC RX W HCPCS: Performed by: INTERNAL MEDICINE

## 2022-04-30 PROCEDURE — 82962 GLUCOSE BLOOD TEST: CPT

## 2022-04-30 PROCEDURE — 80048 BASIC METABOLIC PNL TOTAL CA: CPT

## 2022-04-30 PROCEDURE — 2060000000 HC ICU INTERMEDIATE R&B

## 2022-04-30 PROCEDURE — 84133 ASSAY OF URINE POTASSIUM: CPT

## 2022-04-30 PROCEDURE — 6370000000 HC RX 637 (ALT 250 FOR IP): Performed by: INTERNAL MEDICINE

## 2022-04-30 PROCEDURE — 82077 ASSAY SPEC XCP UR&BREATH IA: CPT

## 2022-04-30 PROCEDURE — 84100 ASSAY OF PHOSPHORUS: CPT

## 2022-04-30 PROCEDURE — 84540 ASSAY OF URINE/UREA-N: CPT

## 2022-04-30 PROCEDURE — 36415 COLL VENOUS BLD VENIPUNCTURE: CPT

## 2022-04-30 PROCEDURE — 80307 DRUG TEST PRSMV CHEM ANLYZR: CPT

## 2022-04-30 PROCEDURE — 80179 DRUG ASSAY SALICYLATE: CPT

## 2022-04-30 PROCEDURE — 2500000003 HC RX 250 WO HCPCS: Performed by: INTERNAL MEDICINE

## 2022-04-30 PROCEDURE — 80143 DRUG ASSAY ACETAMINOPHEN: CPT

## 2022-04-30 PROCEDURE — 84300 ASSAY OF URINE SODIUM: CPT

## 2022-04-30 PROCEDURE — 87150 DNA/RNA AMPLIFIED PROBE: CPT

## 2022-04-30 PROCEDURE — 81001 URINALYSIS AUTO W/SCOPE: CPT

## 2022-04-30 PROCEDURE — 87070 CULTURE OTHR SPECIMN AEROBIC: CPT

## 2022-04-30 PROCEDURE — 82570 ASSAY OF URINE CREATININE: CPT

## 2022-04-30 PROCEDURE — 2580000003 HC RX 258: Performed by: EMERGENCY MEDICINE

## 2022-04-30 PROCEDURE — 87040 BLOOD CULTURE FOR BACTERIA: CPT

## 2022-04-30 RX ORDER — MAGNESIUM SULFATE 1 G/100ML
1000 INJECTION INTRAVENOUS PRN
Status: DISCONTINUED | OUTPATIENT
Start: 2022-04-30 | End: 2022-05-01 | Stop reason: HOSPADM

## 2022-04-30 RX ORDER — NICOTINE POLACRILEX 4 MG
15 LOZENGE BUCCAL PRN
Status: DISCONTINUED | OUTPATIENT
Start: 2022-04-30 | End: 2022-05-01 | Stop reason: HOSPADM

## 2022-04-30 RX ORDER — SODIUM CHLORIDE 9 MG/ML
INJECTION, SOLUTION INTRAVENOUS CONTINUOUS
Status: DISCONTINUED | OUTPATIENT
Start: 2022-04-30 | End: 2022-04-30

## 2022-04-30 RX ORDER — LISINOPRIL 10 MG/1
5 TABLET ORAL DAILY
Status: DISCONTINUED | OUTPATIENT
Start: 2022-04-30 | End: 2022-05-01 | Stop reason: HOSPADM

## 2022-04-30 RX ORDER — ONDANSETRON 2 MG/ML
4 INJECTION INTRAMUSCULAR; INTRAVENOUS EVERY 6 HOURS PRN
Status: DISCONTINUED | OUTPATIENT
Start: 2022-04-30 | End: 2022-05-01 | Stop reason: HOSPADM

## 2022-04-30 RX ORDER — ENOXAPARIN SODIUM 100 MG/ML
40 INJECTION SUBCUTANEOUS DAILY
Status: DISCONTINUED | OUTPATIENT
Start: 2022-04-30 | End: 2022-05-01 | Stop reason: HOSPADM

## 2022-04-30 RX ORDER — PANTOPRAZOLE SODIUM 40 MG/1
40 TABLET, DELAYED RELEASE ORAL
Status: DISCONTINUED | OUTPATIENT
Start: 2022-04-30 | End: 2022-05-01 | Stop reason: HOSPADM

## 2022-04-30 RX ORDER — DEXTROSE MONOHYDRATE 50 MG/ML
100 INJECTION, SOLUTION INTRAVENOUS PRN
Status: DISCONTINUED | OUTPATIENT
Start: 2022-04-30 | End: 2022-05-01 | Stop reason: HOSPADM

## 2022-04-30 RX ORDER — DEXTROSE MONOHYDRATE 25 G/50ML
12.5 INJECTION, SOLUTION INTRAVENOUS PRN
Status: DISCONTINUED | OUTPATIENT
Start: 2022-04-30 | End: 2022-05-01 | Stop reason: HOSPADM

## 2022-04-30 RX ORDER — ASPIRIN 81 MG/1
81 TABLET ORAL DAILY
Status: DISCONTINUED | OUTPATIENT
Start: 2022-04-30 | End: 2022-05-01 | Stop reason: HOSPADM

## 2022-04-30 RX ORDER — ATORVASTATIN CALCIUM 40 MG/1
80 TABLET, FILM COATED ORAL NIGHTLY
Status: DISCONTINUED | OUTPATIENT
Start: 2022-04-30 | End: 2022-05-01

## 2022-04-30 RX ORDER — POLYETHYLENE GLYCOL 3350 17 G/17G
17 POWDER, FOR SOLUTION ORAL DAILY PRN
Status: DISCONTINUED | OUTPATIENT
Start: 2022-04-30 | End: 2022-05-01 | Stop reason: HOSPADM

## 2022-04-30 RX ORDER — POTASSIUM CHLORIDE 7.45 MG/ML
10 INJECTION INTRAVENOUS PRN
Status: DISCONTINUED | OUTPATIENT
Start: 2022-04-30 | End: 2022-05-01 | Stop reason: HOSPADM

## 2022-04-30 RX ORDER — DEXTROSE AND SODIUM CHLORIDE 5; .45 G/100ML; G/100ML
INJECTION, SOLUTION INTRAVENOUS CONTINUOUS PRN
Status: DISCONTINUED | OUTPATIENT
Start: 2022-04-30 | End: 2022-05-01 | Stop reason: HOSPADM

## 2022-04-30 RX ORDER — FOLIC ACID 1 MG/1
1 TABLET ORAL DAILY
Status: DISCONTINUED | OUTPATIENT
Start: 2022-04-30 | End: 2022-05-01 | Stop reason: HOSPADM

## 2022-04-30 RX ORDER — GAUZE BANDAGE 2" X 2"
100 BANDAGE TOPICAL DAILY
Status: DISCONTINUED | OUTPATIENT
Start: 2022-04-30 | End: 2022-05-01 | Stop reason: HOSPADM

## 2022-04-30 RX ADMIN — Medication 100 MG: at 09:07

## 2022-04-30 RX ADMIN — METOPROLOL TARTRATE 12.5 MG: 25 TABLET, FILM COATED ORAL at 20:30

## 2022-04-30 RX ADMIN — CEFTRIAXONE 1000 MG: 1 INJECTION, POWDER, FOR SOLUTION INTRAMUSCULAR; INTRAVENOUS at 23:50

## 2022-04-30 RX ADMIN — DOXYCYCLINE 100 MG: 100 INJECTION, POWDER, LYOPHILIZED, FOR SOLUTION INTRAVENOUS at 11:57

## 2022-04-30 RX ADMIN — ONDANSETRON 4 MG: 2 INJECTION INTRAMUSCULAR; INTRAVENOUS at 05:21

## 2022-04-30 RX ADMIN — DOXYCYCLINE 100 MG: 100 INJECTION, POWDER, LYOPHILIZED, FOR SOLUTION INTRAVENOUS at 23:19

## 2022-04-30 RX ADMIN — METOPROLOL TARTRATE 12.5 MG: 25 TABLET, FILM COATED ORAL at 09:07

## 2022-04-30 RX ADMIN — ATORVASTATIN CALCIUM 80 MG: 40 TABLET, FILM COATED ORAL at 20:30

## 2022-04-30 RX ADMIN — LISINOPRIL 5 MG: 10 TABLET ORAL at 09:07

## 2022-04-30 RX ADMIN — FOLIC ACID 1 MG: 1 TABLET ORAL at 09:07

## 2022-04-30 RX ADMIN — SODIUM CHLORIDE: 9 INJECTION, SOLUTION INTRAVENOUS at 03:46

## 2022-04-30 RX ADMIN — METOPROLOL TARTRATE 12.5 MG: 25 TABLET, FILM COATED ORAL at 03:43

## 2022-04-30 RX ADMIN — CEFTRIAXONE 1000 MG: 1 INJECTION, POWDER, FOR SOLUTION INTRAMUSCULAR; INTRAVENOUS at 00:21

## 2022-04-30 RX ADMIN — PANTOPRAZOLE SODIUM 40 MG: 40 TABLET, DELAYED RELEASE ORAL at 05:21

## 2022-04-30 RX ADMIN — SODIUM CHLORIDE 2000 ML: 9 INJECTION, SOLUTION INTRAVENOUS at 00:22

## 2022-04-30 RX ADMIN — ASPIRIN 81 MG: 81 TABLET, COATED ORAL at 09:07

## 2022-04-30 ASSESSMENT — PAIN SCALES - GENERAL
PAINLEVEL_OUTOF10: 0

## 2022-04-30 ASSESSMENT — LIFESTYLE VARIABLES
HOW OFTEN DO YOU HAVE A DRINK CONTAINING ALCOHOL: 2-3 TIMES A WEEK
HOW MANY STANDARD DRINKS CONTAINING ALCOHOL DO YOU HAVE ON A TYPICAL DAY: 5 OR 6

## 2022-04-30 NOTE — PLAN OF CARE
Problem: Discharge Planning  Goal: Discharge to home or other facility with appropriate resources  Outcome: Progressing  Goal: Able to ambulate with minimal assistance  Description: Able to ambulate with minimal assistance  Outcome: Progressing

## 2022-04-30 NOTE — H&P
[facility]  Internal Medicine Residency Program  History and Physical    Patient:  Dung Aden 47 y.o. male MRN: [id number]     Date of Service: 4/30/2022    Hospital Day: 2      Chief complaint: had concerns including Emesis (pt reports not being able to eat for 3 days . reports nausea vomiting and diarrhea) and Otalgia (pt states he has bilateral ear infections but has not been to the doctor). History of Present Illness   The patient is a 47 y.o. male with past medical history of type 1 diabetes mellitus, polysubstance abuse, hypertension, CVA presents to the hospital with chief complaint of otalgia and nausea/vomitus. Patient states he is having bilateral ear pain since 2 days. The pain is severe, constant, bilateral, radiate down to his jaw. Patient denies hearing loss, tinnitus, dizziness, no blood or drainage from the ear. Patient has no recent trauma or injury. He is also having sore throat, nonproductive cough. He feels nauseous and vomited twice today, no blood in the vomitus. Patient denies fever or chills, chest pain, shortness of breath. Patient also reported having watery diarrhea, 3 times daily for the last 2 days. Patient denies urinary symptoms. Patient drinks 6 cans of beer daily, last drink about several days ago. Does not recall choking or aspiration events. Patient denies new tingling, weakness or numbness. ED Course: Patient is awake, alert, oriented x3. Vitals are stable. Laboratory work-up significant for: Sodium 131, potassium 6.2 (moderately hemolyzed), CO2 20, BUN 34, creatinine 1.3, anion gap 19, blood glucose 297. Beta activity rate 3.09.  pH 7.38  WBC 32.4. Hemoglobin 16. Platelet 173. Chest x-ray showed right upper lobe consolidation concerning for pneumonia. Patient is given Zofran, doxycycline and ceftriaxone x1. Has not started on insulin drip. Continue insulin pump. Patient is receiving resuscitation IV fluid.   Patient is admitted to the hospital for further management. Past Medical History:      Diagnosis Date    [facility]ism ([facility]     Cocaine abuse ([facility]     Diabetes mellitus ([facility]     Hypertension     Idiopathic peripheral neuropathy 9/21/2016    [facility] ([email or web address]     Marijuana abuse        Past Surgical History:    History reviewed. No pertinent surgical history. Medications Prior to Admission:    Prior to Admission medications    Medication Sig Start Date End Date Taking? Authorizing Provider   Continuous Blood Gluc Sensor (DEXCOM G6 SENSOR) MISC Apply every 10 days 3/23/22   CHAYO Guy NP   Continuous Blood Gluc Transmit (DEXCOM G6 TRANSMITTER) MISC 1 transmitter for every 90 days 3/23/22   CHAYO Guy NP   Continuous Blood Gluc  (DEXCOM G6 ) DAVID For bs monitoring 3/23/22   CHAYO Barclay NP   insulin aspart (NOVOLOG) 100 UNIT/ML injection vial 100 units/day via insulin pump 3/22/22   CHAYO Tim   blood glucose test strips (ONETOUCH VERIO) strip 1 each by In Vitro route 5 times daily As needed. 3/22/22   CHAYO Tim   folic acid (FOLVITE) 1 MG tablet Take 1 tablet by mouth daily 1/27/22   Mariana Stanton DO   Cholecalciferol 400 UNIT TABS tablet Take 2.5 tablets by mouth daily 1/27/22   Mariana Stanton DO   thiamine 100 MG tablet Take 1 tablet by mouth daily 1/27/22   Mariana Stanton DO   Insulin Pump - insulin lispro Inject into the skin continuous Insulin-to-Carb Ratio (ICR):    Insulin Sensitivity Factor (ISF): mg/dL per unit of insulin  Target Blood Glucose:  mg/dL  Bolus Frequency:    Historical Provider, MD   lisinopril (PRINIVIL;ZESTRIL) 5 MG tablet Take 1 tablet by mouth daily 1/12/22 2/11/22  CHAYO Barclay NP   aspirin 81 MG EC tablet Take 1 tablet by mouth daily 9/29/21   Clement Chaudhary MD   atorvastatin (LIPITOR) 80 MG tablet Take 1 tablet by mouth nightly 9/28/21   Yanira Ceballos MD   albuterol sulfate  (90 Base) MCG/ACT inhaler Inhale 2 puffs into the lungs every 6 hours as needed for Wheezing or Shortness of Breath 9/28/21   Yanira Ceballos MD   metoprolol tartrate (LOPRESSOR) 25 MG tablet Take 0.5 tablets by mouth 2 times daily 9/28/21   Yanira Ceballos MD       Allergies:  Metoprolol    Social History:   TOBACCO:   reports that he has been smoking cigarettes. He has a 30.00 pack-year smoking history. He has never used smokeless tobacco.  ETOH:   reports current alcohol use of about 5.0 standard drinks of alcohol per week. OCCUPATION: unemployed     Family History:       Problem Relation Age of Onset    Diabetes type 2  Mother     Cancer Maternal Grandmother     Diabetes type 2  Nephew        REVIEW OF SYSTEMS:    · Constitutional: No fever, no chills, no change in weight;  · HEENT: No blurred vision, bilatreal ear pain, sore throat, no rhinorrhea. · Respiratory: Non productive cough, no sputum production, no pleuritic chest pain, no shortness of breath  · Cardiology: No angina, no dyspnea on exertion, no paroxysmal nocturnal dyspnea, no orthopnea, no palpitation, no leg swelling. · Gastroenterology: No dysphagia, no reflux; no abdominal pain, nausea and vomiting; no constipation or diarrhea.  No hematochezia   · Genitourinary: No dysuria, no frequency, hesitancy; no hematuria  · Musculoskeletal: no joint pain, no myalgia, no change in range of movement  · Neurology: no focal weakness in extremities, no slurred speech, no double vision, no tingling or numbness sensation  · Endocrinology: no temperature intolerance, no polyphagia, polydipsia or polyuria  · Hematology: no increased bleeding, no bruising, no lymphadenopathy  · Skin: no skin changes noticed by patient  · Psychology: no depressed mood, no suicidal ideation    Physical Exam   · Vitals: BP (!) 131/92   Pulse 68   Temp 98.6 °F (37 °C) (Oral)   Resp 16   Ht 5' 6\" (1.676 m) Wt 150 lb (68 kg)   SpO2 100%   BMI 24.21 kg/m²     · General Appearance: alert and oriented to person, place and time and in no acute distress  · Skin: warm and dry, no rash or erythema  · Head: normocephalic and atraumatic  · ENT: poor dentition, oropharynx erythematous without exudate and bilateral tympanic membranes erythema, without exudate  · Pulmonary/Chest: clear to auscultation bilaterally- no wheezes, rales or rhonchi, normal air movement, no respiratory distress  · Cardiovascular: normal S1 and S2, no gallops, intact distal pulses, no carotid bruits and tachycardic  · Abdomen: soft, non-tender, non-distended, normal bowel sounds, no masses or organomegaly  · Extremities: no cyanosis, no clubbing and no edema  · Musculoskeletal: normal range of motion, no joint swelling, deformity or tenderness  · Neurologic: gait and coordination normal and speech normal   Labs and Imaging Studies   Basic Labs  Recent Labs     04/29/22 2141   *   K 6.2*   CL 92*   CO2 20*   BUN 34*   CREATININE 1.3*   GLUCOSE 297*   CALCIUM 9.7       Recent Labs     04/29/22 2141   WBC 32.4*   RBC 5.34   HGB 16.0   HCT 47.2   MCV 88.4   MCH 30.0   MCHC 33.9   RDW 13.8   *   MPV 9.3       CBC:   Lab Results   Component Value Date    WBC 32.4 04/29/2022    RBC 5.34 04/29/2022    HGB 16.0 04/29/2022    HCT 47.2 04/29/2022    MCV 88.4 04/29/2022    RDW 13.8 04/29/2022     04/29/2022     BMP:    Lab Results   Component Value Date     04/29/2022    K 6.2 04/29/2022    CL 92 04/29/2022    CO2 20 04/29/2022    BUN 34 04/29/2022     HFP:    Lab Results   Component Value Date    PROT 8.1 04/29/2022     U/A:  No components found for: Maralyn Grumet, USPGRAV, UPH, UPROTEIN, UGLUCOSE, UKETONE, UBILI, UBLOOD, [place], [place], [place], [facility], [place], [facility], [place], [place], [place]  HgBA1c:  No components found for: HGBA1C  Iron Studies:    Lab Results   Component Value Date     02/12/2017    FERRITIN 4,160 02/12/2017     VITAMIN B12: No components found for: B12  FOLATE:    Lab Results   Component Value Date    FOLATE 12.0 07/26/2019       Imaging Studies:     XR CHEST PORTABLE    Result Date: 4/29/2022  EXAMINATION: ONE XRAY VIEW OF THE CHEST 4/29/2022 9:55 pm COMPARISON: 09/26/2021 and 01/25/2022 HISTORY: ORDERING SYSTEM PROVIDED HISTORY: cough TECHNOLOGIST PROVIDED HISTORY: Reason for exam:->cough What reading provider will be dictating this exam?->CRC FINDINGS: There is a round area of confluent airspace disease in the right upper lobe new from 01/25/2022 and most consistent with pneumonia. Lungs are otherwise clear. No pneumothorax or pleural fluid. No acute bone finding. Thoracic aorta is slightly tortuous and unchanged. No acute bone finding. Round right upper lobe opacity most consistent with pneumonia. Follow-up to complete clearing is recommended. EKG: sinus tachycardia. Resident's Assessment and Plan     Dariel Fleming is a 47 y.o. male with past medical history of type 1 diabetes mellitus, polysubstance abuse, hypertension, CVA presents to the hospital with chief complaint of otalgia and nausea/vomitus. Assessment:     1. High anion gap metabolic acidosis 2/2 lactic acidosis versus DKA  2. Community-acquired pneumonia  3. Otitis media  4. KIRSTEN stage II, prerenal 2/2 dehydration. 5. History of type 1 diabetes mellitus  6. Leukocytosis likely 2/2 sepsis versus DKA  7. Hypertension  8. Hyperlipidemia  9. Hx lacunar stroke      Plan:     · Check lactic acid. Monitor BMP every 2 hours. Monitor blood glucose every hour. Patient is on insulin pump. Not started DKA protocol. · Continue IV fluid 30 cc/kg. · Blood cultures, respiratory culture, gram stain sent, awaiting result. · Continue doxycycline, ceftriaxone. Monitor daily CBC with differential, vitals. · Check urine electrolyte, urine creatinine, urine urea. Continue IV fluid. · Endocrinology is consulted.   Further recommendation appreciated. · Monitor daily CBC. · Resume home hypertensive medication. DVT ppx: Lovenox  GI ppx: Protonix    Henry Fenton MD, PGY-2  Attending physician: Dr. Laura Lopez.   Discussed with oncall attending physician: Dr. Elvis Gutierrez

## 2022-04-30 NOTE — PLAN OF CARE
Problem: Discharge Planning  Goal: Discharge to home or other facility with appropriate resources  4/30/2022 1105 by Stefany Nieto RN  Outcome: Progressing     Problem: Discharge Planning  Goal: Able to ambulate with minimal assistance  Description: Able to ambulate with minimal assistance  4/30/2022 1105 by Stefany Nieto RN  Outcome: Progressing     Problem: Pain  Goal: Verbalizes/displays adequate comfort level or baseline comfort level  4/30/2022 1105 by Stefany Nieto RN  Outcome: Progressing

## 2022-04-30 NOTE — PROGRESS NOTES
[facility]  [facility]    Attending Physician Statement  I have discussed the case, including pertinent history and exam findings with the resident physician. I have seen and examined the patient and the key elements of the encounter have been performed by me. I agree with the assessment, plan and orders as documented by the resident. I have reviewed all pertinent PMHx, PSHx, FamHx, SocialHx, medications, and allergies and updated history as appropriate. Pt admitted for right sided ear pain with brownish discaharge with mild SOB and cough, CXR showed right mid to upper lung infiltrate, right ear exam showed no significant lesion in TM. Currenlty on rocephin and doxycyline. Pt denied any previous history of DT. Remainder of medical problems as per resident note.     Marti Clemens MD  4/30/2022 9:55 AM

## 2022-04-30 NOTE — ED PROVIDER NOTES
HPI:  4/29/22,   Time: 9:29 PM EDT       Dariel Fleming is a 47 y.o. male presenting to the ED for n/v/d/cough/congestion/ear aches, beginning 2 days ago. The complaint has been persistent, mild in severity, and worsened by nothing. Bib ems, hx dm, glucose in 200s. No fever/chills/sweats. No sick contacts. No rash/skin lesions. Nothing makes better. No cp/sob/abd pain    Review of Systems:   Pertinent positives and negatives are stated within HPI, all other systems reviewed and are negative.          --------------------------------------------- PAST HISTORY ---------------------------------------------  Past Medical History:  has a past medical history of [facility]ism ([facility] Diabetes mellitus ([facility] Hypertension, Idiopathic peripheral neuropathy, Lacunar stroke ([facility] and Marijuana abuse. Past Surgical History:  has no past surgical history on file. Social History:  reports that he has been smoking cigarettes. He has a 30.00 pack-year smoking history. He has never used smokeless tobacco. He reports current alcohol use of about 5.0 standard drinks of alcohol per week. He reports current drug use. Drug: Marijuana Lovena Mems). Family History: family history includes Cancer in his maternal grandmother; Diabetes type 2  in his mother and nephew. The patients home medications have been reviewed. Allergies: Metoprolol        ---------------------------------------------------PHYSICAL EXAM--------------------------------------    Constitutional/General: Alert and oriented x3, well appearing, non toxic in NAD  Head: Normocephalic and atraumatic, tms nml amador  Eyes: PERRL, EOMI, conjunctive normal, sclera non icteric  Mouth: Oropharynx clear, handling secretions,   Neck: Supple, full ROM,   Respiratory: Lungs clear to auscultation bilaterally, no wheezes, rales, or rhonchi. Not in respiratory distress  Cardiovascular:  Regular rate. Regular rhythm. No murmurs, gallops, or rubs.  2+ distal pulses  Chest: No chest wall tenderness  GI:  Abdomen Soft, Non tender, Non distended. .   Musculoskeletal: Moves all extremities x 4. Warm and well perfused, no clubbing, cyanosis, or edema. Capillary refill <3 seconds  Integument: skin warm and dry. No rashes. Lymphatic: no lymphadenopathy noted  Neurologic: GCS 15, no focal deficits, symmetric strength 5/5 in the upper and lower extremities bilaterally  Psychiatric: Normal Affect    -------------------------------------------------- RESULTS -------------------------------------------------  I have personally reviewed all laboratory and imaging results for this patient. Results are listed below.      LABS:  Results for orders placed or performed during the hospital encounter of 04/29/22   COVID-19, Rapid    Specimen: Nasopharyngeal Swab   Result Value Ref Range    SARS-CoV-2, NAAT Not Detected Not Detected   Rapid influenza A/B antigens    Specimen: Nares   Result Value Ref Range    Influenza A by PCR Not Detected Not Detected    Influenza B by PCR Not Detected Not Detected   CBC with Auto Differential   Result Value Ref Range    WBC 32.4 (H) 4.5 - 11.5 E9/L    RBC 5.34 3.80 - 5.80 E12/L    Hemoglobin 16.0 12.5 - 16.5 g/dL    Hematocrit 47.2 37.0 - 54.0 %    MCV 88.4 80.0 - 99.9 fL    MCH 30.0 26.0 - 35.0 pg    MCHC 33.9 32.0 - 34.5 %    RDW 13.8 11.5 - 15.0 fL    Platelets 402 (H) 789 - 450 E9/L    MPV 9.3 7.0 - 12.0 fL   Comprehensive Metabolic Panel w/ Reflex to MG   Result Value Ref Range    Sodium 131 (L) 132 - 146 mmol/L    Potassium reflex Magnesium 6.2 (H) 3.5 - 5.0 mmol/L    Chloride 92 (L) 98 - 107 mmol/L    CO2 20 (L) 22 - 29 mmol/L    Anion Gap 19 (H) 7 - 16 mmol/L    Glucose 297 (H) 74 - 99 mg/dL    BUN 34 (H) 6 - 20 mg/dL    CREATININE 1.3 (H) 0.7 - 1.2 mg/dL    GFR Non-African American 58 >=60 mL/min/1.73    GFR African American >60     Calcium 9.7 8.6 - 10.2 mg/dL    Total Protein 8.1 6.4 - 8.3 g/dL    Albumin 4.2 3.5 - 5.2 g/dL    Total Bilirubin 1.5 (H) 0.0 - 1.2 mg/dL    Alkaline Phosphatase 136 (H) 40 - 129 U/L    ALT 13 0 - 40 U/L    AST 24 0 - 39 U/L   PH, VENOUS   Result Value Ref Range    pH, Gage 7.38 7.35 - 7.45   Beta-Hydroxybutyrate   Result Value Ref Range    Beta-Hydroxybutyrate 3.09 (H) 0.02 - 0.27 mmol/L       RADIOLOGY:  Interpreted by Radiologist.  XR CHEST PORTABLE   Final Result   Round right upper lobe opacity most consistent with pneumonia. Follow-up to   complete clearing is recommended. EKG:  This EKG is signed and interpreted by the EP. Time: 2251  Rate: 106  Rhythm: Sinus  Interpretation: non-specific EKG  Comparison: None      ------------------------- NURSING NOTES AND VITALS REVIEWED ---------------------------   The nursing notes within the ED encounter and vital signs as below have been reviewed by myself. BP (!) 131/92   Pulse 68   Temp 98.6 °F (37 °C) (Oral)   Resp 16   Ht 5' 6\" (1.676 m)   Wt 150 lb (68 kg)   SpO2 100%   BMI 24.21 kg/m²   Oxygen Saturation Interpretation: Normal    The patients available past medical records and past encounters were reviewed. ------------------------------ ED COURSE/MEDICAL DECISION MAKING----------------------  Medications   0.9 % sodium chloride IV bolus 2,040 mL (2,000 mLs IntraVENous New Bag 4/30/22 0022)   doxycycline (VIBRAMYCIN) 100 mg in dextrose 5 % 100 mL IVPB (100 mg IntraVENous New Bag 4/29/22 0986)   cefTRIAXone (ROCEPHIN) 1,000 mg in sterile water 10 mL IV syringe (1,000 mg IntraVENous Given 4/30/22 0021)         ED COURSE:       Medical Decision Making:    Results noted, ph nml, small inc anion gap, bicarb 20.  ? Start of dka, or more from infection, will hold off insulin gtt at this time and iv bolus. abx ordered, due to hx admit for further care. This patient's ED course included: a personal history and physicial examination    This patient has remained hemodynamically stable during their ED course.       Re-Evaluations: Re-evaluation. Patients symptoms show no change    Re-examination  4/29/22   11:29 PM EDT          Vital Signs:   Vitals:    04/29/22 2124 04/29/22 2137 04/30/22 0025   BP: (!) 142/80 134/82 (!) 131/92   Pulse: 68 66 68   Resp: 18 16 16   Temp:  98.5 °F (36.9 °C) 98.6 °F (37 °C)   TempSrc:  Oral Oral   SpO2: 96% 100% 100%   Weight:  150 lb (68 kg)    Height:  5' 6\" (1.676 m)              Consultations:             [facility]    Critical Care:         Counseling: The emergency provider has spoken with the patient and discussed todays results, in addition to providing specific details for the plan of care and counseling regarding the diagnosis and prognosis. Questions are answered at this time and they are agreeable with the plan.       --------------------------------- IMPRESSION AND DISPOSITION ---------------------------------    IMPRESSION  1. Pneumonia of right upper lobe due to infectious organism        DISPOSITION  Disposition: Admit to telemetry  Patient condition is stable    NOTE: This report was transcribed using voice recognition software.  Every effort was made to ensure accuracy; however, inadvertent computerized transcription errors may be present        Lorraine Eisenmenger, MD  04/30/22 9508

## 2022-05-01 ENCOUNTER — APPOINTMENT (OUTPATIENT)
Dept: GENERAL RADIOLOGY | Age: 52
DRG: 194 | End: 2022-05-01
Payer: COMMERCIAL

## 2022-05-01 VITALS
OXYGEN SATURATION: 96 % | SYSTOLIC BLOOD PRESSURE: 128 MMHG | DIASTOLIC BLOOD PRESSURE: 78 MMHG | RESPIRATION RATE: 20 BRPM | HEIGHT: 66 IN | BODY MASS INDEX: 22.05 KG/M2 | WEIGHT: 137.2 LBS | TEMPERATURE: 97.7 F | HEART RATE: 89 BPM

## 2022-05-01 LAB
ACINETOBACTER CALCOAC BAUMANNII COMPLEX BY PCR: NOT DETECTED
ANION GAP SERPL CALCULATED.3IONS-SCNC: 14 MMOL/L (ref 7–16)
BACTEROIDES FRAGILIS BY PCR: NOT DETECTED
BASOPHILS ABSOLUTE: 0.04 E9/L (ref 0–0.2)
BASOPHILS RELATIVE PERCENT: 0.3 % (ref 0–2)
BOTTLE TYPE: ABNORMAL
BUN BLDV-MCNC: 36 MG/DL (ref 6–20)
CALCIUM SERPL-MCNC: 9.1 MG/DL (ref 8.6–10.2)
CANDIDA ALBICANS BY PCR: NOT DETECTED
CANDIDA AURIS BY PCR: NOT DETECTED
CANDIDA GLABRATA BY PCR: NOT DETECTED
CANDIDA KRUSEI BY PCR: NOT DETECTED
CANDIDA PARAPSILOSIS BY PCR: NOT DETECTED
CANDIDA TROPICALIS BY PCR: NOT DETECTED
CHLORIDE BLD-SCNC: 96 MMOL/L (ref 98–107)
CO2: 22 MMOL/L (ref 22–29)
CREAT SERPL-MCNC: 1 MG/DL (ref 0.7–1.2)
CRYPTOCOCCUS NEOFORMANS/GATTII BY PCR: NOT DETECTED
ENTEROBACTER CLOACAE COMPLEX BY PCR: NOT DETECTED
ENTEROBACTERALES BY PCR: NOT DETECTED
ENTEROCOCCUS FAECALIS BY PCR: NOT DETECTED
ENTEROCOCCUS FAECIUM BY PCR: NOT DETECTED
EOSINOPHILS ABSOLUTE: 0.09 E9/L (ref 0.05–0.5)
EOSINOPHILS RELATIVE PERCENT: 0.6 % (ref 0–6)
ESCHERICHIA COLI BY PCR: NOT DETECTED
GFR AFRICAN AMERICAN: >60
GFR NON-AFRICAN AMERICAN: >60 ML/MIN/1.73
GLUCOSE BLD-MCNC: 301 MG/DL (ref 74–99)
HAEMOPHILUS INFLUENZAE BY PCR: NOT DETECTED
HCT VFR BLD CALC: 41.4 % (ref 37–54)
HEMOGLOBIN: 13.4 G/DL (ref 12.5–16.5)
IMMATURE GRANULOCYTES #: 0.09 E9/L
IMMATURE GRANULOCYTES %: 0.6 % (ref 0–5)
KLEBSIELLA AEROGENES BY PCR: NOT DETECTED
KLEBSIELLA OXYTOCA BY PCR: NOT DETECTED
KLEBSIELLA PNEUMONIAE GROUP BY PCR: NOT DETECTED
LISTERIA MONOCYTOGENES BY PCR: NOT DETECTED
LYMPHOCYTES ABSOLUTE: 1.61 E9/L (ref 1.5–4)
LYMPHOCYTES RELATIVE PERCENT: 10.2 % (ref 20–42)
MAGNESIUM: 2.3 MG/DL (ref 1.6–2.6)
MCH RBC QN AUTO: 29.6 PG (ref 26–35)
MCHC RBC AUTO-ENTMCNC: 32.4 % (ref 32–34.5)
MCV RBC AUTO: 91.6 FL (ref 80–99.9)
METER GLUCOSE: 278 MG/DL (ref 74–99)
METER GLUCOSE: 287 MG/DL (ref 74–99)
METER GLUCOSE: 287 MG/DL (ref 74–99)
METER GLUCOSE: 299 MG/DL (ref 74–99)
MONOCYTES ABSOLUTE: 0.93 E9/L (ref 0.1–0.95)
MONOCYTES RELATIVE PERCENT: 5.9 % (ref 2–12)
NEISSERIA MENINGITIDIS BY PCR: NOT DETECTED
NEUTROPHILS ABSOLUTE: 13.03 E9/L (ref 1.8–7.3)
NEUTROPHILS RELATIVE PERCENT: 82.4 % (ref 43–80)
ORDER NUMBER: ABNORMAL
PDW BLD-RTO: 13.5 FL (ref 11.5–15)
PHOSPHORUS: 1.9 MG/DL (ref 2.5–4.5)
PLATELET # BLD: 397 E9/L (ref 130–450)
PMV BLD AUTO: 9.3 FL (ref 7–12)
POTASSIUM SERPL-SCNC: 4.1 MMOL/L (ref 3.5–5)
PROCALCITONIN: 1.04 NG/ML (ref 0–0.08)
PROTEUS SPECIES BY PCR: NOT DETECTED
PSEUDOMONAS AERUGINOSA BY PCR: NOT DETECTED
RBC # BLD: 4.52 E12/L (ref 3.8–5.8)
SALMONELLA SPECIES BY PCR: NOT DETECTED
SERRATIA MARCESCENS BY PCR: NOT DETECTED
SODIUM BLD-SCNC: 132 MMOL/L (ref 132–146)
SOURCE OF BLOOD CULTURE: ABNORMAL
STAPHYLOCOCCUS AUREUS BY PCR: NOT DETECTED
STAPHYLOCOCCUS EPIDERMIDIS BY PCR: NOT DETECTED
STAPHYLOCOCCUS LUGDUNENSIS BY PCR: NOT DETECTED
STAPHYLOCOCCUS SPECIES BY PCR: DETECTED
STENOTROPHOMONAS MALTOPHILIA BY PCR: NOT DETECTED
STREPTOCOCCUS AGALACTIAE BY PCR: NOT DETECTED
STREPTOCOCCUS PNEUMONIAE BY PCR: NOT DETECTED
STREPTOCOCCUS PYOGENES  BY PCR: NOT DETECTED
STREPTOCOCCUS SPECIES BY PCR: NOT DETECTED
WBC # BLD: 15.8 E9/L (ref 4.5–11.5)

## 2022-05-01 PROCEDURE — 85025 COMPLETE CBC W/AUTO DIFF WBC: CPT

## 2022-05-01 PROCEDURE — 36415 COLL VENOUS BLD VENIPUNCTURE: CPT

## 2022-05-01 PROCEDURE — 2580000003 HC RX 258: Performed by: STUDENT IN AN ORGANIZED HEALTH CARE EDUCATION/TRAINING PROGRAM

## 2022-05-01 PROCEDURE — 2500000003 HC RX 250 WO HCPCS: Performed by: STUDENT IN AN ORGANIZED HEALTH CARE EDUCATION/TRAINING PROGRAM

## 2022-05-01 PROCEDURE — 83735 ASSAY OF MAGNESIUM: CPT

## 2022-05-01 PROCEDURE — 2500000003 HC RX 250 WO HCPCS: Performed by: INTERNAL MEDICINE

## 2022-05-01 PROCEDURE — 6370000000 HC RX 637 (ALT 250 FOR IP): Performed by: INTERNAL MEDICINE

## 2022-05-01 PROCEDURE — 84100 ASSAY OF PHOSPHORUS: CPT

## 2022-05-01 PROCEDURE — 6360000002 HC RX W HCPCS: Performed by: INTERNAL MEDICINE

## 2022-05-01 PROCEDURE — 82962 GLUCOSE BLOOD TEST: CPT

## 2022-05-01 PROCEDURE — 2580000003 HC RX 258: Performed by: INTERNAL MEDICINE

## 2022-05-01 PROCEDURE — 87040 BLOOD CULTURE FOR BACTERIA: CPT

## 2022-05-01 PROCEDURE — 80048 BASIC METABOLIC PNL TOTAL CA: CPT

## 2022-05-01 PROCEDURE — 71045 X-RAY EXAM CHEST 1 VIEW: CPT

## 2022-05-01 PROCEDURE — 99239 HOSP IP/OBS DSCHRG MGMT >30: CPT | Performed by: INTERNAL MEDICINE

## 2022-05-01 PROCEDURE — 99232 SBSQ HOSP IP/OBS MODERATE 35: CPT | Performed by: INTERNAL MEDICINE

## 2022-05-01 PROCEDURE — 84145 PROCALCITONIN (PCT): CPT

## 2022-05-01 RX ORDER — DOXYCYCLINE HYCLATE 100 MG
100 TABLET ORAL 2 TIMES DAILY
Qty: 10 TABLET | Refills: 0 | Status: SHIPPED | OUTPATIENT
Start: 2022-05-01 | End: 2022-05-06

## 2022-05-01 RX ORDER — POTASSIUM CHLORIDE 20 MEQ/1
20 TABLET, EXTENDED RELEASE ORAL ONCE
Status: COMPLETED | OUTPATIENT
Start: 2022-05-01 | End: 2022-05-01

## 2022-05-01 RX ORDER — 0.9 % SODIUM CHLORIDE 0.9 %
1000 INTRAVENOUS SOLUTION INTRAVENOUS ONCE
Status: COMPLETED | OUTPATIENT
Start: 2022-05-01 | End: 2022-05-01

## 2022-05-01 RX ORDER — MAGNESIUM SULFATE 1 G/100ML
1000 INJECTION INTRAVENOUS ONCE
Status: COMPLETED | OUTPATIENT
Start: 2022-05-01 | End: 2022-05-01

## 2022-05-01 RX ORDER — ATORVASTATIN CALCIUM 40 MG/1
40 TABLET, FILM COATED ORAL NIGHTLY
Status: DISCONTINUED | OUTPATIENT
Start: 2022-05-01 | End: 2022-05-01 | Stop reason: HOSPADM

## 2022-05-01 RX ORDER — ATORVASTATIN CALCIUM 40 MG/1
40 TABLET, FILM COATED ORAL NIGHTLY
Qty: 30 TABLET | Refills: 0 | Status: SHIPPED | OUTPATIENT
Start: 2022-05-01

## 2022-05-01 RX ORDER — CEFDINIR 300 MG/1
300 CAPSULE ORAL 2 TIMES DAILY
Qty: 10 CAPSULE | Refills: 0 | Status: SHIPPED | OUTPATIENT
Start: 2022-05-01 | End: 2022-05-06

## 2022-05-01 RX ADMIN — PANTOPRAZOLE SODIUM 40 MG: 40 TABLET, DELAYED RELEASE ORAL at 05:45

## 2022-05-01 RX ADMIN — MAGNESIUM SULFATE HEPTAHYDRATE 1000 MG: 1 INJECTION, SOLUTION INTRAVENOUS at 02:04

## 2022-05-01 RX ADMIN — DOXYCYCLINE 100 MG: 100 INJECTION, POWDER, LYOPHILIZED, FOR SOLUTION INTRAVENOUS at 13:28

## 2022-05-01 RX ADMIN — ASPIRIN 81 MG: 81 TABLET, COATED ORAL at 09:15

## 2022-05-01 RX ADMIN — SODIUM CHLORIDE 1000 ML: 9 INJECTION, SOLUTION INTRAVENOUS at 13:21

## 2022-05-01 RX ADMIN — FOLIC ACID 1 MG: 1 TABLET ORAL at 09:15

## 2022-05-01 RX ADMIN — METOPROLOL TARTRATE 12.5 MG: 25 TABLET, FILM COATED ORAL at 09:15

## 2022-05-01 RX ADMIN — POTASSIUM PHOSPHATE, MONOBASIC AND POTASSIUM PHOSPHATE, DIBASIC 15 MMOL: 224; 236 INJECTION, SOLUTION, CONCENTRATE INTRAVENOUS at 09:22

## 2022-05-01 RX ADMIN — LISINOPRIL 5 MG: 10 TABLET ORAL at 09:14

## 2022-05-01 RX ADMIN — POTASSIUM CHLORIDE 20 MEQ: 20 TABLET, EXTENDED RELEASE ORAL at 01:59

## 2022-05-01 RX ADMIN — Medication 100 MG: at 09:15

## 2022-05-01 ASSESSMENT — PAIN DESCRIPTION - ONSET: ONSET: ON-GOING

## 2022-05-01 ASSESSMENT — PAIN DESCRIPTION - FREQUENCY: FREQUENCY: INTERMITTENT

## 2022-05-01 ASSESSMENT — PAIN - FUNCTIONAL ASSESSMENT: PAIN_FUNCTIONAL_ASSESSMENT: ACTIVITIES ARE NOT PREVENTED

## 2022-05-01 ASSESSMENT — PAIN SCALES - GENERAL: PAINLEVEL_OUTOF10: 6

## 2022-05-01 ASSESSMENT — PAIN DESCRIPTION - LOCATION: LOCATION: EAR

## 2022-05-01 ASSESSMENT — PAIN DESCRIPTION - DESCRIPTORS: DESCRIPTORS: ACHING

## 2022-05-01 ASSESSMENT — PAIN DESCRIPTION - PAIN TYPE: TYPE: ACUTE PAIN

## 2022-05-01 ASSESSMENT — PAIN DESCRIPTION - ORIENTATION: ORIENTATION: LEFT;RIGHT

## 2022-05-01 NOTE — PROGRESS NOTES
[facility]  Internal Medicine Residency Program  Progress Note - House Team     Patient:  Thelma Giles 47 y.o. male MRN: [id number]     Date of Service: 5/1/2022     CC: nausea/vomiting  Overnight events: no acute events      Subjective     Patient was seen and examined this morning at bedside in no acute distress. Overnight no acute events. This morning, patient is resting comfortably in bed. Per telemetry review, he had sinus tachycardia around 3am. Patient states he was awake at that time but denies anxiety, diaphoresis, tremors, or concerns for withdrawal. Patient states he has never gone through alcohol withdrawal before and expressed frustration. I explained that it is always possible with heavy alcohol use. He states he had some BL ear pain last night but that it is minimal this morning. Still c/o fluid draining from left ear. States diarrhea has resolved. Denies difficulty breathing. He is requesting to go home today. Objective     Physical Exam:  Vitals: /65   Pulse 68   Temp 97.8 °F (36.6 °C) (Temporal)   Resp 20   Ht 5' 6\" (1.676 m)   Wt 137 lb 3.2 oz (62.2 kg)   SpO2 97%   BMI 22.14 kg/m²     I & O - 24hr: No intake/output data recorded. General Appearance: alert and cooperative  HEENT:  Head: Normocephalic, no lesions, without obvious abnormality. Neck: no JVD  Lung: clear to auscultation bilaterally  Heart: regular rate and rhythm, S1, S2 normal, no murmur, click, rub or gallop  Abdomen: soft, non-tender; bowel sounds normal; no masses,  no organomegaly  Extremities:  extremities normal, atraumatic, no cyanosis or edema  Musculokeletal: No joint swelling, no muscle tenderness. ROM normal in all joints of extremities.    Neurologic: Mental status: Alert, oriented, thought content appropriate  Subject  Pertinent Labs & Imaging Studies   chika  CBC with Differential:    Lab Results   Component Value Date    WBC 15.8 05/01/2022    RBC 4.52 05/01/2022    HGB 13.4 05/01/2022    HCT 41.4 05/01/2022     05/01/2022    MCV 91.6 05/01/2022    MCH 29.6 05/01/2022    MCHC 32.4 05/01/2022    RDW 13.5 05/01/2022    SEGSPCT 53 01/25/2014    SEGSPCT 86 10/11/2010    BANDSPCT 3 10/10/2010    METASPCT 1.7 07/20/2020    LYMPHOPCT 10.2 05/01/2022    PROMYELOPCT 1.7 04/29/2022    MONOPCT 5.9 05/01/2022    MYELOPCT 2.6 01/04/2020    EOSPCT 2 10/12/2010    BASOPCT 0.3 05/01/2022    MONOSABS 0.93 05/01/2022    LYMPHSABS 1.61 05/01/2022    EOSABS 0.09 05/01/2022    BASOSABS 0.04 05/01/2022     BMP:    Lab Results   Component Value Date     05/01/2022    K 4.1 05/01/2022    K 6.2 04/29/2022    CL 96 05/01/2022    CO2 22 05/01/2022    BUN 36 05/01/2022    LABALBU 4.2 04/29/2022    LABALBU 4.3 12/30/2011    CREATININE 1.0 05/01/2022    CALCIUM 9.1 05/01/2022    GFRAA >60 05/01/2022    LABGLOM >60 05/01/2022    GLUCOSE 301 05/01/2022    GLUCOSE 83 04/11/2012     Magnesium:    Lab Results   Component Value Date    MG 2.3 05/01/2022     Phosphorus:    Lab Results   Component Value Date    PHOS 1.9 05/01/2022       XR CHEST PORTABLE   Final Result   Round right upper lobe opacity most consistent with pneumonia. Follow-up to   complete clearing is recommended. Resident's Assessment and Plan     Assessment and Plan:    Community-acquired pneumonia  Resp stain, gram stain pending  Ceftriaxone day 2/7  Doxycyline day 2/7  Repeat CXR today  Otitis media   KIRSTEN stage I, prerenal 2/2 intravascular volume depletion  FENa 0.1%  Morning measurement already improved, patient encouraged to increase PO intake of fluids  History of type 1 diabetes mellitus  POCT BG q4h  Endo following  Patient on insulin pump: ISF 55, target -180.  ICR 10  Leukocytosis likely 2/2 CAP  Hypophosphatemia  Monitor BID and replete as needed  Hx of HTN  Lisinopril 5mg qd, lopressor 12.5mg BID  Hx of HLD  Change lipitor to 40mg qhs  Hx lacunar stroke    Hx of alcohol abuse  Monitor for withdrawal  Hx of marijuana abuse  UDS positive for marijuana only      PT/OT evaluation: consulted  DVT prophylaxis/ GI prophylaxis: lovenox 40mg qd, protonix 40mg qd  Disposition: continue current care    Tracy Solis MD, PGY-1  Attending physician: Dr. [facility] [place]  [facility]    Attending Physician Statement:  Emily Krueger M.D., F.A.C.P. I have discussed the case, including pertinent history and exam findings with the resident/NP. I have seen and examined the patient and the key elements of the encounter have been performed by me. I agree with the resident ROS, PMHx, PSHx, meds reviewed and assessment, plan and orders as documented by the resident/NP      Hospital charts reviewed, including other providers notes, relevant labs and imaging. N/V/D--2 days prior to admission-- viral infection panel ? Toxigenic food? Now improved GI issues    Noted though treating for acute pneumonia  ?walking community acquired-- doxy+ceftindir plan for DC  No cough, no sob  Definite  R sided suspcious infiltrates noted- atypical  No TB expsoure  Consider fu cxr- rule out aspiration, CA etc..-- needs fu cxr in future  WBC 31 down to 15  procalcitonin 1.04     Ear pain -- c/o fluid draining from left ear. -- peforation  - on above abx    KIRSTEN - hgih BUN/CR ratio  Dedhyration/prerenal  -bolus 1 more liter  Prior to DC today    1/4 bottle from ER blood cx  NOT staph areus  (likely staph epi)  nain llamasy would cover cMRSA anyways  Likely contaminant, resent blood cx-- advised with him- required fu-- make sure NOT bacteremic (if true bacteremic then consider IE, septic emboli to lung-- which I currently doubt)    Insulin pump--BS OK  DC time >30min  >50% of time spent coordinating care with other providers and/or counseling patient/family  Remainder of medical problems as per resident note.

## 2022-05-01 NOTE — PLAN OF CARE
Problem: Discharge Planning  Goal: Discharge to home or other facility with appropriate resources  Outcome: Completed  Goal: Able to ambulate with minimal assistance  Description: Able to ambulate with minimal assistance  Outcome: Completed     Problem: Pain  Goal: Verbalizes/displays adequate comfort level or baseline comfort level  5/1/2022 1530 by Tung Hoffmann RN  Outcome: Completed

## 2022-05-01 NOTE — PROGRESS NOTES
ENDOCRINOLOGY PROGRESS NOTE      Date of admission: 4/29/2022  Date of service: 5/1/2022  Admitting physician: Brenda Palencia MD   Primary Care Physician: Gary Rao MD  Consultant physician: Al Whitmore MD     Reason for the consultation:  Uncontrolled DM    History of Present Illness: The history is provided by the patient. Accuracy of the patient data is excellent    Benito eDlgado is a very pleasant 47 y.o. old male with PMH of Type 1 Diabetes mellitus on Medtronic insulin pump, HTN, HLD, Marijuana abuse, Remote Hx of Lacunar stroke and others listed below who presented to the ED for abdominal pain. He was admitted to [facility] on 4/29/2022 because of otalgia and nausea/vomitus, endocrine team was consulted for diabetes management.      Subjective   Seen and examined this AM, BG was high but pt suspended his pump last night     Point of care glucose monitoring (Independently reviewed)   Recent Labs     04/30/22  0517 04/30/22  1339 04/30/22  1704 04/30/22  2034 05/01/22  0112 05/01/22  0501   GLUMET 271* 239* 299* 150* 287* 287*     Scheduled Meds:   atorvastatin  40 mg Oral Nightly    potassium phosphate IVPB  15 mmol IntraVENous Once    aspirin  81 mg Oral Daily    folic acid  1 mg Oral Daily    lisinopril  5 mg Oral Daily    metoprolol tartrate  12.5 mg Oral BID    thiamine mononitrate  100 mg Oral Daily    enoxaparin  40 mg SubCUTAneous Daily    cefTRIAXone (ROCEPHIN) IV  1,000 mg IntraVENous Q24H    doxycycline (VIBRAMYCIN) IV  100 mg IntraVENous Q12H    pantoprazole  40 mg Oral QAM AC     PRN Meds:   dextrose, 12.5 g, PRN  potassium chloride, 10 mEq, PRN  magnesium sulfate, 1,000 mg, PRN  sodium phosphate IVPB, 10 mmol, PRN   Or  sodium phosphate IVPB, 15 mmol, PRN   Or  sodium phosphate IVPB, 20 mmol, PRN  polyethylene glycol, 17 g, Daily PRN  dextrose 5 % and 0.45 % NaCl, , Continuous PRN  glucose, 15 g, PRN  dextrose, 12.5 g, PRN  glucagon (rDNA), 1 mg, PRN  dextrose, 100 mL/hr, PRN  ondansetron, 4 mg, Q6H PRN      Continuous Infusions:   dextrose 5 % and 0.45 % NaCl      dextrose      Insulin Pump - insulin lispro         Review of Systems  All systems reviewed. All negative except for symptoms mentioned in HPI     OBJECTIVE    /78   Pulse 89   Temp 97.7 °F (36.5 °C) (Temporal)   Resp 20   Ht 5' 6\" (1.676 m)   Wt 137 lb 3.2 oz (62.2 kg)   SpO2 96%   BMI 22.14 kg/m²     Intake/Output Summary (Last 24 hours) at 5/1/2022 1035  Last data filed at 5/1/2022 9694  Gross per 24 hour   Intake 900 ml   Output 1150 ml   Net -250 ml     Physical examination:  General: awake alert, oriented x3  HEENT: normocephalic non traumatic, no exophthalmos   Neck: supple, thyroid tenderness,  Pulm: Clear equal air entry no added sounds  CVS: S1 + S2  Abd: soft lax, no tenderness  Skin: warm, no lesions, no rash. No open wounds, no ulcers   Neuro: CN intact, sensation decreased bilateral , muscle power normal  Psych: normal mood, and affect    Review of Laboratory Data:  I personally reviewed the following labs:   Recent Labs     04/29/22 2141 05/01/22  0545   WBC 32.4* 15.8*   RBC 5.34 4.52   HGB 16.0 13.4   HCT 47.2 41.4   MCV 88.4 91.6   MCH 30.0 29.6   MCHC 33.9 32.4   RDW 13.8 13.5   * 397   MPV 9.3 9.3     Recent Labs     04/29/22 2141 04/30/22  0217 04/30/22  0816 04/30/22  1839 05/01/22  0545   *   < > 135 134 132   K 6.2*   < > 3.8 3.8 4.1   CL 92*   < > 98 98 96*   CO2 20*   < > 22 24 22   BUN 34*   < > 32* 40* 36*   CREATININE 1.3*   < > 1.0 1.3* 1.0   GLUCOSE 297*   < > 230* 246* 301*   CALCIUM 9.7   < > 9.1 9.0 9.1   PROT 8.1  --   --   --   --    LABALBU 4.2  --   --   --   --    BILITOT 1.5*  --   --   --   --    ALKPHOS 136*  --   --   --   --    AST 24  --   --   --   --    ALT 13  --   --   --   --     < > = values in this interval not displayed.      Beta-Hydroxybutyrate   Date Value Ref Range Status   04/29/2022 3.09 (H) 0.02 - 0.27 mmol/L Final   01/25/2022 >4.50 (H) 0.02 - 0.27 mmol/L Final   09/26/2021 >4.50 (H) 0.02 - 0.27 mmol/L Final     Lab Results   Component Value Date    LABA1C 9.3 01/26/2022    LABA1C 9.3 01/12/2022    LABA1C 9.8 09/26/2021     Lab Results   Component Value Date/Time    TSH 3.330 01/04/2020 05:07 PM    T4FREE 0.97 07/19/2012 02:00 AM     Lab Results   Component Value Date    LABA1C 9.3 01/26/2022    GLUCOSE 301 05/01/2022    GLUCOSE 83 04/11/2012    MALBCR 167.5 09/26/2021    LABMICR 132.3 09/26/2021    LABCREA 220 04/30/2022     Lab Results   Component Value Date    TRIG 100 01/26/2022    HDL 48 09/26/2021    LDLCALC 112 09/26/2021    CHOL 213 09/26/2021       Blood culture   Lab Results   Component Value Date    [facility]  04/30/2022     24 Hours no growth  Gram stain performed from blood culture bottle media  Gram positive cocci in clusters      BC 5 Days no growth 01/25/2022       Radiology:  XR CHEST PORTABLE   Final Result   Round right upper lobe opacity most consistent with pneumonia. Follow-up to   complete clearing is recommended. XR CHEST PORTABLE    (Results Pending)       Medical Records/Labs/Images review:   I personally reviewed and summarized previous records   All labs and imaging were reviewed independently     [facility], a 47 y.o.-old male seen today for inpatient diabetes management     Diabetes Mellitus type 1 on insulin pump   · Patient's diabetes is uncontrolled with A1c of 9.8%  · BG was high this AM without DKA. Pt suspended his Pump last night. I advised pt against suspending his pump and I recommended using Temp basal if sugar drop overnight. · We recommend the following pump settings  Basal rate 12 A 1.45, 8a 1.55, andCR 10, ISF 45, goal 100-130, active insulin time 3 hrs  · Continue glucose check with meals and at bedtime   · Will titrate pump settings dose based on the blood glucose trend & insulin requirement  · Pt will follow with us after discharge.  Endocrine follow up visit, Tuesday 5/10 at 12:00pm     Pneumonia  · Discussed the effect of infection on BG control   · Managed by primary service     Bilateral ear pain   · Management per primary service     The above issues were reviewed with the patient who understood and agreed with the plan. Thank you for allowing us to participate in the care of this patient. Please do not hesitate to contact us with any additional questions. Pam Thomas MD  Endocrinologist, [place] [address] [facility]   [address] [place], [place] [address]   Phone: [phone]  Fax: [phone]  ---------------------------  An electronic signature was used to authenticate this note.  Sammy Hodges MD on 5/1/2022 at 10:35 AM

## 2022-05-01 NOTE — CONSULTS
ENDOCRINOLOGY INITIAL CONSULTATION NOTE      Date of admission: 4/29/2022  Date of service: 4/30/2022  Admitting physician: Ngoc Brito MD   Primary Care Physician: Glen Coles MD  Consultant physician: Elena Wallace MD     Reason for the consultation:  Uncontrolled DM    History of Present Illness: The history is provided by the patient. Accuracy of the patient data is excellent    Kashif Almanzar is a very pleasant 47 y.o. old male with PMH of Type 1 Diabetes mellitus on Medtronic insulin pump, HTN, HLD, Marijuana abuse, Remote Hx of Lacunar stroke and others listed below who presented to the ED for abdominal pain. He was admitted to [facility] on 4/29/2022 because of otalgia and nausea/vomitus, endocrine team was consulted for diabetes management. The patient was in his usual state of health until about 2 days perior to presentation when started c/o bilateral ear pain radiated down to his jaw. He denies fever, chills, N/V/D, headache , CP or SOB     Prior to admission  The patient was diagnosed with type 1 DM at the age 30. Prior to admission patient was on an insulin pump with the following pump settings: Basal rate 12 A 1.35, 8a 1.45, andCR 10, ISF 45, goal 100-130, active insulin time 3 hrs. He also had some financial difficulties obtaining insulin in the past however that has resolved and he has no issues getting insulin for his pump. Patient has not been eating consistent carbohydrate meals, self-blood glucose monitoring has been above goal prior to admission. In addition, patient denied macrovascular or microvascular complications. Lab Results   Component Value Date    LABA1C 9.3 01/26/2022     His diabetes is also complicated by neuropathy and retinopathy s/p laser therapy.  Macrovascular complications (+) TIA 5/2494, no hx of CAD, PVD     Point of care glucose monitoring (Independently reviewed)   Recent Labs     04/30/22  0517 04/30/22  1339 04/30/22  1704 04/30/22 2034   GLUMET 271* 239* 299* 150*       Past medical history:   Past Medical History:   Diagnosis Date    [facility]ism ([facility]     Cocaine abuse ([facility]     Diabetes mellitus ([facility]     Hypertension     Idiopathic peripheral neuropathy 9/21/2016    Lacunar stroke ([facility]     Marijuana abuse        Past surgical history:  History reviewed. No pertinent surgical history. Social history:   Tobacco:   reports that he has been smoking cigarettes. He has a 30.00 pack-year smoking history. He has never used smokeless tobacco.  Alcohol:   reports current alcohol use of about 5.0 standard drinks of alcohol per week. Drugs:   reports current drug use. Drug: Marijuana Emmy Krishna). Family history:    Family History   Problem Relation Age of Onset    Diabetes type 2  Mother     Cancer Maternal Grandmother     Diabetes type 2  Nephew        Allergy and drug reactions: Allergies   Allergen Reactions    Metoprolol      bradycardia       Scheduled Meds:   aspirin  81 mg Oral Daily    atorvastatin  80 mg Oral Nightly    folic acid  1 mg Oral Daily    lisinopril  5 mg Oral Daily    metoprolol tartrate  12.5 mg Oral BID    thiamine mononitrate  100 mg Oral Daily    enoxaparin  40 mg SubCUTAneous Daily    cefTRIAXone (ROCEPHIN) IV  1,000 mg IntraVENous Q24H    doxycycline (VIBRAMYCIN) IV  100 mg IntraVENous Q12H    pantoprazole  40 mg Oral QAM AC     PRN Meds:   dextrose, 12.5 g, PRN  potassium chloride, 10 mEq, PRN  magnesium sulfate, 1,000 mg, PRN  sodium phosphate IVPB, 10 mmol, PRN   Or  sodium phosphate IVPB, 15 mmol, PRN   Or  sodium phosphate IVPB, 20 mmol, PRN  polyethylene glycol, 17 g, Daily PRN  dextrose 5 % and 0.45 % NaCl, , Continuous PRN  glucose, 15 g, PRN  dextrose, 12.5 g, PRN  glucagon (rDNA), 1 mg, PRN  dextrose, 100 mL/hr, PRN  ondansetron, 4 mg, Q6H PRN      Continuous Infusions:   dextrose 5 % and 0.45 % NaCl      dextrose      Insulin Pump - insulin lispro         Review of Systems  All systems reviewed.  All negative except for symptoms mentioned in HPI     OBJECTIVE    /62   Pulse 78   Temp 97.7 °F (36.5 °C) (Temporal)   Resp 16   Ht 5' 6\" (1.676 m)   Wt 150 lb (68 kg)   SpO2 97%   BMI 24.21 kg/m²     Intake/Output Summary (Last 24 hours) at 4/30/2022 2245  Last data filed at 4/30/2022 2148  Gross per 24 hour   Intake 2760 ml   Output 400 ml   Net 2360 ml     Physical examination:  General: awake alert, oriented x3  HEENT: normocephalic non traumatic, no exophthalmos   Neck: supple, thyroid tenderness,  Pulm: Clear equal air entry no added sounds  CVS: S1 + S2  Abd: soft lax, no tenderness  Skin: warm, no lesions, no rash. No open wounds, no ulcers   Neuro: CN intact, sensation decreased bilateral , muscle power normal  Psych: normal mood, and affect    Review of Laboratory Data:  I personally reviewed the following labs:   Recent Labs     04/29/22 2141   WBC 32.4*   RBC 5.34   HGB 16.0   HCT 47.2   MCV 88.4   MCH 30.0   MCHC 33.9   RDW 13.8   *   MPV 9.3     Recent Labs     04/29/22  2141 04/30/22  0217 04/30/22  0545 04/30/22  0816 04/30/22  1839   *   < > 133 135 134   K 6.2*   < > 4.2 3.8 3.8   CL 92*   < > 97* 98 98   CO2 20*   < > 20* 22 24   BUN 34*   < > 34* 32* 40*   CREATININE 1.3*   < > 1.1 1.0 1.3*   GLUCOSE 297*   < > 265* 230* 246*   CALCIUM 9.7   < > 8.9 9.1 9.0   PROT 8.1  --   --   --   --    LABALBU 4.2  --   --   --   --    BILITOT 1.5*  --   --   --   --    ALKPHOS 136*  --   --   --   --    AST 24  --   --   --   --    ALT 13  --   --   --   --     < > = values in this interval not displayed.      Beta-Hydroxybutyrate   Date Value Ref Range Status   04/29/2022 3.09 (H) 0.02 - 0.27 mmol/L Final   01/25/2022 >4.50 (H) 0.02 - 0.27 mmol/L Final   09/26/2021 >4.50 (H) 0.02 - 0.27 mmol/L Final     Lab Results   Component Value Date    LABA1C 9.3 01/26/2022    LABA1C 9.3 01/12/2022    LABA1C 9.8 09/26/2021     Lab Results   Component Value Date/Time    TSH 3.330 01/04/2020 05:07 PM T4FREE 0.97 07/19/2012 02:00 AM     Lab Results   Component Value Date    LABA1C 9.3 01/26/2022    GLUCOSE 246 04/30/2022    GLUCOSE 83 04/11/2012    MALBCR 167.5 09/26/2021    LABMICR 132.3 09/26/2021    LABCREA 220 04/30/2022     Lab Results   Component Value Date    TRIG 100 01/26/2022    HDL 48 09/26/2021    LDLCALC 112 09/26/2021    CHOL 213 09/26/2021       Blood culture   Lab Results   Component Value Date    BC 5 Days no growth 01/25/2022    BC 5 Days no growth 07/21/2020       Radiology:  XR CHEST PORTABLE   Final Result   Round right upper lobe opacity most consistent with pneumonia. Follow-up to   complete clearing is recommended. Medical Records/Labs/Images review:   I personally reviewed and summarized previous records   All labs and imaging were reviewed independently     Gloria Zapata, a 47 y.o.-old male seen today for inpatient diabetes management     Diabetes Mellitus type 1 on insulin pump   · Patient's diabetes is uncontrolled with A1c of 9.8%  · Will adjust pump settings  To : Basal rate 12 A 1.45, 8a 1.55, andCR 10, ISF 45, goal 100-130, active insulin time 3 hrs  · Continue glucose check with meals and at bedtime   · Will titrate pump settings dose based on the blood glucose trend & insulin requirement  · Pt will follow with us after discharge. Pneumonia  · Discussed the effect of infection on BG control   · Managed by primary service     Bilateral ear pain   · Management per primary service     The above issues were reviewed with the patient who understood and agreed with the plan. Thank you for allowing us to participate in the care of this patient. Please do not hesitate to contact us with any additional questions.      Jorden Allen MD  Endocrinologist, [facility]   [address] [facility] [address]   Phone: [phone]  Fax: [phone]  ---------------------------  An electronic signature was used to authenticate this note. Jocelyn Eddy MD on 4/30/2022 at 10:45 PM

## 2022-05-01 NOTE — PROGRESS NOTES
Patient's blood sugar was 287. Patient had pumped turned off. He turned the pump back on and it delivered his normal dose of insulin.

## 2022-05-02 LAB
CULTURE, RESPIRATORY: NORMAL
EKG ATRIAL RATE: 106 BPM
EKG P AXIS: 113 DEGREES
EKG P-R INTERVAL: 130 MS
EKG Q-T INTERVAL: 314 MS
EKG QRS DURATION: 74 MS
EKG QTC CALCULATION (BAZETT): 417 MS
EKG R AXIS: 33 DEGREES
EKG T AXIS: -79 DEGREES
EKG VENTRICULAR RATE: 106 BPM
HIV-1 AND HIV-2 ANTIBODIES: NORMAL
SMEAR, RESPIRATORY: NORMAL

## 2022-05-02 PROCEDURE — 93010 ELECTROCARDIOGRAM REPORT: CPT | Performed by: INTERNAL MEDICINE

## 2022-05-02 NOTE — DISCHARGE SUMMARY
[address]  Discharge Summary    PCP: Natalie Sigala MD    Admit Date:4/29/2022  Discharge Date: 5/1/2022    Admission Diagnosis:   1. Community-acquired pneumonia  2. Otitis media   3. KIRSTEN stage I, prerenal 2/2 intravascular volume depletion  4. type 1 diabetes mellitus  5. Leukocytosis likely 2/2 CAP  6. Hypophosphatemia  7. HTN  8. HLD  9. lacunar stroke    10. alcohol abuse  11. marijuana abuse      Discharge Diagnosis:  1. Community-acquired pneumonia  2. Otitis media   3. KIRSTEN stage I, prerenal 2/2 intravascular volume depletion - RESOLVED  4. type 1 diabetes mellitus  5. Leukocytosis likely 2/2 CAP - RESOLVED  6. Hypophosphatemia - RESOLVED  7. HTN  8. HLD  9. lacunar stroke    10. alcohol abuse  11. marijuana abuse    Hospital Course: The patient is a 47 y.o. male with past medical history of type 1 diabetes mellitus, polysubstance abuse, hypertension, CVA presents to the hospital with chief complaint of otalgia and nausea/vomitus. Patient states he is having bilateral ear pain since 2 days. The pain is severe, constant, bilateral, radiate down to his jaw. Patient denies hearing loss, tinnitus, dizziness, no blood or drainage from the ear. Patient has no recent trauma or injury. He is also having sore throat, nonproductive cough. He feels nauseous and vomited twice today, no blood in the vomitus. Patient denies fever or chills, chest pain, shortness of breath. Patient also reported having watery diarrhea, 3 times daily for the last 2 days. Patient denies urinary symptoms. Patient drinks 6 cans of beer daily, last drink about several days ago. Does not recall choking or aspiration events. Patient denies new tingling, weakness or numbness.      ED Course: Patient is awake, alert, oriented x3. Vitals are stable.   Laboratory work-up significant for: Sodium 131, potassium 6.2 (moderately hemolyzed), CO2 20, BUN 34, creatinine 1.3, anion gap 19, blood glucose 297. Beta activity rate 3.09.  pH 7.38  WBC 32.4. Hemoglobin 16. Platelet 147. Chest x-ray showed right upper lobe consolidation concerning for pneumonia. Patient is given Zofran, doxycycline and ceftriaxone x1. Has not started on insulin drip. Continue insulin pump. Patient is receiving resuscitation IV fluid. Patient is admitted to the hospital for further management. No evidence of alcohol withdrawal throughout admission. Endocrinology consult ensured proper glucose management per patient's pump. BL ear pain improved with antibiotics. Patient never developed a fever or URTI symptoms. He remained on RA throughout admission. Patient to complete 5 additional days of abx for CAP with doxycycline and cefdinir. Blood cultures 1/4 bottles grew staph species which is a likely contaminant. Cultures redrawn which should be followed after discharge. Significant findings (history and exam, laboratory, radiological, pathology, other tests):   · General Appearance: alert and cooperative  · HEENT:  Head: Normocephalic, no lesions, without obvious abnormality. · Neck: no JVD  · Lung: clear to auscultation bilaterally  · Heart: regular rate and rhythm, S1, S2 normal, no murmur, click, rub or gallop  · Abdomen: soft, non-tender; bowel sounds normal; no masses,  no organomegaly  · Extremities:  extremities normal, atraumatic, no cyanosis or edema  · Musculokeletal: No joint swelling, no muscle tenderness. ROM normal in all joints of extremities. · Neurologic: Mental status: Alert, oriented, thought content appropriate    Pending test results:   1. None     Consults:  1. endocrinology    Procedures:  1. none    Condition at discharge: Stable    Disposition: home    Outpatient Recommendations:  1. Repeat CXR 1 week to monitor RUL consolidation/infiltrate  2.  Follow repeat blood cultures    Discharge Medications:     Medication List      START taking these medications    cefdinir 300 MG capsule  Commonly known as: OMNICEF  Take 1 capsule by mouth 2 times daily for 5 days     doxycycline hyclate 100 MG tablet  Commonly known as: VIBRA-TABS  Take 1 tablet by mouth 2 times daily for 5 days        CHANGE how you take these medications    atorvastatin 40 MG tablet  Commonly known as: LIPITOR  Take 1 tablet by mouth nightly  What changed:   · medication strength  · how much to take        CONTINUE taking these medications    albuterol sulfate  (90 Base) MCG/ACT inhaler  Inhale 2 puffs into the lungs every 6 hours as needed for Wheezing or Shortness of Breath     aspirin 81 MG EC tablet  Take 1 tablet by mouth daily     Cholecalciferol 400 UNIT Tabs tablet  Take 2.5 tablets by mouth daily     Dexcom G6  [place]  For bs monitoring     Dexcom G6 Sensor Misc  Apply every 10 days     Dexcom G6 Transmitter Misc  1 transmitter for every 90 days     folic acid 1 MG tablet  Commonly known as: FOLVITE  Take 1 tablet by mouth daily     insulin aspart 100 UNIT/ML injection vial  Commonly known as: NovoLOG  100 units/day via insulin pump     Insulin Pump - insulin lispro     lisinopril 5 MG tablet  Commonly known as: PRINIVIL;ZESTRIL  Take 1 tablet by mouth daily     metoprolol tartrate 25 MG tablet  Commonly known as: LOPRESSOR  Take 0.5 tablets by mouth 2 times daily     OneTouch Verio strip  Generic drug: blood glucose test strips  1 each by In Vitro route 5 times daily As needed.      thiamine 100 MG tablet  Take 1 tablet by mouth daily           Where to Get Your Medications      These medications were sent to [address] [place] [address], [facility] [address]    Phone: [phone]   · atorvastatin 40 MG tablet  · cefdinir 300 MG capsule  · doxycycline hyclate 100 MG tablet        [facility] Internal Medicine Resident Service    Activity as tolerated  Diet: diabetic, low calorie  Be compliant with your medications and take them as prescribed. Return for a repeat chest x-ray in 1 week. This is to monitor the pneumonia. Continue the two antibiotic pills for 5 more days. Be sure to finish all the treatment to avoid getting a resistant infection. Other Follow-Ups:    Future Appointments   Date Time Provider Clarisse Jacobson   5/10/2022 12:00 PM CHAYO Barclay NP ENDO [facility]   6/23/2022  2:30 PM CHAYO Barclay NP [facility]       Other than any new prescriptions given to you today, the list of home going meds on this After Visit Summary are based on information provided to us from you. This information, including the list, dose, and frequency of medications is only as accurate as the information you provided. If you have any questions or concerns about your home medications, please contact your Primary Care Physician for further clarification.       Pinky Bustillo MD  PGY-1   9:23 PM 5/1/2022

## 2022-05-05 LAB
BLOOD CULTURE, ROUTINE: ABNORMAL
CULTURE, BLOOD 2: NORMAL
ORGANISM: ABNORMAL

## 2022-05-06 LAB
BLOOD CULTURE, ROUTINE: NORMAL
CULTURE, BLOOD 2: NORMAL

## 2022-05-10 ENCOUNTER — OFFICE VISIT (OUTPATIENT)
Dept: ENDOCRINOLOGY | Age: 52
End: 2022-05-10
Payer: COMMERCIAL

## 2022-05-10 VITALS
SYSTOLIC BLOOD PRESSURE: 179 MMHG | HEIGHT: 66 IN | OXYGEN SATURATION: 98 % | BODY MASS INDEX: 22.34 KG/M2 | WEIGHT: 139 LBS | DIASTOLIC BLOOD PRESSURE: 117 MMHG | HEART RATE: 93 BPM

## 2022-05-10 DIAGNOSIS — E78.5 HYPERLIPIDEMIA, UNSPECIFIED HYPERLIPIDEMIA TYPE: ICD-10-CM

## 2022-05-10 DIAGNOSIS — E10.65 UNCONTROLLED TYPE 1 DIABETES MELLITUS WITH HYPERGLYCEMIA (HCC): Primary | ICD-10-CM

## 2022-05-10 DIAGNOSIS — E10.29 TYPE 1 DIABETES MELLITUS WITH ALBUMINURIA (HCC): ICD-10-CM

## 2022-05-10 DIAGNOSIS — E55.9 VITAMIN D DEFICIENCY: ICD-10-CM

## 2022-05-10 DIAGNOSIS — R80.9 TYPE 1 DIABETES MELLITUS WITH ALBUMINURIA (HCC): ICD-10-CM

## 2022-05-10 LAB — HBA1C MFR BLD: 8.9 %

## 2022-05-10 PROCEDURE — 99214 OFFICE O/P EST MOD 30 MIN: CPT | Performed by: NURSE PRACTITIONER

## 2022-05-10 PROCEDURE — 83036 HEMOGLOBIN GLYCOSYLATED A1C: CPT | Performed by: NURSE PRACTITIONER

## 2022-05-10 PROCEDURE — 3052F HG A1C>EQUAL 8.0%<EQUAL 9.0%: CPT | Performed by: NURSE PRACTITIONER

## 2022-05-10 RX ORDER — BLOOD SUGAR DIAGNOSTIC
1 STRIP MISCELLANEOUS
Qty: 150 EACH | Refills: 11 | Status: SHIPPED | OUTPATIENT
Start: 2022-05-10

## 2022-05-10 RX ORDER — INSULIN ASPART 100 [IU]/ML
INJECTION, SOLUTION INTRAVENOUS; SUBCUTANEOUS
Qty: 30 ML | Refills: 5 | Status: SHIPPED | OUTPATIENT
Start: 2022-05-10

## 2022-05-10 RX ORDER — BLOOD-GLUCOSE,RECEIVER,CONT
EACH MISCELLANEOUS
Qty: 1 EACH | Refills: 0 | Status: SHIPPED | OUTPATIENT
Start: 2022-05-10

## 2022-05-10 NOTE — PROGRESS NOTES
[address] [facility] Department of Endocrinology Diabetes and Metabolism   [address] [facility] [address]   Phone: [phone]  Fax: [phone]    Date of Service: 5/10/2022    Primary Care Physician: Jennifer Bearden MD  Referring physician: No ref. provider found  Provider: CHAYO Pugh NP     Reason for the visit:  Type 1 Diabetes, s/p hospitalization     History of Present Illness: The history is provided by the patient. No  was used. Accuracy of the patient data is excellent. Teresa Godinez is a very pleasant 47 y.o. male seen today for diabetes management. He was recently admitted for CAP at [address] [place] [address] from 4/29/22-5/1/22    Teresa Godinez was diagnosed with diabetes at age 30 and currently on [employer] 530:  Basal rate 12 A 1.45, 8a 1.55, and CR 10, ISF 45, goal 100-130, active insulin time 3 hrs. His pump was recently adjusted in the hospital by Dr Shayy Tyson. The patient has been checking blood sugar once a day    Per glucometer average, still adjusting with compliance and improving appetite s/p hospitalization    Unable to download pump as technical issues with [facility]      Most recent A1c results summarized below  Lab Results   Component Value Date    LABA1C 8.9 05/10/2022    LABA1C 9.3 01/26/2022    LABA1C 9.3 01/12/2022     Patient reported one hypoglycemic episodes  The patient has been trying to be more mindful of what has been eating and has been following diabetes diet    I reviewed current medications and the patient has no issues with diabetes medications  Magan Funes is over due for an eye exam. Last eye exam was few years ago. Financial issues to make an appointment. The patient performs his own feet care and doesn't see podiatry   His diabetes is complicated with neuropathy and retinopathy s/p laser therapy   Macrovascular complications: no CAD, PVD, + TIA 2/2019   Multiple admissions for DKA.   Most recent hospitalization (9/21)    PAST MEDICAL HISTORY   Past Medical History:   Diagnosis Date    [facility] ([facility]     Cocaine abuse ([facility]     Diabetes mellitus ([facility]     Hypertension     Idiopathic peripheral neuropathy 9/21/2016    Lacunar stroke ([facility]     Marijuana abuse        PAST SURGICAL HISTORY   No past surgical history on file. SOCIAL HISTORY   Tobacco:   reports that he has been smoking cigarettes. He has a 30.00 pack-year smoking history. He has never used smokeless tobacco.  Alcohol:   reports current alcohol use of about 5.0 standard drinks of alcohol per week. Drugs:   reports current drug use. Drug: Marijuana Elfida Perera). FAMILY HISTORY   Family History   Problem Relation Age of Onset    Diabetes type 2  Mother     Cancer Maternal Grandmother     Diabetes type 2  Nephew        ALLERGIES AND DRUG REACTIONS   Allergies   Allergen Reactions    Metoprolol      bradycardia       CURRENT MEDICATIONS   Current Outpatient Medications   Medication Sig Dispense Refill    Continuous Blood Gluc  (DEXCOM G6 ) DAVID For BS monitoring 1 each 0    insulin aspart (NOVOLOG) 100 UNIT/ML injection vial 100 units/day via insulin pump 30 mL 5    Insulin Pump - insulin lispro Inject into the skin continuous Insulin-to-Carb Ratio (ICR): Insulin Sensitivity Factor (ISF): mg/dL per unit of insulin  Target Blood Glucose:  mg/dL  Bolus Frequency:      atorvastatin (LIPITOR) 40 MG tablet Take 1 tablet by mouth nightly 30 tablet 0    Continuous Blood Gluc Sensor (DEXCOM G6 SENSOR) MISC Apply every 10 days (Patient not taking: Reported on 5/10/2022) 1 each 0    Continuous Blood Gluc Transmit (DEXCOM G6 TRANSMITTER) MISC 1 transmitter for every 90 days 1 each 3    Continuous Blood Gluc  (DEXCOM G6 ) DAVID For bs monitoring 1 each 0    blood glucose test strips (ONETOUCH VERIO) strip 1 each by In Vitro route 5 times daily As needed.  745 each 11    folic acid (FOLVITE) 1 MG tablet Take 1 tablet by mouth daily 30 tablet 3    Cholecalciferol 400 UNIT TABS tablet Take 2.5 tablets by mouth daily 30 tablet 0    thiamine 100 MG tablet Take 1 tablet by mouth daily 30 tablet 3    lisinopril (PRINIVIL;ZESTRIL) 5 MG tablet Take 1 tablet by mouth daily 30 tablet 3    aspirin 81 MG EC tablet Take 1 tablet by mouth daily 30 tablet 3    albuterol sulfate  (90 Base) MCG/ACT inhaler Inhale 2 puffs into the lungs every 6 hours as needed for Wheezing or Shortness of Breath 1 each 1    metoprolol tartrate (LOPRESSOR) 25 MG tablet Take 0.5 tablets by mouth 2 times daily 60 tablet 3     No current facility-administered medications for this visit. Review of Systems  Constitutional: No fever, no chills, no diaphoresis, no generalized weakness. HEENT: No blurred vision, No sore throat, no ear pain, no hair loss  Neck: denied any neck swelling, difficulty swallowing,   Cardio-pulmonary: No CP, SOB or palpitation, No orthopnea or PND. No cough or wheezing. GI: No N/V/D, no constipation, No abdominal pain, no melena or hematochezia   : Denied any dysuria, hematuria, flank pain, discharge, or incontinence. Skin: denied any rash, ulcer, Hirsute, or hyperpigmentation. MSK: denied any joint deformity, joint pain/swelling, muscle pain, or back pain. Neuro: no numbness, no tingling, no weakness    OBJECTIVE    BP (!) 179/117   Pulse 93   Ht 5' 6\" (1.676 m)   Wt 139 lb (63 kg)   SpO2 98%   BMI 22.44 kg/m²   BP Readings from Last 4 Encounters:   05/10/22 (!) 179/117   05/01/22 128/78   03/23/22 (!) 169/110   01/27/22 (!) 157/100     Wt Readings from Last 6 Encounters:   05/10/22 139 lb (63 kg)   05/01/22 137 lb 3.2 oz (62.2 kg)   03/23/22 144 lb (65.3 kg)   01/27/22 138 lb 3.2 oz (62.7 kg)   01/12/22 146 lb (66.2 kg)   10/12/21 137 lb (62.1 kg)       Physical examination:  General: awake alert, oriented x3, no abnormal position or movements.   HEENT: normocephalic non-traumatic, no exophthalmos   Neck: supple, no LN enlargement, no thyromegaly, no thyroid tenderness, no JVD. Pulm: Clear equal air entry no added sounds, no wheezing or rhonchi    CVS: S1 + S2, no murmur, no heave. Dorsalis pedis pulse palpable   Abd: soft lax, no tenderness, no organomegaly, audible bowel sounds. Skin: warm, no lesions, no rash.  No callus, no Ulcers, No acanthosis nigricans  Musculoskeletal: No back tenderness, no kyphosis/scoliosis    Neuro: CN intact,  sensation decreased bilateral , muscle power normal  Psych: normal mood, and affect      Review of Laboratory Data:  I personally reviewed the following lab:  Lab Results   Component Value Date/Time    WBC 15.8 (H) 05/01/2022 05:45 AM    RBC 4.52 05/01/2022 05:45 AM    HGB 13.4 05/01/2022 05:45 AM    HCT 41.4 05/01/2022 05:45 AM    MCV 91.6 05/01/2022 05:45 AM    MCH 29.6 05/01/2022 05:45 AM    MCHC 32.4 05/01/2022 05:45 AM    RDW 13.5 05/01/2022 05:45 AM     05/01/2022 05:45 AM    MPV 9.3 05/01/2022 05:45 AM    BANDS 1 04/10/2012 04:30 AM      Lab Results   Component Value Date/Time     05/01/2022 05:45 AM    K 4.1 05/01/2022 05:45 AM    K 6.2 (H) 04/29/2022 09:41 PM    CO2 22 05/01/2022 05:45 AM    BUN 36 (H) 05/01/2022 05:45 AM    CREATININE 1.0 05/01/2022 05:45 AM    CALCIUM 9.1 05/01/2022 05:45 AM    LABGLOM >60 05/01/2022 05:45 AM    GFRAA >60 05/01/2022 05:45 AM      Lab Results   Component Value Date/Time    TSH 3.330 01/04/2020 05:07 PM    T4FREE 0.97 07/19/2012 02:00 AM     Lab Results   Component Value Date    LABA1C 8.9 05/10/2022    GLUCOSE 301 05/01/2022    GLUCOSE 83 04/11/2012    MALBCR 167.5 09/26/2021    LABMICR 132.3 09/26/2021    LABCREA 220 04/30/2022     Lab Results   Component Value Date    LABA1C 8.9 05/10/2022    LABA1C 9.3 01/26/2022    LABA1C 9.3 01/12/2022     Lab Results   Component Value Date    TRIG 100 01/26/2022    HDL 48 09/26/2021    LDLCALC 112 09/26/2021    CHOL 213 09/26/2021     Lab Results   Component Value Date    VITD25 25 07/22/2020    VITD25 23 08/02/2019       ASSESSMENT & [address] Sae Han, a 47 y.o.-old male seen in for the following issues       Assessment:      Diagnosis Orders   1. Uncontrolled type 1 diabetes mellitus with hyperglycemia (HCC)  POCT glycosylated hemoglobin (Hb A1C)    Continuous Blood Gluc  ([address]) DAVID   2. Hyperlipidemia, unspecified hyperlipidemia type     3. Vitamin D deficiency     4. 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 Uncontrolled type 1 diabetes mellitus with hyperglycemia ([facility]   · Patient's diabetes is uncontrolled  · Insulin pump for therapy, will check with [employer] to see If pt insurance will cover an upgrade and sensor. Pt will benefit from automode and CGM  · Continue insulin pump settings as above as he was recently d/c from hospital  · Will order Dexcom   · Counseled on insulin pump use. Counseled to enter carbs each time he eats. · The patient was advised to check blood sugars 4 times a day before meals and at bedtime. · Suggested to patient returning to injections with [employer] insulin. He is not interested at this time  · Discussed with patient A1c and blood sugar goals   · Optimal blood sugars: 100-140 pre-prandial, < 180 peak post-prandial  · The patient counseled about the complications of uncontrolled diabetes   · Patient was counselled about the importance of self-blood glucose monitoring and eating consistent carb diet to avoid blood sugar fluctuations   · Discussed lifestyle changes including diet and exercise with patient  · HbA1c at next OV    Continuous Glucose Monitoring (CGM) download and interpretation    I personally reviewed and interpreted continuous glucose monitor (CGM) download.  CGM report was discussed with patient including blood glucose patterns, percentages of blood glucose at goal, above goal and below goal. Insulin dosages/antidiabetic regimen was adjusted according to CGM download. Full CGM was scanned under media. 2. Hyperlipidemia, unspecified hyperlipidemia type   · Continue statin. - reinforced compliance    · Levels remain elevated  · Encourage diet and exercise     3. Vitamin D deficiency   · Pt will have Vit D checked at next visit per his request - refused for today   · On replacement therapy, noncompliant with adherence  · 2000 iu of Vitamin D daily     4. 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 I also ordered labs to further assess and manage the above addressed medical conditions. Return in about 3 months (around 8/10/2022) for type 1 DM on insulin pump. The above issues were reviewed with the patient who understood and agreed with the plan. Thank you for allowing us to participate in the care of this patient. Please do not hesitate to contact us with any additional questions. CHAYO Woodward NP [facility] Diabetes Care and Endocrinology   [address] [place] [address]   Phone: [phone]  Fax: [phone]  --------------------------------------------  An electronic signature was used to authenticate this note.  CHAYO Woodward NP on 5/10/2022 at 12:37 PM

## 2022-05-27 RX ORDER — ATORVASTATIN CALCIUM 40 MG/1
40 TABLET, FILM COATED ORAL NIGHTLY
Qty: 30 TABLET | Refills: 0 | OUTPATIENT
Start: 2022-05-27

## 2022-08-07 ENCOUNTER — HOSPITAL ENCOUNTER (INPATIENT)
Age: 52
LOS: 2 days | Discharge: HOME | DRG: 637 | End: 2022-08-10
Attending: EMERGENCY MEDICINE | Admitting: INTERNAL MEDICINE
Payer: COMMERCIAL

## 2022-08-07 ENCOUNTER — APPOINTMENT (OUTPATIENT)
Dept: GENERAL RADIOLOGY | Age: 52
DRG: 637 | End: 2022-08-07
Payer: COMMERCIAL

## 2022-08-07 DIAGNOSIS — E13.10 DIABETIC KETOACIDOSIS WITHOUT COMA ASSOCIATED WITH OTHER SPECIFIED DIABETES MELLITUS (HCC): Primary | ICD-10-CM

## 2022-08-07 LAB
ALBUMIN SERPL-MCNC: 4.2 G/DL (ref 3.5–5.2)
ALP BLD-CCNC: 149 U/L (ref 40–129)
ALT SERPL-CCNC: 18 U/L (ref 0–40)
ANION GAP SERPL CALCULATED.3IONS-SCNC: 27 MMOL/L (ref 7–16)
AST SERPL-CCNC: 23 U/L (ref 0–39)
BASOPHILS ABSOLUTE: 0.08 E9/L (ref 0–0.2)
BASOPHILS RELATIVE PERCENT: 0.5 % (ref 0–2)
BETA-HYDROXYBUTYRATE: >4.5 MMOL/L (ref 0.02–0.27)
BILIRUB SERPL-MCNC: 1.8 MG/DL (ref 0–1.2)
BILIRUBIN DIRECT: 0.4 MG/DL (ref 0–0.3)
BILIRUBIN, INDIRECT: 1.4 MG/DL (ref 0–1)
BUN BLDV-MCNC: 30 MG/DL (ref 6–20)
CALCIUM SERPL-MCNC: 9.4 MG/DL (ref 8.6–10.2)
CHLORIDE BLD-SCNC: 95 MMOL/L (ref 98–107)
CO2: 10 MMOL/L (ref 22–29)
CREAT SERPL-MCNC: 1.2 MG/DL (ref 0.7–1.2)
EOSINOPHILS ABSOLUTE: 0.15 E9/L (ref 0.05–0.5)
EOSINOPHILS RELATIVE PERCENT: 0.9 % (ref 0–6)
GFR AFRICAN AMERICAN: >60
GFR NON-AFRICAN AMERICAN: >60 ML/MIN/1.73
GLUCOSE BLD-MCNC: 478 MG/DL (ref 74–99)
HCT VFR BLD CALC: 43.4 % (ref 37–54)
HEMOGLOBIN: 13.8 G/DL (ref 12.5–16.5)
IMMATURE GRANULOCYTES #: 0.11 E9/L
IMMATURE GRANULOCYTES %: 0.7 % (ref 0–5)
LACTIC ACID: 2.5 MMOL/L (ref 0.5–2.2)
LIPASE: 15 U/L (ref 13–60)
LYMPHOCYTES ABSOLUTE: 1.03 E9/L (ref 1.5–4)
LYMPHOCYTES RELATIVE PERCENT: 6.3 % (ref 20–42)
MCH RBC QN AUTO: 30.1 PG (ref 26–35)
MCHC RBC AUTO-ENTMCNC: 31.8 % (ref 32–34.5)
MCV RBC AUTO: 94.8 FL (ref 80–99.9)
METER GLUCOSE: 441 MG/DL (ref 74–99)
MONOCYTES ABSOLUTE: 0.86 E9/L (ref 0.1–0.95)
MONOCYTES RELATIVE PERCENT: 5.2 % (ref 2–12)
NEUTROPHILS ABSOLUTE: 14.17 E9/L (ref 1.8–7.3)
NEUTROPHILS RELATIVE PERCENT: 86.4 % (ref 43–80)
PDW BLD-RTO: 15.3 FL (ref 11.5–15)
PH VENOUS: 7.24 (ref 7.35–7.45)
PLATELET # BLD: 387 E9/L (ref 130–450)
PMV BLD AUTO: 9.8 FL (ref 7–12)
POTASSIUM REFLEX MAGNESIUM: 5.5 MMOL/L (ref 3.5–5)
RBC # BLD: 4.58 E12/L (ref 3.8–5.8)
REASON FOR REJECTION: NORMAL
REJECTED TEST: NORMAL
SODIUM BLD-SCNC: 132 MMOL/L (ref 132–146)
TOTAL PROTEIN: 7.2 G/DL (ref 6.4–8.3)
TROPONIN, HIGH SENSITIVITY: 95 NG/L (ref 0–11)
WBC # BLD: 16.4 E9/L (ref 4.5–11.5)

## 2022-08-07 PROCEDURE — 2500000003 HC RX 250 WO HCPCS: Performed by: EMERGENCY MEDICINE

## 2022-08-07 PROCEDURE — 82962 GLUCOSE BLOOD TEST: CPT

## 2022-08-07 PROCEDURE — 82800 BLOOD PH: CPT

## 2022-08-07 PROCEDURE — 83605 ASSAY OF LACTIC ACID: CPT

## 2022-08-07 PROCEDURE — 71045 X-RAY EXAM CHEST 1 VIEW: CPT

## 2022-08-07 PROCEDURE — 80076 HEPATIC FUNCTION PANEL: CPT

## 2022-08-07 PROCEDURE — 85025 COMPLETE CBC W/AUTO DIFF WBC: CPT

## 2022-08-07 PROCEDURE — 96375 TX/PRO/DX INJ NEW DRUG ADDON: CPT

## 2022-08-07 PROCEDURE — 2580000003 HC RX 258: Performed by: EMERGENCY MEDICINE

## 2022-08-07 PROCEDURE — 82010 KETONE BODYS QUAN: CPT

## 2022-08-07 PROCEDURE — 36556 INSERT NON-TUNNEL CV CATH: CPT

## 2022-08-07 PROCEDURE — 84484 ASSAY OF TROPONIN QUANT: CPT

## 2022-08-07 PROCEDURE — 96374 THER/PROPH/DIAG INJ IV PUSH: CPT

## 2022-08-07 PROCEDURE — 83690 ASSAY OF LIPASE: CPT

## 2022-08-07 PROCEDURE — 81001 URINALYSIS AUTO W/SCOPE: CPT

## 2022-08-07 PROCEDURE — 93005 ELECTROCARDIOGRAM TRACING: CPT | Performed by: EMERGENCY MEDICINE

## 2022-08-07 PROCEDURE — 99285 EMERGENCY DEPT VISIT HI MDM: CPT

## 2022-08-07 PROCEDURE — 6370000000 HC RX 637 (ALT 250 FOR IP): Performed by: EMERGENCY MEDICINE

## 2022-08-07 PROCEDURE — 05HM33Z INSERTION OF INFUSION DEVICE INTO RIGHT INTERNAL JUGULAR VEIN, PERCUTANEOUS APPROACH: ICD-10-PCS | Performed by: EMERGENCY MEDICINE

## 2022-08-07 PROCEDURE — 80048 BASIC METABOLIC PNL TOTAL CA: CPT

## 2022-08-07 RX ORDER — 0.9 % SODIUM CHLORIDE 0.9 %
1000 INTRAVENOUS SOLUTION INTRAVENOUS ONCE
Status: COMPLETED | OUTPATIENT
Start: 2022-08-07 | End: 2022-08-07

## 2022-08-07 RX ORDER — 0.9 % SODIUM CHLORIDE 0.9 %
1000 INTRAVENOUS SOLUTION INTRAVENOUS ONCE
Status: COMPLETED | OUTPATIENT
Start: 2022-08-07 | End: 2022-08-08

## 2022-08-07 RX ORDER — DEXTROSE MONOHYDRATE 100 MG/ML
INJECTION, SOLUTION INTRAVENOUS CONTINUOUS PRN
Status: DISCONTINUED | OUTPATIENT
Start: 2022-08-07 | End: 2022-08-09 | Stop reason: SDUPTHER

## 2022-08-07 RX ORDER — ONDANSETRON 2 MG/ML
4 INJECTION INTRAMUSCULAR; INTRAVENOUS ONCE
Status: DISCONTINUED | OUTPATIENT
Start: 2022-08-07 | End: 2022-08-10 | Stop reason: HOSPADM

## 2022-08-07 RX ADMIN — SODIUM BICARBONATE 50 MEQ: 84 INJECTION INTRAVENOUS at 23:36

## 2022-08-07 RX ADMIN — SODIUM CHLORIDE 1000 ML: 9 INJECTION, SOLUTION INTRAVENOUS at 23:40

## 2022-08-07 RX ADMIN — Medication 7 UNITS: at 23:35

## 2022-08-07 RX ADMIN — SODIUM CHLORIDE 1000 ML: 9 INJECTION, SOLUTION INTRAVENOUS at 22:00

## 2022-08-07 ASSESSMENT — ENCOUNTER SYMPTOMS
VOMITING: 1
COUGH: 0
NAUSEA: 1
SHORTNESS OF BREATH: 0
SORE THROAT: 0
ABDOMINAL PAIN: 0
WHEEZING: 0
DIARRHEA: 0
EYE PAIN: 0
BACK PAIN: 0
SINUS PAIN: 0
EYE REDNESS: 0

## 2022-08-07 ASSESSMENT — PAIN - FUNCTIONAL ASSESSMENT: PAIN_FUNCTIONAL_ASSESSMENT: NONE - DENIES PAIN

## 2022-08-07 ASSESSMENT — LIFESTYLE VARIABLES
HOW OFTEN DO YOU HAVE A DRINK CONTAINING ALCOHOL: 4 OR MORE TIMES A WEEK
HOW MANY STANDARD DRINKS CONTAINING ALCOHOL DO YOU HAVE ON A TYPICAL DAY: 5 OR 6

## 2022-08-08 ENCOUNTER — APPOINTMENT (OUTPATIENT)
Dept: GENERAL RADIOLOGY | Age: 52
DRG: 637 | End: 2022-08-08
Payer: COMMERCIAL

## 2022-08-08 PROBLEM — E11.10 DIABETIC ACIDOSIS WITHOUT COMA (HCC): Status: ACTIVE | Noted: 2022-08-08

## 2022-08-08 LAB
ADENOVIRUS BY PCR: NOT DETECTED
ALBUMIN SERPL-MCNC: 3.8 G/DL (ref 3.5–5.2)
ALP BLD-CCNC: 110 U/L (ref 40–129)
ALT SERPL-CCNC: 18 U/L (ref 0–40)
AMPHETAMINE SCREEN, URINE: NOT DETECTED
ANION GAP SERPL CALCULATED.3IONS-SCNC: 10 MMOL/L (ref 7–16)
ANION GAP SERPL CALCULATED.3IONS-SCNC: 10 MMOL/L (ref 7–16)
ANION GAP SERPL CALCULATED.3IONS-SCNC: 12 MMOL/L (ref 7–16)
ANION GAP SERPL CALCULATED.3IONS-SCNC: 22 MMOL/L (ref 7–16)
AST SERPL-CCNC: 14 U/L (ref 0–39)
BACTERIA: ABNORMAL /HPF
BARBITURATE SCREEN URINE: NOT DETECTED
BASOPHILS ABSOLUTE: 0.03 E9/L (ref 0–0.2)
BASOPHILS RELATIVE PERCENT: 0.2 % (ref 0–2)
BENZODIAZEPINE SCREEN, URINE: NOT DETECTED
BILIRUB SERPL-MCNC: 1 MG/DL (ref 0–1.2)
BILIRUBIN DIRECT: 0.3 MG/DL (ref 0–0.3)
BILIRUBIN URINE: ABNORMAL
BILIRUBIN, INDIRECT: 0.7 MG/DL (ref 0–1)
BLOOD, URINE: ABNORMAL
BORDETELLA PARAPERTUSSIS BY PCR: NOT DETECTED
BORDETELLA PERTUSSIS BY PCR: NOT DETECTED
BUN BLDV-MCNC: 20 MG/DL (ref 6–20)
BUN BLDV-MCNC: 25 MG/DL (ref 6–20)
BUN BLDV-MCNC: 28 MG/DL (ref 6–20)
BUN BLDV-MCNC: 32 MG/DL (ref 6–20)
C-REACTIVE PROTEIN: 0.7 MG/DL (ref 0–0.4)
CALCIUM IONIZED: 1.23 MMOL/L (ref 1.15–1.33)
CALCIUM SERPL-MCNC: 8.1 MG/DL (ref 8.6–10.2)
CALCIUM SERPL-MCNC: 8.2 MG/DL (ref 8.6–10.2)
CALCIUM SERPL-MCNC: 8.2 MG/DL (ref 8.6–10.2)
CALCIUM SERPL-MCNC: 8.5 MG/DL (ref 8.6–10.2)
CANNABINOID SCREEN URINE: NOT DETECTED
CHLAMYDOPHILIA PNEUMONIAE BY PCR: NOT DETECTED
CHLORIDE BLD-SCNC: 101 MMOL/L (ref 98–107)
CHLORIDE BLD-SCNC: 105 MMOL/L (ref 98–107)
CHLORIDE BLD-SCNC: 106 MMOL/L (ref 98–107)
CHLORIDE BLD-SCNC: 107 MMOL/L (ref 98–107)
CHLORIDE URINE RANDOM: <20 MMOL/L
CHP ED QC CHECK: NORMAL
CHP ED QC CHECK: NORMAL
CLARITY: CLEAR
CO2: 16 MMOL/L (ref 22–29)
CO2: 19 MMOL/L (ref 22–29)
CO2: 21 MMOL/L (ref 22–29)
CO2: 22 MMOL/L (ref 22–29)
COCAINE METABOLITE SCREEN URINE: NOT DETECTED
COLOR: YELLOW
CORONAVIRUS 229E BY PCR: NOT DETECTED
CORONAVIRUS HKU1 BY PCR: NOT DETECTED
CORONAVIRUS NL63 BY PCR: NOT DETECTED
CORONAVIRUS OC43 BY PCR: NOT DETECTED
CREAT SERPL-MCNC: 0.8 MG/DL (ref 0.7–1.2)
CREAT SERPL-MCNC: 0.9 MG/DL (ref 0.7–1.2)
CREAT SERPL-MCNC: 1.1 MG/DL (ref 0.7–1.2)
CREAT SERPL-MCNC: 1.2 MG/DL (ref 0.7–1.2)
CREATININE URINE: 30 MG/DL (ref 40–278)
CREATININE URINE: 30 MG/DL (ref 40–278)
EKG ATRIAL RATE: 102 BPM
EKG P AXIS: 69 DEGREES
EKG P-R INTERVAL: 138 MS
EKG Q-T INTERVAL: 362 MS
EKG QRS DURATION: 76 MS
EKG QTC CALCULATION (BAZETT): 471 MS
EKG R AXIS: 59 DEGREES
EKG T AXIS: 51 DEGREES
EKG VENTRICULAR RATE: 102 BPM
EOSINOPHILS ABSOLUTE: 0.01 E9/L (ref 0.05–0.5)
EOSINOPHILS RELATIVE PERCENT: 0.1 % (ref 0–6)
FENTANYL SCREEN, URINE: NOT DETECTED
GFR AFRICAN AMERICAN: >60
GFR NON-AFRICAN AMERICAN: >60 ML/MIN/1.73
GLUCOSE BLD-MCNC: 220 MG/DL (ref 74–99)
GLUCOSE BLD-MCNC: 223 MG/DL (ref 74–99)
GLUCOSE BLD-MCNC: 252 MG/DL (ref 74–99)
GLUCOSE BLD-MCNC: 330 MG/DL (ref 74–99)
GLUCOSE BLD-MCNC: 383 MG/DL
GLUCOSE BLD-MCNC: 442 MG/DL
GLUCOSE URINE: >=1000 MG/DL
HBA1C MFR BLD: 9 % (ref 4–5.6)
HCT VFR BLD CALC: 33.7 % (ref 37–54)
HEMOGLOBIN: 11 G/DL (ref 12.5–16.5)
HUMAN METAPNEUMOVIRUS BY PCR: NOT DETECTED
HUMAN RHINOVIRUS/ENTEROVIRUS BY PCR: NOT DETECTED
IMMATURE GRANULOCYTES #: 0.17 E9/L
IMMATURE GRANULOCYTES %: 0.9 % (ref 0–5)
INFLUENZA A BY PCR: NOT DETECTED
INFLUENZA B BY PCR: NOT DETECTED
KETONES, URINE: >=80 MG/DL
LACTIC ACID: 1.1 MMOL/L (ref 0.5–2.2)
LACTIC ACID: 2 MMOL/L (ref 0.5–2.2)
LEUKOCYTE ESTERASE, URINE: NEGATIVE
LYMPHOCYTES ABSOLUTE: 1.74 E9/L (ref 1.5–4)
LYMPHOCYTES RELATIVE PERCENT: 9 % (ref 20–42)
Lab: NORMAL
MAGNESIUM: 1.7 MG/DL (ref 1.6–2.6)
MAGNESIUM: 1.8 MG/DL (ref 1.6–2.6)
MCH RBC QN AUTO: 29.7 PG (ref 26–35)
MCHC RBC AUTO-ENTMCNC: 32.6 % (ref 32–34.5)
MCV RBC AUTO: 91.1 FL (ref 80–99.9)
METER GLUCOSE: 165 MG/DL (ref 74–99)
METER GLUCOSE: 185 MG/DL (ref 74–99)
METER GLUCOSE: 189 MG/DL (ref 74–99)
METER GLUCOSE: 201 MG/DL (ref 74–99)
METER GLUCOSE: 206 MG/DL (ref 74–99)
METER GLUCOSE: 207 MG/DL (ref 74–99)
METER GLUCOSE: 223 MG/DL (ref 74–99)
METER GLUCOSE: 226 MG/DL (ref 74–99)
METER GLUCOSE: 234 MG/DL (ref 74–99)
METER GLUCOSE: 259 MG/DL (ref 74–99)
METER GLUCOSE: 268 MG/DL (ref 74–99)
METER GLUCOSE: 271 MG/DL (ref 74–99)
METER GLUCOSE: 280 MG/DL (ref 74–99)
METER GLUCOSE: 368 MG/DL (ref 74–99)
METER GLUCOSE: 383 MG/DL (ref 74–99)
METER GLUCOSE: 442 MG/DL (ref 74–99)
METHADONE SCREEN, URINE: NOT DETECTED
MICROALBUMIN UR-MCNC: 50.2 MG/L
MICROALBUMIN/CREAT UR-RTO: 167.3 (ref 0–30)
MONOCYTES ABSOLUTE: 1.31 E9/L (ref 0.1–0.95)
MONOCYTES RELATIVE PERCENT: 6.7 % (ref 2–12)
MYCOPLASMA PNEUMONIAE BY PCR: NOT DETECTED
NEUTROPHILS ABSOLUTE: 16.16 E9/L (ref 1.8–7.3)
NEUTROPHILS RELATIVE PERCENT: 83.1 % (ref 43–80)
NITRITE, URINE: NEGATIVE
OPIATE SCREEN URINE: NOT DETECTED
OXYCODONE URINE: NOT DETECTED
PARAINFLUENZA VIRUS 1 BY PCR: NOT DETECTED
PARAINFLUENZA VIRUS 2 BY PCR: NOT DETECTED
PARAINFLUENZA VIRUS 3 BY PCR: NOT DETECTED
PARAINFLUENZA VIRUS 4 BY PCR: NOT DETECTED
PDW BLD-RTO: 15.3 FL (ref 11.5–15)
PH UA: 6 (ref 5–9)
PHENCYCLIDINE SCREEN URINE: NOT DETECTED
PHOSPHORUS: 1.6 MG/DL (ref 2.5–4.5)
PHOSPHORUS: 1.7 MG/DL (ref 2.5–4.5)
PLATELET # BLD: 370 E9/L (ref 130–450)
PMV BLD AUTO: 8.8 FL (ref 7–12)
POTASSIUM REFLEX MAGNESIUM: 3.6 MMOL/L (ref 3.5–5)
POTASSIUM SERPL-SCNC: 3.5 MMOL/L (ref 3.5–5)
POTASSIUM SERPL-SCNC: 3.7 MMOL/L (ref 3.5–5)
POTASSIUM SERPL-SCNC: 4.4 MMOL/L (ref 3.5–5)
POTASSIUM, UR: 20 MMOL/L
PROCALCITONIN: 2.35 NG/ML (ref 0–0.08)
PROTEIN UA: ABNORMAL MG/DL
RBC # BLD: 3.7 E12/L (ref 3.8–5.8)
RBC UA: ABNORMAL /HPF (ref 0–2)
RESPIRATORY SYNCYTIAL VIRUS BY PCR: NOT DETECTED
SARS-COV-2, PCR: NOT DETECTED
SEDIMENTATION RATE, ERYTHROCYTE: 5 MM/HR (ref 0–15)
SODIUM BLD-SCNC: 137 MMOL/L (ref 132–146)
SODIUM BLD-SCNC: 137 MMOL/L (ref 132–146)
SODIUM BLD-SCNC: 138 MMOL/L (ref 132–146)
SODIUM BLD-SCNC: 139 MMOL/L (ref 132–146)
SODIUM URINE: 88 MMOL/L
SPECIFIC GRAVITY UA: 1.02 (ref 1–1.03)
TOTAL PROTEIN: 5.9 G/DL (ref 6.4–8.3)
TROPONIN, HIGH SENSITIVITY: 44 NG/L (ref 0–11)
TROPONIN, HIGH SENSITIVITY: 48 NG/L (ref 0–11)
TROPONIN, HIGH SENSITIVITY: 64 NG/L (ref 0–11)
TROPONIN, HIGH SENSITIVITY: 80 NG/L (ref 0–11)
UROBILINOGEN, URINE: 0.2 E.U./DL
VITAMIN D 25-HYDROXY: 27 NG/ML (ref 30–100)
WBC # BLD: 19.4 E9/L (ref 4.5–11.5)
WBC UA: ABNORMAL /HPF (ref 0–5)

## 2022-08-08 PROCEDURE — 83605 ASSAY OF LACTIC ACID: CPT

## 2022-08-08 PROCEDURE — 84300 ASSAY OF URINE SODIUM: CPT

## 2022-08-08 PROCEDURE — 87040 BLOOD CULTURE FOR BACTERIA: CPT

## 2022-08-08 PROCEDURE — 2580000003 HC RX 258: Performed by: NURSE PRACTITIONER

## 2022-08-08 PROCEDURE — 6370000000 HC RX 637 (ALT 250 FOR IP): Performed by: INTERNAL MEDICINE

## 2022-08-08 PROCEDURE — 99222 1ST HOSP IP/OBS MODERATE 55: CPT | Performed by: INTERNAL MEDICINE

## 2022-08-08 PROCEDURE — 6370000000 HC RX 637 (ALT 250 FOR IP)

## 2022-08-08 PROCEDURE — 6360000002 HC RX W HCPCS: Performed by: NURSE PRACTITIONER

## 2022-08-08 PROCEDURE — 71045 X-RAY EXAM CHEST 1 VIEW: CPT

## 2022-08-08 PROCEDURE — 85025 COMPLETE CBC W/AUTO DIFF WBC: CPT

## 2022-08-08 PROCEDURE — 0202U NFCT DS 22 TRGT SARS-COV-2: CPT

## 2022-08-08 PROCEDURE — 80048 BASIC METABOLIC PNL TOTAL CA: CPT

## 2022-08-08 PROCEDURE — 36556 INSERT NON-TUNNEL CV CATH: CPT

## 2022-08-08 PROCEDURE — 82570 ASSAY OF URINE CREATININE: CPT

## 2022-08-08 PROCEDURE — 85651 RBC SED RATE NONAUTOMATED: CPT

## 2022-08-08 PROCEDURE — 84100 ASSAY OF PHOSPHORUS: CPT

## 2022-08-08 PROCEDURE — 84133 ASSAY OF URINE POTASSIUM: CPT

## 2022-08-08 PROCEDURE — 2580000003 HC RX 258: Performed by: EMERGENCY MEDICINE

## 2022-08-08 PROCEDURE — S5553 INSULIN LONG ACTING 5 U: HCPCS | Performed by: NURSE PRACTITIONER

## 2022-08-08 PROCEDURE — 83735 ASSAY OF MAGNESIUM: CPT

## 2022-08-08 PROCEDURE — 82962 GLUCOSE BLOOD TEST: CPT

## 2022-08-08 PROCEDURE — 82330 ASSAY OF CALCIUM: CPT

## 2022-08-08 PROCEDURE — 83036 HEMOGLOBIN GLYCOSYLATED A1C: CPT

## 2022-08-08 PROCEDURE — 2140000000 HC CCU INTERMEDIATE R&B

## 2022-08-08 PROCEDURE — 80076 HEPATIC FUNCTION PANEL: CPT

## 2022-08-08 PROCEDURE — 2580000003 HC RX 258

## 2022-08-08 PROCEDURE — 80307 DRUG TEST PRSMV CHEM ANLYZR: CPT

## 2022-08-08 PROCEDURE — 36415 COLL VENOUS BLD VENIPUNCTURE: CPT

## 2022-08-08 PROCEDURE — 82306 VITAMIN D 25 HYDROXY: CPT

## 2022-08-08 PROCEDURE — 99291 CRITICAL CARE FIRST HOUR: CPT | Performed by: INTERNAL MEDICINE

## 2022-08-08 PROCEDURE — 2500000003 HC RX 250 WO HCPCS

## 2022-08-08 PROCEDURE — 82044 UR ALBUMIN SEMIQUANTITATIVE: CPT

## 2022-08-08 PROCEDURE — 6370000000 HC RX 637 (ALT 250 FOR IP): Performed by: EMERGENCY MEDICINE

## 2022-08-08 PROCEDURE — 6360000002 HC RX W HCPCS

## 2022-08-08 PROCEDURE — 86140 C-REACTIVE PROTEIN: CPT

## 2022-08-08 PROCEDURE — 87081 CULTURE SCREEN ONLY: CPT

## 2022-08-08 PROCEDURE — 6370000000 HC RX 637 (ALT 250 FOR IP): Performed by: NURSE PRACTITIONER

## 2022-08-08 PROCEDURE — 84484 ASSAY OF TROPONIN QUANT: CPT

## 2022-08-08 PROCEDURE — 84145 PROCALCITONIN (PCT): CPT

## 2022-08-08 PROCEDURE — 82436 ASSAY OF URINE CHLORIDE: CPT

## 2022-08-08 PROCEDURE — 87088 URINE BACTERIA CULTURE: CPT

## 2022-08-08 RX ORDER — 0.9 % SODIUM CHLORIDE 0.9 %
15 INTRAVENOUS SOLUTION INTRAVENOUS ONCE
Status: COMPLETED | OUTPATIENT
Start: 2022-08-08 | End: 2022-08-08

## 2022-08-08 RX ORDER — SODIUM CHLORIDE 0.9 % (FLUSH) 0.9 %
5-40 SYRINGE (ML) INJECTION EVERY 12 HOURS SCHEDULED
Status: DISCONTINUED | OUTPATIENT
Start: 2022-08-08 | End: 2022-08-08 | Stop reason: SDUPTHER

## 2022-08-08 RX ORDER — SODIUM CHLORIDE 0.9 % (FLUSH) 0.9 %
5-40 SYRINGE (ML) INJECTION PRN
Status: DISCONTINUED | OUTPATIENT
Start: 2022-08-08 | End: 2022-08-08 | Stop reason: SDUPTHER

## 2022-08-08 RX ORDER — PANTOPRAZOLE SODIUM 40 MG/1
40 TABLET, DELAYED RELEASE ORAL
Status: DISCONTINUED | OUTPATIENT
Start: 2022-08-09 | End: 2022-08-10 | Stop reason: HOSPADM

## 2022-08-08 RX ORDER — INSULIN LISPRO 100 [IU]/ML
0-4 INJECTION, SOLUTION INTRAVENOUS; SUBCUTANEOUS NIGHTLY
Status: DISCONTINUED | OUTPATIENT
Start: 2022-08-08 | End: 2022-08-08

## 2022-08-08 RX ORDER — INSULIN LISPRO 100 [IU]/ML
0-4 INJECTION, SOLUTION INTRAVENOUS; SUBCUTANEOUS NIGHTLY
Status: DISCONTINUED | OUTPATIENT
Start: 2022-08-08 | End: 2022-08-09

## 2022-08-08 RX ORDER — SODIUM CHLORIDE 0.9 % (FLUSH) 0.9 %
5-40 SYRINGE (ML) INJECTION EVERY 12 HOURS SCHEDULED
Status: DISCONTINUED | OUTPATIENT
Start: 2022-08-08 | End: 2022-08-10 | Stop reason: HOSPADM

## 2022-08-08 RX ORDER — ATORVASTATIN CALCIUM 40 MG/1
40 TABLET, FILM COATED ORAL NIGHTLY
Status: DISCONTINUED | OUTPATIENT
Start: 2022-08-08 | End: 2022-08-10 | Stop reason: HOSPADM

## 2022-08-08 RX ORDER — SODIUM CHLORIDE 9 MG/ML
INJECTION, SOLUTION INTRAVENOUS PRN
Status: DISCONTINUED | OUTPATIENT
Start: 2022-08-08 | End: 2022-08-08

## 2022-08-08 RX ORDER — GAUZE BANDAGE 2" X 2"
100 BANDAGE TOPICAL DAILY
Status: DISCONTINUED | OUTPATIENT
Start: 2022-08-08 | End: 2022-08-10 | Stop reason: HOSPADM

## 2022-08-08 RX ORDER — INSULIN LISPRO 100 [IU]/ML
0-8 INJECTION, SOLUTION INTRAVENOUS; SUBCUTANEOUS
Status: DISCONTINUED | OUTPATIENT
Start: 2022-08-08 | End: 2022-08-09

## 2022-08-08 RX ORDER — SODIUM CHLORIDE 0.9 % (FLUSH) 0.9 %
5-40 SYRINGE (ML) INJECTION PRN
Status: DISCONTINUED | OUTPATIENT
Start: 2022-08-08 | End: 2022-08-10 | Stop reason: HOSPADM

## 2022-08-08 RX ORDER — NICOTINE 21 MG/24HR
1 PATCH, TRANSDERMAL 24 HOURS TRANSDERMAL DAILY
Status: DISCONTINUED | OUTPATIENT
Start: 2022-08-08 | End: 2022-08-08 | Stop reason: DRUGHIGH

## 2022-08-08 RX ORDER — SODIUM CHLORIDE 9 MG/ML
INJECTION, SOLUTION INTRAVENOUS PRN
Status: DISCONTINUED | OUTPATIENT
Start: 2022-08-08 | End: 2022-08-08 | Stop reason: SDUPTHER

## 2022-08-08 RX ORDER — ONDANSETRON 4 MG/1
4 TABLET, ORALLY DISINTEGRATING ORAL EVERY 8 HOURS PRN
Status: DISCONTINUED | OUTPATIENT
Start: 2022-08-08 | End: 2022-08-10 | Stop reason: HOSPADM

## 2022-08-08 RX ORDER — INSULIN LISPRO 100 [IU]/ML
0-16 INJECTION, SOLUTION INTRAVENOUS; SUBCUTANEOUS
Status: DISCONTINUED | OUTPATIENT
Start: 2022-08-08 | End: 2022-08-08

## 2022-08-08 RX ORDER — POTASSIUM CHLORIDE 7.45 MG/ML
10 INJECTION INTRAVENOUS PRN
Status: DISCONTINUED | OUTPATIENT
Start: 2022-08-08 | End: 2022-08-10 | Stop reason: HOSPADM

## 2022-08-08 RX ORDER — DEXTROSE, SODIUM CHLORIDE, AND POTASSIUM CHLORIDE 5; .45; .15 G/100ML; G/100ML; G/100ML
INJECTION INTRAVENOUS CONTINUOUS PRN
Status: DISCONTINUED | OUTPATIENT
Start: 2022-08-08 | End: 2022-08-08

## 2022-08-08 RX ORDER — ACETAMINOPHEN 650 MG/1
650 SUPPOSITORY RECTAL EVERY 6 HOURS PRN
Status: DISCONTINUED | OUTPATIENT
Start: 2022-08-08 | End: 2022-08-10 | Stop reason: HOSPADM

## 2022-08-08 RX ORDER — INSULIN GLARGINE-YFGN 100 [IU]/ML
25 INJECTION, SOLUTION SUBCUTANEOUS 2 TIMES DAILY
Status: DISCONTINUED | OUTPATIENT
Start: 2022-08-08 | End: 2022-08-08

## 2022-08-08 RX ORDER — ONDANSETRON 2 MG/ML
4 INJECTION INTRAMUSCULAR; INTRAVENOUS EVERY 6 HOURS PRN
Status: DISCONTINUED | OUTPATIENT
Start: 2022-08-08 | End: 2022-08-10 | Stop reason: HOSPADM

## 2022-08-08 RX ORDER — ENOXAPARIN SODIUM 100 MG/ML
40 INJECTION SUBCUTANEOUS DAILY
Status: DISCONTINUED | OUTPATIENT
Start: 2022-08-08 | End: 2022-08-10 | Stop reason: HOSPADM

## 2022-08-08 RX ORDER — ACETAMINOPHEN 325 MG/1
650 TABLET ORAL EVERY 6 HOURS PRN
Status: DISCONTINUED | OUTPATIENT
Start: 2022-08-08 | End: 2022-08-10 | Stop reason: HOSPADM

## 2022-08-08 RX ORDER — NICOTINE 21 MG/24HR
1 PATCH, TRANSDERMAL 24 HOURS TRANSDERMAL DAILY
Status: DISCONTINUED | OUTPATIENT
Start: 2022-08-08 | End: 2022-08-10 | Stop reason: HOSPADM

## 2022-08-08 RX ORDER — INSULIN LISPRO 100 [IU]/ML
5 INJECTION, SOLUTION INTRAVENOUS; SUBCUTANEOUS
Status: DISCONTINUED | OUTPATIENT
Start: 2022-08-08 | End: 2022-08-09

## 2022-08-08 RX ORDER — FOLIC ACID 1 MG/1
1 TABLET ORAL DAILY
Status: DISCONTINUED | OUTPATIENT
Start: 2022-08-08 | End: 2022-08-10 | Stop reason: HOSPADM

## 2022-08-08 RX ORDER — POLYETHYLENE GLYCOL 3350 17 G/17G
17 POWDER, FOR SOLUTION ORAL DAILY PRN
Status: DISCONTINUED | OUTPATIENT
Start: 2022-08-08 | End: 2022-08-10 | Stop reason: HOSPADM

## 2022-08-08 RX ORDER — SODIUM CHLORIDE 9 MG/ML
INJECTION, SOLUTION INTRAVENOUS CONTINUOUS
Status: DISCONTINUED | OUTPATIENT
Start: 2022-08-08 | End: 2022-08-08

## 2022-08-08 RX ORDER — INSULIN GLARGINE-YFGN 100 [IU]/ML
28 INJECTION, SOLUTION SUBCUTANEOUS EVERY MORNING
Status: DISCONTINUED | OUTPATIENT
Start: 2022-08-09 | End: 2022-08-09

## 2022-08-08 RX ORDER — ASPIRIN 81 MG/1
81 TABLET ORAL DAILY
Status: DISCONTINUED | OUTPATIENT
Start: 2022-08-08 | End: 2022-08-10 | Stop reason: HOSPADM

## 2022-08-08 RX ORDER — POTASSIUM CHLORIDE 20 MEQ/1
40 TABLET, EXTENDED RELEASE ORAL 2 TIMES DAILY WITH MEALS
Status: COMPLETED | OUTPATIENT
Start: 2022-08-08 | End: 2022-08-08

## 2022-08-08 RX ORDER — MAGNESIUM SULFATE 1 G/100ML
1000 INJECTION INTRAVENOUS PRN
Status: DISCONTINUED | OUTPATIENT
Start: 2022-08-08 | End: 2022-08-08

## 2022-08-08 RX ADMIN — POTASSIUM CHLORIDE, DEXTROSE MONOHYDRATE AND SODIUM CHLORIDE: 150; 5; 450 INJECTION, SOLUTION INTRAVENOUS at 07:11

## 2022-08-08 RX ADMIN — METOPROLOL TARTRATE 12.5 MG: 25 TABLET, FILM COATED ORAL at 08:32

## 2022-08-08 RX ADMIN — VANCOMYCIN HYDROCHLORIDE 1000 MG: 1 INJECTION, POWDER, LYOPHILIZED, FOR SOLUTION INTRAVENOUS at 16:50

## 2022-08-08 RX ADMIN — CEFTRIAXONE 1000 MG: 1 INJECTION, POWDER, FOR SOLUTION INTRAMUSCULAR; INTRAVENOUS at 04:42

## 2022-08-08 RX ADMIN — ASPIRIN 81 MG: 81 TABLET, COATED ORAL at 08:33

## 2022-08-08 RX ADMIN — INSULIN LISPRO 5 UNITS: 100 INJECTION, SOLUTION INTRAVENOUS; SUBCUTANEOUS at 14:22

## 2022-08-08 RX ADMIN — CEFEPIME 2000 MG: 2 INJECTION, POWDER, FOR SOLUTION INTRAVENOUS at 11:05

## 2022-08-08 RX ADMIN — FOLIC ACID 1 MG: 1 TABLET ORAL at 08:32

## 2022-08-08 RX ADMIN — SODIUM CHLORIDE, PRESERVATIVE FREE 10 ML: 5 INJECTION INTRAVENOUS at 20:15

## 2022-08-08 RX ADMIN — SODIUM CHLORIDE 0.1 UNITS/KG/HR: 9 INJECTION, SOLUTION INTRAVENOUS at 00:42

## 2022-08-08 RX ADMIN — POTASSIUM CHLORIDE 40 MEQ: 1500 TABLET, EXTENDED RELEASE ORAL at 08:32

## 2022-08-08 RX ADMIN — SODIUM CHLORIDE 1000 ML: 9 INJECTION, SOLUTION INTRAVENOUS at 02:51

## 2022-08-08 RX ADMIN — SODIUM CHLORIDE: 9 INJECTION, SOLUTION INTRAVENOUS at 04:01

## 2022-08-08 RX ADMIN — ENOXAPARIN SODIUM 40 MG: 100 INJECTION SUBCUTANEOUS at 08:33

## 2022-08-08 RX ADMIN — CEFEPIME 2000 MG: 2 INJECTION, POWDER, FOR SOLUTION INTRAVENOUS at 22:12

## 2022-08-08 RX ADMIN — SODIUM PHOSPHATE, MONOBASIC, MONOHYDRATE AND SODIUM PHOSPHATE, DIBASIC, ANHYDROUS 15 MMOL: 276; 142 INJECTION, SOLUTION INTRAVENOUS at 08:39

## 2022-08-08 RX ADMIN — Medication 100 MG: at 08:32

## 2022-08-08 RX ADMIN — INSULIN GLARGINE-YFGN 25 UNITS: 100 INJECTION, SOLUTION SUBCUTANEOUS at 09:58

## 2022-08-08 RX ADMIN — METOPROLOL TARTRATE 12.5 MG: 25 TABLET, FILM COATED ORAL at 20:14

## 2022-08-08 RX ADMIN — POTASSIUM CHLORIDE 40 MEQ: 1500 TABLET, EXTENDED RELEASE ORAL at 16:52

## 2022-08-08 ASSESSMENT — ENCOUNTER SYMPTOMS
COUGH: 1
SHORTNESS OF BREATH: 0
DIARRHEA: 0
NAUSEA: 1
ABDOMINAL PAIN: 0
VOMITING: 1

## 2022-08-08 NOTE — H&P
[facility]  Internal Medicine Residency Program  History and Physical    Patient:  Maritza Neal 47 y.o. male MRN: [id number]     Date of Service: 8/8/2022    Hospital Day: 2      Chief complaint: had concerns including Nausea, Emesis, and Hyperglycemia (Patient states since noon he has been unable to keep anything down and his blood ugar is elevated. EMS gave him a 500cc bolus and IV Zofran. ). History Obtained From:  patient    Date of Admission: 87/22  History of Present Illness   Maritza Neal is a 47 y.o. male with past medical history of diabetes mellitus type 1 with insulin pump, hypertension, CVA, and polysubstance abuse. He presented to the ED with complaints of nausea, vomiting, and hyperglycemia. The patient reports that yesterday he felt like he getting an  upper respiratory infection as he has had a cough for a few days that is productive of white phlegm. He denied any fever, chills, or trouble breathing. He then developed nausea and vomiting this morning and has been unable to hold down any food since noon. He checked his blood glucose and was found to be hyperglycemia with a glucose in the 500s. The patient uses an insulin pump to administer his insulin but does not have a continuous glucose monitor. He states he checks his glucose 5-6 times per day and is typically in the 200s but does go as high as 400. He reports some hypoglycemic events in the mornings with glucose readings in the 50-60s. The patient reports he has nausea and blurry vision. He denied abdominal pain, diarrhea, fever, chills, chest pain. The patient reports daily alcohol use of 6 beers every day, his last drink being last night. He denied any symptoms of alcohol withdrawal.       ED Course: Patient came to ED with complaint of nausea, vomiting, and hyperglycemia. DKA protocol was started and patient was given an insulin bolus + infusion and bicarb.  A central line was placed on the patient and he was admitted to the ICU for further management. ED Meds: Patient was given zofran, sodium bicarbonate, insulin  ED Fluids: Patient was given normal saline    Previous Hospital Admission: Patient admitted 4/29/22 to 5/1/2022 for CAP and otitis media. Past Medical History:       Diagnosis Date    Alcoholism ([facility]     [facility]     Diabetes mellitus ([facility]     Hypertension     Idiopathic peripheral neuropathy 9/21/2016    Lacunar stroke ([facility]     Marijuana abuse        Past Surgical History:    History reviewed. No pertinent surgical history. Medications Prior to Admission:    Prior to Admission medications    Medication Sig Start Date End Date Taking? Authorizing Provider   Continuous Blood Gluc  (DEXCOM G6 ) DAVID For BS monitoring 5/10/22   CHAYO Barclay NP   blood glucose test strips (ONETOUCH VERIO) strip 1 each by In Vitro route 5 times daily As needed.  5/10/22   CHAYO Barclay NP   insulin aspart (NOVOLOG) 100 UNIT/ML injection vial 100 units per day per insulin pump 5/10/22   CHAYO Barclay NP   atorvastatin (LIPITOR) 40 MG tablet Take 1 tablet by mouth nightly 5/1/22   Cyn Maier MD   Continuous Blood Gluc Sensor (DEXCOM G6 SENSOR) MISC Apply every 10 days  Patient not taking: Reported on 5/10/2022 3/23/22   CHAYO Wilcox NP   Continuous Blood Gluc Transmit (DEXCOM G6 TRANSMITTER) MISC 1 transmitter for every 90 days 3/23/22   CHAYO Wilcox NP   Continuous Blood Gluc  ([address]) [address] For bs monitoring 3/23/22   CHAYO Wilcox NP   insulin aspart (NOVOLOG) 100 UNIT/ML injection vial 100 units/day via insulin pump 3/22/22   CHAYO Lujan   folic acid (FOLVITE) 1 MG tablet Take 1 tablet by mouth daily 1/27/22   Mariana Bolivar DO   Cholecalciferol 400 UNIT TABS tablet Take 2.5 tablets by mouth daily 1/27/22   Mariana Bolivar DO   thiamine 100 MG tablet Take 1 tablet by mouth daily 1/27/22   Mariana Vasques DO   Insulin Pump - insulin lispro Inject into the skin continuous Insulin-to-Carb Ratio (ICR): Insulin Sensitivity Factor (ISF): mg/dL per unit of insulin  Target Blood Glucose:  mg/dL  Bolus Frequency:    Historical Provider, MD   lisinopril (PRINIVIL;ZESTRIL) 5 MG tablet Take 1 tablet by mouth daily 1/12/22 2/11/22  CHAYO Barclay - NP   aspirin 81 MG EC tablet Take 1 tablet by mouth daily 9/29/21   Yehuda Torres MD   albuterol sulfate  (90 Base) MCG/ACT inhaler Inhale 2 puffs into the lungs every 6 hours as needed for Wheezing or Shortness of Breath 9/28/21   Yehuda Torres MD   metoprolol tartrate (LOPRESSOR) 25 MG tablet Take 0.5 tablets by mouth 2 times daily 9/28/21   Yehuda Torres MD       Allergies:  Metoprolol    Social History:   TOBACCO:   reports that he has been smoking cigarettes. He has a 22.50 pack-year smoking history. He has never used smokeless tobacco.  ETOH:   reports current alcohol use of about 6.0 standard drinks per week. OCCUPATION: unemployed    Family History:       Problem Relation Age of Onset    Diabetes type 2  Mother     Cancer Maternal Grandmother     Diabetes type 2  Nephew        REVIEW OF SYSTEMS:    Review of Systems   Constitutional:  Positive for appetite change. Negative for activity change, chills, fatigue and fever. Eyes:  Positive for visual disturbance. Respiratory:  Positive for cough. Negative for shortness of breath. Cardiovascular:  Negative for chest pain and palpitations. Gastrointestinal:  Positive for nausea and vomiting. Negative for abdominal pain and diarrhea. Genitourinary:  Negative for difficulty urinating. Neurological:  Negative for dizziness, weakness and headaches.       Physical Exam   Vitals: /88   Pulse (!) 105   Temp 98 °F (36.7 °C)   Resp 19   Ht 5' 6\" (1.676 m)   Wt 150 lb (68 kg)   SpO2 95%   BMI 24.21 kg/m²     Physical Exam  Constitutional:       Appearance: Normal appearance. HENT:      Head: Normocephalic and atraumatic. Nose: Nose normal.      Mouth/Throat:      Mouth: Mucous membranes are dry. Eyes:      Extraocular Movements: Extraocular movements intact. Conjunctiva/sclera: Conjunctivae normal.      Pupils: Pupils are equal, round, and reactive to light. Cardiovascular:      Rate and Rhythm: Normal rate and regular rhythm. Pulses: Normal pulses. Heart sounds: Normal heart sounds. Pulmonary:      Effort: Pulmonary effort is normal.      Breath sounds: Normal breath sounds. Abdominal:      General: Abdomen is flat. Palpations: Abdomen is soft. Musculoskeletal:         General: Normal range of motion. Cervical back: Normal range of motion and neck supple. Skin:     General: Skin is warm. Neurological:      Mental Status: He is alert and oriented to person, place, and time. Psychiatric:         Mood and Affect: Mood normal.         Behavior: Behavior normal.         Thought Content:  Thought content normal.      Labs and Imaging Studies   Basic Labs  Recent Labs     08/07/22 2153 08/08/22  0040     --    K 5.5*  --    CL 95*  --    CO2 10*  --    BUN 30*  --    CREATININE 1.2  --    GLUCOSE 478* 442   CALCIUM 9.4  --        Recent Labs     08/07/22 2153   WBC 16.4*   RBC 4.58   HGB 13.8   HCT 43.4   MCV 94.8   MCH 30.1   MCHC 31.8*   RDW 15.3*      MPV 9.8       CBC:   Lab Results   Component Value Date/Time    WBC 16.4 08/07/2022 09:53 PM    RBC 4.58 08/07/2022 09:53 PM    HGB 13.8 08/07/2022 09:53 PM    HCT 43.4 08/07/2022 09:53 PM    MCV 94.8 08/07/2022 09:53 PM    RDW 15.3 08/07/2022 09:53 PM     08/07/2022 09:53 PM     BMP:    Lab Results   Component Value Date/Time     08/07/2022 09:53 PM    K 5.5 08/07/2022 09:53 PM    CL 95 08/07/2022 09:53 PM    CO2 10 08/07/2022 09:53 PM    BUN 30 08/07/2022 09:53 PM     CMP:  Lab Results Component Value Date/Time     08/07/2022 09:53 PM    K 5.5 08/07/2022 09:53 PM    CL 95 08/07/2022 09:53 PM    CO2 10 08/07/2022 09:53 PM    BUN 30 08/07/2022 09:53 PM    PROT 7.2 08/07/2022 09:53 PM       Imaging Studies:     XR CHEST PORTABLE    Result Date: 8/7/2022  EXAMINATION: ONE XRAY VIEW OF THE CHEST 8/7/2022 9:46 pm COMPARISON: 05/01/2022 HISTORY: ORDERING SYSTEM PROVIDED HISTORY: emesis TECHNOLOGIST PROVIDED HISTORY: Reason for exam:->emesis What reading provider will be dictating this exam?->CRC FINDINGS: The lungs are without acute focal process. There is no effusion or pneumothorax. The cardiomediastinal silhouette is without acute process. The osseous structures are without acute process. No acute process. Resident's Assessment and Plan   Jaci Phan is a 47 y.o. male with past medical history of diabetes mellitus type 1 with insulin pump, hypertension, CVA, and polysubstance abuse. He presented to the ED with complaints of nausea, vomiting, and hyperglycemia. Assessment & Plan  DKA  - likely 2/2 URI and dehydration  - patient has hx of DM1 on insulin pump; glucose typically in 200s with episodes in 400s  - patient began with nausea and vomiting this morning   - beta-hydroxybutyrate >4.50  - urine ketones >80  - venous pH 7.24  - blood glucose on admission 478  PLAN  - DKA protocol.  Bridge when anion gap closes x2  - BMP q4h  - recheck A1C, last A1C on 5/10/22 was 8.9  - Consider endocrinology consult once nearing bridging    HAGMA  - 2/2 DKA, lactic acidosis  - lactic acid 2.5  - anion gap 27  PLAN  - DKA protocol   - BMP q4h    Hx HTN  - current /88  PLAN  - resume home med: lopressor 25    KIRSTEN Stage 2  - baseline creatinine 0.7-0.8  - current Crt 1.2  - likely pre-renal 2/2 dehydration  PLAN  - obtain urine electrolytes  - continue fluid resuscitation    URI  - patient complains of cough productive of white phlegm for a few days duration  - WBC 16.4  - CXR: no acute process  PLAN  - obtain pro-calcitonin, ESR, CRP, respiratory viral panel    Polysubstance abuse  - patient reports he drinks 6 beers per day, with his last drink being last night  - denies history of withdrawal or any current withdrawal symptoms  - patient smokes 1.5 packs of cigarettes per day for 20 years  - patient uses marijuana daily  PLAN  - thiamine, folate  - monitor for symptoms of withdrawal  - nicotine patch    Indirect hyperbilirubinemia   - bilirubin 1.8; indirect 1.4  - has been elevated on previous admissions  PLAN  - monitor     8. Leukocytosis, reactive vs infectious  - came in with dehydration DKA  - he does have URTI symptoms, although no other signs of infection noted such as fever, chills, abdominal pain  PLAN  - Infectious work-up. Procal, ESR, CRP, respi viral panel, cultures  - Monitor CBC daily    PT/OT: -  DVT ppx: lovenox  GI ppx: -    Next of Kin/ POA:   Mother Ozzy Ramos ([phone])    Code Status:   Full    Tasha Cook MD, PGY-1  Attending physician: Dr. Kady Eldridge    Senior Resident Addendum:  I have seen and examined the patient with the intern. I have discussed the case, including pertinent history and exam findings with the intern. I agree with the assessment, plan and orders as documented above with the following additions:    Patient supposedly taking lisinopril for microalbuminuria. However, he is not currently taking it as he makes him sick. Patient has microalbuminuria, consider losartan on discharge.     Tom Vargas MD  8/8/2022  8:00 AM

## 2022-08-08 NOTE — ED NOTES
Dr. Pat Jaramillo unsuccessful at [address] Will Leblanc RN at bedside for second attempt      Brandt Son, RADHA  08/07/22 1346

## 2022-08-08 NOTE — CONSULTS
ENDOCRINOLOGY INITIAL CONSULTATION NOTE      Date of admission: 8/7/2022  Date of service: 8/8/2022  Admitting physician: Antonia Hanson DO   Primary Care Physician: Deandra Thomas MD  Consultant physician: Deandra Bass MD     Reason for the consultation:  Uncontrolled DM    History of Present Illness: The history is provided by the patient. Accuracy of the patient data is excellent    Chris Espinosa is a very pleasant 47 y.o. old male with PMH of Type 1 Diabetes mellitus on [employer] insulin pump, HTN, HLD, Marijuana abuse, Remote Hx of Lacunar stroke and others listed below who presented to the ED for abdominal pain. He was admitted to [facility] on 8/7/2022 because of nausea/vomitus and found to be in DKA, endocrine team was consulted for diabetes management. The patient was in his usual state of health until yesterday when started c/o symptoms of upper respiratory infection. He denied any fever, chills, or trouble breathing. He then developed nausea and vomiting this morning and has been unable to hold down any food. Pt also reported that BG was running in 500's at home     Prior to admission  The patient was diagnosed with type 1 DM at the age 30. Prior to admission patient was on an insulin pump with the following pump settings: Basal rate 12 A 1.35, 8a 1.45, and CR 10, ISF 45, goal 100-130, active insulin time 3 hrs. He also had some financial difficulties obtaining insulin in the past however that has resolved and he has no issues getting insulin for his pump. Patient has not been eating consistent carbohydrate meals, self-blood glucose monitoring has been above goal prior to admission. In addition, patient denied macrovascular or microvascular complications. Lab Results   Component Value Date/Time    LABA1C 9.0 08/08/2022 06:28 AM     His diabetes is also complicated by neuropathy and retinopathy s/p laser therapy.  Macrovascular complications (+) TIA 9/5406, no hx of CAD, PVD     Point of care glucose monitoring (Independently reviewed)   Recent Labs     08/08/22  0511 08/08/22  0557 08/08/22  0656 08/08/22  0758 08/08/22  0858 08/08/22  0954 08/08/22  1100 08/08/22  1204   GLUMET 268* 271* 223* 226* 259* 234* 207* 189*       Past medical history:   Past Medical History:   Diagnosis Date    Alcoholism ([facility]     Cocaine abuse ([facility]     Diabetes mellitus ([facility]     Hypertension     Idiopathic peripheral neuropathy 9/21/2016    Lacunar stroke ([facility]     Marijuana abuse        Past surgical history:  History reviewed. No pertinent surgical history. Social history:   Tobacco:   reports that he has been smoking cigarettes. He has a 22.50 pack-year smoking history. He has never used smokeless tobacco.  Alcohol:   reports current alcohol use of about 6.0 standard drinks per week. Drugs:   reports current drug use. Drug: Marijuana Elisabeth Black). Family history:    Family History   Problem Relation Age of Onset    Diabetes type 2  Mother     Cancer Maternal Grandmother     Diabetes type 2  Nephew        Allergy and drug reactions: Allergies   Allergen Reactions    Metoprolol      Bradycardia      Patient states he has been currently taking it at home without any issues.        Scheduled Meds:   thiamine mononitrate  100 mg Oral Daily    metoprolol tartrate  12.5 mg Oral BID    folic acid  1 mg Oral Daily    atorvastatin  40 mg Oral Nightly    aspirin  81 mg Oral Daily    enoxaparin  40 mg SubCUTAneous Daily    sodium chloride flush  5-40 mL IntraVENous 2 times per day    nicotine  1 patch TransDERmal Daily    potassium chloride  40 mEq Oral BID WC    [START ON 8/9/2022] pantoprazole  40 mg Oral QAM AC    cefepime  2,000 mg IntraVENous Q12H    vancomycin  1,000 mg IntraVENous Q12H    insulin lispro  5 Units SubCUTAneous TID WC    insulin lispro  0-8 Units SubCUTAneous TID WC    insulin lispro  0-4 Units SubCUTAneous Nightly    [START ON 8/9/2022] insulin glargine  28 Units SubCUTAneous QAM    ondansetron  4 mg IntraVENous Once     PRN Meds:   ondansetron, 4 mg, Q8H PRN   Or  ondansetron, 4 mg, Q6H PRN  polyethylene glycol, 17 g, Daily PRN  acetaminophen, 650 mg, Q6H PRN   Or  acetaminophen, 650 mg, Q6H PRN  potassium chloride, 10 mEq, PRN  dextrose bolus, 125 mL, PRN   Or  dextrose bolus, 250 mL, PRN  sodium chloride flush, 5-40 mL, PRN  glucose, 4 tablet, PRN  glucagon (rDNA), 1 mg, PRN  dextrose, , Continuous PRN    Continuous Infusions:   dextrose         Review of Systems  All systems reviewed. All negative except for symptoms mentioned in HPI     OBJECTIVE    /78   Pulse 79   Temp 98.6 °F (37 °C) (Oral)   Resp 24   Ht 5' 6\" (1.676 m)   Wt 143 lb 6.4 oz (65 kg)   SpO2 94%   BMI 23.15 kg/m²     Intake/Output Summary (Last 24 hours) at 8/8/2022 1408  Last data filed at 8/8/2022 1000  Gross per 24 hour   Intake 400 ml   Output 725 ml   Net -325 ml     Physical examination:  Due to this being a TeleHealth encounter, evaluation of the following organ systems is limited: Vitals/Constitutional/EENT/Resp/CV/GI//MS/Neuro/Skin/Heme-Lymph-Imm. Modified physical exam through Telemedicine camera    General: Communicating well via camera   Neck: no obvious neck mass. No obvious neck deformity     CVS: no distress   Chest: no distress. Chest is moving with respiration    Extremities:  no visible tremor  Skin: No visible rashes as seen from camera   Musculoskeletal: no visible deformity  Neuro: Alert and oriented to person, place, and time. Psychiatric: Normal mood and affect.  Behavior is normal    Review of Laboratory Data:  I personally reviewed the following labs:   Recent Labs     08/07/22 2153 08/08/22 0628   WBC 16.4* 19.4*   RBC 4.58 3.70*   HGB 13.8 11.0*   HCT 43.4 33.7*   MCV 94.8 91.1   MCH 30.1 29.7   MCHC 31.8* 32.6   RDW 15.3* 15.3*    370   MPV 9.8 8.8     Recent Labs     08/07/22 2153 08/08/22  0040 08/08/22  0256 08/08/22  0628 08/08/22  0922     --  139 138 137   K 5.5*  --  3.6 3. 5 3.7   CL 95*  --  101 107 105   CO2 10*  --  16* 19* 22   BUN 30*  --  32* 28* 25*   CREATININE 1.2  --  1.2 1.1 0.9   GLUCOSE 478*   < > 330* 252* 223*   CALCIUM 9.4  --  8.2* 8.1* 8.2*   PROT 7.2  --   --  5.9*  --    LABALBU 4.2  --   --  3.8  --    BILITOT 1.8*  --   --  1.0  --    ALKPHOS 149*  --   --  110  --    AST 23  --   --  14  --    ALT 18  --   --  18  --     < > = values in this interval not displayed. Beta-Hydroxybutyrate   Date Value Ref Range Status   08/07/2022 >4.50 (H) 0.02 - 0.27 mmol/L Final   04/29/2022 3.09 (H) 0.02 - 0.27 mmol/L Final   01/25/2022 >4.50 (H) 0.02 - 0.27 mmol/L Final     Lab Results   Component Value Date/Time    LABA1C 9.0 08/08/2022 06:28 AM    LABA1C 8.9 05/10/2022 12:15 PM    LABA1C 9.3 01/26/2022 03:03 PM     Lab Results   Component Value Date/Time    TSH 3.330 01/04/2020 05:07 PM    T4FREE 0.97 07/19/2012 02:00 AM     Lab Results   Component Value Date/Time    LABA1C 9.0 08/08/2022 06:28 AM    GLUCOSE 223 08/08/2022 09:22 AM    GLUCOSE 83 04/11/2012 03:35 AM    MALBCR 167.3 08/08/2022 03:25 AM    LABMICR 50.2 08/08/2022 03:25 AM    LABCREA 30 08/08/2022 03:25 AM    LABCREA 30 08/08/2022 03:25 AM     Lab Results   Component Value Date/Time    TRIG 100 01/26/2022 06:00 AM    HDL 48 09/26/2021 05:58 PM    LDLCALC 112 09/26/2021 05:58 PM    CHOL 213 09/26/2021 05:58 PM       Blood culture   Lab Results   Component Value Date/Time    BC 5 Days no growth 05/01/2022 11:20 AM    BC  04/30/2022 01:00 AM     This organism was isolated in one set. Susceptibility testing is not routinely done as this  organism frequently represents skin contamination. Additional testing can be ordered by calling the  Microbiology Department. Radiology:  XR CHEST PORTABLE   Final Result   No acute cardiopulmonary process. XR CHEST PORTABLE   Final Result   No acute process.              Medical Records/Labs/Images review:   I personally reviewed and summarized previous records   All labs and imaging were reviewed independently     Gloria Zapata, a 47 y.o.-old male seen today for inpatient diabetes management     Diabetes Mellitus type 1 on insulin pump   Patient's diabetes is uncontrolled, A1c 9%     Current DKA likely due to infusion set problem   For today, We recommend the following insulin regimen  Lantus 28u daily in AM   Humalog 5u with meals   Medium dose sliding scale   Continue glucose check with meals and at bedtime   Plan to restart insulin pump tomorrow morning   Pt will follow with us after discharge. DKA   Resolved, bridged to SQ insulin   Insulin regimen as above     The above issues were reviewed with the patient who understood and agreed with the plan. Thank you for allowing us to participate in the care of this patient. Please do not hesitate to contact us with any additional questions. Beth Garcia MD  Endocrinologist, [facility] Diabetes Care and Endocrinology   [address] [facility] [address]   Phone: [phone]  Fax: [phone]  ---------------------------  An electronic signature was used to authenticate this note.  Breonna Novak MD on 8/8/2022 at 2:08 PM

## 2022-08-08 NOTE — ED PROVIDER NOTES
Chief Complaint   Patient presents with    Nausea    Emesis    Hyperglycemia     Patient states since noon he has been unable to keep anything down and his blood ugar is elevated. EMS gave him a 500cc bolus and IV Zofran. 52-year-old female with past medical history of type 1 diabetes, hypertension, substance abuse presenting today with nausea, vomiting, hyperglycemia. He says that yesterday he was feeling as if he had an upper respiratory infection, since noon today he has not been able to keep anything down. He has been vomiting several times prior to arrival, nothing is made it better or worse, not associate with fevers, chills, abdominal pain, numbness, tingling, weakness. Denies hematemesis or melena. He has been in DKA several times in the past.  He does not have any other acute complaints today. Review of Systems   Constitutional:  Negative for chills and fever. HENT:  Negative for ear pain, sinus pain and sore throat. Eyes:  Negative for pain and redness. Respiratory:  Negative for cough, shortness of breath and wheezing. Cardiovascular:  Negative for chest pain. Gastrointestinal:  Positive for nausea and vomiting. Negative for abdominal pain and diarrhea. Genitourinary:  Negative for dysuria and flank pain. Musculoskeletal:  Negative for back pain and neck pain. Skin:  Negative for rash. Neurological:  Negative for seizures and headaches. Hematological:  Negative for adenopathy. All other systems reviewed and are negative. Physical Exam  Vitals and nursing note reviewed. Constitutional:       General: He is not in acute distress. Appearance: He is well-developed. HENT:      Head: Normocephalic and atraumatic. Right Ear: External ear normal.      Left Ear: External ear normal.      Mouth/Throat:      Mouth: Mucous membranes are moist.      Pharynx: Oropharynx is clear. Eyes:      Pupils: Pupils are equal, round, and reactive to light. Cardiovascular:      Rate and Rhythm: Normal rate and regular rhythm. Heart sounds: Normal heart sounds. No murmur heard. Pulmonary:      Effort: Pulmonary effort is normal.      Breath sounds: Normal breath sounds. Abdominal:      General: Bowel sounds are normal.      Palpations: Abdomen is soft. Tenderness: There is no abdominal tenderness. There is no guarding or rebound. Musculoskeletal:         General: No tenderness. Cervical back: Normal range of motion and neck supple. Skin:     General: Skin is warm and dry. Neurological:      General: No focal deficit present. Mental Status: He is alert and oriented to person, place, and time. Procedures     Central Line Placement Procedure Note    Indication: vascular access    Consent: The patient provided verbal consent for this procedure. Procedure: The patient was positioned appropriately and the skin over the right internal jugular vein was prepped with betadine and draped in a sterile fashion. Local anesthesia was obtained by infiltration using 1% Lidocaine without epinephrine. A large bore needle was used to identify the vein. A guide wire was then inserted into the vein through the needle. A triple lumen catheter was then inserted into the vessel over the guide wire using the Seldinger technique. All ports showed good, free flowing blood return and were flushed with saline solution. The catheter was then securely fastened to the skin with suture at 15 cm. Two sutures were placed into the kit included tube clamp, proximal eyelets, and a suture end from each of the securing sutures was extended around the catheter and tied to the proximal eyelets as an added measure to prevent dislodgement. An antibiotic disk was placed and the site was then covered with a sterile dressing. A post procedure X-ray was ordered and is still pending at this time. The patient tolerated the procedure well.     Complications: None        EKG: This EKG is signed and interpreted by me. Rate: 102  Rhythm: Sinus  Interpretation: no acute changes, no ST elevations, intervals are normal, normal axis, QTC is 471  Comparison: stable as compared to patient's most recent EKG      MDM  Number of Diagnoses or Management Options  Diabetic ketoacidosis without coma associated with other specified diabetes mellitus ([facility] [address]  Diagnosis management comments: 31-year-old male with past medical history of type 1 diabetes, hypertension, substance abuse presented with nausea, vomiting, hyperglycemia. On arrival to emergency department he is slightly tachycardic otherwise hemodynamically stable. EMS had given him 500 mL fluid bolus and Zofran in route to the hospital.   Patient was given fluid bolus on arrival to emergency department, lab work demonstrated DKA with bicarb 10, gap 27, elevated ketones, glucose 478. Insulin infusion protocol was initiated and patient given 2 A of bicarb. Discussed with Dr. Sumaya Bowden in the ICU, he will accept the patient for critical care management. ED Course as of 08/08/22 0144   Sun Aug 07, 2022   2353 DKA protocol ordered, patient will get insulin bolus as well as infusion and 2 A of bicarb as it was 10. He has 2 peripheral access points. Paging the ICU. [MM]   2358 Dr. Sumaya Bowden will except the patient for in the ICU for critical care management. [MM]   Mon Aug 08, 2022   0014 Dr. Caryl Sharp will accept for admission. [MM]   0038 Discussed with internal medicine resident, they are aware of the patient. [MM]      ED Course User Index  [MM] Shruthi Shah,         ED Course as of 08/08/22 0144   Sun Aug 07, 2022   2353 DKA protocol ordered, patient will get insulin bolus as well as infusion and 2 A of bicarb as it was 10. He has 2 peripheral access points. Paging the ICU. [MM]   2358 Dr. Sumaya Bowden will except the patient for in the ICU for critical care management.  [MM]   [facility] Aug 08, 2022   7498 Dr. Caryl Sharp will accept for admission. [MM]   0038 Discussed with internal medicine resident, they are aware of the patient. [MM]      ED Course User Index  [MM] Adryan Kelly, DO       --------------------------------------------- PAST HISTORY ---------------------------------------------  Past Medical History:  has a past medical history of Alcoholism ([facility] [address] Cocaine abuse ([facility] [address] [facility] Hypertension, Idiopathic peripheral neuropathy, Lacunar stroke ([facility] and Marijuana abuse. Past Surgical History:  has no past surgical history on file. Social History:  reports that he has been smoking cigarettes. He has a 22.50 pack-year smoking history. He has never used smokeless tobacco. He reports current alcohol use of about 6.0 standard drinks per week. He reports current drug use. Drug: Marijuana Bernice Krishna). Family History: family history includes Cancer in his maternal grandmother; Diabetes type 2  in his mother and nephew. The patients home medications have been reviewed.     Allergies: Metoprolol    -------------------------------------------------- RESULTS -------------------------------------------------    LABS:  Results for orders placed or performed during the hospital encounter of 08/07/22   CBC with Auto Differential   Result Value Ref Range    WBC 16.4 (H) 4.5 - 11.5 E9/L    RBC 4.58 3.80 - 5.80 E12/L    Hemoglobin 13.8 12.5 - 16.5 g/dL    Hematocrit 43.4 37.0 - 54.0 %    MCV 94.8 80.0 - 99.9 fL    MCH 30.1 26.0 - 35.0 pg    MCHC 31.8 (L) 32.0 - 34.5 %    RDW 15.3 (H) 11.5 - 15.0 fL    Platelets 377 279 - 956 E9/L    MPV 9.8 7.0 - 12.0 fL    Neutrophils % 86.4 (H) 43.0 - 80.0 %    Immature Granulocytes % 0.7 0.0 - 5.0 %    Lymphocytes % 6.3 (L) 20.0 - 42.0 %    Monocytes % 5.2 2.0 - 12.0 %    Eosinophils % 0.9 0.0 - 6.0 %    Basophils % 0.5 0.0 - 2.0 %    Neutrophils Absolute 14.17 (H) 1.80 - 7.30 E9/L    Immature Granulocytes # 0.11 E9/L    Lymphocytes Absolute 1.03 (L) 1.50 - 4.00 E9/L Monocytes Absolute 0.86 0.10 - 0.95 E9/L    Eosinophils Absolute 0.15 0.05 - 0.50 E9/L    Basophils Absolute 0.08 0.00 - 0.20 E9/L   Basic Metabolic Panel w/ Reflex to MG   Result Value Ref Range    Sodium 132 132 - 146 mmol/L    Potassium reflex Magnesium 5.5 (H) 3.5 - 5.0 mmol/L    Chloride 95 (L) 98 - 107 mmol/L    CO2 10 (L) 22 - 29 mmol/L    Anion Gap 27 (H) 7 - 16 mmol/L    Glucose 478 (H) 74 - 99 mg/dL    BUN 30 (H) 6 - 20 mg/dL    Creatinine 1.2 0.7 - 1.2 mg/dL    GFR Non-African American >60 >=60 mL/min/1.73    GFR African American >60     Calcium 9.4 8.6 - 10.2 mg/dL   Hepatic Function Panel   Result Value Ref Range    Total Protein 7.2 6.4 - 8.3 g/dL    Albumin 4.2 3.5 - 5.2 g/dL    Alkaline Phosphatase 149 (H) 40 - 129 U/L    ALT 18 0 - 40 U/L    AST 23 0 - 39 U/L    Total Bilirubin 1.8 (H) 0.0 - 1.2 mg/dL    Bilirubin, Direct 0.4 (H) 0.0 - 0.3 mg/dL    Bilirubin, Indirect 1.4 (H) 0.0 - 1.0 mg/dL   Lipase   Result Value Ref Range    Lipase 15 13 - 60 U/L   Lactic Acid   Result Value Ref Range    Lactic Acid 2.5 (H) 0.5 - 2.2 mmol/L   Urinalysis with Microscopic   Result Value Ref Range    Color, UA Yellow Straw/Yellow    Clarity, UA Clear Clear    Glucose, Ur >=1000 (A) Negative mg/dL    Bilirubin Urine SMALL (A) Negative    Ketones, Urine >=80 (A) Negative mg/dL    Specific Gravity, UA 1.025 1.005 - 1.030    Blood, Urine TRACE-INTACT Negative    pH, UA 6.0 5.0 - 9.0    Protein, UA TRACE Negative mg/dL    Urobilinogen, Urine 0.2 <2.0 E.U./dL    Nitrite, Urine Negative Negative    Leukocyte Esterase, Urine Negative Negative    WBC, UA 0-1 0 - 5 /HPF    RBC, UA 1-3 0 - 2 /HPF    Bacteria, UA RARE (A) None Seen /HPF   pH, venous   Result Value Ref Range    pH, Gage 7.24 (L) 7.35 - 7.45   Beta-Hydroxybutyrate   Result Value Ref Range    Beta-Hydroxybutyrate >4.50 (H) 0.02 - 0.27 mmol/L   SPECIMEN REJECTION   Result Value Ref Range    Rejected Test trp5     Reason for Rejection see below    Troponin   Result patient has remained hemodynamically stable during their ED course. Diagnosis:  1. Diabetic ketoacidosis without coma associated with other specified diabetes mellitus ([facility] [address]        Disposition:  Patient's disposition: Admit to CCU/ICU  Patient's condition is serious.            Denis Molina DO  Resident  08/08/22 1927

## 2022-08-08 NOTE — ED NOTES
6 total RN attempts have been made to establish IV access on this patient, none were successful. Dr. Jigar Umanzor has been made aware of the situation and is willing to place a central line or US guide a line.       Bria Rios RN  08/07/22 [id number]

## 2022-08-08 NOTE — PROGRESS NOTES
Pharmacy Consultation Note  (Antibiotic Dosing and Monitoring)    Initial consult date: 8/8/22  Consulting physician/provider: PEACE GARCIA  Drug: Vancomycin  Indication: Sepsis/SIRS    Age/  Gender Height Weight IBW  Allergy Information   52 y.o./male 5' 6\" (167.6 cm) 150 lb (68 kg)     Ideal body weight: 63.8 kg (140 lb 10.5 oz)  Adjusted ideal body weight: 65.5 kg (144 lb 6.3 oz)   Metoprolol      Renal Function:  Recent Labs     08/07/22 2153 08/08/22  0256   BUN 30* 32*   CREATININE 1.2 1.2       Intake/Output Summary (Last 24 hours) at 8/8/2022 0410  Last data filed at 8/8/2022 0403  Gross per 24 hour   Intake --   Output 550 ml   Net -550 ml       Vancomycin Monitoring:  Trough:  No results for input(s): VANCOTROUGH in the last 72 hours. Random:  No results for input(s): VANCORANDOM in the last 72 hours. Vancomycin Administration Times:  Recent vancomycin administrations        No vancomycin IV orders with administrations found. Orders not given:            vancomycin (VANCOCIN) 1,250 mg in dextrose 5 % 250 mL IVPB                    Assessment:  Patient is a 47 y.o. male who has been initiated on vancomycin  Estimated Creatinine Clearance: 66 mL/min (based on SCr of 1.2 mg/dL). Plan:   Will continue vancomycin 1250 mg IV every 12 hours  Will check vancomycin levels when appropriate  Will continue to monitor renal function   Clinical pharmacy to follow      CHRIS Vinson [facility] 8/8/2022 4:10 AM

## 2022-08-08 NOTE — CARE COORDINATION
8/8:  Transition of Care:  Pt presented to the ER for Nausea,Emesis & Hyperglycemia. Pt was started on a Insulin gtt & transferred to MICU. Pt is on room air at 97%, Iv Maxipime til 8/15  & Iv Vanco til 8/15. Endocrinology has been consulted. Cm met with pt bedside. Pt's PCP is Dr. Lincoln Glasgow & uses the [facility] in [facility]. Pt lives with his mother in a house with 10 steps to enter. The bed/bathroom are on the 1st floor. Pt uses a walker/cane & has a glucometer at home. Pt is a type 1 DM for about 10 years & is on a pump with [employer]. Pt's dc plan is to return home & family can transport him. Pt has no hx of HHC/SNF. [facility] is following. Cm sent insurance information & placed in chart. Pt is active with [facility]. Sw/MONIK will continue to follow for dc planning.  Electronically signed by Amber Gilliam RN on 8/8/2022 at 11:36 AM

## 2022-08-08 NOTE — PROGRESS NOTES
[facility]  Internal Medicine Residency Program  Progress Note - House Team 1    Patient:  Durga Paulson 47 y.o. male MRN: [id number]     Date of Service: 8/8/2022     CC: Nausea and vomiting    Overnight events: Vomiting episode, LA decreased to 2.0     Subjective     47 y.o male Kelvin Cleary presented to the ED with CC of nausea and vomiting. He has a past medical history of Type 1 DM, HTN, Polysubstance abuse HLD, Lacunar stroke, Vit D insufficiency and complains of a URI and productive cough. Denies any other symptoms of fatigue, SOB, or pain, but admits to paresthesias in his right leg. He complains of a vomiting episode late last night and not being able to sleep. Objective     Physical Exam:  Vitals: /75   Pulse 94   Temp 98.6 °F (37 °C) (Oral)   Resp 23   Ht 5' 6\" (1.676 m)   Wt 143 lb 6.4 oz (65 kg)   SpO2 94%   BMI 23.15 kg/m²     I & O - 24hr: I/O this shift:  In: -   Out: 75 [Urine:75]   General Appearance: alert, appears older than stated age, and cooperative  HEENT:  Head: Normocephalic, no lesions, without obvious abnormality. Head: Normal, normocephalic, atraumatic. Eye: Normal external eye, conjunctiva, lids cornea, EDDI. Neck: no adenopathy, no carotid bruit, no JVD, supple, symmetrical, trachea midline, and thyroid not enlarged, symmetric, no tenderness/mass/nodules  Lung: clear to auscultation bilaterally and normal percussion bilaterally  Heart: regular rate and rhythm, S1, S2 normal, no murmur, click, rub or gallop, normal apical impulse, regular rate and rhythm, S1, S2 normal, no S3 or S4, and no rub  Abdomen: soft, non-tender; bowel sounds normal; no masses,  no organomegaly  Extremities:  extremities normal, atraumatic, no cyanosis or edema  Musculokeletal: No joint swelling, no muscle tenderness. ROM normal in all joints of extremities.    Neurologic: Mental status: Alert, oriented, thought content appropriate, alertness: alert, orientation: time, 06:28 AM    ALKPHOS 110 08/08/2022 06:28 AM    AST 14 08/08/2022 06:28 AM    ALT 18 08/08/2022 06:28 AM     BMP:    Lab Results   Component Value Date/Time     08/08/2022 09:22 AM    K 3.7 08/08/2022 09:22 AM    K 3.6 08/08/2022 02:56 AM     08/08/2022 09:22 AM    CO2 22 08/08/2022 09:22 AM    BUN 25 08/08/2022 09:22 AM    LABALBU 3.8 08/08/2022 06:28 AM    LABALBU 4.3 12/30/2011 10:15 AM    CREATININE 0.9 08/08/2022 09:22 AM    CALCIUM 8.2 08/08/2022 09:22 AM    GFRAA >60 08/08/2022 09:22 AM    LABGLOM >60 08/08/2022 09:22 AM    GLUCOSE 223 08/08/2022 09:22 AM    GLUCOSE 83 04/11/2012 03:35 AM     BUN/Creatinine:    Lab Results   Component Value Date/Time    BUN 25 08/08/2022 09:22 AM    CREATININE 0.9 08/08/2022 09:22 AM     Hepatic Function Panel:    Lab Results   Component Value Date/Time    ALKPHOS 110 08/08/2022 06:28 AM    ALT 18 08/08/2022 06:28 AM    AST 14 08/08/2022 06:28 AM    PROT 5.9 08/08/2022 06:28 AM    BILITOT 1.0 08/08/2022 06:28 AM    BILIDIR 0.3 08/08/2022 06:28 AM    IBILI 0.7 08/08/2022 06:28 AM    LABALBU 3.8 08/08/2022 06:28 AM    LABALBU 4.3 12/30/2011 10:15 AM     Urine Toxicology:  No components found for: IAMMENTA, IBARBIT, IBENZO, ICOCAINE, IMARTHC, IOPIATES, IPHENCYC  24 Hour Urine for Protein:  No components found for: Shraddha Lubin, [place]    Student's Assessment and Plan     Bhavya Menard is a 47 y.o. male with a PMHx of DM type 1 with insulin pump, HTN, CVA, and polysubstance abuse. His CC was nausea, vomiting, and hyperglycemia. DKA  URI and dehydration  Hx of DM1 on insulin pump   Glucose upon admission was in 400s  B-hydroxybutryate was greater than 4.5  Urine ketones greater than 80  A1c 9.0%  PLAN:   DKA protocol and bridge upon anion gap closure  BMP every 4 hours  Monitor A1C levels  Potential endocrinology consult   -  2. HAGMA   DKA, Lactic acidosis   LA level of 2.5, Anion gap 27   PLAN   Follow DKA protocol and monitor BMP   -  3.  Polysubstance Abuse   Patient has a cocaine history   Drinks 6 beers daily with most recent drink being night before admission   1.5 pack smoking Hx for last 20 years   Marijuana use daily   PLAN:   Folate + thiamine   Monitor for withdrawal symptoms   Nictoine patch  4.  URI   Complains of productive cough   Elevated WBCs   Elevated Neutrophils   PLAN   Monitor pro calcitonin level, monitor electrolytes and respiratory viral panel    PT/OT evaluation: Not at this time  DVT prophylaxis/ GI prophylaxis: Lovenox  Disposition: home +/- home health / SNF / Lul Rivera / Freddie Nieves, Medical Student  Attending physician: Dr. Fozia Whiteside MD

## 2022-08-08 NOTE — ED NOTES
Decrease in BGL of 59. Per MAR parameters, no change in infusion rate.       Christian Ball RN  08/08/22 [id number]

## 2022-08-08 NOTE — PROGRESS NOTES
Pharmacy Consultation Note  (Antibiotic Dosing and Monitoring)    Initial consult date: 8/8/22  Consulting physician/provider: PEACE GARCIA  Drug: Vancomycin  Indication: Sepsis/SIRS    Age/  Gender Height Weight IBW  Allergy Information   52 y.o./male 5' 6\" (167.6 cm) 150 lb (68 kg)     Ideal body weight: 63.8 kg (140 lb 10.5 oz)  Adjusted ideal body weight: 64.3 kg (141 lb 12 oz)   Metoprolol      Renal Function:  Recent Labs     08/08/22  0256 08/08/22  0628 08/08/22  0922   BUN 32* 28* 25*   CREATININE 1.2 1.1 0.9         Intake/Output Summary (Last 24 hours) at 8/8/2022 1155  Last data filed at 8/8/2022 0840  Gross per 24 hour   Intake --   Output 725 ml   Net -725 ml         Vancomycin Monitoring:  Trough:  No results for input(s): VANCOTROUGH in the last 72 hours. Random:  No results for input(s): VANCORANDOM in the last 72 hours. Vancomycin Administration Times:  Recent vancomycin administrations                     vancomycin (VANCOCIN) 1,250 mg in dextrose 5 % 250 mL IVPB (mg) 1,250 mg New Bag 08/08/22 0441                    Assessment:  Patient is a 47 y.o. male who has been initiated on vancomycin  Estimated Creatinine Clearance: 88 mL/min (based on SCr of 0.9 mg/dL).     Plan:  Adjust dose to vancomycin 1000mg q12h  Will check vancomycin levels when appropriate  Will continue to monitor renal function   Clinical pharmacy to follow      CHRIS Watson GITA [facility] 8/8/2022 11:55 AM

## 2022-08-08 NOTE — CONSULTS
Critical Care Admit/Consult Note    Patient - Maritza Neal   MRN -  [id number]   Acct # - [de-identified]   - 1970      Date of Admission -  2022  9:03 PM  Date of evaluation -  2022   Hospital Day - 0    ADMIT/CONSULT DETAILS     Reason for Admit/Consult   DKA    Consulting Service/Physician   Consulting - Ilia Loza DO  Primary Care Physician - Anat Suazo MD       HPI   Mr. Mancini So a 47 y.o. male with significant past medical history of diabetes type I,diabetic retinopathy, peripheral neuropathy, hypertension, CVA, substance abuse, cardiac arrest r/t cocaine (), alcohol abuse, current tobacco use, vitamin D deficiency, atrial fibrillation, hyperlipidemia ,who has had multiple admission related to DKA, presented to the ER via EMS on 2022 complaints of nausea and emesis, patient reports he has been vomiting since noon and felt that he was developing an upper respiratory infection. Labs significant for this admission include odium 132, potassium 5.5, chloride 95, CO2 10, BUN 30, creatinine 1.2, anion gap 27, lactic acid 2.5, blood sugar 478, beta hydroxybutyrate >4.50, venous pH 7.24 WBC 16.4, UA shows rare bacteria. Patient received 2 L normal saline bolus, 7 units of regular insulin, and 2 amps of bicarb while in the ED. DKA protocol initiated and the patient transferred to MICU for further management of DKA. Home medications include 100 units/day per insulin pump, lovastatin, folic acid, cholecalciferol, thiamine, lisinopril, aspirin 81 mg albuterol, metoprolol. Upon arriving to MICU, the patient is alert and oriented, moving all extremities, requesting water. He has some residual left sided weakness from previous CVA. The patient has a NS and insulin infusions running. The patient reports his last beer was on 22 in the evening. Vital signs are stable, . The plan of care was discussed with the patient and he is a full code.    Past Medical History         Diagnosis Date    Alcoholism ([facility]     Cocaine abuse ([facility]     Diabetes mellitus ([facility]     Hypertension     Idiopathic peripheral neuropathy 9/21/2016    Lacunar stroke [facility])     Marijuana abuse         Past Surgical History     History reviewed. No pertinent surgical history. Influenza:  Not indicated  Pneumococcal Polysaccharide:  Not indicated    Current Medications   Current Medications    ondansetron  4 mg IntraVENous Once     glucose, dextrose bolus **OR** dextrose bolus, glucagon (rDNA), dextrose  IV Drips/Infusions   dextrose      insulin 0.1 Units/kg/hr (08/08/22 0042)     Home Medications  Not in a hospital admission. Diet/Nutrition   No diet orders on file    Allergies   Metoprolol    Social History   Tobacco   reports that he has been smoking cigarettes. He has a 22.50 pack-year smoking history. He has never used smokeless tobacco.    Alcohol     reports current alcohol use of about 6.0 standard drinks per week.     Occupational history :    Family History         Problem Relation Age of Onset    Diabetes type 2  Mother     Cancer Maternal Grandmother     Diabetes type 2  Nephew        Sleep History   n/a    ROS   REVIEW OF SYSTEMS:  CONSTITUTIONAL:  negative except for  fatigue  HEENT:  negative for  sore mouth, sore throat, hoarseness, and voice change  RESPIRATORY:  negative for  dry cough, dyspnea, and wheezing  CARDIOVASCULAR:  negative for  chest pain, dyspnea, palpitations, edema  GASTROINTESTINAL:  negative except for nausea and vomiting  GENITOURINARY:  negative for frequency and dysuria  ENDOCRINE:  negative except for diabetic symptoms including neither blurred vision nor neuropathy nor nausea nor vomiting  MUSCULOSKELETAL:  negative for  decreased range of motion and muscle weakness  NEUROLOGICAL:  negative for headaches, dizziness, seizures, and syncope    Lines and Devices   Peripheral  Right IJ inserted 8/8/22    Mechanical Ventilation Data   VENT SETTINGS pharynx with dry mucus membranes  LUNGS:  No increased work of breathing, good air exchange, clear to auscultation bilaterally, no crackles or wheezing  CARDIOVASCULAR:  Normal apical impulse, regular rate and rhythm, normal S1 and S2  ABDOMEN:  No scars, normal bowel sounds, soft, non-distended, non-tender, no masses palpated, no hepatosplenomegally  MUSCULOSKELETAL:  There is no redness, warmth, or swelling of the joints. Full range of motion noted. Left side weaker than right. NEUROLOGIC:  Awake, alert, oriented to name, place and time.   Peripheral neuropathy BLE  SKIN:  no bruising or bleeding and normal skin color, texture, turgor    Data   Old records and images have been reviewed    Lab Results   CBC     Lab Results   Component Value Date/Time    WBC 16.4 08/07/2022 09:53 PM    RBC 4.58 08/07/2022 09:53 PM    HGB 13.8 08/07/2022 09:53 PM    HCT 43.4 08/07/2022 09:53 PM     08/07/2022 09:53 PM    MCV 94.8 08/07/2022 09:53 PM    MCH 30.1 08/07/2022 09:53 PM    MCHC 31.8 08/07/2022 09:53 PM    RDW 15.3 08/07/2022 09:53 PM    SEGSPCT 53 01/25/2014 05:15 AM    SEGSPCT 86 10/11/2010 04:40 AM    BANDSPCT 3 10/10/2010 07:20 PM    METASPCT 1.7 07/20/2020 12:12 PM    LYMPHOPCT 6.3 08/07/2022 09:53 PM    PROMYELOPCT 1.7 04/29/2022 09:41 PM    MONOPCT 5.2 08/07/2022 09:53 PM    MYELOPCT 2.6 01/04/2020 05:07 PM    EOSPCT 2 10/12/2010 06:00 AM    BASOPCT 0.5 08/07/2022 09:53 PM    MONOSABS 0.86 08/07/2022 09:53 PM    LYMPHSABS 1.03 08/07/2022 09:53 PM    EOSABS 0.15 08/07/2022 09:53 PM    BASOSABS 0.08 08/07/2022 09:53 PM       BMP   Lab Results   Component Value Date/Time     08/07/2022 09:53 PM    K 5.5 08/07/2022 09:53 PM    CL 95 08/07/2022 09:53 PM    CO2 10 08/07/2022 09:53 PM    BUN 30 08/07/2022 09:53 PM    CREATININE 1.2 08/07/2022 09:53 PM    GLUCOSE 442 08/08/2022 12:40 AM    GLUCOSE 83 04/11/2012 03:35 AM    CALCIUM 9.4 08/07/2022 09:53 PM    IONCA 1.23 02/19/2011 06:00 AM       LFTS  Lab Results   Component Value Date/Time    [facility] SOUTH 149 08/07/2022 09:53 PM    ALT 18 08/07/2022 09:53 PM    AST 23 08/07/2022 09:53 PM    PROT 7.2 08/07/2022 09:53 PM    BILITOT 1.8 08/07/2022 09:53 PM    BILIDIR 0.4 08/07/2022 09:53 PM    IBILI 1.4 08/07/2022 09:53 PM    LABALBU 4.2 08/07/2022 09:53 PM    LABALBU 4.3 12/30/2011 10:15 AM       INR  No results for input(s): PROTIME, INR in the last 72 hours. APTT  No results for input(s): APTT in the last 72 hours. Lactic Acid  Lab Results   Component Value Date/Time    LACTA 2.5 08/07/2022 09:53 PM    LACTA 1.1 01/27/2022 05:20 AM    LACTA 1.1 01/26/2022 03:03 PM        BNP   No results for input(s): BNP in the last 72 hours. Cultures   No results for input(s): BC in the last 72 hours. No results for input(s): Nathalia Martinez in the last 72 hours. No results for input(s): LABURIN in the last 72 hours. Radiology   CXR    FINDINGS:   The lungs are without acute focal process. There is no effusion or   pneumothorax. The cardiomediastinal silhouette is without acute process. The   osseous structures are without acute process. SYSTEMS ASSESSMENT AND PLAN:    Neuro   Hx of Lacunar Stroke  Hx Alcohol dependence, last drink was yesterday evening  Hx drug abuse   -Continue to monitor neurovascular status   -Patient currently [address], continue to monitor for withdraw symptoms   -Obtain urine drug screen   -Continue ASA 81 mg     Respiratory   Concerns for URI  Patient currently on 4L NC  Current tobacco use  -CXR is without any acute process, no effusion or pneumothorax.  -Wean oxygen as tolerated. Keep O2 sat 90-92%  -Nicotine patch  -Duoneb prn  -Obtain resp. Culture/resp.  panel    Cardiovascular   Pulmonary hypertension- RVSP 39 on echo 6/2020  Mild LVH  HTN  Elevated troponin, most likely type II MI   -Currently hold home lisinopril due to KIRSTEN   -Restart home med: metoprolol 12.5mg BID   -Continue to trend troponin    Gastrointestinal   Indirect hyperbilirubinemia  Nausea and vomiting 2/2 DKA   -ondansetron prn for N&V   -Continue to monitor bilirubin  Nutrition: NPO while on DKA protocol    Renal   KIRSTEN, most likely volume responsive prerenal KIRSTEN 2/2 osmotic diuresis in the setting of ACE inhibition administration.   -On admission creatinine 1.2mg/dL and basline 0.7mg/dL. Lactic Acidosis 2/2 ketoacidosis  HAGMA 2/2 ketoacidosis  Hyperkalemia 2/2 ACE inhibition administration  Pseudo-hyponatremia 2/2 osmotic shifts from hyperglycemia, corrected sodium for glucose 138 mEq/L. -Obtain urine studies   -Strict I's and O's   -Trend lactic levels   -Hold ACE   -Continue to monitor potassium   -Continue fluid resuscitation per DKA protocol   -Continue to monitor sodium level    Infectious Disease   Leukocytosis with SIRS criteria met  Previous staphylococcus species in blood culture 4/30/22   -Pan culture   -Obtain resp. Panel   -Obtain MRSA nares   -Obtain procalcitonin   -Pharmacy to dose vancomycin   -Start ceftriaxone      Hematology/Oncology   DVT prophylaxis: lovenox    Endocrine   DKA  DM type I- history of insulin pump, beta hydroxybutyrate >4.5, anion gap 27   -DKA protocol with insulin drip   -BMP, Mag & Phos Q4   -Continue to monitor anion gap    -Obtain hemoglobin A1C, previous A1C 8.9 from 5/10/22   -Consult endocrinology prior to discharge   -Consult diabetic educator    Social/Spiritual/DNR/Other   Code Status: Full Code  Disposition: ICU  Lytes/Fluids/Nutrition: Per electrolyte replacement and fluids DKA protocol, NPO  GI/DVT prophylaxis: PPI and heparin SubQ  ____________________________________________________________________      Case and plan discussed with attending physician on call, Dr. Catherine Cao.      CHAYO Gordillo - CNP    8/8/2022, [id number]

## 2022-08-08 NOTE — ED NOTES
Pt first bolus found to not be infusing through the ac on the left arm.  RN moved infusion to the IV in the right FA      Stef Ca RN  08/07/22 [id number]

## 2022-08-08 NOTE — PLAN OF CARE
Problem: Chronic Conditions and Co-morbidities  Goal: Patient's chronic conditions and co-morbidity symptoms are monitored and maintained or improved  8/8/2022 1823 by Karishma Lopez RN  Outcome: Progressing  8/8/2022 0434 by Teresa Nagy RN  Outcome: Progressing     Problem: Discharge Planning  Goal: Discharge to home or other facility with appropriate resources  8/8/2022 1823 by Karishma Lopez RN  Outcome: Progressing  8/8/2022 0434 by Teresa Nagy RN  Outcome: Progressing     Problem: Safety - Adult  Goal: Free from fall injury  8/8/2022 1823 by Karishma Lopez RN  Outcome: Progressing  8/8/2022 0434 by Teresa Nagy RN  Outcome: Progressing

## 2022-08-08 NOTE — PROGRESS NOTES
Critical Care Admit/Consult Note    Patient - Mariely Patel   MRN -  [id number]   Acct # - [de-identified]   - 1970      Date of Admission -  2022  9:03 PM  Date of evaluation -  2022  4401/4401-A   Hospital Day - 0    ADMIT/CONSULT DETAILS     Reason for Admit/Consult   DKA    Consulting Service/Physician   Consulting - Mariama Gale DO  Primary Care Physician - Leanna Lake MD       HPI   Ministerio Avinash Garcia a 47 y.o. male with significant past medical history of diabetes type I,diabetic retinopathy, peripheral neuropathy, hypertension, CVA, substance abuse, cardiac arrest r/t cocaine (), alcohol abuse, current tobacco use, vitamin D deficiency, atrial fibrillation, hyperlipidemia ,who has had multiple admission related to DKA, presented to the ER via EMS on 2022 complaints of nausea and emesis, patient reports he has been vomiting since noon and felt that he was developing an upper respiratory infection. Labs significant for this admission include odium 132, potassium 5.5, chloride 95, CO2 10, BUN 30, creatinine 1.2, anion gap 27, lactic acid 2.5, blood sugar 478, beta hydroxybutyrate >4.50, venous pH 7.24 WBC 16.4, UA shows rare bacteria. Patient received 2 L normal saline bolus, 7 units of regular insulin, and 2 amps of bicarb while in the ED. DKA protocol initiated and the patient transferred to MICU for further management of DKA. Home medications include 100 units/day per insulin pump, lovastatin, folic acid, cholecalciferol, thiamine, lisinopril, aspirin 81 mg albuterol, metoprolol. Upon arriving to MICU, the patient is alert and oriented, moving all extremities, requesting water. He has some residual left sided weakness from previous CVA. The patient has a NS and insulin infusions running. The patient reports his last beer was on 22 in the evening. Vital signs are stable, .  The plan of care was discussed with the patient and he is a full code.    8/8/2022  Much better insists on eating food  Will oblige Gap 12  on IV insulin  Anion gap 12   K 3.5 --> will replace  BS  252  WBC 19.4  CXR NAD  Liberalize po fluid + diet     Small boil on back   Past Medical History         Diagnosis Date    Alcoholism ([facility]     Cocaine abuse ([facility]     Diabetes mellitus ([facility]     Hypertension     Idiopathic peripheral neuropathy 9/21/2016    Lacunar stroke ([facility]     Marijuana abuse         Past Surgical History     History reviewed. No pertinent surgical history.     Influenza:  Not indicated  Pneumococcal Polysaccharide:  Not indicated    Current Medications   Current Medications    thiamine mononitrate  100 mg Oral Daily    metoprolol tartrate  12.5 mg Oral BID    folic acid  1 mg Oral Daily    atorvastatin  40 mg Oral Nightly    aspirin  81 mg Oral Daily    enoxaparin  40 mg SubCUTAneous Daily    sodium chloride flush  5-40 mL IntraVENous 2 times per day    nicotine  1 patch TransDERmal Daily    cefTRIAXone (ROCEPHIN) IV  1,000 mg IntraVENous Q24H    vancomycin  1,250 mg IntraVENous Q12H    potassium chloride  40 mEq Oral BID WC    ondansetron  4 mg IntraVENous Once     ondansetron **OR** ondansetron, polyethylene glycol, acetaminophen **OR** acetaminophen, potassium chloride, magnesium sulfate, sodium phosphate IVPB **OR** sodium phosphate IVPB **OR** sodium phosphate IVPB, dextrose bolus **OR** dextrose bolus, dextrose 5% and 0.45% NaCl with KCl 20 mEq, sodium chloride flush, sodium chloride, glucose, dextrose bolus **OR** dextrose bolus, glucagon (rDNA), dextrose  IV Drips/Infusions   sodium chloride Stopped (08/08/22 0700)    dextrose 5% and 0.45% NaCl with KCl 20 mEq 150 mL/hr at 08/08/22 0711    sodium chloride      dextrose      insulin 0.1125 Units/kg/hr (08/08/22 0810)     Home Medications  Medications Prior to Admission: Continuous Blood Gluc  (DEXCOM G6 ) DAVID, For BS monitoring  blood glucose test strips (ONETOUCH VERIO) strip, 1 each by In Vitro route 5 times daily As needed. insulin aspart (NOVOLOG) 100 UNIT/ML injection vial, 100 units per day per insulin pump  atorvastatin (LIPITOR) 40 MG tablet, Take 1 tablet by mouth nightly (Patient not taking: Reported on 8/8/2022)  Continuous Blood Gluc Sensor (DEXCOM G6 SENSOR) MISC, Apply every 10 days (Patient not taking: Reported on 5/10/2022)  Continuous Blood Gluc Transmit (DEXCOM G6 TRANSMITTER) MISC, 1 transmitter for every 90 days  Continuous Blood Gluc  (DEXCOM G6 ) DAVID, For bs monitoring  insulin aspart (NOVOLOG) 100 UNIT/ML injection vial, 100 units/day via insulin pump  folic acid (FOLVITE) 1 MG tablet, Take 1 tablet by mouth daily (Patient taking differently: Take 1 mg by mouth as needed)  Cholecalciferol 400 UNIT TABS tablet, Take 2.5 tablets by mouth daily (Patient taking differently: Take 1,000 Units by mouth as needed)  thiamine 100 MG tablet, Take 1 tablet by mouth daily (Patient taking differently: Take 100 mg by mouth as needed)  Insulin Pump - insulin lispro, Inject into the skin continuous Insulin-to-Carb Ratio (ICR): Insulin Sensitivity Factor (ISF): mg/dL per unit of insulin Target Blood Glucose:  mg/dL Bolus Frequency:  lisinopril (PRINIVIL;ZESTRIL) 5 MG tablet, Take 1 tablet by mouth daily  albuterol sulfate  (90 Base) MCG/ACT inhaler, Inhale 2 puffs into the lungs every 6 hours as needed for Wheezing or Shortness of Breath  metoprolol tartrate (LOPRESSOR) 25 MG tablet, Take 0.5 tablets by mouth 2 times daily (Patient taking differently: Take 12.5 mg by mouth in the morning.)  [DISCONTINUED] aspirin 81 MG EC tablet, Take 1 tablet by mouth daily    Diet/Nutrition   ADULT DIET; Regular; 4 carb choices (60 gm/meal)    Allergies   Metoprolol    Social History   Tobacco   reports that he has been smoking cigarettes. He has a 22.50 pack-year smoking history.  He has never used smokeless tobacco.    Alcohol     reports current alcohol use of about 6.0 standard 105  Respiration Range: Resp  Av  Min: 0  Max: 27  Current Pulse Ox[de-identified]  SpO2: 97 %  24HR Pulse Ox Range:  SpO2  Av.3 %  Min: 95 %  Max: 98 %  Oxygen Amount and Delivery:        I/O (24 Hours)    Patient Vitals for the past 8 hrs:   BP Temp Temp src Pulse Resp SpO2 Weight   22 0600 (!) 127/90 98.7 °F (37.1 °C) Temporal (!) 101 17 97 % --   22 0516 -- -- -- -- -- -- 143 lb 6.4 oz (65 kg)   22 0500 115/72 -- -- 93 27 96 % --   22 0400 (!) 117/58 -- -- 94 (!) 0 97 % --   22 0300 138/73 -- -- 98 21 95 % --   22 0245 131/86 98.6 °F (37 °C) Temporal (!) 103 17 96 % --   22 0035 129/88 98 °F (36.7 °C) -- (!) 105 19 95 % --       Intake/Output Summary (Last 24 hours) at 2022 0819  Last data filed at 2022 0516  Gross per 24 hour   Intake --   Output 650 ml   Net -650 ml     I/O last 3 completed shifts:  In: -   Out: 650 [Urine:650]   Date 22 0000 - 22   Shift 6927-0342 7388-0878 8840-1282 24 Hour Total   INTAKE   Shift Total(mL/kg)       OUTPUT   Urine(mL/kg/hr) 650(1.2)   650   Shift Total(mL/kg) 650(10)   650(10)   Weight (kg) 65 65 65 65     Patient Vitals for the past 96 hrs (Last 3 readings):   Weight   22 0516 143 lb 6.4 oz (65 kg)   22 2100 150 lb (68 kg)     Drains/Tubes Outputs  N/A    Exam   PHYSICAL EXAM:  CONSTITUTIONAL:  awake, alert, cooperative, no apparent distress, and appears stated age  EYES:  Lids and lashes normal, pupils equal, round and reactive to light, sclera clear, conjunctiva normal  ENT:  Normocephalic, without obvious abnormality, atraumatic, sinuses nontender on palpation, external ears without lesions, oral pharynx with dry mucus membranes  LUNGS:  No increased work of breathing, good air exchange, clear to auscultation bilaterally, no crackles or wheezing  CARDIOVASCULAR:  Normal apical impulse, regular rate and rhythm, normal S1 and S2  ABDOMEN:  No scars, normal bowel sounds, soft, non-distended, non-tender, no masses palpated, no hepatosplenomegally  MUSCULOSKELETAL:  There is no redness, warmth, or swelling of the joints. Full range of motion noted. Left side weaker than right. NEUROLOGIC:  Awake, alert, oriented to name, place and time.   Peripheral neuropathy BLE  SKIN:  no bruising or bleeding and normal skin color, texture, turgor    Data   Old records and images have been reviewed    Lab Results   CBC     Lab Results   Component Value Date/Time    WBC 19.4 08/08/2022 06:28 AM    RBC 3.70 08/08/2022 06:28 AM    HGB 11.0 08/08/2022 06:28 AM    HCT 33.7 08/08/2022 06:28 AM     08/08/2022 06:28 AM    MCV 91.1 08/08/2022 06:28 AM    MCH 29.7 08/08/2022 06:28 AM    MCHC 32.6 08/08/2022 06:28 AM    RDW 15.3 08/08/2022 06:28 AM    SEGSPCT 53 01/25/2014 05:15 AM    SEGSPCT 86 10/11/2010 04:40 AM    BANDSPCT 3 10/10/2010 07:20 PM    METASPCT 1.7 07/20/2020 12:12 PM    LYMPHOPCT 9.0 08/08/2022 06:28 AM    PROMYELOPCT 1.7 04/29/2022 09:41 PM    MONOPCT 6.7 08/08/2022 06:28 AM    MYELOPCT 2.6 01/04/2020 05:07 PM    EOSPCT 2 10/12/2010 06:00 AM    BASOPCT 0.2 08/08/2022 06:28 AM    MONOSABS 1.31 08/08/2022 06:28 AM    LYMPHSABS 1.74 08/08/2022 06:28 AM    EOSABS 0.01 08/08/2022 06:28 AM    BASOSABS 0.03 08/08/2022 06:28 AM       BMP   Lab Results   Component Value Date/Time     08/08/2022 06:28 AM    K 3.5 08/08/2022 06:28 AM    K 3.6 08/08/2022 02:56 AM     08/08/2022 06:28 AM    CO2 19 08/08/2022 06:28 AM    BUN 28 08/08/2022 06:28 AM    CREATININE 1.1 08/08/2022 06:28 AM    GLUCOSE 252 08/08/2022 06:28 AM    GLUCOSE 83 04/11/2012 03:35 AM    CALCIUM 8.1 08/08/2022 06:28 AM    IONCA 1.23 02/19/2011 06:00 AM       LFTS  Lab Results   Component Value Date/Time    ALKPHOS 110 08/08/2022 06:28 AM    ALT 18 08/08/2022 06:28 AM    AST 14 08/08/2022 06:28 AM    PROT 5.9 08/08/2022 06:28 AM    BILITOT 1.0 08/08/2022 06:28 AM    BILIDIR 0.3 08/08/2022 06:28 AM    IBILI 0.7 08/08/2022 06:28 AM ACE inhibition administration.   -On admission creatinine 1.2mg/dL and basline 0.7mg/dL. Lactic Acidosis 2/2 ketoacidosis  HAGMA 2/2 ketoacidosis  Hyperkalemia 2/2 ACE inhibition administration  Pseudo-hyponatremia 2/2 osmotic shifts from hyperglycemia, corrected sodium for glucose 138 mEq/L. -Obtain urine studies   -Strict I's and O's   -Trend lactic levels   -Hold ACE   -Continue to monitor potassium   -Continue fluid resuscitation per DKA protocol   -Continue to monitor sodium level    Infectious Disease   Leukocytosis with SIRS criteria met  Previous staphylococcus species in blood culture 4/30/22   -Pan culture   -Obtain resp.  Panel   -Obtain MRSA nares   -Obtain procalcitonin   -Pharmacy to dose vancomycin   -Start ceftriaxone      Hematology/Oncology   DVT prophylaxis: lovenox    Endocrine   DKA  DM type I- history of insulin pump, beta hydroxybutyrate >4.5, anion gap 27   -DKA protocol with insulin drip   -BMP, Mag & Phos Q4   -Continue to monitor anion gap    -Obtain hemoglobin A1C, previous A1C 8.9 from 5/10/22   -Consult endocrinology prior to discharge   -Consult diabetic educator    Social/Spiritual/DNR/Other   Code Status: Full Code  Disposition: ICU  Lytes/Fluids/Nutrition: Per electrolyte replacement and fluids DKA protocol, NPO  GI/DVT prophylaxis: PPI and heparin SubQ  ____________________________________________________________________        Suzie Simms MD    8/8/2022,       60 MIn

## 2022-08-08 NOTE — PROGRESS NOTES
[facility]  Internal Medicine Residency / [address]    Attending Physician Statement  I have discussed the case, including pertinent history and exam findings with the resident and the team.  I have seen and examined the patient and the key elements of the encounter have been performed by me. I agree with the assessment, plan and orders as documented by the resident. A&O  VS stable   Labs reviewed   Resolving DKA in DM 1   Presently on Cefepime  and Vancomycin  Hx of ETOH and Drug abuse and severe PN  Plan: Same ICU care   Remainder of medical problems as per resident note.       Leanna Lake MD [facility]  Internal Medicine Residency Faculty

## 2022-08-08 NOTE — PROGRESS NOTES
Consult noted and chart reviewed. Patient seen this morning while eating breakfast. He did not look at me during the interaction and continued to eat. Patient states the following:    States he is not sure why BG levels have been elevated. States he is using his pump and has no concerns with supplies. 2. Sees Endocrinology when scheduled and next appointment is \"coming up soon\". 3. Eats 3 meals a day and is aware of carbohydrate foods. No problem getting enough food to eat. 4. Patient is not interested in education at this time. We will contact patient after discharge and see if he would like to be seen as outpatient. Thank you for the consult. Please call ext. [phone] if we can be of any other service.     Electronically signed by Mihir Rios RD, DANNY on 8/8/2022 at 10:02 AM

## 2022-08-09 LAB
ALBUMIN SERPL-MCNC: 3.5 G/DL (ref 3.5–5.2)
ALP BLD-CCNC: 92 U/L (ref 40–129)
ALT SERPL-CCNC: 33 U/L (ref 0–40)
ANION GAP SERPL CALCULATED.3IONS-SCNC: 10 MMOL/L (ref 7–16)
AST SERPL-CCNC: 39 U/L (ref 0–39)
BASOPHILS ABSOLUTE: 0.06 E9/L (ref 0–0.2)
BASOPHILS RELATIVE PERCENT: 0.4 % (ref 0–2)
BILIRUB SERPL-MCNC: 1.2 MG/DL (ref 0–1.2)
BILIRUBIN DIRECT: 0.3 MG/DL (ref 0–0.3)
BILIRUBIN, INDIRECT: 0.9 MG/DL (ref 0–1)
BUN BLDV-MCNC: 13 MG/DL (ref 6–20)
CALCIUM IONIZED: 1.2 MMOL/L (ref 1.15–1.33)
CALCIUM SERPL-MCNC: 8.8 MG/DL (ref 8.6–10.2)
CHLORIDE BLD-SCNC: 100 MMOL/L (ref 98–107)
CO2: 24 MMOL/L (ref 22–29)
CREAT SERPL-MCNC: 0.8 MG/DL (ref 0.7–1.2)
EOSINOPHILS ABSOLUTE: 0.24 E9/L (ref 0.05–0.5)
EOSINOPHILS RELATIVE PERCENT: 1.7 % (ref 0–6)
GFR AFRICAN AMERICAN: >60
GFR NON-AFRICAN AMERICAN: >60 ML/MIN/1.73
GLUCOSE BLD-MCNC: 177 MG/DL (ref 74–99)
HCT VFR BLD CALC: 33.8 % (ref 37–54)
HEMOGLOBIN: 11.4 G/DL (ref 12.5–16.5)
IMMATURE GRANULOCYTES #: 0.07 E9/L
IMMATURE GRANULOCYTES %: 0.5 % (ref 0–5)
LYMPHOCYTES ABSOLUTE: 2.22 E9/L (ref 1.5–4)
LYMPHOCYTES RELATIVE PERCENT: 15.8 % (ref 20–42)
MAGNESIUM: 1.8 MG/DL (ref 1.6–2.6)
MCH RBC QN AUTO: 30.5 PG (ref 26–35)
MCHC RBC AUTO-ENTMCNC: 33.7 % (ref 32–34.5)
MCV RBC AUTO: 90.4 FL (ref 80–99.9)
METER GLUCOSE: 156 MG/DL (ref 74–99)
METER GLUCOSE: 173 MG/DL (ref 74–99)
METER GLUCOSE: 188 MG/DL (ref 74–99)
METER GLUCOSE: 208 MG/DL (ref 74–99)
METER GLUCOSE: 257 MG/DL (ref 74–99)
METER GLUCOSE: 305 MG/DL (ref 74–99)
METER GLUCOSE: 334 MG/DL (ref 74–99)
MONOCYTES ABSOLUTE: 1.14 E9/L (ref 0.1–0.95)
MONOCYTES RELATIVE PERCENT: 8.1 % (ref 2–12)
MRSA CULTURE ONLY: NORMAL
NEUTROPHILS ABSOLUTE: 10.34 E9/L (ref 1.8–7.3)
NEUTROPHILS RELATIVE PERCENT: 73.5 % (ref 43–80)
PDW BLD-RTO: 15.2 FL (ref 11.5–15)
PHOSPHORUS: 1.8 MG/DL (ref 2.5–4.5)
PLATELET # BLD: 354 E9/L (ref 130–450)
PMV BLD AUTO: 8.6 FL (ref 7–12)
POTASSIUM SERPL-SCNC: 4 MMOL/L (ref 3.5–5)
PROCALCITONIN: 2.49 NG/ML (ref 0–0.08)
RBC # BLD: 3.74 E12/L (ref 3.8–5.8)
SODIUM BLD-SCNC: 134 MMOL/L (ref 132–146)
TOTAL PROTEIN: 5.8 G/DL (ref 6.4–8.3)
WBC # BLD: 14.1 E9/L (ref 4.5–11.5)

## 2022-08-09 PROCEDURE — 6360000002 HC RX W HCPCS

## 2022-08-09 PROCEDURE — 6370000000 HC RX 637 (ALT 250 FOR IP): Performed by: INTERNAL MEDICINE

## 2022-08-09 PROCEDURE — 80048 BASIC METABOLIC PNL TOTAL CA: CPT

## 2022-08-09 PROCEDURE — 6370000000 HC RX 637 (ALT 250 FOR IP): Performed by: NURSE PRACTITIONER

## 2022-08-09 PROCEDURE — 2580000003 HC RX 258

## 2022-08-09 PROCEDURE — 84100 ASSAY OF PHOSPHORUS: CPT

## 2022-08-09 PROCEDURE — 83735 ASSAY OF MAGNESIUM: CPT

## 2022-08-09 PROCEDURE — 82962 GLUCOSE BLOOD TEST: CPT

## 2022-08-09 PROCEDURE — 6360000002 HC RX W HCPCS: Performed by: NURSE PRACTITIONER

## 2022-08-09 PROCEDURE — 2580000003 HC RX 258: Performed by: NURSE PRACTITIONER

## 2022-08-09 PROCEDURE — 85025 COMPLETE CBC W/AUTO DIFF WBC: CPT

## 2022-08-09 PROCEDURE — 6370000000 HC RX 637 (ALT 250 FOR IP)

## 2022-08-09 PROCEDURE — 36415 COLL VENOUS BLD VENIPUNCTURE: CPT

## 2022-08-09 PROCEDURE — 84145 PROCALCITONIN (PCT): CPT

## 2022-08-09 PROCEDURE — 99232 SBSQ HOSP IP/OBS MODERATE 35: CPT | Performed by: INTERNAL MEDICINE

## 2022-08-09 PROCEDURE — 80076 HEPATIC FUNCTION PANEL: CPT

## 2022-08-09 PROCEDURE — 2140000000 HC CCU INTERMEDIATE R&B

## 2022-08-09 PROCEDURE — 82330 ASSAY OF CALCIUM: CPT

## 2022-08-09 RX ORDER — INSULIN LISPRO 100 [IU]/ML
300 INJECTION, SOLUTION INTRAVENOUS; SUBCUTANEOUS
Status: DISCONTINUED | OUTPATIENT
Start: 2022-08-09 | End: 2022-08-10 | Stop reason: HOSPADM

## 2022-08-09 RX ADMIN — SODIUM CHLORIDE, PRESERVATIVE FREE 10 ML: 5 INJECTION INTRAVENOUS at 09:24

## 2022-08-09 RX ADMIN — CEFEPIME 2000 MG: 2 INJECTION, POWDER, FOR SOLUTION INTRAVENOUS at 21:41

## 2022-08-09 RX ADMIN — VANCOMYCIN HYDROCHLORIDE 1000 MG: 1 INJECTION, POWDER, LYOPHILIZED, FOR SOLUTION INTRAVENOUS at 05:32

## 2022-08-09 RX ADMIN — PANTOPRAZOLE SODIUM 40 MG: 40 TABLET, DELAYED RELEASE ORAL at 05:29

## 2022-08-09 RX ADMIN — Medication 2 PACKET: at 09:29

## 2022-08-09 RX ADMIN — FOLIC ACID 1 MG: 1 TABLET ORAL at 09:23

## 2022-08-09 RX ADMIN — METOPROLOL TARTRATE 12.5 MG: 25 TABLET, FILM COATED ORAL at 21:33

## 2022-08-09 RX ADMIN — ASPIRIN 81 MG: 81 TABLET, COATED ORAL at 09:24

## 2022-08-09 RX ADMIN — CEFEPIME 2000 MG: 2 INJECTION, POWDER, FOR SOLUTION INTRAVENOUS at 09:34

## 2022-08-09 RX ADMIN — Medication 100 MG: at 09:23

## 2022-08-09 RX ADMIN — ENOXAPARIN SODIUM 40 MG: 100 INJECTION SUBCUTANEOUS at 09:23

## 2022-08-09 RX ADMIN — METOPROLOL TARTRATE 12.5 MG: 25 TABLET, FILM COATED ORAL at 09:23

## 2022-08-09 RX ADMIN — SODIUM CHLORIDE, PRESERVATIVE FREE 10 ML: 5 INJECTION INTRAVENOUS at 21:42

## 2022-08-09 NOTE — PROGRESS NOTES
[facility]  Internal Medicine Residency Program  Progress Note - House Team     Patient:  Raymond Krause 47 y.o. male MRN: [id number]     Date of Service: 8/9/2022     CC: Nausea and vomiting      Days since admission: 08/07/2022     Subjective     Overnight events: Patient was seen this morning and examined at beside in no acute distress. He denies chest pain, palpitations, shortness of breath, cough, nausea, vomiting, headache. He had a bowel movement in this morning. No diarrhea. His only complaint for this morning is trouble sleeping. Objective     Physical Exam:  Vitals: BP (!) 185/92 Comment: RN NOTIFIED (NANDO)  Pulse 69   Temp 97.9 °F (36.6 °C) (Temporal)   Resp 18   Ht 5' 6\" (1.676 m)   Wt 147 lb (66.7 kg)   SpO2 96%   BMI 23.73 kg/m²     I & O - 24hr:   Intake/Output Summary (Last 24 hours) at 8/9/2022 1358  Last data filed at 8/9/2022 1248  Gross per 24 hour   Intake 420 ml   Output --   Net 420 ml      General Appearance: alert, appears stated age, and cooperative  HEENT:  Head: Normocephalic, no lesions, without obvious abnormality. Neck: no adenopathy, no carotid bruit, no JVD, supple, symmetrical, trachea midline, and thyroid not enlarged, symmetric, no tenderness/mass/nodules  Lung: clear to auscultation bilaterally  Heart: regular rate and rhythm, S1, S2 normal, no murmur, click, rub or gallop  Abdomen: soft, non-tender; bowel sounds normal; no masses,  no organomegaly  Extremities:  extremities normal, atraumatic, no cyanosis or edema  Musculokeletal: No joint swelling, no muscle tenderness. ROM normal in all joints of extremities.    Neurologic: Mental status: Alert, oriented, thought content appropriate  Subject  Pertinent Information & Imaging Studies, Consults   chika  CBC with Differential:    Lab Results   Component Value Date/Time    WBC 14.1 08/09/2022 06:04 AM    RBC 3.74 08/09/2022 06:04 AM    HGB 11.4 08/09/2022 06:04 AM    HCT 33.8 08/09/2022 06:04 AM  08/09/2022 06:04 AM    MCV 90.4 08/09/2022 06:04 AM    MCH 30.5 08/09/2022 06:04 AM    MCHC 33.7 08/09/2022 06:04 AM    RDW 15.2 08/09/2022 06:04 AM    SEGSPCT 53 01/25/2014 05:15 AM    SEGSPCT 86 10/11/2010 04:40 AM    BANDSPCT 3 10/10/2010 07:20 PM    METASPCT 1.7 07/20/2020 12:12 PM    LYMPHOPCT 15.8 08/09/2022 06:04 AM    PROMYELOPCT 1.7 04/29/2022 09:41 PM    MONOPCT 8.1 08/09/2022 06:04 AM    MYELOPCT 2.6 01/04/2020 05:07 PM    EOSPCT 2 10/12/2010 06:00 AM    BASOPCT 0.4 08/09/2022 06:04 AM    MONOSABS 1.14 08/09/2022 06:04 AM    LYMPHSABS 2.22 08/09/2022 06:04 AM    EOSABS 0.24 08/09/2022 06:04 AM    BASOSABS 0.06 08/09/2022 06:04 AM     BUN/Creatinine:    Lab Results   Component Value Date/Time    BUN 20 08/08/2022 10:53 PM    CREATININE 0.8 08/08/2022 10:53 PM     Magnesium:    Lab Results   Component Value Date/Time    MG 1.8 08/09/2022 06:04 AM     Phosphorus:    Lab Results   Component Value Date/Time    PHOS 1.8 08/09/2022 06:04 AM     Microalbumen/Creatinine ratio:  No components found for: RUCREAT  AZAM:  No results found for: ANATITER, AZAM  LIPASE:    Lab Results   Component Value Date/Time    LIPASE 15 08/07/2022 09:53 PM       IMAGING:   Imaging Studies:    XR CHEST PORTABLE    Result Date: 8/8/2022  No acute cardiopulmonary process. XR CHEST PORTABLE    Result Date: 8/7/2022  No acute process. PROCEDURES: None         Notable Cultures:      Blood cultures   Blood Culture, Routine   Date Value Ref Range Status   08/08/2022 24 Hours no growth  Preliminary     Respiratory cultures No results found for: RESPCULTURE No results found for: LABGRAM  Urine   Urine Culture, Routine   Date Value Ref Range Status   08/08/2022 <10,000 CFU/mL  Mixed gram positive organisms    Preliminary     Legionella No results found for: LABLEGI  C Diff PCR No results found for: CDIFPCR  Wound culture/abscess: No results for input(s): WNDABS in the last 72 hours.   Tip culture:No results for input(s):

## 2022-08-09 NOTE — PROGRESS NOTES
[facility] Quality Flow/Interdisciplinary Rounds Progress Note        Quality Flow Rounds held on August 9, 2022    Disciplines Attending:  Bedside Nurse, , , and Nursing Unit Leadership    Ankush Estrella was admitted on 8/7/2022  9:03 PM    Anticipated Discharge Date:  Expected Discharge Date: 08/12/22    Disposition:    Roberto Score:  Roberto Scale Score: 19    Readmission Risk              Risk of Unplanned Readmission:  [id number]           Discussed patient goal for the day, patient clinical progression, and barriers to discharge.   The following Goal(s) of the Day/Commitment(s) have been identified:  report labs/diagnostics     Renetta Green RN  August 9, 2022

## 2022-08-09 NOTE — CARE COORDINATION
Peer Recovery Support Note       Name: Live Cervantes    Date: 8/9/2022        Chief Complaint   Patient presents with    Nausea    Emesis    Hyperglycemia     Patient states since noon he has been unable to keep anything down and his blood ugar is elevated. EMS gave him a 500cc bolus and IV Zofran. Peer Support met with patient. [] Support and education provided  [] Resources provided   [] Treatment referral:   [] Other:   [x] Patient declined peer recovery services      Referred By: Hortencia     Notes: Odilia (peer support) and I met with patient and he denied any problem.

## 2022-08-09 NOTE — PLAN OF CARE
Problem: Chronic Conditions and Co-morbidities  Goal: Patient's chronic conditions and co-morbidity symptoms are monitored and maintained or improved  8/9/2022 0050 by Davida Manzo RN  Outcome: Progressing     Problem: Discharge Planning  Goal: Discharge to home or other facility with appropriate resources  8/9/2022 0050 by Davida Manzo RN  Outcome: Progressing     Problem: Safety - Adult  Goal: Free from fall injury  8/9/2022 0050 by Davida Manzo RN  Outcome: Progressing

## 2022-08-09 NOTE — PROGRESS NOTES
ENDOCRINOLOGY PROGRESS NOTE      Date of admission: 8/7/2022  Date of service: 8/9/2022  Admitting physician: Heather Thakkar DO   Primary Care Physician: Agueda Rodriguez MD  Consultant physician: Beth Garcia MD     Reason for the consultation:  Uncontrolled DM    History of Present Illness: The history is provided by the patient. Accuracy of the patient data is excellent    Daina Blount is a very pleasant 47 y.o. old male with PMH of Type 1 Diabetes mellitus on [employer] insulin pump, HTN, HLD, Marijuana abuse, Remote Hx of Lacunar stroke and others listed below who presented to the ED for abdominal pain. He was admitted to [address] [place] [address] on 8/7/2022 because of nausea/vomitus and found to be in DKA, endocrine team was consulted for diabetes management. Subjective   Seen this AM, no acute events overnight.  To restart his pump this AM     Point of care glucose monitoring (Independently reviewed)   Recent Labs     08/08/22  0954 08/08/22  1100 08/08/22  1204 08/08/22  1418 08/08/22  1625 08/08/22  2008 08/08/22  2155 08/09/22  0529   GLUMET 234* 207* 189* 185* 165* 206* 201* 188*     Scheduled Meds:   Insulin Pump - Bolus Dose   SubCUTAneous 4x Daily AC & HS    Insulin Pump - Basal Dose   SubCUTAneous Daily    thiamine mononitrate  100 mg Oral Daily    metoprolol tartrate  12.5 mg Oral BID    folic acid  1 mg Oral Daily    atorvastatin  40 mg Oral Nightly    aspirin  81 mg Oral Daily    enoxaparin  40 mg SubCUTAneous Daily    sodium chloride flush  5-40 mL IntraVENous 2 times per day    nicotine  1 patch TransDERmal Daily    pantoprazole  40 mg Oral QAM AC    cefepime  2,000 mg IntraVENous Q12H    vancomycin  1,000 mg IntraVENous Q12H    ondansetron  4 mg IntraVENous Once     PRN Meds:   insulin lispro, 300 Units, As Directed RT PRN  glucose, 4 tablet, PRN  dextrose bolus, 125 mL, PRN   Or  dextrose bolus, 250 mL, PRN  ondansetron, 4 mg, Q8H PRN   Or  ondansetron, 4 mg, Q6H PRN  polyethylene glycol, 17 g, Daily PRN  acetaminophen, 650 mg, Q6H PRN   Or  acetaminophen, 650 mg, Q6H PRN  potassium chloride, 10 mEq, PRN  sodium chloride flush, 5-40 mL, PRN    Continuous Infusions:        Review of Systems  All systems reviewed. All negative except for symptoms mentioned in HPI     OBJECTIVE    BP (!) 148/95   Pulse 62   Temp 98.6 °F (37 °C)   Resp 20   Ht 5' 6\" (1.676 m)   Wt 147 lb (66.7 kg)   SpO2 96%   BMI 23.73 kg/m²     Intake/Output Summary (Last 24 hours) at 8/9/2022 0953  Last data filed at 8/9/2022 9501  Gross per 24 hour   Intake 580 ml   Output --   Net 580 ml     Physical examination:  Due to this being a TeleHealth encounter, evaluation of the following organ systems is limited: Vitals/Constitutional/EENT/Resp/CV/GI//MS/Neuro/Skin/Heme-Lymph-Imm. Modified physical exam through Telemedicine camera    General: Communicating well via camera   Neck: no obvious neck mass. No obvious neck deformity     CVS: no distress   Chest: no distress. Chest is moving with respiration    Extremities:  no visible tremor  Skin: No visible rashes as seen from camera   Musculoskeletal: no visible deformity  Neuro: Alert and oriented to person, place, and time. Psychiatric: Normal mood and affect.  Behavior is normal    Review of Laboratory Data:  I personally reviewed the following labs:   Recent Labs     08/07/22 2153 08/08/22 0628 08/09/22  0604   WBC 16.4* 19.4* 14.1*   RBC 4.58 3.70* 3.74*   HGB 13.8 11.0* 11.4*   HCT 43.4 33.7* 33.8*   MCV 94.8 91.1 90.4   MCH 30.1 29.7 30.5   MCHC 31.8* 32.6 33.7   RDW 15.3* 15.3* 15.2*    370 354   MPV 9.8 8.8 8.6     Recent Labs     08/07/22 2153 08/08/22  0040 08/08/22  0628 08/08/22  0922 08/08/22  2253 08/09/22  0604      < > 138 137 137  --    K 5.5*   < > 3.5 3.7 4.4  --    CL 95*   < > 107 105 106  --    CO2 10*   < > 19* 22 21*  --    BUN 30*   < > 28* 25* 20  --    CREATININE 1.2   < > 1.1 0.9 0.8  --    GLUCOSE 478*   < > 252* 223* 220*  --    CALCIUM 9.4 ISF 45, goal 100-130, active insulin time 3 hrs  Continue glucose check with meals and at bedtime   Ok to dc from endocrine standpoint   Pt will follow with us after discharge. Follow up appointment 9/6/2022     DKA   Resolved, to restart his insulin pump this AM     The above issues were reviewed with the patient who understood and agreed with the plan. Thank you for allowing us to participate in the care of this patient. Please do not hesitate to contact us with any additional questions. Charles Armstrong MD  Endocrinologist, [facility] Diabetes Care and Endocrinology   [address] [place] [address]   Phone: [phone]  Fax: [phone]  ---------------------------  An electronic signature was used to authenticate this note.  Kristie Gottlieb MD on 8/9/2022 at 9:53 AM

## 2022-08-09 NOTE — PROGRESS NOTES
Pharmacy Consultation Note  (Antibiotic Dosing and Monitoring)    Initial consult date: 8/8/22  Consulting physician/provider: PEACE GARCIA  Drug: Vancomycin  Indication: Sepsis/SIRS    Vancomycin has been discontinued   Clinical Pharmacy to sign-off  Physician to re-consult pharmacy if future dosing is needed    Thank you for the consult,     Liliya Schneider, PharmD  PGY1 Pharmacy Resident 8/9/2022 1:47 PM

## 2022-08-09 NOTE — CARE COORDINATION
Reviewed chart, endocrinology restarted insulin pump, and ok for discharge. Per [facility] note Peer recovery is following, no notes, called and left message for Peer recovery to come see pt prior to discharge. Irina Lynch, MSW, LSW

## 2022-08-09 NOTE — PROGRESS NOTES
[address] [facility]  Internal Medicine Residency / [address]    Attending Physician Statement  I have discussed the case, including pertinent history and exam findings with the resident and the team.  I have seen and examined the patient and the key elements of the encounter have been performed by me. I agree with the assessment, plan and orders as documented by the resident. Up and around with IV's  A&O  Awaiting Endocrinology to resume insulin pump  VS stable   Treating upper lobe pneumonia with Cefepime   WBC decreasing after DKA  Has a productive cough with clear sputum  Plan; Continue same            Discharge planning  Remainder of medical problems as per resident note.       Felisa Ansari MD [facility]  Internal Medicine Residency Faculty

## 2022-08-10 VITALS
DIASTOLIC BLOOD PRESSURE: 90 MMHG | HEIGHT: 66 IN | SYSTOLIC BLOOD PRESSURE: 148 MMHG | BODY MASS INDEX: 23.95 KG/M2 | OXYGEN SATURATION: 98 % | HEART RATE: 75 BPM | WEIGHT: 149 LBS | RESPIRATION RATE: 16 BRPM | TEMPERATURE: 98.6 F

## 2022-08-10 LAB
ALBUMIN SERPL-MCNC: 3.8 G/DL (ref 3.5–5.2)
ALP BLD-CCNC: 97 U/L (ref 40–129)
ALT SERPL-CCNC: 55 U/L (ref 0–40)
ANION GAP SERPL CALCULATED.3IONS-SCNC: 8 MMOL/L (ref 7–16)
AST SERPL-CCNC: 46 U/L (ref 0–39)
BASOPHILS ABSOLUTE: 0.02 E9/L (ref 0–0.2)
BASOPHILS RELATIVE PERCENT: 0.3 % (ref 0–2)
BILIRUB SERPL-MCNC: 1.1 MG/DL (ref 0–1.2)
BILIRUBIN DIRECT: 0.3 MG/DL (ref 0–0.3)
BILIRUBIN, INDIRECT: 0.8 MG/DL (ref 0–1)
BUN BLDV-MCNC: 10 MG/DL (ref 6–20)
CALCIUM IONIZED: 1.27 MMOL/L (ref 1.15–1.33)
CALCIUM SERPL-MCNC: 8.9 MG/DL (ref 8.6–10.2)
CHLORIDE BLD-SCNC: 103 MMOL/L (ref 98–107)
CO2: 28 MMOL/L (ref 22–29)
CREAT SERPL-MCNC: 0.8 MG/DL (ref 0.7–1.2)
EOSINOPHILS ABSOLUTE: 0.25 E9/L (ref 0.05–0.5)
EOSINOPHILS RELATIVE PERCENT: 3.3 % (ref 0–6)
GFR AFRICAN AMERICAN: >60
GFR NON-AFRICAN AMERICAN: >60 ML/MIN/1.73
GLUCOSE BLD-MCNC: 147 MG/DL (ref 74–99)
HCT VFR BLD CALC: 36.5 % (ref 37–54)
HEMOGLOBIN: 12.2 G/DL (ref 12.5–16.5)
IMMATURE GRANULOCYTES #: 0.03 E9/L
IMMATURE GRANULOCYTES %: 0.4 % (ref 0–5)
LYMPHOCYTES ABSOLUTE: 1.54 E9/L (ref 1.5–4)
LYMPHOCYTES RELATIVE PERCENT: 20.5 % (ref 20–42)
MAGNESIUM: 1.8 MG/DL (ref 1.6–2.6)
MCH RBC QN AUTO: 30.1 PG (ref 26–35)
MCHC RBC AUTO-ENTMCNC: 33.4 % (ref 32–34.5)
MCV RBC AUTO: 90.1 FL (ref 80–99.9)
METER GLUCOSE: 124 MG/DL (ref 74–99)
METER GLUCOSE: 63 MG/DL (ref 74–99)
METER GLUCOSE: 89 MG/DL (ref 74–99)
MONOCYTES ABSOLUTE: 0.81 E9/L (ref 0.1–0.95)
MONOCYTES RELATIVE PERCENT: 10.8 % (ref 2–12)
NEUTROPHILS ABSOLUTE: 4.85 E9/L (ref 1.8–7.3)
NEUTROPHILS RELATIVE PERCENT: 64.7 % (ref 43–80)
PDW BLD-RTO: 14.6 FL (ref 11.5–15)
PHOSPHORUS: 3.1 MG/DL (ref 2.5–4.5)
PLATELET # BLD: 390 E9/L (ref 130–450)
PMV BLD AUTO: 8.6 FL (ref 7–12)
POTASSIUM SERPL-SCNC: 4.4 MMOL/L (ref 3.5–5)
RBC # BLD: 4.05 E12/L (ref 3.8–5.8)
SODIUM BLD-SCNC: 139 MMOL/L (ref 132–146)
TOTAL PROTEIN: 6.2 G/DL (ref 6.4–8.3)
URINE CULTURE, ROUTINE: NORMAL
WBC # BLD: 7.5 E9/L (ref 4.5–11.5)

## 2022-08-10 PROCEDURE — 36415 COLL VENOUS BLD VENIPUNCTURE: CPT

## 2022-08-10 PROCEDURE — 80076 HEPATIC FUNCTION PANEL: CPT

## 2022-08-10 PROCEDURE — 6370000000 HC RX 637 (ALT 250 FOR IP): Performed by: NURSE PRACTITIONER

## 2022-08-10 PROCEDURE — 99231 SBSQ HOSP IP/OBS SF/LOW 25: CPT | Performed by: INTERNAL MEDICINE

## 2022-08-10 PROCEDURE — 80048 BASIC METABOLIC PNL TOTAL CA: CPT

## 2022-08-10 PROCEDURE — 2580000003 HC RX 258

## 2022-08-10 PROCEDURE — 6370000000 HC RX 637 (ALT 250 FOR IP)

## 2022-08-10 PROCEDURE — 99232 SBSQ HOSP IP/OBS MODERATE 35: CPT | Performed by: INTERNAL MEDICINE

## 2022-08-10 PROCEDURE — 84100 ASSAY OF PHOSPHORUS: CPT

## 2022-08-10 PROCEDURE — 82962 GLUCOSE BLOOD TEST: CPT

## 2022-08-10 PROCEDURE — 82330 ASSAY OF CALCIUM: CPT

## 2022-08-10 PROCEDURE — 83735 ASSAY OF MAGNESIUM: CPT

## 2022-08-10 PROCEDURE — 85025 COMPLETE CBC W/AUTO DIFF WBC: CPT

## 2022-08-10 RX ORDER — ASPIRIN 81 MG/1
81 TABLET ORAL DAILY
Qty: 30 TABLET | Refills: 3 | Status: SHIPPED | OUTPATIENT
Start: 2022-08-10

## 2022-08-10 RX ORDER — NICOTINE 21 MG/24HR
1 PATCH, TRANSDERMAL 24 HOURS TRANSDERMAL DAILY
Qty: 30 PATCH | Refills: 3 | Status: SHIPPED | OUTPATIENT
Start: 2022-08-11

## 2022-08-10 RX ORDER — CEFDINIR 300 MG/1
300 CAPSULE ORAL EVERY 12 HOURS SCHEDULED
Qty: 10 CAPSULE | Refills: 0 | Status: SHIPPED | OUTPATIENT
Start: 2022-08-10 | End: 2022-08-15

## 2022-08-10 RX ORDER — CEFDINIR 300 MG/1
300 CAPSULE ORAL EVERY 12 HOURS SCHEDULED
Status: DISCONTINUED | OUTPATIENT
Start: 2022-08-10 | End: 2022-08-10 | Stop reason: HOSPADM

## 2022-08-10 RX ADMIN — PANTOPRAZOLE SODIUM 40 MG: 40 TABLET, DELAYED RELEASE ORAL at 05:14

## 2022-08-10 RX ADMIN — ASPIRIN 81 MG: 81 TABLET, COATED ORAL at 08:12

## 2022-08-10 RX ADMIN — METOPROLOL TARTRATE 12.5 MG: 25 TABLET, FILM COATED ORAL at 08:12

## 2022-08-10 RX ADMIN — SODIUM CHLORIDE, PRESERVATIVE FREE 10 ML: 5 INJECTION INTRAVENOUS at 08:13

## 2022-08-10 RX ADMIN — Medication 100 MG: at 08:12

## 2022-08-10 RX ADMIN — FOLIC ACID 1 MG: 1 TABLET ORAL at 08:12

## 2022-08-10 ASSESSMENT — ENCOUNTER SYMPTOMS
ABDOMINAL PAIN: 0
SORE THROAT: 0
COUGH: 1
WHEEZING: 0
CONSTIPATION: 0
VOMITING: 0
DIARRHEA: 0
NAUSEA: 0
SHORTNESS OF BREATH: 0

## 2022-08-10 NOTE — DISCHARGE INSTRUCTIONS
Internal Medicine Discharge Instruction    Discharge to:  Home  Diet: Regular  Activity: As tolerated       Be compliant with medications  Please take omnicef 200 mg twice a day for 5 more days   Follow up with Dr. Ambreen Bennett, make an appointment for hospital follow-up within 1 week   3.  [facility]     Electronically signed by Melonie eBck MD on 8/10/2022 at 11:05 AM

## 2022-08-10 NOTE — PROGRESS NOTES
Physician Progress Note      PATIENT:               Eliseo Nelson  [facility] #:                  [id number]  :                       1970  ADMIT DATE:       2022 9:03 PM  [address] [place] DATE:        8/10/2022 12:25 PM  RESPONDING  PROVIDER #:        Elvira PETTY DO          QUERY TEXT:    Patient admitted with DKA Type 1 . Documentation reflects Pneumonia in note(s)   dated 2021. If possible, please document in the progress notes and   discharge summary if Pneumonia was: The medical record reflects the following:  Risk Factors: upper lung Pneumonia, URI  Clinical Indicators: \"Treating upper lobe pneumonia with Cefepime\" documented     ,\"URI Patient complains of cough with mucus and white phlegm, Chest x-ray   no acute process\" documented in 8/10 White blood cell 14.1 trending down. WBC   16.4-19.4 POA, LA 2.5-2.0, Procal 2.35-2.49  Treatment: Cefepime, CXR, Labs    José Miguel Rangel RN ([phone])  Options provided:  -- Pneumonia confirmed after study  -- Pneumonia treated and resolved  -- Pneumonia ruled out after study  -- Other - I will add my own diagnosis  -- Disagree - Not applicable / Not valid  -- Disagree - Clinically unable to determine / Unknown  -- Refer to Clinical Documentation Reviewer    PROVIDER RESPONSE TEXT:    Pneumonia treated and resolved.     Query created by: Maureen Sainz on 8/10/2022 10:56 AM      Electronically signed by:  Alaina Nava DO 8/10/2022 12:30 PM

## 2022-08-10 NOTE — PATIENT CARE CONFERENCE
[facility] Quality Flow/Interdisciplinary Rounds Progress Note        Quality Flow Rounds held on August 10, 2022    Disciplines Attending:  Bedside Nurse, , , and Nursing Unit Leadership    Maritza Neal was admitted on 8/7/2022  9:03 PM    Anticipated Discharge Date:  Expected Discharge Date: 08/09/22    Disposition:    Roberto Score:  Roberto Scale Score: 23    Readmission Risk              Risk of Unplanned Readmission:  [id number]           Discussed patient goal for the day, patient clinical progression, and barriers to discharge.   The following Goal(s) of the Day/Commitment(s) have been identified:  Labs - Report Results      Hermelinda Stanley RN  August 10, 2022

## 2022-08-10 NOTE — PROGRESS NOTES
[address] [facility]  Internal Medicine Residency / [address]    Attending Physician Statement  I have discussed the case, including pertinent history and exam findings with the resident and the team.  I have seen and examined the patient and the key elements of the encounter have been performed by me. I agree with the assessment, plan and orders as documented by the resident. A&O  Off IV's   Eating   BS controlled and back on insulin pump per Endo  Will discharge on Omnicef for pneumonia and usual meds   I will follow up in my office   Remainder of medical problems as per resident note.       Selena Beard MD [facility]  Internal Medicine Residency Faculty

## 2022-08-10 NOTE — PROGRESS NOTES
[facility]  Internal Medicine Residency Program  Progress Note - House Team     Patient:  Nikunj Rico 47 y.o. male MRN: [id number]     Date of Service: 8/10/2022     CC: nausea and vomiting  Days since admission: 3    Subjective     Overnight events: no overnight events reported    Patient was seen at bedside this morning. Patient said he still has a productive cough but other than that he has no symptoms. States he is ready to go home today. Objective     Physical Exam:  Vitals: BP (!) 148/90   Pulse 75   Temp 98.6 °F (37 °C) (Oral)   Resp 16   Ht 5' 6\" (1.676 m)   Wt 149 lb (67.6 kg)   SpO2 98%   BMI 24.05 kg/m²     I & O - 24hr:   Intake/Output Summary (Last 24 hours) at 8/10/2022 0905  Last data filed at 8/10/2022 0051  Gross per 24 hour   Intake 1380 ml   Output 600 ml   Net 780 ml      General Appearance: alert, appears stated age, and cooperative  HEENT:  Head: Normocephalic, no lesions, without obvious abnormality. Neck: no adenopathy, supple, symmetrical, trachea midline, and thyroid not enlarged, symmetric, no tenderness/mass/nodules  Lung: clear to auscultation bilaterally  Heart: regular rate and rhythm, S1, S2 normal, no murmur, click, rub or gallop  Abdomen: soft, non-tender; bowel sounds normal; no masses,  no organomegaly  Extremities:  extremities normal, atraumatic, no cyanosis or edema  Musculokeletal: No joint swelling, no muscle tenderness. ROM normal in all joints of extremities.    Neurologic: Mental status: Alert, oriented, thought content appropriate  Subject  Pertinent Information & Imaging Studies, Consults   chika  CBC:   Lab Results   Component Value Date/Time    WBC 7.5 08/10/2022 06:08 AM    RBC 4.05 08/10/2022 06:08 AM    HGB 12.2 08/10/2022 06:08 AM    HCT 36.5 08/10/2022 06:08 AM    MCV 90.1 08/10/2022 06:08 AM    MCH 30.1 08/10/2022 06:08 AM    MCHC 33.4 08/10/2022 06:08 AM    RDW 14.6 08/10/2022 06:08 AM     08/10/2022 06:08 AM    MPV 8.6 08/10/2022 06:08 AM     CMP:    Lab Results   Component Value Date/Time     08/09/2022 10:00 PM    K 4.0 08/09/2022 10:00 PM    K 3.6 08/08/2022 02:56 AM     08/09/2022 10:00 PM    CO2 24 08/09/2022 10:00 PM    BUN 13 08/09/2022 10:00 PM    CREATININE 0.8 08/09/2022 10:00 PM    GFRAA >60 08/09/2022 10:00 PM    LABGLOM >60 08/09/2022 10:00 PM    GLUCOSE 177 08/09/2022 10:00 PM    GLUCOSE 83 04/11/2012 03:35 AM    PROT 6.2 08/10/2022 06:08 AM    LABALBU 3.8 08/10/2022 06:08 AM    LABALBU 4.3 12/30/2011 10:15 AM    CALCIUM 8.8 08/09/2022 10:00 PM    BILITOT 1.1 08/10/2022 06:08 AM    ALKPHOS 97 08/10/2022 06:08 AM    AST 46 08/10/2022 06:08 AM    ALT 55 08/10/2022 06:08 AM       IMAGING:   Imaging Studies:    XR CHEST PORTABLE    Result Date: 8/8/2022  No acute cardiopulmonary process. XR CHEST PORTABLE    Result Date: 8/7/2022  No acute process. PROCEDURES: none    FLUIDS: none      Notable Cultures:      Blood cultures   Blood Culture, Routine   Date Value Ref Range Status   08/08/2022 24 Hours no growth  Preliminary     Respiratory cultures No results found for: RESPCULTURE No results found for: LABGRAM  Urine   Urine Culture, Routine   Date Value Ref Range Status   08/08/2022 <10,000 CFU/mL  Mixed gram positive organisms    Final     Legionella No results found for: LABLEGI  C Diff PCR No results found for: CDIFPCR  Wound culture/abscess: No results for input(s): WNDABS in the last 72 hours. Tip culture:No results for input(s): CXCATHTIP in the last 72 hours. Antibiotic  Days  Day started   cefepime 3 8/8/22                             OXYGENATION: none    DIET: adult regular 4 carb choices        Resident's Assessment and Plan     Lucien Angel is a 47 y.o. male with  has a past medical history of Alcoholism ([address] Cocaine abuse ([facility] [address] [facility] Hypertension, Idiopathic peripheral neuropathy, Lacunar stroke [facility] and Marijuana abuse.   came here with CC   Chief Complaint   Patient presents with    Nausea    Emesis    Hyperglycemia     Patient states since noon he has been unable to keep anything down and his blood ugar is elevated. EMS gave him a 500cc bolus and IV Zofran. [address] STAY: Briefly, Xochilt Pang is a 47 y.o. male with past medical history of diabetes mellitus type 1 with insulin pump, hypertension, CVA, and polysubstance abuse. He presented to the ED with complaints of nausea, vomiting, and hyperglycemia. The patient reports that yesterday he felt like he getting an  upper respiratory infection as he has had a cough for a few days that is productive of white phlegm. He denied any fever, chills, or trouble breathing. He then developed nausea and vomiting this morning and has been unable to hold down any food since noon. He checked his blood glucose and was found to be hyperglycemia with a glucose in the 500s. DKA protocol was started in the ED, and patient was started on fluids and an insulin drip. The patient's anion gap successfully closed twice and he was bridged to subcutaneous insulin. He was also treated with cefepime and vancomycin for his URI.      Days since admission: 3    Consults:   endocrinology    Assessment & Plan  DKA, resolved  - likely 2/2 dehydration vs URI  - patient has hx of DM1 with insulin pump; glucose usually in 200s up to 400s  - beta hydroxybutyrate >4.50 on admission  - urine ketones > 80 on admission  - venous pH 7.4 on admission  - insulin pump restarted by endocrinology yesterday    HAGMA, resolved  - 2/2 DKA, lactic acidosis  - lactic acid 1.1 from 2.5  - anion gap 27 on admission, latest 10    Hx HTN  - current /90  PLAN  - continue lopressor 25    KIRSTEN stage II, resolved  - baseline Crt 0.7-0.8  - Crt today 0.8    URI  - patient still complains of cough with mucus and white phlegm  - WBC today 7.5  - CXR no acute process  PLAN  - discharge with cefdinir 300 mg BID ending on 8/14/22    Polysubstance

## 2022-08-10 NOTE — PROGRESS NOTES
Rounded on patient to see if his prescriptions were delivered from pharmacy. No one in the room. Called pharmacy, and pharmacy confirmed, that they had not been filled or delivered yet. Pt left without meds, but did receive his d/c instructions and bilat IV sites were removed with pressure held, and bandaids applied prior to. Monitor was removed as well, and returned. Physician notified via perfect serve. Pharmacy also stated they would attempt to reach out to pt to see if he would return for them.

## 2022-08-10 NOTE — PROGRESS NOTES
ENDOCRINOLOGY PROGRESS NOTE      Date of admission: 8/7/2022  Date of service: 8/10/2022  Admitting physician: Chato Roberson DO   Primary Care Physician: Brennan Bennett MD  Consultant physician: Rashard Pressley MD     Reason for the consultation:  Uncontrolled DM    History of Present Illness: The history is provided by the patient. Accuracy of the patient data is excellent    Hayes Cordero is a very pleasant 47 y.o. old male with PMH of Type 1 Diabetes mellitus on [employer] insulin pump, HTN, HLD, Marijuana abuse, Remote Hx of Lacunar stroke and others listed below who presented to the ED for abdominal pain. He was admitted to [facility] on 8/7/2022 because of nausea/vomitus and found to be in DKA, endocrine team was consulted for diabetes management.      Subjective   Seen this AM, BG dropped to 63 , pt awake alert     Point of care glucose monitoring (Independently reviewed)   Recent Labs     08/09/22  1103 08/09/22  1420 08/09/22  1524 08/09/22  1656 08/09/22  2129 08/09/22  2256 08/10/22  0512 08/10/22  0533   GLUMET 305* 334* 257* 156* 208* 173* 63* 89     Scheduled Meds:   Insulin Pump - Bolus Dose   SubCUTAneous 4x Daily AC & HS    Insulin Pump - Basal Dose   SubCUTAneous Daily    thiamine mononitrate  100 mg Oral Daily    metoprolol tartrate  12.5 mg Oral BID    folic acid  1 mg Oral Daily    atorvastatin  40 mg Oral Nightly    aspirin  81 mg Oral Daily    enoxaparin  40 mg SubCUTAneous Daily    sodium chloride flush  5-40 mL IntraVENous 2 times per day    nicotine  1 patch TransDERmal Daily    pantoprazole  40 mg Oral QAM AC    cefepime  2,000 mg IntraVENous Q12H    ondansetron  4 mg IntraVENous Once     PRN Meds:   insulin lispro, 300 Units, As Directed RT PRN  glucose, 4 tablet, PRN  dextrose bolus, 125 mL, PRN   Or  dextrose bolus, 250 mL, PRN  ondansetron, 4 mg, Q8H PRN   Or  ondansetron, 4 mg, Q6H PRN  polyethylene glycol, 17 g, Daily PRN  acetaminophen, 650 mg, Q6H PRN   Or  acetaminophen, 650 mg, Q6H PRN  potassium chloride, 10 mEq, PRN  sodium chloride flush, 5-40 mL, PRN    Continuous Infusions:        Review of Systems  All systems reviewed. All negative except for symptoms mentioned in HPI     OBJECTIVE    BP (!) 163/97   Pulse 59   Temp 98.6 °F (37 °C) (Oral)   Resp 14   Ht 5' 6\" (1.676 m)   Wt 149 lb (67.6 kg)   SpO2 99%   BMI 24.05 kg/m²     Intake/Output Summary (Last 24 hours) at 8/10/2022 0705  Last data filed at 8/10/2022 0441  Gross per 24 hour   Intake 900 ml   Output 600 ml   Net 300 ml     Physical examination:  Due to this being a TeleHealth encounter, evaluation of the following organ systems is limited: Vitals/Constitutional/EENT/Resp/CV/GI//MS/Neuro/Skin/Heme-Lymph-Imm. Modified physical exam through Telemedicine camera    General: Communicating well via camera   Neck: no obvious neck mass. No obvious neck deformity     CVS: no distress   Chest: no distress. Chest is moving with respiration    Extremities:  no visible tremor  Skin: No visible rashes as seen from camera   Musculoskeletal: no visible deformity  Neuro: Alert and oriented to person, place, and time. Psychiatric: Normal mood and affect.  Behavior is normal    Review of Laboratory Data:  I personally reviewed the following labs:   Recent Labs     08/08/22 0628 08/09/22  0604 08/10/22  0608   WBC 19.4* 14.1* 7.5   RBC 3.70* 3.74* 4.05   HGB 11.0* 11.4* 12.2*   HCT 33.7* 33.8* 36.5*   MCV 91.1 90.4 90.1   MCH 29.7 30.5 30.1   MCHC 32.6 33.7 33.4   RDW 15.3* 15.2* 14.6    354 390   MPV 8.8 8.6 8.6     Recent Labs     08/07/22  2153 08/08/22  0040 08/08/22  0628 08/08/22  0922 08/08/22  2253 08/09/22  0604 08/09/22  2200      < > 138 137 137  --  134   K 5.5*   < > 3.5 3.7 4.4  --  4.0   CL 95*   < > 107 105 106  --  100   CO2 10*   < > 19* 22 21*  --  24   BUN 30*   < > 28* 25* 20  --  13   CREATININE 1.2   < > 1.1 0.9 0.8  --  0.8   GLUCOSE 478*   < > 252* 223* 220*  --  177*   CALCIUM 9.4   < > 8.1* 8.2* 8.5*  --  8.8   PROT 7.2  --  5.9*  --   --  5.8*  --    LABALBU 4.2  --  3.8  --   --  3.5  --    BILITOT 1.8*  --  1.0  --   --  1.2  --    ALKPHOS 149*  --  110  --   --  92  --    AST 23  --  14  --   --  39  --    ALT 18  --  18  --   --  33  --     < > = values in this interval not displayed. Beta-Hydroxybutyrate   Date Value Ref Range Status   08/07/2022 >4.50 (H) 0.02 - 0.27 mmol/L Final   04/29/2022 3.09 (H) 0.02 - 0.27 mmol/L Final   01/25/2022 >4.50 (H) 0.02 - 0.27 mmol/L Final     Lab Results   Component Value Date/Time    LABA1C 9.0 08/08/2022 06:28 AM    LABA1C 8.9 05/10/2022 12:15 PM    LABA1C 9.3 01/26/2022 03:03 PM     Lab Results   Component Value Date/Time    TSH 3.330 01/04/2020 05:07 PM    T4FREE 0.97 07/19/2012 02:00 AM     Lab Results   Component Value Date/Time    LABA1C 9.0 08/08/2022 06:28 AM    GLUCOSE 177 08/09/2022 10:00 PM    GLUCOSE 83 04/11/2012 03:35 AM    MALBCR 167.3 08/08/2022 03:25 AM    LABMICR 50.2 08/08/2022 03:25 AM    LABCREA 30 08/08/2022 03:25 AM    LABCREA 30 08/08/2022 03:25 AM     Lab Results   Component Value Date/Time    TRIG 100 01/26/2022 06:00 AM    HDL 48 09/26/2021 05:58 PM    LDLCALC 112 09/26/2021 05:58 PM    CHOL 213 09/26/2021 05:58 PM       Blood culture   Lab Results   Component Value Date/Time    BC 24 Hours no growth 08/08/2022 06:24 AM    BC 5 Days no growth 05/01/2022 11:20 AM       Radiology:  XR CHEST PORTABLE   Final Result   No acute cardiopulmonary process. XR CHEST PORTABLE   Final Result   No acute process.              Medical Records/Labs/Images review:   I personally reviewed and summarized previous records   All labs and imaging were reviewed independently     [address] Bk Zapata, a 47 y.o.-old male seen today for inpatient diabetes management     Diabetes Mellitus type 1 on insulin pump   Patient's diabetes is uncontrolled, A1c 9%     Will adjust insulin pump with the following settings: Basal rate 12 A 1.45, 8a 1.55, and CR 10, ISF 45, goal 100-130, active insulin time 3 hrs  Continue glucose check with meals and at bedtime   Ok to dc from endocrine standpoint   Pt will follow with us after discharge. Follow up appointment 9/6/2022     DKA   Resolved, back on his insulin pump     Hyperkalemia  Resolved     The above issues were reviewed with the patient who understood and agreed with the plan. Thank you for allowing us to participate in the care of this patient. Please do not hesitate to contact us with any additional questions. Keaton Moffett MD  Endocrinologist, [facility]   [address] [place] [address]   Phone: [phone]  Fax: [phone]  ---------------------------  An electronic signature was used to authenticate this note.  León Franco MD on 8/10/2022 at 7:05 AM

## 2022-08-10 NOTE — CARE COORDINATION
Pt discharging home today, pt declined Peer [facility] services. No needs addressed. Anel Olson, MSW, LSW

## 2022-08-11 NOTE — DISCHARGE SUMMARY
[address]  Discharge Summary    PCP: Sigrid Carrero MD    Admit Date:8/7/2022  Discharge Date: 8/10/2022    Chief Complaint   Patient presents with    Nausea    Emesis    Hyperglycemia     Patient states since noon he has been unable to keep anything down and his blood ugar is elevated. EMS gave him a 500cc bolus and IV Zofran. Admission Diagnosis:   DKA  HAGMA  Hx HTN  KIRSTEN Stage II  Polysubstance abuse  Leukocytosis  Indirect hyperbilirubinemia    Discharge Diagnosis:  DKA, resolved   HAGMA, resolved  Upper respiratory infection  Hx HTN  KIRSTEN stage II, resolved  Polysubstance abuse  Leukocytosis, resolved  Indirect hyperbilirubinemia, resolved    Hospital Course: Adelaide Orr is a 47year old man with a past medical history of diabetes mellitus type 1 with insulin pump, hypertension, CVA, and polysubstance abuse. He presented to the ED with complaints of nausea, vomiting, and hyperglycemia. He reported that the day before he felt like he was getting an upper respiratory infection as he had a cough for a few days that was productive of white phlegm. He denied any fever, chills, or trouble breathing. He then developed nausea and vomiting the morning of admission and check his blood glucose which he found to be in the 500s. The patient stated that he has an insulin pump for insulin administration but does not have a glucose monitor so he checks it himself 5-6 times per day. His glucose typically ranges from 200-400 but he does occasionally have morning readings in the 50-60s. When the patient arrived to the ED he was complaining of nausea and vomiting, and his glucose was 478 with an anion gap of 27. He was started on DKA protocol and was given an insulin bolus, insulin infusion, zofran, and bicarb. A central line was also placed and the patient was admitted to the ICU for further management.  The patient was started on vancomycin and cefepime for treatment Appearance: alert, appears stated age, and cooperative  HEENT:  Head: Normocephalic, no lesions, without obvious abnormality. Neck: no adenopathy, supple, symmetrical, trachea midline, and thyroid not enlarged, symmetric, no tenderness/mass/nodules  Lung: clear to auscultation bilaterally  Heart: regular rate and rhythm, S1, S2 normal, no murmur, click, rub or gallop  Abdomen: soft, non-tender; bowel sounds normal; no masses,  no organomegaly  Extremities:  extremities normal, atraumatic, no cyanosis or edema  Musculokeletal: No joint swelling, no muscle tenderness. ROM normal in all joints of extremities. Neurologic: Mental status: Alert, oriented, thought content appropriate      Pending test results: -    Consults:  Endocrinology    Procedures:  -     Condition at discharge: Stable    Disposition: home    Time taken for discharge : < 30 mins [x] >30 mins []    Discharge Medications:  Discharge Medication List as of 8/10/2022 11:21 AM        START taking these medications    Details   cefdinir (OMNICEF) 300 MG capsule Take 1 capsule by mouth in the morning and 1 capsule before bedtime. Do all this for 10 doses. , Disp-10 capsule, R-0Normal      nicotine (NICODERM CQ) 14 MG/24HR Place 1 patch onto the skin in the morning., Disp-30 patch, R-3Normal           CONTINUE these medications which have CHANGED    Details   aspirin 81 MG EC tablet Take 1 tablet by mouth in the morning., Disp-30 tablet, R-3Normal           CONTINUE these medications which have NOT CHANGED    Details   !! Continuous Blood Gluc  (DEXCOM G6 ) DAVID For BS monitoring, Disp-1 each, R-0Normal      blood glucose test strips (ONETOUCH VERIO) strip 1 each by In Vitro route 5 times daily As needed. , Disp-150 each, R-11Pt now has [facility]. He will bring his updated card. Normal      insulin aspart (NOVOLOG) 100 UNIT/ML injection vial 100 units per day per insulin pump, Disp-30 mL, R-5Normal      atorvastatin (LIPITOR) 40 MG tablet Take 1 tablet by mouth nightly, Disp-30 tablet, R-0Normal      Continuous Blood Gluc Sensor (DEXCOM G6 SENSOR) MISC Apply every 10 days, Disp-1 each, R-0Normal      Continuous Blood Gluc Transmit (DEXCOM G6 TRANSMITTER) MISC 1 transmitter for every 90 days, Disp-1 each, R-3Normal      !! Continuous Blood Gluc  (DEXCOM G6 ) DAVID For bs monitoring, Disp-1 each, R-0Normal      insulin aspart (NOVOLOG) 100 UNIT/ML injection vial 100 units/day via insulin pump, Disp-30 mL, R-5Pt now has [facility]. He will bring his updated card. Normal      folic acid (FOLVITE) 1 MG tablet Take 1 tablet by mouth daily, Disp-30 tablet, R-3Normal      Cholecalciferol 400 UNIT TABS tablet Take 2.5 tablets by mouth daily, Disp-30 tablet, R-0Normal      thiamine 100 MG tablet Take 1 tablet by mouth daily, Disp-30 tablet, R-3Normal      Insulin Pump - insulin lispro Inject into the skin continuous Insulin-to-Carb Ratio (ICR): Insulin Sensitivity Factor (ISF): mg/dL per unit of insulin  Target Blood Glucose:  mg/dL  Bolus Frequency:Historical Med      lisinopril (PRINIVIL;ZESTRIL) 5 MG tablet Take 1 tablet by mouth daily, Disp-30 tablet, R-3Normal      albuterol sulfate  (90 Base) MCG/ACT inhaler Inhale 2 puffs into the lungs every 6 hours as needed for Wheezing or Shortness of Breath, Disp-1 each, R-1Normal      metoprolol tartrate (LOPRESSOR) 25 MG tablet Take 0.5 tablets by mouth 2 times daily, Disp-60 tablet, R-3Normal       !! - Potential duplicate medications found. Please discuss with provider. Activity: activity as tolerated  Diet: diabetic diet    Follow-up appointments: Follow up with Dr. Jonatan Maya in his clinic within 1 week of discharge  Follow up with Dr. Kate Gale in his clinic    Patient Instructions:   Internal medicine    Follow ups  Please follow up with your primary care physician ( Antony Flores MD) within 7 days of discharge from hospital. Please call as soon as possible to make an appointment. Please contact the internal medicine clinic for an appointment if you are unable to get an appointment with your PCP. Please keep all other follow up appointments:  Future Appointments   Date Time Provider Clarisse Jacobson   9/6/2022  3:30 PM [facility]        Changes in healthcare   Please take all medications as indicated  Diet: diabetic diet   Activity: activity as tolerated  New Medications started during this hospital stay  Omnicef 200 mg BID for 5 days  Changes to your medications  none  Medications you should stop taking   none  Additional labs, testing or imaging needed after discharge   none  Even if you are feeling better and not having symptoms do not stop taking antibiotic earlier then prescribed   Please contact us if you have any concerns, wish to change or make an appointment:  Internal medicine clinic   Phone: [phone]  Fax: [phone]  [facility] [address]  Or please call the nurse line at [phone]. Should you have further questions in regards to this visit, you can review your clinical note and after visit summary document on your [employer] account. Other questions can be directed to our nurse line at [phone]. Other than any new prescriptions given to you today, the list of home medications on this After Visit Summary are based on information provided to us from you and your healthcare providers. This information, including the list, dose, and frequency of medications is only as accurate as the information you provided. If you have any questions or concerns about your home medications, please contact your Primary Care Physician for further clarification.      Yohana Crawley MD  PGY 1   9:56 PM 8/10/2022

## 2022-08-11 NOTE — PROGRESS NOTES
CLINICAL PHARMACY NOTE: MEDS TO BEDS    Total # of Prescriptions Filled: 2   The following medications were delivered to the patient:  Cefdinir 300 mg  Adult aspirin 81 mg  Picked up in the pharmacy    Additional Documentation:

## 2022-08-13 LAB
BLOOD CULTURE, ROUTINE: NORMAL
CULTURE, BLOOD 2: NORMAL

## 2022-09-06 ENCOUNTER — OFFICE VISIT (OUTPATIENT)
Dept: ENDOCRINOLOGY | Age: 52
End: 2022-09-06
Payer: COMMERCIAL

## 2022-09-06 VITALS
OXYGEN SATURATION: 97 % | SYSTOLIC BLOOD PRESSURE: 144 MMHG | BODY MASS INDEX: 22.66 KG/M2 | HEIGHT: 66 IN | DIASTOLIC BLOOD PRESSURE: 88 MMHG | HEART RATE: 56 BPM | WEIGHT: 141 LBS

## 2022-09-06 DIAGNOSIS — R80.9 TYPE 1 DIABETES MELLITUS WITH ALBUMINURIA (HCC): ICD-10-CM

## 2022-09-06 DIAGNOSIS — E10.65 UNCONTROLLED TYPE 1 DIABETES MELLITUS WITH HYPERGLYCEMIA (HCC): Primary | ICD-10-CM

## 2022-09-06 DIAGNOSIS — E10.29 TYPE 1 DIABETES MELLITUS WITH ALBUMINURIA (HCC): ICD-10-CM

## 2022-09-06 DIAGNOSIS — E78.5 HYPERLIPIDEMIA, UNSPECIFIED HYPERLIPIDEMIA TYPE: ICD-10-CM

## 2022-09-06 DIAGNOSIS — Z96.41 INSULIN PUMP IN PLACE: ICD-10-CM

## 2022-09-06 DIAGNOSIS — E55.9 VITAMIN D DEFICIENCY: ICD-10-CM

## 2022-09-06 PROCEDURE — 3046F HEMOGLOBIN A1C LEVEL >9.0%: CPT | Performed by: CLINICAL NURSE SPECIALIST

## 2022-09-06 PROCEDURE — 1111F DSCHRG MED/CURRENT MED MERGE: CPT | Performed by: CLINICAL NURSE SPECIALIST

## 2022-09-06 PROCEDURE — 3017F COLORECTAL CA SCREEN DOC REV: CPT | Performed by: CLINICAL NURSE SPECIALIST

## 2022-09-06 PROCEDURE — G8427 DOCREV CUR MEDS BY ELIG CLIN: HCPCS | Performed by: CLINICAL NURSE SPECIALIST

## 2022-09-06 PROCEDURE — 99214 OFFICE O/P EST MOD 30 MIN: CPT | Performed by: CLINICAL NURSE SPECIALIST

## 2022-09-06 PROCEDURE — 4004F PT TOBACCO SCREEN RCVD TLK: CPT | Performed by: CLINICAL NURSE SPECIALIST

## 2022-09-06 PROCEDURE — 2022F DILAT RTA XM EVC RTNOPTHY: CPT | Performed by: CLINICAL NURSE SPECIALIST

## 2022-09-06 PROCEDURE — G8420 CALC BMI NORM PARAMETERS: HCPCS | Performed by: CLINICAL NURSE SPECIALIST

## 2022-09-06 NOTE — PROGRESS NOTES
[address] [facility] Department of Endocrinology Diabetes and Metabolism   [address] [facility] [address]   Phone: [phone]  Fax: [phone]    Date of Service: 9/6/2022    Primary Care Physician: Sigrid Carrero MD  Referring physician: No ref. provider found  Provider: CHAYO Guido     Reason for the visit:  Type 1 Diabetes    History of Present Illness: The history is provided by the patient. No  was used. Accuracy of the patient data is excellent. Leilani Mcnulty is a very pleasant 47 y.o. male seen today for diabetes management. He was recently admitted for CAP at [facility] from 4/29/22-5/1/22    Leilani Mcnulty was diagnosed with diabetes at age 30 and currently on [employer] 530:  Basal rate 12 A 1.45, 8a 1.50, and CR 10, ISF 40, goal 100-130, active insulin time 3 hrs. Missing boluses with meals frequently !!! There are days when he does not check BG or bolus with meals     Recently admitted with DKA  (8/2022)    The patient has been checking blood sugar 1-2 times per day   Average             Most recent A1c results summarized below  Lab Results   Component Value Date/Time    LABA1C 9.0 08/08/2022 06:28 AM    LABA1C 8.9 05/10/2022 12:15 PM    LABA1C 9.3 01/26/2022 03:03 PM     Patient reported one hypoglycemic episodes  The patient has been trying to be more mindful of what has been eating and has been following diabetes diet    I reviewed current medications and the patient has no issues with diabetes medications  Leilani Mcnulty is over due for an eye exam. Last eye exam was few years ago. Financial issues to make an appointment. The patient performs his own feet care and doesn't see podiatry   His diabetes is complicated with neuropathy and retinopathy s/p laser therapy   Macrovascular complications: no CAD, PVD, + TIA 2/2019   Multiple admissions for DKA.   Most recent hospitalization (8/22)    PAST MEDICAL HISTORY   Past Medical History:   Diagnosis Date    Alcoholism ([facility]     Cocaine abuse ([facility]     Diabetes mellitus ([facility]     Hypertension     Idiopathic peripheral neuropathy 9/21/2016    Lacunar stroke ([facility]     Marijuana abuse        PAST SURGICAL HISTORY   No past surgical history on file. SOCIAL HISTORY   Tobacco:   reports that he has been smoking cigarettes. He has a 22.50 pack-year smoking history. He has never used smokeless tobacco.  Alcohol:   reports current alcohol use of about 6.0 standard drinks per week. Drugs:   reports current drug use. Drug: Marijuana Jessica Jules). FAMILY HISTORY   Family History   Problem Relation Age of Onset    Diabetes type 2  Mother     Cancer Maternal Grandmother     Diabetes type 2  Nephew        ALLERGIES AND DRUG REACTIONS   Allergies   Allergen Reactions    Metoprolol      Bradycardia      Patient states he has been currently taking it at home without any issues. CURRENT MEDICATIONS   Current Outpatient Medications   Medication Sig Dispense Refill    insulin aspart (NOVOLOG) 100 UNIT/ML injection vial 100 units per day per insulin pump 30 mL 5    aspirin 81 MG EC tablet Take 1 tablet by mouth in the morning. 30 tablet 3    nicotine (NICODERM CQ) 14 MG/24HR Place 1 patch onto the skin in the morning. 30 patch 3    Continuous Blood Gluc  (DEXCOM G6 ) DAVID For BS monitoring 1 each 0    blood glucose test strips (ONETOUCH VERIO) strip 1 each by In Vitro route 5 times daily As needed.  150 each 11    atorvastatin (LIPITOR) 40 MG tablet Take 1 tablet by mouth nightly 30 tablet 0    Continuous Blood Gluc Sensor (DEXCOM G6 SENSOR) MISC Apply every 10 days (Patient not taking: Reported on 5/10/2022) 1 each 0    Continuous Blood Gluc Transmit (DEXCOM G6 TRANSMITTER) MISC 1 transmitter for every 90 days 1 each 3    Continuous Blood Gluc  (DEXCOM G6 ) DAVID For bs monitoring 1 each 0    insulin aspart (NOVOLOG) 100 UNIT/ML injection vial 100 units/day via insulin pump 30 mL 5    folic acid (FOLVITE) 1 MG tablet Take 1 tablet by mouth daily 30 tablet 3    Cholecalciferol 400 UNIT TABS tablet Take 2.5 tablets by mouth daily 30 tablet 0    thiamine 100 MG tablet Take 1 tablet by mouth daily 30 tablet 3    Insulin Pump - insulin lispro Inject into the skin continuous Insulin-to-Carb Ratio (ICR): Insulin Sensitivity Factor (ISF): mg/dL per unit of insulin  Target Blood Glucose:  mg/dL  Bolus Frequency:      lisinopril (PRINIVIL;ZESTRIL) 5 MG tablet Take 1 tablet by mouth daily 30 tablet 3    albuterol sulfate  (90 Base) MCG/ACT inhaler Inhale 2 puffs into the lungs every 6 hours as needed for Wheezing or Shortness of Breath 1 each 1    metoprolol tartrate (LOPRESSOR) 25 MG tablet Take 0.5 tablets by mouth 2 times daily 60 tablet 3     No current facility-administered medications for this visit. Review of Systems  Constitutional: No fever, no chills, no diaphoresis, no generalized weakness. HEENT: No blurred vision, No sore throat, no ear pain, no hair loss  Neck: denied any neck swelling, difficulty swallowing,   Cardio-pulmonary: No CP, SOB or palpitation, No orthopnea or PND. No cough or wheezing. GI: No N/V/D, no constipation, No abdominal pain, no melena or hematochezia   : Denied any dysuria, hematuria, flank pain, discharge, or incontinence. Skin: denied any rash, ulcer, Hirsute, or hyperpigmentation. MSK: denied any joint deformity, joint pain/swelling, muscle pain, or back pain.   Neuro: no numbness, no tingling, no weakness    OBJECTIVE    BP (!) 144/88   Pulse 56   Ht 5' 6\" (1.676 m)   Wt 141 lb (64 kg)   SpO2 97%   BMI 22.76 kg/m²   BP Readings from Last 4 Encounters:   09/06/22 (!) 144/88   08/10/22 (!) 148/90   05/10/22 (!) 179/117   05/01/22 128/78     Wt Readings from Last 6 Encounters:   09/06/22 141 lb (64 kg)   08/10/22 149 lb (67.6 kg)   05/10/22 139 lb (63 kg)   05/01/22 137 lb 3.2 oz (62.2 kg)   03/23/22 144 lb (65.3 kg)   01/27/22 138 lb 3.2 oz (62.7 kg)       Physical examination:  General: awake alert, oriented x3, no abnormal position or movements. HEENT: normocephalic non-traumatic, no exophthalmos   Neck: supple, no LN enlargement, no thyromegaly, no thyroid tenderness, no JVD. Pulm: Clear equal air entry no added sounds, no wheezing or rhonchi    CVS: S1 + S2, no murmur, no heave. Dorsalis pedis pulse palpable   Abd: soft lax, no tenderness, no organomegaly, audible bowel sounds. Skin: warm, no lesions, no rash.  No callus, no Ulcers, No acanthosis nigricans  Musculoskeletal: No back tenderness, no kyphosis/scoliosis    Neuro: CN intact,  sensation decreased bilateral , muscle power normal  Psych: normal mood, and affect      Review of Laboratory Data:  I personally reviewed the following lab:  Lab Results   Component Value Date/Time    WBC 7.5 08/10/2022 06:08 AM    RBC 4.05 08/10/2022 06:08 AM    HGB 12.2 (L) 08/10/2022 06:08 AM    HCT 36.5 (L) 08/10/2022 06:08 AM    MCV 90.1 08/10/2022 06:08 AM    MCH 30.1 08/10/2022 06:08 AM    MCHC 33.4 08/10/2022 06:08 AM    RDW 14.6 08/10/2022 06:08 AM     08/10/2022 06:08 AM    MPV 8.6 08/10/2022 06:08 AM    BANDS 1 04/10/2012 04:30 AM      Lab Results   Component Value Date/Time     08/10/2022 09:41 AM    K 4.4 08/10/2022 09:41 AM    K 3.6 08/08/2022 02:56 AM    CO2 28 08/10/2022 09:41 AM    BUN 10 08/10/2022 09:41 AM    CREATININE 0.8 08/10/2022 09:41 AM    CALCIUM 8.9 08/10/2022 09:41 AM    LABGLOM >60 08/10/2022 09:41 AM    GFRAA >60 08/10/2022 09:41 AM      Lab Results   Component Value Date/Time    TSH 3.330 01/04/2020 05:07 PM    T4FREE 0.97 07/19/2012 02:00 AM     Lab Results   Component Value Date/Time    LABA1C 9.0 08/08/2022 06:28 AM    GLUCOSE 147 08/10/2022 09:41 AM    GLUCOSE 83 04/11/2012 03:35 AM    MALBCR 167.3 08/08/2022 03:25 AM    LABMICR 50.2 08/08/2022 03:25 AM    LABCREA 30 08/08/2022 03:25 AM    LABCREA 30 08/08/2022 03:25 AM     Lab Results   Component Value Date/Time    LABA1C 9.0 08/08/2022 06:28 AM    LABA1C 8.9 05/10/2022 12:15 PM    LABA1C 9.3 01/26/2022 03:03 PM     Lab Results   Component Value Date/Time    TRIG 100 01/26/2022 06:00 AM    HDL 48 09/26/2021 05:58 PM    LDLCALC 112 09/26/2021 05:58 PM    CHOL 213 09/26/2021 05:58 PM     Lab Results   Component Value Date/Time    VITD25 27 08/08/2022 02:56 AM    VITD25 25 07/22/2020 05:15 AM       ASSESSMENT & RECOMMENDATIONS   Weston Sanchez, a 47 y.o.-old male seen in for the following issues       Assessment:      Diagnosis Orders   1. Uncontrolled type 1 diabetes mellitus with hyperglycemia ([address]        2. Hyperlipidemia, unspecified hyperlipidemia type        3. Vitamin D deficiency        4. 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 Insulin pump in place              Plan:     1. Uncontrolled type 1 diabetes mellitus with hyperglycemia ([address]   Patient's diabetes is uncontrolled  Patient is not bolusing frequently  There are days on download that he does not bolus at all!!!  I encourage compliance with bolusing with each meal and giving correction as needed  Patient checking blood sugars 1-2 times per day  I advised patient to check blood sugars 4 times per day  Will continue insulin pump settings: Basal rate 12 A 1.45, 8a 1.50, and CR 10, ISF 40, goal 100-130, active insulin time 3 hrs. Waiting for new pump upgrade   Send download in 2 weeks   Encouraged diet  and exercise          2. Hyperlipidemia, unspecified hyperlipidemia type   Continue statin. Encouraged compliance   Encourage diet and exercise     3. Vitamin D deficiency   On replacement therapy, noncompliant with adherence  2000 iu of Vitamin D daily     4.  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 I also ordered labs to further assess and manage the above addressed medical conditions. Return in about 3 months (around 12/6/2022). The above issues were reviewed with the patient who understood and agreed with the plan. Thank you for allowing us to participate in the care of this patient. Please do not hesitate to contact us with any additional questions. CHAYO Steele    [facility] Diabetes Care and Endocrinology   [address] [place] [id number]   Phone: [phone]  Fax: [phone]  --------------------------------------------  An electronic signature was used to authenticate this note.  CHAYO Steele on 9/6/2022 at 3:54 PM

## 2022-11-14 ENCOUNTER — HOSPITAL ENCOUNTER (INPATIENT)
Age: 52
LOS: 2 days | Discharge: HOME | DRG: 919 | End: 2022-11-16
Attending: EMERGENCY MEDICINE | Admitting: INTERNAL MEDICINE
Payer: COMMERCIAL

## 2022-11-14 ENCOUNTER — APPOINTMENT (OUTPATIENT)
Dept: GENERAL RADIOLOGY | Age: 52
DRG: 919 | End: 2022-11-14
Payer: COMMERCIAL

## 2022-11-14 DIAGNOSIS — E10.29 TYPE 1 DIABETES MELLITUS WITH ALBUMINURIA (HCC): ICD-10-CM

## 2022-11-14 DIAGNOSIS — E10.10 TYPE 1 DIABETES MELLITUS WITH KETOACIDOSIS WITHOUT COMA (HCC): Primary | ICD-10-CM

## 2022-11-14 DIAGNOSIS — R80.9 TYPE 1 DIABETES MELLITUS WITH ALBUMINURIA (HCC): ICD-10-CM

## 2022-11-14 LAB
ADENOVIRUS BY PCR: NOT DETECTED
ALBUMIN SERPL-MCNC: 4.1 G/DL (ref 3.5–5.2)
ALP BLD-CCNC: 121 U/L (ref 40–129)
ALT SERPL-CCNC: 15 U/L (ref 0–40)
AMPHETAMINE SCREEN, URINE: NOT DETECTED
ANION GAP SERPL CALCULATED.3IONS-SCNC: 11 MMOL/L (ref 7–16)
ANION GAP SERPL CALCULATED.3IONS-SCNC: 18 MMOL/L (ref 7–16)
ANION GAP SERPL CALCULATED.3IONS-SCNC: 25 MMOL/L (ref 7–16)
AST SERPL-CCNC: 10 U/L (ref 0–39)
BACTERIA: ABNORMAL /HPF
BARBITURATE SCREEN URINE: NOT DETECTED
BENZODIAZEPINE SCREEN, URINE: NOT DETECTED
BETA-HYDROXYBUTYRATE: >4.5 MMOL/L (ref 0.02–0.27)
BILIRUB SERPL-MCNC: 0.5 MG/DL (ref 0–1.2)
BILIRUBIN URINE: ABNORMAL
BLOOD, URINE: NEGATIVE
BORDETELLA PARAPERTUSSIS BY PCR: NOT DETECTED
BORDETELLA PERTUSSIS BY PCR: NOT DETECTED
BUN BLDV-MCNC: 11 MG/DL (ref 6–20)
BUN BLDV-MCNC: 11 MG/DL (ref 6–20)
BUN BLDV-MCNC: 15 MG/DL (ref 6–20)
CALCIUM SERPL-MCNC: 7.7 MG/DL (ref 8.6–10.2)
CALCIUM SERPL-MCNC: 8.3 MG/DL (ref 8.6–10.2)
CALCIUM SERPL-MCNC: 8.9 MG/DL (ref 8.6–10.2)
CANNABINOID SCREEN URINE: NOT DETECTED
CHLAMYDOPHILIA PNEUMONIAE BY PCR: NOT DETECTED
CHLORIDE BLD-SCNC: 100 MMOL/L (ref 98–107)
CHLORIDE BLD-SCNC: 106 MMOL/L (ref 98–107)
CHLORIDE BLD-SCNC: 99 MMOL/L (ref 98–107)
CHP ED QC CHECK: YES
CLARITY: CLEAR
CO2: 11 MMOL/L (ref 22–29)
CO2: 14 MMOL/L (ref 22–29)
CO2: 18 MMOL/L (ref 22–29)
COCAINE METABOLITE SCREEN URINE: NOT DETECTED
COLOR: YELLOW
CORONAVIRUS 229E BY PCR: NOT DETECTED
CORONAVIRUS HKU1 BY PCR: NOT DETECTED
CORONAVIRUS NL63 BY PCR: NOT DETECTED
CORONAVIRUS OC43 BY PCR: NOT DETECTED
CREAT SERPL-MCNC: 0.7 MG/DL (ref 0.7–1.2)
CREAT SERPL-MCNC: 0.7 MG/DL (ref 0.7–1.2)
CREAT SERPL-MCNC: 0.9 MG/DL (ref 0.7–1.2)
EKG ATRIAL RATE: 114 BPM
EKG P AXIS: 73 DEGREES
EKG P-R INTERVAL: 128 MS
EKG Q-T INTERVAL: 354 MS
EKG QRS DURATION: 80 MS
EKG QTC CALCULATION (BAZETT): 487 MS
EKG R AXIS: 65 DEGREES
EKG T AXIS: 55 DEGREES
EKG VENTRICULAR RATE: 114 BPM
EPITHELIAL CELLS, UA: ABNORMAL /HPF
FENTANYL SCREEN, URINE: NOT DETECTED
GFR SERPL CREATININE-BSD FRML MDRD: >60 ML/MIN/1.73
GLUCOSE BLD-MCNC: 174 MG/DL (ref 74–99)
GLUCOSE BLD-MCNC: 191 MG/DL (ref 74–99)
GLUCOSE BLD-MCNC: 283 MG/DL
GLUCOSE BLD-MCNC: 314 MG/DL (ref 74–99)
GLUCOSE BLD-MCNC: 319 MG/DL
GLUCOSE BLD-MCNC: 448 MG/DL
GLUCOSE URINE: >=1000 MG/DL
HCT VFR BLD CALC: 49 % (ref 37–54)
HEMOGLOBIN: 16.6 G/DL (ref 12.5–16.5)
HUMAN METAPNEUMOVIRUS BY PCR: NOT DETECTED
HUMAN RHINOVIRUS/ENTEROVIRUS BY PCR: NOT DETECTED
INFLUENZA A BY PCR: NOT DETECTED
INFLUENZA B BY PCR: NOT DETECTED
KETONES, URINE: >=80 MG/DL
LEUKOCYTE ESTERASE, URINE: NEGATIVE
Lab: NORMAL
MAGNESIUM: 1.6 MG/DL (ref 1.6–2.6)
MAGNESIUM: 2.3 MG/DL (ref 1.6–2.6)
MCH RBC QN AUTO: 30.9 PG (ref 26–35)
MCHC RBC AUTO-ENTMCNC: 33.9 % (ref 32–34.5)
MCV RBC AUTO: 91.2 FL (ref 80–99.9)
METER GLUCOSE: 128 MG/DL (ref 74–99)
METER GLUCOSE: 145 MG/DL (ref 74–99)
METER GLUCOSE: 151 MG/DL (ref 74–99)
METER GLUCOSE: 164 MG/DL (ref 74–99)
METER GLUCOSE: 182 MG/DL (ref 74–99)
METER GLUCOSE: 193 MG/DL (ref 74–99)
METER GLUCOSE: 201 MG/DL (ref 74–99)
METER GLUCOSE: 213 MG/DL (ref 74–99)
METER GLUCOSE: 218 MG/DL (ref 74–99)
METER GLUCOSE: 247 MG/DL (ref 74–99)
METER GLUCOSE: 247 MG/DL (ref 74–99)
METER GLUCOSE: 250 MG/DL (ref 74–99)
METER GLUCOSE: 283 MG/DL (ref 74–99)
METER GLUCOSE: 319 MG/DL (ref 74–99)
METER GLUCOSE: 448 MG/DL (ref 74–99)
METHADONE SCREEN, URINE: NOT DETECTED
MYCOPLASMA PNEUMONIAE BY PCR: NOT DETECTED
NITRITE, URINE: NEGATIVE
OPIATE SCREEN URINE: NOT DETECTED
OXYCODONE URINE: NOT DETECTED
PARAINFLUENZA VIRUS 1 BY PCR: NOT DETECTED
PARAINFLUENZA VIRUS 2 BY PCR: NOT DETECTED
PARAINFLUENZA VIRUS 3 BY PCR: NOT DETECTED
PARAINFLUENZA VIRUS 4 BY PCR: NOT DETECTED
PDW BLD-RTO: 13.6 FL (ref 11.5–15)
PH UA: 5.5 (ref 5–9)
PH VENOUS: 7.2 (ref 7.35–7.45)
PHENCYCLIDINE SCREEN URINE: NOT DETECTED
PHOSPHORUS: 1.5 MG/DL (ref 2.5–4.5)
PHOSPHORUS: 2 MG/DL (ref 2.5–4.5)
PHOSPHORUS: 2.2 MG/DL (ref 2.5–4.5)
PLATELET # BLD: 654 E9/L (ref 130–450)
PMV BLD AUTO: 9.5 FL (ref 7–12)
POTASSIUM SERPL-SCNC: 4.7 MMOL/L (ref 3.5–5)
POTASSIUM SERPL-SCNC: 4.7 MMOL/L (ref 3.5–5)
POTASSIUM SERPL-SCNC: 5.1 MMOL/L (ref 3.5–5)
PROCALCITONIN: 0.03 NG/ML (ref 0–0.08)
PROTEIN UA: ABNORMAL MG/DL
RBC # BLD: 5.37 E12/L (ref 3.8–5.8)
RBC UA: ABNORMAL /HPF (ref 0–2)
REASON FOR REJECTION: NORMAL
REJECTED TEST: NORMAL
RESPIRATORY SYNCYTIAL VIRUS BY PCR: NOT DETECTED
SARS-COV-2, PCR: NOT DETECTED
SODIUM BLD-SCNC: 132 MMOL/L (ref 132–146)
SODIUM BLD-SCNC: 135 MMOL/L (ref 132–146)
SODIUM BLD-SCNC: 135 MMOL/L (ref 132–146)
SPECIFIC GRAVITY UA: >=1.03 (ref 1–1.03)
TOTAL PROTEIN: 6.3 G/DL (ref 6.4–8.3)
TROPONIN, HIGH SENSITIVITY: 34 NG/L (ref 0–11)
UROBILINOGEN, URINE: 0.2 E.U./DL
WBC # BLD: 10.3 E9/L (ref 4.5–11.5)
WBC UA: ABNORMAL /HPF (ref 0–5)

## 2022-11-14 PROCEDURE — 84145 PROCALCITONIN (PCT): CPT

## 2022-11-14 PROCEDURE — 2580000003 HC RX 258: Performed by: INTERNAL MEDICINE

## 2022-11-14 PROCEDURE — 2000000000 HC ICU R&B

## 2022-11-14 PROCEDURE — 71045 X-RAY EXAM CHEST 1 VIEW: CPT

## 2022-11-14 PROCEDURE — 80048 BASIC METABOLIC PNL TOTAL CA: CPT

## 2022-11-14 PROCEDURE — 99221 1ST HOSP IP/OBS SF/LOW 40: CPT | Performed by: INTERNAL MEDICINE

## 2022-11-14 PROCEDURE — 2500000003 HC RX 250 WO HCPCS: Performed by: INTERNAL MEDICINE

## 2022-11-14 PROCEDURE — 2580000003 HC RX 258: Performed by: STUDENT IN AN ORGANIZED HEALTH CARE EDUCATION/TRAINING PROGRAM

## 2022-11-14 PROCEDURE — 85027 COMPLETE CBC AUTOMATED: CPT

## 2022-11-14 PROCEDURE — 80053 COMPREHEN METABOLIC PANEL: CPT

## 2022-11-14 PROCEDURE — 82800 BLOOD PH: CPT

## 2022-11-14 PROCEDURE — 84484 ASSAY OF TROPONIN QUANT: CPT

## 2022-11-14 PROCEDURE — 82962 GLUCOSE BLOOD TEST: CPT

## 2022-11-14 PROCEDURE — 0202U NFCT DS 22 TRGT SARS-COV-2: CPT

## 2022-11-14 PROCEDURE — 82010 KETONE BODYS QUAN: CPT

## 2022-11-14 PROCEDURE — 6370000000 HC RX 637 (ALT 250 FOR IP): Performed by: STUDENT IN AN ORGANIZED HEALTH CARE EDUCATION/TRAINING PROGRAM

## 2022-11-14 PROCEDURE — 81001 URINALYSIS AUTO W/SCOPE: CPT

## 2022-11-14 PROCEDURE — 6360000002 HC RX W HCPCS: Performed by: INTERNAL MEDICINE

## 2022-11-14 PROCEDURE — 84100 ASSAY OF PHOSPHORUS: CPT

## 2022-11-14 PROCEDURE — 36415 COLL VENOUS BLD VENIPUNCTURE: CPT

## 2022-11-14 PROCEDURE — 99285 EMERGENCY DEPT VISIT HI MDM: CPT

## 2022-11-14 PROCEDURE — 99222 1ST HOSP IP/OBS MODERATE 55: CPT | Performed by: INTERNAL MEDICINE

## 2022-11-14 PROCEDURE — 83735 ASSAY OF MAGNESIUM: CPT

## 2022-11-14 PROCEDURE — 80307 DRUG TEST PRSMV CHEM ANLYZR: CPT

## 2022-11-14 PROCEDURE — 93005 ELECTROCARDIOGRAM TRACING: CPT | Performed by: STUDENT IN AN ORGANIZED HEALTH CARE EDUCATION/TRAINING PROGRAM

## 2022-11-14 PROCEDURE — 6370000000 HC RX 637 (ALT 250 FOR IP): Performed by: INTERNAL MEDICINE

## 2022-11-14 RX ORDER — ENOXAPARIN SODIUM 100 MG/ML
40 INJECTION SUBCUTANEOUS DAILY
Status: DISCONTINUED | OUTPATIENT
Start: 2022-11-14 | End: 2022-11-16 | Stop reason: HOSPADM

## 2022-11-14 RX ORDER — SODIUM CHLORIDE 450 MG/100ML
INJECTION, SOLUTION INTRAVENOUS CONTINUOUS
Status: DISCONTINUED | OUTPATIENT
Start: 2022-11-14 | End: 2022-11-16 | Stop reason: HOSPADM

## 2022-11-14 RX ORDER — ASPIRIN 81 MG/1
81 TABLET ORAL DAILY
Status: DISCONTINUED | OUTPATIENT
Start: 2022-11-15 | End: 2022-11-16 | Stop reason: HOSPADM

## 2022-11-14 RX ORDER — SODIUM CHLORIDE 0.9 % (FLUSH) 0.9 %
5-40 SYRINGE (ML) INJECTION EVERY 12 HOURS SCHEDULED
Status: DISCONTINUED | OUTPATIENT
Start: 2022-11-14 | End: 2022-11-16 | Stop reason: HOSPADM

## 2022-11-14 RX ORDER — POLYETHYLENE GLYCOL 3350 17 G/17G
17 POWDER, FOR SOLUTION ORAL DAILY PRN
Status: DISCONTINUED | OUTPATIENT
Start: 2022-11-14 | End: 2022-11-16 | Stop reason: HOSPADM

## 2022-11-14 RX ORDER — ATORVASTATIN CALCIUM 40 MG/1
40 TABLET, FILM COATED ORAL NIGHTLY
Status: DISCONTINUED | OUTPATIENT
Start: 2022-11-14 | End: 2022-11-16 | Stop reason: HOSPADM

## 2022-11-14 RX ORDER — ONDANSETRON 2 MG/ML
4 INJECTION INTRAMUSCULAR; INTRAVENOUS EVERY 6 HOURS PRN
Status: DISCONTINUED | OUTPATIENT
Start: 2022-11-14 | End: 2022-11-16 | Stop reason: HOSPADM

## 2022-11-14 RX ORDER — LISINOPRIL 5 MG/1
5 TABLET ORAL DAILY
Status: DISCONTINUED | OUTPATIENT
Start: 2022-11-14 | End: 2022-11-14

## 2022-11-14 RX ORDER — ACETAMINOPHEN 325 MG/1
650 TABLET ORAL EVERY 6 HOURS PRN
Status: DISCONTINUED | OUTPATIENT
Start: 2022-11-14 | End: 2022-11-16 | Stop reason: HOSPADM

## 2022-11-14 RX ORDER — LANOLIN ALCOHOL/MO/W.PET/CERES
100 CREAM (GRAM) TOPICAL DAILY
Status: DISCONTINUED | OUTPATIENT
Start: 2022-11-14 | End: 2022-11-14

## 2022-11-14 RX ORDER — DEXTROSE AND SODIUM CHLORIDE 5; .45 G/100ML; G/100ML
INJECTION, SOLUTION INTRAVENOUS CONTINUOUS PRN
Status: DISCONTINUED | OUTPATIENT
Start: 2022-11-14 | End: 2022-11-16 | Stop reason: HOSPADM

## 2022-11-14 RX ORDER — FOLIC ACID 1 MG/1
1 TABLET ORAL DAILY
Status: DISCONTINUED | OUTPATIENT
Start: 2022-11-14 | End: 2022-11-14

## 2022-11-14 RX ORDER — ONDANSETRON 4 MG/1
4 TABLET, ORALLY DISINTEGRATING ORAL EVERY 8 HOURS PRN
Status: DISCONTINUED | OUTPATIENT
Start: 2022-11-14 | End: 2022-11-16 | Stop reason: HOSPADM

## 2022-11-14 RX ORDER — POTASSIUM CHLORIDE 7.45 MG/ML
10 INJECTION INTRAVENOUS PRN
Status: DISCONTINUED | OUTPATIENT
Start: 2022-11-14 | End: 2022-11-16 | Stop reason: HOSPADM

## 2022-11-14 RX ORDER — ACETAMINOPHEN 650 MG/1
650 SUPPOSITORY RECTAL EVERY 6 HOURS PRN
Status: DISCONTINUED | OUTPATIENT
Start: 2022-11-14 | End: 2022-11-16 | Stop reason: HOSPADM

## 2022-11-14 RX ORDER — MAGNESIUM SULFATE 1 G/100ML
1000 INJECTION INTRAVENOUS PRN
Status: DISCONTINUED | OUTPATIENT
Start: 2022-11-14 | End: 2022-11-16 | Stop reason: HOSPADM

## 2022-11-14 RX ORDER — ASPIRIN 81 MG/1
324 TABLET, CHEWABLE ORAL ONCE
Status: COMPLETED | OUTPATIENT
Start: 2022-11-14 | End: 2022-11-14

## 2022-11-14 RX ORDER — SODIUM CHLORIDE 9 MG/ML
INJECTION, SOLUTION INTRAVENOUS PRN
Status: DISCONTINUED | OUTPATIENT
Start: 2022-11-14 | End: 2022-11-16 | Stop reason: HOSPADM

## 2022-11-14 RX ORDER — SODIUM CHLORIDE 0.9 % (FLUSH) 0.9 %
5-40 SYRINGE (ML) INJECTION PRN
Status: DISCONTINUED | OUTPATIENT
Start: 2022-11-14 | End: 2022-11-16 | Stop reason: HOSPADM

## 2022-11-14 RX ADMIN — MAGNESIUM SULFATE IN DEXTROSE 1000 MG: 10 INJECTION, SOLUTION INTRAVENOUS at 20:22

## 2022-11-14 RX ADMIN — POTASSIUM CHLORIDE 10 MEQ: 7.46 INJECTION, SOLUTION INTRAVENOUS at 23:23

## 2022-11-14 RX ADMIN — ATORVASTATIN CALCIUM 40 MG: 40 TABLET, FILM COATED ORAL at 20:25

## 2022-11-14 RX ADMIN — DEXTROSE AND SODIUM CHLORIDE: 5; 450 INJECTION, SOLUTION INTRAVENOUS at 20:30

## 2022-11-14 RX ADMIN — DEXTROSE AND SODIUM CHLORIDE: 5; 450 INJECTION, SOLUTION INTRAVENOUS at 13:09

## 2022-11-14 RX ADMIN — SODIUM CHLORIDE 2.23 UNITS/HR: 9 INJECTION, SOLUTION INTRAVENOUS at 11:07

## 2022-11-14 RX ADMIN — SODIUM CHLORIDE 250 ML/HR: 4.5 INJECTION, SOLUTION INTRAVENOUS at 09:15

## 2022-11-14 RX ADMIN — Medication 10 ML: at 20:25

## 2022-11-14 RX ADMIN — ASPIRIN 324 MG: 81 TABLET, CHEWABLE ORAL at 08:47

## 2022-11-14 RX ADMIN — SODIUM PHOSPHATE, MONOBASIC, MONOHYDRATE AND SODIUM PHOSPHATE, DIBASIC, ANHYDROUS 15 MMOL: 276; 142 INJECTION, SOLUTION INTRAVENOUS at 21:34

## 2022-11-14 RX ADMIN — ENOXAPARIN SODIUM 40 MG: 100 INJECTION SUBCUTANEOUS at 16:11

## 2022-11-14 RX ADMIN — SODIUM CHLORIDE 7.76 UNITS/HR: 9 INJECTION, SOLUTION INTRAVENOUS at 09:56

## 2022-11-14 RX ADMIN — MAGNESIUM SULFATE IN DEXTROSE 1000 MG: 10 INJECTION, SOLUTION INTRAVENOUS at 18:45

## 2022-11-14 ASSESSMENT — PAIN SCALES - GENERAL
PAINLEVEL_OUTOF10: 5
PAINLEVEL_OUTOF10: 0

## 2022-11-14 ASSESSMENT — PAIN DESCRIPTION - FREQUENCY: FREQUENCY: INTERMITTENT

## 2022-11-14 ASSESSMENT — ENCOUNTER SYMPTOMS
SHORTNESS OF BREATH: 1
CONSTIPATION: 0
EYE REDNESS: 0
VOMITING: 0
DIARRHEA: 0
EYE PAIN: 0
ABDOMINAL PAIN: 0
CHEST TIGHTNESS: 0
SINUS PRESSURE: 0
BACK PAIN: 0
COUGH: 0
SORE THROAT: 0
SINUS PAIN: 0
NAUSEA: 0

## 2022-11-14 ASSESSMENT — PAIN DESCRIPTION - DESCRIPTORS: DESCRIPTORS: ACHING

## 2022-11-14 ASSESSMENT — PAIN DESCRIPTION - LOCATION: LOCATION: CHEST

## 2022-11-14 ASSESSMENT — PAIN DESCRIPTION - ORIENTATION: ORIENTATION: MID

## 2022-11-14 ASSESSMENT — PAIN - FUNCTIONAL ASSESSMENT: PAIN_FUNCTIONAL_ASSESSMENT: 0-10

## 2022-11-14 NOTE — H&P
[facility]  Internal Medicine Residency Program  History and Physical    Patient:  Ki Gaffney 47 y.o. male MRN: [id number]     Date of Service: 11/14/2022    Hospital Day: 1      Chief complaint: had concerns including Shortness of Breath (With mid sternal cp since last night). History of Present Illness   The patient is a 47 y.o. male with PMH of uncontrolled T1DM (Ha1c 9 on 8/8/22), HTN, HLD, CVA, and polysubstance abuse (ETOH, cocaine, marijuana) presenting with SOB, nausea, and chest pain that started suddenyl at 0300 waking him up from sleep. Of note, he has had a recent admission in August for DKA. The patient says that he has had mild substernal chest pain radiating to his R arm that is constant, without alleviating or aggravating factors, and is not positional or pleuritic. He is not currently having chest pain. His SOB is mild without cough, wheezing, or cyanosis. He denies any recent sick contacts or travel. He denies fever, chills, diaphoresis, vomiting, abdominal pain, or diarrhea. He is able to eat and says his appetite is unaffected. He endorses some constipation, no BM's in the past 5 days, he is still passing gas. He has an insulin pump but is unsure if it is working correctly. His sugars run 240-260 at home, last Hgb A1c 9 on 8/8/22. He does not use additional insulin outside of his pump. Patient states he drinks up to 6 beers per day w/o history of seizures or alcohol withdrawal, last drink was 1 week ago. He smokes 1 ppd for approximately 30 years. He uses marijuana daily. Is not currently using any other illegal substances. ED Course: The patient arrived hypertensive with tachycardia. Glucose was found to be 319. He was started on insulin gtt and 1/2 NS at 250cc/hr. Anion gap was 25, bicarb 11. Potassium 4.7. CBC revealed thrombocytosis 654, likely reactive. UA revealed glucosuria and ketonuria. Venous pH 7.2.  CXR showed no acute process, EKG is sinus tachycardia with old infarct, no significant change from previous. He was admitted for further management. Past Medical History:      Diagnosis Date    Alcoholism ([facility]     Cocaine abuse ([facility]     Diabetes mellitus ([facility]     Hypertension     Idiopathic peripheral neuropathy 9/21/2016    Lacunar stroke ([facility]     Marijuana abuse        Past Surgical History:    History reviewed. No pertinent surgical history. Medications Prior to Admission:    Prior to Admission medications    Medication Sig Start Date End Date Taking? Authorizing Provider   aspirin 81 MG EC tablet Take 1 tablet by mouth in the morning. 8/10/22   Raymundo Navas MD   insulin aspart (NOVOLOG) 100 UNIT/ML injection vial 100 units/day via insulin pump 3/22/22   CHAYO Mendez - CNS   albuterol sulfate  (90 Base) MCG/ACT inhaler Inhale 2 puffs into the lungs every 6 hours as needed for Wheezing or Shortness of Breath 9/28/21   Kristopher Rascon MD   metoprolol tartrate (LOPRESSOR) 25 MG tablet Take 0.5 tablets by mouth 2 times daily 9/28/21   Kristopher Rascon MD       Allergies:  Metoprolol    Social History:   TOBACCO:   reports that he has been smoking cigarettes. He has a 22.50 pack-year smoking history. He has never used smokeless tobacco.  ETOH:   reports current alcohol use of about 6.0 standard drinks per week. Family History:       Problem Relation Age of Onset    Diabetes type 2  Mother     Cancer Maternal Grandmother     Diabetes type 2  Nephew        REVIEW OF SYSTEMS:    Constitutional: No fever, no chills, no change in weight; good appetite  HEENT: +Xerostomia. No blurred vision, no ear problems, no sore throat, no rhinorrhea. Respiratory:  +mild SOB. No cough, no sputum production, no pleuritic chest pain. Cardiology: +Substernal chest pain per HPI, no paroxysmal nocturnal dyspnea, no orthopnea, no palpitation, no leg swelling.    Gastroenterology: No dysphagia, no reflux; no abdominal pain, +nausea, no vomiting; +constipation, no diarrhea. No hematochezia   Genitourinary: No dysuria, no frequency, hesitancy; no hematuria  Musculoskeletal: no joint pain, no myalgia, no change in range of movement  Neurology: no focal weakness in extremities, no slurred speech, no double vision, no tingling or numbness sensation  Endocrinology: no temperature intolerance, no polyphagia, polydipsia or polyuria  Hematology: no increased bleeding, no bruising, no lymphadenopathy  Skin: no skin changes noticed by patient  Psychology: no depressed mood, no suicidal ideation    Physical Exam   Vitals: /66   Pulse (!) 116   Temp 98.2 °F (36.8 °C)   Resp 20   Wt 141 lb (64 kg)   SpO2 99%   BMI 22.76 kg/m²     General Appearance: alert and oriented to person, place and time, well-developed and well-nourished, in no acute distress on RA. Appears older than stated age. Skin: warm and dry, no rash or erythema  Head: normocephalic and atraumatic  Eyes: pupils equal, round, and reactive to light, extraocular eye movements intact, conjunctivae normal  Neck: neck supple and non tender without mass, no thyromegaly or thyroid nodules, no cervical lymphadenopathy   Pulmonary/Chest: clear to auscultation bilaterally- no wheezes, rales or rhonchi, fair air movement, no respiratory distress  Cardiovascular: tachycardic, regular rhythm, normal S1 and S2, no gallops, intact distal pulses, and no carotid bruits  Abdomen: soft, non-tender, non-distended, normal bowel sounds, liver felt 1 cm below costal margin in mid-clavicular line. ? 2-3cm mass in LUQ. Extremities: no cyanosis and no clubbing. Capillary refill < 2s.  Musculoskeletal: normal range of motion, no joint swelling, deformity or tenderness, moves all extremities. Neurologic: gait and coordination normal and speech normal. No focal deficit. Sensation intact.     Labs and Imaging Studies   Basic Labs  Recent Labs     11/14/22  0750 11/14/22  0954 11/14/22  1106     -- --    K 4.7  --   --    CL 99  --   --    CO2 11*  --   --    BUN 15  --   --    CREATININE 0.9  --   --    GLUCOSE 314* 448 283   CALCIUM 8.9  --   --        Recent Labs     11/14/22  0750   WBC 10.3   RBC 5.37   HGB 16.6*   HCT 49.0   MCV 91.2   MCH 30.9   MCHC 33.9   RDW 13.6   *   MPV 9.5       CBC:   Lab Results   Component Value Date/Time    WBC 10.3 11/14/2022 07:50 AM    RBC 5.37 11/14/2022 07:50 AM    HGB 16.6 11/14/2022 07:50 AM    HCT 49.0 11/14/2022 07:50 AM    MCV 91.2 11/14/2022 07:50 AM    RDW 13.6 11/14/2022 07:50 AM     11/14/2022 07:50 AM     CMP:  Lab Results   Component Value Date/Time     11/14/2022 07:50 AM    K 4.7 11/14/2022 07:50 AM    K 3.6 08/08/2022 02:56 AM    CL 99 11/14/2022 07:50 AM    CO2 11 11/14/2022 07:50 AM    BUN 15 11/14/2022 07:50 AM    PROT 6.3 11/14/2022 07:50 AM     U/A:  No components found for: Edilberto Jeans, USPGRAV, UPH, UPROTEIN, UGLUCOSE, UKETONE, UBILI, UBLOOD, [place], [place], [place], [facility], [place], [facility], [place], [facility], [place]    Imaging Studies:     XR CHEST PORTABLE    Result Date: 11/14/2022  EXAMINATION: ONE XRAY VIEW OF THE CHEST 11/14/2022 8:30 am COMPARISON: 08/08/2022 HISTORY: ORDERING SYSTEM PROVIDED HISTORY: Chest Pain TECHNOLOGIST PROVIDED HISTORY: Reason for exam:->Chest Pain What reading provider will be dictating this exam?->CRC FINDINGS: The lungs are without acute focal process. There is no effusion or pneumothorax. The cardiomediastinal silhouette is without acute process. The osseous structures are without acute process. No acute process. EKG: Sinus tachycardia, old septal infarct, no change from 8/7/22. Resident's Assessment and Plan     Kristin Figueroa is a 47 y.o. male with PMH of uncontrolled T1DM, HTN, HLD, CVA, and polysubstance abuse (ETOH, cocaine, marijuana) presenting with SOB, nausea, and chest pain found to be in DKA.     DKA with AGMA 2/2 uncontrolled T1DM - resolving  - Arrived glucose 319, bicarb 11, AG 25, venous pH 7.2, ketonuria; started on insulin drip, glucose now 201. Serum osm WNL. Appetite NL.  - Ordered Mg. Viral panel and pro-calcitonin ordered to further evaluate for infection. - Will continue 1/2NS @ 250cc/h, q4h BMP  - Will replace potassium and other electrolytes as needed. - Will likely consult endocrinology after anion gap closes to transition patient back to insulin pump and evaluate for possible insulin pump malfunction. 2. Atypical chest pain with mildly elevated troponin likely 2/2 Type II MI  - Troponin 34, no EKG changes  - No current chest pain  - Will monitor for further symptoms. 3. Polysubstance abuse  - Tobacco abuse, marijuana abuse, alcohol abuse, cocaine use in past.  - Nicotine patches  - Continue motivational interviewing    4. Hx of HTN, HLD, CVA  - Continue home medications as appropriate    PT/OT evaluation: Not appropriate at this time. DVT prophylaxis: Lovenox  GI prophylaxis: Not appropriate at this time.   Dispo.: ICU for DKA management    Cy Chavez MS4  Attending physician: Dr. Robinson Fong

## 2022-11-14 NOTE — ED PROVIDER NOTES
ATTENDING PROVIDER ATTESTATION:     Pawan Lizarraga presented to the emergency department for evaluation of Shortness of Breath (With mid sternal cp since last night)   and was initially evaluated by the Medical Resident. See Original ED Note for H&P and ED course above. I have reviewed and discussed the case, including pertinent history (medical, surgical, family and social) and exam findings with the Medical Resident assigned to Pawan Lizarraga. I have personally performed and/or participated in the history, exam, medical decision making, and procedures and agree with all pertinent clinical information and any additional changes or corrections are noted below in my assessment and plan. I have discussed this patient in detail with the resident, and provided the instruction and education,       I have reviewed my findings and recommendations with the assigned Medical Resident, Pawan Lizarraga and members of family present at the time of disposition. I have performed a history and physical examination of this patient and directly supervised the resident caring for this patient      History of Present Illness:    Presents to the ED for hyperglycemia, beginning days ago. The complaint has been constant, moderate in severity, and worsened by nothing. Patient reports hyperglycemia for quite some time. He says his blood sugar has been uncontrolled. He also reports that since last night he has had tightness in his chest.  He says he feels a heaviness like someone is squeezing his chest.  Reports he felt short of breath as well. Not ripping or tearing. Not sharp or stabbing. No abdominal pain. He denies back pain. No syncope. He is a diabetic.         Review of Systems:   A complete review of systems was performed and pertinent positives and negatives are stated within HPI, all other systems reviewed and are negative.    --------------------------------------------- PAST HISTORY ---------------------------------------------  Past Medical History:  has a past medical history of Alcoholism ([facility] Cocaine abuse ([facility] Diabetes mellitus ([facility] Hypertension, Idiopathic peripheral neuropathy, Lacunar stroke ([facility] and Marijuana abuse. Past Surgical History: denies    Social History:  reports that he has been smoking cigarettes. He has a 22.50 pack-year smoking history. He has never used smokeless tobacco. He reports current alcohol use of about 6.0 standard drinks per week. He reports current drug use. Drug: Marijuana Keatonmary Pereyra). Family History: family history includes Cancer in his maternal grandmother; Diabetes type 2  in his mother and nephew. Unless otherwise noted, family history is non contributory    The patients home medications have been reviewed. Allergies: Metoprolol    EKG Interpretation  Interpreted by emergency department physician, Dr. Aura Baptiste     11/14/22  Time: 4482    Rhythm: sinus tachycardia  Rate: tachycardia  Axis: normal  Conduction: normal  ST Segments: slight elevation in  v1 and v2 and depression in  v4, v5, v6, I, II, III, and aVf  T Waves: no acute change    Clinical Impression: sinus tachycardia, ST changes as stable from prior EKG  Comparison to Old EKG  stable from prior EKG      Physical Exam:  Constitutional/General: Alert and oriented x3  Head: Normocephalic and atraumatic  Eyes: PERRL, EOMI, sclera non icteric  ENT: Oropharynx clear, handling secretions  Neck: Supple, full ROM, no stridor, no meningeal signs  Respiratory: Lungs clear to auscultation bilaterally, no wheezes, rales, or rhonchi. Is tachypneic  Cardiovascular:  tachycardic but Regular rhythm. No murmurs, no gallops, no rubs. 2+ distal pulses. Equal extremity pulses. GI:  Abdomen Soft, Non tender, Non distended. No rebound, guarding, or rigidity. No pulsatile masses. Musculoskeletal: Moves all extremities x 4. Warm and well perfused,  no clubbing, no cyanosis, no edema.  Palpable peripheral pulses  Integument: skin warm and dry. No rashes. Neurologic: GCS 15, no focal deficits  Psychiatric: Normal Affect      I directly supervised any procedures performed by the resident and was present for the procedure including all critical portions of the procedure      The cardiac monitor revealed sinus rhythm with a heart rate in the 89s as interpreted by me. The cardiac monitor was ordered secondary to the patient's DKA and to monitor the patient for dysrhythmia. CPT [id number]        Oxygen Saturation Interpretation: 99 % on RA.        I, Dr. Skyler Rivas, am the primary provider of record      Medical Decision Making:   DKA  Denies infectious sx  Chest pain resolved  DKA protocol  ICU admission]    Name and Route of medications administered in the ED:  Medications   dextrose bolus 10% 125 mL (has no administration in time range)     Or   dextrose bolus 10% 250 mL (has no administration in time range)   potassium chloride 10 mEq/100 mL IVPB (Peripheral Line) (has no administration in time range)   magnesium sulfate 1000 mg in dextrose 5% 100 mL IVPB (0 mg IntraVENous Stopped 11/14/22 2135)   sodium phosphate 10 mmol in sodium chloride 0.9 % 250 mL IVPB ( IntraVENous See Alternative 11/14/22 2134)     Or   sodium phosphate 15 mmol in dextrose 5 % 250 mL IVPB (15 mmol IntraVENous New Bag 11/14/22 2134)     Or   sodium phosphate 20 mmol in dextrose 5 % 500 mL IVPB ( IntraVENous See Alternative 11/14/22 2134)   0.45 % sodium chloride infusion (0 mLs IntraVENous Stopped 11/14/22 1229)   dextrose 5 % and 0.45 % sodium chloride infusion ( IntraVENous New Bag 11/14/22 2030)   insulin regular (HUMULIN R;NOVOLIN R) 100 Units in sodium chloride 0.9 % 100 mL infusion (6.12 Units/hr IntraVENous Rate/Dose Change 11/14/22 2140)   aspirin EC tablet 81 mg (has no administration in time range)   atorvastatin (LIPITOR) tablet 40 mg (40 mg Oral Given 11/14/22 2025)   sodium chloride flush 0.9 % injection 5-40 mL (10 mLs IntraVENous Given 11/14/22 2025)   sodium chloride flush 0.9 % injection 5-40 mL (has no administration in time range)   0.9 % sodium chloride infusion (has no administration in time range)   enoxaparin (LOVENOX) injection 40 mg (40 mg SubCUTAneous Given 11/14/22 1611)   ondansetron (ZOFRAN-ODT) disintegrating tablet 4 mg (has no administration in time range)     Or   ondansetron (ZOFRAN) injection 4 mg (has no administration in time range)   polyethylene glycol (GLYCOLAX) packet 17 g (has no administration in time range)   acetaminophen (TYLENOL) tablet 650 mg (has no administration in time range)     Or   acetaminophen (TYLENOL) suppository 650 mg (has no administration in time range)   aspirin chewable tablet 324 mg (324 mg Oral Given 11/14/22 0847)       ]         Re-Evaluations:       improving      This patient's ED course included: a personal history and physicial examination, re-evaluation prior to disposition, IV medications, cardiac monitoring, continuous pulse oximetry, and complex medical decision making and emergency management    This patient has remained hemodynamically stable during their ED course. Consultations:  Internal Medicine  ICU    CRITICAL CARE:  Please note that the withdrawal or failure to initiate urgent interventions for this patient would likely result in a life threatening deterioration or permanent disability. Accordingly this patient received 30 minutes of critical care time, including coordination of care, and direct bedside care and excluding separately billable procedures.           1. Type 1 diabetes mellitus with ketoacidosis without coma ([facility]            Jackie Cisneros MD  11/14/22 4673

## 2022-11-14 NOTE — H&P
[facility]  Internal Medicine Residency Program  History and Physical    Patient:  David Mukherjee 47 y.o. male MRN: [id number]     Date of Service: 11/14/2022    Hospital Day: 1      Chief complaint: had concerns including Shortness of Breath (With mid sternal cp since last night). History of Present Illness   The patient is a 47 y.o. male with a PMHx of HLD, HTN, CVA and polysubstance drug abuse who presented to the ED complaining of chest pain. He stated he was woken up from sleep while experiencing a substernal chest pain at 3AM this morning. He states that nothing makes it better or worse. He has not taken anything for the chest pain. It does radiate to his right arm at times. It it not related to position changes or to his breathing. He denies current chest pain. He has also been experiencing SOB for the last day or two. He denies: Fevers, chills, cough, vomiting, diarrhea, sick contacts or lower extremity swelling. He was last admitted in August of 2022 for DKA. He states that his sugars normally run in the 240-250 range at home. He believes his insulin pump has been malfunctioning. He follows with endocrinology for his DM management. He endorses nausea and fatigue as well. He last ate prior to presentation. Patient states that following his CVA he has had persistent left upper extremity weakness. He has not had a bowel movement in 5 days but has been able to pass gas. He drinks 6 12-ounce beers daily. He has not had any alcohol in the last week. He denies any withdrawal-associated seizures. He also endorses daily marijuana use. He denies any other illegal substance.      ED Course:   Heart rate 116, 99% on RA   Glucose 319  pH 7.2, AG 25, HCO3 11  K 4.7  Troponin 34  U/A: Glucose >1000, ketones >80  EKG: Sinus tachy   CXR: No acute process    ED Meds: None   ED Fluids: Patient was given 500 mL 1/2NS    Past Medical History:      Diagnosis Date    Alcoholism ([address]     Cocaine abuse ([address]     Diabetes mellitus ([facility]     Hypertension     Idiopathic peripheral neuropathy 9/21/2016    Lacunar stroke [facility])     Marijuana abuse        Past Surgical History:    History reviewed. No pertinent surgical history. Medications Prior to Admission:    Prior to Admission medications    Medication Sig Start Date End Date Taking? Authorizing Provider   aspirin 81 MG EC tablet Take 1 tablet by mouth in the morning. 8/10/22   Gregor Scheuermann, MD   insulin aspart (NOVOLOG) 100 UNIT/ML injection vial 100 units/day via insulin pump 3/22/22   Dyana Mcgovern APRN - CNS   albuterol sulfate  (90 Base) MCG/ACT inhaler Inhale 2 puffs into the lungs every 6 hours as needed for Wheezing or Shortness of Breath 9/28/21   Junior Lane MD   metoprolol tartrate (LOPRESSOR) 25 MG tablet Take 0.5 tablets by mouth 2 times daily 9/28/21   Junior Lane MD       Allergies:  Metoprolol    Social History:   TOBACCO:   reports that he has been smoking cigarettes. He has a 22.50 pack-year smoking history. He has never used smokeless tobacco.  ETOH:   reports current alcohol use of about 6.0 standard drinks per week. Family History:       Problem Relation Age of Onset    Diabetes type 2  Mother     Cancer Maternal Grandmother     Diabetes type 2  Nephew        REVIEW OF SYSTEMS:    Constitutional: No fever, no chills, no change in weight; appetite decreased   HEENT: No blurred vision, no ear problems, no sore throat, no rhinorrhea. Respiratory: No cough, no sputum production, no pleuritic chest pain, no shortness of breath  Cardiology: Angina, no dyspnea on exertion, no paroxysmal nocturnal dyspnea, no orthopnea, no palpitation, no leg swelling. Gastroenterology: No dysphagia, no reflux; no abdominal pain, nausea; No vomiting; Constipation; No diarrhea.  No hematochezia   Genitourinary: No dysuria, no frequency, hesitancy; no hematuria  Musculoskeletal: no joint pain, no myalgia, no change in range of movement  Neurology: no focal weakness in extremities, no slurred speech, no double vision, no tingling or numbness sensation  Endocrinology: no temperature intolerance, no polyphagia, polydipsia or polyuria  Hematology: no increased bleeding, no bruising, no lymphadenopathy  Skin: no skin changes noticed by patient  Psychology: no depressed mood, no suicidal ideation    Physical Exam   Vitals: BP (!) 170/110   Pulse (!) 102   Temp 98.2 °F (36.8 °C)   Resp 18   Wt 141 lb (64 kg)   SpO2 98%   BMI 22.76 kg/m²     General Appearance: alert and oriented to person, place and time, well developed and well- nourished, in no acute distress  Skin: warm and dry, no rash or erythema  Head: normocephalic and atraumatic  Eyes: pupils equal, round, and reactive to light, extraocular eye movements intact, conjunctivae normal  ENT: tympanic membrane, external ear and ear canal normal bilaterally, nose without deformity, nasal mucosa and turbinates normal without polyps  Neck: supple and non-tender without mass, no thyromegaly or thyroid nodules, no cervical lymphadenopathy  Pulmonary/Chest: clear to auscultation bilaterally - no wheezes, rales or rhonchi, normal air movement, no respiratory distress  Cardiovascular: normal rate, regular rhythm, normal S1 and S2, no murmurs, rubs, clicks, or gallops, distal pulses intact, no carotid bruits  Abdomen: soft, non-tender, non-distended, normal bowel sounds, Hepatosplenomegaly   Extremities: no cyanosis, clubbing or edema  Musculoskeletal: normal range of motion, no joint swelling, deformity or tenderness  Neurologic: reflexes normal and symmetric, no cranial nerve deficit, gait, coordination and speech normal   Labs and Imaging Studies   Basic Labs  Recent Labs     11/14/22  0750 11/14/22  0954 11/14/22  1106     --   --    K 4.7  --   --    CL 99  --   --    CO2 11*  --   --    BUN 15  --   --    CREATININE 0.9  --   --    GLUCOSE 314* 448 283   CALCIUM 8.9  --   --        Recent Labs     11/14/22  0750   WBC 10.3   RBC 5.37   HGB 16.6*   HCT 49.0   MCV 91.2   MCH 30.9   MCHC 33.9   RDW 13.6   *   MPV 9.5         Imaging Studies:     XR CHEST PORTABLE    Result Date: 11/14/2022  EXAMINATION: ONE XRAY VIEW OF THE CHEST 11/14/2022 8:30 am COMPARISON: 08/08/2022 HISTORY: ORDERING SYSTEM PROVIDED HISTORY: Chest Pain TECHNOLOGIST PROVIDED HISTORY: Reason for exam:->Chest Pain What reading provider will be dictating this exam?->CRC FINDINGS: The lungs are without acute focal process. There is no effusion or pneumothorax. The cardiomediastinal silhouette is without acute process. The osseous structures are without acute process. No acute process. EKG: sinus tachycardia. Resident's Assessment and Plan     Sagar Larkin is a 47 y.o. male    DKA  -Patient with history of uncontrolled T1DM. He is on insulin pump at home and states that he is not sure if it is functioning properly. Denies fevers, chills, sick contacts or recent alcohol use.    -Anion gap metabolic acidosis (pH 7.2, AG 25, HCO3 11)  -Glucose 319 on admission, BHB >4.5  -Patient has insulin pump at home  -Last A1C ON 8/8/22: 9.0   Plan:  -DKA protocol  -q4 BMP  -q1 POCT  -Patient currently on 1/2D5+1/2NS  -Endocrinology consult    -Follow viral panel  -Follow procalcitonin     High Anion Gap Metabolic Acidosis  -Likely 2/2 to DKA  -AG 25 on admission      Chest pain   -Likely atypical due to patient's history of DM  -On admission troponin: 34 (Previous admission 44)  -EKG on admission: Sinus tachy  -Obtain magnesium and phos     Polysubstance abuse  - Tobacco abuse, marijuana abuse, alcohol abuse, cocaine use in past.  - Patient outside of window for CIWA protocol   -May consider peer management - Patient states he is still using marijuana and drinking alcohol   -Folic acid + Thiamine      Hx of HTN  -Resume home medications when appropriate     Hx of HLD  -Resume home medications    PT/OT evaluation: Not indicated at this time   DVT prophylaxis/ GI prophylaxis: Lovenox / Protonix   Disposition: ICU management for DKA - Home at discharge    Wyatt Good DO, PGY-1   Attending physician: Dr. Lisa Danielle

## 2022-11-14 NOTE — H&P
[facility]  Internal Medicine Residency / [address]    Attending Physician Statement  I have discussed the case, including pertinent history and exam findings with the resident and the team.  I have seen and examined the patient and the key elements of the encounter have been performed by me. I have also personally reviewed imaging studies and labs. I agree with the assessment, plan and orders as documented by the resident. Admitted for DKA. Patient states he is not sure if his pump his working. Patient also with chest pain, centrally located and radiating to the right. Slightly elevated trops with no ST changes on EKG. Cath 2020 with mild LAD. Appears dry, with rales on auscultation, tachycardiac. No fevers, chills, leukocytosis. No focal deficits. UA with glucosuria and ketones, no infection. CXR clear. States he uses tobacco and marijuana daily, last alcohol use was 1 week ago. Assessment:  DKA  HAGMA from #1  Chest pain  HTN    Plan:  Plan for admission to ICU  DKA protocol  Viral panel, procalcitonin  May need endo referral once for stable. Need to reassess pump. Remainder of medical problems as per resident note. Harley Turcios MD  Internal Medicine

## 2022-11-14 NOTE — ED PROVIDER NOTES
[facility]  Department of Emergency Medicine     Written by: Mica Boston DO  Patient Name: Chelsy Patricio  Attending Provider: Minnie Allen MD  Admit Date: 2022  7:12 AM  MRN: [id number]                   : 1970        Chief Complaint   Patient presents with    Shortness of Breath     With mid sternal cp since last night    - Chief complaint    Mr. Esperanza Elaine is a 47year old male who presents to the ED due to shortness of breath. Patient states that he started having chest pain around 3 AM this morning. Says that his chest pain is substernal and radiates to his right arm. He says it has been constant since onset. Denies any aggravating relieving factors. Says he also has mild shortness of breath without any associated cough or wheezing. He says that his insulin pump has been malfunctioning recently and his sugars have been running higher than normal.  He notes that they have been in the 400s where he is usually in the 200s. He was recently admitted in August for DKA. States he has been increasingly fatigued recently. He does not use any additional insulin outside of his insulin pump. Patient is a daily drinker and also uses marijuana but denies any associated drug use. Patient denies any recent fever, chills, nausea, vomiting, abdominal pain, bowel or urinary changes, headaches or vision changes. Review of Systems   Constitutional:  Positive for fatigue. Negative for chills and fever. HENT:  Negative for congestion, sinus pressure, sinus pain and sore throat. Eyes:  Negative for pain, redness and visual disturbance. Respiratory:  Positive for shortness of breath. Negative for cough and chest tightness. Cardiovascular:  Positive for chest pain. Negative for palpitations and leg swelling. Gastrointestinal:  Negative for abdominal pain, constipation, diarrhea, nausea and vomiting.    Genitourinary:  Negative for dysuria, flank pain, frequency, hematuria and urgency. Musculoskeletal:  Negative for arthralgias, back pain, gait problem, joint swelling and myalgias. Skin:  Negative for rash. Neurological:  Negative for dizziness, syncope, weakness, light-headedness, numbness and headaches. Physical Exam  Constitutional:       General: He is not in acute distress. Appearance: Normal appearance. He is normal weight. He is not ill-appearing or toxic-appearing. HENT:      Head: Normocephalic and atraumatic. Right Ear: External ear normal.      Left Ear: External ear normal.      Mouth/Throat:      Mouth: Mucous membranes are moist.      Pharynx: Oropharynx is clear. Eyes:      Extraocular Movements: Extraocular movements intact. Conjunctiva/sclera: Conjunctivae normal.   Cardiovascular:      Rate and Rhythm: Regular rhythm. Tachycardia present. Pulses: Normal pulses. Heart sounds: Normal heart sounds. Pulmonary:      Effort: Pulmonary effort is normal. No tachypnea or respiratory distress. Breath sounds: Normal breath sounds. No decreased breath sounds. Abdominal:      General: Abdomen is flat. Bowel sounds are normal.      Palpations: Abdomen is soft. Tenderness: There is no abdominal tenderness. There is no guarding or rebound. Musculoskeletal:         General: Normal range of motion. Cervical back: Normal range of motion and neck supple. Right lower leg: No edema. Left lower leg: No edema. Skin:     General: Skin is warm and dry. Findings: No erythema or rash. Neurological:      General: No focal deficit present. Mental Status: He is alert and oriented to person, place, and time. Mental status is at baseline. Motor: No weakness.    Psychiatric:         Mood and Affect: Mood normal.         Behavior: Behavior normal.        Procedures       MDM  Number of Diagnoses or Management Options  Type 1 diabetes mellitus with ketoacidosis without coma ([facility] [address]  Diagnosis management comments: This is a 47year old male who presents to the ED due to shortness of breath. Patient CBC was benign within normal limits. CMP revealed an elevated anion gap of 25 and hyperglycemia and a glucose of 314. His bicarb was 11. Beta hydroxybutyrate was greater than 4.5 venous pH was 7.2. His phosphorus was 2.2 as well and repletion was ordered. Initial troponin was 34. Urinalysis revealed glucosuria and ketonuria. Patient was started on the DKA protocol at this time. Chest x-ray revealed no acute process. Patient will be admitted to the ICU by Dr. Robinson Fong at this time where Dr. Lesley Gamez will provide critical care consultation.                --------------------------------------------- PAST HISTORY ---------------------------------------------  Past Medical History:  has a past medical history of Alcoholism ([facility] [address] [facility] abuse ([facility] Diabetes mellitus ([facility] Hypertension, Idiopathic peripheral neuropathy, Lacunar stroke ([facility] and Marijuana abuse. Past Surgical History:  has no past surgical history on file. Social History:  reports that he has been smoking cigarettes. He has a 22.50 pack-year smoking history. He has never used smokeless tobacco. He reports current alcohol use of about 6.0 standard drinks per week. He reports current drug use. Drug: Marijuana Daril Gabino). Family History: family history includes Cancer in his maternal grandmother; Diabetes type 2  in his mother and nephew. The patients home medications have been reviewed.     Allergies: Metoprolol    -------------------------------------------------- RESULTS -------------------------------------------------    LABS:  Results for orders placed or performed during the hospital encounter of 11/14/22   Respiratory Panel, Molecular, with COVID-19 (Restricted: peds pts or suitable admitted adults)    Specimen: Nasopharyngeal   Result Value Ref Range    Adenovirus by PCR Not Detected Not Detected    Bordetella parapertussis by PCR Not Detected Not Detected    Bordetella pertussis by PCR Not Detected Not Detected    Chlamydophilia pneumoniae by PCR Not Detected Not Detected    Coronavirus 229E by PCR Not Detected Not Detected    Coronavirus HKU1 by PCR Not Detected Not Detected    Coronavirus NL63 by PCR Not Detected Not Detected    Coronavirus OC43 by PCR Not Detected Not Detected    SARS-CoV-2, PCR Not Detected Not Detected    Human Metapneumovirus by PCR Not Detected Not Detected    Human Rhinovirus/Enterovirus by PCR Not Detected Not Detected    Influenza A by PCR Not Detected Not Detected    Influenza B by PCR Not Detected Not Detected    Mycoplasma pneumoniae by PCR Not Detected Not Detected    Parainfluenza Virus 1 by PCR Not Detected Not Detected    Parainfluenza Virus 2 by PCR Not Detected Not Detected    Parainfluenza Virus 3 by PCR Not Detected Not Detected    Parainfluenza Virus 4 by PCR Not Detected Not Detected    Respiratory Syncytial Virus by PCR Not Detected Not Detected   CBC   Result Value Ref Range    WBC 10.3 4.5 - 11.5 E9/L    RBC 5.37 3.80 - 5.80 E12/L    Hemoglobin 16.6 (H) 12.5 - 16.5 g/dL    Hematocrit 49.0 37.0 - 54.0 %    MCV 91.2 80.0 - 99.9 fL    MCH 30.9 26.0 - 35.0 pg    MCHC 33.9 32.0 - 34.5 %    RDW 13.6 11.5 - 15.0 fL    Platelets 216 (H) 794 - 450 E9/L    MPV 9.5 7.0 - 12.0 fL   Comprehensive Metabolic Panel   Result Value Ref Range    Sodium 135 132 - 146 mmol/L    Potassium 4.7 3.5 - 5.0 mmol/L    Chloride 99 98 - 107 mmol/L    CO2 11 (L) 22 - 29 mmol/L    Anion Gap 25 (H) 7 - 16 mmol/L    Glucose 314 (H) 74 - 99 mg/dL    BUN 15 6 - 20 mg/dL    Creatinine 0.9 0.7 - 1.2 mg/dL    Est, Glom Filt Rate >60 >=60 mL/min/1.73    Calcium 8.9 8.6 - 10.2 mg/dL    Total Protein 6.3 (L) 6.4 - 8.3 g/dL    Albumin 4.1 3.5 - 5.2 g/dL    Total Bilirubin 0.5 0.0 - 1.2 mg/dL    Alkaline Phosphatase 121 40 - 129 U/L    ALT 15 0 - 40 U/L    AST 10 0 - 39 U/L   Troponin   Result Value Ref Range    Troponin, High Sensitivity 34 (H) 0 - 11 ng/L   Beta-Hydroxybutyrate   Result Value Ref Range    Beta-Hydroxybutyrate >4.50 (H) 0.02 - 0.27 mmol/L   PH, VENOUS   Result Value Ref Range    pH, Gage 7.20 (L) 7.35 - 7.45   Phosphorus   Result Value Ref Range    Phosphorus 2.2 (L) 2.5 - 4.5 mg/dL   Urinalysis   Result Value Ref Range    Color, UA Yellow Straw/Yellow    Clarity, UA Clear Clear    Glucose, Ur >=1000 (A) Negative mg/dL    Bilirubin Urine SMALL (A) Negative    Ketones, Urine >=80 (A) Negative mg/dL    Specific Gravity, UA >=1.030 1.005 - 1.030    Blood, Urine Negative Negative    pH, UA 5.5 5.0 - 9.0    Protein, UA TRACE Negative mg/dL    Urobilinogen, Urine 0.2 <2.0 E.U./dL    Nitrite, Urine Negative Negative    Leukocyte Esterase, Urine Negative Negative   Microscopic Urinalysis   Result Value Ref Range    WBC, UA NONE 0 - 5 /HPF    RBC, UA 0-1 0 - 2 /HPF    Epithelial Cells, UA RARE /HPF    Bacteria, UA RARE (A) None Seen /HPF   Procalcitonin   Result Value Ref Range    Procalcitonin 0.03 0.00 - 0.08 ng/mL   URINE DRUG SCREEN   Result Value Ref Range    Drug Screen Comment: see below    SPECIMEN REJECTION   Result Value Ref Range    Rejected Test SDS2     Reason for Rejection see below    POCT glucose   Result Value Ref Range    Glucose 319 mg/dL    QC OK? yes    POCT Glucose   Result Value Ref Range    Meter Glucose 319 (H) 74 - 99 mg/dL   POCT Glucose - every hour   Result Value Ref Range    Glucose 448 mg/dL    QC OK? yes    POCT Glucose - every hour   Result Value Ref Range    Glucose 283 mg/dL    QC OK?  yes    POCT Glucose   Result Value Ref Range    Meter Glucose 448 (H) 74 - 99 mg/dL   POCT Glucose   Result Value Ref Range    Meter Glucose 283 (H) 74 - 99 mg/dL   POCT Glucose   Result Value Ref Range    Meter Glucose 218 (H) 74 - 99 mg/dL   POCT Glucose   Result Value Ref Range    Meter Glucose 201 (H) 74 - 99 mg/dL   POCT Glucose   Result Value Ref Range    Meter Glucose 193 (H) 74 - 99 mg/dL   POCT Glucose   Result Value Ref Range    Meter Glucose 247 (H) 74 - 99 mg/dL   POCT Glucose   Result Value Ref Range    Meter Glucose 250 (H) 74 - 99 mg/dL   EKG 12 Lead   Result Value Ref Range    Ventricular Rate 114 BPM    Atrial Rate 114 BPM    P-R Interval 128 ms    QRS Duration 80 ms    Q-T Interval 354 ms    QTc Calculation (Bazett) 487 ms    P Axis 73 degrees    R Axis 65 degrees    T Axis 55 degrees       RADIOLOGY:  XR CHEST PORTABLE   Final Result   No acute process. EKG:  This EKG is signed and interpreted by me. Rate: 114  Rhythm: Sinus  Interpretation: sinus tachycardia  Comparison: stable as compared to patient's most recent EKG      ------------------------- NURSING NOTES AND VITALS REVIEWED ---------------------------  Date / Time Roomed:  11/14/2022  7:12 AM  ED Bed Assignment:  [id number]    The nursing notes within the ED encounter and vital signs as below have been reviewed. Patient Vitals for the past 24 hrs:   BP Temp Pulse Resp SpO2 Height Weight   11/14/22 1400 129/77 -- 91 14 99 % -- --   11/14/22 1300 (!) 152/90 -- 96 15 99 % -- --   11/14/22 1250 -- -- -- -- -- 5' 6\" (1.676 m) 110 lb 8 oz (50.1 kg)   11/14/22 1200 (!) 170/110 -- (!) 102 18 98 % -- --   11/14/22 1145 -- -- 90 18 -- -- --   11/14/22 1130 (!) 143/95 -- 97 18 -- -- --   11/14/22 1000 104/66 -- (!) 116 20 -- -- --   11/14/22 0930 115/83 -- (!) 108 19 -- -- --   11/14/22 0915 114/85 -- (!) 110 19 -- -- --   11/14/22 0714 (!) 118/92 98.2 °F (36.8 °C) (!) 114 18 99 % -- 141 lb (64 kg)       Oxygen Saturation Interpretation: Normal    ------------------------------------------ PROGRESS NOTES ------------------------------------------  Re-evaluation(s):  Time: 0900  Patients symptoms show no change  Repeat physical examination is not changed    Counseling:  I have spoken with the patient and discussed todays results, in addition to providing specific details for the plan of care and counseling regarding the diagnosis and prognosis.   Their questions are answered at this time and they are agreeable with the plan of admission.    --------------------------------- ADDITIONAL PROVIDER NOTES ---------------------------------  Consultations:  Time: 0915. Spoke with Dr. Dodie West. Discussed case. They will admit the patient. This patient's ED course included: a personal history and physicial examination, re-evaluation prior to disposition, multiple bedside re-evaluations, IV medications, cardiac monitoring, continuous pulse oximetry, and complex medical decision making and emergency management    This patient has remained hemodynamically stable during their ED course. Diagnosis:  1. Type 1 diabetes mellitus with ketoacidosis without coma (HCC)        Disposition:  Patient's disposition: Admit to CCU/ICU  Patient's condition is stable. Patient was seen and evaluated by myself and my attending Risa Harris MD. Assessment and Plan discussed with attending provider, please see attestation for final plan of care.      DO Tanya Marmolejo DO  Resident  11/14/22 [id number]

## 2022-11-14 NOTE — CONSULTS
[facility]  Internal Medicine Residency Program  MICU Consult note    Patient:  Kandy Height 47 y.o. male MRN: [id number]     Date of Service: 11/14/2022    Hospital Day: 1      Chief complaint: Had concerns including shortness of breath (with midsternal chest pain since last night)  History of Present Illness   The patient is a 47 y.o. male with past medical history of uncontrolled type 1 diabetes mellitus (HbA1c in August is 9), hypertension, hyperlipidemia, CVA, COPD and substance abuse. The patient was recently admitted to our facility for DKA. He mentioned that last night around 3 in the morning he started noticing chest pain along with shortness of breath. He mentioned that it was first time he noticed chest pain it was reported in the center of the chest it did not radiate anywhere. It felt like a pressure pain and nothing made it feel better or worse. The patient was also feeling short of breath. He mentioned that he did not not days and diaphoresis with the pain. He mentioned that he measured his blood sugar at that time and it was in the 400s, he took insulin from the insulin pump at that. He takes 2 units of insulin per hour via insulin pump, but he is not very sure. He measured his blood glucose in the morning and found that it was in 300, 380s to be precise. At that time he decided that he should call ambulance and come to the ED. The patient denies any fever, chills, cough, nausea, vomiting, abdominal pain, diarrhea, dizziness, lightheadedness or any sick contacts. Social history: The patient mentioned that he smokes up pack of cigarettes a day. And he has been doing it for 20 years. Pain also drinks 2 beers a day. But he his last drink was a week ago. He mentions that he has used cocaine in the past.      ED Course:  The patient presented with vitals of heart rate 114, temperature 98.2, respiratory rate 18 blood pressure 118/92 and glucose of 319 saturating at room air. His labs were significant for bicarb of 11 and anion gap of 25. Urinalysis shows glucosuria greater than 1000 small amount of bilirubin and ketonuria. Chest x-ray shows no acute process. ED Meds: The patient is started on IV insulin and is admitted for the management of DKA. Past Medical History:      Diagnosis Date    Alcoholism ([facility]     Cocaine abuse ([facility]     Diabetes mellitus ([facility]     Hypertension     Idiopathic peripheral neuropathy 9/21/2016    Lacunar stroke ([facility]     Marijuana abuse        Past Surgical History:    History reviewed. No pertinent surgical history. Medications Prior to Admission:    Prior to Admission medications    Medication Sig Start Date End Date Taking? Authorizing Provider   aspirin 81 MG EC tablet Take 1 tablet by mouth in the morning. 8/10/22   Balwinder Davies MD   insulin aspart (NOVOLOG) 100 UNIT/ML injection vial 100 units/day via insulin pump 3/22/22   Otf Jorgensen APRN - CNS   albuterol sulfate  (90 Base) MCG/ACT inhaler Inhale 2 puffs into the lungs every 6 hours as needed for Wheezing or Shortness of Breath 9/28/21   Maritza Romeo MD   metoprolol tartrate (LOPRESSOR) 25 MG tablet Take 0.5 tablets by mouth 2 times daily 9/28/21   Maritza Romeo MD       Allergies:  Metoprolol    Social History:   TOBACCO:   reports that he has been smoking cigarettes. He has a 22.50 pack-year smoking history. He has never used smokeless tobacco.  ETOH:   reports current alcohol use of about 6.0 standard drinks per week. OCCUPATION:      Family History:       Problem Relation Age of Onset    Diabetes type 2  Mother     Cancer Maternal Grandmother     Diabetes type 2  Nephew        REVIEW OF SYSTEMS:    Constitutional: No fever, no chills, no change in weight; good appetite  HEENT: No blurred vision, no ear problems, no sore throat, no rhinorrhea.   Respiratory: No cough, no sputum production, no pleuritic chest pain, no shortness of breath  Cardiology: No angina, no dyspnea on exertion, no paroxysmal nocturnal dyspnea, no orthopnea, no palpitation, no leg swelling. Gastroenterology: No dysphagia, no reflux; no abdominal pain, no nausea or vomiting; no constipation or diarrhea. No hematochezia   Genitourinary: No dysuria, no frequency, hesitancy; no hematuria  Musculoskeletal: no joint pain, no myalgia, no change in range of movement  Neurology: no focal weakness in extremities, no slurred speech, no double vision, no tingling or numbness sensation  Endocrinology: no temperature intolerance, no polyphagia, polydipsia or polyuria  Hematology: no increased bleeding, no bruising, no lymphadenopathy  Skin: no skin changes noticed by patient  Psychology: no depressed mood, no suicidal ideation    Physical Exam   Vitals: /77   Pulse 91   Temp 98.2 °F (36.8 °C)   Resp 14   Ht 5' 6\" (1.676 m)   Wt 110 lb 8 oz (50.1 kg)   SpO2 99%   BMI 17.84 kg/m²       Constitutional: Alert, oriented x3 follows commands. In no apparent distress. Head: Normocephalic and atraumatic. Eyes: Conjunctiva normal, (-) scleral icterus. Mucus membranes moist.  Mouth: Mucus membranes moist. Oropharynx clear. No deviation of the tongue or uvula. Neck: (-)  swelling,   Respiratory: Lungs clear to auscultation bilaterally. (-)  wheezes, mild Rales on basal lung fields. Cardiovascular: RRR. (-)  murmurs, (-) gallops,  (-) rubs. S1 and S2 were normal.   GI:  Abdomen soft, non-tender, non-distended. (+) BS. (-) guarding, (-) rigidity. Extremities: Warm and well perfused. (-) clubbing, (-) cyanosis. (-) peripheral edema. Poor hygiene of the feet. Neurologic:  No focal neurological deficits. No speech slurring or tremor. Strength 5/5, sensation intact. Lines  Peripheral IV 11/14/22 Left Forearm 11/14/22      Peripheral IV 11/14/22 Left Forearm 11/14/22      Oxygen: On room air.   ABG:     Lab Results   Component Value Date/Time    PH 7.236 07/20/2020 09:57 PM    PH 7.208 07/19/2012 02:12 AM    PCO2 16.2 07/20/2020 09:57 PM    PO2 119.3 07/20/2020 09:57 PM    HCO3 6.7 07/20/2020 09:57 PM    BE -18.3 07/20/2020 09:57 PM    THB 13.1 07/20/2020 09:57 PM    G5HWGIBA 88.4 07/19/2012 02:12 AM    O2SAT 98.2 07/20/2020 09:57 PM     Labs and Imaging Studies   Basic Labs  CBC:   Lab Results   Component Value Date/Time    WBC 10.3 11/14/2022 07:50 AM    RBC 5.37 11/14/2022 07:50 AM    HGB 16.6 11/14/2022 07:50 AM    HCT 49.0 11/14/2022 07:50 AM    MCV 91.2 11/14/2022 07:50 AM    RDW 13.6 11/14/2022 07:50 AM     11/14/2022 07:50 AM     BMP:    Lab Results   Component Value Date/Time     11/14/2022 07:50 AM    K 4.7 11/14/2022 07:50 AM    K 3.6 08/08/2022 02:56 AM    CL 99 11/14/2022 07:50 AM    CO2 11 11/14/2022 07:50 AM    BUN 15 11/14/2022 07:50 AM       Imaging Studies:     XR CHEST PORTABLE    Result Date: 11/14/2022  EXAMINATION: ONE XRAY VIEW OF THE CHEST 11/14/2022 8:30 am COMPARISON: 08/08/2022 HISTORY: ORDERING SYSTEM PROVIDED HISTORY: Chest Pain TECHNOLOGIST PROVIDED HISTORY: Reason for exam:->Chest Pain What reading provider will be dictating this exam?->CRC FINDINGS: The lungs are without acute focal process. There is no effusion or pneumothorax. The cardiomediastinal silhouette is without acute process. The osseous structures are without acute process. No acute process. EKG:    Ventricular Rate 114 BPM QTc Calculation (Bazett) 487 ms   Atrial Rate 114 BPM P [place] 73 degrees   P-R Interval 128 ms R Axis 65 degrees   QRS Duration 80 ms T Axis 55 degrees   Q-T Interval 354 ms            Resident's Assessment and Plan     Chelsy Patricio is a 47 y.o. male who presented to the ED due to complaints of chest pain and hyperglycemia and was ultimately found to be in DKA. Neurology A,O x3.    Cardiology  Chest pain 2/2 most likely musculoskeletal vs cardiac  Patient is not in pain in ICU  Trend troponin.     Hypertension  Patient BP has been elevated 146/94 mHg. Resume lisinopril 5 mg from home. Hold metoprolol for for now given hx of drug use. Elevated troponin 2/2 most likely from demand Ischemia  Patient troponin was 34 in ED. Repeat troponin  EKG not typical for MI. Endocrinology  DKA 2/2 most likely from noncompliance, r/o infection  The patient's anion gap is at 25, and glucose is 314 when presented. Patient does not have fever and leukocytosis. Follow blood Cultures. Continue DKA protocol with IV insulin. Replace electrolytes. Follow BMP q4h. POCT q1h. Nephrology  HAGMA 2/2 most likely from DKA  Continue DKA protocol as above  BMP every 4 hours. POCT every hour. Psychiatry  History of dug abuse  Thiamine supplements. Follow UDS. PT/OT evaluation: Not indicated at this time  DVT prophylaxis/ GI prophylaxis: Lovenox/   Disposition: Continue ICU care    Deandra Allen MD, PGY-1   Attending physician: Dr. Yovanny Shabazz  Department of Pulmonary, Critical Care and Sleep Medicine  [facility]  Department of Internal Medicine      During multidisciplinary team rounds Lilia Record is a 47 y.o. male was seen, examined and discussed. This is confirmation that I have personally seen and examined the patient and that the key elements of the encounter were performed by me (> 85 % time). The medications & laboratory data was discussed and adjusted where necessary. The radiographic images were reviewed or with radiologist or consultant if felt dis-concordant with the exam or history. The above findings were corroborated, plans confirmed and changes made if needed. Family is updated at the bedside as available. Key issues of the case were discussed among consultants. Critical Care time is documented if appropriate.         Isaura Bellamy DO

## 2022-11-14 NOTE — PROGRESS NOTES
This RN spoke with lab regarding 1300 labs that are not resulted yet. Lab states it is possible they \"got lost\". Lab stated they will run the tests now. Will continue to watch for results.

## 2022-11-15 PROBLEM — E10.65 POORLY CONTROLLED TYPE 1 DIABETES MELLITUS (HCC): Status: ACTIVE | Noted: 2022-11-15

## 2022-11-15 PROBLEM — E10.10 TYPE 1 DIABETES MELLITUS WITH KETOACIDOSIS WITHOUT COMA (HCC): Status: ACTIVE | Noted: 2022-11-15

## 2022-11-15 LAB
ANION GAP SERPL CALCULATED.3IONS-SCNC: 10 MMOL/L (ref 7–16)
ANION GAP SERPL CALCULATED.3IONS-SCNC: 13 MMOL/L (ref 7–16)
ANION GAP SERPL CALCULATED.3IONS-SCNC: 15 MMOL/L (ref 7–16)
ANION GAP SERPL CALCULATED.3IONS-SCNC: 7 MMOL/L (ref 7–16)
BASOPHILS ABSOLUTE: 0.06 E9/L (ref 0–0.2)
BASOPHILS RELATIVE PERCENT: 0.5 % (ref 0–2)
BUN BLDV-MCNC: 10 MG/DL (ref 6–20)
BUN BLDV-MCNC: 5 MG/DL (ref 6–20)
BUN BLDV-MCNC: 6 MG/DL (ref 6–20)
BUN BLDV-MCNC: 8 MG/DL (ref 6–20)
CALCIUM SERPL-MCNC: 7.4 MG/DL (ref 8.6–10.2)
CALCIUM SERPL-MCNC: 8.2 MG/DL (ref 8.6–10.2)
CALCIUM SERPL-MCNC: 8.2 MG/DL (ref 8.6–10.2)
CALCIUM SERPL-MCNC: 8.4 MG/DL (ref 8.6–10.2)
CHLORIDE BLD-SCNC: 101 MMOL/L (ref 98–107)
CHLORIDE BLD-SCNC: 105 MMOL/L (ref 98–107)
CHLORIDE BLD-SCNC: 106 MMOL/L (ref 98–107)
CHLORIDE BLD-SCNC: 107 MMOL/L (ref 98–107)
CO2: 14 MMOL/L (ref 22–29)
CO2: 17 MMOL/L (ref 22–29)
CO2: 17 MMOL/L (ref 22–29)
CO2: 20 MMOL/L (ref 22–29)
CREAT SERPL-MCNC: 0.6 MG/DL (ref 0.7–1.2)
CREAT SERPL-MCNC: 0.6 MG/DL (ref 0.7–1.2)
CREAT SERPL-MCNC: 0.7 MG/DL (ref 0.7–1.2)
CREAT SERPL-MCNC: 0.7 MG/DL (ref 0.7–1.2)
EOSINOPHILS ABSOLUTE: 0.26 E9/L (ref 0.05–0.5)
EOSINOPHILS RELATIVE PERCENT: 2.4 % (ref 0–6)
GFR SERPL CREATININE-BSD FRML MDRD: >60 ML/MIN/1.73
GLUCOSE BLD-MCNC: 149 MG/DL (ref 74–99)
GLUCOSE BLD-MCNC: 179 MG/DL (ref 74–99)
GLUCOSE BLD-MCNC: 180 MG/DL (ref 74–99)
GLUCOSE BLD-MCNC: 216 MG/DL (ref 74–99)
HCT VFR BLD CALC: 40.5 % (ref 37–54)
HEMOGLOBIN: 14.1 G/DL (ref 12.5–16.5)
IMMATURE GRANULOCYTES #: 0.1 E9/L
IMMATURE GRANULOCYTES %: 0.9 % (ref 0–5)
LYMPHOCYTES ABSOLUTE: 2.49 E9/L (ref 1.5–4)
LYMPHOCYTES RELATIVE PERCENT: 22.6 % (ref 20–42)
MAGNESIUM: 1.7 MG/DL (ref 1.6–2.6)
MAGNESIUM: 1.9 MG/DL (ref 1.6–2.6)
MAGNESIUM: 1.9 MG/DL (ref 1.6–2.6)
MAGNESIUM: 2 MG/DL (ref 1.6–2.6)
MCH RBC QN AUTO: 30.6 PG (ref 26–35)
MCHC RBC AUTO-ENTMCNC: 34.8 % (ref 32–34.5)
MCV RBC AUTO: 87.9 FL (ref 80–99.9)
METER GLUCOSE: 113 MG/DL (ref 74–99)
METER GLUCOSE: 138 MG/DL (ref 74–99)
METER GLUCOSE: 140 MG/DL (ref 74–99)
METER GLUCOSE: 154 MG/DL (ref 74–99)
METER GLUCOSE: 155 MG/DL (ref 74–99)
METER GLUCOSE: 155 MG/DL (ref 74–99)
METER GLUCOSE: 156 MG/DL (ref 74–99)
METER GLUCOSE: 156 MG/DL (ref 74–99)
METER GLUCOSE: 163 MG/DL (ref 74–99)
METER GLUCOSE: 179 MG/DL (ref 74–99)
METER GLUCOSE: 186 MG/DL (ref 74–99)
METER GLUCOSE: 205 MG/DL (ref 74–99)
METER GLUCOSE: 213 MG/DL (ref 74–99)
METER GLUCOSE: 220 MG/DL (ref 74–99)
METER GLUCOSE: 226 MG/DL (ref 74–99)
METER GLUCOSE: 244 MG/DL (ref 74–99)
MONOCYTES ABSOLUTE: 0.96 E9/L (ref 0.1–0.95)
MONOCYTES RELATIVE PERCENT: 8.7 % (ref 2–12)
NEUTROPHILS ABSOLUTE: 7.15 E9/L (ref 1.8–7.3)
NEUTROPHILS RELATIVE PERCENT: 64.9 % (ref 43–80)
PDW BLD-RTO: 13.1 FL (ref 11.5–15)
PHOSPHORUS: 1.7 MG/DL (ref 2.5–4.5)
PHOSPHORUS: 1.8 MG/DL (ref 2.5–4.5)
PHOSPHORUS: 2.7 MG/DL (ref 2.5–4.5)
PHOSPHORUS: 2.8 MG/DL (ref 2.5–4.5)
PLATELET # BLD: 527 E9/L (ref 130–450)
PMV BLD AUTO: 8.4 FL (ref 7–12)
POTASSIUM SERPL-SCNC: 4 MMOL/L (ref 3.5–5)
POTASSIUM SERPL-SCNC: 4.1 MMOL/L (ref 3.5–5)
POTASSIUM SERPL-SCNC: 4.2 MMOL/L (ref 3.5–5)
POTASSIUM SERPL-SCNC: 4.4 MMOL/L (ref 3.5–5)
RBC # BLD: 4.61 E12/L (ref 3.8–5.8)
SODIUM BLD-SCNC: 132 MMOL/L (ref 132–146)
SODIUM BLD-SCNC: 133 MMOL/L (ref 132–146)
SODIUM BLD-SCNC: 133 MMOL/L (ref 132–146)
SODIUM BLD-SCNC: 134 MMOL/L (ref 132–146)
WBC # BLD: 11 E9/L (ref 4.5–11.5)

## 2022-11-15 PROCEDURE — 2580000003 HC RX 258

## 2022-11-15 PROCEDURE — 84100 ASSAY OF PHOSPHORUS: CPT

## 2022-11-15 PROCEDURE — 36415 COLL VENOUS BLD VENIPUNCTURE: CPT

## 2022-11-15 PROCEDURE — 82962 GLUCOSE BLOOD TEST: CPT

## 2022-11-15 PROCEDURE — 6360000002 HC RX W HCPCS: Performed by: INTERNAL MEDICINE

## 2022-11-15 PROCEDURE — 2580000003 HC RX 258: Performed by: INTERNAL MEDICINE

## 2022-11-15 PROCEDURE — 2000000000 HC ICU R&B

## 2022-11-15 PROCEDURE — S5553 INSULIN LONG ACTING 5 U: HCPCS

## 2022-11-15 PROCEDURE — 85025 COMPLETE CBC W/AUTO DIFF WBC: CPT

## 2022-11-15 PROCEDURE — 2500000003 HC RX 250 WO HCPCS

## 2022-11-15 PROCEDURE — 80048 BASIC METABOLIC PNL TOTAL CA: CPT

## 2022-11-15 PROCEDURE — 99291 CRITICAL CARE FIRST HOUR: CPT | Performed by: INTERNAL MEDICINE

## 2022-11-15 PROCEDURE — 6370000000 HC RX 637 (ALT 250 FOR IP): Performed by: INTERNAL MEDICINE

## 2022-11-15 PROCEDURE — 6370000000 HC RX 637 (ALT 250 FOR IP)

## 2022-11-15 PROCEDURE — 99222 1ST HOSP IP/OBS MODERATE 55: CPT | Performed by: INTERNAL MEDICINE

## 2022-11-15 PROCEDURE — 99231 SBSQ HOSP IP/OBS SF/LOW 25: CPT | Performed by: INTERNAL MEDICINE

## 2022-11-15 PROCEDURE — 83735 ASSAY OF MAGNESIUM: CPT

## 2022-11-15 RX ORDER — LISINOPRIL 5 MG/1
5 TABLET ORAL DAILY
Status: DISCONTINUED | OUTPATIENT
Start: 2022-11-15 | End: 2022-11-16 | Stop reason: HOSPADM

## 2022-11-15 RX ORDER — INSULIN LISPRO 100 [IU]/ML
0-8 INJECTION, SOLUTION INTRAVENOUS; SUBCUTANEOUS
Status: DISCONTINUED | OUTPATIENT
Start: 2022-11-15 | End: 2022-11-15

## 2022-11-15 RX ORDER — INSULIN GLARGINE-YFGN 100 [IU]/ML
10 INJECTION, SOLUTION SUBCUTANEOUS NIGHTLY
Status: DISCONTINUED | OUTPATIENT
Start: 2022-11-16 | End: 2022-11-16

## 2022-11-15 RX ORDER — INSULIN LISPRO 100 [IU]/ML
3 INJECTION, SOLUTION INTRAVENOUS; SUBCUTANEOUS
Status: DISCONTINUED | OUTPATIENT
Start: 2022-11-16 | End: 2022-11-16

## 2022-11-15 RX ORDER — INSULIN LISPRO 100 [IU]/ML
0-6 INJECTION, SOLUTION INTRAVENOUS; SUBCUTANEOUS
Status: DISCONTINUED | OUTPATIENT
Start: 2022-11-15 | End: 2022-11-16 | Stop reason: HOSPADM

## 2022-11-15 RX ORDER — INSULIN GLARGINE-YFGN 100 [IU]/ML
15 INJECTION, SOLUTION SUBCUTANEOUS NIGHTLY
Status: DISCONTINUED | OUTPATIENT
Start: 2022-11-15 | End: 2022-11-15

## 2022-11-15 RX ORDER — DEXTROSE MONOHYDRATE 100 MG/ML
INJECTION, SOLUTION INTRAVENOUS CONTINUOUS PRN
Status: DISCONTINUED | OUTPATIENT
Start: 2022-11-15 | End: 2022-11-16 | Stop reason: HOSPADM

## 2022-11-15 RX ORDER — INSULIN LISPRO 100 [IU]/ML
5 INJECTION, SOLUTION INTRAVENOUS; SUBCUTANEOUS
Status: DISCONTINUED | OUTPATIENT
Start: 2022-11-15 | End: 2022-11-15

## 2022-11-15 RX ORDER — INSULIN LISPRO 100 [IU]/ML
0-4 INJECTION, SOLUTION INTRAVENOUS; SUBCUTANEOUS NIGHTLY
Status: DISCONTINUED | OUTPATIENT
Start: 2022-11-15 | End: 2022-11-16 | Stop reason: HOSPADM

## 2022-11-15 RX ADMIN — DEXTROSE AND SODIUM CHLORIDE: 5; 450 INJECTION, SOLUTION INTRAVENOUS at 10:50

## 2022-11-15 RX ADMIN — ATORVASTATIN CALCIUM 40 MG: 40 TABLET, FILM COATED ORAL at 21:04

## 2022-11-15 RX ADMIN — POTASSIUM CHLORIDE 10 MEQ: 7.46 INJECTION, SOLUTION INTRAVENOUS at 03:49

## 2022-11-15 RX ADMIN — POTASSIUM CHLORIDE 10 MEQ: 7.46 INJECTION, SOLUTION INTRAVENOUS at 02:49

## 2022-11-15 RX ADMIN — ASPIRIN 81 MG: 81 TABLET, COATED ORAL at 10:59

## 2022-11-15 RX ADMIN — POTASSIUM CHLORIDE 10 MEQ: 7.46 INJECTION, SOLUTION INTRAVENOUS at 04:50

## 2022-11-15 RX ADMIN — POTASSIUM CHLORIDE 10 MEQ: 7.46 INJECTION, SOLUTION INTRAVENOUS at 15:29

## 2022-11-15 RX ADMIN — POTASSIUM CHLORIDE 10 MEQ: 7.46 INJECTION, SOLUTION INTRAVENOUS at 13:19

## 2022-11-15 RX ADMIN — INSULIN GLARGINE-YFGN 15 UNITS: 100 INJECTION, SOLUTION SUBCUTANEOUS at 14:50

## 2022-11-15 RX ADMIN — POTASSIUM PHOSPHATE, MONOBASIC AND POTASSIUM PHOSPHATE, DIBASIC 20 MMOL: 224; 236 INJECTION, SOLUTION, CONCENTRATE INTRAVENOUS at 12:27

## 2022-11-15 RX ADMIN — POTASSIUM CHLORIDE 10 MEQ: 7.46 INJECTION, SOLUTION INTRAVENOUS at 00:25

## 2022-11-15 RX ADMIN — Medication 10 ML: at 10:59

## 2022-11-15 RX ADMIN — Medication 10 ML: at 21:04

## 2022-11-15 RX ADMIN — INSULIN LISPRO 2 UNITS: 100 INJECTION, SOLUTION INTRAVENOUS; SUBCUTANEOUS at 18:25

## 2022-11-15 RX ADMIN — LISINOPRIL 5 MG: 5 TABLET ORAL at 10:59

## 2022-11-15 RX ADMIN — POTASSIUM CHLORIDE 10 MEQ: 7.46 INJECTION, SOLUTION INTRAVENOUS at 14:26

## 2022-11-15 RX ADMIN — INSULIN LISPRO 5 UNITS: 100 INJECTION, SOLUTION INTRAVENOUS; SUBCUTANEOUS at 14:51

## 2022-11-15 ASSESSMENT — PAIN SCALES - GENERAL
PAINLEVEL_OUTOF10: 0

## 2022-11-15 NOTE — PROGRESS NOTES
[address] [facility] [place]  Department of Internal Medicine   Internal Medicine Residency   MICU Progress Note    Patient:  Aubrie Gonzalez 47 y.o. male  MRN: [id number]     Date of Service: 11/15/2022    Allergy: Metoprolol    Subjective     The patient was seen by the bedside in the AM.  The patient was sleeping comfortably in the bed. No new complaints per patient. 24h change: Overnight the patient's anion gap closed once. But due to the insulin drip being stopped, it opened back again. No other significant events. ROS: Denies Fever/chills/CP/SOB/N/V/D/C/Dysuria/Blood in stool or urine  Objective     Physical Exam:  Vitals: /72   Pulse 82   Temp 98.3 °F (36.8 °C) (Oral)   Resp 24   Ht 5' 6\" (1.676 m)   Wt 145 lb (65.8 kg) Comment: bedscale reading incorrectly  SpO2 96%   BMI 23.40 kg/m²       Constitutional: Alert, oriented x3 follows commands. In no apparent distress. Head: Normocephalic and atraumatic. Eyes: Conjunctiva normal, (-) scleral icterus. Mucus membranes moist.  Mouth: Mucus membranes moist. Oropharynx clear. No deviation of the tongue or uvula. Neck: (-)  swelling,   Respiratory: Lungs clear to auscultation bilaterally. (-)  wheezes, mild Rales on basal lung fields. Cardiovascular: RRR. (-)  murmurs, (-) gallops,  (-) rubs. S1 and S2 were normal.   GI:  Abdomen soft, non-tender, non-distended. (+) BS. (-) guarding, (-) rigidity. Extremities: Warm and well perfused. (-) clubbing, (-) cyanosis. (-) peripheral edema. Poor hygiene of the feet. Neurologic:  No focal neurological deficits. No speech slurring or tremor. Strength 5/5, sensation intact.     I & O - 24hr:   Intake/Output Summary (Last 24 hours) at 11/15/2022 1710  Last data filed at 11/15/2022 1639  Gross per 24 hour   Intake 5059.84 ml   Output 1575 ml   Net 3484.84 ml       Lines    Peripheral IV 11/14/22 Left Forearm 11/14/22    Oxygen:    Room air  ABG:     Lab Results   Component Value Date/Time    PH 7.236 07/20/2020 09:57 PM    PH 7.208 07/19/2012 02:12 AM    PCO2 16.2 07/20/2020 09:57 PM    PO2 119.3 07/20/2020 09:57 PM    HCO3 6.7 07/20/2020 09:57 PM    BE -18.3 07/20/2020 09:57 PM    THB 13.1 07/20/2020 09:57 PM    A9XAJJTB 88.4 07/19/2012 02:12 AM    O2SAT 98.2 07/20/2020 09:57 PM        Medications     Infusions: (Fluid, Sedation, Vasopressors)  IVF:   None  Vasopressors  None  Sedation  None    Nutrition:   Adult diet: Carb restricted.   ATB:   Antibiotics  Days   None              Skin issues: none  Patient currently has   Isolation  Restraints  DVT prophylaxis/ GI prophylaxis,    PT/OT      Labs   CBC with Differential:    Lab Results   Component Value Date/Time    WBC 11.0 11/15/2022 05:24 AM    RBC 4.61 11/15/2022 05:24 AM    HGB 14.1 11/15/2022 05:24 AM    HCT 40.5 11/15/2022 05:24 AM     11/15/2022 05:24 AM    MCV 87.9 11/15/2022 05:24 AM    MCH 30.6 11/15/2022 05:24 AM    MCHC 34.8 11/15/2022 05:24 AM    RDW 13.1 11/15/2022 05:24 AM    SEGSPCT 53 01/25/2014 05:15 AM    SEGSPCT 86 10/11/2010 04:40 AM    BANDSPCT 3 10/10/2010 07:20 PM    METASPCT 1.7 07/20/2020 12:12 PM    LYMPHOPCT 22.6 11/15/2022 05:24 AM    PROMYELOPCT 1.7 04/29/2022 09:41 PM    MONOPCT 8.7 11/15/2022 05:24 AM    MYELOPCT 2.6 01/04/2020 05:07 PM    EOSPCT 2 10/12/2010 06:00 AM    BASOPCT 0.5 11/15/2022 05:24 AM    MONOSABS 0.96 11/15/2022 05:24 AM    LYMPHSABS 2.49 11/15/2022 05:24 AM    EOSABS 0.26 11/15/2022 05:24 AM    BASOSABS 0.06 11/15/2022 05:24 AM     BMP:    Lab Results   Component Value Date/Time     11/15/2022 01:21 PM    K 4.2 11/15/2022 01:21 PM    K 3.6 08/08/2022 02:56 AM     11/15/2022 01:21 PM    CO2 17 11/15/2022 01:21 PM    BUN 5 11/15/2022 01:21 PM    LABALBU 4.1 11/14/2022 07:50 AM    LABALBU 4.3 12/30/2011 10:15 AM    CREATININE 0.6 11/15/2022 01:21 PM    CALCIUM 7.4 11/15/2022 01:21 PM    GFRAA >60 08/10/2022 09:41 AM    LABGLOM >60 11/15/2022 01:21 PM    GLUCOSE 216 11/15/2022 01:21 PM GLUCOSE 83 04/11/2012 03:35 AM     Calcium:    Lab Results   Component Value Date/Time    CALCIUM 7.4 11/15/2022 01:21 PM     Ionized Calcium:    Lab Results   Component Value Date/Time    IONCA 1.23 02/19/2011 06:00 AM     Magnesium:    Lab Results   Component Value Date/Time    MG 1.7 11/15/2022 01:21 PM     Phosphorus:    Lab Results   Component Value Date/Time    PHOS 2.7 11/15/2022 01:21 PM       Imaging Studies:  XR CHEST PORTABLE     Result Date: 11/14/2022  No acute process. Resident's Assessment and Plan     Fabiana Arenas is a 47 y.o. male who presented to the ED due to complaints of chest pain and hyperglycemia and was ultimately found to be in DKA. Neurology A,O x3.     Cardiology  Chest pain 2/2 most likely musculoskeletal vs cardiac  No complaints of pain today. Monitor. Hypertension  Patient BP has been elevated 146/94 mHg. Continue lisinopril 5 mg from home. Hold metoprolol for for now given hx of drug use. Elevated troponin 2/2 most likely from demand Ischemia-resolved  Patient troponin was 34 in ED. EKG not typical for MI. Observe for now. Endocrinology  DKA 2/2 unknown source. R/o infection  The patient's anion gap is at 25, and glucose is 314 when presented. He does report that his insulin pump had read motor error. Patient does not have fever and leukocytosis. BC: No growth to date. The patient's anion gap closed twice, the patient has been bridged to subcutaneous insulin. Per endocrine, the patient is bridged on 10 units of Lantus nightly, 3 units lispro with meals, plus low-dose sliding scale. POCT glucose adnjq1e. Nephrology  HAGMA 2/2 most likely from DKA - resolved  Monitor BMP in th am.     Psychiatry  History of dug abuse  Thiamine supplements.   UDS negative     PT/OT evaluation: Not indicated at this time  DVT prophylaxis/ GI prophylaxis: Lovenox/ adult diet  Disposition: Continue ICU care     Carmen Vargas MD, PGY-1   Attending physician:  [facility]  Department of Pulmonary, Critical Care and Sleep Medicine  [address] [facility]  Department of Internal Medicine      During multidisciplinary team rounds Kandy Whitmore is a 47 y.o. male was seen, examined and discussed. This is confirmation that I have personally seen and examined the patient and that the key elements of the encounter were performed by me (> 85 % time). The medications & laboratory data was discussed and adjusted where necessary. The radiographic images were reviewed or with radiologist or consultant if felt dis-concordant with the exam or history. The above findings were corroborated, plans confirmed and changes made if needed. Family is updated at the bedside as available. Key issues of the case were discussed among consultants. Critical Care time is documented if appropriate. Remained on insulin drip this morning. We will transition to subcutaneous insulin. Appreciate endocrinology consult.   Okay to transfer out of ICU if gap remains closed this afternoon    Critical Care time: 34 minutes    Isaura Bellamy DO

## 2022-11-15 NOTE — CONSULTS
ENDOCRINOLOGY INITIAL CONSULTATION NOTE      Date of admission: 11/14/2022  Date of service: 11/15/2022  Admitting physician: Clint Duffy MD   Primary Care Physician: Michael Cano MD  Consultant physician: Carlos A Rodrigez MD     Reason for the consultation:  Uncontrolled DM, DKA     History of Present Illness: The history is provided by the patient. Accuracy of the patient data is excellent    Chen Frazier is a very pleasant 47 y.o. old male with PMH listed below admitted to [address] [place] [address] on 11/14/2022 because of DKA, endocrine service was consulted for diabetes management. Patient believes his insulin pump may not be working properly, says he got \"motor error\" message on his pump. No signs of infection are seen on CXR or urine analysis, afebrile and no leukocytosis. Prior to admission  The patient was diagnosed with type 1 DM at the age 29. Prior to admission patient was on Medtronic 530: basal rate 12a 1.45, 8a 1.5, CR 10, ISF 60, Goal 110-130, active time 3 hrs . Patient has had no hypoglycemic episodes. Patient has not been eating consistent carbohydrate meals, self-blood glucose monitoring has been above goal prior to admission. Patient has neuropathy and retinopathy s/p laser therapy. He has no CAD, PVD but had a TIA 2/2019. The patient says he saw an eye doctor a few Friday's ago and does not see a podiatrist. Last seen in hospital 8/7/22 for DKA.      Lab Results   Component Value Date/Time    LABA1C 9.0 08/08/2022 06:28 AM       Inpatient diet:   Carb Restricted diet     Point of care glucose monitoring   (Independently reviewed)   Recent Labs     11/15/22  0020 11/15/22  0128 11/15/22  0238 11/15/22  0345 11/15/22  0446 11/15/22  0539 11/15/22  0659 11/15/22  0759   GLUMET 113* 154* 155* 163* 140* 155* 186* 179*       Past medical history:   Past Medical History:   Diagnosis Date    Alcoholism ([facility] [address]     Cocaine abuse ([facility] [address]     Diabetes mellitus ([facility] [address]     Hypertension     Idiopathic peripheral neuropathy 9/21/2016    Lacunar stroke ([facility] [address]     Marijuana abuse        Past surgical history:  History reviewed. No pertinent surgical history. Social history:   Tobacco:   reports that he has been smoking cigarettes. He has a 22.50 pack-year smoking history. He has never used smokeless tobacco.  Alcohol:   reports current alcohol use of about 6.0 standard drinks per week. Drugs:   reports current drug use. Drug: Marijuana Roby Demorest). Family history:    Family History   Problem Relation Age of Onset    Diabetes type 2  Mother     Cancer Maternal Grandmother     Diabetes type 2  Nephew        Allergy and drug reactions: Allergies   Allergen Reactions    Metoprolol      Bradycardia      Patient states he has been currently taking it at home without any issues. Scheduled Meds:   lisinopril  5 mg Oral Daily    potassium phosphate IVPB  20 mmol IntraVENous Once    aspirin  81 mg Oral Daily    atorvastatin  40 mg Oral Nightly    sodium chloride flush  5-40 mL IntraVENous 2 times per day    enoxaparin  40 mg SubCUTAneous Daily       PRN Meds:   dextrose bolus, 125 mL, PRN   Or  dextrose bolus, 250 mL, PRN  potassium chloride, 10 mEq, PRN  magnesium sulfate, 1,000 mg, PRN  sodium phosphate IVPB, 10 mmol, PRN   Or  sodium phosphate IVPB, 15 mmol, PRN   Or  sodium phosphate IVPB, 20 mmol, PRN  dextrose 5 % and 0.45 % NaCl, , Continuous PRN  sodium chloride flush, 5-40 mL, PRN  sodium chloride, , PRN  ondansetron, 4 mg, Q8H PRN   Or  ondansetron, 4 mg, Q6H PRN  polyethylene glycol, 17 g, Daily PRN  acetaminophen, 650 mg, Q6H PRN   Or  acetaminophen, 650 mg, Q6H PRN      Continuous Infusions:   sodium chloride Stopped (11/14/22 1229)    dextrose 5 % and 0.45 % NaCl 150 mL/hr at 11/15/22 0614    insulin 2.38 Units/hr (11/15/22 0801)    sodium chloride         Review of Systems  All systems reviewed.  All negative except for symptoms mentioned in HPI     OBJECTIVE    BP (!) 141/100   Pulse 86   Temp 97.8 °F (36.6 °C) (Oral)   Resp 15   Ht 5' 6\" (1.676 m)   Wt 145 lb (65.8 kg) Comment: bedscale reading incorrectly  SpO2 99%   BMI 23.40 kg/m²     Intake/Output Summary (Last 24 hours) at 11/15/2022 0913  Last data filed at 11/15/2022 7048  Gross per 24 hour   Intake 3350.99 ml   Output 650 ml   Net 2700.99 ml       Physical examination:  General: awake alert, oriented x3, mild lethargy   HEENT: normocephalic non traumatic, no exophthalmos   Neck: supple, No thyroid tenderness,  Pulm: good equal air entry no added sounds  CVS: S1 + S2  Abd: soft lax, no tenderness  Skin: warm, no lesions, no rash. No open wounds, no ulcers   Neuro: CN intact, sensation decreased bilateral , muscle power normal  Psych: normal mood, and affect     Review of Laboratory Data:  I personally reviewed the following labs:   Recent Labs     11/14/22  0750 11/15/22  0524   WBC 10.3 11.0   RBC 5.37 4.61   HGB 16.6* 14.1   HCT 49.0 40.5   MCV 91.2 87.9   MCH 30.9 30.6   MCHC 33.9 34.8*   RDW 13.6 13.1   * 527*   MPV 9.5 8.4     Recent Labs     11/14/22  0750 11/14/22  0954 11/14/22  2031 11/15/22  0135 11/15/22  0524      < > 132 133 132   K 4.7   < > 4.7 4.0 4.4   CL 99   < > 100 101 105   CO2 11*   < > 14* 17* 14*   BUN 15   < > 11 10 8   CREATININE 0.9   < > 0.7 0.7 0.6*   GLUCOSE 314*   < > 191* 179* 180*   CALCIUM 8.9   < > 8.3* 8.2* 8.4*   PROT 6.3*  --   --   --   --    LABALBU 4.1  --   --   --   --    BILITOT 0.5  --   --   --   --    ALKPHOS 121  --   --   --   --    AST 10  --   --   --   --    ALT 15  --   --   --   --     < > = values in this interval not displayed.      Beta-Hydroxybutyrate   Date Value Ref Range Status   11/14/2022 >4.50 (H) 0.02 - 0.27 mmol/L Final   08/07/2022 >4.50 (H) 0.02 - 0.27 mmol/L Final   04/29/2022 3.09 (H) 0.02 - 0.27 mmol/L Final     Lab Results   Component Value Date/Time    LABA1C 9.0 08/08/2022 06:28 AM    LABA1C 8.9 05/10/2022 12:15 PM    LABA1C 9.3 01/26/2022 03:03 PM     Lab Results   Component Value Date/Time    TSH 3.330 01/04/2020 05:07 PM    T4FREE 0.97 07/19/2012 02:00 AM     Lab Results   Component Value Date/Time    LABA1C 9.0 08/08/2022 06:28 AM    GLUCOSE 180 11/15/2022 05:24 AM    GLUCOSE 83 04/11/2012 03:35 AM    MALBCR 167.3 08/08/2022 03:25 AM    LABMICR 50.2 08/08/2022 03:25 AM    LABCREA 30 08/08/2022 03:25 AM    LABCREA 30 08/08/2022 03:25 AM     Lab Results   Component Value Date/Time    TRIG 100 01/26/2022 06:00 AM    HDL 48 09/26/2021 05:58 PM    LDLCALC 112 09/26/2021 05:58 PM    CHOL 213 09/26/2021 05:58 PM       Blood culture   Lab Results   Component Value Date/Time    BC 5 Days no growth 08/08/2022 06:24 AM    BC 5 Days no growth 05/01/2022 11:20 AM       Radiology:  XR CHEST PORTABLE   Final Result   No acute process. Medical Records/Labs/Images review:   I personally reviewed and summarized previous records   All labs and imaging were reviewed independently     [address] [facility] Genaro Zapata, a 47 y.o.-old male seen today for inpatient diabetes management     Diabetes Mellitus type 1, admitted for DKA  Patient's diabetes is uncontrolled A1c (8/8) 9.0%  Uses an old  MiniMed 530G Medtronic insulin pump at home with following settings: basal rate 12a 1.45, 8a 1.5, CR 10, ISF 60, Goal 110-130, active time 3 hrs. I have reviewed his pump functionality and I am concerned about pump malfunction   I have reviewed his insulin pump    Avg glucose 240-250, patient says he has been taking his boluses with meals now  Once DKA resolve and pt ready for transition to sq insulin we recommend the following transition regimen   Lantus 15u daily   Humalog 3u with meals   LDSS    Will titrate insulin dose based on the blood glucose trend & insulin requirement  Will arrange for patient to be seen in endocrinology clinic upon discharge for routine diabetes maintenance and prevention.     CP   Managed by primary service     Interdisciplinary plan for communication with healthcare providers:   Consult recommendations were discussed with the Primary Service/Nursing staff      The above issues were reviewed with the patient who understood and agreed with the plan. Thank you for allowing us to participate in the care of this patient. Please do not hesitate to contact us with any additional questions. I saw the patient and discussed the management with the resident physician Dr. Joey Samayoa MD.  I reviewed and agree with the findings and plan as documented in the resident's note    Liss Anders MD  Endocrinologist, [facility] and Endocrinology   [address] [place] [address], [address] [place] [address],[facility] [id number]   Phone: [phone]  Fax: [phone]  --------------------------------------------  An electronic signature was used to authenticate this note.  Elizabeth Barnard MD on 11/15/2022 at 9:13 AM

## 2022-11-15 NOTE — PROGRESS NOTES
[facility]  Internal Medicine Residency / [address]    Attending Physician Statement  I have discussed the case, including pertinent history and exam findings with the resident and the team.  I have seen and examined the patient and the key elements of the encounter have been performed by me. I have also personally reviewed imaging studies and labs. I agree with the assessment, plan and orders as documented by the resident. No chest pain today. Denies chest pain today. Infectious work up has been negative. Assessment:  DKA, improved with DKA protocol  Chest pain, resolved  HTN  Polysubstance abuse        Plan:  Appreciate care from ICU team  Diet once bridged  May need endo referral once for stable. Need to reassess pump. Remainder of medical problems as per resident note. Seng Banerjee MD  Internal Medicine

## 2022-11-15 NOTE — PROGRESS NOTES
[facility]  Internal Medicine Residency Program  Progress Note - House Team 1    Patient:  Chen Frazier 47 y.o. male MRN: [id number]     Date of Service: 11/15/2022     CC: DKA   Overnight events: Patient's insulin was stopped due to DKA protocol briefly overnight     Subjective   ED Presentation:  Ophelia Flaherty is a 47 y.o. male with a PMHx of HLD, HTN, CVA and polysubstance drug abuse who presented to the ED complaining of chest pain. He had been complaining of a substernal chest pain that began the morning of presentation. The pain radiated into his right arm but not alleviated or made worse by anything. He also endorsed SOB, nausea, fatigue and elevated glucose. He is on an insulin pump at home but states that he believes it had been malfunctioning. His sugars normally run 240-250. When he presented his glucose was 319 with an elevated anion gap and pH 7.2. He has not had a bowel movement in 5 days but has been able to pass gas. He drinks 6 12-ounce beers daily. He has not had any alcohol in the last week. He denies any withdrawal-associated seizures. He also endorses daily marijuana use. He denies any other illegal substance. Overnight/Interim:  Overnight patient's insulin was stopped due to DKA protocol briefly overnight. This AM patient had no complaints. He stated that his CP, SOB and nausea are much improved. He only endorses feeling hungry. Denies: HA, dizziness, nausea, vomiting, CP, SOB or urinary issues. Objective     Physical Exam:  Vitals: BP (!) 141/100   Pulse 86   Temp 97.8 °F (36.6 °C) (Oral)   Resp 15   Ht 5' 6\" (1.676 m)   Wt 145 lb (65.8 kg) Comment: bedscale reading incorrectly  SpO2 99%   BMI 23.40 kg/m²     I & O - 24hr: No intake/output data recorded. Physical Exam  Vitals reviewed. Constitutional:       General: He is not in acute distress. Appearance: He is not ill-appearing or toxic-appearing.    Cardiovascular:      Rate and Rhythm: Normal rate and regular rhythm. Heart sounds: Normal heart sounds. Pulmonary:      Effort: Pulmonary effort is normal.      Breath sounds: Normal breath sounds. Abdominal:      General: Abdomen is scaphoid. Bowel sounds are normal.      Palpations: Abdomen is soft. There is hepatomegaly and splenomegaly. Tenderness: There is abdominal tenderness. Musculoskeletal:      Right lower leg: No edema. Left lower leg: No edema. Psychiatric:         Behavior: Behavior is cooperative. Subject  Pertinent Labs & Imaging Studies   chika  CBC:   Lab Results   Component Value Date/Time    WBC 11.0 11/15/2022 05:24 AM    RBC 4.61 11/15/2022 05:24 AM    HGB 14.1 11/15/2022 05:24 AM    HCT 40.5 11/15/2022 05:24 AM    MCV 87.9 11/15/2022 05:24 AM    MCH 30.6 11/15/2022 05:24 AM    MCHC 34.8 11/15/2022 05:24 AM    RDW 13.1 11/15/2022 05:24 AM     11/15/2022 05:24 AM    MPV 8.4 11/15/2022 05:24 AM     BMP:    Lab Results   Component Value Date/Time     11/15/2022 10:35 AM    K 4.1 11/15/2022 10:35 AM    K 3.6 08/08/2022 02:56 AM     11/15/2022 10:35 AM    CO2 20 11/15/2022 10:35 AM    BUN 6 11/15/2022 10:35 AM    LABALBU 4.1 11/14/2022 07:50 AM    LABALBU 4.3 12/30/2011 10:15 AM    CREATININE 0.7 11/15/2022 10:35 AM    CALCIUM 8.2 11/15/2022 10:35 AM    GFRAA >60 08/10/2022 09:41 AM    LABGLOM >60 11/15/2022 10:35 AM    GLUCOSE 149 11/15/2022 10:35 AM    GLUCOSE 83 04/11/2012 03:35 AM     Magnesium:    Lab Results   Component Value Date/Time    MG 1.9 11/15/2022 10:35 AM     Phosphorus:    Lab Results   Component Value Date/Time    PHOS 1.7 11/15/2022 10:35 AM       Resident's Assessment and Plan     DKA   -Patient with history of uncontrolled T1DM. He is on insulin pump at home and states that he is not sure if it is functioning properly. Denies fevers, chills, sick contacts or recent alcohol use.       -Anion gap metabolic acidosis (pH 7.2, AG 25, HCO3 11) at admission; Glucose 319 on admission, BHB >4.5     -Patient has insulin pump at home     -Last A1C ON 8/8/22: 9.0      -Glucose this ; K 4.4     Plan:     -DKA protocol     -q4 BMP     -q1 POCT     -Patient currently on 1/2D5+1/2NS     -Endocrinology consult       -Viral panel neg     -Procalcitonin = .03          High Anion Gap Metabolic Acidosis   -Likely 2/2 to DKA     -AG 25 on admission      -25-> 15->13 (Awaiting anion gap to close twice)      Chest pain    -Likely atypical due to patient's history of DM (Demand ischemia) - Patient no longer complaining     -On admission troponin: 34 (Previous admission 44)     -EKG on admission: Sinus tachy     -Obtain magnesium and phos (Phos 1.8, Mag 2.0)       Polysubstance abuse   -Tobacco abuse, marijuana abuse, alcohol abuse, cocaine use in past.     - Patient outside of window for CIWA protocol      -May consider peer management - Patient states he is still using marijuana and drinking alcohol      -Folic acid + Thiamine      -Bblockers on hold due to cocaine use    -UDS neg         Hx of HTN   -Resume home medications         Hx of HLD   -Resume home medications        PT/OT evaluation: Not indicated at this time  DVT prophylaxis/ GI prophylaxis: Not indicated at this time     Ryan Deluna DO, PGY-1  Attending physician: Dr. Magdalena Echols

## 2022-11-15 NOTE — PROGRESS NOTES
This RN offered to get patient cleaned up and bathed. Patient refusing at this time. Will check back with him in a little bit.

## 2022-11-16 VITALS
SYSTOLIC BLOOD PRESSURE: 150 MMHG | BODY MASS INDEX: 23.3 KG/M2 | DIASTOLIC BLOOD PRESSURE: 81 MMHG | HEIGHT: 66 IN | TEMPERATURE: 98.1 F | RESPIRATION RATE: 20 BRPM | WEIGHT: 145 LBS | OXYGEN SATURATION: 97 % | HEART RATE: 63 BPM

## 2022-11-16 DIAGNOSIS — E10.65 UNCONTROLLED TYPE 1 DIABETES MELLITUS WITH HYPERGLYCEMIA (HCC): Primary | ICD-10-CM

## 2022-11-16 LAB
ANION GAP SERPL CALCULATED.3IONS-SCNC: 9 MMOL/L (ref 7–16)
BASOPHILS ABSOLUTE: 0.04 E9/L (ref 0–0.2)
BASOPHILS RELATIVE PERCENT: 0.4 % (ref 0–2)
BUN BLDV-MCNC: 7 MG/DL (ref 6–20)
CALCIUM SERPL-MCNC: 8.3 MG/DL (ref 8.6–10.2)
CHLORIDE BLD-SCNC: 104 MMOL/L (ref 98–107)
CO2: 21 MMOL/L (ref 22–29)
CREAT SERPL-MCNC: 0.7 MG/DL (ref 0.7–1.2)
EOSINOPHILS ABSOLUTE: 0.2 E9/L (ref 0.05–0.5)
EOSINOPHILS RELATIVE PERCENT: 2.2 % (ref 0–6)
GFR SERPL CREATININE-BSD FRML MDRD: >60 ML/MIN/1.73
GLUCOSE BLD-MCNC: 179 MG/DL (ref 74–99)
HCT VFR BLD CALC: 36.2 % (ref 37–54)
HEMOGLOBIN: 12.4 G/DL (ref 12.5–16.5)
IMMATURE GRANULOCYTES #: 0.05 E9/L
IMMATURE GRANULOCYTES %: 0.6 % (ref 0–5)
LYMPHOCYTES ABSOLUTE: 2.54 E9/L (ref 1.5–4)
LYMPHOCYTES RELATIVE PERCENT: 28 % (ref 20–42)
MAGNESIUM: 1.7 MG/DL (ref 1.6–2.6)
MCH RBC QN AUTO: 30 PG (ref 26–35)
MCHC RBC AUTO-ENTMCNC: 34.3 % (ref 32–34.5)
MCV RBC AUTO: 87.7 FL (ref 80–99.9)
METER GLUCOSE: 202 MG/DL (ref 74–99)
METER GLUCOSE: 231 MG/DL (ref 74–99)
MONOCYTES ABSOLUTE: 0.71 E9/L (ref 0.1–0.95)
MONOCYTES RELATIVE PERCENT: 7.8 % (ref 2–12)
NEUTROPHILS ABSOLUTE: 5.54 E9/L (ref 1.8–7.3)
NEUTROPHILS RELATIVE PERCENT: 61 % (ref 43–80)
PDW BLD-RTO: 13.2 FL (ref 11.5–15)
PHOSPHORUS: 2.3 MG/DL (ref 2.5–4.5)
PLATELET # BLD: 511 E9/L (ref 130–450)
PMV BLD AUTO: 8.5 FL (ref 7–12)
POTASSIUM SERPL-SCNC: 4.2 MMOL/L (ref 3.5–5)
RBC # BLD: 4.13 E12/L (ref 3.8–5.8)
SODIUM BLD-SCNC: 134 MMOL/L (ref 132–146)
WBC # BLD: 9.1 E9/L (ref 4.5–11.5)

## 2022-11-16 PROCEDURE — S5553 INSULIN LONG ACTING 5 U: HCPCS | Performed by: INTERNAL MEDICINE

## 2022-11-16 PROCEDURE — 82962 GLUCOSE BLOOD TEST: CPT

## 2022-11-16 PROCEDURE — 2580000003 HC RX 258: Performed by: INTERNAL MEDICINE

## 2022-11-16 PROCEDURE — 6370000000 HC RX 637 (ALT 250 FOR IP): Performed by: INTERNAL MEDICINE

## 2022-11-16 PROCEDURE — 6360000002 HC RX W HCPCS: Performed by: INTERNAL MEDICINE

## 2022-11-16 PROCEDURE — 99231 SBSQ HOSP IP/OBS SF/LOW 25: CPT | Performed by: INTERNAL MEDICINE

## 2022-11-16 PROCEDURE — 84100 ASSAY OF PHOSPHORUS: CPT

## 2022-11-16 PROCEDURE — 83735 ASSAY OF MAGNESIUM: CPT

## 2022-11-16 PROCEDURE — 80048 BASIC METABOLIC PNL TOTAL CA: CPT

## 2022-11-16 PROCEDURE — 99232 SBSQ HOSP IP/OBS MODERATE 35: CPT | Performed by: INTERNAL MEDICINE

## 2022-11-16 PROCEDURE — 2500000003 HC RX 250 WO HCPCS: Performed by: INTERNAL MEDICINE

## 2022-11-16 PROCEDURE — 6370000000 HC RX 637 (ALT 250 FOR IP)

## 2022-11-16 PROCEDURE — 36415 COLL VENOUS BLD VENIPUNCTURE: CPT

## 2022-11-16 PROCEDURE — 85025 COMPLETE CBC W/AUTO DIFF WBC: CPT

## 2022-11-16 RX ORDER — LISINOPRIL 5 MG/1
5 TABLET ORAL DAILY
Qty: 30 TABLET | Refills: 3 | Status: SHIPPED | OUTPATIENT
Start: 2022-11-16 | End: 2022-12-16

## 2022-11-16 RX ORDER — INSULIN LISPRO 100 [IU]/ML
4 INJECTION, SOLUTION INTRAVENOUS; SUBCUTANEOUS
Status: DISCONTINUED | OUTPATIENT
Start: 2022-11-16 | End: 2022-11-16 | Stop reason: HOSPADM

## 2022-11-16 RX ORDER — ATORVASTATIN CALCIUM 40 MG/1
40 TABLET, FILM COATED ORAL NIGHTLY
Qty: 30 TABLET | Refills: 0 | Status: SHIPPED | OUTPATIENT
Start: 2022-11-16

## 2022-11-16 RX ORDER — INSULIN GLARGINE-YFGN 100 [IU]/ML
17 INJECTION, SOLUTION SUBCUTANEOUS EVERY MORNING
Status: DISCONTINUED | OUTPATIENT
Start: 2022-11-16 | End: 2022-11-16 | Stop reason: HOSPADM

## 2022-11-16 RX ORDER — MAGNESIUM SULFATE IN WATER 40 MG/ML
2000 INJECTION, SOLUTION INTRAVENOUS ONCE
Status: COMPLETED | OUTPATIENT
Start: 2022-11-16 | End: 2022-11-16

## 2022-11-16 RX ORDER — INSULIN GLARGINE 100 [IU]/ML
17 INJECTION, SOLUTION SUBCUTANEOUS NIGHTLY
Qty: 6 ADJUSTABLE DOSE PRE-FILLED PEN SYRINGE | Refills: 3 | Status: SHIPPED | OUTPATIENT
Start: 2022-11-16

## 2022-11-16 RX ORDER — INSULIN LISPRO 100 [IU]/ML
4 INJECTION, SOLUTION INTRAVENOUS; SUBCUTANEOUS
Qty: 4 ADJUSTABLE DOSE PRE-FILLED PEN SYRINGE | Refills: 3 | Status: SHIPPED | OUTPATIENT
Start: 2022-11-16

## 2022-11-16 RX ADMIN — Medication 10 ML: at 08:51

## 2022-11-16 RX ADMIN — SODIUM PHOSPHATE, MONOBASIC, MONOHYDRATE AND SODIUM PHOSPHATE, DIBASIC, ANHYDROUS 15 MMOL: 276; 142 INJECTION, SOLUTION INTRAVENOUS at 12:14

## 2022-11-16 RX ADMIN — INSULIN LISPRO 2 UNITS: 100 INJECTION, SOLUTION INTRAVENOUS; SUBCUTANEOUS at 13:45

## 2022-11-16 RX ADMIN — INSULIN LISPRO 4 UNITS: 100 INJECTION, SOLUTION INTRAVENOUS; SUBCUTANEOUS at 09:42

## 2022-11-16 RX ADMIN — INSULIN LISPRO 4 UNITS: 100 INJECTION, SOLUTION INTRAVENOUS; SUBCUTANEOUS at 13:40

## 2022-11-16 RX ADMIN — LISINOPRIL 5 MG: 5 TABLET ORAL at 08:50

## 2022-11-16 RX ADMIN — ASPIRIN 81 MG: 81 TABLET, COATED ORAL at 08:50

## 2022-11-16 RX ADMIN — MAGNESIUM SULFATE HEPTAHYDRATE 2000 MG: 40 INJECTION, SOLUTION INTRAVENOUS at 09:33

## 2022-11-16 RX ADMIN — INSULIN GLARGINE-YFGN 17 UNITS: 100 INJECTION, SOLUTION SUBCUTANEOUS at 12:00

## 2022-11-16 RX ADMIN — INSULIN LISPRO 2 UNITS: 100 INJECTION, SOLUTION INTRAVENOUS; SUBCUTANEOUS at 09:46

## 2022-11-16 RX ADMIN — ENOXAPARIN SODIUM 40 MG: 100 INJECTION SUBCUTANEOUS at 08:51

## 2022-11-16 ASSESSMENT — PAIN SCALES - GENERAL: PAINLEVEL_OUTOF10: 0

## 2022-11-16 NOTE — PROGRESS NOTES
[facility]  Internal Medicine Residency Program  Progress Note - House Team 1    Patient:  Hayden Woodson 47 y.o. male MRN: [id number]     Date of Service: 11/16/2022     CC: DKA  Overnight events: No acute events overnight. Subjective     Patient is seen and examined this morning. He was bridged to Lantus 15 units daily at HS, and Humalog 3 units TID ac yesterday. Appetite is good. Denies any headache, chest pain, SOB, abdominal pain, nausea, vomiting. He states that he had one loose bowel movement last night, non-bloody. BG in the 179-200`s. Vitals were stable and with good urine output. Objective     Physical Exam:  Vitals: BP (!) 150/81   Pulse 63   Temp 98.1 °F (36.7 °C) (Temporal)   Resp 20   Ht 5' 6\" (1.676 m)   Wt 145 lb (65.8 kg) Comment: bedscale reading incorrectly  SpO2 97%   BMI 23.40 kg/m²     I & O - 24hr: I/O this shift:  In: 240 [P.O.:240]  Out: -    General Appearance: alert, appears stated age, and cooperative  HEENT:  Head: Normocephalic, no lesions, without obvious abnormality. Neck: no adenopathy, no carotid bruit, no JVD, supple, symmetrical, trachea midline, and thyroid not enlarged, symmetric, no tenderness/mass/nodules  Lung: clear to auscultation bilaterally  Heart: regular rate and rhythm, S1, S2 normal, no murmur, click, rub or gallop  Abdomen: soft, non-tender; bowel sounds normal; no masses,  no organomegaly  Extremities:  extremities normal, atraumatic, no cyanosis or edema  Musculokeletal: No joint swelling, no muscle tenderness. ROM normal in all joints of extremities.    Neurologic: Mental status: Alert, oriented, thought content appropriate  Subject  Pertinent Labs & Imaging Studies   chika  CBC with Differential:    Lab Results   Component Value Date/Time    WBC 9.1 11/16/2022 04:27 AM    RBC 4.13 11/16/2022 04:27 AM    HGB 12.4 11/16/2022 04:27 AM    HCT 36.2 11/16/2022 04:27 AM     11/16/2022 04:27 AM    MCV 87.7 11/16/2022 04:27 AM MCH 30.0 11/16/2022 04:27 AM    MCHC 34.3 11/16/2022 04:27 AM    RDW 13.2 11/16/2022 04:27 AM    SEGSPCT 53 01/25/2014 05:15 AM    SEGSPCT 86 10/11/2010 04:40 AM    BANDSPCT 3 10/10/2010 07:20 PM    METASPCT 1.7 07/20/2020 12:12 PM    LYMPHOPCT 28.0 11/16/2022 04:27 AM    PROMYELOPCT 1.7 04/29/2022 09:41 PM    MONOPCT 7.8 11/16/2022 04:27 AM    MYELOPCT 2.6 01/04/2020 05:07 PM    EOSPCT 2 10/12/2010 06:00 AM    BASOPCT 0.4 11/16/2022 04:27 AM    MONOSABS 0.71 11/16/2022 04:27 AM    LYMPHSABS 2.54 11/16/2022 04:27 AM    EOSABS 0.20 11/16/2022 04:27 AM    BASOSABS 0.04 11/16/2022 04:27 AM     CMP:    Lab Results   Component Value Date/Time     11/16/2022 04:27 AM    K 4.2 11/16/2022 04:27 AM    K 3.6 08/08/2022 02:56 AM     11/16/2022 04:27 AM    CO2 21 11/16/2022 04:27 AM    BUN 7 11/16/2022 04:27 AM    CREATININE 0.7 11/16/2022 04:27 AM    GFRAA >60 08/10/2022 09:41 AM    LABGLOM >60 11/16/2022 04:27 AM    GLUCOSE 179 11/16/2022 04:27 AM    GLUCOSE 83 04/11/2012 03:35 AM    PROT 6.3 11/14/2022 07:50 AM    LABALBU 4.1 11/14/2022 07:50 AM    LABALBU 4.3 12/30/2011 10:15 AM    CALCIUM 8.3 11/16/2022 04:27 AM    BILITOT 0.5 11/14/2022 07:50 AM    ALKPHOS 121 11/14/2022 07:50 AM    AST 10 11/14/2022 07:50 AM    ALT 15 11/14/2022 07:50 AM       Resident's Assessment and Plan     Kandy Whitmore is a 47 y.o. male DM type 1, HT, HLD, polysubstance abuse who is being managed for the following:    Assessment:  DKA 2/2 malfunctioning insulin pump  Non-cardiac chest pain likely 2/2 metabolic acidosis, resolved. Troponin  Hx HTN. -130`s. On lisinopril  Hx HLD. On atorvastatin  HAGMA 2/2 DKA, resolved  DM type 1. Was on insulin pump, but was old and malfunctioning.  Follows with Dr. Tameka Travis as OP  Polysubstance abuse    Plan:  -Lantus increased to 17 units at HS, Humalog increased to 4 units TID ac per Endo  -continue lisinopril, atorvastatin and aspirin  -medically stable for discharge today  -Endocrinology okay for discharge on current regimen  -follow-up with Endo on 12/6/22 at 10:45 AM    PT/OT evaluation:  DVT prophylaxis/ GI prophylaxis: enoxaparin  Disposition: home     Mayur Abdi MD MD / DO, PGY-2  Attending physician: Dr. Denice Bell

## 2022-11-16 NOTE — PROGRESS NOTES
Pt. Alexa Harrington about discharge instructions including home medications to  at the pharmacy. Pt. Does not want to wait, and verbalized to pick it up tomorrow morning.

## 2022-11-16 NOTE — PROGRESS NOTES
[facility]  Internal Medicine Residency / [address]    Attending Physician Statement  I have discussed the case, including pertinent history and exam findings with the resident and the team.  I have seen and examined the patient and the key elements of the encounter have been performed by me. I have also personally reviewed imaging studies and labs. I agree with the assessment, plan and orders as documented by the resident. Doing well today. Tolerating diet, ambulating. No acute issues. No shortness of breath, abdominal pain, chest pain. Assessment:  DKA, resolved  DM Type 1, concern about pump malfunction  Chest pain, resolved  HTN  Polysubstance abuse  Thrombocytosis, likely reactive       Plan:  Appreciate recommendations from Endo - will discharge on insulin pens  Will need to have pump investigated outpatient  Follow up with Endo and PCP        Remainder of medical problems as per resident note. Gary Wolfe MD  Internal Medicine

## 2022-11-16 NOTE — PROGRESS NOTES
[facility]  Internal Medicine Residency Program  Progress Note - House Team 2    Patient:  Sagar Larkin 47 y.o. male MRN: [id number]     Date of Service: 11/16/2022     CC: had concerns including Shortness of Breath (With mid sternal cp since last night). Overnight events: NAEON    Subjective     Patient was examined at bedside. He reports 1 episode of fecal incontinence last night, no blood, no hematochezia. This has happened to the patient previously, most recently 1 month ago. Otherwise he denies fever, chills, sweats, nausea, or vomiting. He denies pain. He has been eating and OOB walking without difficulty. Objective     Physical Exam:  Vitals: BP (!) 150/81   Pulse 63   Temp 98.1 °F (36.7 °C) (Temporal)   Resp 20   Ht 5' 6\" (1.676 m)   Wt 145 lb (65.8 kg) Comment: bedscale reading incorrectly  SpO2 97%   BMI 23.40 kg/m²     I & O - 24hr: No intake/output data recorded. General Appearance: alert, appears stated age, and cooperative  HEENT:  Head: Normocephalic, no lesions, without obvious abnormality. Throat: MMM, no erythema. Poor dentition. Neck: supple, symmetrical, trachea midline  Lung: clear to auscultation bilaterally  Heart: regular rate and rhythm, S1, S2 normal, no murmur, click, rub or gallop  Abdomen: soft, non-tender; bowel sounds normal; no masses,  no organomegaly  Extremities:  extremities normal, atraumatic, no cyanosis or edema. Capillary refill < 2s.  Musculokeletal: No joint swelling, no muscle tenderness. ROM normal in all joints of extremities.    Neurologic: Mental status: Alert, oriented, thought content appropriate    Pertinent Labs & Imaging Studies     CBC:   Lab Results   Component Value Date/Time    WBC 9.1 11/16/2022 04:27 AM    RBC 4.13 11/16/2022 04:27 AM    HGB 12.4 11/16/2022 04:27 AM    HCT 36.2 11/16/2022 04:27 AM    MCV 87.7 11/16/2022 04:27 AM    MCH 30.0 11/16/2022 04:27 AM    MCHC 34.3 11/16/2022 04:27 AM    RDW 13.2 11/16/2022 04:27 AM     11/16/2022 04:27 AM    MPV 8.5 11/16/2022 04:27 AM     BMP:    Lab Results   Component Value Date/Time     11/16/2022 04:27 AM    K 4.2 11/16/2022 04:27 AM    K 3.6 08/08/2022 02:56 AM     11/16/2022 04:27 AM    CO2 21 11/16/2022 04:27 AM    BUN 7 11/16/2022 04:27 AM    LABALBU 4.1 11/14/2022 07:50 AM    LABALBU 4.3 12/30/2011 10:15 AM    CREATININE 0.7 11/16/2022 04:27 AM    CALCIUM 8.3 11/16/2022 04:27 AM    GFRAA >60 08/10/2022 09:41 AM    LABGLOM >60 11/16/2022 04:27 AM    GLUCOSE 179 11/16/2022 04:27 AM    GLUCOSE 83 04/11/2012 03:35 AM     Magnesium:    Lab Results   Component Value Date/Time    MG 1.7 11/16/2022 04:27 AM     Phosphorus:    Lab Results   Component Value Date/Time    PHOS 2.3 11/16/2022 04:27 AM       Resident's Assessment and Plan     Estephania Crawley is a 47 y.o. male with PMH of uncontrolled T1DM, HTN, HLD, CVA, and polysubstance abuse (ETOH, cocaine, marijuana) presenting with SOB, nausea, and chest pain found to be in DKA. DKA with AGMA 2/2 uncontrolled T1DM - resolved  - Arrived glucose 319, bicarb 11, AG 25, venous pH 7.2, ketonuria; started on insulin drip, successfully transitioned to basal-bolus insulin with LDSS after AG closed. Glucose now 202. Tolerating full diet. - Infectious workup including viral panel and procal negative. - Will replace potassium and other electrolytes as needed. - Endo on board, insulin pump believed to be malfunctioning. They recommend lantus 17u, humalog 4u w/ meals. Appreciate recommendations. 2. Atypical chest pain with mildly elevated troponin likely 2/2 Type II MI  - Troponin 34, no EKG changes  - No current chest pain  - Will monitor for further symptoms. 3. Polysubstance abuse  - Tobacco abuse, marijuana abuse, alcohol abuse, cocaine use in past.  - Nicotine patches, folic acid and thiamine.  - Continue motivational interviewing     4.  Hx of HTN, HLD, CVA  - Continue home medications as appropriate     PT/OT evaluation: Not appropriate at this time. DVT prophylaxis: Lovenox  GI prophylaxis: Not appropriate at this time. Dispo.: Likely DC today pending endo recommendation.      Wali Carlisle, MS4  Attending physician: Dr. Kylie Rodriguez

## 2022-11-16 NOTE — PLAN OF CARE
Problem: Chronic Conditions and Co-morbidities  Goal: Patient's chronic conditions and co-morbidity symptoms are monitored and maintained or improved  11/16/2022 1348 by Darline Amador RN  Outcome: Completed  11/16/2022 0038 by Cahri Pete RN  Outcome: Progressing     Problem: Discharge Planning  Goal: Discharge to home or other facility with appropriate resources  11/16/2022 1348 by Darline Amador RN  Outcome: Completed  11/16/2022 0038 by Chari Pete RN  Outcome: Progressing     Problem: Pain  Goal: Verbalizes/displays adequate comfort level or baseline comfort level  11/16/2022 0038 by Chari Pete RN  Outcome: Completed     Problem: Safety - Adult  Goal: Free from fall injury  11/16/2022 1348 by Darline Amador RN  Outcome: Completed  11/16/2022 0038 by Chari Pete RN  Outcome: Progressing     Problem: ABCDS Injury Assessment  Goal: Absence of physical injury  11/16/2022 1348 by Darline Amador RN  Outcome: Completed  11/16/2022 0038 by Chari Pete RN  Outcome: Progressing     Problem: Skin/Tissue Integrity  Goal: Absence of new skin breakdown  Description: 1. Monitor for areas of redness and/or skin breakdown  2. Assess vascular access sites hourly  3. Every 4-6 hours minimum:  Change oxygen saturation probe site  4. Every 4-6 hours:  If on nasal continuous positive airway pressure, respiratory therapy assess nares and determine need for appliance change or resting period.   11/16/2022 1348 by Darline Amador RN  Outcome: Completed  11/16/2022 0038 by Chari Pete RN  Outcome: Progressing

## 2022-11-16 NOTE — PROGRESS NOTES
Patient continues to take off blood pressure cuff and pulse ox. Patient educated on the reason for continuous monitoring in the ICU.

## 2022-11-16 NOTE — PLAN OF CARE
Messaged Dr. Axel Duron if patient is okay foe discharge. He said he is okay for discharge from Endocrinology standpoint on current regimen. His imsulin pump is old and we are working on getting him a new pump. Endocrine follow-up on 12/6 at 10:45 AM. Please make sure to send him on Flexpens and pen needle not vials.

## 2022-11-16 NOTE — PROGRESS NOTES
ENDOCRINOLOGY PROGRESS NOTE      Date of admission: 11/14/2022  Date of service: 11/16/2022  Admitting physician: Braulio Rangel MD   Primary Care Physician: Luis Servin MD  Consultant physician: Hiren Abreu MD     Reason for the consultation:  Uncontrolled DM, DKA     History of Present Illness: The history is provided by the patient. Accuracy of the patient data is excellent    Les Shepherd is a very pleasant 47 y.o. old male with PMH listed below admitted to [facility] on 11/14/2022 because of DKA, endocrine service was consulted for diabetes management. Patient believes his insulin pump may not be working properly, says he got \"motor error\" message on his pump. No signs of infection are seen on CXR or urine analysis, afebrile and no leukocytosis. Prior to admission  Patient seen today at bedside in no apparent distress. Patient ate all of his breakfast, no N/V. Lab Results   Component Value Date/Time    LABA1C 9.0 08/08/2022 06:28 AM       Inpatient diet:   Carb 5 diet     Point of care glucose monitoring   (Independently reviewed)   Recent Labs     11/15/22  1029 11/15/22  1123 11/15/22  1221 11/15/22  1315 11/15/22  1425 11/15/22  1820 11/15/22  2101 11/16/22  0635   GLUMET 156* 156* 138* 205* 220* 244* 226* 202*         Past medical history:   Past Medical History:   Diagnosis Date    Alcoholism ([facility]     Cocaine abuse ([facility]     Diabetes mellitus ([facility]     Hypertension     Idiopathic peripheral neuropathy 9/21/2016    Lacunar stroke ([facility]     Marijuana abuse        Past surgical history:  History reviewed. No pertinent surgical history. Social history:   Tobacco:   reports that he has been smoking cigarettes. He has a 22.50 pack-year smoking history. He has never used smokeless tobacco.  Alcohol:   reports current alcohol use of about 6.0 standard drinks per week. Drugs:   reports current drug use. Drug: Marijuana [facility] [place]).     Family history:    Family History   Problem Relation Age of Onset    Diabetes type 2  Mother     Cancer Maternal Grandmother     Diabetes type 2  Nephew        Allergy and drug reactions: Allergies   Allergen Reactions    Metoprolol      Bradycardia      Patient states he has been currently taking it at home without any issues. Scheduled Meds:   sodium phosphate IVPB  15 mmol IntraVENous Once    magnesium sulfate  2,000 mg IntraVENous Once    insulin glargine  17 Units SubCUTAneous QAM    insulin lispro  4 Units SubCUTAneous TID WC    lisinopril  5 mg Oral Daily    insulin lispro  0-4 Units SubCUTAneous Nightly    insulin lispro  0-6 Units SubCUTAneous TID WC    aspirin  81 mg Oral Daily    atorvastatin  40 mg Oral Nightly    sodium chloride flush  5-40 mL IntraVENous 2 times per day    enoxaparin  40 mg SubCUTAneous Daily       PRN Meds:   glucose, 4 tablet, PRN  glucagon (rDNA), 1 mg, PRN  dextrose, , Continuous PRN  dextrose bolus, 125 mL, PRN   Or  dextrose bolus, 250 mL, PRN  potassium chloride, 10 mEq, PRN  magnesium sulfate, 1,000 mg, PRN  sodium phosphate IVPB, 10 mmol, PRN   Or  sodium phosphate IVPB, 15 mmol, PRN   Or  sodium phosphate IVPB, 20 mmol, PRN  dextrose 5 % and 0.45 % NaCl, , Continuous PRN  sodium chloride flush, 5-40 mL, PRN  sodium chloride, , PRN  ondansetron, 4 mg, Q8H PRN   Or  ondansetron, 4 mg, Q6H PRN  polyethylene glycol, 17 g, Daily PRN  acetaminophen, 650 mg, Q6H PRN   Or  acetaminophen, 650 mg, Q6H PRN    Continuous Infusions:   dextrose      sodium chloride Stopped (11/14/22 1229)    dextrose 5 % and 0.45 % NaCl Stopped (11/15/22 1539)    sodium chloride         Review of Systems  All systems reviewed.  All negative except for symptoms mentioned in HPI     OBJECTIVE    BP (!) 150/81   Pulse 63   Temp 98.1 °F (36.7 °C) (Temporal)   Resp 20   Ht 5' 6\" (1.676 m)   Wt 145 lb (65.8 kg) Comment: bedscale reading incorrectly  SpO2 97%   BMI 23.40 kg/m²     Intake/Output Summary (Last 24 hours) at 11/16/2022 7546  Last data filed at 11/15/2022 2300  Gross per 24 hour   Intake 1808.85 ml   Output 1025 ml   Net 783.85 ml         Physical examination:  General: awake alert, oriented x3, mild lethargy   HEENT: normocephalic non traumatic, no exophthalmos   Neck: supple, No thyroid tenderness,  Pulm: good equal air entry no added sounds  CVS: S1 + S2  Abd: soft lax, no tenderness  Skin: warm, no lesions, no rash. No open wounds, no ulcers   Neuro: CN intact, sensation decreased bilateral , muscle power normal  Psych: normal mood, and affect     Review of Laboratory Data:  I personally reviewed the following labs:   Recent Labs     11/14/22  0750 11/15/22  0524 11/16/22  0427   WBC 10.3 11.0 9.1   RBC 5.37 4.61 4.13   HGB 16.6* 14.1 12.4*   HCT 49.0 40.5 36.2*   MCV 91.2 87.9 87.7   MCH 30.9 30.6 30.0   MCHC 33.9 34.8* 34.3   RDW 13.6 13.1 13.2   * 527* 511*   MPV 9.5 8.4 8.5       Recent Labs     11/14/22  0750 11/14/22  0954 11/15/22  1035 11/15/22  1321 11/16/22  0427      < > 134 133 134   K 4.7   < > 4.1 4.2 4.2   CL 99   < > 107 106 104   CO2 11*   < > 20* 17* 21*   BUN 15   < > 6 5* 7   CREATININE 0.9   < > 0.7 0.6* 0.7   GLUCOSE 314*   < > 149* 216* 179*   CALCIUM 8.9   < > 8.2* 7.4* 8.3*   PROT 6.3*  --   --   --   --    LABALBU 4.1  --   --   --   --    BILITOT 0.5  --   --   --   --    ALKPHOS 121  --   --   --   --    AST 10  --   --   --   --    ALT 15  --   --   --   --     < > = values in this interval not displayed.        Beta-Hydroxybutyrate   Date Value Ref Range Status   11/14/2022 >4.50 (H) 0.02 - 0.27 mmol/L Final   08/07/2022 >4.50 (H) 0.02 - 0.27 mmol/L Final   04/29/2022 3.09 (H) 0.02 - 0.27 mmol/L Final     Lab Results   Component Value Date/Time    LABA1C 9.0 08/08/2022 06:28 AM    LABA1C 8.9 05/10/2022 12:15 PM    LABA1C 9.3 01/26/2022 03:03 PM     Lab Results   Component Value Date/Time    TSH 3.330 01/04/2020 05:07 PM    T4FREE 0.97 07/19/2012 02:00 AM     Lab Results   Component Value Date/Time    LABA1C 9.0 08/08/2022 06:28 AM    GLUCOSE 179 11/16/2022 04:27 AM    GLUCOSE 83 04/11/2012 03:35 AM    MALBCR 167.3 08/08/2022 03:25 AM    LABMICR 50.2 08/08/2022 03:25 AM    LABCREA 30 08/08/2022 03:25 AM    LABCREA 30 08/08/2022 03:25 AM     Lab Results   Component Value Date/Time    TRIG 100 01/26/2022 06:00 AM    HDL 48 09/26/2021 05:58 PM    LDLCALC 112 09/26/2021 05:58 PM    CHOL 213 09/26/2021 05:58 PM       Blood culture   Lab Results   Component Value Date/Time    BC 5 Days no growth 08/08/2022 06:24 AM    BC 5 Days no growth 05/01/2022 11:20 AM       Radiology:  XR CHEST PORTABLE   Final Result   No acute process. Medical Records/Labs/Images review:   I personally reviewed and summarized previous records   All labs and imaging were reviewed independently     996 Bk Zapata, a 47 y.o.-old male seen today for inpatient diabetes management     Diabetes Mellitus type 1, admitted for DKA   Patient's diabetes is uncontrolled A1c (8/8) 9.0%  Uses an old  MiniMed 530G Medtronic insulin pump at home with following settings: basal rate 12a 1.45, 8a 1.5, CR 10, ISF 60, Goal 110-130, active time 3 hrs. I have reviewed his pump functionality and I am concerned about pump malfunction   I have reviewed his insulin pump    Carb 5 diet   Avg glucose 240-250, patient says he has been taking his boluses with meals now  We recommend the following insulin regimen   Lantus 17u daily   Humalog 4u with meals   LDSS    Will titrate insulin dose based on the blood glucose trend & insulin requirement  Will arrange for patient to be seen in endocrinology clinic upon discharge for routine diabetes maintenance and prevention. CP   Managed by primary service     Interdisciplinary plan for communication with healthcare providers:   Consult recommendations were discussed with the Primary Service/Nursing staff      The above issues were reviewed with the patient who understood and agreed with the plan.     Thank you for allowing us to participate in the care of this patient. Please do not hesitate to contact us with any additional questions. I saw the patient and discussed the management with the resident physician Dr. Jose M Da Silva MD.  I reviewed and agree with the findings and plan as documented in the resident's note    Patricio Coughlin MD  Endocrinologist, [facility] and Endocrinology   [address] [facility], [address] [place] [address],[place] [id number]   Phone: [phone]  Fax: [phone]  --------------------------------------------  An electronic signature was used to authenticate this note.  Marsha Bravo MD on 11/16/2022 at 9:56 AM

## 2022-11-16 NOTE — CARE COORDINATION
11/16 Care Coordination. Discharge order noted. Pt was admitted to ICU from ED on 11/14 for DKA. BGL on admit was 319. Endocrine consulted and they were concerned regarding pump malfunction. Insulin regime was modified. Pt was downgraded yesterday evening. Pt to f/u with PCP and endocrine OPT. Met with pt at bedside to discuss transition of care planning. Pt's PCP is Dr. Randi Mcgarry and he uses [facility]. Pt lives with his mother in an apartment with [address], no elevator access. DME owned is a FWW, cane x 2, SC, glucometer, and insulin pump. No hx of HHC/DENY. Plan on discharge is to return home, pt voiced no needs and his brother will provide transportation.     TASHA Veronica, RN  PHYSICIANS [facility] SURGICAL [facility] Case Management   Cell: [phone]

## 2022-11-16 NOTE — DISCHARGE INSTRUCTIONS
Internal medicine    Follow ups  Please follow up with your primary care physician ( [email or web address]) within 10 days of discharge from hospital. Please call as soon as possible to make an appointment. Please contact the internal medicine clinic for an appointment if you are unable to get an appointment with your PCP. Please keep all other follow up appointments:  Future Appointments   Date Time Provider Clarisse Chirinosi   12/6/2022 10:45 AM Alis Mcdonald MD [facility] ENDO [facility]        Changes in healthcare   Please take all medications as indicated  Diet: diabetic diet   Activity: activity as tolerated  New Medications started during this hospital stay  Insulin glargine flex pen 17 units under the skin (subcutaneously) at bedtime. Insulin lispro flex pen 4 units 3x a day before meals under the skin (subcutaneously)  Changes to your medications  None  Continue to take your lisinopril and atorvastatin  Medications you should stop taking   Metoprolol  Additional labs, testing or imaging needed after discharge   None  Please contact us if you have any concerns, wish to change or make an appointment:  Internal medicine clinic   Phone: [phone]  Fax: [phone]  [address]  Should you have further questions in regards to this visit, you can review your clinical note and after visit summary document on your [employer] account. Other than any new prescriptions given to you today, the list of home medications on this After Visit Summary are based on information provided to us from you and your healthcare providers. This information, including the list, dose, and frequency of medications is only as accurate as the information you provided. If you have any questions or concerns about your home medications, please contact your Primary Care Physician for further clarification.

## 2022-11-17 ENCOUNTER — TELEPHONE (OUTPATIENT)
Dept: INTERNAL MEDICINE | Age: 52
End: 2022-11-17

## 2022-11-17 RX ORDER — INSULIN PMP CART,AUT,G6/7,CNTR
EACH SUBCUTANEOUS
Qty: 1 KIT | Refills: 0 | Status: SHIPPED | OUTPATIENT
Start: 2022-11-17

## 2022-11-17 RX ORDER — INSULIN PMP CART,AUT,G6/7,CNTR
EACH SUBCUTANEOUS
Qty: 30 EACH | Refills: 3 | Status: SHIPPED | OUTPATIENT
Start: 2022-11-17

## 2022-11-17 RX ORDER — BLOOD-GLUCOSE TRANSMITTER
EACH MISCELLANEOUS
Qty: 1 EACH | Refills: 3 | Status: SHIPPED | OUTPATIENT
Start: 2022-11-17

## 2022-11-17 RX ORDER — BLOOD-GLUCOSE SENSOR
EACH MISCELLANEOUS
Qty: 9 EACH | Refills: 3 | Status: SHIPPED | OUTPATIENT
Start: 2022-11-17

## 2022-11-17 NOTE — PROGRESS NOTES
CLINICAL PHARMACY NOTE: MEDS TO BEDS    Total # of Prescriptions Filled: 4   The following medications were delivered to the patient:  Lipitor 40mg  Basaglar 100u/ml soln  Trueplus pen 31g x 5mm  Lisinopril 5mg    Additional Documentation:  Patient's brother-in-law picked up in pharmacy 11-17-22

## 2022-11-17 NOTE — TELEPHONE ENCOUNTER
Care Transitions Initial Follow Up Call    Outreach made within 2 business days of discharge: Yes    Patient: David Mukherjee Patient : 1970   MRN: [id number]  Reason for Admission: There are no discharge diagnoses documented for the most recent discharge.   Discharge Date: 22       Spoke with: VM message left for patient to return call    Discharge department/facility: [facility]      Scheduled appointment with PCP within 7-14 days    Follow Up  Future Appointments   Date Time Provider Clarisse Jacobson   2022 10:45 AM Brian Olivier MD [facility]       Karina Romano RN

## 2022-11-18 NOTE — TELEPHONE ENCOUNTER
2nd  message left for patient. Message left for patient to call [phone] regarding recent hospital admission and to schedule HFU Appt.

## 2022-11-19 NOTE — DISCHARGE SUMMARY
[address]  Discharge Summary    PCP: Crissie Lombard, MD    Admit Date:11/14/2022  Discharge Date: 11/16/2022    Admission Diagnosis:   DKA 2/2 malfunctioning insulin pump  DM type 1. Was on insulin pump, but was old and malfunctioning. Non-cardiac chest pain   Hx HTN   Hx HLD   HAGMA 2/2 DKA  Polysubstance abuse    Discharge Diagnosis:  DKA, resolved  DM Type 1, concern about pump malfunction  Chest pain, resolved  HTN  Polysubstance abuse  Thrombocytosis, likely reactive  Polysubstance abuse    Hospital Course: The patient is a 47 y.o. male with a PMHx of HLD, HTN, CVA and polysubstance drug abuse who presented to the ED complaining of chest pain. He stated he was woken up from sleep while experiencing a substernal chest pain at 3AM this morning. He states that nothing makes it better or worse. He has not taken anything for the chest pain. It does radiate to his right arm at times. It it not related to position changes or to his breathing. He denies current chest pain. He has also been experiencing SOB for the last day or two. He denies: Fevers, chills, cough, vomiting, diarrhea, sick contacts or lower extremity swelling. He was last admitted in August of 2022 for DKA. He states that his sugars normally run in the 240-250 range at home. He believes his insulin pump has been malfunctioning. He follows with endocrinology for his DM management. At the ED, , 99% on RA, blood glucose 319, pH 7.2, AG 25, HCO3 11, K 4.7, creatinine 0.7, WBC 10.3, Hb 16.6 Platelet 095 (which can be due to dehydration), Troponin 34. U/A showed glucose >1000, ketones >80. EKG revealed sinus tachycardia. CXR showed no acute process. He was given 500 mL 1/2 NS. DKA protocol was started and he was then admitted to the MICU for further evaluation and management. BMP was monitored every 4 hrs, blood glucose every hour,  and electrolytes replaced accordingly.  Procalcitonin was 0.03 and respiratory viral panel was negative. Vital signs were stable and his tachycardia resolved. He was referred back to endocrinology service. On 11/15/22, vitals were stable and he states that he feels much better. BMP was monitored accordingly until AG is closed twice and CO2 >15 for 2 occasions. WBC remains within normal limit at 11, with Hb improved to 14.1 after IV hydration and platelet trended down to 527. He was then bridged later that afternoon with Lantus 15 units and Humalog 3 units TID ac per endocrinology. Diet was started. Blood glucose ranges 149-216 mg/dL. He was then transferred to the floor that evening. On 11/16/2022, his vitals remained stable. AG remains normal between 9-10, CO2 improved to 21, blood glucose of 179 mg/dL. Hb 12.4 and platelet continues to trend down to 511. He was then discharged that afternoon on Lantus 17 units at HS and Humalog 4 units TID ac per endocrinology recommendations. They also recommended to discharge him on flex pens for the insulin and states that they will work on getting him a new pump as out patient. Significant findings (history and exam, laboratory, radiological, pathology, other tests):   General Appearance: alert, appears stated age, and cooperative  HEENT:  Head: Normocephalic, no lesions, without obvious abnormality. Neck: no adenopathy, no carotid bruit, no JVD, supple, symmetrical, trachea midline, and thyroid not enlarged, symmetric, no tenderness/mass/nodules  Lung: clear to auscultation bilaterally  Heart: regular rate and rhythm, S1, S2 normal, no murmur, click, rub or gallop  Abdomen: soft, non-tender; bowel sounds normal; no masses,  no organomegaly  Extremities:  extremities normal, atraumatic, no cyanosis or edema  Musculokeletal: No joint swelling, no muscle tenderness. ROM normal in all joints of extremities.    Neurologic: Mental status: Alert, oriented, thought content appropriate    Pending test results: None    Consults:  Endocrinology    Procedures:  None    Condition at discharge: Stable    Disposition: home    Outpatient Recommendations: Follow-up with Dr. Tennille Costa on 12/6/22 at 10:45 AM    Discharge Medications:     Medication List        START taking these medications      Basaglar KwikPen 100 UNIT/ML injection pen  Generic drug: insulin glargine  Inject 17 Units into the skin nightly     insulin lispro (1 Unit Dial) 100 UNIT/ML Sopn  Commonly known as: HumaLOG KwikPen  Inject 4 Units into the skin 3 times daily (before meals)     Insulin Pen Needle 32G X 5 MM Misc  1 each by Does not apply route daily            CONTINUE taking these medications      albuterol sulfate  (90 Base) MCG/ACT inhaler  Commonly known as: PROVENTIL;VENTOLIN;PROAIR  Inhale 2 puffs into the lungs every 6 hours as needed for Wheezing or Shortness of Breath     aspirin 81 MG EC tablet  Take 1 tablet by mouth in the morning. atorvastatin 40 MG tablet  Commonly known as: LIPITOR  Take 1 tablet by mouth nightly     lisinopril 5 MG tablet  Commonly known as: PRINIVIL;ZESTRIL  Take 1 tablet by mouth daily            STOP taking these medications      insulin aspart 100 UNIT/ML injection vial  Commonly known as: NovoLOG     metoprolol tartrate 25 MG tablet  Commonly known as: LOPRESSOR               Where to Get Your Medications        These medications were sent to [facility] [address] [address]      Phone: [phone]   atorvastatin 40 MG tablet  Basaglar KwikPen 100 UNIT/ML injection pen  insulin lispro (1 Unit Dial) 100 UNIT/ML Sopn  Insulin Pen Needle 32G X 5 MM Misc  lisinopril 5 MG tablet          Follow ups  Please follow up with your primary care physician ( [email or web address]) within 10 days of discharge from hospital. Please call as soon as possible to make an appointment.  Please contact the internal medicine clinic for an appointment if you are unable to get an appointment with your PCP. Please keep all other follow up appointments:  Future Appointments   Date Time Provider Clarisse Jacobson   12/6/2022 10:45 AM Bradley Gann MD [facility]        Changes in healthcare   Please take all medications as indicated  Diet: diabetic diet   Activity: activity as tolerated  New Medications started during this hospital stay  Insulin glargine flex pen 17 units under the skin (subcutaneously) at bedtime.   Insulin lispro flex pen 4 units 3x a day before meals under the skin (subcutaneously)  Changes to your medications  None  Continue to take your lisinopril and atorvastatin  Medications you should stop taking   Metoprolol  Additional labs, testing or imaging needed after discharge   None  Please contact us if you have any concerns, wish to change or make an appointment:  Internal medicine clinic   Phone: [phone]  Fax: [phone]  [place] [address] Lorena Corral MD  PGY-2   11:01 PM 11/18/2022

## 2023-01-23 ENCOUNTER — HOSPITAL ENCOUNTER (INPATIENT)
Age: 53
LOS: 4 days | Discharge: OTHER INSTITUTION | DRG: 064 | End: 2023-01-27
Attending: STUDENT IN AN ORGANIZED HEALTH CARE EDUCATION/TRAINING PROGRAM | Admitting: INTERNAL MEDICINE
Payer: COMMERCIAL

## 2023-01-23 ENCOUNTER — APPOINTMENT (OUTPATIENT)
Dept: GENERAL RADIOLOGY | Age: 53
DRG: 064 | End: 2023-01-23
Payer: COMMERCIAL

## 2023-01-23 ENCOUNTER — APPOINTMENT (OUTPATIENT)
Dept: CT IMAGING | Age: 53
DRG: 064 | End: 2023-01-23
Payer: COMMERCIAL

## 2023-01-23 DIAGNOSIS — R80.9 TYPE 1 DIABETES MELLITUS WITH ALBUMINURIA (HCC): ICD-10-CM

## 2023-01-23 DIAGNOSIS — I63.9 CEREBROVASCULAR ACCIDENT (CVA), UNSPECIFIED MECHANISM (HCC): Primary | ICD-10-CM

## 2023-01-23 DIAGNOSIS — E10.29 TYPE 1 DIABETES MELLITUS WITH ALBUMINURIA (HCC): ICD-10-CM

## 2023-01-23 PROBLEM — E10.9 DIABETES MELLITUS TYPE I (HCC): Status: ACTIVE | Noted: 2023-01-23

## 2023-01-23 PROBLEM — G81.94 LEFT HEMIPARESIS (HCC): Status: ACTIVE | Noted: 2023-01-23

## 2023-01-23 LAB
ACETAMINOPHEN LEVEL: <5 MCG/ML (ref 10–30)
ALBUMIN SERPL-MCNC: 4.3 G/DL (ref 3.5–5.2)
ALP BLD-CCNC: 107 U/L (ref 40–129)
ALT SERPL-CCNC: 14 U/L (ref 0–40)
ANION GAP SERPL CALCULATED.3IONS-SCNC: 13 MMOL/L (ref 7–16)
APTT: 32.6 SEC (ref 24.5–35.1)
AST SERPL-CCNC: 19 U/L (ref 0–39)
BASOPHILS ABSOLUTE: 0.05 E9/L (ref 0–0.2)
BASOPHILS RELATIVE PERCENT: 0.6 % (ref 0–2)
BETA-HYDROXYBUTYRATE: 0.15 MMOL/L (ref 0.02–0.27)
BILIRUB SERPL-MCNC: 0.8 MG/DL (ref 0–1.2)
BUN BLDV-MCNC: 16 MG/DL (ref 6–20)
CALCIUM SERPL-MCNC: 9.3 MG/DL (ref 8.6–10.2)
CHLORIDE BLD-SCNC: 108 MMOL/L (ref 98–107)
CHOLESTEROL, TOTAL: 223 MG/DL (ref 0–199)
CHP ED QC CHECK: NORMAL
CO2: 21 MMOL/L (ref 22–29)
CREAT SERPL-MCNC: 0.8 MG/DL (ref 0.7–1.2)
EOSINOPHILS ABSOLUTE: 0.26 E9/L (ref 0.05–0.5)
EOSINOPHILS RELATIVE PERCENT: 3.1 % (ref 0–6)
ETHANOL: <10 MG/DL (ref 0–0.08)
GFR SERPL CREATININE-BSD FRML MDRD: >60 ML/MIN/1.73
GLUCOSE BLD-MCNC: 192 MG/DL
GLUCOSE BLD-MCNC: 198 MG/DL (ref 74–99)
HBA1C MFR BLD: 9 % (ref 4–5.6)
HCT VFR BLD CALC: 39.5 % (ref 37–54)
HDLC SERPL-MCNC: 70 MG/DL
HEMOGLOBIN: 13.3 G/DL (ref 12.5–16.5)
IMMATURE GRANULOCYTES #: 0.02 E9/L
IMMATURE GRANULOCYTES %: 0.2 % (ref 0–5)
INR BLD: 1
LDL CHOLESTEROL CALCULATED: 123 MG/DL (ref 0–99)
LYMPHOCYTES ABSOLUTE: 2.09 E9/L (ref 1.5–4)
LYMPHOCYTES RELATIVE PERCENT: 25.1 % (ref 20–42)
MCH RBC QN AUTO: 29 PG (ref 26–35)
MCHC RBC AUTO-ENTMCNC: 33.7 % (ref 32–34.5)
MCV RBC AUTO: 86.1 FL (ref 80–99.9)
METER GLUCOSE: 192 MG/DL (ref 74–99)
METER GLUCOSE: 262 MG/DL (ref 74–99)
METER GLUCOSE: 292 MG/DL (ref 74–99)
METER GLUCOSE: 316 MG/DL (ref 74–99)
MONOCYTES ABSOLUTE: 0.79 E9/L (ref 0.1–0.95)
MONOCYTES RELATIVE PERCENT: 9.5 % (ref 2–12)
NEUTROPHILS ABSOLUTE: 5.13 E9/L (ref 1.8–7.3)
NEUTROPHILS RELATIVE PERCENT: 61.5 % (ref 43–80)
PDW BLD-RTO: 14.4 FL (ref 11.5–15)
PH VENOUS: 7.44 (ref 7.35–7.45)
PLATELET # BLD: 484 E9/L (ref 130–450)
PMV BLD AUTO: 8.7 FL (ref 7–12)
POTASSIUM REFLEX MAGNESIUM: 4.2 MMOL/L (ref 3.5–5)
PROTHROMBIN TIME: 11.1 SEC (ref 9.3–12.4)
RBC # BLD: 4.59 E12/L (ref 3.8–5.8)
SALICYLATE, SERUM: <0.3 MG/DL (ref 0–30)
SODIUM BLD-SCNC: 142 MMOL/L (ref 132–146)
TOTAL CK: 229 U/L (ref 20–200)
TOTAL PROTEIN: 7.1 G/DL (ref 6.4–8.3)
TRICYCLIC ANTIDEPRESSANTS SCREEN SERUM: NEGATIVE NG/ML
TRIGL SERPL-MCNC: 148 MG/DL (ref 0–149)
TROPONIN, HIGH SENSITIVITY: 30 NG/L (ref 0–11)
TROPONIN, HIGH SENSITIVITY: 32 NG/L (ref 0–11)
TSH SERPL DL<=0.05 MIU/L-ACNC: 1.56 UIU/ML (ref 0.27–4.2)
VLDLC SERPL CALC-MCNC: 30 MG/DL
WBC # BLD: 8.3 E9/L (ref 4.5–11.5)

## 2023-01-23 PROCEDURE — 6370000000 HC RX 637 (ALT 250 FOR IP): Performed by: STUDENT IN AN ORGANIZED HEALTH CARE EDUCATION/TRAINING PROGRAM

## 2023-01-23 PROCEDURE — 70450 CT HEAD/BRAIN W/O DYE: CPT

## 2023-01-23 PROCEDURE — 82570 ASSAY OF URINE CREATININE: CPT

## 2023-01-23 PROCEDURE — 80143 DRUG ASSAY ACETAMINOPHEN: CPT

## 2023-01-23 PROCEDURE — 82800 BLOOD PH: CPT

## 2023-01-23 PROCEDURE — 2580000003 HC RX 258

## 2023-01-23 PROCEDURE — 82010 KETONE BODYS QUAN: CPT

## 2023-01-23 PROCEDURE — 99285 EMERGENCY DEPT VISIT HI MDM: CPT

## 2023-01-23 PROCEDURE — 82962 GLUCOSE BLOOD TEST: CPT

## 2023-01-23 PROCEDURE — 85730 THROMBOPLASTIN TIME PARTIAL: CPT

## 2023-01-23 PROCEDURE — 80061 LIPID PANEL: CPT

## 2023-01-23 PROCEDURE — 6370000000 HC RX 637 (ALT 250 FOR IP)

## 2023-01-23 PROCEDURE — 6360000004 HC RX CONTRAST MEDICATION: Performed by: RADIOLOGY

## 2023-01-23 PROCEDURE — 2500000003 HC RX 250 WO HCPCS

## 2023-01-23 PROCEDURE — 85025 COMPLETE CBC W/AUTO DIFF WBC: CPT

## 2023-01-23 PROCEDURE — 36415 COLL VENOUS BLD VENIPUNCTURE: CPT

## 2023-01-23 PROCEDURE — S5553 INSULIN LONG ACTING 5 U: HCPCS

## 2023-01-23 PROCEDURE — 85610 PROTHROMBIN TIME: CPT

## 2023-01-23 PROCEDURE — C9113 INJ PANTOPRAZOLE SODIUM, VIA: HCPCS

## 2023-01-23 PROCEDURE — 82550 ASSAY OF CK (CPK): CPT

## 2023-01-23 PROCEDURE — 71045 X-RAY EXAM CHEST 1 VIEW: CPT

## 2023-01-23 PROCEDURE — 99223 1ST HOSP IP/OBS HIGH 75: CPT | Performed by: INTERNAL MEDICINE

## 2023-01-23 PROCEDURE — 83036 HEMOGLOBIN GLYCOSYLATED A1C: CPT

## 2023-01-23 PROCEDURE — 82077 ASSAY SPEC XCP UR&BREATH IA: CPT

## 2023-01-23 PROCEDURE — 2060000000 HC ICU INTERMEDIATE R&B

## 2023-01-23 PROCEDURE — 6360000002 HC RX W HCPCS

## 2023-01-23 PROCEDURE — 80179 DRUG ASSAY SALICYLATE: CPT

## 2023-01-23 PROCEDURE — 80053 COMPREHEN METABOLIC PANEL: CPT

## 2023-01-23 PROCEDURE — 81001 URINALYSIS AUTO W/SCOPE: CPT

## 2023-01-23 PROCEDURE — A4216 STERILE WATER/SALINE, 10 ML: HCPCS

## 2023-01-23 PROCEDURE — 84484 ASSAY OF TROPONIN QUANT: CPT

## 2023-01-23 PROCEDURE — 82044 UR ALBUMIN SEMIQUANTITATIVE: CPT

## 2023-01-23 PROCEDURE — 70498 CT ANGIOGRAPHY NECK: CPT

## 2023-01-23 PROCEDURE — 84443 ASSAY THYROID STIM HORMONE: CPT

## 2023-01-23 PROCEDURE — 80307 DRUG TEST PRSMV CHEM ANLYZR: CPT

## 2023-01-23 RX ORDER — POLYETHYLENE GLYCOL 3350 17 G/17G
17 POWDER, FOR SOLUTION ORAL DAILY PRN
Status: DISCONTINUED | OUTPATIENT
Start: 2023-01-23 | End: 2023-01-27

## 2023-01-23 RX ORDER — LISINOPRIL 10 MG/1
5 TABLET ORAL DAILY
Status: DISCONTINUED | OUTPATIENT
Start: 2023-01-23 | End: 2023-01-27 | Stop reason: HOSPADM

## 2023-01-23 RX ORDER — ACETAMINOPHEN 650 MG/1
650 SUPPOSITORY RECTAL EVERY 6 HOURS PRN
Status: DISCONTINUED | OUTPATIENT
Start: 2023-01-23 | End: 2023-01-27 | Stop reason: HOSPADM

## 2023-01-23 RX ORDER — SODIUM CHLORIDE 0.9 % (FLUSH) 0.9 %
5-40 SYRINGE (ML) INJECTION EVERY 12 HOURS SCHEDULED
Status: DISCONTINUED | OUTPATIENT
Start: 2023-01-23 | End: 2023-01-27 | Stop reason: HOSPADM

## 2023-01-23 RX ORDER — ASPIRIN 81 MG/1
81 TABLET ORAL DAILY
Status: DISCONTINUED | OUTPATIENT
Start: 2023-01-23 | End: 2023-01-27 | Stop reason: HOSPADM

## 2023-01-23 RX ORDER — ONDANSETRON 2 MG/ML
4 INJECTION INTRAMUSCULAR; INTRAVENOUS EVERY 6 HOURS PRN
Status: DISCONTINUED | OUTPATIENT
Start: 2023-01-23 | End: 2023-01-27 | Stop reason: HOSPADM

## 2023-01-23 RX ORDER — ASPIRIN 81 MG/1
324 TABLET, CHEWABLE ORAL ONCE
Status: COMPLETED | OUTPATIENT
Start: 2023-01-23 | End: 2023-01-23

## 2023-01-23 RX ORDER — ONDANSETRON 4 MG/1
4 TABLET, ORALLY DISINTEGRATING ORAL EVERY 8 HOURS PRN
Status: DISCONTINUED | OUTPATIENT
Start: 2023-01-23 | End: 2023-01-27 | Stop reason: HOSPADM

## 2023-01-23 RX ORDER — DEXTROSE MONOHYDRATE 100 MG/ML
INJECTION, SOLUTION INTRAVENOUS CONTINUOUS PRN
Status: DISCONTINUED | OUTPATIENT
Start: 2023-01-23 | End: 2023-01-27 | Stop reason: HOSPADM

## 2023-01-23 RX ORDER — SODIUM CHLORIDE 9 MG/ML
INJECTION, SOLUTION INTRAVENOUS PRN
Status: DISCONTINUED | OUTPATIENT
Start: 2023-01-23 | End: 2023-01-27 | Stop reason: HOSPADM

## 2023-01-23 RX ORDER — SODIUM CHLORIDE 0.9 % (FLUSH) 0.9 %
5-40 SYRINGE (ML) INJECTION PRN
Status: DISCONTINUED | OUTPATIENT
Start: 2023-01-23 | End: 2023-01-27 | Stop reason: HOSPADM

## 2023-01-23 RX ORDER — INSULIN LISPRO 100 [IU]/ML
0-4 INJECTION, SOLUTION INTRAVENOUS; SUBCUTANEOUS NIGHTLY
Status: DISCONTINUED | OUTPATIENT
Start: 2023-01-23 | End: 2023-01-24

## 2023-01-23 RX ORDER — ACETAMINOPHEN 325 MG/1
650 TABLET ORAL EVERY 6 HOURS PRN
Status: DISCONTINUED | OUTPATIENT
Start: 2023-01-23 | End: 2023-01-27 | Stop reason: HOSPADM

## 2023-01-23 RX ORDER — INSULIN GLARGINE-YFGN 100 [IU]/ML
5 INJECTION, SOLUTION SUBCUTANEOUS NIGHTLY
Status: DISCONTINUED | OUTPATIENT
Start: 2023-01-23 | End: 2023-01-24

## 2023-01-23 RX ORDER — INSULIN LISPRO 100 [IU]/ML
0-4 INJECTION, SOLUTION INTRAVENOUS; SUBCUTANEOUS
Status: DISCONTINUED | OUTPATIENT
Start: 2023-01-23 | End: 2023-01-24

## 2023-01-23 RX ORDER — ATORVASTATIN CALCIUM 40 MG/1
40 TABLET, FILM COATED ORAL NIGHTLY
Status: DISCONTINUED | OUTPATIENT
Start: 2023-01-23 | End: 2023-01-25

## 2023-01-23 RX ORDER — LABETALOL HYDROCHLORIDE 5 MG/ML
10 INJECTION, SOLUTION INTRAVENOUS EVERY 6 HOURS PRN
Status: DISCONTINUED | OUTPATIENT
Start: 2023-01-23 | End: 2023-01-27 | Stop reason: HOSPADM

## 2023-01-23 RX ORDER — ENOXAPARIN SODIUM 100 MG/ML
40 INJECTION SUBCUTANEOUS DAILY
Status: DISCONTINUED | OUTPATIENT
Start: 2023-01-23 | End: 2023-01-27 | Stop reason: HOSPADM

## 2023-01-23 RX ADMIN — INSULIN LISPRO 3 UNITS: 100 INJECTION, SOLUTION INTRAVENOUS; SUBCUTANEOUS at 17:42

## 2023-01-23 RX ADMIN — LABETALOL HYDROCHLORIDE 10 MG: 5 INJECTION INTRAVENOUS at 17:06

## 2023-01-23 RX ADMIN — LISINOPRIL 5 MG: 10 TABLET ORAL at 16:01

## 2023-01-23 RX ADMIN — IOPAMIDOL 60 ML: 755 INJECTION, SOLUTION INTRAVENOUS at 21:17

## 2023-01-23 RX ADMIN — SODIUM CHLORIDE, PRESERVATIVE FREE 40 MG: 5 INJECTION INTRAVENOUS at 20:23

## 2023-01-23 RX ADMIN — ASPIRIN 81 MG: 81 TABLET, COATED ORAL at 16:01

## 2023-01-23 RX ADMIN — ENOXAPARIN SODIUM 40 MG: 100 INJECTION SUBCUTANEOUS at 16:01

## 2023-01-23 RX ADMIN — SODIUM CHLORIDE, PRESERVATIVE FREE 10 ML: 5 INJECTION INTRAVENOUS at 20:24

## 2023-01-23 RX ADMIN — INSULIN GLARGINE-YFGN 5 UNITS: 100 INJECTION, SOLUTION SUBCUTANEOUS at 20:24

## 2023-01-23 RX ADMIN — ASPIRIN 81 MG CHEWABLE TABLET 324 MG: 81 TABLET CHEWABLE at 13:17

## 2023-01-23 ASSESSMENT — PAIN SCALES - GENERAL
PAINLEVEL_OUTOF10: 0
PAINLEVEL_OUTOF10: 0

## 2023-01-23 ASSESSMENT — VISUAL ACUITY: VA_NORMAL: 1

## 2023-01-23 ASSESSMENT — PAIN - FUNCTIONAL ASSESSMENT: PAIN_FUNCTIONAL_ASSESSMENT: NONE - DENIES PAIN

## 2023-01-23 NOTE — ED NOTES
Nurse to nurse called Concha Lazaro. Patient in transport to go to the unit.       Evans Bloch, RN  01/23/23 1916

## 2023-01-23 NOTE — PROGRESS NOTES
Notified Dr. Crystal Overall of patients blood sugar of 316. Okayed to give top of sliding scale and recheck in 1 hour.

## 2023-01-23 NOTE — LETTER
[place] Department Medicaid  CERTIFICATION OF NECESSITY  FOR NON-EMERGENCY TRANSPORTATION   BY GROUND AMBULANCE      Individual Information   1. Name: Danny Romano 2. [place] Medicaid Billing Number:    3. Address: [address] [place] [address]      Transportation Provider Information   4. Provider Name:    5. [place] Provider Number:  National Provider Identifier (NPI):      Certification  7. Criteria:  During transport, this individual requires:  [x] Medical treatment or continuous     supervision by an EMT. [] The administration or regulation of oxygen by another person. [] Supervised protective restraint. 8. Period Beginning Date:    5. Length  [x] Not more than 1 day(s)  [] One Year     Additional Information Relevant to Certification   10. Comments or Explanations, If Necessary or Appropriate        Certifying Practitioner Information   11. Name of [address] [place] [address]. [place] Medicaid Provider Number, If Applicable:  [address] Provider Identifier (NPI):      Signature Information   14. Date of Signature:  13. Name of Person Signin. Signature and Professional DesignationCortmarcos Lorenzana DO     [facility] [address]  Rev. 2015       [address] [place] [address] Encounter Date/Time: 2023 [address] [place] [address] Account: [de-identified]    MRN: [id number]    Patient: Danny Romano    Contact Serial #: [id number]      ENCOUNTER          Patient Class: I Private Enc?   No Unit RM BD: SEYZ [address] [place] [address] [id number]   Hospital Service: MED   Encounter DX: Acute CVA (cerebrovascul*   ADM Provider: Stephane Grande DO   Procedure:     ATT Provider: Stephane Grande DO   REF Provider:        Admission DX: Acute CVA (cerebrovascular accident) [facility]), Cerebrovascular accident (CVA), unspecified mechanism ([facility] [address] and DX codes: I63.9, I63.9      PATIENT  Name: Danny Romano : 1970 (47 yrs)   Address: [facility] Sex: Male   City: [address] [place] [address]         Marital Status: Single   Employer: NOT EMPLOYED         Jew: None   Primary Care Provider: Tammy Rolon MD         Primary Phone: [phone]   EMERGENCY CONTACT   Contact Name Legal Guardian? Relationship to Patient Home Phone Work Phone   1. Rain Funes  2. Mat Funes      Parent  Brother/Sister [phone]                 GUARANTOR            Guarantor: Nargis Toro     : 1970   Address: [address] [place] [address] Sex: Male     [place] [address]     Relation to Patient: Self       Home Phone: [phone]   Guarantor ID: [id number]       Work Phone:     Guarantor Employer: NOT EMPLOYED         Status: NOT EMPLO*      COVERAGE        PRIMARY INSURANCE   Payor: [employer] * Plan: [facility] [employer]   Payor Address: [facility] [id number] [place], [address] [facility]       Group Number: [id number] Insurance Type: [address] Name: Magaly Wade : 1970   Subscriber ID: [id number] Blossom Mandel. Rel. to Sub: Self   SECONDARY INSURANCE   Payor:   Plan:     Payor Address:  ,           Group Number:   Insurance Type:     Subscriber Name:   Subscriber :     Subscriber ID:   Pat.  Rel. to Sub:        CSN: [id number]

## 2023-01-23 NOTE — H&P
[facility] [address] [facility]  Internal Medicine Residency Program  History and Physical    Patient:  Kristal Bachelor 47 y.o. male MRN: [id number]     Date of Service: 1/23/2023    Hospital Day: 1      Chief complaint: had concerns including Extremity Weakness (Left upper and lower extremity flaccid, facial droop). History of Present Illness   The patient is a 47 y.o. male with a past medical history of multiple lacunar infarcts, DM, HTN, alcoholism, drug use who presented to the ED via EMS for x2 days of stroke like symptoms. Per patient, on 1/21/22 at approximately 9am he woke up with difficulty moving his left hand and left foot. He didn't recognize any sensation change aside from chronic LE neuropathy at that time. Weakness in left arm progressively worsened and on 1/22/23 he began having slurred speech. \"A few months ago\" patient reports having multiple lacunar infarcts with complete resolution of deficits, but upon chart review the TIA was in 2018. He has not followed up with anyone outpatient. He is complaining of new onset lightheadedness and dysphagia. He states \"water makes me cough now\". Patient has type 1 Diabetes which is currently uncontrolled. Patient says he is no longer on an insulin pump 2/2 not having insurance coverage. He reports using Lantus 14u at 4pm everyday which he says normally brings down his numbers. This AM, patient's BG was 491, after lantus --> 120. Reports no episodes of hypoglycemia. Last saw Dr. Kate Gale during most recent admission for TIA x1 month ago. Denies chest pain, abdominal pain, headache. Past Medical History:      Diagnosis Date    Alcoholism ([address]     Cocaine abuse ([facility] [address] [facility]     Diabetes mellitus ([facility] [address] [facility]     Hypertension     Idiopathic peripheral neuropathy 9/21/2016    Lacunar stroke ([facility] [address]     Marijuana abuse        Past Surgical History:    History reviewed. No pertinent surgical history.     Medications Prior to Admission:    Prior to Admission medications Medication Sig Start Date End Date Taking? Authorizing Provider   Insulin Disposable Pump (OMNIPOD 5 G6 INTRO, GEN 5,) KIT To use as directed 11/17/22   Julieta Tapia MD   Insulin Disposable Pump (OMNIPOD 5 G6 POD, GEN 5,) MISC To change every 72hrs 11/17/22   Julieta Tapia MD   Continuous Blood Gluc Transmit (DEXCOM G6 TRANSMITTER) MISC To change every 90 days 11/17/22   Julieta Tapia MD   Continuous Blood Gluc Sensor (DEXCOM G6 SENSOR) MISC To change eery 10 days 11/17/22   Julieta Tapia MD   insulin glargine [facility]) 100 UNIT/ML injection pen Inject 17 Units into the skin nightly 11/16/22   Braulio Garcias MD   insulin lispro, 1 Unit Dial, ([facility]) 100 UNIT/ML SOPN Inject 4 Units into the skin 3 times daily (before meals) 11/16/22   Braulio Garcias MD   lisinopril (PRINIVIL;ZESTRIL) 5 MG tablet Take 1 tablet by mouth daily 11/16/22 12/16/22  Braulio Garcias MD   atorvastatin (LIPITOR) 40 MG tablet Take 1 tablet by mouth nightly 11/16/22   Braulio Garcias MD   Insulin Pen Needle 32G X 5 MM MISC 1 each by Does not apply route daily 11/16/22   Braulio Garcias MD   aspirin 81 MG EC tablet Take 1 tablet by mouth in the morning. 8/10/22   Zuleika Gallagher MD   albuterol sulfate  (90 Base) MCG/ACT inhaler Inhale 2 puffs into the lungs every 6 hours as needed for Wheezing or Shortness of Breath 9/28/21   Jazmyne Graves MD       Allergies:  Metoprolol    Social History:   TOBACCO:   reports that he has been smoking cigarettes. He has a 22.50 pack-year smoking history. He has never used smokeless tobacco.  ETOH:   reports current alcohol use of about 6.0 standard drinks per week.       Family History:       Problem Relation Age of Onset    Diabetes type 2  Mother     Cancer Maternal Grandmother     Diabetes type 2  Nephew        REVIEW OF SYSTEMS:    Constitutional: No fever, no chills, no change in weight; good appetite  HEENT: Chronic Blurry Vision. No ear problems, no sore throat, no rhinorrhea. Respiratory: No cough, no sputum production, no pleuritic chest pain, no shortness of breath  Cardiology: No angina, no dyspnea on exertion, no paroxysmal nocturnal dyspnea, no orthopnea, no palpitation, no leg swelling. Gastroenterology: Dysphagia. No reflux; no abdominal pain, no nausea or vomiting; no constipation or diarrhea.  No hematochezia   Genitourinary: No dysuria, no frequency, hesitancy; no hematuria  Musculoskeletal: no joint pain, no myalgia, no change in range of movement  Neurology: focal weakness in left sided extremities, slurred speech, Lightheadedness  Endocrinology: no temperature intolerance, no polyphagia, polydipsia or polyuria  Hematology: no increased bleeding, no bruising, no lymphadenopathy  Skin: no skin changes noticed by patient  Psychology: no depressed mood, no suicidal ideation    Physical Exam   Vitals: BP (!) 151/96   Pulse 73   Temp 97 °F (36.1 °C)   Resp 17   Wt 145 lb (65.8 kg)   SpO2 94%   BMI 23.40 kg/m²     General Appearance: alert and oriented to person, place and time,   Skin: warm and dry  Head: normocephalic and atraumatic  Eyes: pupils equal, round, and reactive to light, extraocular eye movements intact, conjunctivae normal  ENT: external ear normal bilaterally, nose without deformity,   Neck: supple and non-tender without mass, no thyromegaly or thyroid nodules, no cervical lymphadenopathy  Pulmonary/Chest: clear to auscultation bilaterally- no wheezes, rales or rhonchi, normal air movement, no respiratory distress  Cardiovascular: normal rate, regular rhythm, normal S1 and S2, no murmurs, rubs, clicks, or gallops, distal pulses intact, no carotid bruits  Abdomen: soft, non-tender, non-distended, normal bowel sounds, no masses or organomegaly  Extremities: no cyanosis, clubbing or edema  Musculoskeletal:  no joint swelling, deformity or tenderness  Neurologic: 4/5 strength to LLE. 0/5 strength to LUE. Left facial droop. Normal sensation intact throughout. CN2-12 intact. Labs and Imaging Studies   Basic Labs  Recent Labs     01/23/23  1112 01/23/23  1114   NA  --  142   K  --  4.2   CL  --  108*   CO2  --  21*   BUN  --  16   CREATININE  --  0.8   GLUCOSE 192 198*   CALCIUM  --  9.3       Recent Labs     01/23/23  1114   WBC 8.3   RBC 4.59   HGB 13.3   HCT 39.5   MCV 86.1   MCH 29.0   MCHC 33.7   RDW 14.4   *   MPV 8.7         Imaging Studies:  XR CHEST PORTABLE   Final Result   No acute process. CT HEAD WO CONTRAST   Final Result   1. There is no acute intracranial abnormality. Specifically, there is no   intracranial hemorrhage. 2. Atrophy and periventricular leukomalacia,   3. Multiple old lacunar infarcts within the right and left basal ganglia more   prominent within the right basal ganglia. .              EKG: normal sinus rhythm, unchanged from previous tracings.     Resident's Assessment and Plan     Assessment and Plan:    Late presentation left sided weakness and slurred speech w/Hx of bilateral lacunar infarcts   MRI Brain, CTA   Consult neurology  Risk stratification with HbA1c, Lipid panel, TSH  PT/OT  SLP evaluation    Diabetes Mellitus  14 U Lantus daily at home - will order 5u as patient will be NPO    Hypertension  Continue home lisinopril    Drug use - Marijuana  Tobacco Use   1.5 ppd x20 years  Denies nicotine patch during admission  Hx Alcoholism   Patient sober since last admission reportedly  CMP      PT/OT evaluation: Consulted  DVT prophylaxis/ GI prophylaxis: None att  Disposition: admit to floor    DAVIE Jason-3  Attending physician: Dr. Cesar Wadsworth

## 2023-01-23 NOTE — ED PROVIDER NOTES
Department of Emergency Medicine   ED  Provider Note  Admit Date/RoomTime: 1/23/2023 10:53 AM  ED Room: 8507/8507-B                1/23/23  10:52 AM EST      HISTORY OF PRESENT ILLNESS:  (Nurses Notes Reviewed)    Chief Complaint:   Extremity Weakness (Left upper and lower extremity flaccid, facial droop)      Source of history provided by:  patient. History/Exam Limitations: none. Arnol Villa is a 47 y.o. old male presenting to the emergency department by EMS, with sudden onset of  slurred speech left arm weakness, which began 2 days ago. Last known well time: 2 days ago. The episode occurred at home. Since recognized the symptoms have been persistent. He has history of diabetes, hypertension, stroke. He has stroke risk factors of: diabetes mellitus, hyperlipidemia, hypertension, and smoking. There have been associated symptoms of left arm and left leg weakness. .  There has been no history of recent trauma. Reports that for 2 days he has been having slurred speech, left-sided arm weakness and left leg weakness. Has been sitting at home. No complaints of pain    Initially symptoms started at 9 AM patient had slurred speech, Sunday around noon became unable to move his left arm completely. Code Status on file: Full Code. NIH Stroke Scale at time of initial evaluation: 1050am  1A: Level of Consciousness 0 - alert; keenly responsive   1B: Ask Month and Age 0 - answers both questions correctly   1C:  Tell Patient To Open and Close Eyes, then Hand  Squeeze 1 - performs one task correctly   2: Test Horizontal Extraocular Movements 0 - normal   3: Test Visual Fields 0 - no visual loss   4: Test Facial Palsy 2 - partial paralysis (total or near total paralysis of the lower face)   5A: Test Left Arm Motor Drift 4 - no movement   5B: Test Right Arm Motor Drift 0 - no drift, limb holds 90 (or 45) degrees for full 10 seconds   6A: Test Left Leg Motor Drift 0 - no drift; leg holds 30 degree position for full 5 seconds   6B: Test Right Leg Motor Drift 0 - no drift; leg holds 30 degree position for full 5 seconds   7: Test Limb Ataxia   (FNF/Heel-Shin) 1 - present in one limb   8: Test Sensation 0 - normal; no sensory loss   9: Test Language/Aphasia 0 - no aphasia, normal   10: Test Dysarthria 1 - mild to moderate, patient slurs at least some words and at worst, can be understood with some difficulty   11: Test Extinction/Inattention 0 - no abnormality   Total 9             Past Medical History:  has a past medical history of Alcoholism ([facility] [address] Cocaine abuse ([facility] [address] Diabetes mellitus ([facility] [address] Hypertension, Idiopathic peripheral neuropathy, Lacunar stroke ([facility] [address] and Marijuana abuse. Past Surgical History:  has no past surgical history on file. Social History:  reports that he has been smoking cigarettes. He has a 22.50 pack-year smoking history. He has never used smokeless tobacco. He reports current alcohol use of about 6.0 standard drinks per week. He reports current drug use. Drug: Marijuana Katia Tania). Family History: family history includes Cancer in his maternal grandmother; Diabetes type 2  in his mother and nephew. Unless otherwise noted, family history is non contributory    The patients home medications have been reviewed. Allergies: Metoprolol      Review of Systems:   Pertinent positives and negatives are stated within HPI, all other systems reviewed and are negative.          ---------------------------------------------------PHYSICAL EXAM--------------------------------------    Constitutional/General: Alert and oriented x3, well appearing, non toxic in NAD  Head: Normocephalic and atraumatic  Eyes: PERRL, EOMI  Mouth: Oropharynx clear, handling secretions, no trismus. Left side facial droop  Neck: Supple, full ROM, non tender to palpation in the midline, no stridor, no crepitus, no meningeal signs  Pulmonary: Lungs clear to auscultation bilaterally, no wheezes, rales, or rhonchi.  Not in respiratory distress  Cardiovascular:  Regular rate. Regular rhythm. No murmurs, gallops, or rubs. 2+ distal pulses  Chest: no chest wall tenderness  Abdomen: Soft. Non tender. Non distended. +BS. No rebound, guarding, or rigidity. No pulsatile masses appreciated. Musculoskeletal: Moving bilateral lower extremities and right arm, left arm flaccid. . Warm and well perfused, no clubbing, cyanosis, or edema. Capillary refill <3 seconds  Skin: warm and dry. No rashes. Neurologic: GCS 15, patient has left-sided facial droop, all extremities strength 5 out of 5 except the left arm which is 0 out of 5. Left arm flaccid. Speech slurred. Please also see NIH stroke scale.   Psych: Normal Affect      -------------------------------------------------- RESULTS -------------------------------------------------  All laboratory and imaging studies have been reviewed by myself    LABS:  Results for orders placed or performed during the hospital encounter of 01/23/23   CBC with Auto Differential   Result Value Ref Range    WBC 8.3 4.5 - 11.5 E9/L    RBC 4.59 3.80 - 5.80 E12/L    Hemoglobin 13.3 12.5 - 16.5 g/dL    Hematocrit 39.5 37.0 - 54.0 %    MCV 86.1 80.0 - 99.9 fL    MCH 29.0 26.0 - 35.0 pg    MCHC 33.7 32.0 - 34.5 %    RDW 14.4 11.5 - 15.0 fL    Platelets 499 (H) 834 - 450 E9/L    MPV 8.7 7.0 - 12.0 fL    Neutrophils % 61.5 43.0 - 80.0 %    Immature Granulocytes % 0.2 0.0 - 5.0 %    Lymphocytes % 25.1 20.0 - 42.0 %    Monocytes % 9.5 2.0 - 12.0 %    Eosinophils % 3.1 0.0 - 6.0 %    Basophils % 0.6 0.0 - 2.0 %    Neutrophils Absolute 5.13 1.80 - 7.30 E9/L    Immature Granulocytes # 0.02 E9/L    Lymphocytes Absolute 2.09 1.50 - 4.00 E9/L    Monocytes Absolute 0.79 0.10 - 0.95 E9/L    Eosinophils Absolute 0.26 0.05 - 0.50 E9/L    Basophils Absolute 0.05 0.00 - 0.20 E9/L   Comprehensive Metabolic Panel w/ Reflex to MG   Result Value Ref Range    Sodium 142 132 - 146 mmol/L    Potassium reflex Magnesium 4.2 3.5 - 5.0 mmol/L    Chloride 108 (H) 98 - 107 mmol/L    CO2 21 (L) 22 - 29 mmol/L    Anion Gap 13 7 - 16 mmol/L    Glucose 198 (H) 74 - 99 mg/dL    BUN 16 6 - 20 mg/dL    Creatinine 0.8 0.7 - 1.2 mg/dL    Est, Glom Filt Rate >60 >=60 mL/min/1.73    Calcium 9.3 8.6 - 10.2 mg/dL    Total Protein 7.1 6.4 - 8.3 g/dL    Albumin 4.3 3.5 - 5.2 g/dL    Total Bilirubin 0.8 0.0 - 1.2 mg/dL    Alkaline Phosphatase 107 40 - 129 U/L    ALT 14 0 - 40 U/L    AST 19 0 - 39 U/L   Troponin   Result Value Ref Range    Troponin, High Sensitivity 32 (H) 0 - 11 ng/L   PH, VENOUS   Result Value Ref Range    pH, Gage 7.44 7.35 - 7.45   Beta-Hydroxybutyrate   Result Value Ref Range    Beta-Hydroxybutyrate 0.15 0.02 - 0.27 mmol/L   Urinalysis with Microscopic   Result Value Ref Range    Color, UA Yellow Straw/Yellow    Clarity, UA Clear Clear    Glucose, Ur >=1000 (A) Negative mg/dL    Bilirubin Urine Negative Negative    Ketones, Urine 40 (A) Negative mg/dL    Specific Gravity, UA 1.025 1.005 - 1.030    Blood, Urine MODERATE (A) Negative    pH, UA 5.5 5.0 - 9.0    Protein, UA 30 (A) Negative mg/dL    Urobilinogen, Urine 0.2 <2.0 E.U./dL    Nitrite, Urine Negative Negative    Leukocyte Esterase, Urine Negative Negative    WBC, UA 0-1 0 - 5 /HPF    RBC, UA 2-5 0 - 2 /HPF    Bacteria, UA NONE SEEN None Seen /HPF   Protime-INR   Result Value Ref Range    Protime 11.1 9.3 - 12.4 sec    INR 1.0    APTT   Result Value Ref Range    aPTT 32.6 24.5 - 35.1 sec   URINE DRUG SCREEN   Result Value Ref Range    Amphetamine Screen, Urine NOT DETECTED Negative <1000 ng/mL    Barbiturate Screen, Ur NOT DETECTED Negative < 200 ng/mL    Benzodiazepine Screen, Urine NOT DETECTED Negative < 200 ng/mL    Cannabinoid Scrn, Ur NOT DETECTED Negative < 50ng/mL    Cocaine Metabolite Screen, Urine NOT DETECTED Negative < 300 ng/mL    Opiate Scrn, Ur NOT DETECTED Negative < 300ng/mL    PCP Screen, Urine NOT DETECTED Negative < 25 ng/mL    Methadone Screen, Urine NOT DETECTED Negative <300 ng/mL    Oxycodone Urine NOT DETECTED Negative <100 ng/mL    FENTANYL SCREEN, URINE NOT DETECTED Negative <1 ng/mL    Drug Screen Comment: see below    Serum Drug Screen   Result Value Ref Range    Ethanol Lvl <10 mg/dL    Acetaminophen Level <5.0 (L) 10.0 - 17.9 mcg/mL    Salicylate, Serum <9.0 0.0 - 30.0 mg/dL    TCA Scrn NEGATIVE Cutoff:300 ng/mL   CK   Result Value Ref Range    Total  (H) 20 - 200 U/L   Troponin   Result Value Ref Range    Troponin, High Sensitivity 30 (H) 0 - 11 ng/L   Hemoglobin A1C   Result Value Ref Range    Hemoglobin A1C 9.0 (H) 4.0 - 5.6 %   Lipid Panel   Result Value Ref Range    Cholesterol, Total 223 (H) 0 - 199 mg/dL    Triglycerides 148 0 - 149 mg/dL    HDL 70 >40 mg/dL    LDL Calculated 123 (H) 0 - 99 mg/dL    VLDL Cholesterol Calculated 30 mg/dL   TSH   Result Value Ref Range    TSH 1.560 0.270 - 4.200 uIU/mL   Microalbumin / creatinine urine ratio   Result Value Ref Range    Microalbumin, Random Urine 201.2 (H) Not Established mg/L    Creatinine, Ur 106 40 - 278 mg/dL    Microalbumin Creatinine Ratio 189.8 (H) 0.0 - 30.0   Comprehensive Metabolic Panel w/ Reflex to MG   Result Value Ref Range    Sodium 140 132 - 146 mmol/L    Potassium reflex Magnesium 4.4 3.5 - 5.0 mmol/L    Chloride 103 98 - 107 mmol/L    CO2 13 (L) 22 - 29 mmol/L    Anion Gap 24 (H) 7 - 16 mmol/L    Glucose 438 (H) 74 - 99 mg/dL    BUN 29 (H) 6 - 20 mg/dL    Creatinine 1.0 0.7 - 1.2 mg/dL    Est, Glom Filt Rate >60 >=60 mL/min/1.73    Calcium 9.3 8.6 - 10.2 mg/dL    Total Protein 6.6 6.4 - 8.3 g/dL    Albumin 4.3 3.5 - 5.2 g/dL    Total Bilirubin 1.1 0.0 - 1.2 mg/dL    Alkaline Phosphatase 115 40 - 129 U/L    ALT 13 0 - 40 U/L    AST 12 0 - 39 U/L   CBC with Auto Differential   Result Value Ref Range    WBC 14.0 (H) 4.5 - 11.5 E9/L    RBC 4.77 3.80 - 5.80 E12/L    Hemoglobin 13.9 12.5 - 16.5 g/dL    Hematocrit 43.5 37.0 - 54.0 %    MCV 91.2 80.0 - 99.9 fL    MCH 29.1 26.0 - 35.0 pg    MCHC 32.0 32.0 - 34.5 %    RDW 14.5 11.5 - 15.0 fL    Platelets 290 134 - 287 E9/L    MPV 9.1 7.0 - 12.0 fL    Neutrophils % 94.8 (H) 43.0 - 80.0 %    Lymphocytes % 2.6 (L) 20.0 - 42.0 %    Monocytes % 0.9 (L) 2.0 - 12.0 %    Eosinophils % 1.7 0.0 - 6.0 %    Basophils % 0.3 0.0 - 2.0 %    Neutrophils Absolute 13.30 (H) 1.80 - 7.30 E9/L    Lymphocytes Absolute 0.42 (L) 1.50 - 4.00 E9/L    Monocytes Absolute 0.14 0.10 - 0.95 E9/L    Eosinophils Absolute 0.24 0.05 - 0.50 E9/L    Basophils Absolute 0.00 0.00 - 0.20 E9/L    Poikilocytes 1+     Binh Cells 1+     Ovalocytes 1+    Lactic Acid   Result Value Ref Range    Lactic Acid 1.4 0.5 - 2.2 mmol/L   PH, VENOUS   Result Value Ref Range    pH, Gage 7.32 (L) 7.35 - 7.45   POCT GLUCOSE   Result Value Ref Range    Glucose 192 mg/dL    QC OK? ok    POCT Glucose   Result Value Ref Range    Meter Glucose 192 (H) 74 - 99 mg/dL   POCT Glucose   Result Value Ref Range    Meter Glucose 316 (H) 74 - 99 mg/dL   POCT Glucose   Result Value Ref Range    Meter Glucose 292 (H) 74 - 99 mg/dL   POCT Glucose   Result Value Ref Range    Meter Glucose 262 (H) 74 - 99 mg/dL   POCT Glucose   Result Value Ref Range    Meter Glucose 457 (H) 74 - 99 mg/dL   EKG 12 lead   Result Value Ref Range    Ventricular Rate 117 BPM    Atrial Rate 117 BPM    P-R Interval 128 ms    QRS Duration 96 ms    Q-T Interval 338 ms    QTc Calculation (Bazett) 471 ms    P Axis 50 degrees    R Axis 33 degrees    T Axis 49 degrees       RADIOLOGY:  Interpreted by Radiologist.  CTA NECK W CONTRAST   Final Result   No hemodynamically significant stenosis or dissection of the cervical   internal carotid arteries. No high-grade stenosis or dissection of the cervical vertebral arteries. 15 mm right thyroid nodule; see recommendation below.       Low-density circumscribed structures in the bilateral cheek subcutaneous   tissues abutting the skin are favored to represent epidermal inclusion cysts   or other benign dermal lesions (series 301, images 164 and 175); recommend   nonemergent dermatologic evaluation. Small fluid level in the left sphenoid sinus; fluid levels can be seen in the   setting acute sinusitis. RECOMMENDATION:   1.5 cm incidental right thyroid nodule. Recommend thyroid US. Reference: J Am Adria Radiol. 2015 Feb;12(2): 143-50         XR CHEST PORTABLE   Final Result   No acute process. CT HEAD WO CONTRAST   Final Result   1. There is no acute intracranial abnormality. Specifically, there is no   intracranial hemorrhage. 2. Atrophy and periventricular leukomalacia,   3. Multiple old lacunar infarcts within the right and left basal ganglia more   prominent within the right basal ganglia. Gilson Carrillo MRI BRAIN WO CONTRAST    (Results Pending)         ------------------------- NURSING NOTES AND VITALS REVIEWED ---------------------------   The nursing notes within the ED encounter and vital signs as below have been reviewed. /73   Pulse (!) 117   Temp 97.4 °F (36.3 °C) (Temporal)   Resp 16   Ht 5' 6\" (1.676 m)   Wt 145 lb (65.8 kg)   SpO2 95%   BMI 23.40 kg/m²   Oxygen Saturation Interpretation: Normal    The patients available past medical records and past encounters were reviewed.         ------------------------------ ED COURSE/MEDICAL DECISION MAKING----------------------  Medications   aspirin EC tablet 81 mg (81 mg Oral Given 1/23/23 1601)   atorvastatin (LIPITOR) tablet 40 mg (40 mg Oral Not Given 1/23/23 2024)   lisinopril (PRINIVIL;ZESTRIL) tablet 5 mg (5 mg Oral Given 1/23/23 1601)   sodium chloride flush 0.9 % injection 5-40 mL (10 mLs IntraVENous Given 1/23/23 2024)   sodium chloride flush 0.9 % injection 5-40 mL (has no administration in time range)   0.9 % sodium chloride infusion (has no administration in time range)   enoxaparin (LOVENOX) injection 40 mg (40 mg SubCUTAneous Given 1/23/23 9942)   ondansetron (ZOFRAN-ODT) disintegrating tablet 4 mg (has no administration in time range)     Or   ondansetron (ZOFRAN) injection 4 mg (has no administration in time range)   polyethylene glycol (GLYCOLAX) packet 17 g (has no administration in time range)   acetaminophen (TYLENOL) tablet 650 mg (has no administration in time range)     Or   acetaminophen (TYLENOL) suppository 650 mg (has no administration in time range)   glucose chewable tablet 16 g (has no administration in time range)   dextrose bolus 10% 125 mL (has no administration in time range)     Or   dextrose bolus 10% 250 mL (has no administration in time range)   glucagon (rDNA) injection 1 mg (has no administration in time range)   dextrose 10 % infusion (has no administration in time range)   insulin glargine-yfgn (SEMGLEE-YFGN) injection vial 5 Units (5 Units SubCUTAneous Given 1/23/23 2024)   insulin lispro (HUMALOG) injection vial 0-4 Units (3 Units SubCUTAneous Given 1/23/23 1742)   insulin lispro (HUMALOG) injection vial 0-4 Units (0 Units SubCUTAneous Not Given 1/23/23 2012)   labetalol (NORMODYNE;TRANDATE) injection 10 mg (10 mg IntraVENous Given 1/23/23 1706)   pantoprazole (PROTONIX) 40 mg in sodium chloride (PF) 0.9 % 10 mL injection (40 mg IntraVENous Given 1/23/23 2023)   lactated ringers IV soln infusion ( IntraVENous Rate/Dose Verify 1/24/23 0325)   loperamide (IMODIUM) capsule 2 mg (2 mg Oral Given 1/24/23 0618)   aspirin chewable tablet 324 mg (324 mg Oral Given 1/23/23 1317)   iopamidol (ISOVUE-370) 76 % injection 60 mL (60 mLs IntraVENous Given 1/23/23 2117)   insulin regular (HUMULIN R;NOVOLIN R) injection 6 Units (6 Units SubCUTAneous Given 1/24/23 0618)       EKG: This EKG is interpreted by me.     Rate: 117  Rhythm: Sinus  Interpretation: Nonspecific ST changes, no acute ST elevations  Comparison: stable as compared to patient's most recent EKG        Acute CVA Core Measures:   Last Known Well : 2 days ago  NIH Stroke Scale Total: 9  t-PA Eligibility: Not eligible, out of the tPA window    Medical Decision Makin-year-old male patient present to emergency department for evaluation of left arm weakness, slurred speech. Concern for CVA, he is also diabetic, elevated blood glucose concern for DKA, concern for acute electrolyte abnormalities. Symptoms ongoing for the last 2 days. Patient is out of the tPA window as result of his symptoms ongoing for the last 2 days. Labs and imaging obtained here. No acute electrolyte abnormalities noted. Potassium stable at 4.2, sodium is 142. Creatinine normal at 0.8 no evidence for KIRSTEN. His troponins are stable at 32 and 30. Patient sinus rhythm on the monitor, no evidence for A. fib. Does have drug history, clinically sober at this time. He also has past history of diabetes and is not in DKA today. Normal anion gap, pH is 7.4. CT head showing no obvious acute intracranial hemorrhage. There are multiple old strokes noted, at this time no acute interventions were performed, patient was given full dose aspirin 324mg p.o. Here in the emergency department, vital signs had remained stable. Re-Evaluations:             Re-evaluation. Patients symptoms show no change    This patient's ED course included: a personal history and physicial examination, re-evaluation prior to disposition, multiple bedside re-evaluations, cardiac monitoring, and a personal history and physicial eaxmination    This patient has remained hemodynamically stable during their ED course. Consultations:      Dr. Kolton Benjamin they will admit the patient      Counseling: The emergency provider has spoken with the patient and discussed todays results, in addition to providing specific details for the plan of care and counseling regarding the diagnosis and prognosis.   Questions are answered at this time and they are agreeable with the plan.       --------------------------------- IMPRESSION AND DISPOSITION ---------------------------------    IMPRESSION  1. Cerebrovascular accident (CVA), unspecified mechanism ([facility] [address]        DISPOSITION  Disposition: Admit to telemetry  Patient condition is stable          Venecia Pina DO  Resident  01/24/23 2654

## 2023-01-24 ENCOUNTER — APPOINTMENT (OUTPATIENT)
Dept: MRI IMAGING | Age: 53
DRG: 064 | End: 2023-01-24
Payer: COMMERCIAL

## 2023-01-24 PROBLEM — I63.9 ACUTE CVA (CEREBROVASCULAR ACCIDENT) (HCC): Status: ACTIVE | Noted: 2023-01-24

## 2023-01-24 PROBLEM — I63.511 ACUTE RIGHT ARTERIAL ISCHEMIC STROKE, MCA (MIDDLE CEREBRAL ARTERY) (HCC): Status: ACTIVE | Noted: 2023-01-23

## 2023-01-24 LAB
ALBUMIN SERPL-MCNC: 4.3 G/DL (ref 3.5–5.2)
ALP BLD-CCNC: 115 U/L (ref 40–129)
ALT SERPL-CCNC: 13 U/L (ref 0–40)
AMPHETAMINE SCREEN, URINE: NOT DETECTED
ANION GAP SERPL CALCULATED.3IONS-SCNC: 11 MMOL/L (ref 7–16)
ANION GAP SERPL CALCULATED.3IONS-SCNC: 14 MMOL/L (ref 7–16)
ANION GAP SERPL CALCULATED.3IONS-SCNC: 24 MMOL/L (ref 7–16)
AST SERPL-CCNC: 12 U/L (ref 0–39)
BACTERIA: ABNORMAL /HPF
BARBITURATE SCREEN URINE: NOT DETECTED
BASOPHILS ABSOLUTE: 0 E9/L (ref 0–0.2)
BASOPHILS RELATIVE PERCENT: 0.3 % (ref 0–2)
BENZODIAZEPINE SCREEN, URINE: NOT DETECTED
BETA-HYDROXYBUTYRATE: >4.5 MMOL/L (ref 0.02–0.27)
BILIRUB SERPL-MCNC: 1.1 MG/DL (ref 0–1.2)
BILIRUBIN URINE: NEGATIVE
BLOOD, URINE: ABNORMAL
BUN BLDV-MCNC: 29 MG/DL (ref 6–20)
BUN BLDV-MCNC: 36 MG/DL (ref 6–20)
BUN BLDV-MCNC: 37 MG/DL (ref 6–20)
BURR CELLS: ABNORMAL
CALCIUM SERPL-MCNC: 8.3 MG/DL (ref 8.6–10.2)
CALCIUM SERPL-MCNC: 8.9 MG/DL (ref 8.6–10.2)
CALCIUM SERPL-MCNC: 9.3 MG/DL (ref 8.6–10.2)
CANNABINOID SCREEN URINE: NOT DETECTED
CHLORIDE BLD-SCNC: 103 MMOL/L (ref 98–107)
CHLORIDE BLD-SCNC: 108 MMOL/L (ref 98–107)
CHLORIDE BLD-SCNC: 109 MMOL/L (ref 98–107)
CLARITY: CLEAR
CO2: 13 MMOL/L (ref 22–29)
CO2: 18 MMOL/L (ref 22–29)
CO2: 21 MMOL/L (ref 22–29)
COCAINE METABOLITE SCREEN URINE: NOT DETECTED
COLOR: YELLOW
CREAT SERPL-MCNC: 1 MG/DL (ref 0.7–1.2)
CREAT SERPL-MCNC: 1.2 MG/DL (ref 0.7–1.2)
CREAT SERPL-MCNC: 1.4 MG/DL (ref 0.7–1.2)
CREATININE URINE: 106 MG/DL (ref 40–278)
EOSINOPHILS ABSOLUTE: 0.24 E9/L (ref 0.05–0.5)
EOSINOPHILS RELATIVE PERCENT: 1.7 % (ref 0–6)
FENTANYL SCREEN, URINE: NOT DETECTED
GFR SERPL CREATININE-BSD FRML MDRD: >60 ML/MIN/1.73
GLUCOSE BLD-MCNC: 252 MG/DL (ref 74–99)
GLUCOSE BLD-MCNC: 377 MG/DL (ref 74–99)
GLUCOSE BLD-MCNC: 438 MG/DL (ref 74–99)
GLUCOSE BLD-MCNC: 493 MG/DL (ref 74–99)
GLUCOSE URINE: >=1000 MG/DL
HCT VFR BLD CALC: 43.5 % (ref 37–54)
HEMOGLOBIN: 13.9 G/DL (ref 12.5–16.5)
KETONES, URINE: 40 MG/DL
LACTIC ACID: 1.4 MMOL/L (ref 0.5–2.2)
LEUKOCYTE ESTERASE, URINE: NEGATIVE
LYMPHOCYTES ABSOLUTE: 0.42 E9/L (ref 1.5–4)
LYMPHOCYTES RELATIVE PERCENT: 2.6 % (ref 20–42)
Lab: NORMAL
MCH RBC QN AUTO: 29.1 PG (ref 26–35)
MCHC RBC AUTO-ENTMCNC: 32 % (ref 32–34.5)
MCV RBC AUTO: 91.2 FL (ref 80–99.9)
METER GLUCOSE: 221 MG/DL (ref 74–99)
METER GLUCOSE: 288 MG/DL (ref 74–99)
METER GLUCOSE: 357 MG/DL (ref 74–99)
METER GLUCOSE: 444 MG/DL (ref 74–99)
METER GLUCOSE: 457 MG/DL (ref 74–99)
METER GLUCOSE: >500 MG/DL (ref 74–99)
METHADONE SCREEN, URINE: NOT DETECTED
MICROALBUMIN UR-MCNC: 201.2 MG/L
MICROALBUMIN/CREAT UR-RTO: 189.8 (ref 0–30)
MONOCYTES ABSOLUTE: 0.14 E9/L (ref 0.1–0.95)
MONOCYTES RELATIVE PERCENT: 0.9 % (ref 2–12)
NEUTROPHILS ABSOLUTE: 13.3 E9/L (ref 1.8–7.3)
NEUTROPHILS RELATIVE PERCENT: 94.8 % (ref 43–80)
NITRITE, URINE: NEGATIVE
OPIATE SCREEN URINE: NOT DETECTED
OVALOCYTES: ABNORMAL
OXYCODONE URINE: NOT DETECTED
PDW BLD-RTO: 14.5 FL (ref 11.5–15)
PH UA: 5.5 (ref 5–9)
PH VENOUS: 7.32 (ref 7.35–7.45)
PHENCYCLIDINE SCREEN URINE: NOT DETECTED
PLATELET # BLD: 448 E9/L (ref 130–450)
PMV BLD AUTO: 9.1 FL (ref 7–12)
POIKILOCYTES: ABNORMAL
POTASSIUM REFLEX MAGNESIUM: 4.4 MMOL/L (ref 3.5–5)
POTASSIUM SERPL-SCNC: 3.9 MMOL/L (ref 3.5–5)
POTASSIUM SERPL-SCNC: 4.2 MMOL/L (ref 3.5–5)
PROTEIN UA: 30 MG/DL
RBC # BLD: 4.77 E12/L (ref 3.8–5.8)
RBC UA: ABNORMAL /HPF (ref 0–2)
SODIUM BLD-SCNC: 140 MMOL/L (ref 132–146)
SODIUM BLD-SCNC: 140 MMOL/L (ref 132–146)
SODIUM BLD-SCNC: 141 MMOL/L (ref 132–146)
SPECIFIC GRAVITY UA: 1.02 (ref 1–1.03)
TOTAL PROTEIN: 6.6 G/DL (ref 6.4–8.3)
UROBILINOGEN, URINE: 0.2 E.U./DL
WBC # BLD: 14 E9/L (ref 4.5–11.5)
WBC UA: ABNORMAL /HPF (ref 0–5)

## 2023-01-24 PROCEDURE — 6370000000 HC RX 637 (ALT 250 FOR IP): Performed by: STUDENT IN AN ORGANIZED HEALTH CARE EDUCATION/TRAINING PROGRAM

## 2023-01-24 PROCEDURE — 6370000000 HC RX 637 (ALT 250 FOR IP)

## 2023-01-24 PROCEDURE — 97161 PT EVAL LOW COMPLEX 20 MIN: CPT

## 2023-01-24 PROCEDURE — 82962 GLUCOSE BLOOD TEST: CPT

## 2023-01-24 PROCEDURE — 92610 EVALUATE SWALLOWING FUNCTION: CPT | Performed by: SPEECH-LANGUAGE PATHOLOGIST

## 2023-01-24 PROCEDURE — 80048 BASIC METABOLIC PNL TOTAL CA: CPT

## 2023-01-24 PROCEDURE — C9113 INJ PANTOPRAZOLE SODIUM, VIA: HCPCS

## 2023-01-24 PROCEDURE — 70551 MRI BRAIN STEM W/O DYE: CPT

## 2023-01-24 PROCEDURE — 2580000003 HC RX 258: Performed by: STUDENT IN AN ORGANIZED HEALTH CARE EDUCATION/TRAINING PROGRAM

## 2023-01-24 PROCEDURE — A4216 STERILE WATER/SALINE, 10 ML: HCPCS

## 2023-01-24 PROCEDURE — 36415 COLL VENOUS BLD VENIPUNCTURE: CPT

## 2023-01-24 PROCEDURE — 99232 SBSQ HOSP IP/OBS MODERATE 35: CPT | Performed by: INTERNAL MEDICINE

## 2023-01-24 PROCEDURE — 93005 ELECTROCARDIOGRAM TRACING: CPT

## 2023-01-24 PROCEDURE — 97535 SELF CARE MNGMENT TRAINING: CPT

## 2023-01-24 PROCEDURE — 82800 BLOOD PH: CPT

## 2023-01-24 PROCEDURE — 85025 COMPLETE CBC W/AUTO DIFF WBC: CPT

## 2023-01-24 PROCEDURE — 82010 KETONE BODYS QUAN: CPT

## 2023-01-24 PROCEDURE — 97530 THERAPEUTIC ACTIVITIES: CPT

## 2023-01-24 PROCEDURE — 82947 ASSAY GLUCOSE BLOOD QUANT: CPT

## 2023-01-24 PROCEDURE — 83605 ASSAY OF LACTIC ACID: CPT

## 2023-01-24 PROCEDURE — 6360000002 HC RX W HCPCS

## 2023-01-24 PROCEDURE — 2060000000 HC ICU INTERMEDIATE R&B

## 2023-01-24 PROCEDURE — 70544 MR ANGIOGRAPHY HEAD W/O DYE: CPT

## 2023-01-24 PROCEDURE — 2580000003 HC RX 258

## 2023-01-24 PROCEDURE — 97165 OT EVAL LOW COMPLEX 30 MIN: CPT

## 2023-01-24 PROCEDURE — 80053 COMPREHEN METABOLIC PANEL: CPT

## 2023-01-24 PROCEDURE — S5553 INSULIN LONG ACTING 5 U: HCPCS | Performed by: STUDENT IN AN ORGANIZED HEALTH CARE EDUCATION/TRAINING PROGRAM

## 2023-01-24 RX ORDER — INSULIN LISPRO 100 [IU]/ML
0-4 INJECTION, SOLUTION INTRAVENOUS; SUBCUTANEOUS EVERY 4 HOURS
Status: DISCONTINUED | OUTPATIENT
Start: 2023-01-24 | End: 2023-01-26

## 2023-01-24 RX ORDER — INSULIN GLARGINE-YFGN 100 [IU]/ML
17 INJECTION, SOLUTION SUBCUTANEOUS NIGHTLY
Status: DISCONTINUED | OUTPATIENT
Start: 2023-01-24 | End: 2023-01-27 | Stop reason: HOSPADM

## 2023-01-24 RX ORDER — LOPERAMIDE HYDROCHLORIDE 2 MG/1
2 CAPSULE ORAL 3 TIMES DAILY PRN
Status: DISCONTINUED | OUTPATIENT
Start: 2023-01-24 | End: 2023-01-24

## 2023-01-24 RX ORDER — 0.9 % SODIUM CHLORIDE 0.9 %
1000 INTRAVENOUS SOLUTION INTRAVENOUS ONCE
Status: COMPLETED | OUTPATIENT
Start: 2023-01-24 | End: 2023-01-24

## 2023-01-24 RX ORDER — LOPERAMIDE HYDROCHLORIDE 2 MG/1
2 CAPSULE ORAL 4 TIMES DAILY PRN
Status: DISCONTINUED | OUTPATIENT
Start: 2023-01-24 | End: 2023-01-27 | Stop reason: HOSPADM

## 2023-01-24 RX ORDER — BACLOFEN 10 MG/1
5 TABLET ORAL 2 TIMES DAILY
Status: DISCONTINUED | OUTPATIENT
Start: 2023-01-24 | End: 2023-01-27 | Stop reason: HOSPADM

## 2023-01-24 RX ORDER — CLOPIDOGREL BISULFATE 75 MG/1
75 TABLET ORAL DAILY
Status: DISCONTINUED | OUTPATIENT
Start: 2023-01-24 | End: 2023-01-27 | Stop reason: HOSPADM

## 2023-01-24 RX ORDER — SODIUM CHLORIDE, SODIUM LACTATE, POTASSIUM CHLORIDE, CALCIUM CHLORIDE 600; 310; 30; 20 MG/100ML; MG/100ML; MG/100ML; MG/100ML
INJECTION, SOLUTION INTRAVENOUS CONTINUOUS
Status: ACTIVE | OUTPATIENT
Start: 2023-01-24 | End: 2023-01-24

## 2023-01-24 RX ORDER — SODIUM CHLORIDE, SODIUM LACTATE, POTASSIUM CHLORIDE, CALCIUM CHLORIDE 600; 310; 30; 20 MG/100ML; MG/100ML; MG/100ML; MG/100ML
INJECTION, SOLUTION INTRAVENOUS CONTINUOUS
Status: ACTIVE | OUTPATIENT
Start: 2023-01-24 | End: 2023-01-25

## 2023-01-24 RX ADMIN — LOPERAMIDE HYDROCHLORIDE 2 MG: 2 CAPSULE ORAL at 06:18

## 2023-01-24 RX ADMIN — SODIUM CHLORIDE, POTASSIUM CHLORIDE, SODIUM LACTATE AND CALCIUM CHLORIDE: 600; 310; 30; 20 INJECTION, SOLUTION INTRAVENOUS at 18:08

## 2023-01-24 RX ADMIN — SODIUM CHLORIDE 1000 ML: 9 INJECTION, SOLUTION INTRAVENOUS at 11:01

## 2023-01-24 RX ADMIN — INSULIN HUMAN 5 UNITS: 100 INJECTION, SOLUTION PARENTERAL at 14:28

## 2023-01-24 RX ADMIN — INSULIN HUMAN 6 UNITS: 100 INJECTION, SOLUTION PARENTERAL at 06:18

## 2023-01-24 RX ADMIN — INSULIN LISPRO 1 UNITS: 100 INJECTION, SOLUTION INTRAVENOUS; SUBCUTANEOUS at 22:12

## 2023-01-24 RX ADMIN — INSULIN HUMAN 10 UNITS: 100 INJECTION, SOLUTION PARENTERAL at 11:04

## 2023-01-24 RX ADMIN — INSULIN LISPRO 2 UNITS: 100 INJECTION, SOLUTION INTRAVENOUS; SUBCUTANEOUS at 18:06

## 2023-01-24 RX ADMIN — ENOXAPARIN SODIUM 40 MG: 100 INJECTION SUBCUTANEOUS at 11:02

## 2023-01-24 RX ADMIN — SODIUM CHLORIDE, PRESERVATIVE FREE 10 ML: 5 INJECTION INTRAVENOUS at 09:42

## 2023-01-24 RX ADMIN — LOPERAMIDE HYDROCHLORIDE 2 MG: 2 CAPSULE ORAL at 01:55

## 2023-01-24 RX ADMIN — INSULIN GLARGINE-YFGN 17 UNITS: 100 INJECTION, SOLUTION SUBCUTANEOUS at 22:12

## 2023-01-24 RX ADMIN — SODIUM CHLORIDE 1000 ML: 9 INJECTION, SOLUTION INTRAVENOUS at 14:28

## 2023-01-24 RX ADMIN — SODIUM CHLORIDE, PRESERVATIVE FREE 40 MG: 5 INJECTION INTRAVENOUS at 09:42

## 2023-01-24 RX ADMIN — SODIUM CHLORIDE, POTASSIUM CHLORIDE, SODIUM LACTATE AND CALCIUM CHLORIDE: 600; 310; 30; 20 INJECTION, SOLUTION INTRAVENOUS at 01:57

## 2023-01-24 RX ADMIN — INSULIN LISPRO 4 UNITS: 100 INJECTION, SOLUTION INTRAVENOUS; SUBCUTANEOUS at 11:50

## 2023-01-24 ASSESSMENT — PAIN SCALES - GENERAL
PAINLEVEL_OUTOF10: 0
PAINLEVEL_OUTOF10: 0

## 2023-01-24 NOTE — PROGRESS NOTES
[facility]  Internal Medicine Residency Program  History and Physical    Patient:  David Mukherjee 47 y.o. male MRN: [id number]     Date of Service: 1/24/2023    Hospital Day: 2      Chief complaint: had concerns including Extremity Weakness (Left upper and lower extremity flaccid, facial droop). History of Present Illness   The patient is a 47 y.o. male seen and examined this morning. Patient has no overnight complaints and says stroke symptoms are unchanged from previous day. Past Medical History:      Diagnosis Date    Alcoholism ([facility]     Cocaine abuse ([facility]     Diabetes mellitus ([facility]     Hypertension     Idiopathic peripheral neuropathy 9/21/2016    Lacunar stroke ([facility]     Marijuana abuse        Past Surgical History:    History reviewed. No pertinent surgical history. Medications Prior to Admission:    Prior to Admission medications    Medication Sig Start Date End Date Taking?  Authorizing Provider   Insulin Disposable Pump (OMNIPOD 5 G6 INTRO, GEN 5,) KIT To use as directed 11/17/22   Brian Olivier MD   Insulin Disposable Pump (OMNIPOD 5 G6 POD, GEN 5,) MISC To change every 72hrs 11/17/22   Brian Olivier MD   Continuous Blood Gluc Transmit (DEXCOM G6 TRANSMITTER) MISC To change every 90 days 11/17/22   Brian Olivier MD   Continuous Blood Gluc Sensor (DEXCOM G6 SENSOR) MISC To change eery 10 days 11/17/22   Brian Olivier MD   insulin glargine [facility]) 100 UNIT/ML injection pen Inject 17 Units into the skin nightly 11/16/22   Naomi Samuel MD   insulin lispro, 1 Unit Dial, ([facility]) 100 UNIT/ML SOPN Inject 4 Units into the skin 3 times daily (before meals) 11/16/22   Naomi Samuel MD   lisinopril (PRINIVIL;ZESTRIL) 5 MG tablet Take 1 tablet by mouth daily 11/16/22 12/16/22  Naomi Samuel MD   atorvastatin (LIPITOR) 40 MG tablet Take 1 tablet by mouth nightly 11/16/22   Naomi Samuel MD   Insulin Pen Needle 32G X 5 MM MISC 1 each by Does not apply route daily 11/16/22   Marizol Delacruz MD   aspirin 81 MG EC tablet Take 1 tablet by mouth in the morning. 8/10/22   Sima Martinez MD   albuterol sulfate  (90 Base) MCG/ACT inhaler Inhale 2 puffs into the lungs every 6 hours as needed for Wheezing or Shortness of Breath 9/28/21   Ro Olivas MD       Allergies:  Metoprolol    Social History:   TOBACCO:   reports that he has been smoking cigarettes. He has a 22.50 pack-year smoking history. He has never used smokeless tobacco.  ETOH:   reports current alcohol use of about 6.0 standard drinks per week.      Family History:       Problem Relation Age of Onset    Diabetes type 2  Mother     Cancer Maternal Grandmother     Diabetes type 2  Nephew        REVIEW OF SYSTEMS:    Constitutional: No fever, no chills, no change in weight; good appetite  HEENT: Chronic Blurry Vision. No ear problems, no sore throat, no rhinorrhea.  Respiratory: No cough, no sputum production, no pleuritic chest pain, no shortness of breath  Cardiology: No angina, no dyspnea on exertion, no paroxysmal nocturnal dyspnea, no orthopnea, no palpitation, no leg swelling.   Gastroenterology: Dysphagia. No reflux; no abdominal pain, no nausea or vomiting; no constipation or diarrhea. No hematochezia   Genitourinary: No dysuria, no frequency, hesitancy; no hematuria  Musculoskeletal: no joint pain, no myalgia, no change in range of movement  Neurology: focal weakness in left sided extremities, slurred speech, Lightheadedness  Endocrinology: no temperature intolerance, no polyphagia, polydipsia or polyuria  Hematology: no increased bleeding, no bruising, no lymphadenopathy  Skin: no skin changes noticed by patient  Psychology: no depressed mood, no suicidal ideation    Physical Exam   Vitals: /73   Pulse (!) 117   Temp 97.4 °F (36.3 °C) (Temporal)   Resp 16   Ht 5' 6\" (1.676 m)   Wt 145 lb (65.8 kg)   SpO2 95%   BMI 23.40  kg/m²     General Appearance: alert and oriented to person, place and time,   Skin: warm and dry  Head: normocephalic and atraumatic  Eyes: pupils equal, round, and reactive to light, extraocular eye movements intact, conjunctivae normal  ENT: external ear normal bilaterally, nose without deformity,   Neck: supple and non-tender without mass, no thyromegaly or thyroid nodules, no cervical lymphadenopathy  Pulmonary/Chest: clear to auscultation bilaterally- no wheezes, rales or rhonchi, normal air movement, no respiratory distress  Cardiovascular: normal rate, regular rhythm, normal S1 and S2, no murmurs, rubs, clicks, or gallops, distal pulses intact, no carotid bruits  Abdomen: soft, non-tender, non-distended, normal bowel sounds, no masses or organomegaly  Extremities: no cyanosis, clubbing or edema  Musculoskeletal:  no joint swelling, deformity or tenderness  Neurologic: 3-/5 strength to LLE. 0/5 strength to LUE. Left facial droop. Normal sensation intact throughout. CN2-12 intact. Labs and Imaging Studies   Basic Labs  Recent Labs     01/23/23  1114 01/24/23  0449 01/24/23  1115 01/24/23  1327    140  --  141   K 4.2 4.4  --  4.2   * 103  --  109*   CO2 21* 13*  --  18*   BUN 16 29*  --  37*   CREATININE 0.8 1.0  --  1.4*   GLUCOSE 198* 438* 493* 377*   CALCIUM 9.3 9.3  --  8.9       Recent Labs     01/23/23  1114 01/24/23  0449   WBC 8.3 14.0*   RBC 4.59 4.77   HGB 13.3 13.9   HCT 39.5 43.5   MCV 86.1 91.2   MCH 29.0 29.1   MCHC 33.7 32.0   RDW 14.4 14.5   * 448   MPV 8.7 9.1         Imaging Studies:  MRA HEAD WO CONTRAST   Final Result   Acute lacunar infarct involving the right posterior limb internal capsule. More subacute appearing cortical infarcts throughout the left posterior   cortical watershed territory within the parietal lobe and lesser extent right   parietal lobe. No acute hemorrhage or significant mass effect.       Moderate luminal narrowing of the right mid M1 segment and severe luminal   narrowing of the right proximal M2 segment. There is tapering of the distal right PCA with fetal origin. RECOMMENDATIONS:   Unavailable         MRI BRAIN WO CONTRAST   Final Result   Acute lacunar infarct involving the right posterior limb internal capsule. More subacute appearing cortical infarcts throughout the left posterior   cortical watershed territory within the parietal lobe and lesser extent right   parietal lobe. No acute hemorrhage or significant mass effect. Moderate luminal narrowing of the right mid M1 segment and severe luminal   narrowing of the right proximal M2 segment. There is tapering of the distal right PCA with fetal origin. RECOMMENDATIONS:   Unavailable         CTA NECK W CONTRAST   Final Result   No hemodynamically significant stenosis or dissection of the cervical   internal carotid arteries. No high-grade stenosis or dissection of the cervical vertebral arteries. 15 mm right thyroid nodule; see recommendation below. Low-density circumscribed structures in the bilateral cheek subcutaneous   tissues abutting the skin are favored to represent epidermal inclusion cysts   or other benign dermal lesions (series 301, images 164 and 175); recommend   nonemergent dermatologic evaluation. Small fluid level in the left sphenoid sinus; fluid levels can be seen in the   setting acute sinusitis. RECOMMENDATION:   1.5 cm incidental right thyroid nodule. Recommend thyroid US. Reference: J Am Adria Radiol. 2015 Feb;12(2): 143-50         XR CHEST PORTABLE   Final Result   No acute process. CT HEAD WO CONTRAST   Final Result   1. There is no acute intracranial abnormality. Specifically, there is no   intracranial hemorrhage. 2. Atrophy and periventricular leukomalacia,   3. Multiple old lacunar infarcts within the right and left basal ganglia more   prominent within the right basal ganglia. Jonel Delaney EKG: normal sinus rhythm, unchanged from previous tracings. Resident's Assessment and Plan     Assessment and Plan:    Late presentation left sided weakness and slurred speech w/Hx of bilateral lacunar infarcts   MRI/MRA Brain  Acute lacunar infarct involving the right posterior limb internal capsule. More subacute appearing cortical infarcts throughout the left posterior   cortical watershed territory within the parietal lobe and lesser extent right   parietal lobe. No acute hemorrhage or significant mass effect. Moderate luminal narrowing of the right mid M1 segment and severe luminal   narrowing of the right proximal M2 segment. There is tapering of the distal right PCA with fetal origin. CTA Neck   No hemodynamically significant stenosis or dissection of the cervical   internal carotid arteries. No high-grade stenosis or dissection of the cervical vertebral arteries. 15 mm right thyroid nodule; see recommendation below. Low-density circumscribed structures in the bilateral cheek subcutaneous   tissues abutting the skin are favored to represent epidermal inclusion cysts   or other benign dermal lesions (series 301, images 164 and 175); recommend   nonemergent dermatologic evaluation. Small fluid level in the left sphenoid sinus; fluid levels can be seen in the   setting acute sinusitis.      Neurology following, follow recs  Baclofen 5mg BID for spasticity   PT/OT  SLP evaluation - NPO with ice chips    Diabetes Mellitus, Type 1, Uncontrolled  DKA: given total 1.5 L NS bolus and regular insulin 15 u, resumed home dose of lantus 15 u 1/24, on LRS @ 100cc/hr  Monitor BG closely, BG Q4H, if still elevated trasnfer to MICU  Follow repeat BMP at [address] for AG, may recheck at midnight     Hypertension  Continue home lisinopril, target SBP <180    Hx TIA in 2018  Continue home ASA  Hx Polysubstance abuse      PT/OT evaluation: Consulted  DVT prophylaxis/ GI prophylaxis: Heparin, Protonix  Disposition: admit to floor    Josephine Viera OMS-3  Attending physician: Dr. Cesar Wadsworth

## 2023-01-24 NOTE — PROGRESS NOTES
[facility]  Internal Medicine Residency Program  Progress Note - House Team     Patient:  Lenin Thompson 47 y.o. male MRN: [id number]     Date of Service: 1/24/2023     CC: Left-sided weakness and slurred speech     Days since admission: 01/23/2023     Subjective     Overnight events: This morning the patient was seen and examined at bedside in no acute distress. No major event overnight. He continues to have left-sided weakness. Upon questioning the patient does not have any slurred speech on exam. SLP was seen at bedside for eval.     Objective     Physical Exam:  Vitals: /73   Pulse (!) 117   Temp 97.4 °F (36.3 °C) (Temporal)   Resp 16   Ht 5' 6\" (1.676 m)   Wt 145 lb (65.8 kg)   SpO2 95%   BMI 23.40 kg/m²     I & O - 24hr:   Intake/Output Summary (Last 24 hours) at 1/24/2023 1608  Last data filed at 1/24/2023 0325  Gross per 24 hour   Intake 156 ml   Output 300 ml   Net -144 ml      General Appearance: alert, appears stated age, and cooperative  HEENT:  Head: Normocephalic, no lesions, without obvious abnormality. Lung: clear to auscultation bilaterally  Heart: regular rate and rhythm, S1, S2 normal, no murmur, click, rub or gallop  Abdomen: soft, non-tender; bowel sounds normal; no masses,  no organomegaly  Extremities:  extremities normal, atraumatic, no cyanosis or edema  Musculokeletal: No joint swelling, no muscle tenderness. ROM normal in all joints of extremities.    Neurologic: Mental status: Alert, oriented, thought content appropriate, decreased vibration senses in the lower extremities bilaterally, sensation intact in upper and lower extremities to light touch, no slurred speech noted, 1/5 on the left upper and lower extremity; 4/5 on the right side lower and upper extremity, no Babinski, reflexes intact on the right side, areflexia on the left side,  left-sided facial droop appreciated, muscle spasticity noted bilaterally     Subject  Pertinent Information & Imaging Studies, Consults   chika  CBC with Differential:    Lab Results   Component Value Date/Time    WBC 14.0 01/24/2023 04:49 AM    RBC 4.77 01/24/2023 04:49 AM    HGB 13.9 01/24/2023 04:49 AM    HCT 43.5 01/24/2023 04:49 AM     01/24/2023 04:49 AM    MCV 91.2 01/24/2023 04:49 AM    MCH 29.1 01/24/2023 04:49 AM    MCHC 32.0 01/24/2023 04:49 AM    RDW 14.5 01/24/2023 04:49 AM    SEGSPCT 53 01/25/2014 05:15 AM    SEGSPCT 86 10/11/2010 04:40 AM    BANDSPCT 3 10/10/2010 07:20 PM    METASPCT 1.7 07/20/2020 12:12 PM    LYMPHOPCT 2.6 01/24/2023 04:49 AM    PROMYELOPCT 1.7 04/29/2022 09:41 PM    MONOPCT 0.9 01/24/2023 04:49 AM    MYELOPCT 2.6 01/04/2020 05:07 PM    EOSPCT 2 10/12/2010 06:00 AM    BASOPCT 0.3 01/24/2023 04:49 AM    MONOSABS 0.14 01/24/2023 04:49 AM    LYMPHSABS 0.42 01/24/2023 04:49 AM    EOSABS 0.24 01/24/2023 04:49 AM    BASOSABS 0.00 01/24/2023 04:49 AM     CMP:    Lab Results   Component Value Date/Time     01/24/2023 01:27 PM    K 4.2 01/24/2023 01:27 PM    K 4.4 01/24/2023 04:49 AM     01/24/2023 01:27 PM    CO2 18 01/24/2023 01:27 PM    BUN 37 01/24/2023 01:27 PM    CREATININE 1.4 01/24/2023 01:27 PM    GFRAA >60 08/10/2022 09:41 AM    LABGLOM >60 01/24/2023 01:27 PM    GLUCOSE 377 01/24/2023 01:27 PM    GLUCOSE 83 04/11/2012 03:35 AM    PROT 6.6 01/24/2023 04:49 AM    LABALBU 4.3 01/24/2023 04:49 AM    LABALBU 4.3 12/30/2011 10:15 AM    CALCIUM 8.9 01/24/2023 01:27 PM    BILITOT 1.1 01/24/2023 04:49 AM    ALKPHOS 115 01/24/2023 04:49 AM    AST 12 01/24/2023 04:49 AM    ALT 13 01/24/2023 04:49 AM       IMAGING:   Imaging Studies:    CT HEAD WO CONTRAST    Result Date: 1/23/2023  1. There is no acute intracranial abnormality. Specifically, there is no intracranial hemorrhage. 2. Atrophy and periventricular leukomalacia, 3. Multiple old lacunar infarcts within the right and left basal ganglia more prominent within the right basal ganglia. Cloteal Champagne      MRA HEAD WO CONTRAST    Result Date: 1/24/2023  Acute lacunar infarct involving the right posterior limb internal capsule. More subacute appearing cortical infarcts throughout the left posterior cortical watershed territory within the parietal lobe and lesser extent right parietal lobe. No acute hemorrhage or significant mass effect. Moderate luminal narrowing of the right mid M1 segment and severe luminal narrowing of the right proximal M2 segment. There is tapering of the distal right PCA with fetal origin. RECOMMENDATIONS: Unavailable     XR CHEST PORTABLE    Result Date: 1/23/2023  No acute process. CTA NECK W CONTRAST    Result Date: 1/23/2023  No hemodynamically significant stenosis or dissection of the cervical internal carotid arteries. No high-grade stenosis or dissection of the cervical vertebral arteries. 15 mm right thyroid nodule; see recommendation below. Low-density circumscribed structures in the bilateral cheek subcutaneous tissues abutting the skin are favored to represent epidermal inclusion cysts or other benign dermal lesions (series 301, images 164 and 175); recommend nonemergent dermatologic evaluation. Small fluid level in the left sphenoid sinus; fluid levels can be seen in the setting acute sinusitis. RECOMMENDATION: 1.5 cm incidental right thyroid nodule. Recommend thyroid US. Reference: J Am Adria Radiol. 2015 Feb;12(2): 143-50     MRI BRAIN WO CONTRAST    Result Date: 1/24/2023  Acute lacunar infarct involving the right posterior limb internal capsule. More subacute appearing cortical infarcts throughout the left posterior cortical watershed territory within the parietal lobe and lesser extent right parietal lobe. No acute hemorrhage or significant mass effect. Moderate luminal narrowing of the right mid M1 segment and severe luminal narrowing of the right proximal M2 segment. There is tapering of the distal right PCA with fetal origin.  RECOMMENDATIONS: Unavailable        [place] No results found for: TIFFANIEGI  C Diff PCR No results found for: CDIFPCR  Wound culture/abscess: No results for input(s): WNDABS in the last 72 hours. Tip culture:No results for input(s): CXCATHTIP in the last 72 hours. Resident's Assessment and Plan   Consults:   Neurology     Assessment:  Subacute ischemic stroke in the setting of poorly controlled type I diabetes   Late presentation >24 hours after episode of weakness   CTA head ordered today   1/5 weakness noted on the left upper and lower extremity   4/5 on the right upper and lower extremity   Left-sided facial droop noted on exam   Concerns for dysphagia as well; new onset   CTA neck showed no high-grade stenosis or dissection of the cervical vertebral arteries   MRA neck shows acute lacunar infarct involving the right posterior limb of the internal capsule   MRI brain acute lacunar infarct involving the right posterior limb internal capsule   PT/OT ordered   Keep NPO for now   Monitor neurological status   On DAPT   Neurology is following     Type I DM, poorly controlled   History of DKA in the setting of uncontrolled diabetes  BG 400s when admitted   Concerns for DKA now   BHB elevated   BG  >450 this morning  IV fluids administered, insulin given   Continue with Lantus 17 units nightly and LDSS   Plan to continue with close monitoring for ICU transfer  Continue to monitor BG closely and BMP     Muscle spasms   Started on baclofen     Hx of TIA   TIA in 2018   Currently on DAPT   Left-sided weakness - new     Hx of HTN   Continue to monitor BG in the setting of subacute stroke    Hx of HLD - on atorvastatin     Hx of polysubstance use   UDS and SDS negative during this admission       PT/OT: Consulted   DVT ppx: Lovenox  GI ppx: Pantoprazole       Code Status: Full code   Disposition: Continue current care       Ruma Shannon MD, PGY-1  Attending physician: Dr. Ginger Branch        NOTE: This report was transcribed using voice recognition software.  Every effort was made to ensure accuracy; however, inadvertent computerized transcription errors may be present.

## 2023-01-24 NOTE — PROGRESS NOTES
SPEECH/LANGUAGE PATHOLOGY  CLINICAL ASSESSMENT OF SWALLOWING FUNCTION   and PLAN OF CARE    PATIENT NAME:  Siddharth Wallace  (male)     MRN:  [id number]    :  1970  (47 y.o.)  STATUS:  Inpatient: Room 8507/8507-B    TODAY'S DATE:  23 1500    SLP swallowing-dysphagia evaluation and treatment  Start:  23 1500,   End:  23 1500,   ONE TIME,   Standing Count:  1 Occurrences,   R         Esperanza Peña MD   REASON FOR REFERRAL: assess oropharyngeal swallow function; failed nurse swallow screen    EVALUATING THERAPIST: FATEMEH Monge                 RESULTS:    DYSPHAGIA DIAGNOSIS:   Clinical indicators of suspected pharyngeal phase dysphagia       DIET RECOMMENDATIONS:  NPO with ongoing PO analysis by SLP only to determine if PO diet can be initiated         OK for ice chips (2-3 at a time) PRN s/p oral care to promote pharyngeal muscle use, decrease risk of further muscle disuse atrophy, and thin pharyngeal secretions. Monitor respiratory status and discontinue ice chips if concern arises. Recommend staff provide ice chips via teaspoon, do not leave at bedside.         FEEDING RECOMMENDATIONS:     Assistance level:  Not applicable      Compensatory strategies recommended: Not applicable      Discussed recommendations with nursing and/or faxed report to referring provider: Yes    SPEECH THERAPY  PLAN OF CARE   The dysphagia POC is established based on physician order, dysphagia diagnosis and results of clinical assessment     Skilled SLP intervention for dysphagia management up to 5x per week until goals met, pt plateaus in function and/or discharged from hospital    Conditions Requiring Skilled Therapeutic Intervention for dysphagia:    Throat clearing during PO intake   Wet vocal quality during PO intake    Specific dysphagia interventions to include:     Trials of upgraded diet/liquid     Specific instructions for next treatment:  ongoing skilled PO analysis to determine if PO diet can be initiated   Patient Treatment Goals:    Short Term Goals:  Pt will participate in ongoing evaluation of swallow function to determine when PO diet can be safely initiated    Long Term Goals:   Pt will improve oropharyngeal swallow function to ensure airway protection during PO intake to maintain adequate nutrition/hydration and decrease signs/symptoms of aspiration to less than 1 x/day. Patient/family Goal:    To drink water    Plan of care discussed with Patient   The Patient understand(s) the diagnosis, prognosis and plan of care     Rehabilitation Potential/Prognosis: fair                    ADMITTING DIAGNOSIS: Acute CVA (cerebrovascular accident) ([facility] [address] [I63.9]  Cerebrovascular accident (CVA), unspecified mechanism ([address] [I63.9]    VISIT DIAGNOSIS:   Visit Diagnoses         Codes    Cerebrovascular accident (CVA), unspecified mechanism ([facility] [address]    -  Primary I63.9             PATIENT REPORT/COMPLAINT: denies difficulty swallowing  RN cleared patient for participation in assessment     yes     PRIOR LEVEL OF SWALLOW FUNCTION:    PAST HISTORY OF DYSPHAGIA?: none reported    Home diet: Regular consistency solids (IDDSI level 7) with  thin liquids (IDDSI level 0)  Current Diet Order:  Diet NPO    PROCEDURE:  Consistencies Administered During the Evaluation   Liquids: thin liquid and ice chips   Solids:  Not administered at this time per physician request.       Method of Intake:   cup, spoon  Self fed, Fed by clinician      Position:   Seated, upright    CLINICAL ASSESSMENT:  Oral Stage:        The oral stage of swallowing was within functional limits for consistencies administered      Pharyngeal Stage:    Wet respirations were noted after presentation of thin liquid  Wet/gurgly vocal quality was noted after presentation of thin liquid    Cognition:   Within functional limits for this exam    Oral Peripheral Examination   Assymetric     Current Respiratory Status    room air     Parameters of Speech Production  Respiration:  Indequate for speech production  Quality:   Strained, Wet vocal quality   Intensity: Within functional limits    Volitional Swallow: present     Volitional Cough:   present     Pain: No pain reported. EDUCATION:   The Speech Language Pathologist (SLP) completed education regarding results of evaluation and that intervention is warranted at this time. Learner: Patient  Education: Reviewed results and recommendations of this evaluation and Reviewed diet and strategies  Evaluation of Education:  Needs further instruction    This plan may be re-evaluated and revised as warranted. Evaluation Time includes thorough review of current medical information, gathering information on past medical history/social history and prior level of function, completion of standardized testing/informal observation of tasks, assessment of data and education on plan of care and goals. [x]The admitting diagnosis and active problem list, have been reviewed prior to initiation of this evaluation.         ACTIVE PROBLEM LIST:   Patient Active Problem List   Diagnosis    Tobacco abuse    Diabetes mellitus type 1, uncontrolled    Moderate nonproliferative diabetic retinopathy associated with type 1 diabetes mellitus (HCC)    Essential hypertension    Diabetic ketoacidosis without coma associated with type 1 diabetes mellitus (HCC)    Idiopathic peripheral neuropathy    Thrombocytopenia (HCC)    Acute ischemic multifocal multiple vascular territories stroke ([address]    Left hemiplegia (HCC)    Diabetic ketoacidosis with coma associated with type 1 diabetes mellitus (HCC)    KIRSTEN (acute kidney injury) ([address]    High anion gap metabolic acidosis    Leukocytosis    Lacunar stroke (HCC)    ETOH abuse    Type 1 diabetes mellitus with complication (HCC)    Marijuana abuse    Lactic acidosis    Acute renal insufficiency    Hyperkalemia    Hypokalemia    Hypophosphatemia    Community acquired pneumonia of right lower lobe of lung    DKA, type 1, not at goal [facility])    Mixed hyperlipidemia    PAF (paroxysmal atrial fibrillation) (HCC)    Dark stools    Urticaria    Insulin pump in place    Diabetic ketoacidosis without coma associated with diabetes mellitus due to underlying condition ([address]    Cardiac arrest ([address]    Acute respiratory failure with hypoxia (HCC)    Hypothermia    Shock ([address]    Severe protein-calorie malnutrition (HCC)    Normochromic normocytic anemia    Syncope and collapse    Pneumonia    Diabetic acidosis without coma (HCC)    Poorly controlled type 1 diabetes mellitus ([address]    Type 1 diabetes mellitus with ketoacidosis without coma ([address]    Acute CVA (cerebrovascular accident) ([address]    Left hemiparesis ([address]    Diabetes mellitus type I ([address]    Cerebrovascular accident (CVA) ([address]         CPT code:  [address]  [facility]  bedside swallow sayra Gamez., CCC-SLP  Speech-Language Pathologist  [id number]  1/24/2023

## 2023-01-24 NOTE — PROGRESS NOTES
Physical Therapy  Physical Therapy Initial Assessment     Name: Alec Washington  : 1970  MRN: [id number]      Date of Service: 2023    Evaluating PT:  Sonya Joel PT, DPT    Room #:  9202/4797-M  Diagnosis:  Acute CVA (cerebrovascular accident) [facility]) [I63.9]  Cerebrovascular accident (CVA), unspecified mechanism ([facility] [address] [I63.9]  PMHx/PSHx:  DM, HTN, stroke  Procedure/Surgery:  N/A  Precautions:  fall risk, L hemiplegia, L sided neglect, TSM, bed alarm  Equipment Needs:  TBD    SUBJECTIVE:    Pt a questionable historian, pt reportedly lives alone in a 2nd floor apt with 3+10 steps to enter and 1 handrail. Pt ambulated with ww PTA. OBJECTIVE:   Initial Evaluation  Date: 23 Treatment Short Term/ Long Term   Goals   AM-PAC 6 Clicks      Was pt agreeable to Eval/treatment? yes     Does pt have pain?  No pain     Bed Mobility  Rolling: mod A  Supine to sit: mod A  Sit to supine: mod A  Scooting: mod A  Rolling: SBA  Supine to sit: SBA  Sit to supine: SBA  Scooting: SBA   Transfers Sit to stand: max A  Stand to sit: mod A  Stand pivot: NT  Sit to stand: SBA  Stand to sit: SBA  Stand pivot: SBA with AAD   Ambulation    2' with max Ax2 with arm in arm A  (Side steps to [facility])  25'+ with AAD SBA   Stair negotiation: ascended and descended NT  10 steps with 1 handrail SBA     Strength/ROM:   LLE grossly 0/5, flexor tone present  RLE grossly 4/5  LLE AROM limited by weakness  RLE AROM WFL    Balance:   Static Sitting: min/mod A  Dynamic Sitting: mod A  Static Standing: max A  Dynamic Standing: max Ax2    Pt is A & O x 3  Sensation:  Pt with inconsistent reports during testing of LLE  Edema:  unremarkable    Therapeutic Exercises:    Bed mobility: supine<>sit, cued for EOB positioning  Transfers: STSx2, cued for hand placement and postural correction  Ambulation: 2' with arm in arm A  BLE AROM    Patient education  Pt educated on role of PT, importance of functional mobility during hospital stay, safety with functional mobility    Patient response to education:   Pt verbalized understanding Pt demonstrated skill Pt requires further education in this area   yes partial yes     ASSESSMENT:    Conditions Requiring Skilled Therapeutic Intervention:    [x]Decreased strength     [x]Decreased ROM  [x]Decreased functional mobility  [x]Decreased balance   [x]Decreased endurance   [x]Decreased posture  [x]Decreased sensation  [x]Decreased coordination   []Decreased vision  [x]Decreased safety awareness   []Increased pain       Comments:    Pt supine in bed upon entering, agreeable to participate. Pt with significant LUE/LLE weakness, assistance provided with trunk and BLE to transfer pt to EOB. Pt sitting upright with poor static sitting balance, LOB to L. Pt's LUE positioned to improve weightbearing through limb and improve balance, also cued for trunk control to maintain balance. Pt reporting dizziness with position change. Pt provided time to allow symptoms to subside. Pt agreeable to stand, completing 2 STS transfers. Pt's L knee blocked to prevent buckling. Pt was cued for sequencing and BLE positioning to side step to [facility]. Pt was able to advance RLE with L knee blocked, however could not advance LLE, assistance provided to position appropriately. Pt was transferred back ot supine. Pt with c/o fatigue after session. Pt remained in bed with all needs met and call bell in reach prior to exiting.      Treatment:  Patient practiced and was instructed in the following treatment:    Bed mobility training - pt given verbal and tactile cues to facilitate proper sequencing and safety during rolling and supine>sit as well as provided with physical assistance to complete task   Sitting EOB for >5 minutes for upright tolerance, postural awareness and BLE ROM  STS transfer training - pt educated on proper hand and foot placement, safety and sequencing, and use of verbal and tactile cues to safely complete sit<>stand and pivot transfers with physical assistance to complete task safely   Skilled positioning - Pt placed in the chair position with pillows utilized to facilitate upright posture, joint and skin integrity, and interaction with environment. Pt's/ family goals   Functional independence    Prognosis is fair for reaching above PT goals. Patient and or family understand(s) diagnosis, prognosis, and plan of care. yes    PHYSICAL THERAPY PLAN OF CARE:    PT POC is established based on physician order and patient diagnosis     Referring provider/PT Order:  Jose Alejandro MD  Diagnosis:  Acute CVA (cerebrovascular accident) [facility]) [I63.9]  Cerebrovascular accident (CVA), unspecified mechanism ([facility] [address] [I63.9]  Specific instructions for next treatment:  Progress as tolerated    Current Treatment Recommendations:     [x] Strengthening to improve independence with functional mobility   [] ROM to improve independence with functional mobility   [x] Balance Training to improve static/dynamic balance and to reduce fall risk  [x] Endurance Training to improve activity tolerance during functional mobility   [x] Transfer Training to improve safety and independence with all functional transfers   [x] Gait Training to improve gait mechanics, endurance and assess need for appropriate assistive device  [x] Stair Training in preparation for safe discharge home and/or into the community   [] Positioning to prevent skin breakdown and contractures  [x] Safety and Education Training   [x] Patient/Caregiver Education   [] HEP  [] Other     PT long term treatment goals are located in above grid    Frequency of treatments: 2-5x/week x 1-2 weeks.     Time in  0940  Time out  1010    Total Treatment Time  15 minutes     Evaluation Time includes thorough review of current medical information, gathering information on past medical history/social history and prior level of function, completion of standardized testing/informal observation of tasks, assessment of data and education on plan of care and goals.     CPT codes:  [x] Low Complexity PT evaluation 00828  [] Moderate Complexity PT evaluation 90027  [] High Complexity PT evaluation 98300  [] PT Re-evaluation 11201  [] Gait training 26644 -- minutes  [] Manual therapy [id number] -- minutes  [x] Therapeutic activities 46489 15 minutes  [] Therapeutic exercises 55557 -- minutes  [] Neuromuscular reeducation 92450 -- minutes     Nellie Mcnally, PT, DPT  [id number]

## 2023-01-24 NOTE — CONSULTS
[facility]  Neurology Consult    Date:  1/24/2023  Patient Name:  Lucien Angel  YOB: 1970  MRN: [id number]     PCP:  Cesar Espinoza MD   Referring:  No ref. provider found      Chief Complaint: Stroke    History obtained from: Patient, chart    Assessment  Lucien Angel is a 47 y.o. male with PMHx of type 1 diabetes mellitus, HTN, HLD, and EtOH abuse admitted for stroke. Patient's clinical exam, labs, and imaging studies are consistent with acute lacunar stroke involving the posterior limb of the right internal capsule. Suspect etiology is multifactorial in nature in the setting of patient's history of tobacco use, uncontrolled Type 1 DM, HLD, and HTN. Plan  CT Head w/o contrast shows atrophy and periventricular leukomalacia and multiple old lacunar infarcts within the right and left basal ganglia but no acute intracranial abnormality  CT Neck w/ contrast shows no stenosis or dissection of the cervical internal carotid arteries  MRI Brain w/o contrast shows acute lacunar infarct involving the right posterior limb of the internal capsule and more subacute appearing cortical infarcts throughout the left posterior cortical watershed territory within the parietal lobe and less extent right parietal lobe. It also shows moderate luminal narrowing of the right mid M1 segment and severe luminal narrowing of the right proximal M2 segment  Continue ASA, Plavix, and Lipitor  PT/OT/SLP      History of Present Illness:  Lucien Angel is a 47 y.o. right handed male with PMHx significant for type 1 diabetes mellitus, HTN, HLD, lacunar infarcts, and EtOH abuse presenting for evaluation of stroke. Patient states that he woke up on 1/21 with weakness in his left arm and leg. As the day progressed, his weakness worsened. On 1/22, patient states he began experiencing slurred speech and his mother grew concerned and called EMS.  Patient states he had a stroke \"a few months ago\" but his symptoms resolved completely. He complains of weakness in his left arm and leg but denies any new sensation deficits. He also states that he noticed facial droop on the left side and has been having difficulty swallowing liquids. He currently denies any fever, chills, n/v/d, chest pain, shortness of breath, or headache. Patient's initial NIH stroke scale score was 9. Review of Systems:  Constitutional  Weight loss: No  Fever: No    Eyes  Double Vision: No  Visions loss: No    Ears, Nose, Mouth, and Throat  Difficulty swallowing: Yes    Cardiovascular  Chest Pain: No    Respiratory  Shortness of Breath: No    Gastrointestinal  Abdominal Pain: No    Genitourinary  Difficulty with Urination: No    Integumentary  Rash: No    Musculoskeletal  Back Pain: No    Neurological  Headaches: No  Weakness: Yes  Numbness: No  Seizures: No  Difficulty with Memory: No  Further symptoms noted in HPI    Psychiatric  Anxiety: No  Depression: No    Complete 10-point review of systems is negative except as noted above in my HPI      Medical History:   Past Medical History:   Diagnosis Date    Alcoholism ([facility]     Cocaine abuse ([facility]     Diabetes mellitus ([facility]     Hypertension     Idiopathic peripheral neuropathy 9/21/2016    Lacunar stroke ([facility]     Marijuana abuse         Surgical History:   History reviewed. No pertinent surgical history. Family History:  Family History   Problem Relation Age of Onset    Diabetes type 2  Mother     Cancer Maternal Grandmother     Diabetes type 2  Nephew        Social History:  Social History     Tobacco Use    Smoking status: Every Day     Packs/day: 1.50     Years: 15.00     Pack years: 22.50     Types: Cigarettes    Smokeless tobacco: Never   Substance Use Topics    Alcohol use:  Yes     Alcohol/week: 6.0 standard drinks     Types: 6 Cans of beer per week    Drug use: Yes     Types: Marijuana Dina Postin)     Comment: smokes week a couple times a day per pt        Current Medications:      Current but no other abnormalities. All sensory modalities were intact in his face. There was spasticity in the left arm and left leg, but none on the right side. There was normal bulk on the right side, but slightly diminished on the left. He did not move his left side, but displayed 5/5 strength on the right. There were no abnormal movements. Light touch was intact in all limbs. Vibration and pinprick were slightly decreased at both ankles. Finger-nose heel-to-shin testing were performed well on the right side, as well as rapid alternating movements and fine finger movements. He did not stand for this interview. The patient clearly displayed left hemiplegia, with associated spasticity. Labs  Recent Labs     01/23/23  1114 01/24/23  0449 01/24/23  0706 01/24/23  1115 01/24/23  1327    140  --   --  141   K 4.2 4.4  --   --  4.2   * 103  --   --  109*   CO2 21* 13*  --   --  18*   BUN 16 29*  --   --  37*   CREATININE 0.8 1.0  --   --  1.4*   GLUCOSE 198* 438*  --    < > 377*   CALCIUM 9.3 9.3  --   --  8.9   PROT 7.1 6.6  --   --   --    LABALBU 4.3 4.3  --   --   --    BILITOT 0.8 1.1  --   --   --    ALKPHOS 107 115  --   --   --    AST 19 12  --   --   --    ALT 14 13  --   --   --    WBC 8.3 14.0*  --   --   --    RBC 4.59 4.77  --   --   --    HGB 13.3 13.9  --   --   --    HCT 39.5 43.5  --   --   --    MCV 86.1 91.2  --   --   --    MCH 29.0 29.1  --   --   --    MCHC 33.7 32.0  --   --   --    RDW 14.4 14.5  --   --   --    * 448  --   --   --    MPV 8.7 9.1  --   --   --    LACTA  --   --  1.4  --   --    HDL 70  --   --   --   --    LDLCALC 123*  --   --   --   --    LABA1C 9.0*  --   --   --   --     < > = values in this interval not displayed. Imaging  MRA HEAD WO CONTRAST   Final Result   Acute lacunar infarct involving the right posterior limb internal capsule.       More subacute appearing cortical infarcts throughout the left posterior   cortical watershed territory within the parietal lobe and lesser extent right   parietal lobe. No acute hemorrhage or significant mass effect. Moderate luminal narrowing of the right mid M1 segment and severe luminal   narrowing of the right proximal M2 segment. There is tapering of the distal right PCA with fetal origin. RECOMMENDATIONS:   Unavailable         MRI BRAIN WO CONTRAST   Final Result   Acute lacunar infarct involving the right posterior limb internal capsule. More subacute appearing cortical infarcts throughout the left posterior   cortical watershed territory within the parietal lobe and lesser extent right   parietal lobe. No acute hemorrhage or significant mass effect. Moderate luminal narrowing of the right mid M1 segment and severe luminal   narrowing of the right proximal M2 segment. There is tapering of the distal right PCA with fetal origin. RECOMMENDATIONS:   Unavailable         CTA NECK W CONTRAST   Final Result   No hemodynamically significant stenosis or dissection of the cervical   internal carotid arteries. No high-grade stenosis or dissection of the cervical vertebral arteries. 15 mm right thyroid nodule; see recommendation below. Low-density circumscribed structures in the bilateral cheek subcutaneous   tissues abutting the skin are favored to represent epidermal inclusion cysts   or other benign dermal lesions (series 301, images 164 and 175); recommend   nonemergent dermatologic evaluation. Small fluid level in the left sphenoid sinus; fluid levels can be seen in the   setting acute sinusitis. RECOMMENDATION:   1.5 cm incidental right thyroid nodule. Recommend thyroid US. Reference: J Am Adria Radiol. 2015 Feb;12(2): 143-50         XR CHEST PORTABLE   Final Result   No acute process. CT HEAD WO CONTRAST   Final Result   1. There is no acute intracranial abnormality. Specifically, there is no   intracranial hemorrhage.    2. Atrophy and periventricular leukomalacia,   3. Multiple old lacunar infarcts within the right and left basal ganglia more   prominent within the right basal ganglia. .               All labs and imaging studies were reviewed independently today. This individual presented with left hemiplegia, consistent with lacunar infarction of the posterior limb of the right internal capsule. His imaging studies revealed significant remote lacunar disease, consistent with small vessel disease. Angiographic studies revealed large vessel disease as well. I agree with dual antiplatelet therapy for 3 weeks, as well as aggressive statin use. However, he must stop smoking, drinking and using marijuana. He also must control his blood sugars better. He is at high risk of recurrent disease and death. His left spasticity is from previous lacunar infarcts. He also suffers from diabetic peripheral neuropathy. He is currently stable medically despite his multiple comorbidities. He requires physical medicine, occupational Therapy and physical therapy evaluation. I feel he requires assisted living upon discharge. I examined and interviewed this patient in detail, as well as reviewed his imaging studies and chart, with the residents. Electronically signed by Yanira White on 1/24/2023 at 3:09 PM       I spent 80 minutes with the patient with over 50 % spent in counseling and disease management. All patient issues were addressed and all questions were answered.   ALKPHOS 107 115  --   --   --    AST 19 12  --   --   --    ALT 14 13  --   --   --    WBC 8.3 14.0*  --   --   --    RBC 4.59 4.77  --   --   --    HGB 13.3 13.9  --   --   --    HCT 39.5 43.5  --   --   --    MCV 86.1 91.2  --   --   --    MCH 29.0 29.1  --   --   --    MCHC 33.7 32.0  --   --   --    RDW 14.4 14.5  --   --   --    * 448  --   --   --    MPV 8.7 9.1  --   --   --    LACTA  --   --  1.4  --   --    HDL 70  --   --   --   --    LDLCALC 123*  --   --   --   --    LABA1C 9.0*  --   --   --   --     < > = values in this interval not displayed.       Imaging  MRA HEAD WO CONTRAST   Final Result   Acute lacunar infarct involving the right posterior limb internal capsule.      More subacute appearing cortical infarcts throughout the left posterior   cortical watershed territory within the parietal lobe and lesser extent right   parietal lobe.      No acute hemorrhage or significant mass effect.      Moderate luminal narrowing of the right mid M1 segment and severe luminal   narrowing of the right proximal M2 segment.      There is tapering of the distal right PCA with fetal origin.      RECOMMENDATIONS:   Unavailable         MRI BRAIN WO CONTRAST   Final Result   Acute lacunar infarct involving the right posterior limb internal capsule.      More subacute appearing cortical infarcts throughout the left posterior   cortical watershed territory within the parietal lobe and lesser extent right   parietal lobe.      No acute hemorrhage or significant mass effect.      Moderate luminal narrowing of the right mid M1 segment and severe luminal   narrowing of the right proximal M2 segment.      There is tapering of the distal right PCA with fetal origin.      RECOMMENDATIONS:   Unavailable         CTA NECK W CONTRAST   Final Result   No hemodynamically significant stenosis or dissection of the cervical   internal carotid arteries.      No high-grade stenosis or dissection of the cervical vertebral  arteries. 15 mm right thyroid nodule; see recommendation below. Low-density circumscribed structures in the bilateral cheek subcutaneous   tissues abutting the skin are favored to represent epidermal inclusion cysts   or other benign dermal lesions (series 301, images 164 and 175); recommend   nonemergent dermatologic evaluation. Small fluid level in the left sphenoid sinus; fluid levels can be seen in the   setting acute sinusitis. RECOMMENDATION:   1.5 cm incidental right thyroid nodule. Recommend thyroid US. Reference: J Am Adria Radiol. 2015 Feb;12(2): 143-50         XR CHEST PORTABLE   Final Result   No acute process. CT HEAD WO CONTRAST   Final Result   1. There is no acute intracranial abnormality. Specifically, there is no   intracranial hemorrhage. 2. Atrophy and periventricular leukomalacia,   3. Multiple old lacunar infarcts within the right and left basal ganglia more   prominent within the right basal ganglia.    .               All labs and imaging studies reviewed independently today            Electronically signed by Sandee North on 1/24/2023 at 3:09 PM

## 2023-01-24 NOTE — PROGRESS NOTES
[address]        Date:2023                                                  Patient Name: Davi Jeffrey    MRN: [id number]    : 1970    Room: [facility]      Evaluating OT: Ronnie Quintanilla, [address] Babs Fernández OTR/L; [id number]      Referring Provider: Bebeto Chen MD    Specific Provider Orders/Date: OT Eval and Treat 23      Diagnosis: Acute CVA (cerebrovascular accident)   Left hemiparesis  Diabetes mellitus type I  Cerebrovascular accident (CVA)    Surgery: NONE THIS ADMISSION     Pertinent Medical History:  has a past medical history of Alcoholism ([facility] Cocaine abuse ([facility] Diabetes mellitus ([facility] Hypertension, Idiopathic peripheral neuropathy, Lacunar stroke ([facility] and Marijuana abuse.       Reason for Admission: LUE flaccid and facial droop for 2 days     Recommended Adaptive Equipment:  TBD pending progression     Precautions:  Fall Risk, Cognition, Bed Alarm, L hemiplegia, L sided neglect, TSM     Assessment of current deficits:    [x] Functional mobility  [x]ADLs  [x] Strength               [x]Cognition    [x] Functional transfers   [x] IADLs         [x] Safety Awareness   [x]Endurance    [x] Fine Coordination              [x] Balance      [] Vision/perception   []Sensation     []Gross Motor Coordination  [] ROM  [] Delirium                   [] Motor Control     OT PLAN OF CARE   OT POC based on physician orders, patient diagnosis and results of clinical assessment    Frequency/Duration: 1-3 days/wk for 2 weeks PRN   Specific OT Treatment Interventions to include:   * Instruction/training on adapted ADL techniques and AE recommendations to increase functional independence within precautions       * Training on energy conservation strategies, correct breathing pattern and techniques to improve independence/tolerance for self-care routine  * Functional transfer/mobility training/DME recommendations for increased independence, safety, and fall prevention  * Patient/Family education to increase follow through with safety techniques and functional independence  * Recommendation of environmental modifications for increased safety with functional transfers/mobility and ADLs  * Therapeutic exercise to improve motor endurance, ROM, and functional strength for ADLs/functional transfers  * Therapeutic activities to facilitate/challenge dynamic balance, stand tolerance for increased safety and independence with ADLs    Modified Kaylin Scale (MRS)  Score     Description  0             No symptoms  1             No significant disability despite symptoms  2             Slight disability; able to look after own affairs  3             Moderate disability; able to ambulate without assist/ requires assist with ADLs  4             Moderate/Severe disability;requires assist to ambulate/assist with ADLs  5             Severe disability;bedridden/incontinent   6               Score: 4      Home Living: Pt lives in 2nd floor apartment with 3 steps and [facility] to enter building. 10 steps BHR to access unit.     Bathroom setup: walkin shower with shower chair   Equipment owned: ww, cane    Prior Level of Function: IND with ADLs    Mod A with IADLs - mother assists with groceries and cooking  ambulated with ww prior  Driving: no - mother and family assist  Occupation: none stated    Pain Level: 0/10  Cognition: A&O: 4/4  Follows single 1 step directions - increased time for processing   Memory:  fair   Sequencing:  fair   Problem solving:  fair   Judgement/safety:  fair     Functional Assessment:  AM-PAC Daily Activity Raw Score:    Initial Eval Status  Date: 23 Treatment Status  Date: STGs = LTGs  Time frame: 10-14 days   Feeding Minimal Assist   Moderate Pony    Grooming Minimal Assist   Moderate Pony    UB Dressing Maximal Assist   Minimal Assist    LB Dressing Dependent   Minimal Assist    Bathing Dependent  Minimal Assist    Toileting Dependent   Minimal Assist    Bed Mobility  Log Roll: Mod A  Supine to sit: Mod A   Sit to supine: Mod A   Supine to sit: SBA   Sit to supine: SBA    Functional Transfers Sit to stand: Max A   Stand to sit:MOD A  Stand pivot: NT  Commode: NT  Sit to stand:SBA   Stand to sit:SBA  Stand pivot: SBA with AD  Commode: SBA with AD    Functional Mobility Max A x 2 with arm in arm assist   ~2 side steps right toward HOB  SBA with AD    Balance Sitting:     Static - Min A <> Mod A  L lateral lean     Dynamic - Mod A  Standing: Max A x 2  Sitting:  Static: SBA  Dynamic: SBA  Standing: SBAwith AD   Activity Tolerance FAIR  Limited d/t fatigue/weakness   Extremely lethargic required verbal cues for keeping eyes open and attending to task  GOOD   Visual/  Perceptual Glasses: yes - not present     Pt reports blurry vision d/t glasses not present    visual tracking deficits noted during H test          Vitals   WFL          BUE  ROM/Strength/  Fine motor Coordination Hand dominance:Right     RUE: ROM WFL     Strength: 4-/5      Strength: FAIR     Coordination:  FAIR     LUE: flexor tone present     Strength: 0/5      Strength: NA     Coordination: NA  increase BUE muscle strength for improved indep with functional transfers     Hearing: WFL   Sensation:  No c/o numbness or tingling   Tone: WFL   Edema: unremarkable    Patient/Family Goal: to get better   Based on patient's functional performance as stated below and level of assistance needed prior to admission, this therapist believes that the patient would benefit from further skilled OT following hospital stay in an effort to increase safety, functional independence, and quality of life. Comment: Cleared by RN to see pt. Upon arrival patient lying supine in bed and agreeable to OT session.  At end of session, patient lying supine in bed with call light and phone within reach, all lines and tubes intact. Overall patient demonstrated  decreased independence and safety during completion of ADL/functional transfer/mobility tasks. Pt would benefit from continued skilled OT to increase safety and independence with completion of ADL/IADL tasks for functional independence and quality of life. Treatment: Required re-orientation to year/month/place. Pt required vc's for proper technique/safety with hand placement/body mechanics/posture for bed mobility/ADLs/functional tranfers/mobility/ww management. Pt required vc's for sequencing/initiation of ADLs/functional transfers. Pt able to  sit EOB ~10 mins to increase core strength/balance/activity tolerance for ease with ADLs. Pt required increased time to complete ADLs/functional transfers due to management of multiple lines. Pt required skilled monitoring of SpO2 during session and education on energy conservation techniques. Pt required rest breaks during session and educated on pursed lip breathing. Pt appeared to have tolerated session well and appears motivated/cooperative/pleasant . Pt instructed on use of call light for assistance and fall prevention. Pt demo'ing fair understanding of education provided. Continue to educate. Rehab Potential: Good  for established goals       LTG: maximize independence with ADLs to return to PLOF    Patient and/or family were instructed on functional diagnosis, prognosis/goals and OT plan of care. Demonstrated  fAIR understanding. [] Malnutrition indicators have been identified and nursing has been notified to ensure a dietitian consult is ordered. Eval Complexity: Low     Evaluation time includes thorough review of current medical information, gathering information on past medical & social history & PLOF, completion of standardized testing, informal observation of tasks, consultation with other medical professions/disciplines, assessment of data & development of POC/goals.      Time In: 0940  Time Out: 1013  Total Treatment Time: 18    Min Units   OT Eval Low 30919  X     OT Eval Medium 04773      OT Eval High 96923      OT Re-Eval [id number]       Therapeutic Ex [id number]       Therapeutic Activities [id number]       ADL/Self Care 46744  18  1   Orthotic Management 89802       Manual [id number]     Neuro Re-Ed [id number]       Non-Billable Time          Evaluation Time additionally includes thorough review of current medical information, gathering information on past medical history/social history and prior level of function, interpretation of standardized testing/informal observation of tasks, assessment of data and development of plan of care and goals.           SHAYNA Crain OTR/L; [id number]

## 2023-01-24 NOTE — PLAN OF CARE
Problem: Chronic Conditions and Co-morbidities  Goal: Patient's chronic conditions and co-morbidity symptoms are monitored and maintained or improved  Outcome: Progressing     Problem: Skin/Tissue Integrity  Goal: Absence of new skin breakdown  1/24/2023 0421 by Chintan Allen RN  Outcome: Progressing  1/23/2023 2204 by Tena Mckeon RN  Outcome: Progressing     Problem: Safety - Adult  Goal: Free from fall injury  1/24/2023 0421 by Chintan Allen RN  Outcome: Progressing  1/23/2023 2204 by Tena Mckeon RN  Outcome: Progressing

## 2023-01-25 ENCOUNTER — APPOINTMENT (OUTPATIENT)
Dept: GENERAL RADIOLOGY | Age: 53
DRG: 064 | End: 2023-01-25
Payer: COMMERCIAL

## 2023-01-25 ENCOUNTER — APPOINTMENT (OUTPATIENT)
Dept: ULTRASOUND IMAGING | Age: 53
DRG: 064 | End: 2023-01-25
Payer: COMMERCIAL

## 2023-01-25 PROBLEM — I63.81 ACUTE LACUNAR INFARCTION (HCC): Status: ACTIVE | Noted: 2023-01-23

## 2023-01-25 LAB
ALBUMIN SERPL-MCNC: 3.8 G/DL (ref 3.5–5.2)
ALP BLD-CCNC: 84 U/L (ref 40–129)
ALT SERPL-CCNC: 11 U/L (ref 0–40)
ANION GAP SERPL CALCULATED.3IONS-SCNC: 13 MMOL/L (ref 7–16)
AST SERPL-CCNC: 13 U/L (ref 0–39)
BASOPHILS ABSOLUTE: 0.03 E9/L (ref 0–0.2)
BASOPHILS RELATIVE PERCENT: 0.3 % (ref 0–2)
BILIRUB SERPL-MCNC: 0.8 MG/DL (ref 0–1.2)
BUN BLDV-MCNC: 31 MG/DL (ref 6–20)
CALCIUM SERPL-MCNC: 8.5 MG/DL (ref 8.6–10.2)
CHLORIDE BLD-SCNC: 111 MMOL/L (ref 98–107)
CO2: 20 MMOL/L (ref 22–29)
CREAT SERPL-MCNC: 0.8 MG/DL (ref 0.7–1.2)
EOSINOPHILS ABSOLUTE: 0.23 E9/L (ref 0.05–0.5)
EOSINOPHILS RELATIVE PERCENT: 2.6 % (ref 0–6)
GFR SERPL CREATININE-BSD FRML MDRD: >60 ML/MIN/1.73
GLUCOSE BLD-MCNC: 185 MG/DL (ref 74–99)
HCT VFR BLD CALC: 34.4 % (ref 37–54)
HEMOGLOBIN: 11.5 G/DL (ref 12.5–16.5)
IMMATURE GRANULOCYTES #: 0.02 E9/L
IMMATURE GRANULOCYTES %: 0.2 % (ref 0–5)
LYMPHOCYTES ABSOLUTE: 1.31 E9/L (ref 1.5–4)
LYMPHOCYTES RELATIVE PERCENT: 14.6 % (ref 20–42)
MCH RBC QN AUTO: 28.9 PG (ref 26–35)
MCHC RBC AUTO-ENTMCNC: 33.4 % (ref 32–34.5)
MCV RBC AUTO: 86.4 FL (ref 80–99.9)
METER GLUCOSE: 169 MG/DL (ref 74–99)
METER GLUCOSE: 189 MG/DL (ref 74–99)
METER GLUCOSE: 191 MG/DL (ref 74–99)
METER GLUCOSE: 200 MG/DL (ref 74–99)
METER GLUCOSE: 244 MG/DL (ref 74–99)
METER GLUCOSE: 296 MG/DL (ref 74–99)
MONOCYTES ABSOLUTE: 0.64 E9/L (ref 0.1–0.95)
MONOCYTES RELATIVE PERCENT: 7.1 % (ref 2–12)
NEUTROPHILS ABSOLUTE: 6.75 E9/L (ref 1.8–7.3)
NEUTROPHILS RELATIVE PERCENT: 75.2 % (ref 43–80)
PDW BLD-RTO: 14.6 FL (ref 11.5–15)
PLATELET # BLD: 388 E9/L (ref 130–450)
PMV BLD AUTO: 9.3 FL (ref 7–12)
POTASSIUM REFLEX MAGNESIUM: 4.1 MMOL/L (ref 3.5–5)
RBC # BLD: 3.98 E12/L (ref 3.8–5.8)
SODIUM BLD-SCNC: 144 MMOL/L (ref 132–146)
TOTAL PROTEIN: 5.9 G/DL (ref 6.4–8.3)
WBC # BLD: 9 E9/L (ref 4.5–11.5)

## 2023-01-25 PROCEDURE — U0003 INFECTIOUS AGENT DETECTION BY NUCLEIC ACID (DNA OR RNA); SEVERE ACUTE RESPIRATORY SYNDROME CORONAVIRUS 2 (SARS-COV-2) (CORONAVIRUS DISEASE [COVID-19]), AMPLIFIED PROBE TECHNIQUE, MAKING USE OF HIGH THROUGHPUT TECHNOLOGIES AS DESCRIBED BY CMS-2020-01-R: HCPCS

## 2023-01-25 PROCEDURE — 2580000003 HC RX 258

## 2023-01-25 PROCEDURE — 99231 SBSQ HOSP IP/OBS SF/LOW 25: CPT | Performed by: INTERNAL MEDICINE

## 2023-01-25 PROCEDURE — 97530 THERAPEUTIC ACTIVITIES: CPT

## 2023-01-25 PROCEDURE — 6370000000 HC RX 637 (ALT 250 FOR IP)

## 2023-01-25 PROCEDURE — C9113 INJ PANTOPRAZOLE SODIUM, VIA: HCPCS

## 2023-01-25 PROCEDURE — 2060000000 HC ICU INTERMEDIATE R&B

## 2023-01-25 PROCEDURE — 6360000002 HC RX W HCPCS

## 2023-01-25 PROCEDURE — 36415 COLL VENOUS BLD VENIPUNCTURE: CPT

## 2023-01-25 PROCEDURE — 92611 MOTION FLUOROSCOPY/SWALLOW: CPT | Performed by: SPEECH-LANGUAGE PATHOLOGIST

## 2023-01-25 PROCEDURE — S5553 INSULIN LONG ACTING 5 U: HCPCS | Performed by: STUDENT IN AN ORGANIZED HEALTH CARE EDUCATION/TRAINING PROGRAM

## 2023-01-25 PROCEDURE — U0005 INFEC AGEN DETEC AMPLI PROBE: HCPCS

## 2023-01-25 PROCEDURE — 99232 SBSQ HOSP IP/OBS MODERATE 35: CPT

## 2023-01-25 PROCEDURE — 6370000000 HC RX 637 (ALT 250 FOR IP): Performed by: STUDENT IN AN ORGANIZED HEALTH CARE EDUCATION/TRAINING PROGRAM

## 2023-01-25 PROCEDURE — 97535 SELF CARE MNGMENT TRAINING: CPT

## 2023-01-25 PROCEDURE — 80053 COMPREHEN METABOLIC PANEL: CPT

## 2023-01-25 PROCEDURE — 76536 US EXAM OF HEAD AND NECK: CPT

## 2023-01-25 PROCEDURE — 82962 GLUCOSE BLOOD TEST: CPT

## 2023-01-25 PROCEDURE — 74230 X-RAY XM SWLNG FUNCJ C+: CPT

## 2023-01-25 PROCEDURE — 85025 COMPLETE CBC W/AUTO DIFF WBC: CPT

## 2023-01-25 PROCEDURE — 92526 ORAL FUNCTION THERAPY: CPT | Performed by: SPEECH-LANGUAGE PATHOLOGIST

## 2023-01-25 RX ORDER — ATORVASTATIN CALCIUM 40 MG/1
80 TABLET, FILM COATED ORAL NIGHTLY
Status: DISCONTINUED | OUTPATIENT
Start: 2023-01-25 | End: 2023-01-27 | Stop reason: HOSPADM

## 2023-01-25 RX ADMIN — SODIUM CHLORIDE, PRESERVATIVE FREE 10 ML: 5 INJECTION INTRAVENOUS at 08:48

## 2023-01-25 RX ADMIN — SODIUM CHLORIDE, PRESERVATIVE FREE 40 MG: 5 INJECTION INTRAVENOUS at 08:43

## 2023-01-25 RX ADMIN — BACLOFEN 5 MG: 10 TABLET ORAL at 21:34

## 2023-01-25 RX ADMIN — SODIUM CHLORIDE, PRESERVATIVE FREE 10 ML: 5 INJECTION INTRAVENOUS at 21:34

## 2023-01-25 RX ADMIN — INSULIN LISPRO 2 UNITS: 100 INJECTION, SOLUTION INTRAVENOUS; SUBCUTANEOUS at 16:38

## 2023-01-25 RX ADMIN — INSULIN GLARGINE-YFGN 17 UNITS: 100 INJECTION, SOLUTION SUBCUTANEOUS at 21:34

## 2023-01-25 RX ADMIN — ENOXAPARIN SODIUM 40 MG: 100 INJECTION SUBCUTANEOUS at 08:43

## 2023-01-25 RX ADMIN — ATORVASTATIN CALCIUM 80 MG: 40 TABLET, FILM COATED ORAL at 21:35

## 2023-01-25 ASSESSMENT — PAIN SCALES - GENERAL
PAINLEVEL_OUTOF10: 0
PAINLEVEL_OUTOF10: 0

## 2023-01-25 NOTE — PROGRESS NOTES
[facility]  Internal Medicine Residency Program  History and Physical    Patient:  Danny Romano 47 y.o. male MRN: [id number]     Date of Service: 1/25/2023    Hospital Day: 3      Chief complaint: had concerns including Extremity Weakness (Left upper and lower extremity flaccid, facial droop). History of Present Illness   The patient is a 47 y.o. male seen and examined this morning. Patient reports twitching in his LUE as well as a feeling of \"nerve pain\" described as shooting pain down his arm. He asks if he can eat yet and was assured as soon as it is safe a diet will be started for him. No other complaints. Past Medical History:      Diagnosis Date    Alcoholism ([facility] [address]     Cocaine abuse ([facility] [address]     Diabetes mellitus ([facility] [address]     Hypertension     Idiopathic peripheral neuropathy 9/21/2016    Lacunar stroke ([facility] [address]     Marijuana abuse        Past Surgical History:    History reviewed. No pertinent surgical history. Medications Prior to Admission:    Prior to Admission medications    Medication Sig Start Date End Date Taking?  Authorizing Provider   Insulin Disposable Pump (OMNIPOD 5 G6 INTRO, GEN 5,) KIT To use as directed 11/17/22   Noah Johnson MD   Insulin Disposable Pump (OMNIPOD 5 G6 POD, GEN 5,) MISC To change every 72hrs 11/17/22   Noah Johnson MD   Continuous Blood Gluc Transmit (DEXCOM G6 TRANSMITTER) MISC To change every 90 days 11/17/22   Noah Johnson MD   Continuous Blood Gluc Sensor (DEXCOM G6 SENSOR) MISC To change eery 10 days 11/17/22   Noah Johnson MD   insulin glargine [facility]) 100 UNIT/ML injection pen Inject 17 Units into the skin nightly 11/16/22   Liss Rios MD   insulin lispro, 1 Unit Dial, ([facility]) 100 UNIT/ML SOPN Inject 4 Units into the skin 3 times daily (before meals) 11/16/22   Liss Rios MD   lisinopril (PRINIVIL;ZESTRIL) 5 MG tablet Take 1 tablet by mouth daily 11/16/22 12/16/22  Liss Rios MD atorvastatin (LIPITOR) 40 MG tablet Take 1 tablet by mouth nightly 11/16/22   Bubba Womack MD   Insulin Pen Needle 32G X 5 MM MISC 1 each by Does not apply route daily 11/16/22   Bubba Womack MD   aspirin 81 MG EC tablet Take 1 tablet by mouth in the morning. 8/10/22   Marianne Huerta MD   albuterol sulfate  (90 Base) MCG/ACT inhaler Inhale 2 puffs into the lungs every 6 hours as needed for Wheezing or Shortness of Breath 9/28/21   Mecca Yang MD       Allergies:  Metoprolol    Social History:   TOBACCO:   reports that he has been smoking cigarettes. He has a 22.50 pack-year smoking history. He has never used smokeless tobacco.  ETOH:   reports current alcohol use of about 6.0 standard drinks per week. Family History:       Problem Relation Age of Onset    Diabetes type 2  Mother     Cancer Maternal Grandmother     Diabetes type 2  Nephew        REVIEW OF SYSTEMS:    Constitutional: Hunger. No fever, no chills, no change in weight; good appetite  HEENT: Chronic Blurry Vision. No ear problems, no sore throat, no rhinorrhea. Respiratory: No cough, no sputum production, no pleuritic chest pain, no shortness of breath  Cardiology: No angina, no dyspnea on exertion, no paroxysmal nocturnal dyspnea, no orthopnea, no palpitation, no leg swelling. Gastroenterology: Dysphagia. No reflux; no abdominal pain, no nausea or vomiting; no constipation or diarrhea.  No hematochezia   Genitourinary: No dysuria, no frequency, hesitancy; no hematuria  Musculoskeletal: no joint pain, no myalgia, no change in range of movement  Neurology: Shooting pain down LUE, focal weakness in left sided extremities, slurred speech, Lightheadedness  Endocrinology: no temperature intolerance, no polyphagia, polydipsia or polyuria  Hematology: no increased bleeding, no bruising, no lymphadenopathy  Skin: no skin changes noticed by patient  Psychology: no depressed mood, no suicidal ideation    Physical Exam   Vitals: BP (!) 114/56   Pulse 85   Temp 97.8 °F (36.6 °C) (Temporal)   Resp 16   Ht 5' 6\" (1.676 m)   Wt 145 lb (65.8 kg)   SpO2 96%   BMI 23.40 kg/m²     General Appearance: alert and oriented to person, place and time,   Skin: warm and dry  Head: normocephalic and atraumatic  Eyes: pupils equal, round, and reactive to light, extraocular eye movements intact, conjunctivae normal  ENT: external ear normal bilaterally, nose without deformity,   Neck: supple and non-tender without mass, no thyromegaly or thyroid nodules, no cervical lymphadenopathy  Pulmonary/Chest: clear to auscultation bilaterally- no wheezes, rales or rhonchi, normal air movement, no respiratory distress  Cardiovascular: normal rate, regular rhythm, normal S1 and S2, no murmurs, rubs, clicks, or gallops, distal pulses intact, no carotid bruits  Abdomen: soft, non-tender, non-distended, normal bowel sounds, no masses or organomegaly  Extremities: no cyanosis, clubbing or edema  Musculoskeletal:  no joint swelling, deformity or tenderness  Neurologic: 0/5 strength to LLE. 0/5 strength to LUE. Left facial droop unchanged. Slurred speech unchanged. Spasticity to LUE and LLE. Normal sensation intact throughout. CN2-12 intact. Labs and Imaging Studies   Basic Labs  Recent Labs     01/24/23  0449 01/24/23  1115 01/24/23  1327 01/24/23  1913     --  141 140   K 4.4  --  4.2 3.9     --  109* 108*   CO2 13*  --  18* 21*   BUN 29*  --  37* 36*   CREATININE 1.0  --  1.4* 1.2   GLUCOSE 438* 493* 377* 252*   CALCIUM 9.3  --  8.9 8.3*       Recent Labs     01/23/23  1114 01/24/23  0449   WBC 8.3 14.0*   RBC 4.59 4.77   HGB 13.3 13.9   HCT 39.5 43.5   MCV 86.1 91.2   MCH 29.0 29.1   MCHC 33.7 32.0   RDW 14.4 14.5   * 448   MPV 8.7 9.1         Imaging Studies:  MRA HEAD WO CONTRAST   Final Result   Acute lacunar infarct involving the right posterior limb internal capsule.       More subacute appearing cortical infarcts throughout the left posterior   cortical watershed territory within the parietal lobe and lesser extent right   parietal lobe. No acute hemorrhage or significant mass effect. Moderate luminal narrowing of the right mid M1 segment and severe luminal   narrowing of the right proximal M2 segment. There is tapering of the distal right PCA with fetal origin. RECOMMENDATIONS:   Unavailable         MRI BRAIN WO CONTRAST   Final Result   Acute lacunar infarct involving the right posterior limb internal capsule. More subacute appearing cortical infarcts throughout the left posterior   cortical watershed territory within the parietal lobe and lesser extent right   parietal lobe. No acute hemorrhage or significant mass effect. Moderate luminal narrowing of the right mid M1 segment and severe luminal   narrowing of the right proximal M2 segment. There is tapering of the distal right PCA with fetal origin. RECOMMENDATIONS:   Unavailable         CTA NECK W CONTRAST   Final Result   No hemodynamically significant stenosis or dissection of the cervical   internal carotid arteries. No high-grade stenosis or dissection of the cervical vertebral arteries. 15 mm right thyroid nodule; see recommendation below. Low-density circumscribed structures in the bilateral cheek subcutaneous   tissues abutting the skin are favored to represent epidermal inclusion cysts   or other benign dermal lesions (series 301, images 164 and 175); recommend   nonemergent dermatologic evaluation. Small fluid level in the left sphenoid sinus; fluid levels can be seen in the   setting acute sinusitis. RECOMMENDATION:   1.5 cm incidental right thyroid nodule. Recommend thyroid US. Reference: J Am Adria Radiol. 2015 Feb;12(2): 143-50         XR CHEST PORTABLE   Final Result   No acute process. CT HEAD WO CONTRAST   Final Result   1.   There is no acute intracranial abnormality. Specifically, there is no   intracranial hemorrhage. 2. Atrophy and periventricular leukomalacia,   3. Multiple old lacunar infarcts within the right and left basal ganglia more   prominent within the right basal ganglia. .         Fluoroscopy modified barium swallow with video    (Results Pending)        EKG: normal sinus rhythm, unchanged from previous tracings. Resident's Assessment and Plan     Assessment and Plan:    Late presentation left sided weakness and slurred speech w/Hx of bilateral lacunar infarcts   MRI/MRA Brain  Acute lacunar infarct involving the right posterior limb internal capsule. More subacute appearing cortical infarcts throughout the left posterior   cortical watershed territory within the parietal lobe and lesser extent right   parietal lobe. No acute hemorrhage or significant mass effect. Moderate luminal narrowing of the right mid M1 segment and severe luminal   narrowing of the right proximal M2 segment. There is tapering of the distal right PCA with fetal origin. CTA Neck   No hemodynamically significant stenosis or dissection of the cervical   internal carotid arteries. No high-grade stenosis or dissection of the cervical vertebral arteries. 15 mm right thyroid nodule; see recommendation below. Low-density circumscribed structures in the bilateral cheek subcutaneous   tissues abutting the skin are favored to represent epidermal inclusion cysts   or other benign dermal lesions (series 301, images 164 and 175); recommend   nonemergent dermatologic evaluation. Small fluid level in the left sphenoid sinus; fluid levels can be seen in the   setting acute sinusitis.      Neurology following, follow recs  Baclofen 5mg BID for spasticity   PT/OT  SLP evaluation - NPO with ice chips    Diabetes Mellitus, Type 1, Uncontrolled  AG closed (11) post 1L NS and insulin adjustments yesterday  Monitor BG closely, BG Q2H, if still elevated trasnfer to MICU  17U Glargine with LDSS Q4h    KIRSTEN Stage 1 2/2 Hyperglycemia  Cr 1.4  to 1.2 today, baseline 0.8.    Hypertension  Continue home lisinopril, target SBP <180    Hx TIA in 2018  Continue home ASA, add plavix for 21 days per neuro  Hx Polysubstance abuse      PT/OT evaluation: [facility] 10/24  DVT prophylaxis/ GI prophylaxis: Heparin, Protonix  Disposition: admit to floor    DAVIE Camp-3  Attending physician: Dr. Jonathan Liao

## 2023-01-25 NOTE — PROGRESS NOTES
Physical Therapy  Treatment Note    Name: Raymond Krause  : 1970  MRN: [id number]      Date of Service: 2023    Evaluating PT:  Joe Lowe, PT, DPT    Room #:  6572/8095-Q  Diagnosis:  Acute CVA (cerebrovascular accident) [facility]) [I63.9]  Cerebrovascular accident (CVA), unspecified mechanism ([facility] [address] [I63.9]  PMHx/PSHx:  DM, HTN, stroke  Procedure/Surgery:  N/A  Precautions:  fall risk, L hemiplegia, L sided neglect, TSM, bed alarm  Equipment Needs:  TBD    SUBJECTIVE:    Pt a questionable historian, pt reportedly lives alone in a 2nd floor apt with 3+10 steps to enter and 1 handrail. Pt ambulated with ww PTA. OBJECTIVE:   Initial Evaluation  Date: 23 Treatment  Date: 23 Short Term/ Long Term   Goals   AM-PAC 6 Clicks 62/54 41/30    Was pt agreeable to Eval/treatment? yes yes    Does pt have pain?  No pain No pain    Bed Mobility  Rolling: mod A  Supine to sit: mod A  Sit to supine: mod A  Scooting: mod A Rolling: mod A  Supine to sit: mod A  Sit to supine: mod A  Scooting: mod A Rolling: SBA  Supine to sit: SBA  Sit to supine: SBA  Scooting: SBA   Transfers Sit to stand: max A  Stand to sit: mod A  Stand pivot: NT Sit to stand: max A  Stand to sit: max A  Stand pivot: max Ax2 arm in arm A Sit to stand: SBA  Stand to sit: SBA  Stand pivot: SBA with AAD   Ambulation    2' with max Ax2 with arm in arm A  (Side steps to [facility]) NT 25'+ with AAD SBA   Stair negotiation: ascended and descended NT NT 10 steps with 1 handrail SBA     Strength/ROM:   LLE grossly 0/5, flexor tone present  RLE grossly 4/5  LLE AROM limited by weakness  RLE AROM WFL    Balance:   Static Sitting: min A  Dynamic Sitting: mod A  Static Standing: max A  Dynamic Standing: max Ax2    Pt is A & O x 3  Sensation:  light touch intact in BLE  Edema:  unremarkable    Therapeutic Exercises:    Bed mobility: supine<>sit, cued for EOB positioning  Transfers: STS x2, stand pivot x2, cued for hand placement and postural correction  BLE AROM    Patient education  Pt educated on role of PT, importance of functional mobility during hospital stay, safety with functional mobility    Patient response to education:   Pt verbalized understanding Pt demonstrated skill Pt requires further education in this area   yes partial yes     ASSESSMENT:  Comments:    Pt supine in bed upon entering, agreeable to participate. Pt instructed to transfer to EOB, assistance provided with trunk and BLE to transfer pt to EOB. Pt sitting upright with fair<>poor static sitting balance, LOB to L. Pt utilizing RUE to hold onto bed rail to maintain sitting balance. Pt assisted with positioning of LUE to increase weightbearing through limb. Pt agreeable to transfer to bedside chair. Pt cued for hand placement and sequencing. Pt's L knee blocked to prevent buckling. Pt was cued for sequencing and BLE positioning safely transfer to bedside chair. Pt sat upright for ~7' in chair, requested to return to bed. Pt was transferred back to EOB, assistance provided to advance LLE to safely complete transfer. Pt positioned for comfort in supine. Pt remained in bed with all needs met, bed alarm and call bell in reach prior to exiting. Treatment:  Patient practiced and was instructed in the following treatment:    Bed mobility training - pt given verbal and tactile cues to facilitate proper sequencing and safety during rolling and supine>sit as well as provided with physical assistance to complete task   Sitting EOB for >5 minutes for upright tolerance, postural awareness and BLE ROM  STS and pivot transfer training - pt educated on proper hand and foot placement, safety and sequencing, and use of verbal and tactile cues to safely complete sit<>stand and pivot transfers with hands on assistance to complete task safely   Skilled positioning - Pt placed in the chair position with pillows utilized to facilitate upright posture, joint and skin integrity, and interaction with environment. PLAN:    Patient is making fair progress towards established goals. Will continue with current POC.       Time in  1328  Time out  1354    Total Treatment Time  23 minutes     CPT codes:  [] Gait training 42245 -- minutes  [] Manual therapy [phone] -- minutes  [x] Therapeutic activities 32866 23 minutes  [] Therapeutic exercises 48997 -- minutes  [] Neuromuscular reeducation 47053 -- minutes    Luke De La Torre PT, DPT  [id number]

## 2023-01-25 NOTE — CARE COORDINATION
Call received from the patient's mother Flores Rodriguez. Reviewed transition of care plan as documented by MONIK Steinberg. Explained that referrals have been made, awaiting acceptance. Patient's mother also asked about speech evaluation of swallow. Explained that patient is able to have a diet at this time after review of notes. Patient's mother expressed understanding. Will continue to follow.      Brennan Yo RN.  [facility]  [phone]

## 2023-01-25 NOTE — PROGRESS NOTES
Consult received. Dr. Tiffany Neil to eval for ARU however per CM, patient has been accepted to [facility] for rehab needs.

## 2023-01-25 NOTE — PROGRESS NOTES
SPEECH/LANGUAGE PATHOLOGY  VIDEOFLUOROSCOPIC STUDY OF SWALLOWING (MBS)   and PLAN OF CARE    PATIENT NAME:  Danny Romano  (male)     MRN:  [id number]    :  1970  (47 y.o.)  STATUS:  Inpatient: Room 8507/8507-B    TODAY'S DATE:  23 230    SLP video swallow  Start:  23,   End:  23,   ONE TIME,   Standing Count:  1 Occurrences,   R         Jose Manuel Guardado DO   REASON FOR REFERRAL: assess oropharyngeal swallow function   EVALUATING THERAPIST: Fatimah Henderson, SLP      RESULTS:      DYSPHAGIA DIAGNOSIS:  mild  pharyngeal phase dysphagia     DIET RECOMMENDATIONS:  Easy to chew consistency solids (IDDSI level 7, transitional) with  thin liquids (IDDSI level 0)-- NO straw    FEEDING RECOMMENDATIONS:    Assistance level:  Set-up is required for all oral intake, Encourage self-feeding     Compensatory strategies recommended: Small bites/sips, Alternate solids and liquids, and No straw     Discussed recommendations with nursing and/or faxed report to referring provider: Yes    Laryngeal Penetration and Aspiration:  Penetration WITH aspiration was observed in today's study with  thin liquid, with straw only    SPEECH THERAPY  PLAN OF CARE   The dysphagia POC is established based on physician order and dysphagia diagnosis    Meal time assessment for 1-2 sessions to provide diet modification and compensatory strategy implementation to safely advance diet as functional ability improves      Conditions Requiring Skilled Therapeutic Intervention for dysphagia:    Patient is performing below functional baseline d/t  current acute condition, Multiple diagnoses, multiple medications, and increased dependency upon caregivers.   Reduced laryngeal closure resulting in aspiration     SPECIFIC DYSPHAGIA INTERVENTIONS TO INCLUDE:     Therapeutic exercises  Trials of upgraded diet/liquid     Specific instructions for next treatment:  ongoing PO analysis to upgrade diet and evaluate tolerance of current PO recommendation and initiate instruction of compensatory strategies  Treatment Goals:    Short Term Goals:  Pt will complete PO trials of upgraded diet textures with SLP only to determine the least restrictive PO diet to maintain adequate nutrition/hydration with no more than 1 overt s/s of pen/asp. Pt will complete laryngeal strength/ ROM therapeutic exercises to improve airway protection for the least restrictive PO diet minimal verbal prompts    Long Term Goals:   Pt will maintain adequate nutrition/hydration via PO intake of the least restrictive oral diet with implementation of safe swallow/ compensatory strategies and decrease signs/symptoms of aspiration to less than 1 x/day.       Patient/family Goal:    To be able to [address] [place] [address] discussed with Patient   The Patient understand(s) the diagnosis, prognosis and plan of care     Rehabilitation Potential/Prognosis: good                      ADMITTING DIAGNOSIS: Acute CVA (cerebrovascular accident) ([facility] [I63.9]  Cerebrovascular accident (CVA), unspecified mechanism ([facility] [I63.9]     VISIT DIAGNOSIS:   Visit Diagnoses         Codes    Cerebrovascular accident (CVA), unspecified mechanism ([facility]    -  Primary I63.9                PATIENT REPORT/COMPLAINT: denies difficulty swallowing    PRIOR LEVEL OF SWALLOW FUNCTION:    Past History of Dysphagia?:  none reported    Home diet: Regular consistency solids (IDDSI level 7) with  thin liquids (IDDSI level 0)  Current Diet Order:  Diet NPO Exceptions are: Ice Chips    PROCEDURE:  Consistencies Administered During the Evaluation   Liquids: thin liquid and nectar thick liquid   Solids:  pureed foods and solid foods      Method of Intake:   cup, straw, spoon  Self fed, Fed by clinician      Position:   Seated, upright    INSTRUMENTAL ASSESSMENT:    ORAL PREP/ ORAL PHASE:    Pt is able to achieve adequate cohesive bolus prep as evidenced by satisfactory mastication patterns, timely A-P bolus propulsion, and minimal oral residuals. PHARYNGEAL PHASE:     ONSET TIME       Onset time of the pharyngeal swallow was adequate       PHARYNGEAL RESIDUALS        Vallecula/Pharyngeal Wall           No significant residuals were noted in the vallecula      Pyriform Sinuses      No significant residuals were noted in the pyriform sinuses     LARYNGEAL PENETRATION   Transient penetration of thin liquids due to delayed swallow onset that cleared spontaneously. Laryngeal penetration occurred prior to aspiration. Further details under aspiration section. ASPIRATION  Aspiration occurred DURING the swallow for thin liquid due to  inadequate laryngeal closure . Aspiration was moderate and occurred only with use of a straw . a spontaneous cough was noted    PENETRATION-ASPIRATION SCALE (PAS):  THIN 8 = Material enters the airway, passes below the vocal folds, and no effort is made to eject   MILDLY THICK 1 = Material does not enter the airway  MODERATELY THICK item not administered  PUREE 1 = Material does not enter the airway  HARD SOLID 1 = Material does not enter the airway       COMPENSATORY STRATEGIES    Compensatory strategies that were beneficial included Small bites/sips, Alternate solids and liquids, and No straw      STRUCTURAL/FUNCTIONAL ANOMALIES   No structural/functional anomalies were noted    CERVICAL ESOPHAGEAL STAGE :     The cervical esophagus appeared adequate          ___________    Cognition:   Within functional limits for this exam    Oral Peripheral Examination   Left labiobuccal weakness    Current Respiratory Status   room air     Parameters of Speech Production  Respiration:  Adequate for speech production  Quality:   Within functional limits  Intensity: Within functional limits    Pain: No pain reported. EDUCATION:   The Speech Language Pathologist (SLP) completed education regarding results of evaluation and that intervention is warranted at this time.   Learner: Patient  Education: Reviewed results and recommendations of this evaluation, Reviewed diet and strategies, Reviewed signs, symptoms and risks of aspiration, and Reviewed recommendations for follow-up  Evaluation of Education:  Verbalizes understanding    This plan may be re-evaluated and revised as warranted. Evaluation Time includes thorough review of current medical information, gathering information on past medical history/social history and prior level of function, completion of standardized testing/informal observation of tasks, assessment of data and education on plan of care and goals. [x]The admitting diagnosis and active problem list, have been reviewed prior to initiation of this evaluation.     CPT Code: 74664  dysphagia study    ACTIVE PROBLEM LIST:   Patient Active Problem List   Diagnosis    Tobacco abuse    Diabetes mellitus type 1, uncontrolled    Moderate nonproliferative diabetic retinopathy associated with type 1 diabetes mellitus (HCC)    Essential hypertension    Diabetic ketoacidosis without coma associated with type 1 diabetes mellitus (HCC)    Idiopathic peripheral neuropathy    Thrombocytopenia (HCC)    Acute ischemic multifocal multiple vascular territories stroke ([facility]    Left hemiplegia (HCC)    Diabetic ketoacidosis with coma associated with type 1 diabetes mellitus (HCC)    KIRSTEN (acute kidney injury) ([facility]    High anion gap metabolic acidosis    Leukocytosis    Lacunar stroke (HCC)    ETOH abuse    Type 1 diabetes mellitus with complication (HCC)    Marijuana abuse    Lactic acidosis    Acute renal insufficiency    Hyperkalemia    Hypokalemia    Hypophosphatemia    Community acquired pneumonia of right lower lobe of lung    DKA, type 1, not at goal [facility])    Mixed hyperlipidemia    PAF (paroxysmal atrial fibrillation) ([facility])    Dark stools    Urticaria    Insulin pump in place    Diabetic ketoacidosis without coma associated with diabetes mellitus due to underlying condition ([facility] Cardiac arrest ([facility]    Acute respiratory failure with hypoxia (HCC)    Hypothermia    Shock (HCC)    Severe protein-calorie malnutrition (HCC)    Normochromic normocytic anemia    Syncope and collapse    Pneumonia    Diabetic acidosis without coma (HCC)    Poorly controlled type 1 diabetes mellitus (HCC)    Type 1 diabetes mellitus with ketoacidosis without coma (HCC)    Acute right arterial ischemic stroke, MCA (middle cerebral artery) (HCC)    Left hemiparesis ([facility] [address] [facility]    Diabetes mellitus type I ([facility]    Cerebrovascular accident (CVA) ([facility] [address] [facility]    Acute CVA (cerebrovascular accident) ([facility] [address] [facility]       INTERVENTION  CPT Code: 09287  dysphagia tx    Speech Pathologist (SLP) completed education with the patient/family regarding procedure of Modified Barium Swallow Study prior to exam and then type of swallowing impairment following completion of MBSS. Reviewed current solid/liquid consistency diet recommendations --   Dental soft (Easy to Chew) solids with  thin liquids (IDDSI level 0) and discussed compensatory strategies (Small bites/sips, Alternate solids and liquids, and No straw) to ensure safe PO intake. Images from MBSS reviewed with patient/ family and education provided. Reviewed aspiration precautions. Encouraged patient and/or family to engage SLP in unstructured Q&A session relative to identified deficit areas; indicated understanding of all information provided via satisfactory verbal response.

## 2023-01-25 NOTE — CARE COORDINATION
Per notes presented to the ED complaining of left-sided weakness with slurring of speech since 2 days prior to presentation. Acute lacunar infarct involving the right posterior limb internal capsule. PT/OT sierra noted and discussed discharge plan with patient who is in agreement for rehab. Given mother is 76 and only one at the house- made referral to [facility] 1. [facility] 2. [place]- referral to [facility]. Await determinations. Electronically signed by Carlos Rucker RN on 1/25/2023 at 11:40 AM    Per Zay[facility]- no beds. Per [facility] can accept @ [facility]- awaiting response from [facility]. Electronically signed by Carlos Rucker RN on 1/25/2023 at 12:05 PM    Call placed to [facility] for updated pt/ot today per [facility]. Electronically signed by Carlos Rucker RN on 1/25/2023 at 1:29 PM    [facility] has accepted and is starting precert. [facility] from [address] [place] [address] will follow . Electronically signed by Carlos Rucker RN on 1/25/2023 at 1:51 PM    Envelope and ambulance form in soft chart. Will need covid pcr. Electronically signed by Carlos Rucker RN on 1/25/2023 at 2:40 PM'

## 2023-01-25 NOTE — PROGRESS NOTES
[facility]  Internal Medicine Residency Program  Progress Note - House Team     Patient:  Kush Georgina 47 y.o. male MRN: [id number]     Date of Service: 1/25/2023     CC: stroke like symptoms  Overnight events: Anion gap closed, BG optimized     Subjective     Patient was seen and examined this morning at bedside in no acute distress. He reports no significant improvements in his symptoms. No new complaints today. Vital signs remain stable. MRI brain and CTA neck reports noted. Incidental Right thyroid nodule 15mm noted. Objective     Physical Exam:  Vitals: BP (!) 114/56   Pulse 85   Temp 97.8 °F (36.6 °C) (Temporal)   Resp 16   Ht 5' 6\" (1.676 m)   Wt 145 lb (65.8 kg)   SpO2 96%   BMI 23.40 kg/m²     I & O - 24hr: No intake/output data recorded. General Appearance: alert, appears stated age, and cooperative  HEENT:  Head: Normal, normocephalic, atraumatic. Neck: supple, symmetrical, trachea midline  Lung: clear to auscultation bilaterally  Heart: regular rate and rhythm, S1, S2 normal, no murmur, click, rub or gallop  Abdomen: soft, non-tender; bowel sounds normal; no masses,  no organomegaly  Extremities:  extremities normal, atraumatic, no cyanosis or edema  Musculokeletal: No joint swelling, no muscle tenderness. ROM normal in all joints of extremities.    Neurologic: Mental status: Alert, oriented, thought content appropriate  GCS 15  Mild dysarthria/left facial deviation  Strength LUE - 0/5, RUE - 5/5    LLE - 2/5, RLE - 4/5  Sensations - grossly intact    Pertinent Labs & Imaging Studies   chika  CBC with Differential:    Lab Results   Component Value Date/Time    WBC 9.0 01/25/2023 06:36 AM    RBC 3.98 01/25/2023 06:36 AM    HGB 11.5 01/25/2023 06:36 AM    HCT 34.4 01/25/2023 06:36 AM     01/25/2023 06:36 AM    MCV 86.4 01/25/2023 06:36 AM    MCH 28.9 01/25/2023 06:36 AM    MCHC 33.4 01/25/2023 06:36 AM    RDW 14.6 01/25/2023 06:36 AM    SEGSPCT 53 01/25/2014 05:15 AM    SEGSPCT 86 10/11/2010 04:40 AM    BANDSPCT 3 10/10/2010 07:20 PM    METASPCT 1.7 07/20/2020 12:12 PM    LYMPHOPCT 14.6 01/25/2023 06:36 AM    PROMYELOPCT 1.7 04/29/2022 09:41 PM    MONOPCT 7.1 01/25/2023 06:36 AM    MYELOPCT 2.6 01/04/2020 05:07 PM    EOSPCT 2 10/12/2010 06:00 AM    BASOPCT 0.3 01/25/2023 06:36 AM    MONOSABS 0.64 01/25/2023 06:36 AM    LYMPHSABS 1.31 01/25/2023 06:36 AM    EOSABS 0.23 01/25/2023 06:36 AM    BASOSABS 0.03 01/25/2023 06:36 AM     CMP:    Lab Results   Component Value Date/Time     01/25/2023 06:36 AM    K 4.1 01/25/2023 06:36 AM     01/25/2023 06:36 AM    CO2 20 01/25/2023 06:36 AM    BUN 31 01/25/2023 06:36 AM    CREATININE 0.8 01/25/2023 06:36 AM    GFRAA >60 08/10/2022 09:41 AM    LABGLOM >60 01/25/2023 06:36 AM    GLUCOSE 185 01/25/2023 06:36 AM    GLUCOSE 83 04/11/2012 03:35 AM    PROT 5.9 01/25/2023 06:36 AM    LABALBU 3.8 01/25/2023 06:36 AM    LABALBU 4.3 12/30/2011 10:15 AM    CALCIUM 8.5 01/25/2023 06:36 AM    BILITOT 0.8 01/25/2023 06:36 AM    ALKPHOS 84 01/25/2023 06:36 AM    AST 13 01/25/2023 06:36 AM    ALT 11 01/25/2023 06:36 AM       MRA HEAD WO CONTRAST   Final Result   Acute lacunar infarct involving the right posterior limb internal capsule. More subacute appearing cortical infarcts throughout the left posterior   cortical watershed territory within the parietal lobe and lesser extent right   parietal lobe. No acute hemorrhage or significant mass effect. Moderate luminal narrowing of the right mid M1 segment and severe luminal   narrowing of the right proximal M2 segment. There is tapering of the distal right PCA with fetal origin. RECOMMENDATIONS:   Unavailable         MRI BRAIN WO CONTRAST   Final Result   Acute lacunar infarct involving the right posterior limb internal capsule.       More subacute appearing cortical infarcts throughout the left posterior   cortical watershed territory within the parietal lobe and lesser extent right   parietal lobe. No acute hemorrhage or significant mass effect. Moderate luminal narrowing of the right mid M1 segment and severe luminal   narrowing of the right proximal M2 segment. There is tapering of the distal right PCA with fetal origin. RECOMMENDATIONS:   Unavailable         CTA NECK W CONTRAST   Final Result   No hemodynamically significant stenosis or dissection of the cervical   internal carotid arteries. No high-grade stenosis or dissection of the cervical vertebral arteries. 15 mm right thyroid nodule; see recommendation below. Low-density circumscribed structures in the bilateral cheek subcutaneous   tissues abutting the skin are favored to represent epidermal inclusion cysts   or other benign dermal lesions (series 301, images 164 and 175); recommend   nonemergent dermatologic evaluation. Small fluid level in the left sphenoid sinus; fluid levels can be seen in the   setting acute sinusitis. RECOMMENDATION:   1.5 cm incidental right thyroid nodule. Recommend thyroid US. Reference: J Am Adria Radiol. 2015 Feb;12(2): 143-50         XR CHEST PORTABLE   Final Result   No acute process. CT HEAD WO CONTRAST   Final Result   1. There is no acute intracranial abnormality. Specifically, there is no   intracranial hemorrhage. 2. Atrophy and periventricular leukomalacia,   3. Multiple old lacunar infarcts within the right and left basal ganglia more   prominent within the right basal ganglia.    .         Fluoroscopy modified barium swallow with video    (Results Pending)   US THYROID    (Results Pending)        Resident's Assessment and Plan     Subacute ischemic stroke 2/2 poorly controlled type I DM, delayed presentation  CT head w/o: old lacunar infarct b/l basal ganglia, worse on right   CTA neck: no stenosis or dissection, 1.5 cm right thyroid nodule  MRI/MRA brain: acute lacunar infarct Right posterior internal capsule, subacute cortical infarcts in Left posterior cortical watershed in parietal lobe, lesser on Right parietal lobe, mod narroving of Right mid M1 and severe narrowing of Right proximal M2  Left sided weakness persists and slightly worsened, likely from relative hypotension  Hold lisinopril for now and allow for permissive hypertension  Continue On plavix, aspirin, atorvastatin 40   PM/R consulted, follow rec    Mild pharyngeal dysphagia on SLP evaluation  Easy to chew consistency solids (IDDSI level 7, transitional) with  thin liquids (IDDSI level 0)-- NO straw    DKA 2/2 poorly controlled TIDM, resolved in early stages  BG currently within goal  Contionue on lantuis 17U/LDSS  Continue to monitor POCT BG Q4hrs    History of TIDM, previously on insulin pump, issues with insurance  Consider SW to address socioeconomic issues prior to discharge    History of hypertension  BP normotensive, hold on lisinopril for now  Continue permissive hypertension    History of TIA, 2018  On aspirin 81 mg daily at 6 home  Continue On plavix, aspirin, atorvastatin 40    History of polysubstance abuse, currently abuses marijuana    PT/OT evaluation: PT - 10/24  DVT prophylaxis/ GI prophylaxis: lovenox/protonix  Disposition: continue current care, ensure aggressive PT    Rashard Abreu MD, PGY-1  Attending physician: Dr. Jonathan Liao

## 2023-01-25 NOTE — PROGRESS NOTES
Nikunj Rico is a 47 y.o. right handed male     Neurology following for stroke    PMH of diabetes, hypertension, alcoholism, smoking, and drug abuse      Assessment:     Acute infarct  Presented with sudden left arm and leg weakness and dysarthria  His stroke risk factors include diabetes, hypertension, hyperlipidemia, smoking, alcoholism. His LDL was 123 and his A1c was 9  Upon exam he continues to experience left arm and leg weakness along with left facial droop. Plan:     Continue aspirin and Plavix x21 days then aspirin monotherapy  Continue statin with an LDL goal <70  A1c goal <6  Echocardiogram pending  Stroke education  PT/OT/SLP  Zio patch at discharge  Neurology will follow    Subjective:     Patient presented to the ER 1/23/2023 after experiencing  left arm and leg weakness and slurred speech for 2 days prior. His NIH was a 9 and he was not a TNK candidate due to being outside the window for administration. His CT imaging demonstrated old infarcts. His MRI brain Acute lacunar infarct involving the right posterior limb internal capsule. More subacute appearing cortical infarcts throughout the left posterior cortical watershed territory within the parietal lobe and lesser extent right parietal lobe. Patient sitting up in chair with therapy at the bedside. He is awake, alert, x 4. He continues to exhibit left facial droop and left extremity weakness. I did educate him on stroke risk factors.   He is awaiting an echocardiogram.    There is no family at the bedside      No chest pain or palpitations  No coughing or wheezing    No vertigo, lightheadedness or loss of consciousness  No falls, tripping or stumbling  No incontinence of bowels or bladder  No itching or bruising appreciated    ROS otherwise negative      Objective:       /64   Pulse 75   Temp 97.6 °F (36.4 °C) (Temporal)   Resp 16   Ht 5' 6\" (1.676 m)   Wt 145 lb (65.8 kg)   SpO2 96%   BMI 23.40 kg/m²       General appearance: alert, appears stated age, cooperative and no distress  Head: normocephalic, without obvious abnormality, atraumatic  Eyes: conjunctivae/corneas clear  Neck: no adenopathy, supple, symmetrical, trachea midline and thyroid not enlarged, symmetric, no tenderness/mass/nodules  Lungs: Regular respirations on room air  Heart: No chest pain or palpitations  Abdomen: soft, non-tender; bowel sounds normal; no masses,  no organomegaly  Extremities:  normal, atraumatic, no cyanosis or edema  Pulses: 2+ and symmetric  Skin: color, texture, turgor normal---no rashes or lesions      Mental Status: Alert, oriented, thought content appropriate, alertness: alert, orientation: time, date, person, place, city, affect: normal     Appropriate attention/concentration  Intact fundus of knowledge  Repetition intact  Intact memories      Speech: Clear  Language: No aphasias    Cranial Nerves:  I: smell NA   II: visual acuity  NA   II: visual fields Full to confrontation   II: pupils EDDI   III,VII: ptosis Left labial fold droop   III,IV,VI: extraocular muscles  Full ROM   V: mastication Normal   V: facial light touch sensation  Normal   V,VII: corneal reflex  Present   VII: facial muscle function - upper  Normal   VII: facial muscle function - lower Left labial fold droop   VIII: hearing Normal   IX: soft palate elevation  Normal   IX,X: gag reflex Present   XI: trapezius strength     XI: sternocleidomastoid strength    XI: neck extension strength     XII: tongue strength  Left tongue deviation     Motor:  Left arm 1/5  Left leg 1+/5    Normal bulk and tone  Left arm pronator drift  No abnormal movements    Sensory:  LT and PP normal  Vibration normal    Coordination:   FN, FFM and MARY normal with right hand, unable to perform with left hand due to weakness  HS normal with right heel but unable to be performed with left heel due to weakness    Gait:  Deferred for patient safety/fall consideration    DTR:   2+ throughout    No Babinskis  No Neal's    No other pathological reflexes    Laboratory/Radiology:     CBC with Differential:    Lab Results   Component Value Date/Time    WBC 9.0 01/25/2023 06:36 AM    RBC 3.98 01/25/2023 06:36 AM    HGB 11.5 01/25/2023 06:36 AM    HCT 34.4 01/25/2023 06:36 AM     01/25/2023 06:36 AM    MCV 86.4 01/25/2023 06:36 AM    MCH 28.9 01/25/2023 06:36 AM    MCHC 33.4 01/25/2023 06:36 AM    RDW 14.6 01/25/2023 06:36 AM    SEGSPCT 53 01/25/2014 05:15 AM    SEGSPCT 86 10/11/2010 04:40 AM    BANDSPCT 3 10/10/2010 07:20 PM    METASPCT 1.7 07/20/2020 12:12 PM    LYMPHOPCT 14.6 01/25/2023 06:36 AM    PROMYELOPCT 1.7 04/29/2022 09:41 PM    MONOPCT 7.1 01/25/2023 06:36 AM    MYELOPCT 2.6 01/04/2020 05:07 PM    EOSPCT 2 10/12/2010 06:00 AM    BASOPCT 0.3 01/25/2023 06:36 AM    MONOSABS 0.64 01/25/2023 06:36 AM    LYMPHSABS 1.31 01/25/2023 06:36 AM    EOSABS 0.23 01/25/2023 06:36 AM    BASOSABS 0.03 01/25/2023 06:36 AM     CMP:    Lab Results   Component Value Date/Time     01/25/2023 06:36 AM    K 4.1 01/25/2023 06:36 AM     01/25/2023 06:36 AM    CO2 20 01/25/2023 06:36 AM    BUN 31 01/25/2023 06:36 AM    CREATININE 0.8 01/25/2023 06:36 AM    GFRAA >60 08/10/2022 09:41 AM    LABGLOM >60 01/25/2023 06:36 AM    GLUCOSE 185 01/25/2023 06:36 AM    GLUCOSE 83 04/11/2012 03:35 AM    PROT 5.9 01/25/2023 06:36 AM    LABALBU 3.8 01/25/2023 06:36 AM    LABALBU 4.3 12/30/2011 10:15 AM    CALCIUM 8.5 01/25/2023 06:36 AM    BILITOT 0.8 01/25/2023 06:36 AM    ALKPHOS 84 01/25/2023 06:36 AM    AST 13 01/25/2023 06:36 AM    ALT 11 01/25/2023 06:36 AM     HgBA1c:    Lab Results   Component Value Date/Time    LABA1C 9.0 01/23/2023 11:14 AM     FLP:    Lab Results   Component Value Date/Time    TRIG 148 01/23/2023 11:14 AM    HDL 70 01/23/2023 11:14 AM    LDLCALC 123 01/23/2023 11:14 AM    LABVLDL 30 01/23/2023 11:14 AM     CT Head  1. There is no acute intracranial abnormality.   Specifically, there is no  intracranial hemorrhage. 2. Atrophy and periventricular leukomalacia,  3. Multiple old lacunar infarcts within the right and left basal ganglia more  prominent within the right basal ganglia. CTA Neck  No hemodynamically significant stenosis or dissection of the cervical  internal carotid arteries. No high-grade stenosis or dissection of the cervical vertebral arteries. 1.5 mm right thyroid nodule; see recommendation below. Low-density circumscribed structures in the bilateral cheek subcutaneous  tissues abutting the skin are favored to represent epidermal inclusion cysts  or other benign dermal lesions (series 301, images 164 and 175); recommend  nonemergent dermatologic evaluation. Small fluid level in the left sphenoid sinus; fluid levels can be seen in the  setting acute sinusitis. MRI/MRV  Acute lacunar infarct involving the right posterior limb internal capsule. More subacute appearing cortical infarcts throughout the left posterior  cortical watershed territory within the parietal lobe and lesser extent right  parietal lobe. No acute hemorrhage or significant mass effect. Moderate luminal narrowing of the right mid M1 segment and severe luminal  narrowing of the right proximal M2 segment. There is tapering of the distal right PCA with fetal origin. US thyroid  The right thyroid nodule is 8 mm and is awarded 3 points; no further  follow-up is needed. See reference below.   ACR TI-RADS recommendations:  TR5 (>= 7 points):  FNA if >= 1 cm; follow-up if 0.5-0.9 cm in 1, 2, 3, 4,  and 5 years  TR4 (4-6 points):  FNA if >= 1.5 cm; follow-up if 1.0-1.4 cm in 1, 2, 3, and  5 years  TR3 (3 points):   FNA if >= 2.5 cm; follow-up if 1.5-2.4 cm in 1, 3, and 5  years  TR2 (2 points):   No FNA or follow-up  TR1 (0 points):   No FNA or follow-up        All labs and imaging studies reviewed independently today           CHAYO Panda CNP  2:36 PM  1/25/2023

## 2023-01-25 NOTE — PROGRESS NOTES
Occupational Therapy  OT BEDSIDE TREATMENT NOTE   [facility] [address]  [address] [place] [address]        Date:2023  Patient Name: Mary Alvarado  MRN: [id number]  : 1970  Room: 850850[facility]     Per OT Eval:    Evaluating OT: Chioma Smith, [address] OTR/L; [id number]       Referring Provider: Zeenat Lockwood MD    Specific Provider Orders/Date: OT Eval and Treat 23       Diagnosis: Acute CVA (cerebrovascular accident)   Left hemiparesis  Diabetes mellitus type I  Cerebrovascular accident (CVA)    Surgery: NONE THIS ADMISSION     Pertinent Medical History:  has a past medical history of Alcoholism ([facility] [address] Cocaine abuse ([facility] [address] Diabetes mellitus ([facility] [address] Hypertension, Idiopathic peripheral neuropathy, Lacunar stroke ([facility] [address] and Marijuana abuse.        Reason for Admission: LUE flaccid and facial droop for 2 days      Recommended Adaptive Equipment:  TBD pending progression      Precautions:  Fall Risk, Cognition, Bed Alarm, L hemiplegia, L sided neglect, TSM      Assessment of current deficits:    [x] Functional mobility            [x]ADLs           [x] Strength                  [x]Cognition    [x] Functional transfers          [x] IADLs         [x] Safety Awareness   [x]Endurance    [x] Fine Coordination                         [x] Balance      [] Vision/perception   []Sensation      []Gross Motor Coordination             [] ROM           [] Delirium                   [] Motor Control      OT PLAN OF CARE   OT POC based on physician orders, patient diagnosis and results of clinical assessment     Frequency/Duration: 1-3 days/wk for 2 weeks PRN   Specific OT Treatment Interventions to include:   * Instruction/training on adapted ADL techniques and AE recommendations to increase functional independence within precautions       * Training on energy conservation strategies, correct breathing pattern and techniques to improve independence/tolerance for self-care routine  * Functional transfer/mobility training/DME recommendations for increased independence, safety, and fall prevention  * Patient/Family education to increase follow through with safety techniques and functional independence  * Recommendation of environmental modifications for increased safety with functional transfers/mobility and ADLs  * Therapeutic exercise to improve motor endurance, ROM, and functional strength for ADLs/functional transfers  * Therapeutic activities to facilitate/challenge dynamic balance, stand tolerance for increased safety and independence with ADLs     Modified Kaylin Scale (MRS)  Score     Description  0             No symptoms  1             No significant disability despite symptoms  2             Slight disability; able to look after own affairs  3             Moderate disability; able to ambulate without assist/ requires assist with ADLs  4             Moderate/Severe disability;requires assist to ambulate/assist with ADLs  5             Severe disability;bedridden/incontinent   6               Score: 4        Home Living: Pt lives in 2nd floor apartment with 3 [facility] and [facility] to enter building. 10 steps BHR to access unit.     Bathroom setup: walkin shower with shower chair   Equipment owned: ww, cane     Prior Level of Function: IND with ADLs    Mod A with IADLs - mother assists with groceries and cooking  ambulated with ww prior  Driving: no - mother and family assist  Occupation: none stated     Pain Level: Pt did not complain of pain this session  Cognition: A&O: 4/4  Follows single 1 step directions - increased time for processing              Memory:  fair              Sequencing:  fair              Problem solving:  fair              Judgement/safety:  fair                Functional Assessment:  AM-PAC Daily Activity Raw Score:     Initial Eval Status  Date: 23 Treatment Status  Date: 23 STGs = LTGs  Time frame: 10-14 days   Feeding Minimal Assist  Mikayla  Pt able to grasp cup, bring to mouth with RUE. Pt reports difficulty holding of ice cream cup with L hand while using of R hand for feeding Moderate [place]    Grooming Minimal Assist   Mikayla  Pt washed face, donned deodorant with use of RUE seated upright in chair Moderate [place]    UB Dressing Maximal Assist   DNT  maxA per previous session Minimal Assist    LB Dressing Dependent   modA-socks  Pt able to doff socks using cross over technique, assistance to ki    DNT pants this date Minimal Assist    Bathing Dependent  maxA  Simulated Task    Pt able to wash of UB with RUE and richi area seated, assistance to wash of LUE, LB and buttocks Minimal Assist    Toileting Dependent   Dependent  Pt having of external male catheter Minimal Assist    Bed Mobility  Log Roll: Mod A  Supine to sit: Mod A   Sit to supine: Mod A  modA  Supine<>EOB  EOB<>Supine Supine to sit: SBA   Sit to supine: SBA    Functional Transfers Sit to stand: Max A   Stand to sit:MOD A  Stand pivot: NT  Commode: NT maxA  Sit to Stand  Stand to Sit    maxAx2  Stand Pivot Transfer EOB<>Chair  Chair<>EOB with HHA Sit to stand:SBA   Stand to sit:SBA  Stand pivot: SBA with AD  Commode: SBA with AD    Functional Mobility Max A x 2 with arm in arm assist   ~2 side steps right toward [facility] DNT  maxAx2 per previous session SBA with AD    Balance Sitting:     Static - Min A <> Mod A  L lateral lean     Dynamic - Mod A  Standing: Max A x 2  Sitting EOB:  min/modA    Standing:  maxAx2 Sitting:  Static: SBA  Dynamic: SBA  Standing:  SBAwith AD   Activity Tolerance FAIR  Limited d/t fatigue/weakness   Extremely lethargic required verbal cues for keeping eyes open and attending to task  Fair- GOOD   Visual/  Perceptual Glasses: yes - not present      Pt reports blurry vision d/t glasses not present     visual tracking deficits noted during H test             Vitals    WFL              BUE  ROM/Strength/  Fine motor Coordination Hand dominance:Right     RUE: ROM WFL     Strength: 4-/5      Strength: FAIR     Coordination:  FAIR     LUE: flexor tone present     Strength: 0/5      Strength: NA     Coordination: NA   increase BUE muscle strength for improved indep with functional transfers      Hearing: WFL   Sensation:  No c/o numbness or tingling   Tone: WFL   Edema: unremarkable    Education:  Pt was educated on role of OT, goals to be reached, importance of OOB activity, safety with bed mobility, safety and hand placement of RUE with transfers, balance/posture seated unsupported at EOB, and valorie dressing techniques to assist with LB dressing tasks. Comments: Upon arrival pt supine in bed, agreeable to therapy, speaking with nursing okaying pt to be seen this session. Pt completed of bed mobility, transfers, unsupported sitting balance/endurance and light ADL tasks this session. Monitoring of pt's BP throughout session 158/99 supine, 153/92in chair and 152/117 returned supine in bed, nursing aware. At end of session, pt seated upright in bed, all lines and tubes intact, call light within reach. PT present during session due to pt's current assist levels, safety and activity tolerance. Pt has made slow progress towards set goals.    Continue with current plan of  care focusing on increasing of independency with transfers and ADL tasks       Treatment Time In: 1:28pm           Treatment Time Out: 1:54pm                Treatment Charges: Mins Units   Ther Ex  26939     Manual Therapy [id number]     Thera Activities [id number] 18 1   ADL/Home Mgt [id number] 8 1   Neuro Re-ed [id number]     Group Therapy      Orthotic manage/training  94207     Non-Billable Time     Total Timed Treatment 26 2        Vanessa Kilgore BONILLA/L [id number]

## 2023-01-25 NOTE — PLAN OF CARE
Problem: Chronic Conditions and Co-morbidities  Goal: Patient's chronic conditions and co-morbidity symptoms are monitored and maintained or improved  Outcome: Progressing     Problem: Discharge Planning  Goal: Discharge to home or other facility with appropriate resources  Outcome: Progressing     Problem: Skin/Tissue Integrity  Goal: Absence of new skin breakdown  Description: 1. Monitor for areas of redness and/or skin breakdown  2. Assess vascular access sites hourly  3. Every 4-6 hours minimum:  Change oxygen saturation probe site  4. Every 4-6 hours:  If on nasal continuous positive airway pressure, respiratory therapy assess nares and determine need for appliance change or resting period.   Outcome: Progressing     Problem: Safety - Adult  Goal: Free from fall injury  Outcome: Progressing

## 2023-01-26 LAB
ALBUMIN SERPL-MCNC: 3.5 G/DL (ref 3.5–5.2)
ALP BLD-CCNC: 86 U/L (ref 40–129)
ALT SERPL-CCNC: 11 U/L (ref 0–40)
ANION GAP SERPL CALCULATED.3IONS-SCNC: 10 MMOL/L (ref 7–16)
AST SERPL-CCNC: 15 U/L (ref 0–39)
BASOPHILS ABSOLUTE: 0.02 E9/L (ref 0–0.2)
BASOPHILS RELATIVE PERCENT: 0.3 % (ref 0–2)
BILIRUB SERPL-MCNC: 0.5 MG/DL (ref 0–1.2)
BUN BLDV-MCNC: 18 MG/DL (ref 6–20)
CALCIUM SERPL-MCNC: 8.5 MG/DL (ref 8.6–10.2)
CHLORIDE BLD-SCNC: 105 MMOL/L (ref 98–107)
CO2: 23 MMOL/L (ref 22–29)
CREAT SERPL-MCNC: 0.7 MG/DL (ref 0.7–1.2)
EOSINOPHILS ABSOLUTE: 0.39 E9/L (ref 0.05–0.5)
EOSINOPHILS RELATIVE PERCENT: 4.9 % (ref 0–6)
GFR SERPL CREATININE-BSD FRML MDRD: >60 ML/MIN/1.73
GLUCOSE BLD-MCNC: 208 MG/DL (ref 74–99)
HCT VFR BLD CALC: 34.2 % (ref 37–54)
HEMOGLOBIN: 11.2 G/DL (ref 12.5–16.5)
IMMATURE GRANULOCYTES #: 0.03 E9/L
IMMATURE GRANULOCYTES %: 0.4 % (ref 0–5)
LV EF: 60 %
LVEF MODALITY: NORMAL
LYMPHOCYTES ABSOLUTE: 1.98 E9/L (ref 1.5–4)
LYMPHOCYTES RELATIVE PERCENT: 24.9 % (ref 20–42)
MCH RBC QN AUTO: 29.3 PG (ref 26–35)
MCHC RBC AUTO-ENTMCNC: 32.7 % (ref 32–34.5)
MCV RBC AUTO: 89.5 FL (ref 80–99.9)
METER GLUCOSE: 126 MG/DL (ref 74–99)
METER GLUCOSE: 218 MG/DL (ref 74–99)
METER GLUCOSE: 220 MG/DL (ref 74–99)
METER GLUCOSE: 241 MG/DL (ref 74–99)
METER GLUCOSE: 243 MG/DL (ref 74–99)
METER GLUCOSE: 262 MG/DL (ref 74–99)
MONOCYTES ABSOLUTE: 0.89 E9/L (ref 0.1–0.95)
MONOCYTES RELATIVE PERCENT: 11.2 % (ref 2–12)
NEUTROPHILS ABSOLUTE: 4.64 E9/L (ref 1.8–7.3)
NEUTROPHILS RELATIVE PERCENT: 58.3 % (ref 43–80)
PDW BLD-RTO: 14.1 FL (ref 11.5–15)
PLATELET # BLD: 341 E9/L (ref 130–450)
PMV BLD AUTO: 9.2 FL (ref 7–12)
POTASSIUM REFLEX MAGNESIUM: 3.8 MMOL/L (ref 3.5–5)
RBC # BLD: 3.82 E12/L (ref 3.8–5.8)
SARS-COV-2, PCR: NOT DETECTED
SODIUM BLD-SCNC: 138 MMOL/L (ref 132–146)
TOTAL PROTEIN: 5.6 G/DL (ref 6.4–8.3)
WBC # BLD: 8 E9/L (ref 4.5–11.5)

## 2023-01-26 PROCEDURE — 2580000003 HC RX 258

## 2023-01-26 PROCEDURE — 97530 THERAPEUTIC ACTIVITIES: CPT

## 2023-01-26 PROCEDURE — 82962 GLUCOSE BLOOD TEST: CPT

## 2023-01-26 PROCEDURE — 80053 COMPREHEN METABOLIC PANEL: CPT

## 2023-01-26 PROCEDURE — 85025 COMPLETE CBC W/AUTO DIFF WBC: CPT

## 2023-01-26 PROCEDURE — C9113 INJ PANTOPRAZOLE SODIUM, VIA: HCPCS

## 2023-01-26 PROCEDURE — 99231 SBSQ HOSP IP/OBS SF/LOW 25: CPT | Performed by: INTERNAL MEDICINE

## 2023-01-26 PROCEDURE — 6370000000 HC RX 637 (ALT 250 FOR IP): Performed by: STUDENT IN AN ORGANIZED HEALTH CARE EDUCATION/TRAINING PROGRAM

## 2023-01-26 PROCEDURE — 97535 SELF CARE MNGMENT TRAINING: CPT

## 2023-01-26 PROCEDURE — A4216 STERILE WATER/SALINE, 10 ML: HCPCS

## 2023-01-26 PROCEDURE — 6370000000 HC RX 637 (ALT 250 FOR IP)

## 2023-01-26 PROCEDURE — 99232 SBSQ HOSP IP/OBS MODERATE 35: CPT

## 2023-01-26 PROCEDURE — 2060000000 HC ICU INTERMEDIATE R&B

## 2023-01-26 PROCEDURE — 6360000002 HC RX W HCPCS

## 2023-01-26 PROCEDURE — S5553 INSULIN LONG ACTING 5 U: HCPCS | Performed by: STUDENT IN AN ORGANIZED HEALTH CARE EDUCATION/TRAINING PROGRAM

## 2023-01-26 PROCEDURE — 36415 COLL VENOUS BLD VENIPUNCTURE: CPT

## 2023-01-26 PROCEDURE — 93306 TTE W/DOPPLER COMPLETE: CPT

## 2023-01-26 PROCEDURE — 92526 ORAL FUNCTION THERAPY: CPT

## 2023-01-26 RX ORDER — INSULIN LISPRO 100 [IU]/ML
0-4 INJECTION, SOLUTION INTRAVENOUS; SUBCUTANEOUS NIGHTLY
Status: DISCONTINUED | OUTPATIENT
Start: 2023-01-26 | End: 2023-01-27 | Stop reason: HOSPADM

## 2023-01-26 RX ORDER — INSULIN LISPRO 100 [IU]/ML
0-4 INJECTION, SOLUTION INTRAVENOUS; SUBCUTANEOUS
Status: DISCONTINUED | OUTPATIENT
Start: 2023-01-26 | End: 2023-01-26

## 2023-01-26 RX ORDER — PANTOPRAZOLE SODIUM 40 MG/1
40 TABLET, DELAYED RELEASE ORAL
Status: DISCONTINUED | OUTPATIENT
Start: 2023-01-27 | End: 2023-01-27 | Stop reason: HOSPADM

## 2023-01-26 RX ORDER — INSULIN LISPRO 100 [IU]/ML
0-8 INJECTION, SOLUTION INTRAVENOUS; SUBCUTANEOUS
Status: DISCONTINUED | OUTPATIENT
Start: 2023-01-26 | End: 2023-01-27 | Stop reason: HOSPADM

## 2023-01-26 RX ADMIN — CLOPIDOGREL BISULFATE 75 MG: 75 TABLET ORAL at 08:42

## 2023-01-26 RX ADMIN — SODIUM CHLORIDE, PRESERVATIVE FREE 10 ML: 5 INJECTION INTRAVENOUS at 20:58

## 2023-01-26 RX ADMIN — ENOXAPARIN SODIUM 40 MG: 100 INJECTION SUBCUTANEOUS at 08:46

## 2023-01-26 RX ADMIN — INSULIN LISPRO 1 UNITS: 100 INJECTION, SOLUTION INTRAVENOUS; SUBCUTANEOUS at 04:18

## 2023-01-26 RX ADMIN — INSULIN LISPRO 1 UNITS: 100 INJECTION, SOLUTION INTRAVENOUS; SUBCUTANEOUS at 14:18

## 2023-01-26 RX ADMIN — ATORVASTATIN CALCIUM 80 MG: 40 TABLET, FILM COATED ORAL at 20:57

## 2023-01-26 RX ADMIN — INSULIN LISPRO 2 UNITS: 100 INJECTION, SOLUTION INTRAVENOUS; SUBCUTANEOUS at 18:00

## 2023-01-26 RX ADMIN — BACLOFEN 5 MG: 10 TABLET ORAL at 08:42

## 2023-01-26 RX ADMIN — ASPIRIN 81 MG: 81 TABLET, COATED ORAL at 08:42

## 2023-01-26 RX ADMIN — SODIUM CHLORIDE, PRESERVATIVE FREE 40 MG: 5 INJECTION INTRAVENOUS at 08:44

## 2023-01-26 RX ADMIN — INSULIN GLARGINE-YFGN 17 UNITS: 100 INJECTION, SOLUTION SUBCUTANEOUS at 20:57

## 2023-01-26 RX ADMIN — INSULIN LISPRO 1 UNITS: 100 INJECTION, SOLUTION INTRAVENOUS; SUBCUTANEOUS at 00:06

## 2023-01-26 RX ADMIN — BACLOFEN 5 MG: 10 TABLET ORAL at 20:57

## 2023-01-26 ASSESSMENT — PAIN SCALES - GENERAL: PAINLEVEL_OUTOF10: 2

## 2023-01-26 NOTE — PROGRESS NOTES
Occupational Therapy  OT BEDSIDE TREATMENT NOTE   [facility] [address]  [address] [place] [address]        Date:2023  Patient Name: Connie Leyva  MRN: [id number]  : 1970  Room: 850850[facility]     Per OT Eval:    Evaluating OT: Erik Abrams, [address] OTR/L; [id number]       Referring Provider: Nnamdi Westfall MD    Specific Provider Orders/Date: OT Eval and Treat 23       Diagnosis: Acute CVA (cerebrovascular accident)   Left hemiparesis  Diabetes mellitus type I  Cerebrovascular accident (CVA)    Surgery: NONE THIS ADMISSION     Pertinent Medical History:  has a past medical history of Alcoholism ([facility] [address] Cocaine abuse ([facility] [address] Diabetes mellitus ([facility] [address] Hypertension, Idiopathic peripheral neuropathy, Lacunar stroke ([facility] [address] and Marijuana abuse.        Reason for Admission: LUE flaccid and facial droop for 2 days      Recommended Adaptive Equipment:  TBD pending progression      Precautions:  Fall Risk, Cognition, Bed Alarm, L hemiplegia, L sided neglect, TSM      Assessment of current deficits:    [x] Functional mobility            [x]ADLs           [x] Strength                  [x]Cognition    [x] Functional transfers          [x] IADLs         [x] Safety Awareness   [x]Endurance    [x] Fine Coordination                         [x] Balance      [] Vision/perception   []Sensation      []Gross Motor Coordination             [] ROM           [] Delirium                   [] Motor Control      OT PLAN OF CARE   OT POC based on physician orders, patient diagnosis and results of clinical assessment     Frequency/Duration: 1-3 days/wk for 2 weeks PRN   Specific OT Treatment Interventions to include:   * Instruction/training on adapted ADL techniques and AE recommendations to increase functional independence within precautions       * Training on energy conservation strategies, correct breathing pattern and techniques to improve independence/tolerance for self-care routine  * Functional transfer/mobility training/DME recommendations for increased independence, safety, and fall prevention  * Patient/Family education to increase follow through with safety techniques and functional independence  * Recommendation of environmental modifications for increased safety with functional transfers/mobility and ADLs  * Therapeutic exercise to improve motor endurance, ROM, and functional strength for ADLs/functional transfers  * Therapeutic activities to facilitate/challenge dynamic balance, stand tolerance for increased safety and independence with ADLs     Modified Kaylin Scale (MRS)  Score     Description  0             No symptoms  1             No significant disability despite symptoms  2             Slight disability; able to look after own affairs  3             Moderate disability; able to ambulate without assist/ requires assist with ADLs  4             Moderate/Severe disability;requires assist to ambulate/assist with ADLs  5             Severe disability;bedridden/incontinent   6               Score: 4     Home Living: Pt lives in 2nd floor apartment with 3 [facility] and [facility] to enter building. 10 steps BHR to access unit.     Bathroom setup: walkin shower with shower chair   Equipment owned: ww, cane     Prior Level of Function: IND with ADLs    Mod A with IADLs - mother assists with groceries and cooking  ambulated with ww prior  Driving: no - mother and family assist  Occupation: none stated     Pain Level: Pt did not complain of pain this session    Cognition: A&O: 4/4  Follows single 2 step directions - pleasant & cooperative              Memory:  fair              Sequencing:  fair              Problem solving:  fair              Judgement/safety:  fair                Functional Assessment:  AM-PAC Daily Activity Raw Score:     Initial Eval Status  Date: 23 Treatment Status  Date: 23 STGs = LTGs  Time frame: 10-14 days   Feeding Minimal Assist SBA  Using R UE  Moderate [place]    Grooming Minimal Assist  Min A  Pt washed face, donned deodorant with use of RUE seated upright in chair Moderate [place]    UB Dressing Maximal Assist  Mod A  Instructed pt on valorie-dressing techniques    Minimal Assist    LB Dressing Dependent  Mod A  Simulated  Minimal Assist    Bathing Dependent Mod A  Simulated  Minimal Assist    Toileting Dependent   Dep  Pt having of external male catheter Minimal Assist    Bed Mobility  Log Roll: Mod A  Supine to sit: Mod A   Sit to supine: Mod A  Mod A  Supine to sit    Supine to sit: SBA   Sit to supine: SBA    Functional Transfers Sit to stand: Max A   Stand to sit:MOD A  Stand pivot: NT  Commode: NT Max A  Sit to Stand  Stand to Sit   Sit to stand:SBA   Stand to sit:SBA  Stand pivot: SBA with AD  Commode: SBA with AD    Functional Mobility Max A x 2 with arm in arm assist   ~2 side steps right toward HOB Max A x 2  Stand Pivot Transfer   EOB to chair SBA with AD    Balance Sitting:     Static - Min A <> Mod A  L lateral lean     Dynamic - Mod A  Standing: Max A x 2  Sitting EOB:  Min/SBA    Standing: Max A Sitting:  Static: SBA  Dynamic: SBA  Standing:  SBAwith AD   Activity Tolerance FAIR  Limited d/t fatigue/weakness   Extremely lethargic required verbal cues for keeping eyes open and attending to task  Fair-  Reports fatigue from not sleeping good GOOD   Visual/  Perceptual Glasses: yes - not present      Pt reports blurry vision d/t glasses not present     visual tracking deficits noted during H test             Vitals    WFL              BUE  ROM/Strength/  Fine motor Coordination Hand dominance:Right     RUE: ROM WFL     Strength: 4-/5      Strength: FAIR     Coordination:  FAIR     LUE: flexor tone present     Strength: 0/5      Strength: NA     Coordination: NA   increase BUE muscle strength for improved indep with functional transfers      Hearing: WFL   Sensation:  No c/o numbness or tingling   Tone: [facility] Edema: unremarkable    Education:  Pt was educated on role of OT, goals to be reached, importance of OOB activity, safety with bed mobility, safety and hand placement of RUE with transfers, balance/posture seated unsupported at EOB, and valorie dressing techniques to assist with LB dressing tasks. Comments: Upon arrival pt supine in bed, agreeable to therapy, speaking with nursing okaying pt to be seen this session. At end of session, pt seated in chair, all lines and tubes intact, call light within reach. Nsg aware. PT present during session due to pt's current assist levels, safety and activity tolerance. Pt has made Fair progress towards set goals. Continue with current plan of  care focusing on increasing of independency with transfers and ADL tasks     Treatment Time In: 11:45am           Treatment Time Out: 12:15               Treatment Charges: Mins Units   Ther Ex  07090     Manual Therapy 67150     Thera Activities [id number] 20 1   ADL/Home Mgt [id number] 10 1   Neuro Re-ed [id number]     Group Therapy      Orthotic manage/training  78029     Non-Billable Time     Total Timed Treatment 30 2      Stella PELAEZ  [address] [place] [address], [address] [place] [address]

## 2023-01-26 NOTE — CONSULTS
[facility]                  [address] [place],  [facility]                                  CONSULTATION    PATIENT NAME: Krystian Hernandez                     :        1970  MED REC NO:   [id number]                            ROOM:       850  ACCOUNT NO:   [de-identified]                           ADMIT DATE: 2023  PROVIDER:     Kailyn Cordon MD    CONSULT DATE:  2023    REHAB CONSULT    AGE:  47. SEX:  Male. CHIEF COMPLAINT:  CVA. HISTORY OF PRESENT ILLNESS:  The patient was admitted to [address] [place] [address]  on 2023 with sudden onset of left hemiparesis. He was found to  have an acute right middle cerebral artery stroke. He was admitted and  stabilized. There were actually multiple strokes on his scan. He has  underlying hypertension, diabetes, smoking, and alcohol use. He was  evaluated by Therapy. He is at a mod to max assist level with all of  his functioning and he is felt to be a good candidate for further rehab. PAST MEDICAL HISTORY:  1. Hypertension. 2.  Type 1 diabetes mellitus. 3.  Previous DKA. 4.  Nicotine addiction. 5.  Peripheral neuropathy. 6.  Alcohol use disorder. ALLERGIES:  METOPROLOL. CURRENT MEDICATIONS:  Aspirin, Lipitor, baclofen, Plavix, Lovenox, short  and long-acting insulin, Zestril, and Protonix. SOCIAL HISTORY:  He lives with his mother. REVIEW OF SYSTEMS:  Negative for prior HEENT problems. He denies any  prior cardiac or pulmonary problems. Denies any prior GI or   problems. Denies any prior orthopedic deficits. NEUROLOGIC:  Positive  for neuropathy. PHYSICAL EXAMINATION:  GENERAL:  A well-nourished, well-developed white male, in no acute  distress. HEAD:  Normocephalic and atraumatic. EYES:  Pupils equal, round, and reactive to light. LUNGS:  Scattered rales and rhonchi. HEART:  Regular rate and rhythm. S1, S2 normal.  No murmurs or gallops.   ABDOMEN:  Bowel sounds normal.  Soft and nontender. No masses. EXTREMITIES:  Without clubbing, cyanosis, or edema. MENTAL STATUS:  Alert and oriented. SENSATION:  Diminished on the left side. NEUROMUSCULAR:  Trace of the left upper, 3/5 at the left hip and knee  with trace at the ankle. PROBLEM LIST:  1. CVA with left hemiparesis. 2.  Type 1 diabetes mellitus. 3.  Hypertension. 4.  Alcohol use disorder. 5.  Nicotine addiction. RECOMMENDATIONS:  I think the patient is a good candidate for acute  rehab. I understand he is planning to go to [facility], so I believe they  are addressing pre-cert with him.         Josh Drew MD    D: 01/26/2023 9:56:57       T: 01/26/2023 9:59:25     [email or web address]  Job#: [id number]     Doc#: [id number]    CC:

## 2023-01-26 NOTE — PROGRESS NOTES
[facility]  Internal Medicine Residency / [address]    Attending Physician Statement  I have discussed the case, including pertinent history and exam findings with the resident and the team.  I have seen and examined the patient and the key elements of the encounter have been performed by me. I agree with the assessment, plan and orders as documented by the resident. Principal Problem:    Acute lacunar infarction [facility])  Active Problems:    Left hemiparesis ([facility] [address]    Diabetes mellitus type I ([facility] [address]    Cerebrovascular accident (CVA) ([facility] [address]    Acute CVA (cerebrovascular accident) ([facility] [address]  Resolved Problems:    * No resolved hospital problems. *    Echo done, no thrombus noted and the mechanism of his stroke is related to his extensive cerebrovascular atherosclerotic disease. Continue DAPT, high intensity statin. BP better off low-dose lisinopril given significant cerebrovascular disease. Spasticity is a little improved on baclofen, will continue the current dose of 5 mg twice daily    Discharge planning for acute rehab tomorrow. Remainder of medical problems as per resident note.       Antonia Hanson, DO  Internal Medicine Residency Faculty

## 2023-01-26 NOTE — PROGRESS NOTES
[facility]  Internal Medicine Residency Program  Progress Note - House Team     Patient:  Siddharth Wallace 47 y.o. male MRN: [id number]     Date of Service: 1/26/2023     CC: stroke like symptoms  Overnight events: No acute concerns    Subjective     Patient was seen and examined this morning at bedside in no acute distress. He reports no new complaint today. Patient noticed slight improvement in his left leg function, left upper limb still paralyzed as per patient. Vital signs remained stable, /90. Blood glucose elevated to 281. Insulin regimen increased to medium dose sliding scale to optimize glycemic control. Objective     Physical Exam:  Vitals: BP (!) 160/89   Pulse 67   Temp 97.6 °F (36.4 °C) (Temporal)   Resp 18   Ht 5' 6\" (1.676 m)   Wt 145 lb (65.8 kg)   SpO2 100%   BMI 23.40 kg/m²     I & O - 24hr: No intake/output data recorded. General Appearance: alert, appears stated age, and cooperative  HEENT:  Head: Normal, normocephalic, atraumatic. Neck: supple, symmetrical, trachea midline  Lung: clear to auscultation bilaterally  Heart: regular rate and rhythm, S1, S2 normal, no murmur, click, rub or gallop  Abdomen: soft, non-tender; bowel sounds normal; no masses,  no organomegaly  Extremities:  extremities normal, atraumatic, no cyanosis or edema  Musculokeletal: No joint swelling, no muscle tenderness. ROM normal in all joints of extremities.    Neurologic: Mental status: Alert, oriented, thought content appropriate  GCS 15  Subtle dysarthria/left facial deviation  Strength LUE - 0/5, RUE - 5/5    LLE - 3/5, RLE - 5/5  Sensations - grossly intact    Pertinent Labs & Imaging Studies   chika  CBC with Differential:    Lab Results   Component Value Date/Time    WBC 8.0 01/26/2023 04:20 AM    RBC 3.82 01/26/2023 04:20 AM    HGB 11.2 01/26/2023 04:20 AM    HCT 34.2 01/26/2023 04:20 AM     01/26/2023 04:20 AM    MCV 89.5 01/26/2023 04:20 AM    MCH 29.3 01/26/2023 04:20 AM    MCHC 32.7 01/26/2023 04:20 AM    RDW 14.1 01/26/2023 04:20 AM    SEGSPCT 53 01/25/2014 05:15 AM    SEGSPCT 86 10/11/2010 04:40 AM    BANDSPCT 3 10/10/2010 07:20 PM    METASPCT 1.7 07/20/2020 12:12 PM    LYMPHOPCT 24.9 01/26/2023 04:20 AM    PROMYELOPCT 1.7 04/29/2022 09:41 PM    MONOPCT 11.2 01/26/2023 04:20 AM    MYELOPCT 2.6 01/04/2020 05:07 PM    EOSPCT 2 10/12/2010 06:00 AM    BASOPCT 0.3 01/26/2023 04:20 AM    MONOSABS 0.89 01/26/2023 04:20 AM    LYMPHSABS 1.98 01/26/2023 04:20 AM    EOSABS 0.39 01/26/2023 04:20 AM    BASOSABS 0.02 01/26/2023 04:20 AM     CMP:    Lab Results   Component Value Date/Time     01/26/2023 04:20 AM    K 3.8 01/26/2023 04:20 AM     01/26/2023 04:20 AM    CO2 23 01/26/2023 04:20 AM    BUN 18 01/26/2023 04:20 AM    CREATININE 0.7 01/26/2023 04:20 AM    GFRAA >60 08/10/2022 09:41 AM    LABGLOM >60 01/26/2023 04:20 AM    GLUCOSE 208 01/26/2023 04:20 AM    GLUCOSE 83 04/11/2012 03:35 AM    PROT 5.6 01/26/2023 04:20 AM    LABALBU 3.5 01/26/2023 04:20 AM    LABALBU 4.3 12/30/2011 10:15 AM    CALCIUM 8.5 01/26/2023 04:20 AM    BILITOT 0.5 01/26/2023 04:20 AM    ALKPHOS 86 01/26/2023 04:20 AM    AST 15 01/26/2023 04:20 AM    ALT 11 01/26/2023 04:20 AM       US THYROID   Final Result   The right thyroid nodule is 8 mm and is awarded 3 points; no further   follow-up is needed. See reference below. ACR TI-RADS recommendations:      TR5 (>= 7 points):  FNA if >= 1 cm; follow-up if 0.5-0.9 cm in 1, 2, 3, 4,   and 5 years      TR4 (4-6 points):  FNA if >= 1.5 cm; follow-up if 1.0-1.4 cm in 1, 2, 3, and   5 years      TR3 (3 points):   FNA if >= 2.5 cm; follow-up if 1.5-2.4 cm in 1, 3, and 5   years      TR2 (2 points):   No FNA or follow-up      TR1 (0 points):   No FNA or follow-up      ACR TI-RADS recommends that no more than two nodules with the highest ACR   TI-RADS point total should be biopsied and no more than four nodules should   be followed. Fluoroscopy modified barium swallow with video   Final Result   1. Without use of a straw, no barium aspiration or significant swallowing   deficits. Transient laryngeal penetration with thin liquid barium. 2.  Single episode of thin liquid barium aspiration when using a straw. 3.  Please see separate speech pathology report for full discussion of   findings and recommendations. MRA HEAD WO CONTRAST   Final Result   Acute lacunar infarct involving the right posterior limb internal capsule. More subacute appearing cortical infarcts throughout the left posterior   cortical watershed territory within the parietal lobe and lesser extent right   parietal lobe. No acute hemorrhage or significant mass effect. Moderate luminal narrowing of the right mid M1 segment and severe luminal   narrowing of the right proximal M2 segment. There is tapering of the distal right PCA with fetal origin. RECOMMENDATIONS:   Unavailable         MRI BRAIN WO CONTRAST   Final Result   Acute lacunar infarct involving the right posterior limb internal capsule. More subacute appearing cortical infarcts throughout the left posterior   cortical watershed territory within the parietal lobe and lesser extent right   parietal lobe. No acute hemorrhage or significant mass effect. Moderate luminal narrowing of the right mid M1 segment and severe luminal   narrowing of the right proximal M2 segment. There is tapering of the distal right PCA with fetal origin. RECOMMENDATIONS:   Unavailable         CTA NECK W CONTRAST   Final Result   No hemodynamically significant stenosis or dissection of the cervical   internal carotid arteries. No high-grade stenosis or dissection of the cervical vertebral arteries. 15 mm right thyroid nodule; see recommendation below.       Low-density circumscribed structures in the bilateral cheek subcutaneous   tissues abutting the skin are favored to represent epidermal inclusion cysts   or other benign dermal lesions (series 301, images 164 and 175); recommend   nonemergent dermatologic evaluation. Small fluid level in the left sphenoid sinus; fluid levels can be seen in the   setting acute sinusitis. RECOMMENDATION:   1.5 cm incidental right thyroid nodule. Recommend thyroid US. Reference: J Am Adria Radiol. 2015 Feb;12(2): 143-50         XR CHEST PORTABLE   Final Result   No acute process. CT HEAD WO CONTRAST   Final Result   1. There is no acute intracranial abnormality. Specifically, there is no   intracranial hemorrhage. 2. Atrophy and periventricular leukomalacia,   3. Multiple old lacunar infarcts within the right and left basal ganglia more   prominent within the right basal ganglia. Yesenia Champagne               Resident's Assessment and Plan     Subacute ischemic stroke 2/2 poorly controlled type I DM, delayed presentation  CT head w/o: old lacunar infarct b/l basal ganglia, worse on right   CTA neck: no stenosis or dissection, 1.5 cm right thyroid nodule  MRI/MRA brain: acute lacunar infarct Right posterior internal capsule, subacute cortical infarcts in Left posterior cortical watershed in parietal lobe, lesser on Right parietal lobe, mod narroving of Right mid M1 and severe narrowing of Right proximal M2  Some improvements in strength in the lower extremity  Continue to hold lisinopril for now and allow for permissive hypertension  Continue On plavix, aspirin, atorvastatin 40     Mild pharyngeal dysphagia on SLP evaluation  Continue easy to chew consistency solids (IDDSI level 7, transitional) with  thin liquids (IDDSI level 0)-- NO straw    DKA 2/2 poorly controlled TIDM, resolved in early stages  BG currently within goal  Contionue on lantuis 17U/MDSS  Continue to monitor POCT BG Q4hrs    History of TIDM, previously on insulin pump, issues with insurance  Consider SW to address socioeconomic issues prior to discharge    History of hypertension  BP normotensive, hold on lisinopril for now  Continue permissive hypertension    History of TIA, 2018  On aspirin 81 mg daily at home  Continue On plavix, aspirin, atorvastatin 40    History of polysubstance abuse, currently abuses marijuana    PT/OT evaluation: PT - 10/24  DVT prophylaxis/ GI prophylaxis: lovenox/protonix  Disposition: D/C planning to ARU ongoing, ensure aggressive PT    Victor Hugo Huerta MD, PGY-1  Attending physician: Dr. Glenn Crawford

## 2023-01-26 NOTE — PROGRESS NOTES
Pt c/o feeling like he is not moving enough air,I asked to clarify if he felt short of breath he stated no, he just feels like he cant take a deep breath. For comfort I placed on 2L nc. Pt verbalized improvement.

## 2023-01-26 NOTE — CARE COORDINATION
Per notes presented to the ED complaining of left-sided weakness with slurring of speech since 2 days prior to presentation. Acute lacunar infarct involving the right posterior limb internal capsule. Neurology consult. PT/OT sierra noted and discussed discharge plan with patient who is in agreement for rehab. Black referral yesterday and has started precert. Envelope and ambulance form in soft chart. Luis Donnie from [address] [place] [address] also following. PCR is negative today. Needs echo. Per neurology- He will be discharged with a Zio patch. Electronically signed by Tye Hicks RN on 1/26/2023 at 11:59 AM

## 2023-01-26 NOTE — PROGRESS NOTES
Physical Therapy  Treatment Note    Name: Franklyn Sellers  : 1970  MRN: [id number]      Date of Service: 2023    Evaluating PT:  Tashia Smith PT, DPT    Room #:  0585/8302-U  Diagnosis:  Acute CVA (cerebrovascular accident) [facility]) [I63.9]  Cerebrovascular accident (CVA), unspecified mechanism ([facility] [address] [I63.9]  PMHx/PSHx:  DM, HTN, stroke  Procedure/Surgery:  N/A  Precautions:  fall risk, L hemiplegia, L sided neglect, TSM, bed alarm  Equipment Needs:  TBD    SUBJECTIVE:    Pt a questionable historian, pt reportedly lives alone in a 2nd floor apt with 3+10 steps to enter and 1 handrail. Pt ambulated with ww PTA. OBJECTIVE:   Initial Evaluation  Date: 23 Treatment  Date: 23 Short Term/ Long Term   Goals   AM-PAC 6 Clicks 55/26 18/10    Was pt agreeable to Eval/treatment? yes yes    Does pt have pain? No pain No pain    Bed Mobility  Rolling: mod A  Supine to sit: mod A  Sit to supine: mod A  Scooting: mod A Rolling: mod A  Supine to sit: mod A  Scooting: mod A Rolling: SBA  Supine to sit: SBA  Sit to supine: SBA  Scooting: SBA   Transfers Sit to stand: max A  Stand to sit: mod A  Stand pivot: NT Sit to stand: max A  Stand to sit: max A  Stand pivot: max A of 2  Sit to stand: SBA  Stand to sit: SBA  Stand pivot: SBA with AAD   Ambulation    2' with max Ax2 with arm in arm A  (Side steps to [facility]) NT 25'+ with AAD SBA   Stair negotiation: ascended and descended NT NT 10 steps with 1 handrail SBA             Patient education  Pt educated on maintaining midline with sitting     Patient response to education:   Pt verbalized understanding Pt demonstrated skill Pt requires further education in this area   yes partial yes     ASSESSMENT:  Comments:    Nursing cleared pt for physical therapy. Pt in bed upon arrival and agreed to participate in therapy. Pt sat on edge of bed x10-15 minutes with R UE support static sitting SBA , without UE support static min A , Mod A for dynamic sitting balance.   L LE Flexor tone noted with sitting and especially with standing/pivot. Pt is barely able to place ball of foot/toes on ground with assistance. Pt left in bed side chair with call light in reach. Treatment:  Patient practiced and was instructed in the following treatment:    Bed mobility training -Verbal instruction for technique with bed mobility. Assistance required to complete task. Sitting EOB to promote upright posture and postural awareness   Transfer training - Verbal instruction for hand placement and technique to improve balance and safety. Assistance required to complete task. PLAN:    Patient is making fair progress towards established goals. Will continue with current POC.       Time in  1145  Time out  1215    Total Treatment Time  30 minutes     CPT codes:  [] Gait training 26492 -- minutes  [] Manual therapy [id number] -- minutes  [x] Therapeutic activities 00881 30 minutes  [] Therapeutic exercises 18799 -- minutes  [] Neuromuscular reeducation 16271 -- minutes  Alonzo Murcia [id number]

## 2023-01-26 NOTE — PROGRESS NOTES
SPEECH LANGUAGE PATHOLOGY  DAILY PROGRESS NOTE        PATIENT NAME:  Hayes Cordero      :  1970          TODAY'S DATE:  2023 ROOM:  [address] [place] [address]    Patient seen for f/u for dysphagia mgmt. Patient w/ meal tray present in room (easy to chew solids and thin liquids-no straws). Patient able to recall no straw recommendation at start of session. Patient consumed easy to chew solids and thin liquids via cup w/ mild oral stasis after 1st swl that cleared w/ 2nd swl and/or liquid wash, fair bolus formation;manipulation, adequate mastication time, and fair AP tongue propulsion. Patient pharyngeal stage exhibited cough x 1 w/ thin liquids which could be secondary to large bolus. SLP cued patient for smaller sips and exhibited excellent follow through w/ no further s/s of pen/aspiration noted. Patient able to repeat recommended strategies (small bites/sips, slow rate, alt solids/liquids and no straw) w/ 80% acc. Will cont w/ POC.        CPT code(s) 39446  dysphagia tx  Total minutes :  15 minutes    [employer] M.S., CCC-SLP  Speech-Language Pathologist  [address] [id number]

## 2023-01-26 NOTE — PROGRESS NOTES
Chris Espinosa is a 47 y.o. right handed male     Neurology following for stroke    PMH of diabetes, hypertension, alcoholism, smoking, and drug abuse    Assessment:     Acute infarct  Presented with sudden left arm and leg weakness and dysarthria  His stroke risk factors include diabetes, hypertension, hyperlipidemia, smoking, alcoholism. His LDL was 123 and his A1c was 9  Upon exam he continues to experience left arm and leg weakness along with left facial droop. Plan:     Continue aspirin and Plavix x 21 days then aspirin monotherapy  Continue statin with an LDL goal <70  A1c goal <6  Echocardiogram still pending  Stroke education  PT/OT/SLP  Zio patch at discharge  Neurology will follow    Subjective:     Patient presented to the ER 1/23/2023 after experiencing  left arm and leg weakness and slurred speech for 2 days prior. His NIH was a 9 and he was not a TNK candidate due to being outside the window for administration. His CT imaging demonstrated old infarcts. His MRI brain Acute lacunar infarct involving the right posterior limb internal capsule. More subacute appearing cortical infarcts throughout the left posterior cortical watershed territory within the parietal lobe and lesser extent right parietal lobe. Patient lying in bed awake, alert, and oriented x4 he continues to experience left extremity weakness with his left arm weaker than his left leg. He also continues to experience left facial droop. He does not have any sensory loss. He continues to wait for his echocardiogram, hopefully this to be done soon. He will be discharged with a Zio patch. I did speak about stroke risk factors, patient verbally understands.     There is no family at the bedside      No chest pain or palpitations  No coughing or wheezing    No vertigo, lightheadedness or loss of consciousness  No falls, tripping or stumbling  No incontinence of bowels or bladder  No itching or bruising appreciated    ROS otherwise negative      Objective:       BP (!) 160/89   Pulse 67   Temp 97.6 °F (36.4 °C) (Temporal)   Resp 18   Ht 5' 6\" (1.676 m)   Wt 145 lb (65.8 kg)   SpO2 100%   BMI 23.40 kg/m²       General appearance: alert, appears stated age, cooperative and no distress  Head: normocephalic, without obvious abnormality, atraumatic  Eyes: conjunctivae/corneas clear  Neck: no adenopathy, supple, symmetrical, trachea midline and thyroid not enlarged, symmetric, no tenderness/mass/nodules  Lungs: Regular respirations on room air  Heart: No chest pain or palpitations  Abdomen: soft, non-tender; bowel sounds normal; no masses,  no organomegaly  Extremities:  normal, atraumatic, no cyanosis or edema  Pulses: 2+ and symmetric  Skin: color, texture, turgor normal---no rashes or lesions      Mental Status: Alert, oriented, thought content appropriate, alertness: alert, orientation: time, date, person, place, city, affect: normal     Appropriate attention/concentration  Intact fundus of knowledge  Repetition intact  Intact memories      Speech: Clear  Language: No aphasias    Cranial Nerves:  I: smell NA   II: visual acuity  NA   II: visual fields Full to confrontation   II: pupils EDDI   III,VII: ptosis Left labial fold droop   III,IV,VI: extraocular muscles  Full ROM   V: mastication Normal   V: facial light touch sensation  Normal   V,VII: corneal reflex  Present   VII: facial muscle function - upper  Normal   VII: facial muscle function - lower Left labial fold droop   VIII: hearing Normal   IX: soft palate elevation  Normal   IX,X: gag reflex Present   XI: trapezius strength     XI: sternocleidomastoid strength    XI: neck extension strength     XII: tongue strength  normal     Motor:  Left arm 0/5  Left leg 3-/5    Normal bulk and tone  Left arm pronator drift  No abnormal movements    Sensory:  LT and PP normal  Vibration normal    Coordination:   FN, FFM and MARY normal with right hand, unable to perform with left hand due to weakness  HS normal with right heel but unable to be performed with left heel due to weakness    Gait:  Deferred for patient safety/fall consideration    DTR:   2+ throughout    No Babinskis  No Nela's    No other pathological reflexes    Laboratory/Radiology:     CBC with Differential:    Lab Results   Component Value Date/Time    WBC 8.0 01/26/2023 04:20 AM    RBC 3.82 01/26/2023 04:20 AM    HGB 11.2 01/26/2023 04:20 AM    HCT 34.2 01/26/2023 04:20 AM     01/26/2023 04:20 AM    MCV 89.5 01/26/2023 04:20 AM    MCH 29.3 01/26/2023 04:20 AM    MCHC 32.7 01/26/2023 04:20 AM    RDW 14.1 01/26/2023 04:20 AM    SEGSPCT 53 01/25/2014 05:15 AM    SEGSPCT 86 10/11/2010 04:40 AM    BANDSPCT 3 10/10/2010 07:20 PM    METASPCT 1.7 07/20/2020 12:12 PM    LYMPHOPCT 24.9 01/26/2023 04:20 AM    PROMYELOPCT 1.7 04/29/2022 09:41 PM    MONOPCT 11.2 01/26/2023 04:20 AM    MYELOPCT 2.6 01/04/2020 05:07 PM    EOSPCT 2 10/12/2010 06:00 AM    BASOPCT 0.3 01/26/2023 04:20 AM    MONOSABS 0.89 01/26/2023 04:20 AM    LYMPHSABS 1.98 01/26/2023 04:20 AM    EOSABS 0.39 01/26/2023 04:20 AM    BASOSABS 0.02 01/26/2023 04:20 AM     CMP:    Lab Results   Component Value Date/Time     01/26/2023 04:20 AM    K 3.8 01/26/2023 04:20 AM     01/26/2023 04:20 AM    CO2 23 01/26/2023 04:20 AM    BUN 18 01/26/2023 04:20 AM    CREATININE 0.7 01/26/2023 04:20 AM    GFRAA >60 08/10/2022 09:41 AM    LABGLOM >60 01/26/2023 04:20 AM    GLUCOSE 208 01/26/2023 04:20 AM    GLUCOSE 83 04/11/2012 03:35 AM    PROT 5.6 01/26/2023 04:20 AM    LABALBU 3.5 01/26/2023 04:20 AM    LABALBU 4.3 12/30/2011 10:15 AM    CALCIUM 8.5 01/26/2023 04:20 AM    BILITOT 0.5 01/26/2023 04:20 AM    ALKPHOS 86 01/26/2023 04:20 AM    AST 15 01/26/2023 04:20 AM    ALT 11 01/26/2023 04:20 AM     HgBA1c:    Lab Results   Component Value Date/Time    LABA1C 9.0 01/23/2023 11:14 AM     FLP:    Lab Results   Component Value Date/Time    TRIG 148 01/23/2023 11:14 AM    HDL 70 01/23/2023 11:14 AM    LDLCALC 123 01/23/2023 11:14 AM    LABVLDL 30 01/23/2023 11:14 AM     CT Head  1. There is no acute intracranial abnormality. Specifically, there is no  intracranial hemorrhage. 2. Atrophy and periventricular leukomalacia,  3. Multiple old lacunar infarcts within the right and left basal ganglia more  prominent within the right basal ganglia. CTA Neck  No hemodynamically significant stenosis or dissection of the cervical  internal carotid arteries. No high-grade stenosis or dissection of the cervical vertebral arteries. 1.5 mm right thyroid nodule; see recommendation below. Low-density circumscribed structures in the bilateral cheek subcutaneous  tissues abutting the skin are favored to represent epidermal inclusion cysts  or other benign dermal lesions (series 301, images 164 and 175); recommend  nonemergent dermatologic evaluation. Small fluid level in the left sphenoid sinus; fluid levels can be seen in the  setting acute sinusitis. MRI/MRV  Acute lacunar infarct involving the right posterior limb internal capsule. More subacute appearing cortical infarcts throughout the left posterior  cortical watershed territory within the parietal lobe and lesser extent right  parietal lobe. No acute hemorrhage or significant mass effect. Moderate luminal narrowing of the right mid M1 segment and severe luminal  narrowing of the right proximal M2 segment. There is tapering of the distal right PCA with fetal origin. US thyroid  The right thyroid nodule is 8 mm and is awarded 3 points; no further  follow-up is needed. See reference below.   ACR TI-RADS recommendations:  TR5 (>= 7 points):  FNA if >= 1 cm; follow-up if 0.5-0.9 cm in 1, 2, 3, 4,  and 5 years  TR4 (4-6 points):  FNA if >= 1.5 cm; follow-up if 1.0-1.4 cm in 1, 2, 3, and  5 years  TR3 (3 points):   FNA if >= 2.5 cm; follow-up if 1.5-2.4 cm in 1, 3, and 5  years  TR2 (2 points):   No FNA or follow-up  TR1 (0 points):   No FNA or follow-up        All labs and imaging studies reviewed independently today           CHAYO Perry CNP  10:46 AM  1/26/2023

## 2023-01-27 VITALS
HEART RATE: 72 BPM | BODY MASS INDEX: 23.3 KG/M2 | RESPIRATION RATE: 12 BRPM | TEMPERATURE: 97.9 F | DIASTOLIC BLOOD PRESSURE: 92 MMHG | OXYGEN SATURATION: 99 % | HEIGHT: 66 IN | SYSTOLIC BLOOD PRESSURE: 140 MMHG | WEIGHT: 145 LBS

## 2023-01-27 DIAGNOSIS — I63.89 ACUTE ISCHEMIC MULTIFOCAL MULTIPLE VASCULAR TERRITORIES STROKE (HCC): Primary | ICD-10-CM

## 2023-01-27 LAB
ANION GAP SERPL CALCULATED.3IONS-SCNC: 8 MMOL/L (ref 7–16)
BASOPHILS ABSOLUTE: 0.02 E9/L (ref 0–0.2)
BASOPHILS RELATIVE PERCENT: 0.2 % (ref 0–2)
BUN BLDV-MCNC: 14 MG/DL (ref 6–20)
CALCIUM SERPL-MCNC: 8.8 MG/DL (ref 8.6–10.2)
CHLORIDE BLD-SCNC: 103 MMOL/L (ref 98–107)
CO2: 26 MMOL/L (ref 22–29)
CREAT SERPL-MCNC: 0.6 MG/DL (ref 0.7–1.2)
EKG ATRIAL RATE: 117 BPM
EKG P AXIS: 50 DEGREES
EKG P-R INTERVAL: 128 MS
EKG Q-T INTERVAL: 338 MS
EKG QRS DURATION: 96 MS
EKG QTC CALCULATION (BAZETT): 471 MS
EKG R AXIS: 33 DEGREES
EKG T AXIS: 49 DEGREES
EKG VENTRICULAR RATE: 117 BPM
EOSINOPHILS ABSOLUTE: 0.29 E9/L (ref 0.05–0.5)
EOSINOPHILS RELATIVE PERCENT: 3.2 % (ref 0–6)
GFR SERPL CREATININE-BSD FRML MDRD: >60 ML/MIN/1.73
GLUCOSE BLD-MCNC: 144 MG/DL (ref 74–99)
HCT VFR BLD CALC: 36.9 % (ref 37–54)
HEMOGLOBIN: 12.3 G/DL (ref 12.5–16.5)
IMMATURE GRANULOCYTES #: 0.04 E9/L
IMMATURE GRANULOCYTES %: 0.4 % (ref 0–5)
LYMPHOCYTES ABSOLUTE: 1.9 E9/L (ref 1.5–4)
LYMPHOCYTES RELATIVE PERCENT: 20.9 % (ref 20–42)
MCH RBC QN AUTO: 29.3 PG (ref 26–35)
MCHC RBC AUTO-ENTMCNC: 33.3 % (ref 32–34.5)
MCV RBC AUTO: 87.9 FL (ref 80–99.9)
METER GLUCOSE: 150 MG/DL (ref 74–99)
METER GLUCOSE: 248 MG/DL (ref 74–99)
MONOCYTES ABSOLUTE: 0.85 E9/L (ref 0.1–0.95)
MONOCYTES RELATIVE PERCENT: 9.3 % (ref 2–12)
NEUTROPHILS ABSOLUTE: 6.01 E9/L (ref 1.8–7.3)
NEUTROPHILS RELATIVE PERCENT: 66 % (ref 43–80)
PDW BLD-RTO: 13.7 FL (ref 11.5–15)
PLATELET # BLD: 358 E9/L (ref 130–450)
PMV BLD AUTO: 9 FL (ref 7–12)
POTASSIUM REFLEX MAGNESIUM: 3.6 MMOL/L (ref 3.5–5)
RBC # BLD: 4.2 E12/L (ref 3.8–5.8)
SODIUM BLD-SCNC: 137 MMOL/L (ref 132–146)
WBC # BLD: 9.1 E9/L (ref 4.5–11.5)

## 2023-01-27 PROCEDURE — 85025 COMPLETE CBC W/AUTO DIFF WBC: CPT

## 2023-01-27 PROCEDURE — 6370000000 HC RX 637 (ALT 250 FOR IP): Performed by: STUDENT IN AN ORGANIZED HEALTH CARE EDUCATION/TRAINING PROGRAM

## 2023-01-27 PROCEDURE — 93242 EXT ECG>48HR<7D RECORDING: CPT

## 2023-01-27 PROCEDURE — 6360000002 HC RX W HCPCS

## 2023-01-27 PROCEDURE — 80048 BASIC METABOLIC PNL TOTAL CA: CPT

## 2023-01-27 PROCEDURE — 6370000000 HC RX 637 (ALT 250 FOR IP)

## 2023-01-27 PROCEDURE — 2580000003 HC RX 258

## 2023-01-27 PROCEDURE — 82962 GLUCOSE BLOOD TEST: CPT

## 2023-01-27 PROCEDURE — 92526 ORAL FUNCTION THERAPY: CPT

## 2023-01-27 PROCEDURE — 99232 SBSQ HOSP IP/OBS MODERATE 35: CPT

## 2023-01-27 PROCEDURE — 97530 THERAPEUTIC ACTIVITIES: CPT

## 2023-01-27 PROCEDURE — 97535 SELF CARE MNGMENT TRAINING: CPT

## 2023-01-27 PROCEDURE — 36415 COLL VENOUS BLD VENIPUNCTURE: CPT

## 2023-01-27 RX ORDER — INSULIN GLARGINE-YFGN 100 [IU]/ML
17 INJECTION, SOLUTION SUBCUTANEOUS NIGHTLY
DISCHARGE
Start: 2023-01-27

## 2023-01-27 RX ORDER — CLOPIDOGREL BISULFATE 75 MG/1
75 TABLET ORAL DAILY
Qty: 30 TABLET | Refills: 0 | Status: CANCELLED | OUTPATIENT
Start: 2023-01-27 | End: 2023-02-17

## 2023-01-27 RX ORDER — ASPIRIN 81 MG/1
81 TABLET ORAL DAILY
Qty: 30 TABLET | Refills: 3 | DISCHARGE
Start: 2023-01-27

## 2023-01-27 RX ORDER — BACLOFEN 5 MG/1
5 TABLET ORAL 2 TIMES DAILY
Qty: 60 TABLET | Refills: 0 | Status: CANCELLED | OUTPATIENT
Start: 2023-01-27 | End: 2023-02-26

## 2023-01-27 RX ORDER — INSULIN GLARGINE-YFGN 100 [IU]/ML
17 INJECTION, SOLUTION SUBCUTANEOUS NIGHTLY
Qty: 2 EACH | Refills: 1 | Status: CANCELLED | OUTPATIENT
Start: 2023-01-27

## 2023-01-27 RX ORDER — ATORVASTATIN CALCIUM 80 MG/1
80 TABLET, FILM COATED ORAL NIGHTLY
Qty: 30 TABLET | Refills: 3 | Status: CANCELLED | OUTPATIENT
Start: 2023-01-27

## 2023-01-27 RX ORDER — BACLOFEN 5 MG/1
5 TABLET ORAL 2 TIMES DAILY
DISCHARGE
Start: 2023-01-27

## 2023-01-27 RX ORDER — CLOPIDOGREL BISULFATE 75 MG/1
75 TABLET ORAL DAILY
Qty: 21 TABLET | Refills: 0 | DISCHARGE
Start: 2023-01-28 | End: 2023-02-18

## 2023-01-27 RX ORDER — POLYETHYLENE GLYCOL 3350 17 G/17G
17 POWDER, FOR SOLUTION ORAL DAILY
Qty: 527 G | Refills: 0 | DISCHARGE
Start: 2023-01-28 | End: 2023-02-02

## 2023-01-27 RX ORDER — POLYETHYLENE GLYCOL 3350 17 G/17G
17 POWDER, FOR SOLUTION ORAL DAILY
Status: DISCONTINUED | OUTPATIENT
Start: 2023-01-27 | End: 2023-01-27 | Stop reason: HOSPADM

## 2023-01-27 RX ORDER — ATORVASTATIN CALCIUM 80 MG/1
80 TABLET, FILM COATED ORAL NIGHTLY
Qty: 30 TABLET | Refills: 3 | DISCHARGE
Start: 2023-01-27

## 2023-01-27 RX ORDER — LISINOPRIL 5 MG/1
5 TABLET ORAL DAILY
Qty: 30 TABLET | Refills: 3 | DISCHARGE
Start: 2023-01-27 | End: 2023-02-26

## 2023-01-27 RX ORDER — MECOBALAMIN 5000 MCG
5 TABLET,DISINTEGRATING ORAL NIGHTLY
Status: DISCONTINUED | OUTPATIENT
Start: 2023-01-27 | End: 2023-01-27 | Stop reason: HOSPADM

## 2023-01-27 RX ORDER — MECOBALAMIN 5000 MCG
5 TABLET,DISINTEGRATING ORAL NIGHTLY
DISCHARGE
Start: 2023-01-27

## 2023-01-27 RX ADMIN — ENOXAPARIN SODIUM 40 MG: 100 INJECTION SUBCUTANEOUS at 09:21

## 2023-01-27 RX ADMIN — INSULIN LISPRO 2 UNITS: 100 INJECTION, SOLUTION INTRAVENOUS; SUBCUTANEOUS at 12:50

## 2023-01-27 RX ADMIN — CLOPIDOGREL BISULFATE 75 MG: 75 TABLET ORAL at 09:21

## 2023-01-27 RX ADMIN — SODIUM CHLORIDE, PRESERVATIVE FREE 10 ML: 5 INJECTION INTRAVENOUS at 09:22

## 2023-01-27 RX ADMIN — BACLOFEN 5 MG: 10 TABLET ORAL at 09:21

## 2023-01-27 RX ADMIN — ASPIRIN 81 MG: 81 TABLET, COATED ORAL at 09:21

## 2023-01-27 RX ADMIN — PANTOPRAZOLE SODIUM 40 MG: 40 TABLET, DELAYED RELEASE ORAL at 05:05

## 2023-01-27 NOTE — PROGRESS NOTES
SPEECH LANGUAGE PATHOLOGY  DAILY PROGRESS NOTE        PATIENT NAME:  Maritza Neal      :  1970          TODAY'S DATE:  2023 [facility]    Patient seen for f/u for dysphagia mgmt. SLP entered room and observed patient had spilled coffee on his tray table, bed, and self. Patient stated coffee wasn't hot. RN notified and assisted patient with new gown and transfer to chair. Patient agreeable to soft solid snack and thin water. Patient able to recall recommended strategies (small bites/sips, slow rate, alt solids/liquids, and no straw) w/ 100% acc. Patient utilized strategies in 100% of opportunities during skilled analysis of snack. Patient exhibited min oral stasis after 1st swl that cleared w/ 2nd swl and/or liquid wash, fair bolus formation;manipulation, adequate mastication time, and good AP tongue propulsion. Patient pharyngeal stage exhibited latent, dry cough x 1 after snack was completed. Patient tolerating recommended diet (easy to chew solids and thin liquids-no straw) at this time w/ excellent recall/carryover of strategies recommended from MBSS completed on 23. Patient voiced no difficulty chewing/swallowing or increased coughing with intake at this time. SLP will sign off, please re-consult if needs arise and/or if change in medical status.        CPT code(s) 15412  dysphagia tx  Total minutes :  15 minutes    Hugo He M.S., CCC-SLP  Speech-Language Pathologist  [phone]

## 2023-01-27 NOTE — PROGRESS NOTES
Pt picked up by PAS for [place]. Iv access and heart monitor removed. Zio patch in place and activated. AVS, Med rec and NIALL all completed and sent wit PAS.   BARYB called @ [phone]

## 2023-01-27 NOTE — CARE COORDINATION
Per Chriss Carrero @ [address] [facility],[address] insurance has approved. PCR covid  was negative 1/25. Patients mother Marcia Carpenter was ntfd of approval for [facility] and for transport today. [facility] setup for 430 PM today. Bedside RN,Charge RN and patient updated. Per neurology- He will be discharged with a Zio patch. Envelope and ambulance form in soft chart.  Electronically signed by Susan Perez RN on 1/27/2023 at 10:47 AM

## 2023-01-27 NOTE — PROGRESS NOTES
Occupational Therapy  OT BEDSIDE TREATMENT NOTE   [facility] [address] [facility]  [address] [place] [address]        Date:2023  Patient Name: Alba Mack  MRN: [id number]  : 1970  Room: [facility]     Per OT Eval:    Evaluating OT: Chica De, [address] RuJaspreet Fernández OTR/L; [id number]       Referring Provider: Shin Perdomo MD    Specific Provider Orders/Date: OT Eval and Treat 23       Diagnosis: Acute CVA (cerebrovascular accident)   Left hemiparesis  Diabetes mellitus type I  Cerebrovascular accident (CVA)    Surgery: NONE THIS ADMISSION     Pertinent Medical History:  has a past medical history of Alcoholism ([facility] [address] Cocaine abuse ([facility] [address] [facility] Diabetes mellitus ([facility] [address] Hypertension, Idiopathic peripheral neuropathy, Lacunar stroke ([facility] [address] [facility] and Marijuana abuse.        Reason for Admission: LUE flaccid and facial droop for 2 days      Recommended Adaptive Equipment:  TBD pending progression      Precautions:  Fall Risk, Cognition, Bed Alarm, L hemiplegia, L sided neglect, TSM      Assessment of current deficits:    [x] Functional mobility            [x]ADLs           [x] Strength                  [x]Cognition    [x] Functional transfers          [x] IADLs         [x] Safety Awareness   [x]Endurance    [x] Fine Coordination                         [x] Balance      [] Vision/perception   []Sensation      []Gross Motor Coordination             [] ROM           [] Delirium                   [] Motor Control      OT PLAN OF CARE   OT POC based on physician orders, patient diagnosis and results of clinical assessment     Frequency/Duration: 1-3 days/wk for 2 weeks PRN   Specific OT Treatment Interventions to include:   * Instruction/training on adapted ADL techniques and AE recommendations to increase functional independence within precautions       * Training on energy conservation strategies, correct breathing pattern and techniques to improve independence/tolerance for self-care routine  * Functional transfer/mobility training/DME recommendations for increased independence, safety, and fall prevention  * Patient/Family education to increase follow through with safety techniques and functional independence  * Recommendation of environmental modifications for increased safety with functional transfers/mobility and ADLs  * Therapeutic exercise to improve motor endurance, ROM, and functional strength for ADLs/functional transfers  * Therapeutic activities to facilitate/challenge dynamic balance, stand tolerance for increased safety and independence with ADLs     Modified Kaylin Scale (MRS)  Score     Description  0             No symptoms  1             No significant disability despite symptoms  2             Slight disability; able to look after own affairs  3             Moderate disability; able to ambulate without assist/ requires assist with ADLs  4             Moderate/Severe disability;requires assist to ambulate/assist with ADLs  5             Severe disability;bedridden/incontinent   6               Score: 4     Home Living: Pt lives in 2nd floor apartment with [facility] and [facility] to enter building. 10 steps BHR to access unit.     Bathroom setup: walkin shower with shower chair   Equipment owned: ww, cane     Prior Level of Function: IND with ADLs    Mod A with IADLs - mother assists with groceries and cooking  ambulated with ww prior  Driving: no - mother and family assist  Occupation: none stated     Pain Level: Pt did not complain of pain this session    Cognition: A&O: 4/4  Follows single 2 step directions - pleasant & cooperative              Memory:  fair              Sequencing:  fair              Problem solving:  fair              Judgement/safety:  fair                Functional Assessment:  AM-PAC Daily Activity Raw Score:     Initial Eval Status  Date: 23 Treatment Status  Date: 23 STGs = LTGs  Time frame: 10-14 days   Feeding Minimal Assist SBA  Using R UE  Moderate [place]    Grooming Minimal Assist  Min A  Seated in chair with R UE washing off face, hands & brushing beard & teeth   Educated pt on using L UE as a helper during ADL tasks  Moderate [place]    UB Dressing Maximal Assist  Mod A  Instructed pt on valorie-dressing techniques, good recall & results doff/ki gown    Minimal Assist    LB Dressing Dependent  Mod/Max A  Doff/ki brief seated to thread legs, then standing with total assist over hips & for balance Minimal Assist    Bathing Dependent Mod A  Completed full sponge bath, seated, standing only for posterior hygiene  Minimal Assist    Toileting Dependent   Dep  Reports difficulty with use of urinal  Minimal Assist    Bed Mobility  Log Roll: Mod A  Supine to sit: Mod A   Sit to supine: Mod A  NT   Supine to sit: SBA   Sit to supine: SBA    Functional Transfers Sit to stand: Max A   Stand to sit:MOD A  Stand pivot: NT  Commode: NT Mod A  Sit to Stand  Stand to Sit   Sit to stand:SBA   Stand to sit:SBA  Stand pivot: SBA with AD  Commode: SBA with AD    Functional Mobility Max A x 2 with arm in arm assist   ~2 side steps right toward [facility] Max A   Stand Pivot Transfer   EOB to chair SBA with AD    Balance Sitting:     Static - Min A <> Mod A  L lateral lean     Dynamic - Mod A  Standing: Max A x 2  Sitting EOB:  Min/SBA    Standing: Max A Sitting:  Static: SBA  Dynamic: SBA  Standing:  SBAwith AD   Activity Tolerance FAIR  Limited d/t fatigue/weakness   Extremely lethargic required verbal cues for keeping eyes open and attending to task  Fair   GOOD   Visual/  Perceptual Glasses: yes - not present      Pt reports blurry vision d/t glasses not present     visual tracking deficits noted during H test             Vitals    WFL              BUE  ROM/Strength/  Fine motor Coordination Hand dominance:Right     RUE: ROM WFL     Strength: 4-/5      Strength: FAIR     Coordination:  FAIR     LUE: flexor tone present     Strength: 0/5  Strength: NA     Coordination: NA   increase BUE muscle strength for improved indep with functional transfers      Education:  Pt was educated on role of OT, goals to be reached, importance of OOB activity, safety with bed mobility, safety and hand placement of RUE with transfers, balance/posture seated unsupported at EOB, and valorie dressing techniques to assist with LB dressing tasks. Comments: Upon arrival pt seated in chair, agreeable to therapy, speaking with nursing okaying pt to be seen this session. At end of session, pt seated in chair, all lines and tubes intact, call light within reach. Nsg aware. Pt has made Fair progress towards set goals. Continue with current plan of  care focusing on increasing of independency with transfers and ADL tasks     Treatment Time In: 11:45am           Treatment Time Out: 12:30               Treatment Charges: Mins Units   Ther Ex  [id number]     Manual Therapy 69105     Thera Activities [id number] 10 1   ADL/Home Mgt [id number] 35 1   Neuro Re-ed [id number]     Group Therapy      Orthotic manage/training  [id number]     Non-Billable Time     Total Timed Treatment 45 3      Stella PELAEZ  [address] [place] [address], [address] [place] [address]

## 2023-01-27 NOTE — PROGRESS NOTES
Lucien Angel is a 47 y.o. right handed male     Neurology following for stroke    PMH of diabetes, hypertension, alcoholism, smoking, and drug abuse    Assessment:     Acute infarct  Presented with sudden left arm and leg weakness and dysarthria  His stroke risk factors include diabetes, hypertension, hyperlipidemia, smoking, alcoholism. His LDL was 123 and his A1c was 9  Upon exam he continues to experience left arm and leg weakness along with left facial droop. Zio patch upon discharge    Plan:     Continue aspirin and Plavix x 21 days then aspirin monotherapy  Continue statin with an LDL goal <70  A1c goal <6  Stroke education  PT/OT/SLP  Zio patch at discharge (ordered in [address])  Neurology will sign of, call if needed  Follow up with neurology after discharge    Subjective:     Patient presented to the ER 1/23/2023 after experiencing  left arm and leg weakness and slurred speech for 2 days prior. His NIH was a 9 and he was not a TNK candidate due to being outside the window for administration. His CT imaging demonstrated old infarcts. His MRI brain Acute lacunar infarct involving the right posterior limb internal capsule. More subacute appearing cortical infarcts throughout the left posterior cortical watershed territory within the parietal lobe and lesser extent right parietal lobe. Patient sitting up in the chair awake, alert, and oriented x 4. He continues to experience left extremity weakness with his left arm weaker than his left leg. He also continues to experience left facial droop. He does not have any sensory loss. His echo was negative for a PFO,  he will be discharged with a Zio patch. He is to discharge to rehab today.     There is no family at the bedside      No chest pain or palpitations  No coughing or wheezing    No vertigo, lightheadedness or loss of consciousness  No falls, tripping or stumbling  No incontinence of bowels or bladder  No itching or bruising appreciated    ROS otherwise negative      Objective:       BP (!) 140/92   Pulse 72   Temp 97.9 °F (36.6 °C) (Temporal)   Resp 12   Ht 5' 6\" (1.676 m)   Wt 145 lb (65.8 kg)   SpO2 99%   BMI 23.40 kg/m²       General appearance: alert, appears stated age, cooperative and no distress  Head: normocephalic, without obvious abnormality, atraumatic  Eyes: conjunctivae/corneas clear  Neck: no adenopathy, supple, symmetrical, trachea midline and thyroid not enlarged, symmetric, no tenderness/mass/nodules  Lungs: Regular respirations on room air  Heart: No chest pain or palpitations  Abdomen: soft, non-tender; bowel sounds normal; no masses,  no organomegaly  Extremities:  normal, atraumatic, no cyanosis or edema  Pulses: 2+ and symmetric  Skin: color, texture, turgor normal---no rashes or lesions      Mental Status: Alert, oriented, thought content appropriate, alertness: alert, orientation: time, date, person, place, city, affect: normal     Appropriate attention/concentration  Intact fundus of knowledge  Repetition intact  Intact memories      Speech: Clear  Language: No aphasias    Cranial Nerves:  I: smell NA   II: visual acuity  NA   II: visual fields Full to confrontation   II: pupils EDDI   III,VII: ptosis Left labial fold droop   III,IV,VI: extraocular muscles  Full ROM   V: mastication Normal   V: facial light touch sensation  Normal   V,VII: corneal reflex  Present   VII: facial muscle function - upper  Normal   VII: facial muscle function - lower Left labial fold droop   VIII: hearing Normal   IX: soft palate elevation  Normal   IX,X: gag reflex Present   XI: trapezius strength     XI: sternocleidomastoid strength    XI: neck extension strength     XII: tongue strength  normal     Motor:  Left arm 0/5  Left leg 3-/5    Normal bulk and tone  Left arm pronator drift  No abnormal movements    Sensory:  LT and PP normal  Vibration normal    Coordination:   FN, FFM and MARY normal with right hand, unable to perform with left hand due to weakness  HS normal with right heel but unable to be performed with left heel due to weakness    Gait:  Deferred for patient safety/fall consideration    DTR:   2+ throughout    No Babinskis  No Neal's    No other pathological reflexes    Laboratory/Radiology:     CBC with Differential:    Lab Results   Component Value Date/Time    WBC 9.1 01/27/2023 08:08 AM    RBC 4.20 01/27/2023 08:08 AM    HGB 12.3 01/27/2023 08:08 AM    HCT 36.9 01/27/2023 08:08 AM     01/27/2023 08:08 AM    MCV 87.9 01/27/2023 08:08 AM    MCH 29.3 01/27/2023 08:08 AM    MCHC 33.3 01/27/2023 08:08 AM    RDW 13.7 01/27/2023 08:08 AM    SEGSPCT 53 01/25/2014 05:15 AM    SEGSPCT 86 10/11/2010 04:40 AM    BANDSPCT 3 10/10/2010 07:20 PM    METASPCT 1.7 07/20/2020 12:12 PM    LYMPHOPCT 20.9 01/27/2023 08:08 AM    PROMYELOPCT 1.7 04/29/2022 09:41 PM    MONOPCT 9.3 01/27/2023 08:08 AM    MYELOPCT 2.6 01/04/2020 05:07 PM    EOSPCT 2 10/12/2010 06:00 AM    BASOPCT 0.2 01/27/2023 08:08 AM    MONOSABS 0.85 01/27/2023 08:08 AM    LYMPHSABS 1.90 01/27/2023 08:08 AM    EOSABS 0.29 01/27/2023 08:08 AM    BASOSABS 0.02 01/27/2023 08:08 AM     CMP:    Lab Results   Component Value Date/Time     01/27/2023 08:08 AM    K 3.6 01/27/2023 08:08 AM     01/27/2023 08:08 AM    CO2 26 01/27/2023 08:08 AM    BUN 14 01/27/2023 08:08 AM    CREATININE 0.6 01/27/2023 08:08 AM    GFRAA >60 08/10/2022 09:41 AM    LABGLOM >60 01/27/2023 08:08 AM    GLUCOSE 144 01/27/2023 08:08 AM    GLUCOSE 83 04/11/2012 03:35 AM    PROT 5.6 01/26/2023 04:20 AM    LABALBU 3.5 01/26/2023 04:20 AM    LABALBU 4.3 12/30/2011 10:15 AM    CALCIUM 8.8 01/27/2023 08:08 AM    BILITOT 0.5 01/26/2023 04:20 AM    ALKPHOS 86 01/26/2023 04:20 AM    AST 15 01/26/2023 04:20 AM    ALT 11 01/26/2023 04:20 AM     HgBA1c:    Lab Results   Component Value Date/Time    LABA1C 9.0 01/23/2023 11:14 AM     FLP:    Lab Results   Component Value Date/Time    TRIG 148 01/23/2023 11:14 AM    HDL 70 01/23/2023 11:14 AM    LDLCALC 123 01/23/2023 11:14 AM    LABVLDL 30 01/23/2023 11:14 AM     CT Head  1. There is no acute intracranial abnormality. Specifically, there is no  intracranial hemorrhage. 2. Atrophy and periventricular leukomalacia,  3. Multiple old lacunar infarcts within the right and left basal ganglia more  prominent within the right basal ganglia. CTA Neck  No hemodynamically significant stenosis or dissection of the cervical  internal carotid arteries. No high-grade stenosis or dissection of the cervical vertebral arteries. 1.5 mm right thyroid nodule; see recommendation below. Low-density circumscribed structures in the bilateral cheek subcutaneous  tissues abutting the skin are favored to represent epidermal inclusion cysts  or other benign dermal lesions (series 301, images 164 and 175); recommend  nonemergent dermatologic evaluation. Small fluid level in the left sphenoid sinus; fluid levels can be seen in the  setting acute sinusitis. MRI/MRV  Acute lacunar infarct involving the right posterior limb internal capsule. More subacute appearing cortical infarcts throughout the left posterior  cortical watershed territory within the parietal lobe and lesser extent right  parietal lobe. No acute hemorrhage or significant mass effect. Moderate luminal narrowing of the right mid M1 segment and severe luminal  narrowing of the right proximal M2 segment. There is tapering of the distal right PCA with fetal origin. US thyroid  The right thyroid nodule is 8 mm and is awarded 3 points; no further  follow-up is needed. See reference below.   ACR TI-RADS recommendations:  TR5 (>= 7 points):  FNA if >= 1 cm; follow-up if 0.5-0.9 cm in 1, 2, 3, 4,  and 5 years  TR4 (4-6 points):  FNA if >= 1.5 cm; follow-up if 1.0-1.4 cm in 1, 2, 3, and  5 years  TR3 (3 points):   FNA if >= 2.5 cm; follow-up if 1.5-2.4 cm in 1, 3, and 5  years  TR2 (2 points):   No FNA or follow-up  TR1 (0 points):   No FNA or follow-up    Echo    Normal left ventricular systolic function. Ejection fraction is visually estimated at > 60%. Normal right ventricular size and function (TAPSE 2.3 cm). There is doppler evidence of stage I diastolic dysfunction. No evidence of interatrial shunting on bubble study. Mild-moderate mitral regurgitation. Mild tricuspid regurgitation. PASP is estimated at 37 mmHg.     All labs and imaging studies reviewed independently today           CHAYO Lord - CNP  2:17 PM  1/27/2023

## 2023-01-27 NOTE — ADT AUTH CERT
Utilization Reviews       Stroke: Ischemic - Care Day 4 (1/26/2023) by Nicholas Mckeon RN       Review Status Review Entered   Completed 1/27/2023 1617       Created By   Nicholas Mckeon RN      Criteria Review      Care Day: 4 Care Date: 1/26/2023 Level of Care: Intermediate Care    Guideline Day 3    Clinical Status    (X) * Hemodynamic stability    (X) * Dangerous arrhythmia absent    (X) * Mental status at baseline or stable and appropriate for next level of care    (X) * Neurologic deficits absent or stable and appropriate for next level of care    (X) * Discharge plans and education understood    1/27/2023 4:17 PM EST by Lauryn Shipman      await precert for rehab dc    Activity    (X) * Ambulatory or acceptable for next level of care    Routes    (X) * Oral hydration    (X) * Oral medications or regimen acceptable for next level of care    1/27/2023 4:17 PM EST by Lauryn Shipman      Meds-   Lipitor 80 mg po qd  Asa 81 mg po qd  Plavix 75 mg po qd    (X) * Oral diet or acceptable for next level of care    Interventions    (X) * Supplemental oxygen absent or at baseline requirement    (X) Neurologic checks    (X) Possible physical therapy    Medications    (X) * Antithrombotic therapy appropriate for next level of care established    * Milestone   Additional Notes   1/26-       Labs-    Wbc 8, h/h 11/34   K 3.8   Glucose 208      Vs- BP (!) 160/89   Pulse 67   Temp 97.6 °F (36.4 °C) (Temporal)   Resp 18 ,sao2 100 ra      Patient noticed slight improvement in his left leg function, left upper limb still paralyzed    Still has lt facial droop      Physical medicine consulted-    PROBLEM LIST:   1. CVA with left hemiparesis. 2.  Type 1 diabetes mellitus. 3.  Hypertension. 4.  Alcohol use disorder. 5.  Nicotine addiction. RECOMMENDATIONS:  I think the patient is a good candidate for acute   rehab. Speech therapy today -    Patient seen for f/u for dysphagia mgmt.  Patient w/ meal tray present in room (easy to chew solids and thin liquids-no straws). Patient able to recall no straw recommendation at start of session. Patient consumed easy to chew solids and thin liquids via cup w/ mild oral stasis after 1st swl that cleared w/ 2nd swl and/or liquid wash, fair bolus formation;manipulation, adequate mastication time, and fair AP tongue propulsion. Patient pharyngeal stage exhibited cough x 1 w/ thin liquids which could be secondary to large bolus. SLP cued patient for smaller sips and exhibited excellent follow through w/ no further s/s of pen/aspiration noted. Patient able to repeat recommended strategies (small bites/sips, slow rate, alt solids/liquids and no straw) w/ 80% acc.        Continues w pt/ot      Meds-    Lipitor 80 mg po qd   Asa 81 mg po qd   Plavix 75 mg po qd      Dc plan-- await precert for rehab dc                  ** 1/27**   Ins auth obtained---- pt dc'ing to rehab today            Stroke: Ischemic - Care Day 3 (1/25/2023) by Madie Abebe RN       Review Status Review Entered   Completed 1/27/2023 1611       Created By   Madie Abebe RN      Criteria Review      Care Day: 3 Care Date: 1/25/2023 Level of Care: Intermediate Care    Guideline Day 2    Level Of Care    (X) Stroke unit, ICU, telemetry, or floor    Clinical Status    (X) * Hemodynamic stability    (X) * Mental status at baseline or stable    (X) * Neurologic deficits absent or stable    1/27/2023 4:11 PM EST by Mary Lou Finch      unchanged    (X) * Unimpaired swallowing    1/27/2023 4:11 PM EST by Mary Lou Finch      speech following  MBS completed    Activity    (X) * Up to chair    (X) Possible assisted ambulation    1/27/2023 4:11 PM EST by Mary Lou Finch      pt/ot    Routes    (X) * Oral hydration    1/27/2023 4:11 PM EST by Mary Lou Finch      dc ivf    (X) * Oral medications    1/27/2023 4:11 PM EST by Mary Lou Finch      Meds-   Lipitor incr to 80 mg po qd  Asa 81 mg po qd  Plavix 75 mg po qd  Dc ivf    (X) * Advance diet as tolerated    1/27/2023 4:11 PM EST by Patsy Randle      Diet- ADULT DIET; Easy to Chew; 3 carb choices (45 gm/meal)    Interventions    (X) Neurologic checks    (X) Rehabilitation assessment    1/27/2023 4:11 PM EST by Patsy Randle dc planning underway    (X) Possible physical therapy    (X) Possible occupational and speech therapy    Medications    (X) Antithrombotic therapy    1/27/2023 4:11 PM EST by Patsy Randle      asa, plavix    * Milestone   Additional Notes   1/25-       ++ acute cva      Thyroid us completed-    The right thyroid nodule is 8 mm and is awarded 3 points; no further    follow-up is needed. Labs-    Wbc 9, h/h 11/34   K 4.1   Co2 20, anion gap 13   Glucose 185   Bun 31, creat 0.8      Vs- BP (!) 114/56   Pulse 85   Temp 97.8 °F (36.6 °C) (Temporal)   Resp 16, on ra, sao2 95      Not much improvement in symptoms   He continues to exhibit left facial droop and left extremity weakness. Per neurology -     · Continue aspirin and Plavix x21 days then aspirin monotherapy   · Continue statin with an LDL goal <70   · A1c goal <6   · Echocardiogram pending   · Stroke education   · PT/OT/SLP   · Zio patch at discharge   · Neurology will follow         Speech therapy  today -    Laryngeal Penetration and Aspiration:   Penetration WITH aspiration was observed in today's study with  thin liquid, with straw only      MBS completed-    1. Without use of a straw, no barium aspiration or significant swallowing    deficits. Transient laryngeal penetration with thin liquid barium. 2.  Single episode of thin liquid barium aspiration when using a straw.        Pt/ot continues      Dc planning underway for rehab      Diet- ADULT DIET; Easy to Chew; 3 carb choices (45 gm/meal)      Meds-    Lipitor incr to 80 mg po qd   Asa 81 mg po qd   Plavix 75 mg po qd   Dc ivf                 Stroke: Ischemic - Care Day 2 (1/24/2023) by Edgar Loepz RN Review Status Review Entered   Completed 1/24/2023 1603       Created By   Clover Cuellar RN      Criteria Review      Care Day: 2 Care Date: 1/24/2023 Level of Care: Intermediate Care    Guideline Day 2    Level Of Care    (X) Stroke unit, ICU, telemetry, or floor    1/24/2023 4:03 PM EST by Larissa Menchaca      Intermediate    Clinical Status    (X) * Hemodynamic stability    1/24/2023 4:03 PM EST by Cindi De La Torre 117  /73    (X) * Mental status at baseline or stable    1/24/2023 4:03 PM EST by Larissa Menchaca      A&OX3    (X) * Neurologic deficits absent or stable    1/24/2023 4:03 PM EST by Larissa Menchaca      Non noted    ( ) * Unimpaired swallowing    Activity    (X) * Up to chair    1/24/2023 4:03 PM EST by Larissa Menchaca      Stand to sit: mod A    Routes    ( ) * Oral hydration    1/24/2023 4:03 PM EST by Larissa Menchaca      NPO    ( ) * Oral medications    1/24/2023 4:03 PM EST by Larissa Menchaca      pantoprazole (PROTONIX) 40 mg in sodium chloride (PF) 0.9 % 10 mL injection DAILY IV  operamide (IMODIUM) capsule 2 mg 4 TIMES DAILY PRN PO x2    ( ) * Advance diet as tolerated    1/24/2023 4:03 PM EST by Larissa Menchaca      Diet NPO Exceptions are: Ice Chips    Interventions    ( ) Possible oxygen    1/24/2023 4:03 PM EST by Larissa Menchaca      on RA    Medications    (X) Antithrombotic therapy    1/24/2023 4:03 PM EST by Larissa Menchaca      enoxaparin (LOVENOX) injection 40 mg DAILY SC       Definitions for Care Day 2    Hemodynamic stability    ( ) Hemodynamic stability, as indicated by  1 or more  of the following :       ( ) Patient hemodynamically stable, as indicated by  ALL  of the following  (1) (2) (3) (4) (5):          (X) Hypotension absent          (X) No evidence of inadequate perfusion (eg, no myocardial ischemia)          (X) No other hemodynamic abnormalities (eg, no Orthostatic hypotension)       Hypotension absent    (X) Hypotension absent, as indicated by  1 or more  of the following  (1) (2) (3) (4):       (X) SBP greater than or equal to 90 mm Hg and without recent decrease greater than 40 mm Hg from       baseline in adult or child 10 years or older       * Milestone   Additional Notes   DATE: 1/24/23         PERTINENT UPDATES:   Mri brain shows Acute lacunar infarct      VITALS:   T 97.4    RR 16   , 118   /73   SPO2 95% RA   GCS 15      ABNL/PERTINENT LABS/RADIOLOGY/DIAGNOSTIC STUDIES:   UA: Glucose, Ur >=1000, Ketones, Urine 40, Blood, Urine MODERATE, Protein, UA 30Abnormal   Creatinine ratio: Microalbumin Creatinine Ratio 189.8,   Microalbumin, Random Urine 201. 2High    Chloride 109    CO2 13, 18    Anion Gap 24    Glucose 438, 377   BUN 29, 37   Creatinine 1.4    WBC 14.0   pH, Gage 7.32   Beta-Hydroxybutyrate >4. 50High      MRI BRAIN/MRA HEAD   Acute lacunar infarct involving the right posterior limb internal capsule. More subacute appearing cortical infarcts throughout the left posterior cortical watershed territory within the parietal lobe and lesser extent right parietal lobe. No acute hemorrhage or significant mass effect. Moderate luminal narrowing of the right mid M1 segment and severe luminal narrowing of the right proximal M2 segment. There is tapering of the distal right PCA with fetal origin      PHYSICAL EXAM:   General Appearance: alert and oriented to person, place and time,    Cardiovascular: normal rate, regular rhythm, normal S1 and S2, no murmurs, rubs, clicks, or gallops, distal pulses intact, no carotid bruits   Neurologic: 4/5 strength to LLE. 0/5 strength to LUE. Left facial droop. Normal sensation intact throughout. CN2-12 intact. MD CONSULTS/ASSESSMENT AND PLAN:   IM   Assessment and Plan:       Late presentation left sided weakness and slurred speech w/Hx of bilateral lacunar infarcts    MRI/MRA Brain   Acute lacunar infarct involving the right posterior limb internal capsule.        More subacute appearing cortical infarcts throughout the left posterior   cortical watershed territory within the parietal lobe and lesser extent right   parietal lobe. No acute hemorrhage or significant mass effect. Moderate luminal narrowing of the right mid M1 segment and severe luminal   narrowing of the right proximal M2 segment. There is tapering of the distal right PCA with fetal origin. CTA Neck    No hemodynamically significant stenosis or dissection of the cervical   internal carotid arteries. No high-grade stenosis or dissection of the cervical vertebral arteries. 15 mm right thyroid nodule; see recommendation below. Low-density circumscribed structures in the bilateral cheek subcutaneous   tissues abutting the skin are favored to represent epidermal inclusion cysts   or other benign dermal lesions (series 301, images 164 and 175); recommend   nonemergent dermatologic evaluation. Small fluid level in the left sphenoid sinus; fluid levels can be seen in the   setting acute sinusitis. Neurology following, follow recs   Baclofen 5mg BID for spasticity    PT/OT   SLP evaluation - NPO with ice chips       Diabetes Mellitus, Type 1, Uncontrolled   DKA: given total 1.5 L NS bolus and regular insulin 15 u, resumed home dose of lantus 15 u 1/24, on LRS @ 100cc/hr   Monitor BG closely, BG Q4H, if still elevated trasnfer to MICU   Follow repeat BMP at [address] for AG, may recheck at midnight        Hypertension   Continue home lisinopril, target SBP <180       Hx TIA in 2018   Continue home ASA   Hx Polysubstance abuse      NEURO   Assessment   Bhavya Menard is a 47 y.o. male with PMHx of type 1 diabetes mellitus, HTN, HLD, and EtOH abuse admitted for stroke. Patient's clinical exam, labs, and imaging studies are consistent with acute lacunar stroke involving the posterior limb of the right internal capsule.  Suspect etiology is multifactorial in nature in the setting of patient's history of tobacco use, uncontrolled Type 1 DM, HLD, and HTN. Plan   CT Head w/o contrast shows atrophy and periventricular leukomalacia and multiple old lacunar infarcts within the right and left basal ganglia but no acute intracranial abnormality   CT Neck w/ contrast shows no stenosis or dissection of the cervical internal carotid arteries   MRI Brain w/o contrast shows acute lacunar infarct involving the right posterior limb of the internal capsule and more subacute appearing cortical infarcts throughout the left posterior cortical watershed territory within the parietal lobe and less extent right parietal lobe.  It also shows moderate luminal narrowing of the right mid M1 segment and severe luminal narrowing of the right proximal M2 segment   Continue ASA, Plavix, and Lipitor   PT/OT/SLP      Medications   0.9 % sodium chloride bolus ONCE IV x2   lactated ringers IV soln infusion 100 mL/hr Freq: CONTINUOUS IV-D/C 1159H   insulin lispro (HUMALOG) injection vial 0-4 Units 3 TIMES DAILY WITH MEALS SC   insulin regular (HUMULIN R;NOVOLIN R) injection 10 Units ONCE IV   insulin regular (HUMULIN R;NOVOLIN R) injection 5 Units ONCE SC   insulin regular (HUMULIN R;NOVOLIN R) injection 6 Units ONCE SC

## 2023-01-27 NOTE — DISCHARGE SUMMARY
[address]  Discharge Summary    PCP: Renetta Linares MD    Admit Date:1/23/2023  Discharge Date: 1/27/2023    Admission Diagnosis:   Likely ischemic stroke 2/2 poorly controlled type I DM  History of TIDM, previously on insulin pump, issues with insurance  History of hypertension, on lisinopril  History of TIA, 2018, on aspirin 81 mg daily  History of polysubstance abuse, currently abuses marijuana    Discharge Diagnosis:  Subacute ischemic stroke 2/2 poorly controlled type I DM, delayed presentation  Mild pharyngeal dysphagia on SLP evaluation  DKA 2/2 poorly controlled TIDM, resolved in early stages  History of TIDM, previously on insulin pump, issues with insurance  History of hypertension  History of TIA, 2018  History of polysubstance abuse, currently abuses marijuana    Hospital Course:   Mr Cy Gardiner is a 47 y.o. male with a past medical history of poorly controlled type I DM, multiple lacunar infarcts, hypertension, hyperlipidemia and polysubstance use including alcohol who presented to the ED complaining of left-sided weakness with slurring of speech since 2 days prior to presentation. Patient noted left-sided weakness on waking up 1/21/2022 from sleep with difficulty moving his left upper limb and heaviness in the left lower limb. He also noticed slight facial droop on the left side with associated dysphagia. Symptoms progressively worsened with subsequent slowing of speech noticed the second day being yesterday necessitating his presentation to the ED for evaluation. He endorsed history of lightheadedness with orthostatic symptoms however no history of fall or syncopal episodes. Patient denies any palpitation, chest pain or shortness of breath at any point. He reported history of TIA in the past, per chart review was in 2018 with no significant carotid artery stenosis on evaluation.   He currently denies any headache, vomiting or nausea but did endorse blurring of vision which has been happening for some weeks now. Patient was recently admitted on 11/14/2022 for chest pain where he was managed for DKA and discharged after 2 days on admission to continue on insulin pump, however its not been able to obtain insulin pump due lack of insurance coverage. He has had multiple admissions in the past for DKA on account of poorly controlled type I DM. He follows with Dr. Lamont Mahmood only in inpatient whenever he is admitted for DKA. He has not followed with any doctor outpatient. In the ED, patient was hemodynamically stable with elevated blood pressure. Initial NIH stroke scale score at 1050 AM was 9. Initial laboratory evaluation was negative for evidence of DKA and CT head showed no acute intracranial process like hemorrhage unremarkable for old lacunar infarcts in the right and left basal ganglia. He received high dose aspirin in ED. In the floor, patient was risk stratified and was started on DAPT with atorvastatin increased to 80 mg daily which he tolerated well while in this admission. Neurology was consulted with recommendations for continued DAPT for 21 days and the aspirin monotherapy. ECHO work-up was done which was unremarkable. Patient continue to experience left arm and leg weakness along with left facial droop. He had some physical sessions while inpatient with AM-PAC score of 10. He was cleared for discharge for acute rehabilitation in a rehab facility with Zio patch inserted prior to discharge to monitor heart rhythm for arrhythmia. General Appearance: alert, appears stated age, and cooperative  HEENT:  Head: Normal, normocephalic, atraumatic.   Neck: supple, symmetrical, trachea midline  Lung: clear to auscultation bilaterally  Heart: regular rate and rhythm, S1, S2 normal, no murmur, click, rub or gallop  Abdomen: soft, non-tender; bowel sounds normal; no masses,  no organomegaly  Extremities:  extremities normal, atraumatic, no cyanosis or edema  Musculokeletal: No joint swelling, no muscle tenderness. ROM normal in all joints of extremities.    Neurologic: Mental status: Alert, oriented, thought content appropriate    Pending test results: None    Consults:  Neurology    Procedures:  None    Condition at discharge: Stable    Disposition: Acute rehab    Discharge Medications:  Current Discharge Medication List        START taking these medications    Details   insulin glargine-yfgn (SEMGLEE-YFGN) 100 UNIT/ML injection vial Inject 17 Units into the skin nightly      melatonin 5 MG TBDP disintegrating tablet Take 1 tablet by mouth nightly      clopidogrel (PLAVIX) 75 MG tablet Take 1 tablet by mouth daily for 21 days  Qty: 21 tablet, Refills: 0      baclofen (LIORESAL) 5 MG tablet Take 1 tablet by mouth 2 times daily           CONTINUE these medications which have CHANGED    Details   atorvastatin (LIPITOR) 80 MG tablet Take 1 tablet by mouth nightly  Qty: 30 tablet, Refills: 3           CONTINUE these medications which have NOT CHANGED    Details   Insulin Disposable Pump (OMNIPOD 5 G6 INTRO, GEN 5,) KIT To use as directed  Qty: 1 kit, Refills: 0    Associated Diagnoses: Uncontrolled type 1 diabetes mellitus with hyperglycemia (HCC)      Insulin Disposable Pump (OMNIPOD 5 G6 POD, GEN 5,) MISC To change every 72hrs  Qty: 30 each, Refills: 3    Associated Diagnoses: Uncontrolled type 1 diabetes mellitus with hyperglycemia (HCC)      Continuous Blood Gluc Transmit (DEXCOM G6 TRANSMITTER) MISC To change every 90 days  Qty: 1 each, Refills: 3    Associated Diagnoses: Uncontrolled type 1 diabetes mellitus with hyperglycemia (HCC)      Continuous Blood Gluc Sensor (DEXCOM G6 SENSOR) MISC To change eery 10 days  Qty: 9 each, Refills: 3    Associated Diagnoses: Uncontrolled type 1 diabetes mellitus with hyperglycemia (HCC)      lisinopril (PRINIVIL;ZESTRIL) 5 MG tablet Take 1 tablet by mouth daily  Qty: 30 tablet, Refills: 3    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)      Insulin Pen Needle 32G X 5 MM MISC 1 each by Does not apply route daily  Qty: 100 each, Refills: 4      aspirin 81 MG EC tablet Take 1 tablet by mouth in the morning. Qty: 30 tablet, Refills: 3      albuterol sulfate  (90 Base) MCG/ACT inhaler Inhale 2 puffs into the lungs every 6 hours as needed for Wheezing or Shortness of Breath  Qty: 1 each, Refills: 1           STOP taking these medications       insulin glargine (BASAGLAR KWIKPEN) 100 UNIT/ML injection pen Comments:   Reason for Stopping:         insulin lispro, 1 Unit Dial, (HUMALOG KWIKPEN) 100 UNIT/ML SOPN Comments:   Reason for Stopping: Follow ups  Please follow up with the internal medicine clinic at [facility] within 14 days of discharge from your rehabilitation facility   Please keep all other follow up appointments:  No future appointments. Changes in healthcare   Please take all medications as indicated  Diet: diabetic diet   Activity: activity as tolerated  New Medications started during this hospital stay  Baclofen 5 mg tablet take 1 tablet by mouth 2 times daily  Clopidogrel 75 mg tablet take 1 tablet by mouth daily for 21 days  Insulin glargine, take 17 units into the skin nightly  Please Ensure hypoglycemic protocol as instructed  Melatonin 5 mg take 1 tablet nightly  Ensure Zio patch for 14 days  Ensure aggressive physical therapy for better chance of recovery  Changes to your medications  Atorvastatin increased to 80 mg daily  Please contact us if you have any concerns, wish to change or make an appointment:  Internal medicine clinic   Phone: [phone]  Fax: [phone]  [address]  Should you have further questions in regards to this visit, you can review your clinical note and after visit summary document on your [email or web address] account.      Other than any new prescriptions given to you today, the list of home medications on this After Visit Summary are based on information provided to us from you and your healthcare providers. This information, including the list, dose, and frequency of medications is only as accurate as the information you provided. If you have any questions or concerns about your home medications, please contact your Primary Care Physician for further clarification.     He Zhou MD  PGY 1   1:36 PM 1/27/2023

## 2023-01-27 NOTE — DISCHARGE INSTRUCTIONS
Internal medicine    Follow ups  Please follow up with the internal medicine clinic at [facility] within 14 days of discharge from your rehabilitation facility   Please keep all other follow up appointments:  No future appointments. Changes in healthcare   Please take all medications as indicated  Diet: diabetic diet   Activity: activity as tolerated  New Medications started during this hospital stay  Baclofen 5 mg tablet take 1 tablet by mouth 2 times daily  Clopidogrel 75 mg tablet take 1 tablet by mouth daily for 21 days  Insulin glargine, take 17 units into the skin nightly  Please Ensure hypoglycemic protocol as instructed  Melatonin 5 mg take 1 tablet nightly  Ensure Zio patch for 14 days  Ensure aggressive physical therapy for better chance of recovery  Changes to your medications  Atorvastatin increased to 80 mg daily  Please contact us if you have any concerns, wish to change or make an appointment:  Internal medicine clinic   Phone: [phone]  Fax: [phone]  [address] Av[facility] hospitals in the [place]  Should you have further questions in regards to this visit, you can review your clinical note and after visit summary document on your [employer] account. Other than any new prescriptions given to you today, the list of home medications on this After Visit Summary are based on information provided to us from you and your healthcare providers. This information, including the list, dose, and frequency of medications is only as accurate as the information you provided. If you have any questions or concerns about your home medications, please contact your Primary Care Physician for further clarification.

## 2023-01-27 NOTE — DISCHARGE INSTR - COC
Continuity of Care Form    Patient Name: Fredo Ndiaye   :  1970  MRN:  [id number]    Admit date:  2023  Discharge date:  23    Code Status Order: Full Code   Advance Directives:     Admitting Physician:  Nuris Cintron DO  PCP: Selena Beard MD    Dis[facility]ing [facility]/8544-N  Discharging Unit Phone Number: [phone]    Emergency Contact:   Extended Emergency Contact Information  Primary Emergency Contact: [facility]  Address: [address] [place] [address] Phone: [phone]  Relation: Parent  Secondary Emergency Contact: Mat Funes  Mobile Phone: [phone]  Relation: Brother/Sister   needed? No    Past Surgical History:  History reviewed. No pertinent surgical history.     Immunization History:   Immunization History   Administered Date(s) Administered    COVID-19, PFIZER PURPLE top, DILUTE for use, (age 15 y+), 30mcg/0.3mL 2021, 03/15/2021, 2021    Influenza Virus Vaccine 2017    Pneumococcal Polysaccharide ([id number]) 2015       Active Problems:  Patient Active Problem List   Diagnosis Code    Tobacco abuse Z72.0    Diabetes mellitus type 1, uncontrolled [id number]    Moderate nonproliferative diabetic retinopathy associated with type 1 diabetes mellitus ([facility] Q38.0743    Essential hypertension I10    Diabetic ketoacidosis without coma associated with type 1 diabetes mellitus (HCC) E10.10    Idiopathic peripheral neuropathy G60.9    Thrombocytopenia (HCC) D69.6    Acute ischemic multifocal multiple vascular territories stroke [facility]) I63.89    Left hemiplegia (HCC) G81.94    Diabetic ketoacidosis with coma associated with type 1 diabetes mellitus (HCC) E10.11    KIRSTEN (acute kidney injury) ([facility] N17.9    High anion gap metabolic acidosis O16.91    Leukocytosis D72.829    Lacunar stroke ([facility] I63.81    ETOH abuse F10.10    Type 1 diabetes mellitus with complication ([address] A72.3    Marijuana abuse F12.10    Lactic acidosis E87.20    Acute renal insufficiency N28.9    Hyperkalemia E87.5    Hypokalemia E87.6    Hypophosphatemia E83.39    Community acquired pneumonia of right lower lobe of lung J18.9    DKA, type 1, not at goal [facility]) E10.10    Mixed hyperlipidemia E78.2    PAF (paroxysmal atrial fibrillation) (HCC) I48.0    Dark stools R19.5    Urticaria L50.9    Insulin pump in place Z96.41    Diabetic ketoacidosis without coma associated with diabetes mellitus due to underlying condition ([address] E08.10    Cardiac arrest ([address] I46.9    Acute respiratory failure with hypoxia ([address] J96.01    Hypothermia T68. XXXA    Shock ([address] R57.9    Severe protein-calorie malnutrition ([address] E43    Normochromic normocytic anemia D64.9    Syncope and collapse R55    Pneumonia J18.9    Diabetic acidosis without coma ([facility]) E11.10    Poorly controlled type 1 diabetes mellitus ([address] E10.65    Type 1 diabetes mellitus with ketoacidosis without coma ([address] E10.10    Acute lacunar infarction ([address] I63.81    Left hemiparesis ([facility]) G81.94    Diabetes mellitus type I ([address] E10.9    Cerebrovascular accident (CVA) ([address] I63.9    Acute CVA (cerebrovascular accident) ([address] I63.9       Isolation/Infection:   Isolation            No Isolation          Patient Infection Status       Infection Onset Added Last Indicated Last Indicated By Review Planned Expiration Resolved Resolved By    None active    Resolved    COVID-19 (Rule Out) 01/25/23 01/25/23 01/25/23 COVID-19 (Ordered)   01/26/23 Rule-Out Test Resulted    COVID-19 (Rule Out) 11/14/22 11/14/22 11/14/22 Respiratory Panel, Molecular, with COVID-19 (Restricted: peds pts or suitable admitted adults) (Ordered)   11/14/22 Rule-Out Test Resulted    COVID-19 (Rule Out) 08/08/22 08/08/22 08/08/22 Respiratory Panel, Molecular, with COVID-19 (Restricted: peds pts or suitable admitted adults) (Ordered)   08/08/22 Rule-Out Test Resulted    COVID-19 (Rule Out) 04/29/22 04/29/22 04/29/22 COVID-19, Rapid (Ordered)   04/29/22 Rule-Out Test Resulted    COVID-19 (Rule Out) 01/26/22 01/26/22 01/26/22 Respiratory Panel, Molecular, with COVID-19 (Restricted: peds pts or suitable admitted adults) (Ordered)   01/27/22 Rule-Out Test Resulted    COVID-19 (Rule Out) 07/20/20 07/20/20 07/21/20 COVID-19 (Ordered)   07/21/20 Rule-Out Test Resulted            Nurse Assessment:  Last Vital Signs: BP (!) 140/92   Pulse 72   Temp 97.9 °F (36.6 °C) (Temporal)   Resp 12   Ht 5' 6\" (1.676 m)   Wt 145 lb (65.8 kg)   SpO2 99%   BMI 23.40 kg/m²     Last documented pain score (0-10 scale): Pain Level: 2  Last Weight:   Wt Readings from Last 1 Encounters:   01/23/23 145 lb (65.8 kg)     Mental Status:  oriented    IV Access:  - None    Nursing Mobility/ADLs:  Walking   Dependent  Transfer  Dependent  Bathing  Assisted  Dressing  Assisted  Toileting  Assisted  Feeding  Independent  Med Admin  Dependent  Med Delivery   whole    Wound Care Documentation and Therapy:        Elimination:  Continence: Bowel: Yes  Bladder: Yes  Urinary Catheter: None   Colostomy/Ileostomy/Ileal Conduit: No       Date of Last BM: 01/25/2023      Intake/Output Summary (Last 24 hours) at 1/27/2023 1334  Last data filed at 1/27/2023 0523  Gross per 24 hour   Intake --   Output 1700 ml   Net -1700 ml     I/O last 3 completed shifts: In: 620 [P.O.:620]  Out: 2950 [Urine:2950]    Safety Concerns: At Risk for Falls    Impairments/Disabilities:      Left side flaccid     Nutrition Therapy:  Current Nutrition Therapy:   - Oral Diet:  General    Routes of Feeding: Oral  Liquids: Thin Liquids  Daily Fluid Restriction: no  Last Modified Barium Swallow with Video (Video Swallowing Test): not done    Treatments at the Time of Hospital Discharge:   Respiratory Treatments:   Oxygen Therapy:  is not on home oxygen therapy.   Ventilator:    - No ventilator support    Rehab Therapies: Physical Therapy and Occupational Therapy  Weight Bearing Status/Restrictions: No weight bearing restrictions  Other Medical Equipment (for information only, NOT a DME order):  wheelchair  Other Treatments:     Patient's personal belongings (please select all that are sent with patient):  None    RN SIGNATURE:  Electronically signed by Tano Ayala RN on 1/27/23 at 2:24 PM EST    CASE MANAGEMENT/SOCIAL WORK SECTION    Inpatient Status Date: 01/27/2023    Readmission Risk Assessment Score:  Readmission Risk              Risk of Unplanned Readmission:  31           Discharging to Facility/ Agency   Name:   Address:  Phone:  Fax:    Dialysis Facility (if applicable)   Name:  Address:  Dialysis Schedule:  Phone:  Fax:    / signature: {Esignature:[id number]}    PHYSICIAN SECTION    Prognosis: Guarded    Condition at Discharge: Stable    Rehab Potential (if transferring to Rehab): Guarded    Recommended Labs or Other Treatments After Discharge: None    Physician Certification: I certify the above information and transfer of David Mukherjee  is necessary for the continuing treatment of the diagnosis listed and that he requires Acute Rehab for greater 30 days.      Update Admission H&P: Changes in H&P as follows - ***    PHYSICIAN SIGNATURE:  Electronically signed by Domenico Gonsalves MD on 1/27/23 at 1:35 PM EST

## 2023-01-27 NOTE — ADT AUTH CERT
Utilization Reviews       Stroke: Ischemic - Care Day 4 (1/26/2023) by Eleonora Garcia RN       Review Status Review Entered   Completed 1/27/2023 1617       Created By   Eleonora Garcia RN      Criteria Review      Care Day: 4 Care Date: 1/26/2023 Level of Care: Intermediate Care    Guideline Day 3    Clinical Status    (X) * Hemodynamic stability    (X) * Dangerous arrhythmia absent    (X) * Mental status at baseline or stable and appropriate for next level of care    (X) * Neurologic deficits absent or stable and appropriate for next level of care    (X) * Discharge plans and education understood    1/27/2023 4:17 PM EST by Tomasz olson for rehab dc    Activity    (X) * Ambulatory or acceptable for next level of care    Routes    (X) * Oral hydration    (X) * Oral medications or regimen acceptable for next level of care    1/27/2023 4:17 PM EST by Tomasz Jeronimo      Meds-   Lipitor 80 mg po qd  Asa 81 mg po qd  Plavix 75 mg po qd    (X) * Oral diet or acceptable for next level of care    Interventions    (X) * Supplemental oxygen absent or at baseline requirement    (X) Neurologic checks    (X) Possible physical therapy    Medications    (X) * Antithrombotic therapy appropriate for next level of care established    * Milestone   Additional Notes   1/26-       Labs-    Wbc 8, h/h 11/34   K 3.8   Glucose 208      Vs- BP (!) 160/89   Pulse 67   Temp 97.6 °F (36.4 °C) (Temporal)   Resp 18 ,sao2 100 ra      Patient noticed slight improvement in his left leg function, left upper limb still paralyzed    Still has lt facial droop      Physical medicine consulted-    PROBLEM LIST:   1. CVA with left hemiparesis. 2.  Type 1 diabetes mellitus. 3.  Hypertension. 4.  Alcohol use disorder. 5.  Nicotine addiction. RECOMMENDATIONS:  I think the patient is a good candidate for acute   rehab. Speech therapy today -    Patient seen for f/u for dysphagia mgmt.  Patient w/ meal tray present in room (easy to chew solids and thin liquids-no straws). Patient able to recall no straw recommendation at start of session. Patient consumed easy to chew solids and thin liquids via cup w/ mild oral stasis after 1st swl that cleared w/ 2nd swl and/or liquid wash, fair bolus formation;manipulation, adequate mastication time, and fair AP tongue propulsion. Patient pharyngeal stage exhibited cough x 1 w/ thin liquids which could be secondary to large bolus. SLP cued patient for smaller sips and exhibited excellent follow through w/ no further s/s of pen/aspiration noted. Patient able to repeat recommended strategies (small bites/sips, slow rate, alt solids/liquids and no straw) w/ 80% acc.        Continues w pt/ot      Meds-    Lipitor 80 mg po qd   Asa 81 mg po qd   Plavix 75 mg po qd      Dc plan-- await precert for rehab dc                  ** 1/27**   Ins auth obtained---- pt dc'ing to rehab today            Stroke: Ischemic - Care Day 3 (1/25/2023) by Dannie Fernandez RN       Review Status Review Entered   Completed 1/27/2023 1611       Created By   Dannie Fernandez RN      Criteria Review      Care Day: 3 Care Date: 1/25/2023 Level of Care: Intermediate Care    Guideline Day 2    Level Of Care    (X) Stroke unit, ICU, telemetry, or floor    Clinical Status    (X) * Hemodynamic stability    (X) * Mental status at baseline or stable    (X) * Neurologic deficits absent or stable    1/27/2023 4:11 PM EST by Roseann Delgado      unchanged    (X) * Unimpaired swallowing    1/27/2023 4:11 PM EST by Roseann Delgado      speech following  MBS completed    Activity    (X) * Up to chair    (X) Possible assisted ambulation    1/27/2023 4:11 PM EST by Roseann Delgado      pt/ot    Routes    (X) * Oral hydration    1/27/2023 4:11 PM EST by Roseann Delgado      dc ivf    (X) * Oral medications    1/27/2023 4:11 PM EST by Roseann Delgado      Meds-   Lipitor incr to 80 mg po qd  Asa 81 mg po qd  Plavix 75 mg po qd  Dc ivf    (X) * Advance diet as tolerated    1/27/2023 4:11 PM EST by Giovanna Wu      Diet- ADULT DIET; Easy to Chew; 3 carb choices (45 gm/meal)    Interventions    (X) Neurologic checks    (X) Rehabilitation assessment    1/27/2023 4:11 PM EST by Giovanna Wu      dc planning underway    (X) Possible physical therapy    (X) Possible occupational and speech therapy    Medications    (X) Antithrombotic therapy    1/27/2023 4:11 PM EST by Giovanna Wu      asa, plavix    * Milestone   Additional Notes   1/25-       ++ acute cva      Thyroid us completed-    The right thyroid nodule is 8 mm and is awarded 3 points; no further    follow-up is needed. Labs-    Wbc 9, h/h 11/34   K 4.1   Co2 20, anion gap 13   Glucose 185   Bun 31, creat 0.8      Vs- BP (!) 114/56   Pulse 85   Temp 97.8 °F (36.6 °C) (Temporal)   Resp 16, on ra, sao2 95      Not much improvement in symptoms   He continues to exhibit left facial droop and left extremity weakness. Per neurology -     · Continue aspirin and Plavix x21 days then aspirin monotherapy   · Continue statin with an LDL goal <70   · A1c goal <6   · Echocardiogram pending   · Stroke education   · PT/OT/SLP   · Zio patch at discharge   · Neurology will follow         Speech therapy  today -    Laryngeal Penetration and Aspiration:   Penetration WITH aspiration was observed in today's study with  thin liquid, with straw only      MBS completed-    1. Without use of a straw, no barium aspiration or significant swallowing    deficits. Transient laryngeal penetration with thin liquid barium. 2.  Single episode of thin liquid barium aspiration when using a straw.        Pt/ot continues      Dc planning underway for rehab      Diet- ADULT DIET; Easy to Chew; 3 carb choices (45 gm/meal)      Meds-    Lipitor incr to 80 mg po qd   Asa 81 mg po qd   Plavix 75 mg po qd   Dc ivf

## 2023-02-16 DIAGNOSIS — I63.89 ACUTE ISCHEMIC MULTIFOCAL MULTIPLE VASCULAR TERRITORIES STROKE (HCC): ICD-10-CM

## 2023-02-23 ENCOUNTER — TELEPHONE (OUTPATIENT)
Dept: NEUROLOGY | Age: 53
End: 2023-02-23

## 2023-02-23 NOTE — TELEPHONE ENCOUNTER
----- Message from CHAYO Colon CNP sent at 2/20/2023  9:43 AM EST -----  Can you let him know his cardiac monitor was negative for arrhythmias

## 2023-03-07 DIAGNOSIS — E10.65 UNCONTROLLED TYPE 1 DIABETES MELLITUS WITH HYPERGLYCEMIA (HCC): Primary | ICD-10-CM

## 2023-03-07 RX ORDER — FLASH GLUCOSE SCANNING READER
EACH MISCELLANEOUS
Qty: 1 EACH | Refills: 0 | Status: SHIPPED | OUTPATIENT
Start: 2023-03-07

## 2023-03-07 RX ORDER — FLASH GLUCOSE SENSOR
KIT MISCELLANEOUS
Qty: 2 EACH | Refills: 4 | Status: SHIPPED | OUTPATIENT
Start: 2023-03-07

## 2023-04-26 ENCOUNTER — APPOINTMENT (OUTPATIENT)
Dept: CT IMAGING | Age: 53
End: 2023-04-26
Payer: COMMERCIAL

## 2023-04-26 ENCOUNTER — APPOINTMENT (OUTPATIENT)
Dept: GENERAL RADIOLOGY | Age: 53
End: 2023-04-26
Payer: COMMERCIAL

## 2023-04-26 ENCOUNTER — HOSPITAL ENCOUNTER (INPATIENT)
Age: 53
LOS: 3 days | Discharge: HOME | End: 2023-04-29
Attending: EMERGENCY MEDICINE | Admitting: INTERNAL MEDICINE
Payer: COMMERCIAL

## 2023-04-26 DIAGNOSIS — E13.10 DIABETIC KETOACIDOSIS WITHOUT COMA ASSOCIATED WITH OTHER SPECIFIED DIABETES MELLITUS (HCC): Primary | ICD-10-CM

## 2023-04-26 DIAGNOSIS — K92.2 UPPER GI BLEED: ICD-10-CM

## 2023-04-26 LAB
ABO + RH BLD: NORMAL
ALBUMIN SERPL-MCNC: 4.8 G/DL (ref 3.5–5.2)
ALP SERPL-CCNC: 238 U/L (ref 40–129)
ALT SERPL-CCNC: 44 U/L (ref 0–40)
ANION GAP SERPL CALCULATED.3IONS-SCNC: 13 MMOL/L (ref 7–16)
ANION GAP SERPL CALCULATED.3IONS-SCNC: 15 MMOL/L (ref 7–16)
ANION GAP SERPL CALCULATED.3IONS-SCNC: 21 MMOL/L (ref 7–16)
AST SERPL-CCNC: 17 U/L (ref 0–39)
B PARAP IS1001 DNA NPH QL NAA+NON-PROBE: NOT DETECTED
B PERT.PT PRMT NPH QL NAA+NON-PROBE: NOT DETECTED
BASOPHILS # BLD: 0.05 E9/L (ref 0–0.2)
BASOPHILS NFR BLD: 0.3 % (ref 0–2)
BETA-HYDROXYBUTYRATE: >4.5 MMOL/L (ref 0.02–0.27)
BILIRUB DIRECT SERPL-MCNC: 0.3 MG/DL (ref 0–0.3)
BILIRUB INDIRECT SERPL-MCNC: 0.7 MG/DL (ref 0–1)
BILIRUB SERPL-MCNC: 1 MG/DL (ref 0–1.2)
BLD GP AB SCN SERPL QL: NORMAL
BUN SERPL-MCNC: 24 MG/DL (ref 6–20)
BUN SERPL-MCNC: 27 MG/DL (ref 6–20)
BUN SERPL-MCNC: 32 MG/DL (ref 6–20)
C PNEUM DNA NPH QL NAA+NON-PROBE: NOT DETECTED
CALCIUM SERPL-MCNC: 10.1 MG/DL (ref 8.6–10.2)
CALCIUM SERPL-MCNC: 9.3 MG/DL (ref 8.6–10.2)
CALCIUM SERPL-MCNC: 9.4 MG/DL (ref 8.6–10.2)
CHLORIDE SERPL-SCNC: 108 MMOL/L (ref 98–107)
CHLORIDE SERPL-SCNC: 108 MMOL/L (ref 98–107)
CHLORIDE SERPL-SCNC: 98 MMOL/L (ref 98–107)
CHP ED QC CHECK: NORMAL
CO2 SERPL-SCNC: 21 MMOL/L (ref 22–29)
CO2 SERPL-SCNC: 24 MMOL/L (ref 22–29)
CO2 SERPL-SCNC: 25 MMOL/L (ref 22–29)
CREAT SERPL-MCNC: 0.7 MG/DL (ref 0.7–1.2)
CREAT SERPL-MCNC: 0.8 MG/DL (ref 0.7–1.2)
CREAT SERPL-MCNC: 0.9 MG/DL (ref 0.7–1.2)
EKG ATRIAL RATE: 127 BPM
EKG P AXIS: 66 DEGREES
EKG P-R INTERVAL: 122 MS
EKG Q-T INTERVAL: 336 MS
EKG QRS DURATION: 80 MS
EKG QTC CALCULATION (BAZETT): 488 MS
EKG R AXIS: 56 DEGREES
EKG T AXIS: 91 DEGREES
EKG VENTRICULAR RATE: 127 BPM
EOSINOPHIL # BLD: 0.01 E9/L (ref 0.05–0.5)
EOSINOPHIL NFR BLD: 0.1 % (ref 0–6)
ERYTHROCYTE [DISTWIDTH] IN BLOOD BY AUTOMATED COUNT: 14.9 FL (ref 11.5–15)
FLUAV RNA NPH QL NAA+NON-PROBE: NOT DETECTED
FLUBV RNA NPH QL NAA+NON-PROBE: NOT DETECTED
GLUCOSE BLD-MCNC: 351 MG/DL
GLUCOSE SERPL-MCNC: 168 MG/DL (ref 74–99)
GLUCOSE SERPL-MCNC: 230 MG/DL (ref 74–99)
GLUCOSE SERPL-MCNC: 394 MG/DL (ref 74–99)
HADV DNA NPH QL NAA+NON-PROBE: NOT DETECTED
HBA1C MFR BLD: 9.8 % (ref 4–5.6)
HCOV 229E RNA NPH QL NAA+NON-PROBE: NOT DETECTED
HCOV HKU1 RNA NPH QL NAA+NON-PROBE: NOT DETECTED
HCOV NL63 RNA NPH QL NAA+NON-PROBE: NOT DETECTED
HCOV OC43 RNA NPH QL NAA+NON-PROBE: NOT DETECTED
HCT VFR BLD AUTO: 44.9 % (ref 37–54)
HGB BLD-MCNC: 14.6 G/DL (ref 12.5–16.5)
HMPV RNA NPH QL NAA+NON-PROBE: NOT DETECTED
HPIV1 RNA NPH QL NAA+NON-PROBE: NOT DETECTED
HPIV2 RNA NPH QL NAA+NON-PROBE: NOT DETECTED
HPIV3 RNA NPH QL NAA+NON-PROBE: NOT DETECTED
HPIV4 RNA NPH QL NAA+NON-PROBE: NOT DETECTED
IMM GRANULOCYTES # BLD: 0.07 E9/L
IMM GRANULOCYTES NFR BLD: 0.4 % (ref 0–5)
LACTATE BLDV-SCNC: 1.2 MMOL/L (ref 0.5–2.2)
LIPASE: 14 U/L (ref 13–60)
LYMPHOCYTES # BLD: 0.94 E9/L (ref 1.5–4)
LYMPHOCYTES NFR BLD: 5.6 % (ref 20–42)
M PNEUMO DNA NPH QL NAA+NON-PROBE: NOT DETECTED
MAGNESIUM SERPL-MCNC: 1.8 MG/DL (ref 1.6–2.6)
MAGNESIUM SERPL-MCNC: 1.9 MG/DL (ref 1.6–2.6)
MCH RBC QN AUTO: 28.7 PG (ref 26–35)
MCHC RBC AUTO-ENTMCNC: 32.5 % (ref 32–34.5)
MCV RBC AUTO: 88.2 FL (ref 80–99.9)
METER GLUCOSE: 143 MG/DL (ref 74–99)
METER GLUCOSE: 145 MG/DL (ref 74–99)
METER GLUCOSE: 146 MG/DL (ref 74–99)
METER GLUCOSE: 154 MG/DL (ref 74–99)
METER GLUCOSE: 158 MG/DL (ref 74–99)
METER GLUCOSE: 160 MG/DL (ref 74–99)
METER GLUCOSE: 214 MG/DL (ref 74–99)
METER GLUCOSE: 254 MG/DL (ref 74–99)
METER GLUCOSE: 279 MG/DL (ref 74–99)
METER GLUCOSE: 348 MG/DL (ref 74–99)
METER GLUCOSE: 351 MG/DL (ref 74–99)
MONOCYTES # BLD: 0.74 E9/L (ref 0.1–0.95)
MONOCYTES NFR BLD: 4.4 % (ref 2–12)
NEUTROPHILS # BLD: 14.86 E9/L (ref 1.8–7.3)
NEUTS SEG NFR BLD: 89.2 % (ref 43–80)
PH VENOUS: 7.32 (ref 7.35–7.45)
PHOSPHATE SERPL-MCNC: 1.9 MG/DL (ref 2.5–4.5)
PHOSPHATE SERPL-MCNC: 2.1 MG/DL (ref 2.5–4.5)
PLATELET # BLD AUTO: 634 E9/L (ref 130–450)
PMV BLD AUTO: 8.4 FL (ref 7–12)
POTASSIUM SERPL-SCNC: 3.5 MMOL/L (ref 3.5–5)
POTASSIUM SERPL-SCNC: 4 MMOL/L (ref 3.5–5)
POTASSIUM SERPL-SCNC: 4.8 MMOL/L (ref 3.5–5)
PROCALCITONIN: 0.54 NG/ML (ref 0–0.08)
PROT SERPL-MCNC: 8.1 G/DL (ref 6.4–8.3)
RBC # BLD AUTO: 5.09 E12/L (ref 3.8–5.8)
RSV RNA NPH QL NAA+NON-PROBE: NOT DETECTED
RV+EV RNA NPH QL NAA+NON-PROBE: NOT DETECTED
SARS-COV-2 RNA NPH QL NAA+NON-PROBE: NOT DETECTED
SODIUM SERPL-SCNC: 143 MMOL/L (ref 132–146)
SODIUM SERPL-SCNC: 144 MMOL/L (ref 132–146)
SODIUM SERPL-SCNC: 146 MMOL/L (ref 132–146)
TROPONIN, HIGH SENSITIVITY: 26 NG/L (ref 0–11)
TROPONIN, HIGH SENSITIVITY: 42 NG/L (ref 0–11)
WBC # BLD: 16.7 E9/L (ref 4.5–11.5)

## 2023-04-26 PROCEDURE — 2580000003 HC RX 258

## 2023-04-26 PROCEDURE — 84100 ASSAY OF PHOSPHORUS: CPT

## 2023-04-26 PROCEDURE — 82962 GLUCOSE BLOOD TEST: CPT

## 2023-04-26 PROCEDURE — 6360000004 HC RX CONTRAST MEDICATION: Performed by: RADIOLOGY

## 2023-04-26 PROCEDURE — 93005 ELECTROCARDIOGRAM TRACING: CPT | Performed by: STUDENT IN AN ORGANIZED HEALTH CARE EDUCATION/TRAINING PROGRAM

## 2023-04-26 PROCEDURE — 6370000000 HC RX 637 (ALT 250 FOR IP): Performed by: STUDENT IN AN ORGANIZED HEALTH CARE EDUCATION/TRAINING PROGRAM

## 2023-04-26 PROCEDURE — 2580000003 HC RX 258: Performed by: INTERNAL MEDICINE

## 2023-04-26 PROCEDURE — C9113 INJ PANTOPRAZOLE SODIUM, VIA: HCPCS | Performed by: STUDENT IN AN ORGANIZED HEALTH CARE EDUCATION/TRAINING PROGRAM

## 2023-04-26 PROCEDURE — 85025 COMPLETE CBC W/AUTO DIFF WBC: CPT

## 2023-04-26 PROCEDURE — 80048 BASIC METABOLIC PNL TOTAL CA: CPT

## 2023-04-26 PROCEDURE — 99285 EMERGENCY DEPT VISIT HI MDM: CPT

## 2023-04-26 PROCEDURE — 86901 BLOOD TYPING SEROLOGIC RH(D): CPT

## 2023-04-26 PROCEDURE — 99291 CRITICAL CARE FIRST HOUR: CPT | Performed by: INTERNAL MEDICINE

## 2023-04-26 PROCEDURE — 2000000000 HC ICU R&B

## 2023-04-26 PROCEDURE — 86850 RBC ANTIBODY SCREEN: CPT

## 2023-04-26 PROCEDURE — 71045 X-RAY EXAM CHEST 1 VIEW: CPT

## 2023-04-26 PROCEDURE — 2580000003 HC RX 258: Performed by: STUDENT IN AN ORGANIZED HEALTH CARE EDUCATION/TRAINING PROGRAM

## 2023-04-26 PROCEDURE — 96361 HYDRATE IV INFUSION ADD-ON: CPT

## 2023-04-26 PROCEDURE — 80076 HEPATIC FUNCTION PANEL: CPT

## 2023-04-26 PROCEDURE — 6360000002 HC RX W HCPCS: Performed by: STUDENT IN AN ORGANIZED HEALTH CARE EDUCATION/TRAINING PROGRAM

## 2023-04-26 PROCEDURE — 84484 ASSAY OF TROPONIN QUANT: CPT

## 2023-04-26 PROCEDURE — 82010 KETONE BODYS QUAN: CPT

## 2023-04-26 PROCEDURE — 86900 BLOOD TYPING SEROLOGIC ABO: CPT

## 2023-04-26 PROCEDURE — 83735 ASSAY OF MAGNESIUM: CPT

## 2023-04-26 PROCEDURE — 83605 ASSAY OF LACTIC ACID: CPT

## 2023-04-26 PROCEDURE — C9113 INJ PANTOPRAZOLE SODIUM, VIA: HCPCS

## 2023-04-26 PROCEDURE — 82800 BLOOD PH: CPT

## 2023-04-26 PROCEDURE — 83690 ASSAY OF LIPASE: CPT

## 2023-04-26 PROCEDURE — 74177 CT ABD & PELVIS W/CONTRAST: CPT

## 2023-04-26 PROCEDURE — 2500000003 HC RX 250 WO HCPCS: Performed by: STUDENT IN AN ORGANIZED HEALTH CARE EDUCATION/TRAINING PROGRAM

## 2023-04-26 PROCEDURE — 83036 HEMOGLOBIN GLYCOSYLATED A1C: CPT

## 2023-04-26 PROCEDURE — 96375 TX/PRO/DX INJ NEW DRUG ADDON: CPT

## 2023-04-26 PROCEDURE — 84145 PROCALCITONIN (PCT): CPT

## 2023-04-26 PROCEDURE — 6360000002 HC RX W HCPCS

## 2023-04-26 PROCEDURE — 96365 THER/PROPH/DIAG IV INF INIT: CPT

## 2023-04-26 PROCEDURE — 0202U NFCT DS 22 TRGT SARS-COV-2: CPT

## 2023-04-26 PROCEDURE — 87040 BLOOD CULTURE FOR BACTERIA: CPT

## 2023-04-26 PROCEDURE — 93010 ELECTROCARDIOGRAM REPORT: CPT | Performed by: INTERNAL MEDICINE

## 2023-04-26 RX ORDER — 0.9 % SODIUM CHLORIDE 0.9 %
15 INTRAVENOUS SOLUTION INTRAVENOUS ONCE
Status: DISCONTINUED | OUTPATIENT
Start: 2023-04-26 | End: 2023-04-27

## 2023-04-26 RX ORDER — ONDANSETRON 4 MG/1
4 TABLET, ORALLY DISINTEGRATING ORAL EVERY 8 HOURS PRN
Status: DISCONTINUED | OUTPATIENT
Start: 2023-04-26 | End: 2023-04-29 | Stop reason: HOSPADM

## 2023-04-26 RX ORDER — POLYETHYLENE GLYCOL 3350 17 G/17G
17 POWDER, FOR SOLUTION ORAL DAILY PRN
Status: DISCONTINUED | OUTPATIENT
Start: 2023-04-26 | End: 2023-04-29 | Stop reason: HOSPADM

## 2023-04-26 RX ORDER — MAGNESIUM SULFATE 1 G/100ML
1000 INJECTION INTRAVENOUS PRN
Status: DISCONTINUED | OUTPATIENT
Start: 2023-04-26 | End: 2023-04-26

## 2023-04-26 RX ORDER — ONDANSETRON 2 MG/ML
4 INJECTION INTRAMUSCULAR; INTRAVENOUS ONCE
Status: COMPLETED | OUTPATIENT
Start: 2023-04-26 | End: 2023-04-26

## 2023-04-26 RX ORDER — SODIUM CHLORIDE 0.9 % (FLUSH) 0.9 %
5-40 SYRINGE (ML) INJECTION EVERY 12 HOURS SCHEDULED
Status: DISCONTINUED | OUTPATIENT
Start: 2023-04-26 | End: 2023-04-29 | Stop reason: HOSPADM

## 2023-04-26 RX ORDER — 0.9 % SODIUM CHLORIDE 0.9 %
15 INTRAVENOUS SOLUTION INTRAVENOUS ONCE
Status: COMPLETED | OUTPATIENT
Start: 2023-04-26 | End: 2023-04-26

## 2023-04-26 RX ORDER — 0.9 % SODIUM CHLORIDE 0.9 %
1000 INTRAVENOUS SOLUTION INTRAVENOUS ONCE
Status: COMPLETED | OUTPATIENT
Start: 2023-04-26 | End: 2023-04-26

## 2023-04-26 RX ORDER — ENOXAPARIN SODIUM 100 MG/ML
40 INJECTION SUBCUTANEOUS DAILY
Status: DISCONTINUED | OUTPATIENT
Start: 2023-04-26 | End: 2023-04-29 | Stop reason: HOSPADM

## 2023-04-26 RX ORDER — BISACODYL 10 MG
10 SUPPOSITORY, RECTAL RECTAL DAILY PRN
Status: DISCONTINUED | OUTPATIENT
Start: 2023-04-26 | End: 2023-04-28

## 2023-04-26 RX ORDER — DEXTROSE AND SODIUM CHLORIDE 5; .45 G/100ML; G/100ML
INJECTION, SOLUTION INTRAVENOUS CONTINUOUS
Status: DISCONTINUED | OUTPATIENT
Start: 2023-04-26 | End: 2023-04-28

## 2023-04-26 RX ORDER — IPRATROPIUM BROMIDE AND ALBUTEROL SULFATE 2.5; .5 MG/3ML; MG/3ML
1 SOLUTION RESPIRATORY (INHALATION) EVERY 4 HOURS PRN
Status: DISCONTINUED | OUTPATIENT
Start: 2023-04-26 | End: 2023-04-29 | Stop reason: HOSPADM

## 2023-04-26 RX ORDER — SODIUM CHLORIDE 9 MG/ML
INJECTION, SOLUTION INTRAVENOUS PRN
Status: DISCONTINUED | OUTPATIENT
Start: 2023-04-26 | End: 2023-04-29 | Stop reason: HOSPADM

## 2023-04-26 RX ORDER — DEXTROSE, SODIUM CHLORIDE, AND POTASSIUM CHLORIDE 5; .45; .15 G/100ML; G/100ML; G/100ML
INJECTION INTRAVENOUS CONTINUOUS PRN
Status: DISCONTINUED | OUTPATIENT
Start: 2023-04-26 | End: 2023-04-27

## 2023-04-26 RX ORDER — SODIUM CHLORIDE 9 MG/ML
INJECTION, SOLUTION INTRAVENOUS CONTINUOUS
Status: DISCONTINUED | OUTPATIENT
Start: 2023-04-26 | End: 2023-04-27

## 2023-04-26 RX ORDER — POTASSIUM CHLORIDE 7.45 MG/ML
10 INJECTION INTRAVENOUS PRN
Status: DISCONTINUED | OUTPATIENT
Start: 2023-04-26 | End: 2023-04-26

## 2023-04-26 RX ORDER — ACETAMINOPHEN 650 MG/1
650 SUPPOSITORY RECTAL EVERY 6 HOURS PRN
Status: DISCONTINUED | OUTPATIENT
Start: 2023-04-26 | End: 2023-04-29 | Stop reason: HOSPADM

## 2023-04-26 RX ORDER — SODIUM CHLORIDE 0.9 % (FLUSH) 0.9 %
5-40 SYRINGE (ML) INJECTION PRN
Status: DISCONTINUED | OUTPATIENT
Start: 2023-04-26 | End: 2023-04-26

## 2023-04-26 RX ORDER — DEXTROSE, SODIUM CHLORIDE, AND POTASSIUM CHLORIDE 5; .45; .15 G/100ML; G/100ML; G/100ML
INJECTION INTRAVENOUS CONTINUOUS PRN
Status: DISCONTINUED | OUTPATIENT
Start: 2023-04-26 | End: 2023-04-26

## 2023-04-26 RX ORDER — ASPIRIN 81 MG/1
81 TABLET ORAL DAILY
Status: DISCONTINUED | OUTPATIENT
Start: 2023-04-26 | End: 2023-04-29 | Stop reason: HOSPADM

## 2023-04-26 RX ORDER — POTASSIUM CHLORIDE 7.45 MG/ML
10 INJECTION INTRAVENOUS PRN
Status: DISCONTINUED | OUTPATIENT
Start: 2023-04-26 | End: 2023-04-28

## 2023-04-26 RX ORDER — LISINOPRIL 5 MG/1
5 TABLET ORAL DAILY
Status: DISCONTINUED | OUTPATIENT
Start: 2023-04-27 | End: 2023-04-28

## 2023-04-26 RX ORDER — INSULIN GLARGINE 100 [IU]/ML
14 INJECTION, SOLUTION SUBCUTANEOUS NIGHTLY
Status: ON HOLD | COMMUNITY
End: 2023-04-29 | Stop reason: SDUPTHER

## 2023-04-26 RX ORDER — MAGNESIUM SULFATE 1 G/100ML
1000 INJECTION INTRAVENOUS PRN
Status: DISCONTINUED | OUTPATIENT
Start: 2023-04-26 | End: 2023-04-28

## 2023-04-26 RX ORDER — ACETAMINOPHEN 325 MG/1
650 TABLET ORAL EVERY 6 HOURS PRN
Status: DISCONTINUED | OUTPATIENT
Start: 2023-04-26 | End: 2023-04-29 | Stop reason: HOSPADM

## 2023-04-26 RX ORDER — LABETALOL HYDROCHLORIDE 5 MG/ML
10 INJECTION, SOLUTION INTRAVENOUS ONCE
Status: COMPLETED | OUTPATIENT
Start: 2023-04-27 | End: 2023-04-27

## 2023-04-26 RX ORDER — ONDANSETRON 2 MG/ML
4 INJECTION INTRAMUSCULAR; INTRAVENOUS EVERY 6 HOURS PRN
Status: DISCONTINUED | OUTPATIENT
Start: 2023-04-26 | End: 2023-04-29 | Stop reason: HOSPADM

## 2023-04-26 RX ORDER — ATORVASTATIN CALCIUM 40 MG/1
80 TABLET, FILM COATED ORAL NIGHTLY
Status: DISCONTINUED | OUTPATIENT
Start: 2023-04-26 | End: 2023-04-29 | Stop reason: HOSPADM

## 2023-04-26 RX ADMIN — SODIUM CHLORIDE 1000 ML: 9 INJECTION, SOLUTION INTRAVENOUS at 12:07

## 2023-04-26 RX ADMIN — POTASSIUM CHLORIDE 10 MEQ: 7.46 INJECTION, SOLUTION INTRAVENOUS at 23:43

## 2023-04-26 RX ADMIN — IOPAMIDOL 75 ML: 755 INJECTION, SOLUTION INTRAVENOUS at 12:25

## 2023-04-26 RX ADMIN — POTASSIUM CHLORIDE 10 MEQ: 7.46 INJECTION, SOLUTION INTRAVENOUS at 22:41

## 2023-04-26 RX ADMIN — SODIUM CHLORIDE 80 MG: 9 INJECTION, SOLUTION INTRAVENOUS at 12:10

## 2023-04-26 RX ADMIN — DEXTROSE AND SODIUM CHLORIDE: 5; 450 INJECTION, SOLUTION INTRAVENOUS at 17:38

## 2023-04-26 RX ADMIN — SODIUM CHLORIDE: 9 INJECTION, SOLUTION INTRAVENOUS at 14:06

## 2023-04-26 RX ADMIN — SODIUM CHLORIDE, PRESERVATIVE FREE 40 MG: 5 INJECTION INTRAVENOUS at 20:31

## 2023-04-26 RX ADMIN — SODIUM PHOSPHATE, MONOBASIC, MONOHYDRATE AND SODIUM PHOSPHATE, DIBASIC, ANHYDROUS 15 MMOL: 142; 276 INJECTION, SOLUTION INTRAVENOUS at 23:37

## 2023-04-26 RX ADMIN — SODIUM CHLORIDE 5.76 UNITS/HR: 9 INJECTION, SOLUTION INTRAVENOUS at 13:06

## 2023-04-26 RX ADMIN — SODIUM CHLORIDE, PRESERVATIVE FREE 10 ML: 5 INJECTION INTRAVENOUS at 20:32

## 2023-04-26 RX ADMIN — ONDANSETRON 4 MG: 4 TABLET, ORALLY DISINTEGRATING ORAL at 20:30

## 2023-04-26 RX ADMIN — ONDANSETRON 4 MG: 2 INJECTION INTRAMUSCULAR; INTRAVENOUS at 12:05

## 2023-04-26 RX ADMIN — SODIUM CHLORIDE 1000 ML: 9 INJECTION, SOLUTION INTRAVENOUS at 13:03

## 2023-04-26 ASSESSMENT — ENCOUNTER SYMPTOMS
VOMITING: 1
NAUSEA: 1
EYE REDNESS: 0
SORE THROAT: 0
ABDOMINAL PAIN: 1
DIARRHEA: 0
WHEEZING: 0
EYE PAIN: 0
COUGH: 0
SINUS PRESSURE: 0
EYE DISCHARGE: 0
BACK PAIN: 0
SHORTNESS OF BREATH: 0

## 2023-04-26 ASSESSMENT — PAIN - FUNCTIONAL ASSESSMENT: PAIN_FUNCTIONAL_ASSESSMENT: NONE - DENIES PAIN

## 2023-04-26 ASSESSMENT — PAIN SCALES - GENERAL
PAINLEVEL_OUTOF10: 0
PAINLEVEL_OUTOF10: 0

## 2023-04-26 NOTE — H&P
of 45.  CBC showed WBC of 16.7 and platelet count of 007. Patient was started on DKA protocol while in the ED. CT abdomen pelvis showed moderate stool burden as well as mild distention of renal collecting system. Chest x-ray was negative for any acute process. ED Meds: Patient was given regular insulin, started on DKA protocol, Protonix 80 mg IV, Zofran 4 mg IV   ED Fluids: Patient was given 1 L bolus    Past Medical History:      Diagnosis Date    Alcoholism ([facility]     Cocaine abuse ([facility].)     Diabetes mellitus ([facility].)     Hypertension     Idiopathic peripheral neuropathy 9/21/2016    Lacunar stroke ([facility].)     Marijuana abuse        Past Surgical History:    No past surgical history on file. Medications Prior to Admission:    Prior to Admission medications    Medication Sig Start Date End Date Taking? Authorizing Provider   insulin glargine (LANTUS) 100 UNIT/ML injection vial Inject 14 Units into the skin nightly   Yes Historical Provider, MD   Insulin Aspart (NOVOLOG IJ) Inject 0-15 Units as directed 3 times daily (before meals) *Per Sliding Scale*   Yes Historical Provider, MD   atorvastatin (LIPITOR) 80 MG tablet Take 1 tablet by mouth nightly 1/27/23   Lake Streeter MD   aspirin 81 MG EC tablet Take 1 tablet by mouth daily 1/27/23   Lake Streeter MD   albuterol sulfate  (90 Base) MCG/ACT inhaler Inhale 2 puffs into the lungs every 6 hours as needed for Wheezing or Shortness of Breath 9/28/21   Anna Rouse MD       Allergies:  Metoprolol    Social History:   TOBACCO:   reports that he has been smoking cigarettes. He has a 22.50 pack-year smoking history. He has never used smokeless tobacco.  ETOH:   reports current alcohol use of about 6.0 standard drinks per week.   OCCUPATION: Unknown    Family History:       Problem Relation Age of Onset    Diabetes type 2  Mother     Cancer Maternal Grandmother     Diabetes type 2  Nephew        REVIEW OF SYSTEMS:    Pertinent review of

## 2023-04-26 NOTE — ED PROVIDER NOTES
HPI     60-year-old male presents emergency department with complaint of nausea vomiting and elevated blood sugar thinks he might be in diabetic ketoacidosis. Patient states vomiting today multiple times that was coffee-ground emesis. Denies any chest pain or shortness of breath denies any changes bowel bladder habits. No other acute complaints at this time. Review of Systems   Constitutional:  Negative for chills and fever. HENT:  Negative for ear pain, sinus pressure and sore throat. Eyes:  Negative for pain, discharge and redness. Respiratory:  Negative for cough, shortness of breath and wheezing. Cardiovascular:  Negative for chest pain. Gastrointestinal:  Positive for abdominal pain, nausea and vomiting. Negative for diarrhea. Genitourinary:  Negative for dysuria and frequency. Musculoskeletal:  Negative for arthralgias and back pain. Skin:  Negative for rash and wound. Neurological:  Negative for weakness and headaches. Hematological:  Negative for adenopathy. All other systems reviewed and are negative. Physical Exam  Vitals and nursing note reviewed. Constitutional:       General: He is not in acute distress. Appearance: Normal appearance. He is well-developed. HENT:      Head: Normocephalic and atraumatic. Eyes:      Conjunctiva/sclera: Conjunctivae normal.   Cardiovascular:      Rate and Rhythm: Normal rate and regular rhythm. Heart sounds: Normal heart sounds. No murmur heard. Pulmonary:      Effort: Pulmonary effort is normal. No respiratory distress. Breath sounds: Normal breath sounds. No wheezing or rales. Abdominal:      General: Bowel sounds are normal.      Palpations: Abdomen is soft. Tenderness: There is no abdominal tenderness. There is no guarding or rebound. Genitourinary:     Comments: Coffee-ground emesis which is positive for blood on testing. Musculoskeletal:         General: No tenderness or deformity.       Cervical back:
CXR - no focal consolidation or pneumothorax     [CD]   1148 EKG  Rate of 127  Sinus rhythm normal axis does have chronic depressions noted in 2 3 aVF as well as lateral leads no ST elevation is stable as compared to previous EKG QTc of 488 [CB]   1400 Dr Junior Likes agree with admission [CB]   1446 The following tests were interpreted by me:       [CD]   1446 Beta-Hydroxybutyrate(!):    Beta-Hydroxybutyrate >4.50(!) [CD]   1446 Hepatic Function Panel(!):    Total Protein 8.1   Albumin 4.8   Alk Phos 238(!)   ALT 44(!)   AST 17   BILIRUBIN TOTAL 1.0   Bilirubin, Direct 0.3   Bilirubin, Indirect 0.7 [CD]   1446 Lipase:    Lipase 14 [CD]   1446 Troponin(!):    Troponin, High Sensitivity 42(!) [CD]   7327 Basic Metabolic Panel w/ Reflex to MG(!):    Sodium 143   Potassium 4.8   Chloride 98   CO2 24   Anion Gap 21(!)   Glucose, Random 394(!)   BUN,BUNPL 32(!)   Creatinine 0.9   Est, Glom Filt Rate >60   CALCIUM, SERUM, 968912 10.1 [CD]   1446 PH, VENOUS(!):    pH, Gage 7.32(!) [CD]   1446 CBC with Auto Differential(!):    WBC 16.7(!)   RBC 5.09   Hemoglobin Quant 14.6   Hematocrit 44.9   MCV 88.2   MCH 28.7   MCHC 32.5   RDW 14.9   Platelet Count 922(!)   MPV 8.4   Neutrophils % 89. 2(!)   Immature Granulocytes % 0.4   Lymphocyte % 5.6(!)   Monocytes % 4.4   Eosinophils % 0.1   Basophils % 0.3   Neutrophils Absolute 14.86(!)   Immature Granulocytes # 0.07   Lymphocytes Absolute 0.94(!)   Monocytes Absolute 0.74   Eosinophils Absolute 0.01(!)   Basophils Absolute 0.05 [CD]      ED Course User Index  [CB] Sherren Imam, MD  [CD] Kareen Slaughter MD          Medical Decision Making   Differential Diagnosis includes but not limited to: DKA, hyperglycemia, Gastro intestinal bleeding, anemia, other electrolyte abnormalities. Laboratory testing interpreted by me and documented in the ED course shows diabetic ketoacidosis. Beta hydroxybutyrate greater than 4.5, glucose 400, anion gap 21. Hemoglobin stable at 14.6.   He was given IV

## 2023-04-26 NOTE — ED NOTES
Patient from home via EMS, he states he is in DKA due to elevated BS and vomiting starting this am.  Coffee ground emesis x 3 this am.  Patient states he has not had a bowel movement in a couple days. A&O x 4.  Hx CVA with left sided deficits       Nina Levi RN  04/26/23 [id number]

## 2023-04-27 LAB
AMPHET UR QL SCN: NOT DETECTED
ANION GAP SERPL CALCULATED.3IONS-SCNC: 11 MMOL/L (ref 7–16)
ANION GAP SERPL CALCULATED.3IONS-SCNC: 11 MMOL/L (ref 7–16)
ANION GAP SERPL CALCULATED.3IONS-SCNC: 8 MMOL/L (ref 7–16)
ANION GAP SERPL CALCULATED.3IONS-SCNC: 8 MMOL/L (ref 7–16)
ANION GAP SERPL CALCULATED.3IONS-SCNC: 9 MMOL/L (ref 7–16)
BACTERIA BLD CULT ORG #2: NORMAL
BACTERIA BLD CULT: NORMAL
BACTERIA URNS QL MICRO: ABNORMAL /HPF
BARBITURATES UR QL SCN: NOT DETECTED
BASOPHILS # BLD: 0.05 E9/L (ref 0–0.2)
BASOPHILS NFR BLD: 0.3 % (ref 0–2)
BENZODIAZ UR QL SCN: NOT DETECTED
BILIRUB UR QL STRIP: NEGATIVE
BUN SERPL-MCNC: 11 MG/DL (ref 6–20)
BUN SERPL-MCNC: 11 MG/DL (ref 6–20)
BUN SERPL-MCNC: 12 MG/DL (ref 6–20)
BUN SERPL-MCNC: 16 MG/DL (ref 6–20)
BUN SERPL-MCNC: 18 MG/DL (ref 6–20)
CALCIUM SERPL-MCNC: 8.8 MG/DL (ref 8.6–10.2)
CALCIUM SERPL-MCNC: 8.9 MG/DL (ref 8.6–10.2)
CALCIUM SERPL-MCNC: 9 MG/DL (ref 8.6–10.2)
CALCIUM SERPL-MCNC: 9.1 MG/DL (ref 8.6–10.2)
CALCIUM SERPL-MCNC: 9.2 MG/DL (ref 8.6–10.2)
CANNABINOIDS UR QL SCN: NOT DETECTED
CHLORIDE SERPL-SCNC: 105 MMOL/L (ref 98–107)
CHLORIDE SERPL-SCNC: 107 MMOL/L (ref 98–107)
CHLORIDE SERPL-SCNC: 107 MMOL/L (ref 98–107)
CHLORIDE SERPL-SCNC: 109 MMOL/L (ref 98–107)
CHLORIDE SERPL-SCNC: 110 MMOL/L (ref 98–107)
CLARITY UR: CLEAR
CO2 SERPL-SCNC: 23 MMOL/L (ref 22–29)
CO2 SERPL-SCNC: 24 MMOL/L (ref 22–29)
CO2 SERPL-SCNC: 24 MMOL/L (ref 22–29)
CO2 SERPL-SCNC: 25 MMOL/L (ref 22–29)
CO2 SERPL-SCNC: 25 MMOL/L (ref 22–29)
COCAINE UR QL SCN: NOT DETECTED
COLOR UR: YELLOW
CREAT SERPL-MCNC: 0.5 MG/DL (ref 0.7–1.2)
CREAT SERPL-MCNC: 0.6 MG/DL (ref 0.7–1.2)
CREAT SERPL-MCNC: 0.9 MG/DL (ref 0.7–1.2)
DRUG SCREEN COMMENT UR-IMP: NORMAL
EOSINOPHIL # BLD: 0.04 E9/L (ref 0.05–0.5)
EOSINOPHIL NFR BLD: 0.3 % (ref 0–6)
EPI CELLS #/AREA URNS HPF: ABNORMAL /HPF
ERYTHROCYTE [DISTWIDTH] IN BLOOD BY AUTOMATED COUNT: 15.3 FL (ref 11.5–15)
FENTANYL SCREEN, URINE: NOT DETECTED
GLUCOSE SERPL-MCNC: 141 MG/DL (ref 74–99)
GLUCOSE SERPL-MCNC: 173 MG/DL (ref 74–99)
GLUCOSE SERPL-MCNC: 189 MG/DL (ref 74–99)
GLUCOSE SERPL-MCNC: 210 MG/DL (ref 74–99)
GLUCOSE SERPL-MCNC: 267 MG/DL (ref 74–99)
GLUCOSE UR STRIP-MCNC: >=1000 MG/DL
HCT VFR BLD AUTO: 34.2 % (ref 37–54)
HGB BLD-MCNC: 11.2 G/DL (ref 12.5–16.5)
HGB UR QL STRIP: ABNORMAL
IMM GRANULOCYTES # BLD: 0.05 E9/L
IMM GRANULOCYTES NFR BLD: 0.3 % (ref 0–5)
KETONES UR STRIP-MCNC: 40 MG/DL
LEUKOCYTE ESTERASE UR QL STRIP: NEGATIVE
LYMPHOCYTES # BLD: 1.34 E9/L (ref 1.5–4)
LYMPHOCYTES NFR BLD: 9.3 % (ref 20–42)
MAGNESIUM SERPL-MCNC: 1.5 MG/DL (ref 1.6–2.6)
MAGNESIUM SERPL-MCNC: 1.7 MG/DL (ref 1.6–2.6)
MAGNESIUM SERPL-MCNC: 2 MG/DL (ref 1.6–2.6)
MCH RBC QN AUTO: 28.9 PG (ref 26–35)
MCHC RBC AUTO-ENTMCNC: 32.7 % (ref 32–34.5)
MCV RBC AUTO: 88.1 FL (ref 80–99.9)
METER GLUCOSE: 136 MG/DL (ref 74–99)
METER GLUCOSE: 136 MG/DL (ref 74–99)
METER GLUCOSE: 143 MG/DL (ref 74–99)
METER GLUCOSE: 153 MG/DL (ref 74–99)
METER GLUCOSE: 164 MG/DL (ref 74–99)
METER GLUCOSE: 164 MG/DL (ref 74–99)
METER GLUCOSE: 175 MG/DL (ref 74–99)
METER GLUCOSE: 189 MG/DL (ref 74–99)
METER GLUCOSE: 192 MG/DL (ref 74–99)
METER GLUCOSE: 220 MG/DL (ref 74–99)
METER GLUCOSE: 225 MG/DL (ref 74–99)
METER GLUCOSE: 246 MG/DL (ref 74–99)
METER GLUCOSE: 255 MG/DL (ref 74–99)
METHADONE UR QL SCN: NOT DETECTED
MONOCYTES # BLD: 1.03 E9/L (ref 0.1–0.95)
MONOCYTES NFR BLD: 7.1 % (ref 2–12)
NEUTROPHILS # BLD: 11.96 E9/L (ref 1.8–7.3)
NEUTS SEG NFR BLD: 82.7 % (ref 43–80)
NITRITE UR QL STRIP: NEGATIVE
OPIATES UR QL SCN: NOT DETECTED
OXYCODONE URINE: NOT DETECTED
PCP UR QL SCN: NOT DETECTED
PH UR STRIP: 5.5 [PH] (ref 5–9)
PHOSPHATE SERPL-MCNC: 2.1 MG/DL (ref 2.5–4.5)
PHOSPHATE SERPL-MCNC: 2.5 MG/DL (ref 2.5–4.5)
PHOSPHATE SERPL-MCNC: 2.6 MG/DL (ref 2.5–4.5)
PHOSPHATE SERPL-MCNC: 2.7 MG/DL (ref 2.5–4.5)
PHOSPHATE SERPL-MCNC: 2.7 MG/DL (ref 2.5–4.5)
PLATELET # BLD AUTO: 499 E9/L (ref 130–450)
PMV BLD AUTO: 8.6 FL (ref 7–12)
POTASSIUM SERPL-SCNC: 3.2 MMOL/L (ref 3.5–5)
POTASSIUM SERPL-SCNC: 3.5 MMOL/L (ref 3.5–5)
POTASSIUM SERPL-SCNC: 3.6 MMOL/L (ref 3.5–5)
POTASSIUM SERPL-SCNC: 3.9 MMOL/L (ref 3.5–5)
POTASSIUM SERPL-SCNC: 4.4 MMOL/L (ref 3.5–5)
PROT UR STRIP-MCNC: 30 MG/DL
RBC # BLD AUTO: 3.88 E12/L (ref 3.8–5.8)
RBC #/AREA URNS HPF: ABNORMAL /HPF (ref 0–2)
SODIUM SERPL-SCNC: 138 MMOL/L (ref 132–146)
SODIUM SERPL-SCNC: 140 MMOL/L (ref 132–146)
SODIUM SERPL-SCNC: 141 MMOL/L (ref 132–146)
SODIUM SERPL-SCNC: 143 MMOL/L (ref 132–146)
SODIUM SERPL-SCNC: 144 MMOL/L (ref 132–146)
SP GR UR STRIP: >=1.03 (ref 1–1.03)
TROPONIN, HIGH SENSITIVITY: 28 NG/L (ref 0–11)
TROPONIN, HIGH SENSITIVITY: 31 NG/L (ref 0–11)
TSH SERPL-MCNC: 0.91 UIU/ML (ref 0.27–4.2)
UROBILINOGEN UR STRIP-ACNC: 0.2 E.U./DL
WBC # BLD: 14.5 E9/L (ref 4.5–11.5)
WBC #/AREA URNS HPF: ABNORMAL /HPF (ref 0–5)

## 2023-04-27 PROCEDURE — S5553 INSULIN LONG ACTING 5 U: HCPCS | Performed by: INTERNAL MEDICINE

## 2023-04-27 PROCEDURE — 36415 COLL VENOUS BLD VENIPUNCTURE: CPT

## 2023-04-27 PROCEDURE — 6360000002 HC RX W HCPCS: Performed by: INTERNAL MEDICINE

## 2023-04-27 PROCEDURE — 6370000000 HC RX 637 (ALT 250 FOR IP): Performed by: INTERNAL MEDICINE

## 2023-04-27 PROCEDURE — 84443 ASSAY THYROID STIM HORMONE: CPT

## 2023-04-27 PROCEDURE — 97530 THERAPEUTIC ACTIVITIES: CPT

## 2023-04-27 PROCEDURE — C9113 INJ PANTOPRAZOLE SODIUM, VIA: HCPCS

## 2023-04-27 PROCEDURE — 82962 GLUCOSE BLOOD TEST: CPT

## 2023-04-27 PROCEDURE — 2580000003 HC RX 258: Performed by: INTERNAL MEDICINE

## 2023-04-27 PROCEDURE — 2580000003 HC RX 258: Performed by: STUDENT IN AN ORGANIZED HEALTH CARE EDUCATION/TRAINING PROGRAM

## 2023-04-27 PROCEDURE — 99233 SBSQ HOSP IP/OBS HIGH 50: CPT | Performed by: INTERNAL MEDICINE

## 2023-04-27 PROCEDURE — 6360000002 HC RX W HCPCS

## 2023-04-27 PROCEDURE — 2000000000 HC ICU R&B

## 2023-04-27 PROCEDURE — 6360000002 HC RX W HCPCS: Performed by: STUDENT IN AN ORGANIZED HEALTH CARE EDUCATION/TRAINING PROGRAM

## 2023-04-27 PROCEDURE — 87088 URINE BACTERIA CULTURE: CPT

## 2023-04-27 PROCEDURE — 2580000003 HC RX 258

## 2023-04-27 PROCEDURE — 84100 ASSAY OF PHOSPHORUS: CPT

## 2023-04-27 PROCEDURE — 80048 BASIC METABOLIC PNL TOTAL CA: CPT

## 2023-04-27 PROCEDURE — 99221 1ST HOSP IP/OBS SF/LOW 40: CPT | Performed by: INTERNAL MEDICINE

## 2023-04-27 PROCEDURE — 83735 ASSAY OF MAGNESIUM: CPT

## 2023-04-27 PROCEDURE — 2500000003 HC RX 250 WO HCPCS: Performed by: STUDENT IN AN ORGANIZED HEALTH CARE EDUCATION/TRAINING PROGRAM

## 2023-04-27 PROCEDURE — 80307 DRUG TEST PRSMV CHEM ANLYZR: CPT

## 2023-04-27 PROCEDURE — 81001 URINALYSIS AUTO W/SCOPE: CPT

## 2023-04-27 PROCEDURE — A4216 STERILE WATER/SALINE, 10 ML: HCPCS

## 2023-04-27 PROCEDURE — 84484 ASSAY OF TROPONIN QUANT: CPT

## 2023-04-27 PROCEDURE — 85025 COMPLETE CBC W/AUTO DIFF WBC: CPT

## 2023-04-27 PROCEDURE — 97162 PT EVAL MOD COMPLEX 30 MIN: CPT

## 2023-04-27 PROCEDURE — 6370000000 HC RX 637 (ALT 250 FOR IP): Performed by: STUDENT IN AN ORGANIZED HEALTH CARE EDUCATION/TRAINING PROGRAM

## 2023-04-27 PROCEDURE — 97166 OT EVAL MOD COMPLEX 45 MIN: CPT

## 2023-04-27 RX ORDER — MAGNESIUM SULFATE IN WATER 40 MG/ML
2000 INJECTION, SOLUTION INTRAVENOUS ONCE
Status: COMPLETED | OUTPATIENT
Start: 2023-04-27 | End: 2023-04-27

## 2023-04-27 RX ORDER — INSULIN LISPRO 100 [IU]/ML
0-4 INJECTION, SOLUTION INTRAVENOUS; SUBCUTANEOUS NIGHTLY
Status: DISCONTINUED | OUTPATIENT
Start: 2023-04-27 | End: 2023-04-29 | Stop reason: HOSPADM

## 2023-04-27 RX ORDER — LABETALOL HYDROCHLORIDE 5 MG/ML
10 INJECTION, SOLUTION INTRAVENOUS EVERY 4 HOURS PRN
Status: DISCONTINUED | OUTPATIENT
Start: 2023-04-27 | End: 2023-04-29 | Stop reason: HOSPADM

## 2023-04-27 RX ORDER — INSULIN LISPRO 100 [IU]/ML
0-4 INJECTION, SOLUTION INTRAVENOUS; SUBCUTANEOUS
Status: DISCONTINUED | OUTPATIENT
Start: 2023-04-27 | End: 2023-04-28

## 2023-04-27 RX ORDER — INSULIN GLARGINE-YFGN 100 [IU]/ML
6 INJECTION, SOLUTION SUBCUTANEOUS NIGHTLY
Status: DISCONTINUED | OUTPATIENT
Start: 2023-04-27 | End: 2023-04-27

## 2023-04-27 RX ORDER — INSULIN LISPRO 100 [IU]/ML
2 INJECTION, SOLUTION INTRAVENOUS; SUBCUTANEOUS
Status: DISCONTINUED | OUTPATIENT
Start: 2023-04-27 | End: 2023-04-28

## 2023-04-27 RX ORDER — INSULIN GLARGINE-YFGN 100 [IU]/ML
6 INJECTION, SOLUTION SUBCUTANEOUS NIGHTLY
Status: DISCONTINUED | OUTPATIENT
Start: 2023-04-27 | End: 2023-04-28 | Stop reason: SDUPTHER

## 2023-04-27 RX ORDER — LANOLIN ALCOHOL/MO/W.PET/CERES
400 CREAM (GRAM) TOPICAL ONCE
Status: DISCONTINUED | OUTPATIENT
Start: 2023-04-27 | End: 2023-04-29 | Stop reason: HOSPADM

## 2023-04-27 RX ADMIN — INSULIN GLARGINE-YFGN 6 UNITS: 100 INJECTION, SOLUTION SUBCUTANEOUS at 09:24

## 2023-04-27 RX ADMIN — LABETALOL HYDROCHLORIDE 10 MG: 5 INJECTION INTRAVENOUS at 00:03

## 2023-04-27 RX ADMIN — INSULIN LISPRO 2 UNITS: 100 INJECTION, SOLUTION INTRAVENOUS; SUBCUTANEOUS at 12:33

## 2023-04-27 RX ADMIN — SODIUM CHLORIDE, PRESERVATIVE FREE 10 ML: 5 INJECTION INTRAVENOUS at 08:16

## 2023-04-27 RX ADMIN — SODIUM CHLORIDE, PRESERVATIVE FREE 40 MG: 5 INJECTION INTRAVENOUS at 05:23

## 2023-04-27 RX ADMIN — ONDANSETRON 4 MG: 4 TABLET, ORALLY DISINTEGRATING ORAL at 03:48

## 2023-04-27 RX ADMIN — SODIUM PHOSPHATE, MONOBASIC, MONOHYDRATE AND SODIUM PHOSPHATE, DIBASIC, ANHYDROUS 10 MMOL: 142; 276 INJECTION, SOLUTION INTRAVENOUS at 15:33

## 2023-04-27 RX ADMIN — SODIUM CHLORIDE, PRESERVATIVE FREE 40 MG: 5 INJECTION INTRAVENOUS at 16:54

## 2023-04-27 RX ADMIN — ASPIRIN 81 MG: 81 TABLET, COATED ORAL at 12:35

## 2023-04-27 RX ADMIN — SODIUM PHOSPHATE, MONOBASIC, MONOHYDRATE AND SODIUM PHOSPHATE, DIBASIC, ANHYDROUS 10 MMOL: 142; 276 INJECTION, SOLUTION INTRAVENOUS at 08:01

## 2023-04-27 RX ADMIN — ATORVASTATIN CALCIUM 80 MG: 40 TABLET, FILM COATED ORAL at 20:58

## 2023-04-27 RX ADMIN — MAGNESIUM SULFATE HEPTAHYDRATE 2000 MG: 40 INJECTION, SOLUTION INTRAVENOUS at 15:07

## 2023-04-27 RX ADMIN — POTASSIUM CHLORIDE 10 MEQ: 7.46 INJECTION, SOLUTION INTRAVENOUS at 08:01

## 2023-04-27 RX ADMIN — SODIUM CHLORIDE 5.3 UNITS/HR: 9 INJECTION, SOLUTION INTRAVENOUS at 07:01

## 2023-04-27 RX ADMIN — POTASSIUM CHLORIDE 10 MEQ: 7.46 INJECTION, SOLUTION INTRAVENOUS at 08:02

## 2023-04-27 RX ADMIN — INSULIN LISPRO 2 UNITS: 100 INJECTION, SOLUTION INTRAVENOUS; SUBCUTANEOUS at 16:54

## 2023-04-27 RX ADMIN — POTASSIUM CHLORIDE 10 MEQ: 7.46 INJECTION, SOLUTION INTRAVENOUS at 09:07

## 2023-04-27 RX ADMIN — DEXTROSE AND SODIUM CHLORIDE: 5; 450 INJECTION, SOLUTION INTRAVENOUS at 00:42

## 2023-04-27 RX ADMIN — ENOXAPARIN SODIUM 40 MG: 100 INJECTION SUBCUTANEOUS at 08:14

## 2023-04-27 RX ADMIN — POTASSIUM CHLORIDE 10 MEQ: 7.46 INJECTION, SOLUTION INTRAVENOUS at 00:40

## 2023-04-27 RX ADMIN — SODIUM CHLORIDE, PRESERVATIVE FREE 10 ML: 5 INJECTION INTRAVENOUS at 21:02

## 2023-04-27 RX ADMIN — POTASSIUM BICARBONATE 40 MEQ: 782 TABLET, EFFERVESCENT ORAL at 15:04

## 2023-04-27 RX ADMIN — LISINOPRIL 5 MG: 5 TABLET ORAL at 08:14

## 2023-04-27 ASSESSMENT — PAIN SCALES - GENERAL
PAINLEVEL_OUTOF10: 0

## 2023-04-27 NOTE — PLAN OF CARE
Problem: Chronic Conditions and Co-morbidities  Goal: Patient's chronic conditions and co-morbidity symptoms are monitored and maintained or improved  Outcome: Progressing     Problem: Discharge Planning  Goal: Discharge to home or other facility with appropriate resources  Outcome: Progressing     Problem: Safety - Adult  Goal: Free from fall injury  Outcome: Progressing     Problem: ABCDS Injury Assessment  Goal: Absence of physical injury  Outcome: Progressing     Problem: Skin/Tissue Integrity  Goal: Absence of new skin breakdown  Description: 1. Monitor for areas of redness and/or skin breakdown  2. Assess vascular access sites hourly  3. Every 4-6 hours minimum:  Change oxygen saturation probe site  4. Every 4-6 hours:  If on nasal continuous positive airway pressure, respiratory therapy assess nares and determine need for appliance change or resting period.   Outcome: Progressing     Problem: Pain  Goal: Verbalizes/displays adequate comfort level or baseline comfort level  Outcome: Adequate for Discharge  Flowsheets (Taken 4/26/2023 2000)  Verbalizes/displays adequate comfort level or baseline comfort level:   Encourage patient to monitor pain and request assistance   Assess pain using appropriate pain scale   Administer analgesics based on type and severity of pain and evaluate response   Implement non-pharmacological measures as appropriate and evaluate response   Consider cultural and social influences on pain and pain management   Notify Licensed Independent Practitioner if interventions unsuccessful or patient reports new pain

## 2023-04-27 NOTE — CARE COORDINATION
Care Coordination: LOS 1 Day. Hx of DM presents DKA, insulin gtt, room air. Met pt at bedside to discuss transition of care at discharge. Lives at home with mom, who is RN and manages DM and insulin. Hx of Stroke left side flaccid. Has a wc and walker. [facility]. No hx of hhc but would liker PTx and otx at home. No preference. Referral made to [facility]. Spoke to [employer]. Brother to  at discharge. Uses [facility] in [place].  Follows with Dr Harry Krishnan    Electronically signed by Nathaniel Chacon RN on 4/27/2023 at 1:05 PM     Addendum: [facility] unable to accept [facility] [place], referral made to [facility] at [facility].    Electronically signed by Nathaniel Chacon RN on 4/27/2023 at 1:12 PM

## 2023-04-27 NOTE — ACP (ADVANCE CARE PLANNING)
Advance Care Planning   Healthcare Decision Maker:    Primary Decision Maker: [facility] - [phone]    Secondary Decision Maker: Vandana Bahena - Brother/Sister - [phone]    Click here to complete Healthcare Decision Makers including selection of the Healthcare Decision Maker Relationship (ie \"Primary\").

## 2023-04-27 NOTE — CARE COORDINATION
Case Management Assessment  Initial Evaluation    Date/Time of Evaluation: 4/27/2023 1:07 PM  Assessment Completed by: Marvin Mason RN    If patient is discharged prior to next notation, then this note serves as note for discharge by case management. Patient Name: Ki Gaffney                   YOB: 1970  Diagnosis: Upper GI bleed [K92.2]  DKA, type 1, not at goal [facility]) [E10.10]  Diabetic ketoacidosis without coma associated with other specified diabetes mellitus ([facility] [E13.10]                   Date / Time: 4/26/2023 10:29 AM    Patient Admission Status: Inpatient   Readmission Risk (Low < 19, Mod (19-27), High > 27): Readmission Risk Score: 20.4    Current PCP: Beth Higgins MD  PCP verified by CM? Yes    Chart Reviewed: Yes      History Provided by: Patient  Patient Orientation: Alert and Oriented    Patient Cognition: Alert    Hospitalization in the last 30 days (Readmission):  No    If yes, Readmission Assessment in  Navigator will be completed. Advance Directives:      Code Status: Full Code   Patient's Primary Decision Maker is: Legal Next of Kin      Discharge Planning:    Patient lives with:   Type of Home:    Primary Care Giver: Family  Patient Support Systems include: Parent, Family Members   Current Financial resources:    Current community resources:    Current services prior to admission:              Current DME:              Type of Home Care services:       ADLS  Prior functional level: Assistance with the following:, Bathing, Dressing, Toileting, Feeding, Cooking, Housework, Shopping, Mobility  Current functional level: Assistance with the following:, Bathing, Dressing, Toileting, Feeding, Cooking, Housework, Shopping, Mobility    PT AM-PAC: 11 /24  OT AM-PAC: 11 /24    Family can provide assistance at DC: Yes  Would you like Case Management to discuss the discharge plan with any other family members/significant others, and if so, who?  Yes  Plans to Return to Present

## 2023-04-27 NOTE — HOME CARE
[facility] referral received, awaiting final orders. Spoke with patient's mother Teena Abraham and verified demographics. Will follow. Tony Call, LPN [facility].

## 2023-04-28 LAB
ANION GAP SERPL CALCULATED.3IONS-SCNC: 15 MMOL/L (ref 7–16)
ANION GAP SERPL CALCULATED.3IONS-SCNC: 9 MMOL/L (ref 7–16)
ANION GAP SERPL CALCULATED.3IONS-SCNC: 9 MMOL/L (ref 7–16)
BASOPHILS # BLD: 0.06 E9/L (ref 0–0.2)
BASOPHILS NFR BLD: 0.6 % (ref 0–2)
BUN SERPL-MCNC: 12 MG/DL (ref 6–20)
BUN SERPL-MCNC: 15 MG/DL (ref 6–20)
BUN SERPL-MCNC: 18 MG/DL (ref 6–20)
CALCIUM SERPL-MCNC: 8.9 MG/DL (ref 8.6–10.2)
CALCIUM SERPL-MCNC: 9 MG/DL (ref 8.6–10.2)
CALCIUM SERPL-MCNC: 9.1 MG/DL (ref 8.6–10.2)
CHLORIDE SERPL-SCNC: 97 MMOL/L (ref 98–107)
CHLORIDE SERPL-SCNC: 98 MMOL/L (ref 98–107)
CHLORIDE SERPL-SCNC: 99 MMOL/L (ref 98–107)
CO2 SERPL-SCNC: 20 MMOL/L (ref 22–29)
CO2 SERPL-SCNC: 26 MMOL/L (ref 22–29)
CO2 SERPL-SCNC: 27 MMOL/L (ref 22–29)
CREAT SERPL-MCNC: 0.6 MG/DL (ref 0.7–1.2)
CREAT SERPL-MCNC: 0.7 MG/DL (ref 0.7–1.2)
CREAT SERPL-MCNC: 0.9 MG/DL (ref 0.7–1.2)
EOSINOPHIL # BLD: 0.17 E9/L (ref 0.05–0.5)
EOSINOPHIL NFR BLD: 1.7 % (ref 0–6)
ERYTHROCYTE [DISTWIDTH] IN BLOOD BY AUTOMATED COUNT: 15 FL (ref 11.5–15)
GLUCOSE SERPL-MCNC: 265 MG/DL (ref 74–99)
GLUCOSE SERPL-MCNC: 287 MG/DL (ref 74–99)
GLUCOSE SERPL-MCNC: 383 MG/DL (ref 74–99)
HCT VFR BLD AUTO: 33.2 % (ref 37–54)
HGB BLD-MCNC: 10.7 G/DL (ref 12.5–16.5)
IMM GRANULOCYTES # BLD: 0.03 E9/L
IMM GRANULOCYTES NFR BLD: 0.3 % (ref 0–5)
LYMPHOCYTES # BLD: 1.73 E9/L (ref 1.5–4)
LYMPHOCYTES NFR BLD: 17.7 % (ref 20–42)
MCH RBC QN AUTO: 28.6 PG (ref 26–35)
MCHC RBC AUTO-ENTMCNC: 32.2 % (ref 32–34.5)
MCV RBC AUTO: 88.8 FL (ref 80–99.9)
METER GLUCOSE: 179 MG/DL (ref 74–99)
METER GLUCOSE: 239 MG/DL (ref 74–99)
METER GLUCOSE: 240 MG/DL (ref 74–99)
METER GLUCOSE: 296 MG/DL (ref 74–99)
METER GLUCOSE: 310 MG/DL (ref 74–99)
METER GLUCOSE: 319 MG/DL (ref 74–99)
METER GLUCOSE: 390 MG/DL (ref 74–99)
MONOCYTES # BLD: 0.71 E9/L (ref 0.1–0.95)
MONOCYTES NFR BLD: 7.3 % (ref 2–12)
NEUTROPHILS # BLD: 7.06 E9/L (ref 1.8–7.3)
NEUTS SEG NFR BLD: 72.4 % (ref 43–80)
PLATELET # BLD AUTO: 419 E9/L (ref 130–450)
PMV BLD AUTO: 8.7 FL (ref 7–12)
POTASSIUM SERPL-SCNC: 3.9 MMOL/L (ref 3.5–5)
POTASSIUM SERPL-SCNC: 4.4 MMOL/L (ref 3.5–5)
POTASSIUM SERPL-SCNC: 4.5 MMOL/L (ref 3.5–5)
RBC # BLD AUTO: 3.74 E12/L (ref 3.8–5.8)
SODIUM SERPL-SCNC: 132 MMOL/L (ref 132–146)
SODIUM SERPL-SCNC: 133 MMOL/L (ref 132–146)
SODIUM SERPL-SCNC: 135 MMOL/L (ref 132–146)
WBC # BLD: 9.8 E9/L (ref 4.5–11.5)

## 2023-04-28 PROCEDURE — 99233 SBSQ HOSP IP/OBS HIGH 50: CPT | Performed by: INTERNAL MEDICINE

## 2023-04-28 PROCEDURE — 2580000003 HC RX 258: Performed by: STUDENT IN AN ORGANIZED HEALTH CARE EDUCATION/TRAINING PROGRAM

## 2023-04-28 PROCEDURE — 6370000000 HC RX 637 (ALT 250 FOR IP): Performed by: INTERNAL MEDICINE

## 2023-04-28 PROCEDURE — 85025 COMPLETE CBC W/AUTO DIFF WBC: CPT

## 2023-04-28 PROCEDURE — 2580000003 HC RX 258: Performed by: INTERNAL MEDICINE

## 2023-04-28 PROCEDURE — 97530 THERAPEUTIC ACTIVITIES: CPT

## 2023-04-28 PROCEDURE — 51798 US URINE CAPACITY MEASURE: CPT

## 2023-04-28 PROCEDURE — A4216 STERILE WATER/SALINE, 10 ML: HCPCS

## 2023-04-28 PROCEDURE — 82962 GLUCOSE BLOOD TEST: CPT

## 2023-04-28 PROCEDURE — 80048 BASIC METABOLIC PNL TOTAL CA: CPT

## 2023-04-28 PROCEDURE — 2000000000 HC ICU R&B

## 2023-04-28 PROCEDURE — 2580000003 HC RX 258

## 2023-04-28 PROCEDURE — S5553 INSULIN LONG ACTING 5 U: HCPCS | Performed by: INTERNAL MEDICINE

## 2023-04-28 PROCEDURE — 6370000000 HC RX 637 (ALT 250 FOR IP): Performed by: STUDENT IN AN ORGANIZED HEALTH CARE EDUCATION/TRAINING PROGRAM

## 2023-04-28 PROCEDURE — 99231 SBSQ HOSP IP/OBS SF/LOW 25: CPT | Performed by: INTERNAL MEDICINE

## 2023-04-28 PROCEDURE — 36415 COLL VENOUS BLD VENIPUNCTURE: CPT

## 2023-04-28 PROCEDURE — 97535 SELF CARE MNGMENT TRAINING: CPT

## 2023-04-28 PROCEDURE — 97110 THERAPEUTIC EXERCISES: CPT

## 2023-04-28 PROCEDURE — C9113 INJ PANTOPRAZOLE SODIUM, VIA: HCPCS

## 2023-04-28 PROCEDURE — 6360000002 HC RX W HCPCS: Performed by: STUDENT IN AN ORGANIZED HEALTH CARE EDUCATION/TRAINING PROGRAM

## 2023-04-28 PROCEDURE — 6360000002 HC RX W HCPCS

## 2023-04-28 PROCEDURE — 6370000000 HC RX 637 (ALT 250 FOR IP)

## 2023-04-28 PROCEDURE — S5553 INSULIN LONG ACTING 5 U: HCPCS

## 2023-04-28 RX ORDER — AMLODIPINE BESYLATE 5 MG/1
5 TABLET ORAL DAILY
Status: DISCONTINUED | OUTPATIENT
Start: 2023-04-28 | End: 2023-04-29 | Stop reason: HOSPADM

## 2023-04-28 RX ORDER — INSULIN GLARGINE-YFGN 100 [IU]/ML
6 INJECTION, SOLUTION SUBCUTANEOUS NIGHTLY
Status: DISCONTINUED | OUTPATIENT
Start: 2023-04-28 | End: 2023-04-28

## 2023-04-28 RX ORDER — INSULIN LISPRO 100 [IU]/ML
0-8 INJECTION, SOLUTION INTRAVENOUS; SUBCUTANEOUS
Status: DISCONTINUED | OUTPATIENT
Start: 2023-04-28 | End: 2023-04-29 | Stop reason: HOSPADM

## 2023-04-28 RX ORDER — INSULIN GLARGINE-YFGN 100 [IU]/ML
12 INJECTION, SOLUTION SUBCUTANEOUS NIGHTLY
Status: DISCONTINUED | OUTPATIENT
Start: 2023-04-28 | End: 2023-04-29

## 2023-04-28 RX ORDER — INSULIN GLARGINE-YFGN 100 [IU]/ML
10 INJECTION, SOLUTION SUBCUTANEOUS ONCE
Status: COMPLETED | OUTPATIENT
Start: 2023-04-28 | End: 2023-04-28

## 2023-04-28 RX ORDER — SODIUM CHLORIDE, SODIUM LACTATE, POTASSIUM CHLORIDE, AND CALCIUM CHLORIDE .6; .31; .03; .02 G/100ML; G/100ML; G/100ML; G/100ML
1000 INJECTION, SOLUTION INTRAVENOUS ONCE
Status: COMPLETED | OUTPATIENT
Start: 2023-04-28 | End: 2023-04-28

## 2023-04-28 RX ORDER — INSULIN GLARGINE-YFGN 100 [IU]/ML
2 INJECTION, SOLUTION SUBCUTANEOUS ONCE
Status: COMPLETED | OUTPATIENT
Start: 2023-04-28 | End: 2023-04-28

## 2023-04-28 RX ORDER — LISINOPRIL 10 MG/1
10 TABLET ORAL DAILY
Status: DISCONTINUED | OUTPATIENT
Start: 2023-04-29 | End: 2023-04-29 | Stop reason: HOSPADM

## 2023-04-28 RX ORDER — INSULIN LISPRO 100 [IU]/ML
4 INJECTION, SOLUTION INTRAVENOUS; SUBCUTANEOUS
Status: DISCONTINUED | OUTPATIENT
Start: 2023-04-28 | End: 2023-04-29

## 2023-04-28 RX ADMIN — SODIUM CHLORIDE, PRESERVATIVE FREE 10 ML: 5 INJECTION INTRAVENOUS at 20:47

## 2023-04-28 RX ADMIN — ACETAMINOPHEN 650 MG: 325 TABLET ORAL at 20:52

## 2023-04-28 RX ADMIN — INSULIN GLARGINE-YFGN 2 UNITS: 100 INJECTION, SOLUTION SUBCUTANEOUS at 00:47

## 2023-04-28 RX ADMIN — ASPIRIN 81 MG: 81 TABLET, COATED ORAL at 08:28

## 2023-04-28 RX ADMIN — SODIUM CHLORIDE, PRESERVATIVE FREE 40 MG: 5 INJECTION INTRAVENOUS at 16:39

## 2023-04-28 RX ADMIN — INSULIN LISPRO 2 UNITS: 100 INJECTION, SOLUTION INTRAVENOUS; SUBCUTANEOUS at 08:26

## 2023-04-28 RX ADMIN — INSULIN GLARGINE-YFGN 10 UNITS: 100 INJECTION, SOLUTION SUBCUTANEOUS at 08:26

## 2023-04-28 RX ADMIN — ENOXAPARIN SODIUM 40 MG: 100 INJECTION SUBCUTANEOUS at 08:28

## 2023-04-28 RX ADMIN — INSULIN LISPRO 4 UNITS: 100 INJECTION, SOLUTION INTRAVENOUS; SUBCUTANEOUS at 16:41

## 2023-04-28 RX ADMIN — SODIUM CHLORIDE, PRESERVATIVE FREE 40 MG: 5 INJECTION INTRAVENOUS at 04:50

## 2023-04-28 RX ADMIN — ATORVASTATIN CALCIUM 80 MG: 40 TABLET, FILM COATED ORAL at 20:46

## 2023-04-28 RX ADMIN — AMLODIPINE BESYLATE 5 MG: 5 TABLET ORAL at 15:40

## 2023-04-28 RX ADMIN — ONDANSETRON 4 MG: 2 INJECTION INTRAMUSCULAR; INTRAVENOUS at 08:28

## 2023-04-28 RX ADMIN — INSULIN LISPRO 8 UNITS: 100 INJECTION, SOLUTION INTRAVENOUS; SUBCUTANEOUS at 08:27

## 2023-04-28 RX ADMIN — INSULIN GLARGINE-YFGN 12 UNITS: 100 INJECTION, SOLUTION SUBCUTANEOUS at 20:43

## 2023-04-28 RX ADMIN — SODIUM CHLORIDE, POTASSIUM CHLORIDE, SODIUM LACTATE AND CALCIUM CHLORIDE 1000 ML: 600; 310; 30; 20 INJECTION, SOLUTION INTRAVENOUS at 10:42

## 2023-04-28 RX ADMIN — INSULIN LISPRO 4 UNITS: 100 INJECTION, SOLUTION INTRAVENOUS; SUBCUTANEOUS at 10:37

## 2023-04-28 RX ADMIN — LISINOPRIL 5 MG: 5 TABLET ORAL at 08:28

## 2023-04-28 RX ADMIN — INSULIN LISPRO 6 UNITS: 100 INJECTION, SOLUTION INTRAVENOUS; SUBCUTANEOUS at 10:37

## 2023-04-28 RX ADMIN — SODIUM CHLORIDE, PRESERVATIVE FREE 10 ML: 5 INJECTION INTRAVENOUS at 08:29

## 2023-04-28 RX ADMIN — INSULIN LISPRO 2 UNITS: 100 INJECTION, SOLUTION INTRAVENOUS; SUBCUTANEOUS at 16:39

## 2023-04-28 ASSESSMENT — PAIN SCALES - GENERAL
PAINLEVEL_OUTOF10: 0
PAINLEVEL_OUTOF10: 0
PAINLEVEL_OUTOF10: 1
PAINLEVEL_OUTOF10: 4
PAINLEVEL_OUTOF10: 0

## 2023-04-28 ASSESSMENT — PAIN DESCRIPTION - LOCATION: LOCATION: OTHER (COMMENT)

## 2023-04-28 ASSESSMENT — PAIN SCALES - WONG BAKER
WONGBAKER_NUMERICALRESPONSE: 2
WONGBAKER_NUMERICALRESPONSE: 0
WONGBAKER_NUMERICALRESPONSE: 0

## 2023-04-28 ASSESSMENT — PAIN DESCRIPTION - DESCRIPTORS: DESCRIPTORS: ACHING

## 2023-04-28 ASSESSMENT — PAIN - FUNCTIONAL ASSESSMENT: PAIN_FUNCTIONAL_ASSESSMENT: ACTIVITIES ARE NOT PREVENTED

## 2023-04-28 ASSESSMENT — PAIN DESCRIPTION - ORIENTATION: ORIENTATION: LEFT

## 2023-04-28 NOTE — CARE COORDINATION
Care Coordination: LOS 2 day, spoke to mother Sharonda Rothman, she states her son is trying to get a bed off of facebook, someone is giving away. If they get bed they they do not need hospital bed or BSC. But would like a wheelchair instead. She will let nursing know tomorrow. Orders for Bed and BSC will need cancelled and orders for wc will need entered and [facility] DME will need notified. [facility] is following. Orders are in for hhc.  Will need to notify hhc on day of dc    Electronically signed by Yefri Quijano RN on 4/28/2023 at 3:22 PM

## 2023-04-28 NOTE — CARE COORDINATION
Care Coordination: Spoke to Brother and GF on unit, they agree with [facility], they also feel he needs [facility] and hospital bed, he cannot maneuver and he cannot get to upstairs bedroom and sleeping on couch. Orders entered, I have messaged residents for documentation for hospital bed in progress notes and I have made referral to [facility].  She is requesting call again on day of discharge to set up hospital bed    Electronically signed by Rose Chambers RN on 4/28/2023 at 1:25 PM       Addendum: I have messaged Dr Fede Cordova, team 1 for medical necessity statement needed in progress notes for hospital bed    Electronically signed by Rose Chambers RN on 4/28/2023 at 1:28 PM     Addendum: home health care orders placed as well    Electronically signed by Rose Chambers RN on 4/28/2023 at 1:32 PM PATIENT HAS APPT SCHEDULED FOR DEC 23 FOR PHYSICAL.  PATIENT ALSO MADE A SEPARATE APPOINTMENT FOR THE 28 FOR A PAP.    PATIENT REQUESTED TO COMBINE BOTH APPOINTMENTS FOR THE 23RD OF December.    PLEASE ADVISE  [phone]

## 2023-04-28 NOTE — PLAN OF CARE
Bedside commode needed as well as hospital bed for medical necessity due to patient's debilitation secondary to prior CVA and inability to reposition himself.      Electronically signed by Cheri Martinez DO on 4/28/2023 at 1:58 PM

## 2023-04-28 NOTE — PLAN OF CARE
Problem: Chronic Conditions and Co-morbidities  Goal: Patient's chronic conditions and co-morbidity symptoms are monitored and maintained or improved  4/28/2023 0633 by Noel Queen RN  Outcome: Progressing  4/27/2023 2234 by Noel Queen RN  Outcome: Progressing  4/27/2023 1828 by Darell Lopez RN  Outcome: Progressing     Problem: Discharge Planning  Goal: Discharge to home or other facility with appropriate resources  4/28/2023 6962 by Noel Queen RN  Outcome: Progressing  4/27/2023 2234 by Noel Queen RN  Outcome: Progressing  4/27/2023 1828 by Darell Lopez RN  Outcome: Progressing     Problem: Pain  Goal: Verbalizes/displays adequate comfort level or baseline comfort level  4/28/2023 0633 by Noel Queen RN  Outcome: Progressing  4/27/2023 2234 by Noel Queen RN  Outcome: Progressing  4/27/2023 1828 by Darell Lopez RN  Outcome: Progressing     Problem: Safety - Adult  Goal: Free from fall injury  4/28/2023 0633 by Noel Queen RN  Outcome: Progressing  4/27/2023 2234 by Noel Queen RN  Outcome: Progressing  4/27/2023 1828 by Darell Lopez RN  Outcome: Progressing     Problem: ABCDS Injury Assessment  Goal: Absence of physical injury  4/28/2023 0633 by Noel Queen RN  Outcome: Progressing  4/27/2023 2234 by Noel Queen RN  Outcome: Progressing  4/27/2023 1828 by Darell Lopez RN  Outcome: Progressing     Problem: Skin/Tissue Integrity  Goal: Absence of new skin breakdown  Description: 1. Monitor for areas of redness and/or skin breakdown  2. Assess vascular access sites hourly  3. Every 4-6 hours minimum:  Change oxygen saturation probe site  4. Every 4-6 hours:  If on nasal continuous positive airway pressure, respiratory therapy assess nares and determine need for appliance change or resting period.   4/28/2023 3853 by Noel Queen RN  Outcome: Progressing  4/27/2023 2234 by Noel Queen RN  Outcome: Progressing  4/27/2023 1828 by Darell Lopez RN  Outcome: Progressing

## 2023-04-28 NOTE — PLAN OF CARE
Problem: Chronic Conditions and Co-morbidities  Goal: Patient's chronic conditions and co-morbidity symptoms are monitored and maintained or improved  4/27/2023 2234 by Melita Bishop RN  Outcome: Progressing  4/27/2023 1828 by Yobany Multani RN  Outcome: Progressing     Problem: Discharge Planning  Goal: Discharge to home or other facility with appropriate resources  4/27/2023 2234 by Melita Bishop RN  Outcome: Progressing  4/27/2023 1828 by Yobany Multani RN  Outcome: Progressing     Problem: Pain  Goal: Verbalizes/displays adequate comfort level or baseline comfort level  4/27/2023 2234 by Melita Bishop RN  Outcome: Progressing  4/27/2023 1828 by Yobany Multani RN  Outcome: Progressing     Problem: Safety - Adult  Goal: Free from fall injury  4/27/2023 2234 by Melita Bishop RN  Outcome: Progressing  4/27/2023 1828 by Yobany Multani RN  Outcome: Progressing     Problem: ABCDS Injury Assessment  Goal: Absence of physical injury  4/27/2023 2234 by Melita Bishop RN  Outcome: Progressing  4/27/2023 1828 by Yobany Multani RN  Outcome: Progressing     Problem: Skin/Tissue Integrity  Goal: Absence of new skin breakdown  Description: 1. Monitor for areas of redness and/or skin breakdown  2. Assess vascular access sites hourly  3. Every 4-6 hours minimum:  Change oxygen saturation probe site  4. Every 4-6 hours:  If on nasal continuous positive airway pressure, respiratory therapy assess nares and determine need for appliance change or resting period.   4/27/2023 2234 by Melita Bishop RN  Outcome: Progressing  4/27/2023 1828 by Yobany Multani RN  Outcome: Progressing Patent

## 2023-04-28 NOTE — CONSULTS
4/28/2023 10:04 AM  Service: Urology  Group: [facility] ([facility])    Trini Ards  [id number]     Chief Complaint:    Bilateral hydronephrosis    History of Present Illness: The patient is a 47 y.o. male patient who presents with bilateral hydronephrosis on CAT scan. We had seen this patient in 2016 when he had diabetic ketoacidosis and also had left testicular pain . Subsequently passed a stone  He currently is admitted for hyperglycemia and concern for diabetic ketoacidosis  Pt is voiding at bedrest with external catheter with PVR of 150 but he is comfortable. No gross hematuria . No recent incontinence or retention symptoms      Past Medical History:   Diagnosis Date    Alcoholism ([facility]     Cocaine abuse ([facility]     Diabetes mellitus ([facility]     Hypertension     Idiopathic peripheral neuropathy 9/21/2016    Lacunar stroke (H[facility])     Marijuana abuse          No past surgical history on file. Medications Prior to Admission:    Medications Prior to Admission: insulin glargine (LANTUS) 100 UNIT/ML injection vial, Inject 14 Units into the skin nightly  Insulin Aspart (NOVOLOG IJ), Inject 0-15 Units as directed 3 times daily (before meals) *Per Sliding Scale*  atorvastatin (LIPITOR) 80 MG tablet, Take 1 tablet by mouth nightly  aspirin 81 MG EC tablet, Take 1 tablet by mouth daily  albuterol sulfate  (90 Base) MCG/ACT inhaler, Inhale 2 puffs into the lungs every 6 hours as needed for Wheezing or Shortness of Breath    Allergies:    Metoprolol    Social History:    reports that he has been smoking cigarettes. He has a 22.50 pack-year smoking history. He has never used smokeless tobacco. He reports current alcohol use of about 6.0 standard drinks per week. He reports current drug use. Drug: Marijuana [place]). Family History:   Non-contributory to this Urological problem  family history includes Cancer in his maternal grandmother; Diabetes type 2  in his mother and nephew.     Review of
hematuria  Musculoskeletal:no cranial nerve deficit and left-sided decreased range of motion secondary to CVA   Neurology: no focal weakness in extremities, no slurred speech, no double vision, no tingling or numbness sensation  Endocrinology: no temperature intolerance, no polyphagia, polydipsia or polyuria  Hematology: no increased bleeding, no bruising, no lymphadenopathy  Skin: no skin changes noticed by patient  Psychology: no depressed mood, no suicidal ideation    OBJECTIVE:     VITAL SIGNS:  BP (!) 151/99   Pulse (!) 115   Temp 98 °F (36.7 °C)   Resp 13   Wt 150 lb (68 kg)   SpO2 97%   BMI 24.21 kg/m²   Tmax over 24 hours:  Temp (24hrs), Av °F (36.7 °C), Min:98 °F (36.7 °C), Max:98 °F (36.7 °C)      Patient Vitals for the past 6 hrs:   BP Pulse Resp SpO2 Weight   23 1630 (!) 151/99 (!) 115 13 97 % --   23 1530 (!) 152/104 (!) 113 20 96 % --   23 1330 (!) 160/103 (!) 112 24 95 % --   23 1315 -- (!) 112 12 94 % --   23 1300 (!) 135/104 (!) 124 19 99 % --   23 1219 -- -- -- -- 150 lb (68 kg)       No intake or output data in the 24 hours ending 23 1719  Wt Readings from Last 2 Encounters:   23 150 lb (68 kg)   23 145 lb (65.8 kg)     Body mass index is 24.21 kg/m².       PHYSICAL EXAMINATION:  General Appearance: alert and oriented to person, place and time, well-developed and well-nourished, in no acute distress  Head: normocephalic and atraumatic  Pulmonary/Chest: clear to auscultation bilaterally- no wheezes, rales or rhonchi, normal air movement, no respiratory distress  Cardiovascular: normal rate and normal S1 and S2  Abdomen: soft, non-tender, non-distended, normal bowel sounds, no masses or organomegaly  Extremities: no cyanosis, no clubbing, and no edema  Musculoskeletal: normal range of motion, no joint swelling, deformity or tenderness  Neurologic: no cranial nerve deficit and left-sided decreased range of motion secondary to CVA     Any

## 2023-04-29 VITALS
RESPIRATION RATE: 13 BRPM | BODY MASS INDEX: 20.95 KG/M2 | HEART RATE: 94 BPM | OXYGEN SATURATION: 99 % | DIASTOLIC BLOOD PRESSURE: 102 MMHG | TEMPERATURE: 98.1 F | WEIGHT: 129.8 LBS | SYSTOLIC BLOOD PRESSURE: 159 MMHG

## 2023-04-29 LAB
ANION GAP SERPL CALCULATED.3IONS-SCNC: 8 MMOL/L (ref 7–16)
BACTERIA UR CULT: NORMAL
BASOPHILS # BLD: 0.05 E9/L (ref 0–0.2)
BASOPHILS NFR BLD: 0.7 % (ref 0–2)
BUN SERPL-MCNC: 20 MG/DL (ref 6–20)
CALCIUM SERPL-MCNC: 8.8 MG/DL (ref 8.6–10.2)
CHLORIDE SERPL-SCNC: 97 MMOL/L (ref 98–107)
CO2 SERPL-SCNC: 27 MMOL/L (ref 22–29)
CREAT SERPL-MCNC: 0.7 MG/DL (ref 0.7–1.2)
EOSINOPHIL # BLD: 0.21 E9/L (ref 0.05–0.5)
EOSINOPHIL NFR BLD: 2.9 % (ref 0–6)
ERYTHROCYTE [DISTWIDTH] IN BLOOD BY AUTOMATED COUNT: 14.3 FL (ref 11.5–15)
GLUCOSE SERPL-MCNC: 271 MG/DL (ref 74–99)
HCT VFR BLD AUTO: 34.8 % (ref 37–54)
HGB BLD-MCNC: 11.4 G/DL (ref 12.5–16.5)
IMM GRANULOCYTES # BLD: 0.02 E9/L
IMM GRANULOCYTES NFR BLD: 0.3 % (ref 0–5)
LYMPHOCYTES # BLD: 2.11 E9/L (ref 1.5–4)
LYMPHOCYTES NFR BLD: 29.5 % (ref 20–42)
MCH RBC QN AUTO: 28.4 PG (ref 26–35)
MCHC RBC AUTO-ENTMCNC: 32.8 % (ref 32–34.5)
MCV RBC AUTO: 86.6 FL (ref 80–99.9)
METER GLUCOSE: 219 MG/DL (ref 74–99)
METER GLUCOSE: 230 MG/DL (ref 74–99)
METER GLUCOSE: 275 MG/DL (ref 74–99)
MONOCYTES # BLD: 0.47 E9/L (ref 0.1–0.95)
MONOCYTES NFR BLD: 6.6 % (ref 2–12)
NEUTROPHILS # BLD: 4.3 E9/L (ref 1.8–7.3)
NEUTS SEG NFR BLD: 60 % (ref 43–80)
PLATELET # BLD AUTO: 418 E9/L (ref 130–450)
PMV BLD AUTO: 8.6 FL (ref 7–12)
POTASSIUM SERPL-SCNC: 3.8 MMOL/L (ref 3.5–5)
RBC # BLD AUTO: 4.02 E12/L (ref 3.8–5.8)
SODIUM SERPL-SCNC: 132 MMOL/L (ref 132–146)
WBC # BLD: 7.2 E9/L (ref 4.5–11.5)

## 2023-04-29 PROCEDURE — 6360000002 HC RX W HCPCS

## 2023-04-29 PROCEDURE — 2580000003 HC RX 258

## 2023-04-29 PROCEDURE — 82962 GLUCOSE BLOOD TEST: CPT

## 2023-04-29 PROCEDURE — 85025 COMPLETE CBC W/AUTO DIFF WBC: CPT

## 2023-04-29 PROCEDURE — 99232 SBSQ HOSP IP/OBS MODERATE 35: CPT | Performed by: INTERNAL MEDICINE

## 2023-04-29 PROCEDURE — 6370000000 HC RX 637 (ALT 250 FOR IP)

## 2023-04-29 PROCEDURE — 6370000000 HC RX 637 (ALT 250 FOR IP): Performed by: STUDENT IN AN ORGANIZED HEALTH CARE EDUCATION/TRAINING PROGRAM

## 2023-04-29 PROCEDURE — 2580000003 HC RX 258: Performed by: STUDENT IN AN ORGANIZED HEALTH CARE EDUCATION/TRAINING PROGRAM

## 2023-04-29 PROCEDURE — 99233 SBSQ HOSP IP/OBS HIGH 50: CPT | Performed by: INTERNAL MEDICINE

## 2023-04-29 PROCEDURE — A4216 STERILE WATER/SALINE, 10 ML: HCPCS

## 2023-04-29 PROCEDURE — C9113 INJ PANTOPRAZOLE SODIUM, VIA: HCPCS

## 2023-04-29 PROCEDURE — 80048 BASIC METABOLIC PNL TOTAL CA: CPT

## 2023-04-29 PROCEDURE — 6370000000 HC RX 637 (ALT 250 FOR IP): Performed by: INTERNAL MEDICINE

## 2023-04-29 PROCEDURE — 6360000002 HC RX W HCPCS: Performed by: STUDENT IN AN ORGANIZED HEALTH CARE EDUCATION/TRAINING PROGRAM

## 2023-04-29 RX ORDER — INSULIN GLARGINE 100 [IU]/ML
20 INJECTION, SOLUTION SUBCUTANEOUS NIGHTLY
Qty: 10 ML | Refills: 3 | Status: SHIPPED | OUTPATIENT
Start: 2023-04-29

## 2023-04-29 RX ORDER — INSULIN LISPRO 100 [IU]/ML
5 INJECTION, SOLUTION INTRAVENOUS; SUBCUTANEOUS
Status: DISCONTINUED | OUTPATIENT
Start: 2023-04-29 | End: 2023-04-29 | Stop reason: HOSPADM

## 2023-04-29 RX ORDER — LISINOPRIL 10 MG/1
10 TABLET ORAL DAILY
Qty: 30 TABLET | Refills: 3 | Status: SHIPPED | OUTPATIENT
Start: 2023-04-30

## 2023-04-29 RX ORDER — AMLODIPINE BESYLATE 5 MG/1
5 TABLET ORAL DAILY
Qty: 30 TABLET | Refills: 3 | Status: SHIPPED | OUTPATIENT
Start: 2023-04-30

## 2023-04-29 RX ORDER — INSULIN GLARGINE-YFGN 100 [IU]/ML
15 INJECTION, SOLUTION SUBCUTANEOUS NIGHTLY
Status: DISCONTINUED | OUTPATIENT
Start: 2023-04-29 | End: 2023-04-29 | Stop reason: HOSPADM

## 2023-04-29 RX ADMIN — POTASSIUM BICARBONATE 40 MEQ: 782 TABLET, EFFERVESCENT ORAL at 10:42

## 2023-04-29 RX ADMIN — ASPIRIN 81 MG: 81 TABLET, COATED ORAL at 10:42

## 2023-04-29 RX ADMIN — SODIUM CHLORIDE, PRESERVATIVE FREE 10 ML: 5 INJECTION INTRAVENOUS at 10:43

## 2023-04-29 RX ADMIN — INSULIN LISPRO 2 UNITS: 100 INJECTION, SOLUTION INTRAVENOUS; SUBCUTANEOUS at 10:43

## 2023-04-29 RX ADMIN — INSULIN LISPRO 5 UNITS: 100 INJECTION, SOLUTION INTRAVENOUS; SUBCUTANEOUS at 10:44

## 2023-04-29 RX ADMIN — SODIUM CHLORIDE, PRESERVATIVE FREE 40 MG: 5 INJECTION INTRAVENOUS at 04:51

## 2023-04-29 RX ADMIN — ENOXAPARIN SODIUM 40 MG: 100 INJECTION SUBCUTANEOUS at 10:42

## 2023-04-29 RX ADMIN — AMLODIPINE BESYLATE 5 MG: 5 TABLET ORAL at 10:42

## 2023-04-29 RX ADMIN — LISINOPRIL 10 MG: 10 TABLET ORAL at 10:42

## 2023-04-29 ASSESSMENT — PAIN SCALES - GENERAL
PAINLEVEL_OUTOF10: 0

## 2023-04-29 ASSESSMENT — PAIN SCALES - WONG BAKER
WONGBAKER_NUMERICALRESPONSE: 0

## 2023-04-29 NOTE — PLAN OF CARE
Problem: Chronic Conditions and Co-morbidities  Goal: Patient's chronic conditions and co-morbidity symptoms are monitored and maintained or improved  4/29/2023 0610 by Rose Restrepo RN  Outcome: Progressing  4/28/2023 2114 by Rose Restrepo RN  Outcome: Progressing     Problem: Discharge Planning  Goal: Discharge to home or other facility with appropriate resources  4/29/2023 0610 by Rose Restrepo RN  Outcome: Progressing  4/28/2023 2114 by Rose Restrepo RN  Outcome: Progressing     Problem: Pain  Goal: Verbalizes/displays adequate comfort level or baseline comfort level  4/29/2023 0610 by Rose Restrepo RN  Outcome: Progressing  4/28/2023 2114 by Rose Restrepo RN  Outcome: Progressing     Problem: Safety - Adult  Goal: Free from fall injury  4/29/2023 0610 by Rose Restrepo RN  Outcome: Progressing  4/28/2023 2114 by Rose Restrepo RN  Outcome: Progressing     Problem: ABCDS Injury Assessment  Goal: Absence of physical injury  4/29/2023 0610 by Rose Restrepo RN  Outcome: Progressing  4/28/2023 2114 by Rose Restrepo RN  Outcome: Progressing     Problem: Skin/Tissue Integrity  Goal: Absence of new skin breakdown  Description: 1. Monitor for areas of redness and/or skin breakdown  2. Assess vascular access sites hourly  3. Every 4-6 hours minimum:  Change oxygen saturation probe site  4. Every 4-6 hours:  If on nasal continuous positive airway pressure, respiratory therapy assess nares and determine need for appliance change or resting period.   4/29/2023 0610 by Rose Restrepo RN  Outcome: Progressing  4/28/2023 2114 by Rose Restrepo RN  Outcome: Progressing

## 2023-04-29 NOTE — CARE COORDINATION
Patient to discharge today. [facility] notified of orders and discharge today. .For questions I can be reached at [phone].  Ligia Langford [place]

## 2023-04-29 NOTE — DISCHARGE INSTRUCTIONS
Internal medicine    Follow ups  Please follow up with your primary care physician ( [email or web address]) within 10 days of discharge from hospital. Please call as soon as possible to make an appointment. Please contact the internal medicine clinic for an appointment if you are unable to get an appointment with your PCP. Please keep all other follow up appointments:  No future appointments. Changes in healthcare   Please take all medications as indicated  Diet: diabetic diet   Activity: activity as tolerated  New Medications started during this hospital stay  Norvasc 5 mg daily   Changes to your medications  Lisinopril increased to 10 mg daily  Lantus 20 units nightly   Please give yourself insulin prior to meals according to the sliding scale; give yourself 30% more than what you have been giving to yourself  Medications you should stop taking   None  Additional labs, testing or imaging needed after discharge   None  Even if you are feeling better and not having symptoms do not stop taking antibiotic earlier then prescribed   Please contact us if you have any concerns, wish to change or make an appointment:  Internal medicine clinic   Phone: [phone]  Fax: [phone]  [address]  Should you have further questions in regards to this visit, you can review your clinical note and after visit summary document on your [employer] account. Other than any new prescriptions given to you today, the list of home medications on this After Visit Summary are based on information provided to us from you and your healthcare providers. This information, including the list, dose, and frequency of medications is only as accurate as the information you provided. If you have any questions or concerns about your home medications, please contact your Primary Care Physician for further clarification. Your information:  Name: Gildardo Morrell  : 1970    Your instructions:     Follow up with your

## 2023-04-29 NOTE — PLAN OF CARE
Problem: Chronic Conditions and Co-morbidities  Goal: Patient's chronic conditions and co-morbidity symptoms are monitored and maintained or improved  4/29/2023 1250 by Sherri Mcgarry RN  Outcome: Adequate for Discharge  4/29/2023 1118 by Sherri Mcgarry RN  Outcome: Progressing  Flowsheets (Taken 4/29/2023 0800)  Care Plan - Patient's Chronic Conditions and Co-Morbidity Symptoms are Monitored and Maintained or Improved:   Monitor and assess patient's chronic conditions and comorbid symptoms for stability, deterioration, or improvement   Collaborate with multidisciplinary team to address chronic and comorbid conditions and prevent exacerbation or deterioration   Update acute care plan with appropriate goals if chronic or comorbid symptoms are exacerbated and prevent overall improvement and discharge  4/29/2023 0610 by Monique Gunter RN  Outcome: Progressing     Problem: Discharge Planning  Goal: Discharge to home or other facility with appropriate resources  4/29/2023 1250 by Sherri Mcgarry RN  Outcome: Adequate for Discharge  4/29/2023 1118 by Sherri Mcgarry RN  Outcome: Progressing  Flowsheets (Taken 4/29/2023 0800)  Discharge to home or other facility with appropriate resources: Identify barriers to discharge with patient and caregiver  4/29/2023 0610 by Monique Gunter RN  Outcome: Progressing     Problem: Pain  Goal: Verbalizes/displays adequate comfort level or baseline comfort level  4/29/2023 1250 by Sherri Mcgarry RN  Outcome: Adequate for Discharge  4/29/2023 1118 by Sherri Mcgarry RN  Outcome: Progressing  Flowsheets (Taken 4/29/2023 0800)  Verbalizes/displays adequate comfort level or baseline comfort level:   Encourage patient to monitor pain and request assistance   Assess pain using appropriate pain scale   Administer analgesics based on type and severity of pain and evaluate response   Implement non-pharmacological measures as appropriate and evaluate response   Consider cultural and social

## 2023-04-29 NOTE — PLAN OF CARE
Problem: Chronic Conditions and Co-morbidities  Goal: Patient's chronic conditions and co-morbidity symptoms are monitored and maintained or improved  4/29/2023 1118 by Alexandru Gimenez RN  Outcome: Progressing  Flowsheets (Taken 4/29/2023 0800)  Care Plan - Patient's Chronic Conditions and Co-Morbidity Symptoms are Monitored and Maintained or Improved:   Monitor and assess patient's chronic conditions and comorbid symptoms for stability, deterioration, or improvement   Collaborate with multidisciplinary team to address chronic and comorbid conditions and prevent exacerbation or deterioration   Update acute care plan with appropriate goals if chronic or comorbid symptoms are exacerbated and prevent overall improvement and discharge  4/29/2023 0610 by Dyan Thomas RN  Outcome: Progressing     Problem: Discharge Planning  Goal: Discharge to home or other facility with appropriate resources  4/29/2023 1118 by Alexandru Gimenez RN  Outcome: Progressing  Flowsheets (Taken 4/29/2023 0800)  Discharge to home or other facility with appropriate resources: Identify barriers to discharge with patient and caregiver  4/29/2023 0610 by Dyan Thomas RN  Outcome: Progressing     Problem: Pain  Goal: Verbalizes/displays adequate comfort level or baseline comfort level  4/29/2023 1118 by Alexandru Gimenez RN  Outcome: Progressing  Flowsheets (Taken 4/29/2023 0800)  Verbalizes/displays adequate comfort level or baseline comfort level:   Encourage patient to monitor pain and request assistance   Assess pain using appropriate pain scale   Administer analgesics based on type and severity of pain and evaluate response   Implement non-pharmacological measures as appropriate and evaluate response   Consider cultural and social influences on pain and pain management   Notify Licensed Independent Practitioner if interventions unsuccessful or patient reports new pain  4/29/2023 0610 by Dyan Thomas RN  Outcome: Progressing     Problem: Safety

## 2023-04-29 NOTE — PROGRESS NOTES
[address] [facility]   Department of Internal Medicine   Internal Medicine Residency  MICU Progress Note    Patient:  Les Shepherd 47 y.o. male   MRN: [id number]       Date of Service: 2023    Allergy: Metoprolol    Subjective     Patient was seen and examined in AM. Had vomiting earlier in the morning which has resolved. He has been able to tolerate PO fluids. 24 hour change: Stable overnight. No acute overnight issues reported. Objective     TEMPERATURE:  Current - Temp: 97.8 °F (36.6 °C); Max - Temp  Av.2 °F (36.8 °C)  Min: 97.8 °F (36.6 °C)  Max: 98.4 °F (36.9 °C)  RESPIRATIONS RANGE: Resp  Av  Min: 12  Max: 24  PULSE RANGE: Pulse  Av.8  Min: 81  Max: 124  BLOOD PRESSURE RANGE:  Systolic (41XSK), TZR:062 , Min:135 , WSX:876   ; Diastolic (90XII), DSZ:40, Min:86, Max:109    PULSE OXIMETRY RANGE: SpO2  Av.4 %  Min: 93 %  Max: 99 %    I & O - 24hr:    Intake/Output Summary (Last 24 hours) at 2023 1115  Last data filed at 2023 2956  Gross per 24 hour   Intake --   Output 805 ml   Net -805 ml     I/O last 3 completed shifts:  In: -   Out: 805 [Urine:805] No intake/output data recorded. Weight change:     Physical Exam  Vitals and nursing note reviewed. Constitutional:       General: He is not in acute distress. Appearance: He is not ill-appearing, toxic-appearing or diaphoretic. HENT:      Head: Normocephalic and atraumatic. Right Ear: External ear normal.      Left Ear: External ear normal.      Nose: Nose normal.      Mouth/Throat:      Mouth: Mucous membranes are moist.      Pharynx: Oropharynx is clear. Eyes:      General: No scleral icterus. Right eye: No discharge. Left eye: No discharge. Conjunctiva/sclera: Conjunctivae normal.      Pupils: Pupils are equal, round, and reactive to light. Cardiovascular:      Rate and Rhythm: Normal rate and regular rhythm. Pulses: Normal pulses.       Heart sounds: Normal heart
[address] [facility]  Internal Medicine Residency / [address]    Attending Physician Statement  I have discussed the case, including pertinent history and exam findings with the resident and the team.  I have seen and examined the patient and the key elements of the encounter have been performed by me. I agree with the assessment, plan and orders as documented by the resident. A&O  DKA resolving  Insulin at home monitored by his mother an RN  VS stable   Left hemiplegia from previous stroke  Meds reviewed  Plan: Same ICU care    Remainder of medical problems as per resident note.       Gabirela De Santiago MD [facility]  Internal Medicine Residency Faculty
[address] [facility]   Department of Internal Medicine   Internal Medicine Residency  MICU Progress Note    Patient:  Priscila Rincon 47 y.o. male   MRN: [id number]       Date of Service: 2023    Allergy: Metoprolol    Subjective     Patient was seen and examined in AM. Pt had nausea overnight but is doing well this morning after antiemetic. He has been able t tolerate breakfast     24 hour change: Stable overnight. Elevated BG, increased lantus to 15 units and premeal 5 unit TID     Objective     TEMPERATURE:  Current - Temp: 98.1 °F (36.7 °C); Max - Temp  Av °F (36.7 °C)  Min: 97.5 °F (36.4 °C)  Max: 98.7 °F (37.1 °C)  RESPIRATIONS RANGE: Resp  Avg: 15.9  Min: 9  Max: 23  PULSE RANGE: Pulse  Av.8  Min: 60  Max: 94  BLOOD PRESSURE RANGE:  Systolic (24TLT), OIQ:872 , Min:121 , OQL:023   ; Diastolic (28UBY), LUX:95, Min:71, Max:102    PULSE OXIMETRY RANGE: SpO2  Av.5 %  Min: 94 %  Max: 99 %    I & O - 24hr:    Intake/Output Summary (Last 24 hours) at 2023 1219  Last data filed at 2023 1100  Gross per 24 hour   Intake 1330 ml   Output 2600 ml   Net -1270 ml       I/O last 3 completed shifts: In: 2111.6 [P.O.:1200; I.V.:20; IV Piggyback:891.6]  Out: 5400 [Urine:5400] I/O this shift:  In: 360 [P.O.:360]  Out: 800 [Urine:800]   Weight change: -5 lb 3.2 oz (-2.359 kg)    Physical Exam  Vitals and nursing note reviewed. Constitutional:       General: He is not in acute distress. Appearance: He is not ill-appearing, toxic-appearing or diaphoretic. HENT:      Head: Normocephalic and atraumatic. Right Ear: External ear normal.      Left Ear: External ear normal.      Nose: Nose normal.      Mouth/Throat:      Mouth: Mucous membranes are moist.      Pharynx: Oropharynx is clear. Eyes:      General: No scleral icterus. Right eye: No discharge. Left eye: No discharge.       Conjunctiva/sclera: Conjunctivae normal.      Pupils: Pupils are equal, round, and
[address]   Internal Medicine Residency / [address]    Attending Physician Statement  I have discussed the case, including pertinent history and exam findings with the resident and the team.  I have seen and examined the patient and the key elements of the encounter have been performed by me. I agree with the assessment, plan and orders as documented by the resident. A&O  HAs elevated systolic BP this AM in supine position  May have had a hx of orthostatic hypotension by hx  Record sitting BP  External cath working  And will record Bladder PVRV  Left hemiplegia same  Not clear of baseline mobility skills  Insulin dosage pper ENDOCRINOLOGY AND REVIEWED  Impression:Resolved DKA   Plan:Continue same           Discharge planning    Remainder of medical problems as per resident note.       Tab Klein MD [facility] SPECIALTY [facility]  Internal Medicine Residency Faculty
[address] [address] [address]       Date:2023                                                               Patient Name: Liv Delgado  MRN: [id number]  : 1970  Room: [facility]    Evaluating OT: LIEN Tuttle,  OTR/L; [id number]    Referring Provider: Demetri Woods MD   Specific Provider Orders/Date: OT eval and treat (23)       Diagnosis: Upper GI bleed [K92.2]  DKA, type 1, not at goal [facility]) [E10.10]  Diabetic ketoacidosis without coma associated with other specified diabetes mellitus ([facility] [facility] [E13.10]     Surgery/Procedures: None this admission     Pertinent Medical History:    Past Medical History:   Diagnosis Date    Alcoholism ([facility]     Cocaine abuse ([facility]     Diabetes mellitus ([facility]     Hypertension     Idiopathic peripheral neuropathy 2016    Lacunar stroke ([facility]     Marijuana abuse         *Precautions:  Fall Risk, full liquid diet, L valorie/neglect    Assessment of current deficits   [x] Functional mobility  [x]ADLs  [x] Strength               []Cognition   [x] Functional transfers   [x] IADLs         [x] Safety Awareness   [x]Endurance   [] Fine Coordination        [] ROM     [] Vision/perception   []Sensation    []Gross Motor Coordination [x] Balance   [] Delirium                  []Motor Control     [] Communication    OT PLAN OF CARE   OT POC based on physician orders, patient diagnosis and results of clinical assessment.        Frequency/Duration: 1-3 days/wk for 1-2 weeks PRN    Specific OT Treatment Interventions to include:   * Instruction/training on adapted ADL techniques and AE recommendations to increase functional independence within precautions       * Training on energy conservation strategies, correct breathing pattern and techniques to improve independence/tolerance for self-care routine  * Functional transfer/mobility training/DME recommendations for
[facility]  Internal Medicine Residency Program  Progress Note - House Team     Patient:  Coreen Barrientosman 47 y.o. male MRN: [id number]     Date of Service: 4/28/2023     CC: DKA  Overnight events: Bridged yesterday. BG remained elevated at 383 overnight,  increased to MDSS and gave 10 U lantus and 10 U lantus early this morning. AG remains elevated at 15 after closed yesterday. Subjective     Patient was seen and examined this morning at bedside in no acute distress. Eating breakfast and tolerating well. Denying any CP or SOB. No current complaints at this time. Objective     Physical Exam:  Vitals: BP (!) 184/98   Pulse 70   Temp 97.6 °F (36.4 °C) (Temporal)   Resp 15   Wt 135 lb (61.2 kg)   SpO2 95%   BMI 21.79 kg/m²     I & O - 24hr: I/O this shift:  In: -   Out: 300 [Urine:300]   General Appearance: alert, appears stated age, and cooperative  HEENT:  Head: Normocephalic, no lesions, without obvious abnormality. Neck: no adenopathy and no JVD  Lung: clear to auscultation bilaterally  Heart: regular rate and rhythm, S1, S2 normal, no murmur, click, rub or gallop  Abdomen: soft, non-tender; bowel sounds normal; no masses,  no organomegaly  Extremities:  extremities normal, atraumatic, no cyanosis or edema  Musculokeletal: No joint swelling, no muscle tenderness. ROM normal in all joints of extremities.    Neurologic: Mental status: Alert, oriented, thought content appropriate,  no cranial nerve deficit and left-sided decreased range of motion secondary to CVA     Subjective  Pertinent Labs & Imaging Studies     CBC with Differential:    Lab Results   Component Value Date/Time    WBC 9.8 04/28/2023 04:46 AM    RBC 3.74 04/28/2023 04:46 AM    HGB 10.7 04/28/2023 04:46 AM    HCT 33.2 04/28/2023 04:46 AM     04/28/2023 04:46 AM    MCV 88.8 04/28/2023 04:46 AM    MCH 28.6 04/28/2023 04:46 AM    MCHC 32.2 04/28/2023 04:46 AM    RDW 15.0 04/28/2023 04:46 AM    SEGSPCT 53 01/25/2014
[facility]  Internal Medicine Residency Program  Progress Note - House Team 1    Patient:  Kenrick Banks 47 y.o. male MRN: [id number]     Date of Service: 4/27/2023     CC: nausea and vomiting; DKA       Subjective   Overnight events: No acute events overnight. Patient was seen and examined at bedside this morning. He stated he is feeling slightly better compared to arrival but it still nauseated at this time. He is not sure what caused him to go into DKA again and states he does take his insulin at home. Denies any other symptoms at this time. Objective     Physical Exam:  Vitals: BP (!) 161/90   Pulse (!) 104   Temp 98.4 °F (36.9 °C) (Temporal)   Resp 14   Wt 149 lb 14.6 oz (68 kg)   SpO2 95%   BMI 24.20 kg/m²     I & O - 24hr: No intake/output data recorded. General Appearance: alert and cooperative  HEENT:  Head: Normal, normocephalic, atraumatic. Neck: no adenopathy and supple, symmetrical, trachea midline  Lung: clear to auscultation bilaterally  Heart: regular rate and rhythm, S1, S2 normal, no murmur, click, rub or gallop  Abdomen: soft, non-tender; bowel sounds normal; no masses,  no organomegaly  Extremities:  extremities normal, atraumatic, no cyanosis or edema  Musculokeletal: No joint swelling, no muscle tenderness. ROM normal in all joints of extremities.    Neurologic: Mental status: Alert, oriented, thought content appropriate  Subject  Pertinent Labs & Imaging Studies   chika  CBC with Differential:    Lab Results   Component Value Date/Time    WBC 14.5 04/27/2023 04:19 AM    RBC 3.88 04/27/2023 04:19 AM    HGB 11.2 04/27/2023 04:19 AM    HCT 34.2 04/27/2023 04:19 AM     04/27/2023 04:19 AM    MCV 88.1 04/27/2023 04:19 AM    MCH 28.9 04/27/2023 04:19 AM    MCHC 32.7 04/27/2023 04:19 AM    RDW 15.3 04/27/2023 04:19 AM    SEGSPCT 53 01/25/2014 05:15 AM    SEGSPCT 86 10/11/2010 04:40 AM    BANDSPCT 3 10/10/2010 07:20 PM    METASPCT 1.7 07/20/2020 12:12 PM
Nurse to nurse report called to [facility] on 5. Transport requested. Patient's clothing and cell phone were gathered and placed with patient on transfer upon transfer to [facility].
Physical Therapy    Physical Therapy Daily Treatment Note      Name: Coreen Perez  : 1970  MRN: [id number]      Date of Service: 2023    Evaluating PT:  Dayron Mcmahan, PT, DPT  [id number]     Room #:  4779/8602-W  Diagnosis:  Upper GI bleed [K92.2]  DKA, type 1, not at goal [facility]) [E10.10]  Diabetic ketoacidosis without coma associated with other specified diabetes mellitus ([facility] [E13.10]  PMHx/PSHx:   has a past medical history of Alcoholism ([facility] [facility] Cocaine abuse ([facility] Diabetes mellitus ([facility] Hypertension, Idiopathic peripheral neuropathy, Lacunar stroke ([facility] and Marijuana abuse. Procedure/Surgery:  None  Precautions:  Falls, Chronic L hemiparesis   Equipment Needs:  NA    SUBJECTIVE:    Pt lives with his mother in a 2 story home with 3 stairs and 1 rail to enter. Bedroom and bathroom are on the 2nd level with 10 steps and B rails. Pt ambulated with a hemicane PTA. OBJECTIVE:   Initial Evaluation  Date: 23 Treatment  23 Short Term/ Long Term   Goals   AM-PAC 6 Clicks 97/86 47/63    Was pt agreeable to Eval/treatment? Yes  Yes    Does pt have pain? No c/o pain  No c/o pain     Bed Mobility  Rolling: Min A  Supine to sit: Mod A  Sit to supine: Mod A  Scooting: Mod A Rolling: Min A  Supine to sit: Min A  Sit to supine: Mod A  Scooting: Min A Rolling: SBA  Supine to sit: SBA  Sit to supine: SBA  Scooting: SBA   Transfers Sit to stand: Mod A  Stand to sit: Mod A  Stand pivot: NT Sit to stand:  Mod A  Stand to sit: Mod A  Stand pivot: NT Sit to stand: SBA  Stand to sit: SBA  Stand pivot: SBA with AAD   Ambulation    Few side steps with HHA Mod A Few steps fwd/bwd with HHA Mod A;    Few steps L<>R with HHA Mod A >25 feet with AAD Min A   Stair negotiation: ascended and descended  NT NT >2 steps with 1 rail Min A   ROM BUE:  Per OT eval   BLE:  WFL See eval    Strength BUE:  Per OT eval   RLE:  Grossly 4+/5  LLE:  Grossly 2/5 See eval     Balance Sitting EOB:  Min A  Dynamic Standing:  Mod >
Physical Therapy    Physical Therapy Initial Assessment     Name: Lizett Queen  : 1970  MRN: [id number]      Date of Service: 2023    Evaluating PT:  Malachi Kern, PT, DPT  [id number]     Room #:  1611/3052-Q  Diagnosis:  Upper GI bleed [K92.2]  DKA, type 1, not at goal [facility]) [E10.10]  Diabetic ketoacidosis without coma associated with other specified diabetes mellitus ([facility] [E13.10]  PMHx/PSHx:   has a past medical history of Alcoholism ([facility] Cocaine abuse ([facility] Diabetes mellitus ([facility] Hypertension, Idiopathic peripheral neuropathy, Lacunar stroke ([facility] and Marijuana abuse. Procedure/Surgery:  None  Precautions:  Falls, Chronic L hemiparesis   Equipment Needs:  NA    SUBJECTIVE:    Pt lives with his mother in a 2 story home with 3 stairs and 1 rail to enter. Bedroom and bathroom are on the 2nd level with 10 steps and B rails. Pt ambulated with a hemicane PTA. OBJECTIVE:   Initial Evaluation  Date: 23 Treatment Short Term/ Long Term   Goals   AM-PAC 6 Clicks      Was pt agreeable to Eval/treatment? Yes      Does pt have pain? No c/o pain      Bed Mobility  Rolling: Min A  Supine to sit: Mod A  Sit to supine: Mod A  Scooting: Mod A  Rolling: SBA  Supine to sit: SBA  Sit to supine: SBA  Scooting: SBA   Transfers Sit to stand:  Mod A  Stand to sit: Mod A  Stand pivot: NT  Sit to stand: SBA  Stand to sit: SBA  Stand pivot: SBA with AAD   Ambulation    Few side steps with HHA Mod A  >25 feet with AAD Min A   Stair negotiation: ascended and descended  NT  >2 steps with 1 rail Min A   ROM BUE:  Per OT eval   BLE:  WFL     Strength BUE:  Per OT eval   RLE:  Grossly 4+/5  LLE:  Grossly 2/5     Balance Sitting EOB:  Min A  Dynamic Standing:  Mod > Max A with fatigue   Sitting EOB:  SBA  Dynamic Standing:  Min A with AAD     Pt is A & O x 4  RASS:  0  CAM-ICU:  NT  Sensation:  Pt denies numbness and tingling to extremities  Edema:  Unremarkable     Vitals:  Blood Pressure at rest 163/67 mmHg
Pvr 150
Reason for consult:    A1C:   9.8                    Patient states the following concerns/barriers to diabetes self-management:     [x] None       [] Medication cost   [] Food cost/availability        [] Reading  [] Hearing   [] Vision                [] Work    [] Transportation  [] No insurance  [] Physical limitations    [] Other:        Patient states the following about their diabetes/health:   [x]   Diabetic over 20 years and refused outpatient education  [x] Eats 3 meals a day larger meal at night. States his carb/insulin ratio is 1:10  [x] Drinks water and gatorade only  [x] Has supplies at home and monitors BG every few hours  [] States FBG averages 140 but then stated A1c is always elevated        Diabetes survival packet provided to:   [] Patient     [x] Other:refused      Diabetes medications reviewed (use, purpose, action):   Lantus and humalog as rx feels confident with ability to manage diabetes          Post-education Assessment  [x]  Somewhat attentive to teaching  [x]  Appeared to answered questions appropriately when asked   [x]  Appears able to apply concepts to daily lifestyle  [x]  Showed very little interest in complying with treatment plan             Recommendations:   [x] Continue carbohydrate-controlled diet        Thank you for this consult.   15 minutes of education provided
ipratropium-albuterol, potassium chloride, magnesium sulfate, bisacodyl  Nutrition:   NG/OG tube TF type: Pulmocare/Nephro/Glucerna/Jevity        At rate: ml/h    Labs and Imaging Studies     CBC:   Recent Labs     04/26/23  1043 04/27/23  0419 04/28/23  0446   WBC 16.7* 14.5* 9.8   HGB 14.6 11.2* 10.7*   HCT 44.9 34.2* 33.2*   MCV 88.2 88.1 88.8   * 499* 419         BMP:    Recent Labs     04/27/23  1226 04/27/23  2110 04/28/23  0446    138 133   K 3.5 4.4 4.4    105 98   CO2 24 25 20*   BUN 12 11 12   CREATININE 0.5* 0.9 0.7   GLUCOSE 141* 267* 383*         LIVER PROFILE:   Recent Labs     04/26/23  1043   AST 17   ALT 44*   LIPASE 14   BILIDIR 0.3   BILITOT 1.0   ALKPHOS 238*         PT/INR:   No results for input(s): PROTIME, INR in the last 72 hours. APTT:   No results for input(s): APTT in the last 72 hours. Fasting Lipid Panel:    Lab Results   Component Value Date/Time    CHOL 223 01/23/2023 11:14 AM    TRIG 148 01/23/2023 11:14 AM    HDL 70 01/23/2023 11:14 AM       Cardiac Enzymes:    Lab Results   Component Value Date    CKTOTAL 229 (H) 01/23/2023    CKTOTAL 311 (H) 01/26/2022    CKTOTAL 90 07/20/2020    CKMB 5.2 01/26/2022    CKMB 4.5 07/20/2020    CKMB 6.1 12/08/2019    TROPONINI <0.01 07/22/2020    TROPONINI <0.01 07/21/2020    TROPONINI 0.02 07/21/2020       Notable Cultures:      Blood cultures   Blood Culture, Routine   Date Value Ref Range Status   04/26/2023 24 Hours no growth  Preliminary     Respiratory cultures No results found for: RESPCULTURE No results found for: LABGRAM  Urine   Urine Culture, Routine   Date Value Ref Range Status   08/08/2022 <10,000 CFU/mL  Mixed gram positive organisms    Final     Legionella No results found for: LABLEGI  C Diff PCR No results found for: CDIFPCR  Wound culture/abscess: No results for input(s): WNDABS in the last 72 hours. Tip culture:No results for input(s): CXCATHTIP in the last 72 hours. Oxygen:           Additional
235  Not provoked dka, other than questionable complaince  Working on optimal home dosing currently -- to prevent readmission    Mild collecting duct dilation- PVR OK  PVR 150cc-- flomax?     HTN -- added norvasc, same lisinopril  ASA- hx of  cva  Debilitation -- WC for home,  hospital bed issue  Polysuibstance abuse
Evaluation time includes thorough review of current medical information, gathering information on past medical history/social history and prior level of function, completion of standardized testing/informal observation of tasks, assessment of data and development of POC/Goals.      LIEN Paez,  OTR/L; [id number]

## 2023-04-29 NOTE — PROCEDURES
04/27/23  0419 04/28/23  0446 04/29/23  0455   WBC 14.5* 9.8 7.2   HGB 11.2* 10.7* 11.4*   HCT 34.2* 33.2* 34.8*   MCV 88.1 88.8 86.6   * 419 418       BMP:    Recent Labs     04/28/23  1210 04/28/23  1630 04/29/23  0455    132 132   K 3.9 4.5 3.8   CL 99 97* 97*   CO2 27 26 27   BUN 15 18 20   CREATININE 0.6* 0.9 0.7   GLUCOSE 265* 287* 271*       LIVER PROFILE:   No results for input(s): AST, ALT, LIPASE, BILIDIR, BILITOT, ALKPHOS in the last 72 hours. Invalid input(s): AMYLASE,  ALB    PT/INR:   No results for input(s): PROTIME, INR in the last 72 hours. APTT:   No results for input(s): APTT in the last 72 hours. Fasting Lipid Panel:    Lab Results   Component Value Date/Time    CHOL 223 01/23/2023 11:14 AM    TRIG 148 01/23/2023 11:14 AM    HDL 70 01/23/2023 11:14 AM       Cardiac Enzymes:    Lab Results   Component Value Date    CKTOTAL 229 (H) 01/23/2023    CKTOTAL 311 (H) 01/26/2022    CKTOTAL 90 07/20/2020    CKMB 5.2 01/26/2022    CKMB 4.5 07/20/2020    CKMB 6.1 12/08/2019    TROPONINI <0.01 07/22/2020    TROPONINI <0.01 07/21/2020    TROPONINI 0.02 07/21/2020       Notable Cultures:      Blood cultures   Blood Culture, Routine   Date Value Ref Range Status   04/26/2023 24 Hours no growth  Preliminary     Respiratory cultures No results found for: RESPCULTURE No results found for: LABGRAM  Urine   Urine Culture, Routine   Date Value Ref Range Status   04/27/2023 Growth not present  Final     Legionella No results found for: LABLEGI  C Diff PCR No results found for: CDIFPCR  Wound culture/abscess: No results for input(s): WNDABS in the last 72 hours. Tip culture:No results for input(s): CXCATHTIP in the last 72 hours. Oxygen:           Additional Respiratory Assessments  Heart Rate: 94  Resp: 13  SpO2: 99 %     External Urinary Catheter-Output (mL): 800 mL    Imaging Studies:  CT ABDOMEN PELVIS W IV CONTRAST Additional Contrast? None   Final Result   There is the mild distension

## 2023-05-01 ENCOUNTER — TELEPHONE (OUTPATIENT)
Dept: INTERNAL MEDICINE | Age: 53
End: 2023-05-01

## 2023-05-01 LAB
BACTERIA BLD CULT ORG #2: NORMAL
BACTERIA BLD CULT: NORMAL

## 2023-05-01 NOTE — TELEPHONE ENCOUNTER
Care Transitions Initial Follow Up Call    Outreach made within 2 business days of discharge: Yes    Patient: Chaparro Lopez Patient : 1970   MRN: [id number]  Reason for Admission: There are no discharge diagnoses documented for the most recent discharge. Discharge Date: 23       Spoke with: VM message left for patient to call office    Discharge department/facility: [facility]      Scheduled appointment with PCP within 7-14 days    Follow Up  No future appointments.     Yan Garrett RN

## 2023-05-02 ENCOUNTER — TELEPHONE (OUTPATIENT)
Dept: INTERNAL MEDICINE | Age: 53
End: 2023-05-02

## 2023-05-02 NOTE — TELEPHONE ENCOUNTER
Care Transitions Initial Follow Up Call    Outreach made within 2 business days of discharge: Yes    Patient: Chantell Henderson Patient : 1970   MRN: [id number]  Reason for Admission: There are no discharge diagnoses documented for the most recent discharge. Discharge Date: 23       Spoke with: No answer noted    Discharge department[facility]facility: [facility]      Scheduled appointment with PCP within 7-14 days    Follow Up  No future appointments.     Norah Hutchins RN

## 2023-05-10 ENCOUNTER — APPOINTMENT (OUTPATIENT)
Dept: GENERAL RADIOLOGY | Age: 53
DRG: 064 | End: 2023-05-10

## 2023-05-10 ENCOUNTER — HOSPITAL ENCOUNTER (INPATIENT)
Age: 53
LOS: 3 days | Discharge: HOME HEALTH | DRG: 064 | End: 2023-05-15
Attending: EMERGENCY MEDICINE | Admitting: INTERNAL MEDICINE

## 2023-05-10 DIAGNOSIS — R55 SYNCOPE AND COLLAPSE: Primary | ICD-10-CM

## 2023-05-10 LAB
ALBUMIN SERPL-MCNC: 4.6 G/DL (ref 3.5–5.2)
ALP SERPL-CCNC: 165 U/L (ref 40–129)
ALT SERPL-CCNC: 39 U/L (ref 0–40)
ANION GAP SERPL CALCULATED.3IONS-SCNC: 10 MMOL/L (ref 7–16)
AST SERPL-CCNC: 26 U/L (ref 0–39)
BASOPHILS # BLD: 0.04 E9/L (ref 0–0.2)
BASOPHILS NFR BLD: 0.4 % (ref 0–2)
BILIRUB SERPL-MCNC: 0.6 MG/DL (ref 0–1.2)
BUN SERPL-MCNC: 19 MG/DL (ref 6–20)
CALCIUM SERPL-MCNC: 9.8 MG/DL (ref 8.6–10.2)
CHLORIDE SERPL-SCNC: 99 MMOL/L (ref 98–107)
CO2 SERPL-SCNC: 27 MMOL/L (ref 22–29)
CREAT SERPL-MCNC: 0.8 MG/DL (ref 0.7–1.2)
EOSINOPHIL # BLD: 0.14 E9/L (ref 0.05–0.5)
EOSINOPHIL NFR BLD: 1.5 % (ref 0–6)
ERYTHROCYTE [DISTWIDTH] IN BLOOD BY AUTOMATED COUNT: 15.4 FL (ref 11.5–15)
GLUCOSE SERPL-MCNC: 141 MG/DL (ref 74–99)
HCT VFR BLD AUTO: 37.9 % (ref 37–54)
HGB BLD-MCNC: 12.2 G/DL (ref 12.5–16.5)
IMM GRANULOCYTES # BLD: 0.04 E9/L
IMM GRANULOCYTES NFR BLD: 0.4 % (ref 0–5)
LIPASE: 24 U/L (ref 13–60)
LYMPHOCYTES # BLD: 1.48 E9/L (ref 1.5–4)
LYMPHOCYTES NFR BLD: 15.4 % (ref 20–42)
MCH RBC QN AUTO: 28.5 PG (ref 26–35)
MCHC RBC AUTO-ENTMCNC: 32.2 % (ref 32–34.5)
MCV RBC AUTO: 88.6 FL (ref 80–99.9)
MONOCYTES # BLD: 0.55 E9/L (ref 0.1–0.95)
MONOCYTES NFR BLD: 5.7 % (ref 2–12)
NEUTROPHILS # BLD: 7.36 E9/L (ref 1.8–7.3)
NEUTS SEG NFR BLD: 76.6 % (ref 43–80)
PLATELET # BLD AUTO: 502 E9/L (ref 130–450)
PMV BLD AUTO: 8.4 FL (ref 7–12)
POTASSIUM SERPL-SCNC: 4.1 MMOL/L (ref 3.5–5)
PROT SERPL-MCNC: 7.4 G/DL (ref 6.4–8.3)
RBC # BLD AUTO: 4.28 E12/L (ref 3.8–5.8)
SODIUM SERPL-SCNC: 136 MMOL/L (ref 132–146)
TROPONIN, HIGH SENSITIVITY: 48 NG/L (ref 0–11)
WBC # BLD: 9.6 E9/L (ref 4.5–11.5)

## 2023-05-10 PROCEDURE — 85025 COMPLETE CBC W/AUTO DIFF WBC: CPT

## 2023-05-10 PROCEDURE — 99285 EMERGENCY DEPT VISIT HI MDM: CPT

## 2023-05-10 PROCEDURE — 93005 ELECTROCARDIOGRAM TRACING: CPT | Performed by: EMERGENCY MEDICINE

## 2023-05-10 PROCEDURE — 2580000003 HC RX 258: Performed by: EMERGENCY MEDICINE

## 2023-05-10 PROCEDURE — 36415 COLL VENOUS BLD VENIPUNCTURE: CPT

## 2023-05-10 PROCEDURE — 71045 X-RAY EXAM CHEST 1 VIEW: CPT

## 2023-05-10 PROCEDURE — 80053 COMPREHEN METABOLIC PANEL: CPT

## 2023-05-10 PROCEDURE — 84484 ASSAY OF TROPONIN QUANT: CPT

## 2023-05-10 PROCEDURE — 83690 ASSAY OF LIPASE: CPT

## 2023-05-10 RX ORDER — 0.9 % SODIUM CHLORIDE 0.9 %
1000 INTRAVENOUS SOLUTION INTRAVENOUS ONCE
Status: COMPLETED | OUTPATIENT
Start: 2023-05-10 | End: 2023-05-11

## 2023-05-10 RX ADMIN — SODIUM CHLORIDE 1000 ML: 9 INJECTION, SOLUTION INTRAVENOUS at 21:28

## 2023-05-10 ASSESSMENT — PAIN - FUNCTIONAL ASSESSMENT: PAIN_FUNCTIONAL_ASSESSMENT: NONE - DENIES PAIN

## 2023-05-11 ENCOUNTER — APPOINTMENT (OUTPATIENT)
Dept: MRI IMAGING | Age: 53
DRG: 064 | End: 2023-05-11

## 2023-05-11 ENCOUNTER — APPOINTMENT (OUTPATIENT)
Dept: NEUROLOGY | Age: 53
DRG: 064 | End: 2023-05-11

## 2023-05-11 LAB
AMPHET UR QL SCN: NOT DETECTED
ANION GAP SERPL CALCULATED.3IONS-SCNC: 17 MMOL/L (ref 7–16)
ANION GAP SERPL CALCULATED.3IONS-SCNC: 21 MMOL/L (ref 7–16)
ANION GAP SERPL CALCULATED.3IONS-SCNC: 22 MMOL/L (ref 7–16)
ANION GAP SERPL CALCULATED.3IONS-SCNC: 24 MMOL/L (ref 7–16)
APAP SERPL-MCNC: <5 MCG/ML (ref 10–30)
BARBITURATES UR QL SCN: NOT DETECTED
BASOPHILS # BLD: 0.08 E9/L (ref 0–0.2)
BASOPHILS NFR BLD: 0.5 % (ref 0–2)
BENZODIAZ UR QL SCN: NOT DETECTED
BETA-HYDROXYBUTYRATE: >4.5 MMOL/L (ref 0.02–0.27)
BETA-HYDROXYBUTYRATE: >4.5 MMOL/L (ref 0.02–0.27)
BILIRUB UR QL STRIP: NEGATIVE
BUN SERPL-MCNC: 23 MG/DL (ref 6–20)
BUN SERPL-MCNC: 26 MG/DL (ref 6–20)
BUN SERPL-MCNC: 27 MG/DL (ref 6–20)
BUN SERPL-MCNC: 27 MG/DL (ref 6–20)
CALCIUM SERPL-MCNC: 9.4 MG/DL (ref 8.6–10.2)
CALCIUM SERPL-MCNC: 9.7 MG/DL (ref 8.6–10.2)
CANNABINOIDS UR QL SCN: POSITIVE
CHLORIDE SERPL-SCNC: 100 MMOL/L (ref 98–107)
CHLORIDE SERPL-SCNC: 102 MMOL/L (ref 98–107)
CHLORIDE SERPL-SCNC: 103 MMOL/L (ref 98–107)
CHLORIDE SERPL-SCNC: 92 MMOL/L (ref 98–107)
CHOLESTEROL, TOTAL: 211 MG/DL (ref 0–199)
CLARITY UR: CLEAR
CO2 SERPL-SCNC: 16 MMOL/L (ref 22–29)
CO2 SERPL-SCNC: 17 MMOL/L (ref 22–29)
CO2 SERPL-SCNC: 18 MMOL/L (ref 22–29)
CO2 SERPL-SCNC: 22 MMOL/L (ref 22–29)
COCAINE UR QL SCN: NOT DETECTED
COLOR UR: YELLOW
CREAT SERPL-MCNC: 0.7 MG/DL (ref 0.7–1.2)
CREAT SERPL-MCNC: 0.8 MG/DL (ref 0.7–1.2)
DRUG SCREEN COMMENT UR-IMP: ABNORMAL
EKG ATRIAL RATE: 66 BPM
EKG P AXIS: 54 DEGREES
EKG P-R INTERVAL: 148 MS
EKG Q-T INTERVAL: 430 MS
EKG QRS DURATION: 82 MS
EKG QTC CALCULATION (BAZETT): 450 MS
EKG R AXIS: 48 DEGREES
EKG T AXIS: 33 DEGREES
EKG VENTRICULAR RATE: 66 BPM
EOSINOPHIL # BLD: 0.11 E9/L (ref 0.05–0.5)
EOSINOPHIL NFR BLD: 0.6 % (ref 0–6)
ERYTHROCYTE [DISTWIDTH] IN BLOOD BY AUTOMATED COUNT: 15.2 FL (ref 11.5–15)
ETHANOLAMINE SERPL-MCNC: <10 MG/DL (ref 0–0.08)
FENTANYL SCREEN, URINE: NOT DETECTED
GLUCOSE SERPL-MCNC: 201 MG/DL (ref 74–99)
GLUCOSE SERPL-MCNC: 215 MG/DL (ref 74–99)
GLUCOSE SERPL-MCNC: 313 MG/DL (ref 74–99)
GLUCOSE SERPL-MCNC: 605 MG/DL (ref 74–99)
GLUCOSE SERPL-MCNC: 609 MG/DL (ref 74–99)
GLUCOSE UR STRIP-MCNC: >=1000 MG/DL
HBA1C MFR BLD: 9.3 % (ref 4–5.6)
HCT VFR BLD AUTO: 38.3 % (ref 37–54)
HDLC SERPL-MCNC: 84 MG/DL
HGB BLD-MCNC: 12.3 G/DL (ref 12.5–16.5)
HGB UR QL STRIP: NEGATIVE
HOMOCYSTEINE: 8.6 UMOL/L (ref 0–15)
IMM GRANULOCYTES # BLD: 0.09 E9/L
IMM GRANULOCYTES NFR BLD: 0.5 % (ref 0–5)
KETONES UR STRIP-MCNC: 40 MG/DL
LACTATE BLDV-SCNC: 1.3 MMOL/L (ref 0.5–2.2)
LACTATE BLDV-SCNC: 1.6 MMOL/L (ref 0.5–2.2)
LDLC SERPL CALC-MCNC: 109 MG/DL (ref 0–99)
LEUKOCYTE ESTERASE UR QL STRIP: NEGATIVE
LYMPHOCYTES # BLD: 1.41 E9/L (ref 1.5–4)
LYMPHOCYTES NFR BLD: 8 % (ref 20–42)
MCH RBC QN AUTO: 28.9 PG (ref 26–35)
MCHC RBC AUTO-ENTMCNC: 32.1 % (ref 32–34.5)
MCV RBC AUTO: 89.9 FL (ref 80–99.9)
METER GLUCOSE: 167 MG/DL (ref 74–99)
METER GLUCOSE: 198 MG/DL (ref 74–99)
METER GLUCOSE: 278 MG/DL (ref 74–99)
METER GLUCOSE: 290 MG/DL (ref 74–99)
METER GLUCOSE: 447 MG/DL (ref 74–99)
METER GLUCOSE: >500 MG/DL (ref 74–99)
METHADONE UR QL SCN: NOT DETECTED
MONOCYTES # BLD: 0.71 E9/L (ref 0.1–0.95)
MONOCYTES NFR BLD: 4 % (ref 2–12)
NEUTROPHILS # BLD: 15.14 E9/L (ref 1.8–7.3)
NEUTS SEG NFR BLD: 86.4 % (ref 43–80)
NITRITE UR QL STRIP: NEGATIVE
OPIATES UR QL SCN: NOT DETECTED
OXYCODONE URINE: NOT DETECTED
PCP UR QL SCN: NOT DETECTED
PH UR STRIP: 5.5 [PH] (ref 5–9)
PHOSPHATE SERPL-MCNC: 3.1 MG/DL (ref 2.5–4.5)
PLATELET # BLD AUTO: 512 E9/L (ref 130–450)
PMV BLD AUTO: 9.1 FL (ref 7–12)
POTASSIUM SERPL-SCNC: 4 MMOL/L (ref 3.5–5)
POTASSIUM SERPL-SCNC: 4.1 MMOL/L (ref 3.5–5)
POTASSIUM SERPL-SCNC: 4.4 MMOL/L (ref 3.5–5)
POTASSIUM SERPL-SCNC: 4.7 MMOL/L (ref 3.5–5)
PROT UR STRIP-MCNC: NEGATIVE MG/DL
RBC # BLD AUTO: 4.26 E12/L (ref 3.8–5.8)
SALICYLATES SERPL-MCNC: <0.3 MG/DL (ref 0–30)
SODIUM SERPL-SCNC: 132 MMOL/L (ref 132–146)
SODIUM SERPL-SCNC: 139 MMOL/L (ref 132–146)
SODIUM SERPL-SCNC: 141 MMOL/L (ref 132–146)
SODIUM SERPL-SCNC: 142 MMOL/L (ref 132–146)
SP GR UR STRIP: 1.01 (ref 1–1.03)
TRICYCLIC ANTIDEPRESSANTS SCREEN SERUM: NEGATIVE NG/ML
TRIGL SERPL-MCNC: 90 MG/DL (ref 0–149)
TROPONIN, HIGH SENSITIVITY: 38 NG/L (ref 0–11)
TSH SERPL-MCNC: 0.89 UIU/ML (ref 0.27–4.2)
UROBILINOGEN UR STRIP-ACNC: 0.2 E.U./DL
VLDLC SERPL CALC-MCNC: 18 MG/DL
WBC # BLD: 17.5 E9/L (ref 4.5–11.5)

## 2023-05-11 PROCEDURE — 2580000003 HC RX 258: Performed by: INTERNAL MEDICINE

## 2023-05-11 PROCEDURE — 6370000000 HC RX 637 (ALT 250 FOR IP)

## 2023-05-11 PROCEDURE — 82010 KETONE BODYS QUAN: CPT

## 2023-05-11 PROCEDURE — 81003 URINALYSIS AUTO W/O SCOPE: CPT

## 2023-05-11 PROCEDURE — 83921 ORGANIC ACID SINGLE QUANT: CPT

## 2023-05-11 PROCEDURE — 80048 BASIC METABOLIC PNL TOTAL CA: CPT

## 2023-05-11 PROCEDURE — 80061 LIPID PANEL: CPT

## 2023-05-11 PROCEDURE — 36415 COLL VENOUS BLD VENIPUNCTURE: CPT

## 2023-05-11 PROCEDURE — 6370000000 HC RX 637 (ALT 250 FOR IP): Performed by: INTERNAL MEDICINE

## 2023-05-11 PROCEDURE — 83090 ASSAY OF HOMOCYSTEINE: CPT

## 2023-05-11 PROCEDURE — 80307 DRUG TEST PRSMV CHEM ANLYZR: CPT

## 2023-05-11 PROCEDURE — G0378 HOSPITAL OBSERVATION PER HR: HCPCS

## 2023-05-11 PROCEDURE — 80179 DRUG ASSAY SALICYLATE: CPT

## 2023-05-11 PROCEDURE — 2580000003 HC RX 258: Performed by: CHIROPRACTOR

## 2023-05-11 PROCEDURE — 87040 BLOOD CULTURE FOR BACTERIA: CPT

## 2023-05-11 PROCEDURE — 2580000003 HC RX 258: Performed by: STUDENT IN AN ORGANIZED HEALTH CARE EDUCATION/TRAINING PROGRAM

## 2023-05-11 PROCEDURE — 83036 HEMOGLOBIN GLYCOSYLATED A1C: CPT

## 2023-05-11 PROCEDURE — S5553 INSULIN LONG ACTING 5 U: HCPCS

## 2023-05-11 PROCEDURE — 99221 1ST HOSP IP/OBS SF/LOW 40: CPT | Performed by: INTERNAL MEDICINE

## 2023-05-11 PROCEDURE — 85025 COMPLETE CBC W/AUTO DIFF WBC: CPT

## 2023-05-11 PROCEDURE — 6370000000 HC RX 637 (ALT 250 FOR IP): Performed by: STUDENT IN AN ORGANIZED HEALTH CARE EDUCATION/TRAINING PROGRAM

## 2023-05-11 PROCEDURE — 93010 ELECTROCARDIOGRAM REPORT: CPT | Performed by: INTERNAL MEDICINE

## 2023-05-11 PROCEDURE — 6360000002 HC RX W HCPCS: Performed by: STUDENT IN AN ORGANIZED HEALTH CARE EDUCATION/TRAINING PROGRAM

## 2023-05-11 PROCEDURE — 70551 MRI BRAIN STEM W/O DYE: CPT

## 2023-05-11 PROCEDURE — 83605 ASSAY OF LACTIC ACID: CPT

## 2023-05-11 PROCEDURE — 82947 ASSAY GLUCOSE BLOOD QUANT: CPT

## 2023-05-11 PROCEDURE — 84100 ASSAY OF PHOSPHORUS: CPT

## 2023-05-11 PROCEDURE — 95819 EEG AWAKE AND ASLEEP: CPT

## 2023-05-11 PROCEDURE — 6360000002 HC RX W HCPCS

## 2023-05-11 PROCEDURE — 84484 ASSAY OF TROPONIN QUANT: CPT

## 2023-05-11 PROCEDURE — C9113 INJ PANTOPRAZOLE SODIUM, VIA: HCPCS

## 2023-05-11 PROCEDURE — 80143 DRUG ASSAY ACETAMINOPHEN: CPT

## 2023-05-11 PROCEDURE — 82077 ASSAY SPEC XCP UR&BREATH IA: CPT

## 2023-05-11 PROCEDURE — 82962 GLUCOSE BLOOD TEST: CPT

## 2023-05-11 PROCEDURE — 84443 ASSAY THYROID STIM HORMONE: CPT

## 2023-05-11 RX ORDER — INSULIN LISPRO 100 [IU]/ML
0-8 INJECTION, SOLUTION INTRAVENOUS; SUBCUTANEOUS EVERY 6 HOURS
Status: DISCONTINUED | OUTPATIENT
Start: 2023-05-11 | End: 2023-05-11

## 2023-05-11 RX ORDER — ENOXAPARIN SODIUM 100 MG/ML
40 INJECTION SUBCUTANEOUS DAILY
Status: DISCONTINUED | OUTPATIENT
Start: 2023-05-11 | End: 2023-05-15 | Stop reason: HOSPADM

## 2023-05-11 RX ORDER — LISINOPRIL 10 MG/1
10 TABLET ORAL DAILY
Status: DISCONTINUED | OUTPATIENT
Start: 2023-05-11 | End: 2023-05-15 | Stop reason: HOSPADM

## 2023-05-11 RX ORDER — INSULIN GLARGINE-YFGN 100 [IU]/ML
15 INJECTION, SOLUTION SUBCUTANEOUS EVERY MORNING
Status: DISCONTINUED | OUTPATIENT
Start: 2023-05-11 | End: 2023-05-11

## 2023-05-11 RX ORDER — INSULIN GLARGINE-YFGN 100 [IU]/ML
10 INJECTION, SOLUTION SUBCUTANEOUS ONCE
Status: DISCONTINUED | OUTPATIENT
Start: 2023-05-11 | End: 2023-05-12

## 2023-05-11 RX ORDER — ACETAMINOPHEN 325 MG/1
650 TABLET ORAL EVERY 6 HOURS PRN
Status: DISCONTINUED | OUTPATIENT
Start: 2023-05-11 | End: 2023-05-15 | Stop reason: HOSPADM

## 2023-05-11 RX ORDER — INSULIN LISPRO 100 [IU]/ML
0-4 INJECTION, SOLUTION INTRAVENOUS; SUBCUTANEOUS EVERY 6 HOURS
Status: DISCONTINUED | OUTPATIENT
Start: 2023-05-11 | End: 2023-05-11

## 2023-05-11 RX ORDER — PANTOPRAZOLE SODIUM 40 MG/10ML
40 INJECTION, POWDER, LYOPHILIZED, FOR SOLUTION INTRAVENOUS DAILY
Status: DISCONTINUED | OUTPATIENT
Start: 2023-05-11 | End: 2023-05-15 | Stop reason: CLARIF

## 2023-05-11 RX ORDER — LANOLIN ALCOHOL/MO/W.PET/CERES
100 CREAM (GRAM) TOPICAL DAILY
Status: DISCONTINUED | OUTPATIENT
Start: 2023-05-11 | End: 2023-05-15 | Stop reason: HOSPADM

## 2023-05-11 RX ORDER — INSULIN GLARGINE-YFGN 100 [IU]/ML
10 INJECTION, SOLUTION SUBCUTANEOUS NIGHTLY
Status: DISCONTINUED | OUTPATIENT
Start: 2023-05-11 | End: 2023-05-11

## 2023-05-11 RX ORDER — ACETAMINOPHEN 650 MG/1
650 SUPPOSITORY RECTAL EVERY 6 HOURS PRN
Status: DISCONTINUED | OUTPATIENT
Start: 2023-05-11 | End: 2023-05-15 | Stop reason: HOSPADM

## 2023-05-11 RX ORDER — DEXTROSE MONOHYDRATE 100 MG/ML
INJECTION, SOLUTION INTRAVENOUS CONTINUOUS PRN
Status: DISCONTINUED | OUTPATIENT
Start: 2023-05-11 | End: 2023-05-15 | Stop reason: HOSPADM

## 2023-05-11 RX ORDER — SODIUM CHLORIDE, SODIUM LACTATE, POTASSIUM CHLORIDE, AND CALCIUM CHLORIDE .6; .31; .03; .02 G/100ML; G/100ML; G/100ML; G/100ML
1000 INJECTION, SOLUTION INTRAVENOUS ONCE
Status: COMPLETED | OUTPATIENT
Start: 2023-05-11 | End: 2023-05-11

## 2023-05-11 RX ORDER — SENNA PLUS 8.6 MG/1
2 TABLET ORAL NIGHTLY
Status: DISCONTINUED | OUTPATIENT
Start: 2023-05-11 | End: 2023-05-15 | Stop reason: HOSPADM

## 2023-05-11 RX ORDER — ASPIRIN 81 MG/1
81 TABLET ORAL DAILY
Status: DISCONTINUED | OUTPATIENT
Start: 2023-05-11 | End: 2023-05-15 | Stop reason: HOSPADM

## 2023-05-11 RX ORDER — INSULIN LISPRO 100 [IU]/ML
0-8 INJECTION, SOLUTION INTRAVENOUS; SUBCUTANEOUS EVERY 4 HOURS
Status: DISCONTINUED | OUTPATIENT
Start: 2023-05-11 | End: 2023-05-12 | Stop reason: DRUGHIGH

## 2023-05-11 RX ORDER — ONDANSETRON 4 MG/1
4 TABLET, ORALLY DISINTEGRATING ORAL EVERY 8 HOURS PRN
Status: DISCONTINUED | OUTPATIENT
Start: 2023-05-11 | End: 2023-05-15 | Stop reason: HOSPADM

## 2023-05-11 RX ORDER — INSULIN GLARGINE-YFGN 100 [IU]/ML
15 INJECTION, SOLUTION SUBCUTANEOUS EVERY MORNING
Status: DISCONTINUED | OUTPATIENT
Start: 2023-05-12 | End: 2023-05-13

## 2023-05-11 RX ORDER — ONDANSETRON 2 MG/ML
4 INJECTION INTRAMUSCULAR; INTRAVENOUS EVERY 6 HOURS PRN
Status: DISCONTINUED | OUTPATIENT
Start: 2023-05-11 | End: 2023-05-15 | Stop reason: HOSPADM

## 2023-05-11 RX ORDER — INSULIN GLARGINE-YFGN 100 [IU]/ML
5 INJECTION, SOLUTION SUBCUTANEOUS ONCE
Status: COMPLETED | OUTPATIENT
Start: 2023-05-11 | End: 2023-05-11

## 2023-05-11 RX ORDER — SODIUM CHLORIDE 9 MG/ML
INJECTION, SOLUTION INTRAVENOUS CONTINUOUS
Status: DISCONTINUED | OUTPATIENT
Start: 2023-05-11 | End: 2023-05-12 | Stop reason: ALTCHOICE

## 2023-05-11 RX ORDER — NICOTINE 21 MG/24HR
1 PATCH, TRANSDERMAL 24 HOURS TRANSDERMAL DAILY
Status: DISCONTINUED | OUTPATIENT
Start: 2023-05-11 | End: 2023-05-15 | Stop reason: HOSPADM

## 2023-05-11 RX ORDER — ATORVASTATIN CALCIUM 40 MG/1
80 TABLET, FILM COATED ORAL NIGHTLY
Status: DISCONTINUED | OUTPATIENT
Start: 2023-05-11 | End: 2023-05-15 | Stop reason: HOSPADM

## 2023-05-11 RX ORDER — INSULIN GLARGINE-YFGN 100 [IU]/ML
10 INJECTION, SOLUTION SUBCUTANEOUS ONCE
Status: COMPLETED | OUTPATIENT
Start: 2023-05-11 | End: 2023-05-11

## 2023-05-11 RX ORDER — AMLODIPINE BESYLATE 5 MG/1
5 TABLET ORAL DAILY
Status: DISCONTINUED | OUTPATIENT
Start: 2023-05-11 | End: 2023-05-15 | Stop reason: HOSPADM

## 2023-05-11 RX ORDER — SODIUM CHLORIDE 9 MG/ML
INJECTION, SOLUTION INTRAVENOUS PRN
Status: DISCONTINUED | OUTPATIENT
Start: 2023-05-11 | End: 2023-05-15 | Stop reason: HOSPADM

## 2023-05-11 RX ORDER — SODIUM CHLORIDE 0.9 % (FLUSH) 0.9 %
5-40 SYRINGE (ML) INJECTION PRN
Status: DISCONTINUED | OUTPATIENT
Start: 2023-05-11 | End: 2023-05-15 | Stop reason: HOSPADM

## 2023-05-11 RX ORDER — SODIUM CHLORIDE 0.9 % (FLUSH) 0.9 %
5-40 SYRINGE (ML) INJECTION EVERY 12 HOURS SCHEDULED
Status: DISCONTINUED | OUTPATIENT
Start: 2023-05-11 | End: 2023-05-15 | Stop reason: HOSPADM

## 2023-05-11 RX ORDER — FOLIC ACID 1 MG/1
1 TABLET ORAL DAILY
Status: DISCONTINUED | OUTPATIENT
Start: 2023-05-11 | End: 2023-05-15 | Stop reason: HOSPADM

## 2023-05-11 RX ADMIN — INSULIN GLARGINE-YFGN 5 UNITS: 100 INJECTION, SOLUTION SUBCUTANEOUS at 22:08

## 2023-05-11 RX ADMIN — SODIUM CHLORIDE, POTASSIUM CHLORIDE, SODIUM LACTATE AND CALCIUM CHLORIDE 1000 ML: 600; 310; 30; 20 INJECTION, SOLUTION INTRAVENOUS at 19:32

## 2023-05-11 RX ADMIN — ONDANSETRON 4 MG: 4 TABLET, ORALLY DISINTEGRATING ORAL at 06:22

## 2023-05-11 RX ADMIN — SODIUM CHLORIDE: 9 INJECTION, SOLUTION INTRAVENOUS at 09:16

## 2023-05-11 RX ADMIN — INSULIN GLARGINE-YFGN 10 UNITS: 100 INJECTION, SOLUTION SUBCUTANEOUS at 07:38

## 2023-05-11 RX ADMIN — ATORVASTATIN CALCIUM 80 MG: 80 TABLET, FILM COATED ORAL at 20:44

## 2023-05-11 RX ADMIN — PANTOPRAZOLE SODIUM 40 MG: 40 INJECTION, POWDER, FOR SOLUTION INTRAVENOUS at 15:39

## 2023-05-11 RX ADMIN — ENOXAPARIN SODIUM 40 MG: 100 INJECTION SUBCUTANEOUS at 08:41

## 2023-05-11 RX ADMIN — INSULIN HUMAN 5 UNITS: 100 INJECTION, SOLUTION PARENTERAL at 09:14

## 2023-05-11 RX ADMIN — SENNOSIDES 17.2 MG: 8.6 TABLET, COATED ORAL at 20:44

## 2023-05-11 RX ADMIN — INSULIN HUMAN 5 UNITS: 100 INJECTION, SOLUTION PARENTERAL at 11:55

## 2023-05-11 RX ADMIN — INSULIN LISPRO 4 UNITS: 100 INJECTION, SOLUTION INTRAVENOUS; SUBCUTANEOUS at 20:50

## 2023-05-11 RX ADMIN — ONDANSETRON 4 MG: 2 INJECTION INTRAMUSCULAR; INTRAVENOUS at 20:41

## 2023-05-11 RX ADMIN — SODIUM CHLORIDE, PRESERVATIVE FREE 10 ML: 5 INJECTION INTRAVENOUS at 08:46

## 2023-05-11 RX ADMIN — INSULIN LISPRO 8 UNITS: 100 INJECTION, SOLUTION INTRAVENOUS; SUBCUTANEOUS at 08:40

## 2023-05-12 ENCOUNTER — APPOINTMENT (OUTPATIENT)
Dept: GENERAL RADIOLOGY | Age: 53
DRG: 064 | End: 2023-05-12

## 2023-05-12 LAB
ANION GAP SERPL CALCULATED.3IONS-SCNC: 10 MMOL/L (ref 7–16)
ANION GAP SERPL CALCULATED.3IONS-SCNC: 10 MMOL/L (ref 7–16)
ANION GAP SERPL CALCULATED.3IONS-SCNC: 12 MMOL/L (ref 7–16)
ANION GAP SERPL CALCULATED.3IONS-SCNC: 13 MMOL/L (ref 7–16)
ANION GAP SERPL CALCULATED.3IONS-SCNC: 16 MMOL/L (ref 7–16)
ANION GAP SERPL CALCULATED.3IONS-SCNC: 18 MMOL/L (ref 7–16)
B.E.: -3.8 MMOL/L (ref -3–3)
BASOPHILS # BLD: 0.07 E9/L (ref 0–0.2)
BASOPHILS NFR BLD: 0.4 % (ref 0–2)
BETA-HYDROXYBUTYRATE: 1.74 MMOL/L (ref 0.02–0.27)
BUN SERPL-MCNC: 10 MG/DL (ref 6–20)
BUN SERPL-MCNC: 10 MG/DL (ref 6–20)
BUN SERPL-MCNC: 14 MG/DL (ref 6–20)
BUN SERPL-MCNC: 16 MG/DL (ref 6–20)
BUN SERPL-MCNC: 19 MG/DL (ref 6–20)
BUN SERPL-MCNC: 20 MG/DL (ref 6–20)
CALCIUM SERPL-MCNC: 8.9 MG/DL (ref 8.6–10.2)
CALCIUM SERPL-MCNC: 8.9 MG/DL (ref 8.6–10.2)
CALCIUM SERPL-MCNC: 9 MG/DL (ref 8.6–10.2)
CALCIUM SERPL-MCNC: 9.3 MG/DL (ref 8.6–10.2)
CHLORIDE SERPL-SCNC: 100 MMOL/L (ref 98–107)
CHLORIDE SERPL-SCNC: 101 MMOL/L (ref 98–107)
CHLORIDE SERPL-SCNC: 101 MMOL/L (ref 98–107)
CHLORIDE SERPL-SCNC: 102 MMOL/L (ref 98–107)
CHLORIDE SERPL-SCNC: 98 MMOL/L (ref 98–107)
CHLORIDE SERPL-SCNC: 99 MMOL/L (ref 98–107)
CO2 SERPL-SCNC: 21 MMOL/L (ref 22–29)
CO2 SERPL-SCNC: 21 MMOL/L (ref 22–29)
CO2 SERPL-SCNC: 22 MMOL/L (ref 22–29)
CO2 SERPL-SCNC: 25 MMOL/L (ref 22–29)
CO2 SERPL-SCNC: 26 MMOL/L (ref 22–29)
CO2 SERPL-SCNC: 26 MMOL/L (ref 22–29)
COHB: 1.2 % (ref 0–1.5)
CREAT SERPL-MCNC: 0.5 MG/DL (ref 0.7–1.2)
CREAT SERPL-MCNC: 0.5 MG/DL (ref 0.7–1.2)
CREAT SERPL-MCNC: 0.6 MG/DL (ref 0.7–1.2)
CRITICAL: ABNORMAL
DATE ANALYZED: ABNORMAL
DATE OF COLLECTION: ABNORMAL
EOSINOPHIL # BLD: 0.08 E9/L (ref 0.05–0.5)
EOSINOPHIL NFR BLD: 0.4 % (ref 0–6)
ERYTHROCYTE [DISTWIDTH] IN BLOOD BY AUTOMATED COUNT: 15.7 FL (ref 11.5–15)
GLUCOSE SERPL-MCNC: 144 MG/DL (ref 74–99)
GLUCOSE SERPL-MCNC: 148 MG/DL (ref 74–99)
GLUCOSE SERPL-MCNC: 182 MG/DL (ref 74–99)
GLUCOSE SERPL-MCNC: 184 MG/DL (ref 74–99)
GLUCOSE SERPL-MCNC: 63 MG/DL (ref 74–99)
GLUCOSE SERPL-MCNC: 97 MG/DL (ref 74–99)
HCO3: 19.8 MMOL/L (ref 22–26)
HCT VFR BLD AUTO: 35.1 % (ref 37–54)
HGB BLD-MCNC: 11.4 G/DL (ref 12.5–16.5)
HHB: 0.8 % (ref 0–5)
IMM GRANULOCYTES # BLD: 0.08 E9/L
IMM GRANULOCYTES NFR BLD: 0.4 % (ref 0–5)
LAB: ABNORMAL
LV EF: 65 %
LVEF MODALITY: NORMAL
LYMPHOCYTES # BLD: 2.71 E9/L (ref 1.5–4)
LYMPHOCYTES NFR BLD: 14.2 % (ref 20–42)
Lab: ABNORMAL
MAGNESIUM SERPL-MCNC: 1.6 MG/DL (ref 1.6–2.6)
MAGNESIUM SERPL-MCNC: 1.6 MG/DL (ref 1.6–2.6)
MAGNESIUM SERPL-MCNC: 1.7 MG/DL (ref 1.6–2.6)
MAGNESIUM SERPL-MCNC: 2.1 MG/DL (ref 1.6–2.6)
MAGNESIUM SERPL-MCNC: 2.3 MG/DL (ref 1.6–2.6)
MCH RBC QN AUTO: 28.4 PG (ref 26–35)
MCHC RBC AUTO-ENTMCNC: 32.5 % (ref 32–34.5)
MCV RBC AUTO: 87.5 FL (ref 80–99.9)
METER GLUCOSE: 147 MG/DL (ref 74–99)
METER GLUCOSE: 149 MG/DL (ref 74–99)
METER GLUCOSE: 172 MG/DL (ref 74–99)
METER GLUCOSE: 178 MG/DL (ref 74–99)
METER GLUCOSE: 218 MG/DL (ref 74–99)
METER GLUCOSE: 76 MG/DL (ref 74–99)
METHB: 0.2 % (ref 0–1.5)
MODE: ABNORMAL
MONOCYTES # BLD: 1.18 E9/L (ref 0.1–0.95)
MONOCYTES NFR BLD: 6.2 % (ref 2–12)
NEUTROPHILS # BLD: 15 E9/L (ref 1.8–7.3)
NEUTS SEG NFR BLD: 78.4 % (ref 43–80)
O2 CONTENT: 17.1 ML/DL
O2 SATURATION: 99.2 % (ref 92–98.5)
O2HB: 97.8 % (ref 94–97)
OPERATOR ID: 2962
PATIENT TEMP: 37 C
PCO2: 31.4 MMHG (ref 35–45)
PH BLOOD GAS: 7.42 (ref 7.35–7.45)
PHOSPHATE SERPL-MCNC: 2.1 MG/DL (ref 2.5–4.5)
PHOSPHATE SERPL-MCNC: 2.3 MG/DL (ref 2.5–4.5)
PHOSPHATE SERPL-MCNC: 2.3 MG/DL (ref 2.5–4.5)
PHOSPHATE SERPL-MCNC: 2.8 MG/DL (ref 2.5–4.5)
PHOSPHATE SERPL-MCNC: 3.1 MG/DL (ref 2.5–4.5)
PLATELET # BLD AUTO: 516 E9/L (ref 130–450)
PMV BLD AUTO: 8.1 FL (ref 7–12)
PO2: 175.6 MMHG (ref 75–100)
POTASSIUM SERPL-SCNC: 3.4 MMOL/L (ref 3.5–5)
POTASSIUM SERPL-SCNC: 3.5 MMOL/L (ref 3.5–5)
POTASSIUM SERPL-SCNC: 3.8 MMOL/L (ref 3.5–5)
POTASSIUM SERPL-SCNC: 5 MMOL/L (ref 3.5–5)
PROCALCITONIN: 1.35 NG/ML (ref 0–0.08)
RBC # BLD AUTO: 4.01 E12/L (ref 3.8–5.8)
SODIUM SERPL-SCNC: 136 MMOL/L (ref 132–146)
SODIUM SERPL-SCNC: 138 MMOL/L (ref 132–146)
SODIUM SERPL-SCNC: 139 MMOL/L (ref 132–146)
SOURCE, BLOOD GAS: ABNORMAL
THB: 12.2 G/DL (ref 11.5–16.5)
TIME ANALYZED: 514
WBC # BLD: 19.1 E9/L (ref 4.5–11.5)

## 2023-05-12 PROCEDURE — 82962 GLUCOSE BLOOD TEST: CPT

## 2023-05-12 PROCEDURE — S5553 INSULIN LONG ACTING 5 U: HCPCS

## 2023-05-12 PROCEDURE — 2000000000 HC ICU R&B

## 2023-05-12 PROCEDURE — 6360000002 HC RX W HCPCS: Performed by: INTERNAL MEDICINE

## 2023-05-12 PROCEDURE — 6370000000 HC RX 637 (ALT 250 FOR IP)

## 2023-05-12 PROCEDURE — 82010 KETONE BODYS QUAN: CPT

## 2023-05-12 PROCEDURE — 2580000003 HC RX 258: Performed by: INTERNAL MEDICINE

## 2023-05-12 PROCEDURE — 6360000002 HC RX W HCPCS: Performed by: STUDENT IN AN ORGANIZED HEALTH CARE EDUCATION/TRAINING PROGRAM

## 2023-05-12 PROCEDURE — 82805 BLOOD GASES W/O2 SATURATION: CPT

## 2023-05-12 PROCEDURE — 2500000003 HC RX 250 WO HCPCS: Performed by: INTERNAL MEDICINE

## 2023-05-12 PROCEDURE — 99232 SBSQ HOSP IP/OBS MODERATE 35: CPT | Performed by: INTERNAL MEDICINE

## 2023-05-12 PROCEDURE — 6370000000 HC RX 637 (ALT 250 FOR IP): Performed by: STUDENT IN AN ORGANIZED HEALTH CARE EDUCATION/TRAINING PROGRAM

## 2023-05-12 PROCEDURE — 93306 TTE W/DOPPLER COMPLETE: CPT

## 2023-05-12 PROCEDURE — 2580000003 HC RX 258: Performed by: CHIROPRACTOR

## 2023-05-12 PROCEDURE — 36415 COLL VENOUS BLD VENIPUNCTURE: CPT

## 2023-05-12 PROCEDURE — 84100 ASSAY OF PHOSPHORUS: CPT

## 2023-05-12 PROCEDURE — 84145 PROCALCITONIN (PCT): CPT

## 2023-05-12 PROCEDURE — 2580000003 HC RX 258: Performed by: STUDENT IN AN ORGANIZED HEALTH CARE EDUCATION/TRAINING PROGRAM

## 2023-05-12 PROCEDURE — 83735 ASSAY OF MAGNESIUM: CPT

## 2023-05-12 PROCEDURE — 6360000002 HC RX W HCPCS

## 2023-05-12 PROCEDURE — 80048 BASIC METABOLIC PNL TOTAL CA: CPT

## 2023-05-12 PROCEDURE — 99232 SBSQ HOSP IP/OBS MODERATE 35: CPT | Performed by: CLINICAL NURSE SPECIALIST

## 2023-05-12 PROCEDURE — C9113 INJ PANTOPRAZOLE SODIUM, VIA: HCPCS

## 2023-05-12 PROCEDURE — 2500000003 HC RX 250 WO HCPCS: Performed by: STUDENT IN AN ORGANIZED HEALTH CARE EDUCATION/TRAINING PROGRAM

## 2023-05-12 PROCEDURE — 85025 COMPLETE CBC W/AUTO DIFF WBC: CPT

## 2023-05-12 PROCEDURE — 71045 X-RAY EXAM CHEST 1 VIEW: CPT

## 2023-05-12 PROCEDURE — 6370000000 HC RX 637 (ALT 250 FOR IP): Performed by: INTERNAL MEDICINE

## 2023-05-12 RX ORDER — INSULIN LISPRO 100 [IU]/ML
0-4 INJECTION, SOLUTION INTRAVENOUS; SUBCUTANEOUS
Status: DISCONTINUED | OUTPATIENT
Start: 2023-05-13 | End: 2023-05-13

## 2023-05-12 RX ORDER — CHLORHEXIDINE GLUCONATE 0.12 MG/ML
15 RINSE ORAL 2 TIMES DAILY
Status: DISCONTINUED | OUTPATIENT
Start: 2023-05-12 | End: 2023-05-15 | Stop reason: HOSPADM

## 2023-05-12 RX ORDER — LABETALOL HYDROCHLORIDE 5 MG/ML
10 INJECTION, SOLUTION INTRAVENOUS ONCE
Status: COMPLETED | OUTPATIENT
Start: 2023-05-12 | End: 2023-05-12

## 2023-05-12 RX ORDER — POTASSIUM CHLORIDE 7.45 MG/ML
10 INJECTION INTRAVENOUS PRN
Status: DISCONTINUED | OUTPATIENT
Start: 2023-05-12 | End: 2023-05-12

## 2023-05-12 RX ORDER — SODIUM CHLORIDE 9 MG/ML
INJECTION, SOLUTION INTRAVENOUS CONTINUOUS
Status: DISCONTINUED | OUTPATIENT
Start: 2023-05-12 | End: 2023-05-12

## 2023-05-12 RX ORDER — CLOPIDOGREL BISULFATE 75 MG/1
75 TABLET ORAL DAILY
Status: DISCONTINUED | OUTPATIENT
Start: 2023-05-12 | End: 2023-05-15 | Stop reason: HOSPADM

## 2023-05-12 RX ORDER — MAGNESIUM SULFATE IN WATER 40 MG/ML
2000 INJECTION, SOLUTION INTRAVENOUS ONCE
Status: COMPLETED | OUTPATIENT
Start: 2023-05-12 | End: 2023-05-12

## 2023-05-12 RX ORDER — DEXTROSE, SODIUM CHLORIDE, AND POTASSIUM CHLORIDE 5; .45; .15 G/100ML; G/100ML; G/100ML
INJECTION INTRAVENOUS CONTINUOUS PRN
Status: DISCONTINUED | OUTPATIENT
Start: 2023-05-12 | End: 2023-05-12

## 2023-05-12 RX ORDER — INSULIN LISPRO 100 [IU]/ML
0-4 INJECTION, SOLUTION INTRAVENOUS; SUBCUTANEOUS NIGHTLY
Status: DISCONTINUED | OUTPATIENT
Start: 2023-05-13 | End: 2023-05-13

## 2023-05-12 RX ORDER — HYDRALAZINE HYDROCHLORIDE 20 MG/ML
10 INJECTION INTRAMUSCULAR; INTRAVENOUS EVERY 6 HOURS PRN
Status: DISCONTINUED | OUTPATIENT
Start: 2023-05-12 | End: 2023-05-15 | Stop reason: HOSPADM

## 2023-05-12 RX ORDER — MAGNESIUM SULFATE 1 G/100ML
1000 INJECTION INTRAVENOUS PRN
Status: DISCONTINUED | OUTPATIENT
Start: 2023-05-12 | End: 2023-05-12

## 2023-05-12 RX ADMIN — SODIUM CHLORIDE, PRESERVATIVE FREE 10 ML: 5 INJECTION INTRAVENOUS at 00:27

## 2023-05-12 RX ADMIN — ATORVASTATIN CALCIUM 80 MG: 80 TABLET, FILM COATED ORAL at 21:12

## 2023-05-12 RX ADMIN — INSULIN GLARGINE-YFGN 15 UNITS: 100 INJECTION, SOLUTION SUBCUTANEOUS at 10:49

## 2023-05-12 RX ADMIN — ENOXAPARIN SODIUM 40 MG: 100 INJECTION SUBCUTANEOUS at 10:49

## 2023-05-12 RX ADMIN — SODIUM CHLORIDE: 9 INJECTION, SOLUTION INTRAVENOUS at 01:27

## 2023-05-12 RX ADMIN — FOLIC ACID 1 MG: 1 TABLET ORAL at 10:51

## 2023-05-12 RX ADMIN — SODIUM CHLORIDE, PRESERVATIVE FREE 10 ML: 5 INJECTION INTRAVENOUS at 10:50

## 2023-05-12 RX ADMIN — POTASSIUM PHOSPHATE, MONOBASIC AND POTASSIUM PHOSPHATE, DIBASIC 15 MMOL: 224; 236 INJECTION, SOLUTION, CONCENTRATE INTRAVENOUS at 17:44

## 2023-05-12 RX ADMIN — MAGNESIUM SULFATE HEPTAHYDRATE 2000 MG: 40 INJECTION, SOLUTION INTRAVENOUS at 15:18

## 2023-05-12 RX ADMIN — SODIUM CHLORIDE, PRESERVATIVE FREE 10 ML: 5 INJECTION INTRAVENOUS at 10:53

## 2023-05-12 RX ADMIN — SODIUM CHLORIDE: 9 INJECTION, SOLUTION INTRAVENOUS at 10:47

## 2023-05-12 RX ADMIN — PANTOPRAZOLE SODIUM 40 MG: 40 INJECTION, POWDER, FOR SOLUTION INTRAVENOUS at 10:50

## 2023-05-12 RX ADMIN — CLOPIDOGREL BISULFATE 75 MG: 75 TABLET ORAL at 10:51

## 2023-05-12 RX ADMIN — INSULIN LISPRO 2 UNITS: 100 INJECTION, SOLUTION INTRAVENOUS; SUBCUTANEOUS at 00:26

## 2023-05-12 RX ADMIN — LABETALOL HYDROCHLORIDE 10 MG: 5 INJECTION INTRAVENOUS at 17:41

## 2023-05-12 RX ADMIN — 0.12% CHLORHEXIDINE GLUCONATE 15 ML: 1.2 RINSE ORAL at 10:51

## 2023-05-12 RX ADMIN — Medication 100 MG: at 10:51

## 2023-05-12 RX ADMIN — SODIUM CHLORIDE, PRESERVATIVE FREE 10 ML: 5 INJECTION INTRAVENOUS at 21:13

## 2023-05-12 RX ADMIN — ASPIRIN 81 MG: 81 TABLET, COATED ORAL at 10:51

## 2023-05-12 ASSESSMENT — PAIN SCALES - WONG BAKER
WONGBAKER_NUMERICALRESPONSE: 0

## 2023-05-12 ASSESSMENT — PAIN SCALES - GENERAL
PAINLEVEL_OUTOF10: 0

## 2023-05-13 LAB
ANION GAP SERPL CALCULATED.3IONS-SCNC: 11 MMOL/L (ref 7–16)
ANION GAP SERPL CALCULATED.3IONS-SCNC: 9 MMOL/L (ref 7–16)
BASOPHILS # BLD: 0.07 E9/L (ref 0–0.2)
BASOPHILS NFR BLD: 0.7 % (ref 0–2)
BUN SERPL-MCNC: 8 MG/DL (ref 6–20)
BUN SERPL-MCNC: 9 MG/DL (ref 6–20)
CALCIUM SERPL-MCNC: 8.3 MG/DL (ref 8.6–10.2)
CALCIUM SERPL-MCNC: 9.1 MG/DL (ref 8.6–10.2)
CHLORIDE SERPL-SCNC: 100 MMOL/L (ref 98–107)
CHLORIDE SERPL-SCNC: 97 MMOL/L (ref 98–107)
CO2 SERPL-SCNC: 23 MMOL/L (ref 22–29)
CO2 SERPL-SCNC: 27 MMOL/L (ref 22–29)
CREAT SERPL-MCNC: 0.5 MG/DL (ref 0.7–1.2)
CREAT SERPL-MCNC: 0.5 MG/DL (ref 0.7–1.2)
EOSINOPHIL # BLD: 0.16 E9/L (ref 0.05–0.5)
EOSINOPHIL NFR BLD: 1.6 % (ref 0–6)
ERYTHROCYTE [DISTWIDTH] IN BLOOD BY AUTOMATED COUNT: 15.3 FL (ref 11.5–15)
GLUCOSE SERPL-MCNC: 290 MG/DL (ref 74–99)
GLUCOSE SERPL-MCNC: 84 MG/DL (ref 74–99)
HCT VFR BLD AUTO: 36.1 % (ref 37–54)
HGB BLD-MCNC: 11.8 G/DL (ref 12.5–16.5)
IMM GRANULOCYTES # BLD: 0.04 E9/L
IMM GRANULOCYTES NFR BLD: 0.4 % (ref 0–5)
LYMPHOCYTES # BLD: 1.41 E9/L (ref 1.5–4)
LYMPHOCYTES NFR BLD: 14.1 % (ref 20–42)
MAGNESIUM SERPL-MCNC: 1.9 MG/DL (ref 1.6–2.6)
MAGNESIUM SERPL-MCNC: 1.9 MG/DL (ref 1.6–2.6)
MCH RBC QN AUTO: 28.6 PG (ref 26–35)
MCHC RBC AUTO-ENTMCNC: 32.7 % (ref 32–34.5)
MCV RBC AUTO: 87.4 FL (ref 80–99.9)
METER GLUCOSE: 237 MG/DL (ref 74–99)
METER GLUCOSE: 258 MG/DL (ref 74–99)
METER GLUCOSE: 274 MG/DL (ref 74–99)
METER GLUCOSE: 276 MG/DL (ref 74–99)
METER GLUCOSE: 298 MG/DL (ref 74–99)
METER GLUCOSE: 304 MG/DL (ref 74–99)
METER GLUCOSE: 389 MG/DL (ref 74–99)
METER GLUCOSE: 80 MG/DL (ref 74–99)
MONOCYTES # BLD: 0.8 E9/L (ref 0.1–0.95)
MONOCYTES NFR BLD: 8 % (ref 2–12)
NEUTROPHILS # BLD: 7.53 E9/L (ref 1.8–7.3)
NEUTS SEG NFR BLD: 75.2 % (ref 43–80)
PHOSPHATE SERPL-MCNC: 2.3 MG/DL (ref 2.5–4.5)
PHOSPHATE SERPL-MCNC: 2.8 MG/DL (ref 2.5–4.5)
PLATELET # BLD AUTO: 446 E9/L (ref 130–450)
PMV BLD AUTO: 8.4 FL (ref 7–12)
POTASSIUM SERPL-SCNC: 3.9 MMOL/L (ref 3.5–5)
POTASSIUM SERPL-SCNC: 4.5 MMOL/L (ref 3.5–5)
RBC # BLD AUTO: 4.13 E12/L (ref 3.8–5.8)
REASON FOR REJECTION: NORMAL
REASON FOR REJECTION: NORMAL
REJECTED TEST: NORMAL
REJECTED TEST: NORMAL
SODIUM SERPL-SCNC: 133 MMOL/L (ref 132–146)
SODIUM SERPL-SCNC: 134 MMOL/L (ref 132–146)
WBC # BLD: 10 E9/L (ref 4.5–11.5)

## 2023-05-13 PROCEDURE — 2580000003 HC RX 258: Performed by: STUDENT IN AN ORGANIZED HEALTH CARE EDUCATION/TRAINING PROGRAM

## 2023-05-13 PROCEDURE — 1200000000 HC SEMI PRIVATE

## 2023-05-13 PROCEDURE — 84100 ASSAY OF PHOSPHORUS: CPT

## 2023-05-13 PROCEDURE — 6360000002 HC RX W HCPCS: Performed by: STUDENT IN AN ORGANIZED HEALTH CARE EDUCATION/TRAINING PROGRAM

## 2023-05-13 PROCEDURE — 99231 SBSQ HOSP IP/OBS SF/LOW 25: CPT | Performed by: INTERNAL MEDICINE

## 2023-05-13 PROCEDURE — 36415 COLL VENOUS BLD VENIPUNCTURE: CPT

## 2023-05-13 PROCEDURE — 6370000000 HC RX 637 (ALT 250 FOR IP): Performed by: STUDENT IN AN ORGANIZED HEALTH CARE EDUCATION/TRAINING PROGRAM

## 2023-05-13 PROCEDURE — 6370000000 HC RX 637 (ALT 250 FOR IP): Performed by: INTERNAL MEDICINE

## 2023-05-13 PROCEDURE — 99232 SBSQ HOSP IP/OBS MODERATE 35: CPT | Performed by: CLINICAL NURSE SPECIALIST

## 2023-05-13 PROCEDURE — 99233 SBSQ HOSP IP/OBS HIGH 50: CPT | Performed by: INTERNAL MEDICINE

## 2023-05-13 PROCEDURE — 83735 ASSAY OF MAGNESIUM: CPT

## 2023-05-13 PROCEDURE — C9113 INJ PANTOPRAZOLE SODIUM, VIA: HCPCS

## 2023-05-13 PROCEDURE — S5553 INSULIN LONG ACTING 5 U: HCPCS | Performed by: INTERNAL MEDICINE

## 2023-05-13 PROCEDURE — 6370000000 HC RX 637 (ALT 250 FOR IP): Performed by: CLINICAL NURSE SPECIALIST

## 2023-05-13 PROCEDURE — 85025 COMPLETE CBC W/AUTO DIFF WBC: CPT

## 2023-05-13 PROCEDURE — 80048 BASIC METABOLIC PNL TOTAL CA: CPT

## 2023-05-13 PROCEDURE — 6360000002 HC RX W HCPCS

## 2023-05-13 PROCEDURE — 82962 GLUCOSE BLOOD TEST: CPT

## 2023-05-13 RX ORDER — INSULIN GLARGINE-YFGN 100 [IU]/ML
10 INJECTION, SOLUTION SUBCUTANEOUS ONCE
Status: COMPLETED | OUTPATIENT
Start: 2023-05-13 | End: 2023-05-13

## 2023-05-13 RX ORDER — BACLOFEN 10 MG/1
10 TABLET ORAL 2 TIMES DAILY
Status: DISCONTINUED | OUTPATIENT
Start: 2023-05-13 | End: 2023-05-15 | Stop reason: HOSPADM

## 2023-05-13 RX ORDER — INSULIN LISPRO 100 [IU]/ML
5 INJECTION, SOLUTION INTRAVENOUS; SUBCUTANEOUS
Status: DISCONTINUED | OUTPATIENT
Start: 2023-05-13 | End: 2023-05-15 | Stop reason: HOSPADM

## 2023-05-13 RX ORDER — INSULIN LISPRO 100 [IU]/ML
0-4 INJECTION, SOLUTION INTRAVENOUS; SUBCUTANEOUS NIGHTLY
Status: DISCONTINUED | OUTPATIENT
Start: 2023-05-13 | End: 2023-05-15 | Stop reason: HOSPADM

## 2023-05-13 RX ORDER — INSULIN LISPRO 100 [IU]/ML
0-4 INJECTION, SOLUTION INTRAVENOUS; SUBCUTANEOUS NIGHTLY
Status: DISCONTINUED | OUTPATIENT
Start: 2023-05-13 | End: 2023-05-13

## 2023-05-13 RX ORDER — INSULIN GLARGINE-YFGN 100 [IU]/ML
20 INJECTION, SOLUTION SUBCUTANEOUS NIGHTLY
Status: DISCONTINUED | OUTPATIENT
Start: 2023-05-13 | End: 2023-05-15

## 2023-05-13 RX ORDER — INSULIN LISPRO 100 [IU]/ML
0-8 INJECTION, SOLUTION INTRAVENOUS; SUBCUTANEOUS
Status: DISCONTINUED | OUTPATIENT
Start: 2023-05-13 | End: 2023-05-13

## 2023-05-13 RX ORDER — INSULIN GLARGINE-YFGN 100 [IU]/ML
20 INJECTION, SOLUTION SUBCUTANEOUS EVERY MORNING
Status: DISCONTINUED | OUTPATIENT
Start: 2023-05-14 | End: 2023-05-13

## 2023-05-13 RX ORDER — INSULIN LISPRO 100 [IU]/ML
0-4 INJECTION, SOLUTION INTRAVENOUS; SUBCUTANEOUS
Status: DISCONTINUED | OUTPATIENT
Start: 2023-05-13 | End: 2023-05-15 | Stop reason: HOSPADM

## 2023-05-13 RX ORDER — INSULIN LISPRO 100 [IU]/ML
3 INJECTION, SOLUTION INTRAVENOUS; SUBCUTANEOUS
Status: DISCONTINUED | OUTPATIENT
Start: 2023-05-13 | End: 2023-05-13

## 2023-05-13 RX ADMIN — ASPIRIN 81 MG: 81 TABLET, COATED ORAL at 10:05

## 2023-05-13 RX ADMIN — ENOXAPARIN SODIUM 40 MG: 100 INJECTION SUBCUTANEOUS at 10:03

## 2023-05-13 RX ADMIN — SODIUM CHLORIDE, PRESERVATIVE FREE 10 ML: 5 INJECTION INTRAVENOUS at 10:03

## 2023-05-13 RX ADMIN — INSULIN GLARGINE-YFGN 10 UNITS: 100 INJECTION, SOLUTION SUBCUTANEOUS at 07:00

## 2023-05-13 RX ADMIN — BACLOFEN 10 MG: 10 TABLET ORAL at 10:05

## 2023-05-13 RX ADMIN — BACLOFEN 10 MG: 10 TABLET ORAL at 23:59

## 2023-05-13 RX ADMIN — INSULIN LISPRO 2 UNITS: 100 INJECTION, SOLUTION INTRAVENOUS; SUBCUTANEOUS at 16:23

## 2023-05-13 RX ADMIN — CLOPIDOGREL BISULFATE 75 MG: 75 TABLET ORAL at 10:04

## 2023-05-13 RX ADMIN — INSULIN LISPRO 5 UNITS: 100 INJECTION, SOLUTION INTRAVENOUS; SUBCUTANEOUS at 14:25

## 2023-05-13 RX ADMIN — LISINOPRIL 10 MG: 10 TABLET ORAL at 10:04

## 2023-05-13 RX ADMIN — FOLIC ACID 1 MG: 1 TABLET ORAL at 10:04

## 2023-05-13 RX ADMIN — INSULIN LISPRO 2 UNITS: 100 INJECTION, SOLUTION INTRAVENOUS; SUBCUTANEOUS at 10:02

## 2023-05-13 RX ADMIN — DIBASIC SODIUM PHOSPHATE, MONOBASIC POTASSIUM PHOSPHATE AND MONOBASIC SODIUM PHOSPHATE 2 TABLET: 852; 155; 130 TABLET ORAL at 16:12

## 2023-05-13 RX ADMIN — Medication 100 MG: at 10:04

## 2023-05-13 RX ADMIN — 0.12% CHLORHEXIDINE GLUCONATE 15 ML: 1.2 RINSE ORAL at 23:59

## 2023-05-13 RX ADMIN — 0.12% CHLORHEXIDINE GLUCONATE 15 ML: 1.2 RINSE ORAL at 10:06

## 2023-05-13 RX ADMIN — SODIUM CHLORIDE: 9 INJECTION, SOLUTION INTRAVENOUS at 05:36

## 2023-05-13 RX ADMIN — SENNOSIDES 17.2 MG: 8.6 TABLET, COATED ORAL at 23:59

## 2023-05-13 RX ADMIN — INSULIN LISPRO 5 UNITS: 100 INJECTION, SOLUTION INTRAVENOUS; SUBCUTANEOUS at 16:23

## 2023-05-13 RX ADMIN — ATORVASTATIN CALCIUM 80 MG: 80 TABLET, FILM COATED ORAL at 23:59

## 2023-05-13 RX ADMIN — INSULIN GLARGINE-YFGN 10 UNITS: 100 INJECTION, SOLUTION SUBCUTANEOUS at 06:40

## 2023-05-13 RX ADMIN — PANTOPRAZOLE SODIUM 40 MG: 40 INJECTION, POWDER, FOR SOLUTION INTRAVENOUS at 10:03

## 2023-05-13 ASSESSMENT — PAIN SCALES - GENERAL
PAINLEVEL_OUTOF10: 0

## 2023-05-13 ASSESSMENT — PAIN SCALES - WONG BAKER: WONGBAKER_NUMERICALRESPONSE: 0

## 2023-05-14 LAB
ANION GAP SERPL CALCULATED.3IONS-SCNC: 10 MMOL/L (ref 7–16)
BASOPHILS # BLD: 0.04 E9/L (ref 0–0.2)
BASOPHILS NFR BLD: 0.6 % (ref 0–2)
BUN SERPL-MCNC: 19 MG/DL (ref 6–20)
CALCIUM SERPL-MCNC: 9.2 MG/DL (ref 8.6–10.2)
CHLORIDE SERPL-SCNC: 100 MMOL/L (ref 98–107)
CO2 SERPL-SCNC: 25 MMOL/L (ref 22–29)
CREAT SERPL-MCNC: 0.7 MG/DL (ref 0.7–1.2)
EOSINOPHIL # BLD: 0.13 E9/L (ref 0.05–0.5)
EOSINOPHIL NFR BLD: 1.8 % (ref 0–6)
ERYTHROCYTE [DISTWIDTH] IN BLOOD BY AUTOMATED COUNT: 14.9 FL (ref 11.5–15)
GLUCOSE SERPL-MCNC: 214 MG/DL (ref 74–99)
HCT VFR BLD AUTO: 32.8 % (ref 37–54)
HGB BLD-MCNC: 10.7 G/DL (ref 12.5–16.5)
IMM GRANULOCYTES # BLD: 0.02 E9/L
IMM GRANULOCYTES NFR BLD: 0.3 % (ref 0–5)
LYMPHOCYTES # BLD: 1.69 E9/L (ref 1.5–4)
LYMPHOCYTES NFR BLD: 24 % (ref 20–42)
MAGNESIUM SERPL-MCNC: 1.7 MG/DL (ref 1.6–2.6)
MCH RBC QN AUTO: 28.4 PG (ref 26–35)
MCHC RBC AUTO-ENTMCNC: 32.6 % (ref 32–34.5)
MCV RBC AUTO: 87 FL (ref 80–99.9)
METER GLUCOSE: 181 MG/DL (ref 74–99)
METER GLUCOSE: 193 MG/DL (ref 74–99)
METER GLUCOSE: 227 MG/DL (ref 74–99)
METER GLUCOSE: 321 MG/DL (ref 74–99)
MONOCYTES # BLD: 0.6 E9/L (ref 0.1–0.95)
MONOCYTES NFR BLD: 8.5 % (ref 2–12)
NEUTROPHILS # BLD: 4.56 E9/L (ref 1.8–7.3)
NEUTS SEG NFR BLD: 64.8 % (ref 43–80)
PHOSPHATE SERPL-MCNC: 3.4 MG/DL (ref 2.5–4.5)
PLATELET # BLD AUTO: 425 E9/L (ref 130–450)
PMV BLD AUTO: 8.3 FL (ref 7–12)
POTASSIUM SERPL-SCNC: 3.8 MMOL/L (ref 3.5–5)
RBC # BLD AUTO: 3.77 E12/L (ref 3.8–5.8)
SODIUM SERPL-SCNC: 135 MMOL/L (ref 132–146)
WBC # BLD: 7 E9/L (ref 4.5–11.5)

## 2023-05-14 PROCEDURE — 6370000000 HC RX 637 (ALT 250 FOR IP): Performed by: STUDENT IN AN ORGANIZED HEALTH CARE EDUCATION/TRAINING PROGRAM

## 2023-05-14 PROCEDURE — 83735 ASSAY OF MAGNESIUM: CPT

## 2023-05-14 PROCEDURE — 6360000002 HC RX W HCPCS: Performed by: STUDENT IN AN ORGANIZED HEALTH CARE EDUCATION/TRAINING PROGRAM

## 2023-05-14 PROCEDURE — 36415 COLL VENOUS BLD VENIPUNCTURE: CPT

## 2023-05-14 PROCEDURE — 80048 BASIC METABOLIC PNL TOTAL CA: CPT

## 2023-05-14 PROCEDURE — 85025 COMPLETE CBC W/AUTO DIFF WBC: CPT

## 2023-05-14 PROCEDURE — 6370000000 HC RX 637 (ALT 250 FOR IP): Performed by: CLINICAL NURSE SPECIALIST

## 2023-05-14 PROCEDURE — C9113 INJ PANTOPRAZOLE SODIUM, VIA: HCPCS

## 2023-05-14 PROCEDURE — 6360000002 HC RX W HCPCS: Performed by: INTERNAL MEDICINE

## 2023-05-14 PROCEDURE — 2580000003 HC RX 258: Performed by: STUDENT IN AN ORGANIZED HEALTH CARE EDUCATION/TRAINING PROGRAM

## 2023-05-14 PROCEDURE — 6360000002 HC RX W HCPCS

## 2023-05-14 PROCEDURE — 99232 SBSQ HOSP IP/OBS MODERATE 35: CPT | Performed by: INTERNAL MEDICINE

## 2023-05-14 PROCEDURE — S5553 INSULIN LONG ACTING 5 U: HCPCS | Performed by: STUDENT IN AN ORGANIZED HEALTH CARE EDUCATION/TRAINING PROGRAM

## 2023-05-14 PROCEDURE — 84100 ASSAY OF PHOSPHORUS: CPT

## 2023-05-14 PROCEDURE — 1200000000 HC SEMI PRIVATE

## 2023-05-14 PROCEDURE — 6370000000 HC RX 637 (ALT 250 FOR IP)

## 2023-05-14 PROCEDURE — 82962 GLUCOSE BLOOD TEST: CPT

## 2023-05-14 RX ORDER — MAGNESIUM SULFATE IN WATER 40 MG/ML
2000 INJECTION, SOLUTION INTRAVENOUS ONCE
Status: COMPLETED | OUTPATIENT
Start: 2023-05-14 | End: 2023-05-14

## 2023-05-14 RX ADMIN — INSULIN LISPRO 1 UNITS: 100 INJECTION, SOLUTION INTRAVENOUS; SUBCUTANEOUS at 18:11

## 2023-05-14 RX ADMIN — BACLOFEN 10 MG: 10 TABLET ORAL at 09:39

## 2023-05-14 RX ADMIN — INSULIN LISPRO 5 UNITS: 100 INJECTION, SOLUTION INTRAVENOUS; SUBCUTANEOUS at 09:39

## 2023-05-14 RX ADMIN — FOLIC ACID 1 MG: 1 TABLET ORAL at 09:39

## 2023-05-14 RX ADMIN — POTASSIUM BICARBONATE 25 MEQ: 978 TABLET, EFFERVESCENT ORAL at 09:45

## 2023-05-14 RX ADMIN — ASPIRIN 81 MG: 81 TABLET, COATED ORAL at 09:38

## 2023-05-14 RX ADMIN — ATORVASTATIN CALCIUM 80 MG: 80 TABLET, FILM COATED ORAL at 20:30

## 2023-05-14 RX ADMIN — CLOPIDOGREL BISULFATE 75 MG: 75 TABLET ORAL at 09:40

## 2023-05-14 RX ADMIN — PANTOPRAZOLE SODIUM 40 MG: 40 INJECTION, POWDER, FOR SOLUTION INTRAVENOUS at 09:39

## 2023-05-14 RX ADMIN — INSULIN LISPRO 5 UNITS: 100 INJECTION, SOLUTION INTRAVENOUS; SUBCUTANEOUS at 12:10

## 2023-05-14 RX ADMIN — ENOXAPARIN SODIUM 40 MG: 100 INJECTION SUBCUTANEOUS at 09:39

## 2023-05-14 RX ADMIN — INSULIN GLARGINE-YFGN 20 UNITS: 100 INJECTION, SOLUTION SUBCUTANEOUS at 00:00

## 2023-05-14 RX ADMIN — INSULIN GLARGINE-YFGN 20 UNITS: 100 INJECTION, SOLUTION SUBCUTANEOUS at 20:44

## 2023-05-14 RX ADMIN — SODIUM CHLORIDE, PRESERVATIVE FREE 10 ML: 5 INJECTION INTRAVENOUS at 09:40

## 2023-05-14 RX ADMIN — MAGNESIUM SULFATE HEPTAHYDRATE 2000 MG: 40 INJECTION, SOLUTION INTRAVENOUS at 09:45

## 2023-05-14 RX ADMIN — LISINOPRIL 10 MG: 10 TABLET ORAL at 09:40

## 2023-05-14 RX ADMIN — 0.12% CHLORHEXIDINE GLUCONATE 15 ML: 1.2 RINSE ORAL at 09:38

## 2023-05-14 RX ADMIN — INSULIN LISPRO 5 UNITS: 100 INJECTION, SOLUTION INTRAVENOUS; SUBCUTANEOUS at 18:12

## 2023-05-14 RX ADMIN — Medication 100 MG: at 09:40

## 2023-05-14 RX ADMIN — BACLOFEN 10 MG: 10 TABLET ORAL at 20:30

## 2023-05-14 RX ADMIN — SODIUM CHLORIDE, PRESERVATIVE FREE 10 ML: 5 INJECTION INTRAVENOUS at 20:31

## 2023-05-14 RX ADMIN — INSULIN LISPRO 4 UNITS: 100 INJECTION, SOLUTION INTRAVENOUS; SUBCUTANEOUS at 20:44

## 2023-05-14 RX ADMIN — SODIUM CHLORIDE, PRESERVATIVE FREE 10 ML: 5 INJECTION INTRAVENOUS at 00:04

## 2023-05-14 ASSESSMENT — PAIN SCALES - GENERAL: PAINLEVEL_OUTOF10: 0

## 2023-05-15 VITALS
OXYGEN SATURATION: 98 % | TEMPERATURE: 97.2 F | HEIGHT: 66 IN | DIASTOLIC BLOOD PRESSURE: 78 MMHG | SYSTOLIC BLOOD PRESSURE: 134 MMHG | HEART RATE: 80 BPM | WEIGHT: 124.12 LBS | BODY MASS INDEX: 19.95 KG/M2 | RESPIRATION RATE: 16 BRPM

## 2023-05-15 LAB
METER GLUCOSE: 247 MG/DL (ref 74–99)
METER GLUCOSE: 258 MG/DL (ref 74–99)
METER GLUCOSE: 280 MG/DL (ref 74–99)
METHYLMALONATE SERPL-SCNC: 0.26 UMOL/L (ref 0–0.4)

## 2023-05-15 PROCEDURE — 97165 OT EVAL LOW COMPLEX 30 MIN: CPT

## 2023-05-15 PROCEDURE — 6370000000 HC RX 637 (ALT 250 FOR IP)

## 2023-05-15 PROCEDURE — 97535 SELF CARE MNGMENT TRAINING: CPT

## 2023-05-15 PROCEDURE — 6370000000 HC RX 637 (ALT 250 FOR IP): Performed by: STUDENT IN AN ORGANIZED HEALTH CARE EDUCATION/TRAINING PROGRAM

## 2023-05-15 PROCEDURE — 6360000002 HC RX W HCPCS: Performed by: STUDENT IN AN ORGANIZED HEALTH CARE EDUCATION/TRAINING PROGRAM

## 2023-05-15 PROCEDURE — 82962 GLUCOSE BLOOD TEST: CPT

## 2023-05-15 PROCEDURE — C9113 INJ PANTOPRAZOLE SODIUM, VIA: HCPCS

## 2023-05-15 PROCEDURE — 97161 PT EVAL LOW COMPLEX 20 MIN: CPT

## 2023-05-15 PROCEDURE — 99239 HOSP IP/OBS DSCHRG MGMT >30: CPT | Performed by: INTERNAL MEDICINE

## 2023-05-15 PROCEDURE — 2580000003 HC RX 258: Performed by: STUDENT IN AN ORGANIZED HEALTH CARE EDUCATION/TRAINING PROGRAM

## 2023-05-15 PROCEDURE — 6370000000 HC RX 637 (ALT 250 FOR IP): Performed by: CLINICAL NURSE SPECIALIST

## 2023-05-15 PROCEDURE — 97530 THERAPEUTIC ACTIVITIES: CPT

## 2023-05-15 PROCEDURE — 6360000002 HC RX W HCPCS

## 2023-05-15 RX ORDER — BACLOFEN 10 MG/1
10 TABLET ORAL 2 TIMES DAILY
Qty: 30 TABLET | Refills: 0 | Status: SHIPPED | OUTPATIENT
Start: 2023-05-15 | End: 2023-05-15 | Stop reason: SDUPTHER

## 2023-05-15 RX ORDER — CLOPIDOGREL BISULFATE 75 MG/1
75 TABLET ORAL DAILY
Qty: 18 TABLET | Refills: 0 | Status: SHIPPED | OUTPATIENT
Start: 2023-05-15 | End: 2023-06-02

## 2023-05-15 RX ORDER — EZETIMIBE 10 MG/1
10 TABLET ORAL NIGHTLY
Qty: 90 TABLET | Refills: 1 | Status: SHIPPED | OUTPATIENT
Start: 2023-05-15

## 2023-05-15 RX ORDER — FOLIC ACID 1 MG/1
1 TABLET ORAL DAILY
Qty: 30 TABLET | Refills: 3 | Status: SHIPPED | OUTPATIENT
Start: 2023-05-16 | End: 2023-05-15 | Stop reason: SDUPTHER

## 2023-05-15 RX ORDER — EZETIMIBE 10 MG/1
10 TABLET ORAL NIGHTLY
Status: DISCONTINUED | OUTPATIENT
Start: 2023-05-15 | End: 2023-05-15 | Stop reason: HOSPADM

## 2023-05-15 RX ORDER — INSULIN LISPRO 100 [IU]/ML
5 INJECTION, SOLUTION INTRAVENOUS; SUBCUTANEOUS
Qty: 3 ML | Refills: 2 | Status: SHIPPED | OUTPATIENT
Start: 2023-05-15

## 2023-05-15 RX ORDER — GLUCOSAMINE HCL/CHONDROITIN SU 500-400 MG
CAPSULE ORAL
Qty: 100 STRIP | Refills: 5 | Status: SHIPPED | OUTPATIENT
Start: 2023-05-15 | End: 2023-05-15 | Stop reason: SDUPTHER

## 2023-05-15 RX ORDER — EZETIMIBE 10 MG/1
10 TABLET ORAL NIGHTLY
Qty: 90 TABLET | Refills: 1 | Status: SHIPPED | OUTPATIENT
Start: 2023-05-15 | End: 2023-05-15 | Stop reason: SDUPTHER

## 2023-05-15 RX ORDER — CLOPIDOGREL BISULFATE 75 MG/1
75 TABLET ORAL DAILY
Qty: 18 TABLET | Refills: 0 | Status: SHIPPED | OUTPATIENT
Start: 2023-05-15 | End: 2023-05-15 | Stop reason: SDUPTHER

## 2023-05-15 RX ORDER — LANCETS 30 GAUGE
EACH MISCELLANEOUS
Qty: 300 EACH | Refills: 1 | Status: SHIPPED | OUTPATIENT
Start: 2023-05-15 | End: 2023-05-15 | Stop reason: SDUPTHER

## 2023-05-15 RX ORDER — INSULIN GLARGINE-YFGN 100 [IU]/ML
22 INJECTION, SOLUTION SUBCUTANEOUS NIGHTLY
Status: DISCONTINUED | OUTPATIENT
Start: 2023-05-15 | End: 2023-05-15 | Stop reason: HOSPADM

## 2023-05-15 RX ORDER — LANOLIN ALCOHOL/MO/W.PET/CERES
100 CREAM (GRAM) TOPICAL DAILY
Qty: 30 TABLET | Refills: 3 | Status: SHIPPED | OUTPATIENT
Start: 2023-05-16

## 2023-05-15 RX ORDER — INSULIN ASPART 100 [IU]/ML
0-15 INJECTION, SOLUTION INTRAVENOUS; SUBCUTANEOUS
Qty: 40.5 ML | Refills: 0 | Status: SHIPPED | OUTPATIENT
Start: 2023-05-15 | End: 2023-08-13

## 2023-05-15 RX ORDER — BACLOFEN 10 MG/1
10 TABLET ORAL 2 TIMES DAILY
Qty: 30 TABLET | Refills: 0 | Status: SHIPPED | OUTPATIENT
Start: 2023-05-15

## 2023-05-15 RX ORDER — PANTOPRAZOLE SODIUM 40 MG/1
40 TABLET, DELAYED RELEASE ORAL
Status: DISCONTINUED | OUTPATIENT
Start: 2023-05-16 | End: 2023-05-15 | Stop reason: HOSPADM

## 2023-05-15 RX ORDER — LANOLIN ALCOHOL/MO/W.PET/CERES
100 CREAM (GRAM) TOPICAL DAILY
Qty: 30 TABLET | Refills: 3 | Status: SHIPPED | OUTPATIENT
Start: 2023-05-16 | End: 2023-05-15 | Stop reason: SDUPTHER

## 2023-05-15 RX ORDER — FOLIC ACID 1 MG/1
1 TABLET ORAL DAILY
Qty: 30 TABLET | Refills: 3 | Status: SHIPPED | OUTPATIENT
Start: 2023-05-16

## 2023-05-15 RX ORDER — INSULIN GLARGINE 100 [IU]/ML
22 INJECTION, SOLUTION SUBCUTANEOUS NIGHTLY
Qty: 10 ML | Refills: 3 | Status: SHIPPED | OUTPATIENT
Start: 2023-05-15

## 2023-05-15 RX ORDER — INSULIN LISPRO 100 [IU]/ML
5 INJECTION, SOLUTION INTRAVENOUS; SUBCUTANEOUS
Qty: 3 ML | Refills: 2 | Status: SHIPPED | OUTPATIENT
Start: 2023-05-15 | End: 2023-05-15 | Stop reason: SDUPTHER

## 2023-05-15 RX ORDER — LANCETS 30 GAUGE
EACH MISCELLANEOUS
Qty: 300 EACH | Refills: 1 | Status: SHIPPED | OUTPATIENT
Start: 2023-05-15

## 2023-05-15 RX ORDER — GLUCOSAMINE HCL/CHONDROITIN SU 500-400 MG
CAPSULE ORAL
Qty: 100 STRIP | Refills: 5 | Status: SHIPPED | OUTPATIENT
Start: 2023-05-15

## 2023-05-15 RX ADMIN — ENOXAPARIN SODIUM 40 MG: 100 INJECTION SUBCUTANEOUS at 08:49

## 2023-05-15 RX ADMIN — INSULIN HUMAN 3 UNITS: 100 INJECTION, SOLUTION PARENTERAL at 01:23

## 2023-05-15 RX ADMIN — INSULIN LISPRO 5 UNITS: 100 INJECTION, SOLUTION INTRAVENOUS; SUBCUTANEOUS at 12:54

## 2023-05-15 RX ADMIN — ASPIRIN 81 MG: 81 TABLET, COATED ORAL at 08:45

## 2023-05-15 RX ADMIN — INSULIN LISPRO 1 UNITS: 100 INJECTION, SOLUTION INTRAVENOUS; SUBCUTANEOUS at 08:52

## 2023-05-15 RX ADMIN — BACLOFEN 10 MG: 10 TABLET ORAL at 08:44

## 2023-05-15 RX ADMIN — FOLIC ACID 1 MG: 1 TABLET ORAL at 08:49

## 2023-05-15 RX ADMIN — INSULIN LISPRO 2 UNITS: 100 INJECTION, SOLUTION INTRAVENOUS; SUBCUTANEOUS at 12:54

## 2023-05-15 RX ADMIN — CLOPIDOGREL BISULFATE 75 MG: 75 TABLET ORAL at 08:43

## 2023-05-15 RX ADMIN — INSULIN LISPRO 5 UNITS: 100 INJECTION, SOLUTION INTRAVENOUS; SUBCUTANEOUS at 08:52

## 2023-05-15 RX ADMIN — SODIUM CHLORIDE, PRESERVATIVE FREE 10 ML: 5 INJECTION INTRAVENOUS at 08:42

## 2023-05-15 RX ADMIN — Medication 100 MG: at 08:43

## 2023-05-15 RX ADMIN — LISINOPRIL 10 MG: 10 TABLET ORAL at 08:43

## 2023-05-15 RX ADMIN — PANTOPRAZOLE SODIUM 40 MG: 40 INJECTION, POWDER, FOR SOLUTION INTRAVENOUS at 08:42

## 2023-05-15 ASSESSMENT — ENCOUNTER SYMPTOMS
ALLERGIC/IMMUNOLOGIC NEGATIVE: 1
RESPIRATORY NEGATIVE: 1
GASTROINTESTINAL NEGATIVE: 1
EYES NEGATIVE: 1

## 2023-05-15 ASSESSMENT — PAIN SCALES - GENERAL: PAINLEVEL_OUTOF10: 0

## 2023-05-15 NOTE — PLAN OF CARE
Problem: Discharge Planning  Goal: Discharge to home or other facility with appropriate resources  5/15/2023 1405 by Lilian Forde RN  Outcome: Adequate for Discharge  5/15/2023 1011 by Lilian Forde RN  Outcome: Progressing     Problem: Skin/Tissue Integrity  Goal: Absence of new skin breakdown  Description: 1. Monitor for areas of redness and/or skin breakdown  2. Assess vascular access sites hourly  3. Every 4-6 hours minimum:  Change oxygen saturation probe site  4. Every 4-6 hours:  If on nasal continuous positive airway pressure, respiratory therapy assess nares and determine need for appliance change or resting period.   5/15/2023 1405 by Lilian Forde RN  Outcome: Adequate for Discharge  5/15/2023 1011 by Lilian Forde RN  Outcome: Progressing     Problem: Safety - Adult  Goal: Free from fall injury  5/15/2023 1405 by Lilian Forde RN  Outcome: Adequate for Discharge  Flowsheets (Taken 5/15/2023 1015)  Free From Fall Injury: Instruct family/caregiver on patient safety  5/15/2023 1011 by Lilian Forde RN  Outcome: Progressing     Problem: Chronic Conditions and Co-morbidities  Goal: Patient's chronic conditions and co-morbidity symptoms are monitored and maintained or improved  5/15/2023 1405 by Lilian Forde RN  Outcome: Adequate for Discharge  5/15/2023 1011 by Lilian Forde RN  Outcome: Progressing     Problem: ABCDS Injury Assessment  Goal: Absence of physical injury  5/15/2023 1405 by Lilian Forde RN  Outcome: Adequate for Discharge  Flowsheets (Taken 5/15/2023 1015)  Absence of Physical Injury: Implement safety measures based on patient assessment  5/15/2023 1011 by Lilian Forde RN  Outcome: Progressing     Problem: Pain  Goal: Verbalizes/displays adequate comfort level or baseline comfort level  5/15/2023 1405 by Lilian Forde RN  Outcome: Adequate for Discharge  5/15/2023 1011 by Lilian Forde RN  Outcome: Progressing

## 2023-05-15 NOTE — DISCHARGE SUMMARY
[address]  Discharge Summary    PCP: Charlotte Henderson MD    Admit Date:5/10/2023  Discharge Date: 5/15/2023    Chief Complaint   Patient presents with    Loss of Consciousness     (Sitting in wheelchair per patient's friend \"passed out in wheelchair\" x2.) +ETOH, +weed. Patient states he did not fall out of wheelchair. Admission Diagnosis:   Loss of consciousness/syncope likely secondary to TIA  Hyperglycemia  Hx of ischemic stroke  Hx of mild pharyngeal dysphagia on SLP  Hx of type 1 diabetes  Hx of HTN  Hx of HLD  Hx of TIA  Hx of polysubstance abuse -alcohol, tobacco, marijuana    Discharge Diagnosis:  Loss of consciousness/syncope likely 2/2 subacute versus acute infarcts - stable   DKA -  resolved  Hyperglycemia -improved  Hx of ischemic stroke  Hx of mild pharyngeal dysphagia on SLP  Hx of type 1 diabetes  Hx of HTN  Hx of HLD  Hx of TIA  Hx of polysubstance abuse -alcohol, tobacco, marijuana    Hospital Course: Mr. Rianna Aviles is a 46 y/o man with a PMH of type 1 diabetes, hypertension, TIA in 2018, ischemic stroke (1/2023), polysubstance abuse (EtOH, tobacco, marijuana) who presented to ED with chief complaints of loss of consciousness prior to admission. No aura/prodrome, blurry vision, chest pain, palpitations or headache. Episode was witnessed by his friend who denied observing any abnormal body movements, regained consciousness spontaneously. Patient denied any change in motor strength, numbness or tingling, weakness following the episode. In the ED, vitals were stable, labs showed no significant abnormalities. Patient was admitted for further work up. Neurology was consulted. Overnight after admission, patient didn't receive 20 U of home dose lantus, patient's BG elevated in the very next morning at 600, and beta hydroxy butyrate >4.5, AG 22. Patient was given sub cutaneous insulin, BG started trending down, but AG remained high.  Decision has

## 2023-05-15 NOTE — PROGRESS NOTES
[address]        Date:5/15/2023                                                  Patient Name: Priscila Rincon    MRN: [id number]    : 1970    Room: [address] [place] [address]      Evaluating OT: SHAYNA Rizo OTR/L; [id number]      Referring Provider: Liya Brock MD    Specific Provider Orders/Date: OT Eval and Treat 23      Diagnosis: Syncope And Collapse      Surgery: none this admission     Pertinent Medical History:  has a past medical history of Alcoholism ([facility] [address] Cocaine abuse ([facility] [address] Diabetes mellitus ([facility] [address] Hypertension, Idiopathic peripheral neuropathy, Lacunar stroke ([facility] [address] and Marijuana abuse.       Reason for Admission: 2 episodes of LOC    Recommended Adaptive Equipment:  TBD pending progression/discharge plan     Precautions:  Fall Risk, Bed Alarm External Male Catheter, L UE spastic hemiplegia     Assessment of current deficits:    [x] Functional mobility  [x]ADLs  [x] Strength               [x]Cognition    [x] Functional transfers   [x] IADLs         [x] Safety Awareness   [x]Endurance    [x] Fine Coordination              [x] Balance      [] Vision/perception   []Sensation     []Gross Motor Coordination  [] ROM  [] Delirium                   [] Motor Control     OT PLAN OF CARE   OT POC based on physician orders, patient diagnosis and results of clinical assessment    Frequency/Duration: 1-3 days/wk for 2 weeks PRN   Specific OT Treatment Interventions to include:   * Instruction/training on adapted ADL techniques and AE recommendations to increase functional independence within precautions       * Training on energy conservation strategies, correct breathing pattern and techniques to improve independence/tolerance for self-care routine  * Functional transfer/mobility training/DME recommendations for increased independence, safety, and fall prevention  *

## 2023-05-15 NOTE — PROGRESS NOTES
CLINICAL PHARMACY NOTE: MEDS TO BEDS    Total # of Prescriptions Filled: 0   The following medications were delivered to the patient:  Test strips    Additional Documentation:     Patient did not need his insulin meds. He said he had them at home. He will come back to the pharmacy to  rest of his meds. He did not have the money.  I informed his rn Magali Tovar what was going on with his meds

## 2023-05-15 NOTE — CARE COORDINATION
Discharge order noted. Plan is home with [facility]. Message left with [facility] from [facility] to notify her patent is discharging today. Home health care orders are in. Patient's family will transport him home.    Grzegorz Vidal RN   [phone]

## 2023-05-15 NOTE — DISCHARGE INSTRUCTIONS
Internal medicine    Follow ups  Please follow up with primary care provider Dr. Luis Enrique Fuentes within 7-10 days of your hospital discharge. If you have any issue with your appointment and wants to follow with the Internal Medicine [address] - feel free to do so. Contact information provided in the discharge instructions paper. Changes in healthcare   Please take all medications as indicated  Diet: Diabetic diet   Activity: activity as tolerated  New Medications started during this hospital stay  Zetia for your high cholesterol level   Thiamine and folic acid   Lispro 5 units before eating   Plavix 75 mg daily for 18 days to complete the 21-day treatment plan of aspirin and clopidogrel - then you will continue with aspirin for life   Baclofen provided for your muscle stiffness (spasticity)   Changes to your medications  Increase Lantus to 22 units nightly   Keep premeal at 5 units   NO CHANGES made to your sliding scale   Medications you should stop taking   None   Additional labs, testing or imaging needed after discharge   None   Please contact us if you have any concerns or if you cannot get an appointment with your primary care doctor. Internal medicine clinic   Phone: [phone]  Fax: [phone]  [address]  Should you have further questions in regards to this visit, you can review your clinical note and after visit summary document on your [employer] account. Other than any new prescriptions given to you today, the list of home medications on this After Visit Summary are based on information provided to us from you and your healthcare providers. This information, including the list, dose, and frequency of medications is only as accurate as the information you provided. If you have any questions or concerns about your home medications, please contact your Primary Care Physician for further clarification.      Electronically signed by Lisbeth Elizalde MD on 5/15/2023

## 2023-05-15 NOTE — PLAN OF CARE
Problem: Discharge Planning  Goal: Discharge to home or other facility with appropriate resources  Outcome: Progressing     Problem: Skin/Tissue Integrity  Goal: Absence of new skin breakdown  Description: 1. Monitor for areas of redness and/or skin breakdown  2. Assess vascular access sites hourly  3. Every 4-6 hours minimum:  Change oxygen saturation probe site  4. Every 4-6 hours:  If on nasal continuous positive airway pressure, respiratory therapy assess nares and determine need for appliance change or resting period.   Outcome: Progressing     Problem: Safety - Adult  Goal: Free from fall injury  Outcome: Progressing     Problem: Chronic Conditions and Co-morbidities  Goal: Patient's chronic conditions and co-morbidity symptoms are monitored and maintained or improved  Outcome: Progressing     Problem: ABCDS Injury Assessment  Goal: Absence of physical injury  Outcome: Progressing     Problem: Pain  Goal: Verbalizes/displays adequate comfort level or baseline comfort level  Outcome: Progressing

## 2023-05-15 NOTE — PROGRESS NOTES
Physical Therapy  Physical Therapy Initial Assessment     Name: Kandy Whitmore  : 1970  MRN: [id number]      Date of Service: 5/15/2023    Evaluating PT:  Jones Fierro PT, DPT    Room #:  9117/3273-K  Diagnosis:  Syncope and collapse [R55]  DKA, type 1, not at goal [facility]) [E10.10]  PMHx/PSHx:  CVA, DM, HTN, substance abuse  Procedure/Surgery:  N/A  Precautions:  fall risk, L sided weakness   Equipment Owned: hemicane, w/c  Equipment Needs:  TBD    SUBJECTIVE:    Pt is questionable historian, reports lives with mother in a 2 story home with 3+10 steps to enter and 1 handrail. Pt stated he is sometimes carried up stairs in w/c. Bed/bath is on 1st floor. Pt ambulated with hemicane for short distances, uses w/c for longer distances PTA. OBJECTIVE:   Initial Evaluation  Date: 5/15/23 Treatment Short Term/ Long Term   Goals   AM-PAC 6 Clicks 57/45     Was pt agreeable to Eval/treatment? yes     Does pt have pain?  No pain     Bed Mobility  Rolling: SBA  Supine to sit: SBA  Sit to supine: NT  Scooting: SBA  Rolling: mod I  Supine to sit: mod I  Sit to supine: mod I  Scooting: mod I   Transfers Sit to stand: min A  Stand to sit: min A  Stand pivot: min A with no AD  Sit to stand: mod I  Stand to sit: mod I  Stand pivot: mod I with AAD   Ambulation    NT  10'+ with AAD SBA   Stair negotiation: ascended and descended  NT  10 steps with AAD and 1 handrail SBA     Strength/ROM:   LLE grossly 2/5  RLE grossly 4/5  LLE AROM limited by weakness  RLE AROM WFL    Balance:   Static Sitting: SBA  Dynamic Sitting: CGA  Static Standing: CGA with RUE support  Dynamic Standing: min A with RUE support    Pt is A & O x 3  Sensation:  Pt reports numbness in all extremities  Edema:  unremarkable    Vitals:  SpO2 and HR were stable during session    Therapeutic Exercises:    Bed mobility: supine>sit, cued for EOB positioning  Balance: sitting EOB ~5'  Transfers: STS x2, stand pivot x1, cued for hand placement and postural

## 2023-05-16 ENCOUNTER — TELEPHONE (OUTPATIENT)
Dept: INTERNAL MEDICINE | Age: 53
End: 2023-05-16

## 2023-05-16 LAB
BACTERIA BLD CULT ORG #2: NORMAL
BACTERIA BLD CULT: NORMAL

## 2023-06-01 ENCOUNTER — HOSPITAL ENCOUNTER (OUTPATIENT)
Age: 53
Setting detail: OBSERVATION
Discharge: HOME | End: 2023-06-03
Attending: STUDENT IN AN ORGANIZED HEALTH CARE EDUCATION/TRAINING PROGRAM | Admitting: INTERNAL MEDICINE
Payer: COMMERCIAL

## 2023-06-01 ENCOUNTER — APPOINTMENT (OUTPATIENT)
Dept: CT IMAGING | Age: 53
End: 2023-06-01
Payer: COMMERCIAL

## 2023-06-01 ENCOUNTER — APPOINTMENT (OUTPATIENT)
Dept: GENERAL RADIOLOGY | Age: 53
End: 2023-06-01
Payer: COMMERCIAL

## 2023-06-01 DIAGNOSIS — R55 SYNCOPE AND COLLAPSE: Primary | ICD-10-CM

## 2023-06-01 LAB
ALBUMIN SERPL-MCNC: 4.1 G/DL (ref 3.5–5.2)
ALP SERPL-CCNC: 115 U/L (ref 40–129)
ALT SERPL-CCNC: 44 U/L (ref 0–40)
ANION GAP SERPL CALCULATED.3IONS-SCNC: 10 MMOL/L (ref 7–16)
APAP SERPL-MCNC: <5 MCG/ML (ref 10–30)
AST SERPL-CCNC: 31 U/L (ref 0–39)
BASOPHILS # BLD: 0.05 E9/L (ref 0–0.2)
BASOPHILS NFR BLD: 0.6 % (ref 0–2)
BILIRUB SERPL-MCNC: 0.4 MG/DL (ref 0–1.2)
BUN SERPL-MCNC: 27 MG/DL (ref 6–20)
CALCIUM SERPL-MCNC: 9.1 MG/DL (ref 8.6–10.2)
CHLORIDE SERPL-SCNC: 105 MMOL/L (ref 98–107)
CO2 SERPL-SCNC: 24 MMOL/L (ref 22–29)
CREAT SERPL-MCNC: 0.6 MG/DL (ref 0.7–1.2)
EOSINOPHIL # BLD: 0.26 E9/L (ref 0.05–0.5)
EOSINOPHIL NFR BLD: 3.1 % (ref 0–6)
ERYTHROCYTE [DISTWIDTH] IN BLOOD BY AUTOMATED COUNT: 15.3 FL (ref 11.5–15)
ETHANOLAMINE SERPL-MCNC: <10 MG/DL (ref 0–0.08)
GLUCOSE SERPL-MCNC: 96 MG/DL (ref 74–99)
HCT VFR BLD AUTO: 33.1 % (ref 37–54)
HGB BLD-MCNC: 10.7 G/DL (ref 12.5–16.5)
IMM GRANULOCYTES # BLD: 0.03 E9/L
IMM GRANULOCYTES NFR BLD: 0.4 % (ref 0–5)
LYMPHOCYTES # BLD: 1.57 E9/L (ref 1.5–4)
LYMPHOCYTES NFR BLD: 18.8 % (ref 20–42)
MCH RBC QN AUTO: 28.8 PG (ref 26–35)
MCHC RBC AUTO-ENTMCNC: 32.3 % (ref 32–34.5)
MCV RBC AUTO: 89.2 FL (ref 80–99.9)
METER GLUCOSE: 116 MG/DL (ref 74–99)
METER GLUCOSE: 121 MG/DL (ref 74–99)
MONOCYTES # BLD: 0.61 E9/L (ref 0.1–0.95)
MONOCYTES NFR BLD: 7.3 % (ref 2–12)
NEUTROPHILS # BLD: 5.83 E9/L (ref 1.8–7.3)
NEUTS SEG NFR BLD: 69.8 % (ref 43–80)
PLATELET # BLD AUTO: 436 E9/L (ref 130–450)
PMV BLD AUTO: 8.6 FL (ref 7–12)
POTASSIUM SERPL-SCNC: 4.2 MMOL/L (ref 3.5–5)
POTASSIUM SERPL-SCNC: 5.2 MMOL/L (ref 3.5–5)
POTASSIUM SERPL-SCNC: 5.5 MMOL/L (ref 3.5–5)
PROT SERPL-MCNC: 6.6 G/DL (ref 6.4–8.3)
RBC # BLD AUTO: 3.71 E12/L (ref 3.8–5.8)
REASON FOR REJECTION: NORMAL
REJECTED TEST: NORMAL
SALICYLATES SERPL-MCNC: <0.3 MG/DL (ref 0–30)
SODIUM SERPL-SCNC: 139 MMOL/L (ref 132–146)
TRICYCLIC ANTIDEPRESSANTS SCREEN SERUM: NEGATIVE NG/ML
TROPONIN, HIGH SENSITIVITY: 28 NG/L (ref 0–11)
WBC # BLD: 8.4 E9/L (ref 4.5–11.5)

## 2023-06-01 PROCEDURE — 2580000003 HC RX 258: Performed by: STUDENT IN AN ORGANIZED HEALTH CARE EDUCATION/TRAINING PROGRAM

## 2023-06-01 PROCEDURE — 84132 ASSAY OF SERUM POTASSIUM: CPT

## 2023-06-01 PROCEDURE — 80143 DRUG ASSAY ACETAMINOPHEN: CPT

## 2023-06-01 PROCEDURE — 80307 DRUG TEST PRSMV CHEM ANLYZR: CPT

## 2023-06-01 PROCEDURE — 99285 EMERGENCY DEPT VISIT HI MDM: CPT

## 2023-06-01 PROCEDURE — 85025 COMPLETE CBC W/AUTO DIFF WBC: CPT

## 2023-06-01 PROCEDURE — 93005 ELECTROCARDIOGRAM TRACING: CPT | Performed by: STUDENT IN AN ORGANIZED HEALTH CARE EDUCATION/TRAINING PROGRAM

## 2023-06-01 PROCEDURE — 80053 COMPREHEN METABOLIC PANEL: CPT

## 2023-06-01 PROCEDURE — 82077 ASSAY SPEC XCP UR&BREATH IA: CPT

## 2023-06-01 PROCEDURE — 82962 GLUCOSE BLOOD TEST: CPT

## 2023-06-01 PROCEDURE — 36415 COLL VENOUS BLD VENIPUNCTURE: CPT

## 2023-06-01 PROCEDURE — 80179 DRUG ASSAY SALICYLATE: CPT

## 2023-06-01 PROCEDURE — 84484 ASSAY OF TROPONIN QUANT: CPT

## 2023-06-01 PROCEDURE — 70450 CT HEAD/BRAIN W/O DYE: CPT

## 2023-06-01 PROCEDURE — 71045 X-RAY EXAM CHEST 1 VIEW: CPT

## 2023-06-01 RX ORDER — 0.9 % SODIUM CHLORIDE 0.9 %
1000 INTRAVENOUS SOLUTION INTRAVENOUS ONCE
Status: COMPLETED | OUTPATIENT
Start: 2023-06-01 | End: 2023-06-01

## 2023-06-01 RX ADMIN — SODIUM CHLORIDE 1000 ML: 9 INJECTION, SOLUTION INTRAVENOUS at 18:22

## 2023-06-01 NOTE — ED PROVIDER NOTES
[address]        Pt Name: Praveen Thornton  MRN: [id number]  [place] 1970  Date of evaluation: 6/1/2023  Provider: Sidney Ruvalcaba DO  PCP: Vidal Méndez MD  Note Started: 5:17 PM EDT 6/1/23    CHIEF COMPLAINT       Chief Complaint   Patient presents with    Loss of Consciousness     X2, denies head injury, last BGL at home 94, 116 in triage       HISTORY OF PRESENT ILLNESS: 1 or more Elements   History From: ***    {Limitations to history (Optional):68768}    Praveen Thornton is a 47 y.o. male who presents ***    Nursing Notes were all reviewed and agreed with or any disagreements were addressed in the HPI. REVIEW OF SYSTEMS :      Review of Systems    Positives and Pertinent negatives as per HPI. SURGICAL HISTORY   History reviewed. No pertinent surgical history. CURRENTMEDICATIONS       Previous Medications    ALBUTEROL SULFATE  (90 BASE) MCG/ACT INHALER    Inhale 2 puffs into the lungs every 6 hours as needed for Wheezing or Shortness of Breath    AMLODIPINE (NORVASC) 5 MG TABLET    Take 1 tablet by mouth daily    ASPIRIN 81 MG EC TABLET    Take 1 tablet by mouth daily    ATORVASTATIN (LIPITOR) 80 MG TABLET    Take 1 tablet by mouth nightly    BACLOFEN (LIORESAL) 10 MG TABLET    Take 1 tablet by mouth 2 times daily    BLOOD GLUCOSE MONITOR STRIPS    Test 3 times a day & as needed for symptoms of irregular blood glucose. Dispense sufficient amount for indicated testing frequency plus additional to accommodate PRN testing needs.     CLOPIDOGREL (PLAVIX) 75 MG TABLET    Take 1 tablet by mouth daily for 18 days    EZETIMIBE (ZETIA) 10 MG TABLET    Take 1 tablet by mouth nightly    FOLIC ACID (FOLVITE) 1 MG TABLET    Take 1 tablet by mouth daily    INSULIN ASPART (NOVOLOG) 100 UNIT/ML INJECTION VIAL    Inject 0-15 Units into the skin 3 times daily (before meals) *Per Sliding Scale*   no

## 2023-06-02 ENCOUNTER — APPOINTMENT (OUTPATIENT)
Dept: NEUROLOGY | Age: 53
End: 2023-06-02
Payer: COMMERCIAL

## 2023-06-02 LAB
ALBUMIN SERPL-MCNC: 4.1 G/DL (ref 3.5–5.2)
ALP SERPL-CCNC: 121 U/L (ref 40–129)
ALT SERPL-CCNC: 41 U/L (ref 0–40)
AMPHET UR QL SCN: NOT DETECTED
ANION GAP SERPL CALCULATED.3IONS-SCNC: 15 MMOL/L (ref 7–16)
AST SERPL-CCNC: 23 U/L (ref 0–39)
BARBITURATES UR QL SCN: NOT DETECTED
BASOPHILS # BLD: 0.04 E9/L (ref 0–0.2)
BASOPHILS NFR BLD: 0.5 % (ref 0–2)
BENZODIAZ UR QL SCN: NOT DETECTED
BILIRUB SERPL-MCNC: 0.6 MG/DL (ref 0–1.2)
BUN SERPL-MCNC: 17 MG/DL (ref 6–20)
CALCIUM SERPL-MCNC: 9.1 MG/DL (ref 8.6–10.2)
CANNABINOIDS UR QL SCN: POSITIVE
CHLORIDE SERPL-SCNC: 100 MMOL/L (ref 98–107)
CHP ED QC CHECK: NORMAL
CHP ED QC CHECK: NORMAL
CO2 SERPL-SCNC: 24 MMOL/L (ref 22–29)
COCAINE UR QL SCN: NOT DETECTED
CREAT SERPL-MCNC: 0.6 MG/DL (ref 0.7–1.2)
DRUG SCREEN COMMENT UR-IMP: ABNORMAL
EKG ATRIAL RATE: 83 BPM
EKG P AXIS: 50 DEGREES
EKG P-R INTERVAL: 140 MS
EKG Q-T INTERVAL: 382 MS
EKG QRS DURATION: 80 MS
EKG QTC CALCULATION (BAZETT): 448 MS
EKG R AXIS: 57 DEGREES
EKG VENTRICULAR RATE: 83 BPM
EOSINOPHIL # BLD: 0.23 E9/L (ref 0.05–0.5)
EOSINOPHIL NFR BLD: 2.9 % (ref 0–6)
ERYTHROCYTE [DISTWIDTH] IN BLOOD BY AUTOMATED COUNT: 15.2 FL (ref 11.5–15)
FENTANYL SCREEN, URINE: NOT DETECTED
GLUCOSE BLD-MCNC: 194 MG/DL
GLUCOSE BLD-MCNC: 244 MG/DL
GLUCOSE SERPL-MCNC: 192 MG/DL (ref 74–99)
HCT VFR BLD AUTO: 34.9 % (ref 37–54)
HGB BLD-MCNC: 11.2 G/DL (ref 12.5–16.5)
IMM GRANULOCYTES # BLD: 0.03 E9/L
IMM GRANULOCYTES NFR BLD: 0.4 % (ref 0–5)
LYMPHOCYTES # BLD: 1.93 E9/L (ref 1.5–4)
LYMPHOCYTES NFR BLD: 24.2 % (ref 20–42)
MCH RBC QN AUTO: 28.9 PG (ref 26–35)
MCHC RBC AUTO-ENTMCNC: 32.1 % (ref 32–34.5)
MCV RBC AUTO: 89.9 FL (ref 80–99.9)
METER GLUCOSE: 169 MG/DL (ref 74–99)
METER GLUCOSE: 194 MG/DL (ref 74–99)
METER GLUCOSE: 232 MG/DL (ref 74–99)
METER GLUCOSE: 244 MG/DL (ref 74–99)
METER GLUCOSE: 278 MG/DL (ref 74–99)
METHADONE UR QL SCN: NOT DETECTED
MONOCYTES # BLD: 0.56 E9/L (ref 0.1–0.95)
MONOCYTES NFR BLD: 7 % (ref 2–12)
NEUTROPHILS # BLD: 5.19 E9/L (ref 1.8–7.3)
NEUTS SEG NFR BLD: 65 % (ref 43–80)
OPIATES UR QL SCN: NOT DETECTED
OXYCODONE URINE: NOT DETECTED
PCP UR QL SCN: NOT DETECTED
PLATELET # BLD AUTO: 447 E9/L (ref 130–450)
PMV BLD AUTO: 8.7 FL (ref 7–12)
POTASSIUM SERPL-SCNC: 4.1 MMOL/L (ref 3.5–5)
PROT SERPL-MCNC: 6.7 G/DL (ref 6.4–8.3)
RBC # BLD AUTO: 3.88 E12/L (ref 3.8–5.8)
SODIUM SERPL-SCNC: 139 MMOL/L (ref 132–146)
WBC # BLD: 8 E9/L (ref 4.5–11.5)

## 2023-06-02 PROCEDURE — 97530 THERAPEUTIC ACTIVITIES: CPT

## 2023-06-02 PROCEDURE — 36415 COLL VENOUS BLD VENIPUNCTURE: CPT

## 2023-06-02 PROCEDURE — 97161 PT EVAL LOW COMPLEX 20 MIN: CPT

## 2023-06-02 PROCEDURE — 82962 GLUCOSE BLOOD TEST: CPT

## 2023-06-02 PROCEDURE — 2580000003 HC RX 258

## 2023-06-02 PROCEDURE — G0378 HOSPITAL OBSERVATION PER HR: HCPCS

## 2023-06-02 PROCEDURE — 95816 EEG AWAKE AND DROWSY: CPT

## 2023-06-02 PROCEDURE — 85025 COMPLETE CBC W/AUTO DIFF WBC: CPT

## 2023-06-02 PROCEDURE — S5553 INSULIN LONG ACTING 5 U: HCPCS

## 2023-06-02 PROCEDURE — 6370000000 HC RX 637 (ALT 250 FOR IP)

## 2023-06-02 PROCEDURE — 80307 DRUG TEST PRSMV CHEM ANLYZR: CPT

## 2023-06-02 PROCEDURE — 81001 URINALYSIS AUTO W/SCOPE: CPT

## 2023-06-02 PROCEDURE — 6360000002 HC RX W HCPCS

## 2023-06-02 PROCEDURE — 93010 ELECTROCARDIOGRAM REPORT: CPT | Performed by: INTERNAL MEDICINE

## 2023-06-02 PROCEDURE — 99222 1ST HOSP IP/OBS MODERATE 55: CPT | Performed by: PSYCHIATRY & NEUROLOGY

## 2023-06-02 PROCEDURE — 97165 OT EVAL LOW COMPLEX 30 MIN: CPT

## 2023-06-02 PROCEDURE — 99221 1ST HOSP IP/OBS SF/LOW 40: CPT | Performed by: INTERNAL MEDICINE

## 2023-06-02 PROCEDURE — 80053 COMPREHEN METABOLIC PANEL: CPT

## 2023-06-02 RX ORDER — ENOXAPARIN SODIUM 100 MG/ML
40 INJECTION SUBCUTANEOUS DAILY
Status: DISCONTINUED | OUTPATIENT
Start: 2023-06-02 | End: 2023-06-03 | Stop reason: HOSPADM

## 2023-06-02 RX ORDER — SODIUM CHLORIDE 9 MG/ML
INJECTION, SOLUTION INTRAVENOUS PRN
Status: DISCONTINUED | OUTPATIENT
Start: 2023-06-02 | End: 2023-06-03 | Stop reason: HOSPADM

## 2023-06-02 RX ORDER — ACETAMINOPHEN 650 MG/1
650 SUPPOSITORY RECTAL EVERY 6 HOURS PRN
Status: DISCONTINUED | OUTPATIENT
Start: 2023-06-02 | End: 2023-06-03 | Stop reason: HOSPADM

## 2023-06-02 RX ORDER — LISINOPRIL 10 MG/1
10 TABLET ORAL DAILY
Status: DISCONTINUED | OUTPATIENT
Start: 2023-06-02 | End: 2023-06-03 | Stop reason: HOSPADM

## 2023-06-02 RX ORDER — LANOLIN ALCOHOL/MO/W.PET/CERES
100 CREAM (GRAM) TOPICAL DAILY
Status: DISCONTINUED | OUTPATIENT
Start: 2023-06-02 | End: 2023-06-03 | Stop reason: HOSPADM

## 2023-06-02 RX ORDER — SODIUM CHLORIDE 0.9 % (FLUSH) 0.9 %
5-40 SYRINGE (ML) INJECTION PRN
Status: DISCONTINUED | OUTPATIENT
Start: 2023-06-02 | End: 2023-06-03 | Stop reason: HOSPADM

## 2023-06-02 RX ORDER — INSULIN GLARGINE-YFGN 100 [IU]/ML
22 INJECTION, SOLUTION SUBCUTANEOUS NIGHTLY
Status: DISCONTINUED | OUTPATIENT
Start: 2023-06-02 | End: 2023-06-03 | Stop reason: HOSPADM

## 2023-06-02 RX ORDER — ONDANSETRON 2 MG/ML
4 INJECTION INTRAMUSCULAR; INTRAVENOUS EVERY 6 HOURS PRN
Status: DISCONTINUED | OUTPATIENT
Start: 2023-06-02 | End: 2023-06-03 | Stop reason: HOSPADM

## 2023-06-02 RX ORDER — ATORVASTATIN CALCIUM 40 MG/1
80 TABLET, FILM COATED ORAL NIGHTLY
Status: DISCONTINUED | OUTPATIENT
Start: 2023-06-02 | End: 2023-06-03 | Stop reason: HOSPADM

## 2023-06-02 RX ORDER — ASPIRIN 81 MG/1
81 TABLET ORAL DAILY
Status: DISCONTINUED | OUTPATIENT
Start: 2023-06-02 | End: 2023-06-03 | Stop reason: HOSPADM

## 2023-06-02 RX ORDER — FOLIC ACID 1 MG/1
1 TABLET ORAL DAILY
Status: DISCONTINUED | OUTPATIENT
Start: 2023-06-02 | End: 2023-06-03 | Stop reason: HOSPADM

## 2023-06-02 RX ORDER — INSULIN LISPRO 100 [IU]/ML
5 INJECTION, SOLUTION INTRAVENOUS; SUBCUTANEOUS
Status: DISCONTINUED | OUTPATIENT
Start: 2023-06-02 | End: 2023-06-03 | Stop reason: HOSPADM

## 2023-06-02 RX ORDER — NICOTINE 21 MG/24HR
1 PATCH, TRANSDERMAL 24 HOURS TRANSDERMAL DAILY
Status: DISCONTINUED | OUTPATIENT
Start: 2023-06-02 | End: 2023-06-03 | Stop reason: HOSPADM

## 2023-06-02 RX ORDER — ONDANSETRON 4 MG/1
4 TABLET, ORALLY DISINTEGRATING ORAL EVERY 8 HOURS PRN
Status: DISCONTINUED | OUTPATIENT
Start: 2023-06-02 | End: 2023-06-03 | Stop reason: HOSPADM

## 2023-06-02 RX ORDER — POLYETHYLENE GLYCOL 3350 17 G/17G
17 POWDER, FOR SOLUTION ORAL DAILY PRN
Status: DISCONTINUED | OUTPATIENT
Start: 2023-06-02 | End: 2023-06-03 | Stop reason: HOSPADM

## 2023-06-02 RX ORDER — SODIUM CHLORIDE 0.9 % (FLUSH) 0.9 %
5-40 SYRINGE (ML) INJECTION EVERY 12 HOURS SCHEDULED
Status: DISCONTINUED | OUTPATIENT
Start: 2023-06-02 | End: 2023-06-03 | Stop reason: HOSPADM

## 2023-06-02 RX ORDER — INSULIN LISPRO 100 [IU]/ML
0-4 INJECTION, SOLUTION INTRAVENOUS; SUBCUTANEOUS
Status: DISCONTINUED | OUTPATIENT
Start: 2023-06-02 | End: 2023-06-03 | Stop reason: HOSPADM

## 2023-06-02 RX ORDER — INSULIN LISPRO 100 [IU]/ML
0-4 INJECTION, SOLUTION INTRAVENOUS; SUBCUTANEOUS NIGHTLY
Status: DISCONTINUED | OUTPATIENT
Start: 2023-06-02 | End: 2023-06-03 | Stop reason: HOSPADM

## 2023-06-02 RX ORDER — ACETAMINOPHEN 325 MG/1
650 TABLET ORAL EVERY 6 HOURS PRN
Status: DISCONTINUED | OUTPATIENT
Start: 2023-06-02 | End: 2023-06-03 | Stop reason: HOSPADM

## 2023-06-02 RX ORDER — BACLOFEN 10 MG/1
10 TABLET ORAL 2 TIMES DAILY
Status: DISCONTINUED | OUTPATIENT
Start: 2023-06-02 | End: 2023-06-03 | Stop reason: HOSPADM

## 2023-06-02 RX ORDER — DEXTROSE MONOHYDRATE 100 MG/ML
INJECTION, SOLUTION INTRAVENOUS CONTINUOUS PRN
Status: DISCONTINUED | OUTPATIENT
Start: 2023-06-02 | End: 2023-06-03 | Stop reason: HOSPADM

## 2023-06-02 RX ORDER — CLOPIDOGREL BISULFATE 75 MG/1
75 TABLET ORAL DAILY
Status: COMPLETED | OUTPATIENT
Start: 2023-06-02 | End: 2023-06-02

## 2023-06-02 RX ORDER — AMLODIPINE BESYLATE 5 MG/1
5 TABLET ORAL DAILY
Status: DISCONTINUED | OUTPATIENT
Start: 2023-06-02 | End: 2023-06-03 | Stop reason: HOSPADM

## 2023-06-02 RX ORDER — EZETIMIBE 10 MG/1
10 TABLET ORAL NIGHTLY
Status: DISCONTINUED | OUTPATIENT
Start: 2023-06-02 | End: 2023-06-03 | Stop reason: HOSPADM

## 2023-06-02 RX ADMIN — INSULIN LISPRO 5 UNITS: 100 INJECTION, SOLUTION INTRAVENOUS; SUBCUTANEOUS at 17:06

## 2023-06-02 RX ADMIN — INSULIN LISPRO 5 UNITS: 100 INJECTION, SOLUTION INTRAVENOUS; SUBCUTANEOUS at 11:25

## 2023-06-02 RX ADMIN — AMLODIPINE BESYLATE 5 MG: 5 TABLET ORAL at 07:25

## 2023-06-02 RX ADMIN — CLOPIDOGREL BISULFATE 75 MG: 75 TABLET ORAL at 07:25

## 2023-06-02 RX ADMIN — BACLOFEN 10 MG: 10 TABLET ORAL at 07:24

## 2023-06-02 RX ADMIN — Medication 100 MG: at 07:24

## 2023-06-02 RX ADMIN — ATORVASTATIN CALCIUM 80 MG: 40 TABLET, FILM COATED ORAL at 21:45

## 2023-06-02 RX ADMIN — BACLOFEN 10 MG: 10 TABLET ORAL at 21:44

## 2023-06-02 RX ADMIN — INSULIN LISPRO 5 UNITS: 100 INJECTION, SOLUTION INTRAVENOUS; SUBCUTANEOUS at 07:25

## 2023-06-02 RX ADMIN — ENOXAPARIN SODIUM 40 MG: 100 INJECTION SUBCUTANEOUS at 07:24

## 2023-06-02 RX ADMIN — ASPIRIN 81 MG: 81 TABLET, COATED ORAL at 07:24

## 2023-06-02 RX ADMIN — LISINOPRIL 10 MG: 10 TABLET ORAL at 07:24

## 2023-06-02 RX ADMIN — INSULIN GLARGINE-YFGN 22 UNITS: 100 INJECTION, SOLUTION SUBCUTANEOUS at 21:48

## 2023-06-02 RX ADMIN — SODIUM CHLORIDE, PRESERVATIVE FREE 10 ML: 5 INJECTION INTRAVENOUS at 07:25

## 2023-06-02 RX ADMIN — FOLIC ACID 1 MG: 1 TABLET ORAL at 07:24

## 2023-06-02 ASSESSMENT — PAIN SCALES - GENERAL
PAINLEVEL_OUTOF10: 0
PAINLEVEL_OUTOF10: 0

## 2023-06-02 NOTE — ED NOTES
Pt cleaned up of incontinent urine, clean briefs given, and linens changed.       Dar Snow RN  06/02/23 0787

## 2023-06-02 NOTE — ED NOTES
Pt cleaned up of incontinent urine, clean brief placed, linens changed, and pt placed in gown.       Katalina Toussaint RN  06/02/23 9768

## 2023-06-02 NOTE — CARE COORDINATION
Transition of Care-Attempted to speak with patient numerous attempts, unable to reach-may be off unit for testing. Patient is a readmit-last stay was from 5-10-5- for the same issues. Per chart review patient is somewhat independent with ADL's, lives with his mother, owns a wheelchair and walker, active with [facility]-confirmed -will not need [facility] order unless he is switched to inpatient status. PCP is Dr. Maria Luisa Chaidez, preferred pharmacy is [facility]. Family to provide transportation home, do not anticipate any additional needs. Case Management Assessment  Initial Evaluation    Date/Time of Evaluation: 6/2/2023 9:50 AM  Assessment Completed by: Damaris Peña RN    If patient is discharged prior to next notation, then this note serves as note for discharge by case management. Patient Name: Ramon Tucker                   YOB: 1970  Diagnosis: Syncope and collapse [R55]                   Date / Time: 6/1/2023  5:34 PM    Patient Admission Status: Observation   Readmission Risk (Low < 19, Mod (19-27), High > 27): Readmission Risk Score: 22.2    Current PCP: Luanne Jiménez MD  PCP verified by CM? Yes    Chart Reviewed: Yes      History Provided by: Medical Record  Patient Orientation: Alert and Oriented    Patient Cognition: Alert    Hospitalization in the last 30 days (Readmission):  Yes    If yes, Readmission Assessment in  Navigator will be completed.     Advance Directives:      Code Status: Full Code   Patient's Primary Decision Maker is: Legal Next of Kin    Primary Decision MakerScofranklin Kaye - Parent - [phone]    Secondary Decision Maker: Brooke Amayas - Brother/Sister - [phone]    Discharge Planning:    Patient lives with: Family Members Type of Home: House  Primary Care Giver: Self  Patient Support Systems include: Family Members   Current Financial resources:    Current community resources:    Current services prior to admission: None            Current DME:

## 2023-06-02 NOTE — PROCEDURES
[address] Report    MRN: [id number]  PATIENT NAME: Althea Ramirez OF REPORT: 2023    DATE OF SERVICE: 2023  PHYSICIAN NAME: Ezequiel Hughes DO  Referring Physician: Dr Teresa Viveros      Patient's : 1970  Patient's Age: 47 y.o. Gender: male    PROCEDURE: Routine EEG without video      Clinical Interpretation: This was a normal study during waking and drowsiness. No seizures or epileptiform discharges were noted during this study. ____________________________  Electronically signed by: Ezequiel Hughes DO, 2023 4:00 PM      Patient Clinical Information   Reason for Study: Patient undergoing evaluation for episode of loss of consciousness  Patient State: Awake  Primary neurological diagnosis: Spell of uncertain etiology  Primary indication for monitoring: Characterization of spell    Pertinent Medications and Treatments    baclofen     Sedatives administered: No  Intubated: No  Pharmacological paralytic: No    Reporting Period  Start of Study: 0, 2023   End of Study:  , 2023       EEG Description  Digital video and scalp EEG monitoring was performed using the standard protocol for this laboratory. Scalp electrodes were applied in the international 10/20 system. Multiple digital montage arrangements were utilized for evaluation. EKG was recorded. Background:      Occipital rhythm (posterior dominant rhythm or PDR): Present  Frequency: 8.5 Hz  Voltage: Medium  Organization: fair   Reactivity to eye opening/closure: fair     Drowsiness: Present - normal  Sleep: Absent    Technical and Activation Procedures:  Hyperventilation: Not done  Photic stimulation: Done - physiologic driving noted  Reactivity to stimulation: Yes    Abnormalities:    I. Seizures? No    II. Rhythmic or Periodic Patterns? No    III. Other Abnormalities?   No

## 2023-06-02 NOTE — H&P
[facility]  Internal Medicine Residency Program  History and Physical    Patient:  Jahaira Quintanilla 47 y.o. male MRN: [id number]     Date of Service: 6/2/2023    Hospital Day: 2      Chief complaint: had concerns including Loss of Consciousness (X2, denies head injury, last BGL at home 94, 116 in triage). History of Present Illness   The patient is a 47 y.o. male with a past medical history of type I DM, Hypertension, Ischemic Stroke (1/2023) with Left-Sided Hemiplegia, Polysubstance Abuse who presented in the ED for loss of consciousness. He was admitted on 510 for the same complaint. He was noted to have acute versus subacute infarct in the right parietal seen on MRI. He was started on DAPT for 21 days. EEG done on previous admission has not been officially read but was preliminary read did not show epileptiform. He was not started on any antiseizure medications. He was discharged on stable condition. Yesterday afternoon, patient experienced 2 episodes of loss of consciousness sitting on his wheelchair, smoking and talking to his friend. He suddenly just slumped onto his wheelchair while he was talking. His first episode lasted less than 5 minutes. No abnormal movements noted. He denies any symptoms prior to the episode. He denies any chest pain, palpitations, headaches, blurring of vision, lightheadedness, nausea. After around 10 minutes he had another similar episode that lasted around 5 minutes. They checked his blood sugars during that time and was in the 90s. No noted urinary or bowel incontinence, upward rolling of eyes or tongue biting during these episodes. His mom called EMS and he was brought to the ED. He arrived in the ED hemodynamically stable. CMP was mostly unremarkable but did show slightly elevated ALT at 44. CBC was noted to have hemoglobin of 10.7 which near his baseline. Serum drug screen was negative.   Urine drug screen was positive for

## 2023-06-02 NOTE — PLAN OF CARE
Problem: Chronic Conditions and Co-morbidities  Goal: Patient's chronic conditions and co-morbidity symptoms are monitored and maintained or improved  6/2/2023 0802 by Shannan Velásquez RN  Outcome: Progressing     Problem: Safety - Adult  Goal: Free from fall injury  6/2/2023 0802 by Shannan Velásquez RN  Outcome: Progressing     Problem: Skin/Tissue Integrity  Goal: Absence of new skin breakdown  Description: 1. Monitor for areas of redness and/or skin breakdown  2. Assess vascular access sites hourly  3. Every 4-6 hours minimum:  Change oxygen saturation probe site  4. Every 4-6 hours:  If on nasal continuous positive airway pressure, respiratory therapy assess nares and determine need for appliance change or resting period.   6/2/2023 0802 by Shannan Velásquez RN  Outcome: Progressing

## 2023-06-02 NOTE — CONSULTS
[facility]  Neurology Consult    Date:  6/2/2023  Patient Name:  Sirena Li  YOB: 1970  MRN: [id number]     PCP:  Bryce Noguera MD   Referring:  No ref. provider found      Chief Complaint: Blacked out    History obtained from: Patient, and his friend Nate via phone    Rachel Barrientos is a 47 y.o. male with a history of stroke and residual left sided weakness and left arm spasticity admitted with an episode of loss of consciousness. By history this episode was associated with diaphoresis and no postictal confusion. Given his prolonged history of diabetes and a significant length dependent peripheral neuropathy on exam autonomic neuropathy is suspected as a likely contributor to this episode. Plan  Recommend good glucose control  Consider outpatient tilt table test  Neurology service will sign off at this time. Please contact our service with any further questions or concerns. History of Present Illness:  Sirena Li is a 47 y.o. right handed male presenting for evaluation of episode of loss of consciousness. Patient has a history of prior stroke with residual spastic paresis of his left arm and left-sided weakness. He had been sitting when abruptly he began staring off and became very sweaty. His friend Jennifer Leigh states that this lasted about 30 to 45 seconds. Within the next 20 to 30 minutes a second episode occurred similar to the first lasting about 1 minute. The patient states he felt like his \"normal self\" prior to this event. Both the patient and his friend noted that he awoke immediately at the end of this episode with no subsequent confusion reported. Patient reports history of diabetes mellitus for nearly 20 years. Orthostatic vitals were normal this admission.         Medical History:   Past Medical History:   Diagnosis Date    Alcoholism ([facility] [address]     Cocaine abuse ([facility] [address]     Diabetes mellitus ([facility] [address]     Hypertension     Idiopathic

## 2023-06-03 VITALS
RESPIRATION RATE: 16 BRPM | HEIGHT: 66 IN | HEART RATE: 74 BPM | TEMPERATURE: 97.9 F | BODY MASS INDEX: 23.3 KG/M2 | SYSTOLIC BLOOD PRESSURE: 135 MMHG | OXYGEN SATURATION: 96 % | WEIGHT: 145 LBS | DIASTOLIC BLOOD PRESSURE: 89 MMHG

## 2023-06-03 PROBLEM — E10.8 TYPE 1 DIABETES MELLITUS WITH COMPLICATION (HCC): Status: RESOLVED | Noted: 2019-02-09 | Resolved: 2023-06-03

## 2023-06-03 PROBLEM — G81.94 LEFT HEMIPLEGIA (HCC): Status: RESOLVED | Noted: 2018-11-12 | Resolved: 2023-06-03

## 2023-06-03 PROBLEM — E08.10 DIABETIC KETOACIDOSIS WITHOUT COMA ASSOCIATED WITH DIABETES MELLITUS DUE TO UNDERLYING CONDITION (HCC): Status: RESOLVED | Noted: 2019-12-08 | Resolved: 2023-06-03

## 2023-06-03 PROBLEM — J18.9 PNEUMONIA: Status: RESOLVED | Noted: 2022-04-30 | Resolved: 2023-06-03

## 2023-06-03 PROBLEM — I63.9 CEREBROVASCULAR ACCIDENT (CVA) (HCC): Status: RESOLVED | Noted: 2023-01-23 | Resolved: 2023-06-03

## 2023-06-03 PROBLEM — E10.11 DIABETIC KETOACIDOSIS WITH COMA ASSOCIATED WITH TYPE 1 DIABETES MELLITUS (HCC): Status: RESOLVED | Noted: 2018-12-24 | Resolved: 2023-06-03

## 2023-06-03 PROBLEM — F12.10 MARIJUANA ABUSE: Status: RESOLVED | Noted: 2019-04-13 | Resolved: 2023-06-03

## 2023-06-03 PROBLEM — E10.65 POORLY CONTROLLED TYPE 1 DIABETES MELLITUS (HCC): Status: RESOLVED | Noted: 2022-11-15 | Resolved: 2023-06-03

## 2023-06-03 PROBLEM — E87.20 LACTIC ACIDOSIS: Status: RESOLVED | Noted: 2019-04-13 | Resolved: 2023-06-03

## 2023-06-03 PROBLEM — E10.9 DIABETES MELLITUS TYPE I (HCC): Status: RESOLVED | Noted: 2023-01-23 | Resolved: 2023-06-03

## 2023-06-03 PROBLEM — E11.10 DIABETIC ACIDOSIS WITHOUT COMA (HCC): Status: RESOLVED | Noted: 2022-08-08 | Resolved: 2023-06-03

## 2023-06-03 PROBLEM — Z96.41 INSULIN PUMP IN PLACE: Status: RESOLVED | Noted: 2019-09-25 | Resolved: 2023-06-03

## 2023-06-03 PROBLEM — D69.6 THROMBOCYTOPENIA (HCC): Status: RESOLVED | Noted: 2018-06-11 | Resolved: 2023-06-03

## 2023-06-03 PROBLEM — R55 SYNCOPE AND COLLAPSE: Status: RESOLVED | Noted: 2020-06-20 | Resolved: 2023-06-03

## 2023-06-03 PROBLEM — E10.10 DKA, TYPE 1, NOT AT GOAL (HCC): Status: RESOLVED | Noted: 2019-08-01 | Resolved: 2023-06-03

## 2023-06-03 LAB
ALBUMIN SERPL-MCNC: 4.2 G/DL (ref 3.5–5.2)
ALP SERPL-CCNC: 115 U/L (ref 40–129)
ALT SERPL-CCNC: 44 U/L (ref 0–40)
ANION GAP SERPL CALCULATED.3IONS-SCNC: 13 MMOL/L (ref 7–16)
AST SERPL-CCNC: 24 U/L (ref 0–39)
BACTERIA URNS QL MICRO: ABNORMAL /HPF
BASOPHILS # BLD: 0.03 E9/L (ref 0–0.2)
BASOPHILS NFR BLD: 0.4 % (ref 0–2)
BILIRUB SERPL-MCNC: 0.8 MG/DL (ref 0–1.2)
BILIRUB UR QL STRIP: NEGATIVE
BUN SERPL-MCNC: 15 MG/DL (ref 6–20)
CALCIUM SERPL-MCNC: 9.4 MG/DL (ref 8.6–10.2)
CHLORIDE SERPL-SCNC: 102 MMOL/L (ref 98–107)
CHP ED QC CHECK: NORMAL
CLARITY UR: CLEAR
CO2 SERPL-SCNC: 24 MMOL/L (ref 22–29)
COLOR UR: YELLOW
CREAT SERPL-MCNC: 0.6 MG/DL (ref 0.7–1.2)
EOSINOPHIL # BLD: 0.24 E9/L (ref 0.05–0.5)
EOSINOPHIL NFR BLD: 3.5 % (ref 0–6)
ERYTHROCYTE [DISTWIDTH] IN BLOOD BY AUTOMATED COUNT: 14.9 FL (ref 11.5–15)
GLUCOSE BLD-MCNC: 162 MG/DL
GLUCOSE SERPL-MCNC: 153 MG/DL (ref 74–99)
GLUCOSE UR STRIP-MCNC: >=1000 MG/DL
HCT VFR BLD AUTO: 37.1 % (ref 37–54)
HGB BLD-MCNC: 12 G/DL (ref 12.5–16.5)
HGB UR QL STRIP: NEGATIVE
IMM GRANULOCYTES # BLD: 0.02 E9/L
IMM GRANULOCYTES NFR BLD: 0.3 % (ref 0–5)
KETONES UR STRIP-MCNC: NEGATIVE MG/DL
LEUKOCYTE ESTERASE UR QL STRIP: NEGATIVE
LYMPHOCYTES # BLD: 1.74 E9/L (ref 1.5–4)
LYMPHOCYTES NFR BLD: 25.7 % (ref 20–42)
MCH RBC QN AUTO: 28.7 PG (ref 26–35)
MCHC RBC AUTO-ENTMCNC: 32.3 % (ref 32–34.5)
MCV RBC AUTO: 88.8 FL (ref 80–99.9)
METER GLUCOSE: 162 MG/DL (ref 74–99)
METER GLUCOSE: 217 MG/DL (ref 74–99)
MONOCYTES # BLD: 0.49 E9/L (ref 0.1–0.95)
MONOCYTES NFR BLD: 7.2 % (ref 2–12)
NEUTROPHILS # BLD: 4.25 E9/L (ref 1.8–7.3)
NEUTS SEG NFR BLD: 62.9 % (ref 43–80)
NITRITE UR QL STRIP: NEGATIVE
PH UR STRIP: 7.5 [PH] (ref 5–9)
PLATELET # BLD AUTO: 477 E9/L (ref 130–450)
PMV BLD AUTO: 8.9 FL (ref 7–12)
POTASSIUM SERPL-SCNC: 4 MMOL/L (ref 3.5–5)
PROT SERPL-MCNC: 6.8 G/DL (ref 6.4–8.3)
PROT UR STRIP-MCNC: NEGATIVE MG/DL
RBC # BLD AUTO: 4.18 E12/L (ref 3.8–5.8)
RBC #/AREA URNS HPF: ABNORMAL /HPF (ref 0–2)
SODIUM SERPL-SCNC: 139 MMOL/L (ref 132–146)
SP GR UR STRIP: 1.01 (ref 1–1.03)
UROBILINOGEN UR STRIP-ACNC: 0.2 E.U./DL
WBC # BLD: 6.8 E9/L (ref 4.5–11.5)
WBC #/AREA URNS HPF: ABNORMAL /HPF (ref 0–5)

## 2023-06-03 PROCEDURE — 99232 SBSQ HOSP IP/OBS MODERATE 35: CPT | Performed by: INTERNAL MEDICINE

## 2023-06-03 PROCEDURE — 6370000000 HC RX 637 (ALT 250 FOR IP)

## 2023-06-03 PROCEDURE — 36415 COLL VENOUS BLD VENIPUNCTURE: CPT

## 2023-06-03 PROCEDURE — 80053 COMPREHEN METABOLIC PANEL: CPT

## 2023-06-03 PROCEDURE — 85025 COMPLETE CBC W/AUTO DIFF WBC: CPT

## 2023-06-03 PROCEDURE — 2580000003 HC RX 258

## 2023-06-03 PROCEDURE — 6360000002 HC RX W HCPCS

## 2023-06-03 PROCEDURE — G0378 HOSPITAL OBSERVATION PER HR: HCPCS

## 2023-06-03 PROCEDURE — 82962 GLUCOSE BLOOD TEST: CPT

## 2023-06-03 RX ADMIN — FOLIC ACID 1 MG: 1 TABLET ORAL at 09:49

## 2023-06-03 RX ADMIN — Medication 100 MG: at 09:48

## 2023-06-03 RX ADMIN — ENOXAPARIN SODIUM 40 MG: 100 INJECTION SUBCUTANEOUS at 09:48

## 2023-06-03 RX ADMIN — AMLODIPINE BESYLATE 5 MG: 5 TABLET ORAL at 09:49

## 2023-06-03 RX ADMIN — ASPIRIN 81 MG: 81 TABLET, COATED ORAL at 09:48

## 2023-06-03 RX ADMIN — BACLOFEN 10 MG: 10 TABLET ORAL at 09:48

## 2023-06-03 RX ADMIN — INSULIN LISPRO 5 UNITS: 100 INJECTION, SOLUTION INTRAVENOUS; SUBCUTANEOUS at 11:04

## 2023-06-03 RX ADMIN — SODIUM CHLORIDE, PRESERVATIVE FREE 10 ML: 5 INJECTION INTRAVENOUS at 09:49

## 2023-06-03 RX ADMIN — INSULIN LISPRO 5 UNITS: 100 INJECTION, SOLUTION INTRAVENOUS; SUBCUTANEOUS at 09:49

## 2023-06-03 RX ADMIN — LISINOPRIL 10 MG: 10 TABLET ORAL at 09:49

## 2023-06-03 RX ADMIN — INSULIN LISPRO 1 UNITS: 100 INJECTION, SOLUTION INTRAVENOUS; SUBCUTANEOUS at 11:04

## 2023-06-03 ASSESSMENT — PAIN SCALES - GENERAL
PAINLEVEL_OUTOF10: 0

## 2023-06-03 NOTE — DISCHARGE INSTRUCTIONS
Discharge to:    Diet: regular  Activity: activity as tolerated   Exercise: As tolerated     Be compliant with medication  Continue your rest of the medications without any changes  Neuro recommends to do tilt table testing as outpatient. Please, follow up with your PCP. Fainting: Care Instructions  Overview     When you faint, or pass out, you lose consciousness for a short time. A brief drop in blood flow to the brain often causes it. When you fall or lie down, more blood flows to your brain and you regain consciousness. Emotional stress, pain, or overheating--especially if you have been standing--can make you faint. In these cases, fainting is usually not serious. But fainting can be a sign of a more serious problem. Your doctor may want you to have more tests to rule out other causes. The treatment you need depends on the reason why you fainted. Follow-up care is a key part of your treatment and safety. Be sure to make and go to all appointments, and call your doctor if you are having problems. It's also a good idea to know your test results and keep a list of the medicines you take. How can you care for yourself at home? Drink plenty of fluids to prevent dehydration. If you have kidney, heart, or liver disease and have to limit fluids, talk with your doctor before you increase your fluid intake. Ask your doctor when it is safe to drive. When should you call for help? Call 911 anytime you think you may need emergency care. For example, call if:    You have symptoms of a heart problem. These may include:  Chest pain or pressure. Severe trouble breathing. A fast or irregular heartbeat. Lightheadedness or sudden weakness. Coughing up pink, foamy mucus. Passing out. After you call 911, the  may tell you to chew 1 adult-strength or 2 to 4 low-dose aspirin. Wait for an ambulance. Do not try to drive yourself. You have symptoms of a stroke.  These may include:  Sudden numbness,

## 2023-06-03 NOTE — PLAN OF CARE
Problem: Chronic Conditions and Co-morbidities  Goal: Patient's chronic conditions and co-morbidity symptoms are monitored and maintained or improved  6/3/2023 1456 by Earnest Baeza RN  Outcome: Adequate for Discharge  6/3/2023 1011 by Earnest Baeza RN  Outcome: Progressing     Problem: Safety - Adult  Goal: Free from fall injury  6/3/2023 1456 by Earnest Baeza RN  Outcome: Adequate for Discharge  6/3/2023 1011 by Earnest Baeza RN  Outcome: Progressing     Problem: Skin/Tissue Integrity  Goal: Absence of new skin breakdown  Description: 1. Monitor for areas of redness and/or skin breakdown  2. Assess vascular access sites hourly  3. Every 4-6 hours minimum:  Change oxygen saturation probe site  4. Every 4-6 hours:  If on nasal continuous positive airway pressure, respiratory therapy assess nares and determine need for appliance change or resting period.   6/3/2023 1456 by Earnest Baeza RN  Outcome: Adequate for Discharge  6/3/2023 1011 by Earnest Baeza RN  Outcome: Progressing

## 2023-06-03 NOTE — PLAN OF CARE
Problem: Chronic Conditions and Co-morbidities  Goal: Patient's chronic conditions and co-morbidity symptoms are monitored and maintained or improved  6/3/2023 1011 by Jackie Gonsalves RN  Outcome: Progressing     Problem: Safety - Adult  Goal: Free from fall injury  6/3/2023 1011 by Jackie Gonsalves RN  Outcome: Progressing     Problem: Skin/Tissue Integrity  Goal: Absence of new skin breakdown  Description: 1. Monitor for areas of redness and/or skin breakdown  2. Assess vascular access sites hourly  3. Every 4-6 hours minimum:  Change oxygen saturation probe site  4. Every 4-6 hours:  If on nasal continuous positive airway pressure, respiratory therapy assess nares and determine need for appliance change or resting period.   6/3/2023 1011 by Jackie Gonsalves RN  Outcome: Progressing

## 2023-06-03 NOTE — PROGRESS NOTES
[address]   Internal Medicine Residency / [address]    Attending Physician Statement  I have discussed the case, including pertinent history and exam findings with the resident and the team.  I have seen and examined the patient and the key elements of the encounter have been performed by me. I agree with the assessment, plan and orders as documented by the resident. A&O  VS stable  Hx of syncope again without rhyme or reason at rest  Cardiac workup in past negative  No orthostatic element   Plan: Neurology on consult although unlikely to be a seizure           Consider subclinical Cardiac arrhythmia           Consider EP opinion  Remainder of medical problems as per resident note.       Alberto Patrick MD [address] [facility]  Internal Medicine Residency Faculty
[address]        Date:2023                                                  Patient Name: Kathya Rodriguez    MRN: [id number]    : 1970    Room: [address] [place] [address]          Evaluating OT: Loyda Umaña OTR/L; [id number]       Referring Provider: Madelaien Barger MD    Specific Provider Orders/Date: OT Eval and Treat 23       Diagnosis: Syncope and collapse     Surgery: None this admission     Pertinent Medical History:  has a past medical history of Alcoholism ([facility] Cocaine abuse ([facility] Diabetes mellitus ([facility] Hypertension, Idiopathic peripheral neuropathy, Lacunar stroke ([facility] and Marijuana abuse.      Recommended Adaptive Equipment: BSC,  AE for LE bathing and dressing PRN     Precautions:  Fall Risk, h/o CVA (2023) w/ residual L spastic hemiplegia, external catheter/incontinence     Assessment of current deficits    [x] Functional mobility  [x]ADLs  [x] Strength               []Cognition    [x] Functional transfers   [x] IADLs         [x] Safety Awareness   [x]Endurance    [x] Fine Coordination              [x] Balance      [] Vision/perception   []Sensation     []Gross Motor Coordination  [] ROM  [] Delirium                   [] Motor Control     OT PLAN OF CARE   OT POC based on physician orders, patient diagnosis and results of clinical assessment    Frequency/Duration 1-3 days/wk for 2 weeks PRN   Specific OT Treatment Interventions to include:   * Instruction/training on adapted ADL techniques and AE recommendations to increase functional independence within precautions       * Training on energy conservation strategies, correct breathing pattern and techniques to improve independence/tolerance for self-care routine  * Functional transfer/mobility training/DME recommendations for increased independence, safety, and fall prevention  * Patient/Family
EEG completed. Report to follow.   Emma Tapia 6/2/2023
Neurology called to try and coordinate the EEG being completed this afternoon. Patient ok to go.
Physical Therapy Initial Assessment     Name: Foreign Shah  : 1970  MRN: [id number]      Date of Service: 2023    Evaluating PT:  Trip Gibson, PT [place] [address] [facility]    Room #:  8210/8210-B  Diagnosis:  Syncope and collapse [R55]  PMHx/PSHx:   has a past medical history of Alcoholism ([facility] [address] Cocaine abuse ([facility] [address] [facility] Diabetes mellitus ([facility] Hypertension, Idiopathic peripheral neuropathy, Lacunar stroke ([facility] and Marijuana abuse. Procedure/Surgery:   has no past surgical history on file. Precautions:  Falls, L spastic Hemiplegia  Equipment Needs:  Hemiwalker    SUBJECTIVE:    Pt lives with mother in a 2 story home with 10 stairs to enter and 2 rail. Bed is on 1st floor and bath is on 1st floor. Pt ambulated with Hemiwalker PTA. OBJECTIVE:   Initial Evaluation  Date: 23 Treatment Short Term/ Long Term   Goals   AM-PAC 6 Clicks 56/82     Was pt agreeable to Eval/treatment? Yes     Does pt have pain? No     Bed Mobility  Rolling: Min  Supine to sit:   Min   Sit to supine: Min   Scooting: Min   Rolling: Ind  Supine to sit: Ind  Sit to supine: Ind  Scooting: Ind   Transfers Sit to stand: Max  to Bear Sheridan to sit: Max   Stand pivot: Max  with HW  Sit to stand: Ind to Mil Hannifin to sit: Ind   Stand pivot: Ind with HW   Ambulation   15 feet with HW Max around foot of bed  100 feet with Sup HW   Stair negotiation: ascended and descended NT due to safety   3 steps with Min 2 rail    ROM BUE:  LUE: limited  BLE:  LLE: limited     Strength BUE:  LUE: 1/5  BLE:  LLE: 1/5     Balance Sitting EOB:  SBA  Dynamic Standing:  Max x1  Sitting EOB:  Ind  Dynamic Standing:  Min     Pt is A & O x 4  Sensation:  Pt denies numbness and tingling to extremities  Edema:  Not remarkable    Vitals:  Blood Pressure at rest - Blood Pressure post session --   Heart Rate at rest - Heart Rate post session --   SPO2 at rest - SPO2 post session -       Patient education  Pt educated on sequencing with hemiwalker.      Patient
Received a call from [facility] from Neurology stating that the ordered EEG will not be able to be completed when able. May be a few days from now. Dr Natalie Altamirano aware per [place].
Unit Nurse, notified to let him know that patient's EEG unable to be run at this time due to EEG machine not avail at this time will follow up with floor.   Gina Hardwick
06:50 AM     06/03/2023 06:50 AM    MCV 88.8 06/03/2023 06:50 AM    MCH 28.7 06/03/2023 06:50 AM    MCHC 32.3 06/03/2023 06:50 AM    RDW 14.9 06/03/2023 06:50 AM    SEGSPCT 53 01/25/2014 05:15 AM    SEGSPCT 86 10/11/2010 04:40 AM    BANDSPCT 3 10/10/2010 07:20 PM    METASPCT 1.7 07/20/2020 12:12 PM    LYMPHOPCT 25.7 06/03/2023 06:50 AM    PROMYELOPCT 1.7 04/29/2022 09:41 PM    MONOPCT 7.2 06/03/2023 06:50 AM    MYELOPCT 2.6 01/04/2020 05:07 PM    EOSPCT 2 10/12/2010 06:00 AM    BASOPCT 0.4 06/03/2023 06:50 AM    MONOSABS 0.49 06/03/2023 06:50 AM    LYMPHSABS 1.74 06/03/2023 06:50 AM    EOSABS 0.24 06/03/2023 06:50 AM    BASOSABS 0.03 06/03/2023 06:50 AM     BMP:    Lab Results   Component Value Date/Time     06/02/2023 06:21 AM    K 4.1 06/02/2023 06:21 AM    K 5.5 06/01/2023 05:38 PM     06/02/2023 06:21 AM    CO2 24 06/02/2023 06:21 AM    BUN 17 06/02/2023 06:21 AM    LABALBU 4.1 06/02/2023 06:21 AM    LABALBU 4.3 12/30/2011 10:15 AM    CREATININE 0.6 06/02/2023 06:21 AM    CALCIUM 9.1 06/02/2023 06:21 AM    GFRAA >60 08/10/2022 09:41 AM    LABGLOM >60 06/02/2023 06:21 AM    GLUCOSE 162 06/03/2023 06:33 AM    GLUCOSE 83 04/11/2012 03:35 AM       IMAGING:   Imaging Studies:    CT HEAD WO CONTRAST    Result Date: 6/1/2023  No acute intracranial abnormality. XR CHEST PORTABLE    Result Date: 6/1/2023  No acute process. Notable Cultures:      Blood cultures   Blood Culture, Routine   Date Value Ref Range Status   05/11/2023 5 Days no growth  Final     Respiratory cultures No results found for: RESPCULTURE No results found for: LABGRAM  Urine   Urine Culture, Routine   Date Value Ref Range Status   04/27/2023 Growth not present  Final     Legionella No results found for: LABLEGI  C Diff PCR No results found for: CDIFPCR  Wound culture/abscess: No results for input(s): WNDABS in the last 72 hours. Tip culture:No results for input(s): CXCATHTIP in the last 72 hours.      Antibiotic  Days

## 2023-06-03 NOTE — PLAN OF CARE
Problem: Chronic Conditions and Co-morbidities  Goal: Patient's chronic conditions and co-morbidity symptoms are monitored and maintained or improved  6/3/2023 1456 by Mayito Curtis RN  Outcome: Adequate for Discharge  6/3/2023 1011 by Mayito Curtis RN  Outcome: Progressing     Problem: Safety - Adult  Goal: Free from fall injury  6/3/2023 1456 by Maytio Curtis RN  Outcome: Adequate for Discharge  6/3/2023 1011 by Mayito Curtis RN  Outcome: Progressing     Problem: Skin/Tissue Integrity  Goal: Absence of new skin breakdown  Description: 1. Monitor for areas of redness and/or skin breakdown  2. Assess vascular access sites hourly  3. Every 4-6 hours minimum:  Change oxygen saturation probe site  4. Every 4-6 hours:  If on nasal continuous positive airway pressure, respiratory therapy assess nares and determine need for appliance change or resting period.   6/3/2023 1456 by Mayito Curtis RN  Outcome: Adequate for Discharge  6/3/2023 1011 by Mayito Curtis RN  Outcome: Progressing

## 2023-06-03 NOTE — DISCHARGE SUMMARY
[address]  Discharge Summary    PCP: Michelle Gonzales MD    Admit Date:6/1/2023  Discharge Date: 6/3/2023    Admission Diagnosis:   Syncope likely autonomic vs cardiac vs seizures (less likely)  Hx of ischemic stroke (1/2023) (Lacunar infarct involving the right post limb internal capsule); Hx of type 1 diabetes, recurrent episodes of DKA secondary to non-compliance  Hx of hypertension  Hx of hyperlipidemia  Hx of polysubstance abuse (alcohol, tobacco, marijuana). Hx of cardiac arrest (2020), PEA likely due to DKA and hypothermia    Discharge Diagnosis:  Syncopal episode likely 2/2 polysubstance abuse  Hx of ischemic stroke (1/2023) (Lacunar infarct involving the right post limb internal capsule); Hx of type 1 diabetes, recurrent episodes of DKA secondary to non-compliance  Hx of hypertension  Hx of hyperlipidemia  Hx of polysubstance abuse (alcohol, tobacco, marijuana). Hx of cardiac arrest (2020), PEA likely due to DKA and hypothermia      Hospital Course: The patient is a 47 y.o. male with a past medical history of type I DM, Hypertension, Ischemic Stroke (1/2023) with Left-Sided Hemiplegia, Polysubstance Abuse who presented in the ED for loss of consciousness. Patient was admitted on 5/10/23 for the same complaint. He was noted to have acute versus subacute infarct in the right parietal seen on MRI. He was started on DAPT for 21 days. He was not started on any antiseizure medications. Patient states experienced 2 episodes of loss of consciousness sitting on his wheelchair, smoking and talking to his friend. He suddenly just slumped onto his wheelchair while he was talking without any abnormal movements. BG was in the 90s. No noted urinary or bowel incontinence, upward rolling of eyes or tongue biting during these episodes. In the Ed, patient was hemodynamically stable. Labs were not significant. CT head was negative for acute abnormalities.  UDS

## 2023-06-16 RX ORDER — BACLOFEN 10 MG/1
10 TABLET ORAL 2 TIMES DAILY
Qty: 30 TABLET | Refills: 0 | Status: SHIPPED | OUTPATIENT
Start: 2023-06-16

## 2023-06-16 RX ORDER — DULOXETIN HYDROCHLORIDE 30 MG/1
30 CAPSULE, DELAYED RELEASE ORAL DAILY
Qty: 30 CAPSULE | Refills: 3 | Status: SHIPPED | OUTPATIENT
Start: 2023-06-16

## 2023-06-24 ENCOUNTER — APPOINTMENT (OUTPATIENT)
Dept: GENERAL RADIOLOGY | Age: 53
DRG: 637 | End: 2023-06-24
Payer: COMMERCIAL

## 2023-06-24 ENCOUNTER — APPOINTMENT (OUTPATIENT)
Dept: CT IMAGING | Age: 53
DRG: 637 | End: 2023-06-24
Payer: COMMERCIAL

## 2023-06-24 ENCOUNTER — HOSPITAL ENCOUNTER (INPATIENT)
Age: 53
LOS: 6 days | Discharge: HOME | DRG: 637 | End: 2023-07-01
Attending: EMERGENCY MEDICINE | Admitting: INTERNAL MEDICINE
Payer: COMMERCIAL

## 2023-06-24 DIAGNOSIS — E87.20 METABOLIC ACIDOSIS: Primary | ICD-10-CM

## 2023-06-24 DIAGNOSIS — D72.829 LEUKOCYTOSIS, UNSPECIFIED TYPE: ICD-10-CM

## 2023-06-24 DIAGNOSIS — E10.10 DKA, TYPE 1, NOT AT GOAL (HCC): ICD-10-CM

## 2023-06-24 DIAGNOSIS — R73.9 HYPERGLYCEMIA: ICD-10-CM

## 2023-06-24 DIAGNOSIS — R11.2 NAUSEA AND VOMITING, UNSPECIFIED VOMITING TYPE: ICD-10-CM

## 2023-06-24 LAB
ALBUMIN SERPL-MCNC: 4.6 G/DL (ref 3.5–5.2)
ALP SERPL-CCNC: 124 U/L (ref 40–129)
ALT SERPL-CCNC: 27 U/L (ref 0–40)
ANION GAP SERPL CALCULATED.3IONS-SCNC: 25 MMOL/L (ref 7–16)
APAP SERPL-MCNC: <5 MCG/ML (ref 10–30)
AST SERPL-CCNC: 17 U/L (ref 0–39)
BASOPHILS # BLD: 0.06 E9/L (ref 0–0.2)
BASOPHILS NFR BLD: 0.3 % (ref 0–2)
BETA-HYDROXYBUTYRATE: >4.5 MMOL/L (ref 0.02–0.27)
BILIRUB SERPL-MCNC: 0.8 MG/DL (ref 0–1.2)
BUN SERPL-MCNC: 26 MG/DL (ref 6–20)
CALCIUM SERPL-MCNC: 9.7 MG/DL (ref 8.6–10.2)
CHLORIDE SERPL-SCNC: 97 MMOL/L (ref 98–107)
CO2 SERPL-SCNC: 20 MMOL/L (ref 22–29)
CREAT SERPL-MCNC: 1 MG/DL (ref 0.7–1.2)
EOSINOPHIL # BLD: 0.08 E9/L (ref 0.05–0.5)
EOSINOPHIL NFR BLD: 0.4 % (ref 0–6)
ERYTHROCYTE [DISTWIDTH] IN BLOOD BY AUTOMATED COUNT: 13.8 FL (ref 11.5–15)
ETHANOLAMINE SERPL-MCNC: <10 MG/DL (ref 0–0.08)
GLUCOSE SERPL-MCNC: 227 MG/DL (ref 74–99)
HCT VFR BLD AUTO: 40 % (ref 37–54)
HGB BLD-MCNC: 13.1 G/DL (ref 12.5–16.5)
IMM GRANULOCYTES # BLD: 0.09 E9/L
IMM GRANULOCYTES NFR BLD: 0.5 % (ref 0–5)
LIPASE: 13 U/L (ref 13–60)
LYMPHOCYTES # BLD: 1.68 E9/L (ref 1.5–4)
LYMPHOCYTES NFR BLD: 8.5 % (ref 20–42)
MAGNESIUM SERPL-MCNC: 1.8 MG/DL (ref 1.6–2.6)
MCH RBC QN AUTO: 29.2 PG (ref 26–35)
MCHC RBC AUTO-ENTMCNC: 32.8 % (ref 32–34.5)
MCV RBC AUTO: 89.1 FL (ref 80–99.9)
MONOCYTES # BLD: 1.27 E9/L (ref 0.1–0.95)
MONOCYTES NFR BLD: 6.4 % (ref 2–12)
NEUTROPHILS # BLD: 16.65 E9/L (ref 1.8–7.3)
NEUTS SEG NFR BLD: 83.9 % (ref 43–80)
PLATELET # BLD AUTO: 428 E9/L (ref 130–450)
PMV BLD AUTO: 8.9 FL (ref 7–12)
POTASSIUM SERPL-SCNC: 3.8 MMOL/L (ref 3.5–5)
PROT SERPL-MCNC: 7.3 G/DL (ref 6.4–8.3)
RBC # BLD AUTO: 4.49 E12/L (ref 3.8–5.8)
REASON FOR REJECTION: NORMAL
REJECTED TEST: NORMAL
SALICYLATES SERPL-MCNC: <0.3 MG/DL (ref 0–30)
SODIUM SERPL-SCNC: 142 MMOL/L (ref 132–146)
TRICYCLIC ANTIDEPRESSANTS SCREEN SERUM: NEGATIVE NG/ML
TROPONIN, HIGH SENSITIVITY: 39 NG/L (ref 0–11)
WBC # BLD: 19.8 E9/L (ref 4.5–11.5)

## 2023-06-24 PROCEDURE — 74177 CT ABD & PELVIS W/CONTRAST: CPT

## 2023-06-24 PROCEDURE — 83690 ASSAY OF LIPASE: CPT

## 2023-06-24 PROCEDURE — 93005 ELECTROCARDIOGRAM TRACING: CPT | Performed by: EMERGENCY MEDICINE

## 2023-06-24 PROCEDURE — 36415 COLL VENOUS BLD VENIPUNCTURE: CPT

## 2023-06-24 PROCEDURE — 82077 ASSAY SPEC XCP UR&BREATH IA: CPT

## 2023-06-24 PROCEDURE — 80307 DRUG TEST PRSMV CHEM ANLYZR: CPT

## 2023-06-24 PROCEDURE — 80053 COMPREHEN METABOLIC PANEL: CPT

## 2023-06-24 PROCEDURE — 96374 THER/PROPH/DIAG INJ IV PUSH: CPT

## 2023-06-24 PROCEDURE — 82010 KETONE BODYS QUAN: CPT

## 2023-06-24 PROCEDURE — 2580000003 HC RX 258: Performed by: EMERGENCY MEDICINE

## 2023-06-24 PROCEDURE — 6360000002 HC RX W HCPCS: Performed by: EMERGENCY MEDICINE

## 2023-06-24 PROCEDURE — 83735 ASSAY OF MAGNESIUM: CPT

## 2023-06-24 PROCEDURE — 80179 DRUG ASSAY SALICYLATE: CPT

## 2023-06-24 PROCEDURE — 71045 X-RAY EXAM CHEST 1 VIEW: CPT

## 2023-06-24 PROCEDURE — 83605 ASSAY OF LACTIC ACID: CPT

## 2023-06-24 PROCEDURE — 84484 ASSAY OF TROPONIN QUANT: CPT

## 2023-06-24 PROCEDURE — 85025 COMPLETE CBC W/AUTO DIFF WBC: CPT

## 2023-06-24 PROCEDURE — 6360000004 HC RX CONTRAST MEDICATION: Performed by: RADIOLOGY

## 2023-06-24 PROCEDURE — 99285 EMERGENCY DEPT VISIT HI MDM: CPT

## 2023-06-24 PROCEDURE — 80143 DRUG ASSAY ACETAMINOPHEN: CPT

## 2023-06-24 RX ORDER — ONDANSETRON 2 MG/ML
4 INJECTION INTRAMUSCULAR; INTRAVENOUS ONCE
Status: COMPLETED | OUTPATIENT
Start: 2023-06-24 | End: 2023-06-24

## 2023-06-24 RX ORDER — 0.9 % SODIUM CHLORIDE 0.9 %
1000 INTRAVENOUS SOLUTION INTRAVENOUS ONCE
Status: COMPLETED | OUTPATIENT
Start: 2023-06-24 | End: 2023-06-25

## 2023-06-24 RX ORDER — 0.9 % SODIUM CHLORIDE 0.9 %
1000 INTRAVENOUS SOLUTION INTRAVENOUS ONCE
Status: COMPLETED | OUTPATIENT
Start: 2023-06-24 | End: 2023-06-24

## 2023-06-24 RX ADMIN — SODIUM CHLORIDE 1000 ML: 9 INJECTION, SOLUTION INTRAVENOUS at 20:20

## 2023-06-24 RX ADMIN — ONDANSETRON 4 MG: 2 INJECTION INTRAMUSCULAR; INTRAVENOUS at 20:30

## 2023-06-24 RX ADMIN — IOPAMIDOL 75 ML: 755 INJECTION, SOLUTION INTRAVENOUS at 21:59

## 2023-06-24 ASSESSMENT — PAIN - FUNCTIONAL ASSESSMENT: PAIN_FUNCTIONAL_ASSESSMENT: NONE - DENIES PAIN

## 2023-06-24 ASSESSMENT — LIFESTYLE VARIABLES: HOW OFTEN DO YOU HAVE A DRINK CONTAINING ALCOHOL: NEVER

## 2023-06-25 ENCOUNTER — APPOINTMENT (OUTPATIENT)
Dept: GENERAL RADIOLOGY | Age: 53
DRG: 637 | End: 2023-06-25
Payer: COMMERCIAL

## 2023-06-25 PROBLEM — E87.29 STARVATION KETOACIDOSIS: Status: ACTIVE | Noted: 2023-06-25

## 2023-06-25 PROBLEM — R94.31 ABNORMAL EKG: Status: ACTIVE | Noted: 2023-06-25

## 2023-06-25 PROBLEM — T73.0XXA STARVATION KETOACIDOSIS: Status: ACTIVE | Noted: 2023-06-25

## 2023-06-25 PROBLEM — E10.10 DKA, TYPE 1, NOT AT GOAL (HCC): Status: ACTIVE | Noted: 2023-06-25

## 2023-06-25 LAB
AMPHET UR QL SCN: NOT DETECTED
ANION GAP SERPL CALCULATED.3IONS-SCNC: 17 MMOL/L (ref 7–16)
ANION GAP SERPL CALCULATED.3IONS-SCNC: 23 MMOL/L (ref 7–16)
ANION GAP SERPL CALCULATED.3IONS-SCNC: 29 MMOL/L (ref 7–16)
ANION GAP SERPL CALCULATED.3IONS-SCNC: 31 MMOL/L (ref 7–16)
ANION GAP SERPL CALCULATED.3IONS-SCNC: 32 MMOL/L (ref 7–16)
ANION GAP SERPL CALCULATED.3IONS-SCNC: 9 MMOL/L (ref 7–16)
B PARAP IS1001 DNA NPH QL NAA+NON-PROBE: NOT DETECTED
B PERT.PT PRMT NPH QL NAA+NON-PROBE: NOT DETECTED
B.E.: -9 MMOL/L (ref -3–3)
BACTERIA URNS QL MICRO: ABNORMAL /HPF
BARBITURATES UR QL SCN: NOT DETECTED
BASOPHILS # BLD: 0.05 E9/L (ref 0–0.2)
BASOPHILS NFR BLD: 0.2 % (ref 0–2)
BENZODIAZ UR QL SCN: NOT DETECTED
BILIRUB UR QL STRIP: ABNORMAL
BUN SERPL-MCNC: 25 MG/DL (ref 6–20)
BUN SERPL-MCNC: 27 MG/DL (ref 6–20)
BUN SERPL-MCNC: 28 MG/DL (ref 6–20)
BUN SERPL-MCNC: 29 MG/DL (ref 6–20)
BUN SERPL-MCNC: 30 MG/DL (ref 6–20)
BUN SERPL-MCNC: 32 MG/DL (ref 6–20)
C PNEUM DNA NPH QL NAA+NON-PROBE: NOT DETECTED
CALCIUM SERPL-MCNC: 7.2 MG/DL (ref 8.6–10.2)
CALCIUM SERPL-MCNC: 7.4 MG/DL (ref 8.6–10.2)
CALCIUM SERPL-MCNC: 8.6 MG/DL (ref 8.6–10.2)
CALCIUM SERPL-MCNC: 8.8 MG/DL (ref 8.6–10.2)
CALCIUM SERPL-MCNC: 8.9 MG/DL (ref 8.6–10.2)
CALCIUM SERPL-MCNC: 8.9 MG/DL (ref 8.6–10.2)
CANNABINOIDS UR QL SCN: NOT DETECTED
CHLORIDE SERPL-SCNC: 100 MMOL/L (ref 98–107)
CHLORIDE SERPL-SCNC: 103 MMOL/L (ref 98–107)
CHLORIDE SERPL-SCNC: 103 MMOL/L (ref 98–107)
CHLORIDE SERPL-SCNC: 109 MMOL/L (ref 98–107)
CHLORIDE SERPL-SCNC: 112 MMOL/L (ref 98–107)
CHLORIDE SERPL-SCNC: 114 MMOL/L (ref 98–107)
CHOLESTEROL, TOTAL: 124 MG/DL (ref 0–199)
CLARITY UR: CLEAR
CO2 SERPL-SCNC: 10 MMOL/L (ref 22–29)
CO2 SERPL-SCNC: 12 MMOL/L (ref 22–29)
CO2 SERPL-SCNC: 13 MMOL/L (ref 22–29)
CO2 SERPL-SCNC: 21 MMOL/L (ref 22–29)
CO2 SERPL-SCNC: 26 MMOL/L (ref 22–29)
CO2 SERPL-SCNC: 9 MMOL/L (ref 22–29)
COCAINE UR QL SCN: NOT DETECTED
COHB: 0.3 % (ref 0–1.5)
COLOR UR: YELLOW
CREAT SERPL-MCNC: 1 MG/DL (ref 0.7–1.2)
CREAT SERPL-MCNC: 1.1 MG/DL (ref 0.7–1.2)
CREAT SERPL-MCNC: 1.2 MG/DL (ref 0.7–1.2)
CREAT SERPL-MCNC: 1.2 MG/DL (ref 0.7–1.2)
CREAT SERPL-MCNC: 1.4 MG/DL (ref 0.7–1.2)
CREAT SERPL-MCNC: 1.5 MG/DL (ref 0.7–1.2)
CRITICAL: ABNORMAL
DATE ANALYZED: ABNORMAL
DATE OF COLLECTION: ABNORMAL
DRUG SCREEN COMMENT UR-IMP: NORMAL
EOSINOPHIL # BLD: 0 E9/L (ref 0.05–0.5)
EOSINOPHIL NFR BLD: 0 % (ref 0–6)
ERYTHROCYTE [DISTWIDTH] IN BLOOD BY AUTOMATED COUNT: 14.1 FL (ref 11.5–15)
FENTANYL SCREEN, URINE: NOT DETECTED
FLUAV RNA NPH QL NAA+NON-PROBE: NOT DETECTED
FLUBV RNA NPH QL NAA+NON-PROBE: NOT DETECTED
GLUCOSE SERPL-MCNC: 193 MG/DL (ref 74–99)
GLUCOSE SERPL-MCNC: 276 MG/DL (ref 74–99)
GLUCOSE SERPL-MCNC: 317 MG/DL (ref 74–99)
GLUCOSE SERPL-MCNC: 506 MG/DL (ref 74–99)
GLUCOSE SERPL-MCNC: 517 MG/DL (ref 74–99)
GLUCOSE SERPL-MCNC: 526 MG/DL (ref 74–99)
GLUCOSE SERPL-MCNC: 602 MG/DL (ref 74–99)
GLUCOSE UR STRIP-MCNC: 500 MG/DL
HADV DNA NPH QL NAA+NON-PROBE: NOT DETECTED
HBA1C MFR BLD: 9.2 % (ref 4–5.6)
HBA1C MFR BLD: 9.2 % (ref 4–5.6)
HCO3: 15.8 MMOL/L (ref 22–26)
HCOV 229E RNA NPH QL NAA+NON-PROBE: NOT DETECTED
HCOV HKU1 RNA NPH QL NAA+NON-PROBE: NOT DETECTED
HCOV NL63 RNA NPH QL NAA+NON-PROBE: NOT DETECTED
HCOV OC43 RNA NPH QL NAA+NON-PROBE: NOT DETECTED
HCT VFR BLD AUTO: 34.4 % (ref 37–54)
HDLC SERPL-MCNC: 44 MG/DL
HGB BLD-MCNC: 10.9 G/DL (ref 12.5–16.5)
HGB UR QL STRIP: NEGATIVE
HHB: 4.7 % (ref 0–5)
HMPV RNA NPH QL NAA+NON-PROBE: NOT DETECTED
HPIV1 RNA NPH QL NAA+NON-PROBE: NOT DETECTED
HPIV2 RNA NPH QL NAA+NON-PROBE: NOT DETECTED
HPIV3 RNA NPH QL NAA+NON-PROBE: NOT DETECTED
HPIV4 RNA NPH QL NAA+NON-PROBE: NOT DETECTED
IMM GRANULOCYTES # BLD: 0.1 E9/L
IMM GRANULOCYTES NFR BLD: 0.5 % (ref 0–5)
KETONES UR STRIP-MCNC: >=80 MG/DL
LAB: ABNORMAL
LACTATE BLDV-SCNC: 1.2 MMOL/L (ref 0.5–2.2)
LACTATE BLDV-SCNC: 1.3 MMOL/L (ref 0.5–2.2)
LACTATE BLDV-SCNC: 2.5 MMOL/L (ref 0.5–2.2)
LACTATE BLDV-SCNC: 2.7 MMOL/L (ref 0.5–2.2)
LDLC SERPL CALC-MCNC: 70 MG/DL (ref 0–99)
LEUKOCYTE ESTERASE UR QL STRIP: NEGATIVE
LYMPHOCYTES # BLD: 0.6 E9/L (ref 1.5–4)
LYMPHOCYTES NFR BLD: 2.7 % (ref 20–42)
Lab: ABNORMAL
M PNEUMO DNA NPH QL NAA+NON-PROBE: NOT DETECTED
MAGNESIUM SERPL-MCNC: 1.8 MG/DL (ref 1.6–2.6)
MAGNESIUM SERPL-MCNC: 1.9 MG/DL (ref 1.6–2.6)
MAGNESIUM SERPL-MCNC: 2 MG/DL (ref 1.6–2.6)
MCH RBC QN AUTO: 29 PG (ref 26–35)
MCHC RBC AUTO-ENTMCNC: 31.7 % (ref 32–34.5)
MCV RBC AUTO: 91.5 FL (ref 80–99.9)
METER GLUCOSE: 169 MG/DL (ref 74–99)
METER GLUCOSE: 195 MG/DL (ref 74–99)
METER GLUCOSE: 204 MG/DL (ref 74–99)
METER GLUCOSE: 212 MG/DL (ref 74–99)
METER GLUCOSE: 232 MG/DL (ref 74–99)
METER GLUCOSE: 244 MG/DL (ref 74–99)
METER GLUCOSE: 260 MG/DL (ref 74–99)
METER GLUCOSE: 291 MG/DL (ref 74–99)
METER GLUCOSE: 300 MG/DL (ref 74–99)
METER GLUCOSE: 326 MG/DL (ref 74–99)
METER GLUCOSE: 353 MG/DL (ref 74–99)
METER GLUCOSE: 371 MG/DL (ref 74–99)
METER GLUCOSE: 381 MG/DL (ref 74–99)
METER GLUCOSE: 381 MG/DL (ref 74–99)
METER GLUCOSE: 429 MG/DL (ref 74–99)
METER GLUCOSE: 478 MG/DL (ref 74–99)
METER GLUCOSE: >500 MG/DL (ref 74–99)
METHADONE UR QL SCN: NOT DETECTED
METHB: 0.3 % (ref 0–1.5)
MODE: ABNORMAL
MONOCYTES # BLD: 1.23 E9/L (ref 0.1–0.95)
MONOCYTES NFR BLD: 5.6 % (ref 2–12)
NEUTROPHILS # BLD: 19.87 E9/L (ref 1.8–7.3)
NEUTS SEG NFR BLD: 91 % (ref 43–80)
NITRITE UR QL STRIP: NEGATIVE
O2 CONTENT: 16.1 ML/DL
O2 SATURATION: 95.3 % (ref 92–98.5)
O2HB: 94.7 % (ref 94–97)
OPERATOR ID: 2860
OPIATES UR QL SCN: NOT DETECTED
OSMOLALITY SERPL CALC.SUM OF ELEC: 322 MOSM/KG (ref 285–310)
OXYCODONE URINE: NOT DETECTED
PATIENT TEMP: 37 C
PCO2: 30.8 MMHG (ref 35–45)
PCP UR QL SCN: NOT DETECTED
PH BLOOD GAS: 7.33 (ref 7.35–7.45)
PH UR STRIP: 5.5 [PH] (ref 5–9)
PHOSPHATE SERPL-MCNC: 2 MG/DL (ref 2.5–4.5)
PHOSPHATE SERPL-MCNC: 2 MG/DL (ref 2.5–4.5)
PHOSPHATE SERPL-MCNC: 2.2 MG/DL (ref 2.5–4.5)
PHOSPHATE SERPL-MCNC: 3.8 MG/DL (ref 2.5–4.5)
PHOSPHATE SERPL-MCNC: 3.9 MG/DL (ref 2.5–4.5)
PLATELET # BLD AUTO: 385 E9/L (ref 130–450)
PMV BLD AUTO: 9.1 FL (ref 7–12)
PO2: 82.1 MMHG (ref 75–100)
POTASSIUM SERPL-SCNC: 3.1 MMOL/L (ref 3.5–5)
POTASSIUM SERPL-SCNC: 3.5 MMOL/L (ref 3.5–5)
POTASSIUM SERPL-SCNC: 3.8 MMOL/L (ref 3.5–5)
POTASSIUM SERPL-SCNC: 3.8 MMOL/L (ref 3.5–5)
POTASSIUM SERPL-SCNC: 4.2 MMOL/L (ref 3.5–5)
POTASSIUM SERPL-SCNC: 4.6 MMOL/L (ref 3.5–5)
PROCALCITONIN: 0.25 NG/ML (ref 0–0.08)
PROT UR STRIP-MCNC: NEGATIVE MG/DL
RBC # BLD AUTO: 3.76 E12/L (ref 3.8–5.8)
RBC #/AREA URNS HPF: ABNORMAL /HPF (ref 0–2)
RSV RNA NPH QL NAA+NON-PROBE: NOT DETECTED
RV+EV RNA NPH QL NAA+NON-PROBE: NOT DETECTED
SARS-COV-2 RNA NPH QL NAA+NON-PROBE: NOT DETECTED
SODIUM SERPL-SCNC: 141 MMOL/L (ref 132–146)
SODIUM SERPL-SCNC: 144 MMOL/L (ref 132–146)
SODIUM SERPL-SCNC: 144 MMOL/L (ref 132–146)
SODIUM SERPL-SCNC: 147 MMOL/L (ref 132–146)
SODIUM SERPL-SCNC: 148 MMOL/L (ref 132–146)
SODIUM SERPL-SCNC: 149 MMOL/L (ref 132–146)
SOURCE, BLOOD GAS: ABNORMAL
SP GR UR STRIP: 1.02 (ref 1–1.03)
THB: 12 G/DL (ref 11.5–16.5)
TIME ANALYZED: 634
TRIGL SERPL-MCNC: 48 MG/DL (ref 0–149)
TROPONIN, HIGH SENSITIVITY: 40 NG/L (ref 0–11)
TROPONIN, HIGH SENSITIVITY: 49 NG/L (ref 0–11)
TSH SERPL-MCNC: 0.64 UIU/ML (ref 0.27–4.2)
UROBILINOGEN UR STRIP-ACNC: 0.2 E.U./DL
VLDLC SERPL CALC-MCNC: 10 MG/DL
WBC # BLD: 21.9 E9/L (ref 4.5–11.5)
WBC #/AREA URNS HPF: ABNORMAL /HPF (ref 0–5)

## 2023-06-25 PROCEDURE — 87150 DNA/RNA AMPLIFIED PROBE: CPT

## 2023-06-25 PROCEDURE — 83930 ASSAY OF BLOOD OSMOLALITY: CPT

## 2023-06-25 PROCEDURE — 51702 INSERT TEMP BLADDER CATH: CPT

## 2023-06-25 PROCEDURE — 83605 ASSAY OF LACTIC ACID: CPT

## 2023-06-25 PROCEDURE — 93005 ELECTROCARDIOGRAM TRACING: CPT | Performed by: CLINICAL NURSE SPECIALIST

## 2023-06-25 PROCEDURE — 2580000003 HC RX 258

## 2023-06-25 PROCEDURE — 2580000003 HC RX 258: Performed by: EMERGENCY MEDICINE

## 2023-06-25 PROCEDURE — 87040 BLOOD CULTURE FOR BACTERIA: CPT

## 2023-06-25 PROCEDURE — 71045 X-RAY EXAM CHEST 1 VIEW: CPT

## 2023-06-25 PROCEDURE — 87088 URINE BACTERIA CULTURE: CPT

## 2023-06-25 PROCEDURE — 6370000000 HC RX 637 (ALT 250 FOR IP): Performed by: EMERGENCY MEDICINE

## 2023-06-25 PROCEDURE — 83036 HEMOGLOBIN GLYCOSYLATED A1C: CPT

## 2023-06-25 PROCEDURE — 6360000002 HC RX W HCPCS: Performed by: STUDENT IN AN ORGANIZED HEALTH CARE EDUCATION/TRAINING PROGRAM

## 2023-06-25 PROCEDURE — 80307 DRUG TEST PRSMV CHEM ANLYZR: CPT

## 2023-06-25 PROCEDURE — 6360000002 HC RX W HCPCS: Performed by: INTERNAL MEDICINE

## 2023-06-25 PROCEDURE — 99254 IP/OBS CNSLTJ NEW/EST MOD 60: CPT | Performed by: INTERNAL MEDICINE

## 2023-06-25 PROCEDURE — 36415 COLL VENOUS BLD VENIPUNCTURE: CPT

## 2023-06-25 PROCEDURE — 83735 ASSAY OF MAGNESIUM: CPT

## 2023-06-25 PROCEDURE — 99291 CRITICAL CARE FIRST HOUR: CPT | Performed by: INTERNAL MEDICINE

## 2023-06-25 PROCEDURE — 82805 BLOOD GASES W/O2 SATURATION: CPT

## 2023-06-25 PROCEDURE — 82962 GLUCOSE BLOOD TEST: CPT

## 2023-06-25 PROCEDURE — 85025 COMPLETE CBC W/AUTO DIFF WBC: CPT

## 2023-06-25 PROCEDURE — 81001 URINALYSIS AUTO W/SCOPE: CPT

## 2023-06-25 PROCEDURE — 84443 ASSAY THYROID STIM HORMONE: CPT

## 2023-06-25 PROCEDURE — 6370000000 HC RX 637 (ALT 250 FOR IP)

## 2023-06-25 PROCEDURE — 2580000003 HC RX 258: Performed by: INTERNAL MEDICINE

## 2023-06-25 PROCEDURE — 6360000002 HC RX W HCPCS

## 2023-06-25 PROCEDURE — 6360000002 HC RX W HCPCS: Performed by: EMERGENCY MEDICINE

## 2023-06-25 PROCEDURE — 84145 PROCALCITONIN (PCT): CPT

## 2023-06-25 PROCEDURE — 36592 COLLECT BLOOD FROM PICC: CPT

## 2023-06-25 PROCEDURE — APPSS60 APP SPLIT SHARED TIME 46-60 MINUTES: Performed by: CLINICAL NURSE SPECIALIST

## 2023-06-25 PROCEDURE — 80048 BASIC METABOLIC PNL TOTAL CA: CPT

## 2023-06-25 PROCEDURE — 84100 ASSAY OF PHOSPHORUS: CPT

## 2023-06-25 PROCEDURE — 87186 SC STD MICRODIL/AGAR DIL: CPT

## 2023-06-25 PROCEDURE — 80061 LIPID PANEL: CPT

## 2023-06-25 PROCEDURE — 87077 CULTURE AEROBIC IDENTIFY: CPT

## 2023-06-25 PROCEDURE — 0202U NFCT DS 22 TRGT SARS-COV-2: CPT

## 2023-06-25 PROCEDURE — 82947 ASSAY GLUCOSE BLOOD QUANT: CPT

## 2023-06-25 PROCEDURE — 84484 ASSAY OF TROPONIN QUANT: CPT

## 2023-06-25 PROCEDURE — 87081 CULTURE SCREEN ONLY: CPT

## 2023-06-25 PROCEDURE — 74018 RADEX ABDOMEN 1 VIEW: CPT

## 2023-06-25 PROCEDURE — 2000000000 HC ICU R&B

## 2023-06-25 PROCEDURE — 99223 1ST HOSP IP/OBS HIGH 75: CPT | Performed by: INTERNAL MEDICINE

## 2023-06-25 PROCEDURE — 36556 INSERT NON-TUNNEL CV CATH: CPT

## 2023-06-25 PROCEDURE — 2580000003 HC RX 258: Performed by: STUDENT IN AN ORGANIZED HEALTH CARE EDUCATION/TRAINING PROGRAM

## 2023-06-25 PROCEDURE — C9113 INJ PANTOPRAZOLE SODIUM, VIA: HCPCS | Performed by: INTERNAL MEDICINE

## 2023-06-25 PROCEDURE — 2500000003 HC RX 250 WO HCPCS: Performed by: EMERGENCY MEDICINE

## 2023-06-25 RX ORDER — ENOXAPARIN SODIUM 100 MG/ML
40 INJECTION SUBCUTANEOUS DAILY
Status: DISCONTINUED | OUTPATIENT
Start: 2023-06-25 | End: 2023-07-01 | Stop reason: HOSPADM

## 2023-06-25 RX ORDER — SODIUM CHLORIDE 0.9 % (FLUSH) 0.9 %
5-40 SYRINGE (ML) INJECTION EVERY 12 HOURS SCHEDULED
Status: DISCONTINUED | OUTPATIENT
Start: 2023-06-25 | End: 2023-07-01 | Stop reason: HOSPADM

## 2023-06-25 RX ORDER — EZETIMIBE 10 MG/1
10 TABLET ORAL NIGHTLY
Status: DISCONTINUED | OUTPATIENT
Start: 2023-06-25 | End: 2023-07-01 | Stop reason: HOSPADM

## 2023-06-25 RX ORDER — SODIUM CHLORIDE 9 MG/ML
INJECTION, SOLUTION INTRAVENOUS CONTINUOUS
Status: DISCONTINUED | OUTPATIENT
Start: 2023-06-25 | End: 2023-06-25

## 2023-06-25 RX ORDER — AMLODIPINE BESYLATE 5 MG/1
5 TABLET ORAL DAILY
Status: DISCONTINUED | OUTPATIENT
Start: 2023-06-25 | End: 2023-06-28

## 2023-06-25 RX ORDER — INSULIN LISPRO 100 [IU]/ML
0-4 INJECTION, SOLUTION INTRAVENOUS; SUBCUTANEOUS
Status: DISCONTINUED | OUTPATIENT
Start: 2023-06-25 | End: 2023-06-25

## 2023-06-25 RX ORDER — SODIUM CHLORIDE 9 MG/ML
INJECTION, SOLUTION INTRAVENOUS PRN
Status: DISCONTINUED | OUTPATIENT
Start: 2023-06-25 | End: 2023-06-27

## 2023-06-25 RX ORDER — SODIUM CHLORIDE 450 MG/100ML
INJECTION, SOLUTION INTRAVENOUS CONTINUOUS
Status: DISCONTINUED | OUTPATIENT
Start: 2023-06-25 | End: 2023-07-01 | Stop reason: HOSPADM

## 2023-06-25 RX ORDER — ONDANSETRON 4 MG/1
4 TABLET, ORALLY DISINTEGRATING ORAL EVERY 8 HOURS PRN
Status: DISCONTINUED | OUTPATIENT
Start: 2023-06-25 | End: 2023-07-01 | Stop reason: HOSPADM

## 2023-06-25 RX ORDER — 0.9 % SODIUM CHLORIDE 0.9 %
15 INTRAVENOUS SOLUTION INTRAVENOUS ONCE
Status: COMPLETED | OUTPATIENT
Start: 2023-06-25 | End: 2023-06-25

## 2023-06-25 RX ORDER — POLYETHYLENE GLYCOL 3350 17 G/17G
17 POWDER, FOR SOLUTION ORAL DAILY
Status: DISCONTINUED | OUTPATIENT
Start: 2023-06-25 | End: 2023-07-01 | Stop reason: HOSPADM

## 2023-06-25 RX ORDER — DEXTROSE AND SODIUM CHLORIDE 5; .45 G/100ML; G/100ML
INJECTION, SOLUTION INTRAVENOUS CONTINUOUS PRN
Status: DISCONTINUED | OUTPATIENT
Start: 2023-06-25 | End: 2023-07-01 | Stop reason: HOSPADM

## 2023-06-25 RX ORDER — HEPARIN SODIUM 100 [USP'U]/ML
1 INJECTION, SOLUTION INTRAVENOUS PRN
Status: DISCONTINUED | OUTPATIENT
Start: 2023-06-25 | End: 2023-06-25

## 2023-06-25 RX ORDER — DEXTROSE AND SODIUM CHLORIDE 5; .45 G/100ML; G/100ML
INJECTION, SOLUTION INTRAVENOUS CONTINUOUS PRN
Status: DISCONTINUED | OUTPATIENT
Start: 2023-06-25 | End: 2023-06-25 | Stop reason: SDUPTHER

## 2023-06-25 RX ORDER — INSULIN GLARGINE-YFGN 100 [IU]/ML
22 INJECTION, SOLUTION SUBCUTANEOUS NIGHTLY
Status: DISCONTINUED | OUTPATIENT
Start: 2023-06-25 | End: 2023-06-25

## 2023-06-25 RX ORDER — HEPARIN SODIUM 100 [USP'U]/ML
1 INJECTION, SOLUTION INTRAVENOUS EVERY 12 HOURS SCHEDULED
Status: DISCONTINUED | OUTPATIENT
Start: 2023-06-25 | End: 2023-06-25

## 2023-06-25 RX ORDER — ACETAMINOPHEN 650 MG/1
650 SUPPOSITORY RECTAL EVERY 6 HOURS PRN
Status: DISCONTINUED | OUTPATIENT
Start: 2023-06-25 | End: 2023-07-01 | Stop reason: HOSPADM

## 2023-06-25 RX ORDER — MAGNESIUM SULFATE 1 G/100ML
1000 INJECTION INTRAVENOUS ONCE
Status: COMPLETED | OUTPATIENT
Start: 2023-06-25 | End: 2023-06-25

## 2023-06-25 RX ORDER — ENEMA 19; 7 G/133ML; G/133ML
1 ENEMA RECTAL
Status: ACTIVE | OUTPATIENT
Start: 2023-06-25 | End: 2023-06-26

## 2023-06-25 RX ORDER — POTASSIUM CHLORIDE 29.8 MG/ML
20 INJECTION INTRAVENOUS PRN
Status: DISCONTINUED | OUTPATIENT
Start: 2023-06-25 | End: 2023-06-27

## 2023-06-25 RX ORDER — DEXTROSE MONOHYDRATE 100 MG/ML
INJECTION, SOLUTION INTRAVENOUS CONTINUOUS PRN
Status: DISCONTINUED | OUTPATIENT
Start: 2023-06-25 | End: 2023-07-01 | Stop reason: HOSPADM

## 2023-06-25 RX ORDER — SODIUM CHLORIDE 0.9 % (FLUSH) 0.9 %
5-40 SYRINGE (ML) INJECTION EVERY 12 HOURS SCHEDULED
Status: DISCONTINUED | OUTPATIENT
Start: 2023-06-25 | End: 2023-06-25

## 2023-06-25 RX ORDER — 0.9 % SODIUM CHLORIDE 0.9 %
1000 INTRAVENOUS SOLUTION INTRAVENOUS ONCE
Status: COMPLETED | OUTPATIENT
Start: 2023-06-25 | End: 2023-06-25

## 2023-06-25 RX ORDER — ONDANSETRON 2 MG/ML
4 INJECTION INTRAMUSCULAR; INTRAVENOUS EVERY 6 HOURS PRN
Status: DISCONTINUED | OUTPATIENT
Start: 2023-06-25 | End: 2023-07-01 | Stop reason: HOSPADM

## 2023-06-25 RX ORDER — ATORVASTATIN CALCIUM 40 MG/1
80 TABLET, FILM COATED ORAL NIGHTLY
Status: DISCONTINUED | OUTPATIENT
Start: 2023-06-25 | End: 2023-07-01 | Stop reason: HOSPADM

## 2023-06-25 RX ORDER — LANOLIN ALCOHOL/MO/W.PET/CERES
100 CREAM (GRAM) TOPICAL DAILY
Status: DISCONTINUED | OUTPATIENT
Start: 2023-06-25 | End: 2023-07-01 | Stop reason: HOSPADM

## 2023-06-25 RX ORDER — INSULIN LISPRO 100 [IU]/ML
0-4 INJECTION, SOLUTION INTRAVENOUS; SUBCUTANEOUS NIGHTLY
Status: DISCONTINUED | OUTPATIENT
Start: 2023-06-25 | End: 2023-06-25

## 2023-06-25 RX ORDER — LISINOPRIL 10 MG/1
10 TABLET ORAL DAILY
Status: DISCONTINUED | OUTPATIENT
Start: 2023-06-25 | End: 2023-07-01 | Stop reason: HOSPADM

## 2023-06-25 RX ORDER — SODIUM CHLORIDE 0.9 % (FLUSH) 0.9 %
5-40 SYRINGE (ML) INJECTION PRN
Status: DISCONTINUED | OUTPATIENT
Start: 2023-06-25 | End: 2023-06-27

## 2023-06-25 RX ORDER — MAGNESIUM SULFATE 1 G/100ML
1000 INJECTION INTRAVENOUS PRN
Status: DISCONTINUED | OUTPATIENT
Start: 2023-06-25 | End: 2023-06-27

## 2023-06-25 RX ORDER — POTASSIUM CHLORIDE 7.45 MG/ML
10 INJECTION INTRAVENOUS PRN
Status: DISCONTINUED | OUTPATIENT
Start: 2023-06-25 | End: 2023-06-25

## 2023-06-25 RX ORDER — ASPIRIN 81 MG/1
81 TABLET ORAL DAILY
Status: DISCONTINUED | OUTPATIENT
Start: 2023-06-25 | End: 2023-07-01 | Stop reason: HOSPADM

## 2023-06-25 RX ORDER — INSULIN GLARGINE-YFGN 100 [IU]/ML
11 INJECTION, SOLUTION SUBCUTANEOUS NIGHTLY
Status: DISCONTINUED | OUTPATIENT
Start: 2023-06-25 | End: 2023-06-25

## 2023-06-25 RX ORDER — ACETAMINOPHEN 325 MG/1
650 TABLET ORAL EVERY 6 HOURS PRN
Status: DISCONTINUED | OUTPATIENT
Start: 2023-06-25 | End: 2023-07-01 | Stop reason: HOSPADM

## 2023-06-25 RX ORDER — 0.9 % SODIUM CHLORIDE 0.9 %
15 INTRAVENOUS SOLUTION INTRAVENOUS ONCE
Status: DISCONTINUED | OUTPATIENT
Start: 2023-06-25 | End: 2023-06-25

## 2023-06-25 RX ORDER — SODIUM CHLORIDE 9 MG/ML
INJECTION, SOLUTION INTRAVENOUS PRN
Status: DISCONTINUED | OUTPATIENT
Start: 2023-06-25 | End: 2023-07-01 | Stop reason: HOSPADM

## 2023-06-25 RX ADMIN — POTASSIUM CHLORIDE 20 MEQ: 29.8 INJECTION, SOLUTION INTRAVENOUS at 20:17

## 2023-06-25 RX ADMIN — ONDANSETRON 4 MG: 2 INJECTION INTRAMUSCULAR; INTRAVENOUS at 20:03

## 2023-06-25 RX ADMIN — SODIUM CHLORIDE, PRESERVATIVE FREE 40 MG: 5 INJECTION INTRAVENOUS at 20:26

## 2023-06-25 RX ADMIN — INSULIN LISPRO 4 UNITS: 100 INJECTION, SOLUTION INTRAVENOUS; SUBCUTANEOUS at 02:17

## 2023-06-25 RX ADMIN — POTASSIUM CHLORIDE 20 MEQ: 29.8 INJECTION, SOLUTION INTRAVENOUS at 14:59

## 2023-06-25 RX ADMIN — ONDANSETRON 4 MG: 4 TABLET, ORALLY DISINTEGRATING ORAL at 04:41

## 2023-06-25 RX ADMIN — ONDANSETRON 4 MG: 2 INJECTION INTRAMUSCULAR; INTRAVENOUS at 08:49

## 2023-06-25 RX ADMIN — ENOXAPARIN SODIUM 40 MG: 100 INJECTION SUBCUTANEOUS at 08:46

## 2023-06-25 RX ADMIN — POTASSIUM CHLORIDE 10 MEQ: 7.46 INJECTION, SOLUTION INTRAVENOUS at 12:32

## 2023-06-25 RX ADMIN — SODIUM CHLORIDE, PRESERVATIVE FREE 10 ML: 5 INJECTION INTRAVENOUS at 20:27

## 2023-06-25 RX ADMIN — SODIUM CHLORIDE, PRESERVATIVE FREE 10 ML: 5 INJECTION INTRAVENOUS at 08:47

## 2023-06-25 RX ADMIN — POTASSIUM CHLORIDE 10 MEQ: 7.46 INJECTION, SOLUTION INTRAVENOUS at 07:47

## 2023-06-25 RX ADMIN — POTASSIUM CHLORIDE 10 MEQ: 7.46 INJECTION, SOLUTION INTRAVENOUS at 08:43

## 2023-06-25 RX ADMIN — MAGNESIUM SULFATE HEPTAHYDRATE 1000 MG: 1 INJECTION, SOLUTION INTRAVENOUS at 02:38

## 2023-06-25 RX ADMIN — DEXTROSE AND SODIUM CHLORIDE: 5; 450 INJECTION, SOLUTION INTRAVENOUS at 18:20

## 2023-06-25 RX ADMIN — POTASSIUM CHLORIDE 10 MEQ: 7.46 INJECTION, SOLUTION INTRAVENOUS at 10:09

## 2023-06-25 RX ADMIN — PIPERACILLIN AND TAZOBACTAM 3375 MG: 3; .375 INJECTION, POWDER, LYOPHILIZED, FOR SOLUTION INTRAVENOUS at 08:48

## 2023-06-25 RX ADMIN — SODIUM CHLORIDE 9.32 UNITS/HR: 9 INJECTION, SOLUTION INTRAVENOUS at 03:10

## 2023-06-25 RX ADMIN — POTASSIUM CHLORIDE 20 MEQ: 29.8 INJECTION, SOLUTION INTRAVENOUS at 19:02

## 2023-06-25 RX ADMIN — SODIUM CHLORIDE: 4.5 INJECTION, SOLUTION INTRAVENOUS at 13:44

## 2023-06-25 RX ADMIN — AMPICILLIN SODIUM AND SULBACTAM SODIUM 3000 MG: 2; 1 INJECTION, POWDER, FOR SOLUTION INTRAMUSCULAR; INTRAVENOUS at 17:29

## 2023-06-25 RX ADMIN — SODIUM PHOSPHATE, MONOBASIC, MONOHYDRATE AND SODIUM PHOSPHATE, DIBASIC, ANHYDROUS 15 MMOL: 276; 142 INJECTION, SOLUTION INTRAVENOUS at 20:05

## 2023-06-25 RX ADMIN — POTASSIUM CHLORIDE 10 MEQ: 7.46 INJECTION, SOLUTION INTRAVENOUS at 13:43

## 2023-06-25 RX ADMIN — SODIUM PHOSPHATE, MONOBASIC, MONOHYDRATE AND SODIUM PHOSPHATE, DIBASIC, ANHYDROUS 15 MMOL: 276; 142 INJECTION, SOLUTION INTRAVENOUS at 10:31

## 2023-06-25 RX ADMIN — SODIUM CHLORIDE 1000 ML: 9 INJECTION, SOLUTION INTRAVENOUS at 03:44

## 2023-06-25 RX ADMIN — SODIUM CHLORIDE: 9 INJECTION, SOLUTION INTRAVENOUS at 05:10

## 2023-06-25 RX ADMIN — SODIUM CHLORIDE 1000 ML: 9 INJECTION, SOLUTION INTRAVENOUS at 01:15

## 2023-06-25 RX ADMIN — POTASSIUM CHLORIDE 20 MEQ: 29.8 INJECTION, SOLUTION INTRAVENOUS at 15:58

## 2023-06-25 RX ADMIN — SODIUM CHLORIDE 12 UNITS/HR: 9 INJECTION, SOLUTION INTRAVENOUS at 17:14

## 2023-06-25 RX ADMIN — SODIUM CHLORIDE: 4.5 INJECTION, SOLUTION INTRAVENOUS at 08:44

## 2023-06-25 RX ADMIN — SODIUM CHLORIDE 8.92 UNITS/HR: 9 INJECTION, SOLUTION INTRAVENOUS at 12:07

## 2023-06-25 RX ADMIN — SODIUM CHLORIDE 1000 ML: 9 INJECTION, SOLUTION INTRAVENOUS at 00:30

## 2023-06-25 RX ADMIN — SODIUM CHLORIDE: 9 INJECTION, SOLUTION INTRAVENOUS at 12:10

## 2023-06-25 RX ADMIN — POTASSIUM CHLORIDE 20 MEQ: 29.8 INJECTION, SOLUTION INTRAVENOUS at 23:07

## 2023-06-25 RX ADMIN — POTASSIUM CHLORIDE 10 MEQ: 7.46 INJECTION, SOLUTION INTRAVENOUS at 11:18

## 2023-06-25 ASSESSMENT — PAIN SCALES - GENERAL
PAINLEVEL_OUTOF10: 0

## 2023-06-26 ENCOUNTER — APPOINTMENT (OUTPATIENT)
Dept: GENERAL RADIOLOGY | Age: 53
DRG: 637 | End: 2023-06-26
Payer: COMMERCIAL

## 2023-06-26 PROBLEM — Z91.119 DIETARY NONCOMPLIANCE: Status: ACTIVE | Noted: 2023-06-26

## 2023-06-26 PROBLEM — R73.9 HYPERGLYCEMIA: Status: ACTIVE | Noted: 2023-06-26

## 2023-06-26 LAB
A BAUMANNII DNA BLD POS QL NAA+NON-PROBE: NOT DETECTED
AMPHET UR QL SCN: NOT DETECTED
ANION GAP SERPL CALCULATED.3IONS-SCNC: 17 MMOL/L (ref 7–16)
ANION GAP SERPL CALCULATED.3IONS-SCNC: 9 MMOL/L (ref 7–16)
ANISOCYTOSIS: ABNORMAL
BARBITURATES UR QL SCN: NOT DETECTED
BASOPHILS # BLD: 0 E9/L (ref 0–0.2)
BASOPHILS # BLD: 0.04 E9/L (ref 0–0.2)
BASOPHILS NFR BLD: 0.2 % (ref 0–2)
BASOPHILS NFR BLD: 0.2 % (ref 0–2)
BENZODIAZ UR QL SCN: NOT DETECTED
BOTTLE TYPE: ABNORMAL
BUN SERPL-MCNC: 16 MG/DL (ref 6–20)
BUN SERPL-MCNC: 21 MG/DL (ref 6–20)
C ALBICANS DNA BLD POS QL NAA+NON-PROBE: NOT DETECTED
C AURIS DNA BLD POS QL NAA+PROBE: NOT DETECTED
C GLABRATA DNA BLD POS QL NAA+NON-PROBE: NOT DETECTED
C KRUSEI DNA BLD POS QL NAA+NON-PROBE: NOT DETECTED
C PARAP DNA BLD POS QL NAA+NON-PROBE: NOT DETECTED
C TROPICLS DNA BLD POS QL NAA+NON-PROBE: NOT DETECTED
CALCIUM SERPL-MCNC: 8.7 MG/DL (ref 8.6–10.2)
CALCIUM SERPL-MCNC: 9 MG/DL (ref 8.6–10.2)
CANNABINOIDS UR QL SCN: NOT DETECTED
CHLORIDE SERPL-SCNC: 113 MMOL/L (ref 98–107)
CHLORIDE SERPL-SCNC: 119 MMOL/L (ref 98–107)
CO2 SERPL-SCNC: 19 MMOL/L (ref 22–29)
CO2 SERPL-SCNC: 23 MMOL/L (ref 22–29)
COCAINE UR QL SCN: NOT DETECTED
CREAT SERPL-MCNC: 0.7 MG/DL (ref 0.7–1.2)
CREAT SERPL-MCNC: 0.8 MG/DL (ref 0.7–1.2)
CRYPTOCOCCUS NEOFORMANS/GATTII BY PCR: NOT DETECTED
DRUG SCREEN COMMENT UR-IMP: NORMAL
E CLOAC COMP DNA BLD POS NAA+NON-PROBE: NOT DETECTED
E COLI DNA BLD POS QL NAA+NON-PROBE: NOT DETECTED
E FAECALIS DNA BLD POS QL NAA+PROBE: NOT DETECTED
E FAECIUM DNA BLD POS QL NAA+PROBE: NOT DETECTED
EKG ATRIAL RATE: 123 BPM
EKG ATRIAL RATE: 125 BPM
EKG P AXIS: 79 DEGREES
EKG P AXIS: 80 DEGREES
EKG P-R INTERVAL: 124 MS
EKG P-R INTERVAL: 130 MS
EKG Q-T INTERVAL: 322 MS
EKG Q-T INTERVAL: 342 MS
EKG QRS DURATION: 78 MS
EKG QRS DURATION: 78 MS
EKG QTC CALCULATION (BAZETT): 464 MS
EKG QTC CALCULATION (BAZETT): 489 MS
EKG R AXIS: 60 DEGREES
EKG R AXIS: 66 DEGREES
EKG T AXIS: 57 DEGREES
EKG T AXIS: 70 DEGREES
EKG VENTRICULAR RATE: 123 BPM
EKG VENTRICULAR RATE: 125 BPM
ENTEROBACT DNA BLD POS QL NAA+NON-PROBE: NOT DETECTED
ENTEROCOC DNA BLD POS QL NAA+NON-PROBE: NOT DETECTED
EOSINOPHIL # BLD: 0 E9/L (ref 0.05–0.5)
EOSINOPHIL # BLD: 0 E9/L (ref 0.05–0.5)
EOSINOPHIL NFR BLD: 0 % (ref 0–6)
EOSINOPHIL NFR BLD: 0 % (ref 0–6)
ERYTHROCYTE [DISTWIDTH] IN BLOOD BY AUTOMATED COUNT: 14.4 FL (ref 11.5–15)
ERYTHROCYTE [DISTWIDTH] IN BLOOD BY AUTOMATED COUNT: 14.4 FL (ref 11.5–15)
FENTANYL SCREEN, URINE: NOT DETECTED
FOLATE SERPL-MCNC: 17.5 NG/ML (ref 4.8–24.2)
GLUCOSE SERPL-MCNC: 134 MG/DL (ref 74–99)
GLUCOSE SERPL-MCNC: 283 MG/DL (ref 74–99)
GN BLD CULTURE PNL BLD POS NAA+PROBE: NOT DETECTED
HCT VFR BLD AUTO: 30.5 % (ref 37–54)
HCT VFR BLD AUTO: 31 % (ref 37–54)
HGB BLD-MCNC: 10 G/DL (ref 12.5–16.5)
HGB BLD-MCNC: 10 G/DL (ref 12.5–16.5)
IMM GRANULOCYTES # BLD: 0.18 E9/L
IMM GRANULOCYTES NFR BLD: 0.8 % (ref 0–5)
K OXYTOCA DNA BLD POS QL NAA+NON-PROBE: NOT DETECTED
K PNEUMON DNA SPEC QL NAA+PROBE: NOT DETECTED
K. AEROGENES DNA SPEC QL NAA+PROBE: NOT DETECTED
L MONOCYTOG DNA BLD POS QL NAA+NON-PROBE: NOT DETECTED
LACTATE BLDV-SCNC: 1.1 MMOL/L (ref 0.5–2.2)
LYMPHOCYTES # BLD: 1.6 E9/L (ref 1.5–4)
LYMPHOCYTES # BLD: 1.74 E9/L (ref 1.5–4)
LYMPHOCYTES NFR BLD: 7 % (ref 20–42)
LYMPHOCYTES NFR BLD: 7.8 % (ref 20–42)
MAGNESIUM SERPL-MCNC: 1.6 MG/DL (ref 1.6–2.6)
MAGNESIUM SERPL-MCNC: 1.7 MG/DL (ref 1.6–2.6)
MCH RBC QN AUTO: 29.2 PG (ref 26–35)
MCH RBC QN AUTO: 29.5 PG (ref 26–35)
MCHC RBC AUTO-ENTMCNC: 32.3 % (ref 32–34.5)
MCHC RBC AUTO-ENTMCNC: 32.8 % (ref 32–34.5)
MCV RBC AUTO: 90 FL (ref 80–99.9)
MCV RBC AUTO: 90.4 FL (ref 80–99.9)
METER GLUCOSE: 128 MG/DL (ref 74–99)
METER GLUCOSE: 134 MG/DL (ref 74–99)
METER GLUCOSE: 137 MG/DL (ref 74–99)
METER GLUCOSE: 141 MG/DL (ref 74–99)
METER GLUCOSE: 143 MG/DL (ref 74–99)
METER GLUCOSE: 143 MG/DL (ref 74–99)
METER GLUCOSE: 153 MG/DL (ref 74–99)
METER GLUCOSE: 172 MG/DL (ref 74–99)
METER GLUCOSE: 176 MG/DL (ref 74–99)
METER GLUCOSE: 182 MG/DL (ref 74–99)
METER GLUCOSE: 254 MG/DL (ref 74–99)
METER GLUCOSE: 97 MG/DL (ref 74–99)
METHADONE UR QL SCN: NOT DETECTED
MONOCYTES # BLD: 0.87 E9/L (ref 0.1–0.95)
MONOCYTES # BLD: 2.12 E9/L (ref 0.1–0.95)
MONOCYTES NFR BLD: 4.4 % (ref 2–12)
MONOCYTES NFR BLD: 9.3 % (ref 2–12)
MRSA SPEC QL CULT: NORMAL
N MEN DNA BLD POS QL NAA+NON-PROBE: NOT DETECTED
NEUTROPHILS # BLD: 18.94 E9/L (ref 1.8–7.3)
NEUTROPHILS # BLD: 19.1 E9/L (ref 1.8–7.3)
NEUTS SEG NFR BLD: 82.7 % (ref 43–80)
NEUTS SEG NFR BLD: 87.8 % (ref 43–80)
OPIATES UR QL SCN: NOT DETECTED
ORDER NUMBER: ABNORMAL
OVALOCYTES: ABNORMAL
OXYCODONE URINE: NOT DETECTED
P AERUGINOSA DNA BLD POS NAA+NON-PROBE: NOT DETECTED
PCP UR QL SCN: NOT DETECTED
PHOSPHATE SERPL-MCNC: 2.9 MG/DL (ref 2.5–4.5)
PHOSPHATE SERPL-MCNC: 2.9 MG/DL (ref 2.5–4.5)
PLATELET # BLD AUTO: 365 E9/L (ref 130–450)
PLATELET # BLD AUTO: 372 E9/L (ref 130–450)
PMV BLD AUTO: 8.7 FL (ref 7–12)
PMV BLD AUTO: 9 FL (ref 7–12)
POIKILOCYTES: ABNORMAL
POTASSIUM SERPL-SCNC: 3.7 MMOL/L (ref 3.5–5)
POTASSIUM SERPL-SCNC: 3.7 MMOL/L (ref 3.5–5)
PROTEUS SP DNA BLD POS QL NAA+NON-PROBE: NOT DETECTED
RBC # BLD AUTO: 3.39 E12/L (ref 3.8–5.8)
RBC # BLD AUTO: 3.43 E12/L (ref 3.8–5.8)
S AUREUS DNA BLD POS QL NAA+NON-PROBE: NOT DETECTED
S AUREUS+CONS DNA BLD POS NAA+NON-PROBE: DETECTED
S EPIDERMIDIS DNA BLD POS QL NAA+PROBE: NOT DETECTED
S LUGDUNENSIS DNA BLD POS QL NAA+PROBE: NOT DETECTED
S MALTOPH DNA BLD POS QL NAA+PROBE: NOT DETECTED
S MARCESCENS DNA BLD POS NAA+NON-PROBE: NOT DETECTED
S PNEUM DNA BLD POS QL NAA+NON-PROBE: NOT DETECTED
S PYO DNA BLD POS QL NAA+NON-PROBE: NOT DETECTED
SALMONELLA DNA BLD POS QL NAA+PROBE: NOT DETECTED
SODIUM SERPL-SCNC: 149 MMOL/L (ref 132–146)
SODIUM SERPL-SCNC: 151 MMOL/L (ref 132–146)
SOURCE OF BLOOD CULTURE: ABNORMAL
STREPTOCOCCUS AGALACTIAE BY PCR: NOT DETECTED
STREPTOCOCCUS DNA BLD POS NAA+NON-PROBE: NOT DETECTED
VIT B12 SERPL-MCNC: 778 PG/ML (ref 211–946)
WBC # BLD: 21.7 E9/L (ref 4.5–11.5)
WBC # BLD: 22.9 E9/L (ref 4.5–11.5)

## 2023-06-26 PROCEDURE — 2580000003 HC RX 258: Performed by: INTERNAL MEDICINE

## 2023-06-26 PROCEDURE — 6360000002 HC RX W HCPCS: Performed by: STUDENT IN AN ORGANIZED HEALTH CARE EDUCATION/TRAINING PROGRAM

## 2023-06-26 PROCEDURE — 2580000003 HC RX 258

## 2023-06-26 PROCEDURE — 2500000003 HC RX 250 WO HCPCS: Performed by: EMERGENCY MEDICINE

## 2023-06-26 PROCEDURE — 6360000002 HC RX W HCPCS

## 2023-06-26 PROCEDURE — 84100 ASSAY OF PHOSPHORUS: CPT

## 2023-06-26 PROCEDURE — 6370000000 HC RX 637 (ALT 250 FOR IP): Performed by: EMERGENCY MEDICINE

## 2023-06-26 PROCEDURE — 6360000002 HC RX W HCPCS: Performed by: INTERNAL MEDICINE

## 2023-06-26 PROCEDURE — 99291 CRITICAL CARE FIRST HOUR: CPT | Performed by: INTERNAL MEDICINE

## 2023-06-26 PROCEDURE — S5553 INSULIN LONG ACTING 5 U: HCPCS

## 2023-06-26 PROCEDURE — 93010 ELECTROCARDIOGRAM REPORT: CPT | Performed by: INTERNAL MEDICINE

## 2023-06-26 PROCEDURE — 6370000000 HC RX 637 (ALT 250 FOR IP)

## 2023-06-26 PROCEDURE — 36415 COLL VENOUS BLD VENIPUNCTURE: CPT

## 2023-06-26 PROCEDURE — 99232 SBSQ HOSP IP/OBS MODERATE 35: CPT | Performed by: INTERNAL MEDICINE

## 2023-06-26 PROCEDURE — 83605 ASSAY OF LACTIC ACID: CPT

## 2023-06-26 PROCEDURE — A4216 STERILE WATER/SALINE, 10 ML: HCPCS | Performed by: INTERNAL MEDICINE

## 2023-06-26 PROCEDURE — 82746 ASSAY OF FOLIC ACID SERUM: CPT

## 2023-06-26 PROCEDURE — 99222 1ST HOSP IP/OBS MODERATE 55: CPT | Performed by: INTERNAL MEDICINE

## 2023-06-26 PROCEDURE — 83735 ASSAY OF MAGNESIUM: CPT

## 2023-06-26 PROCEDURE — 2580000003 HC RX 258: Performed by: EMERGENCY MEDICINE

## 2023-06-26 PROCEDURE — 2580000003 HC RX 258: Performed by: STUDENT IN AN ORGANIZED HEALTH CARE EDUCATION/TRAINING PROGRAM

## 2023-06-26 PROCEDURE — 85025 COMPLETE CBC W/AUTO DIFF WBC: CPT

## 2023-06-26 PROCEDURE — 1200000000 HC SEMI PRIVATE

## 2023-06-26 PROCEDURE — C9113 INJ PANTOPRAZOLE SODIUM, VIA: HCPCS | Performed by: INTERNAL MEDICINE

## 2023-06-26 PROCEDURE — 36592 COLLECT BLOOD FROM PICC: CPT

## 2023-06-26 PROCEDURE — 71045 X-RAY EXAM CHEST 1 VIEW: CPT

## 2023-06-26 PROCEDURE — 80307 DRUG TEST PRSMV CHEM ANLYZR: CPT

## 2023-06-26 PROCEDURE — 82607 VITAMIN B-12: CPT

## 2023-06-26 PROCEDURE — 82962 GLUCOSE BLOOD TEST: CPT

## 2023-06-26 PROCEDURE — 80048 BASIC METABOLIC PNL TOTAL CA: CPT

## 2023-06-26 RX ORDER — INSULIN LISPRO 100 [IU]/ML
0-8 INJECTION, SOLUTION INTRAVENOUS; SUBCUTANEOUS
Status: DISCONTINUED | OUTPATIENT
Start: 2023-06-26 | End: 2023-06-26

## 2023-06-26 RX ORDER — INSULIN GLARGINE-YFGN 100 [IU]/ML
15 INJECTION, SOLUTION SUBCUTANEOUS NIGHTLY
Status: DISCONTINUED | OUTPATIENT
Start: 2023-06-26 | End: 2023-06-26

## 2023-06-26 RX ORDER — PROCHLORPERAZINE EDISYLATE 5 MG/ML
10 INJECTION INTRAMUSCULAR; INTRAVENOUS ONCE
Status: COMPLETED | OUTPATIENT
Start: 2023-06-26 | End: 2023-06-26

## 2023-06-26 RX ORDER — INSULIN LISPRO 100 [IU]/ML
0-4 INJECTION, SOLUTION INTRAVENOUS; SUBCUTANEOUS NIGHTLY
Status: DISCONTINUED | OUTPATIENT
Start: 2023-06-26 | End: 2023-06-26

## 2023-06-26 RX ORDER — INSULIN LISPRO 100 [IU]/ML
0-6 INJECTION, SOLUTION INTRAVENOUS; SUBCUTANEOUS NIGHTLY
Status: DISCONTINUED | OUTPATIENT
Start: 2023-06-26 | End: 2023-07-01 | Stop reason: HOSPADM

## 2023-06-26 RX ORDER — INSULIN GLARGINE-YFGN 100 [IU]/ML
18 INJECTION, SOLUTION SUBCUTANEOUS EVERY MORNING
Status: DISCONTINUED | OUTPATIENT
Start: 2023-06-27 | End: 2023-06-29

## 2023-06-26 RX ORDER — INSULIN LISPRO 100 [IU]/ML
0-12 INJECTION, SOLUTION INTRAVENOUS; SUBCUTANEOUS
Status: DISCONTINUED | OUTPATIENT
Start: 2023-06-27 | End: 2023-07-01 | Stop reason: HOSPADM

## 2023-06-26 RX ADMIN — AMPICILLIN SODIUM AND SULBACTAM SODIUM 3000 MG: 2; 1 INJECTION, POWDER, FOR SOLUTION INTRAMUSCULAR; INTRAVENOUS at 05:12

## 2023-06-26 RX ADMIN — DEXTROSE AND SODIUM CHLORIDE: 5; 450 INJECTION, SOLUTION INTRAVENOUS at 00:55

## 2023-06-26 RX ADMIN — SODIUM CHLORIDE, PRESERVATIVE FREE 10 ML: 5 INJECTION INTRAVENOUS at 09:03

## 2023-06-26 RX ADMIN — ASPIRIN 81 MG: 81 TABLET, COATED ORAL at 09:18

## 2023-06-26 RX ADMIN — AMPICILLIN SODIUM AND SULBACTAM SODIUM 3000 MG: 2; 1 INJECTION, POWDER, FOR SOLUTION INTRAMUSCULAR; INTRAVENOUS at 18:43

## 2023-06-26 RX ADMIN — VANCOMYCIN HYDROCHLORIDE 1500 MG: 10 INJECTION, POWDER, LYOPHILIZED, FOR SOLUTION INTRAVENOUS at 17:08

## 2023-06-26 RX ADMIN — ONDANSETRON 4 MG: 2 INJECTION INTRAMUSCULAR; INTRAVENOUS at 07:40

## 2023-06-26 RX ADMIN — SODIUM CHLORIDE, PRESERVATIVE FREE 40 MG: 5 INJECTION INTRAVENOUS at 08:59

## 2023-06-26 RX ADMIN — SODIUM PHOSPHATE, MONOBASIC, MONOHYDRATE AND SODIUM PHOSPHATE, DIBASIC, ANHYDROUS 15 MMOL: 276; 142 INJECTION, SOLUTION INTRAVENOUS at 00:01

## 2023-06-26 RX ADMIN — DEXTROSE AND SODIUM CHLORIDE: 5; 450 INJECTION, SOLUTION INTRAVENOUS at 08:04

## 2023-06-26 RX ADMIN — INSULIN GLARGINE-YFGN 15 UNITS: 100 INJECTION, SOLUTION SUBCUTANEOUS at 08:59

## 2023-06-26 RX ADMIN — POTASSIUM CHLORIDE 20 MEQ: 29.8 INJECTION, SOLUTION INTRAVENOUS at 05:08

## 2023-06-26 RX ADMIN — ATORVASTATIN CALCIUM 80 MG: 40 TABLET, FILM COATED ORAL at 21:40

## 2023-06-26 RX ADMIN — SODIUM CHLORIDE 2.92 UNITS/HR: 9 INJECTION, SOLUTION INTRAVENOUS at 06:18

## 2023-06-26 RX ADMIN — LISINOPRIL 10 MG: 10 TABLET ORAL at 08:59

## 2023-06-26 RX ADMIN — AMPICILLIN SODIUM AND SULBACTAM SODIUM 3000 MG: 2; 1 INJECTION, POWDER, FOR SOLUTION INTRAMUSCULAR; INTRAVENOUS at 12:14

## 2023-06-26 RX ADMIN — AMLODIPINE BESYLATE 5 MG: 5 TABLET ORAL at 08:59

## 2023-06-26 RX ADMIN — PROCHLORPERAZINE EDISYLATE 10 MG: 5 INJECTION INTRAMUSCULAR; INTRAVENOUS at 00:22

## 2023-06-26 RX ADMIN — INSULIN LISPRO 4 UNITS: 100 INJECTION, SOLUTION INTRAVENOUS; SUBCUTANEOUS at 12:11

## 2023-06-26 RX ADMIN — ONDANSETRON 4 MG: 2 INJECTION INTRAMUSCULAR; INTRAVENOUS at 17:35

## 2023-06-26 RX ADMIN — EZETIMIBE 10 MG: 10 TABLET ORAL at 21:40

## 2023-06-26 RX ADMIN — ENOXAPARIN SODIUM 40 MG: 100 INJECTION SUBCUTANEOUS at 08:59

## 2023-06-26 RX ADMIN — SODIUM CHLORIDE, PRESERVATIVE FREE 10 ML: 5 INJECTION INTRAVENOUS at 21:42

## 2023-06-26 RX ADMIN — AMPICILLIN SODIUM AND SULBACTAM SODIUM 3000 MG: 2; 1 INJECTION, POWDER, FOR SOLUTION INTRAMUSCULAR; INTRAVENOUS at 00:00

## 2023-06-26 RX ADMIN — POTASSIUM CHLORIDE 20 MEQ: 29.8 INJECTION, SOLUTION INTRAVENOUS at 00:17

## 2023-06-26 ASSESSMENT — PAIN SCALES - GENERAL
PAINLEVEL_OUTOF10: 0

## 2023-06-27 PROBLEM — B95.8 BACTEREMIA DUE TO STAPHYLOCOCCUS: Status: ACTIVE | Noted: 2023-06-27

## 2023-06-27 PROBLEM — R78.81 BACTEREMIA DUE TO STAPHYLOCOCCUS: Status: ACTIVE | Noted: 2023-06-27

## 2023-06-27 LAB
ANION GAP SERPL CALCULATED.3IONS-SCNC: 12 MMOL/L (ref 7–16)
BACTERIA UR CULT: NORMAL
BASOPHILS # BLD: 0.04 E9/L (ref 0–0.2)
BASOPHILS NFR BLD: 0.3 % (ref 0–2)
BUN SERPL-MCNC: 8 MG/DL (ref 6–20)
CALCIUM SERPL-MCNC: 9 MG/DL (ref 8.6–10.2)
CHLORIDE SERPL-SCNC: 101 MMOL/L (ref 98–107)
CO2 SERPL-SCNC: 26 MMOL/L (ref 22–29)
CREAT SERPL-MCNC: 0.6 MG/DL (ref 0.7–1.2)
EOSINOPHIL # BLD: 0.16 E9/L (ref 0.05–0.5)
EOSINOPHIL NFR BLD: 1.2 % (ref 0–6)
ERYTHROCYTE [DISTWIDTH] IN BLOOD BY AUTOMATED COUNT: 13.7 FL (ref 11.5–15)
GLUCOSE SERPL-MCNC: 101 MG/DL (ref 74–99)
HCT VFR BLD AUTO: 31.1 % (ref 37–54)
HGB BLD-MCNC: 10.2 G/DL (ref 12.5–16.5)
IMM GRANULOCYTES # BLD: 0.07 E9/L
IMM GRANULOCYTES NFR BLD: 0.5 % (ref 0–5)
LV EF: 63 %
LVEF MODALITY: NORMAL
LYMPHOCYTES # BLD: 1.83 E9/L (ref 1.5–4)
LYMPHOCYTES NFR BLD: 13.3 % (ref 20–42)
MAGNESIUM SERPL-MCNC: 1.6 MG/DL (ref 1.6–2.6)
MCH RBC QN AUTO: 29.1 PG (ref 26–35)
MCHC RBC AUTO-ENTMCNC: 32.8 % (ref 32–34.5)
MCV RBC AUTO: 88.9 FL (ref 80–99.9)
METER GLUCOSE: 124 MG/DL (ref 74–99)
METER GLUCOSE: 133 MG/DL (ref 74–99)
METER GLUCOSE: 159 MG/DL (ref 74–99)
METER GLUCOSE: 250 MG/DL (ref 74–99)
MONOCYTES # BLD: 0.82 E9/L (ref 0.1–0.95)
MONOCYTES NFR BLD: 6 % (ref 2–12)
NEUTROPHILS # BLD: 10.81 E9/L (ref 1.8–7.3)
NEUTS SEG NFR BLD: 78.7 % (ref 43–80)
PHOSPHATE SERPL-MCNC: 2.4 MG/DL (ref 2.5–4.5)
PLATELET # BLD AUTO: 332 E9/L (ref 130–450)
PMV BLD AUTO: 9.4 FL (ref 7–12)
POTASSIUM SERPL-SCNC: 3.4 MMOL/L (ref 3.5–5)
RBC # BLD AUTO: 3.5 E12/L (ref 3.8–5.8)
SODIUM SERPL-SCNC: 139 MMOL/L (ref 132–146)
WBC # BLD: 13.7 E9/L (ref 4.5–11.5)

## 2023-06-27 PROCEDURE — 36415 COLL VENOUS BLD VENIPUNCTURE: CPT

## 2023-06-27 PROCEDURE — 99232 SBSQ HOSP IP/OBS MODERATE 35: CPT | Performed by: INTERNAL MEDICINE

## 2023-06-27 PROCEDURE — 6370000000 HC RX 637 (ALT 250 FOR IP): Performed by: INTERNAL MEDICINE

## 2023-06-27 PROCEDURE — 1200000000 HC SEMI PRIVATE

## 2023-06-27 PROCEDURE — 2580000003 HC RX 258

## 2023-06-27 PROCEDURE — 36556 INSERT NON-TUNNEL CV CATH: CPT | Performed by: INTERNAL MEDICINE

## 2023-06-27 PROCEDURE — 84100 ASSAY OF PHOSPHORUS: CPT

## 2023-06-27 PROCEDURE — 6370000000 HC RX 637 (ALT 250 FOR IP): Performed by: STUDENT IN AN ORGANIZED HEALTH CARE EDUCATION/TRAINING PROGRAM

## 2023-06-27 PROCEDURE — 87040 BLOOD CULTURE FOR BACTERIA: CPT

## 2023-06-27 PROCEDURE — 93306 TTE W/DOPPLER COMPLETE: CPT

## 2023-06-27 PROCEDURE — 76937 US GUIDE VASCULAR ACCESS: CPT | Performed by: INTERNAL MEDICINE

## 2023-06-27 PROCEDURE — 83735 ASSAY OF MAGNESIUM: CPT

## 2023-06-27 PROCEDURE — 6370000000 HC RX 637 (ALT 250 FOR IP)

## 2023-06-27 PROCEDURE — 6360000002 HC RX W HCPCS

## 2023-06-27 PROCEDURE — 82962 GLUCOSE BLOOD TEST: CPT

## 2023-06-27 PROCEDURE — 99291 CRITICAL CARE FIRST HOUR: CPT | Performed by: INTERNAL MEDICINE

## 2023-06-27 PROCEDURE — S5553 INSULIN LONG ACTING 5 U: HCPCS | Performed by: INTERNAL MEDICINE

## 2023-06-27 PROCEDURE — 80048 BASIC METABOLIC PNL TOTAL CA: CPT

## 2023-06-27 PROCEDURE — 02HV33Z INSERTION OF INFUSION DEVICE INTO SUPERIOR VENA CAVA, PERCUTANEOUS APPROACH: ICD-10-PCS | Performed by: STUDENT IN AN ORGANIZED HEALTH CARE EDUCATION/TRAINING PROGRAM

## 2023-06-27 PROCEDURE — 85025 COMPLETE CBC W/AUTO DIFF WBC: CPT

## 2023-06-27 RX ORDER — MAGNESIUM SULFATE 1 G/100ML
1000 INJECTION INTRAVENOUS ONCE
Status: DISCONTINUED | OUTPATIENT
Start: 2023-06-27 | End: 2023-06-27

## 2023-06-27 RX ORDER — CLOTRIMAZOLE 1 %
CREAM (GRAM) TOPICAL 2 TIMES DAILY
Status: DISCONTINUED | OUTPATIENT
Start: 2023-06-27 | End: 2023-07-01 | Stop reason: HOSPADM

## 2023-06-27 RX ORDER — CLONIDINE HYDROCHLORIDE 0.1 MG/1
0.1 TABLET ORAL 2 TIMES DAILY
Status: DISCONTINUED | OUTPATIENT
Start: 2023-06-27 | End: 2023-06-27

## 2023-06-27 RX ORDER — MAGNESIUM SULFATE IN WATER 40 MG/ML
2000 INJECTION, SOLUTION INTRAVENOUS ONCE
Status: DISCONTINUED | OUTPATIENT
Start: 2023-06-27 | End: 2023-06-27

## 2023-06-27 RX ADMIN — ONDANSETRON 4 MG: 2 INJECTION INTRAMUSCULAR; INTRAVENOUS at 07:59

## 2023-06-27 RX ADMIN — VANCOMYCIN HYDROCHLORIDE 1250 MG: 10 INJECTION, POWDER, LYOPHILIZED, FOR SOLUTION INTRAVENOUS at 20:41

## 2023-06-27 RX ADMIN — ASPIRIN 81 MG: 81 TABLET, COATED ORAL at 08:56

## 2023-06-27 RX ADMIN — AMPICILLIN SODIUM AND SULBACTAM SODIUM 3000 MG: 2; 1 INJECTION, POWDER, FOR SOLUTION INTRAMUSCULAR; INTRAVENOUS at 23:57

## 2023-06-27 RX ADMIN — VANCOMYCIN HYDROCHLORIDE 1250 MG: 10 INJECTION, POWDER, LYOPHILIZED, FOR SOLUTION INTRAVENOUS at 06:15

## 2023-06-27 RX ADMIN — CLOTRIMAZOLE: 10 CREAM TOPICAL at 11:20

## 2023-06-27 RX ADMIN — DIBASIC SODIUM PHOSPHATE, MONOBASIC POTASSIUM PHOSPHATE AND MONOBASIC SODIUM PHOSPHATE 1 TABLET: 852; 155; 130 TABLET ORAL at 11:20

## 2023-06-27 RX ADMIN — MAGNESIUM SULFATE HEPTAHYDRATE 2000 MG: 40 INJECTION, SOLUTION INTRAVENOUS at 08:59

## 2023-06-27 RX ADMIN — AMPICILLIN SODIUM AND SULBACTAM SODIUM 3000 MG: 2; 1 INJECTION, POWDER, FOR SOLUTION INTRAMUSCULAR; INTRAVENOUS at 18:23

## 2023-06-27 RX ADMIN — EZETIMIBE 10 MG: 10 TABLET ORAL at 22:13

## 2023-06-27 RX ADMIN — AMPICILLIN SODIUM AND SULBACTAM SODIUM 3000 MG: 2; 1 INJECTION, POWDER, FOR SOLUTION INTRAMUSCULAR; INTRAVENOUS at 06:13

## 2023-06-27 RX ADMIN — AMPICILLIN SODIUM AND SULBACTAM SODIUM 3000 MG: 2; 1 INJECTION, POWDER, FOR SOLUTION INTRAMUSCULAR; INTRAVENOUS at 00:52

## 2023-06-27 RX ADMIN — SODIUM CHLORIDE, PRESERVATIVE FREE 10 ML: 5 INJECTION INTRAVENOUS at 07:59

## 2023-06-27 RX ADMIN — AMLODIPINE BESYLATE 5 MG: 5 TABLET ORAL at 08:56

## 2023-06-27 RX ADMIN — SODIUM CHLORIDE, PRESERVATIVE FREE 10 ML: 5 INJECTION INTRAVENOUS at 22:13

## 2023-06-27 RX ADMIN — ONDANSETRON 4 MG: 2 INJECTION INTRAMUSCULAR; INTRAVENOUS at 01:25

## 2023-06-27 RX ADMIN — INSULIN LISPRO 4 UNITS: 100 INJECTION, SOLUTION INTRAVENOUS; SUBCUTANEOUS at 12:43

## 2023-06-27 RX ADMIN — Medication 100 MG: at 08:56

## 2023-06-27 RX ADMIN — DIBASIC SODIUM PHOSPHATE, MONOBASIC POTASSIUM PHOSPHATE AND MONOBASIC SODIUM PHOSPHATE 1 TABLET: 852; 155; 130 TABLET ORAL at 22:13

## 2023-06-27 RX ADMIN — POLYETHYLENE GLYCOL 3350 17 G: 17 POWDER, FOR SOLUTION ORAL at 09:04

## 2023-06-27 RX ADMIN — INSULIN LISPRO 2 UNITS: 100 INJECTION, SOLUTION INTRAVENOUS; SUBCUTANEOUS at 18:16

## 2023-06-27 RX ADMIN — ATORVASTATIN CALCIUM 80 MG: 40 TABLET, FILM COATED ORAL at 20:47

## 2023-06-27 RX ADMIN — ENOXAPARIN SODIUM 40 MG: 100 INJECTION SUBCUTANEOUS at 08:56

## 2023-06-27 RX ADMIN — AMPICILLIN SODIUM AND SULBACTAM SODIUM 3000 MG: 2; 1 INJECTION, POWDER, FOR SOLUTION INTRAMUSCULAR; INTRAVENOUS at 12:38

## 2023-06-27 RX ADMIN — LISINOPRIL 10 MG: 10 TABLET ORAL at 08:56

## 2023-06-27 RX ADMIN — INSULIN GLARGINE-YFGN 18 UNITS: 100 INJECTION, SOLUTION SUBCUTANEOUS at 08:56

## 2023-06-27 ASSESSMENT — PAIN SCALES - GENERAL
PAINLEVEL_OUTOF10: 0

## 2023-06-27 ASSESSMENT — LIFESTYLE VARIABLES
HOW MANY STANDARD DRINKS CONTAINING ALCOHOL DO YOU HAVE ON A TYPICAL DAY: 3 OR 4
HOW OFTEN DO YOU HAVE A DRINK CONTAINING ALCOHOL: 2-4 TIMES A MONTH

## 2023-06-27 ASSESSMENT — ENCOUNTER SYMPTOMS
GASTROINTESTINAL NEGATIVE: 1
RESPIRATORY NEGATIVE: 1

## 2023-06-28 LAB
ANION GAP SERPL CALCULATED.3IONS-SCNC: 12 MMOL/L (ref 7–16)
BASOPHILS # BLD: 0.02 E9/L (ref 0–0.2)
BASOPHILS NFR BLD: 0.3 % (ref 0–2)
BUN SERPL-MCNC: 13 MG/DL (ref 6–20)
CALCIUM SERPL-MCNC: 8.6 MG/DL (ref 8.6–10.2)
CHLORIDE SERPL-SCNC: 96 MMOL/L (ref 98–107)
CO2 SERPL-SCNC: 27 MMOL/L (ref 22–29)
CREAT SERPL-MCNC: 0.6 MG/DL (ref 0.7–1.2)
EOSINOPHIL # BLD: 0.14 E9/L (ref 0.05–0.5)
EOSINOPHIL NFR BLD: 2 % (ref 0–6)
ERYTHROCYTE [DISTWIDTH] IN BLOOD BY AUTOMATED COUNT: 13 FL (ref 11.5–15)
FERRITIN SERPL-MCNC: 290 NG/ML
GLUCOSE SERPL-MCNC: 321 MG/DL (ref 74–99)
HCT VFR BLD AUTO: 31.8 % (ref 37–54)
HGB BLD-MCNC: 10.7 G/DL (ref 12.5–16.5)
IMM GRANULOCYTES # BLD: 0.02 E9/L
IMM GRANULOCYTES NFR BLD: 0.3 % (ref 0–5)
IRON SATN MFR SERPL: 50 % (ref 20–55)
IRON SERPL-MCNC: 101 MCG/DL (ref 59–158)
LYMPHOCYTES # BLD: 1.49 E9/L (ref 1.5–4)
LYMPHOCYTES NFR BLD: 21.4 % (ref 20–42)
MAGNESIUM SERPL-MCNC: 1.7 MG/DL (ref 1.6–2.6)
MCH RBC QN AUTO: 29.1 PG (ref 26–35)
MCHC RBC AUTO-ENTMCNC: 33.6 % (ref 32–34.5)
MCV RBC AUTO: 86.4 FL (ref 80–99.9)
METER GLUCOSE: 217 MG/DL (ref 74–99)
METER GLUCOSE: 288 MG/DL (ref 74–99)
METER GLUCOSE: 429 MG/DL (ref 74–99)
METER GLUCOSE: 496 MG/DL (ref 74–99)
MONOCYTES # BLD: 0.53 E9/L (ref 0.1–0.95)
MONOCYTES NFR BLD: 7.6 % (ref 2–12)
NEUTROPHILS # BLD: 4.76 E9/L (ref 1.8–7.3)
NEUTS SEG NFR BLD: 68.4 % (ref 43–80)
ORGANISM: ABNORMAL
PHOSPHATE SERPL-MCNC: 2.4 MG/DL (ref 2.5–4.5)
PLATELET # BLD AUTO: 305 E9/L (ref 130–450)
PMV BLD AUTO: 9.1 FL (ref 7–12)
POTASSIUM SERPL-SCNC: 3.5 MMOL/L (ref 3.5–5)
RBC # BLD AUTO: 3.68 E12/L (ref 3.8–5.8)
SODIUM SERPL-SCNC: 135 MMOL/L (ref 132–146)
TIBC SERPL-MCNC: 201 MCG/DL (ref 250–450)
VANCOMYCIN SERPL-MCNC: 12.2 MCG/ML (ref 5–40)
WBC # BLD: 7 E9/L (ref 4.5–11.5)

## 2023-06-28 PROCEDURE — 2500000003 HC RX 250 WO HCPCS

## 2023-06-28 PROCEDURE — 6370000000 HC RX 637 (ALT 250 FOR IP): Performed by: STUDENT IN AN ORGANIZED HEALTH CARE EDUCATION/TRAINING PROGRAM

## 2023-06-28 PROCEDURE — 6370000000 HC RX 637 (ALT 250 FOR IP): Performed by: INTERNAL MEDICINE

## 2023-06-28 PROCEDURE — 99232 SBSQ HOSP IP/OBS MODERATE 35: CPT | Performed by: INTERNAL MEDICINE

## 2023-06-28 PROCEDURE — 2580000003 HC RX 258

## 2023-06-28 PROCEDURE — 36415 COLL VENOUS BLD VENIPUNCTURE: CPT

## 2023-06-28 PROCEDURE — 82728 ASSAY OF FERRITIN: CPT

## 2023-06-28 PROCEDURE — S5553 INSULIN LONG ACTING 5 U: HCPCS | Performed by: INTERNAL MEDICINE

## 2023-06-28 PROCEDURE — 83735 ASSAY OF MAGNESIUM: CPT

## 2023-06-28 PROCEDURE — 6360000002 HC RX W HCPCS

## 2023-06-28 PROCEDURE — 97161 PT EVAL LOW COMPLEX 20 MIN: CPT

## 2023-06-28 PROCEDURE — 83540 ASSAY OF IRON: CPT

## 2023-06-28 PROCEDURE — 84100 ASSAY OF PHOSPHORUS: CPT

## 2023-06-28 PROCEDURE — 83550 IRON BINDING TEST: CPT

## 2023-06-28 PROCEDURE — 97165 OT EVAL LOW COMPLEX 30 MIN: CPT

## 2023-06-28 PROCEDURE — 80202 ASSAY OF VANCOMYCIN: CPT

## 2023-06-28 PROCEDURE — 80048 BASIC METABOLIC PNL TOTAL CA: CPT

## 2023-06-28 PROCEDURE — 85025 COMPLETE CBC W/AUTO DIFF WBC: CPT

## 2023-06-28 PROCEDURE — 6370000000 HC RX 637 (ALT 250 FOR IP)

## 2023-06-28 PROCEDURE — 1200000000 HC SEMI PRIVATE

## 2023-06-28 PROCEDURE — 82962 GLUCOSE BLOOD TEST: CPT

## 2023-06-28 PROCEDURE — 97530 THERAPEUTIC ACTIVITIES: CPT

## 2023-06-28 RX ORDER — POTASSIUM CHLORIDE 20 MEQ/1
20 TABLET, EXTENDED RELEASE ORAL
Status: COMPLETED | OUTPATIENT
Start: 2023-06-28 | End: 2023-06-29

## 2023-06-28 RX ORDER — AMLODIPINE BESYLATE 10 MG/1
10 TABLET ORAL DAILY
Status: DISCONTINUED | OUTPATIENT
Start: 2023-06-28 | End: 2023-07-01 | Stop reason: HOSPADM

## 2023-06-28 RX ORDER — TRAZODONE HYDROCHLORIDE 50 MG/1
50 TABLET ORAL NIGHTLY PRN
COMMUNITY
Start: 2023-06-01

## 2023-06-28 RX ORDER — ONDANSETRON 4 MG/1
4 TABLET, FILM COATED ORAL 4 TIMES DAILY PRN
COMMUNITY
Start: 2023-06-08

## 2023-06-28 RX ORDER — MAGNESIUM SULFATE IN WATER 40 MG/ML
2000 INJECTION, SOLUTION INTRAVENOUS ONCE
Status: COMPLETED | OUTPATIENT
Start: 2023-06-28 | End: 2023-06-28

## 2023-06-28 RX ORDER — GABAPENTIN 100 MG/1
100 CAPSULE ORAL 3 TIMES DAILY PRN
COMMUNITY
Start: 2023-05-06

## 2023-06-28 RX ADMIN — EZETIMIBE 10 MG: 10 TABLET ORAL at 23:02

## 2023-06-28 RX ADMIN — CLOTRIMAZOLE: 10 CREAM TOPICAL at 08:29

## 2023-06-28 RX ADMIN — INSULIN LISPRO 12 UNITS: 100 INJECTION, SOLUTION INTRAVENOUS; SUBCUTANEOUS at 08:31

## 2023-06-28 RX ADMIN — AMPICILLIN SODIUM AND SULBACTAM SODIUM 3000 MG: 2; 1 INJECTION, POWDER, FOR SOLUTION INTRAMUSCULAR; INTRAVENOUS at 05:01

## 2023-06-28 RX ADMIN — DIBASIC SODIUM PHOSPHATE, MONOBASIC POTASSIUM PHOSPHATE AND MONOBASIC SODIUM PHOSPHATE 1 TABLET: 852; 155; 130 TABLET ORAL at 22:26

## 2023-06-28 RX ADMIN — ATORVASTATIN CALCIUM 80 MG: 40 TABLET, FILM COATED ORAL at 22:26

## 2023-06-28 RX ADMIN — MAGNESIUM SULFATE HEPTAHYDRATE 2000 MG: 40 INJECTION, SOLUTION INTRAVENOUS at 10:08

## 2023-06-28 RX ADMIN — Medication 100 MG: at 08:29

## 2023-06-28 RX ADMIN — SODIUM CHLORIDE, PRESERVATIVE FREE 10 ML: 5 INJECTION INTRAVENOUS at 22:35

## 2023-06-28 RX ADMIN — AMLODIPINE BESYLATE 10 MG: 10 TABLET ORAL at 08:46

## 2023-06-28 RX ADMIN — POTASSIUM CHLORIDE 20 MEQ: 20 TABLET, EXTENDED RELEASE ORAL at 08:46

## 2023-06-28 RX ADMIN — INSULIN LISPRO 6 UNITS: 100 INJECTION, SOLUTION INTRAVENOUS; SUBCUTANEOUS at 12:09

## 2023-06-28 RX ADMIN — CLOTRIMAZOLE: 10 CREAM TOPICAL at 22:34

## 2023-06-28 RX ADMIN — POLYETHYLENE GLYCOL 3350 17 G: 17 POWDER, FOR SOLUTION ORAL at 08:28

## 2023-06-28 RX ADMIN — ASPIRIN 81 MG: 81 TABLET, COATED ORAL at 08:29

## 2023-06-28 RX ADMIN — AMPICILLIN SODIUM AND SULBACTAM SODIUM 3000 MG: 2; 1 INJECTION, POWDER, FOR SOLUTION INTRAMUSCULAR; INTRAVENOUS at 12:09

## 2023-06-28 RX ADMIN — SODIUM CHLORIDE, PRESERVATIVE FREE 10 ML: 5 INJECTION INTRAVENOUS at 08:29

## 2023-06-28 RX ADMIN — POTASSIUM PHOSPHATE, MONOBASIC AND POTASSIUM PHOSPHATE, DIBASIC 10 MMOL: 224; 236 INJECTION, SOLUTION, CONCENTRATE INTRAVENOUS at 12:56

## 2023-06-28 RX ADMIN — INSULIN LISPRO 12 UNITS: 100 INJECTION, SOLUTION INTRAVENOUS; SUBCUTANEOUS at 17:30

## 2023-06-28 RX ADMIN — INSULIN GLARGINE-YFGN 18 UNITS: 100 INJECTION, SOLUTION SUBCUTANEOUS at 08:30

## 2023-06-28 RX ADMIN — LISINOPRIL 10 MG: 10 TABLET ORAL at 08:29

## 2023-06-28 RX ADMIN — INSULIN LISPRO 2 UNITS: 100 INJECTION, SOLUTION INTRAVENOUS; SUBCUTANEOUS at 22:26

## 2023-06-28 RX ADMIN — VANCOMYCIN HYDROCHLORIDE 1500 MG: 10 INJECTION, POWDER, LYOPHILIZED, FOR SOLUTION INTRAVENOUS at 17:29

## 2023-06-28 RX ADMIN — VANCOMYCIN HYDROCHLORIDE 1250 MG: 10 INJECTION, POWDER, LYOPHILIZED, FOR SOLUTION INTRAVENOUS at 06:51

## 2023-06-28 RX ADMIN — ENOXAPARIN SODIUM 40 MG: 100 INJECTION SUBCUTANEOUS at 08:28

## 2023-06-28 RX ADMIN — DIBASIC SODIUM PHOSPHATE, MONOBASIC POTASSIUM PHOSPHATE AND MONOBASIC SODIUM PHOSPHATE 1 TABLET: 852; 155; 130 TABLET ORAL at 08:29

## 2023-06-29 LAB
ANION GAP SERPL CALCULATED.3IONS-SCNC: 11 MMOL/L (ref 7–16)
BUN SERPL-MCNC: 14 MG/DL (ref 6–20)
CALCIUM SERPL-MCNC: 8.6 MG/DL (ref 8.6–10.2)
CHLORIDE SERPL-SCNC: 98 MMOL/L (ref 98–107)
CO2 SERPL-SCNC: 27 MMOL/L (ref 22–29)
CREAT SERPL-MCNC: 0.6 MG/DL (ref 0.7–1.2)
GLUCOSE SERPL-MCNC: 265 MG/DL (ref 74–99)
MAGNESIUM SERPL-MCNC: 2 MG/DL (ref 1.6–2.6)
METER GLUCOSE: 181 MG/DL (ref 74–99)
METER GLUCOSE: 228 MG/DL (ref 74–99)
METER GLUCOSE: 232 MG/DL (ref 74–99)
METER GLUCOSE: 238 MG/DL (ref 74–99)
METER GLUCOSE: 305 MG/DL (ref 74–99)
PHOSPHATE SERPL-MCNC: 2.5 MG/DL (ref 2.5–4.5)
POTASSIUM SERPL-SCNC: 3.6 MMOL/L (ref 3.5–5)
SODIUM SERPL-SCNC: 136 MMOL/L (ref 132–146)

## 2023-06-29 PROCEDURE — 82962 GLUCOSE BLOOD TEST: CPT

## 2023-06-29 PROCEDURE — 2580000003 HC RX 258

## 2023-06-29 PROCEDURE — 80048 BASIC METABOLIC PNL TOTAL CA: CPT

## 2023-06-29 PROCEDURE — 6360000002 HC RX W HCPCS

## 2023-06-29 PROCEDURE — 6360000002 HC RX W HCPCS: Performed by: INTERNAL MEDICINE

## 2023-06-29 PROCEDURE — 1200000000 HC SEMI PRIVATE

## 2023-06-29 PROCEDURE — 83735 ASSAY OF MAGNESIUM: CPT

## 2023-06-29 PROCEDURE — 6370000000 HC RX 637 (ALT 250 FOR IP): Performed by: INTERNAL MEDICINE

## 2023-06-29 PROCEDURE — 84100 ASSAY OF PHOSPHORUS: CPT

## 2023-06-29 PROCEDURE — 36415 COLL VENOUS BLD VENIPUNCTURE: CPT

## 2023-06-29 PROCEDURE — 99232 SBSQ HOSP IP/OBS MODERATE 35: CPT | Performed by: INTERNAL MEDICINE

## 2023-06-29 PROCEDURE — 6370000000 HC RX 637 (ALT 250 FOR IP)

## 2023-06-29 PROCEDURE — 2580000003 HC RX 258: Performed by: INTERNAL MEDICINE

## 2023-06-29 PROCEDURE — 6370000000 HC RX 637 (ALT 250 FOR IP): Performed by: STUDENT IN AN ORGANIZED HEALTH CARE EDUCATION/TRAINING PROGRAM

## 2023-06-29 PROCEDURE — 99239 HOSP IP/OBS DSCHRG MGMT >30: CPT | Performed by: INTERNAL MEDICINE

## 2023-06-29 PROCEDURE — S5553 INSULIN LONG ACTING 5 U: HCPCS | Performed by: INTERNAL MEDICINE

## 2023-06-29 RX ORDER — POTASSIUM CHLORIDE 20 MEQ/1
40 TABLET, EXTENDED RELEASE ORAL ONCE
Status: COMPLETED | OUTPATIENT
Start: 2023-06-29 | End: 2023-06-29

## 2023-06-29 RX ORDER — INSULIN LISPRO 100 [IU]/ML
4 INJECTION, SOLUTION INTRAVENOUS; SUBCUTANEOUS
Status: DISCONTINUED | OUTPATIENT
Start: 2023-06-29 | End: 2023-06-29

## 2023-06-29 RX ORDER — INSULIN LISPRO 100 [IU]/ML
5 INJECTION, SOLUTION INTRAVENOUS; SUBCUTANEOUS
Status: DISCONTINUED | OUTPATIENT
Start: 2023-06-29 | End: 2023-06-29

## 2023-06-29 RX ORDER — INSULIN LISPRO 100 [IU]/ML
6 INJECTION, SOLUTION INTRAVENOUS; SUBCUTANEOUS
Status: DISCONTINUED | OUTPATIENT
Start: 2023-06-29 | End: 2023-07-01

## 2023-06-29 RX ORDER — INSULIN GLARGINE-YFGN 100 [IU]/ML
24 INJECTION, SOLUTION SUBCUTANEOUS EVERY MORNING
Status: DISCONTINUED | OUTPATIENT
Start: 2023-06-30 | End: 2023-06-30

## 2023-06-29 RX ADMIN — INSULIN LISPRO 4 UNITS: 100 INJECTION, SOLUTION INTRAVENOUS; SUBCUTANEOUS at 08:21

## 2023-06-29 RX ADMIN — SODIUM CHLORIDE, PRESERVATIVE FREE 10 ML: 5 INJECTION INTRAVENOUS at 21:08

## 2023-06-29 RX ADMIN — POTASSIUM CHLORIDE 40 MEQ: 1500 TABLET, EXTENDED RELEASE ORAL at 08:21

## 2023-06-29 RX ADMIN — INSULIN LISPRO 8 UNITS: 100 INJECTION, SOLUTION INTRAVENOUS; SUBCUTANEOUS at 12:42

## 2023-06-29 RX ADMIN — DIBASIC SODIUM PHOSPHATE, MONOBASIC POTASSIUM PHOSPHATE AND MONOBASIC SODIUM PHOSPHATE 1 TABLET: 852; 155; 130 TABLET ORAL at 20:20

## 2023-06-29 RX ADMIN — EZETIMIBE 10 MG: 10 TABLET ORAL at 20:20

## 2023-06-29 RX ADMIN — POTASSIUM CHLORIDE 20 MEQ: 20 TABLET, EXTENDED RELEASE ORAL at 08:22

## 2023-06-29 RX ADMIN — Medication 100 MG: at 08:21

## 2023-06-29 RX ADMIN — SODIUM CHLORIDE, PRESERVATIVE FREE 10 ML: 5 INJECTION INTRAVENOUS at 08:21

## 2023-06-29 RX ADMIN — CEFTRIAXONE SODIUM 2000 MG: 2 INJECTION, POWDER, FOR SOLUTION INTRAMUSCULAR; INTRAVENOUS at 10:51

## 2023-06-29 RX ADMIN — INSULIN GLARGINE-YFGN 18 UNITS: 100 INJECTION, SOLUTION SUBCUTANEOUS at 08:21

## 2023-06-29 RX ADMIN — VANCOMYCIN HYDROCHLORIDE 1500 MG: 10 INJECTION, POWDER, LYOPHILIZED, FOR SOLUTION INTRAVENOUS at 05:13

## 2023-06-29 RX ADMIN — ENOXAPARIN SODIUM 40 MG: 100 INJECTION SUBCUTANEOUS at 08:20

## 2023-06-29 RX ADMIN — INSULIN LISPRO 6 UNITS: 100 INJECTION, SOLUTION INTRAVENOUS; SUBCUTANEOUS at 14:08

## 2023-06-29 RX ADMIN — INSULIN LISPRO 4 UNITS: 100 INJECTION, SOLUTION INTRAVENOUS; SUBCUTANEOUS at 17:32

## 2023-06-29 RX ADMIN — INSULIN LISPRO 1 UNITS: 100 INJECTION, SOLUTION INTRAVENOUS; SUBCUTANEOUS at 20:20

## 2023-06-29 RX ADMIN — LISINOPRIL 10 MG: 10 TABLET ORAL at 08:22

## 2023-06-29 RX ADMIN — POLYETHYLENE GLYCOL 3350 17 G: 17 POWDER, FOR SOLUTION ORAL at 08:20

## 2023-06-29 RX ADMIN — CLOTRIMAZOLE: 10 CREAM TOPICAL at 20:23

## 2023-06-29 RX ADMIN — ASPIRIN 81 MG: 81 TABLET, COATED ORAL at 08:21

## 2023-06-29 RX ADMIN — CLOTRIMAZOLE: 10 CREAM TOPICAL at 08:21

## 2023-06-29 RX ADMIN — AMLODIPINE BESYLATE 10 MG: 10 TABLET ORAL at 08:21

## 2023-06-29 RX ADMIN — DIBASIC SODIUM PHOSPHATE, MONOBASIC POTASSIUM PHOSPHATE AND MONOBASIC SODIUM PHOSPHATE 1 TABLET: 852; 155; 130 TABLET ORAL at 08:21

## 2023-06-29 RX ADMIN — ATORVASTATIN CALCIUM 80 MG: 40 TABLET, FILM COATED ORAL at 20:20

## 2023-06-29 ASSESSMENT — PAIN SCALES - GENERAL: PAINLEVEL_OUTOF10: 0

## 2023-06-30 ENCOUNTER — APPOINTMENT (OUTPATIENT)
Dept: CARDIAC CATH/INVASIVE PROCEDURES | Age: 53
DRG: 637 | End: 2023-06-30
Payer: COMMERCIAL

## 2023-06-30 ENCOUNTER — ANESTHESIA (OUTPATIENT)
Dept: CARDIAC CATH/INVASIVE PROCEDURES | Age: 53
End: 2023-06-30
Payer: COMMERCIAL

## 2023-06-30 ENCOUNTER — ANESTHESIA EVENT (OUTPATIENT)
Dept: CARDIAC CATH/INVASIVE PROCEDURES | Age: 53
End: 2023-06-30
Payer: COMMERCIAL

## 2023-06-30 LAB
BACTERIA BLD CULT: ABNORMAL
METER GLUCOSE: 158 MG/DL (ref 74–99)
METER GLUCOSE: 263 MG/DL (ref 74–99)
METER GLUCOSE: 272 MG/DL (ref 74–99)
METER GLUCOSE: 284 MG/DL (ref 74–99)
METER GLUCOSE: 327 MG/DL (ref 74–99)
METER GLUCOSE: 400 MG/DL (ref 74–99)
ORGANISM: ABNORMAL
ORGANISM: ABNORMAL

## 2023-06-30 PROCEDURE — 6370000000 HC RX 637 (ALT 250 FOR IP)

## 2023-06-30 PROCEDURE — 3700000001 HC ADD 15 MINUTES (ANESTHESIA)

## 2023-06-30 PROCEDURE — 2709999900 HC NON-CHARGEABLE SUPPLY

## 2023-06-30 PROCEDURE — 93312 ECHO TRANSESOPHAGEAL: CPT

## 2023-06-30 PROCEDURE — 99232 SBSQ HOSP IP/OBS MODERATE 35: CPT | Performed by: INTERNAL MEDICINE

## 2023-06-30 PROCEDURE — 93321 DOPPLER ECHO F-UP/LMTD STD: CPT

## 2023-06-30 PROCEDURE — 6360000002 HC RX W HCPCS

## 2023-06-30 PROCEDURE — B24BZZ4 ULTRASONOGRAPHY OF HEART WITH AORTA, TRANSESOPHAGEAL: ICD-10-PCS | Performed by: INTERNAL MEDICINE

## 2023-06-30 PROCEDURE — 82962 GLUCOSE BLOOD TEST: CPT

## 2023-06-30 PROCEDURE — 2580000003 HC RX 258

## 2023-06-30 PROCEDURE — 3700000000 HC ANESTHESIA ATTENDED CARE

## 2023-06-30 PROCEDURE — 6370000000 HC RX 637 (ALT 250 FOR IP): Performed by: INTERNAL MEDICINE

## 2023-06-30 PROCEDURE — 6360000002 HC RX W HCPCS: Performed by: INTERNAL MEDICINE

## 2023-06-30 PROCEDURE — 1200000000 HC SEMI PRIVATE

## 2023-06-30 PROCEDURE — S5553 INSULIN LONG ACTING 5 U: HCPCS | Performed by: INTERNAL MEDICINE

## 2023-06-30 PROCEDURE — 93325 DOPPLER ECHO COLOR FLOW MAPG: CPT

## 2023-06-30 PROCEDURE — 6370000000 HC RX 637 (ALT 250 FOR IP): Performed by: STUDENT IN AN ORGANIZED HEALTH CARE EDUCATION/TRAINING PROGRAM

## 2023-06-30 PROCEDURE — 2580000003 HC RX 258: Performed by: INTERNAL MEDICINE

## 2023-06-30 RX ORDER — INSULIN GLARGINE-YFGN 100 [IU]/ML
22 INJECTION, SOLUTION SUBCUTANEOUS EVERY MORNING
Status: DISCONTINUED | OUTPATIENT
Start: 2023-06-30 | End: 2023-06-30

## 2023-06-30 RX ORDER — LIDOCAINE HYDROCHLORIDE 20 MG/ML
INJECTION, SOLUTION INTRAVENOUS PRN
Status: DISCONTINUED | OUTPATIENT
Start: 2023-06-30 | End: 2023-06-30 | Stop reason: SDUPTHER

## 2023-06-30 RX ORDER — SODIUM CHLORIDE 9 MG/ML
INJECTION, SOLUTION INTRAVENOUS CONTINUOUS PRN
Status: DISCONTINUED | OUTPATIENT
Start: 2023-06-30 | End: 2023-06-30 | Stop reason: SDUPTHER

## 2023-06-30 RX ORDER — PROPOFOL 10 MG/ML
INJECTION, EMULSION INTRAVENOUS PRN
Status: DISCONTINUED | OUTPATIENT
Start: 2023-06-30 | End: 2023-06-30 | Stop reason: SDUPTHER

## 2023-06-30 RX ORDER — INSULIN GLARGINE-YFGN 100 [IU]/ML
22 INJECTION, SOLUTION SUBCUTANEOUS EVERY MORNING
Status: DISCONTINUED | OUTPATIENT
Start: 2023-06-30 | End: 2023-07-01

## 2023-06-30 RX ORDER — SENNA AND DOCUSATE SODIUM 50; 8.6 MG/1; MG/1
2 TABLET, FILM COATED ORAL DAILY
Status: DISCONTINUED | OUTPATIENT
Start: 2023-07-01 | End: 2023-07-01 | Stop reason: HOSPADM

## 2023-06-30 RX ORDER — INSULIN GLARGINE-YFGN 100 [IU]/ML
24 INJECTION, SOLUTION SUBCUTANEOUS EVERY MORNING
Status: DISCONTINUED | OUTPATIENT
Start: 2023-06-30 | End: 2023-06-30

## 2023-06-30 RX ADMIN — ENOXAPARIN SODIUM 40 MG: 100 INJECTION SUBCUTANEOUS at 08:49

## 2023-06-30 RX ADMIN — LISINOPRIL 10 MG: 10 TABLET ORAL at 08:47

## 2023-06-30 RX ADMIN — SODIUM CHLORIDE, PRESERVATIVE FREE 10 ML: 5 INJECTION INTRAVENOUS at 21:12

## 2023-06-30 RX ADMIN — SODIUM CHLORIDE: 9 INJECTION, SOLUTION INTRAVENOUS at 10:10

## 2023-06-30 RX ADMIN — LIDOCAINE HYDROCHLORIDE 50 MG: 20 INJECTION, SOLUTION INTRAVENOUS at 10:19

## 2023-06-30 RX ADMIN — CEFTRIAXONE SODIUM 2000 MG: 2 INJECTION, POWDER, FOR SOLUTION INTRAMUSCULAR; INTRAVENOUS at 11:52

## 2023-06-30 RX ADMIN — INSULIN GLARGINE-YFGN 22 UNITS: 100 INJECTION, SOLUTION SUBCUTANEOUS at 08:48

## 2023-06-30 RX ADMIN — SODIUM CHLORIDE, PRESERVATIVE FREE 10 ML: 5 INJECTION INTRAVENOUS at 08:48

## 2023-06-30 RX ADMIN — CLOTRIMAZOLE: 10 CREAM TOPICAL at 08:48

## 2023-06-30 RX ADMIN — ASPIRIN 81 MG: 81 TABLET, COATED ORAL at 08:51

## 2023-06-30 RX ADMIN — INSULIN LISPRO 6 UNITS: 100 INJECTION, SOLUTION INTRAVENOUS; SUBCUTANEOUS at 21:11

## 2023-06-30 RX ADMIN — INSULIN LISPRO 6 UNITS: 100 INJECTION, SOLUTION INTRAVENOUS; SUBCUTANEOUS at 12:53

## 2023-06-30 RX ADMIN — CLOTRIMAZOLE: 10 CREAM TOPICAL at 21:13

## 2023-06-30 RX ADMIN — INSULIN LISPRO 2 UNITS: 100 INJECTION, SOLUTION INTRAVENOUS; SUBCUTANEOUS at 12:54

## 2023-06-30 RX ADMIN — AMLODIPINE BESYLATE 10 MG: 10 TABLET ORAL at 08:47

## 2023-06-30 RX ADMIN — EZETIMIBE 10 MG: 10 TABLET ORAL at 21:11

## 2023-06-30 RX ADMIN — ACETAMINOPHEN 650 MG: 325 TABLET ORAL at 15:42

## 2023-06-30 RX ADMIN — INSULIN LISPRO 8 UNITS: 100 INJECTION, SOLUTION INTRAVENOUS; SUBCUTANEOUS at 08:49

## 2023-06-30 RX ADMIN — INSULIN LISPRO 6 UNITS: 100 INJECTION, SOLUTION INTRAVENOUS; SUBCUTANEOUS at 18:00

## 2023-06-30 RX ADMIN — DIBASIC SODIUM PHOSPHATE, MONOBASIC POTASSIUM PHOSPHATE AND MONOBASIC SODIUM PHOSPHATE 1 TABLET: 852; 155; 130 TABLET ORAL at 08:47

## 2023-06-30 RX ADMIN — Medication 100 MG: at 08:47

## 2023-06-30 RX ADMIN — INSULIN LISPRO 6 UNITS: 100 INJECTION, SOLUTION INTRAVENOUS; SUBCUTANEOUS at 08:47

## 2023-06-30 RX ADMIN — DIBASIC SODIUM PHOSPHATE, MONOBASIC POTASSIUM PHOSPHATE AND MONOBASIC SODIUM PHOSPHATE 1 TABLET: 852; 155; 130 TABLET ORAL at 21:11

## 2023-06-30 RX ADMIN — PROPOFOL 90 MG: 10 INJECTION, EMULSION INTRAVENOUS at 10:19

## 2023-06-30 RX ADMIN — INSULIN LISPRO 6 UNITS: 100 INJECTION, SOLUTION INTRAVENOUS; SUBCUTANEOUS at 17:59

## 2023-06-30 RX ADMIN — ATORVASTATIN CALCIUM 80 MG: 40 TABLET, FILM COATED ORAL at 21:11

## 2023-06-30 ASSESSMENT — LIFESTYLE VARIABLES: SMOKING_STATUS: 1

## 2023-07-01 VITALS
OXYGEN SATURATION: 96 % | TEMPERATURE: 98.2 F | HEART RATE: 96 BPM | WEIGHT: 139.4 LBS | BODY MASS INDEX: 23.22 KG/M2 | RESPIRATION RATE: 18 BRPM | DIASTOLIC BLOOD PRESSURE: 75 MMHG | SYSTOLIC BLOOD PRESSURE: 134 MMHG | HEIGHT: 65 IN

## 2023-07-01 LAB
METER GLUCOSE: 114 MG/DL (ref 74–99)
METER GLUCOSE: 337 MG/DL (ref 74–99)
METER GLUCOSE: 354 MG/DL (ref 74–99)

## 2023-07-01 PROCEDURE — 6370000000 HC RX 637 (ALT 250 FOR IP): Performed by: INTERNAL MEDICINE

## 2023-07-01 PROCEDURE — 6370000000 HC RX 637 (ALT 250 FOR IP): Performed by: STUDENT IN AN ORGANIZED HEALTH CARE EDUCATION/TRAINING PROGRAM

## 2023-07-01 PROCEDURE — 6360000002 HC RX W HCPCS

## 2023-07-01 PROCEDURE — 6360000002 HC RX W HCPCS: Performed by: INTERNAL MEDICINE

## 2023-07-01 PROCEDURE — 2580000003 HC RX 258: Performed by: INTERNAL MEDICINE

## 2023-07-01 PROCEDURE — 6370000000 HC RX 637 (ALT 250 FOR IP)

## 2023-07-01 PROCEDURE — 82962 GLUCOSE BLOOD TEST: CPT

## 2023-07-01 PROCEDURE — 2580000003 HC RX 258

## 2023-07-01 PROCEDURE — S5553 INSULIN LONG ACTING 5 U: HCPCS | Performed by: STUDENT IN AN ORGANIZED HEALTH CARE EDUCATION/TRAINING PROGRAM

## 2023-07-01 PROCEDURE — 99232 SBSQ HOSP IP/OBS MODERATE 35: CPT | Performed by: INTERNAL MEDICINE

## 2023-07-01 RX ORDER — INSULIN LISPRO 100 [IU]/ML
10 INJECTION, SOLUTION INTRAVENOUS; SUBCUTANEOUS
Qty: 1 EACH | Refills: 4 | Status: CANCELLED | OUTPATIENT
Start: 2023-07-01

## 2023-07-01 RX ORDER — INSULIN LISPRO 100 [IU]/ML
10 INJECTION, SOLUTION INTRAVENOUS; SUBCUTANEOUS
Qty: 3 ADJUSTABLE DOSE PRE-FILLED PEN SYRINGE | Refills: 4 | Status: CANCELLED | OUTPATIENT
Start: 2023-07-01

## 2023-07-01 RX ORDER — CEPHALEXIN 500 MG/1
500 CAPSULE ORAL EVERY 6 HOURS SCHEDULED
Status: DISCONTINUED | OUTPATIENT
Start: 2023-07-01 | End: 2023-07-01 | Stop reason: HOSPADM

## 2023-07-01 RX ORDER — AMLODIPINE BESYLATE 10 MG/1
10 TABLET ORAL DAILY
Qty: 30 TABLET | Refills: 3 | Status: SHIPPED | OUTPATIENT
Start: 2023-07-02

## 2023-07-01 RX ORDER — GLUCOSAMINE HCL/CHONDROITIN SU 500-400 MG
CAPSULE ORAL
Qty: 100 STRIP | Refills: 5 | Status: CANCELLED | OUTPATIENT
Start: 2023-07-01

## 2023-07-01 RX ORDER — INSULIN GLARGINE 100 [IU]/ML
30 INJECTION, SOLUTION SUBCUTANEOUS NIGHTLY
Qty: 10 ML | Refills: 3 | Status: CANCELLED | OUTPATIENT
Start: 2023-07-01

## 2023-07-01 RX ORDER — DULOXETIN HYDROCHLORIDE 30 MG/1
30 CAPSULE, DELAYED RELEASE ORAL DAILY
Status: DISCONTINUED | OUTPATIENT
Start: 2023-07-01 | End: 2023-07-01 | Stop reason: HOSPADM

## 2023-07-01 RX ORDER — PEN NEEDLE, DIABETIC 30 GX5/16"
1 NEEDLE, DISPOSABLE MISCELLANEOUS DAILY
Qty: 100 EACH | Refills: 3 | Status: CANCELLED | OUTPATIENT
Start: 2023-07-01

## 2023-07-01 RX ORDER — INSULIN LISPRO 100 [IU]/ML
10 INJECTION, SOLUTION INTRAVENOUS; SUBCUTANEOUS
Qty: 10 ML | Refills: 5 | Status: SHIPPED | OUTPATIENT
Start: 2023-07-01

## 2023-07-01 RX ORDER — LANCETS 30 GAUGE
1 EACH MISCELLANEOUS 2 TIMES DAILY
Qty: 300 EACH | Refills: 1 | Status: SHIPPED | OUTPATIENT
Start: 2023-07-01

## 2023-07-01 RX ORDER — GLUCOSAMINE HCL/CHONDROITIN SU 500-400 MG
CAPSULE ORAL
Qty: 900 STRIP | Refills: 3 | Status: SHIPPED | OUTPATIENT
Start: 2023-07-01

## 2023-07-01 RX ORDER — INSULIN GLARGINE-YFGN 100 [IU]/ML
22 INJECTION, SOLUTION SUBCUTANEOUS EVERY MORNING
Status: DISCONTINUED | OUTPATIENT
Start: 2023-07-01 | End: 2023-07-01

## 2023-07-01 RX ORDER — SENNA AND DOCUSATE SODIUM 50; 8.6 MG/1; MG/1
2 TABLET, FILM COATED ORAL DAILY
Qty: 30 TABLET | Refills: 0 | Status: CANCELLED | OUTPATIENT
Start: 2023-07-02

## 2023-07-01 RX ORDER — LANCETS 30 GAUGE
1 EACH MISCELLANEOUS DAILY
Qty: 100 EACH | Refills: 5 | Status: CANCELLED | OUTPATIENT
Start: 2023-07-01

## 2023-07-01 RX ORDER — SENNA AND DOCUSATE SODIUM 50; 8.6 MG/1; MG/1
2 TABLET, FILM COATED ORAL DAILY
Qty: 30 TABLET | Refills: 0 | Status: SHIPPED | OUTPATIENT
Start: 2023-07-02

## 2023-07-01 RX ORDER — CEPHALEXIN 500 MG/1
500 CAPSULE ORAL 4 TIMES DAILY
Qty: 28 CAPSULE | Refills: 0 | Status: SHIPPED | OUTPATIENT
Start: 2023-07-01 | End: 2023-07-08

## 2023-07-01 RX ORDER — INSULIN GLARGINE-YFGN 100 [IU]/ML
30 INJECTION, SOLUTION SUBCUTANEOUS EVERY MORNING
Status: DISCONTINUED | OUTPATIENT
Start: 2023-07-01 | End: 2023-07-01 | Stop reason: HOSPADM

## 2023-07-01 RX ORDER — GLUCOSAMINE HCL/CHONDROITIN SU 500-400 MG
CAPSULE ORAL
Qty: 100 STRIP | Refills: 5 | Status: SHIPPED | OUTPATIENT
Start: 2023-07-01

## 2023-07-01 RX ORDER — SYRINGE-NEEDLE,INSULIN,0.5 ML 27GX1/2"
1 SYRINGE, EMPTY DISPOSABLE MISCELLANEOUS DAILY
Qty: 100 EACH | Refills: 11 | Status: CANCELLED | OUTPATIENT
Start: 2023-07-01

## 2023-07-01 RX ORDER — SYRINGE-NEEDLE,INSULIN,0.5 ML 27GX1/2"
1 SYRINGE, EMPTY DISPOSABLE MISCELLANEOUS DAILY
Qty: 100 EACH | Refills: 11 | Status: SHIPPED | OUTPATIENT
Start: 2023-07-01

## 2023-07-01 RX ORDER — INSULIN LISPRO 100 [IU]/ML
10 INJECTION, SOLUTION INTRAVENOUS; SUBCUTANEOUS
Status: DISCONTINUED | OUTPATIENT
Start: 2023-07-01 | End: 2023-07-01 | Stop reason: HOSPADM

## 2023-07-01 RX ORDER — INSULIN GLARGINE 100 [IU]/ML
30 INJECTION, SOLUTION SUBCUTANEOUS NIGHTLY
Qty: 10 ML | Refills: 3 | Status: SHIPPED | OUTPATIENT
Start: 2023-07-01

## 2023-07-01 RX ADMIN — INSULIN GLARGINE-YFGN 30 UNITS: 100 INJECTION, SOLUTION SUBCUTANEOUS at 08:19

## 2023-07-01 RX ADMIN — ASPIRIN 81 MG: 81 TABLET, COATED ORAL at 08:16

## 2023-07-01 RX ADMIN — CEFTRIAXONE SODIUM 2000 MG: 2 INJECTION, POWDER, FOR SOLUTION INTRAMUSCULAR; INTRAVENOUS at 11:32

## 2023-07-01 RX ADMIN — POLYETHYLENE GLYCOL 3350 17 G: 17 POWDER, FOR SOLUTION ORAL at 08:18

## 2023-07-01 RX ADMIN — INSULIN HUMAN 5 UNITS: 100 INJECTION, SOLUTION PARENTERAL at 07:05

## 2023-07-01 RX ADMIN — LISINOPRIL 10 MG: 10 TABLET ORAL at 08:16

## 2023-07-01 RX ADMIN — CLOTRIMAZOLE: 10 CREAM TOPICAL at 08:18

## 2023-07-01 RX ADMIN — DULOXETINE HYDROCHLORIDE 30 MG: 30 CAPSULE, DELAYED RELEASE ORAL at 08:21

## 2023-07-01 RX ADMIN — ENOXAPARIN SODIUM 40 MG: 100 INJECTION SUBCUTANEOUS at 08:22

## 2023-07-01 RX ADMIN — Medication 100 MG: at 08:16

## 2023-07-01 RX ADMIN — SODIUM CHLORIDE, PRESERVATIVE FREE 10 ML: 5 INJECTION INTRAVENOUS at 08:21

## 2023-07-01 RX ADMIN — INSULIN LISPRO 10 UNITS: 100 INJECTION, SOLUTION INTRAVENOUS; SUBCUTANEOUS at 08:19

## 2023-07-01 RX ADMIN — DIBASIC SODIUM PHOSPHATE, MONOBASIC POTASSIUM PHOSPHATE AND MONOBASIC SODIUM PHOSPHATE 1 TABLET: 852; 155; 130 TABLET ORAL at 08:17

## 2023-07-01 RX ADMIN — SENNOSIDES AND DOCUSATE SODIUM 2 TABLET: 50; 8.6 TABLET ORAL at 08:16

## 2023-07-01 RX ADMIN — CEPHALEXIN 500 MG: 500 CAPSULE ORAL at 13:12

## 2023-07-01 RX ADMIN — AMLODIPINE BESYLATE 10 MG: 10 TABLET ORAL at 08:16

## 2023-07-01 RX ADMIN — INSULIN LISPRO 8 UNITS: 100 INJECTION, SOLUTION INTRAVENOUS; SUBCUTANEOUS at 08:20

## 2023-07-02 LAB
BACTERIA BLD CULT ORG #2: NORMAL
BACTERIA BLD CULT: NORMAL

## 2023-07-03 NOTE — ADT AUTH CERT
blood cultures no growth for 48 hours will DC vancomycin  -ID will continue to follow     MEDICATIONS:  Unasyn 3000mg IV q6h  Vancocin 1250mg IV q12h  Vancomycin 1500mg IV q12h  Magnesium Sulfate 2000mg IV once  Potassium Phosphate 10mmol IV once  Norvasc 10mg PO daily  Aspirin 81mg PO daily  Lipitor 80mg PO nightly  Zetia 10mg PO nightly  Lisinopril 10mg PO daily  K Phos Neutral 250mg PO BID  Klor-Con M 20mEq PO daily  Thiamine 100mg PO daily  Semglee-Yfgn 18units SC every morning  Lovenox 40mg SC daily  Humalog 12units SSI x 2  Humalog 6units SSI x 1  Humalog 2units SSI x 1     ORDERS:  Up with assistance  POCT Glucose QID  Strict bedrest  Intake and output q8h  Daily weights     PT/OT/SLP/CM ASSESSMENT OR NOTES:  PT:  AM-PAC 6 Clicks: 8/2  Pt transferred from supine to sit with assistance of BLE and trunk. Pt sat EOB~ 10 minutes; pt showed fair sitting balance initially with HOB elevated but improved throughout session after lowering HOB; pt given verbal cueing for R hand placement. Pt attempted to scoot towards the right; pt unable to complete full scoot w/o assist. Pt refused to perform STS transfer. Pt assisted back into supine with assistance of BLE and trunk; pt repositioned in bed using TAPS. Pt remained supine in bed with all needs met and call light in reach. Treatment:    -Bed mobility training - pt given verbal and tactile cues to facilitate proper sequencing and safety during rolling and supine<>sit as well as provided with physical assistance to complete task   -Sitting EOB for >10 minutes for upright tolerance, postural awareness and BLE ROM     Pt's/ family goals   1. Return home/PLOF     OT:  AM-PAC Daily Activity Raw Score: 12/24  Overall patient demonstrated  decreased independence and safety during completion of ADL/functional transfer/mobility tasks.   Rehab Potential: Fair for established goals  Patient and/or family were instructed on functional diagnosis, prognosis/goals and OT plan of

## 2023-07-15 ENCOUNTER — HOSPITAL ENCOUNTER (INPATIENT)
Age: 53
LOS: 2 days | Discharge: HOME | DRG: 073 | End: 2023-07-17
Attending: EMERGENCY MEDICINE | Admitting: INTERNAL MEDICINE
Payer: COMMERCIAL

## 2023-07-15 ENCOUNTER — APPOINTMENT (OUTPATIENT)
Dept: GENERAL RADIOLOGY | Age: 53
DRG: 073 | End: 2023-07-15
Payer: COMMERCIAL

## 2023-07-15 DIAGNOSIS — R73.9 HYPERGLYCEMIA: ICD-10-CM

## 2023-07-15 DIAGNOSIS — R11.2 NAUSEA AND VOMITING, UNSPECIFIED VOMITING TYPE: ICD-10-CM

## 2023-07-15 DIAGNOSIS — R00.2 PALPITATIONS: Primary | ICD-10-CM

## 2023-07-15 LAB
ALBUMIN SERPL-MCNC: 4.9 G/DL (ref 3.5–5.2)
ALP SERPL-CCNC: 118 U/L (ref 40–129)
ALT SERPL-CCNC: 36 U/L (ref 0–40)
ANION GAP SERPL CALCULATED.3IONS-SCNC: 16 MMOL/L (ref 7–16)
ANION GAP SERPL CALCULATED.3IONS-SCNC: 16 MMOL/L (ref 7–16)
AST SERPL-CCNC: 18 U/L (ref 0–39)
B-OH-BUTYR SERPL-MCNC: 3.65 MMOL/L (ref 0.02–0.27)
BASOPHILS # BLD: 0.06 K/UL (ref 0–0.2)
BASOPHILS # BLD: 0.06 K/UL (ref 0–0.2)
BASOPHILS NFR BLD: 1 % (ref 0–2)
BASOPHILS NFR BLD: 1 % (ref 0–2)
BILIRUB SERPL-MCNC: 0.9 MG/DL (ref 0–1.2)
BILIRUB UR QL STRIP: NEGATIVE
BUN SERPL-MCNC: 21 MG/DL (ref 6–20)
BUN SERPL-MCNC: 23 MG/DL (ref 6–20)
CALCIUM SERPL-MCNC: 9.4 MG/DL (ref 8.6–10.2)
CALCIUM SERPL-MCNC: 9.7 MG/DL (ref 8.6–10.2)
CHLORIDE SERPL-SCNC: 104 MMOL/L (ref 98–107)
CHLORIDE SERPL-SCNC: 98 MMOL/L (ref 98–107)
CHP ED QC CHECK: NORMAL
CHP ED QC CHECK: NORMAL
CHP ED QC CHECK: YES
CLARITY UR: CLEAR
CO2 SERPL-SCNC: 21 MMOL/L (ref 22–29)
CO2 SERPL-SCNC: 24 MMOL/L (ref 22–29)
COLOR UR: YELLOW
CREAT SERPL-MCNC: 0.6 MG/DL (ref 0.7–1.2)
CREAT SERPL-MCNC: 0.7 MG/DL (ref 0.7–1.2)
D DIMER: <200 NG/ML DDU (ref 0–232)
EOSINOPHIL # BLD: 0.02 K/UL (ref 0.05–0.5)
EOSINOPHIL # BLD: 0.15 K/UL (ref 0.05–0.5)
EOSINOPHILS RELATIVE PERCENT: 0 % (ref 0–6)
EOSINOPHILS RELATIVE PERCENT: 1 % (ref 0–6)
ERYTHROCYTE [DISTWIDTH] IN BLOOD BY AUTOMATED COUNT: 14 % (ref 11.5–15)
ERYTHROCYTE [DISTWIDTH] IN BLOOD BY AUTOMATED COUNT: 14.2 % (ref 11.5–15)
GFR SERPL CREATININE-BSD FRML MDRD: >60 ML/MIN/1.73M2
GFR SERPL CREATININE-BSD FRML MDRD: >60 ML/MIN/1.73M2
GLUCOSE BLD-MCNC: 367 MG/DL
GLUCOSE BLD-MCNC: 380 MG/DL
GLUCOSE BLD-MCNC: 731 MG/DL
GLUCOSE SERPL-MCNC: 403 MG/DL (ref 74–99)
GLUCOSE SERPL-MCNC: 489 MG/DL (ref 74–99)
GLUCOSE UR STRIP-MCNC: >=1000 MG/DL
HCT VFR BLD AUTO: 38.7 % (ref 37–54)
HCT VFR BLD AUTO: 39.6 % (ref 37–54)
HGB BLD-MCNC: 12 G/DL (ref 12.5–16.5)
HGB BLD-MCNC: 13 G/DL (ref 12.5–16.5)
HGB UR QL STRIP.AUTO: NEGATIVE
IMM GRANULOCYTES # BLD AUTO: 0.04 K/UL (ref 0–0.58)
IMM GRANULOCYTES # BLD AUTO: 0.04 K/UL (ref 0–0.58)
IMM GRANULOCYTES NFR BLD: 0 % (ref 0–5)
IMM GRANULOCYTES NFR BLD: 0 % (ref 0–5)
KETONES UR STRIP-MCNC: >80 MG/DL
LACTATE BLDV-SCNC: 1.2 MMOL/L (ref 0.5–2.2)
LEUKOCYTE ESTERASE UR QL STRIP: NEGATIVE
LIPASE SERPL-CCNC: 16 U/L (ref 13–60)
LYMPHOCYTES # BLD: 10 % (ref 20–42)
LYMPHOCYTES # BLD: 12 % (ref 20–42)
LYMPHOCYTES NFR BLD: 1.21 K/UL (ref 1.5–4)
LYMPHOCYTES NFR BLD: 1.49 K/UL (ref 1.5–4)
MCH RBC QN AUTO: 28.5 PG (ref 26–35)
MCH RBC QN AUTO: 29.6 PG (ref 26–35)
MCHC RBC AUTO-ENTMCNC: 31 G/DL (ref 32–34.5)
MCHC RBC AUTO-ENTMCNC: 32.8 G/DL (ref 32–34.5)
MCV RBC AUTO: 90.2 FL (ref 80–99.9)
MCV RBC AUTO: 91.9 FL (ref 80–99.9)
METER GLUCOSE: 367 MG/DL (ref 74–99)
METER GLUCOSE: 380 MG/DL (ref 74–99)
METER GLUCOSE: 431 MG/DL (ref 74–99)
METER GLUCOSE: 455 MG/DL (ref 74–99)
METER GLUCOSE: 459 MG/DL (ref 74–99)
METER GLUCOSE: 485 MG/DL (ref 74–99)
MONOCYTES NFR BLD: 0.57 K/UL (ref 0.1–0.95)
MONOCYTES NFR BLD: 0.61 K/UL (ref 0.1–0.95)
MONOCYTES NFR BLD: 5 % (ref 2–12)
MONOCYTES NFR BLD: 5 % (ref 2–12)
NEUTROPHILS NFR BLD: 81 % (ref 43–80)
NEUTROPHILS NFR BLD: 84 % (ref 43–80)
NEUTS SEG NFR BLD: 10.11 K/UL (ref 1.8–7.3)
NEUTS SEG NFR BLD: 9.7 K/UL (ref 1.8–7.3)
NITRITE UR QL STRIP: NEGATIVE
PH UR STRIP: 5.5 [PH] (ref 5–9)
PH VENOUS: 7.36 (ref 7.35–7.45)
PHOSPHATE SERPL-MCNC: 2.4 MG/DL (ref 2.5–4.5)
PLATELET # BLD AUTO: 467 K/UL (ref 130–450)
PLATELET # BLD AUTO: 472 K/UL (ref 130–450)
PMV BLD AUTO: 8.6 FL (ref 7–12)
PMV BLD AUTO: 8.7 FL (ref 7–12)
POTASSIUM SERPL-SCNC: 4.2 MMOL/L (ref 3.5–5)
POTASSIUM SERPL-SCNC: 4.6 MMOL/L (ref 3.5–5)
PROCALCITONIN SERPL-MCNC: 0.1 NG/ML (ref 0–0.08)
PROT SERPL-MCNC: 7.6 G/DL (ref 6.4–8.3)
PROT UR STRIP-MCNC: NEGATIVE MG/DL
RBC # BLD AUTO: 4.21 M/UL (ref 3.8–5.8)
RBC # BLD AUTO: 4.39 M/UL (ref 3.8–5.8)
RBC #/AREA URNS HPF: ABNORMAL /HPF
REASON FOR REJECTION: NORMAL
SODIUM SERPL-SCNC: 138 MMOL/L (ref 132–146)
SODIUM SERPL-SCNC: 141 MMOL/L (ref 132–146)
SP GR UR STRIP: 1.01 (ref 1–1.03)
SPECIMEN SOURCE: NORMAL
TROPONIN I SERPL HS-MCNC: 35 NG/L (ref 0–11)
TROPONIN I SERPL HS-MCNC: 36 NG/L (ref 0–11)
UROBILINOGEN UR STRIP-ACNC: 0.2 EU/DL (ref 0–1)
WBC #/AREA URNS HPF: ABNORMAL /HPF
WBC OTHER # BLD: 11.6 K/UL (ref 4.5–11.5)
WBC OTHER # BLD: 12.5 K/UL (ref 4.5–11.5)
ZZ NTE CLEAN UP: ORDERED TEST: NORMAL

## 2023-07-15 PROCEDURE — 96374 THER/PROPH/DIAG INJ IV PUSH: CPT

## 2023-07-15 PROCEDURE — 71046 X-RAY EXAM CHEST 2 VIEWS: CPT

## 2023-07-15 PROCEDURE — 2580000003 HC RX 258

## 2023-07-15 PROCEDURE — 83605 ASSAY OF LACTIC ACID: CPT

## 2023-07-15 PROCEDURE — 82800 BLOOD PH: CPT

## 2023-07-15 PROCEDURE — 96375 TX/PRO/DX INJ NEW DRUG ADDON: CPT

## 2023-07-15 PROCEDURE — 96372 THER/PROPH/DIAG INJ SC/IM: CPT

## 2023-07-15 PROCEDURE — 99285 EMERGENCY DEPT VISIT HI MDM: CPT

## 2023-07-15 PROCEDURE — 82010 KETONE BODYS QUAN: CPT

## 2023-07-15 PROCEDURE — 2580000003 HC RX 258: Performed by: EMERGENCY MEDICINE

## 2023-07-15 PROCEDURE — 2140000000 HC CCU INTERMEDIATE R&B

## 2023-07-15 PROCEDURE — 80053 COMPREHEN METABOLIC PANEL: CPT

## 2023-07-15 PROCEDURE — 93005 ELECTROCARDIOGRAM TRACING: CPT

## 2023-07-15 PROCEDURE — 85027 COMPLETE CBC AUTOMATED: CPT

## 2023-07-15 PROCEDURE — 87040 BLOOD CULTURE FOR BACTERIA: CPT

## 2023-07-15 PROCEDURE — 6370000000 HC RX 637 (ALT 250 FOR IP): Performed by: EMERGENCY MEDICINE

## 2023-07-15 PROCEDURE — 84484 ASSAY OF TROPONIN QUANT: CPT

## 2023-07-15 PROCEDURE — 85379 FIBRIN DEGRADATION QUANT: CPT

## 2023-07-15 PROCEDURE — 81001 URINALYSIS AUTO W/SCOPE: CPT

## 2023-07-15 PROCEDURE — 84145 PROCALCITONIN (PCT): CPT

## 2023-07-15 PROCEDURE — 84100 ASSAY OF PHOSPHORUS: CPT

## 2023-07-15 PROCEDURE — 80164 ASSAY DIPROPYLACETIC ACD TOT: CPT

## 2023-07-15 PROCEDURE — 96361 HYDRATE IV INFUSION ADD-ON: CPT

## 2023-07-15 PROCEDURE — 83690 ASSAY OF LIPASE: CPT

## 2023-07-15 PROCEDURE — S5553 INSULIN LONG ACTING 5 U: HCPCS

## 2023-07-15 PROCEDURE — 6370000000 HC RX 637 (ALT 250 FOR IP)

## 2023-07-15 PROCEDURE — 6360000002 HC RX W HCPCS: Performed by: EMERGENCY MEDICINE

## 2023-07-15 PROCEDURE — 82947 ASSAY GLUCOSE BLOOD QUANT: CPT

## 2023-07-15 PROCEDURE — 80048 BASIC METABOLIC PNL TOTAL CA: CPT

## 2023-07-15 RX ORDER — SODIUM CHLORIDE 0.9 % (FLUSH) 0.9 %
5-40 SYRINGE (ML) INJECTION PRN
Status: DISCONTINUED | OUTPATIENT
Start: 2023-07-15 | End: 2023-07-17 | Stop reason: HOSPADM

## 2023-07-15 RX ORDER — INSULIN LISPRO 100 [IU]/ML
0-4 INJECTION, SOLUTION INTRAVENOUS; SUBCUTANEOUS
Status: DISCONTINUED | OUTPATIENT
Start: 2023-07-16 | End: 2023-07-17 | Stop reason: HOSPADM

## 2023-07-15 RX ORDER — LISINOPRIL 10 MG/1
10 TABLET ORAL DAILY
Status: DISCONTINUED | OUTPATIENT
Start: 2023-07-16 | End: 2023-07-17 | Stop reason: HOSPADM

## 2023-07-15 RX ORDER — ATORVASTATIN CALCIUM 80 MG/1
80 TABLET, FILM COATED ORAL NIGHTLY
Status: DISCONTINUED | OUTPATIENT
Start: 2023-07-15 | End: 2023-07-17 | Stop reason: HOSPADM

## 2023-07-15 RX ORDER — ASPIRIN 81 MG/1
81 TABLET ORAL DAILY
Status: DISCONTINUED | OUTPATIENT
Start: 2023-07-16 | End: 2023-07-17 | Stop reason: HOSPADM

## 2023-07-15 RX ORDER — EZETIMIBE 10 MG/1
10 TABLET ORAL NIGHTLY
Status: DISCONTINUED | OUTPATIENT
Start: 2023-07-15 | End: 2023-07-17 | Stop reason: HOSPADM

## 2023-07-15 RX ORDER — TRAZODONE HYDROCHLORIDE 50 MG/1
50 TABLET ORAL NIGHTLY PRN
Status: DISCONTINUED | OUTPATIENT
Start: 2023-07-15 | End: 2023-07-17 | Stop reason: HOSPADM

## 2023-07-15 RX ORDER — ACETAMINOPHEN 325 MG/1
650 TABLET ORAL EVERY 6 HOURS PRN
Status: DISCONTINUED | OUTPATIENT
Start: 2023-07-15 | End: 2023-07-17 | Stop reason: HOSPADM

## 2023-07-15 RX ORDER — ACETAMINOPHEN 650 MG/1
650 SUPPOSITORY RECTAL EVERY 6 HOURS PRN
Status: DISCONTINUED | OUTPATIENT
Start: 2023-07-15 | End: 2023-07-17 | Stop reason: HOSPADM

## 2023-07-15 RX ORDER — POLYETHYLENE GLYCOL 3350 17 G/17G
17 POWDER, FOR SOLUTION ORAL DAILY PRN
Status: DISCONTINUED | OUTPATIENT
Start: 2023-07-15 | End: 2023-07-17 | Stop reason: HOSPADM

## 2023-07-15 RX ORDER — GABAPENTIN 100 MG/1
100 CAPSULE ORAL 3 TIMES DAILY PRN
Status: DISCONTINUED | OUTPATIENT
Start: 2023-07-15 | End: 2023-07-17 | Stop reason: HOSPADM

## 2023-07-15 RX ORDER — ENOXAPARIN SODIUM 100 MG/ML
40 INJECTION SUBCUTANEOUS DAILY
Status: DISCONTINUED | OUTPATIENT
Start: 2023-07-16 | End: 2023-07-17 | Stop reason: HOSPADM

## 2023-07-15 RX ORDER — METOCLOPRAMIDE HYDROCHLORIDE 5 MG/ML
10 INJECTION INTRAMUSCULAR; INTRAVENOUS ONCE
Status: COMPLETED | OUTPATIENT
Start: 2023-07-15 | End: 2023-07-15

## 2023-07-15 RX ORDER — INSULIN LISPRO 100 [IU]/ML
0-4 INJECTION, SOLUTION INTRAVENOUS; SUBCUTANEOUS NIGHTLY
Status: DISCONTINUED | OUTPATIENT
Start: 2023-07-15 | End: 2023-07-17 | Stop reason: HOSPADM

## 2023-07-15 RX ORDER — 0.9 % SODIUM CHLORIDE 0.9 %
1000 INTRAVENOUS SOLUTION INTRAVENOUS ONCE
Status: COMPLETED | OUTPATIENT
Start: 2023-07-15 | End: 2023-07-15

## 2023-07-15 RX ORDER — AMLODIPINE BESYLATE 10 MG/1
10 TABLET ORAL DAILY
Status: DISCONTINUED | OUTPATIENT
Start: 2023-07-16 | End: 2023-07-17 | Stop reason: HOSPADM

## 2023-07-15 RX ORDER — SODIUM CHLORIDE 9 MG/ML
INJECTION, SOLUTION INTRAVENOUS PRN
Status: DISCONTINUED | OUTPATIENT
Start: 2023-07-15 | End: 2023-07-17 | Stop reason: HOSPADM

## 2023-07-15 RX ORDER — DULOXETIN HYDROCHLORIDE 30 MG/1
30 CAPSULE, DELAYED RELEASE ORAL DAILY
Status: DISCONTINUED | OUTPATIENT
Start: 2023-07-16 | End: 2023-07-17 | Stop reason: HOSPADM

## 2023-07-15 RX ORDER — 0.9 % SODIUM CHLORIDE 0.9 %
1000 INTRAVENOUS SOLUTION INTRAVENOUS
Status: COMPLETED | OUTPATIENT
Start: 2023-07-15 | End: 2023-07-15

## 2023-07-15 RX ORDER — ONDANSETRON 4 MG/1
4 TABLET, ORALLY DISINTEGRATING ORAL EVERY 8 HOURS PRN
Status: DISCONTINUED | OUTPATIENT
Start: 2023-07-15 | End: 2023-07-17 | Stop reason: HOSPADM

## 2023-07-15 RX ORDER — INSULIN GLARGINE-YFGN 100 [IU]/ML
15 INJECTION, SOLUTION SUBCUTANEOUS NIGHTLY
Status: DISCONTINUED | OUTPATIENT
Start: 2023-07-15 | End: 2023-07-17 | Stop reason: HOSPADM

## 2023-07-15 RX ORDER — LANOLIN ALCOHOL/MO/W.PET/CERES
100 CREAM (GRAM) TOPICAL DAILY
Status: DISCONTINUED | OUTPATIENT
Start: 2023-07-16 | End: 2023-07-17 | Stop reason: HOSPADM

## 2023-07-15 RX ORDER — SODIUM CHLORIDE 0.9 % (FLUSH) 0.9 %
5-40 SYRINGE (ML) INJECTION EVERY 12 HOURS SCHEDULED
Status: DISCONTINUED | OUTPATIENT
Start: 2023-07-15 | End: 2023-07-17 | Stop reason: HOSPADM

## 2023-07-15 RX ORDER — ONDANSETRON 2 MG/ML
4 INJECTION INTRAMUSCULAR; INTRAVENOUS EVERY 6 HOURS PRN
Status: DISCONTINUED | OUTPATIENT
Start: 2023-07-15 | End: 2023-07-17 | Stop reason: HOSPADM

## 2023-07-15 RX ORDER — NICOTINE 21 MG/24HR
1 PATCH, TRANSDERMAL 24 HOURS TRANSDERMAL DAILY
Status: DISCONTINUED | OUTPATIENT
Start: 2023-07-16 | End: 2023-07-17 | Stop reason: HOSPADM

## 2023-07-15 RX ORDER — ONDANSETRON 4 MG/1
4 TABLET, ORALLY DISINTEGRATING ORAL ONCE
Status: COMPLETED | OUTPATIENT
Start: 2023-07-15 | End: 2023-07-15

## 2023-07-15 RX ORDER — BACLOFEN 10 MG/1
10 TABLET ORAL 2 TIMES DAILY
Status: DISCONTINUED | OUTPATIENT
Start: 2023-07-15 | End: 2023-07-17 | Stop reason: HOSPADM

## 2023-07-15 RX ADMIN — INSULIN GLARGINE-YFGN 15 UNITS: 100 INJECTION, SOLUTION SUBCUTANEOUS at 21:56

## 2023-07-15 RX ADMIN — SODIUM CHLORIDE 1000 ML: 9 INJECTION, SOLUTION INTRAVENOUS at 15:57

## 2023-07-15 RX ADMIN — ONDANSETRON 4 MG: 4 TABLET, ORALLY DISINTEGRATING ORAL at 15:57

## 2023-07-15 RX ADMIN — SODIUM CHLORIDE 1000 ML: 9 INJECTION, SOLUTION INTRAVENOUS at 20:12

## 2023-07-15 RX ADMIN — SODIUM CHLORIDE 1000 ML: 9 INJECTION, SOLUTION INTRAVENOUS at 22:00

## 2023-07-15 RX ADMIN — INSULIN HUMAN 10 UNITS: 100 INJECTION, SOLUTION PARENTERAL at 19:15

## 2023-07-15 RX ADMIN — INSULIN LISPRO 4 UNITS: 100 INJECTION, SOLUTION INTRAVENOUS; SUBCUTANEOUS at 21:57

## 2023-07-15 RX ADMIN — SODIUM CHLORIDE 1000 ML: 9 INJECTION, SOLUTION INTRAVENOUS at 23:00

## 2023-07-15 RX ADMIN — METOCLOPRAMIDE 10 MG: 5 INJECTION, SOLUTION INTRAMUSCULAR; INTRAVENOUS at 19:33

## 2023-07-15 ASSESSMENT — LIFESTYLE VARIABLES
HOW OFTEN DO YOU HAVE A DRINK CONTAINING ALCOHOL: 2-3 TIMES A WEEK
HOW MANY STANDARD DRINKS CONTAINING ALCOHOL DO YOU HAVE ON A TYPICAL DAY: 3 OR 4

## 2023-07-15 ASSESSMENT — PAIN - FUNCTIONAL ASSESSMENT: PAIN_FUNCTIONAL_ASSESSMENT: NONE - DENIES PAIN

## 2023-07-16 LAB
ANION GAP SERPL CALCULATED.3IONS-SCNC: 10 MMOL/L (ref 7–16)
ANION GAP SERPL CALCULATED.3IONS-SCNC: 11 MMOL/L (ref 7–16)
ANION GAP SERPL CALCULATED.3IONS-SCNC: 12 MMOL/L (ref 7–16)
ANION GAP SERPL CALCULATED.3IONS-SCNC: 17 MMOL/L (ref 7–16)
BASOPHILS # BLD: 0.05 K/UL (ref 0–0.2)
BASOPHILS NFR BLD: 1 % (ref 0–2)
BUN SERPL-MCNC: 10 MG/DL (ref 6–20)
BUN SERPL-MCNC: 11 MG/DL (ref 6–20)
BUN SERPL-MCNC: 15 MG/DL (ref 6–20)
BUN SERPL-MCNC: 19 MG/DL (ref 6–20)
CA-I BLD-SCNC: 1.13 MMOL/L (ref 1.15–1.33)
CALCIUM SERPL-MCNC: 8.3 MG/DL (ref 8.6–10.2)
CALCIUM SERPL-MCNC: 8.5 MG/DL (ref 8.6–10.2)
CALCIUM SERPL-MCNC: 8.9 MG/DL (ref 8.6–10.2)
CALCIUM SERPL-MCNC: 9 MG/DL (ref 8.6–10.2)
CHLORIDE SERPL-SCNC: 109 MMOL/L (ref 98–107)
CHLORIDE SERPL-SCNC: 109 MMOL/L (ref 98–107)
CHLORIDE SERPL-SCNC: 98 MMOL/L (ref 98–107)
CHLORIDE SERPL-SCNC: 98 MMOL/L (ref 98–107)
CHP ED QC CHECK: NORMAL
CHP ED QC CHECK: NORMAL
CO2 SERPL-SCNC: 17 MMOL/L (ref 22–29)
CO2 SERPL-SCNC: 22 MMOL/L (ref 22–29)
CO2 SERPL-SCNC: 24 MMOL/L (ref 22–29)
CO2 SERPL-SCNC: 25 MMOL/L (ref 22–29)
CREAT SERPL-MCNC: 0.4 MG/DL (ref 0.7–1.2)
CREAT SERPL-MCNC: 0.5 MG/DL (ref 0.7–1.2)
CREAT SERPL-MCNC: 0.5 MG/DL (ref 0.7–1.2)
CREAT SERPL-MCNC: 0.6 MG/DL (ref 0.7–1.2)
EOSINOPHIL # BLD: 0.09 K/UL (ref 0.05–0.5)
EOSINOPHILS RELATIVE PERCENT: 1 % (ref 0–6)
ERYTHROCYTE [DISTWIDTH] IN BLOOD BY AUTOMATED COUNT: 14.1 % (ref 11.5–15)
GFR SERPL CREATININE-BSD FRML MDRD: >60 ML/MIN/1.73M2
GLUCOSE BLD-MCNC: 167 MG/DL
GLUCOSE BLD-MCNC: 226 MG/DL
GLUCOSE BLD-MCNC: 235 MG/DL
GLUCOSE BLD-MCNC: 267 MG/DL
GLUCOSE SERPL-MCNC: 177 MG/DL (ref 74–99)
GLUCOSE SERPL-MCNC: 186 MG/DL (ref 74–99)
GLUCOSE SERPL-MCNC: 196 MG/DL (ref 74–99)
GLUCOSE SERPL-MCNC: 249 MG/DL (ref 74–99)
HCT VFR BLD AUTO: 32.7 % (ref 37–54)
HGB BLD-MCNC: 10.3 G/DL (ref 12.5–16.5)
IMM GRANULOCYTES # BLD AUTO: 0.03 K/UL (ref 0–0.58)
IMM GRANULOCYTES NFR BLD: 0 % (ref 0–5)
LYMPHOCYTES # BLD: 21 % (ref 20–42)
LYMPHOCYTES NFR BLD: 2.24 K/UL (ref 1.5–4)
MAGNESIUM SERPL-MCNC: 1.6 MG/DL (ref 1.6–2.6)
MAGNESIUM SERPL-MCNC: 1.8 MG/DL (ref 1.6–2.6)
MCH RBC QN AUTO: 29 PG (ref 26–35)
MCHC RBC AUTO-ENTMCNC: 31.5 G/DL (ref 32–34.5)
MCV RBC AUTO: 92.1 FL (ref 80–99.9)
METER GLUCOSE: 167 MG/DL (ref 74–99)
METER GLUCOSE: 173 MG/DL (ref 74–99)
METER GLUCOSE: 226 MG/DL (ref 74–99)
METER GLUCOSE: 235 MG/DL (ref 74–99)
METER GLUCOSE: 267 MG/DL (ref 74–99)
MONOCYTES NFR BLD: 0.72 K/UL (ref 0.1–0.95)
MONOCYTES NFR BLD: 7 % (ref 2–12)
NEUTROPHILS NFR BLD: 71 % (ref 43–80)
NEUTS SEG NFR BLD: 7.67 K/UL (ref 1.8–7.3)
PHOSPHATE SERPL-MCNC: 1.8 MG/DL (ref 2.5–4.5)
PHOSPHATE SERPL-MCNC: 2.5 MG/DL (ref 2.5–4.5)
PLATELET # BLD AUTO: 401 K/UL (ref 130–450)
PMV BLD AUTO: 8.7 FL (ref 7–12)
POTASSIUM SERPL-SCNC: 3.7 MMOL/L (ref 3.5–5)
POTASSIUM SERPL-SCNC: 4.3 MMOL/L (ref 3.5–5)
POTASSIUM SERPL-SCNC: 4.3 MMOL/L (ref 3.5–5)
POTASSIUM SERPL-SCNC: 4.5 MMOL/L (ref 3.5–5)
RBC # BLD AUTO: 3.55 M/UL (ref 3.8–5.8)
SODIUM SERPL-SCNC: 133 MMOL/L (ref 132–146)
SODIUM SERPL-SCNC: 135 MMOL/L (ref 132–146)
SODIUM SERPL-SCNC: 141 MMOL/L (ref 132–146)
SODIUM SERPL-SCNC: 143 MMOL/L (ref 132–146)
VALPROATE SERPL-MCNC: 0 UG/ML (ref 50–100)
WBC OTHER # BLD: 10.8 K/UL (ref 4.5–11.5)

## 2023-07-16 PROCEDURE — 6360000002 HC RX W HCPCS

## 2023-07-16 PROCEDURE — S5553 INSULIN LONG ACTING 5 U: HCPCS

## 2023-07-16 PROCEDURE — 87086 URINE CULTURE/COLONY COUNT: CPT

## 2023-07-16 PROCEDURE — 83735 ASSAY OF MAGNESIUM: CPT

## 2023-07-16 PROCEDURE — 80048 BASIC METABOLIC PNL TOTAL CA: CPT

## 2023-07-16 PROCEDURE — 82947 ASSAY GLUCOSE BLOOD QUANT: CPT

## 2023-07-16 PROCEDURE — 2580000003 HC RX 258

## 2023-07-16 PROCEDURE — 6370000000 HC RX 637 (ALT 250 FOR IP)

## 2023-07-16 PROCEDURE — 85027 COMPLETE CBC AUTOMATED: CPT

## 2023-07-16 PROCEDURE — 99222 1ST HOSP IP/OBS MODERATE 55: CPT | Performed by: INTERNAL MEDICINE

## 2023-07-16 PROCEDURE — 84100 ASSAY OF PHOSPHORUS: CPT

## 2023-07-16 PROCEDURE — 2140000000 HC CCU INTERMEDIATE R&B

## 2023-07-16 PROCEDURE — 82330 ASSAY OF CALCIUM: CPT

## 2023-07-16 RX ORDER — SODIUM CHLORIDE 9 MG/ML
INJECTION, SOLUTION INTRAVENOUS CONTINUOUS
Status: ACTIVE | OUTPATIENT
Start: 2023-07-16 | End: 2023-07-16

## 2023-07-16 RX ADMIN — BACLOFEN 10 MG: 10 TABLET ORAL at 14:18

## 2023-07-16 RX ADMIN — ATORVASTATIN CALCIUM 80 MG: 40 TABLET, FILM COATED ORAL at 22:19

## 2023-07-16 RX ADMIN — ONDANSETRON 4 MG: 2 INJECTION INTRAMUSCULAR; INTRAVENOUS at 04:37

## 2023-07-16 RX ADMIN — Medication 5 ML: at 15:36

## 2023-07-16 RX ADMIN — Medication 100 MG: at 13:40

## 2023-07-16 RX ADMIN — LISINOPRIL 10 MG: 10 TABLET ORAL at 13:41

## 2023-07-16 RX ADMIN — SODIUM CHLORIDE: 9 INJECTION, SOLUTION INTRAVENOUS at 13:39

## 2023-07-16 RX ADMIN — ENOXAPARIN SODIUM 40 MG: 100 INJECTION SUBCUTANEOUS at 13:41

## 2023-07-16 RX ADMIN — BACLOFEN 10 MG: 10 TABLET ORAL at 22:19

## 2023-07-16 RX ADMIN — AMLODIPINE BESYLATE 10 MG: 10 TABLET ORAL at 13:40

## 2023-07-16 RX ADMIN — INSULIN GLARGINE-YFGN 15 UNITS: 100 INJECTION, SOLUTION SUBCUTANEOUS at 22:19

## 2023-07-16 RX ADMIN — DULOXETINE HYDROCHLORIDE 30 MG: 30 CAPSULE, DELAYED RELEASE ORAL at 14:18

## 2023-07-16 RX ADMIN — Medication 5 ML: at 22:23

## 2023-07-16 RX ADMIN — ASPIRIN 81 MG: 81 TABLET, COATED ORAL at 13:40

## 2023-07-16 RX ADMIN — POTASSIUM BICARBONATE 40 MEQ: 782 TABLET, EFFERVESCENT ORAL at 13:40

## 2023-07-16 RX ADMIN — EZETIMIBE 10 MG: 10 TABLET ORAL at 22:41

## 2023-07-16 ASSESSMENT — PAIN SCALES - GENERAL
PAINLEVEL_OUTOF10: 6
PAINLEVEL_OUTOF10: 5

## 2023-07-16 ASSESSMENT — PAIN DESCRIPTION - LOCATION: LOCATION: ABDOMEN;CHEST

## 2023-07-16 ASSESSMENT — PAIN DESCRIPTION - DESCRIPTORS: DESCRIPTORS: ACHING

## 2023-07-17 ENCOUNTER — APPOINTMENT (OUTPATIENT)
Dept: CT IMAGING | Age: 53
DRG: 073 | End: 2023-07-17
Payer: COMMERCIAL

## 2023-07-17 VITALS
RESPIRATION RATE: 18 BRPM | SYSTOLIC BLOOD PRESSURE: 126 MMHG | WEIGHT: 132 LBS | BODY MASS INDEX: 21.99 KG/M2 | OXYGEN SATURATION: 99 % | DIASTOLIC BLOOD PRESSURE: 98 MMHG | HEIGHT: 65 IN | HEART RATE: 99 BPM | TEMPERATURE: 98.6 F

## 2023-07-17 PROBLEM — E10.10 TYPE 1 DIABETES MELLITUS WITH KETOACIDOSIS WITHOUT COMA (HCC): Status: RESOLVED | Noted: 2022-11-15 | Resolved: 2023-07-17

## 2023-07-17 LAB
BASOPHILS # BLD: 0.04 K/UL (ref 0–0.2)
BASOPHILS NFR BLD: 1 % (ref 0–2)
EKG ATRIAL RATE: 132 BPM
EKG P AXIS: 82 DEGREES
EKG P-R INTERVAL: 126 MS
EKG Q-T INTERVAL: 302 MS
EKG QRS DURATION: 76 MS
EKG QTC CALCULATION (BAZETT): 447 MS
EKG R AXIS: 88 DEGREES
EKG T AXIS: 32 DEGREES
EKG VENTRICULAR RATE: 132 BPM
EOSINOPHIL # BLD: 0.23 K/UL (ref 0.05–0.5)
EOSINOPHILS RELATIVE PERCENT: 3 % (ref 0–6)
ERYTHROCYTE [DISTWIDTH] IN BLOOD BY AUTOMATED COUNT: 13.3 % (ref 11.5–15)
HCT VFR BLD AUTO: 32.2 % (ref 37–54)
HGB BLD-MCNC: 10.5 G/DL (ref 12.5–16.5)
IMM GRANULOCYTES # BLD AUTO: 0.03 K/UL (ref 0–0.58)
IMM GRANULOCYTES NFR BLD: 0 % (ref 0–5)
LYMPHOCYTES # BLD: 29 % (ref 20–42)
LYMPHOCYTES NFR BLD: 1.97 K/UL (ref 1.5–4)
MCH RBC QN AUTO: 29.2 PG (ref 26–35)
MCHC RBC AUTO-ENTMCNC: 32.6 G/DL (ref 32–34.5)
MCV RBC AUTO: 89.4 FL (ref 80–99.9)
METER GLUCOSE: 191 MG/DL (ref 74–99)
METER GLUCOSE: 197 MG/DL (ref 74–99)
MONOCYTES NFR BLD: 0.51 K/UL (ref 0.1–0.95)
MONOCYTES NFR BLD: 7 % (ref 2–12)
NEUTROPHILS NFR BLD: 59 % (ref 43–80)
NEUTS SEG NFR BLD: 4.07 K/UL (ref 1.8–7.3)
PLATELET # BLD AUTO: 133 K/UL (ref 130–450)
PMV BLD AUTO: 9.8 FL (ref 7–12)
RBC # BLD AUTO: 3.6 M/UL (ref 3.8–5.8)
REASON FOR REJECTION: NORMAL
SPECIMEN SOURCE: NORMAL
WBC OTHER # BLD: 6.9 K/UL (ref 4.5–11.5)
ZZ NTE CLEAN UP: ORDERED TEST: NORMAL

## 2023-07-17 PROCEDURE — 6360000002 HC RX W HCPCS

## 2023-07-17 PROCEDURE — 2580000003 HC RX 258

## 2023-07-17 PROCEDURE — 85027 COMPLETE CBC AUTOMATED: CPT

## 2023-07-17 PROCEDURE — 36415 COLL VENOUS BLD VENIPUNCTURE: CPT

## 2023-07-17 PROCEDURE — 82947 ASSAY GLUCOSE BLOOD QUANT: CPT

## 2023-07-17 PROCEDURE — 6370000000 HC RX 637 (ALT 250 FOR IP): Performed by: STUDENT IN AN ORGANIZED HEALTH CARE EDUCATION/TRAINING PROGRAM

## 2023-07-17 PROCEDURE — 70450 CT HEAD/BRAIN W/O DYE: CPT

## 2023-07-17 PROCEDURE — 99238 HOSP IP/OBS DSCHRG MGMT 30/<: CPT | Performed by: INTERNAL MEDICINE

## 2023-07-17 PROCEDURE — 93010 ELECTROCARDIOGRAM REPORT: CPT | Performed by: INTERNAL MEDICINE

## 2023-07-17 PROCEDURE — 2500000003 HC RX 250 WO HCPCS

## 2023-07-17 PROCEDURE — 6370000000 HC RX 637 (ALT 250 FOR IP)

## 2023-07-17 RX ORDER — DEXTROSE MONOHYDRATE 100 MG/ML
INJECTION, SOLUTION INTRAVENOUS CONTINUOUS PRN
Status: DISCONTINUED | OUTPATIENT
Start: 2023-07-17 | End: 2023-07-17 | Stop reason: HOSPADM

## 2023-07-17 RX ORDER — MAGNESIUM SULFATE IN WATER 40 MG/ML
2000 INJECTION, SOLUTION INTRAVENOUS ONCE
Status: COMPLETED | OUTPATIENT
Start: 2023-07-17 | End: 2023-07-17

## 2023-07-17 RX ORDER — INSULIN LISPRO 100 [IU]/ML
3 INJECTION, SOLUTION INTRAVENOUS; SUBCUTANEOUS
Status: DISCONTINUED | OUTPATIENT
Start: 2023-07-17 | End: 2023-07-17 | Stop reason: HOSPADM

## 2023-07-17 RX ADMIN — Medication 10 ML: at 09:33

## 2023-07-17 RX ADMIN — DULOXETINE HYDROCHLORIDE 30 MG: 30 CAPSULE, DELAYED RELEASE ORAL at 09:16

## 2023-07-17 RX ADMIN — LISINOPRIL 10 MG: 10 TABLET ORAL at 09:16

## 2023-07-17 RX ADMIN — MAGNESIUM SULFATE HEPTAHYDRATE 2000 MG: 40 INJECTION, SOLUTION INTRAVENOUS at 05:03

## 2023-07-17 RX ADMIN — INSULIN LISPRO 3 UNITS: 100 INJECTION, SOLUTION INTRAVENOUS; SUBCUTANEOUS at 09:27

## 2023-07-17 RX ADMIN — ENOXAPARIN SODIUM 40 MG: 100 INJECTION SUBCUTANEOUS at 09:14

## 2023-07-17 RX ADMIN — Medication 100 MG: at 09:16

## 2023-07-17 RX ADMIN — AMLODIPINE BESYLATE 10 MG: 10 TABLET ORAL at 09:15

## 2023-07-17 RX ADMIN — SODIUM PHOSPHATE, MONOBASIC, MONOHYDRATE AND SODIUM PHOSPHATE, DIBASIC, ANHYDROUS 15 MMOL: 276; 142 INJECTION, SOLUTION INTRAVENOUS at 06:25

## 2023-07-17 RX ADMIN — INSULIN LISPRO 3 UNITS: 100 INJECTION, SOLUTION INTRAVENOUS; SUBCUTANEOUS at 13:21

## 2023-07-17 RX ADMIN — ASPIRIN 81 MG: 81 TABLET, COATED ORAL at 09:16

## 2023-07-17 RX ADMIN — BACLOFEN 10 MG: 10 TABLET ORAL at 09:16

## 2023-07-17 ASSESSMENT — PAIN SCALES - GENERAL
PAINLEVEL_OUTOF10: 0
PAINLEVEL_OUTOF10: 0

## 2023-07-17 NOTE — CARE COORDINATION
Discharge order noted. Spoke with patient at bedside for transition of care planning. Patient reports he lives at home with his mother, is mainly in a wheelchair and requires assist with bathing, dressing, patient feeds himself; has a walker at home. Uses [facility] pharmacy in [facility] and PCP is Dr. Ricco Alejandro. Reports being active with [facility] and wants to return home. Called patient's sister, Osbaldo Mcnamara [phone] with patient's permission. She had questions regarding if patient would be appropriate for [facility]; explained in detail and provided options. Patient to return home with home care services and sister, Osbaldo Mcnamara will transport home. She is requesting nurse give her a call when the patient is ready. Call made to [facility] and notified them of patient's discharge home today; will need resumption of care orders. Case Management Assessment  Initial Evaluation    Date/Time of Evaluation: 7/17/2023 3:38 PM  Assessment Completed by: Rosette Escudero    If patient is discharged prior to next notation, then this note serves as note for discharge by case management. Patient Name: Zak Frye                   YOB: 1970  Diagnosis: Palpitations [R00.2]  Hyperglycemia [R73.9]  Nausea and vomiting, unspecified vomiting type [R11.2]                   Date / Time: 7/15/2023  2:38 PM    Patient Admission Status: Inpatient   Readmission Risk (Low < 19, Mod (19-27), High > 27): Readmission Risk Score: 32.9    Current PCP: Griselda Puente MD  PCP verified by CM? Yes    Chart Reviewed: Yes      History Provided by: Patient  Patient Orientation: Alert and Oriented    Patient Cognition: Alert    Hospitalization in the last 30 days (Readmission):  Yes    If yes, Readmission Assessment in  Navigator will be completed.     Advance Directives:      Code Status: Full Code   Patient's Primary Decision Maker is: Legal Next of Kin    Primary Decision Maker: Katelynn Alcides Bray - [phone]    Secondary

## 2023-07-17 NOTE — PROGRESS NOTES
Notified patient of policy to notify family member when a fall occurs. Patient states he did not want his mother called at this time and woken up. States he will call her later this morning to notify her.

## 2023-07-17 NOTE — DISCHARGE INSTRUCTIONS
[facility] Internal Medicine Resident Service    Discharge to:  Home  Diet: diabetic diet  Activity: activity as tolerated   Exercise: As tolerated   Be compliant with medication  - Resume taking your medications at your home dose, please do not skip insulin doses, continue to monitor your blood sugars!     Follow up with: Dr. Ramona Grajeda, please call to make an appointment within 1 week

## 2023-07-17 NOTE — DISCHARGE SUMMARY
[address]  Discharge Summary    PCP: Maritza Rico MD    Admit Date:7/15/2023  Discharge Date: 7/17/2023    Admission Diagnosis:     Palpitations likely 2/2 DKA vs sepsis  DKA likely 2/2 medication noncompliance vs possible infection  Leukocytosis likely 2/2 infection vs DKA   Type 1 DM, uncontrolled - on insulin, Hx of recurrent admissions for DKA (most recent 2 wks ago)  Hypertension, controlled, Initial /99, latest /80   Hyperlipidemia, controlled   Hx of lacunar infarct (1/2023)   Hx of polysubstance abuse   9. Hx of cardiac arrest-PEA in setting of DKA         Discharge Diagnosis:    Palpitations likely 2/2 DKA vs sepsis, resolved  Diabetic ketoacidosis Likely 2/2 noncompliance, resolved  Hypokalemia 2/2 DKA, resolved  Hypocalcemia, resolved  Hypertension, On Amlodipine 10 Mg, Lisinopril 10 MG   Hyperlipidemia, On Atorvastatin 80 mg QD, Ezetimibe 10 mg nightly  Diarrhea 2/2 Antibiotics ( cefepime) use, resolved  Normocytic normochromic Anemia, Hgb 10.5, stable  Spasticity on left wrist, On Baclofen 10 MG QD      Hospital Course: On arrival to the ED, he had temperature of 97.9 F, HR of 122/min and BP of 150/99 mm Hg. His random blood glucose at the time of presentation was 489 mg/dl, BUN of 21 and Cr of 0.6. Total leukocyte count was 12.5. On the ED 10U of IV regular insulin along with NS bolus was given. He was admitted to the IM floor with 15 U lantus and insulin lispro on a sliding scale, along with antiemetics. Home meds were resumed on 1st DOA, was put of lovenox 40 mg. A CT head without contrast was order following a fall from the bed at night, Imaging was non significant. Orthostatic vitals were ordered, which was positive, but patient was noted to be chronically to be orthostatic, likely due to autonomic neuropathy. Today his vitals have stabilized, is eating well has normal I/O.  His last POCT glucose was 197, TC -6.9, Cr-0.4, Na-133,

## 2023-07-17 NOTE — PROGRESS NOTES
4 Eyes Skin Assessment     NAME:  Vanessa Mcnally OF BIRTH:  1970  MEDICAL RECORD NUMBER:  [id number]    The patient is being assessed for  Admission    I agree that at least one RN has performed a thorough Head to Toe Skin Assessment on the patient. ALL assessment sites listed below have been assessed. Areas assessed by both nurses:    Head, Face, Ears, Shoulders, Back, Chest, Arms, Elbows, Hands, Sacrum. Buttock, Coccyx, Ischium, Legs. Feet and Heels, and Under Medical Devices     Buttocks-Blanchable redness  BLE-Abrasions/Scratches         Does the Patient have a Wound?  No noted wound(s)       Roberto Prevention initiated by RN: Yes  Wound Care Orders initiated by RN: No    Pressure Injury (Stage 3,4, Unstageable, DTI, NWPT, and Complex wounds) if present, place Wound referral order by RN under : Yes    New Ostomies, if present place, Ostomy referral order under : No     Nurse 1 eSignature: Electronically signed by Kobe Dodd RN on 7/17/23 at 2:25 AM EDT    **SHARE this note so that the co-signing nurse can place an eSignature**    Nurse 2 eSignature: Electronically signed by Jefferson Frey RN on 7/17/23 at 3:01 AM EDT

## 2023-07-17 NOTE — ED NOTES
Notified Pharmacy about pt's missing medication (zetia). Not in pt's bin.       Patrick Putnam RN  07/16/23 6700

## 2023-07-17 NOTE — FLOWSHEET NOTE
07/17/23 0900   Vital Signs   Blood Pressure Lying 166/108   Pulse Lying 115 PER MINUTE   Blood Pressure Sitting 133/89   Pulse Sitting 122 PER MINUTE   Blood Pressure Standing   (patient unable to stand)

## 2023-07-17 NOTE — PLAN OF CARE
Problem: Chronic Conditions and Co-morbidities  Goal: Patient's chronic conditions and co-morbidity symptoms are monitored and maintained or improved  7/17/2023 1308 by Nathaniel Powers RN  Outcome: Progressing  7/17/2023 0214 by Charlaine Bamberger, RN  Outcome: Progressing     Problem: Discharge Planning  Goal: Discharge to home or other facility with appropriate resources  7/17/2023 1308 by Nathaniel Powers RN  Outcome: Progressing  7/17/2023 0214 by Charlaine Bamberger, RN  Outcome: Progressing     Problem: Safety - Adult  Goal: Free from fall injury  Outcome: Progressing     Problem: Skin/Tissue Integrity  Goal: Absence of new skin breakdown  Description: 1. Monitor for areas of redness and/or skin breakdown  2. Assess vascular access sites hourly  3. Every 4-6 hours minimum:  Change oxygen saturation probe site  4. Every 4-6 hours:  If on nasal continuous positive airway pressure, respiratory therapy assess nares and determine need for appliance change or resting period.   Outcome: Progressing     Problem: ABCDS Injury Assessment  Goal: Absence of physical injury  Outcome: Progressing

## 2023-07-17 NOTE — PLAN OF CARE
Received a perfect serve the patient was found down. I went up to see the patient. Patient lying comfortably in his bed. Patient alert oriented x3. Neurologic exam is not changed from the baseline this morning. When asked the patient how did he fell he mentioned that he slipped from the bed. He mentioned that siderails were not engaged. Patient does have a bump on left side of his head which not aware was present before. I ordered a stat CT scan of head without contrast.  Will follow.

## 2023-07-17 NOTE — PROGRESS NOTES
Patient heard yelling for help. Went to room immediately and found patient on the floor. Patient assisted back into bed. IM resident notified of incident.

## 2023-07-17 NOTE — PROGRESS NOTES
[address]  Internal Medicine Residency / [address]  [address] [place] [address]    Attending Physician Statement  I have discussed the case, including pertinent history and exam findings with the resident and the team.  I have seen and examined the patient and the key elements of the encounter have been performed by me. I agree with the assessment, plan and orders as documented by the resident. A&O this AM  Rolled out of bed with head contusion   CT head negative  BS good control   No DKA  Eating well  Plan: Review meds for discharge home    Remainder of medical problems as per resident note.       Kimberly Nieto MD [address] [facility]  Internal Medicine Residency Faculty

## 2023-07-19 LAB
MICROORGANISM SPEC CULT: ABNORMAL
SPECIMEN DESCRIPTION: ABNORMAL

## 2023-07-20 LAB
MICROORGANISM SPEC CULT: NORMAL
MICROORGANISM SPEC CULT: NORMAL
SERVICE CMNT-IMP: NORMAL
SERVICE CMNT-IMP: NORMAL
SPECIMEN DESCRIPTION: NORMAL
SPECIMEN DESCRIPTION: NORMAL

## 2023-10-09 RX ORDER — DULOXETIN HYDROCHLORIDE 30 MG/1
30 CAPSULE, DELAYED RELEASE ORAL DAILY
Qty: 30 CAPSULE | Refills: 3 | Status: SHIPPED | OUTPATIENT
Start: 2023-10-09

## 2023-10-13 RX ORDER — AMLODIPINE BESYLATE 10 MG/1
10 TABLET ORAL DAILY
Qty: 30 TABLET | Refills: 3 | OUTPATIENT
Start: 2023-10-13

## 2023-10-20 ENCOUNTER — HOSPITAL ENCOUNTER (OUTPATIENT)
Age: 53
Setting detail: OBSERVATION
Discharge: HOME HEALTH | End: 2023-10-22
Attending: EMERGENCY MEDICINE | Admitting: INTERNAL MEDICINE
Payer: MEDICARE

## 2023-10-20 DIAGNOSIS — R62.7 FAILURE TO THRIVE IN ADULT: Primary | ICD-10-CM

## 2023-10-20 DIAGNOSIS — R73.9 HYPERGLYCEMIA: ICD-10-CM

## 2023-10-20 LAB
ALBUMIN SERPL-MCNC: 4.9 G/DL (ref 3.5–5.2)
ALP SERPL-CCNC: 152 U/L (ref 40–129)
ALT SERPL-CCNC: 37 U/L (ref 0–40)
ANION GAP SERPL CALCULATED.3IONS-SCNC: 14 MMOL/L (ref 7–16)
AST SERPL-CCNC: 22 U/L (ref 0–39)
B-OH-BUTYR SERPL-MCNC: 0.32 MMOL/L (ref 0.02–0.27)
BASOPHILS # BLD: 0.05 K/UL (ref 0–0.2)
BASOPHILS NFR BLD: 1 % (ref 0–2)
BILIRUB SERPL-MCNC: 0.5 MG/DL (ref 0–1.2)
BUN SERPL-MCNC: 22 MG/DL (ref 6–20)
CALCIUM SERPL-MCNC: 10 MG/DL (ref 8.6–10.2)
CHLORIDE SERPL-SCNC: 98 MMOL/L (ref 98–107)
CHP ED QC CHECK: NORMAL
CO2 SERPL-SCNC: 25 MMOL/L (ref 22–29)
CREAT SERPL-MCNC: 0.6 MG/DL (ref 0.7–1.2)
EOSINOPHIL # BLD: 0.27 K/UL (ref 0.05–0.5)
EOSINOPHILS RELATIVE PERCENT: 3 % (ref 0–6)
ERYTHROCYTE [DISTWIDTH] IN BLOOD BY AUTOMATED COUNT: 13.9 % (ref 11.5–15)
GFR SERPL CREATININE-BSD FRML MDRD: >60 ML/MIN/1.73M2
GLUCOSE BLD-MCNC: 268 MG/DL
GLUCOSE BLD-MCNC: 268 MG/DL (ref 74–99)
GLUCOSE BLD-MCNC: 344 MG/DL
GLUCOSE BLD-MCNC: 344 MG/DL (ref 74–99)
GLUCOSE BLD-MCNC: 361 MG/DL
GLUCOSE BLD-MCNC: 361 MG/DL (ref 74–99)
GLUCOSE BLD-MCNC: 424 MG/DL (ref 74–99)
GLUCOSE SERPL-MCNC: 358 MG/DL (ref 74–99)
HCT VFR BLD AUTO: 39.9 % (ref 37–54)
HGB BLD-MCNC: 13 G/DL (ref 12.5–16.5)
IMM GRANULOCYTES # BLD AUTO: <0.03 K/UL (ref 0–0.58)
IMM GRANULOCYTES NFR BLD: 0 % (ref 0–5)
LYMPHOCYTES NFR BLD: 1.56 K/UL (ref 1.5–4)
LYMPHOCYTES RELATIVE PERCENT: 17 % (ref 20–42)
MCH RBC QN AUTO: 29.1 PG (ref 26–35)
MCHC RBC AUTO-ENTMCNC: 32.6 G/DL (ref 32–34.5)
MCV RBC AUTO: 89.3 FL (ref 80–99.9)
MONOCYTES NFR BLD: 0.48 K/UL (ref 0.1–0.95)
MONOCYTES NFR BLD: 5 % (ref 2–12)
NEUTROPHILS NFR BLD: 74 % (ref 43–80)
NEUTS SEG NFR BLD: 6.9 K/UL (ref 1.8–7.3)
PLATELET # BLD AUTO: 526 K/UL (ref 130–450)
PMV BLD AUTO: 8.7 FL (ref 7–12)
POTASSIUM SERPL-SCNC: 4.6 MMOL/L (ref 3.5–5)
PROT SERPL-MCNC: 7.7 G/DL (ref 6.4–8.3)
RBC # BLD AUTO: 4.47 M/UL (ref 3.8–5.8)
SODIUM SERPL-SCNC: 137 MMOL/L (ref 132–146)
WBC OTHER # BLD: 9.3 K/UL (ref 4.5–11.5)

## 2023-10-20 PROCEDURE — G0378 HOSPITAL OBSERVATION PER HR: HCPCS

## 2023-10-20 PROCEDURE — 97161 PT EVAL LOW COMPLEX 20 MIN: CPT

## 2023-10-20 PROCEDURE — 96360 HYDRATION IV INFUSION INIT: CPT

## 2023-10-20 PROCEDURE — 82962 GLUCOSE BLOOD TEST: CPT

## 2023-10-20 PROCEDURE — 99285 EMERGENCY DEPT VISIT HI MDM: CPT

## 2023-10-20 PROCEDURE — 6370000000 HC RX 637 (ALT 250 FOR IP)

## 2023-10-20 PROCEDURE — 97165 OT EVAL LOW COMPLEX 30 MIN: CPT

## 2023-10-20 PROCEDURE — 2580000003 HC RX 258: Performed by: EMERGENCY MEDICINE

## 2023-10-20 PROCEDURE — 82010 KETONE BODYS QUAN: CPT

## 2023-10-20 PROCEDURE — 80053 COMPREHEN METABOLIC PANEL: CPT

## 2023-10-20 PROCEDURE — 96361 HYDRATE IV INFUSION ADD-ON: CPT

## 2023-10-20 PROCEDURE — 85025 COMPLETE CBC W/AUTO DIFF WBC: CPT

## 2023-10-20 RX ORDER — ONDANSETRON 2 MG/ML
4 INJECTION INTRAMUSCULAR; INTRAVENOUS ONCE
Status: DISCONTINUED | OUTPATIENT
Start: 2023-10-20 | End: 2023-10-22 | Stop reason: HOSPADM

## 2023-10-20 RX ORDER — INSULIN LISPRO 100 [IU]/ML
0-8 INJECTION, SOLUTION INTRAVENOUS; SUBCUTANEOUS
Status: DISCONTINUED | OUTPATIENT
Start: 2023-10-20 | End: 2023-10-22 | Stop reason: HOSPADM

## 2023-10-20 RX ORDER — LISINOPRIL 10 MG/1
10 TABLET ORAL DAILY
Status: DISCONTINUED | OUTPATIENT
Start: 2023-10-20 | End: 2023-10-20

## 2023-10-20 RX ORDER — 0.9 % SODIUM CHLORIDE 0.9 %
1000 INTRAVENOUS SOLUTION INTRAVENOUS ONCE
Status: COMPLETED | OUTPATIENT
Start: 2023-10-20 | End: 2023-10-20

## 2023-10-20 RX ORDER — AMLODIPINE BESYLATE 10 MG/1
10 TABLET ORAL DAILY
Status: DISCONTINUED | OUTPATIENT
Start: 2023-10-20 | End: 2023-10-22 | Stop reason: HOSPADM

## 2023-10-20 RX ORDER — EZETIMIBE 10 MG/1
10 TABLET ORAL NIGHTLY
Status: DISCONTINUED | OUTPATIENT
Start: 2023-10-20 | End: 2023-10-22 | Stop reason: HOSPADM

## 2023-10-20 RX ORDER — DEXTROSE MONOHYDRATE 100 MG/ML
INJECTION, SOLUTION INTRAVENOUS CONTINUOUS PRN
Status: DISCONTINUED | OUTPATIENT
Start: 2023-10-20 | End: 2023-10-22 | Stop reason: HOSPADM

## 2023-10-20 RX ORDER — INSULIN LISPRO 100 [IU]/ML
5 INJECTION, SOLUTION INTRAVENOUS; SUBCUTANEOUS
Status: DISCONTINUED | OUTPATIENT
Start: 2023-10-20 | End: 2023-10-20

## 2023-10-20 RX ORDER — ACETAMINOPHEN 325 MG/1
650 TABLET ORAL EVERY 6 HOURS PRN
Status: DISCONTINUED | OUTPATIENT
Start: 2023-10-20 | End: 2023-10-22 | Stop reason: HOSPADM

## 2023-10-20 RX ORDER — TRAZODONE HYDROCHLORIDE 50 MG/1
50 TABLET ORAL NIGHTLY PRN
Status: DISCONTINUED | OUTPATIENT
Start: 2023-10-20 | End: 2023-10-22 | Stop reason: HOSPADM

## 2023-10-20 RX ORDER — SODIUM CHLORIDE 9 MG/ML
INJECTION, SOLUTION INTRAVENOUS PRN
Status: DISCONTINUED | OUTPATIENT
Start: 2023-10-20 | End: 2023-10-22 | Stop reason: HOSPADM

## 2023-10-20 RX ORDER — SODIUM CHLORIDE 0.9 % (FLUSH) 0.9 %
5-40 SYRINGE (ML) INJECTION EVERY 12 HOURS SCHEDULED
Status: DISCONTINUED | OUTPATIENT
Start: 2023-10-20 | End: 2023-10-22 | Stop reason: HOSPADM

## 2023-10-20 RX ORDER — SENNA AND DOCUSATE SODIUM 50; 8.6 MG/1; MG/1
2 TABLET, FILM COATED ORAL DAILY
Status: DISCONTINUED | OUTPATIENT
Start: 2023-10-20 | End: 2023-10-22 | Stop reason: HOSPADM

## 2023-10-20 RX ORDER — SODIUM CHLORIDE 0.9 % (FLUSH) 0.9 %
5-40 SYRINGE (ML) INJECTION PRN
Status: DISCONTINUED | OUTPATIENT
Start: 2023-10-20 | End: 2023-10-22 | Stop reason: HOSPADM

## 2023-10-20 RX ORDER — INSULIN GLARGINE 100 [IU]/ML
30 INJECTION, SOLUTION SUBCUTANEOUS NIGHTLY
Status: DISCONTINUED | OUTPATIENT
Start: 2023-10-20 | End: 2023-10-20

## 2023-10-20 RX ORDER — LANOLIN ALCOHOL/MO/W.PET/CERES
100 CREAM (GRAM) TOPICAL DAILY
Status: DISCONTINUED | OUTPATIENT
Start: 2023-10-20 | End: 2023-10-22 | Stop reason: HOSPADM

## 2023-10-20 RX ORDER — ONDANSETRON 2 MG/ML
4 INJECTION INTRAMUSCULAR; INTRAVENOUS EVERY 6 HOURS PRN
Status: DISCONTINUED | OUTPATIENT
Start: 2023-10-20 | End: 2023-10-22 | Stop reason: HOSPADM

## 2023-10-20 RX ORDER — INSULIN LISPRO 100 [IU]/ML
0-4 INJECTION, SOLUTION INTRAVENOUS; SUBCUTANEOUS NIGHTLY
Status: DISCONTINUED | OUTPATIENT
Start: 2023-10-20 | End: 2023-10-22 | Stop reason: HOSPADM

## 2023-10-20 RX ORDER — INSULIN GLARGINE 100 [IU]/ML
12 INJECTION, SOLUTION SUBCUTANEOUS NIGHTLY
Status: DISCONTINUED | OUTPATIENT
Start: 2023-10-20 | End: 2023-10-22 | Stop reason: HOSPADM

## 2023-10-20 RX ORDER — ENOXAPARIN SODIUM 100 MG/ML
40 INJECTION SUBCUTANEOUS DAILY
Status: DISCONTINUED | OUTPATIENT
Start: 2023-10-20 | End: 2023-10-22 | Stop reason: HOSPADM

## 2023-10-20 RX ORDER — ACETAMINOPHEN 650 MG/1
650 SUPPOSITORY RECTAL EVERY 6 HOURS PRN
Status: DISCONTINUED | OUTPATIENT
Start: 2023-10-20 | End: 2023-10-22 | Stop reason: HOSPADM

## 2023-10-20 RX ORDER — DULOXETIN HYDROCHLORIDE 30 MG/1
30 CAPSULE, DELAYED RELEASE ORAL DAILY
Status: DISCONTINUED | OUTPATIENT
Start: 2023-10-20 | End: 2023-10-22 | Stop reason: HOSPADM

## 2023-10-20 RX ORDER — POLYETHYLENE GLYCOL 3350 17 G/17G
17 POWDER, FOR SOLUTION ORAL DAILY PRN
Status: DISCONTINUED | OUTPATIENT
Start: 2023-10-20 | End: 2023-10-22 | Stop reason: HOSPADM

## 2023-10-20 RX ORDER — BACLOFEN 10 MG/1
10 TABLET ORAL 2 TIMES DAILY
Status: DISCONTINUED | OUTPATIENT
Start: 2023-10-20 | End: 2023-10-20

## 2023-10-20 RX ORDER — ASPIRIN 81 MG/1
81 TABLET ORAL DAILY
Status: DISCONTINUED | OUTPATIENT
Start: 2023-10-20 | End: 2023-10-22 | Stop reason: HOSPADM

## 2023-10-20 RX ORDER — ONDANSETRON 4 MG/1
4 TABLET, ORALLY DISINTEGRATING ORAL EVERY 8 HOURS PRN
Status: DISCONTINUED | OUTPATIENT
Start: 2023-10-20 | End: 2023-10-22 | Stop reason: HOSPADM

## 2023-10-20 RX ADMIN — SODIUM CHLORIDE 1000 ML: 9 INJECTION, SOLUTION INTRAVENOUS at 11:57

## 2023-10-20 RX ADMIN — INSULIN GLARGINE 12 UNITS: 100 INJECTION, SOLUTION SUBCUTANEOUS at 23:19

## 2023-10-20 RX ADMIN — INSULIN LISPRO 4 UNITS: 100 INJECTION, SOLUTION INTRAVENOUS; SUBCUTANEOUS at 23:18

## 2023-10-20 ASSESSMENT — PAIN - FUNCTIONAL ASSESSMENT
PAIN_FUNCTIONAL_ASSESSMENT: NONE - DENIES PAIN
PAIN_FUNCTIONAL_ASSESSMENT: NONE - DENIES PAIN

## 2023-10-20 ASSESSMENT — LIFESTYLE VARIABLES
HOW MANY STANDARD DRINKS CONTAINING ALCOHOL DO YOU HAVE ON A TYPICAL DAY: PATIENT DOES NOT DRINK
HOW OFTEN DO YOU HAVE A DRINK CONTAINING ALCOHOL: NEVER

## 2023-10-20 ASSESSMENT — ENCOUNTER SYMPTOMS
CHEST TIGHTNESS: 0
ABDOMINAL DISTENTION: 0
ABDOMINAL PAIN: 0

## 2023-10-20 NOTE — CARE COORDINATION
Social Work /Transition of Care:    Pt presents to the ED secondary to weakness and inability to care for himself at home. Pt has hx of type 1 diabetes and earlier this year had a stroke resulting in left sided weakness. BENJAMÍN met with pt and his mother. Pt is alert and oriented x3, sitting in a wheel chair. Pt and mother live in a 2 story home with a main floor set up. Pt has a valorie walker, wheel chair, and hospital bed at home. Pt's mother is primary caregiver providing assistance with ADLs but it is becoming more difficult for her. Pt's brother, Otis Barrera, assists with lifting pt but does not live in the home. Pt and mother report plan will be for pt to discharge to [facility], and that they have already talked to the , Charley Fowler, at the facility. BENJAMÍN made referral to [facility] with [place]. Pt will need PT/OT evals.

## 2023-10-20 NOTE — H&P
[address] [place] [address]  Internal Medicine Residency Program  History and Physical    Patient:  Shanelle Barrett 47 y.o. male   MRN: [id number]       Date of Service: 10/20/2023          Chief complaint: had concerns including Placement (Had recent stroke in February with left sided deficits. Here for placement to already pre approved nursing home. Dr. Ruma Donnelly told family he could come do H&P for his placement. ). History of Present Illness   The patient is a 47 y.o. male with past medical history of T1DM, HTN, HLD, hx of lacunar infarct, hx of PEA arrest in the setting of DKA, and hx of polysubstance abuse who presented to the ED for placement into a nursing home. ED was unable to secure placement so patient will be admitted until placement is secured. He has no complaints today; he denied dizziness, lightheadedness, chest pain, SOB, abdominal pain, nausea or vomiting, diarrhea or constipation, dysuria. Vitals on arrival to the ED were pertinent for tachycardia (116) and hypertension (144/94). Labs were significant for hyperglycemia (358), erythrocytosis (526), elevated alk phos (152), and elevated serum beta-hydroxybutyrate (0.32). He received 1 L NS bolus. Past Medical History:      Diagnosis Date    Alcoholism ([address])     Cocaine abuse ([address])     Diabetes mellitus ([address])     Hypertension     Idiopathic peripheral neuropathy 09/21/2016    Lacunar stroke (HCC)     Marijuana abuse     S/P transesophageal echocardiogram (NETTA) 06/30/2023    appau       Past Surgical History:    History reviewed. No pertinent surgical history. Medications Prior to Admission:    Prior to Admission medications    Medication Sig Start Date End Date Taking?  Authorizing Provider   DULoxetine (CYMBALTA) 30 MG extended release capsule take 1 capsule by mouth once daily 10/9/23   Tc Collazo MD   insulin glargine (LANTUS) 100 UNIT/ML injection vial Inject 30 Units into the skin nightly 7/1/23   Farhana Bowers MD

## 2023-10-20 NOTE — ED PROVIDER NOTES
Beta-Hydroxybutyrate 0.32(!) [CD]   1918 Comprehensive Metabolic Panel w/ Reflex to MG(!):    Sodium 137   Potassium 4.6   Chloride 98   CO2 25   Anion Gap 14   Glucose, Random 358(!)   BUN,BUNPL 22(!)   Creatinine 0.6(!)   Est, Glom Filt Rate >60   CALCIUM, SERUM, 270192 10.0   Total Protein 7.7   Albumin 4.9   BILIRUBIN TOTAL 0.5   Alk Phos 152(!)   ALT 37   AST 22 [CD]   1918 CBC with Auto Differential(!):    WBC 9.3   RBC 4.47   Hemoglobin Quant 13.0   Hematocrit 39.9   MCV 89.3   MCH 29.1   MCHC 32.6   RDW 13.9   Platelet Count 884(!)   MPV 8.7   Neutrophils % 74   Lymphocyte % 17(!)   Monocytes % 5   Eosinophils % 3   Basophils % 1   Immature Granulocytes 0   Neutrophils Absolute 6.90   Lymphocytes Absolute 1.56   Monocytes Absolute 0.48   Eosinophils Absolute 0.27   Basophils Absolute 0.05   Absolute Immature Granulocyte <0.03 [CD]      ED Course User Index  [CD] Nicol Fermin MD          Medical Decision Making  Failure to thrive, could not get directly placed to ECF today secondary to percert and additional administrative issues. Case mgt recommends medical admission as they continue to arrange placement. He is hyperglycemic but not in DKA. Dr. Salas Castillo accepts for admission    Amount and/or Complexity of Data Reviewed  Labs: ordered. Decision-making details documented in ED Course. Risk  Prescription drug management. Decision regarding hospitalization. CONSULTS:   IP CONSULT TO INTERNAL MEDICINE            PROCEDURES   Unless otherwise noted below, none      CRITICAL CARE TIME (.cct)            I am the Primary Clinician of Record. FINAL IMPRESSION      1. Failure to thrive in adult          DISPOSITION/PLAN     DISPOSITION Admitted 10/20/2023 07:17:37 PM      PATIENT REFERRED TO:  No follow-up provider specified.     DISCHARGE MEDICATIONS:  New Prescriptions    No medications on file       DISCONTINUED MEDICATIONS:  Discontinued Medications    No medications on file

## 2023-10-20 NOTE — CARE COORDINATION
Social Work /Transition of Care:    Pt has been accepted to [facility] and insurance authorization started. N17 and PASRR complete and sent to facility.     SW to wait for insurance approval.

## 2023-10-21 LAB
ANION GAP SERPL CALCULATED.3IONS-SCNC: 12 MMOL/L (ref 7–16)
BASOPHILS # BLD: 0.06 K/UL (ref 0–0.2)
BASOPHILS NFR BLD: 1 % (ref 0–2)
BUN SERPL-MCNC: 18 MG/DL (ref 6–20)
CALCIUM SERPL-MCNC: 9.1 MG/DL (ref 8.6–10.2)
CHLORIDE SERPL-SCNC: 98 MMOL/L (ref 98–107)
CO2 SERPL-SCNC: 22 MMOL/L (ref 22–29)
CREAT SERPL-MCNC: 0.6 MG/DL (ref 0.7–1.2)
EOSINOPHIL # BLD: 0.34 K/UL (ref 0.05–0.5)
EOSINOPHILS RELATIVE PERCENT: 4 % (ref 0–6)
ERYTHROCYTE [DISTWIDTH] IN BLOOD BY AUTOMATED COUNT: 13.7 % (ref 11.5–15)
GFR SERPL CREATININE-BSD FRML MDRD: >60 ML/MIN/1.73M2
GLUCOSE BLD-MCNC: 117 MG/DL (ref 74–99)
GLUCOSE BLD-MCNC: 159 MG/DL (ref 74–99)
GLUCOSE BLD-MCNC: 264 MG/DL (ref 74–99)
GLUCOSE BLD-MCNC: 322 MG/DL (ref 74–99)
GLUCOSE SERPL-MCNC: 201 MG/DL (ref 74–99)
HBA1C MFR BLD: 9.3 % (ref 4–5.6)
HCT VFR BLD AUTO: 36.5 % (ref 37–54)
HGB BLD-MCNC: 12.1 G/DL (ref 12.5–16.5)
IMM GRANULOCYTES # BLD AUTO: <0.03 K/UL (ref 0–0.58)
IMM GRANULOCYTES NFR BLD: 0 % (ref 0–5)
LYMPHOCYTES NFR BLD: 2.51 K/UL (ref 1.5–4)
LYMPHOCYTES RELATIVE PERCENT: 30 % (ref 20–42)
MCH RBC QN AUTO: 29.2 PG (ref 26–35)
MCHC RBC AUTO-ENTMCNC: 33.2 G/DL (ref 32–34.5)
MCV RBC AUTO: 88 FL (ref 80–99.9)
MONOCYTES NFR BLD: 0.69 K/UL (ref 0.1–0.95)
MONOCYTES NFR BLD: 8 % (ref 2–12)
NEUTROPHILS NFR BLD: 57 % (ref 43–80)
NEUTS SEG NFR BLD: 4.71 K/UL (ref 1.8–7.3)
PLATELET # BLD AUTO: 462 K/UL (ref 130–450)
PMV BLD AUTO: 8.5 FL (ref 7–12)
POTASSIUM SERPL-SCNC: 3.7 MMOL/L (ref 3.5–5)
RBC # BLD AUTO: 4.15 M/UL (ref 3.8–5.8)
SODIUM SERPL-SCNC: 132 MMOL/L (ref 132–146)
WBC OTHER # BLD: 8.3 K/UL (ref 4.5–11.5)

## 2023-10-21 PROCEDURE — 6370000000 HC RX 637 (ALT 250 FOR IP)

## 2023-10-21 PROCEDURE — 82962 GLUCOSE BLOOD TEST: CPT

## 2023-10-21 PROCEDURE — 83036 HEMOGLOBIN GLYCOSYLATED A1C: CPT

## 2023-10-21 PROCEDURE — G0378 HOSPITAL OBSERVATION PER HR: HCPCS

## 2023-10-21 PROCEDURE — 99221 1ST HOSP IP/OBS SF/LOW 40: CPT | Performed by: INTERNAL MEDICINE

## 2023-10-21 PROCEDURE — 2580000003 HC RX 258

## 2023-10-21 PROCEDURE — 6360000002 HC RX W HCPCS

## 2023-10-21 PROCEDURE — 96372 THER/PROPH/DIAG INJ SC/IM: CPT

## 2023-10-21 PROCEDURE — 36415 COLL VENOUS BLD VENIPUNCTURE: CPT

## 2023-10-21 PROCEDURE — 85025 COMPLETE CBC W/AUTO DIFF WBC: CPT

## 2023-10-21 PROCEDURE — 80048 BASIC METABOLIC PNL TOTAL CA: CPT

## 2023-10-21 RX ADMIN — INSULIN LISPRO 8 UNITS: 100 INJECTION, SOLUTION INTRAVENOUS; SUBCUTANEOUS at 17:33

## 2023-10-21 RX ADMIN — SODIUM CHLORIDE, PRESERVATIVE FREE 10 ML: 5 INJECTION INTRAVENOUS at 22:31

## 2023-10-21 RX ADMIN — INSULIN GLARGINE 12 UNITS: 100 INJECTION, SOLUTION SUBCUTANEOUS at 22:31

## 2023-10-21 RX ADMIN — Medication 100 MG: at 01:42

## 2023-10-21 RX ADMIN — ENOXAPARIN SODIUM 40 MG: 100 INJECTION SUBCUTANEOUS at 01:42

## 2023-10-21 RX ADMIN — SODIUM CHLORIDE, PRESERVATIVE FREE 10 ML: 5 INJECTION INTRAVENOUS at 10:38

## 2023-10-21 RX ADMIN — SENNOSIDES AND DOCUSATE SODIUM 2 TABLET: 8.6; 5 TABLET ORAL at 01:42

## 2023-10-21 RX ADMIN — ASPIRIN 81 MG: 81 TABLET, COATED ORAL at 01:42

## 2023-10-21 RX ADMIN — INSULIN LISPRO 4 UNITS: 100 INJECTION, SOLUTION INTRAVENOUS; SUBCUTANEOUS at 12:56

## 2023-10-21 RX ADMIN — ASPIRIN 81 MG: 81 TABLET, COATED ORAL at 10:36

## 2023-10-21 RX ADMIN — EZETIMIBE 10 MG: 10 TABLET ORAL at 22:30

## 2023-10-21 RX ADMIN — AMLODIPINE BESYLATE 10 MG: 10 TABLET ORAL at 01:42

## 2023-10-21 RX ADMIN — Medication 100 MG: at 10:38

## 2023-10-21 RX ADMIN — AMLODIPINE BESYLATE 10 MG: 10 TABLET ORAL at 10:35

## 2023-10-21 RX ADMIN — DULOXETINE HYDROCHLORIDE 30 MG: 30 CAPSULE, DELAYED RELEASE ORAL at 01:42

## 2023-10-21 RX ADMIN — SODIUM CHLORIDE, PRESERVATIVE FREE 10 ML: 5 INJECTION INTRAVENOUS at 01:43

## 2023-10-21 RX ADMIN — DULOXETINE HYDROCHLORIDE 30 MG: 30 CAPSULE, DELAYED RELEASE ORAL at 10:36

## 2023-10-21 NOTE — PLAN OF CARE
Patient admitted for failure to thrive. Patient has history of cva and unable to care for self. Patient is being monitored and progressing toward goals. Patient to be discharged to as long term care facility as patient's mother is elderly and unable to care for patient.

## 2023-10-21 NOTE — CARE COORDINATION
Care Coordination  I received a call from Dr Rima Beal asking if the precert has come back yet. Called the liason for [address] [place] [address] await a call back. She called this cm back and the precert is still pending.

## 2023-10-21 NOTE — ED NOTES
Report called to [facility]     Daniel Izaguirre RN  10/21/23 0157

## 2023-10-22 VITALS
SYSTOLIC BLOOD PRESSURE: 160 MMHG | WEIGHT: 145 LBS | BODY MASS INDEX: 23.3 KG/M2 | TEMPERATURE: 98.1 F | HEIGHT: 66 IN | HEART RATE: 98 BPM | OXYGEN SATURATION: 98 % | RESPIRATION RATE: 16 BRPM | DIASTOLIC BLOOD PRESSURE: 109 MMHG

## 2023-10-22 LAB
ANION GAP SERPL CALCULATED.3IONS-SCNC: 14 MMOL/L (ref 7–16)
BASOPHILS # BLD: 0.05 K/UL (ref 0–0.2)
BASOPHILS NFR BLD: 1 % (ref 0–2)
BUN SERPL-MCNC: 20 MG/DL (ref 6–20)
CALCIUM SERPL-MCNC: 9.5 MG/DL (ref 8.6–10.2)
CHLORIDE SERPL-SCNC: 104 MMOL/L (ref 98–107)
CO2 SERPL-SCNC: 23 MMOL/L (ref 22–29)
CREAT SERPL-MCNC: 0.7 MG/DL (ref 0.7–1.2)
EOSINOPHIL # BLD: 0.33 K/UL (ref 0.05–0.5)
EOSINOPHILS RELATIVE PERCENT: 4 % (ref 0–6)
ERYTHROCYTE [DISTWIDTH] IN BLOOD BY AUTOMATED COUNT: 13.7 % (ref 11.5–15)
GFR SERPL CREATININE-BSD FRML MDRD: >60 ML/MIN/1.73M2
GLUCOSE BLD-MCNC: 173 MG/DL (ref 74–99)
GLUCOSE BLD-MCNC: 320 MG/DL (ref 74–99)
GLUCOSE SERPL-MCNC: 151 MG/DL (ref 74–99)
HCT VFR BLD AUTO: 39.7 % (ref 37–54)
HGB BLD-MCNC: 13.1 G/DL (ref 12.5–16.5)
IMM GRANULOCYTES # BLD AUTO: <0.03 K/UL (ref 0–0.58)
IMM GRANULOCYTES NFR BLD: 0 % (ref 0–5)
LYMPHOCYTES NFR BLD: 2.29 K/UL (ref 1.5–4)
LYMPHOCYTES RELATIVE PERCENT: 28 % (ref 20–42)
MCH RBC QN AUTO: 29.2 PG (ref 26–35)
MCHC RBC AUTO-ENTMCNC: 33 G/DL (ref 32–34.5)
MCV RBC AUTO: 88.4 FL (ref 80–99.9)
MONOCYTES NFR BLD: 0.72 K/UL (ref 0.1–0.95)
MONOCYTES NFR BLD: 9 % (ref 2–12)
NEUTROPHILS NFR BLD: 59 % (ref 43–80)
NEUTS SEG NFR BLD: 4.93 K/UL (ref 1.8–7.3)
PLATELET # BLD AUTO: 511 K/UL (ref 130–450)
PMV BLD AUTO: 8.6 FL (ref 7–12)
POTASSIUM SERPL-SCNC: 3.8 MMOL/L (ref 3.5–5)
RBC # BLD AUTO: 4.49 M/UL (ref 3.8–5.8)
SODIUM SERPL-SCNC: 141 MMOL/L (ref 132–146)
WBC OTHER # BLD: 8.3 K/UL (ref 4.5–11.5)

## 2023-10-22 PROCEDURE — 6360000002 HC RX W HCPCS

## 2023-10-22 PROCEDURE — 80048 BASIC METABOLIC PNL TOTAL CA: CPT

## 2023-10-22 PROCEDURE — 82962 GLUCOSE BLOOD TEST: CPT

## 2023-10-22 PROCEDURE — G0378 HOSPITAL OBSERVATION PER HR: HCPCS

## 2023-10-22 PROCEDURE — 99231 SBSQ HOSP IP/OBS SF/LOW 25: CPT | Performed by: INTERNAL MEDICINE

## 2023-10-22 PROCEDURE — 36415 COLL VENOUS BLD VENIPUNCTURE: CPT

## 2023-10-22 PROCEDURE — 96372 THER/PROPH/DIAG INJ SC/IM: CPT

## 2023-10-22 PROCEDURE — 6370000000 HC RX 637 (ALT 250 FOR IP)

## 2023-10-22 PROCEDURE — 2580000003 HC RX 258

## 2023-10-22 PROCEDURE — 85025 COMPLETE CBC W/AUTO DIFF WBC: CPT

## 2023-10-22 RX ADMIN — ENOXAPARIN SODIUM 40 MG: 100 INJECTION SUBCUTANEOUS at 09:36

## 2023-10-22 RX ADMIN — Medication 100 MG: at 09:35

## 2023-10-22 RX ADMIN — INSULIN LISPRO 6 UNITS: 100 INJECTION, SOLUTION INTRAVENOUS; SUBCUTANEOUS at 12:18

## 2023-10-22 RX ADMIN — DULOXETINE HYDROCHLORIDE 30 MG: 30 CAPSULE, DELAYED RELEASE ORAL at 09:37

## 2023-10-22 RX ADMIN — AMLODIPINE BESYLATE 10 MG: 10 TABLET ORAL at 09:35

## 2023-10-22 RX ADMIN — SENNOSIDES AND DOCUSATE SODIUM 2 TABLET: 8.6; 5 TABLET ORAL at 09:35

## 2023-10-22 RX ADMIN — SODIUM CHLORIDE, PRESERVATIVE FREE 10 ML: 5 INJECTION INTRAVENOUS at 09:37

## 2023-10-22 RX ADMIN — ASPIRIN 81 MG: 81 TABLET, COATED ORAL at 09:36

## 2023-10-22 NOTE — DISCHARGE INSTR - COC
secure placement so patient will be admitted until placement is secured. He has no complaints today; he denied dizziness, lightheadedness, chest pain, SOB, abdominal pain, nausea or vomiting, diarrhea or constipation, dysuria. On admission, patient remained hemodynamically stable. He has been alert, awake, oriented following all the commands. Did not had any physical complaint during the hospital stay. During hospital stay, he was eating outside food, blood glucose remained elevated. Rest of the lab work has been unremarkable. Recommendation: Blood glucose measurement for insulin adjustment. Will require hemoglobin A1c after 3 months.   PHYSICIAN SIGNATURE:  Electronically signed by Teagan Wadsworth MD on 10/22/23 at 1:28 PM EDT

## 2023-10-22 NOTE — PLAN OF CARE
Problem: Chronic Conditions and Co-morbidities  Goal: Patient's chronic conditions and co-morbidity symptoms are monitored and maintained or improved  10/22/2023 0032 by Amparo Aguayo RN  Outcome: Progressing  10/21/2023 1545 by Michelle Huff RN  Outcome: Progressing     Problem: Discharge Planning  Goal: Discharge to home or other facility with appropriate resources  10/22/2023 0032 by Amparo Aguayo RN  Outcome: Progressing  10/21/2023 1545 by Michelle Huff RN  Outcome: Progressing     Problem: Skin/Tissue Integrity  Goal: Absence of new skin breakdown  Description: 1. Monitor for areas of redness and/or skin breakdown  2. Assess vascular access sites hourly  3. Every 4-6 hours minimum:  Change oxygen saturation probe site  4. Every 4-6 hours:  If on nasal continuous positive airway pressure, respiratory therapy assess nares and determine need for appliance change or resting period.   10/22/2023 0032 by Amparo Aguayo RN  Outcome: Progressing  10/21/2023 1545 by Michelle Huff RN  Outcome: Progressing     Problem: Safety - Adult  Goal: Free from fall injury  Outcome: Progressing     Problem: Neurosensory - Adult  Goal: Achieves maximal functionality and self care  Outcome: Progressing     Problem: Skin/Tissue Integrity - Adult  Goal: Skin integrity remains intact  Outcome: Progressing     Problem: Metabolic/Fluid and Electrolytes - Adult  Goal: Glucose maintained within prescribed range  Outcome: Progressing

## 2023-10-22 NOTE — CARE COORDINATION
10/22/2023 notified of precert to [address] [place] [address].  set up [facility] for 2 pm today. Floor notified to obtain discharge order, left for pt's mother and facility liaison notified of transport time.   Electronically signed by LUZ ELENA Dominique on 10/22/2023 at 1:15 PM

## 2023-10-23 NOTE — DISCHARGE SUMMARY
[address]  Discharge Summary    PCP: Lasha Gaxiola MD    Admit Date:10/20/2023  Discharge Date: 10/23/2023    Admission Diagnosis:   Hyperglycemia secondary to medication noncompliance  History of type 1 diabetes mellitus, A1c 9.8% uncontrolled. Hypertension on amlodipine  Hyperlipidemia  History of lacunar infarct  History of PEA arrest  Polysubstance abuse history     Discharge Diagnosis:  Hypoglycemia secondary to medication noncompliance. History of type 1 diabetes mellitus, A1c 9.8% uncontrolled. Hypertension on amlodipine  Hyperlipidemia  History of lacunar infarct  History of PEA arrest  Polysubstance abuse history     Hospital Course: The patient is a 47 y.o. male with past medical history of T1DM, HTN, HLD, hx of lacunar infarct, hx of PEA arrest in the setting of DKA, and hx of polysubstance abuse who presented to the ED for placement into a nursing home. ED was unable to secure placement so patient will be admitted until placement is secured. He has no complaints today; he denied dizziness, lightheadedness, chest pain, SOB, abdominal pain, nausea or vomiting, diarrhea or constipation, dysuria. On admission, patient remained hemodynamically stable. He has been alert, awake, oriented following all the commands. Did not had any physical complaint during the hospital stay. During hospital stay, he was eating outside food, blood glucose remained elevated. Rest of the lab work has been unremarkable. Recommendation: Blood glucose measurement for insulin adjustment. Will require hemoglobin A1c after 3 months. Significant findings (history and exam, laboratory, radiological, pathology, other tests):   General Appearance: alert, cooperative, and no distress  HEENT:  Head: Normal, normocephalic, atraumatic.   Lung: clear to auscultation bilaterally  Heart: regular rate and rhythm, S1, S2 normal, no murmur, click, rub or gallop  Abdomen:

## 2023-11-02 RX ORDER — EZETIMIBE 10 MG/1
10 TABLET ORAL NIGHTLY
Qty: 90 TABLET | Refills: 1 | OUTPATIENT
Start: 2023-11-02

## 2023-12-07 RX ORDER — BLOOD-GLUCOSE SENSOR
1 EACH MISCELLANEOUS DAILY
Qty: 6 EACH | Refills: 3 | Status: SHIPPED | OUTPATIENT
Start: 2023-12-07

## 2023-12-11 RX ORDER — CEPHALEXIN 500 MG/1
500 CAPSULE ORAL 3 TIMES DAILY
Qty: 21 CAPSULE | Refills: 0 | Status: SHIPPED | OUTPATIENT
Start: 2023-12-11

## 2024-01-07 ENCOUNTER — APPOINTMENT (OUTPATIENT)
Dept: GENERAL RADIOLOGY | Age: 54
DRG: 871 | End: 2024-01-07
Payer: MEDICARE

## 2024-01-07 ENCOUNTER — HOSPITAL ENCOUNTER (INPATIENT)
Age: 54
LOS: 8 days | Discharge: NURSING, REHAB OR LONG-TERM CARE | DRG: 871 | End: 2024-01-15
Attending: EMERGENCY MEDICINE | Admitting: INTERNAL MEDICINE
Payer: MEDICARE

## 2024-01-07 DIAGNOSIS — N17.9 AKI (ACUTE KIDNEY INJURY) (HCC): Primary | ICD-10-CM

## 2024-01-07 DIAGNOSIS — R73.9 HYPERGLYCEMIA: ICD-10-CM

## 2024-01-07 DIAGNOSIS — E86.0 DEHYDRATION: ICD-10-CM

## 2024-01-07 LAB
ALBUMIN SERPL-MCNC: 4.2 G/DL (ref 3.5–5.2)
ALP SERPL-CCNC: 156 U/L (ref 40–129)
ALT SERPL-CCNC: 81 U/L (ref 0–40)
ANION GAP SERPL CALCULATED.3IONS-SCNC: 13 MMOL/L (ref 7–16)
AST SERPL-CCNC: 47 U/L (ref 0–39)
B PARAP IS1001 DNA NPH QL NAA+NON-PROBE: NOT DETECTED
B PERT DNA SPEC QL NAA+PROBE: NOT DETECTED
B-OH-BUTYR SERPL-MCNC: 1.23 MMOL/L (ref 0.02–0.27)
B.E.: 3 MMOL/L (ref -3–3)
BASOPHILS # BLD: 0 K/UL (ref 0–0.2)
BASOPHILS NFR BLD: 0 % (ref 0–2)
BILIRUB SERPL-MCNC: 1 MG/DL (ref 0–1.2)
BILIRUB UR QL STRIP: NEGATIVE
BUN BLD-MCNC: 33 MG/DL (ref 6–20)
BUN SERPL-MCNC: 33 MG/DL (ref 6–20)
C PNEUM DNA NPH QL NAA+NON-PROBE: NOT DETECTED
CALCIUM SERPL-MCNC: 9.8 MG/DL (ref 8.6–10.2)
CHLORIDE BLD-SCNC: 100 MMOL/L (ref 100–108)
CHLORIDE SERPL-SCNC: 93 MMOL/L (ref 98–107)
CHLORIDE UR-SCNC: <20 MMOL/L
CLARITY UR: ABNORMAL
CO2 BLD CALC-SCNC: 31 MMOL/L (ref 22–29)
CO2 SERPL-SCNC: 31 MMOL/L (ref 22–29)
COHB: 2.5 % (ref 0–1.5)
COLOR UR: YELLOW
CREAT BLD-MCNC: 1.1 MG/DL (ref 0.7–1.2)
CREAT SERPL-MCNC: 1.4 MG/DL (ref 0.7–1.2)
CREAT UR-MCNC: 31.8 MG/DL (ref 40–278)
CRITICAL: ABNORMAL
DATE ANALYZED: ABNORMAL
DATE OF COLLECTION: ABNORMAL
EGFR, POC: >60 ML/MIN/1.73M2
EOSINOPHIL # BLD: 0.21 K/UL (ref 0.05–0.5)
EOSINOPHILS RELATIVE PERCENT: 1 % (ref 0–6)
ERYTHROCYTE [DISTWIDTH] IN BLOOD BY AUTOMATED COUNT: 14.5 % (ref 11.5–15)
FLUAV RNA NPH QL NAA+NON-PROBE: NOT DETECTED
FLUBV RNA NPH QL NAA+NON-PROBE: NOT DETECTED
GFR SERPL CREATININE-BSD FRML MDRD: >60 ML/MIN/1.73M2
GLUCOSE BLD-MCNC: 275 MG/DL (ref 74–99)
GLUCOSE BLD-MCNC: 352 MG/DL (ref 74–99)
GLUCOSE BLD-MCNC: 361 MG/DL (ref 74–99)
GLUCOSE BLD-MCNC: 457 MG/DL (ref 74–99)
GLUCOSE SERPL-MCNC: 266 MG/DL (ref 74–99)
GLUCOSE UR STRIP-MCNC: >=1000 MG/DL
HADV DNA NPH QL NAA+NON-PROBE: NOT DETECTED
HBA1C MFR BLD: 11.4 % (ref 4–5.6)
HCO3: 27.1 MMOL/L (ref 22–26)
HCOV 229E RNA NPH QL NAA+NON-PROBE: NOT DETECTED
HCOV HKU1 RNA NPH QL NAA+NON-PROBE: NOT DETECTED
HCOV NL63 RNA NPH QL NAA+NON-PROBE: NOT DETECTED
HCOV OC43 RNA NPH QL NAA+NON-PROBE: NOT DETECTED
HCT VFR BLD AUTO: 42.2 % (ref 37–54)
HGB BLD-MCNC: 13.6 G/DL (ref 12.5–16.5)
HGB UR QL STRIP.AUTO: ABNORMAL
HHB: 10.6 % (ref 0–5)
HMPV RNA NPH QL NAA+NON-PROBE: NOT DETECTED
HPIV1 RNA NPH QL NAA+NON-PROBE: NOT DETECTED
HPIV2 RNA NPH QL NAA+NON-PROBE: NOT DETECTED
HPIV3 RNA NPH QL NAA+NON-PROBE: NOT DETECTED
HPIV4 RNA NPH QL NAA+NON-PROBE: NOT DETECTED
KETONES UR STRIP-MCNC: >80 MG/DL
LAB: ABNORMAL
LEUKOCYTE ESTERASE UR QL STRIP: ABNORMAL
LYMPHOCYTES NFR BLD: 0.42 K/UL (ref 1.5–4)
LYMPHOCYTES RELATIVE PERCENT: 2 % (ref 20–42)
Lab: 1025
M PNEUMO DNA NPH QL NAA+NON-PROBE: NOT DETECTED
MCH RBC QN AUTO: 28.2 PG (ref 26–35)
MCHC RBC AUTO-ENTMCNC: 32.2 G/DL (ref 32–34.5)
MCV RBC AUTO: 87.4 FL (ref 80–99.9)
METHB: 0 % (ref 0–1.5)
MODE: ABNORMAL
MONOCYTES NFR BLD: 0.64 K/UL (ref 0.1–0.95)
MONOCYTES NFR BLD: 3 % (ref 2–12)
NEUTROPHILS NFR BLD: 95 % (ref 43–80)
NEUTS SEG NFR BLD: 23.13 K/UL (ref 1.8–7.3)
NITRITE UR QL STRIP: POSITIVE
O2 CONTENT: 16.5 ML/DL
O2 SATURATION: 89.1 % (ref 92–98.5)
O2HB: 86.9 % (ref 94–97)
OPERATOR ID: ABNORMAL
PATIENT TEMP: 37 C
PCO2: 39.5 MMHG (ref 35–45)
PH BLOOD GAS: 7.45 (ref 7.35–7.45)
PH UR STRIP: 6 [PH] (ref 5–9)
PLATELET # BLD AUTO: 384 K/UL (ref 130–450)
PMV BLD AUTO: 9 FL (ref 7–12)
PO2: 51.9 MMHG (ref 75–100)
POC ANION GAP: 9 MMOL/L (ref 7–16)
POTASSIUM BLD-SCNC: 4 MMOL/L (ref 3.5–5)
POTASSIUM SERPL-SCNC: 4.2 MMOL/L (ref 3.5–5)
PROT SERPL-MCNC: 7.9 G/DL (ref 6.4–8.3)
PROT UR STRIP-MCNC: ABNORMAL MG/DL
RBC # BLD AUTO: 4.83 M/UL (ref 3.8–5.8)
RBC # BLD: ABNORMAL 10*6/UL
RBC # BLD: ABNORMAL 10*6/UL
RSV RNA NPH QL NAA+NON-PROBE: NOT DETECTED
RV+EV RNA NPH QL NAA+NON-PROBE: NOT DETECTED
SARS-COV-2 RNA NPH QL NAA+NON-PROBE: NOT DETECTED
SODIUM BLD-SCNC: 140 MMOL/L (ref 132–146)
SODIUM SERPL-SCNC: 137 MMOL/L (ref 132–146)
SODIUM UR-SCNC: 36 MMOL/L
SOURCE, BLOOD GAS: ABNORMAL
SP GR UR STRIP: 1.01 (ref 1–1.03)
SPECIMEN DESCRIPTION: NORMAL
THB: 13.5 G/DL (ref 11.5–16.5)
TIME ANALYZED: 1048
UROBILINOGEN UR STRIP-ACNC: 0.2 EU/DL (ref 0–1)
WBC OTHER # BLD: 24.4 K/UL (ref 4.5–11.5)

## 2024-01-07 PROCEDURE — 82010 KETONE BODYS QUAN: CPT

## 2024-01-07 PROCEDURE — 87088 URINE BACTERIA CULTURE: CPT

## 2024-01-07 PROCEDURE — 80053 COMPREHEN METABOLIC PANEL: CPT

## 2024-01-07 PROCEDURE — 82962 GLUCOSE BLOOD TEST: CPT

## 2024-01-07 PROCEDURE — 82436 ASSAY OF URINE CHLORIDE: CPT

## 2024-01-07 PROCEDURE — 1200000000 HC SEMI PRIVATE

## 2024-01-07 PROCEDURE — 87046 STOOL CULTR AEROBIC BACT EA: CPT

## 2024-01-07 PROCEDURE — 87329 GIARDIA AG IA: CPT

## 2024-01-07 PROCEDURE — 6360000002 HC RX W HCPCS

## 2024-01-07 PROCEDURE — 99285 EMERGENCY DEPT VISIT HI MDM: CPT

## 2024-01-07 PROCEDURE — 2580000003 HC RX 258: Performed by: EMERGENCY MEDICINE

## 2024-01-07 PROCEDURE — 0202U NFCT DS 22 TRGT SARS-COV-2: CPT

## 2024-01-07 PROCEDURE — 80051 ELECTROLYTE PANEL: CPT

## 2024-01-07 PROCEDURE — 93005 ELECTROCARDIOGRAM TRACING: CPT

## 2024-01-07 PROCEDURE — 87427 SHIGA-LIKE TOXIN AG IA: CPT

## 2024-01-07 PROCEDURE — 83036 HEMOGLOBIN GLYCOSYLATED A1C: CPT

## 2024-01-07 PROCEDURE — 82805 BLOOD GASES W/O2 SATURATION: CPT

## 2024-01-07 PROCEDURE — 87040 BLOOD CULTURE FOR BACTERIA: CPT

## 2024-01-07 PROCEDURE — 82570 ASSAY OF URINE CREATININE: CPT

## 2024-01-07 PROCEDURE — 96360 HYDRATION IV INFUSION INIT: CPT

## 2024-01-07 PROCEDURE — 85025 COMPLETE CBC W/AUTO DIFF WBC: CPT

## 2024-01-07 PROCEDURE — 87086 URINE CULTURE/COLONY COUNT: CPT

## 2024-01-07 PROCEDURE — 2580000003 HC RX 258

## 2024-01-07 PROCEDURE — 82565 ASSAY OF CREATININE: CPT

## 2024-01-07 PROCEDURE — 82947 ASSAY GLUCOSE BLOOD QUANT: CPT

## 2024-01-07 PROCEDURE — 87045 FECES CULTURE AEROBIC BACT: CPT

## 2024-01-07 PROCEDURE — 6370000000 HC RX 637 (ALT 250 FOR IP)

## 2024-01-07 PROCEDURE — 87324 CLOSTRIDIUM AG IA: CPT

## 2024-01-07 PROCEDURE — 71045 X-RAY EXAM CHEST 1 VIEW: CPT

## 2024-01-07 PROCEDURE — 84300 ASSAY OF URINE SODIUM: CPT

## 2024-01-07 PROCEDURE — 87449 NOS EACH ORGANISM AG IA: CPT

## 2024-01-07 PROCEDURE — G0378 HOSPITAL OBSERVATION PER HR: HCPCS

## 2024-01-07 PROCEDURE — 84520 ASSAY OF UREA NITROGEN: CPT

## 2024-01-07 RX ORDER — INSULIN LISPRO 100 [IU]/ML
0-4 INJECTION, SOLUTION INTRAVENOUS; SUBCUTANEOUS
Status: DISCONTINUED | OUTPATIENT
Start: 2024-01-07 | End: 2024-01-07

## 2024-01-07 RX ORDER — INSULIN LISPRO 100 [IU]/ML
10 INJECTION, SOLUTION INTRAVENOUS; SUBCUTANEOUS
Status: DISCONTINUED | OUTPATIENT
Start: 2024-01-08 | End: 2024-01-09

## 2024-01-07 RX ORDER — ONDANSETRON 4 MG/1
4 TABLET, ORALLY DISINTEGRATING ORAL EVERY 8 HOURS PRN
Status: DISCONTINUED | OUTPATIENT
Start: 2024-01-07 | End: 2024-01-15 | Stop reason: HOSPADM

## 2024-01-07 RX ORDER — GABAPENTIN 100 MG/1
100 CAPSULE ORAL 3 TIMES DAILY PRN
Status: DISCONTINUED | OUTPATIENT
Start: 2024-01-07 | End: 2024-01-15 | Stop reason: HOSPADM

## 2024-01-07 RX ORDER — INSULIN GLARGINE 100 [IU]/ML
30 INJECTION, SOLUTION SUBCUTANEOUS NIGHTLY
Status: DISCONTINUED | OUTPATIENT
Start: 2024-01-07 | End: 2024-01-09

## 2024-01-07 RX ORDER — SODIUM CHLORIDE 0.9 % (FLUSH) 0.9 %
5-40 SYRINGE (ML) INJECTION PRN
Status: DISCONTINUED | OUTPATIENT
Start: 2024-01-07 | End: 2024-01-15 | Stop reason: HOSPADM

## 2024-01-07 RX ORDER — DULOXETIN HYDROCHLORIDE 30 MG/1
30 CAPSULE, DELAYED RELEASE ORAL DAILY
Status: DISCONTINUED | OUTPATIENT
Start: 2024-01-07 | End: 2024-01-15 | Stop reason: HOSPADM

## 2024-01-07 RX ORDER — INSULIN LISPRO 100 [IU]/ML
5 INJECTION, SOLUTION INTRAVENOUS; SUBCUTANEOUS
Status: DISCONTINUED | OUTPATIENT
Start: 2024-01-07 | End: 2024-01-07

## 2024-01-07 RX ORDER — MAGNESIUM SULFATE IN WATER 40 MG/ML
2000 INJECTION, SOLUTION INTRAVENOUS PRN
Status: DISCONTINUED | OUTPATIENT
Start: 2024-01-07 | End: 2024-01-15 | Stop reason: HOSPADM

## 2024-01-07 RX ORDER — SODIUM CHLORIDE 9 MG/ML
INJECTION, SOLUTION INTRAVENOUS PRN
Status: DISCONTINUED | OUTPATIENT
Start: 2024-01-07 | End: 2024-01-15 | Stop reason: HOSPADM

## 2024-01-07 RX ORDER — LANOLIN ALCOHOL/MO/W.PET/CERES
100 CREAM (GRAM) TOPICAL DAILY
Status: DISCONTINUED | OUTPATIENT
Start: 2024-01-07 | End: 2024-01-15 | Stop reason: HOSPADM

## 2024-01-07 RX ORDER — SODIUM CHLORIDE 9 MG/ML
INJECTION, SOLUTION INTRAVENOUS CONTINUOUS
Status: DISCONTINUED | OUTPATIENT
Start: 2024-01-07 | End: 2024-01-08

## 2024-01-07 RX ORDER — ASPIRIN 81 MG/1
81 TABLET ORAL DAILY
Status: DISCONTINUED | OUTPATIENT
Start: 2024-01-07 | End: 2024-01-15 | Stop reason: HOSPADM

## 2024-01-07 RX ORDER — INSULIN GLARGINE 100 [IU]/ML
20 INJECTION, SOLUTION SUBCUTANEOUS NIGHTLY
Status: DISCONTINUED | OUTPATIENT
Start: 2024-01-07 | End: 2024-01-07

## 2024-01-07 RX ORDER — EZETIMIBE 10 MG/1
10 TABLET ORAL NIGHTLY
Status: DISCONTINUED | OUTPATIENT
Start: 2024-01-07 | End: 2024-01-15 | Stop reason: HOSPADM

## 2024-01-07 RX ORDER — ONDANSETRON 2 MG/ML
4 INJECTION INTRAMUSCULAR; INTRAVENOUS EVERY 6 HOURS PRN
Status: DISCONTINUED | OUTPATIENT
Start: 2024-01-07 | End: 2024-01-15 | Stop reason: HOSPADM

## 2024-01-07 RX ORDER — ACETAMINOPHEN 650 MG/1
650 SUPPOSITORY RECTAL EVERY 6 HOURS PRN
Status: DISCONTINUED | OUTPATIENT
Start: 2024-01-07 | End: 2024-01-15 | Stop reason: HOSPADM

## 2024-01-07 RX ORDER — INSULIN LISPRO 100 [IU]/ML
0-4 INJECTION, SOLUTION INTRAVENOUS; SUBCUTANEOUS NIGHTLY
Status: DISCONTINUED | OUTPATIENT
Start: 2024-01-07 | End: 2024-01-07

## 2024-01-07 RX ORDER — ACETAMINOPHEN 325 MG/1
650 TABLET ORAL EVERY 6 HOURS PRN
Status: DISCONTINUED | OUTPATIENT
Start: 2024-01-07 | End: 2024-01-15 | Stop reason: HOSPADM

## 2024-01-07 RX ORDER — ATORVASTATIN CALCIUM 40 MG/1
80 TABLET, FILM COATED ORAL NIGHTLY
Status: DISCONTINUED | OUTPATIENT
Start: 2024-01-07 | End: 2024-01-15 | Stop reason: HOSPADM

## 2024-01-07 RX ORDER — SODIUM CHLORIDE 0.9 % (FLUSH) 0.9 %
5-40 SYRINGE (ML) INJECTION EVERY 12 HOURS SCHEDULED
Status: DISCONTINUED | OUTPATIENT
Start: 2024-01-07 | End: 2024-01-15 | Stop reason: HOSPADM

## 2024-01-07 RX ORDER — INSULIN LISPRO 100 [IU]/ML
0-4 INJECTION, SOLUTION INTRAVENOUS; SUBCUTANEOUS NIGHTLY
Status: DISCONTINUED | OUTPATIENT
Start: 2024-01-07 | End: 2024-01-08

## 2024-01-07 RX ORDER — INSULIN LISPRO 100 [IU]/ML
0-8 INJECTION, SOLUTION INTRAVENOUS; SUBCUTANEOUS
Status: DISCONTINUED | OUTPATIENT
Start: 2024-01-07 | End: 2024-01-08

## 2024-01-07 RX ORDER — 0.9 % SODIUM CHLORIDE 0.9 %
1000 INTRAVENOUS SOLUTION INTRAVENOUS ONCE
Status: COMPLETED | OUTPATIENT
Start: 2024-01-07 | End: 2024-01-07

## 2024-01-07 RX ORDER — POLYETHYLENE GLYCOL 3350 17 G/17G
17 POWDER, FOR SOLUTION ORAL DAILY PRN
Status: DISCONTINUED | OUTPATIENT
Start: 2024-01-07 | End: 2024-01-15 | Stop reason: HOSPADM

## 2024-01-07 RX ORDER — ENOXAPARIN SODIUM 100 MG/ML
40 INJECTION SUBCUTANEOUS DAILY
Status: DISCONTINUED | OUTPATIENT
Start: 2024-01-07 | End: 2024-01-15 | Stop reason: HOSPADM

## 2024-01-07 RX ADMIN — INSULIN LISPRO 4 UNITS: 100 INJECTION, SOLUTION INTRAVENOUS; SUBCUTANEOUS at 21:46

## 2024-01-07 RX ADMIN — SODIUM CHLORIDE 1000 ML: 9 INJECTION, SOLUTION INTRAVENOUS at 10:33

## 2024-01-07 RX ADMIN — INSULIN LISPRO 4 UNITS: 100 INJECTION, SOLUTION INTRAVENOUS; SUBCUTANEOUS at 17:02

## 2024-01-07 RX ADMIN — INSULIN LISPRO 5 UNITS: 100 INJECTION, SOLUTION INTRAVENOUS; SUBCUTANEOUS at 17:02

## 2024-01-07 RX ADMIN — ENOXAPARIN SODIUM 40 MG: 100 INJECTION SUBCUTANEOUS at 17:10

## 2024-01-07 RX ADMIN — INSULIN GLARGINE 30 UNITS: 100 INJECTION, SOLUTION SUBCUTANEOUS at 21:45

## 2024-01-07 RX ADMIN — ATORVASTATIN CALCIUM 80 MG: 40 TABLET, FILM COATED ORAL at 20:59

## 2024-01-07 RX ADMIN — ONDANSETRON 4 MG: 2 INJECTION INTRAMUSCULAR; INTRAVENOUS at 16:27

## 2024-01-07 RX ADMIN — EZETIMIBE 10 MG: 10 TABLET ORAL at 21:38

## 2024-01-07 RX ADMIN — Medication 10 ML: at 23:29

## 2024-01-07 RX ADMIN — ONDANSETRON 4 MG: 4 TABLET, ORALLY DISINTEGRATING ORAL at 21:45

## 2024-01-07 RX ADMIN — SODIUM CHLORIDE: 9 INJECTION, SOLUTION INTRAVENOUS at 12:04

## 2024-01-07 ASSESSMENT — PAIN - FUNCTIONAL ASSESSMENT: PAIN_FUNCTIONAL_ASSESSMENT: NONE - DENIES PAIN

## 2024-01-07 NOTE — ED PROVIDER NOTES
EXAM--------------------------------------    Constitutional/General: Alert and oriented x3  Head: Normocephalic and atraumatic  Eyes: PERRL, EOMI, sclera non icteric  Mouth: Oropharynx clear, handling secretions, no trismus, no asymmetry of the posterior oropharynx or uvular edema  Neck: Supple, full ROM, no stridor, no meningeal signs  Respiratory: Lungs clear to auscultation bilaterally, no wheezes, rales, or rhonchi. Not in respiratory distress  Cardiovascular:  Regular tachycardia. Regular rhythm.  2+ distal pulses. Equal extremity pulses.   Chest: No chest wall tenderness  GI:  Abdomen Soft, Non tender, Non distended. No rebound, guarding, or rigidity. No pulsatile masses.  Musculoskeletal: Moves all extremities x 4. Warm and well perfused, no clubbing, cyanosis, or edema. Capillary refill <3 seconds  Integument: skin warm and dry. No rashes.   Neurologic: GCS 15, no focal deficits, symmetric strength 5/5 in the upper and lower extremities bilaterally  Psychiatric: Normal Affect          -------------------------------------------------- RESULTS -------------------------------------------------  I have personally reviewed all laboratory and imaging results for this patient. Results are listed below.     LABS: (Lab results interpreted by me)  Results for orders placed or performed during the hospital encounter of 01/07/24   CBC with Auto Differential   Result Value Ref Range    WBC 24.4 (H) 4.5 - 11.5 k/uL    RBC 4.83 3.80 - 5.80 m/uL    Hemoglobin 13.6 12.5 - 16.5 g/dL    Hematocrit 42.2 37.0 - 54.0 %    MCV 87.4 80.0 - 99.9 fL    MCH 28.2 26.0 - 35.0 pg    MCHC 32.2 32.0 - 34.5 g/dL    RDW 14.5 11.5 - 15.0 %    Platelets 384 130 - 450 k/uL    MPV 9.0 7.0 - 12.0 fL    Neutrophils % 95 (H) 43.0 - 80.0 %    Lymphocytes % 2 (L) 20.0 - 42.0 %    Monocytes % 3 2.0 - 12.0 %    Eosinophils % 1 0 - 6 %    Basophils % 0 0.0 - 2.0 %    Neutrophils Absolute 23.13 (H) 1.80 - 7.30 k/uL    Lymphocytes Absolute 0.42 (L) 1.50

## 2024-01-07 NOTE — H&P
[facility]  Internal Medicine Residency Program  History and Physical    Patient:  Magan Funes 53 y.o. male   MRN: [id number]       Date of Service: 1/7/2024          Chief complaint: had concerns including Hyperglycemia (Patient states his BGL has been elevated , hx DKA before  ).    History of Present Illness   The patient is a 53 y.o. male , presented with PMH T1DM with h/o DKA, HTN, HLD, hx of lacunar infarct, hx of PEA arrest in 2020 that presents to the ED for diarrhea and vomiting. He vomiting started last night and he's had 4 episodes of vomiting since. Patient states that he had a fever of 103 and took tylenol that reduced his fever. He also has had watery diarrhea for the past 3 weeks and has about 1-2 bowel movements per day. He denies any bloody bowel or urine. He also mentions that he's had a cough with no sputum production and runny nose for the past couple of days. The patient denies any sick contacts at home or recent travel. He is not able eat anything but has been able to drunk some water. Patient states he takes his insulin as prescribed and that his glucose reading have been high.     In the ED the was tachycardic , /78,RR 18. Labs were significant for BUN 33, Creatinine 1.4 (baseline 0.6), glucose 266, , ALT 81, AST 47, WBC 24.4. ABG was significant for pH 7.454, pCO2 39.5, pO2 51.9, HCO3 27.1 which is likely venous.     He received 1,000 mL 0.9% NaCl, as well as 0.9 Nacl continuous infusion at 100 mL/hr.       Past Medical History:      Diagnosis Date    Alcoholism (HCC)     Cocaine abuse (HCC)     Diabetes mellitus (HCC)     Hypertension     Idiopathic peripheral neuropathy 09/21/2016    Lacunar stroke (HCC)     Marijuana abuse     S/P transesophageal echocardiogram (NETTA) 06/30/2023    appau       Past Surgical History:    No past surgical history on file.    Medications Prior to Admission:    Prior to Admission medications    Medication Sig

## 2024-01-08 LAB
ANION GAP SERPL CALCULATED.3IONS-SCNC: 22 MMOL/L (ref 7–16)
BASOPHILS # BLD: 0 K/UL (ref 0–0.2)
BASOPHILS NFR BLD: 0 % (ref 0–2)
BUN SERPL-MCNC: 32 MG/DL (ref 6–20)
CALCIUM SERPL-MCNC: 9.2 MG/DL (ref 8.6–10.2)
CHLORIDE SERPL-SCNC: 98 MMOL/L (ref 98–107)
CO2 SERPL-SCNC: 22 MMOL/L (ref 22–29)
CREAT SERPL-MCNC: 1 MG/DL (ref 0.7–1.2)
CRP SERPL HS-MCNC: 215 MG/L (ref 0–5)
EKG ATRIAL RATE: 121 BPM
EKG P AXIS: 65 DEGREES
EKG P-R INTERVAL: 140 MS
EKG Q-T INTERVAL: 284 MS
EKG QRS DURATION: 82 MS
EKG QTC CALCULATION (BAZETT): 403 MS
EKG R AXIS: 37 DEGREES
EKG T AXIS: 98 DEGREES
EKG VENTRICULAR RATE: 121 BPM
EOSINOPHIL # BLD: 0 K/UL (ref 0.05–0.5)
EOSINOPHILS RELATIVE PERCENT: 0 % (ref 0–6)
ERYTHROCYTE [DISTWIDTH] IN BLOOD BY AUTOMATED COUNT: 14.8 % (ref 11.5–15)
ERYTHROCYTE [SEDIMENTATION RATE] IN BLOOD BY WESTERGREN METHOD: 36 MM/HR (ref 0–15)
G LAMBLIA AG STL QL IA: NEGATIVE
GFR SERPL CREATININE-BSD FRML MDRD: >60 ML/MIN/1.73M2
GLUCOSE BLD-MCNC: 147 MG/DL (ref 74–99)
GLUCOSE BLD-MCNC: 166 MG/DL (ref 74–99)
GLUCOSE BLD-MCNC: 190 MG/DL (ref 74–99)
GLUCOSE BLD-MCNC: 220 MG/DL (ref 74–99)
GLUCOSE BLD-MCNC: 252 MG/DL (ref 74–99)
GLUCOSE BLD-MCNC: 260 MG/DL (ref 74–99)
GLUCOSE BLD-MCNC: 343 MG/DL (ref 74–99)
GLUCOSE SERPL-MCNC: 343 MG/DL (ref 74–99)
HCT VFR BLD AUTO: 36.2 % (ref 37–54)
HGB BLD-MCNC: 11.8 G/DL (ref 12.5–16.5)
LYMPHOCYTES NFR BLD: 1.19 K/UL (ref 1.5–4)
LYMPHOCYTES RELATIVE PERCENT: 5 % (ref 20–42)
MAGNESIUM SERPL-MCNC: 2.1 MG/DL (ref 1.6–2.6)
MCH RBC QN AUTO: 28.6 PG (ref 26–35)
MCHC RBC AUTO-ENTMCNC: 32.6 G/DL (ref 32–34.5)
MCV RBC AUTO: 87.9 FL (ref 80–99.9)
MONOCYTES NFR BLD: 0.99 K/UL (ref 0.1–0.95)
MONOCYTES NFR BLD: 4 % (ref 2–12)
NEUTROPHILS NFR BLD: 90 % (ref 43–80)
NEUTS SEG NFR BLD: 20.62 K/UL (ref 1.8–7.3)
PHOSPHATE SERPL-MCNC: 3.2 MG/DL (ref 2.5–4.5)
PLATELET # BLD AUTO: 423 K/UL (ref 130–450)
PMV BLD AUTO: 9.3 FL (ref 7–12)
POTASSIUM SERPL-SCNC: 4 MMOL/L (ref 3.5–5)
PROCALCITONIN SERPL-MCNC: 0.97 NG/ML (ref 0–0.08)
RBC # BLD AUTO: 4.12 M/UL (ref 3.8–5.8)
RBC # BLD: ABNORMAL 10*6/UL
RBC # BLD: ABNORMAL 10*6/UL
SODIUM SERPL-SCNC: 142 MMOL/L (ref 132–146)
SOURCE: NORMAL
SPECIMEN DESCRIPTION: NORMAL
WBC OTHER # BLD: 22.8 K/UL (ref 4.5–11.5)

## 2024-01-08 PROCEDURE — G0378 HOSPITAL OBSERVATION PER HR: HCPCS

## 2024-01-08 PROCEDURE — 84145 PROCALCITONIN (PCT): CPT

## 2024-01-08 PROCEDURE — 6360000002 HC RX W HCPCS

## 2024-01-08 PROCEDURE — 82962 GLUCOSE BLOOD TEST: CPT

## 2024-01-08 PROCEDURE — 93010 ELECTROCARDIOGRAM REPORT: CPT | Performed by: INTERNAL MEDICINE

## 2024-01-08 PROCEDURE — 2580000003 HC RX 258

## 2024-01-08 PROCEDURE — 6370000000 HC RX 637 (ALT 250 FOR IP)

## 2024-01-08 PROCEDURE — 83735 ASSAY OF MAGNESIUM: CPT

## 2024-01-08 PROCEDURE — 85652 RBC SED RATE AUTOMATED: CPT

## 2024-01-08 PROCEDURE — 80048 BASIC METABOLIC PNL TOTAL CA: CPT

## 2024-01-08 PROCEDURE — 1200000000 HC SEMI PRIVATE

## 2024-01-08 PROCEDURE — 84100 ASSAY OF PHOSPHORUS: CPT

## 2024-01-08 PROCEDURE — 86140 C-REACTIVE PROTEIN: CPT

## 2024-01-08 PROCEDURE — 85025 COMPLETE CBC W/AUTO DIFF WBC: CPT

## 2024-01-08 RX ORDER — 0.9 % SODIUM CHLORIDE 0.9 %
1000 INTRAVENOUS SOLUTION INTRAVENOUS ONCE
Status: COMPLETED | OUTPATIENT
Start: 2024-01-08 | End: 2024-01-08

## 2024-01-08 RX ORDER — DOCUSATE SODIUM 100 MG/1
100 CAPSULE, LIQUID FILLED ORAL DAILY
COMMUNITY

## 2024-01-08 RX ORDER — INSULIN GLARGINE 100 [IU]/ML
20 INJECTION, SOLUTION SUBCUTANEOUS NIGHTLY
Status: ON HOLD | COMMUNITY
End: 2024-01-15 | Stop reason: HOSPADM

## 2024-01-08 RX ORDER — AMLODIPINE BESYLATE 10 MG/1
10 TABLET ORAL DAILY
Status: DISCONTINUED | OUTPATIENT
Start: 2024-01-08 | End: 2024-01-15 | Stop reason: HOSPADM

## 2024-01-08 RX ORDER — INSULIN LISPRO 100 [IU]/ML
10 INJECTION, SOLUTION INTRAVENOUS; SUBCUTANEOUS
Status: ON HOLD | COMMUNITY
End: 2024-01-15 | Stop reason: HOSPADM

## 2024-01-08 RX ORDER — INSULIN LISPRO 100 [IU]/ML
0-4 INJECTION, SOLUTION INTRAVENOUS; SUBCUTANEOUS NIGHTLY
Status: DISCONTINUED | OUTPATIENT
Start: 2024-01-08 | End: 2024-01-08 | Stop reason: ALTCHOICE

## 2024-01-08 RX ORDER — DOXYCYCLINE HYCLATE 100 MG/1
100 CAPSULE ORAL EVERY 12 HOURS SCHEDULED
Status: DISCONTINUED | OUTPATIENT
Start: 2024-01-08 | End: 2024-01-10

## 2024-01-08 RX ORDER — INSULIN LISPRO 100 [IU]/ML
0-16 INJECTION, SOLUTION INTRAVENOUS; SUBCUTANEOUS
Status: DISCONTINUED | OUTPATIENT
Start: 2024-01-08 | End: 2024-01-09

## 2024-01-08 RX ORDER — BACLOFEN 10 MG/1
10 TABLET ORAL 3 TIMES DAILY
COMMUNITY

## 2024-01-08 RX ADMIN — ATORVASTATIN CALCIUM 80 MG: 40 TABLET, FILM COATED ORAL at 21:09

## 2024-01-08 RX ADMIN — WATER 1000 MG: 1 INJECTION INTRAMUSCULAR; INTRAVENOUS; SUBCUTANEOUS at 12:15

## 2024-01-08 RX ADMIN — INSULIN GLARGINE 15 UNITS: 100 INJECTION, SOLUTION SUBCUTANEOUS at 21:09

## 2024-01-08 RX ADMIN — GABAPENTIN 100 MG: 100 CAPSULE ORAL at 08:35

## 2024-01-08 RX ADMIN — ENOXAPARIN SODIUM 40 MG: 100 INJECTION SUBCUTANEOUS at 10:37

## 2024-01-08 RX ADMIN — Medication 10 ML: at 21:14

## 2024-01-08 RX ADMIN — INSULIN LISPRO 10 UNITS: 100 INJECTION, SOLUTION INTRAVENOUS; SUBCUTANEOUS at 08:35

## 2024-01-08 RX ADMIN — ASPIRIN 81 MG: 81 TABLET, COATED ORAL at 08:35

## 2024-01-08 RX ADMIN — SODIUM CHLORIDE 1000 ML: 9 INJECTION, SOLUTION INTRAVENOUS at 09:00

## 2024-01-08 RX ADMIN — INSULIN LISPRO 10 UNITS: 100 INJECTION, SOLUTION INTRAVENOUS; SUBCUTANEOUS at 12:12

## 2024-01-08 RX ADMIN — DOXYCYCLINE HYCLATE 100 MG: 100 CAPSULE ORAL at 12:12

## 2024-01-08 RX ADMIN — DOXYCYCLINE HYCLATE 100 MG: 100 CAPSULE ORAL at 21:09

## 2024-01-08 RX ADMIN — AMLODIPINE BESYLATE 10 MG: 10 TABLET ORAL at 08:35

## 2024-01-08 RX ADMIN — INSULIN LISPRO 8 UNITS: 100 INJECTION, SOLUTION INTRAVENOUS; SUBCUTANEOUS at 13:38

## 2024-01-08 NOTE — ED NOTES
Patient states they're not hungry at this time, gave 10 units of insulin, will re-check in one hour.

## 2024-01-09 PROBLEM — E10.65 POORLY CONTROLLED TYPE 1 DIABETES MELLITUS (HCC): Status: ACTIVE | Noted: 2024-01-09

## 2024-01-09 PROBLEM — E44.0 MODERATE PROTEIN-CALORIE MALNUTRITION (HCC): Status: ACTIVE | Noted: 2020-01-08

## 2024-01-09 PROBLEM — A08.4 GASTROENTERITIS AND COLITIS, VIRAL: Status: ACTIVE | Noted: 2024-01-09

## 2024-01-09 LAB
ALBUMIN SERPL-MCNC: 3.4 G/DL (ref 3.5–5.2)
ALP SERPL-CCNC: 113 U/L (ref 40–129)
ALT SERPL-CCNC: 45 U/L (ref 0–40)
ANION GAP SERPL CALCULATED.3IONS-SCNC: 14 MMOL/L (ref 7–16)
ANION GAP SERPL CALCULATED.3IONS-SCNC: 16 MMOL/L (ref 7–16)
AST SERPL-CCNC: 20 U/L (ref 0–39)
BASOPHILS # BLD: 0.04 K/UL (ref 0–0.2)
BASOPHILS NFR BLD: 0 % (ref 0–2)
BILIRUB SERPL-MCNC: 0.7 MG/DL (ref 0–1.2)
BUN SERPL-MCNC: 17 MG/DL (ref 6–20)
BUN SERPL-MCNC: 21 MG/DL (ref 6–20)
CALCIUM SERPL-MCNC: 8.3 MG/DL (ref 8.6–10.2)
CALCIUM SERPL-MCNC: 8.6 MG/DL (ref 8.6–10.2)
CHLORIDE SERPL-SCNC: 101 MMOL/L (ref 98–107)
CHLORIDE SERPL-SCNC: 96 MMOL/L (ref 98–107)
CO2 SERPL-SCNC: 24 MMOL/L (ref 22–29)
CO2 SERPL-SCNC: 25 MMOL/L (ref 22–29)
CREAT SERPL-MCNC: 0.6 MG/DL (ref 0.7–1.2)
CREAT SERPL-MCNC: 0.7 MG/DL (ref 0.7–1.2)
EOSINOPHIL # BLD: 0.02 K/UL (ref 0.05–0.5)
EOSINOPHILS RELATIVE PERCENT: 0 % (ref 0–6)
ERYTHROCYTE [DISTWIDTH] IN BLOOD BY AUTOMATED COUNT: 15 % (ref 11.5–15)
GFR SERPL CREATININE-BSD FRML MDRD: >60 ML/MIN/1.73M2
GFR SERPL CREATININE-BSD FRML MDRD: >60 ML/MIN/1.73M2
GLUCOSE BLD-MCNC: 196 MG/DL (ref 74–99)
GLUCOSE BLD-MCNC: 204 MG/DL (ref 74–99)
GLUCOSE BLD-MCNC: 204 MG/DL (ref 74–99)
GLUCOSE BLD-MCNC: 239 MG/DL (ref 74–99)
GLUCOSE SERPL-MCNC: 196 MG/DL (ref 74–99)
GLUCOSE SERPL-MCNC: 196 MG/DL (ref 74–99)
HCT VFR BLD AUTO: 34.5 % (ref 37–54)
HGB BLD-MCNC: 11.2 G/DL (ref 12.5–16.5)
IMM GRANULOCYTES # BLD AUTO: 0.11 K/UL (ref 0–0.58)
IMM GRANULOCYTES NFR BLD: 1 % (ref 0–5)
LYMPHOCYTES NFR BLD: 1.44 K/UL (ref 1.5–4)
LYMPHOCYTES RELATIVE PERCENT: 7 % (ref 20–42)
MAGNESIUM SERPL-MCNC: 1.8 MG/DL (ref 1.6–2.6)
MAGNESIUM SERPL-MCNC: 1.8 MG/DL (ref 1.6–2.6)
MCH RBC QN AUTO: 28.9 PG (ref 26–35)
MCHC RBC AUTO-ENTMCNC: 32.5 G/DL (ref 32–34.5)
MCV RBC AUTO: 88.9 FL (ref 80–99.9)
MICROORGANISM SPEC CULT: NORMAL
MICROORGANISM SPEC CULT: NORMAL
MONOCYTES NFR BLD: 1.59 K/UL (ref 0.1–0.95)
MONOCYTES NFR BLD: 8 % (ref 2–12)
NEUTROPHILS NFR BLD: 84 % (ref 43–80)
NEUTS SEG NFR BLD: 16.69 K/UL (ref 1.8–7.3)
PHOSPHATE SERPL-MCNC: 1.7 MG/DL (ref 2.5–4.5)
PHOSPHATE SERPL-MCNC: 2.5 MG/DL (ref 2.5–4.5)
PLATELET # BLD AUTO: 409 K/UL (ref 130–450)
PMV BLD AUTO: 9.5 FL (ref 7–12)
POTASSIUM SERPL-SCNC: 3.5 MMOL/L (ref 3.5–5)
POTASSIUM SERPL-SCNC: 3.7 MMOL/L (ref 3.5–5)
PROT SERPL-MCNC: 6.3 G/DL (ref 6.4–8.3)
RBC # BLD AUTO: 3.88 M/UL (ref 3.8–5.8)
RBC # BLD: ABNORMAL 10*6/UL
SODIUM SERPL-SCNC: 134 MMOL/L (ref 132–146)
SODIUM SERPL-SCNC: 142 MMOL/L (ref 132–146)
SPECIMEN DESCRIPTION: NORMAL
WBC OTHER # BLD: 19.9 K/UL (ref 4.5–11.5)

## 2024-01-09 PROCEDURE — 2580000003 HC RX 258

## 2024-01-09 PROCEDURE — 99222 1ST HOSP IP/OBS MODERATE 55: CPT | Performed by: INTERNAL MEDICINE

## 2024-01-09 PROCEDURE — 82962 GLUCOSE BLOOD TEST: CPT

## 2024-01-09 PROCEDURE — 6360000002 HC RX W HCPCS

## 2024-01-09 PROCEDURE — 2500000003 HC RX 250 WO HCPCS

## 2024-01-09 PROCEDURE — 6370000000 HC RX 637 (ALT 250 FOR IP)

## 2024-01-09 PROCEDURE — 99231 SBSQ HOSP IP/OBS SF/LOW 25: CPT | Performed by: INTERNAL MEDICINE

## 2024-01-09 PROCEDURE — 1200000000 HC SEMI PRIVATE

## 2024-01-09 PROCEDURE — 85025 COMPLETE CBC W/AUTO DIFF WBC: CPT

## 2024-01-09 PROCEDURE — 36415 COLL VENOUS BLD VENIPUNCTURE: CPT

## 2024-01-09 PROCEDURE — 83735 ASSAY OF MAGNESIUM: CPT

## 2024-01-09 PROCEDURE — 84100 ASSAY OF PHOSPHORUS: CPT

## 2024-01-09 PROCEDURE — 80053 COMPREHEN METABOLIC PANEL: CPT

## 2024-01-09 PROCEDURE — 6370000000 HC RX 637 (ALT 250 FOR IP): Performed by: INTERNAL MEDICINE

## 2024-01-09 PROCEDURE — 80048 BASIC METABOLIC PNL TOTAL CA: CPT

## 2024-01-09 RX ORDER — MAGNESIUM SULFATE IN WATER 40 MG/ML
2000 INJECTION, SOLUTION INTRAVENOUS ONCE
Status: COMPLETED | OUTPATIENT
Start: 2024-01-09 | End: 2024-01-09

## 2024-01-09 RX ORDER — LISINOPRIL 10 MG/1
10 TABLET ORAL DAILY
Status: DISCONTINUED | OUTPATIENT
Start: 2024-01-09 | End: 2024-01-15 | Stop reason: HOSPADM

## 2024-01-09 RX ORDER — INSULIN GLARGINE 100 [IU]/ML
18 INJECTION, SOLUTION SUBCUTANEOUS NIGHTLY
Status: DISCONTINUED | OUTPATIENT
Start: 2024-01-09 | End: 2024-01-10

## 2024-01-09 RX ORDER — INSULIN LISPRO 100 [IU]/ML
0-12 INJECTION, SOLUTION INTRAVENOUS; SUBCUTANEOUS
Status: DISCONTINUED | OUTPATIENT
Start: 2024-01-09 | End: 2024-01-11

## 2024-01-09 RX ORDER — INSULIN LISPRO 100 [IU]/ML
6 INJECTION, SOLUTION INTRAVENOUS; SUBCUTANEOUS
Status: DISCONTINUED | OUTPATIENT
Start: 2024-01-10 | End: 2024-01-10

## 2024-01-09 RX ADMIN — ENOXAPARIN SODIUM 40 MG: 100 INJECTION SUBCUTANEOUS at 10:20

## 2024-01-09 RX ADMIN — POTASSIUM PHOSPHATE, MONOBASIC AND POTASSIUM PHOSPHATE, DIBASIC 15 MMOL: 224; 236 INJECTION, SOLUTION, CONCENTRATE INTRAVENOUS at 20:32

## 2024-01-09 RX ADMIN — DOXYCYCLINE HYCLATE 100 MG: 100 CAPSULE ORAL at 20:40

## 2024-01-09 RX ADMIN — Medication 10 ML: at 20:40

## 2024-01-09 RX ADMIN — LISINOPRIL 10 MG: 10 TABLET ORAL at 10:20

## 2024-01-09 RX ADMIN — WATER 1000 MG: 1 INJECTION INTRAMUSCULAR; INTRAVENOUS; SUBCUTANEOUS at 10:20

## 2024-01-09 RX ADMIN — MAGNESIUM SULFATE HEPTAHYDRATE 2000 MG: 40 INJECTION, SOLUTION INTRAVENOUS at 20:37

## 2024-01-09 RX ADMIN — Medication 100 MG: at 10:22

## 2024-01-09 RX ADMIN — INSULIN GLARGINE 18 UNITS: 100 INJECTION, SOLUTION SUBCUTANEOUS at 20:41

## 2024-01-09 RX ADMIN — ONDANSETRON 4 MG: 2 INJECTION INTRAMUSCULAR; INTRAVENOUS at 10:20

## 2024-01-09 RX ADMIN — EZETIMIBE 10 MG: 10 TABLET ORAL at 20:40

## 2024-01-09 RX ADMIN — POTASSIUM PHOSPHATE, MONOBASIC AND POTASSIUM PHOSPHATE, DIBASIC 20 MMOL: 224; 236 INJECTION, SOLUTION, CONCENTRATE INTRAVENOUS at 12:08

## 2024-01-09 RX ADMIN — DULOXETINE HYDROCHLORIDE 30 MG: 30 CAPSULE, DELAYED RELEASE ORAL at 10:22

## 2024-01-09 RX ADMIN — SODIUM CHLORIDE: 9 INJECTION, SOLUTION INTRAVENOUS at 20:33

## 2024-01-09 RX ADMIN — ASPIRIN 81 MG: 81 TABLET, COATED ORAL at 10:20

## 2024-01-09 RX ADMIN — INSULIN LISPRO 4 UNITS: 100 INJECTION, SOLUTION INTRAVENOUS; SUBCUTANEOUS at 20:41

## 2024-01-09 RX ADMIN — ATORVASTATIN CALCIUM 80 MG: 40 TABLET, FILM COATED ORAL at 20:40

## 2024-01-09 RX ADMIN — AMLODIPINE BESYLATE 10 MG: 10 TABLET ORAL at 10:20

## 2024-01-09 RX ADMIN — DOXYCYCLINE HYCLATE 100 MG: 100 CAPSULE ORAL at 10:21

## 2024-01-09 NOTE — CONSULTS
10/21/2023 05:00 AM    LABA1C 9.2 06/25/2023 05:01 AM     Lab Results   Component Value Date/Time    TSH 0.642 06/25/2023 05:01 AM    T4FREE 0.97 07/19/2012 02:00 AM     Lab Results   Component Value Date/Time    LABA1C 11.4 01/07/2024 05:13 PM    GLUCOSE 196 01/09/2024 04:19 AM    GLUCOSE 83 04/11/2012 03:35 AM    MALBCR 189.8 01/23/2023 11:07 PM    LABCREA 31.8 01/07/2024 05:27 PM     Lab Results   Component Value Date/Time    TRIG 48 06/25/2023 05:01 AM    HDL 44 06/25/2023 05:01 AM    LDLCALC 70 06/25/2023 05:01 AM    CHOL 124 06/25/2023 05:01 AM       Blood culture   Lab Results   Component Value Date/Time    BC 5 Days no growth 06/27/2023 07:59 AM    BC  06/25/2023 01:26 AM     This organism is only isolated from one set.  Susceptibility testing is not routinely performed.  Additional testing can be ordered by calling the  Microbiology Department.  Additional blood cultures may be obtained if  clinical suspicion of septicemia is high.         Radiology:  XR CHEST PORTABLE   Final Result   No acute process.             Medical Records/Labs/Images review:   I personally reviewed and summarized previous records   All labs and imaging were reviewed independently     ASSESSMENT & PLAN   Magan Funes, a 53 y.o.-old male seen today for inpatient diabetes management     Diabetes Mellitus type 1, admitted for DKA  Patient's diabetes is uncontrolled  We recommend the following diabetes regimen  Lantus 18 units daily in the morning daily   Humalog 6u with meals   Medium dose sliding scale ACHS  Will titrate insulin dose based on the blood glucose trend & insulin requirement  Will arrange for patient to be seen in endocrinology clinic upon discharge for routine diabetes maintenance and prevention.    Nausea and vomiting  Symptomatic management per primary service.    Hyperlipidemia  Lipitor 80 mg daily    Dietary noncompliance  Discussed with patient the importance of eating consistent carbohydrate meals, avoiding high

## 2024-01-09 NOTE — ACP (ADVANCE CARE PLANNING)
Advance Care Planning   Healthcare Decision Maker:    Primary Decision Maker: Rain Funes - Parent - [phone]    Secondary Decision Maker: Mat Funes - Brother/Sister - [phone]    Click here to complete Healthcare Decision Makers including selection of the Healthcare Decision Maker Relationship (ie \"Primary\").

## 2024-01-10 LAB
ALBUMIN SERPL-MCNC: 3.4 G/DL (ref 3.5–5.2)
ALP SERPL-CCNC: 122 U/L (ref 40–129)
ALT SERPL-CCNC: 36 U/L (ref 0–40)
ANION GAP SERPL CALCULATED.3IONS-SCNC: 14 MMOL/L (ref 7–16)
AST SERPL-CCNC: 18 U/L (ref 0–39)
BASOPHILS # BLD: 0.03 K/UL (ref 0–0.2)
BASOPHILS NFR BLD: 0 % (ref 0–2)
BILIRUB SERPL-MCNC: 0.8 MG/DL (ref 0–1.2)
BILIRUB UR QL STRIP: ABNORMAL
BUN SERPL-MCNC: 16 MG/DL (ref 6–20)
CALCIUM SERPL-MCNC: 8.4 MG/DL (ref 8.6–10.2)
CHLORIDE SERPL-SCNC: 98 MMOL/L (ref 98–107)
CLARITY UR: CLEAR
CO2 SERPL-SCNC: 25 MMOL/L (ref 22–29)
COLOR UR: YELLOW
CREAT SERPL-MCNC: 0.6 MG/DL (ref 0.7–1.2)
EOSINOPHIL # BLD: 0.05 K/UL (ref 0.05–0.5)
EOSINOPHILS RELATIVE PERCENT: 0 % (ref 0–6)
ERYTHROCYTE [DISTWIDTH] IN BLOOD BY AUTOMATED COUNT: 14.2 % (ref 11.5–15)
GFR SERPL CREATININE-BSD FRML MDRD: >60 ML/MIN/1.73M2
GLUCOSE BLD-MCNC: 278 MG/DL (ref 74–99)
GLUCOSE BLD-MCNC: 280 MG/DL (ref 74–99)
GLUCOSE BLD-MCNC: 290 MG/DL (ref 74–99)
GLUCOSE BLD-MCNC: 319 MG/DL (ref 74–99)
GLUCOSE SERPL-MCNC: 263 MG/DL (ref 74–99)
GLUCOSE UR STRIP-MCNC: >=1000 MG/DL
HCT VFR BLD AUTO: 33.1 % (ref 37–54)
HGB BLD-MCNC: 10.9 G/DL (ref 12.5–16.5)
HGB UR QL STRIP.AUTO: ABNORMAL
IMM GRANULOCYTES # BLD AUTO: 0.1 K/UL (ref 0–0.58)
IMM GRANULOCYTES NFR BLD: 1 % (ref 0–5)
KETONES UR STRIP-MCNC: >80 MG/DL
LEUKOCYTE ESTERASE UR QL STRIP: NEGATIVE
LYMPHOCYTES NFR BLD: 1.57 K/UL (ref 1.5–4)
LYMPHOCYTES RELATIVE PERCENT: 12 % (ref 20–42)
MAGNESIUM SERPL-MCNC: 2.1 MG/DL (ref 1.6–2.6)
MCH RBC QN AUTO: 28.5 PG (ref 26–35)
MCHC RBC AUTO-ENTMCNC: 32.9 G/DL (ref 32–34.5)
MCV RBC AUTO: 86.4 FL (ref 80–99.9)
MONOCYTES NFR BLD: 1.17 K/UL (ref 0.1–0.95)
MONOCYTES NFR BLD: 9 % (ref 2–12)
NEUTROPHILS NFR BLD: 79 % (ref 43–80)
NEUTS SEG NFR BLD: 10.66 K/UL (ref 1.8–7.3)
NITRITE UR QL STRIP: NEGATIVE
PH UR STRIP: 6 [PH] (ref 5–9)
PHOSPHATE SERPL-MCNC: 2.3 MG/DL (ref 2.5–4.5)
PLATELET # BLD AUTO: 425 K/UL (ref 130–450)
PMV BLD AUTO: 9 FL (ref 7–12)
POTASSIUM SERPL-SCNC: 3.7 MMOL/L (ref 3.5–5)
PROCALCITONIN SERPL-MCNC: 0.29 NG/ML (ref 0–0.08)
PROT SERPL-MCNC: 6 G/DL (ref 6.4–8.3)
PROT UR STRIP-MCNC: ABNORMAL MG/DL
RBC # BLD AUTO: 3.83 M/UL (ref 3.8–5.8)
RBC #/AREA URNS HPF: ABNORMAL /HPF
SODIUM SERPL-SCNC: 137 MMOL/L (ref 132–146)
SP GR UR STRIP: 1.01 (ref 1–1.03)
UROBILINOGEN UR STRIP-ACNC: 0.2 EU/DL (ref 0–1)
WBC #/AREA URNS HPF: ABNORMAL /HPF
WBC OTHER # BLD: 13.6 K/UL (ref 4.5–11.5)

## 2024-01-10 PROCEDURE — 97166 OT EVAL MOD COMPLEX 45 MIN: CPT

## 2024-01-10 PROCEDURE — 6370000000 HC RX 637 (ALT 250 FOR IP)

## 2024-01-10 PROCEDURE — 6360000002 HC RX W HCPCS

## 2024-01-10 PROCEDURE — 84145 PROCALCITONIN (PCT): CPT

## 2024-01-10 PROCEDURE — 97535 SELF CARE MNGMENT TRAINING: CPT

## 2024-01-10 PROCEDURE — 6370000000 HC RX 637 (ALT 250 FOR IP): Performed by: INTERNAL MEDICINE

## 2024-01-10 PROCEDURE — 2580000003 HC RX 258

## 2024-01-10 PROCEDURE — 2500000003 HC RX 250 WO HCPCS

## 2024-01-10 PROCEDURE — 82962 GLUCOSE BLOOD TEST: CPT

## 2024-01-10 PROCEDURE — 85025 COMPLETE CBC W/AUTO DIFF WBC: CPT

## 2024-01-10 PROCEDURE — 83735 ASSAY OF MAGNESIUM: CPT

## 2024-01-10 PROCEDURE — 80053 COMPREHEN METABOLIC PANEL: CPT

## 2024-01-10 PROCEDURE — 1200000000 HC SEMI PRIVATE

## 2024-01-10 PROCEDURE — 99232 SBSQ HOSP IP/OBS MODERATE 35: CPT | Performed by: INTERNAL MEDICINE

## 2024-01-10 PROCEDURE — 84100 ASSAY OF PHOSPHORUS: CPT

## 2024-01-10 PROCEDURE — 81001 URINALYSIS AUTO W/SCOPE: CPT

## 2024-01-10 PROCEDURE — 99231 SBSQ HOSP IP/OBS SF/LOW 25: CPT | Performed by: INTERNAL MEDICINE

## 2024-01-10 PROCEDURE — 36415 COLL VENOUS BLD VENIPUNCTURE: CPT

## 2024-01-10 RX ORDER — INSULIN LISPRO 100 [IU]/ML
8 INJECTION, SOLUTION INTRAVENOUS; SUBCUTANEOUS
Status: DISCONTINUED | OUTPATIENT
Start: 2024-01-10 | End: 2024-01-12

## 2024-01-10 RX ORDER — INSULIN GLARGINE 100 [IU]/ML
25 INJECTION, SOLUTION SUBCUTANEOUS NIGHTLY
Status: DISCONTINUED | OUTPATIENT
Start: 2024-01-10 | End: 2024-01-11

## 2024-01-10 RX ADMIN — INSULIN LISPRO 8 UNITS: 100 INJECTION, SOLUTION INTRAVENOUS; SUBCUTANEOUS at 18:04

## 2024-01-10 RX ADMIN — Medication 10 ML: at 21:17

## 2024-01-10 RX ADMIN — WATER 1000 MG: 1 INJECTION INTRAMUSCULAR; INTRAVENOUS; SUBCUTANEOUS at 15:00

## 2024-01-10 RX ADMIN — DULOXETINE HYDROCHLORIDE 30 MG: 30 CAPSULE, DELAYED RELEASE ORAL at 08:47

## 2024-01-10 RX ADMIN — DOXYCYCLINE HYCLATE 100 MG: 100 CAPSULE ORAL at 08:47

## 2024-01-10 RX ADMIN — INSULIN LISPRO 6 UNITS: 100 INJECTION, SOLUTION INTRAVENOUS; SUBCUTANEOUS at 08:48

## 2024-01-10 RX ADMIN — INSULIN LISPRO 6 UNITS: 100 INJECTION, SOLUTION INTRAVENOUS; SUBCUTANEOUS at 18:03

## 2024-01-10 RX ADMIN — Medication 100 MG: at 08:46

## 2024-01-10 RX ADMIN — INSULIN GLARGINE 25 UNITS: 100 INJECTION, SOLUTION SUBCUTANEOUS at 21:15

## 2024-01-10 RX ADMIN — INSULIN LISPRO 6 UNITS: 100 INJECTION, SOLUTION INTRAVENOUS; SUBCUTANEOUS at 08:47

## 2024-01-10 RX ADMIN — INSULIN LISPRO 6 UNITS: 100 INJECTION, SOLUTION INTRAVENOUS; SUBCUTANEOUS at 13:11

## 2024-01-10 RX ADMIN — ENOXAPARIN SODIUM 40 MG: 100 INJECTION SUBCUTANEOUS at 08:49

## 2024-01-10 RX ADMIN — ASPIRIN 81 MG: 81 TABLET, COATED ORAL at 08:47

## 2024-01-10 RX ADMIN — EZETIMIBE 10 MG: 10 TABLET ORAL at 21:15

## 2024-01-10 RX ADMIN — AMLODIPINE BESYLATE 10 MG: 10 TABLET ORAL at 08:47

## 2024-01-10 RX ADMIN — LISINOPRIL 10 MG: 10 TABLET ORAL at 08:47

## 2024-01-10 RX ADMIN — POTASSIUM PHOSPHATE, MONOBASIC AND POTASSIUM PHOSPHATE, DIBASIC 15 MMOL: 224; 236 INJECTION, SOLUTION, CONCENTRATE INTRAVENOUS at 09:42

## 2024-01-10 RX ADMIN — Medication 10 ML: at 08:49

## 2024-01-10 RX ADMIN — ATORVASTATIN CALCIUM 80 MG: 40 TABLET, FILM COATED ORAL at 21:15

## 2024-01-10 RX ADMIN — INSULIN LISPRO 8 UNITS: 100 INJECTION, SOLUTION INTRAVENOUS; SUBCUTANEOUS at 21:16

## 2024-01-10 NOTE — FLOWSHEET NOTE
01/10/24 1342   Vital Signs   Blood Pressure Lying 114/90   Pulse Lying 110 PER MINUTE   Blood Pressure Sitting 153/92   Pulse Sitting 123 PER MINUTE   Blood Pressure Standing 139/82   Pulse Standing 133 PER MINUTE

## 2024-01-11 LAB
ALBUMIN SERPL-MCNC: 3.4 G/DL (ref 3.5–5.2)
ALP SERPL-CCNC: 122 U/L (ref 40–129)
ALT SERPL-CCNC: 29 U/L (ref 0–40)
ANION GAP SERPL CALCULATED.3IONS-SCNC: 10 MMOL/L (ref 7–16)
ANION GAP SERPL CALCULATED.3IONS-SCNC: 11 MMOL/L (ref 7–16)
ANION GAP SERPL CALCULATED.3IONS-SCNC: 12 MMOL/L (ref 7–16)
AST SERPL-CCNC: 15 U/L (ref 0–39)
BASOPHILS # BLD: 0.04 K/UL (ref 0–0.2)
BASOPHILS NFR BLD: 0 % (ref 0–2)
BILIRUB SERPL-MCNC: 0.8 MG/DL (ref 0–1.2)
BUN SERPL-MCNC: 12 MG/DL (ref 6–20)
BUN SERPL-MCNC: 13 MG/DL (ref 6–20)
BUN SERPL-MCNC: 14 MG/DL (ref 6–20)
CALCIUM SERPL-MCNC: 8.4 MG/DL (ref 8.6–10.2)
CALCIUM SERPL-MCNC: 8.7 MG/DL (ref 8.6–10.2)
CALCIUM SERPL-MCNC: 8.7 MG/DL (ref 8.6–10.2)
CHLORIDE SERPL-SCNC: 98 MMOL/L (ref 98–107)
CO2 SERPL-SCNC: 26 MMOL/L (ref 22–29)
CO2 SERPL-SCNC: 26 MMOL/L (ref 22–29)
CO2 SERPL-SCNC: 27 MMOL/L (ref 22–29)
CREAT SERPL-MCNC: 0.6 MG/DL (ref 0.7–1.2)
CREAT SERPL-MCNC: 0.7 MG/DL (ref 0.7–1.2)
CREAT SERPL-MCNC: 0.7 MG/DL (ref 0.7–1.2)
EOSINOPHIL # BLD: 0.18 K/UL (ref 0.05–0.5)
EOSINOPHILS RELATIVE PERCENT: 1 % (ref 0–6)
ERYTHROCYTE [DISTWIDTH] IN BLOOD BY AUTOMATED COUNT: 13.8 % (ref 11.5–15)
GFR SERPL CREATININE-BSD FRML MDRD: >60 ML/MIN/1.73M2
GLUCOSE BLD-MCNC: 113 MG/DL (ref 74–99)
GLUCOSE BLD-MCNC: 170 MG/DL (ref 74–99)
GLUCOSE BLD-MCNC: 177 MG/DL (ref 74–99)
GLUCOSE BLD-MCNC: 192 MG/DL (ref 74–99)
GLUCOSE SERPL-MCNC: 139 MG/DL (ref 74–99)
GLUCOSE SERPL-MCNC: 158 MG/DL (ref 74–99)
GLUCOSE SERPL-MCNC: 200 MG/DL (ref 74–99)
HCT VFR BLD AUTO: 34.1 % (ref 37–54)
HGB BLD-MCNC: 11.2 G/DL (ref 12.5–16.5)
IMM GRANULOCYTES # BLD AUTO: 0.12 K/UL (ref 0–0.58)
IMM GRANULOCYTES NFR BLD: 1 % (ref 0–5)
LYMPHOCYTES NFR BLD: 1.79 K/UL (ref 1.5–4)
LYMPHOCYTES RELATIVE PERCENT: 13 % (ref 20–42)
MAGNESIUM SERPL-MCNC: 1.9 MG/DL (ref 1.6–2.6)
MAGNESIUM SERPL-MCNC: 2 MG/DL (ref 1.6–2.6)
MCH RBC QN AUTO: 28.1 PG (ref 26–35)
MCHC RBC AUTO-ENTMCNC: 32.8 G/DL (ref 32–34.5)
MCV RBC AUTO: 85.5 FL (ref 80–99.9)
MONOCYTES NFR BLD: 1.34 K/UL (ref 0.1–0.95)
MONOCYTES NFR BLD: 10 % (ref 2–12)
NEUTROPHILS NFR BLD: 75 % (ref 43–80)
NEUTS SEG NFR BLD: 10.62 K/UL (ref 1.8–7.3)
PHOSPHATE SERPL-MCNC: 2.4 MG/DL (ref 2.5–4.5)
PHOSPHATE SERPL-MCNC: 2.7 MG/DL (ref 2.5–4.5)
PLATELET # BLD AUTO: 437 K/UL (ref 130–450)
PMV BLD AUTO: 9.2 FL (ref 7–12)
POTASSIUM SERPL-SCNC: 3.4 MMOL/L (ref 3.5–5)
POTASSIUM SERPL-SCNC: 3.5 MMOL/L (ref 3.5–5)
POTASSIUM SERPL-SCNC: 3.9 MMOL/L (ref 3.5–5)
PROT SERPL-MCNC: 5.8 G/DL (ref 6.4–8.3)
RBC # BLD AUTO: 3.99 M/UL (ref 3.8–5.8)
SODIUM SERPL-SCNC: 135 MMOL/L (ref 132–146)
SODIUM SERPL-SCNC: 135 MMOL/L (ref 132–146)
SODIUM SERPL-SCNC: 136 MMOL/L (ref 132–146)
WBC OTHER # BLD: 14.1 K/UL (ref 4.5–11.5)

## 2024-01-11 PROCEDURE — 6370000000 HC RX 637 (ALT 250 FOR IP)

## 2024-01-11 PROCEDURE — 97530 THERAPEUTIC ACTIVITIES: CPT

## 2024-01-11 PROCEDURE — 80048 BASIC METABOLIC PNL TOTAL CA: CPT

## 2024-01-11 PROCEDURE — 6360000002 HC RX W HCPCS

## 2024-01-11 PROCEDURE — 2580000003 HC RX 258

## 2024-01-11 PROCEDURE — 2500000003 HC RX 250 WO HCPCS

## 2024-01-11 PROCEDURE — 82962 GLUCOSE BLOOD TEST: CPT

## 2024-01-11 PROCEDURE — 36415 COLL VENOUS BLD VENIPUNCTURE: CPT

## 2024-01-11 PROCEDURE — 84100 ASSAY OF PHOSPHORUS: CPT

## 2024-01-11 PROCEDURE — 83735 ASSAY OF MAGNESIUM: CPT

## 2024-01-11 PROCEDURE — 6370000000 HC RX 637 (ALT 250 FOR IP): Performed by: INTERNAL MEDICINE

## 2024-01-11 PROCEDURE — 80053 COMPREHEN METABOLIC PANEL: CPT

## 2024-01-11 PROCEDURE — 97161 PT EVAL LOW COMPLEX 20 MIN: CPT

## 2024-01-11 PROCEDURE — 99231 SBSQ HOSP IP/OBS SF/LOW 25: CPT | Performed by: INTERNAL MEDICINE

## 2024-01-11 PROCEDURE — 1200000000 HC SEMI PRIVATE

## 2024-01-11 PROCEDURE — 85025 COMPLETE CBC W/AUTO DIFF WBC: CPT

## 2024-01-11 RX ORDER — INSULIN GLARGINE 100 [IU]/ML
18 INJECTION, SOLUTION SUBCUTANEOUS NIGHTLY
Status: DISCONTINUED | OUTPATIENT
Start: 2024-01-11 | End: 2024-01-12

## 2024-01-11 RX ORDER — POTASSIUM CHLORIDE 20 MEQ/1
40 TABLET, EXTENDED RELEASE ORAL ONCE
Status: COMPLETED | OUTPATIENT
Start: 2024-01-11 | End: 2024-01-11

## 2024-01-11 RX ORDER — POTASSIUM CHLORIDE 20 MEQ/1
20 TABLET, EXTENDED RELEASE ORAL ONCE
Status: DISCONTINUED | OUTPATIENT
Start: 2024-01-11 | End: 2024-01-11

## 2024-01-11 RX ORDER — DEXTROSE MONOHYDRATE 100 MG/ML
INJECTION, SOLUTION INTRAVENOUS CONTINUOUS PRN
Status: DISCONTINUED | OUTPATIENT
Start: 2024-01-11 | End: 2024-01-15 | Stop reason: HOSPADM

## 2024-01-11 RX ORDER — INSULIN LISPRO 100 [IU]/ML
0-12 INJECTION, SOLUTION INTRAVENOUS; SUBCUTANEOUS
Status: DISCONTINUED | OUTPATIENT
Start: 2024-01-11 | End: 2024-01-12

## 2024-01-11 RX ORDER — POTASSIUM CHLORIDE 20 MEQ/1
20 TABLET, EXTENDED RELEASE ORAL ONCE
Status: COMPLETED | OUTPATIENT
Start: 2024-01-11 | End: 2024-01-11

## 2024-01-11 RX ADMIN — WATER 1000 MG: 1 INJECTION INTRAMUSCULAR; INTRAVENOUS; SUBCUTANEOUS at 11:51

## 2024-01-11 RX ADMIN — POTASSIUM CHLORIDE 40 MEQ: 20 TABLET, EXTENDED RELEASE ORAL at 06:54

## 2024-01-11 RX ADMIN — POTASSIUM CHLORIDE 20 MEQ: 20 TABLET, EXTENDED RELEASE ORAL at 06:54

## 2024-01-11 RX ADMIN — INSULIN LISPRO 8 UNITS: 100 INJECTION, SOLUTION INTRAVENOUS; SUBCUTANEOUS at 08:20

## 2024-01-11 RX ADMIN — INSULIN LISPRO 8 UNITS: 100 INJECTION, SOLUTION INTRAVENOUS; SUBCUTANEOUS at 17:23

## 2024-01-11 RX ADMIN — INSULIN GLARGINE 18 UNITS: 100 INJECTION, SOLUTION SUBCUTANEOUS at 20:30

## 2024-01-11 RX ADMIN — Medication 10 ML: at 20:33

## 2024-01-11 RX ADMIN — AMLODIPINE BESYLATE 10 MG: 10 TABLET ORAL at 08:20

## 2024-01-11 RX ADMIN — EZETIMIBE 10 MG: 10 TABLET ORAL at 20:33

## 2024-01-11 RX ADMIN — ASPIRIN 81 MG: 81 TABLET, COATED ORAL at 08:20

## 2024-01-11 RX ADMIN — INSULIN LISPRO 8 UNITS: 100 INJECTION, SOLUTION INTRAVENOUS; SUBCUTANEOUS at 11:52

## 2024-01-11 RX ADMIN — SODIUM PHOSPHATE, MONOBASIC, MONOHYDRATE AND SODIUM PHOSPHATE, DIBASIC, ANHYDROUS 15 MMOL: 142; 276 INJECTION, SOLUTION INTRAVENOUS at 08:21

## 2024-01-11 RX ADMIN — ATORVASTATIN CALCIUM 80 MG: 40 TABLET, FILM COATED ORAL at 20:29

## 2024-01-11 RX ADMIN — Medication 100 MG: at 08:19

## 2024-01-11 RX ADMIN — ENOXAPARIN SODIUM 40 MG: 100 INJECTION SUBCUTANEOUS at 08:19

## 2024-01-11 RX ADMIN — DULOXETINE HYDROCHLORIDE 30 MG: 30 CAPSULE, DELAYED RELEASE ORAL at 08:19

## 2024-01-11 RX ADMIN — INSULIN LISPRO 2 UNITS: 100 INJECTION, SOLUTION INTRAVENOUS; SUBCUTANEOUS at 20:30

## 2024-01-11 RX ADMIN — LISINOPRIL 10 MG: 10 TABLET ORAL at 08:19

## 2024-01-11 NOTE — DISCHARGE INSTR - COC
Restriction: no  Last Modified Barium Swallow with Video (Video Swallowing Test): not done    Treatments at the Time of Hospital Discharge:   Respiratory Treatments: ***  Oxygen Therapy:  is not on home oxygen therapy.  Ventilator:    - No ventilator support    Rehab Therapies: Physical Therapy and Occupational Therapy  Weight Bearing Status/Restrictions: {[facility] Weight Bearin}  Other Medical Equipment (for information only, NOT a DME order):  {EQUIPMENT:[id number]}  Other Treatments: ***    Patient's personal belongings (please select all that are sent with patient):  {[facility] DME Belongings:[id number]}    RN SIGNATURE:  Electronically signed by Tania Curry RN on 1/15/24 at 3:17 PM EST    CASE MANAGEMENT/SOCIAL WORK SECTION    Inpatient Status Date: ***    Readmission Risk Assessment Score:  Readmission Risk              Risk of Unplanned Readmission:  24           Discharging to Facility/ Agency   Name: [facility]   Address:  Phone:  Fax:    Dialysis Facility (if applicable)   Name:   Address:  Dialysis Schedule:  Phone:  Fax:    / signature: Electronically signed by LUZ ELENA Crews on 2024 at 9:43 AM    PHYSICIAN SECTION    Prognosis: Good    Condition at Discharge: Stable    Rehab Potential (if transferring to Rehab): Good    Recommended Labs or Other Treatments After Discharge: Continue to monitor blood glucose     Physician Certification: I certify the above information and transfer of Magan Funes  is necessary for the continuing treatment of the diagnosis listed and that he requires Skilled Nursing Facility for less 30 days.     Update Admission H&P: Changes in H&P as follows - Mr. Funes has a past medical history of Alcoholism ([facility]), Cocaine abuse ([facility]), Diabetes mellitus ([facility]), Hypertension, Idiopathic peripheral neuropathy, Lacunar stroke (HCC), Marijuana abuse, and S/P transesophageal echocardiogram (NETTA). presented with CC of vomiting, diarrhea and runny nose

## 2024-01-11 NOTE — PLAN OF CARE
Problem: Skin/Tissue Integrity  Goal: Absence of new skin breakdown  Description: 1.  Monitor for areas of redness and/or skin breakdown  2.  Assess vascular access sites hourly  3.  Every 4-6 hours minimum:  Change oxygen saturation probe site  4.  Every 4-6 hours:  If on nasal continuous positive airway pressure, respiratory therapy assess nares and determine need for appliance change or resting period.  Outcome: Progressing     Problem: Chronic Conditions and Co-morbidities  Goal: Patient's chronic conditions and co-morbidity symptoms are monitored and maintained or improved  Outcome: Progressing     Problem: Discharge Planning  Goal: Discharge to home or other facility with appropriate resources  Outcome: Progressing     Problem: Safety - Adult  Goal: Free from fall injury  Outcome: Progressing     Problem: Nutrition Deficit:  Goal: Optimize nutritional status  Outcome: Progressing

## 2024-01-12 LAB
ALBUMIN SERPL-MCNC: 3.4 G/DL (ref 3.5–5.2)
ALP SERPL-CCNC: 111 U/L (ref 40–129)
ALT SERPL-CCNC: 31 U/L (ref 0–40)
ANION GAP SERPL CALCULATED.3IONS-SCNC: 11 MMOL/L (ref 7–16)
ANION GAP SERPL CALCULATED.3IONS-SCNC: 13 MMOL/L (ref 7–16)
AST SERPL-CCNC: 28 U/L (ref 0–39)
BASOPHILS # BLD: 0.02 K/UL (ref 0–0.2)
BASOPHILS NFR BLD: 0 % (ref 0–2)
BILIRUB SERPL-MCNC: 0.6 MG/DL (ref 0–1.2)
BUN SERPL-MCNC: 13 MG/DL (ref 6–20)
BUN SERPL-MCNC: 9 MG/DL (ref 6–20)
CALCIUM SERPL-MCNC: 8.3 MG/DL (ref 8.6–10.2)
CALCIUM SERPL-MCNC: 8.3 MG/DL (ref 8.6–10.2)
CHLORIDE SERPL-SCNC: 101 MMOL/L (ref 98–107)
CHLORIDE SERPL-SCNC: 96 MMOL/L (ref 98–107)
CO2 SERPL-SCNC: 23 MMOL/L (ref 22–29)
CO2 SERPL-SCNC: 25 MMOL/L (ref 22–29)
CREAT SERPL-MCNC: 0.6 MG/DL (ref 0.7–1.2)
CREAT SERPL-MCNC: 0.7 MG/DL (ref 0.7–1.2)
EOSINOPHIL # BLD: 0.26 K/UL (ref 0.05–0.5)
EOSINOPHILS RELATIVE PERCENT: 2 % (ref 0–6)
ERYTHROCYTE [DISTWIDTH] IN BLOOD BY AUTOMATED COUNT: 14.1 % (ref 11.5–15)
GFR SERPL CREATININE-BSD FRML MDRD: >60 ML/MIN/1.73M2
GFR SERPL CREATININE-BSD FRML MDRD: >60 ML/MIN/1.73M2
GLUCOSE BLD-MCNC: 124 MG/DL (ref 74–99)
GLUCOSE BLD-MCNC: 148 MG/DL (ref 74–99)
GLUCOSE BLD-MCNC: 287 MG/DL (ref 74–99)
GLUCOSE BLD-MCNC: 305 MG/DL (ref 74–99)
GLUCOSE BLD-MCNC: 66 MG/DL (ref 74–99)
GLUCOSE SERPL-MCNC: 285 MG/DL (ref 74–99)
GLUCOSE SERPL-MCNC: 65 MG/DL (ref 74–99)
HCT VFR BLD AUTO: 33.3 % (ref 37–54)
HGB BLD-MCNC: 11 G/DL (ref 12.5–16.5)
IMM GRANULOCYTES # BLD AUTO: 0.13 K/UL (ref 0–0.58)
IMM GRANULOCYTES NFR BLD: 1 % (ref 0–5)
LYMPHOCYTES NFR BLD: 1.89 K/UL (ref 1.5–4)
LYMPHOCYTES RELATIVE PERCENT: 15 % (ref 20–42)
MAGNESIUM SERPL-MCNC: 1.9 MG/DL (ref 1.6–2.6)
MCH RBC QN AUTO: 28.4 PG (ref 26–35)
MCHC RBC AUTO-ENTMCNC: 33 G/DL (ref 32–34.5)
MCV RBC AUTO: 85.8 FL (ref 80–99.9)
MICROORGANISM SPEC CULT: ABNORMAL
MICROORGANISM SPEC CULT: NORMAL
MONOCYTES NFR BLD: 1.23 K/UL (ref 0.1–0.95)
MONOCYTES NFR BLD: 10 % (ref 2–12)
NEUTROPHILS NFR BLD: 72 % (ref 43–80)
NEUTS SEG NFR BLD: 9.18 K/UL (ref 1.8–7.3)
PHOSPHATE SERPL-MCNC: 2.9 MG/DL (ref 2.5–4.5)
PLATELET # BLD AUTO: 466 K/UL (ref 130–450)
PMV BLD AUTO: 8.8 FL (ref 7–12)
POTASSIUM SERPL-SCNC: 3.6 MMOL/L (ref 3.5–5)
POTASSIUM SERPL-SCNC: 4.7 MMOL/L (ref 3.5–5)
PROT SERPL-MCNC: 6 G/DL (ref 6.4–8.3)
RBC # BLD AUTO: 3.88 M/UL (ref 3.8–5.8)
SERVICE CMNT-IMP: NORMAL
SODIUM SERPL-SCNC: 130 MMOL/L (ref 132–146)
SODIUM SERPL-SCNC: 139 MMOL/L (ref 132–146)
SPECIMEN DESCRIPTION: ABNORMAL
SPECIMEN DESCRIPTION: NORMAL
WBC OTHER # BLD: 12.7 K/UL (ref 4.5–11.5)

## 2024-01-12 PROCEDURE — 2580000003 HC RX 258

## 2024-01-12 PROCEDURE — 6370000000 HC RX 637 (ALT 250 FOR IP): Performed by: INTERNAL MEDICINE

## 2024-01-12 PROCEDURE — 6360000002 HC RX W HCPCS

## 2024-01-12 PROCEDURE — 6370000000 HC RX 637 (ALT 250 FOR IP)

## 2024-01-12 PROCEDURE — 97535 SELF CARE MNGMENT TRAINING: CPT

## 2024-01-12 PROCEDURE — 80048 BASIC METABOLIC PNL TOTAL CA: CPT

## 2024-01-12 PROCEDURE — 99238 HOSP IP/OBS DSCHRG MGMT 30/<: CPT | Performed by: INTERNAL MEDICINE

## 2024-01-12 PROCEDURE — 1200000000 HC SEMI PRIVATE

## 2024-01-12 PROCEDURE — 36415 COLL VENOUS BLD VENIPUNCTURE: CPT

## 2024-01-12 PROCEDURE — 83735 ASSAY OF MAGNESIUM: CPT

## 2024-01-12 PROCEDURE — 82962 GLUCOSE BLOOD TEST: CPT

## 2024-01-12 PROCEDURE — 85025 COMPLETE CBC W/AUTO DIFF WBC: CPT

## 2024-01-12 PROCEDURE — 84100 ASSAY OF PHOSPHORUS: CPT

## 2024-01-12 PROCEDURE — 80053 COMPREHEN METABOLIC PANEL: CPT

## 2024-01-12 RX ORDER — INSULIN GLARGINE 100 [IU]/ML
14 INJECTION, SOLUTION SUBCUTANEOUS NIGHTLY
Status: DISCONTINUED | OUTPATIENT
Start: 2024-01-12 | End: 2024-01-13

## 2024-01-12 RX ORDER — INSULIN LISPRO 100 [IU]/ML
0-4 INJECTION, SOLUTION INTRAVENOUS; SUBCUTANEOUS NIGHTLY
Status: DISCONTINUED | OUTPATIENT
Start: 2024-01-12 | End: 2024-01-15 | Stop reason: HOSPADM

## 2024-01-12 RX ORDER — INSULIN LISPRO 100 [IU]/ML
0-4 INJECTION, SOLUTION INTRAVENOUS; SUBCUTANEOUS
Status: DISCONTINUED | OUTPATIENT
Start: 2024-01-12 | End: 2024-01-12

## 2024-01-12 RX ORDER — INSULIN LISPRO 100 [IU]/ML
0-4 INJECTION, SOLUTION INTRAVENOUS; SUBCUTANEOUS NIGHTLY
Status: DISCONTINUED | OUTPATIENT
Start: 2024-01-12 | End: 2024-01-12

## 2024-01-12 RX ORDER — CEPHALEXIN 500 MG/1
500 CAPSULE ORAL EVERY 8 HOURS SCHEDULED
Status: DISCONTINUED | OUTPATIENT
Start: 2024-01-13 | End: 2024-01-12

## 2024-01-12 RX ORDER — INSULIN LISPRO 100 [IU]/ML
0-8 INJECTION, SOLUTION INTRAVENOUS; SUBCUTANEOUS
Status: DISCONTINUED | OUTPATIENT
Start: 2024-01-12 | End: 2024-01-12

## 2024-01-12 RX ORDER — CEPHALEXIN 500 MG/1
500 CAPSULE ORAL EVERY 8 HOURS SCHEDULED
Status: COMPLETED | OUTPATIENT
Start: 2024-01-13 | End: 2024-01-14

## 2024-01-12 RX ORDER — INSULIN GLARGINE 100 [IU]/ML
15 INJECTION, SOLUTION SUBCUTANEOUS NIGHTLY
Status: DISCONTINUED | OUTPATIENT
Start: 2024-01-12 | End: 2024-01-12

## 2024-01-12 RX ORDER — INSULIN LISPRO 100 [IU]/ML
5 INJECTION, SOLUTION INTRAVENOUS; SUBCUTANEOUS
Status: DISCONTINUED | OUTPATIENT
Start: 2024-01-12 | End: 2024-01-14

## 2024-01-12 RX ORDER — INSULIN LISPRO 100 [IU]/ML
0-8 INJECTION, SOLUTION INTRAVENOUS; SUBCUTANEOUS
Status: DISCONTINUED | OUTPATIENT
Start: 2024-01-13 | End: 2024-01-13

## 2024-01-12 RX ADMIN — ATORVASTATIN CALCIUM 80 MG: 40 TABLET, FILM COATED ORAL at 20:02

## 2024-01-12 RX ADMIN — AMLODIPINE BESYLATE 10 MG: 10 TABLET ORAL at 08:05

## 2024-01-12 RX ADMIN — LISINOPRIL 10 MG: 10 TABLET ORAL at 08:05

## 2024-01-12 RX ADMIN — ENOXAPARIN SODIUM 40 MG: 100 INJECTION SUBCUTANEOUS at 08:05

## 2024-01-12 RX ADMIN — Medication 100 MG: at 08:05

## 2024-01-12 RX ADMIN — INSULIN LISPRO 5 UNITS: 100 INJECTION, SOLUTION INTRAVENOUS; SUBCUTANEOUS at 11:50

## 2024-01-12 RX ADMIN — DULOXETINE HYDROCHLORIDE 30 MG: 30 CAPSULE, DELAYED RELEASE ORAL at 08:08

## 2024-01-12 RX ADMIN — INSULIN GLARGINE 14 UNITS: 100 INJECTION, SOLUTION SUBCUTANEOUS at 20:11

## 2024-01-12 RX ADMIN — WATER 1000 MG: 1 INJECTION INTRAMUSCULAR; INTRAVENOUS; SUBCUTANEOUS at 10:51

## 2024-01-12 RX ADMIN — ONDANSETRON 4 MG: 4 TABLET, ORALLY DISINTEGRATING ORAL at 08:05

## 2024-01-12 RX ADMIN — INSULIN LISPRO 8 UNITS: 100 INJECTION, SOLUTION INTRAVENOUS; SUBCUTANEOUS at 08:06

## 2024-01-12 RX ADMIN — Medication 10 ML: at 20:09

## 2024-01-12 RX ADMIN — Medication 10 ML: at 08:05

## 2024-01-12 RX ADMIN — INSULIN LISPRO 5 UNITS: 100 INJECTION, SOLUTION INTRAVENOUS; SUBCUTANEOUS at 17:36

## 2024-01-12 RX ADMIN — ASPIRIN 81 MG: 81 TABLET, COATED ORAL at 08:05

## 2024-01-12 RX ADMIN — INSULIN LISPRO 4 UNITS: 100 INJECTION, SOLUTION INTRAVENOUS; SUBCUTANEOUS at 20:11

## 2024-01-12 RX ADMIN — INSULIN LISPRO 2 UNITS: 100 INJECTION, SOLUTION INTRAVENOUS; SUBCUTANEOUS at 17:36

## 2024-01-12 RX ADMIN — EZETIMIBE 10 MG: 10 TABLET ORAL at 20:02

## 2024-01-13 LAB
ALBUMIN SERPL-MCNC: 3.4 G/DL (ref 3.5–5.2)
ALP SERPL-CCNC: 141 U/L (ref 40–129)
ALT SERPL-CCNC: 51 U/L (ref 0–40)
ANION GAP SERPL CALCULATED.3IONS-SCNC: 11 MMOL/L (ref 7–16)
ANION GAP SERPL CALCULATED.3IONS-SCNC: 16 MMOL/L (ref 7–16)
AST SERPL-CCNC: 49 U/L (ref 0–39)
BASOPHILS # BLD: 0.02 K/UL (ref 0–0.2)
BASOPHILS NFR BLD: 0 % (ref 0–2)
BILIRUB SERPL-MCNC: 0.5 MG/DL (ref 0–1.2)
BUN SERPL-MCNC: 16 MG/DL (ref 6–20)
BUN SERPL-MCNC: 18 MG/DL (ref 6–20)
CALCIUM SERPL-MCNC: 8.6 MG/DL (ref 8.6–10.2)
CALCIUM SERPL-MCNC: 8.7 MG/DL (ref 8.6–10.2)
CHLORIDE SERPL-SCNC: 94 MMOL/L (ref 98–107)
CHLORIDE SERPL-SCNC: 95 MMOL/L (ref 98–107)
CO2 SERPL-SCNC: 21 MMOL/L (ref 22–29)
CO2 SERPL-SCNC: 25 MMOL/L (ref 22–29)
CREAT SERPL-MCNC: 0.7 MG/DL (ref 0.7–1.2)
CREAT SERPL-MCNC: 0.7 MG/DL (ref 0.7–1.2)
EOSINOPHIL # BLD: 0.21 K/UL (ref 0.05–0.5)
EOSINOPHILS RELATIVE PERCENT: 2 % (ref 0–6)
ERYTHROCYTE [DISTWIDTH] IN BLOOD BY AUTOMATED COUNT: 14.2 % (ref 11.5–15)
GFR SERPL CREATININE-BSD FRML MDRD: >60 ML/MIN/1.73M2
GFR SERPL CREATININE-BSD FRML MDRD: >60 ML/MIN/1.73M2
GLUCOSE BLD-MCNC: 337 MG/DL (ref 74–99)
GLUCOSE BLD-MCNC: 342 MG/DL (ref 74–99)
GLUCOSE BLD-MCNC: 369 MG/DL (ref 74–99)
GLUCOSE BLD-MCNC: 428 MG/DL (ref 74–99)
GLUCOSE SERPL-MCNC: 404 MG/DL (ref 74–99)
GLUCOSE SERPL-MCNC: 447 MG/DL (ref 74–99)
HCT VFR BLD AUTO: 35.6 % (ref 37–54)
HGB BLD-MCNC: 11.5 G/DL (ref 12.5–16.5)
IMM GRANULOCYTES # BLD AUTO: 0.16 K/UL (ref 0–0.58)
IMM GRANULOCYTES NFR BLD: 1 % (ref 0–5)
LYMPHOCYTES NFR BLD: 1.73 K/UL (ref 1.5–4)
LYMPHOCYTES RELATIVE PERCENT: 14 % (ref 20–42)
MAGNESIUM SERPL-MCNC: 2.1 MG/DL (ref 1.6–2.6)
MCH RBC QN AUTO: 28.3 PG (ref 26–35)
MCHC RBC AUTO-ENTMCNC: 32.3 G/DL (ref 32–34.5)
MCV RBC AUTO: 87.7 FL (ref 80–99.9)
MICROORGANISM SPEC CULT: NORMAL
MONOCYTES NFR BLD: 1.12 K/UL (ref 0.1–0.95)
MONOCYTES NFR BLD: 9 % (ref 2–12)
NEUTROPHILS NFR BLD: 73 % (ref 43–80)
NEUTS SEG NFR BLD: 8.83 K/UL (ref 1.8–7.3)
PHOSPHATE SERPL-MCNC: 2.5 MG/DL (ref 2.5–4.5)
PLATELET # BLD AUTO: 582 K/UL (ref 130–450)
PMV BLD AUTO: 9.1 FL (ref 7–12)
POTASSIUM SERPL-SCNC: 4.4 MMOL/L (ref 3.5–5)
POTASSIUM SERPL-SCNC: 5.1 MMOL/L (ref 3.5–5)
PROT SERPL-MCNC: 6.2 G/DL (ref 6.4–8.3)
RBC # BLD AUTO: 4.06 M/UL (ref 3.8–5.8)
SERVICE CMNT-IMP: NORMAL
SODIUM SERPL-SCNC: 130 MMOL/L (ref 132–146)
SODIUM SERPL-SCNC: 132 MMOL/L (ref 132–146)
SPECIMEN DESCRIPTION: NORMAL
WBC OTHER # BLD: 12.1 K/UL (ref 4.5–11.5)

## 2024-01-13 PROCEDURE — 2580000003 HC RX 258

## 2024-01-13 PROCEDURE — 99232 SBSQ HOSP IP/OBS MODERATE 35: CPT | Performed by: INTERNAL MEDICINE

## 2024-01-13 PROCEDURE — 36415 COLL VENOUS BLD VENIPUNCTURE: CPT

## 2024-01-13 PROCEDURE — 6370000000 HC RX 637 (ALT 250 FOR IP)

## 2024-01-13 PROCEDURE — 80048 BASIC METABOLIC PNL TOTAL CA: CPT

## 2024-01-13 PROCEDURE — 6370000000 HC RX 637 (ALT 250 FOR IP): Performed by: INTERNAL MEDICINE

## 2024-01-13 PROCEDURE — 85025 COMPLETE CBC W/AUTO DIFF WBC: CPT

## 2024-01-13 PROCEDURE — 84100 ASSAY OF PHOSPHORUS: CPT

## 2024-01-13 PROCEDURE — 80053 COMPREHEN METABOLIC PANEL: CPT

## 2024-01-13 PROCEDURE — 1200000000 HC SEMI PRIVATE

## 2024-01-13 PROCEDURE — 82962 GLUCOSE BLOOD TEST: CPT

## 2024-01-13 PROCEDURE — 83735 ASSAY OF MAGNESIUM: CPT

## 2024-01-13 PROCEDURE — 6360000002 HC RX W HCPCS

## 2024-01-13 RX ORDER — INSULIN LISPRO 100 [IU]/ML
5 INJECTION, SOLUTION INTRAVENOUS; SUBCUTANEOUS ONCE
Status: COMPLETED | OUTPATIENT
Start: 2024-01-13 | End: 2024-01-13

## 2024-01-13 RX ORDER — INSULIN GLARGINE 100 [IU]/ML
18 INJECTION, SOLUTION SUBCUTANEOUS NIGHTLY
Qty: 5 ADJUSTABLE DOSE PRE-FILLED PEN SYRINGE | Refills: 3 | Status: CANCELLED | DISCHARGE
Start: 2024-01-13

## 2024-01-13 RX ORDER — INSULIN LISPRO 100 [IU]/ML
5 INJECTION, SOLUTION INTRAVENOUS; SUBCUTANEOUS
Qty: 10 ML | Refills: 5 | Status: CANCELLED | DISCHARGE
Start: 2024-01-13

## 2024-01-13 RX ORDER — CEPHALEXIN 500 MG/1
500 CAPSULE ORAL EVERY 8 HOURS SCHEDULED
Qty: 3 CAPSULE | Refills: 0 | Status: CANCELLED | DISCHARGE
Start: 2024-01-13 | End: 2024-01-14

## 2024-01-13 RX ORDER — INSULIN GLARGINE 100 [IU]/ML
16 INJECTION, SOLUTION SUBCUTANEOUS NIGHTLY
Status: DISCONTINUED | OUTPATIENT
Start: 2024-01-13 | End: 2024-01-15 | Stop reason: HOSPADM

## 2024-01-13 RX ORDER — INSULIN LISPRO 100 [IU]/ML
0-12 INJECTION, SOLUTION INTRAVENOUS; SUBCUTANEOUS
Status: DISCONTINUED | OUTPATIENT
Start: 2024-01-13 | End: 2024-01-15 | Stop reason: HOSPADM

## 2024-01-13 RX ADMIN — INSULIN LISPRO 10 UNITS: 100 INJECTION, SOLUTION INTRAVENOUS; SUBCUTANEOUS at 17:24

## 2024-01-13 RX ADMIN — ATORVASTATIN CALCIUM 80 MG: 40 TABLET, FILM COATED ORAL at 20:08

## 2024-01-13 RX ADMIN — CEPHALEXIN 500 MG: 500 CAPSULE ORAL at 20:08

## 2024-01-13 RX ADMIN — LISINOPRIL 10 MG: 10 TABLET ORAL at 07:47

## 2024-01-13 RX ADMIN — INSULIN LISPRO 8 UNITS: 100 INJECTION, SOLUTION INTRAVENOUS; SUBCUTANEOUS at 07:49

## 2024-01-13 RX ADMIN — Medication 10 ML: at 19:17

## 2024-01-13 RX ADMIN — Medication 100 MG: at 07:47

## 2024-01-13 RX ADMIN — INSULIN LISPRO 4 UNITS: 100 INJECTION, SOLUTION INTRAVENOUS; SUBCUTANEOUS at 20:20

## 2024-01-13 RX ADMIN — Medication 10 ML: at 07:48

## 2024-01-13 RX ADMIN — CEPHALEXIN 500 MG: 500 CAPSULE ORAL at 13:10

## 2024-01-13 RX ADMIN — PETROLATUM: 420 OINTMENT TOPICAL at 19:22

## 2024-01-13 RX ADMIN — INSULIN LISPRO 5 UNITS: 100 INJECTION, SOLUTION INTRAVENOUS; SUBCUTANEOUS at 13:07

## 2024-01-13 RX ADMIN — CEPHALEXIN 500 MG: 500 CAPSULE ORAL at 06:45

## 2024-01-13 RX ADMIN — EZETIMIBE 10 MG: 10 TABLET ORAL at 20:08

## 2024-01-13 RX ADMIN — AMLODIPINE BESYLATE 10 MG: 10 TABLET ORAL at 07:48

## 2024-01-13 RX ADMIN — INSULIN LISPRO 8 UNITS: 100 INJECTION, SOLUTION INTRAVENOUS; SUBCUTANEOUS at 13:09

## 2024-01-13 RX ADMIN — INSULIN LISPRO 5 UNITS: 100 INJECTION, SOLUTION INTRAVENOUS; SUBCUTANEOUS at 07:48

## 2024-01-13 RX ADMIN — ASPIRIN 81 MG: 81 TABLET, COATED ORAL at 07:48

## 2024-01-13 RX ADMIN — DULOXETINE HYDROCHLORIDE 30 MG: 30 CAPSULE, DELAYED RELEASE ORAL at 07:47

## 2024-01-13 RX ADMIN — INSULIN GLARGINE 16 UNITS: 100 INJECTION, SOLUTION SUBCUTANEOUS at 20:21

## 2024-01-13 RX ADMIN — INSULIN LISPRO 5 UNITS: 100 INJECTION, SOLUTION INTRAVENOUS; SUBCUTANEOUS at 17:24

## 2024-01-13 RX ADMIN — ENOXAPARIN SODIUM 40 MG: 100 INJECTION SUBCUTANEOUS at 07:49

## 2024-01-13 NOTE — PLAN OF CARE
Problem: Skin/Tissue Integrity  Goal: Absence of new skin breakdown  Description: 1.  Monitor for areas of redness and/or skin breakdown  2.  Assess vascular access sites hourly  3.  Every 4-6 hours minimum:  Change oxygen saturation probe site  4.  Every 4-6 hours:  If on nasal continuous positive airway pressure, respiratory therapy assess nares and determine need for appliance change or resting period.  Outcome: Progressing     Problem: Chronic Conditions and Co-morbidities  Goal: Patient's chronic conditions and co-morbidity symptoms are monitored and maintained or improved  Outcome: Progressing     Problem: Discharge Planning  Goal: Discharge to home or other facility with appropriate resources  Outcome: Progressing     Problem: Safety - Adult  Goal: Free from fall injury  Outcome: Progressing     Problem: Nutrition Deficit:  Goal: Optimize nutritional status  Outcome: Progressing  Flowsheets (Taken 1/12/2024 1423 by Flores Santiago, MS, RD, LD)  Nutrient intake appropriate for improving, restoring, or maintaining nutritional needs: Monitor oral intake, labs, and treatment plans

## 2024-01-13 NOTE — DISCHARGE INSTRUCTIONS
Internal medicine    Follow ups  Please follow up with the internal medicine clinic at [facility] within 14 days of discharge from your rehabilitation facility   Please keep all other follow up appointments:  Future Appointments   Date Time Provider Department Center   2/15/2024 12:15 PM Barber Baca MD BD ENDO [facility]       Changes in healthcare   Please take all medications as indicated  Diet: regular diet   Activity: activity as tolerated  Changes to your medications  Insulin regimen:   Lantus 10 units in the AM and 16 units in the PM  Humalog 8 units with meals  MDSSI as per the Medium Dose Corrective Algorithm  Glucose: Dose:    No Insulin  151-200 2 Units  201-250 4 Units  251-300 6 Units  Over 300 8 Units and notify physician     Even if you are feeling better and not having symptoms do not stop taking antibiotic earlier then prescribed till 1/15/23  Please contact us if you have any concerns, wish to change or make an appointment:  Internal medicine clinic   Phone: [phone]  Fax: [phone]  [facility] [place] [address]  Should you have further questions in regards to this visit, you can review your clinical note and after visit summary document on your [employer] account.     Other than any new prescriptions given to you today, the list of home medications on this After Visit Summary are based on information provided to us from you and your healthcare providers. This information, including the list, dose, and frequency of medications is only as accurate as the information you provided. If you have any questions or concerns about your home medications, please contact your Primary Care Physician for further clarification.

## 2024-01-14 LAB
ALBUMIN SERPL-MCNC: 3.5 G/DL (ref 3.5–5.2)
ALP SERPL-CCNC: 123 U/L (ref 40–129)
ALT SERPL-CCNC: 62 U/L (ref 0–40)
ANION GAP SERPL CALCULATED.3IONS-SCNC: 11 MMOL/L (ref 7–16)
ANION GAP SERPL CALCULATED.3IONS-SCNC: 12 MMOL/L (ref 7–16)
AST SERPL-CCNC: 55 U/L (ref 0–39)
BASOPHILS # BLD: 0.04 K/UL (ref 0–0.2)
BASOPHILS NFR BLD: 0 % (ref 0–2)
BILIRUB SERPL-MCNC: 0.6 MG/DL (ref 0–1.2)
BUN SERPL-MCNC: 17 MG/DL (ref 6–20)
BUN SERPL-MCNC: 22 MG/DL (ref 6–20)
CALCIUM SERPL-MCNC: 8.6 MG/DL (ref 8.6–10.2)
CALCIUM SERPL-MCNC: 8.8 MG/DL (ref 8.6–10.2)
CHLORIDE SERPL-SCNC: 96 MMOL/L (ref 98–107)
CHLORIDE SERPL-SCNC: 98 MMOL/L (ref 98–107)
CO2 SERPL-SCNC: 24 MMOL/L (ref 22–29)
CO2 SERPL-SCNC: 27 MMOL/L (ref 22–29)
CREAT SERPL-MCNC: 0.7 MG/DL (ref 0.7–1.2)
CREAT SERPL-MCNC: 0.9 MG/DL (ref 0.7–1.2)
EOSINOPHIL # BLD: 0.28 K/UL (ref 0.05–0.5)
EOSINOPHILS RELATIVE PERCENT: 2 % (ref 0–6)
ERYTHROCYTE [DISTWIDTH] IN BLOOD BY AUTOMATED COUNT: 14.3 % (ref 11.5–15)
GFR SERPL CREATININE-BSD FRML MDRD: >60 ML/MIN/1.73M2
GFR SERPL CREATININE-BSD FRML MDRD: >60 ML/MIN/1.73M2
GLUCOSE BLD-MCNC: 206 MG/DL (ref 74–99)
GLUCOSE BLD-MCNC: 224 MG/DL (ref 74–99)
GLUCOSE BLD-MCNC: 270 MG/DL (ref 74–99)
GLUCOSE BLD-MCNC: 360 MG/DL (ref 74–99)
GLUCOSE SERPL-MCNC: 238 MG/DL (ref 74–99)
GLUCOSE SERPL-MCNC: 347 MG/DL (ref 74–99)
HCT VFR BLD AUTO: 34.8 % (ref 37–54)
HGB BLD-MCNC: 11.2 G/DL (ref 12.5–16.5)
IMM GRANULOCYTES # BLD AUTO: 0.18 K/UL (ref 0–0.58)
IMM GRANULOCYTES NFR BLD: 1 % (ref 0–5)
LYMPHOCYTES NFR BLD: 2.17 K/UL (ref 1.5–4)
LYMPHOCYTES RELATIVE PERCENT: 17 % (ref 20–42)
MAGNESIUM SERPL-MCNC: 2 MG/DL (ref 1.6–2.6)
MCH RBC QN AUTO: 27.9 PG (ref 26–35)
MCHC RBC AUTO-ENTMCNC: 32.2 G/DL (ref 32–34.5)
MCV RBC AUTO: 86.6 FL (ref 80–99.9)
MONOCYTES NFR BLD: 1.12 K/UL (ref 0.1–0.95)
MONOCYTES NFR BLD: 9 % (ref 2–12)
NEUTROPHILS NFR BLD: 70 % (ref 43–80)
NEUTS SEG NFR BLD: 8.77 K/UL (ref 1.8–7.3)
PHOSPHATE SERPL-MCNC: 2.5 MG/DL (ref 2.5–4.5)
PLATELET # BLD AUTO: 616 K/UL (ref 130–450)
PMV BLD AUTO: 8.8 FL (ref 7–12)
POTASSIUM SERPL-SCNC: 4.5 MMOL/L (ref 3.5–5)
POTASSIUM SERPL-SCNC: 4.7 MMOL/L (ref 3.5–5)
PROT SERPL-MCNC: 6.3 G/DL (ref 6.4–8.3)
RBC # BLD AUTO: 4.02 M/UL (ref 3.8–5.8)
SODIUM SERPL-SCNC: 132 MMOL/L (ref 132–146)
SODIUM SERPL-SCNC: 136 MMOL/L (ref 132–146)
WBC OTHER # BLD: 12.6 K/UL (ref 4.5–11.5)

## 2024-01-14 PROCEDURE — 84100 ASSAY OF PHOSPHORUS: CPT

## 2024-01-14 PROCEDURE — 6370000000 HC RX 637 (ALT 250 FOR IP)

## 2024-01-14 PROCEDURE — 6370000000 HC RX 637 (ALT 250 FOR IP): Performed by: INTERNAL MEDICINE

## 2024-01-14 PROCEDURE — 80048 BASIC METABOLIC PNL TOTAL CA: CPT

## 2024-01-14 PROCEDURE — 1200000000 HC SEMI PRIVATE

## 2024-01-14 PROCEDURE — 36415 COLL VENOUS BLD VENIPUNCTURE: CPT

## 2024-01-14 PROCEDURE — 85025 COMPLETE CBC W/AUTO DIFF WBC: CPT

## 2024-01-14 PROCEDURE — 83735 ASSAY OF MAGNESIUM: CPT

## 2024-01-14 PROCEDURE — 2580000003 HC RX 258

## 2024-01-14 PROCEDURE — 80053 COMPREHEN METABOLIC PANEL: CPT

## 2024-01-14 PROCEDURE — 97530 THERAPEUTIC ACTIVITIES: CPT

## 2024-01-14 PROCEDURE — 6360000002 HC RX W HCPCS

## 2024-01-14 PROCEDURE — 82962 GLUCOSE BLOOD TEST: CPT

## 2024-01-14 PROCEDURE — 99231 SBSQ HOSP IP/OBS SF/LOW 25: CPT | Performed by: INTERNAL MEDICINE

## 2024-01-14 RX ORDER — INSULIN LISPRO 100 [IU]/ML
8 INJECTION, SOLUTION INTRAVENOUS; SUBCUTANEOUS
Status: DISCONTINUED | OUTPATIENT
Start: 2024-01-14 | End: 2024-01-15 | Stop reason: HOSPADM

## 2024-01-14 RX ORDER — INSULIN GLARGINE 100 [IU]/ML
10 INJECTION, SOLUTION SUBCUTANEOUS EVERY MORNING
Status: DISCONTINUED | OUTPATIENT
Start: 2024-01-14 | End: 2024-01-15 | Stop reason: HOSPADM

## 2024-01-14 RX ADMIN — INSULIN LISPRO 4 UNITS: 100 INJECTION, SOLUTION INTRAVENOUS; SUBCUTANEOUS at 17:36

## 2024-01-14 RX ADMIN — INSULIN GLARGINE 16 UNITS: 100 INJECTION, SOLUTION SUBCUTANEOUS at 20:17

## 2024-01-14 RX ADMIN — LISINOPRIL 10 MG: 10 TABLET ORAL at 08:14

## 2024-01-14 RX ADMIN — INSULIN LISPRO 8 UNITS: 100 INJECTION, SOLUTION INTRAVENOUS; SUBCUTANEOUS at 08:15

## 2024-01-14 RX ADMIN — INSULIN LISPRO 8 UNITS: 100 INJECTION, SOLUTION INTRAVENOUS; SUBCUTANEOUS at 12:18

## 2024-01-14 RX ADMIN — CEPHALEXIN 500 MG: 500 CAPSULE ORAL at 14:04

## 2024-01-14 RX ADMIN — PETROLATUM: 420 OINTMENT TOPICAL at 20:19

## 2024-01-14 RX ADMIN — INSULIN LISPRO 8 UNITS: 100 INJECTION, SOLUTION INTRAVENOUS; SUBCUTANEOUS at 17:36

## 2024-01-14 RX ADMIN — Medication 10 ML: at 08:16

## 2024-01-14 RX ADMIN — INSULIN LISPRO 6 UNITS: 100 INJECTION, SOLUTION INTRAVENOUS; SUBCUTANEOUS at 12:18

## 2024-01-14 RX ADMIN — DULOXETINE HYDROCHLORIDE 30 MG: 30 CAPSULE, DELAYED RELEASE ORAL at 08:13

## 2024-01-14 RX ADMIN — ASPIRIN 81 MG: 81 TABLET, COATED ORAL at 08:14

## 2024-01-14 RX ADMIN — EZETIMIBE 10 MG: 10 TABLET ORAL at 20:18

## 2024-01-14 RX ADMIN — ATORVASTATIN CALCIUM 80 MG: 40 TABLET, FILM COATED ORAL at 20:18

## 2024-01-14 RX ADMIN — Medication 100 MG: at 08:13

## 2024-01-14 RX ADMIN — AMLODIPINE BESYLATE 10 MG: 10 TABLET ORAL at 08:13

## 2024-01-14 RX ADMIN — ENOXAPARIN SODIUM 40 MG: 100 INJECTION SUBCUTANEOUS at 08:14

## 2024-01-14 RX ADMIN — INSULIN LISPRO 10 UNITS: 100 INJECTION, SOLUTION INTRAVENOUS; SUBCUTANEOUS at 08:15

## 2024-01-14 RX ADMIN — CEPHALEXIN 500 MG: 500 CAPSULE ORAL at 20:18

## 2024-01-14 RX ADMIN — INSULIN GLARGINE 10 UNITS: 100 INJECTION, SOLUTION SUBCUTANEOUS at 08:14

## 2024-01-14 RX ADMIN — Medication 10 ML: at 20:32

## 2024-01-14 RX ADMIN — CEPHALEXIN 500 MG: 500 CAPSULE ORAL at 05:52

## 2024-01-15 VITALS
SYSTOLIC BLOOD PRESSURE: 119 MMHG | BODY MASS INDEX: 22.53 KG/M2 | DIASTOLIC BLOOD PRESSURE: 85 MMHG | OXYGEN SATURATION: 97 % | WEIGHT: 140.21 LBS | RESPIRATION RATE: 16 BRPM | HEIGHT: 66 IN | TEMPERATURE: 97.7 F | HEART RATE: 93 BPM

## 2024-01-15 LAB
ANION GAP SERPL CALCULATED.3IONS-SCNC: 9 MMOL/L (ref 7–16)
BASOPHILS # BLD: 0.05 K/UL (ref 0–0.2)
BASOPHILS NFR BLD: 0 % (ref 0–2)
BUN SERPL-MCNC: 16 MG/DL (ref 6–20)
CALCIUM SERPL-MCNC: 8.6 MG/DL (ref 8.6–10.2)
CHLORIDE SERPL-SCNC: 97 MMOL/L (ref 98–107)
CO2 SERPL-SCNC: 26 MMOL/L (ref 22–29)
CREAT SERPL-MCNC: 0.7 MG/DL (ref 0.7–1.2)
EOSINOPHIL # BLD: 0.23 K/UL (ref 0.05–0.5)
EOSINOPHILS RELATIVE PERCENT: 2 % (ref 0–6)
ERYTHROCYTE [DISTWIDTH] IN BLOOD BY AUTOMATED COUNT: 14.3 % (ref 11.5–15)
GFR SERPL CREATININE-BSD FRML MDRD: >60 ML/MIN/1.73M2
GLUCOSE BLD-MCNC: 115 MG/DL (ref 74–99)
GLUCOSE BLD-MCNC: 133 MG/DL (ref 74–99)
GLUCOSE BLD-MCNC: 307 MG/DL (ref 74–99)
GLUCOSE SERPL-MCNC: 113 MG/DL (ref 74–99)
HCT VFR BLD AUTO: 35.4 % (ref 37–54)
HGB BLD-MCNC: 11.6 G/DL (ref 12.5–16.5)
IMM GRANULOCYTES # BLD AUTO: 0.2 K/UL (ref 0–0.58)
IMM GRANULOCYTES NFR BLD: 2 % (ref 0–5)
LYMPHOCYTES NFR BLD: 2.77 K/UL (ref 1.5–4)
LYMPHOCYTES RELATIVE PERCENT: 21 % (ref 20–42)
MAGNESIUM SERPL-MCNC: 2 MG/DL (ref 1.6–2.6)
MCH RBC QN AUTO: 28.4 PG (ref 26–35)
MCHC RBC AUTO-ENTMCNC: 32.8 G/DL (ref 32–34.5)
MCV RBC AUTO: 86.8 FL (ref 80–99.9)
MONOCYTES NFR BLD: 1.17 K/UL (ref 0.1–0.95)
MONOCYTES NFR BLD: 9 % (ref 2–12)
NEUTROPHILS NFR BLD: 66 % (ref 43–80)
NEUTS SEG NFR BLD: 8.73 K/UL (ref 1.8–7.3)
PHOSPHATE SERPL-MCNC: 2.9 MG/DL (ref 2.5–4.5)
PLATELET # BLD AUTO: 635 K/UL (ref 130–450)
PMV BLD AUTO: 8.3 FL (ref 7–12)
POTASSIUM SERPL-SCNC: 4.3 MMOL/L (ref 3.5–5)
RBC # BLD AUTO: 4.08 M/UL (ref 3.8–5.8)
SODIUM SERPL-SCNC: 132 MMOL/L (ref 132–146)
WBC OTHER # BLD: 13.2 K/UL (ref 4.5–11.5)

## 2024-01-15 PROCEDURE — 6370000000 HC RX 637 (ALT 250 FOR IP)

## 2024-01-15 PROCEDURE — 99238 HOSP IP/OBS DSCHRG MGMT 30/<: CPT | Performed by: INTERNAL MEDICINE

## 2024-01-15 PROCEDURE — 85025 COMPLETE CBC W/AUTO DIFF WBC: CPT

## 2024-01-15 PROCEDURE — 2580000003 HC RX 258

## 2024-01-15 PROCEDURE — 6370000000 HC RX 637 (ALT 250 FOR IP): Performed by: INTERNAL MEDICINE

## 2024-01-15 PROCEDURE — 80048 BASIC METABOLIC PNL TOTAL CA: CPT

## 2024-01-15 PROCEDURE — 36415 COLL VENOUS BLD VENIPUNCTURE: CPT

## 2024-01-15 PROCEDURE — 84100 ASSAY OF PHOSPHORUS: CPT

## 2024-01-15 PROCEDURE — 6360000002 HC RX W HCPCS

## 2024-01-15 PROCEDURE — 83735 ASSAY OF MAGNESIUM: CPT

## 2024-01-15 PROCEDURE — 82962 GLUCOSE BLOOD TEST: CPT

## 2024-01-15 RX ORDER — INSULIN GLARGINE 100 [IU]/ML
16 INJECTION, SOLUTION SUBCUTANEOUS NIGHTLY
Qty: 10 ML | Refills: 3 | DISCHARGE
Start: 2024-01-15

## 2024-01-15 RX ORDER — INSULIN GLARGINE 100 [IU]/ML
10 INJECTION, SOLUTION SUBCUTANEOUS EVERY MORNING
Qty: 10 ML | Refills: 3 | DISCHARGE
Start: 2024-01-16

## 2024-01-15 RX ORDER — INSULIN LISPRO 100 [IU]/ML
INJECTION, SOLUTION INTRAVENOUS; SUBCUTANEOUS
DISCHARGE
Start: 2024-01-15

## 2024-01-15 RX ORDER — DULOXETIN HYDROCHLORIDE 30 MG/1
30 CAPSULE, DELAYED RELEASE ORAL DAILY
Qty: 30 CAPSULE | Refills: 3 | DISCHARGE
Start: 2024-01-15

## 2024-01-15 RX ORDER — GLUCOSAMINE HCL/CHONDROITIN SU 500-400 MG
CAPSULE ORAL
Qty: 900 STRIP | Refills: 3 | DISCHARGE
Start: 2024-01-15

## 2024-01-15 RX ADMIN — Medication 10 ML: at 09:38

## 2024-01-15 RX ADMIN — INSULIN LISPRO 8 UNITS: 100 INJECTION, SOLUTION INTRAVENOUS; SUBCUTANEOUS at 09:37

## 2024-01-15 RX ADMIN — INSULIN LISPRO 8 UNITS: 100 INJECTION, SOLUTION INTRAVENOUS; SUBCUTANEOUS at 17:55

## 2024-01-15 RX ADMIN — INSULIN GLARGINE 10 UNITS: 100 INJECTION, SOLUTION SUBCUTANEOUS at 09:36

## 2024-01-15 RX ADMIN — Medication 100 MG: at 09:38

## 2024-01-15 RX ADMIN — DULOXETINE HYDROCHLORIDE 30 MG: 30 CAPSULE, DELAYED RELEASE ORAL at 09:37

## 2024-01-15 RX ADMIN — ASPIRIN 81 MG: 81 TABLET, COATED ORAL at 09:37

## 2024-01-15 RX ADMIN — ENOXAPARIN SODIUM 40 MG: 100 INJECTION SUBCUTANEOUS at 09:39

## 2024-01-15 RX ADMIN — INSULIN LISPRO 8 UNITS: 100 INJECTION, SOLUTION INTRAVENOUS; SUBCUTANEOUS at 17:54

## 2024-01-15 RX ADMIN — LISINOPRIL 10 MG: 10 TABLET ORAL at 09:38

## 2024-01-15 RX ADMIN — AMLODIPINE BESYLATE 10 MG: 10 TABLET ORAL at 09:37

## 2024-01-15 NOTE — CARE COORDINATION
CM update:  Therapy evals were completed with PT at 11 and OT at 12. He ambulated 4 ft front to back with a moderate assist. Reviewed [facility] scores with patient, and recommendation for DENY. Patient and mother Rain agreeable to DENY, and their choice of [facility]. Referral made to [facility]. Awaiting their acceptance. Endocrinology following. CM/SW will follow.  Electronically signed by Ottoniel Hayden RN on 1/11/2024 at 11:12 AM    Addendum: Per Christine at [facility],  patient accepted, and will have a bed available Saturday. PASRR, and anbulance form completed and is in envelope on soft chart. NIALL and destination completed. Patient and and Mother Rain updated.MB    
Chart reviewed and case reviewed in IDR.  Updated therapy notes received yesterday for transition of care planning.  BS remains high per endocrinology.  Insulin continues to be adjusted; Lantus increased to 10u in am and Humalog 8u with meals.  Keflex continues until today, 1/15.  Call placed to Christine, liaison with Jayme lAeman re: transition of care planning.  Authorization pending with the patient's insurance for transition of care planning.  Christine will let this CM know if pre-cert is received for transition of care.  PASRR completed.  Envelope and transfer paperwork in the soft chart.  Will continue to follow.     Kiersten Driver RN.  P:  [phone]      Call received from MONIK Madsen assistant.  Authorization received for the patient to transition to [facility] for rehab today: Auth # [id number], good through 1/17.  Call [employer] message to House Team 1 residents, Dr Cooper, and discharge orders received.  Call placed to [facility] and spoke with Mariah.  Ambulance transport arranged for a 5:30 pm pick-up time.  Call placed to the patient's mother Rain to notify of above.  She is in agreement with transition of care plan to rehab.  PASRR completed.  Envelope and transfer paperwork completed and provided to bedside nurse Tania when notified of above.  Will continue to follow.     Kiersten Driver RN.  P:  [phone]  
SOCIAL WORK/CASEMANAGEMENT TRANSITION OF CARE PLANNING( KESHAWN WADSWORTH, LUZ ELEAN [phone]):  pt remains on iv rocephin for pneumonia. PT and OT to eval. Will discuss recommendations with pt and his mother. Endocrinology is following and adjusting insulin. Pt is active with [facility] for therapy and nsg. Will need gifty orders on discharge if goes  home. If not per the brother wants him to go to [facility]. Will follow. LUZ ELENA Crews  1/10/2024      
SOCIAL WORK/CASEMANAGEMENT TRANSITION OF CARE PLANNING( KESHAWN WADSWORTH, LUZ ELENA [phone]):  updated PT and OT notes are in from today. Plan is to [facility] and precert was started last week. All discharge paper work is in place. LUZ ELENA Crews  1/14/2024      
SOCIAL WORK/CASEMANAGEMENT TRANSITION OF CARE PLANNING( KESHAWN WADSWORTH, LUZ ELENA [phone]):  plan is to [facility] to be started today. Bed available on 1/13.  Pt refused to work with OT yesterday. Met with pt this a.m. and told him he needed to work with OT this a.m. if wants to go to [facility]. Pt in agreement and OT at bedside. All discharge paper work with [facility] in place. LUZ ELENA Crews  1/12/2024  
[N17.9]  Gastroenteritis and colitis, viral [A08.4]                   Date / Time: 1/7/2024  9:18 AM    Patient Admission Status: Inpatient   Readmission Risk (Low < 19, Mod (19-27), High > 27): Readmission Risk Score: 32.9    Current PCP: Andrzej Cline MD  PCP verified by CM? Yes    Chart Reviewed: Yes      History Provided by: Patient, Child/Family  Patient Orientation: Alert and Oriented    Patient Cognition: Alert    Hospitalization in the last 30 days (Readmission):  No    If yes, Readmission Assessment in  Navigator will be completed.    Advance Directives:      Code Status: Full Code   Patient's Primary Decision Maker is: Named in Scanned ACP Document    Primary Decision Maker: Rain Funes - Parent - [phone]    Secondary Decision Maker: Mat Funes - Brother/Sister - [phone]    Discharge Planning:    Patient lives with: Parent Type of Home: House  Primary Care Giver: Family  Patient Support Systems include: Family Members, Parent   Current Financial resources:    Current community resources:    Current services prior to admission: None            Current DME:              Type of Home Care services:  None    ADLS  Prior functional level: Assistance with the following:, Bathing, Dressing, Cooking, Housework, Shopping, Mobility  Current functional level: Other (see comment) (PT and OT to eval)    PT AM-PAC:   /24  OT AM-PAC:   /24    Family can provide assistance at DC: Yes  Would you like Case Management to discuss the discharge plan with any other family members/significant others, and if so, who? Yes (no advance directives. no children. provided copy of advance directive to pt and discussed wtih brother)  Plans to Return to Present Housing: Unknown at present (need PT and OT to eval per brother who wants pt to go back to [facility] instead of home.)  Other Identified Issues/Barriers to RETURNING to current housing:  therapies to eval    Potential Assistance needed at discharge:

## 2024-01-15 NOTE — DISCHARGE SUMMARY
80 MG tablet  Commonly known as: LIPITOR  Take 1 tablet by mouth nightly     baclofen 10 MG tablet  Commonly known as: LIORESAL     blood glucose test strips  Test 3 times a day & as needed for symptoms of irregular blood glucose. Dispense sufficient amount for indicated testing frequency plus additional to accommodate PRN testing needs.     docusate sodium 100 MG capsule  Commonly known as: COLACE     DULoxetine 30 MG extended release capsule  Commonly known as: CYMBALTA  Take 1 capsule by mouth daily     ezetimibe 10 MG tablet  Commonly known as: ZETIA  Take 1 tablet by mouth nightly     lisinopril 10 MG tablet  Commonly known as: PRINIVIL;ZESTRIL  Take 1 tablet by mouth daily     traZODone 50 MG tablet  Commonly known as: DESYREL            STOP taking these medications      HumaLOG KwikPen 100 UNIT/ML Sopn  Generic drug: insulin lispro (1 Unit Dial)  Replaced by: insulin lispro 100 UNIT/ML Soln injection vial     Lantus SoloStar 100 UNIT/ML injection pen  Generic drug: insulin glargine  Replaced by: insulin glargine 100 UNIT/ML injection vial  You also have another medication with the same name that you need to continue taking as instructed.               Where to Get Your Medications        Information about where to get these medications is not yet available    Ask your nurse or doctor about these medications  blood glucose test strips  DULoxetine 30 MG extended release capsule  insulin glargine 100 UNIT/ML injection vial  insulin glargine 100 UNIT/ML injection vial  insulin lispro 100 UNIT/ML Soln injection vial          Internal medicine discharge instructions:     Follow ups  Please follow up with the internal medicine clinic at [facility] within 14 days of discharge from your rehabilitation facility   Please keep all other follow up appointments:         Future Appointments   Date Time Provider Department Center   2/15/2024 12:15 PM Barber Baca MD BDM ENDO [facility]      Changes in

## 2024-01-15 NOTE — PLAN OF CARE
Problem: Skin/Tissue Integrity  Goal: Absence of new skin breakdown  Description: 1.  Monitor for areas of redness and/or skin breakdown  2.  Assess vascular access sites hourly  3.  Every 4-6 hours minimum:  Change oxygen saturation probe site  4.  Every 4-6 hours:  If on nasal continuous positive airway pressure, respiratory therapy assess nares and determine need for appliance change or resting period.  Outcome: Progressing     Problem: Chronic Conditions and Co-morbidities  Goal: Patient's chronic conditions and co-morbidity symptoms are monitored and maintained or improved  Outcome: Progressing     Problem: Safety - Adult  Goal: Free from fall injury  Outcome: Progressing     Problem: Nutrition Deficit:  Goal: Optimize nutritional status  Outcome: Progressing

## 2024-01-15 NOTE — PROGRESS NOTES
[facility]   INTERNAL MEDICINE RESIDENCY FACULTY    Attending Physician Statement  I have discussed the case, including pertinent history (medical, surgical, family and social) and exam findings with the resident and the team.  I have seen and examined the patient and the key elements of the encounter have been performed by me.  I agree with the assessment, plan and orders as documented by the resident.      The patient was seen earlier by me on rounds with the team.    Patient is doing much better, continues with one word answers. At baseline now, no new issues. On cephalosporin for ecoli UTI - other issues resolved - ok for DC.    I have reviewed my findings and recommendations with Magan Funes.  Remainder of medical problems as per resident note.      Mat Chen MD, M.D., [facility]  Internal Medicine Residency Faculty      
    [facility]   INTERNAL MEDICINE RESIDENCY FACULTY    Attending Physician Statement  I have discussed the case, including pertinent history (medical, surgical, family and social) and exam findings with the resident and the team.  I have seen and examined the patient and the key elements of the encounter have been performed by me.  I agree with the assessment, plan and orders as documented by the resident.      The patient was seen earlier by me on rounds with the team.    Patient did not leave yesterday due to ECF not beings able to take him. He is clinically stable - no new issues - BS is up some. Will continue present plan. All questions answered.    I have reviewed my findings and recommendations with Magan Funes.  Remainder of medical problems as per resident note.      Mat Chen MD, M.GITA., [facility]  Internal Medicine Residency Faculty      
  Physician Progress Note      PATIENT:               ADRIANA CRAWLEY  CSN #:                  [id number]  :                       1970  ADMIT DATE:       2024 9:18 AM  DISCH DATE:  RESPONDING  PROVIDER #:        Kvng Menezes MD          QUERY TEXT:    Pt admitted with dehydration. Pt noted to have \"Leukocytosis possible CAP vs   viral gastroenteritis. Patient had fevers, vomiting and diarrhea\" documented   in attending progress note on . WBC 24.4, HR  128, resp 21. If possible,   please document in the progress notes and discharge summary if you are   evaluating and /or treating any of the following:    The medical record reflects the following:  Risk Factors: possible CAP, viral gastroenteritis  Clinical Indicators: KIRSTEN. CXR on : No acute process.  Treatment: IVF, IV Rocephin, IV Vibramycin    Melanie Moore, BSN, RN, CRCR  Clinical   [phone]  Options provided:  -- Sepsis, present on admission  -- Sepsis was ruled out  -- Other - I will add my own diagnosis  -- Disagree - Not applicable / Not valid  -- Disagree - Clinically unable to determine / Unknown  -- Refer to Clinical Documentation Reviewer    PROVIDER RESPONSE TEXT:    This patient has sepsis which was present on admission.    Query created by: Melanie Moore on 2024 11:23 AM      Electronically signed by:  Kvng Menezes MD 2024 12:47 PM          
4 Eyes Skin Assessment     NAME:  Magan Funes  YOB: 1970  MEDICAL RECORD NUMBER:  [id number]    The patient is being assessed for  Admission    I agree that at least one RN has performed a thorough Head to Toe Skin Assessment on the patient. ALL assessment sites listed below have been assessed.      Areas assessed by both nurses:    Head, Face, Ears, Shoulders, Back, Chest, Arms, Elbows, Hands, Sacrum. Buttock, Coccyx, Ischium, and Legs. Feet and Heels        Does the Patient have a Wound? No noted wound(s)       Roberto Prevention initiated by RN: Yes  Wound Care Orders initiated by RN: Yes    Pressure Injury (Stage 3,4, Unstageable, DTI, NWPT, and Complex wounds) if present, place Wound referral order by RN under : No    New Ostomies, if present place, Ostomy referral order under : No     Nurse 1 eSignature: Electronically signed by Isabel Bowers RN on 1/9/24 at 1:31 AM EST    **SHARE this note so that the co-signing nurse can place an eSignature**    Nurse 2 eSignature: Electronically signed by Norma Marion RN on 1/9/24 at 1:32 AM EST    
[facility]  Internal Medicine Residency / House Medicine Service    Attending Physician Statement  I have discussed the case, including pertinent history and exam findings with the resident and the team.  I have seen and examined the patient and the key elements of the encounter have been performed by me.  I agree with the assessment, plan and orders as documented by the resident.      A&O and baseline  BS controlled  No DKA   Continuing nausea and vomiting  Not on oral doxycycline  Diarrhea has stopped   Abdomen soft  Plan:Continue same , treat as gastroenteritis           Endocrinology consult    Remainder of medical problems as per resident note.      Andrzej Cline MD  Internal Medicine Residency Faculty      
[facility]  Internal Medicine Residency Program  Progress Note - House Team 1    Patient:  Magan Funes 53 y.o. male MRN: [id number]     Date of Service: 1/9/2024     CC: vomiting and diarrhea   Overnight events: none      Subjective     Patient seen and examined at bedside. Patient continues to have nausea and vomiting. He is unable to eat  anything either. Patient has zofran prn.     Objective     Physical Exam:  Vitals: BP (!) 156/103   Pulse (!) 112   Temp 99.2 °F (37.3 °C) (Oral)   Resp 16   Ht 1.676 m (5' 6\")   Wt 61.5 kg (135 lb 9.6 oz)   SpO2 93%   BMI 21.89 kg/m²     I & O - 24hr: No intake/output data recorded.   General Appearance: alert, appears stated age, and cooperative  HEENT:  Head: Normal, normocephalic, atraumatic.  Neck: supple, symmetrical, trachea midline  Lung: rhonchi bilaterally  Heart: regular rate and rhythm and S1, S2 normal  Abdomen: soft, non-tender; bowel sounds normal; no masses,  no organomegaly  Extremities:  extremities normal, atraumatic, no cyanosis or edema  Musculokeletal: No joint swelling, no muscle tenderness. ROM normal in all joints of extremities.   Neurologic: Mental status: Alert, oriented, thought content appropriate  Subject  Pertinent Labs & Imaging Studies   chika  CBC:   Lab Results   Component Value Date/Time    WBC 19.9 01/09/2024 04:19 AM    RBC 3.88 01/09/2024 04:19 AM    HGB 11.2 01/09/2024 04:19 AM    HCT 34.5 01/09/2024 04:19 AM    MCV 88.9 01/09/2024 04:19 AM    MCH 28.9 01/09/2024 04:19 AM    MCHC 32.5 01/09/2024 04:19 AM    RDW 15.0 01/09/2024 04:19 AM     01/09/2024 04:19 AM    MPV 9.5 01/09/2024 04:19 AM     CMP:    Lab Results   Component Value Date/Time     01/09/2024 04:19 AM    K 3.5 01/09/2024 04:19 AM    K 5.5 06/01/2023 05:38 PM     01/09/2024 04:19 AM    CO2 25 01/09/2024 04:19 AM    BUN 21 01/09/2024 04:19 AM    CREATININE 0.7 01/09/2024 04:19 AM    GFRAA >60 08/10/2022 09:41 AM    LABGLOM >60 
Comprehensive Nutrition Assessment    Type and Reason for Visit:  Reassess    Nutrition Recommendations/Plan:   Continue current diet and ONS     Malnutrition Assessment:  Malnutrition Status:  Moderate malnutrition (01/09/24 1329)    Context:  Chronic Illness (hx of polysubstance abuse)     Findings of the 6 clinical characteristics of malnutrition:  Energy Intake:  Mild decrease in energy intake (Comment) (poor oral intake for the past few days d/t N/V/D)  Weight Loss:  No significant weight loss     Body Fat Loss:   (moderate) Orbital, Triceps, Fat Overlying Ribs   Muscle Mass Loss:   (moderate) Temples (temporalis), Clavicles (pectoralis & deltoids), Thigh (quadraceps)  Fluid Accumulation:  No significant fluid accumulation     Strength:  Not Performed    Nutrition Assessment:    Pt improving from a nutritional standpoint w/ noted increased PO intake. Admit w/ N/V/D likely 2/2 gastroenteritis vs UTI (resolved). Noted KIRSTEN & hyperglycemia on admit. PMHx DM, polysubstance abuse. Noted moderate malnutrition. Continue current ONS pending d/c to ECF. Will monitor.    Nutrition Related Findings:    pt alert, abd WDL, contractures, no edema, -I/Os Wound Type: None       Current Nutrition Intake & Therapies:    Average Meal Intake: 1-25%, 0% (0% of lunch per RD observation ; pt tells me he \"isn't hungry\" d/t nausea))  Average Supplements Intake: None Ordered  ADULT DIET; Regular; 4 carb choices (60 gm/meal)  ADULT ORAL NUTRITION SUPPLEMENT; Breakfast, Lunch, Dinner; Diabetic Oral Supplement    Anthropometric Measures:  Height: 167.6 cm (5' 5.98\")  Ideal Body Weight (IBW): 142 lbs (65 kg)    Admission Body Weight: 63.6 kg (140 lb 3.4 oz) (1/9-BS)  Current Body Weight: 63.5 kg (140 lb) (1/9 bed scale), 98.6 % IBW. Weight Source: Bed Scale  Current BMI (kg/m2): 22.6  Usual Body Weight: 59.5 kg (131 lb 2.8 oz) (6/28/23-BS)  % Weight Change (Calculated): 6.9  Weight Adjustment For: No Adjustment                 BMI 
Dr Baca notified per griffin of endocrin consult.  Pt added to census  
ENDOCRINOLOGY PROGRESS NOTE      Date of admission: 1/7/2024  Date of service: 1/10/2024  Admitting physician: Andrzej Cline MD   Primary Care Physician: Andrzej Cline MD  Consultant physician: Barber Baca MD     Reason for the consultation:  Uncontrolled T1DM, DKA     History of Present Illness:  The history is provided by the patient. Accuracy of the patient data is excellent    Magan Funes is a very pleasant 53 y.o. old male with PMH listed below admitted to [facility] on 1/7/2024 because N/V and diarrhea, endocrine service was consulted for diabetes management.     Subjective   The patient was seen and examined, no acute events , BS above goal but improving     Inpatient diet:   Carb Restricted diet     Point of care glucose monitoring (Independently reviewed)   Recent Labs     01/08/24  2004 01/09/24  0520 01/09/24  1141 01/09/24  1814 01/09/24  2039 01/10/24  0701 01/10/24  1155 01/10/24  1716   POCGLU 190* 204* 196* 239* 204* 280* 290* 278*     Scheduled Meds:   insulin lispro  8 Units SubCUTAneous TID WC    insulin glargine  25 Units SubCUTAneous Nightly    lisinopril  10 mg Oral Daily    insulin lispro  0-12 Units SubCUTAneous 4x Daily AC & HS    amLODIPine  10 mg Oral Daily    cefTRIAXone (ROCEPHIN) IV  1,000 mg IntraVENous Q24H    sodium chloride flush  5-40 mL IntraVENous 2 times per day    enoxaparin  40 mg SubCUTAneous Daily    atorvastatin  80 mg Oral Nightly    aspirin  81 mg Oral Daily    DULoxetine  30 mg Oral Daily    ezetimibe  10 mg Oral Nightly    thiamine  100 mg Oral Daily       PRN Meds:   sodium chloride flush, 5-40 mL, PRN  sodium chloride, , PRN  magnesium sulfate, 2,000 mg, PRN  ondansetron, 4 mg, Q8H PRN   Or  ondansetron, 4 mg, Q6H PRN  polyethylene glycol, 17 g, Daily PRN  acetaminophen, 650 mg, Q6H PRN   Or  acetaminophen, 650 mg, Q6H PRN  gabapentin, 100 mg, TID PRN      Continuous Infusions:   sodium chloride 50 mL/hr at 01/09/24 2033       Review of Systems  All systems 
ENDOCRINOLOGY PROGRESS NOTE      Date of admission: 1/7/2024  Date of service: 1/12/2024  Admitting physician: Andrzej Cline MD   Primary Care Physician: Andrzej Cline MD  Consultant physician: Barber Baca MD     Reason for the consultation:  Uncontrolled T1DM, DKA     History of Present Illness:  The history is provided by the patient. Accuracy of the patient data is excellent    Magan Funes is a very pleasant 53 y.o. old male with PMH listed below admitted to [facility] on 1/7/2024 because N/V and diarrhea, endocrine service was consulted for diabetes management.     Subjective   The patient was seen and examined, no acute events     Inpatient diet:   Carb Restricted diet     Point of care glucose monitoring (Independently reviewed)   Recent Labs     01/10/24  2008 01/11/24  0603 01/11/24  1146 01/11/24  1720 01/11/24  1929 01/12/24  0548 01/12/24  0628 01/12/24  1049   POCGLU 319* 170* 192* 113* 177* 66* 148* 124*     Scheduled Meds:   insulin lispro  0-4 Units SubCUTAneous TID WC    insulin lispro  0-4 Units SubCUTAneous Nightly    insulin lispro  5 Units SubCUTAneous TID WC    insulin glargine  15 Units SubCUTAneous Nightly    [START ON 1/13/2024] cephALEXin  500 mg Oral 3 times per day    lisinopril  10 mg Oral Daily    amLODIPine  10 mg Oral Daily    sodium chloride flush  5-40 mL IntraVENous 2 times per day    enoxaparin  40 mg SubCUTAneous Daily    atorvastatin  80 mg Oral Nightly    aspirin  81 mg Oral Daily    DULoxetine  30 mg Oral Daily    ezetimibe  10 mg Oral Nightly    thiamine  100 mg Oral Daily       PRN Meds:   glucose, 4 tablet, PRN  dextrose bolus, 125 mL, PRN   Or  dextrose bolus, 250 mL, PRN  glucagon (rDNA), 1 mg, PRN  dextrose, , Continuous PRN  sodium chloride flush, 5-40 mL, PRN  sodium chloride, , PRN  magnesium sulfate, 2,000 mg, PRN  ondansetron, 4 mg, Q8H PRN   Or  ondansetron, 4 mg, Q6H PRN  polyethylene glycol, 17 g, Daily PRN  acetaminophen, 650 mg, Q6H PRN   
ENDOCRINOLOGY PROGRESS NOTE      Date of admission: 1/7/2024  Date of service: 1/13/2024  Admitting physician: Andrzej Cline MD   Primary Care Physician: Andrzej Cline MD  Consultant physician: Barber Baca MD     Reason for the consultation:  Uncontrolled T1DM, DKA     History of Present Illness:  The history is provided by the patient. Accuracy of the patient data is excellent    Magan Funes is a very pleasant 53 y.o. old male with PMH listed below admitted to [facility] on 1/7/2024 because N/V and diarrhea, endocrine service was consulted for diabetes management.     Subjective   The patient was seen and examined at bedside this morning.  Glucose level is still very high    Inpatient diet:   Carb Restricted diet     Point of care glucose monitoring (Independently reviewed)   Recent Labs     01/11/24  1720 01/11/24  1929 01/12/24  0548 01/12/24  0628 01/12/24  1049 01/12/24  1623 01/12/24  1959 01/13/24  0620   POCGLU 113* 177* 66* 148* 124* 287* 305* 428*     Scheduled Meds:   insulin glargine  16 Units SubCUTAneous Nightly    insulin lispro  5 Units SubCUTAneous TID WC    cephALEXin  500 mg Oral 3 times per day    insulin lispro  0-8 Units SubCUTAneous TID WC    insulin lispro  0-4 Units SubCUTAneous Nightly    lisinopril  10 mg Oral Daily    amLODIPine  10 mg Oral Daily    sodium chloride flush  5-40 mL IntraVENous 2 times per day    enoxaparin  40 mg SubCUTAneous Daily    atorvastatin  80 mg Oral Nightly    aspirin  81 mg Oral Daily    DULoxetine  30 mg Oral Daily    ezetimibe  10 mg Oral Nightly    thiamine  100 mg Oral Daily       PRN Meds:   glucose, 4 tablet, PRN  dextrose bolus, 125 mL, PRN   Or  dextrose bolus, 250 mL, PRN  glucagon (rDNA), 1 mg, PRN  dextrose, , Continuous PRN  sodium chloride flush, 5-40 mL, PRN  sodium chloride, , PRN  magnesium sulfate, 2,000 mg, PRN  ondansetron, 4 mg, Q8H PRN   Or  ondansetron, 4 mg, Q6H PRN  polyethylene glycol, 17 g, Daily PRN  acetaminophen, 650 mg, Q6H 
ENDOCRINOLOGY PROGRESS NOTE      Date of admission: 1/7/2024  Date of service: 1/14/2024  Admitting physician: Andrzej Cline MD   Primary Care Physician: Andrzej Cline MD  Consultant physician: Barber Baca MD     Reason for the consultation:  Uncontrolled T1DM, DKA     History of Present Illness:  The history is provided by the patient. Accuracy of the patient data is excellent    Magan Funes is a very pleasant 53 y.o. old male with PMH listed below admitted to [facility] on 1/7/2024 because N/V and diarrhea, endocrine service was consulted for diabetes management.     Subjective   The patient was seen and examined at bedside this morning.  No acute events overnight.  Glucose level is still high    Inpatient diet:   Carb Restricted diet     Point of care glucose monitoring (Independently reviewed)   Recent Labs     01/12/24  1049 01/12/24  1623 01/12/24  1959 01/13/24  0620 01/13/24  1120 01/13/24  1625 01/13/24 2011 01/14/24  0549   POCGLU 124* 287* 305* 428* 337* 369* 342* 360*     Scheduled Meds:   insulin glargine  10 Units SubCUTAneous QAM    insulin lispro  8 Units SubCUTAneous TID WC    insulin glargine  16 Units SubCUTAneous Nightly    insulin lispro  0-12 Units SubCUTAneous TID WC    cephALEXin  500 mg Oral 3 times per day    insulin lispro  0-4 Units SubCUTAneous Nightly    lisinopril  10 mg Oral Daily    amLODIPine  10 mg Oral Daily    sodium chloride flush  5-40 mL IntraVENous 2 times per day    enoxaparin  40 mg SubCUTAneous Daily    atorvastatin  80 mg Oral Nightly    aspirin  81 mg Oral Daily    DULoxetine  30 mg Oral Daily    ezetimibe  10 mg Oral Nightly    thiamine  100 mg Oral Daily       PRN Meds:   white petrolatum, , BID PRN  glucose, 4 tablet, PRN  dextrose bolus, 125 mL, PRN   Or  dextrose bolus, 250 mL, PRN  glucagon (rDNA), 1 mg, PRN  dextrose, , Continuous PRN  sodium chloride flush, 5-40 mL, PRN  sodium chloride, , PRN  magnesium sulfate, 2,000 mg, PRN  ondansetron, 4 mg, Q8H 
IM resident notified of K 5.1  
MAURI KU notified of K 3.4  
Messaged  [facility] to see if patient able to discharge to [facility]  
Messaged Dr. Baca to see if patient able to discharge.   
N-N report called to Zehra allen [place].   
[facility]  Internal Medicine Residency / House Medicine Service    Attending Physician Statement  I have discussed the case, including pertinent history and exam findings with the resident and the team.  I have seen and examined the patient and the key elements of the encounter have been performed by me.  I agree with the assessment, plan and orders as documented by the resident.      A&O   And eating well  VS stable   His BS control will be improved with Endocrinology consult  Immobile and in WC at home  Will be discharging to [facility] facility  PVRV below 200 cc  Meds reviewed     Remainder of medical problems as per resident note.      Andrzej Cline MD  FRCP [facility]  Internal Medicine Residency Faculty    
[facility]  Internal Medicine Residency Program  Progress Note - House Team       Patient:  Magan Funes 53 y.o. male   MRN: [id number]       Date of Service: 1/14/2024      Subjective     Patient was seen and examined at bedside this morning.  Was laying in bed, comfortable, saturating in RA.   No concerning issues or events overnight.    Objective       Physical Exam  Vitals: BP (!) 143/91   Pulse 97   Temp 98.3 °F (36.8 °C) (Oral)   Resp 16   Ht 1.676 m (5' 5.98\")   Wt 63.6 kg (140 lb 3.4 oz)   SpO2 97%   BMI 22.64 kg/m²     I & O - 24hr: No intake/output data recorded.   General Appearance: alert, appears stated age, and cooperative  HEENT:  Head: Normal, normocephalic, atraumatic.  Lung: clear to auscultation bilaterally  Heart: S1, S2 normal  Abdomen: soft, non-tender; bowel sounds normal; no masses,  no organomegaly  Extremities:  extremities normal, atraumatic, no cyanosis or edema  Neurologic: Mental status: Alert, oriented, thought content appropriate    Diet:   ADULT DIET; Regular; 4 carb choices (60 gm/meal)  ADULT ORAL NUTRITION SUPPLEMENT; Breakfast, Lunch, Dinner; Diabetic Oral Supplement      Pertinent Labs & Imaging Studies     Labs    CBC with Differential:    Lab Results   Component Value Date/Time    WBC 12.6 01/14/2024 04:30 AM    RBC 4.02 01/14/2024 04:30 AM    HGB 11.2 01/14/2024 04:30 AM    HCT 34.8 01/14/2024 04:30 AM     01/14/2024 04:30 AM    MCV 86.6 01/14/2024 04:30 AM    MCH 27.9 01/14/2024 04:30 AM    MCHC 32.2 01/14/2024 04:30 AM    RDW 14.3 01/14/2024 04:30 AM    SEGSPCT 53 01/25/2014 05:15 AM    SEGSPCT 86 10/11/2010 04:40 AM    BANDSPCT 3 10/10/2010 07:20 PM    METASPCT 1.7 07/20/2020 12:12 PM    LYMPHOPCT 17 01/14/2024 04:30 AM    PROMYELOPCT 1.7 04/29/2022 09:41 PM    MONOPCT 9 01/14/2024 04:30 AM    MYELOPCT 2.6 01/04/2020 05:07 PM    EOSPCT 2 10/12/2010 06:00 AM    BASOPCT 0 01/14/2024 04:30 AM    MONOSABS 1.12 01/14/2024 04:30 AM    
Notified internal med resident pt phosphorus is 1.7.  
OCCUPATIONAL THERAPY INITIAL EVALUATION      [facility] [address] [place], [place]       Date:1/10/2024                                                  Patient Name: Magan Funes  MRN: [id number]  : 1970  Room: [address] [place] [address]    Evaluating OT: Erinkera Marrero, OTR/L #[id number]    Referring Provider::  Vikki Santana MD     Specific Provider Orders/Date: OT evaluation and treatment 24    Diagnosis:  Dehydration [E86.0]  Hyperglycemia [R73.9]  KIRSTEN (acute kidney injury) (HCC) [N17.9]  Gastroenteritis and colitis, viral [A08.4]      Pertinent Medical History:  has a past medical history of Alcoholism (HCC), Cocaine abuse (HCC), Diabetes mellitus (HCC), Hypertension, Idiopathic peripheral neuropathy, Lacunar stroke (HCC), Marijuana abuse, and S/P transesophageal echocardiogram (NETTA).       Precautions:  Fall Risk, hx L hemiplegia, L AFO, L palm protector splint, incontinence, TAPS, Alarm,     Assessment of current deficits   [x] Functional mobility   [x]ADLs  [x] Strength               []Cognition   [x] Functional transfers   [] IADLs         [x] Safety Awareness   [x]Endurance   [x] Fine Coordination              [x] Balance      [] Vision/perception   []Sensation    [x]Gross Motor Coordination  [x] ROM  [] Delirium                   [x] Motor Control     OT PLAN OF CARE   OT POC based on physician orders, patient diagnosis and results of clinical assessment    Frequency/Duration 2-4 days/wk for 2 weeks PRN   Specific OT Treatment to include:   * Instruction/training on adapted ADL techniques and AE recommendations to increase functional independence within precautions       * Functional transfer/mobility training/DME recommendations for increased independence, safety, and fall prevention  * Patient/Family education to increase follow through with safety techniques and functional independence  * Splinting/positioning for increased function, 
OCCUPATIONAL THERAPY TREATMENT SESSION    [facility]  [address], [place]      OT BEDSIDE TREATMENT NOTE      Date:2024  Patient Name: Magan Funes  MRN: [id number]  : 1970  Room: [facility]     Per OT Eval:      Evaluating OT: Rain Marrero, OTR/L #[id number]     Referring Provider::  Vikki Santana MD                    Specific Provider Orders/Date: OT evaluation and treatment 24     Diagnosis:  Dehydration [E86.0]  Hyperglycemia [R73.9]  KIRSTEN (acute kidney injury) (HCC) [N17.9]  Gastroenteritis and colitis, viral [A08.4]       Pertinent Medical History:  has a past medical history of Alcoholism (HCC), Cocaine abuse (HCC), Diabetes mellitus (HCC), Hypertension, Idiopathic peripheral neuropathy, Lacunar stroke (HCC), Marijuana abuse, and S/P transesophageal echocardiogram (NETTA).         Precautions:  Fall Risk, hx L hemiplegia, L AFO (not present), L palm protector splint, incontinence, TAPS, Alarm, external catheter, spacticity L side     Assessment of current deficits   [x] Functional mobility             [x]ADLs           [x] Strength                  []Cognition   [x] Functional transfers           [] IADLs         [x] Safety Awareness   [x]Endurance   [x] Fine Coordination              [x] Balance      [] Vision/perception   []Sensation     [x]Gross Motor Coordination  [x] ROM           [] Delirium                   [x] Motor Control      OT PLAN OF CARE   OT POC based on physician orders, patient diagnosis and results of clinical assessment     Frequency/Duration 2-4 days/wk for 2 weeks PRN   Specific OT Treatment to include:   * Instruction/training on adapted ADL techniques and AE recommendations to increase functional independence within precautions       * Functional transfer/mobility training/DME recommendations for increased independence, safety, and fall prevention  * Patient/Family education to increase 
OT SESSION ATTEMPT     Date:2024  Patient Name: Magan Funes  MRN: [id number]  : 1970  Room: [address] [place] [address]A     Occupational therapy orders received/chart review completed and OT session attempted this date:   Pt declined participation this afternoon despite encouragement and edu on role of OT. Pt reports he already completed Adls and declines to participate in any therapeutic activity or functional mobility.       Will reattempt OT at a later time/date.    Isaak Jolly OTR/L [id number]    
[employer] [facility]  Internal Medicine Residency Program  Progress Note - House Team 1    Patient:  Magan Funes 53 y.o. male MRN: [id number]     Date of Service: 1/13/2024     CC: vomiting and diarrhea   Overnight events: none      Subjective     Patient seen and examined at bedside. Patient denies any nausea and vomiting. Urine culture positive for E.coli >10,000. PVR was 113 yesterday. Plan for [facility], bed will be available 1/13.     Objective     Physical Exam:  Vitals: BP (!) 141/90   Pulse 99   Temp 97.7 °F (36.5 °C) (Oral)   Resp 16   Ht 1.676 m (5' 5.98\")   Wt 63.6 kg (140 lb 3.4 oz)   SpO2 98%   BMI 22.64 kg/m²     I & O - 24hr: No intake/output data recorded.   General Appearance: alert, appears stated age, and cooperative  HEENT:  Head: Normal, normocephalic, atraumatic.  Neck: supple, symmetrical, trachea midline  Lung: rhonchi bilaterally  Heart: regular rate and rhythm and S1, S2 normal  Abdomen: soft, non-tender; bowel sounds normal; no masses,  no organomegaly  Extremities:  extremities normal, atraumatic, no cyanosis or edema  Musculokeletal: No joint swelling, no muscle tenderness. ROM normal in all joints of extremities.   Neurologic: Mental status: Alert, oriented, thought content appropriate  Subject  Pertinent Labs & Imaging Studies   chika  CBC:   Lab Results   Component Value Date/Time    WBC 12.1 01/13/2024 05:23 AM    RBC 4.06 01/13/2024 05:23 AM    HGB 11.5 01/13/2024 05:23 AM    HCT 35.6 01/13/2024 05:23 AM    MCV 87.7 01/13/2024 05:23 AM    MCH 28.3 01/13/2024 05:23 AM    MCHC 32.3 01/13/2024 05:23 AM    RDW 14.2 01/13/2024 05:23 AM     01/13/2024 05:23 AM    MPV 9.1 01/13/2024 05:23 AM     CMP:    Lab Results   Component Value Date/Time     01/13/2024 05:23 AM    K 5.1 01/13/2024 05:23 AM    K 5.5 06/01/2023 05:38 PM    CL 95 01/13/2024 05:23 AM    CO2 21 01/13/2024 05:23 AM    BUN 16 01/13/2024 05:23 AM    CREATININE 0.7 01/13/2024 05:23 AM    
Okay to discharge from endocrinology standpoint   
[facility]  Internal Medicine Residency / House Medicine Service    Attending Physician Statement  I have discussed the case, including pertinent history and exam findings with the resident and the team.  I have seen and examined the patient and the key elements of the encounter have been performed by me.  I agree with the assessment, plan and orders as documented by the resident.      N&V plus diarrhea  Hungry this AM  Increasing AG  Lability of BS control, no DKA   Diarrhea less this AM  VS stable wit   Plan: IV's and insulin coverage           Endocrine cosult  Antibiotics discussed     Remainder of medical problems as per resident note.      Andrzej Cline MD FRCP [facility]  Internal Medicine Residency Faculty    
Patient bladder scanned per order. Patient incontinent 114 ml was result  
Perfect serve internal med resident for PT/OT orders.   
Physical Therapy  Physical Therapy Initial Assessment     Name: Magan Funes  : 1970  MRN: [id number]      Date of Service: 2024    Evaluating PT:  Jaye Madrid PT, DPT [id number]    Room #:  8501/8501-A  Diagnosis:  Dehydration [E86.0]  Hyperglycemia [R73.9]  KIRSTEN (acute kidney injury) (HCC) [N17.9]  Gastroenteritis and colitis, viral [A08.4]  PMHx/PSHx:    Past Medical History:   Diagnosis Date    Alcoholism (HCC)     Cocaine abuse (HCC)     Diabetes mellitus (HCC)     Hypertension     Idiopathic peripheral neuropathy 2016    Lacunar stroke (HCC)     Marijuana abuse     S/P transesophageal echocardiogram (NETTA) 2023    appau      Procedure/Surgery:  none this admission  Precautions:  Falls, L valorie - chronic + spacticity, L AFO (not present at time of evaluation), incontinent, L hand splint  Equipment Needs:  none, pt has WC and hemiwalker    SUBJECTIVE:    Pt lives in a 2nd floor apartment with 3+10 DIONICIO with B HR. Pt's mother lives in same complex. Pt reports that mother and brother assist pt with all dynamic standing mobility. Pt uses WC in apartment. Hemiwalker used for ambulation.    OBJECTIVE:   Initial Evaluation  Date: 24 Treatment Short Term/ Long Term   Goals   AM-PAC 6 Clicks      Was pt agreeable to Eval/treatment? yes     Does pt have pain? No c/o pain     Bed Mobility  Rolling: Mode  Supine to sit: ModA  Sit to supine: Mode  Scooting: Mode  Rolling: Independent   Supine to sit: Independent   Sit to supine: Independent   Scooting: Independent    Transfers Sit to stand: ModA  Stand to sit: ModA  Stand pivot: ModA with hemiwalker  Sit to stand: Mode  Stand to sit: Mode  Stand pivot: Mode with hemiwalker   Ambulation    4 feet forward/backward with hemiwalker ModA  50 feet with hemiwalker Mode   Stair negotiation: ascended and descended  NT  10 steps with 1 rail Mode   ROM BUE:  Defer to OT note  BLE:  RLE WFL, LLE limited by tone     Strength BUE:  Defer to OT 
Physical Therapy  Treatment Note    Name: Magan Funes  : 1970  MRN: [id number]      Date of Service: 2024    Evaluating PT:  Jaye Madrid PT, DPT [id number]    Room #:  8501/8501-A  Diagnosis:  Dehydration [E86.0]  Hyperglycemia [R73.9]  KIRSTEN (acute kidney injury) (HCC) [N17.9]  Gastroenteritis and colitis, viral [A08.4]  PMHx/PSHx:    Past Medical History:   Diagnosis Date    Alcoholism (HCC)     Cocaine abuse (HCC)     Diabetes mellitus (HCC)     Hypertension     Idiopathic peripheral neuropathy 2016    Lacunar stroke (HCC)     Marijuana abuse     S/P transesophageal echocardiogram (NETTA) 2023    appau      Procedure/Surgery:  none this admission  Precautions:  Falls, L valorie - chronic + spacticity, L AFO (not present at time of evaluation), incontinent, L hand splint  Equipment Needs:  none, pt has WC and hemiwalker    SUBJECTIVE:    Pt lives in a 2nd floor apartment with 3+10 DIONICIO with B HR. Pt's mother lives in same complex. Pt reports that mother and brother assist pt with all dynamic standing mobility. Pt uses WC in apartment. Hemiwalker used for ambulation.    OBJECTIVE:   Initial Evaluation  Date: 24 Treatment  Date: 24 Short Term/ Long Term   Goals   AM-PAC 6 Clicks     Was pt agreeable to Eval/treatment? yes yes    Does pt have pain? No c/o pain No pain    Bed Mobility  Rolling: Mode  Supine to sit: ModA  Sit to supine: Mode  Scooting: Mode Rolling: min A  Supine to sit: mod A  Sit to supine: min A  Scooting: min A Rolling: Independent   Supine to sit: Independent   Sit to supine: Independent   Scooting: Independent    Transfers Sit to stand: ModA  Stand to sit: ModA  Stand pivot: ModA with hemiwalker Sit to stand: mod A  Stand to sit: mod A  Stand pivot: NTr Sit to stand: Mode  Stand to sit: Mode  Stand pivot: Mode with hemiwalker   Ambulation    4 feet forward/backward with hemiwalker ModA 2'x2 with quad cane mod A  (Side steps at bedside) 50 feet with 
[facility]  Internal Medicine Residency Program  Progress Note - House Team       Patient:  Magan Funes 53 y.o. male   MRN: [id number]       Date of Service: 1/15/2024    CC:   Chief Complaint   Patient presents with    Hyperglycemia     Patient states his BGL has been elevated , hx DKA before         Overnight events:   NAEO     Subjective     Patient reports he is feeling well this morning.  Was resting comfortably in bed prior to this encounter. Patient denied headache, shortness breath, chest pain, abdominal pain.  Reports he feels he is moving his bowels okay, reports good appetite.      Objective       Physical Exam  Vitals: /79   Pulse 86   Temp 97.8 °F (36.6 °C) (Temporal)   Resp 16   Ht 1.676 m (5' 5.98\")   Wt 63.6 kg (140 lb 3.4 oz)   SpO2 95%   BMI 22.64 kg/m²     I & O - 24hr: No intake/output data recorded.     Physical Exam  Constitutional:       General: He is awake. He is not in acute distress.     Appearance: Normal appearance. He is not toxic-appearing or diaphoretic.   HENT:      Head: Normocephalic and atraumatic.      Nose: Nose normal.      Mouth/Throat:      Dentition: Abnormal dentition.   Eyes:      General: Lids are normal. Gaze aligned appropriately. No scleral icterus.  Cardiovascular:      Rate and Rhythm: Normal rate and regular rhythm.      Heart sounds: Normal heart sounds, S1 normal and S2 normal. Heart sounds not distant. No murmur heard.  Pulmonary:      Effort: Pulmonary effort is normal. No tachypnea, bradypnea, accessory muscle usage or respiratory distress.      Breath sounds: Normal breath sounds and air entry.      Comments: Some wheezing auscultated, improved after cough  Abdominal:      General: Abdomen is flat. Bowel sounds are normal. There is no distension.      Palpations: Abdomen is soft.      Tenderness: There is no abdominal tenderness.   Musculoskeletal:      Right lower leg: No edema.      Left lower leg: No edema. 
[facility]  Internal Medicine Residency / House Medicine Service    Attending Physician Statement  I have discussed the case, including pertinent history and exam findings with the resident and the team.  I have seen and examined the patient and the key elements of the encounter have been performed by me.  I agree with the assessment, plan and orders as documented by the resident.      GI function has returned to normal  BS control per Endocrinology  A&O  VS stable  Plan; Discharge planning to [facility]   Remainder of medical problems as per resident note.      Andrzej Cline MD FRCP [facility]  Internal Medicine Residency Faculty    
[facility]  Internal Medicine Residency / House Medicine Service    Attending Physician Statement  I have discussed the case, including pertinent history and exam findings with the resident and the team.  I have seen and examined the patient and the key elements of the encounter have been performed by me.  I agree with the assessment, plan and orders as documented by the resident.      Continues to improve  A&O and   VS stable   Will check PVRV  Eating well  BS improving with Endocrinology  Plan; Discharge planning    Remainder of medical problems as per resident note.      Andrzej Cline MD FRCP [facility]  Internal Medicine Residency Faculty    
[facility]  Internal Medicine Residency / House Medicine Service    Attending Physician Statement  I have discussed the case, including pertinent history and exam findings with the resident and the team.  I have seen and examined the patient and the key elements of the encounter have been performed by me.  I agree with the assessment, plan and orders as documented by the resident.      A&O   Eating well , no diarrhea  Pro calcitonin  low  Abdomen soft  CXR clear   Will check micro U/A       Follow his baseline orthostatic hypotension        On Lisinopril 10 mg and Norvasc 10 mg  Examine functio before discharge  Plan: Consider discharge without antibiotics    Remainder of medical problems as per resident note.      Andrzej Cline MD FRCP [facility]  Internal Medicine Residency Faculty    
[facility]  Internal Medicine Residency Program  Progress Note - House Team 1    Patient:  Magan Funes 53 y.o. male MRN: [id number]     Date of Service: 1/12/2024     CC: vomiting and diarrhea   Overnight events: none      Subjective     Patient seen and examined at bedside. Patient denies any nausea and vomiting. Urine culture positive for E.coli >10,000. PVR was 113 yesterday. Plan for [facility], bed will be available 1/13.     Objective     Physical Exam:  Vitals: /86   Pulse 94   Temp 98.6 °F (37 °C) (Oral)   Resp 16   Ht 1.676 m (5' 5.98\")   Wt 63.6 kg (140 lb 3.4 oz)   SpO2 96%   BMI 22.64 kg/m²     I & O - 24hr: I/O this shift:  In: -   Out: 1000 [Urine:1000]   General Appearance: alert, appears stated age, and cooperative  HEENT:  Head: Normal, normocephalic, atraumatic.  Neck: supple, symmetrical, trachea midline  Lung: rhonchi bilaterally  Heart: regular rate and rhythm and S1, S2 normal  Abdomen: soft, non-tender; bowel sounds normal; no masses,  no organomegaly  Extremities:  extremities normal, atraumatic, no cyanosis or edema  Musculokeletal: No joint swelling, no muscle tenderness. ROM normal in all joints of extremities.   Neurologic: Mental status: Alert, oriented, thought content appropriate  Subject  Pertinent Labs & Imaging Studies   chika  CBC:   Lab Results   Component Value Date/Time    WBC 12.7 01/12/2024 05:47 AM    RBC 3.88 01/12/2024 05:47 AM    HGB 11.0 01/12/2024 05:47 AM    HCT 33.3 01/12/2024 05:47 AM    MCV 85.8 01/12/2024 05:47 AM    MCH 28.4 01/12/2024 05:47 AM    MCHC 33.0 01/12/2024 05:47 AM    RDW 14.1 01/12/2024 05:47 AM     01/12/2024 05:47 AM    MPV 8.8 01/12/2024 05:47 AM     CMP:    Lab Results   Component Value Date/Time     01/12/2024 05:47 AM    K 3.6 01/12/2024 05:47 AM    K 5.5 06/01/2023 05:38 PM     01/12/2024 05:47 AM    CO2 25 01/12/2024 05:47 AM    BUN 9 01/12/2024 05:47 AM    CREATININE 0.6 01/12/2024 05:47 
[facility]  Internal Medicine Residency Program  Progress Note - House Team 1    Patient:  Magan Funes 53 y.o. male MRN: [id number]     Date of Service: 1/11/2024     CC: vomiting and diarrhea   Overnight events: none      Subjective     Patient seen and examined at bedside. Patient denies any nausea and vomiting. Urine culture positive for E.coli >10,000. Will do a post void residual bladder scan to see if the patient is retaining urine.    Objective     Physical Exam:  Vitals: BP (!) 149/90   Pulse 89   Temp 98.7 °F (37.1 °C) (Temporal)   Resp 16   Ht 1.676 m (5' 5.98\")   Wt 63.6 kg (140 lb 3.4 oz)   SpO2 98%   BMI 22.64 kg/m²     I & O - 24hr: I/O this shift:  In: -   Out: 850 [Urine:850]   General Appearance: alert, appears stated age, and cooperative  HEENT:  Head: Normal, normocephalic, atraumatic.  Neck: supple, symmetrical, trachea midline  Lung: rhonchi bilaterally  Heart: regular rate and rhythm and S1, S2 normal  Abdomen: soft, non-tender; bowel sounds normal; no masses,  no organomegaly  Extremities:  extremities normal, atraumatic, no cyanosis or edema  Musculokeletal: No joint swelling, no muscle tenderness. ROM normal in all joints of extremities.   Neurologic: Mental status: Alert, oriented, thought content appropriate  Subject  Pertinent Labs & Imaging Studies   chika  CBC:   Lab Results   Component Value Date/Time    WBC 14.1 01/11/2024 04:13 AM    RBC 3.99 01/11/2024 04:13 AM    HGB 11.2 01/11/2024 04:13 AM    HCT 34.1 01/11/2024 04:13 AM    MCV 85.5 01/11/2024 04:13 AM    MCH 28.1 01/11/2024 04:13 AM    MCHC 32.8 01/11/2024 04:13 AM    RDW 13.8 01/11/2024 04:13 AM     01/11/2024 04:13 AM    MPV 9.2 01/11/2024 04:13 AM     CMP:    Lab Results   Component Value Date/Time     01/11/2024 10:39 AM    K 3.5 01/11/2024 10:39 AM    K 5.5 06/01/2023 05:38 PM    CL 98 01/11/2024 10:39 AM    CO2 26 01/11/2024 10:39 AM    BUN 13 01/11/2024 10:39 AM    CREATININE 0.7 
[facility]  Internal Medicine Residency Program  Progress Note - House Team 1    Patient:  Mgaan Funes 53 y.o. male MRN: [id number]     Date of Service: 1/8/2024     CC: vomiting and diarrhea   Overnight events: none      Subjective     Patient seen and examined at bedside. Says that overnight he had  3 episodes of vomiting overnight, but the diarrhea resolved.  Patient's only concern is that he is very thirsty.  Will give him a bolus of fluid at this time.    Objective     Physical Exam:  Vitals: BP (!) 152/93   Pulse (!) 118   Temp 98.3 °F (36.8 °C) (Oral)   Resp 22   Wt 65.8 kg (145 lb)   SpO2 95%   BMI 23.40 kg/m²     I & O - 24hr: No intake/output data recorded.   General Appearance: alert, appears stated age, and cooperative  HEENT:  Head: Normal, normocephalic, atraumatic.  Neck: supple, symmetrical, trachea midline  Lung: rhonchi bilaterally  Heart: regular rate and rhythm and S1, S2 normal  Abdomen: soft, non-tender; bowel sounds normal; no masses,  no organomegaly  Extremities:  extremities normal, atraumatic, no cyanosis or edema  Musculokeletal: No joint swelling, no muscle tenderness. ROM normal in all joints of extremities.   Neurologic: Mental status: Alert, oriented, thought content appropriate  Subject  Pertinent Labs & Imaging Studies   chika  CBC:   Lab Results   Component Value Date/Time    WBC 22.8 01/08/2024 05:40 AM    RBC 4.12 01/08/2024 05:40 AM    HGB 11.8 01/08/2024 05:40 AM    HCT 36.2 01/08/2024 05:40 AM    MCV 87.9 01/08/2024 05:40 AM    MCH 28.6 01/08/2024 05:40 AM    MCHC 32.6 01/08/2024 05:40 AM    RDW 14.8 01/08/2024 05:40 AM     01/08/2024 05:40 AM    MPV 9.3 01/08/2024 05:40 AM     CMP:    Lab Results   Component Value Date/Time     01/08/2024 05:40 AM    K 4.0 01/08/2024 05:40 AM    K 5.5 06/01/2023 05:38 PM    CL 98 01/08/2024 05:40 AM    CO2 22 01/08/2024 05:40 AM    BUN 32 01/08/2024 05:40 AM    CREATININE 1.0 01/08/2024 05:40 AM    
Scale  Current BMI (kg/m2): 22.6  Usual Body Weight: 59.5 kg (131 lb 2.8 oz) (6/28/23-BS)  % Weight Change (Calculated): 6.9  Weight Adjustment For: No Adjustment                 BMI Categories: Normal Weight (BMI 18.5-24.9)    Estimated Daily Nutrient Needs:  Energy Requirements Based On: Formula  Weight Used for Energy Requirements: Current  Energy (kcal/day): 5841-6559  Weight Used for Protein Requirements: Current  Protein (g/day): 1.3-1.5g/kgxCBW=80-95g  Method Used for Fluid Requirements: 1 ml/kcal  Fluid (ml/day): 8978-1739    Nutrition Diagnosis:   Moderate malnutrition, In context of chronic illness related to inadequate protein-energy intake as evidenced by Criteria as identified in malnutrition assessment    Nutrition Interventions:   Food and/or Nutrient Delivery: Continue Current Diet, Start Oral Nutrition Supplement (Glucerna TID)  Nutrition Education/Counseling: Education not indicated  Coordination of Nutrition Care: Continue to monitor while inpatient       Goals:     Goals: other (specify)  Specify Other Goals: Oral intake >20% of meals/ONS.    Nutrition Monitoring and Evaluation:   Behavioral-Environmental Outcomes: None Identified  Food/Nutrient Intake Outcomes: Food and Nutrient Intake, Supplement Intake  Physical Signs/Symptoms Outcomes: Biochemical Data, Nutrition Focused Physical Findings, Chewing or Swallowing, Skin, Weight, Diarrhea, GI Status, Nausea or Vomiting, Fluid Status or Edema    Discharge Planning:    Continue Oral Nutrition Supplement     Mai Orosco RD, LD  Contact: [phone]    
through with safety techniques and functional independence  * Splinting/positioning for increased function, prevention of contractures, and improve skin integrity  * Therapeutic exercise to improve motor endurance, ROM, and functional strength for ADLs/functional transfers  * Therapeutic activities to facilitate/challenge dynamic balance, stand tolerance for increased safety and independence with ADLs  * Therapeutic activities to facilitate gross/fine motor skills for increased independence with ADLs  * Neuro-muscular re-education: facilitation of righting/equilibrium reactions, midline orientation, scapular stability/mobility, normalization of muscle tone, and facilitation of volitional active controled movement  * Positioning to improve skin integrity, interaction with environment and functional independence        Recommended Adaptive Equipment:  TBD      Home Living:  Pt lives in a 2nd floor apt (mother lives in the same complex) with 3+10 step(s) to enter and 2 rail(s);   Bathroom setup: walk in shower with shower chair   Equipment owned: shower chair, w/c, valorie walker     Prior Level of Function:  assist from mom   with ADLs ,    assist with IADLs. Ambulated with valorie walker, L AFO     Driving: no  Occupation/leisure: not stated     Pain Level: no c/o pain at this time.      Cognition: A&O: 4/4;   Follows multi step directions: good               Memory:  fair               Sequencing:  fair               Problem solving:  poor              Judgement/safety:  poor     Functional Assessment:   AM-PAC Daily Activity Raw Score: 12/24       Initial Eval Status  Date: 1/10/24 Treatment Status  Date: 1/14/24 STG=LTG  Time frame: 10-14 days   Feeding Minimal Assist   Set up, setup Modified [place]    Grooming Moderate Assist  Pt refused to complete washing face or brushing teeth.    Supervision    UB Dressing Moderate Assist   Doff/ki gown Mod A due to limited movement in LUE.    Supervision    LB Dressing 
Value Ref Range Status   01/07/2024 1.23 (H) 0.02 - 0.27 mmol/L Final   10/20/2023 0.32 (H) 0.02 - 0.27 mmol/L Final   07/15/2023 3.65 (H) 0.02 - 0.27 mmol/L Final     Lab Results   Component Value Date/Time    LABA1C 11.4 01/07/2024 05:13 PM    LABA1C 9.3 10/21/2023 05:00 AM    LABA1C 9.2 06/25/2023 05:01 AM     Lab Results   Component Value Date/Time    TSH 0.642 06/25/2023 05:01 AM    T4FREE 0.97 07/19/2012 02:00 AM     Lab Results   Component Value Date/Time    LABA1C 11.4 01/07/2024 05:13 PM    GLUCOSE 200 01/11/2024 10:39 AM    GLUCOSE 83 04/11/2012 03:35 AM    MALBCR 189.8 01/23/2023 11:07 PM    LABCREA 31.8 01/07/2024 05:27 PM     Lab Results   Component Value Date/Time    TRIG 48 06/25/2023 05:01 AM    HDL 44 06/25/2023 05:01 AM    LDLCALC 70 06/25/2023 05:01 AM    CHOL 124 06/25/2023 05:01 AM       Blood culture   Lab Results   Component Value Date/Time    BC 5 Days no growth 06/27/2023 07:59 AM    BC  06/25/2023 01:26 AM     This organism is only isolated from one set.  Susceptibility testing is not routinely performed.  Additional testing can be ordered by calling the  Microbiology Department.  Additional blood cultures may be obtained if  clinical suspicion of septicemia is high.         Radiology:  XR CHEST PORTABLE   Final Result   No acute process.             Medical Records/Labs/Images review:   I personally reviewed and summarized previous records   All labs and imaging were reviewed independently     ASSESSMENT & PLAN   Magan Funes, a 53 y.o.-old male seen today for inpatient diabetes management     Diabetes Mellitus type 1, admitted for DKA  Patient's diabetes is uncontrolled  We recommend the following diabetes regimen  Lantus 18 units nightly  Humalog 8 u with meals   Medium dose sliding scale ACHS  Will titrate insulin dose based on the blood glucose trend & insulin requirement  Will arrange for patient to be seen in endocrinology clinic upon discharge for routine diabetes maintenance and 
positive for increased HR from lying to standing  Monitor BP, may need medication adjustment (to be discussed)  On lisinopril and norvasc 10/10    DVT prophylaxis: Lovenox  Diet:   ADULT DIET; Regular; 4 carb choices (60 gm/meal)  ADULT ORAL NUTRITION SUPPLEMENT; Breakfast, Lunch, Dinner; Diabetic Oral Supplement   Bowel regimen: Glycolax PRN  Pain management: as needed  Code status: Full Code   Disposition: Transfer / Discharge / Continue Current Care  Family: updated as available    Stephon Shannon MD, PGY-1   Attending physician: Dr. Cline

## 2024-04-25 DIAGNOSIS — M79.10 MYALGIA: Primary | ICD-10-CM

## 2024-04-25 RX ORDER — BLOOD-GLUCOSE SENSOR
EACH MISCELLANEOUS
Qty: 3 EACH | Refills: 3 | Status: SHIPPED | OUTPATIENT
Start: 2024-04-25

## 2024-04-25 RX ORDER — CALCIUM CARB/VITAMIN D3/VIT K1 500-100-40
1 TABLET,CHEWABLE ORAL DAILY
Qty: 100 EACH | Refills: 3 | Status: SHIPPED | OUTPATIENT
Start: 2024-04-25

## 2024-04-25 RX ORDER — LISINOPRIL 10 MG/1
10 TABLET ORAL DAILY
Qty: 90 TABLET | Refills: 3 | Status: SHIPPED | OUTPATIENT
Start: 2024-04-25

## 2024-04-25 RX ORDER — INSULIN LISPRO 100 [IU]/ML
10 INJECTION, SOLUTION INTRAVENOUS; SUBCUTANEOUS
Qty: 5 ADJUSTABLE DOSE PRE-FILLED PEN SYRINGE | Refills: 3 | Status: SHIPPED | OUTPATIENT
Start: 2024-04-25

## 2024-04-25 RX ORDER — EZETIMIBE 10 MG/1
10 TABLET ORAL NIGHTLY
Qty: 90 TABLET | Refills: 3 | Status: SHIPPED | OUTPATIENT
Start: 2024-04-25

## 2024-04-25 RX ORDER — INSULIN GLARGINE 100 [IU]/ML
30 INJECTION, SOLUTION SUBCUTANEOUS NIGHTLY
Qty: 5 ADJUSTABLE DOSE PRE-FILLED PEN SYRINGE | Refills: 3 | Status: SHIPPED | OUTPATIENT
Start: 2024-04-25

## 2024-04-25 RX ORDER — ATORVASTATIN CALCIUM 80 MG/1
80 TABLET, FILM COATED ORAL NIGHTLY
Qty: 90 TABLET | Refills: 5 | Status: SHIPPED | OUTPATIENT
Start: 2024-04-25

## 2024-04-25 RX ORDER — TRAZODONE HYDROCHLORIDE 50 MG/1
50 TABLET ORAL NIGHTLY PRN
Qty: 90 TABLET | Refills: 3 | Status: SHIPPED | OUTPATIENT
Start: 2024-04-25

## 2024-04-25 RX ORDER — BACLOFEN 10 MG/1
10 TABLET ORAL 3 TIMES DAILY
Qty: 90 TABLET | Refills: 3 | Status: SHIPPED | OUTPATIENT
Start: 2024-04-25

## 2024-06-01 RX ORDER — CEPHALEXIN 500 MG/1
500 CAPSULE ORAL 3 TIMES DAILY
Qty: 21 CAPSULE | Refills: 0 | Status: SHIPPED | OUTPATIENT
Start: 2024-06-01

## 2024-06-23 ENCOUNTER — APPOINTMENT (OUTPATIENT)
Dept: CT IMAGING | Age: 54
DRG: 637 | End: 2024-06-23
Payer: MEDICARE

## 2024-06-23 ENCOUNTER — HOSPITAL ENCOUNTER (INPATIENT)
Age: 54
LOS: 9 days | Discharge: NURSING, REHAB OR LONG-TERM CARE | DRG: 637 | End: 2024-07-02
Attending: EMERGENCY MEDICINE | Admitting: INTERNAL MEDICINE
Payer: MEDICARE

## 2024-06-23 ENCOUNTER — APPOINTMENT (OUTPATIENT)
Dept: GENERAL RADIOLOGY | Age: 54
DRG: 637 | End: 2024-06-23
Payer: MEDICARE

## 2024-06-23 DIAGNOSIS — R13.10 DYSPHAGIA, UNSPECIFIED TYPE: ICD-10-CM

## 2024-06-23 DIAGNOSIS — N17.9 AKI (ACUTE KIDNEY INJURY) (HCC): ICD-10-CM

## 2024-06-23 DIAGNOSIS — E10.10 DIABETIC KETOACIDOSIS WITHOUT COMA ASSOCIATED WITH TYPE 1 DIABETES MELLITUS (HCC): Primary | ICD-10-CM

## 2024-06-23 LAB
ALBUMIN SERPL-MCNC: 3.9 G/DL (ref 3.5–5.2)
ALP SERPL-CCNC: 124 U/L (ref 40–129)
ALT SERPL-CCNC: 116 U/L (ref 0–40)
AMPHET UR QL SCN: NEGATIVE
ANION GAP SERPL CALCULATED.3IONS-SCNC: 27 MMOL/L (ref 7–16)
AST SERPL-CCNC: 39 U/L (ref 0–39)
B PARAP IS1001 DNA NPH QL NAA+NON-PROBE: NOT DETECTED
B PERT DNA SPEC QL NAA+PROBE: NOT DETECTED
B-OH-BUTYR SERPL-MCNC: >4.5 MMOL/L (ref 0.02–0.27)
B.E.: -10.1 MMOL/L (ref -3–3)
BACTERIA URNS QL MICRO: ABNORMAL
BARBITURATES UR QL SCN: NEGATIVE
BASOPHILS # BLD: 0.06 K/UL (ref 0–0.2)
BASOPHILS NFR BLD: 0 % (ref 0–2)
BENZODIAZ UR QL: NEGATIVE
BILIRUB SERPL-MCNC: 0.9 MG/DL (ref 0–1.2)
BILIRUB UR QL STRIP: ABNORMAL
BUN SERPL-MCNC: 39 MG/DL (ref 6–20)
BUPRENORPHINE UR QL: NEGATIVE
C PNEUM DNA NPH QL NAA+NON-PROBE: NOT DETECTED
CALCIUM SERPL-MCNC: 9 MG/DL (ref 8.6–10.2)
CANNABINOIDS UR QL SCN: NEGATIVE
CHLORIDE SERPL-SCNC: 93 MMOL/L (ref 98–107)
CLARITY UR: CLEAR
CO2 SERPL-SCNC: 13 MMOL/L (ref 22–29)
COCAINE UR QL SCN: NEGATIVE
COHB: 1.4 % (ref 0–1.5)
COLOR UR: YELLOW
CREAT SERPL-MCNC: 1.5 MG/DL (ref 0.7–1.2)
CRITICAL: ABNORMAL
DATE ANALYZED: ABNORMAL
DATE OF COLLECTION: ABNORMAL
EOSINOPHIL # BLD: 0.01 K/UL (ref 0.05–0.5)
EOSINOPHILS RELATIVE PERCENT: 0 % (ref 0–6)
ERYTHROCYTE [DISTWIDTH] IN BLOOD BY AUTOMATED COUNT: 13.9 % (ref 11.5–15)
FENTANYL UR QL: NEGATIVE
FLUAV RNA NPH QL NAA+NON-PROBE: NOT DETECTED
FLUBV RNA NPH QL NAA+NON-PROBE: NOT DETECTED
GFR, ESTIMATED: 56 ML/MIN/1.73M2
GLUCOSE BLD-MCNC: 447 MG/DL (ref 74–99)
GLUCOSE BLD-MCNC: >500 MG/DL (ref 74–99)
GLUCOSE SERPL-MCNC: 572 MG/DL (ref 74–99)
GLUCOSE UR STRIP-MCNC: >=1000 MG/DL
HADV DNA NPH QL NAA+NON-PROBE: NOT DETECTED
HCO3: 14.7 MMOL/L (ref 22–26)
HCOV 229E RNA NPH QL NAA+NON-PROBE: NOT DETECTED
HCOV HKU1 RNA NPH QL NAA+NON-PROBE: NOT DETECTED
HCOV NL63 RNA NPH QL NAA+NON-PROBE: NOT DETECTED
HCOV OC43 RNA NPH QL NAA+NON-PROBE: NOT DETECTED
HCT VFR BLD AUTO: 35.7 % (ref 37–54)
HGB BLD-MCNC: 11.3 G/DL (ref 12.5–16.5)
HGB UR QL STRIP.AUTO: NEGATIVE
HHB: 9.2 % (ref 0–5)
HMPV RNA NPH QL NAA+NON-PROBE: NOT DETECTED
HPIV1 RNA NPH QL NAA+NON-PROBE: NOT DETECTED
HPIV2 RNA NPH QL NAA+NON-PROBE: NOT DETECTED
HPIV3 RNA NPH QL NAA+NON-PROBE: NOT DETECTED
HPIV4 RNA NPH QL NAA+NON-PROBE: NOT DETECTED
IMM GRANULOCYTES # BLD AUTO: 0.08 K/UL (ref 0–0.58)
IMM GRANULOCYTES NFR BLD: 0 % (ref 0–5)
KETONES UR STRIP-MCNC: 40 MG/DL
LAB: ABNORMAL
LACTATE BLDV-SCNC: 1.4 MMOL/L (ref 0.5–1.9)
LEUKOCYTE ESTERASE UR QL STRIP: NEGATIVE
LYMPHOCYTES NFR BLD: 1.51 K/UL (ref 1.5–4)
LYMPHOCYTES RELATIVE PERCENT: 8 % (ref 20–42)
Lab: 2150
M PNEUMO DNA NPH QL NAA+NON-PROBE: NOT DETECTED
MCH RBC QN AUTO: 28 PG (ref 26–35)
MCHC RBC AUTO-ENTMCNC: 31.7 G/DL (ref 32–34.5)
MCV RBC AUTO: 88.6 FL (ref 80–99.9)
METHADONE UR QL: NEGATIVE
METHB: 0.4 % (ref 0–1.5)
MODE: ABNORMAL
MONOCYTES NFR BLD: 1.14 K/UL (ref 0.1–0.95)
MONOCYTES NFR BLD: 6 % (ref 2–12)
NEUTROPHILS NFR BLD: 85 % (ref 43–80)
NEUTS SEG NFR BLD: 16.31 K/UL (ref 1.8–7.3)
NITRITE UR QL STRIP: NEGATIVE
O2 SATURATION: 90.6 % (ref 92–98.5)
O2HB: 89 % (ref 94–97)
OPERATOR ID: 5100
OPIATES UR QL SCN: NEGATIVE
OXYCODONE UR QL SCN: NEGATIVE
PATIENT TEMP: 37 C
PCO2: 29.2 MMHG (ref 35–45)
PCP UR QL SCN: NEGATIVE
PH BLOOD GAS: 7.32 (ref 7.35–7.45)
PH UR STRIP: 6 [PH] (ref 5–9)
PLATELET # BLD AUTO: 449 K/UL (ref 130–450)
PMV BLD AUTO: 9.4 FL (ref 7–12)
PO2: 60.4 MMHG (ref 75–100)
POTASSIUM SERPL-SCNC: 5.1 MMOL/L (ref 3.5–5)
PROCALCITONIN SERPL-MCNC: 0.33 NG/ML (ref 0–0.08)
PROT SERPL-MCNC: 6.7 G/DL (ref 6.4–8.3)
PROT UR STRIP-MCNC: NEGATIVE MG/DL
RBC # BLD AUTO: 4.03 M/UL (ref 3.8–5.8)
RBC #/AREA URNS HPF: ABNORMAL /HPF
RSV RNA NPH QL NAA+NON-PROBE: NOT DETECTED
RV+EV RNA NPH QL NAA+NON-PROBE: NOT DETECTED
SARS-COV-2 RNA NPH QL NAA+NON-PROBE: NOT DETECTED
SODIUM SERPL-SCNC: 133 MMOL/L (ref 132–146)
SOURCE, BLOOD GAS: ABNORMAL
SP GR UR STRIP: 1.01 (ref 1–1.03)
SPECIMEN DESCRIPTION: NORMAL
TEST INFORMATION: NORMAL
THB: 12.5 G/DL (ref 11.5–16.5)
TIME ANALYZED: 2204
TROPONIN I SERPL HS-MCNC: NORMAL NG/L (ref 0–22)
TROPONIN INTERP: NORMAL
TROPONIN T SERPL-MCNC: NORMAL NG/ML
UROBILINOGEN UR STRIP-ACNC: 0.2 EU/DL (ref 0–1)
WBC #/AREA URNS HPF: ABNORMAL /HPF
WBC OTHER # BLD: 19.1 K/UL (ref 4.5–11.5)

## 2024-06-23 PROCEDURE — 80307 DRUG TEST PRSMV CHEM ANLYZR: CPT

## 2024-06-23 PROCEDURE — 84540 ASSAY OF URINE/UREA-N: CPT

## 2024-06-23 PROCEDURE — 85025 COMPLETE CBC W/AUTO DIFF WBC: CPT

## 2024-06-23 PROCEDURE — 2000000000 HC ICU R&B

## 2024-06-23 PROCEDURE — 80179 DRUG ASSAY SALICYLATE: CPT

## 2024-06-23 PROCEDURE — 82010 KETONE BODYS QUAN: CPT

## 2024-06-23 PROCEDURE — 99285 EMERGENCY DEPT VISIT HI MDM: CPT

## 2024-06-23 PROCEDURE — 82805 BLOOD GASES W/O2 SATURATION: CPT

## 2024-06-23 PROCEDURE — 80053 COMPREHEN METABOLIC PANEL: CPT

## 2024-06-23 PROCEDURE — 84145 PROCALCITONIN (PCT): CPT

## 2024-06-23 PROCEDURE — 83605 ASSAY OF LACTIC ACID: CPT

## 2024-06-23 PROCEDURE — 80143 DRUG ASSAY ACETAMINOPHEN: CPT

## 2024-06-23 PROCEDURE — 87040 BLOOD CULTURE FOR BACTERIA: CPT

## 2024-06-23 PROCEDURE — 81001 URINALYSIS AUTO W/SCOPE: CPT

## 2024-06-23 PROCEDURE — 74176 CT ABD & PELVIS W/O CONTRAST: CPT

## 2024-06-23 PROCEDURE — 82962 GLUCOSE BLOOD TEST: CPT

## 2024-06-23 PROCEDURE — 83036 HEMOGLOBIN GLYCOSYLATED A1C: CPT

## 2024-06-23 PROCEDURE — 82746 ASSAY OF FOLIC ACID SERUM: CPT

## 2024-06-23 PROCEDURE — 84300 ASSAY OF URINE SODIUM: CPT

## 2024-06-23 PROCEDURE — 84484 ASSAY OF TROPONIN QUANT: CPT

## 2024-06-23 PROCEDURE — 71045 X-RAY EXAM CHEST 1 VIEW: CPT

## 2024-06-23 PROCEDURE — 93005 ELECTROCARDIOGRAM TRACING: CPT

## 2024-06-23 PROCEDURE — 2580000003 HC RX 258

## 2024-06-23 PROCEDURE — 84443 ASSAY THYROID STIM HORMONE: CPT

## 2024-06-23 PROCEDURE — 83935 ASSAY OF URINE OSMOLALITY: CPT

## 2024-06-23 PROCEDURE — 82306 VITAMIN D 25 HYDROXY: CPT

## 2024-06-23 PROCEDURE — 6370000000 HC RX 637 (ALT 250 FOR IP)

## 2024-06-23 PROCEDURE — 87077 CULTURE AEROBIC IDENTIFY: CPT

## 2024-06-23 PROCEDURE — 82570 ASSAY OF URINE CREATININE: CPT

## 2024-06-23 PROCEDURE — 0202U NFCT DS 22 TRGT SARS-COV-2: CPT

## 2024-06-23 PROCEDURE — 87154 CUL TYP ID BLD PTHGN 6+ TRGT: CPT

## 2024-06-23 PROCEDURE — 84439 ASSAY OF FREE THYROXINE: CPT

## 2024-06-23 PROCEDURE — 82607 VITAMIN B-12: CPT

## 2024-06-23 PROCEDURE — G0480 DRUG TEST DEF 1-7 CLASSES: HCPCS

## 2024-06-23 RX ORDER — EZETIMIBE 10 MG/1
10 TABLET ORAL NIGHTLY
Status: DISCONTINUED | OUTPATIENT
Start: 2024-06-23 | End: 2024-07-02 | Stop reason: HOSPADM

## 2024-06-23 RX ORDER — 0.9 % SODIUM CHLORIDE 0.9 %
15 INTRAVENOUS SOLUTION INTRAVENOUS ONCE
Status: COMPLETED | OUTPATIENT
Start: 2024-06-23 | End: 2024-06-23

## 2024-06-23 RX ORDER — AMLODIPINE BESYLATE 10 MG/1
10 TABLET ORAL DAILY
Status: DISCONTINUED | OUTPATIENT
Start: 2024-06-24 | End: 2024-07-02 | Stop reason: HOSPADM

## 2024-06-23 RX ORDER — SODIUM CHLORIDE 9 MG/ML
INJECTION, SOLUTION INTRAVENOUS PRN
Status: DISCONTINUED | OUTPATIENT
Start: 2024-06-23 | End: 2024-07-02 | Stop reason: HOSPADM

## 2024-06-23 RX ORDER — 0.9 % SODIUM CHLORIDE 0.9 %
1000 INTRAVENOUS SOLUTION INTRAVENOUS ONCE
Status: COMPLETED | OUTPATIENT
Start: 2024-06-23 | End: 2024-06-23

## 2024-06-23 RX ORDER — POLYETHYLENE GLYCOL 3350 17 G/17G
17 POWDER, FOR SOLUTION ORAL DAILY PRN
Status: DISCONTINUED | OUTPATIENT
Start: 2024-06-23 | End: 2024-07-02 | Stop reason: HOSPADM

## 2024-06-23 RX ORDER — DEXTROSE MONOHYDRATE AND SODIUM CHLORIDE 5; .45 G/100ML; G/100ML
INJECTION, SOLUTION INTRAVENOUS CONTINUOUS PRN
Status: DISCONTINUED | OUTPATIENT
Start: 2024-06-23 | End: 2024-06-29 | Stop reason: SDUPTHER

## 2024-06-23 RX ORDER — ONDANSETRON 4 MG/1
4 TABLET, ORALLY DISINTEGRATING ORAL EVERY 8 HOURS PRN
Status: DISCONTINUED | OUTPATIENT
Start: 2024-06-23 | End: 2024-07-02 | Stop reason: HOSPADM

## 2024-06-23 RX ORDER — ATORVASTATIN CALCIUM 40 MG/1
80 TABLET, FILM COATED ORAL NIGHTLY
Status: DISCONTINUED | OUTPATIENT
Start: 2024-06-23 | End: 2024-07-02 | Stop reason: HOSPADM

## 2024-06-23 RX ORDER — ONDANSETRON 2 MG/ML
4 INJECTION INTRAMUSCULAR; INTRAVENOUS EVERY 6 HOURS PRN
Status: DISCONTINUED | OUTPATIENT
Start: 2024-06-23 | End: 2024-07-02 | Stop reason: HOSPADM

## 2024-06-23 RX ORDER — POTASSIUM CHLORIDE 7.45 MG/ML
10 INJECTION INTRAVENOUS PRN
Status: DISCONTINUED | OUTPATIENT
Start: 2024-06-23 | End: 2024-06-25

## 2024-06-23 RX ORDER — SODIUM CHLORIDE 450 MG/100ML
INJECTION, SOLUTION INTRAVENOUS CONTINUOUS
Status: DISCONTINUED | OUTPATIENT
Start: 2024-06-23 | End: 2024-06-25

## 2024-06-23 RX ORDER — SODIUM CHLORIDE 0.9 % (FLUSH) 0.9 %
10 SYRINGE (ML) INJECTION PRN
Status: DISCONTINUED | OUTPATIENT
Start: 2024-06-23 | End: 2024-07-02 | Stop reason: HOSPADM

## 2024-06-23 RX ORDER — ACETAMINOPHEN 325 MG/1
650 TABLET ORAL EVERY 6 HOURS PRN
Status: DISCONTINUED | OUTPATIENT
Start: 2024-06-23 | End: 2024-07-02 | Stop reason: HOSPADM

## 2024-06-23 RX ORDER — SODIUM CHLORIDE 0.9 % (FLUSH) 0.9 %
5-40 SYRINGE (ML) INJECTION EVERY 12 HOURS SCHEDULED
Status: DISCONTINUED | OUTPATIENT
Start: 2024-06-23 | End: 2024-07-02 | Stop reason: HOSPADM

## 2024-06-23 RX ORDER — DULOXETIN HYDROCHLORIDE 30 MG/1
30 CAPSULE, DELAYED RELEASE ORAL DAILY
Status: DISCONTINUED | OUTPATIENT
Start: 2024-06-24 | End: 2024-07-02 | Stop reason: HOSPADM

## 2024-06-23 RX ORDER — ACETAMINOPHEN 650 MG/1
650 SUPPOSITORY RECTAL EVERY 6 HOURS PRN
Status: DISCONTINUED | OUTPATIENT
Start: 2024-06-23 | End: 2024-07-02 | Stop reason: HOSPADM

## 2024-06-23 RX ORDER — ENOXAPARIN SODIUM 100 MG/ML
40 INJECTION SUBCUTANEOUS DAILY
Status: DISCONTINUED | OUTPATIENT
Start: 2024-06-24 | End: 2024-07-02 | Stop reason: HOSPADM

## 2024-06-23 RX ORDER — MAGNESIUM SULFATE IN WATER 40 MG/ML
2000 INJECTION, SOLUTION INTRAVENOUS PRN
Status: DISCONTINUED | OUTPATIENT
Start: 2024-06-23 | End: 2024-06-25

## 2024-06-23 RX ORDER — LISINOPRIL 10 MG/1
10 TABLET ORAL DAILY
Status: DISCONTINUED | OUTPATIENT
Start: 2024-06-24 | End: 2024-07-02 | Stop reason: HOSPADM

## 2024-06-23 RX ORDER — ASPIRIN 81 MG/1
81 TABLET ORAL DAILY
Status: DISCONTINUED | OUTPATIENT
Start: 2024-06-24 | End: 2024-06-27

## 2024-06-23 RX ORDER — SODIUM CHLORIDE 0.9 % (FLUSH) 0.9 %
5-40 SYRINGE (ML) INJECTION PRN
Status: DISCONTINUED | OUTPATIENT
Start: 2024-06-23 | End: 2024-07-02 | Stop reason: HOSPADM

## 2024-06-23 RX ADMIN — SODIUM CHLORIDE 7.7 UNITS/HR: 9 INJECTION, SOLUTION INTRAVENOUS at 23:12

## 2024-06-23 RX ADMIN — SODIUM CHLORIDE 1000 ML: 9 INJECTION, SOLUTION INTRAVENOUS at 21:44

## 2024-06-23 RX ADMIN — SODIUM CHLORIDE 1000 ML: 9 INJECTION, SOLUTION INTRAVENOUS at 21:59

## 2024-06-23 RX ADMIN — SODIUM CHLORIDE 1000 ML: 9 INJECTION, SOLUTION INTRAVENOUS at 23:08

## 2024-06-23 ASSESSMENT — PAIN - FUNCTIONAL ASSESSMENT: PAIN_FUNCTIONAL_ASSESSMENT: NONE - DENIES PAIN

## 2024-06-24 LAB
25(OH)D3 SERPL-MCNC: 18.7 NG/ML (ref 30–100)
AMPHET UR QL SCN: NEGATIVE
ANION GAP SERPL CALCULATED.3IONS-SCNC: 11 MMOL/L (ref 7–16)
ANION GAP SERPL CALCULATED.3IONS-SCNC: 14 MMOL/L (ref 7–16)
ANION GAP SERPL CALCULATED.3IONS-SCNC: 20 MMOL/L (ref 7–16)
ANION GAP SERPL CALCULATED.3IONS-SCNC: 9 MMOL/L (ref 7–16)
APAP SERPL-MCNC: <5 UG/ML (ref 10–30)
BARBITURATES UR QL SCN: NEGATIVE
BASOPHILS # BLD: 0.06 K/UL (ref 0–0.2)
BASOPHILS NFR BLD: 0 % (ref 0–2)
BENZODIAZ UR QL: NEGATIVE
BUN SERPL-MCNC: 11 MG/DL (ref 6–20)
BUN SERPL-MCNC: 13 MG/DL (ref 6–20)
BUN SERPL-MCNC: 18 MG/DL (ref 6–20)
BUN SERPL-MCNC: 21 MG/DL (ref 6–20)
BUN SERPL-MCNC: 25 MG/DL (ref 6–20)
BUN SERPL-MCNC: 30 MG/DL (ref 6–20)
BUPRENORPHINE UR QL: NEGATIVE
CALCIUM SERPL-MCNC: 8 MG/DL (ref 8.6–10.2)
CALCIUM SERPL-MCNC: 8.2 MG/DL (ref 8.6–10.2)
CALCIUM SERPL-MCNC: 8.3 MG/DL (ref 8.6–10.2)
CALCIUM SERPL-MCNC: 8.4 MG/DL (ref 8.6–10.2)
CALCIUM SERPL-MCNC: 8.4 MG/DL (ref 8.6–10.2)
CALCIUM SERPL-MCNC: 8.6 MG/DL (ref 8.6–10.2)
CANNABINOIDS UR QL SCN: NEGATIVE
CHLORIDE SERPL-SCNC: 104 MMOL/L (ref 98–107)
CHLORIDE SERPL-SCNC: 105 MMOL/L (ref 98–107)
CHLORIDE SERPL-SCNC: 105 MMOL/L (ref 98–107)
CHLORIDE SERPL-SCNC: 106 MMOL/L (ref 98–107)
CHLORIDE SERPL-SCNC: 107 MMOL/L (ref 98–107)
CHLORIDE SERPL-SCNC: 109 MMOL/L (ref 98–107)
CO2 SERPL-SCNC: 15 MMOL/L (ref 22–29)
CO2 SERPL-SCNC: 18 MMOL/L (ref 22–29)
CO2 SERPL-SCNC: 21 MMOL/L (ref 22–29)
CO2 SERPL-SCNC: 21 MMOL/L (ref 22–29)
CO2 SERPL-SCNC: 23 MMOL/L (ref 22–29)
CO2 SERPL-SCNC: 23 MMOL/L (ref 22–29)
COCAINE UR QL SCN: NEGATIVE
CREAT SERPL-MCNC: 0.7 MG/DL (ref 0.7–1.2)
CREAT SERPL-MCNC: 0.7 MG/DL (ref 0.7–1.2)
CREAT SERPL-MCNC: 0.8 MG/DL (ref 0.7–1.2)
CREAT SERPL-MCNC: 0.8 MG/DL (ref 0.7–1.2)
CREAT SERPL-MCNC: 0.9 MG/DL (ref 0.7–1.2)
CREAT SERPL-MCNC: 1.2 MG/DL (ref 0.7–1.2)
CREAT UR-MCNC: 41.7 MG/DL (ref 40–278)
EKG ATRIAL RATE: 95 BPM
EKG P AXIS: 57 DEGREES
EKG P-R INTERVAL: 146 MS
EKG Q-T INTERVAL: 362 MS
EKG QRS DURATION: 88 MS
EKG QTC CALCULATION (BAZETT): 454 MS
EKG R AXIS: 27 DEGREES
EKG T AXIS: 41 DEGREES
EKG VENTRICULAR RATE: 95 BPM
EOSINOPHIL # BLD: 0.21 K/UL (ref 0.05–0.5)
EOSINOPHILS RELATIVE PERCENT: 1 % (ref 0–6)
ERYTHROCYTE [DISTWIDTH] IN BLOOD BY AUTOMATED COUNT: 13.7 % (ref 11.5–15)
ETHANOLAMINE SERPL-MCNC: <10 MG/DL (ref 0–0.08)
FENTANYL UR QL: NEGATIVE
FOLATE SERPL-MCNC: >20 NG/ML (ref 4.8–24.2)
GFR, ESTIMATED: 75 ML/MIN/1.73M2
GFR, ESTIMATED: >90 ML/MIN/1.73M2
GLUCOSE BLD-MCNC: 119 MG/DL (ref 74–99)
GLUCOSE BLD-MCNC: 130 MG/DL (ref 74–99)
GLUCOSE BLD-MCNC: 132 MG/DL (ref 74–99)
GLUCOSE BLD-MCNC: 138 MG/DL (ref 74–99)
GLUCOSE BLD-MCNC: 142 MG/DL (ref 74–99)
GLUCOSE BLD-MCNC: 143 MG/DL (ref 74–99)
GLUCOSE BLD-MCNC: 147 MG/DL (ref 74–99)
GLUCOSE BLD-MCNC: 152 MG/DL (ref 74–99)
GLUCOSE BLD-MCNC: 166 MG/DL (ref 74–99)
GLUCOSE BLD-MCNC: 179 MG/DL (ref 74–99)
GLUCOSE BLD-MCNC: 190 MG/DL (ref 74–99)
GLUCOSE BLD-MCNC: 198 MG/DL (ref 74–99)
GLUCOSE BLD-MCNC: 207 MG/DL (ref 74–99)
GLUCOSE BLD-MCNC: 209 MG/DL (ref 74–99)
GLUCOSE BLD-MCNC: 225 MG/DL (ref 74–99)
GLUCOSE BLD-MCNC: 249 MG/DL (ref 74–99)
GLUCOSE BLD-MCNC: 286 MG/DL (ref 74–99)
GLUCOSE BLD-MCNC: 307 MG/DL (ref 74–99)
GLUCOSE BLD-MCNC: 358 MG/DL (ref 74–99)
GLUCOSE BLD-MCNC: 367 MG/DL (ref 74–99)
GLUCOSE BLD-MCNC: 90 MG/DL (ref 74–99)
GLUCOSE SERPL-MCNC: 110 MG/DL (ref 74–99)
GLUCOSE SERPL-MCNC: 134 MG/DL (ref 74–99)
GLUCOSE SERPL-MCNC: 147 MG/DL (ref 74–99)
GLUCOSE SERPL-MCNC: 177 MG/DL (ref 74–99)
GLUCOSE SERPL-MCNC: 220 MG/DL (ref 74–99)
GLUCOSE SERPL-MCNC: 292 MG/DL (ref 74–99)
HBA1C MFR BLD: 12.5 % (ref 4–5.6)
HCT VFR BLD AUTO: 33 % (ref 37–54)
HGB BLD-MCNC: 10.9 G/DL (ref 12.5–16.5)
IMM GRANULOCYTES # BLD AUTO: 0.08 K/UL (ref 0–0.58)
IMM GRANULOCYTES NFR BLD: 1 % (ref 0–5)
LACTATE BLDV-SCNC: 1.5 MMOL/L (ref 0.5–1.9)
LYMPHOCYTES NFR BLD: 2.6 K/UL (ref 1.5–4)
LYMPHOCYTES RELATIVE PERCENT: 15 % (ref 20–42)
MAGNESIUM SERPL-MCNC: 1.5 MG/DL (ref 1.6–2.6)
MAGNESIUM SERPL-MCNC: 1.6 MG/DL (ref 1.6–2.6)
MAGNESIUM SERPL-MCNC: 1.8 MG/DL (ref 1.6–2.6)
MAGNESIUM SERPL-MCNC: 1.9 MG/DL (ref 1.6–2.6)
MAGNESIUM SERPL-MCNC: 2 MG/DL (ref 1.6–2.6)
MAGNESIUM SERPL-MCNC: 2.2 MG/DL (ref 1.6–2.6)
MCH RBC QN AUTO: 28.4 PG (ref 26–35)
MCHC RBC AUTO-ENTMCNC: 33 G/DL (ref 32–34.5)
MCV RBC AUTO: 85.9 FL (ref 80–99.9)
METHADONE UR QL: NEGATIVE
MONOCYTES NFR BLD: 1.43 K/UL (ref 0.1–0.95)
MONOCYTES NFR BLD: 8 % (ref 2–12)
NEUTROPHILS NFR BLD: 75 % (ref 43–80)
NEUTS SEG NFR BLD: 12.89 K/UL (ref 1.8–7.3)
OPIATES UR QL SCN: NEGATIVE
OSMOLALITY UR: 457 MOSM/KG (ref 300–900)
OXYCODONE UR QL SCN: NEGATIVE
PCP UR QL SCN: NEGATIVE
PHOSPHATE SERPL-MCNC: 1.8 MG/DL (ref 2.5–4.5)
PHOSPHATE SERPL-MCNC: 2 MG/DL (ref 2.5–4.5)
PHOSPHATE SERPL-MCNC: 2 MG/DL (ref 2.5–4.5)
PHOSPHATE SERPL-MCNC: 2.4 MG/DL (ref 2.5–4.5)
PHOSPHATE SERPL-MCNC: 2.9 MG/DL (ref 2.5–4.5)
PHOSPHATE SERPL-MCNC: 3.1 MG/DL (ref 2.5–4.5)
PLATELET # BLD AUTO: 425 K/UL (ref 130–450)
PMV BLD AUTO: 9 FL (ref 7–12)
POTASSIUM SERPL-SCNC: 3.8 MMOL/L (ref 3.5–5)
POTASSIUM SERPL-SCNC: 3.9 MMOL/L (ref 3.5–5)
POTASSIUM SERPL-SCNC: 4 MMOL/L (ref 3.5–5)
POTASSIUM SERPL-SCNC: 4.1 MMOL/L (ref 3.5–5)
POTASSIUM SERPL-SCNC: 4.1 MMOL/L (ref 3.5–5)
POTASSIUM SERPL-SCNC: 4.2 MMOL/L (ref 3.5–5)
PREALB SERPL-MCNC: 10 MG/DL (ref 20–40)
RBC # BLD AUTO: 3.84 M/UL (ref 3.8–5.8)
SALICYLATES SERPL-MCNC: <0.3 MG/DL (ref 0–30)
SODIUM SERPL-SCNC: 135 MMOL/L (ref 132–146)
SODIUM SERPL-SCNC: 136 MMOL/L (ref 132–146)
SODIUM SERPL-SCNC: 138 MMOL/L (ref 132–146)
SODIUM SERPL-SCNC: 139 MMOL/L (ref 132–146)
SODIUM SERPL-SCNC: 140 MMOL/L (ref 132–146)
SODIUM SERPL-SCNC: 141 MMOL/L (ref 132–146)
SODIUM UR-SCNC: 32 MMOL/L
T4 FREE SERPL-MCNC: 1.4 NG/DL (ref 0.9–1.7)
TEST INFORMATION: NORMAL
TOXIC TRICYCLIC SC,BLOOD: NEGATIVE
TROPONIN I SERPL HS-MCNC: 87 NG/L (ref 0–11)
TROPONIN I SERPL HS-MCNC: 88 NG/L (ref 0–11)
TSH SERPL DL<=0.05 MIU/L-ACNC: 0.63 UIU/ML (ref 0.27–4.2)
UUN UR-MCNC: 302 MG/DL (ref 800–1666)
VIT B12 SERPL-MCNC: 880 PG/ML (ref 211–946)
WBC OTHER # BLD: 17.3 K/UL (ref 4.5–11.5)

## 2024-06-24 PROCEDURE — 2580000003 HC RX 258

## 2024-06-24 PROCEDURE — 82962 GLUCOSE BLOOD TEST: CPT

## 2024-06-24 PROCEDURE — 2500000003 HC RX 250 WO HCPCS

## 2024-06-24 PROCEDURE — 6370000000 HC RX 637 (ALT 250 FOR IP)

## 2024-06-24 PROCEDURE — 84484 ASSAY OF TROPONIN QUANT: CPT

## 2024-06-24 PROCEDURE — 80048 BASIC METABOLIC PNL TOTAL CA: CPT

## 2024-06-24 PROCEDURE — 93010 ELECTROCARDIOGRAM REPORT: CPT | Performed by: INTERNAL MEDICINE

## 2024-06-24 PROCEDURE — 6360000002 HC RX W HCPCS

## 2024-06-24 PROCEDURE — 84100 ASSAY OF PHOSPHORUS: CPT

## 2024-06-24 PROCEDURE — C9113 INJ PANTOPRAZOLE SODIUM, VIA: HCPCS

## 2024-06-24 PROCEDURE — 99221 1ST HOSP IP/OBS SF/LOW 40: CPT | Performed by: INTERNAL MEDICINE

## 2024-06-24 PROCEDURE — 85025 COMPLETE CBC W/AUTO DIFF WBC: CPT

## 2024-06-24 PROCEDURE — 84134 ASSAY OF PREALBUMIN: CPT

## 2024-06-24 PROCEDURE — 83735 ASSAY OF MAGNESIUM: CPT

## 2024-06-24 PROCEDURE — 2000000000 HC ICU R&B

## 2024-06-24 PROCEDURE — 99291 CRITICAL CARE FIRST HOUR: CPT | Performed by: INTERNAL MEDICINE

## 2024-06-24 RX ORDER — CALCIUM GLUCONATE 10 MG/ML
1000 INJECTION, SOLUTION INTRAVENOUS ONCE
Status: COMPLETED | OUTPATIENT
Start: 2024-06-24 | End: 2024-06-24

## 2024-06-24 RX ORDER — VITAMIN B COMPLEX
1000 TABLET ORAL DAILY
Status: DISCONTINUED | OUTPATIENT
Start: 2024-06-24 | End: 2024-07-02 | Stop reason: HOSPADM

## 2024-06-24 RX ORDER — DEXTROSE MONOHYDRATE 100 MG/ML
INJECTION, SOLUTION INTRAVENOUS CONTINUOUS PRN
Status: DISCONTINUED | OUTPATIENT
Start: 2024-06-24 | End: 2024-07-02 | Stop reason: HOSPADM

## 2024-06-24 RX ORDER — GLUCAGON 1 MG/ML
1 KIT INJECTION PRN
Status: DISCONTINUED | OUTPATIENT
Start: 2024-06-24 | End: 2024-07-02 | Stop reason: HOSPADM

## 2024-06-24 RX ORDER — METOCLOPRAMIDE HYDROCHLORIDE 5 MG/ML
10 INJECTION INTRAMUSCULAR; INTRAVENOUS ONCE
Status: COMPLETED | OUTPATIENT
Start: 2024-06-24 | End: 2024-06-25

## 2024-06-24 RX ORDER — INSULIN GLARGINE 100 [IU]/ML
30 INJECTION, SOLUTION SUBCUTANEOUS ONCE
Status: COMPLETED | OUTPATIENT
Start: 2024-06-24 | End: 2024-06-24

## 2024-06-24 RX ORDER — INSULIN GLARGINE 100 [IU]/ML
INJECTION, SOLUTION SUBCUTANEOUS NIGHTLY
Status: CANCELLED | OUTPATIENT
Start: 2024-06-24

## 2024-06-24 RX ORDER — ERGOCALCIFEROL 1.25 MG/1
50000 CAPSULE ORAL WEEKLY
Status: DISCONTINUED | OUTPATIENT
Start: 2024-06-24 | End: 2024-07-02 | Stop reason: HOSPADM

## 2024-06-24 RX ADMIN — EZETIMIBE 10 MG: 10 TABLET ORAL at 02:17

## 2024-06-24 RX ADMIN — ATORVASTATIN CALCIUM 80 MG: 80 TABLET, FILM COATED ORAL at 00:02

## 2024-06-24 RX ADMIN — POTASSIUM CHLORIDE 10 MEQ: 7.46 INJECTION, SOLUTION INTRAVENOUS at 04:00

## 2024-06-24 RX ADMIN — POTASSIUM CHLORIDE 10 MEQ: 7.46 INJECTION, SOLUTION INTRAVENOUS at 14:47

## 2024-06-24 RX ADMIN — SODIUM CHLORIDE, PRESERVATIVE FREE 10 ML: 5 INJECTION INTRAVENOUS at 20:45

## 2024-06-24 RX ADMIN — ERGOCALCIFEROL 50000 UNITS: 1.25 CAPSULE ORAL at 10:20

## 2024-06-24 RX ADMIN — POTASSIUM CHLORIDE 10 MEQ: 7.46 INJECTION, SOLUTION INTRAVENOUS at 21:46

## 2024-06-24 RX ADMIN — SODIUM CHLORIDE, PRESERVATIVE FREE 10 ML: 5 INJECTION INTRAVENOUS at 00:05

## 2024-06-24 RX ADMIN — INSULIN GLARGINE 30 UNITS: 100 INJECTION, SOLUTION SUBCUTANEOUS at 14:17

## 2024-06-24 RX ADMIN — POTASSIUM CHLORIDE 10 MEQ: 7.46 INJECTION, SOLUTION INTRAVENOUS at 23:02

## 2024-06-24 RX ADMIN — DULOXETINE HYDROCHLORIDE 30 MG: 30 CAPSULE, DELAYED RELEASE ORAL at 10:21

## 2024-06-24 RX ADMIN — DEXTROSE AND SODIUM CHLORIDE: 5; 450 INJECTION, SOLUTION INTRAVENOUS at 17:56

## 2024-06-24 RX ADMIN — POTASSIUM CHLORIDE 10 MEQ: 7.46 INJECTION, SOLUTION INTRAVENOUS at 05:51

## 2024-06-24 RX ADMIN — SODIUM CHLORIDE, PRESERVATIVE FREE 40 MG: 5 INJECTION INTRAVENOUS at 10:19

## 2024-06-24 RX ADMIN — ATORVASTATIN CALCIUM 80 MG: 80 TABLET, FILM COATED ORAL at 20:44

## 2024-06-24 RX ADMIN — POTASSIUM CHLORIDE 10 MEQ: 7.46 INJECTION, SOLUTION INTRAVENOUS at 17:30

## 2024-06-24 RX ADMIN — ENOXAPARIN SODIUM 40 MG: 100 INJECTION SUBCUTANEOUS at 10:19

## 2024-06-24 RX ADMIN — PIPERACILLIN AND TAZOBACTAM 4500 MG: 4; .5 INJECTION, POWDER, FOR SOLUTION INTRAVENOUS at 20:48

## 2024-06-24 RX ADMIN — AMLODIPINE BESYLATE 10 MG: 10 TABLET ORAL at 10:26

## 2024-06-24 RX ADMIN — POTASSIUM CHLORIDE 10 MEQ: 7.46 INJECTION, SOLUTION INTRAVENOUS at 10:46

## 2024-06-24 RX ADMIN — ONDANSETRON 4 MG: 2 INJECTION INTRAMUSCULAR; INTRAVENOUS at 14:21

## 2024-06-24 RX ADMIN — SODIUM PHOSPHATE, MONOBASIC, MONOHYDRATE AND SODIUM PHOSPHATE, DIBASIC, ANHYDROUS 15 MMOL: 142; 276 INJECTION, SOLUTION INTRAVENOUS at 11:09

## 2024-06-24 RX ADMIN — PIPERACILLIN AND TAZOBACTAM 4500 MG: 4; .5 INJECTION, POWDER, FOR SOLUTION INTRAVENOUS at 13:49

## 2024-06-24 RX ADMIN — CALCIUM GLUCONATE 1000 MG: 10 INJECTION, SOLUTION INTRAVENOUS at 10:25

## 2024-06-24 RX ADMIN — POTASSIUM CHLORIDE 10 MEQ: 7.46 INJECTION, SOLUTION INTRAVENOUS at 04:50

## 2024-06-24 RX ADMIN — SODIUM CHLORIDE, PRESERVATIVE FREE 10 ML: 5 INJECTION INTRAVENOUS at 10:26

## 2024-06-24 RX ADMIN — Medication 1000 UNITS: at 10:20

## 2024-06-24 RX ADMIN — ONDANSETRON 4 MG: 2 INJECTION INTRAMUSCULAR; INTRAVENOUS at 04:43

## 2024-06-24 RX ADMIN — ASPIRIN 81 MG: 81 TABLET, COATED ORAL at 10:20

## 2024-06-24 RX ADMIN — DEXTROSE AND SODIUM CHLORIDE: 5; 450 INJECTION, SOLUTION INTRAVENOUS at 10:44

## 2024-06-24 RX ADMIN — LISINOPRIL 10 MG: 10 TABLET ORAL at 10:27

## 2024-06-24 RX ADMIN — SODIUM CHLORIDE 0.4 UNITS/HR: 9 INJECTION, SOLUTION INTRAVENOUS at 14:50

## 2024-06-24 RX ADMIN — SODIUM CHLORIDE: 4.5 INJECTION, SOLUTION INTRAVENOUS at 00:06

## 2024-06-24 RX ADMIN — EZETIMIBE 10 MG: 10 TABLET ORAL at 20:56

## 2024-06-24 RX ADMIN — DEXTROSE AND SODIUM CHLORIDE: 5; 450 INJECTION, SOLUTION INTRAVENOUS at 04:06

## 2024-06-24 NOTE — ACP (ADVANCE CARE PLANNING)
Advance Care Planning   Healthcare Decision Maker:    Primary Decision Maker: Rain Funes - Parent - [phone]    Secondary Decision Maker: Mat Funes - Brother/Sister - [phone]    Click here to complete Healthcare Decision Makers including selection of the Healthcare Decision Maker Relationship (ie \"Primary\").

## 2024-06-24 NOTE — ED PROVIDER NOTES
diabetes mellitus (HCC)    2. KIRSTEN (acute kidney injury) ([facility])      DISPOSITION  Disposition: Admit to CCU/ICU  Patient condition is serious    NOTE: This report was transcribed using voice recognition software. Every effort was made to ensure accuracy; however, inadvertent computerized transcription errors may be present  ATTENDING PROVIDER ATTESTATION:     I have personally performed and/or participated in the history, exam, medical decision making, and procedures and agree with all pertinent clinical information.      I have also reviewed and agree with the past medical, family and social history unless otherwise noted.    I have discussed this patient in detail with the resident, and provided the instruction and education regarding hyperglycemia.    My findings/Plan: I was the primary provider for patient.  Patient with history of subs abuse history of hypertension history of diabetes history of stroke history of proximal atrial fibrillation presenting here because of elevated blood sugar also reports recent diagnosis of UTI.  Patient reporting nausea vomiting.  Patient reporting no fever no chills he reports no chest pain he reports no active abdominal pain.  Patient reporting no fall or injury.  Patient reporting no dizziness or lightheadedness.  Patient reporting no productive cough.  Patient does report history of tobacco as well as marijuana use.  Patient here is awake alert oriented x 3 heart exam normal lungs are clear abdomen soft nontender patient able to move extremities he has no edema.  Patient stated has a history of tobacco use patient was last seen on 1/7/2024 for acute kidney injury.  Patient was admitted at that time.  Echo from 6/3/2023 showed ejection fraction of 60 to 65%.  Patient EKG done today rate of 95 no acute ST elevation.  It was compared to EKG from 1/7/2024 other than prior EKG having sinus tachycardia and nonspecific ST changes this EKG looks improved I do agree with interpretation

## 2024-06-24 NOTE — CONSULTS
[facility]  Internal Medicine Residency Program  CONSULT NOTE  MICU    Patient:  Magan Funes 54 y.o. male MRN: [id number]     Date of Service: 6/24/2024    Hospital Day: 2      Chief complaint: Nausea, vomiting, hyperglycemia   History of Present Illness   The patient is a 54 y.o. 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 coli UTI, history of cardiac arrest in 2020, history of polysubstance abuse, presented with nausea vomiting for the past 3 days.  He was not able to keep food or water down for the past 3 days, he denied blood in his vomit.  He denied fever or chills, denied cough, no chest pain or shortness of breath.  He denied urinary symptoms, no urgency or frequency, no burning or pain with urination.  He measured his blood glucose at home in this afternoon and found it was around 600, he called EMS and was sent to ED. patient comes from home, he lives together with his mother, at baseline he is alert and oriented to 3, not ambulatory, his mother is primary caregiver.      In the ED, temperature normal, /67, heart rate between , saturating well on room air.  BG over 500, with repeat 447.  BHB 4.5. K5.1, bicarb 13, creatinine 1.5, AG 27, lactic acid 1.4.  WBC 19.1, Pro-Nathaniel 0.33, hemoglobin 11.3, ABG showed pH 7.319/29.2/60.4/14.7.  Respiratory panel was negative, UDS was negative, UA was negative. CXR showed No clear acute cardiopulmonary process on portable exam. CT AP showed No renal calculus or renal or bowel obstruction.         ED Course:  Pt had N/V for past 3 days, measure BG at home around 600s, called EMS   ED Meds: Patient was given insulin drip  ED Fluids: Patient was given 2L bolus     Past Medical History:      Diagnosis Date    A-fib (HCC)     Alcoholism (HCC)     Cardiac arrest (HCC)

## 2024-06-24 NOTE — CARE COORDINATION
Case Management Assessment  Initial Evaluation    Date/Time of Evaluation: 6/24/2024 3:27 PM  Assessment Completed by: Veronica Mayen RN    If patient is discharged prior to next notation, then this note serves as note for discharge by case management.    Patient Name: Magan Funes                   YOB: 1970  Diagnosis: KIRSTEN (acute kidney injury) (HCC) [N17.9]  DKA, type 1, not at goal (HCC) [E10.10]  Diabetic ketoacidosis without coma associated with type 1 diabetes mellitus (HCC) [E10.10]                   Date / Time: 6/23/2024  9:12 PM    Patient Admission Status: Inpatient   Readmission Risk (Low < 19, Mod (19-27), High > 27): Readmission Risk Score: 19    Current PCP: Andrzej Cline MD  PCP verified by CM? Yes    Chart Reviewed: Yes      History Provided by: Patient  Patient Orientation: Alert and Oriented    Patient Cognition: Alert    Hospitalization in the last 30 days (Readmission):  No    If yes, Readmission Assessment in CM Navigator will be completed.    Advance Directives:      Code Status: Full Code   Patient's Primary Decision Maker is: Legal Next of Kin    Primary Decision Maker: Rain Funes - Katarina - [phone]    Secondary Decision Maker: Mat Funes - Brother/Sister - [phone]    Discharge Planning:    Patient lives with: Parent Type of Home: House  Primary Care Giver: Self  Patient Support Systems include: Parent, Family Members   Current Financial resources:    Current community resources:    Current services prior to admission: Home Care            Current DME:              Type of Home Care services:  None    ADLS  Prior functional level: Independent in ADLs/IADLs  Current functional level: Other (see comment) (in ICU, unknown)    PT AM-PAC:   /24  OT AM-PAC:   /24    Family can provide assistance at DC: No  Would you like Case Management to discuss the discharge plan with any other family members/significant others, and if so, who? No  Plans to Return to

## 2024-06-24 NOTE — CARE COORDINATION
Care Coordination LOS: 1 day. DKA.  Insulin gtt, IVF@ 150.  Met with pt at bedside to discuss transition of care. Lives with mom, 2 story home, but stays on main floor, independent, does not drive, no hx of hhc, Dme or nancy. Follows with Dr Cline and Dr Baca- but states he has not seen endocrine for “quite a while”.States he has [facility] freestyle. Plan is to return home at discharge. mom is emergency contact; brother Mat will transport. Does not anticipate any home going needs.    Electronically signed by Veronica Mayen RN on 6/24/2024 at 3:26 PM     The Plan for Transition of Care is related to the following treatment goals: dc    The Patient was provided with a choice of provider and agrees   with the discharge plan. [x] Yes [] No    Freedom of choice list was provided with basic dialogue that supports the patient's individualized plan of care/goals, treatment preferences and shares the quality data associated with the providers. [x] Yes [] No

## 2024-06-24 NOTE — H&P
[facility]  Internal Medicine Residency Program  History and Physical    Patient:  Magan Funes 54 y.o. male   MRN: [id number]       Date of Service: 6/23/2024        Chief complaint: had concerns including Hyperglycemia (Pt felt off today and checked BGL which read HI. Pt states he's being treated for UTI, but stopped Cipro few days ago because it wasn't helping).    History of Present Illness   The patient is a 54 y.o. 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 coli UTI, history of cardiac arrest in 2020, history of polysubstance abuse, presented with nausea vomiting for the past 3 days.  He was not able to keep food or water down for the past 3 days, he denied blood in his vomit.  He denied fever or chills, denied cough, no chest pain or shortness of breath.  He denied urinary symptoms, no urgency or frequency, no burning or pain with urination.  He measured his blood glucose at home in this afternoon and found it was around 600, he called EMS and was sent to ED. patient comes from home, he lives together with his mother, at baseline he is alert and oriented to 3, not ambulatory, his mother is primary caregiver.     In the ED, temperature normal, /67, heart rate between , saturating well on room air.  BG over 500, with repeat 447.  BHB 4.5. K5.1, bicarb 13, creatinine 1.5, AG 27, lactic acid 1.4.  WBC 19.1, Pro-Nathaniel 0.33, hemoglobin 11.3, ABG showed pH 7.319/29.2/60.4/14.7.  Respiratory panel was negative, UDS was negative, UA was negative. CXR and CT AP pending.       ED Course:  Pt had N/V for past 3 days, measure BG at home around 600s, called EMS   ED Meds: Patient was given insulin drip  ED Fluids: Patient was given 2L bolus         Past Medical History:      Diagnosis Date    Alcoholism (HCC)     Cocaine

## 2024-06-25 ENCOUNTER — APPOINTMENT (OUTPATIENT)
Dept: GENERAL RADIOLOGY | Age: 54
DRG: 637 | End: 2024-06-25
Payer: MEDICARE

## 2024-06-25 LAB
ANION GAP SERPL CALCULATED.3IONS-SCNC: 11 MMOL/L (ref 7–16)
ANION GAP SERPL CALCULATED.3IONS-SCNC: 12 MMOL/L (ref 7–16)
BASOPHILS # BLD: 0.05 K/UL (ref 0–0.2)
BASOPHILS NFR BLD: 0 % (ref 0–2)
BUN SERPL-MCNC: 6 MG/DL (ref 6–20)
BUN SERPL-MCNC: 9 MG/DL (ref 6–20)
CALCIUM SERPL-MCNC: 8.8 MG/DL (ref 8.6–10.2)
CALCIUM SERPL-MCNC: 8.8 MG/DL (ref 8.6–10.2)
CHLORIDE SERPL-SCNC: 101 MMOL/L (ref 98–107)
CHLORIDE SERPL-SCNC: 104 MMOL/L (ref 98–107)
CO2 SERPL-SCNC: 20 MMOL/L (ref 22–29)
CO2 SERPL-SCNC: 24 MMOL/L (ref 22–29)
CREAT SERPL-MCNC: 0.7 MG/DL (ref 0.7–1.2)
CREAT SERPL-MCNC: 0.7 MG/DL (ref 0.7–1.2)
EOSINOPHIL # BLD: 0.35 K/UL (ref 0.05–0.5)
EOSINOPHILS RELATIVE PERCENT: 3 % (ref 0–6)
ERYTHROCYTE [DISTWIDTH] IN BLOOD BY AUTOMATED COUNT: 13.5 % (ref 11.5–15)
GFR, ESTIMATED: >90 ML/MIN/1.73M2
GFR, ESTIMATED: >90 ML/MIN/1.73M2
GLUCOSE BLD-MCNC: 106 MG/DL (ref 74–99)
GLUCOSE BLD-MCNC: 108 MG/DL (ref 74–99)
GLUCOSE BLD-MCNC: 114 MG/DL (ref 74–99)
GLUCOSE BLD-MCNC: 129 MG/DL (ref 74–99)
GLUCOSE BLD-MCNC: 136 MG/DL (ref 74–99)
GLUCOSE BLD-MCNC: 142 MG/DL (ref 74–99)
GLUCOSE BLD-MCNC: 202 MG/DL (ref 74–99)
GLUCOSE BLD-MCNC: 221 MG/DL (ref 74–99)
GLUCOSE SERPL-MCNC: 109 MG/DL (ref 74–99)
GLUCOSE SERPL-MCNC: 110 MG/DL (ref 74–99)
HCT VFR BLD AUTO: 34.9 % (ref 37–54)
HGB BLD-MCNC: 11.5 G/DL (ref 12.5–16.5)
IMM GRANULOCYTES # BLD AUTO: 0.06 K/UL (ref 0–0.58)
IMM GRANULOCYTES NFR BLD: 0 % (ref 0–5)
LYMPHOCYTES NFR BLD: 2.69 K/UL (ref 1.5–4)
LYMPHOCYTES RELATIVE PERCENT: 20 % (ref 20–42)
MAGNESIUM SERPL-MCNC: 1.7 MG/DL (ref 1.6–2.6)
MAGNESIUM SERPL-MCNC: 2 MG/DL (ref 1.6–2.6)
MCH RBC QN AUTO: 28 PG (ref 26–35)
MCHC RBC AUTO-ENTMCNC: 33 G/DL (ref 32–34.5)
MCV RBC AUTO: 85.1 FL (ref 80–99.9)
MONOCYTES NFR BLD: 0.97 K/UL (ref 0.1–0.95)
MONOCYTES NFR BLD: 7 % (ref 2–12)
NEUTROPHILS NFR BLD: 69 % (ref 43–80)
NEUTS SEG NFR BLD: 9.36 K/UL (ref 1.8–7.3)
PHOSPHATE SERPL-MCNC: 2.3 MG/DL (ref 2.5–4.5)
PHOSPHATE SERPL-MCNC: 2.8 MG/DL (ref 2.5–4.5)
PLATELET # BLD AUTO: 431 K/UL (ref 130–450)
PMV BLD AUTO: 9.1 FL (ref 7–12)
POTASSIUM SERPL-SCNC: 3.9 MMOL/L (ref 3.5–5)
POTASSIUM SERPL-SCNC: 3.9 MMOL/L (ref 3.5–5)
RBC # BLD AUTO: 4.1 M/UL (ref 3.8–5.8)
SODIUM SERPL-SCNC: 136 MMOL/L (ref 132–146)
SODIUM SERPL-SCNC: 136 MMOL/L (ref 132–146)
WBC OTHER # BLD: 13.5 K/UL (ref 4.5–11.5)

## 2024-06-25 PROCEDURE — 6370000000 HC RX 637 (ALT 250 FOR IP)

## 2024-06-25 PROCEDURE — 2580000003 HC RX 258

## 2024-06-25 PROCEDURE — 6360000002 HC RX W HCPCS

## 2024-06-25 PROCEDURE — 80048 BASIC METABOLIC PNL TOTAL CA: CPT

## 2024-06-25 PROCEDURE — 97166 OT EVAL MOD COMPLEX 45 MIN: CPT

## 2024-06-25 PROCEDURE — 99291 CRITICAL CARE FIRST HOUR: CPT | Performed by: INTERNAL MEDICINE

## 2024-06-25 PROCEDURE — 85025 COMPLETE CBC W/AUTO DIFF WBC: CPT

## 2024-06-25 PROCEDURE — 2500000003 HC RX 250 WO HCPCS

## 2024-06-25 PROCEDURE — 82962 GLUCOSE BLOOD TEST: CPT

## 2024-06-25 PROCEDURE — 99231 SBSQ HOSP IP/OBS SF/LOW 25: CPT | Performed by: INTERNAL MEDICINE

## 2024-06-25 PROCEDURE — 92610 EVALUATE SWALLOWING FUNCTION: CPT

## 2024-06-25 PROCEDURE — 83735 ASSAY OF MAGNESIUM: CPT

## 2024-06-25 PROCEDURE — 2000000000 HC ICU R&B

## 2024-06-25 PROCEDURE — C9113 INJ PANTOPRAZOLE SODIUM, VIA: HCPCS

## 2024-06-25 PROCEDURE — 97530 THERAPEUTIC ACTIVITIES: CPT

## 2024-06-25 PROCEDURE — 71045 X-RAY EXAM CHEST 1 VIEW: CPT

## 2024-06-25 PROCEDURE — 84100 ASSAY OF PHOSPHORUS: CPT

## 2024-06-25 PROCEDURE — 97162 PT EVAL MOD COMPLEX 30 MIN: CPT

## 2024-06-25 PROCEDURE — 87040 BLOOD CULTURE FOR BACTERIA: CPT

## 2024-06-25 PROCEDURE — 36415 COLL VENOUS BLD VENIPUNCTURE: CPT

## 2024-06-25 RX ORDER — MAGNESIUM SULFATE IN WATER 40 MG/ML
2000 INJECTION, SOLUTION INTRAVENOUS ONCE
Status: COMPLETED | OUTPATIENT
Start: 2024-06-25 | End: 2024-06-25

## 2024-06-25 RX ORDER — INSULIN LISPRO 100 [IU]/ML
10 INJECTION, SOLUTION INTRAVENOUS; SUBCUTANEOUS
Status: DISCONTINUED | OUTPATIENT
Start: 2024-06-25 | End: 2024-06-28

## 2024-06-25 RX ORDER — INSULIN GLARGINE 100 [IU]/ML
30 INJECTION, SOLUTION SUBCUTANEOUS NIGHTLY
Status: DISCONTINUED | OUTPATIENT
Start: 2024-06-26 | End: 2024-06-28

## 2024-06-25 RX ORDER — DEXTROSE MONOHYDRATE 50 MG/ML
INJECTION, SOLUTION INTRAVENOUS CONTINUOUS
Status: DISCONTINUED | OUTPATIENT
Start: 2024-06-25 | End: 2024-06-30

## 2024-06-25 RX ORDER — INSULIN LISPRO 100 [IU]/ML
0-8 INJECTION, SOLUTION INTRAVENOUS; SUBCUTANEOUS EVERY 6 HOURS
Status: DISCONTINUED | OUTPATIENT
Start: 2024-06-26 | End: 2024-06-27

## 2024-06-25 RX ORDER — INSULIN LISPRO 100 [IU]/ML
0-8 INJECTION, SOLUTION INTRAVENOUS; SUBCUTANEOUS
Status: DISCONTINUED | OUTPATIENT
Start: 2024-06-25 | End: 2024-06-25

## 2024-06-25 RX ORDER — MAGNESIUM SULFATE 1 G/100ML
1000 INJECTION INTRAVENOUS ONCE
Status: COMPLETED | OUTPATIENT
Start: 2024-06-26 | End: 2024-06-26

## 2024-06-25 RX ORDER — POTASSIUM CHLORIDE 750 MG/1
10 TABLET, EXTENDED RELEASE ORAL ONCE
Status: COMPLETED | OUTPATIENT
Start: 2024-06-25 | End: 2024-06-25

## 2024-06-25 RX ORDER — INSULIN LISPRO 100 [IU]/ML
5 INJECTION, SOLUTION INTRAVENOUS; SUBCUTANEOUS ONCE
Status: COMPLETED | OUTPATIENT
Start: 2024-06-25 | End: 2024-06-25

## 2024-06-25 RX ORDER — INSULIN LISPRO 100 [IU]/ML
0-4 INJECTION, SOLUTION INTRAVENOUS; SUBCUTANEOUS NIGHTLY
Status: DISCONTINUED | OUTPATIENT
Start: 2024-06-25 | End: 2024-06-28

## 2024-06-25 RX ADMIN — AMLODIPINE BESYLATE 10 MG: 10 TABLET ORAL at 09:54

## 2024-06-25 RX ADMIN — PIPERACILLIN AND TAZOBACTAM 4500 MG: 4; .5 INJECTION, POWDER, FOR SOLUTION INTRAVENOUS at 21:36

## 2024-06-25 RX ADMIN — Medication 1000 UNITS: at 09:54

## 2024-06-25 RX ADMIN — POTASSIUM PHOSPHATE, MONOBASIC AND POTASSIUM PHOSPHATE, DIBASIC 15 MMOL: 224; 236 INJECTION, SOLUTION, CONCENTRATE INTRAVENOUS at 00:27

## 2024-06-25 RX ADMIN — DEXTROSE MONOHYDRATE: 50 INJECTION, SOLUTION INTRAVENOUS at 10:26

## 2024-06-25 RX ADMIN — SODIUM CHLORIDE, PRESERVATIVE FREE 40 MG: 5 INJECTION INTRAVENOUS at 09:52

## 2024-06-25 RX ADMIN — INSULIN LISPRO 5 UNITS: 100 INJECTION, SOLUTION INTRAVENOUS; SUBCUTANEOUS at 14:17

## 2024-06-25 RX ADMIN — LISINOPRIL 10 MG: 10 TABLET ORAL at 09:54

## 2024-06-25 RX ADMIN — ATORVASTATIN CALCIUM 80 MG: 80 TABLET, FILM COATED ORAL at 21:29

## 2024-06-25 RX ADMIN — MAGNESIUM SULFATE HEPTAHYDRATE 2000 MG: 40 INJECTION, SOLUTION INTRAVENOUS at 00:35

## 2024-06-25 RX ADMIN — METOCLOPRAMIDE 10 MG: 5 INJECTION, SOLUTION INTRAMUSCULAR; INTRAVENOUS at 09:03

## 2024-06-25 RX ADMIN — SODIUM CHLORIDE, PRESERVATIVE FREE 10 ML: 5 INJECTION INTRAVENOUS at 21:29

## 2024-06-25 RX ADMIN — ASPIRIN 81 MG: 81 TABLET, COATED ORAL at 09:54

## 2024-06-25 RX ADMIN — POTASSIUM CHLORIDE 10 MEQ: 7.46 INJECTION, SOLUTION INTRAVENOUS at 00:23

## 2024-06-25 RX ADMIN — PIPERACILLIN AND TAZOBACTAM 4500 MG: 4; .5 INJECTION, POWDER, FOR SOLUTION INTRAVENOUS at 05:28

## 2024-06-25 RX ADMIN — DULOXETINE HYDROCHLORIDE 30 MG: 30 CAPSULE, DELAYED RELEASE ORAL at 09:54

## 2024-06-25 RX ADMIN — PIPERACILLIN AND TAZOBACTAM 4500 MG: 4; .5 INJECTION, POWDER, FOR SOLUTION INTRAVENOUS at 13:21

## 2024-06-25 RX ADMIN — POTASSIUM CHLORIDE 10 MEQ: 750 TABLET, EXTENDED RELEASE ORAL at 06:58

## 2024-06-25 RX ADMIN — ENOXAPARIN SODIUM 40 MG: 100 INJECTION SUBCUTANEOUS at 09:52

## 2024-06-25 RX ADMIN — INSULIN LISPRO 2 UNITS: 100 INJECTION, SOLUTION INTRAVENOUS; SUBCUTANEOUS at 18:28

## 2024-06-25 RX ADMIN — SODIUM CHLORIDE: 9 INJECTION, SOLUTION INTRAVENOUS at 05:23

## 2024-06-25 RX ADMIN — SODIUM CHLORIDE, PRESERVATIVE FREE 10 ML: 5 INJECTION INTRAVENOUS at 09:58

## 2024-06-25 RX ADMIN — EZETIMIBE 10 MG: 10 TABLET ORAL at 22:32

## 2024-06-25 NOTE — CARE COORDINATION
Care Coordination: LOS 2 day. Met with pt, insistent on returning home at discharge. He states his mom cares for him. I asked if it would be okay to speak to mom, he said yes. Call to Rain, she states he needs to go to short term rehab, he was weaker this admission. First choice is [facility] [address], 2nd choice [facility].  Referral made to [facility] at [place], awaiting therapy evals, will need accepted and precert.     Electronically signed by Veronica Mayen RN on 6/25/2024 at 10:26 AM     ADDENDUM: [facility] has accepted. 7000 completed, transport and romero completed, will need precert, [facility] will follow for timing of precert    Electronically signed by Veronica Mayen RN on 6/25/2024 at 3:16 PM

## 2024-06-25 NOTE — DISCHARGE INSTR - COC
Continuity of Care Form    Patient Name: Magan Funes   :  1970  MRN:  [id number]    Admit date:  2024  Discharge date:  24    Code Status Order: Full Code   Advance Directives:     Admitting Physician:  Andrzej Cline MD  PCP: Andrzej Cline MD    Discharging Nurse: RADHA Nino   Discharging Hospital Unit/Room#: 4427/4427-A  Discharging Unit Phone Number: [phone]    Emergency Contact:   Extended Emergency Contact Information  Primary Emergency Contact: Rain Funes  Address: [address] [place] [address]  Home Phone: [phone]  Relation: Parent  Secondary Emergency Contact: Mat Funes  Home Phone: [phone]  Mobile Phone: [phone]  Relation: Brother/Sister   needed? No    Past Surgical History:  History reviewed. No pertinent surgical history.    Immunization History:   Immunization History   Administered Date(s) Administered    COVID-19, MODERNA, ( formula), (age 12y+), IM, 50mcg/0.5mL 2023    COVID-19, PFIZER PURPLE top, DILUTE for use, (age 12 y+), 30mcg/0.3mL 2021, 03/15/2021, 2021    Influenza Virus Vaccine 2017    Pneumococcal, PPSV23, PNEUMOVAX 23, (age 2y+), SC/IM, 0.5mL 2015       Active Problems:  Patient Active Problem List   Diagnosis Code    Sinus tachycardia R00.0    Tobacco abuse Z72.0    Moderate nonproliferative diabetic retinopathy associated with type 1 diabetes mellitus (HCC) E10.3399    Essential hypertension I10    Diabetic ketoacidosis without coma associated with type 1 diabetes mellitus (HCC) E10.10    Idiopathic peripheral neuropathy G60.9    Acute ischemic multifocal multiple vascular territories stroke (HCC) I63.89    Left hemiplegia (HCC) G81.94    KIRSTEN (acute kidney injury) (HCC) N17.9    Metabolic acidosis E87.20    Lacunar stroke (HCC) I63.81    ETOH abuse F10.10    Acute renal insufficiency N28.9    Hyperlipidemia E78.5    PAF (paroxysmal atrial

## 2024-06-25 NOTE — DISCHARGE INSTR - DIET

## 2024-06-26 ENCOUNTER — APPOINTMENT (OUTPATIENT)
Dept: GENERAL RADIOLOGY | Age: 54
DRG: 637 | End: 2024-06-26
Payer: MEDICARE

## 2024-06-26 LAB
ANION GAP SERPL CALCULATED.3IONS-SCNC: 12 MMOL/L (ref 7–16)
ANION GAP SERPL CALCULATED.3IONS-SCNC: 15 MMOL/L (ref 7–16)
ANION GAP SERPL CALCULATED.3IONS-SCNC: 17 MMOL/L (ref 7–16)
BASOPHILS # BLD: 0.04 K/UL (ref 0–0.2)
BASOPHILS NFR BLD: 1 % (ref 0–2)
BUN SERPL-MCNC: 5 MG/DL (ref 6–20)
BUN SERPL-MCNC: 5 MG/DL (ref 6–20)
BUN SERPL-MCNC: 8 MG/DL (ref 6–20)
CALCIUM SERPL-MCNC: 8.4 MG/DL (ref 8.6–10.2)
CALCIUM SERPL-MCNC: 8.8 MG/DL (ref 8.6–10.2)
CALCIUM SERPL-MCNC: 8.8 MG/DL (ref 8.6–10.2)
CHLORIDE SERPL-SCNC: 100 MMOL/L (ref 98–107)
CHLORIDE SERPL-SCNC: 98 MMOL/L (ref 98–107)
CHLORIDE SERPL-SCNC: 99 MMOL/L (ref 98–107)
CO2 SERPL-SCNC: 19 MMOL/L (ref 22–29)
CO2 SERPL-SCNC: 20 MMOL/L (ref 22–29)
CO2 SERPL-SCNC: 24 MMOL/L (ref 22–29)
CREAT SERPL-MCNC: 0.7 MG/DL (ref 0.7–1.2)
EOSINOPHIL # BLD: 0.28 K/UL (ref 0.05–0.5)
EOSINOPHILS RELATIVE PERCENT: 3 % (ref 0–6)
ERYTHROCYTE [DISTWIDTH] IN BLOOD BY AUTOMATED COUNT: 13.3 % (ref 11.5–15)
GFR, ESTIMATED: >90 ML/MIN/1.73M2
GLUCOSE BLD-MCNC: 115 MG/DL (ref 74–99)
GLUCOSE BLD-MCNC: 174 MG/DL (ref 74–99)
GLUCOSE BLD-MCNC: 179 MG/DL (ref 74–99)
GLUCOSE BLD-MCNC: 236 MG/DL (ref 74–99)
GLUCOSE BLD-MCNC: 326 MG/DL (ref 74–99)
GLUCOSE BLD-MCNC: 384 MG/DL (ref 74–99)
GLUCOSE BLD-MCNC: 406 MG/DL (ref 74–99)
GLUCOSE SERPL-MCNC: 152 MG/DL (ref 74–99)
GLUCOSE SERPL-MCNC: 255 MG/DL (ref 74–99)
GLUCOSE SERPL-MCNC: 306 MG/DL (ref 74–99)
HCT VFR BLD AUTO: 33.9 % (ref 37–54)
HGB BLD-MCNC: 11.5 G/DL (ref 12.5–16.5)
IMM GRANULOCYTES # BLD AUTO: <0.03 K/UL (ref 0–0.58)
IMM GRANULOCYTES NFR BLD: 0 % (ref 0–5)
LYMPHOCYTES NFR BLD: 1.6 K/UL (ref 1.5–4)
LYMPHOCYTES RELATIVE PERCENT: 19 % (ref 20–42)
MAGNESIUM SERPL-MCNC: 2 MG/DL (ref 1.6–2.6)
MAGNESIUM SERPL-MCNC: 2.1 MG/DL (ref 1.6–2.6)
MAGNESIUM SERPL-MCNC: 2.2 MG/DL (ref 1.6–2.6)
MCH RBC QN AUTO: 29.2 PG (ref 26–35)
MCHC RBC AUTO-ENTMCNC: 33.9 G/DL (ref 32–34.5)
MCV RBC AUTO: 86 FL (ref 80–99.9)
MONOCYTES NFR BLD: 0.55 K/UL (ref 0.1–0.95)
MONOCYTES NFR BLD: 7 % (ref 2–12)
NEUTROPHILS NFR BLD: 70 % (ref 43–80)
NEUTS SEG NFR BLD: 5.76 K/UL (ref 1.8–7.3)
PHOSPHATE SERPL-MCNC: 2.4 MG/DL (ref 2.5–4.5)
PHOSPHATE SERPL-MCNC: 3 MG/DL (ref 2.5–4.5)
PHOSPHATE SERPL-MCNC: 3.1 MG/DL (ref 2.5–4.5)
PLATELET # BLD AUTO: 343 K/UL (ref 130–450)
PMV BLD AUTO: 8.8 FL (ref 7–12)
POTASSIUM SERPL-SCNC: 3.6 MMOL/L (ref 3.5–5)
POTASSIUM SERPL-SCNC: 3.9 MMOL/L (ref 3.5–5)
POTASSIUM SERPL-SCNC: 4.1 MMOL/L (ref 3.5–5)
RBC # BLD AUTO: 3.94 M/UL (ref 3.8–5.8)
SODIUM SERPL-SCNC: 133 MMOL/L (ref 132–146)
SODIUM SERPL-SCNC: 135 MMOL/L (ref 132–146)
SODIUM SERPL-SCNC: 136 MMOL/L (ref 132–146)
WBC OTHER # BLD: 8.3 K/UL (ref 4.5–11.5)

## 2024-06-26 PROCEDURE — 2580000003 HC RX 258

## 2024-06-26 PROCEDURE — 2060000000 HC ICU INTERMEDIATE R&B

## 2024-06-26 PROCEDURE — 6370000000 HC RX 637 (ALT 250 FOR IP)

## 2024-06-26 PROCEDURE — 6360000002 HC RX W HCPCS

## 2024-06-26 PROCEDURE — C9113 INJ PANTOPRAZOLE SODIUM, VIA: HCPCS

## 2024-06-26 PROCEDURE — 92526 ORAL FUNCTION THERAPY: CPT

## 2024-06-26 PROCEDURE — 74230 X-RAY XM SWLNG FUNCJ C+: CPT

## 2024-06-26 PROCEDURE — 80048 BASIC METABOLIC PNL TOTAL CA: CPT

## 2024-06-26 PROCEDURE — 82962 GLUCOSE BLOOD TEST: CPT

## 2024-06-26 PROCEDURE — 93005 ELECTROCARDIOGRAM TRACING: CPT

## 2024-06-26 PROCEDURE — 99231 SBSQ HOSP IP/OBS SF/LOW 25: CPT

## 2024-06-26 PROCEDURE — 99291 CRITICAL CARE FIRST HOUR: CPT | Performed by: INTERNAL MEDICINE

## 2024-06-26 PROCEDURE — 84100 ASSAY OF PHOSPHORUS: CPT

## 2024-06-26 PROCEDURE — 36415 COLL VENOUS BLD VENIPUNCTURE: CPT

## 2024-06-26 PROCEDURE — 85025 COMPLETE CBC W/AUTO DIFF WBC: CPT

## 2024-06-26 PROCEDURE — 2500000003 HC RX 250 WO HCPCS

## 2024-06-26 PROCEDURE — 83735 ASSAY OF MAGNESIUM: CPT

## 2024-06-26 PROCEDURE — 92611 MOTION FLUOROSCOPY/SWALLOW: CPT

## 2024-06-26 RX ORDER — METOCLOPRAMIDE HYDROCHLORIDE 5 MG/ML
10 INJECTION INTRAMUSCULAR; INTRAVENOUS EVERY 6 HOURS PRN
Status: DISCONTINUED | OUTPATIENT
Start: 2024-06-26 | End: 2024-06-27

## 2024-06-26 RX ORDER — INSULIN LISPRO 100 [IU]/ML
5 INJECTION, SOLUTION INTRAVENOUS; SUBCUTANEOUS ONCE
Status: COMPLETED | OUTPATIENT
Start: 2024-06-26 | End: 2024-06-26

## 2024-06-26 RX ADMIN — INSULIN LISPRO 2 UNITS: 100 INJECTION, SOLUTION INTRAVENOUS; SUBCUTANEOUS at 06:03

## 2024-06-26 RX ADMIN — INSULIN GLARGINE 30 UNITS: 100 INJECTION, SOLUTION SUBCUTANEOUS at 23:28

## 2024-06-26 RX ADMIN — PIPERACILLIN AND TAZOBACTAM 4500 MG: 4; .5 INJECTION, POWDER, FOR SOLUTION INTRAVENOUS at 23:30

## 2024-06-26 RX ADMIN — ACETAMINOPHEN 650 MG: 325 TABLET ORAL at 00:35

## 2024-06-26 RX ADMIN — ATORVASTATIN CALCIUM 80 MG: 80 TABLET, FILM COATED ORAL at 23:29

## 2024-06-26 RX ADMIN — AMLODIPINE BESYLATE 10 MG: 10 TABLET ORAL at 08:57

## 2024-06-26 RX ADMIN — SODIUM CHLORIDE, PRESERVATIVE FREE 10 ML: 5 INJECTION INTRAVENOUS at 23:32

## 2024-06-26 RX ADMIN — INSULIN LISPRO 5 UNITS: 100 INJECTION, SOLUTION INTRAVENOUS; SUBCUTANEOUS at 19:46

## 2024-06-26 RX ADMIN — SODIUM CHLORIDE, PRESERVATIVE FREE 40 MG: 5 INJECTION INTRAVENOUS at 08:57

## 2024-06-26 RX ADMIN — SODIUM CHLORIDE, PRESERVATIVE FREE 10 ML: 5 INJECTION INTRAVENOUS at 08:58

## 2024-06-26 RX ADMIN — LISINOPRIL 10 MG: 10 TABLET ORAL at 08:57

## 2024-06-26 RX ADMIN — ASPIRIN 81 MG: 81 TABLET, COATED ORAL at 08:57

## 2024-06-26 RX ADMIN — INSULIN LISPRO 8 UNITS: 100 INJECTION, SOLUTION INTRAVENOUS; SUBCUTANEOUS at 12:29

## 2024-06-26 RX ADMIN — MAGNESIUM SULFATE HEPTAHYDRATE 1000 MG: 1 INJECTION, SOLUTION INTRAVENOUS at 00:35

## 2024-06-26 RX ADMIN — POTASSIUM BICARBONATE 40 MEQ: 782 TABLET, EFFERVESCENT ORAL at 05:51

## 2024-06-26 RX ADMIN — ENOXAPARIN SODIUM 40 MG: 100 INJECTION SUBCUTANEOUS at 08:57

## 2024-06-26 RX ADMIN — POTASSIUM PHOSPHATE, MONOBASIC AND POTASSIUM PHOSPHATE, DIBASIC 15 MMOL: 224; 236 INJECTION, SOLUTION, CONCENTRATE INTRAVENOUS at 01:00

## 2024-06-26 RX ADMIN — DULOXETINE HYDROCHLORIDE 30 MG: 30 CAPSULE, DELAYED RELEASE ORAL at 08:57

## 2024-06-26 RX ADMIN — PIPERACILLIN AND TAZOBACTAM 4500 MG: 4; .5 INJECTION, POWDER, FOR SOLUTION INTRAVENOUS at 12:24

## 2024-06-26 RX ADMIN — DEXTROSE MONOHYDRATE: 50 INJECTION, SOLUTION INTRAVENOUS at 01:27

## 2024-06-26 RX ADMIN — INSULIN LISPRO 8 UNITS: 100 INJECTION, SOLUTION INTRAVENOUS; SUBCUTANEOUS at 18:48

## 2024-06-26 RX ADMIN — Medication 1000 UNITS: at 08:57

## 2024-06-26 RX ADMIN — SODIUM PHOSPHATE, MONOBASIC, MONOHYDRATE AND SODIUM PHOSPHATE, DIBASIC, ANHYDROUS 15 MMOL: 142; 276 INJECTION, SOLUTION INTRAVENOUS at 23:31

## 2024-06-26 RX ADMIN — METOCLOPRAMIDE 10 MG: 5 INJECTION, SOLUTION INTRAMUSCULAR; INTRAVENOUS at 12:22

## 2024-06-26 RX ADMIN — PIPERACILLIN AND TAZOBACTAM 4500 MG: 4; .5 INJECTION, POWDER, FOR SOLUTION INTRAVENOUS at 04:35

## 2024-06-26 ASSESSMENT — PAIN SCALES - GENERAL
PAINLEVEL_OUTOF10: 2
PAINLEVEL_OUTOF10: 3
PAINLEVEL_OUTOF10: 2
PAINLEVEL_OUTOF10: 0
PAINLEVEL_OUTOF10: 2

## 2024-06-26 ASSESSMENT — PAIN DESCRIPTION - DESCRIPTORS
DESCRIPTORS: ACHING;DISCOMFORT
DESCRIPTORS: ACHING;DISCOMFORT;NAGGING;SORE

## 2024-06-26 ASSESSMENT — PAIN DESCRIPTION - ORIENTATION
ORIENTATION: LEFT
ORIENTATION: LEFT

## 2024-06-26 ASSESSMENT — PAIN - FUNCTIONAL ASSESSMENT
PAIN_FUNCTIONAL_ASSESSMENT: PREVENTS OR INTERFERES SOME ACTIVE ACTIVITIES AND ADLS
PAIN_FUNCTIONAL_ASSESSMENT: PREVENTS OR INTERFERES SOME ACTIVE ACTIVITIES AND ADLS

## 2024-06-26 ASSESSMENT — PAIN DESCRIPTION - LOCATION
LOCATION: HAND
LOCATION: HAND

## 2024-06-26 NOTE — CARE COORDINATION
Care Coordination:LOS 3 day left message with Lucy at [facility] to review for precert. Pt has evals on chart from 6/25/24- will likely need updated in the AM. 7000, romero and transport completed    Electronically signed by Veronica Mayen RN on 6/26/2024 at 3:50 PM

## 2024-06-27 LAB
ANION GAP SERPL CALCULATED.3IONS-SCNC: 11 MMOL/L (ref 7–16)
BUN SERPL-MCNC: 9 MG/DL (ref 6–20)
CALCIUM SERPL-MCNC: 8.7 MG/DL (ref 8.6–10.2)
CHLORIDE SERPL-SCNC: 104 MMOL/L (ref 98–107)
CO2 SERPL-SCNC: 24 MMOL/L (ref 22–29)
CREAT SERPL-MCNC: 0.8 MG/DL (ref 0.7–1.2)
GFR, ESTIMATED: >90 ML/MIN/1.73M2
GLUCOSE BLD-MCNC: 114 MG/DL (ref 74–99)
GLUCOSE BLD-MCNC: 119 MG/DL (ref 74–99)
GLUCOSE BLD-MCNC: 206 MG/DL (ref 74–99)
GLUCOSE BLD-MCNC: 64 MG/DL (ref 74–99)
GLUCOSE BLD-MCNC: 80 MG/DL (ref 74–99)
GLUCOSE SERPL-MCNC: 168 MG/DL (ref 74–99)
MAGNESIUM SERPL-MCNC: 2 MG/DL (ref 1.6–2.6)
PHOSPHATE SERPL-MCNC: 3.2 MG/DL (ref 2.5–4.5)
POTASSIUM SERPL-SCNC: 3.9 MMOL/L (ref 3.5–5)
SODIUM SERPL-SCNC: 139 MMOL/L (ref 132–146)

## 2024-06-27 PROCEDURE — 2580000003 HC RX 258

## 2024-06-27 PROCEDURE — 99231 SBSQ HOSP IP/OBS SF/LOW 25: CPT

## 2024-06-27 PROCEDURE — 84100 ASSAY OF PHOSPHORUS: CPT

## 2024-06-27 PROCEDURE — 82962 GLUCOSE BLOOD TEST: CPT

## 2024-06-27 PROCEDURE — 6370000000 HC RX 637 (ALT 250 FOR IP)

## 2024-06-27 PROCEDURE — 6360000002 HC RX W HCPCS

## 2024-06-27 PROCEDURE — 92526 ORAL FUNCTION THERAPY: CPT

## 2024-06-27 PROCEDURE — 83735 ASSAY OF MAGNESIUM: CPT

## 2024-06-27 PROCEDURE — 2060000000 HC ICU INTERMEDIATE R&B

## 2024-06-27 PROCEDURE — 36415 COLL VENOUS BLD VENIPUNCTURE: CPT

## 2024-06-27 PROCEDURE — C9113 INJ PANTOPRAZOLE SODIUM, VIA: HCPCS

## 2024-06-27 PROCEDURE — 80048 BASIC METABOLIC PNL TOTAL CA: CPT

## 2024-06-27 RX ORDER — AMOXICILLIN AND CLAVULANATE POTASSIUM 875; 125 MG/1; MG/1
1 TABLET, FILM COATED ORAL EVERY 12 HOURS SCHEDULED
Status: COMPLETED | OUTPATIENT
Start: 2024-06-27 | End: 2024-07-02

## 2024-06-27 RX ORDER — ASPIRIN 81 MG/1
81 TABLET, CHEWABLE ORAL DAILY
Status: DISCONTINUED | OUTPATIENT
Start: 2024-06-28 | End: 2024-07-02 | Stop reason: HOSPADM

## 2024-06-27 RX ORDER — METOCLOPRAMIDE 5 MG/1
5 TABLET ORAL 3 TIMES DAILY PRN
Status: DISCONTINUED | OUTPATIENT
Start: 2024-06-27 | End: 2024-07-02 | Stop reason: HOSPADM

## 2024-06-27 RX ORDER — INSULIN LISPRO 100 [IU]/ML
0-8 INJECTION, SOLUTION INTRAVENOUS; SUBCUTANEOUS
Status: DISCONTINUED | OUTPATIENT
Start: 2024-06-27 | End: 2024-06-28

## 2024-06-27 RX ADMIN — ENOXAPARIN SODIUM 40 MG: 100 INJECTION SUBCUTANEOUS at 09:01

## 2024-06-27 RX ADMIN — SODIUM CHLORIDE, PRESERVATIVE FREE 10 ML: 5 INJECTION INTRAVENOUS at 09:03

## 2024-06-27 RX ADMIN — INSULIN LISPRO 10 UNITS: 100 INJECTION, SOLUTION INTRAVENOUS; SUBCUTANEOUS at 09:02

## 2024-06-27 RX ADMIN — AMLODIPINE BESYLATE 10 MG: 10 TABLET ORAL at 09:01

## 2024-06-27 RX ADMIN — INSULIN GLARGINE 30 UNITS: 100 INJECTION, SOLUTION SUBCUTANEOUS at 20:38

## 2024-06-27 RX ADMIN — ATORVASTATIN CALCIUM 80 MG: 80 TABLET, FILM COATED ORAL at 20:38

## 2024-06-27 RX ADMIN — SODIUM CHLORIDE, PRESERVATIVE FREE 10 ML: 5 INJECTION INTRAVENOUS at 20:39

## 2024-06-27 RX ADMIN — PIPERACILLIN AND TAZOBACTAM 4500 MG: 4; .5 INJECTION, POWDER, FOR SOLUTION INTRAVENOUS at 07:14

## 2024-06-27 RX ADMIN — SODIUM CHLORIDE, PRESERVATIVE FREE 40 MG: 5 INJECTION INTRAVENOUS at 09:03

## 2024-06-27 RX ADMIN — AMOXICILLIN AND CLAVULANATE POTASSIUM 1 TABLET: 875; 125 TABLET, FILM COATED ORAL at 13:33

## 2024-06-27 RX ADMIN — LISINOPRIL 10 MG: 10 TABLET ORAL at 09:01

## 2024-06-27 RX ADMIN — AMOXICILLIN AND CLAVULANATE POTASSIUM 1 TABLET: 875; 125 TABLET, FILM COATED ORAL at 20:39

## 2024-06-27 RX ADMIN — DULOXETINE HYDROCHLORIDE 30 MG: 30 CAPSULE, DELAYED RELEASE ORAL at 09:01

## 2024-06-27 RX ADMIN — Medication 1000 UNITS: at 09:01

## 2024-06-27 RX ADMIN — DEXTROSE MONOHYDRATE 125 ML: 100 INJECTION, SOLUTION INTRAVENOUS at 11:16

## 2024-06-27 RX ADMIN — EZETIMIBE 10 MG: 10 TABLET ORAL at 20:39

## 2024-06-27 RX ADMIN — ASPIRIN 81 MG: 81 TABLET, COATED ORAL at 09:01

## 2024-06-27 NOTE — CARE COORDINATION
Social Work Discharge Planning:  Transition of care update. Precert started today for [facility]. 7000 and ambulette form in soft chart. SW will continue to follow,  Electronically signed by LUZ ELENA Gallagher on 6/27/2024 at 11:35 AM

## 2024-06-28 LAB
ACB COMPLEX DNA BLD POS QL NAA+NON-PROBE: NOT DETECTED
ANION GAP SERPL CALCULATED.3IONS-SCNC: 10 MMOL/L (ref 7–16)
ANION GAP SERPL CALCULATED.3IONS-SCNC: 11 MMOL/L (ref 7–16)
B FRAGILIS DNA BLD POS QL NAA+NON-PROBE: NOT DETECTED
BASOPHILS # BLD: 0.03 K/UL (ref 0–0.2)
BASOPHILS NFR BLD: 0 % (ref 0–2)
BIOFIRE TEST COMMENT: ABNORMAL
BUN SERPL-MCNC: 10 MG/DL (ref 6–20)
BUN SERPL-MCNC: 11 MG/DL (ref 6–20)
C ALBICANS DNA BLD POS QL NAA+NON-PROBE: NOT DETECTED
C AURIS DNA BLD POS QL NAA+NON-PROBE: NOT DETECTED
C GATTII+NEOFOR DNA BLD POS QL NAA+N-PRB: NOT DETECTED
C GLABRATA DNA BLD POS QL NAA+NON-PROBE: NOT DETECTED
C KRUSEI DNA BLD POS QL NAA+NON-PROBE: NOT DETECTED
C PARAP DNA BLD POS QL NAA+NON-PROBE: NOT DETECTED
C TROPICLS DNA BLD POS QL NAA+NON-PROBE: NOT DETECTED
CALCIUM SERPL-MCNC: 8.8 MG/DL (ref 8.6–10.2)
CALCIUM SERPL-MCNC: 9 MG/DL (ref 8.6–10.2)
CHLORIDE SERPL-SCNC: 100 MMOL/L (ref 98–107)
CHLORIDE SERPL-SCNC: 105 MMOL/L (ref 98–107)
CO2 SERPL-SCNC: 25 MMOL/L (ref 22–29)
CO2 SERPL-SCNC: 25 MMOL/L (ref 22–29)
CREAT SERPL-MCNC: 0.7 MG/DL (ref 0.7–1.2)
CREAT SERPL-MCNC: 0.7 MG/DL (ref 0.7–1.2)
E CLOAC COMP DNA BLD POS NAA+NON-PROBE: NOT DETECTED
E COLI DNA BLD POS QL NAA+NON-PROBE: NOT DETECTED
E FAECALIS DNA BLD POS QL NAA+NON-PROBE: NOT DETECTED
E FAECIUM DNA BLD POS QL NAA+NON-PROBE: NOT DETECTED
EKG ATRIAL RATE: 93 BPM
EKG P AXIS: 61 DEGREES
EKG P-R INTERVAL: 144 MS
EKG Q-T INTERVAL: 360 MS
EKG QRS DURATION: 72 MS
EKG QTC CALCULATION (BAZETT): 447 MS
EKG R AXIS: 51 DEGREES
EKG T AXIS: 63 DEGREES
EKG VENTRICULAR RATE: 93 BPM
ENTEROBACTERALES DNA BLD POS NAA+N-PRB: NOT DETECTED
EOSINOPHIL # BLD: 0.07 K/UL (ref 0.05–0.5)
EOSINOPHILS RELATIVE PERCENT: 1 % (ref 0–6)
ERYTHROCYTE [DISTWIDTH] IN BLOOD BY AUTOMATED COUNT: 13.8 % (ref 11.5–15)
GFR, ESTIMATED: >90 ML/MIN/1.73M2
GFR, ESTIMATED: >90 ML/MIN/1.73M2
GLUCOSE BLD-MCNC: 114 MG/DL (ref 74–99)
GLUCOSE BLD-MCNC: 165 MG/DL (ref 74–99)
GLUCOSE BLD-MCNC: 191 MG/DL (ref 74–99)
GLUCOSE BLD-MCNC: 334 MG/DL (ref 74–99)
GLUCOSE BLD-MCNC: 51 MG/DL (ref 74–99)
GLUCOSE SERPL-MCNC: 111 MG/DL (ref 74–99)
GLUCOSE SERPL-MCNC: 307 MG/DL (ref 74–99)
GP B STREP DNA BLD POS QL NAA+NON-PROBE: NOT DETECTED
HAEM INFLU DNA BLD POS QL NAA+NON-PROBE: NOT DETECTED
HCT VFR BLD AUTO: 39.5 % (ref 37–54)
HGB BLD-MCNC: 12.9 G/DL (ref 12.5–16.5)
IMM GRANULOCYTES # BLD AUTO: <0.03 K/UL (ref 0–0.58)
IMM GRANULOCYTES NFR BLD: 0 % (ref 0–5)
K OXYTOCA DNA BLD POS QL NAA+NON-PROBE: NOT DETECTED
KLEBSIELLA SP DNA BLD POS QL NAA+NON-PRB: NOT DETECTED
KLEBSIELLA SP DNA BLD POS QL NAA+NON-PRB: NOT DETECTED
L MONOCYTOG DNA BLD POS QL NAA+NON-PROBE: NOT DETECTED
LYMPHOCYTES NFR BLD: 1.19 K/UL (ref 1.5–4)
LYMPHOCYTES RELATIVE PERCENT: 17 % (ref 20–42)
MAGNESIUM SERPL-MCNC: 2 MG/DL (ref 1.6–2.6)
MAGNESIUM SERPL-MCNC: 2 MG/DL (ref 1.6–2.6)
MCH RBC QN AUTO: 28.1 PG (ref 26–35)
MCHC RBC AUTO-ENTMCNC: 32.7 G/DL (ref 32–34.5)
MCV RBC AUTO: 86.1 FL (ref 80–99.9)
MECA+MECC ISLT/SPM QL: DETECTED
MICROORGANISM SPEC CULT: ABNORMAL
MICROORGANISM SPEC CULT: ABNORMAL
MICROORGANISM SPEC CULT: NORMAL
MICROORGANISM/AGENT SPEC: ABNORMAL
MONOCYTES NFR BLD: 0.5 K/UL (ref 0.1–0.95)
MONOCYTES NFR BLD: 7 % (ref 2–12)
N MEN DNA BLD POS QL NAA+NON-PROBE: NOT DETECTED
NEUTROPHILS NFR BLD: 74 % (ref 43–80)
NEUTS SEG NFR BLD: 5.28 K/UL (ref 1.8–7.3)
P AERUGINOSA DNA BLD POS NAA+NON-PROBE: NOT DETECTED
PHOSPHATE SERPL-MCNC: 2.7 MG/DL (ref 2.5–4.5)
PHOSPHATE SERPL-MCNC: 3.2 MG/DL (ref 2.5–4.5)
PLATELET # BLD AUTO: 436 K/UL (ref 130–450)
PMV BLD AUTO: 9.1 FL (ref 7–12)
POTASSIUM SERPL-SCNC: 3.4 MMOL/L (ref 3.5–5)
POTASSIUM SERPL-SCNC: 4.4 MMOL/L (ref 3.5–5)
PROTEUS SP DNA BLD POS QL NAA+NON-PROBE: NOT DETECTED
RBC # BLD AUTO: 4.59 M/UL (ref 3.8–5.8)
S AUREUS DNA BLD POS QL NAA+NON-PROBE: NOT DETECTED
S AUREUS+CONS DNA BLD POS NAA+NON-PROBE: DETECTED
S EPIDERMIDIS DNA BLD POS QL NAA+NON-PRB: DETECTED
S LUGDUNENSIS DNA BLD POS QL NAA+NON-PRB: NOT DETECTED
S MALTOPHILIA DNA BLD POS QL NAA+NON-PRB: NOT DETECTED
S MARCESCENS DNA BLD POS NAA+NON-PROBE: NOT DETECTED
S PNEUM DNA BLD POS QL NAA+NON-PROBE: NOT DETECTED
S PYO DNA BLD POS QL NAA+NON-PROBE: NOT DETECTED
SALMONELLA DNA BLD POS QL NAA+NON-PROBE: NOT DETECTED
SERVICE CMNT-IMP: ABNORMAL
SERVICE CMNT-IMP: NORMAL
SODIUM SERPL-SCNC: 135 MMOL/L (ref 132–146)
SODIUM SERPL-SCNC: 141 MMOL/L (ref 132–146)
SPECIMEN DESCRIPTION: ABNORMAL
SPECIMEN DESCRIPTION: NORMAL
STREPTOCOCCUS DNA BLD POS NAA+NON-PROBE: NOT DETECTED
WBC OTHER # BLD: 7.1 K/UL (ref 4.5–11.5)

## 2024-06-28 PROCEDURE — 36415 COLL VENOUS BLD VENIPUNCTURE: CPT

## 2024-06-28 PROCEDURE — 6370000000 HC RX 637 (ALT 250 FOR IP)

## 2024-06-28 PROCEDURE — 85025 COMPLETE CBC W/AUTO DIFF WBC: CPT

## 2024-06-28 PROCEDURE — 82962 GLUCOSE BLOOD TEST: CPT

## 2024-06-28 PROCEDURE — 2580000003 HC RX 258

## 2024-06-28 PROCEDURE — 6360000002 HC RX W HCPCS

## 2024-06-28 PROCEDURE — 97530 THERAPEUTIC ACTIVITIES: CPT

## 2024-06-28 PROCEDURE — 99231 SBSQ HOSP IP/OBS SF/LOW 25: CPT

## 2024-06-28 PROCEDURE — 83735 ASSAY OF MAGNESIUM: CPT

## 2024-06-28 PROCEDURE — 84100 ASSAY OF PHOSPHORUS: CPT

## 2024-06-28 PROCEDURE — 92526 ORAL FUNCTION THERAPY: CPT

## 2024-06-28 PROCEDURE — 80048 BASIC METABOLIC PNL TOTAL CA: CPT

## 2024-06-28 PROCEDURE — 2060000000 HC ICU INTERMEDIATE R&B

## 2024-06-28 RX ORDER — INSULIN LISPRO 100 [IU]/ML
0-4 INJECTION, SOLUTION INTRAVENOUS; SUBCUTANEOUS
Status: DISCONTINUED | OUTPATIENT
Start: 2024-06-28 | End: 2024-06-30

## 2024-06-28 RX ORDER — INSULIN GLARGINE 100 [IU]/ML
25 INJECTION, SOLUTION SUBCUTANEOUS NIGHTLY
Status: DISCONTINUED | OUTPATIENT
Start: 2024-06-28 | End: 2024-06-28

## 2024-06-28 RX ORDER — INSULIN GLARGINE 100 [IU]/ML
25 INJECTION, SOLUTION SUBCUTANEOUS DAILY
Status: DISCONTINUED | OUTPATIENT
Start: 2024-06-29 | End: 2024-06-30

## 2024-06-28 RX ADMIN — POTASSIUM BICARBONATE 40 MEQ: 782 TABLET, EFFERVESCENT ORAL at 10:41

## 2024-06-28 RX ADMIN — EZETIMIBE 10 MG: 10 TABLET ORAL at 22:12

## 2024-06-28 RX ADMIN — SODIUM CHLORIDE, PRESERVATIVE FREE 10 ML: 5 INJECTION INTRAVENOUS at 10:11

## 2024-06-28 RX ADMIN — SODIUM CHLORIDE, PRESERVATIVE FREE 10 ML: 5 INJECTION INTRAVENOUS at 22:12

## 2024-06-28 RX ADMIN — Medication 1000 UNITS: at 10:41

## 2024-06-28 RX ADMIN — ATORVASTATIN CALCIUM 80 MG: 80 TABLET, FILM COATED ORAL at 22:12

## 2024-06-28 RX ADMIN — DULOXETINE HYDROCHLORIDE 30 MG: 30 CAPSULE, DELAYED RELEASE ORAL at 10:12

## 2024-06-28 RX ADMIN — ENOXAPARIN SODIUM 40 MG: 100 INJECTION SUBCUTANEOUS at 10:14

## 2024-06-28 RX ADMIN — AMOXICILLIN AND CLAVULANATE POTASSIUM 1 TABLET: 875; 125 TABLET, FILM COATED ORAL at 22:12

## 2024-06-28 RX ADMIN — LISINOPRIL 10 MG: 10 TABLET ORAL at 10:13

## 2024-06-28 RX ADMIN — AMLODIPINE BESYLATE 10 MG: 10 TABLET ORAL at 10:13

## 2024-06-28 RX ADMIN — DEXTROSE MONOHYDRATE 125 ML: 100 INJECTION, SOLUTION INTRAVENOUS at 06:09

## 2024-06-28 RX ADMIN — AMOXICILLIN AND CLAVULANATE POTASSIUM 1 TABLET: 875; 125 TABLET, FILM COATED ORAL at 10:13

## 2024-06-28 RX ADMIN — ASPIRIN 81 MG 81 MG: 81 TABLET ORAL at 10:12

## 2024-06-28 ASSESSMENT — PAIN SCALES - GENERAL: PAINLEVEL_OUTOF10: 0

## 2024-06-28 NOTE — CARE COORDINATION
Social Work Discharge Planning:  Transition of care update: [facility]. Patient is tentatively set-up to leave at 11:30 AM transport [facility].  BENJAMÍN notified nursing.   Electronically signed by LUZ ELENA Gallagher on 6/28/2024 at 9:03 AM   Addendum:  Patient is now tentatively set up for 2 PM transport to [facility].  Electronically signed by LUZ ELENA Gallagher on 6/28/2024 at 9:21 AM   Addendum:  Patient is not leaving today. SW cancelled [facility] ambulance and place on will call.  Electronically signed by LUZ ELENA Gallagher on 6/28/2024 at 12:07 PM

## 2024-06-29 LAB
ANION GAP SERPL CALCULATED.3IONS-SCNC: 10 MMOL/L (ref 7–16)
ANION GAP SERPL CALCULATED.3IONS-SCNC: 18 MMOL/L (ref 7–16)
ANION GAP SERPL CALCULATED.3IONS-SCNC: 20 MMOL/L (ref 7–16)
B-OH-BUTYR SERPL-MCNC: 4.4 MMOL/L (ref 0.02–0.27)
BASOPHILS # BLD: 0.05 K/UL (ref 0–0.2)
BASOPHILS NFR BLD: 1 % (ref 0–2)
BUN SERPL-MCNC: 16 MG/DL (ref 6–20)
BUN SERPL-MCNC: 16 MG/DL (ref 6–20)
BUN SERPL-MCNC: 17 MG/DL (ref 6–20)
CALCIUM SERPL-MCNC: 8.7 MG/DL (ref 8.6–10.2)
CALCIUM SERPL-MCNC: 8.9 MG/DL (ref 8.6–10.2)
CALCIUM SERPL-MCNC: 9.2 MG/DL (ref 8.6–10.2)
CHLORIDE SERPL-SCNC: 100 MMOL/L (ref 98–107)
CHLORIDE SERPL-SCNC: 96 MMOL/L (ref 98–107)
CHLORIDE SERPL-SCNC: 97 MMOL/L (ref 98–107)
CO2 SERPL-SCNC: 18 MMOL/L (ref 22–29)
CO2 SERPL-SCNC: 19 MMOL/L (ref 22–29)
CO2 SERPL-SCNC: 22 MMOL/L (ref 22–29)
CREAT SERPL-MCNC: 0.6 MG/DL (ref 0.7–1.2)
CREAT SERPL-MCNC: 0.7 MG/DL (ref 0.7–1.2)
CREAT SERPL-MCNC: 0.8 MG/DL (ref 0.7–1.2)
EOSINOPHIL # BLD: 0.29 K/UL (ref 0.05–0.5)
EOSINOPHILS RELATIVE PERCENT: 3 % (ref 0–6)
ERYTHROCYTE [DISTWIDTH] IN BLOOD BY AUTOMATED COUNT: 13.7 % (ref 11.5–15)
GFR, ESTIMATED: >90 ML/MIN/1.73M2
GLUCOSE BLD-MCNC: 115 MG/DL (ref 74–99)
GLUCOSE BLD-MCNC: 125 MG/DL (ref 74–99)
GLUCOSE BLD-MCNC: 129 MG/DL (ref 74–99)
GLUCOSE BLD-MCNC: 163 MG/DL (ref 74–99)
GLUCOSE BLD-MCNC: 181 MG/DL (ref 74–99)
GLUCOSE BLD-MCNC: 241 MG/DL (ref 74–99)
GLUCOSE BLD-MCNC: 248 MG/DL (ref 74–99)
GLUCOSE BLD-MCNC: 288 MG/DL (ref 74–99)
GLUCOSE BLD-MCNC: 303 MG/DL (ref 74–99)
GLUCOSE BLD-MCNC: 358 MG/DL (ref 74–99)
GLUCOSE BLD-MCNC: 396 MG/DL (ref 74–99)
GLUCOSE BLD-MCNC: 418 MG/DL (ref 74–99)
GLUCOSE SERPL-MCNC: 127 MG/DL (ref 74–99)
GLUCOSE SERPL-MCNC: 303 MG/DL (ref 74–99)
GLUCOSE SERPL-MCNC: 427 MG/DL (ref 74–99)
HBA1C MFR BLD: 11.7 % (ref 4–5.6)
HCT VFR BLD AUTO: 41.6 % (ref 37–54)
HGB BLD-MCNC: 13.5 G/DL (ref 12.5–16.5)
IMM GRANULOCYTES # BLD AUTO: <0.03 K/UL (ref 0–0.58)
IMM GRANULOCYTES NFR BLD: 0 % (ref 0–5)
LYMPHOCYTES NFR BLD: 1.7 K/UL (ref 1.5–4)
LYMPHOCYTES RELATIVE PERCENT: 19 % (ref 20–42)
MAGNESIUM SERPL-MCNC: 1.9 MG/DL (ref 1.6–2.6)
MAGNESIUM SERPL-MCNC: 2 MG/DL (ref 1.6–2.6)
MAGNESIUM SERPL-MCNC: 2 MG/DL (ref 1.6–2.6)
MCH RBC QN AUTO: 28.2 PG (ref 26–35)
MCHC RBC AUTO-ENTMCNC: 32.5 G/DL (ref 32–34.5)
MCV RBC AUTO: 87 FL (ref 80–99.9)
MONOCYTES NFR BLD: 0.45 K/UL (ref 0.1–0.95)
MONOCYTES NFR BLD: 5 % (ref 2–12)
NEUTROPHILS NFR BLD: 72 % (ref 43–80)
NEUTS SEG NFR BLD: 6.48 K/UL (ref 1.8–7.3)
PH VENOUS: 7.39 (ref 7.35–7.45)
PHOSPHATE SERPL-MCNC: 2.4 MG/DL (ref 2.5–4.5)
PHOSPHATE SERPL-MCNC: 2.5 MG/DL (ref 2.5–4.5)
PHOSPHATE SERPL-MCNC: 2.7 MG/DL (ref 2.5–4.5)
PLATELET # BLD AUTO: 468 K/UL (ref 130–450)
PMV BLD AUTO: 9.1 FL (ref 7–12)
POTASSIUM SERPL-SCNC: 4.1 MMOL/L (ref 3.5–5)
POTASSIUM SERPL-SCNC: 4.2 MMOL/L (ref 3.5–5)
POTASSIUM SERPL-SCNC: 4.7 MMOL/L (ref 3.5–5)
RBC # BLD AUTO: 4.78 M/UL (ref 3.8–5.8)
SODIUM SERPL-SCNC: 132 MMOL/L (ref 132–146)
SODIUM SERPL-SCNC: 133 MMOL/L (ref 132–146)
SODIUM SERPL-SCNC: 135 MMOL/L (ref 132–146)
WBC OTHER # BLD: 9 K/UL (ref 4.5–11.5)

## 2024-06-29 PROCEDURE — 6370000000 HC RX 637 (ALT 250 FOR IP)

## 2024-06-29 PROCEDURE — 6360000002 HC RX W HCPCS

## 2024-06-29 PROCEDURE — 83036 HEMOGLOBIN GLYCOSYLATED A1C: CPT

## 2024-06-29 PROCEDURE — 82962 GLUCOSE BLOOD TEST: CPT

## 2024-06-29 PROCEDURE — 2000000000 HC ICU R&B

## 2024-06-29 PROCEDURE — 99233 SBSQ HOSP IP/OBS HIGH 50: CPT | Performed by: INTERNAL MEDICINE

## 2024-06-29 PROCEDURE — 84100 ASSAY OF PHOSPHORUS: CPT

## 2024-06-29 PROCEDURE — 85025 COMPLETE CBC W/AUTO DIFF WBC: CPT

## 2024-06-29 PROCEDURE — 2580000003 HC RX 258

## 2024-06-29 PROCEDURE — 6370000000 HC RX 637 (ALT 250 FOR IP): Performed by: INTERNAL MEDICINE

## 2024-06-29 PROCEDURE — 2500000003 HC RX 250 WO HCPCS

## 2024-06-29 PROCEDURE — 82800 BLOOD PH: CPT

## 2024-06-29 PROCEDURE — 80048 BASIC METABOLIC PNL TOTAL CA: CPT

## 2024-06-29 PROCEDURE — 83735 ASSAY OF MAGNESIUM: CPT

## 2024-06-29 PROCEDURE — 36415 COLL VENOUS BLD VENIPUNCTURE: CPT

## 2024-06-29 PROCEDURE — 99291 CRITICAL CARE FIRST HOUR: CPT | Performed by: INTERNAL MEDICINE

## 2024-06-29 PROCEDURE — 82010 KETONE BODYS QUAN: CPT

## 2024-06-29 RX ORDER — INSULIN GLARGINE 100 [IU]/ML
25 INJECTION, SOLUTION SUBCUTANEOUS DAILY
Qty: 10 ML | Refills: 3 | Status: CANCELLED | OUTPATIENT
Start: 2024-06-29

## 2024-06-29 RX ORDER — DEXTROSE MONOHYDRATE AND SODIUM CHLORIDE 5; .45 G/100ML; G/100ML
INJECTION, SOLUTION INTRAVENOUS CONTINUOUS PRN
Status: DISCONTINUED | OUTPATIENT
Start: 2024-06-29 | End: 2024-06-29 | Stop reason: ALTCHOICE

## 2024-06-29 RX ORDER — 0.9 % SODIUM CHLORIDE 0.9 %
15 INTRAVENOUS SOLUTION INTRAVENOUS ONCE
Status: DISCONTINUED | OUTPATIENT
Start: 2024-06-29 | End: 2024-06-29 | Stop reason: SDUPTHER

## 2024-06-29 RX ORDER — AMOXICILLIN AND CLAVULANATE POTASSIUM 875; 125 MG/1; MG/1
1 TABLET, FILM COATED ORAL EVERY 12 HOURS SCHEDULED
Qty: 2 TABLET | Refills: 0 | Status: CANCELLED | OUTPATIENT
Start: 2024-06-29 | End: 2024-06-30

## 2024-06-29 RX ORDER — 0.9 % SODIUM CHLORIDE 0.9 %
15 INTRAVENOUS SOLUTION INTRAVENOUS ONCE
Status: DISCONTINUED | OUTPATIENT
Start: 2024-06-29 | End: 2024-06-29 | Stop reason: ALTCHOICE

## 2024-06-29 RX ORDER — DEXTROSE MONOHYDRATE AND SODIUM CHLORIDE 5; .45 G/100ML; G/100ML
INJECTION, SOLUTION INTRAVENOUS CONTINUOUS PRN
Status: DISCONTINUED | OUTPATIENT
Start: 2024-06-29 | End: 2024-06-30

## 2024-06-29 RX ORDER — 0.9 % SODIUM CHLORIDE 0.9 %
15 INTRAVENOUS SOLUTION INTRAVENOUS ONCE
Status: DISCONTINUED | OUTPATIENT
Start: 2024-06-29 | End: 2024-07-02 | Stop reason: HOSPADM

## 2024-06-29 RX ORDER — CHOLECALCIFEROL (VITAMIN D3) 25 MCG
1000 TABLET ORAL DAILY
Qty: 60 TABLET | Refills: 2 | Status: CANCELLED | OUTPATIENT
Start: 2024-06-29

## 2024-06-29 RX ORDER — SODIUM CHLORIDE 9 MG/ML
INJECTION, SOLUTION INTRAVENOUS CONTINUOUS
Status: DISCONTINUED | OUTPATIENT
Start: 2024-06-29 | End: 2024-06-29 | Stop reason: ALTCHOICE

## 2024-06-29 RX ORDER — POTASSIUM CHLORIDE 7.45 MG/ML
10 INJECTION INTRAVENOUS PRN
Status: DISCONTINUED | OUTPATIENT
Start: 2024-06-29 | End: 2024-07-02 | Stop reason: HOSPADM

## 2024-06-29 RX ORDER — MAGNESIUM SULFATE IN WATER 40 MG/ML
2000 INJECTION, SOLUTION INTRAVENOUS PRN
Status: DISCONTINUED | OUTPATIENT
Start: 2024-06-29 | End: 2024-07-02 | Stop reason: HOSPADM

## 2024-06-29 RX ORDER — SODIUM CHLORIDE 9 MG/ML
INJECTION, SOLUTION INTRAVENOUS CONTINUOUS
Status: DISCONTINUED | OUTPATIENT
Start: 2024-06-29 | End: 2024-06-29 | Stop reason: SDUPTHER

## 2024-06-29 RX ORDER — SODIUM CHLORIDE 450 MG/100ML
INJECTION, SOLUTION INTRAVENOUS CONTINUOUS
Status: DISCONTINUED | OUTPATIENT
Start: 2024-06-29 | End: 2024-07-02 | Stop reason: HOSPADM

## 2024-06-29 RX ADMIN — AMOXICILLIN AND CLAVULANATE POTASSIUM 1 TABLET: 875; 125 TABLET, FILM COATED ORAL at 09:07

## 2024-06-29 RX ADMIN — SODIUM CHLORIDE: 4.5 INJECTION, SOLUTION INTRAVENOUS at 12:03

## 2024-06-29 RX ADMIN — DULOXETINE HYDROCHLORIDE 30 MG: 30 CAPSULE, DELAYED RELEASE ORAL at 09:02

## 2024-06-29 RX ADMIN — INSULIN GLARGINE 25 UNITS: 100 INJECTION, SOLUTION SUBCUTANEOUS at 09:01

## 2024-06-29 RX ADMIN — SODIUM CHLORIDE 4.9 UNITS/HR: 9 INJECTION, SOLUTION INTRAVENOUS at 13:21

## 2024-06-29 RX ADMIN — POTASSIUM CHLORIDE 10 MEQ: 7.46 INJECTION, SOLUTION INTRAVENOUS at 20:16

## 2024-06-29 RX ADMIN — POTASSIUM CHLORIDE 10 MEQ: 7.46 INJECTION, SOLUTION INTRAVENOUS at 15:18

## 2024-06-29 RX ADMIN — SODIUM CHLORIDE: 4.5 INJECTION, SOLUTION INTRAVENOUS at 13:25

## 2024-06-29 RX ADMIN — POTASSIUM CHLORIDE 10 MEQ: 7.46 INJECTION, SOLUTION INTRAVENOUS at 17:25

## 2024-06-29 RX ADMIN — DEXTROSE AND SODIUM CHLORIDE: 5; 450 INJECTION, SOLUTION INTRAVENOUS at 22:48

## 2024-06-29 RX ADMIN — INSULIN LISPRO 4 UNITS: 100 INJECTION, SOLUTION INTRAVENOUS; SUBCUTANEOUS at 06:19

## 2024-06-29 RX ADMIN — ASPIRIN 81 MG 81 MG: 81 TABLET ORAL at 09:04

## 2024-06-29 RX ADMIN — SODIUM PHOSPHATE, MONOBASIC, MONOHYDRATE AND SODIUM PHOSPHATE, DIBASIC, ANHYDROUS 15 MMOL: 142; 276 INJECTION, SOLUTION INTRAVENOUS at 20:57

## 2024-06-29 RX ADMIN — Medication 1000 UNITS: at 09:01

## 2024-06-29 RX ADMIN — POTASSIUM CHLORIDE 10 MEQ: 7.46 INJECTION, SOLUTION INTRAVENOUS at 16:23

## 2024-06-29 RX ADMIN — AMOXICILLIN AND CLAVULANATE POTASSIUM 1 TABLET: 875; 125 TABLET, FILM COATED ORAL at 20:54

## 2024-06-29 RX ADMIN — ATORVASTATIN CALCIUM 80 MG: 80 TABLET, FILM COATED ORAL at 20:54

## 2024-06-29 RX ADMIN — AMLODIPINE BESYLATE 10 MG: 10 TABLET ORAL at 09:02

## 2024-06-29 RX ADMIN — ENOXAPARIN SODIUM 40 MG: 100 INJECTION SUBCUTANEOUS at 09:14

## 2024-06-29 RX ADMIN — EZETIMIBE 10 MG: 10 TABLET ORAL at 21:58

## 2024-06-29 RX ADMIN — POTASSIUM CHLORIDE 10 MEQ: 7.46 INJECTION, SOLUTION INTRAVENOUS at 21:17

## 2024-06-29 RX ADMIN — DEXTROSE AND SODIUM CHLORIDE: 5; 450 INJECTION, SOLUTION INTRAVENOUS at 15:31

## 2024-06-29 RX ADMIN — LISINOPRIL 10 MG: 10 TABLET ORAL at 09:02

## 2024-06-29 RX ADMIN — POTASSIUM CHLORIDE 10 MEQ: 7.46 INJECTION, SOLUTION INTRAVENOUS at 22:19

## 2024-06-29 RX ADMIN — SODIUM CHLORIDE, PRESERVATIVE FREE 10 ML: 5 INJECTION INTRAVENOUS at 09:09

## 2024-06-29 ASSESSMENT — PAIN SCALES - GENERAL
PAINLEVEL_OUTOF10: 0

## 2024-06-29 NOTE — CONSULTS
Critical Care Consult Note    Patient - Magan Funes   MRN -  [id number]   [id number]   - 1970      Date of Admission -  2024  9:12 PM  Date of evaluation -  2024  4404/4404-A   Hospital Day - 6          ADMIT/CONSULT DETAILS     Reason for Admit/Consult   DKA    Consulting Service/Physician   Consulting - Andrzej Cline MD  Primary Care Physician - Andrzej Cline MD         HPI   54 y.o. 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 coli UTI, history of cardiac arrest in , history of polysubstance abuse, presented with nausea vomiting.  He was diagnosed with DKA and admitted in MICU and was transferred to the floor.  Patient was found to be in DKA again and was transferred back to MICU.         Past Medical History         Diagnosis Date    A-fib (HCC)     Alcoholism (HCC)     Cardiac arrest (HCC)     Cocaine abuse (HCC)     Diabetes mellitus (HCC)     Hypertension     Idiopathic peripheral neuropathy 2016    Lacunar stroke (HCC)     Marijuana abuse     S/P transesophageal echocardiogram (NETTA) 2023    appau        Past Surgical History     History reviewed. No pertinent surgical history.      Current Medications   Current Medications    sodium chloride  15 mL/kg IntraVENous Once    pantoprazole (PROTONIX) 40 mg in sodium chloride (PF) 0.9 % 10 mL injection  40 mg IntraVENous Daily    insulin lispro  0-4 Units SubCUTAneous TID WC    insulin glargine  25 Units SubCUTAneous Daily    amoxicillin-clavulanate  1 tablet Oral 2 times per day    aspirin  81 mg Oral Daily    vitamin D  50,000 Units Oral Weekly    Vitamin D  1,000 Units Oral Daily    atorvastatin  80 mg Oral Nightly    DULoxetine  30 mg Oral Daily    ezetimibe  10 mg Oral Nightly    lisinopril  10 mg Oral Daily    amLODIPine  10 mg Oral

## 2024-06-30 ENCOUNTER — APPOINTMENT (OUTPATIENT)
Dept: GENERAL RADIOLOGY | Age: 54
DRG: 637 | End: 2024-06-30
Payer: MEDICARE

## 2024-06-30 LAB
ANION GAP SERPL CALCULATED.3IONS-SCNC: 10 MMOL/L (ref 7–16)
ANION GAP SERPL CALCULATED.3IONS-SCNC: 11 MMOL/L (ref 7–16)
ANION GAP SERPL CALCULATED.3IONS-SCNC: 9 MMOL/L (ref 7–16)
BASOPHILS # BLD: 0.04 K/UL (ref 0–0.2)
BASOPHILS NFR BLD: 1 % (ref 0–2)
BUN SERPL-MCNC: 10 MG/DL (ref 6–20)
BUN SERPL-MCNC: 11 MG/DL (ref 6–20)
BUN SERPL-MCNC: 14 MG/DL (ref 6–20)
CALCIUM SERPL-MCNC: 8.3 MG/DL (ref 8.6–10.2)
CALCIUM SERPL-MCNC: 8.5 MG/DL (ref 8.6–10.2)
CALCIUM SERPL-MCNC: 8.7 MG/DL (ref 8.6–10.2)
CHLORIDE SERPL-SCNC: 100 MMOL/L (ref 98–107)
CHLORIDE SERPL-SCNC: 95 MMOL/L (ref 98–107)
CHLORIDE SERPL-SCNC: 98 MMOL/L (ref 98–107)
CO2 SERPL-SCNC: 23 MMOL/L (ref 22–29)
CREAT SERPL-MCNC: 0.6 MG/DL (ref 0.7–1.2)
CREAT SERPL-MCNC: 0.6 MG/DL (ref 0.7–1.2)
CREAT SERPL-MCNC: 0.8 MG/DL (ref 0.7–1.2)
EOSINOPHIL # BLD: 0.34 K/UL (ref 0.05–0.5)
EOSINOPHILS RELATIVE PERCENT: 5 % (ref 0–6)
ERYTHROCYTE [DISTWIDTH] IN BLOOD BY AUTOMATED COUNT: 13.9 % (ref 11.5–15)
GFR, ESTIMATED: >90 ML/MIN/1.73M2
GLUCOSE BLD-MCNC: 104 MG/DL (ref 74–99)
GLUCOSE BLD-MCNC: 116 MG/DL (ref 74–99)
GLUCOSE BLD-MCNC: 121 MG/DL (ref 74–99)
GLUCOSE BLD-MCNC: 135 MG/DL (ref 74–99)
GLUCOSE BLD-MCNC: 141 MG/DL (ref 74–99)
GLUCOSE BLD-MCNC: 148 MG/DL (ref 74–99)
GLUCOSE BLD-MCNC: 163 MG/DL (ref 74–99)
GLUCOSE BLD-MCNC: 198 MG/DL (ref 74–99)
GLUCOSE BLD-MCNC: 207 MG/DL (ref 74–99)
GLUCOSE BLD-MCNC: 214 MG/DL (ref 74–99)
GLUCOSE BLD-MCNC: 215 MG/DL (ref 74–99)
GLUCOSE BLD-MCNC: 216 MG/DL (ref 74–99)
GLUCOSE BLD-MCNC: 222 MG/DL (ref 74–99)
GLUCOSE BLD-MCNC: 225 MG/DL (ref 74–99)
GLUCOSE BLD-MCNC: 236 MG/DL (ref 74–99)
GLUCOSE BLD-MCNC: 244 MG/DL (ref 74–99)
GLUCOSE SERPL-MCNC: 123 MG/DL (ref 74–99)
GLUCOSE SERPL-MCNC: 236 MG/DL (ref 74–99)
GLUCOSE SERPL-MCNC: 237 MG/DL (ref 74–99)
HCT VFR BLD AUTO: 32.6 % (ref 37–54)
HGB BLD-MCNC: 10.5 G/DL (ref 12.5–16.5)
IMM GRANULOCYTES # BLD AUTO: <0.03 K/UL (ref 0–0.58)
IMM GRANULOCYTES NFR BLD: 0 % (ref 0–5)
INR PPP: 1.1
LYMPHOCYTES NFR BLD: 2.47 K/UL (ref 1.5–4)
LYMPHOCYTES RELATIVE PERCENT: 34 % (ref 20–42)
MAGNESIUM SERPL-MCNC: 1.6 MG/DL (ref 1.6–2.6)
MAGNESIUM SERPL-MCNC: 1.7 MG/DL (ref 1.6–2.6)
MAGNESIUM SERPL-MCNC: 1.8 MG/DL (ref 1.6–2.6)
MCH RBC QN AUTO: 28.4 PG (ref 26–35)
MCHC RBC AUTO-ENTMCNC: 32.2 G/DL (ref 32–34.5)
MCV RBC AUTO: 88.1 FL (ref 80–99.9)
MICROORGANISM SPEC CULT: NORMAL
MICROORGANISM SPEC CULT: NORMAL
MONOCYTES NFR BLD: 0.57 K/UL (ref 0.1–0.95)
MONOCYTES NFR BLD: 8 % (ref 2–12)
NEUTROPHILS NFR BLD: 53 % (ref 43–80)
NEUTS SEG NFR BLD: 3.84 K/UL (ref 1.8–7.3)
PARTIAL THROMBOPLASTIN TIME: 29.5 SEC (ref 24.5–35.1)
PHOSPHATE SERPL-MCNC: 2.3 MG/DL (ref 2.5–4.5)
PHOSPHATE SERPL-MCNC: 2.9 MG/DL (ref 2.5–4.5)
PHOSPHATE SERPL-MCNC: 3.6 MG/DL (ref 2.5–4.5)
PLATELET # BLD AUTO: 385 K/UL (ref 130–450)
PMV BLD AUTO: 9.1 FL (ref 7–12)
POTASSIUM SERPL-SCNC: 4.2 MMOL/L (ref 3.5–5)
POTASSIUM SERPL-SCNC: 4.4 MMOL/L (ref 3.5–5)
POTASSIUM SERPL-SCNC: 4.8 MMOL/L (ref 3.5–5)
PROTHROMBIN TIME: 11.6 SEC (ref 9.3–12.4)
RBC # BLD AUTO: 3.7 M/UL (ref 3.8–5.8)
SERVICE CMNT-IMP: NORMAL
SERVICE CMNT-IMP: NORMAL
SODIUM SERPL-SCNC: 128 MMOL/L (ref 132–146)
SODIUM SERPL-SCNC: 130 MMOL/L (ref 132–146)
SODIUM SERPL-SCNC: 134 MMOL/L (ref 132–146)
SPECIMEN DESCRIPTION: NORMAL
SPECIMEN DESCRIPTION: NORMAL
WBC OTHER # BLD: 7.3 K/UL (ref 4.5–11.5)

## 2024-06-30 PROCEDURE — 6360000002 HC RX W HCPCS

## 2024-06-30 PROCEDURE — 82962 GLUCOSE BLOOD TEST: CPT

## 2024-06-30 PROCEDURE — 6370000000 HC RX 637 (ALT 250 FOR IP): Performed by: INTERNAL MEDICINE

## 2024-06-30 PROCEDURE — 2000000000 HC ICU R&B

## 2024-06-30 PROCEDURE — 2500000003 HC RX 250 WO HCPCS

## 2024-06-30 PROCEDURE — 85025 COMPLETE CBC W/AUTO DIFF WBC: CPT

## 2024-06-30 PROCEDURE — 99233 SBSQ HOSP IP/OBS HIGH 50: CPT | Performed by: INTERNAL MEDICINE

## 2024-06-30 PROCEDURE — C9113 INJ PANTOPRAZOLE SODIUM, VIA: HCPCS

## 2024-06-30 PROCEDURE — 85730 THROMBOPLASTIN TIME PARTIAL: CPT

## 2024-06-30 PROCEDURE — 99291 CRITICAL CARE FIRST HOUR: CPT | Performed by: INTERNAL MEDICINE

## 2024-06-30 PROCEDURE — 2580000003 HC RX 258

## 2024-06-30 PROCEDURE — 84100 ASSAY OF PHOSPHORUS: CPT

## 2024-06-30 PROCEDURE — 71045 X-RAY EXAM CHEST 1 VIEW: CPT

## 2024-06-30 PROCEDURE — 85610 PROTHROMBIN TIME: CPT

## 2024-06-30 PROCEDURE — 6370000000 HC RX 637 (ALT 250 FOR IP)

## 2024-06-30 PROCEDURE — 83735 ASSAY OF MAGNESIUM: CPT

## 2024-06-30 RX ORDER — INSULIN LISPRO 100 [IU]/ML
0-4 INJECTION, SOLUTION INTRAVENOUS; SUBCUTANEOUS
Status: DISCONTINUED | OUTPATIENT
Start: 2024-06-30 | End: 2024-07-02 | Stop reason: HOSPADM

## 2024-06-30 RX ORDER — INSULIN LISPRO 100 [IU]/ML
0-4 INJECTION, SOLUTION INTRAVENOUS; SUBCUTANEOUS NIGHTLY
Status: DISCONTINUED | OUTPATIENT
Start: 2024-06-30 | End: 2024-07-02 | Stop reason: HOSPADM

## 2024-06-30 RX ORDER — INSULIN GLARGINE 100 [IU]/ML
10 INJECTION, SOLUTION SUBCUTANEOUS DAILY
Status: DISCONTINUED | OUTPATIENT
Start: 2024-06-30 | End: 2024-07-02

## 2024-06-30 RX ORDER — ERGOCALCIFEROL 1.25 MG/1
50000 CAPSULE ORAL WEEKLY
Qty: 5 CAPSULE | Refills: 0 | Status: CANCELLED | OUTPATIENT
Start: 2024-07-01 | End: 2024-08-13

## 2024-06-30 RX ORDER — DEXTROSE MONOHYDRATE AND SODIUM CHLORIDE 5; .9 G/100ML; G/100ML
INJECTION, SOLUTION INTRAVENOUS CONTINUOUS PRN
Status: DISCONTINUED | OUTPATIENT
Start: 2024-06-30 | End: 2024-07-02 | Stop reason: HOSPADM

## 2024-06-30 RX ADMIN — POTASSIUM CHLORIDE 10 MEQ: 7.46 INJECTION, SOLUTION INTRAVENOUS at 03:14

## 2024-06-30 RX ADMIN — POTASSIUM CHLORIDE 10 MEQ: 7.46 INJECTION, SOLUTION INTRAVENOUS at 02:04

## 2024-06-30 RX ADMIN — DEXTROSE AND SODIUM CHLORIDE: 5; 900 INJECTION, SOLUTION INTRAVENOUS at 08:51

## 2024-06-30 RX ADMIN — ATORVASTATIN CALCIUM 80 MG: 80 TABLET, FILM COATED ORAL at 19:57

## 2024-06-30 RX ADMIN — DEXTROSE AND SODIUM CHLORIDE: 5; 450 INJECTION, SOLUTION INTRAVENOUS at 05:57

## 2024-06-30 RX ADMIN — EZETIMIBE 10 MG: 10 TABLET ORAL at 21:05

## 2024-06-30 RX ADMIN — AMLODIPINE BESYLATE 10 MG: 10 TABLET ORAL at 10:19

## 2024-06-30 RX ADMIN — SODIUM CHLORIDE, PRESERVATIVE FREE 10 ML: 5 INJECTION INTRAVENOUS at 10:20

## 2024-06-30 RX ADMIN — INSULIN LISPRO 1 UNITS: 100 INJECTION, SOLUTION INTRAVENOUS; SUBCUTANEOUS at 18:38

## 2024-06-30 RX ADMIN — ASPIRIN 81 MG 81 MG: 81 TABLET ORAL at 10:19

## 2024-06-30 RX ADMIN — POTASSIUM CHLORIDE 10 MEQ: 7.46 INJECTION, SOLUTION INTRAVENOUS at 07:48

## 2024-06-30 RX ADMIN — DULOXETINE HYDROCHLORIDE 30 MG: 30 CAPSULE, DELAYED RELEASE ORAL at 10:31

## 2024-06-30 RX ADMIN — PANTOPRAZOLE SODIUM 40 MG: 40 INJECTION, POWDER, FOR SOLUTION INTRAVENOUS at 10:19

## 2024-06-30 RX ADMIN — INSULIN LISPRO 1 UNITS: 100 INJECTION, SOLUTION INTRAVENOUS; SUBCUTANEOUS at 13:05

## 2024-06-30 RX ADMIN — ENOXAPARIN SODIUM 40 MG: 100 INJECTION SUBCUTANEOUS at 10:19

## 2024-06-30 RX ADMIN — LISINOPRIL 10 MG: 10 TABLET ORAL at 10:19

## 2024-06-30 RX ADMIN — POTASSIUM CHLORIDE 10 MEQ: 7.46 INJECTION, SOLUTION INTRAVENOUS at 01:12

## 2024-06-30 RX ADMIN — SODIUM CHLORIDE, PRESERVATIVE FREE 10 ML: 5 INJECTION INTRAVENOUS at 19:57

## 2024-06-30 RX ADMIN — AMOXICILLIN AND CLAVULANATE POTASSIUM 1 TABLET: 875; 125 TABLET, FILM COATED ORAL at 19:57

## 2024-06-30 RX ADMIN — INSULIN GLARGINE 10 UNITS: 100 INJECTION, SOLUTION SUBCUTANEOUS at 10:52

## 2024-06-30 RX ADMIN — POTASSIUM CHLORIDE 10 MEQ: 7.46 INJECTION, SOLUTION INTRAVENOUS at 06:35

## 2024-06-30 RX ADMIN — AMOXICILLIN AND CLAVULANATE POTASSIUM 1 TABLET: 875; 125 TABLET, FILM COATED ORAL at 10:19

## 2024-06-30 RX ADMIN — Medication 1000 UNITS: at 10:19

## 2024-06-30 RX ADMIN — SODIUM PHOSPHATE, MONOBASIC, MONOHYDRATE AND SODIUM PHOSPHATE, DIBASIC, ANHYDROUS 15 MMOL: 142; 276 INJECTION, SOLUTION INTRAVENOUS at 07:49

## 2024-06-30 ASSESSMENT — PAIN SCALES - GENERAL
PAINLEVEL_OUTOF10: 0

## 2024-07-01 LAB
ALBUMIN SERPL-MCNC: 3.7 G/DL (ref 3.5–5.2)
ALP SERPL-CCNC: 97 U/L (ref 40–129)
ALT SERPL-CCNC: 39 U/L (ref 0–40)
ANION GAP SERPL CALCULATED.3IONS-SCNC: 10 MMOL/L (ref 7–16)
ANION GAP SERPL CALCULATED.3IONS-SCNC: 12 MMOL/L (ref 7–16)
AST SERPL-CCNC: 30 U/L (ref 0–39)
B-OH-BUTYR SERPL-MCNC: 0.51 MMOL/L (ref 0.02–0.27)
BASOPHILS # BLD: 0.05 K/UL (ref 0–0.2)
BASOPHILS NFR BLD: 1 % (ref 0–2)
BILIRUB SERPL-MCNC: 1 MG/DL (ref 0–1.2)
BUN SERPL-MCNC: 10 MG/DL (ref 6–20)
BUN SERPL-MCNC: 9 MG/DL (ref 6–20)
CALCIUM SERPL-MCNC: 8.7 MG/DL (ref 8.6–10.2)
CALCIUM SERPL-MCNC: 8.8 MG/DL (ref 8.6–10.2)
CHLORIDE SERPL-SCNC: 100 MMOL/L (ref 98–107)
CHLORIDE SERPL-SCNC: 102 MMOL/L (ref 98–107)
CO2 SERPL-SCNC: 21 MMOL/L (ref 22–29)
CO2 SERPL-SCNC: 23 MMOL/L (ref 22–29)
CREAT SERPL-MCNC: 0.6 MG/DL (ref 0.7–1.2)
CREAT SERPL-MCNC: 0.6 MG/DL (ref 0.7–1.2)
EOSINOPHIL # BLD: 0.31 K/UL (ref 0.05–0.5)
EOSINOPHILS RELATIVE PERCENT: 4 % (ref 0–6)
ERYTHROCYTE [DISTWIDTH] IN BLOOD BY AUTOMATED COUNT: 13.5 % (ref 11.5–15)
GFR, ESTIMATED: >90 ML/MIN/1.73M2
GFR, ESTIMATED: >90 ML/MIN/1.73M2
GLUCOSE BLD-MCNC: 229 MG/DL (ref 74–99)
GLUCOSE BLD-MCNC: 244 MG/DL (ref 74–99)
GLUCOSE BLD-MCNC: 253 MG/DL (ref 74–99)
GLUCOSE BLD-MCNC: 317 MG/DL (ref 74–99)
GLUCOSE SERPL-MCNC: 205 MG/DL (ref 74–99)
GLUCOSE SERPL-MCNC: 214 MG/DL (ref 74–99)
HCT VFR BLD AUTO: 37.4 % (ref 37–54)
HGB BLD-MCNC: 12.3 G/DL (ref 12.5–16.5)
IMM GRANULOCYTES # BLD AUTO: <0.03 K/UL (ref 0–0.58)
IMM GRANULOCYTES NFR BLD: 0 % (ref 0–5)
INR PPP: 1
LYMPHOCYTES NFR BLD: 2.55 K/UL (ref 1.5–4)
LYMPHOCYTES RELATIVE PERCENT: 33 % (ref 20–42)
MAGNESIUM SERPL-MCNC: 1.8 MG/DL (ref 1.6–2.6)
MCH RBC QN AUTO: 28 PG (ref 26–35)
MCHC RBC AUTO-ENTMCNC: 32.9 G/DL (ref 32–34.5)
MCV RBC AUTO: 85 FL (ref 80–99.9)
MONOCYTES NFR BLD: 0.57 K/UL (ref 0.1–0.95)
MONOCYTES NFR BLD: 7 % (ref 2–12)
NEUTROPHILS NFR BLD: 54 % (ref 43–80)
NEUTS SEG NFR BLD: 4.18 K/UL (ref 1.8–7.3)
PARTIAL THROMBOPLASTIN TIME: 29.6 SEC (ref 24.5–35.1)
PLATELET # BLD AUTO: 471 K/UL (ref 130–450)
PMV BLD AUTO: 8.7 FL (ref 7–12)
POTASSIUM SERPL-SCNC: 4.6 MMOL/L (ref 3.5–5)
POTASSIUM SERPL-SCNC: 5.4 MMOL/L (ref 3.5–5)
PROT SERPL-MCNC: 6.5 G/DL (ref 6.4–8.3)
PROTHROMBIN TIME: 10.9 SEC (ref 9.3–12.4)
RBC # BLD AUTO: 4.4 M/UL (ref 3.8–5.8)
SODIUM SERPL-SCNC: 133 MMOL/L (ref 132–146)
SODIUM SERPL-SCNC: 135 MMOL/L (ref 132–146)
WBC OTHER # BLD: 7.7 K/UL (ref 4.5–11.5)

## 2024-07-01 PROCEDURE — 6360000002 HC RX W HCPCS: Performed by: NURSE PRACTITIONER

## 2024-07-01 PROCEDURE — 6360000002 HC RX W HCPCS

## 2024-07-01 PROCEDURE — 6370000000 HC RX 637 (ALT 250 FOR IP): Performed by: INTERNAL MEDICINE

## 2024-07-01 PROCEDURE — 82010 KETONE BODYS QUAN: CPT

## 2024-07-01 PROCEDURE — 80048 BASIC METABOLIC PNL TOTAL CA: CPT

## 2024-07-01 PROCEDURE — 1200000000 HC SEMI PRIVATE

## 2024-07-01 PROCEDURE — 97535 SELF CARE MNGMENT TRAINING: CPT

## 2024-07-01 PROCEDURE — 6370000000 HC RX 637 (ALT 250 FOR IP)

## 2024-07-01 PROCEDURE — 2580000003 HC RX 258

## 2024-07-01 PROCEDURE — 99231 SBSQ HOSP IP/OBS SF/LOW 25: CPT | Performed by: INTERNAL MEDICINE

## 2024-07-01 PROCEDURE — 97530 THERAPEUTIC ACTIVITIES: CPT

## 2024-07-01 PROCEDURE — 85025 COMPLETE CBC W/AUTO DIFF WBC: CPT

## 2024-07-01 PROCEDURE — 82962 GLUCOSE BLOOD TEST: CPT

## 2024-07-01 PROCEDURE — 80053 COMPREHEN METABOLIC PANEL: CPT

## 2024-07-01 PROCEDURE — 85730 THROMBOPLASTIN TIME PARTIAL: CPT

## 2024-07-01 PROCEDURE — 83735 ASSAY OF MAGNESIUM: CPT

## 2024-07-01 PROCEDURE — 85610 PROTHROMBIN TIME: CPT

## 2024-07-01 RX ORDER — MAGNESIUM SULFATE IN WATER 40 MG/ML
2000 INJECTION, SOLUTION INTRAVENOUS ONCE
Status: COMPLETED | OUTPATIENT
Start: 2024-07-01 | End: 2024-07-01

## 2024-07-01 RX ORDER — INSULIN LISPRO 100 [IU]/ML
5 INJECTION, SOLUTION INTRAVENOUS; SUBCUTANEOUS
Status: DISCONTINUED | OUTPATIENT
Start: 2024-07-01 | End: 2024-07-02 | Stop reason: HOSPADM

## 2024-07-01 RX ADMIN — INSULIN GLARGINE 10 UNITS: 100 INJECTION, SOLUTION SUBCUTANEOUS at 08:23

## 2024-07-01 RX ADMIN — INSULIN LISPRO 5 UNITS: 100 INJECTION, SOLUTION INTRAVENOUS; SUBCUTANEOUS at 18:10

## 2024-07-01 RX ADMIN — AMLODIPINE BESYLATE 10 MG: 10 TABLET ORAL at 08:22

## 2024-07-01 RX ADMIN — ATORVASTATIN CALCIUM 80 MG: 80 TABLET, FILM COATED ORAL at 21:19

## 2024-07-01 RX ADMIN — AMOXICILLIN AND CLAVULANATE POTASSIUM 1 TABLET: 875; 125 TABLET, FILM COATED ORAL at 21:19

## 2024-07-01 RX ADMIN — ASPIRIN 81 MG 81 MG: 81 TABLET ORAL at 08:23

## 2024-07-01 RX ADMIN — ENOXAPARIN SODIUM 40 MG: 100 INJECTION SUBCUTANEOUS at 08:23

## 2024-07-01 RX ADMIN — DULOXETINE HYDROCHLORIDE 30 MG: 30 CAPSULE, DELAYED RELEASE ORAL at 08:50

## 2024-07-01 RX ADMIN — AMOXICILLIN AND CLAVULANATE POTASSIUM 1 TABLET: 875; 125 TABLET, FILM COATED ORAL at 08:22

## 2024-07-01 RX ADMIN — EZETIMIBE 10 MG: 10 TABLET ORAL at 22:39

## 2024-07-01 RX ADMIN — INSULIN LISPRO 3 UNITS: 100 INJECTION, SOLUTION INTRAVENOUS; SUBCUTANEOUS at 15:11

## 2024-07-01 RX ADMIN — Medication 1000 UNITS: at 08:23

## 2024-07-01 RX ADMIN — MAGNESIUM SULFATE HEPTAHYDRATE 2000 MG: 40 INJECTION, SOLUTION INTRAVENOUS at 06:42

## 2024-07-01 RX ADMIN — SODIUM CHLORIDE, PRESERVATIVE FREE 10 ML: 5 INJECTION INTRAVENOUS at 21:19

## 2024-07-01 RX ADMIN — PANTOPRAZOLE SODIUM 40 MG: 40 INJECTION, POWDER, FOR SOLUTION INTRAVENOUS at 08:23

## 2024-07-01 RX ADMIN — INSULIN LISPRO 1 UNITS: 100 INJECTION, SOLUTION INTRAVENOUS; SUBCUTANEOUS at 08:24

## 2024-07-01 RX ADMIN — ERGOCALCIFEROL 50000 UNITS: 1.25 CAPSULE ORAL at 08:24

## 2024-07-01 RX ADMIN — LISINOPRIL 10 MG: 10 TABLET ORAL at 08:22

## 2024-07-01 NOTE — CARE COORDINATION
Care Coordination: LOS 8 day, Tx from 74 on 6/29/24 for DKA protocol and elevated BS. Pt was bridged on 6/30/24. Pt has transfer. Spoke with Lucy at [facility]. Precert obtained 6/27/24 and is only good through today, will need new Therapy eval. and prestart precert tomorrow. Will check dc disposition today.     Electronically signed by Veronica aMyen RN on 7/1/2024 at 9:11 AM     ADDENDUM: per CM assistant, precert is good through 7/2/24. Plan is [facility].  Met with pt and he is still agreeable. Ptx and otx updated, incase needed, per ICU team- continue to monitor for hypoglycemia, consider gastric emptying study, will need to reeval for dysphagia, continue Augmentin until 7/2/24      Electronically signed by Veronica Mayen RN on 7/1/2024 at 3:13 PM

## 2024-07-01 NOTE — FLOWSHEET NOTE
Report called to [facility]. Transport Requested. The following patient belongings:  Cell Phone, Cell Phone , Pants, Shirt, and Shoes were gathered and placed with patient on transfer upon transfer to MICU. Family notified by patient via personal cell.

## 2024-07-01 NOTE — FLOWSHEET NOTE
Nurse to nurse report called to RADHA Oropeza. Bed currently dirty will put in  for transport when room is ready. Patient aware of new room (8417 A), will attempt to contact family and update.

## 2024-07-02 VITALS
DIASTOLIC BLOOD PRESSURE: 82 MMHG | HEIGHT: 66 IN | OXYGEN SATURATION: 98 % | WEIGHT: 129.41 LBS | SYSTOLIC BLOOD PRESSURE: 136 MMHG | TEMPERATURE: 96.7 F | BODY MASS INDEX: 20.8 KG/M2 | RESPIRATION RATE: 20 BRPM | HEART RATE: 86 BPM

## 2024-07-02 LAB
ANION GAP SERPL CALCULATED.3IONS-SCNC: 10 MMOL/L (ref 7–16)
BASOPHILS # BLD: 0.03 K/UL (ref 0–0.2)
BASOPHILS NFR BLD: 0 % (ref 0–2)
BUN SERPL-MCNC: 12 MG/DL (ref 6–20)
CALCIUM SERPL-MCNC: 9.1 MG/DL (ref 8.6–10.2)
CHLORIDE SERPL-SCNC: 101 MMOL/L (ref 98–107)
CO2 SERPL-SCNC: 24 MMOL/L (ref 22–29)
CREAT SERPL-MCNC: 0.6 MG/DL (ref 0.7–1.2)
EOSINOPHIL # BLD: 0.24 K/UL (ref 0.05–0.5)
EOSINOPHILS RELATIVE PERCENT: 3 % (ref 0–6)
ERYTHROCYTE [DISTWIDTH] IN BLOOD BY AUTOMATED COUNT: 13.5 % (ref 11.5–15)
GFR, ESTIMATED: >90 ML/MIN/1.73M2
GLUCOSE BLD-MCNC: 218 MG/DL (ref 74–99)
GLUCOSE BLD-MCNC: 223 MG/DL (ref 74–99)
GLUCOSE BLD-MCNC: 280 MG/DL (ref 74–99)
GLUCOSE SERPL-MCNC: 261 MG/DL (ref 74–99)
HCT VFR BLD AUTO: 39.5 % (ref 37–54)
HGB BLD-MCNC: 13.1 G/DL (ref 12.5–16.5)
IMM GRANULOCYTES # BLD AUTO: <0.03 K/UL (ref 0–0.58)
IMM GRANULOCYTES NFR BLD: 0 % (ref 0–5)
LYMPHOCYTES NFR BLD: 2.17 K/UL (ref 1.5–4)
LYMPHOCYTES RELATIVE PERCENT: 30 % (ref 20–42)
MAGNESIUM SERPL-MCNC: 1.9 MG/DL (ref 1.6–2.6)
MCH RBC QN AUTO: 28.4 PG (ref 26–35)
MCHC RBC AUTO-ENTMCNC: 33.2 G/DL (ref 32–34.5)
MCV RBC AUTO: 85.5 FL (ref 80–99.9)
MONOCYTES NFR BLD: 0.54 K/UL (ref 0.1–0.95)
MONOCYTES NFR BLD: 7 % (ref 2–12)
NEUTROPHILS NFR BLD: 59 % (ref 43–80)
NEUTS SEG NFR BLD: 4.32 K/UL (ref 1.8–7.3)
PHOSPHATE SERPL-MCNC: 2.9 MG/DL (ref 2.5–4.5)
PLATELET # BLD AUTO: 488 K/UL (ref 130–450)
PMV BLD AUTO: 8.8 FL (ref 7–12)
POTASSIUM SERPL-SCNC: 4.3 MMOL/L (ref 3.5–5)
RBC # BLD AUTO: 4.62 M/UL (ref 3.8–5.8)
SODIUM SERPL-SCNC: 135 MMOL/L (ref 132–146)
WBC OTHER # BLD: 7.3 K/UL (ref 4.5–11.5)

## 2024-07-02 PROCEDURE — 6370000000 HC RX 637 (ALT 250 FOR IP)

## 2024-07-02 PROCEDURE — 6360000002 HC RX W HCPCS

## 2024-07-02 PROCEDURE — 36415 COLL VENOUS BLD VENIPUNCTURE: CPT

## 2024-07-02 PROCEDURE — 85025 COMPLETE CBC W/AUTO DIFF WBC: CPT

## 2024-07-02 PROCEDURE — 6370000000 HC RX 637 (ALT 250 FOR IP): Performed by: INTERNAL MEDICINE

## 2024-07-02 PROCEDURE — 99238 HOSP IP/OBS DSCHRG MGMT 30/<: CPT | Performed by: INTERNAL MEDICINE

## 2024-07-02 PROCEDURE — 2580000003 HC RX 258

## 2024-07-02 PROCEDURE — 84100 ASSAY OF PHOSPHORUS: CPT

## 2024-07-02 PROCEDURE — 83735 ASSAY OF MAGNESIUM: CPT

## 2024-07-02 PROCEDURE — 82962 GLUCOSE BLOOD TEST: CPT

## 2024-07-02 PROCEDURE — 80048 BASIC METABOLIC PNL TOTAL CA: CPT

## 2024-07-02 RX ORDER — INSULIN GLARGINE 100 [IU]/ML
15 INJECTION, SOLUTION SUBCUTANEOUS DAILY
Status: DISCONTINUED | OUTPATIENT
Start: 2024-07-03 | End: 2024-07-02 | Stop reason: HOSPADM

## 2024-07-02 RX ORDER — ERGOCALCIFEROL 1.25 MG/1
50000 CAPSULE ORAL WEEKLY
Qty: 5 CAPSULE | DISCHARGE
Start: 2024-07-08 | End: 2024-08-13

## 2024-07-02 RX ORDER — INSULIN GLARGINE 100 [IU]/ML
5 INJECTION, SOLUTION SUBCUTANEOUS ONCE
Status: COMPLETED | OUTPATIENT
Start: 2024-07-02 | End: 2024-07-02

## 2024-07-02 RX ORDER — PANTOPRAZOLE SODIUM 40 MG/1
40 TABLET, DELAYED RELEASE ORAL
Status: DISCONTINUED | OUTPATIENT
Start: 2024-07-03 | End: 2024-07-02 | Stop reason: HOSPADM

## 2024-07-02 RX ADMIN — INSULIN GLARGINE 10 UNITS: 100 INJECTION, SOLUTION SUBCUTANEOUS at 09:14

## 2024-07-02 RX ADMIN — INSULIN LISPRO 1 UNITS: 100 INJECTION, SOLUTION INTRAVENOUS; SUBCUTANEOUS at 12:29

## 2024-07-02 RX ADMIN — INSULIN LISPRO 5 UNITS: 100 INJECTION, SOLUTION INTRAVENOUS; SUBCUTANEOUS at 09:14

## 2024-07-02 RX ADMIN — INSULIN GLARGINE 5 UNITS: 100 INJECTION, SOLUTION SUBCUTANEOUS at 10:37

## 2024-07-02 RX ADMIN — AMOXICILLIN AND CLAVULANATE POTASSIUM 1 TABLET: 875; 125 TABLET, FILM COATED ORAL at 09:16

## 2024-07-02 RX ADMIN — ASPIRIN 81 MG 81 MG: 81 TABLET ORAL at 09:16

## 2024-07-02 RX ADMIN — INSULIN LISPRO 1 UNITS: 100 INJECTION, SOLUTION INTRAVENOUS; SUBCUTANEOUS at 17:44

## 2024-07-02 RX ADMIN — Medication 1000 UNITS: at 09:15

## 2024-07-02 RX ADMIN — DULOXETINE HYDROCHLORIDE 30 MG: 30 CAPSULE, DELAYED RELEASE ORAL at 09:16

## 2024-07-02 RX ADMIN — LISINOPRIL 10 MG: 10 TABLET ORAL at 09:16

## 2024-07-02 RX ADMIN — AMLODIPINE BESYLATE 10 MG: 10 TABLET ORAL at 09:16

## 2024-07-02 RX ADMIN — SODIUM CHLORIDE, PRESERVATIVE FREE 10 ML: 5 INJECTION INTRAVENOUS at 09:16

## 2024-07-02 RX ADMIN — INSULIN LISPRO 2 UNITS: 100 INJECTION, SOLUTION INTRAVENOUS; SUBCUTANEOUS at 09:15

## 2024-07-02 RX ADMIN — ENOXAPARIN SODIUM 40 MG: 100 INJECTION SUBCUTANEOUS at 09:15

## 2024-07-02 RX ADMIN — INSULIN LISPRO 5 UNITS: 100 INJECTION, SOLUTION INTRAVENOUS; SUBCUTANEOUS at 17:44

## 2024-07-02 RX ADMIN — INSULIN LISPRO 5 UNITS: 100 INJECTION, SOLUTION INTRAVENOUS; SUBCUTANEOUS at 12:29

## 2024-07-02 RX ADMIN — PANTOPRAZOLE SODIUM 40 MG: 40 INJECTION, POWDER, FOR SOLUTION INTRAVENOUS at 09:15

## 2024-07-02 NOTE — PLAN OF CARE
Problem: Chronic Conditions and Co-morbidities  Goal: Patient's chronic conditions and co-morbidity symptoms are monitored and maintained or improved  6/29/2024 0033 by Zehra Hernandez RN  Outcome: Progressing  6/28/2024 1403 by Amy Shah RN  Outcome: Progressing     Problem: Discharge Planning  Goal: Discharge to home or other facility with appropriate resources  6/29/2024 0033 by Zehra Hernandez RN  Outcome: Progressing  6/28/2024 1403 by Amy Shah RN  Outcome: Progressing     Problem: Skin/Tissue Integrity  Goal: Absence of new skin breakdown  Description: 1.  Monitor for areas of redness and/or skin breakdown  2.  Assess vascular access sites hourly  3.  Every 4-6 hours minimum:  Change oxygen saturation probe site  4.  Every 4-6 hours:  If on nasal continuous positive airway pressure, respiratory therapy assess nares and determine need for appliance change or resting period.  6/29/2024 0033 by Zehra Hernandez RN  Outcome: Progressing  6/28/2024 1403 by Amy Shah RN  Outcome: Progressing     Problem: Safety - Adult  Goal: Free from fall injury  6/29/2024 0033 by Zehra Hernandez RN  Outcome: Progressing  Flowsheets (Taken 6/29/2024 0025)  Free From Fall Injury: Instruct family/caregiver on patient safety  6/28/2024 1403 by Amy Shah RN  Outcome: Progressing     Problem: Neurosensory - Adult  Goal: Achieves maximal functionality and self care  6/29/2024 0033 by Zehra Hernandez RN  Outcome: Progressing  6/28/2024 1403 by Amy Shah RN  Outcome: Progressing     Problem: Respiratory - Adult  Goal: Achieves optimal ventilation and oxygenation  6/29/2024 0033 by Zehra Hernandez RN  Outcome: Progressing  6/28/2024 1403 by Amy Shah RN  Outcome: Progressing     Problem: Skin/Tissue Integrity - Adult  Goal: Skin integrity remains intact  6/29/2024 0033 by Zehra Hernandez RN  Outcome: Progressing  Flowsheets (Taken 6/29/2024 0025)  Skin Integrity Remains Intact: Monitor for areas 
  Problem: Chronic Conditions and Co-morbidities  Goal: Patient's chronic conditions and co-morbidity symptoms are monitored and maintained or improved  6/29/2024 1042 by Amy Shah RN  Outcome: Not Progressing  6/29/2024 0033 by Zehra Hernandez RN  Outcome: Progressing     Problem: Discharge Planning  Goal: Discharge to home or other facility with appropriate resources  6/29/2024 1042 by Amy Shah RN  Outcome: Not Progressing  6/29/2024 0033 by Zehra Hernandez RN  Outcome: Progressing     Problem: Metabolic/Fluid and Electrolytes - Adult  Goal: Electrolytes maintained within normal limits  6/29/2024 1042 by Amy Shah RN  Outcome: Not Progressing  6/29/2024 0033 by Zehra Hernandez RN  Outcome: Progressing  Goal: Hemodynamic stability and optimal renal function maintained  6/29/2024 1042 by Amy Shah RN  Outcome: Not Progressing  6/29/2024 0033 by Zehra Hernandez RN  Outcome: Progressing  Goal: Glucose maintained within prescribed range  6/29/2024 1042 by Amy Shah RN  Outcome: Not Progressing  6/29/2024 0033 by Zehra Hernandez RN  Outcome: Progressing     
  Problem: Chronic Conditions and Co-morbidities  Goal: Patient's chronic conditions and co-morbidity symptoms are monitored and maintained or improved  7/1/2024 0000 by Sasha Max RN  Outcome: Progressing  6/30/2024 1224 by Martha Bellamy RN  Outcome: Progressing     Problem: Discharge Planning  Goal: Discharge to home or other facility with appropriate resources  7/1/2024 0000 by Sasha Max RN  Outcome: Progressing  6/30/2024 1224 by Martha Bellamy RN  Outcome: Progressing     Problem: Skin/Tissue Integrity  Goal: Absence of new skin breakdown  Description: 1.  Monitor for areas of redness and/or skin breakdown  2.  Assess vascular access sites hourly  3.  Every 4-6 hours minimum:  Change oxygen saturation probe site  4.  Every 4-6 hours:  If on nasal continuous positive airway pressure, respiratory therapy assess nares and determine need for appliance change or resting period.  7/1/2024 0000 by Sasha Max RN  Outcome: Progressing  6/30/2024 1224 by Martha Bellamy RN  Outcome: Progressing     Problem: Safety - Adult  Goal: Free from fall injury  7/1/2024 0000 by Sasha Max RN  Outcome: Progressing  6/30/2024 1224 by Martha Bellamy RN  Outcome: Progressing     Problem: Neurosensory - Adult  Goal: Achieves maximal functionality and self care  7/1/2024 0000 by Sasha Max RN  Outcome: Progressing  6/30/2024 1224 by Martha Bellamy RN  Outcome: Progressing     Problem: Respiratory - Adult  Goal: Achieves optimal ventilation and oxygenation  7/1/2024 0000 by Sasha Max RN  Outcome: Progressing  6/30/2024 1224 by Martha Bellamy RN  Outcome: Progressing     Problem: Skin/Tissue Integrity - Adult  Goal: Skin integrity remains intact  7/1/2024 0000 by Sasha Max RN  Outcome: Progressing  6/30/2024 1224 by Martha Bellamy RN  Outcome: Progressing  Goal: Oral mucous membranes remain intact  Outcome: Progressing     Problem: Musculoskeletal - Adult  Goal: Return mobility to safest 
  Problem: Chronic Conditions and Co-morbidities  Goal: Patient's chronic conditions and co-morbidity symptoms are monitored and maintained or improved  7/2/2024 1030 by Mica Abbott RN  Outcome: Progressing  7/2/2024 0241 by Isela Valdez RN  Outcome: Progressing     Problem: Discharge Planning  Goal: Discharge to home or other facility with appropriate resources  7/2/2024 1030 by Mica Abbott RN  Outcome: Progressing  7/2/2024 0241 by Isela Valdez RN  Outcome: Progressing     Problem: Skin/Tissue Integrity  Goal: Absence of new skin breakdown  Description: 1.  Monitor for areas of redness and/or skin breakdown  2.  Assess vascular access sites hourly  3.  Every 4-6 hours minimum:  Change oxygen saturation probe site  4.  Every 4-6 hours:  If on nasal continuous positive airway pressure, respiratory therapy assess nares and determine need for appliance change or resting period.  7/2/2024 1030 by Mica Abbott RN  Outcome: Progressing  7/2/2024 0241 by Isela Valdez RN  Outcome: Progressing     
  Problem: Chronic Conditions and Co-morbidities  Goal: Patient's chronic conditions and co-morbidity symptoms are monitored and maintained or improved  Outcome: Progressing     Problem: Discharge Planning  Goal: Discharge to home or other facility with appropriate resources  Outcome: Progressing     Problem: Safety - Adult  Goal: Free from fall injury  Outcome: Progressing     Problem: ABCDS Injury Assessment  Goal: Absence of physical injury  Outcome: Progressing     Problem: Neurosensory - Adult  Goal: Achieves stable or improved neurological status  Outcome: Progressing  Flowsheets (Taken 6/25/2024 2000)  Achieves stable or improved neurological status:   Assess for and report changes in neurological status   Maintain blood pressure and fluid volume within ordered parameters to optimize cerebral perfusion and minimize risk of hemorrhage   Monitor temperature, glucose, and sodium. Initiate appropriate interventions as ordered     Problem: Respiratory - Adult  Goal: Achieves optimal ventilation and oxygenation  Outcome: Progressing     Problem: Cardiovascular - Adult  Goal: Maintains optimal cardiac output and hemodynamic stability  Outcome: Progressing  Flowsheets (Taken 6/25/2024 2000)  Maintains optimal cardiac output and hemodynamic stability:   Monitor blood pressure and heart rate   Monitor urine output and notify Licensed Independent Practitioner for values outside of normal range   Assess for signs of decreased cardiac output   Administer fluid and/or volume expanders as ordered     Problem: Skin/Tissue Integrity - Adult  Goal: Skin integrity remains intact  Outcome: Progressing     
  Problem: Chronic Conditions and Co-morbidities  Goal: Patient's chronic conditions and co-morbidity symptoms are monitored and maintained or improved  Outcome: Progressing     Problem: Discharge Planning  Goal: Discharge to home or other facility with appropriate resources  Outcome: Progressing     Problem: Skin/Tissue Integrity  Goal: Absence of new skin breakdown  Description: 1.  Monitor for areas of redness and/or skin breakdown  2.  Assess vascular access sites hourly  3.  Every 4-6 hours minimum:  Change oxygen saturation probe site  4.  Every 4-6 hours:  If on nasal continuous positive airway pressure, respiratory therapy assess nares and determine need for appliance change or resting period.  Outcome: Progressing     Problem: Safety - Adult  Goal: Free from fall injury  Outcome: Progressing     
  Problem: Chronic Conditions and Co-morbidities  Goal: Patient's chronic conditions and co-morbidity symptoms are monitored and maintained or improved  Outcome: Progressing     Problem: Discharge Planning  Goal: Discharge to home or other facility with appropriate resources  Outcome: Progressing     Problem: Skin/Tissue Integrity  Goal: Absence of new skin breakdown  Description: 1.  Monitor for areas of redness and/or skin breakdown  2.  Assess vascular access sites hourly  3.  Every 4-6 hours minimum:  Change oxygen saturation probe site  4.  Every 4-6 hours:  If on nasal continuous positive airway pressure, respiratory therapy assess nares and determine need for appliance change or resting period.  Outcome: Progressing     Problem: Safety - Adult  Goal: Free from fall injury  Outcome: Progressing     Problem: Neurosensory - Adult  Goal: Achieves maximal functionality and self care  Outcome: Progressing     Problem: Respiratory - Adult  Goal: Achieves optimal ventilation and oxygenation  Outcome: Progressing     Problem: Skin/Tissue Integrity - Adult  Goal: Skin integrity remains intact  Outcome: Progressing     Problem: Musculoskeletal - Adult  Goal: Return mobility to safest level of function  Outcome: Progressing     Problem: Gastrointestinal - Adult  Goal: Maintains or returns to baseline bowel function  Outcome: Progressing     Problem: Genitourinary - Adult  Goal: Absence of urinary retention  Outcome: Progressing     Problem: Infection - Adult  Goal: Absence of infection at discharge  Outcome: Progressing     Problem: Metabolic/Fluid and Electrolytes - Adult  Goal: Electrolytes maintained within normal limits  Outcome: Progressing     Problem: Nutrition Deficit:  Goal: Optimize nutritional status  Outcome: Progressing     
  Problem: Chronic Conditions and Co-morbidities  Goal: Patient's chronic conditions and co-morbidity symptoms are monitored and maintained or improved  Outcome: Progressing     Problem: Discharge Planning  Goal: Discharge to home or other facility with appropriate resources  Outcome: Progressing     Problem: Skin/Tissue Integrity  Goal: Absence of new skin breakdown  Description: 1.  Monitor for areas of redness and/or skin breakdown  2.  Assess vascular access sites hourly  3.  Every 4-6 hours minimum:  Change oxygen saturation probe site  4.  Every 4-6 hours:  If on nasal continuous positive airway pressure, respiratory therapy assess nares and determine need for appliance change or resting period.  Outcome: Progressing     Problem: Safety - Adult  Goal: Free from fall injury  Outcome: Progressing     Problem: Neurosensory - Adult  Goal: Achieves maximal functionality and self care  Outcome: Progressing     Problem: Respiratory - Adult  Goal: Achieves optimal ventilation and oxygenation  Outcome: Progressing     Problem: Skin/Tissue Integrity - Adult  Goal: Skin integrity remains intact  Outcome: Progressing  Goal: Oral mucous membranes remain intact  Outcome: Progressing     Problem: Musculoskeletal - Adult  Goal: Return mobility to safest level of function  Outcome: Progressing  Goal: Return ADL status to a safe level of function  Outcome: Progressing     Problem: Gastrointestinal - Adult  Goal: Maintains or returns to baseline bowel function  Outcome: Progressing  Goal: Maintains adequate nutritional intake  Outcome: Progressing     Problem: Genitourinary - Adult  Goal: Absence of urinary retention  Outcome: Progressing     Problem: Infection - Adult  Goal: Absence of infection at discharge  Outcome: Progressing  Goal: Absence of infection during hospitalization  Outcome: Progressing  Goal: Absence of fever/infection during anticipated neutropenic period  Outcome: Progressing     Problem: Metabolic/Fluid and 
  Problem: Chronic Conditions and Co-morbidities  Goal: Patient's chronic conditions and co-morbidity symptoms are monitored and maintained or improved  Outcome: Progressing     Problem: Discharge Planning  Goal: Discharge to home or other facility with appropriate resources  Outcome: Progressing     Problem: Skin/Tissue Integrity  Goal: Absence of new skin breakdown  Description: 1.  Monitor for areas of redness and/or skin breakdown  2.  Assess vascular access sites hourly  3.  Every 4-6 hours minimum:  Change oxygen saturation probe site  4.  Every 4-6 hours:  If on nasal continuous positive airway pressure, respiratory therapy assess nares and determine need for appliance change or resting period.  Outcome: Progressing     Problem: Safety - Adult  Goal: Free from fall injury  Outcome: Progressing     Problem: Neurosensory - Adult  Goal: Achieves maximal functionality and self care  Outcome: Progressing     Problem: Respiratory - Adult  Goal: Achieves optimal ventilation and oxygenation  Outcome: Progressing     Problem: Skin/Tissue Integrity - Adult  Goal: Skin integrity remains intact  Outcome: Progressing  Goal: Oral mucous membranes remain intact  Outcome: Progressing     Problem: Musculoskeletal - Adult  Goal: Return mobility to safest level of function  Outcome: Progressing  Goal: Return ADL status to a safe level of function  Outcome: Progressing     Problem: Gastrointestinal - Adult  Goal: Maintains or returns to baseline bowel function  Outcome: Progressing  Goal: Maintains adequate nutritional intake  Outcome: Progressing     Problem: Genitourinary - Adult  Goal: Absence of urinary retention  Outcome: Progressing     Problem: Infection - Adult  Goal: Absence of infection at discharge  Outcome: Progressing  Goal: Absence of infection during hospitalization  Outcome: Progressing  Goal: Absence of fever/infection during anticipated neutropenic period  Outcome: Progressing     Problem: Metabolic/Fluid and 
  Problem: Skin/Tissue Integrity  Goal: Absence of new skin breakdown  Description: 1.  Monitor for areas of redness and/or skin breakdown  2.  Assess vascular access sites hourly  3.  Every 4-6 hours minimum:  Change oxygen saturation probe site  4.  Every 4-6 hours:  If on nasal continuous positive airway pressure, respiratory therapy assess nares and determine need for appliance change or resting period.  Outcome: Progressing     Problem: Safety - Adult  Goal: Free from fall injury  Outcome: Progressing  Flowsheets (Taken 6/25/2024 0318)  Free From Fall Injury: Instruct family/caregiver on patient safety     Problem: ABCDS Injury Assessment  Goal: Absence of physical injury  Outcome: Progressing  Flowsheets (Taken 6/25/2024 0318)  Absence of Physical Injury: Implement safety measures based on patient assessment     Problem: Cardiovascular - Adult  Goal: Maintains optimal cardiac output and hemodynamic stability  Outcome: Progressing  Flowsheets (Taken 6/25/2024 0318)  Maintains optimal cardiac output and hemodynamic stability:   Monitor blood pressure and heart rate   Monitor urine output and notify Licensed Independent Practitioner for values outside of normal range   Administer fluid and/or volume expanders as ordered   Assess for signs of decreased cardiac output   Administer vasoactive medications as ordered  Goal: Absence of cardiac dysrhythmias or at baseline  Outcome: Progressing  Flowsheets (Taken 6/25/2024 0318)  Absence of cardiac dysrhythmias or at baseline: Monitor cardiac rate and rhythm     
Problem: Skin/Tissue Integrity  Goal: Absence of new skin breakdown  Description: 1.  Monitor for areas of redness and/or skin breakdown  2.  Assess vascular access sites hourly  3.  Every 4-6 hours minimum:  Change oxygen saturation probe site  4.  Every 4-6 hours:  If on nasal continuous positive airway pressure, respiratory therapy assess nares and determine need for appliance change or resting period.  6/29/2024 1641 by Martha Bellamy RN  Outcome: Progressing  6/29/2024 1042 by Amy Shah RN  Outcome: Progressing     Problem: Safety - Adult  Goal: Free from fall injury  6/29/2024 1641 by Martha Bellamy RN  Outcome: Progressing  6/29/2024 1042 by Amy Shah RN  Outcome: Progressing  Flowsheets (Taken 6/29/2024 1039)  Free From Fall Injury: Based on caregiver fall risk screen, instruct family/caregiver to ask for assistance with transferring infant if caregiver noted to have fall risk factors     Problem: ABCDS Injury Assessment  Goal: Absence of physical injury  Recent Flowsheet Documentation  Taken 6/29/2024 1039 by Amy Shah RN  Absence of Physical Injury: Implement safety measures based on patient assessment     Problem: Neurosensory - Adult  Goal: Achieves maximal functionality and self care  6/29/2024 1641 by Martha Bellamy RN  Outcome: Progressing  6/29/2024 1042 by Amy Shah RN  Outcome: Progressing     Problem: Respiratory - Adult  Goal: Achieves optimal ventilation and oxygenation  6/29/2024 1641 by Martha Bellamy RN  Outcome: Progressing  6/29/2024 1042 by Amy Shah RN  Outcome: Progressing     Problem: Skin/Tissue Integrity - Adult  Goal: Skin integrity remains intact  6/29/2024 1641 by Martha Bellamy RN  Outcome: Progressing  6/29/2024 1042 by Amy Shah RN  Outcome: Progressing     Problem: Musculoskeletal - Adult  Goal: Return mobility to safest level of function  6/29/2024 1641 by Martha Bellamy RN  Outcome: Progressing  6/29/2024 1042 by Amy Shah 
of infection at discharge  Outcome: Progressing  Goal: Absence of infection during hospitalization  Outcome: Progressing  Goal: Absence of fever/infection during anticipated neutropenic period  Outcome: Progressing     Problem: Metabolic/Fluid and Electrolytes - Adult  Goal: Electrolytes maintained within normal limits  Outcome: Progressing  Goal: Hemodynamic stability and optimal renal function maintained  Outcome: Progressing  Goal: Glucose maintained within prescribed range  Outcome: Progressing     Problem: Hematologic - Adult  Goal: Maintains hematologic stability  Outcome: Progressing     Problem: Discharge Planning  Goal: Discharge to home or other facility with appropriate resources  Outcome: Not Progressing     Problem: Gastrointestinal - Adult  Goal: Maintains adequate nutritional intake  Outcome: Not Progressing

## 2024-07-02 NOTE — PROGRESS NOTES
[facility]             Pulmonary & Critical Care Medicine                MICU Progress Note                 Written by: Srinath Simmnos MD  Name: Magan Funes : 1970       Age: 54 y.o. MR/Act #    : [id number],  Billing  #    : [id number]   Admit Date: 2024  9:12 PM LOS: 7,   Hospital Day: 8 Room #      : 4404/4404-A   PCP            : Andrzej Cline MD,   Referred by: Andrzej Cline MD ICU Attending: MD Candie Evans date: 2024 10:33 AM   ICU Days:       2 Vent Days:     0 LOS: 7,                          Reason for ICU admission           Chief Complaint   Patient presents with    Hyperglycemia     Pt felt off today and checked BGL which read HI. Pt states he's being treated for UTI, but stopped Cipro few days ago because it wasn't helping                          Brief HPI, Presentation & Synopsis                       54 y.o. 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 coli UTI, history of cardiac arrest in , history of polysubstance abuse, presented with nausea vomiting.  He was diagnosed with DKA and admitted in MICU and was transferred to the floor.  Patient was found to be in DKA again and was transferred back to MICU.     Active problem list of yesterday:  Active Hospital Problems    Diagnosis Date Noted    DKA, type 1, not at goal (HCC) [E10.10] 2023    Diabetic ketoacidosis without coma associated with type 1 diabetes mellitus (HCC) [E10.10] 2016                           Events of last night                      Anion gap closed, transition to subcutaneous insulin                      Subjective   2024               Afebrile, hemodynamically stable                      Objective  2024               Vitals:    24 0900   BP: 
     [facility]  Internal Medicine Department     Attending Physician Statement:  Alban Urban Jr. D.O.     I have discussed the case, including pertinent history and exam findings with the resident. I have reviewed all past medical history, past surgical history, family history, social history, medications, and allergies and updated as appropriate in the history section of the chart. I have seen and examined the patient and the key elements of the encounter have been performed by me. I agree with the assessment, plan and orders as documented by the resident.       IDDM2  -DKA resolved  -bridging this AM  -plan to continue insulin as prescribed   -diet and transfer from ICU per ICU protocols      HTN  -continue current treatment; stable      Remainder of medical problems as per resident note.  
  SPEECH/LANGUAGE PATHOLOGY  CLINICAL ASSESSMENT OF SWALLOWING FUNCTION   and PLAN OF CARE    PATIENT NAME:  Magan Funes  (male)     MRN:  [id number]    :  1970  (54 y.o.)  STATUS:  Inpatient: Room 4427/4427-A    TODAY'S DATE:  2024  REFERRING PROVIDER:     SPECIFIC PROVIDER ORDER: SLP eval and treat Date of order:  24  REASON FOR REFERRAL: dysphagia   EVALUATING THERAPIST: FATEMEH Galvez                 RESULTS:    DYSPHAGIA DIAGNOSIS:   Clinical indicators of moderate-severe pharyngeal phase dysphagia       DIET RECOMMENDATIONS:  NPO until MBSS can be completed        FEEDING RECOMMENDATIONS:     Assistance level:  Not applicable      Compensatory strategies recommended: No strategies are recommended at this time      Discussed recommendations with:  patient nurse in person    SPEECH THERAPY  PLAN OF CARE   The dysphagia POC is established based on physician order, dysphagia diagnosis and results of clinical assessment     Will make further recommendations/changes to POC once MBSS is completed.    Conditions Requiring Skilled Therapeutic Intervention for dysphagia:    Not applicable    Specific dysphagia interventions to include:     MBSS to fully assess oropharyngeal swallow function and to assist in determining the least restrictive PO diet to maintain adequate nutrition/hydration     Specific instructions for next treatment:  MBSS to be completed  Patient Treatment Goals:    Short Term Goals:  Pt will participate in MBSS to fully assess oropharyngeal swallow function and to assist in determining the least restrictive PO diet to maintain adequate nutrition/hydration     Long Term Goals:   A Video Swallow Study (MBSS) is recommended and requires a physician order      Patient/family Goal:    Did not state.  Will further assess during treatment.    Plan of care discussed with Patient   The Patient understand(s) the diagnosis, prognosis and plan of care     Rehabilitation 
..4 Eyes Skin Assessment     NAME:  Magan Funes  YOB: 1970  MEDICAL RECORD NUMBER:  [id number]    The patient is being assessed for  Admission    I agree that at least one RN has performed a thorough Head to Toe Skin Assessment on the patient. ALL assessment sites listed below have been assessed.      Areas assessed by both nurses:    Head, Face, Ears, Shoulders, Back, Chest, Arms, Elbows, Hands, Sacrum. Buttock, Coccyx, Ischium, Legs. Feet and Heels, and Under Medical Devices         Does the Patient have a Wound? No noted wound(s)       Roberto Prevention initiated by RN: Yes  Wound Care Orders initiated by RN: No    Pressure Injury (Stage 3,4, Unstageable, DTI, NWPT, and Complex wounds) if present, place Wound referral order by RN under : No    New Ostomies, if present place, Ostomy referral order under : No     Nurse 1 eSignature: Electronically signed by Latesha Giang RN on 6/24/24 at 1:21 AM EDT    **SHARE this note so that the co-signing nurse can place an eSignature**    Nurse 2 eSignature: Electronically signed by Eri Kim RN on 6/24/24 at 4:47 AM EDT   
4 Eyes Skin Assessment     NAME:  Magan Funes  YOB: 1970  MEDICAL RECORD NUMBER:  [id number]    The patient is being assessed for  Transfer to New Unit    I agree that at least one RN has performed a thorough Head to Toe Skin Assessment on the patient. ALL assessment sites listed below have been assessed.      Areas assessed by both nurses:    Head, Face, Ears, Shoulders, Back, Chest, Arms, Elbows, Hands, Sacrum. Buttock, Coccyx, Ischium, and Legs. Feet and Heels        Does the Patient have a Wound?   Bruise left knee  Redness to groin, bilateral elbows  Right elbow scab  Nonblanchable area to buttocks  Healed gluteal cleft tear in fold, pinpoint hole       Roberto Prevention initiated by RN: Yes  Wound Care Orders initiated by RN: No    Pressure Injury (Stage 3,4, Unstageable, DTI, NWPT, and Complex wounds) if present, place Wound referral order by RN under : No    New Ostomies, if present place, Ostomy referral order under : No     Nurse 1 eSignature: Electronically signed by Mica Abbott RN on 7/1/24 at 6:49 PM EDT    **SHARE this note so that the co-signing nurse can place an eSignature**    Nurse 2 eSignature: Electronically signed by Maritza Pratt RN on 7/1/24 at 7:06 PM EDT    
4 Eyes Skin Assessment     NAME:  Magan Funes  YOB: 1970  MEDICAL RECORD NUMBER:  [id number]    The patient is being assessed for  Admission    I agree that at least one RN has performed a thorough Head to Toe Skin Assessment on the patient. ALL assessment sites listed below have been assessed.      Areas assessed by both nurses:    Head, Face, Ears, Shoulders, Back, Chest, Arms, Elbows, Hands, Sacrum. Buttock, Coccyx, Ischium, Legs. Feet and Heels, and Under Medical Devices         Does the Patient have a Wound?     Red, blanchable bilateral buttocks and bilateral heels   Generalized scabs       Roberto Prevention initiated by RN: Yes  Wound Care Orders initiated by RN: No    Pressure Injury (Stage 3,4, Unstageable, DTI, NWPT, and Complex wounds) if present, place Wound referral order by RN under : No    New Ostomies, if present place, Ostomy referral order under : No     Nurse 1 eSignature: Electronically signed by Martha Bellamy RN on 6/29/24 at 2:07 PM EDT    **SHARE this note so that the co-signing nurse can place an eSignature**    Nurse 2 eSignature: Electronically signed by Crissy Avery RN on 6/29/24 at 3:58 PM EDT  
4 Eyes Skin Assessment     NAME:  Magan Funes  YOB: 1970  MEDICAL RECORD NUMBER:  [id number]    The patient is being assessed for  Transfer to New Unit    I agree that at least one RN has performed a thorough Head to Toe Skin Assessment on the patient. ALL assessment sites listed below have been assessed.      Areas assessed by both nurses:    Head, Face, Ears, Shoulders, Back, Chest, Arms, Elbows, Hands, Sacrum. Buttock, Coccyx, Ischium, Legs. Feet and Heels, and Other ***        Does the Patient have a Wound? No noted wound(s)       Roberto Prevention initiated by RN: Yes  Wound Care Orders initiated by RN: No    Pressure Injury (Stage 3,4, Unstageable, DTI, NWPT, and Complex wounds) if present, place Wound referral order by RN under : No    New Ostomies, if present place, Ostomy referral order under : No     Nurse 1 eSignature: Electronically signed by Angel Houston RN on 6/26/24 at 6:10 PM EDT    **SHARE this note so that the co-signing nurse can place an eSignature**    Nurse 2 eSignature: {Esignature:[id number]}    
Antibiotic Extended Infusion Policy     This patient is on medication that requires renal, weight, and/or indication dose adjustment.      Date Body Weight IBW  Adjusted BW SCr  CrCl Dialysis status BMI   6/24/2024 61.5 kg (135 lb 9.6 oz) Ideal body weight: 63.8 kg (140 lb 10.5 oz) Serum creatinine: 0.8 mg/dL 06/24/24 1122  Estimated creatinine clearance: 93 mL/min N/a Body mass index is 21.89 kg/m².       Pharmacy has dose-adjusted the following medication(s):    Ordered Medication: Zosyn 2500mg q8H     Order Changed/converted to: Zosyn 4500mg q8h    These changes were made per protocol according to the [facility]   Automatic Extended Infusion Dose Adjustment Policy.     *Please note this dose may need readjusted if patient's condition changes.    Please contact pharmacy with any questions regarding these changes.    Krissy Atkins RP  6/24/2024  12:24 PM    
[facility]  Internal Medicine Residency / House Medicine Service    Attending Physician Statement  I have discussed the case, including pertinent history and exam findings with the resident and the team.  I have seen and examined the patient and the key elements of the encounter have been performed by me.  I agree with the assessment, plan and orders as documented by the resident.      DKA resolved  CXR reviewed and right diaphragm may be crisp on AP film  Denies nausea or emesis but nurse just witnessed it  No temp, no tachypnea, no cough or sputum or A-a gradient   Follow clinical progress  Hx of remote CVA and left paresis  Review meds and continue same   Remainder of medical problems as per resident note.      Andrzej Cline MD  Internal Medicine Residency Faculty    
Asked physician while rounding if NG can be removed now that patient has a diet, Attending gave this nurse verbal to remove NG.  
Comprehensive Nutrition Assessment    Type and Reason for Visit:  Initial, Positive Nutrition Screen (New TF)    Nutrition Recommendations/Plan:     Continue NPO.     Recommend to modify EN formula to:   Peptide based formula (Vital AF 1.2) @60ml/hr, which provides: 1440ml tv, 1728kcal, 108g pro, 1168ml free water for optimal GI tolerance w/ noted N/V.     Meets 96% of Pt's est. energy needs and 100% of est. protein needs. Noted DKA- Vital AF Formula is low carbohydrate containing. Pt at risk for refeeding syndrome; Monitor electrolytes and replace per PRN.     Will monitor.      Malnutrition Assessment:  Malnutrition Status:  Severe malnutrition (06/26/24 1409)    Context:  Chronic Illness     Findings of the 6 clinical characteristics of malnutrition:  Energy Intake:  75% or less estimated energy requirements for 1 month or longer (2/2 L hemiparesis s/p CVA)  Weight Loss:  No significant weight loss     Body Fat Loss:   (Moderate) Orbital, Triceps, Buccal region   Muscle Mass Loss:  Severe muscle mass loss Temples (temporalis), Calf (gastrocnemius), Thigh (quadriceps), Hand (interosseous)  Fluid Accumulation:  No significant fluid accumulation     Strength:  Not Performed    Nutrition Assessment:    Pt john w/ DKA likely 2/2 hypovolemia due to N/V vs medication noncompliance (Hx T1DM)/ KIRSTEN -resolved. Noted possible gastroparesis. PMH of T1DM, L hemiparesis s/p CVA ('23), AFIB, cardiac arrest, polysubstance abuse, severe malnutrition. Pt at nutritional risk d/t severe malnutrition. Possible aspiration PNA w/ pt awaiting MBSS per SLP. EN support initiated and running; Pt tolerated per note. Will modify tube feeding; Noted at risk for refeeding syndrome, monitor electrolytes and replace PRN. Will monitor.    Nutrition Related Findings:    I/Os WNL, A&Ox4, +BS, abd soft/nontender, no edema, hyperglycemia (~300), NGT w/ TF running, Na/K/Mg/Phos WNL Wound Type: None       Current Nutrition Intake & Therapies:  
Comprehensive Nutrition Assessment    Type and Reason for Visit:  Reassess    Nutrition Recommendations/Plan:   Continue current diet  Start [id number] BID      Malnutrition Assessment:  Malnutrition Status:  Severe malnutrition (06/26/24 1409)    Context:  Chronic Illness     Findings of the 6 clinical characteristics of malnutrition:  Energy Intake:  75% or less estimated energy requirements for 1 month or longer (2/2 L hemiparesis s/p CVA)  Weight Loss:  No significant weight loss     Body Fat Loss:   (Moderate) Orbital, Triceps, Buccal region   Muscle Mass Loss:  Severe muscle mass loss Temples (temporalis), Calf (gastrocnemius), Thigh (quadriceps), Hand (interosseous)  Fluid Accumulation:  No significant fluid accumulation     Strength:  Not Performed    Nutrition Assessment:    Pt remains at risk w/ noted hypoglycemic episode then developing DKA w/ transfer back to MICU 6/29. Pt s/p MBS 6/26 w/ PO diet advanced to modified diet/PTL & NGT removed 6/27. Pt initally admit w/ DKA likely 2/2 hypovolemia d/t N/V vs medication noncompliance (HgbA1c 11.7). PMhx DM, CVA, cardiac arrest, ETOH/polysubstance abuse. Noted possible aspiration PNA. Pt meets criteria for severe malnutrition. Will start ONS and continue to monitor.    Nutrition Related Findings:    pt alert, active BS, soft abd, no edema, contracture LUE, -I/Os 6.8L, hyperglycemia, A1c 11.7 6/29 Wound Type: None       Current Nutrition Intake & Therapies:    Average Meal Intake: 1-25%  Average Supplements Intake: None Ordered  ADULT DIET; Dysphagia - Soft and Bite Sized; 3 carb choices (45 gm/meal); Extremely Thick (Pudding)    Anthropometric Measures:  Height: 167.6 cm (5' 5.98\")  Ideal Body Weight (IBW): 142 lbs (65 kg)    Admission Body Weight: 61.5 kg (135 lb 9.3 oz)  Current Body Weight: 59 kg (130 lb) (7/1 bed scale), 91.5 % IBW. Weight Source: Bed Scale  Current BMI (kg/m2): 21  Usual Body Weight: 61.2 kg (135 lb) (1/8/24 actual; noted wt flux of 
[facility]  Internal Medicine Residency / House Medicine Service    Attending Physician Statement  I have discussed the case, including pertinent history and exam findings with the resident and the team.  I have seen and examined the patient and the key elements of the encounter have been performed by me.  I agree with the assessment, plan and orders as documented by the resident.      A&O Baseline Mental status  VS stale  Tolerating enteral feedings  Titrating up Reglan for probablr\e gastroparesis  Following QT intervals  Same ICU care  REview insulin dosing   Remainder of medical problems as per resident note.      Andrzej Cline MD FRCP [facility]  Internal Medicine Residency Faculty    
[facility]  Internal Medicine Residency / House Medicine Service    Attending Physician Statement  I have discussed the case, including pertinent history and exam findings with the resident and the team.  I have seen and examined the patient and the key elements of the encounter have been performed by me.  I agree with the assessment, plan and orders as documented by the resident.      AG closed   And eating this AM off Reglan  Plan; Follow po intake with thicket             Lantus to AM dosing             Follow for now   Remainder of medical problems as per resident note.      Andrzej Cline MD FRCP [facility]  Internal Medicine Residency Faculty    
[facility]  Internal Medicine Residency Program  Progress Note - House Team       Patient:  Magan Funes 54 y.o. male   MRN: [id number]       Date of Service: 6/25/2024    CC: Nausea vomiting, hyperglycemia   Overnight events:   Patient bridged to 30 u lantus, lispro 10U TID w MDSS  Mg 1.5, Phos 2 replaced  K 3.9 replaced     Subjective     This morning the patient was seen in ICU in no acute distress.  According to nurse patient vomited 4 times yesterday and had 1 episode of vomiting this morning.  Patient was seen vomiting clear liquid once during encounter.  No other complaints today.      Objective       Physical Exam  Vitals: BP (!) 144/94   Pulse 81   Temp 98.6 °F (37 °C) (Oral)   Resp 16   Ht 1.676 m (5' 6\")   Wt 63.7 kg (140 lb 8 oz)   SpO2 95%   BMI 22.68 kg/m²     I & O - 24hr: I/O this shift:  In: 1757.8 [I.V.:1005.1; IV Piggyback:752.7]  Out: 500 [Urine:500]   General Appearance: alert, appears stated age, and cooperative  HEENT:  Head: Normal, normocephalic, atraumatic.  Lung: rales in bilateral lungs  Heart: regular rate and rhythm  Abdomen: soft, non-tender; bowel sounds normal; no masses,  no organomegaly  Extremities:  extremities normal, atraumatic, no cyanosis or edema  Neurologic: Mental status: Alert, oriented, thought content appropriate, Baseline weakness on left extremities. Left UE contracture noted.     Diet:   ADULT DIET; Regular; 4 carb choices (60 gm/meal)      Pertinent Labs & Imaging Studies     Labs    Recent Labs     06/23/24  2120 06/24/24  0656 06/25/24  0428   WBC 19.1* 17.3* 13.5*   RBC 4.03 3.84 4.10   HGB 11.3* 10.9* 11.5*   HCT 35.7* 33.0* 34.9*   MCV 88.6 85.9 85.1   MCH 28.0 28.4 28.0   MCHC 31.7* 33.0 33.0   RDW 13.9 13.7 13.5    425 431   MPV 9.4 9.0 9.1     Recent Labs     06/23/24  2120 06/24/24  0202 06/24/24  1122 06/24/24  1519 06/24/24  1918 06/24/24  2257 06/25/24  0428      < > 139 138 135 136 136   K 5.1*   < > 4.2 4.1 
IM HT1 resident messaged via perfect serve to notify that precert has been obtained and can the patient be discharged today.  Dr Ramos taking messages  
[facility]  Internal Medicine Residency / House Medicine Service    Attending Physician Statement  I have discussed the case, including pertinent history and exam findings with the resident and the team.  I have seen and examined the patient and the key elements of the encounter have been performed by me.  I agree with the assessment, plan and orders as documented by the resident.      Alert  Eating has improved  BS reasonable control  Review insulin dosing  Plan:  Review meds for discharge             Continue coffee for orthostatic hypotension for bow     Remainder of medical problems as per resident note.      Andrzej Cline MD FRCP [facility]  Internal Medicine Residency Faculty    
[facility]  Internal Medicine Residency / House Medicine Service    Attending Physician Statement  I have discussed the case, including pertinent history and exam findings with the resident and the team.  I have seen and examined the patient and the key elements of the encounter have been performed by me.  I agree with the assessment, plan and orders as documented by the resident.      Mentally alert and baseline  Spoke with nursing and he is requiring D 10 for recurring hypoglycemia  Humalog insulin stopped with meals  Lantus adjusted downward  Pharyngeal esophageal dysfunction on thicket   BP drops with orthostatic changes acc to nursing  Will defer DENY discharge until BS is acceptable and not low   Patient recognizes diaphoresis as his marker of hypoglycemia and eats candy bars at home  As needed and   Also aware of glucose tablets as needed     Glucagon as needed   Reglan ordered for gastroparesis symptoms and appears to woooork    Remainder of medical problems as per resident note.      Andrzej Cline MD FRCP [facility]  Internal Medicine Residency Faculty    
[facility]  Internal Medicine Residency Program  Progress Note - House Team       Patient:  Magan Funes 54 y.o. male   MRN: [id number]       Date of Service: 6/24/2024    CC: Nausea vomiting, hyperglycemia  Overnight events:   No significant events     Subjective     This morning the patient was seen in ICU in mild distress. Patient is still having nausea and vomiting despite Zofran, Patient mentions he had cough for a week, but denied any fever. No burning with urination.      Objective       Physical Exam  Vitals: BP (!) 153/62   Pulse 93   Temp 99.9 °F (37.7 °C) (Temporal)   Resp 26   Ht 1.676 m (5' 6\")   Wt 61.5 kg (135 lb 9.6 oz)   SpO2 95%   BMI 21.89 kg/m²     I & O - 24hr: I/O this shift:  In: 847.1 [I.V.:571.8; IV Piggyback:275.3]  Out: -    General Appearance: alert, appears stated age, and cooperative  HEENT:  Head: Normal, normocephalic, atraumatic.  Lung: rales LLL and NARCISO  Heart: regular rate and rhythm  Abdomen: soft, non-tender; bowel sounds normal; no masses,  no organomegaly  Extremities:  extremities normal, atraumatic, no cyanosis or edema  Neurologic: Mental status: Alert, oriented, thought content appropriate, Baseline weakness on left extremities.    Diet:   Diet NPO      Pertinent Labs & Imaging Studies     Labs    Recent Labs     06/23/24 2120 06/24/24  0656   WBC 19.1* 17.3*   RBC 4.03 3.84   HGB 11.3* 10.9*   HCT 35.7* 33.0*   MCV 88.6 85.9   MCH 28.0 28.4   MCHC 31.7* 33.0   RDW 13.9 13.7    425   MPV 9.4 9.0     Recent Labs     06/23/24 2120 06/24/24  0202 06/24/24  0656    140 141   K 5.1* 3.9 4.0   CL 93* 105 109*   MG  --  2.0 2.2   PHOS  --  2.9 1.8*   CO2 13* 15* 23   BUN 39* 30* 25*   CREATININE 1.5* 1.2 0.9   ANIONGAP 27* 20* 9   GLUCOSE 572* 292* 147*   CALCIUM 9.0 8.4* 8.3*   BILITOT 0.9  --   --    ALKPHOS 124  --   --    AST 39  --   --    *  --   --      No results for input(s): \"LACTA\" in the last 72 hours.  Recent Labs    
Message sent to resident regarding pts blood sugar of 384.   
Messaged Dr Bernabe via perfect serve to request possible discharge today, no discharge per Dr Ferrari while on unit    Electronically signed by Yecenia Mena RN on 6/29/2024 at 9:48 AM      
[facility]  Internal Medicine Residency / House Medicine Service    Attending Physician Statement  I have discussed the case, including pertinent history and exam findings with the resident and the team.  I have seen and examined the patient and the key elements of the encounter have been performed by me.  I agree with the assessment, plan and orders as documented by the resident.      A&O  Taking po and no emesis   May be responding to metochlopramide  Will continue and D/C enteral tube  Carefully follow BS to prevent hypoglycemia  PT/OT  Now on Augmentin  Remainder of medical problems as per resident note.      Andrzej Cline MD FRCP [facility]  Internal Medicine Residency Faculty    
NG tube removed and patient tolerated well with no complications or complaints  
[facility]  Department of Internal Medicine   Internal Medicine Residency  House Progress Note    Patient:  Magan Funes 54 y.o. male  MRN: [id number]     Date of Service: 2024    Allergy: Metoprolol    Subjective     I have seen and examine the patient at bedside. Patient is alert and oriented, resting comfortably.     24h change: Choked on food, coughed it all out, ordered SLP eval  Failed SLP eval - NG placed > TF ordered  MDSS changed to q 6h, held lispro 10 TID  Barium swallow ordered  2330 Mg, P replaced  K 3.6 replaced  ROS: Denies Fever/chills/CP/SOB/N/V/D/C/Dysuria/Blood in stool or urine  Objective     VITAL SIGNS:  BP (!) 155/95   Pulse 95   Temp 98 °F (36.7 °C) (Temporal)   Resp 19   Ht 1.676 m (5' 6\")   Wt 64.3 kg (141 lb 11.2 oz)   SpO2 100%   BMI 22.87 kg/m²   Tmax over 24 hours:  Temp (24hrs), Av.4 °F (36.9 °C), Min:98 °F (36.7 °C), Max:98.9 °F (37.2 °C)      Patient Vitals for the past 6 hrs:   BP Temp Temp src Pulse Resp SpO2   24 1000 (!) 155/95 -- -- 95 19 --   24 0900 (!) 153/99 -- -- 96 22 --   24 0800 (!) 153/94 98 °F (36.7 °C) Temporal 80 20 100 %   24 0700 (!) 159/99 -- -- (!) 113 18 97 %   24 0600 (!) 154/93 -- -- 97 20 96 %         Intake/Output Summary (Last 24 hours) at 2024 1108  Last data filed at 2024 0925  Gross per 24 hour   Intake 1708.93 ml   Output 2350 ml   Net -641.07 ml     Wt Readings from Last 2 Encounters:   24 64.3 kg (141 lb 11.2 oz)   24 63.6 kg (140 lb 3.4 oz)     Body mass index is 22.87 kg/m².        I & O - 24hr:   Intake/Output Summary (Last 24 hours) at 2024 1108  Last data filed at 2024 0925  Gross per 24 hour   Intake 1708.93 ml   Output 2350 ml   Net -641.07 ml       Physical Exam:  General Appearance: alert, appears stated age, and cooperative  Neck: no adenopathy, no carotid bruit, no JVD, supple, symmetrical, trachea midline, and thyroid not enlarged, symmetric, no 
Notified Dr. Cline that patient had a fall trying to reach for phone . No injuries and vitals are stable. Bed alarm was on at time of fall  
Nurse to nurse called to [facility]  
Patient admitted to MICU with the following belongings:  Cell Phone, Cell Phone , Pants, Shirt, Socks, and Shoes. The following belongings admitted with the patient, None, were sent home with the patient's family.   
Patient admitted to MICU with the following belongings:  Cell Phone, Cell Phone , Pants, Shirt, and Socks. The following belongings admitted with the patient, None, were sent home with the patient's family.     
Patient blood glucose was 64 before lunch prn dextrose 10% administered abd blood glucose level after was 114. Patient with no signs or symptoms of hypoglycemia observed. Messaged sent to attending no new orders at this time  
Patient's mother updated on transfer to MICU  
Physical Therapy  Physical Therapy Treatment    Name: Magan Funes  : 1970  MRN: [id number]      Date of Service: 2024    Evaluating PT:  Gage Morris, PT, DPT  [id number]     Room #:  4404/4404-A  Diagnosis:  KIRSTEN (acute kidney injury) (HCC) [N17.9]  DKA, type 1, not at goal (HCC) [E10.10]  Diabetic ketoacidosis without coma associated with type 1 diabetes mellitus (HCC) [E10.10]  PMHx/PSHx:   has a past medical history of A-fib (HCC), Alcoholism (HCC), Cardiac arrest (HCC), Cocaine abuse (HCC), Diabetes mellitus (HCC), Hypertension, Idiopathic peripheral neuropathy, Lacunar stroke (HCC), Marijuana abuse, and S/P transesophageal echocardiogram (NETTA).   has no past surgical history on file.  Procedure/Surgery:  None   Precautions:  Falls, L hemiparesis, L AFO, L hand splint, LUE and LLE contractures, TSM, monitor BP  Equipment Needs:  TBD     SUBJECTIVE:    Pt lives with his mother in a 2nd floor apartment with 10 steps and B rails. Pt uses hemiwalker and wheelchair.  Pt has family assistance for transfers and ADLs.      OBJECTIVE:   Initial Evaluation  Date: 24 Treatment  24 Short Term/ Long Term   Goals   AM-PAC 6 Clicks 10/24 10/24    Was pt agreeable to Eval/treatment? Yes  Yes    Does pt have pain? No c/o pain No c/o pain    Bed Mobility  Rolling: NT  Supine to sit: Mod A  Sit to supine: Mod A  Scooting: Min A to EOB Rolling: NT  Supine to sit: Max A  Sit to supine: Mod A  Scooting: Max A Rolling: SBA  Supine to sit: SBA  Sit to supine: SBA  Scooting: SBA   Transfers Sit to stand: Max A (partial)  Stand to sit: Max A  Stand pivot: Max A with no AD Sit to stand: Mod A  Stand to sit: Mod A  Stand pivot: NT Sit to stand: Min A  Stand to sit: Min A  Stand pivot: Mod A with no AD   Ambulation    NT A few side steps EOB with Mod A x2 no device  >5 feet with R hemiwalker Mod A   Stair negotiation: ascended and descended  NT NT NA   ROM BUE:  Per OT eval   RLE:  WFL  LLE:  Limited knee flexion and 
Physical Therapy  Physical Therapy Treatment    Name: Magan Funes  : 1970  MRN: [id number]      Date of Service: 2024    Evaluating PT:  Gage Morris, PT, DPT  [id number]     Room #:  7413/7413-B  Diagnosis:  KIRSTEN (acute kidney injury) (HCC) [N17.9]  DKA, type 1, not at goal (HCC) [E10.10]  Diabetic ketoacidosis without coma associated with type 1 diabetes mellitus (HCC) [E10.10]  PMHx/PSHx:   has a past medical history of A-fib (HCC), Alcoholism (HCC), Cardiac arrest (HCC), Cocaine abuse (HCC), Diabetes mellitus (HCC), Hypertension, Idiopathic peripheral neuropathy, Lacunar stroke (HCC), Marijuana abuse, and S/P transesophageal echocardiogram (NETTA).   has no past surgical history on file.  Procedure/Surgery:  None   Precautions:  Falls, L hemiparesis, L AFO, L hand splint, LUE and LLE contractures, TSM, monitor BP  Equipment Needs:  TBD     SUBJECTIVE:    Pt lives with his mother in a 2nd floor apartment with 10 steps and B rails. Pt uses hemiwalker and wheelchair.  Pt has family assistance for transfers and ADLs.      OBJECTIVE:   Initial Evaluation  Date: 24 Treatment  2024 Short Term/ Long Term   Goals   AM-PAC 6 Clicks 10/24 9/24    Was pt agreeable to Eval/treatment? Yes  Yes    Does pt have pain? No c/o pain No c/o pain    Bed Mobility  Rolling: NT  Supine to sit: Mod A  Sit to supine: Mod A  Scooting: Min A to EOB Rolling: SBA  Supine to sit: ModA  Sit to supine: ModA  Scooting: NT Rolling: SBA  Supine to sit: SBA  Sit to supine: SBA  Scooting: SBA   Transfers Sit to stand: Max A (partial)  Stand to sit: Max A  Stand pivot: Max A with no AD Sit to stand: NT  Stand to sit: NT  Stand pivot: NT Sit to stand: Min A  Stand to sit: Min A  Stand pivot: Mod A with no AD   Ambulation    NT NT >5 feet with R hemiwalker Mod A   Stair negotiation: ascended and descended  NT NT NA   ROM BUE:  Per OT eval   RLE:  WFL  LLE:  Limited knee flexion and hip extension      Strength BUE:  Per OT eval 
[facility]  Internal Medicine Residency Program  Progress Note - House Team       Patient:  Magan Funes 54 y.o. male   MRN: [id number]       Date of Service: 6/28/2024    CC: Nausea vomiting, hyperglycemia   Overnight events:   BG 64> D10 given, repeat , lunch humalog was not given  BG 80>168>206    Subjective       The patient was seen at bedside in no acute distress.  Patient mentions feeling better.  No shortness of breath.  He had a bowel movement yesterday.      Objective       Physical Exam  Vitals: /78   Pulse 90   Temp 97.8 °F (36.6 °C) (Temporal)   Resp 16   Ht 1.676 m (5' 5.98\")   Wt 60.5 kg (133 lb 6.4 oz)   SpO2 96%   BMI 21.54 kg/m²     I & O - 24hr: No intake/output data recorded.   General Appearance: alert, appears stated age, and cooperative  HEENT:  Head: Normal, normocephalic, atraumatic.  Lung: Clear to auscultation bilaterally  Heart: Tachycardia, regular rhythm  Abdomen: soft, non-tender; bowel sounds normal; no masses,  no organomegaly  Extremities:  extremities normal, atraumatic, no cyanosis or edema  Neurologic: Mental status: Alert, oriented, thought content appropriate, Baseline weakness on left extremities. Left UE contracture noted.     Diet:   ADULT DIET; Dysphagia - Soft and Bite Sized; Extremely Thick (Pudding)      Pertinent Labs & Imaging Studies     Labs    Recent Labs     06/26/24  0549 06/28/24  0626   WBC 8.3 7.1   RBC 3.94 4.59   HGB 11.5* 12.9   HCT 33.9* 39.5   MCV 86.0 86.1   MCH 29.2 28.1   MCHC 33.9 32.7   RDW 13.3 13.8    436   MPV 8.8 9.1     Recent Labs     06/26/24  0206 06/26/24  0549 06/26/24 2010 06/27/24  1745 06/28/24  0626    136 135 139 141   K 3.6 3.9 4.1 3.9 3.4*   CL 98 100 99 104 105   MG 2.1 2.2 2.0 2.0 2.0   PHOS 3.0 3.1 2.4* 3.2 3.2   CO2 20* 19* 24 24 25   BUN 5* 5* 8 9 10   CREATININE 0.7 0.7 0.7 0.8 0.7   ANIONGAP 15 17* 12 11 11   GLUCOSE 152* 255* 306* 168* 111*   CALCIUM 8.4* 8.8 8.8 8.7 9.0 
Pt hypoglycemic this morning with a blood glucose of 51. Pt asymptomatic. PRN Dextrose 10% administered per order. Repeat blood glucose 114. Plan of care remains on-going.   
Reason for consult:  DKA    A1C: 11.7 (6/29/24)            Patient states the following concerns/barriers to diabetes self-management:     [] None       [] Medication cost   [] Food cost/availability        [] Reading  [] Hearing   [] Vision                [] Work    [] Transportation  [] No insurance  [x] Physical limitations recent stroke   [] Other:        Patient states the following about their diabetes/health:   [x]  Has had diabetes since age 35   [x]   Attended outpatient eduction in past  [x]   Uses CGM at home and checks bg 5-6 times a day  [x]   Eats 3 meals a day  [x]   Drinks mostly water and diet pop    Diabetes survival packet provided to:   [x] Patient     [] Other:    Information given:   Definition of diabetes   Target glucose ranges/A1C   Self-monitoring of blood glucose   Prevention/symptoms/treatment of hypo-/hyperglycemia   Medication adherence             The plate method/meal planning guidelines             The benefits of exercise and recommendations             Reducing the risk of chronic complications     Diabetes medications reviewed (use, purpose, action): Lantus at night, humalog with meals. Patient states he takes insulin consistently and rarely misses doses.            Post-education Assessment  [x]  Attentive to teaching  [x]  Answered questions appropriately when asked   [x]  Seems able to apply concepts to daily lifestyle  [x]  Seems motivated to do well  [x]  Verbalized an understanding of plate method  [x]  Verbalized an understanding of prescribed antidiabetes medications   [x]  Verbalized an understanding of target glucose ranges/A1C level  [x]  Expresses an intent to comply with treatment plan   []  Showed very little interest in complying with treatment plan   []  Seems to have trouble applying concepts to daily lifestyle     COMMENTS:          Recommendations:   [] Carbohydrate-controlled diet    [] Script for glucometer and supplies (per preference of patient's 
SPEECH LANGUAGE PATHOLOGY  DAILY PROGRESS NOTE        PATIENT NAME:  Magan Funes      ROOM:  7413/7413-B :  1970         TODAY'S DATE:  2024       Patient seen for dysphagia      Current Diet Order: ADULT DIET; Dysphagia - Soft and Bite Sized; Extremely Thick (Pudding)    OK for ice chips (2-3 per hour) PRN s/p oral care to promote pharyngeal muscle use, decrease risk of further muscle disuse atrophy, and thin pharyngeal secretions. Monitor respiratory status and discontinue ice chips if concern arises.     Laryngeal strength/ ROM therapeutic exercises to improve airway protection for the least restrictive PO diet  were completed with minimal verbal prompts        Will continue    
SPEECH LANGUAGE PATHOLOGY  DAILY PROGRESS NOTE        PATIENT NAME:  Magan Funes      ROOM:  7413/7413-B :  1970         TODAY'S DATE:  2024       Patient seen for dysphagia      Current Diet Order: ADULT DIET; Dysphagia - Soft and Bite Sized; Extremely Thick (Pudding)  Diet recommended following MBS (Modified Barium Swallow) on 24  Results and recommendations of swallowing evaluation reviewed.  Pt monitored during mealtime.  Tolerating current diet without noted or reported difficulties.        Will continue    
SPEECH LANGUAGE PATHOLOGY  DAILY PROGRESS NOTE        PATIENT NAME:  Magan Funes      ROOM:  7413/7413-B :  1970         TODAY'S DATE:  2024       Patient seen for dysphagia x2      DYSPHAGIA  Diet recommended following MBS (Modified Barium Swallow) on   Results and recommendations of swallowing evaluation reviewed.  OK for ice chips (2-3 per hour) PRN s/p oral care to promote pharyngeal muscle use, decrease risk of further muscle disuse atrophy, and thin pharyngeal secretions. Monitor respiratory status and discontinue ice chips if concern arises.   Oral motor strength/ coordination exercises to improve bolus prep/ control and mastication were completed with  moderate verbal prompts .  BOTR strength/ ROM exercises to reduce pharyngeal residuals and improve epiglottic inversion were completed with moderate verbal prompts  Laryngeal strength/ ROM therapeutic exercises to improve airway protection for the least restrictive PO diet  were completed with moderate verbal prompts        Will continue    
SPEECH/LANGUAGE PATHOLOGY  VIDEOFLUOROSCOPIC STUDY OF SWALLOWING (MBS)   and PLAN OF CARE    PATIENT NAME:  Magan Funes  (male)     MRN:  [id number]    :  1970  (54 y.o.)  STATUS:  Inpatient: Room 4427/4427-A    TODAY'S DATE:  2024  REFERRING PROVIDER:      SPECIFIC PROVIDER ORDER: FL modified barium swallow with video  Date of order:  24   REASON FOR REFERRAL: dysphagia   EVALUATING THERAPIST: FATEMEH Galvez      RESULTS:      DYSPHAGIA DIAGNOSIS:  moderate-severe pharyngeal phase dysphagia     DIET RECOMMENDATIONS:  Soft and bite size consistency solids (IDDSI level 6) with  pudding consistency (extremely thick - IDDSI level 4)  liquids    FEEDING RECOMMENDATIONS:    Assistance level:  Set-up is required for all oral intake     Compensatory strategies recommended: No strategies are recommended at this time     Discussed recommendations with:  patient nurse in person    SPEECH THERAPY  PLAN OF CARE   The dysphagia POC is established based on physician order and dysphagia diagnosis    Skilled SLP intervention for dysphagia management on acute care up to 5 x per week until goals met, pt plateaus in function and/or discharged from hospital      Conditions Requiring Skilled Therapeutic Intervention for dysphagia:    impaired mastication   impaired upper esophageal clearing requiring implementation of compensatory strategies to decrease risk of aspiration    Impaired laryngeal elevation  SPECIFIC DYSPHAGIA INTERVENTIONS TO INCLUDE:     PO trials of upgraded diet textures with SLP only to determine the least restrictive PO diet   oral motor strength/ coordination exercises to improve bolus prep/ control and mastication  exercises to target laryngeal elevation     Specific instructions for next treatment:  initiate instruction of therapeutic exercises   Treatment Goals:    Short Term Goals:  Pt will improve bolus prep/control and mastication with  minimal verbal prompts to advance diet grade by 1 
Stephon Shannon notified via perfect serve of patients morning blood sugar of 418. Top of sliding scale to be given at this time.  
[facility]  Internal Medicine Residency / House Medicine Service    Attending Physician Statement  I have discussed the case, including pertinent history and exam findings with the resident and the team.  I have seen and examined the patient and the key elements of the encounter have been performed by me.  I agree with the assessment, plan and orders as documented by the resident.      Wheelchair bound at home since CVA  DKA resolved  Nausea and vomiting in AM is chronic  according to his mother   Therapeutic trial with metoclopramide for gastroparesis   May add erythromycin as well  Has left hand flexion contracture and left knee contrature mild   Continue Lantus and Humalog as needed   Follow with rehab  Remainder of medical problems as per resident note.      Andrzej Cline MD FRCP [facility]  Internal Medicine Residency Faculty    
[facility]  Internal Medicine Residency Program  Progress Note - House Team 1    Patient:  Magan Funes 54 y.o. male MRN: [id number]     Date of Service: 6/27/2024     Overnight events: no significant overnight events reported       Subjective     Patient was seen and examined at bedside this morning. Only complaint is wanting the NG removed.       Objective     Physical Exam:  Vitals: /89   Pulse 99   Temp 97.3 °F (36.3 °C) (Temporal)   Resp 20   Ht 1.676 m (5' 5.98\")   Wt 61.4 kg (135 lb 6.4 oz)   SpO2 96%   BMI 21.86 kg/m²     I & O - 24hr: No intake/output data recorded.   General Appearance: alert and cooperative  HEENT:  Head: Normal, normocephalic, atraumatic.  Neck: supple, symmetrical, trachea midline  Lung: clear to auscultation bilaterally  Heart: regular rate and rhythm, S1, S2 normal, no murmur, click, rub or gallop  Abdomen: soft, non-tender; bowel sounds normal; no masses,  no organomegaly  Extremities:  extremities normal, atraumatic, no cyanosis or edema  Musculokeletal: No joint swelling, no muscle tenderness. Neurologic: Mental status: Alert, oriented, thought content appropriate    Pertinent Labs & Imaging Studies     CBC:   Lab Results   Component Value Date/Time    WBC 8.3 06/26/2024 05:49 AM    RBC 3.94 06/26/2024 05:49 AM    HGB 11.5 06/26/2024 05:49 AM    HCT 33.9 06/26/2024 05:49 AM    MCV 86.0 06/26/2024 05:49 AM    MCH 29.2 06/26/2024 05:49 AM    MCHC 33.9 06/26/2024 05:49 AM    RDW 13.3 06/26/2024 05:49 AM     06/26/2024 05:49 AM    MPV 8.8 06/26/2024 05:49 AM     CMP:    Lab Results   Component Value Date/Time     06/26/2024 08:10 PM    K 4.1 06/26/2024 08:10 PM    K 5.5 06/01/2023 05:38 PM    CL 99 06/26/2024 08:10 PM    CO2 24 06/26/2024 08:10 PM    BUN 8 06/26/2024 08:10 PM    CREATININE 0.7 06/26/2024 08:10 PM    GFRAA >60 08/10/2022 09:41 AM    LABGLOM >90 06/26/2024 08:10 PM    LABGLOM >60 01/15/2024 06:07 AM    GLUCOSE 306 
  Component Value Date/Time    PROTIME 11.1 01/23/2023 11:14 AM    INR 1.0 01/23/2023 11:14 AM     PTT:    Lab Results   Component Value Date/Time    APTT 32.6 01/23/2023 11:14 AM       Comprehensive Metabolic Profile:   Recent Labs     06/23/24  2120 06/24/24  0202 06/24/24  1918 06/24/24  2257 06/25/24  0428      < > 135 136 136   K 5.1*   < > 4.1 3.8 3.9   CL 93*   < > 105 104 104   CO2 13*   < > 21* 23 20*   BUN 39*   < > 13 11 9   CREATININE 1.5*   < > 0.7 0.7 0.7   GLUCOSE 572*   < > 220* 134* 109*   CALCIUM 9.0   < > 8.2* 8.0* 8.8   BILITOT 0.9  --   --   --   --    ALKPHOS 124  --   --   --   --    AST 39  --   --   --   --    *  --   --   --   --     < > = values in this interval not displayed.      Magnesium:   Lab Results   Component Value Date/Time    MG 2.0 06/25/2024 04:28 AM     Phosphorus:   Lab Results   Component Value Date/Time    PHOS 2.8 06/25/2024 04:28 AM     Ionized Calcium:   Lab Results   Component Value Date/Time    CAION 1.13 07/16/2023 04:05 PM      Troponin: No results for input(s): \"TROPONINI\" in the last 72 hours.    Imaging Studies:  Have been reviewed appropriately        Resident's Assessment and Plan     Assessment:   DKA likely 2/2 medication noncompliance  HAGMA likely 2/2 DKA-resolving  KIRSTEN likely prerenal 2/2 hypovolemia-resolved  Hyperkalemia likely 2/2 DKA - resolved  Concern for aspiration pneumonia  Elevated troponin-NETTA 6/2023 showed EF 60 to 65%  History of pansensitive E. coli UTI, 1/2024  Elevated ALT  Type 1 diabetes mellitus-on Lantus 15 units twice daily, Humalog 15 units 3 times daily with meals and medium dose sliding scale at home, followed with Dr. Baca, no follow-up for 2 years  Leukocytosis likely 2/2 infectious versus reactive, concern for aspiration PNA  History of lacunar stroke 1/2023-left upper and lower extremity weakness-on aspirin  Hypertension-on lisinopril 10 mg daily , amlodipine 10 mg daily  Hyperlipidemia on Lipitor 80 mg daily, 
9.0 8.8 9.2       Imaging Studies:    Fluoroscopy modified barium swallow with video   Final Result   1. Positive examination for aspiration with nectar texture.   2. Penetration elicited with honey texture.   Please see separate speech pathology report for full discussion of findings   and recommendations.         XR CHEST ABDOMEN NG PLACEMENT   Final Result   Satisfactory position of nasogastric tube.         CT ABDOMEN PELVIS WO CONTRAST Additional Contrast? None   Final Result   No renal calculus or renal or bowel obstruction.         XR CHEST PORTABLE   Final Result   No clear acute cardiopulmonary process on portable exam.      RECOMMENDATION:   PA and lateral exam or CT may better evaluate  as clinically warranted.              EKG:   Normal sinus rhythm  Normal ECG    Resident's Assessment and Plan       Assessment and Plan:     DKA likely 2/2 Poor oral intake  and sub optimal insulin  Episodes of hypoglycemia in setting of type I DM on insulin  Nausea  likely 2/2 DKA vs r/o Gastroparesis  HAGMA likely 2/2 DKA  KIRSTEN likely prerenal 2/2 hypovolemia-resolved  Hyperkalemia likely 2/2 DKA - resolved  Concern for aspiration pneumonia  Dysphagia, barium swallow showing positive aspiration with nectar consistency  Elevated troponin-NETTA 6/2023 showed EF 60 to 65%  History of pansensitive E. coli UTI, 1/2024  Elevated ALT  Type 1 diabetes mellitus-on Lantus 15 units twice daily, Humalog 15 units 3 times daily with meals and medium dose sliding scale at home, followed with Dr. Baca, no follow-up for 2 years  Leukocytosis likely 2/2 infectious versus reactive, concern for aspiration PNA-resolved  History of lacunar stroke 1/2023-left upper and lower extremity weakness-on aspirin  Hypertension-on lisinopril 10 mg daily , amlodipine 10 mg daily  Hyperlipidemia on Lipitor 80 mg daily, ezetimibe 10 mg nightly  Tobacco use disorder-current smoker, 1 PPD for 20 years  History of polysubstance abuse  History of PEA arrest in 
conservation strategies, correct breathing pattern and techniques to improve independence/tolerance for self-care routine  * Functional transfer/mobility training/DME recommendations for increased independence, safety, and fall prevention  * Patient/Family education to increase follow through with safety techniques and functional independence  * Recommendation of environmental modifications for increased safety with functional transfers/mobility and ADLs  * Cognitive retraining/development of therapeutic activities to improve problem solving, judgement, memory, and attention for increased safety/participation in ADL/IADL tasks  * Sensory re-education to improve body/limb awareness, maintain/improve skin integrity, and improve hand/UE motor function  * Visual-perceptual training to improve environmental scanning, visual attention/focus, and oculomotor skills for increased safety/independence with functional transfers/mobility and ADLs  * Splinting/positioning for increased function, prevention of contractures, and improve skin integrity  * Therapeutic exercise to improve motor endurance, ROM, and functional strength for ADLs/functional transfers  * Therapeutic activities to facilitate/challenge dynamic balance, stand tolerance for increased safety and independence with ADLs  * Therapeutic activities to facilitate gross/fine motor skills for increased independence with ADLs  * Neuro-muscular re-education: facilitation of righting/equilibrium reactions, midline orientation, scapular stability/mobility, normalization of muscle tone, and facilitation of volitional active controled movement  * Positioning to improve skin integrity, interaction with environment and functional independence  * Delirium prevention/treatment  * Manual techniques for edema management    Recommended Adaptive Equipment/DME: TBD     Home Living: Pt lives with mom in 2nd level apartment with 1 flight of stairs to living level and bed and bath on 
SLP.     Assessment and Plan:     DKA likely 2/2 Poor oral intake and sub optimal insulin dose  Episodes of hypoglycemia in setting of type I DM on insulin  Nausea  likely 2/2 DKA vs r/o Gastroparesis  HAGMA likely 2/2 DKA  KIRSTEN likely prerenal 2/2 hypovolemia-resolved  Hyperkalemia likely 2/2 DKA - resolved  Concern for aspiration pneumonia  Dysphagia, barium swallow showing positive aspiration with nectar consistency  Elevated troponin-NETTA 6/2023 showed EF 60 to 65%  History of pansensitive E. coli UTI, 1/2024  Elevated ALT  Type 1 diabetes mellitus-on Lantus 15 units twice daily, Humalog 15 units 3 times daily with meals and medium dose sliding scale at home, followed with Dr. Baca, no follow-up for 2 years  Leukocytosis likely 2/2 infectious versus reactive, concern for aspiration PNA-resolved  History of lacunar stroke 1/2023-left upper and lower extremity weakness-on aspirin  Hypertension-on lisinopril 10 mg daily , amlodipine 10 mg daily  Hyperlipidemia on Lipitor 80 mg daily, ezetimibe 10 mg nightly  Tobacco use disorder-current smoker, 1 PPD for 20 years  History of polysubstance abuse  History of PEA arrest in 2020  Vitamin D deficiency     Plan:  Patient bridged with just 10 units and low-dose sliding scale  Monitor for hypoglycemia  Continue metoclopramide 5 mg 3 times daily as needed   Consider gastric emptying study  Patient will need reevaluation for dysphagia  Patient will need speech therapy after discharge  Continue PEP flutter  Continue Augmentin until 7/2/2024  Patient will need to follow-up with endocrinology as outpatient  Continue aspirin 81 mg daily  Continue lisinopril and amlodipine  Continue ezetimibe and atorvastatin  Continue vitamin D 50,000 unit weekly and 1000 units daily  Mx per ICU      PT/OT evaluation: Following    DVT prophylaxis: Lovenox   GI prophylaxis: Protonix   Diet:   ADULT DIET; Regular; 3 carb choices (45 gm/meal)   Bowel regimen: GlycoLax   Pain management: as 
aspirin  Hypertension-on lisinopril 10 mg daily , amlodipine 10 mg daily  Hyperlipidemia on Lipitor 80 mg daily, ezetimibe 10 mg nightly  Tobacco use disorder-current smoker, 1 PPD for 20 years  History of polysubstance abuse  History of PEA arrest in 2020  Vitamin D deficiency      Plan:   Patient bridged with Lantus 30 units, Lispro 10 U TID and MDSS  continuing metoclopramide every 6 hours for nausea vomiting Consider Erythromycin; Monitor QTC    Respiratory culture sent, follow. Repeat blood cultures sent; no growth thus far   Continue PEP flutter  Continue Zosyn for possible aspiration pneumonia  Continue aspirin 81 mg daily  Continue lisinopril and amlodipine  Continue ezetimibe and atorvastatin  Continue vitamin D 50,000 unit weekly and 1000 units daily  Consulted PT OT      PT/OT evaluation: not indicated at this time   DVT prophylaxis: Lovenox  GI prophylaxis: PPI  Diet:   ADULT TUBE FEEDING; Nasogastric; Standard with Fiber; Continuous; 20; Yes; 20; Q 4 hours; 55; 200; Q 4 hours   Bowel regimen: Glycolax   Pain management: as needed  Code status: Full Code   Disposition: Transfer   Family: updated as available    Vikki Santana MD, PGY-1   Attending physician: Dr. Ravi      [facility]  Department of Pulmonary, Critical Care and Sleep Medicine  [facility] [place]  Department of Internal Medicine  Attending Statement    This patient has a high probability of sudden clinically significant deterioration, which requires the highest level of physician preparedness to intervene urgently. I managed/supervised life or organ supporting interventions that required frequent physician assessment. I devoted my full attention to the direct care of this patient for the period of time indicated below. Time I spent with family of surrogate(s) is included only if the patient was incapable of providing the necessary information or participating in medical decision 
likely 2/2 DKA gastroparesis: improving  HAGMA likely 2/2 DKA: resolved  KIRSTEN likely prerenal 2/2 hypovolemia-resolved  Hyperkalemia likely 2/2 DKA - resolved  Concern for aspiration pneumonia  Dysphagia, barium swallow showing positive aspiration with nectar consistency  Elevated troponin-NETTA 6/2023 showed EF 60 to 65%  Type 1 diabetes mellitus-on Lantus 15 units twice daily, Humalog 15 units 3 times daily with meals and medium dose sliding scale at home  Leukocytosis likely 2/2 infectious versus reactive, concern for aspiration PNA-resolved  History of lacunar stroke 1/2023-left upper and lower extremity weakness-on aspirin  Hypertension-on lisinopril 10 mg daily , amlodipine 10 mg daily  Hyperlipidemia on Lipitor 80 mg daily, ezetimibe 10 mg nightly  Tobacco use disorder-current smoker, 1 PPD for 20 years  History of polysubstance abuse  History of PEA arrest in 2020  Vitamin D deficiency     Plan:  Patient bridged with just 10 units and low-dose sliding scale  Monitor for hypoglycemia  Continue metoclopramide 5 mg 3 times daily as needed   Consider gastric emptying study   Continue PEP flutter  Continue Augmentin until 7/2/2024 for aspiration pneumonia  Patient will need to follow-up with endocrinology as outpatient  Continue aspirin 81 mg daily  Continue lisinopril and amlodipine  Continue ezetimibe and atorvastatin  Continue vitamin D 50,000 unit weekly and 1000 units daily    PT/OT evaluation: not indicated at this time   DVT prophylaxis: lovenox  GI prophylaxis: PPI  Diet:   ADULT DIET; Dysphagia - Soft and Bite Sized; 3 carb choices (45 gm/meal); Extremely Thick (Pudding)  ADULT ORAL NUTRITION SUPPLEMENT; Breakfast, Dinner; Fortified Gelatin Oral Supplement   Bowel regimen: PRN  Pain management: as needed  Code status: Full Code   Disposition: Transfer / Discharge / Continue Current Care  Family: updated as available    Jessica Crowell MD, PGY-2   Attending physician: Dr. Cline   
  Sit to supine: Stand by Assist    Functional Transfers Sit to stand:Maximal Assist  Stand to sit: Maximal Assist  Stand pivot:Maximal Assist    Declined transfers   Moderate Assist    Functional Mobility NT      NT Moderate Assist with appropriate AD   Balance Sitting:     Static: Min A    Dynamic:Mod A  Standing: Max A    during functional activity SBA  Seated supported bed level  Sitting:     Static: SBA    Dynamic: Min A  Standing: Mod A   Activity Tolerance Fair tolerance with light activity Fair tolerance with light to moderate activity   WFL  For full ADL   Visual/  Perceptual Glasses: none present  Pt reports B blurriness          Safety F-  Fair F+      Hand Dominance: R    AROM (PROM) Strength Additional Info:    RUE  WFL 4/5 Good  and wfl FMC/dexterity noted during ADL tasks         LUE Limited flx contracture at shoulder/elbow/hand +palm protector Shoulder: 1/5 7/1:  *Pt educated on jt protection during ADLs; ROM ex's to perform bedside  Absent  and wfl FMC/dexterity noted during ADL tasks         Hearing: WFL   Sensation:  No c/o numbness or tingling   Tone: WFL   Edema: Unremarkable  Vitals:   HR at rest: 101 bpm HR at end of session: 92 bpm   Spo2 at rest:97% Spo2 at end of session: 97%   BP at rest: 128/86 mmHg BP at end of session: *not registering mmHg     Treatment: OT treatment provided this date:  ADL retraining: Instruction on adapted techniques/valorie to increase independence and safe reach during dressing/bathing activities.  Pt demonstrated G understanding with min cues.    Energy conservation: Education on breathing techniques, pacing, work simplification strategies & recommended bathroom DME for safety and energy conservation during self care tasks and activities of daily living.     ROM/exercise: See comments above; pt presents with increased tone/ contractures L UE. UE elevated on pillow at end of session (splint intact.)    Delirium Prevention: Environmental and sensory 
information, gathering information on past medical history/social history and prior level of function, completion of standardized testing/informal observation of tasks, assessment of data and education on plan of care and goals.    CPT codes:  [] Low Complexity PT evaluation 07867  [x] Moderate Complexity PT evaluation 87174  [] High Complexity PT evaluation 32982  [] PT Re-evaluation 61928  [] Gait training 81245 -- minutes  [] Manual therapy 43932 -- minutes  [x] Therapeutic activities 49934 8 minutes  [] Therapeutic exercises 23711 - minutes  [] Neuromuscular reeducation 53764 -- minutes     Gage Morris, PT, DPT  [id number]         
therapists and consultants. Critical care services and times documented are independent of procedures and multidisciplinary rounds with Residents. Additionally comprehensive, multidisciplinary rounds were conducted with the MICU team. The case was discussed in detail and plans for care were established. Review of Residents documentation was conducted and revisions were made as appropriate. I agree with the above documented exam, problem list and plan of care.I performed the substantive portion of the visit.      During multidisciplinary team rounds the patient was seen, examined and discussed. This is confirmation that I have personally seen and examined the patient and that the key elements of the encounter were performed by me (> 85 % time).  The medications & laboratory data and imagery was discussed and adjusted where necessary. Key issues of the case were discussed among consultants.     This patient has a high probability of sudden clinically significant deterioration. I managed/supervised life or organ supporting interventions that required frequent physician assessment. I devoted my full attention to the direct care of this patient for the period of time indicated below. In addition to above, time was devoted to teaching and to any procedure.      NOTE: This report, in part or full, may have been transcribed using voice recognition software. Every effort was made to ensure accuracy; however, inadvertent computerized transcription errors may be present. Please excuse any transcriptional grammatical or spelling errors that may have escaped my editorial review.     John Oconnell MD

## 2024-07-02 NOTE — CARE COORDINATION
CM update note.  Per attending patient is ready for discharge today.  Transport set up via stretcher with PAS.   time is 1600.  Nurse will need to call report to [phone].  Facility liasion, patient, patient mother, and RN notified of  time.  Ambulance form with envelope placed on the soft chart.  Lyndon Gallegos RN [phone].

## 2024-07-02 NOTE — DISCHARGE SUMMARY
[facility]  Discharge Summary    PCP: Andrzej Cline MD    Admit Date:6/23/2024  Discharge Date: 7/2/2024    Admission Diagnosis:   DKA likely 2/2 hypovolemia due to N/V vs medication noncompliance   HAGMA 2/2 DKA   KIRSTEN likely pre-renal 2/2 hypovolemia   Elevated troponin - NETTA 06/2023 EF 60 to 65%, No evidence of vegetation consistent with endocarditis, no thrombus within left atrium or appendage, no PFO   Hx of pansensitive E. coli UTI  Transaminitis   Type 1 diabetes mellitus - Lantus 15U BID.  Humalog 10U TID with meals, and MDSS - used to follow with Dr. Baca, not seen for 2 years  Leukocytosis likely 2/2 infectious vs reactive   History of lacunar stroke 2/2023 - left upper and lower extremities weakness - on ASA   Hypertension - on Lisinopril 10mg, amlodipine 10mg   Hyperlipidemia - Lipitor 80mg   Tobacco use  disorder - current smoker, 1 ppd for 20 years  History of polysubstance abuse  History of PEA arrest in 2020    Discharge Diagnosis:  DKA likely 2/2 Dehydration (N/V), medication noncompliance - resolved  HAGMA 2/2 DKA, resolved  KIRSTEN likely pre-renal 2/2 hypovolemia, resolved  Transaminitis, resolved  Aspiration PNA, s/p Augumentin  Hx of pansensitive E. coli UTI  Hx of Type 1 diabetes mellitus  Leukocytosis likely 2/2 infectious versus reactive, concern for aspiration PNA-resolved  History of lacunar stroke 1/2023-left upper and lower extremity weakness-on aspirin  Hypertension-on lisinopril 10 mg daily , amlodipine 10 mg daily  Hyperlipidemia on Lipitor 80 mg daily, ezetimibe 10 mg nightly  Tobacco use disorder-current smoker, 1 PPD for 20 years  History of polysubstance abuse  History of PEA arrest in 2020  Vitamin D deficiency    Hospital Course:   54 y.o. male , with past medical history of type I diabetics, hypertension, hyperlipidemia, history of CVA 02/2024 with left upper and lower extremities weakness, tobacco use disorder, history of EtOH

## 2024-07-05 LAB
ALBUMIN SERPL-MCNC: 3.8 G/DL (ref 3.5–5.2)
ALP SERPL-CCNC: 124 U/L (ref 40–129)
ALT SERPL-CCNC: 89 U/L (ref 0–40)
ANION GAP SERPL CALCULATED.3IONS-SCNC: 12 MMOL/L (ref 7–16)
AST SERPL-CCNC: 65 U/L (ref 0–39)
BASOPHILS # BLD: 0.04 K/UL (ref 0–0.2)
BASOPHILS NFR BLD: 0 % (ref 0–2)
BILIRUB SERPL-MCNC: 0.9 MG/DL (ref 0–1.2)
BUN SERPL-MCNC: 18 MG/DL (ref 6–20)
CALCIUM SERPL-MCNC: 8.8 MG/DL (ref 8.6–10.2)
CHLORIDE SERPL-SCNC: 99 MMOL/L (ref 98–107)
CO2 SERPL-SCNC: 21 MMOL/L (ref 22–29)
CREAT SERPL-MCNC: 0.6 MG/DL (ref 0.7–1.2)
EOSINOPHIL # BLD: 0.31 K/UL (ref 0.05–0.5)
EOSINOPHILS RELATIVE PERCENT: 3 % (ref 0–6)
ERYTHROCYTE [DISTWIDTH] IN BLOOD BY AUTOMATED COUNT: 14.1 % (ref 11.5–15)
GFR, ESTIMATED: >90 ML/MIN/1.73M2
GLUCOSE P FAST SERPL-MCNC: 419 MG/DL (ref 74–99)
HCT VFR BLD AUTO: 36.6 % (ref 37–54)
HGB BLD-MCNC: 11.8 G/DL (ref 12.5–16.5)
IMM GRANULOCYTES # BLD AUTO: 0.03 K/UL (ref 0–0.58)
IMM GRANULOCYTES NFR BLD: 0 % (ref 0–5)
LYMPHOCYTES NFR BLD: 2.04 K/UL (ref 1.5–4)
LYMPHOCYTES RELATIVE PERCENT: 23 % (ref 20–42)
MCH RBC QN AUTO: 28 PG (ref 26–35)
MCHC RBC AUTO-ENTMCNC: 32.2 G/DL (ref 32–34.5)
MCV RBC AUTO: 86.9 FL (ref 80–99.9)
MONOCYTES NFR BLD: 0.64 K/UL (ref 0.1–0.95)
MONOCYTES NFR BLD: 7 % (ref 2–12)
NEUTROPHILS NFR BLD: 66 % (ref 43–80)
NEUTS SEG NFR BLD: 5.95 K/UL (ref 1.8–7.3)
PLATELET # BLD AUTO: 497 K/UL (ref 130–450)
PMV BLD AUTO: 9.3 FL (ref 7–12)
POTASSIUM SERPL-SCNC: 4.8 MMOL/L (ref 3.5–5)
PROT SERPL-MCNC: 6.4 G/DL (ref 6.4–8.3)
RBC # BLD AUTO: 4.21 M/UL (ref 3.8–5.8)
SODIUM SERPL-SCNC: 132 MMOL/L (ref 132–146)
WBC OTHER # BLD: 9 K/UL (ref 4.5–11.5)

## 2024-07-07 ENCOUNTER — HOSPITAL ENCOUNTER (INPATIENT)
Age: 54
LOS: 5 days | Discharge: OTHER INSTITUTION | DRG: 073 | End: 2024-07-12
Attending: EMERGENCY MEDICINE | Admitting: INTERNAL MEDICINE
Payer: MEDICARE

## 2024-07-07 ENCOUNTER — APPOINTMENT (OUTPATIENT)
Dept: CT IMAGING | Age: 54
DRG: 073 | End: 2024-07-07
Payer: MEDICARE

## 2024-07-07 ENCOUNTER — APPOINTMENT (OUTPATIENT)
Dept: GENERAL RADIOLOGY | Age: 54
DRG: 073 | End: 2024-07-07
Payer: MEDICARE

## 2024-07-07 DIAGNOSIS — R65.10 SYSTEMIC INFLAMMATORY RESPONSE SYNDROME (HCC): Primary | ICD-10-CM

## 2024-07-07 DIAGNOSIS — K92.0 HEMATEMESIS, UNSPECIFIED WHETHER NAUSEA PRESENT: ICD-10-CM

## 2024-07-07 DIAGNOSIS — B95.8 BACTEREMIA DUE TO STAPHYLOCOCCUS: ICD-10-CM

## 2024-07-07 DIAGNOSIS — Z86.14 HISTORY OF MRSA INFECTION: ICD-10-CM

## 2024-07-07 DIAGNOSIS — K92.0 COFFEE GROUND EMESIS: ICD-10-CM

## 2024-07-07 DIAGNOSIS — E86.0 DEHYDRATION: ICD-10-CM

## 2024-07-07 DIAGNOSIS — N17.9 ACUTE KIDNEY INJURY (HCC): ICD-10-CM

## 2024-07-07 DIAGNOSIS — R78.81 BACTEREMIA DUE TO STAPHYLOCOCCUS: ICD-10-CM

## 2024-07-07 DIAGNOSIS — E87.0 HYPERNATREMIA: ICD-10-CM

## 2024-07-07 LAB
ALBUMIN SERPL-MCNC: 4.6 G/DL (ref 3.5–5.2)
ALP SERPL-CCNC: 134 U/L (ref 40–129)
ALT SERPL-CCNC: 71 U/L (ref 0–40)
ANION GAP SERPL CALCULATED.3IONS-SCNC: 18 MMOL/L (ref 7–16)
AST SERPL-CCNC: 25 U/L (ref 0–39)
B-OH-BUTYR SERPL-MCNC: 2.49 MMOL/L (ref 0.02–0.27)
B.E.: 5.6 MMOL/L (ref -3–3)
BACTERIA URNS QL MICRO: ABNORMAL
BASOPHILS # BLD: 0 K/UL (ref 0–0.2)
BASOPHILS NFR BLD: 0 % (ref 0–2)
BILIRUB DIRECT SERPL-MCNC: 0.3 MG/DL (ref 0–0.3)
BILIRUB INDIRECT SERPL-MCNC: 1 MG/DL (ref 0–1)
BILIRUB SERPL-MCNC: 1.3 MG/DL (ref 0–1.2)
BILIRUB UR QL STRIP: ABNORMAL
BUN SERPL-MCNC: 55 MG/DL (ref 6–20)
CALCIUM SERPL-MCNC: 10.3 MG/DL (ref 8.6–10.2)
CASTS #/AREA URNS LPF: ABNORMAL /LPF
CHLORIDE SERPL-SCNC: 101 MMOL/L (ref 98–107)
CK SERPL-CCNC: 337 U/L (ref 20–200)
CLARITY UR: CLEAR
CO2 SERPL-SCNC: 29 MMOL/L (ref 22–29)
COHB: 0.4 % (ref 0–1.5)
COLOR UR: YELLOW
CREAT SERPL-MCNC: 1.9 MG/DL (ref 0.7–1.2)
CRITICAL: ABNORMAL
DATE ANALYZED: ABNORMAL
DATE OF COLLECTION: ABNORMAL
EOSINOPHIL # BLD: 0 K/UL (ref 0.05–0.5)
EOSINOPHILS RELATIVE PERCENT: 0 % (ref 0–6)
ERYTHROCYTE [DISTWIDTH] IN BLOOD BY AUTOMATED COUNT: 14.8 % (ref 11.5–15)
GFR, ESTIMATED: 41 ML/MIN/1.73M2
GLUCOSE BLD-MCNC: 264 MG/DL (ref 74–99)
GLUCOSE BLD-MCNC: 293 MG/DL (ref 74–99)
GLUCOSE BLD-MCNC: 315 MG/DL (ref 74–99)
GLUCOSE SERPL-MCNC: 301 MG/DL (ref 74–99)
GLUCOSE UR STRIP-MCNC: 250 MG/DL
HCO3: 29.4 MMOL/L (ref 22–26)
HCT VFR BLD AUTO: 36.2 % (ref 37–54)
HCT VFR BLD AUTO: 42.9 % (ref 37–54)
HGB BLD-MCNC: 11.8 G/DL (ref 12.5–16.5)
HGB BLD-MCNC: 14.2 G/DL (ref 12.5–16.5)
HGB UR QL STRIP.AUTO: NEGATIVE
HHB: 6.9 % (ref 0–5)
KETONES UR STRIP-MCNC: 15 MG/DL
LAB: ABNORMAL
LACTATE BLDV-SCNC: 2 MMOL/L (ref 0.5–1.9)
LACTATE BLDV-SCNC: 3.3 MMOL/L (ref 0.5–1.9)
LEUKOCYTE ESTERASE UR QL STRIP: NEGATIVE
LYMPHOCYTES NFR BLD: 0.49 K/UL (ref 1.5–4)
LYMPHOCYTES RELATIVE PERCENT: 2 % (ref 20–42)
Lab: 1038
MAGNESIUM SERPL-MCNC: 2.3 MG/DL (ref 1.6–2.6)
MCH RBC QN AUTO: 28.6 PG (ref 26–35)
MCHC RBC AUTO-ENTMCNC: 33.1 G/DL (ref 32–34.5)
MCV RBC AUTO: 86.5 FL (ref 80–99.9)
METHB: 0.3 % (ref 0–1.5)
MODE: ABNORMAL
MONOCYTES NFR BLD: 1.22 K/UL (ref 0.1–0.95)
MONOCYTES NFR BLD: 4 % (ref 2–12)
NEUTROPHILS NFR BLD: 94 % (ref 43–80)
NEUTS SEG NFR BLD: 26.3 K/UL (ref 1.8–7.3)
NITRITE UR QL STRIP: NEGATIVE
O2 SATURATION: 93.1 % (ref 92–98.5)
O2HB: 92.4 % (ref 94–97)
OPERATOR ID: 405
PATIENT TEMP: 37 C
PCO2: 39.9 MMHG (ref 35–45)
PH BLOOD GAS: 7.49 (ref 7.35–7.45)
PH UR STRIP: 6 [PH] (ref 5–9)
PLATELET # BLD AUTO: 570 K/UL (ref 130–450)
PMV BLD AUTO: 9.2 FL (ref 7–12)
PO2: 61.1 MMHG (ref 75–100)
POTASSIUM SERPL-SCNC: 3.9 MMOL/L (ref 3.5–5)
PROT SERPL-MCNC: 8.1 G/DL (ref 6.4–8.3)
PROT UR STRIP-MCNC: ABNORMAL MG/DL
RBC # BLD AUTO: 4.96 M/UL (ref 3.8–5.8)
RBC # BLD: ABNORMAL 10*6/UL
RBC #/AREA URNS HPF: ABNORMAL /HPF
SODIUM SERPL-SCNC: 148 MMOL/L (ref 132–146)
SOURCE, BLOOD GAS: ABNORMAL
SP GR UR STRIP: >1.03 (ref 1–1.03)
T4 FREE SERPL-MCNC: 1.5 NG/DL (ref 0.9–1.7)
THB: 13.9 G/DL (ref 11.5–16.5)
TIME ANALYZED: 1044
TROPONIN I SERPL HS-MCNC: 62 NG/L (ref 0–11)
TSH SERPL DL<=0.05 MIU/L-ACNC: 1.52 UIU/ML (ref 0.27–4.2)
UROBILINOGEN UR STRIP-ACNC: 0.2 EU/DL (ref 0–1)
WBC #/AREA URNS HPF: ABNORMAL /HPF
WBC OTHER # BLD: 28 K/UL (ref 4.5–11.5)

## 2024-07-07 PROCEDURE — 86901 BLOOD TYPING SEROLOGIC RH(D): CPT

## 2024-07-07 PROCEDURE — 86850 RBC ANTIBODY SCREEN: CPT

## 2024-07-07 PROCEDURE — 85018 HEMOGLOBIN: CPT

## 2024-07-07 PROCEDURE — 2580000003 HC RX 258

## 2024-07-07 PROCEDURE — 83605 ASSAY OF LACTIC ACID: CPT

## 2024-07-07 PROCEDURE — 96361 HYDRATE IV INFUSION ADD-ON: CPT

## 2024-07-07 PROCEDURE — 93005 ELECTROCARDIOGRAM TRACING: CPT

## 2024-07-07 PROCEDURE — 99285 EMERGENCY DEPT VISIT HI MDM: CPT

## 2024-07-07 PROCEDURE — 82248 BILIRUBIN DIRECT: CPT

## 2024-07-07 PROCEDURE — P9016 RBC LEUKOCYTES REDUCED: HCPCS

## 2024-07-07 PROCEDURE — 96375 TX/PRO/DX INJ NEW DRUG ADDON: CPT

## 2024-07-07 PROCEDURE — 84439 ASSAY OF FREE THYROXINE: CPT

## 2024-07-07 PROCEDURE — 86923 COMPATIBILITY TEST ELECTRIC: CPT

## 2024-07-07 PROCEDURE — 74176 CT ABD & PELVIS W/O CONTRAST: CPT

## 2024-07-07 PROCEDURE — 87040 BLOOD CULTURE FOR BACTERIA: CPT

## 2024-07-07 PROCEDURE — 81001 URINALYSIS AUTO W/SCOPE: CPT

## 2024-07-07 PROCEDURE — 6360000002 HC RX W HCPCS

## 2024-07-07 PROCEDURE — 85014 HEMATOCRIT: CPT

## 2024-07-07 PROCEDURE — 82962 GLUCOSE BLOOD TEST: CPT

## 2024-07-07 PROCEDURE — 71045 X-RAY EXAM CHEST 1 VIEW: CPT

## 2024-07-07 PROCEDURE — 6370000000 HC RX 637 (ALT 250 FOR IP)

## 2024-07-07 PROCEDURE — 84443 ASSAY THYROID STIM HORMONE: CPT

## 2024-07-07 PROCEDURE — 2060000000 HC ICU INTERMEDIATE R&B

## 2024-07-07 PROCEDURE — 96374 THER/PROPH/DIAG INJ IV PUSH: CPT

## 2024-07-07 PROCEDURE — 85025 COMPLETE CBC W/AUTO DIFF WBC: CPT

## 2024-07-07 PROCEDURE — 87077 CULTURE AEROBIC IDENTIFY: CPT

## 2024-07-07 PROCEDURE — 36430 TRANSFUSION BLD/BLD COMPNT: CPT

## 2024-07-07 PROCEDURE — 83735 ASSAY OF MAGNESIUM: CPT

## 2024-07-07 PROCEDURE — 86900 BLOOD TYPING SEROLOGIC ABO: CPT

## 2024-07-07 PROCEDURE — 87154 CUL TYP ID BLD PTHGN 6+ TRGT: CPT

## 2024-07-07 PROCEDURE — 84484 ASSAY OF TROPONIN QUANT: CPT

## 2024-07-07 PROCEDURE — 99223 1ST HOSP IP/OBS HIGH 75: CPT | Performed by: SURGERY

## 2024-07-07 PROCEDURE — 82805 BLOOD GASES W/O2 SATURATION: CPT

## 2024-07-07 PROCEDURE — 80053 COMPREHEN METABOLIC PANEL: CPT

## 2024-07-07 PROCEDURE — 82010 KETONE BODYS QUAN: CPT

## 2024-07-07 PROCEDURE — 82550 ASSAY OF CK (CPK): CPT

## 2024-07-07 RX ORDER — LISINOPRIL 5 MG/1
10 TABLET ORAL DAILY
Status: DISCONTINUED | OUTPATIENT
Start: 2024-07-07 | End: 2024-07-12 | Stop reason: HOSPADM

## 2024-07-07 RX ORDER — INSULIN LISPRO 100 [IU]/ML
0-8 INJECTION, SOLUTION INTRAVENOUS; SUBCUTANEOUS EVERY 4 HOURS
Status: DISCONTINUED | OUTPATIENT
Start: 2024-07-07 | End: 2024-07-08

## 2024-07-07 RX ORDER — SODIUM CHLORIDE, SODIUM LACTATE, POTASSIUM CHLORIDE, AND CALCIUM CHLORIDE .6; .31; .03; .02 G/100ML; G/100ML; G/100ML; G/100ML
1000 INJECTION, SOLUTION INTRAVENOUS ONCE
Status: DISCONTINUED | OUTPATIENT
Start: 2024-07-07 | End: 2024-07-08

## 2024-07-07 RX ORDER — 0.9 % SODIUM CHLORIDE 0.9 %
1000 INTRAVENOUS SOLUTION INTRAVENOUS ONCE
Status: COMPLETED | OUTPATIENT
Start: 2024-07-07 | End: 2024-07-07

## 2024-07-07 RX ORDER — DEXTROSE MONOHYDRATE AND SODIUM CHLORIDE 5; .45 G/100ML; G/100ML
INJECTION, SOLUTION INTRAVENOUS CONTINUOUS
Status: ACTIVE | OUTPATIENT
Start: 2024-07-07 | End: 2024-07-08

## 2024-07-07 RX ORDER — ERGOCALCIFEROL 1.25 MG/1
50000 CAPSULE ORAL WEEKLY
Status: DISCONTINUED | OUTPATIENT
Start: 2024-07-08 | End: 2024-07-12 | Stop reason: HOSPADM

## 2024-07-07 RX ORDER — EZETIMIBE 10 MG/1
10 TABLET ORAL NIGHTLY
Status: DISCONTINUED | OUTPATIENT
Start: 2024-07-07 | End: 2024-07-12 | Stop reason: HOSPADM

## 2024-07-07 RX ORDER — ASPIRIN 81 MG/1
81 TABLET ORAL DAILY
Status: DISCONTINUED | OUTPATIENT
Start: 2024-07-07 | End: 2024-07-12 | Stop reason: HOSPADM

## 2024-07-07 RX ORDER — DULOXETIN HYDROCHLORIDE 30 MG/1
30 CAPSULE, DELAYED RELEASE ORAL DAILY
Status: DISCONTINUED | OUTPATIENT
Start: 2024-07-07 | End: 2024-07-12 | Stop reason: HOSPADM

## 2024-07-07 RX ORDER — INSULIN LISPRO 100 [IU]/ML
10 INJECTION, SOLUTION INTRAVENOUS; SUBCUTANEOUS
Status: DISCONTINUED | OUTPATIENT
Start: 2024-07-07 | End: 2024-07-11

## 2024-07-07 RX ORDER — ONDANSETRON 4 MG/1
4 TABLET, ORALLY DISINTEGRATING ORAL EVERY 8 HOURS PRN
Status: DISCONTINUED | OUTPATIENT
Start: 2024-07-07 | End: 2024-07-12 | Stop reason: HOSPADM

## 2024-07-07 RX ORDER — SODIUM CHLORIDE AND POTASSIUM CHLORIDE 150; 450 MG/100ML; MG/100ML
INJECTION, SOLUTION INTRAVENOUS CONTINUOUS
Status: DISCONTINUED | OUTPATIENT
Start: 2024-07-07 | End: 2024-07-08

## 2024-07-07 RX ORDER — POLYETHYLENE GLYCOL 3350 17 G/17G
17 POWDER, FOR SOLUTION ORAL DAILY PRN
Status: DISCONTINUED | OUTPATIENT
Start: 2024-07-07 | End: 2024-07-12 | Stop reason: HOSPADM

## 2024-07-07 RX ORDER — SODIUM CHLORIDE 9 MG/ML
INJECTION, SOLUTION INTRAVENOUS PRN
Status: DISCONTINUED | OUTPATIENT
Start: 2024-07-07 | End: 2024-07-12 | Stop reason: HOSPADM

## 2024-07-07 RX ORDER — ENOXAPARIN SODIUM 100 MG/ML
40 INJECTION SUBCUTANEOUS DAILY
Status: DISCONTINUED | OUTPATIENT
Start: 2024-07-07 | End: 2024-07-12 | Stop reason: HOSPADM

## 2024-07-07 RX ORDER — DEXTROSE MONOHYDRATE 100 MG/ML
INJECTION, SOLUTION INTRAVENOUS CONTINUOUS PRN
Status: DISCONTINUED | OUTPATIENT
Start: 2024-07-07 | End: 2024-07-12 | Stop reason: HOSPADM

## 2024-07-07 RX ORDER — SODIUM CHLORIDE 0.9 % (FLUSH) 0.9 %
5-40 SYRINGE (ML) INJECTION PRN
Status: DISCONTINUED | OUTPATIENT
Start: 2024-07-07 | End: 2024-07-12 | Stop reason: HOSPADM

## 2024-07-07 RX ORDER — ACETAMINOPHEN 650 MG/1
650 SUPPOSITORY RECTAL EVERY 6 HOURS PRN
Status: DISCONTINUED | OUTPATIENT
Start: 2024-07-07 | End: 2024-07-12 | Stop reason: HOSPADM

## 2024-07-07 RX ORDER — INSULIN LISPRO 100 [IU]/ML
0-4 INJECTION, SOLUTION INTRAVENOUS; SUBCUTANEOUS NIGHTLY
Status: DISCONTINUED | OUTPATIENT
Start: 2024-07-07 | End: 2024-07-08

## 2024-07-07 RX ORDER — GLUCAGON 1 MG/ML
1 KIT INJECTION PRN
Status: DISCONTINUED | OUTPATIENT
Start: 2024-07-07 | End: 2024-07-12 | Stop reason: HOSPADM

## 2024-07-07 RX ORDER — BACLOFEN 10 MG/1
10 TABLET ORAL 3 TIMES DAILY
Status: DISCONTINUED | OUTPATIENT
Start: 2024-07-07 | End: 2024-07-12 | Stop reason: HOSPADM

## 2024-07-07 RX ORDER — ATORVASTATIN CALCIUM 40 MG/1
80 TABLET, FILM COATED ORAL NIGHTLY
Status: DISCONTINUED | OUTPATIENT
Start: 2024-07-07 | End: 2024-07-12 | Stop reason: HOSPADM

## 2024-07-07 RX ORDER — INSULIN GLARGINE 100 [IU]/ML
15 INJECTION, SOLUTION SUBCUTANEOUS NIGHTLY
Status: DISCONTINUED | OUTPATIENT
Start: 2024-07-07 | End: 2024-07-09

## 2024-07-07 RX ORDER — ALBUTEROL SULFATE 2.5 MG/3ML
2.5 SOLUTION RESPIRATORY (INHALATION) EVERY 6 HOURS PRN
Status: DISCONTINUED | OUTPATIENT
Start: 2024-07-07 | End: 2024-07-12 | Stop reason: HOSPADM

## 2024-07-07 RX ORDER — ONDANSETRON 2 MG/ML
4 INJECTION INTRAMUSCULAR; INTRAVENOUS EVERY 6 HOURS PRN
Status: DISCONTINUED | OUTPATIENT
Start: 2024-07-07 | End: 2024-07-12 | Stop reason: HOSPADM

## 2024-07-07 RX ORDER — ACETAMINOPHEN 325 MG/1
650 TABLET ORAL EVERY 6 HOURS PRN
Status: DISCONTINUED | OUTPATIENT
Start: 2024-07-07 | End: 2024-07-12 | Stop reason: HOSPADM

## 2024-07-07 RX ORDER — TRAZODONE HYDROCHLORIDE 50 MG/1
50 TABLET ORAL NIGHTLY PRN
Status: DISCONTINUED | OUTPATIENT
Start: 2024-07-07 | End: 2024-07-12 | Stop reason: HOSPADM

## 2024-07-07 RX ORDER — AMLODIPINE BESYLATE 10 MG/1
10 TABLET ORAL DAILY
Status: DISCONTINUED | OUTPATIENT
Start: 2024-07-07 | End: 2024-07-12 | Stop reason: HOSPADM

## 2024-07-07 RX ORDER — INSULIN LISPRO 100 [IU]/ML
0-8 INJECTION, SOLUTION INTRAVENOUS; SUBCUTANEOUS
Status: DISCONTINUED | OUTPATIENT
Start: 2024-07-07 | End: 2024-07-07

## 2024-07-07 RX ORDER — METOCLOPRAMIDE HYDROCHLORIDE 5 MG/ML
10 INJECTION INTRAMUSCULAR; INTRAVENOUS EVERY 6 HOURS PRN
Status: DISCONTINUED | OUTPATIENT
Start: 2024-07-07 | End: 2024-07-12 | Stop reason: HOSPADM

## 2024-07-07 RX ORDER — SODIUM CHLORIDE 0.9 % (FLUSH) 0.9 %
5-40 SYRINGE (ML) INJECTION EVERY 12 HOURS SCHEDULED
Status: DISCONTINUED | OUTPATIENT
Start: 2024-07-07 | End: 2024-07-12 | Stop reason: HOSPADM

## 2024-07-07 RX ADMIN — SODIUM CHLORIDE 1000 ML: 9 INJECTION, SOLUTION INTRAVENOUS at 11:00

## 2024-07-07 RX ADMIN — SODIUM CHLORIDE 1000 ML: 9 INJECTION, SOLUTION INTRAVENOUS at 10:58

## 2024-07-07 RX ADMIN — BACLOFEN 10 MG: 10 TABLET ORAL at 22:37

## 2024-07-07 RX ADMIN — PANTOPRAZOLE SODIUM 40 MG: 40 INJECTION, POWDER, FOR SOLUTION INTRAVENOUS at 20:48

## 2024-07-07 RX ADMIN — VANCOMYCIN HYDROCHLORIDE 1250 MG: 1 INJECTION, POWDER, LYOPHILIZED, FOR SOLUTION INTRAVENOUS at 18:39

## 2024-07-07 RX ADMIN — SODIUM CHLORIDE, PRESERVATIVE FREE 10 ML: 5 INJECTION INTRAVENOUS at 20:58

## 2024-07-07 RX ADMIN — POTASSIUM CHLORIDE AND SODIUM CHLORIDE: 450; 150 INJECTION, SOLUTION INTRAVENOUS at 13:11

## 2024-07-07 RX ADMIN — ATORVASTATIN CALCIUM 80 MG: 40 TABLET, FILM COATED ORAL at 20:47

## 2024-07-07 RX ADMIN — CEFEPIME 2000 MG: 2 INJECTION, POWDER, FOR SOLUTION INTRAVENOUS at 18:10

## 2024-07-07 RX ADMIN — EZETIMIBE 10 MG: 10 TABLET ORAL at 22:37

## 2024-07-07 RX ADMIN — PANTOPRAZOLE SODIUM 80 MG: 40 INJECTION, POWDER, FOR SOLUTION INTRAVENOUS at 10:58

## 2024-07-07 RX ADMIN — METOCLOPRAMIDE 10 MG: 5 INJECTION, SOLUTION INTRAMUSCULAR; INTRAVENOUS at 20:48

## 2024-07-07 RX ADMIN — ALUMINUM HYDROXIDE, MAGNESIUM HYDROXIDE, AND SIMETHICONE: 1200; 120; 1200 SUSPENSION ORAL at 22:38

## 2024-07-07 RX ADMIN — INSULIN GLARGINE 15 UNITS: 100 INJECTION, SOLUTION SUBCUTANEOUS at 20:48

## 2024-07-07 RX ADMIN — DULOXETINE HYDROCHLORIDE 30 MG: 30 CAPSULE, DELAYED RELEASE ORAL at 22:37

## 2024-07-07 RX ADMIN — INSULIN LISPRO 4 UNITS: 100 INJECTION, SOLUTION INTRAVENOUS; SUBCUTANEOUS at 20:51

## 2024-07-07 ASSESSMENT — PAIN - FUNCTIONAL ASSESSMENT: PAIN_FUNCTIONAL_ASSESSMENT: NONE - DENIES PAIN

## 2024-07-07 ASSESSMENT — PAIN SCALES - GENERAL: PAINLEVEL_OUTOF10: 6

## 2024-07-07 NOTE — CONSULTS
GENERAL SURGERY  CONSULT NOTE  7/7/2024    Physician Consulted: Dr. Bush  Reason for Consult: possible GI bleed  Referring Physician: Dr. Yosi MAYA  Magan Funes is a 54 y.o. male with PMH of CVA, HTN, T2DM, who presents for evaluation of hyperglycemia, polyuria and polydipsia. Patient also has a 3 day history of persistent nausea and coffee ground emesis. Patient states that he has not been able to keep anything down for several days. He was recently admitted for DKA and discharged on 7/3. WBC 28, lactate 3.3, improved to 2.0 after fluids, Hgb stable at 14.       Past Medical History:   Diagnosis Date    A-fib (HCC)     Alcoholism (HCC)     Cardiac arrest (HCC)     Cocaine abuse (HCC)     Diabetes mellitus (HCC)     Hypertension     Idiopathic peripheral neuropathy 09/21/2016    Lacunar stroke (HCC)     Marijuana abuse     S/P transesophageal echocardiogram (NETTA) 06/30/2023    appau       No past surgical history on file.    Medications Prior to Admission    Prior to Admission medications    Medication Sig Start Date End Date Taking? Authorizing Provider   Ergocalciferol (VITAMIN D) 06588 units CAPS Take 50,000 Units by mouth once a week for 6 doses 7/8/24 8/13/24  Shawn Grayson MD   atorvastatin (LIPITOR) 80 MG tablet Take 1 tablet by mouth nightly 4/25/24   Andrzej Cline MD   baclofen (LIORESAL) 10 MG tablet Take 1 tablet by mouth 3 times daily 4/25/24   Andrzej Cline MD   ezetimibe (ZETIA) 10 MG tablet Take 1 tablet by mouth nightly 4/25/24   Andrzej Cline MD   lisinopril (PRINIVIL;ZESTRIL) 10 MG tablet Take 1 tablet by mouth daily 4/25/24   Andrzej Cline MD   traZODone (DESYREL) 50 MG tablet Take 1 tablet by mouth nightly as needed for Sleep 4/25/24   Andrzej Cline MD   insulin glargine (LANTUS SOLOSTAR) 100 UNIT/ML injection pen Inject 30 Units into the skin nightly 4/25/24   Andrzej Cline MD   insulin lispro, 1 Unit Dial, (HUMALOG KWIKPEN) 100 UNIT/ML

## 2024-07-07 NOTE — H&P
[facility]  Internal Medicine Residency Program  History and Physical    Patient:  Magan Funes 54 y.o. male MRN: [id number]     Date of Service: 2024    Hospital Day: 1      Chief complaint: had concerns including Hyperglycemia (Patient sent from NH with hyperglycemia BGL > 400 . Patient also had a fever this am ).    History of Present Illness   The patient is a 54 y.o. male with a past medical history of type 1 diabetes on medication, hypertension on medication, previous CVA  with left upper and lower extremity weakness, alcohol abuse, cardiac arrest in , polysubstance abuse, hyperlipidemia on medication, atrial fibrillation, E. coli UTI, tobacco abuse.  Patient was recently admitted for DKA and discharged on 7/3.  He was brought to ED from nursing home by EMS due to hyperglycemia polyuria polydipsia and generalized fatigue.  Blood glucose was over 400.  He also had an episode of coffee-ground emesis this morning.  Patient denies any chest pain shortness of breath or abdominal pain.    In ED, WBC 28, Na 148, Cr 1.9 (baseline 0.9), LA 2.0, B-hydroxy 2.49, B, BUN 55.  He was given 2 L of normal saline bolus, 1 dose of vancomycin and cefepime. She was started 0.45 % NaCl with KCl 20 mEq. Chest x-ray and CT abdomen without contrast were performed.    Past Medical History:      Diagnosis Date    A-fib (HCC)     Alcoholism (HCC)     Cardiac arrest (HCC)     Cocaine abuse (HCC)     Diabetes mellitus (HCC)     Hypertension     Idiopathic peripheral neuropathy 2016    Lacunar stroke (HCC)     Marijuana abuse     S/P transesophageal echocardiogram (NETTA) 2023    appau       Past Surgical History:    No past surgical history on file.    Medications Prior to Admission:    Prior to Admission medications    Medication Sig Start Date End Date Taking? Authorizing Provider   Ergocalciferol (VITAMIN D) 38036 units CAPS Take 50,000 Units by mouth once a week for 6 doses 24  Shyann-Wilkins  Coffee ground emesis  KIRSTEN likely pre-renal 2/2 hypovolemia    Cr: 1.9 (baseline (0.9)   BUN: 55  Hypernatremia in the setting of KIRSTEN   Na: 148   Elevated troponin 62 , his base is 80s  Hx of pansensitive E. coli UTI  Transaminitis    ALT: 71  Type 1 diabetes mellitus    At home on Lantus 15U BID.  Humalog 10U TID with meals, plus MDSS - used to follow with Dr. Baca, not seen for 2 years  B  Hx of undocumented paroxysmal A-fib   patient has never been on AC  Current Chadsvasc: 4  History of lacunar stroke 2023    left upper and lower extremities weakness    Hypertension    on Lisinopril 10mg, amlodipine 10mg   Tobacco use  disorder    current smoker, 1 ppd for 20 years  History of polysubstance abuse   Positive for cannabis    Positive for cocaine in   History of PEA arrest in     Plan:  Follow up Blood culture, respiratory panel and culture and procal and ESR and CRP  Considering gastric emptying test   Cefepime and vanc in the ED ,Abx  pending infection source  Chemical DVT prophylaxis and ASA on hold for upper GI bleed  Follow daily labs , fluid resuscitation    Patient is NPO started D5 half Salin to give patient Insulin 10 lantus and LDSS Q4 BG  Follow ESR CRP    PT/OT evaluation: not indicated at this time  DVT prophylaxis: PCD  GI prophylaxis: Protonix  Code status: Full Code  Diet: NPO  Disposition: admit to IM house team  Senior Resident Addendum:  I have seen and examined the patient with the intern. I have discussed the case, including pertinent history and exam findings with the intern. I agree with the assessment, plan and orders as documented above       Geoff Richard DO, PGY-1  Attending physician: Dr. Deluca

## 2024-07-07 NOTE — ED PROVIDER NOTES
Department of Emergency Medicine     Written by: Shahbaz Edwards MD  Patient Name: Magan Funes  Admit Date: 2024  8:22 AM  MRN: [id number]                   : 1970    HPI     Chief Complaint   Patient presents with    Hyperglycemia     Patient sent from NH with hyperglycemia BGL > 400 . Patient also had a fever this am        Magan Funes is a 54 y.o. male that presents to the ED with hyperglycemia, polyuria polydipsia fatigue generalized malaise over the last 3 days.  From a nursing home.  EMS called today due to aforementioned symptoms.  Blood glucose greater than 400.  Oral mucosa dry, he has been suffering from polyuria as well.  He has been compliant with his medications, as he lives in assisted living, and he has medications given by nursing staff.  He says he has been nauseous with a single episode of emesis this morning and once yesterday.  No abdominal pain chest pain or shortness of breath.  Has a history of atrial fibrillation.  He follows with Dr. Baca from endocrinology.  Has a history of stroke with right-sided residual deficits, on aspirin daily    Review of systems   Pertinent positives and negatives mentioned in the HPI/MDM.      Physical   Physical Exam  Constitutional:       General: He is not in acute distress.     Appearance: Normal appearance. He is ill-appearing.   HENT:      Mouth/Throat:      Mouth: Mucous membranes are dry.   Eyes:      Extraocular Movements: Extraocular movements intact.      Pupils: Pupils are equal, round, and reactive to light.   Cardiovascular:      Rate and Rhythm: Tachycardia present. Rhythm irregular.      Pulses: Normal pulses.      Heart sounds: Normal heart sounds.   Pulmonary:      Effort: Pulmonary effort is normal. No respiratory distress.      Breath sounds: No wheezing.   Abdominal:      General: Abdomen is flat.      Palpations: Abdomen is soft.      Tenderness: There is no abdominal tenderness.   Musculoskeletal:         General: No  patient's acute onset of severe hyperglycemia    Differential diagnoses: EKG ordered to evaluate patient's current cardiac rate, rhythm, and QT interval. CBC was ordered as part of my assessment for possible infection, anemia or thrombocytopenia. BMP to assess electrolytes, kidney function or any metabolic derangements. Hepatic function panel to evaluate for liver/biliary disease. Lipase to evaluate for pancreatitis. Lactic acid as a marker of hypoperfusion or ischemia. Urinalysis to evaluate for a UTI. Beta-hydroxybutyrate to rule out DKA as it is a hydroxyacid known to be elevated with it. Venous pH to assess the patient's blood pH level. Chest x-ray for any possible signs of, but without limitation to, pneumonia, pleural effusions, cardiomegaly, pneumothorax, atelectasis, rib or sternal abnormalities including fractures.      ED Course as of 07/07/24 1725   Sun Jul 07, 2024   0908 EKG Interpretation  Interpreted by emergency department physician, Dr. Swartz    Rhythm: sinus tachycardia  Rate: 120-130  Axis: normal  Ectopy: none  Conduction: normal  ST Segments: nonspecific changes  T Waves: no acute change  Q Waves: none    Clinical Impression: sinus tachycardia   [HH]   0937 Patient seen and examined, brought in by EMS from nursing home for evaluation of hyperglycemia, he also notes polyuria, polydipsia and generalized weakness for the past 2 days, BG found to be greater than 400, denies fever or chills, he has nausea with 1 episode of vomiting this morning.  Denies chest pain or shortness of breath.  Denies abdominal pain.  Exam: Alert, dry mucous membranes, irregularly irregular tachycardic, lungs are clear, abdomen soft nontender nondistended [HH]   1633 Chest x-ray inter by me, no acute process [HH]      ED Course User Index  [HH] Anabel Swartz,        EKG obtained during ED visit is noted in the ED course.  I interpreted the patient's labs and imaging please see above.  Initial lactic acidosis 3.3, which

## 2024-07-08 PROBLEM — K92.0 COFFEE GROUND EMESIS: Status: ACTIVE | Noted: 2024-07-08

## 2024-07-08 LAB
ABO/RH: NORMAL
ANION GAP SERPL CALCULATED.3IONS-SCNC: 13 MMOL/L (ref 7–16)
ANION GAP SERPL CALCULATED.3IONS-SCNC: 13 MMOL/L (ref 7–16)
ANION GAP SERPL CALCULATED.3IONS-SCNC: 15 MMOL/L (ref 7–16)
ANTIBODY SCREEN: NEGATIVE
ARM BAND NUMBER: NORMAL
B PARAP IS1001 DNA NPH QL NAA+NON-PROBE: NOT DETECTED
B PERT DNA SPEC QL NAA+PROBE: NOT DETECTED
BASOPHILS # BLD: 0.05 K/UL (ref 0–0.2)
BASOPHILS NFR BLD: 0 % (ref 0–2)
BLOOD BANK BLOOD PRODUCT EXPIRATION DATE: NORMAL
BLOOD BANK DISPENSE STATUS: NORMAL
BLOOD BANK ISBT PRODUCT BLOOD TYPE: 5100
BLOOD BANK PRODUCT CODE: NORMAL
BLOOD BANK SAMPLE EXPIRATION: NORMAL
BLOOD BANK UNIT TYPE AND RH: NORMAL
BPU ID: NORMAL
BUN SERPL-MCNC: 20 MG/DL (ref 6–20)
BUN SERPL-MCNC: 26 MG/DL (ref 6–20)
BUN SERPL-MCNC: 36 MG/DL (ref 6–20)
C PNEUM DNA NPH QL NAA+NON-PROBE: NOT DETECTED
CALCIUM SERPL-MCNC: 8.8 MG/DL (ref 8.6–10.2)
CALCIUM SERPL-MCNC: 9.1 MG/DL (ref 8.6–10.2)
CALCIUM SERPL-MCNC: 9.2 MG/DL (ref 8.6–10.2)
CHLORIDE SERPL-SCNC: 107 MMOL/L (ref 98–107)
CHLORIDE SERPL-SCNC: 110 MMOL/L (ref 98–107)
CHLORIDE SERPL-SCNC: 112 MMOL/L (ref 98–107)
CO2 SERPL-SCNC: 25 MMOL/L (ref 22–29)
CO2 SERPL-SCNC: 25 MMOL/L (ref 22–29)
CO2 SERPL-SCNC: 26 MMOL/L (ref 22–29)
COMPONENT: NORMAL
CREAT SERPL-MCNC: 0.6 MG/DL (ref 0.7–1.2)
CREAT SERPL-MCNC: 0.7 MG/DL (ref 0.7–1.2)
CREAT SERPL-MCNC: 0.9 MG/DL (ref 0.7–1.2)
CROSSMATCH RESULT: NORMAL
CRP SERPL HS-MCNC: 7 MG/L (ref 0–5)
EKG ATRIAL RATE: 127 BPM
EKG P AXIS: 84 DEGREES
EKG P-R INTERVAL: 124 MS
EKG Q-T INTERVAL: 334 MS
EKG QRS DURATION: 90 MS
EKG QTC CALCULATION (BAZETT): 485 MS
EKG R AXIS: 68 DEGREES
EKG T AXIS: 79 DEGREES
EKG VENTRICULAR RATE: 127 BPM
EOSINOPHIL # BLD: 0 K/UL (ref 0.05–0.5)
EOSINOPHILS RELATIVE PERCENT: 0 % (ref 0–6)
ERYTHROCYTE [DISTWIDTH] IN BLOOD BY AUTOMATED COUNT: 14.8 % (ref 11.5–15)
ERYTHROCYTE [SEDIMENTATION RATE] IN BLOOD BY WESTERGREN METHOD: 5 MM/HR (ref 0–15)
FLUAV RNA NPH QL NAA+NON-PROBE: NOT DETECTED
FLUBV RNA NPH QL NAA+NON-PROBE: NOT DETECTED
GFR, ESTIMATED: >90 ML/MIN/1.73M2
GLUCOSE BLD-MCNC: 212 MG/DL (ref 74–99)
GLUCOSE BLD-MCNC: 216 MG/DL (ref 74–99)
GLUCOSE BLD-MCNC: 237 MG/DL (ref 74–99)
GLUCOSE BLD-MCNC: 270 MG/DL (ref 74–99)
GLUCOSE BLD-MCNC: 289 MG/DL (ref 74–99)
GLUCOSE BLD-MCNC: 301 MG/DL (ref 74–99)
GLUCOSE BLD-MCNC: 313 MG/DL (ref 74–99)
GLUCOSE SERPL-MCNC: 271 MG/DL (ref 74–99)
GLUCOSE SERPL-MCNC: 298 MG/DL (ref 74–99)
GLUCOSE SERPL-MCNC: 339 MG/DL (ref 74–99)
HADV DNA NPH QL NAA+NON-PROBE: NOT DETECTED
HCOV 229E RNA NPH QL NAA+NON-PROBE: NOT DETECTED
HCOV HKU1 RNA NPH QL NAA+NON-PROBE: NOT DETECTED
HCOV NL63 RNA NPH QL NAA+NON-PROBE: NOT DETECTED
HCOV OC43 RNA NPH QL NAA+NON-PROBE: NOT DETECTED
HCT VFR BLD AUTO: 40.6 % (ref 37–54)
HGB BLD-MCNC: 13.3 G/DL (ref 12.5–16.5)
HMPV RNA NPH QL NAA+NON-PROBE: NOT DETECTED
HPIV1 RNA NPH QL NAA+NON-PROBE: NOT DETECTED
HPIV2 RNA NPH QL NAA+NON-PROBE: NOT DETECTED
HPIV3 RNA NPH QL NAA+NON-PROBE: NOT DETECTED
HPIV4 RNA NPH QL NAA+NON-PROBE: NOT DETECTED
IMM GRANULOCYTES # BLD AUTO: 0.13 K/UL (ref 0–0.58)
IMM GRANULOCYTES NFR BLD: 1 % (ref 0–5)
LACTATE BLDV-SCNC: 1.2 MMOL/L (ref 0.5–2.2)
LYMPHOCYTES NFR BLD: 1.07 K/UL (ref 1.5–4)
LYMPHOCYTES RELATIVE PERCENT: 5 % (ref 20–42)
M PNEUMO DNA NPH QL NAA+NON-PROBE: NOT DETECTED
MAGNESIUM SERPL-MCNC: 2.4 MG/DL (ref 1.6–2.6)
MCH RBC QN AUTO: 28.5 PG (ref 26–35)
MCHC RBC AUTO-ENTMCNC: 32.8 G/DL (ref 32–34.5)
MCV RBC AUTO: 87.1 FL (ref 80–99.9)
MONOCYTES NFR BLD: 1.25 K/UL (ref 0.1–0.95)
MONOCYTES NFR BLD: 6 % (ref 2–12)
NEUTROPHILS NFR BLD: 89 % (ref 43–80)
NEUTS SEG NFR BLD: 19.86 K/UL (ref 1.8–7.3)
PHOSPHATE SERPL-MCNC: 2.3 MG/DL (ref 2.5–4.5)
PLATELET # BLD AUTO: 450 K/UL (ref 130–450)
PMV BLD AUTO: 9 FL (ref 7–12)
POTASSIUM SERPL-SCNC: 3.8 MMOL/L (ref 3.5–5)
POTASSIUM SERPL-SCNC: 4.1 MMOL/L (ref 3.5–5)
POTASSIUM SERPL-SCNC: 4.5 MMOL/L (ref 3.5–5)
PROCALCITONIN SERPL-MCNC: 1.54 NG/ML (ref 0–0.08)
RBC # BLD AUTO: 4.66 M/UL (ref 3.8–5.8)
RSV RNA NPH QL NAA+NON-PROBE: NOT DETECTED
RV+EV RNA NPH QL NAA+NON-PROBE: NOT DETECTED
SARS-COV-2 RNA NPH QL NAA+NON-PROBE: NOT DETECTED
SODIUM SERPL-SCNC: 145 MMOL/L (ref 132–146)
SODIUM SERPL-SCNC: 150 MMOL/L (ref 132–146)
SODIUM SERPL-SCNC: 151 MMOL/L (ref 132–146)
SPECIMEN DESCRIPTION: NORMAL
TRANSFUSION STATUS: NORMAL
TROPONIN I SERPL HS-MCNC: 29 NG/L (ref 0–11)
UNIT DIVISION: 0
UNIT ISSUE DATE/TIME: NORMAL
WBC OTHER # BLD: 22.4 K/UL (ref 4.5–11.5)

## 2024-07-08 PROCEDURE — 2580000003 HC RX 258

## 2024-07-08 PROCEDURE — 84484 ASSAY OF TROPONIN QUANT: CPT

## 2024-07-08 PROCEDURE — 6360000002 HC RX W HCPCS

## 2024-07-08 PROCEDURE — 2580000003 HC RX 258: Performed by: INTERNAL MEDICINE

## 2024-07-08 PROCEDURE — 80048 BASIC METABOLIC PNL TOTAL CA: CPT

## 2024-07-08 PROCEDURE — 85025 COMPLETE CBC W/AUTO DIFF WBC: CPT

## 2024-07-08 PROCEDURE — 6370000000 HC RX 637 (ALT 250 FOR IP)

## 2024-07-08 PROCEDURE — 83605 ASSAY OF LACTIC ACID: CPT

## 2024-07-08 PROCEDURE — 99221 1ST HOSP IP/OBS SF/LOW 40: CPT | Performed by: INTERNAL MEDICINE

## 2024-07-08 PROCEDURE — 2060000000 HC ICU INTERMEDIATE R&B

## 2024-07-08 PROCEDURE — 2500000003 HC RX 250 WO HCPCS

## 2024-07-08 PROCEDURE — 86140 C-REACTIVE PROTEIN: CPT

## 2024-07-08 PROCEDURE — 83735 ASSAY OF MAGNESIUM: CPT

## 2024-07-08 PROCEDURE — 0202U NFCT DS 22 TRGT SARS-COV-2: CPT

## 2024-07-08 PROCEDURE — 36415 COLL VENOUS BLD VENIPUNCTURE: CPT

## 2024-07-08 PROCEDURE — 84145 PROCALCITONIN (PCT): CPT

## 2024-07-08 PROCEDURE — 99232 SBSQ HOSP IP/OBS MODERATE 35: CPT | Performed by: SURGERY

## 2024-07-08 PROCEDURE — 84100 ASSAY OF PHOSPHORUS: CPT

## 2024-07-08 PROCEDURE — 87040 BLOOD CULTURE FOR BACTERIA: CPT

## 2024-07-08 PROCEDURE — 93010 ELECTROCARDIOGRAM REPORT: CPT | Performed by: INTERNAL MEDICINE

## 2024-07-08 PROCEDURE — 85652 RBC SED RATE AUTOMATED: CPT

## 2024-07-08 PROCEDURE — 82962 GLUCOSE BLOOD TEST: CPT

## 2024-07-08 PROCEDURE — 6360000002 HC RX W HCPCS: Performed by: INTERNAL MEDICINE

## 2024-07-08 RX ORDER — SUCRALFATE 1 G/1
1 TABLET ORAL
Status: DISCONTINUED | OUTPATIENT
Start: 2024-07-08 | End: 2024-07-12 | Stop reason: HOSPADM

## 2024-07-08 RX ORDER — DEXTROSE MONOHYDRATE AND SODIUM CHLORIDE 5; .45 G/100ML; G/100ML
INJECTION, SOLUTION INTRAVENOUS CONTINUOUS
Status: ACTIVE | OUTPATIENT
Start: 2024-07-08 | End: 2024-07-08

## 2024-07-08 RX ORDER — INSULIN LISPRO 100 [IU]/ML
0-12 INJECTION, SOLUTION INTRAVENOUS; SUBCUTANEOUS EVERY 4 HOURS
Status: DISCONTINUED | OUTPATIENT
Start: 2024-07-09 | End: 2024-07-12 | Stop reason: HOSPADM

## 2024-07-08 RX ORDER — DEXTROSE MONOHYDRATE 50 MG/ML
INJECTION, SOLUTION INTRAVENOUS CONTINUOUS
Status: ACTIVE | OUTPATIENT
Start: 2024-07-08 | End: 2024-07-08

## 2024-07-08 RX ORDER — DEXTROSE MONOHYDRATE AND SODIUM CHLORIDE 5; .45 G/100ML; G/100ML
INJECTION, SOLUTION INTRAVENOUS CONTINUOUS
Status: ACTIVE | OUTPATIENT
Start: 2024-07-09 | End: 2024-07-09

## 2024-07-08 RX ORDER — LABETALOL HYDROCHLORIDE 5 MG/ML
10 INJECTION, SOLUTION INTRAVENOUS EVERY 4 HOURS PRN
Status: DISCONTINUED | OUTPATIENT
Start: 2024-07-08 | End: 2024-07-12 | Stop reason: HOSPADM

## 2024-07-08 RX ORDER — DEXTROSE MONOHYDRATE AND SODIUM CHLORIDE 5; .45 G/100ML; G/100ML
INJECTION, SOLUTION INTRAVENOUS CONTINUOUS
Status: DISCONTINUED | OUTPATIENT
Start: 2024-07-08 | End: 2024-07-08

## 2024-07-08 RX ORDER — HYDRALAZINE HYDROCHLORIDE 20 MG/ML
10 INJECTION INTRAMUSCULAR; INTRAVENOUS EVERY 4 HOURS PRN
Status: DISCONTINUED | OUTPATIENT
Start: 2024-07-08 | End: 2024-07-12 | Stop reason: HOSPADM

## 2024-07-08 RX ADMIN — SUCRALFATE 1 G: 1 TABLET ORAL at 05:58

## 2024-07-08 RX ADMIN — POTASSIUM PHOSPHATE, MONOBASIC AND POTASSIUM PHOSPHATE, DIBASIC 15 MMOL: 224; 236 INJECTION, SOLUTION, CONCENTRATE INTRAVENOUS at 10:30

## 2024-07-08 RX ADMIN — ERGOCALCIFEROL 50000 UNITS: 1.25 CAPSULE ORAL at 10:28

## 2024-07-08 RX ADMIN — SUCRALFATE 1 G: 1 TABLET ORAL at 20:15

## 2024-07-08 RX ADMIN — CEFAZOLIN 2000 MG: 2 INJECTION, POWDER, FOR SOLUTION INTRAMUSCULAR; INTRAVENOUS at 16:47

## 2024-07-08 RX ADMIN — INSULIN GLARGINE 15 UNITS: 100 INJECTION, SOLUTION SUBCUTANEOUS at 20:16

## 2024-07-08 RX ADMIN — VANCOMYCIN HYDROCHLORIDE 1000 MG: 1 INJECTION, POWDER, LYOPHILIZED, FOR SOLUTION INTRAVENOUS at 09:16

## 2024-07-08 RX ADMIN — INSULIN LISPRO 4 UNITS: 100 INJECTION, SOLUTION INTRAVENOUS; SUBCUTANEOUS at 01:33

## 2024-07-08 RX ADMIN — EZETIMIBE 10 MG: 10 TABLET ORAL at 23:21

## 2024-07-08 RX ADMIN — DEXTROSE AND SODIUM CHLORIDE: 5; 450 INJECTION, SOLUTION INTRAVENOUS at 01:29

## 2024-07-08 RX ADMIN — CEFAZOLIN 2000 MG: 2 INJECTION, POWDER, FOR SOLUTION INTRAMUSCULAR; INTRAVENOUS at 23:21

## 2024-07-08 RX ADMIN — BACLOFEN 10 MG: 10 TABLET ORAL at 20:15

## 2024-07-08 RX ADMIN — PANTOPRAZOLE SODIUM 40 MG: 40 INJECTION, POWDER, FOR SOLUTION INTRAVENOUS at 20:15

## 2024-07-08 RX ADMIN — INSULIN LISPRO 2 UNITS: 100 INJECTION, SOLUTION INTRAVENOUS; SUBCUTANEOUS at 16:46

## 2024-07-08 RX ADMIN — BACLOFEN 10 MG: 10 TABLET ORAL at 14:12

## 2024-07-08 RX ADMIN — SODIUM CHLORIDE, PRESERVATIVE FREE 10 ML: 5 INJECTION INTRAVENOUS at 09:14

## 2024-07-08 RX ADMIN — DULOXETINE HYDROCHLORIDE 30 MG: 30 CAPSULE, DELAYED RELEASE ORAL at 09:13

## 2024-07-08 RX ADMIN — SUCRALFATE 1 G: 1 TABLET ORAL at 16:46

## 2024-07-08 RX ADMIN — VANCOMYCIN HYDROCHLORIDE 1000 MG: 1 INJECTION, POWDER, LYOPHILIZED, FOR SOLUTION INTRAVENOUS at 20:23

## 2024-07-08 RX ADMIN — SUCRALFATE 1 G: 1 TABLET ORAL at 09:13

## 2024-07-08 RX ADMIN — LABETALOL HYDROCHLORIDE 10 MG: 5 INJECTION, SOLUTION INTRAVENOUS at 16:47

## 2024-07-08 RX ADMIN — AMLODIPINE BESYLATE 10 MG: 10 TABLET ORAL at 09:13

## 2024-07-08 RX ADMIN — ATORVASTATIN CALCIUM 80 MG: 40 TABLET, FILM COATED ORAL at 20:15

## 2024-07-08 RX ADMIN — INSULIN LISPRO 3 UNITS: 100 INJECTION, SOLUTION INTRAVENOUS; SUBCUTANEOUS at 20:17

## 2024-07-08 RX ADMIN — INSULIN LISPRO 3 UNITS: 100 INJECTION, SOLUTION INTRAVENOUS; SUBCUTANEOUS at 09:13

## 2024-07-08 RX ADMIN — INSULIN LISPRO 2 UNITS: 100 INJECTION, SOLUTION INTRAVENOUS; SUBCUTANEOUS at 14:12

## 2024-07-08 RX ADMIN — DEXTROSE AND SODIUM CHLORIDE: 5; 450 INJECTION, SOLUTION INTRAVENOUS at 10:29

## 2024-07-08 RX ADMIN — BACLOFEN 10 MG: 10 TABLET ORAL at 09:13

## 2024-07-08 RX ADMIN — INSULIN LISPRO 4 UNITS: 100 INJECTION, SOLUTION INTRAVENOUS; SUBCUTANEOUS at 05:58

## 2024-07-08 RX ADMIN — PANTOPRAZOLE SODIUM 40 MG: 40 INJECTION, POWDER, FOR SOLUTION INTRAVENOUS at 09:13

## 2024-07-08 RX ADMIN — INSULIN LISPRO 10 UNITS: 100 INJECTION, SOLUTION INTRAVENOUS; SUBCUTANEOUS at 11:31

## 2024-07-08 RX ADMIN — LABETALOL HYDROCHLORIDE 10 MG: 5 INJECTION, SOLUTION INTRAVENOUS at 09:13

## 2024-07-08 RX ADMIN — DEXTROSE MONOHYDRATE: 50 INJECTION, SOLUTION INTRAVENOUS at 16:53

## 2024-07-08 ASSESSMENT — PAIN SCALES - GENERAL: PAINLEVEL_OUTOF10: 0

## 2024-07-08 NOTE — PROGRESS NOTES
Procedural consent sign by patient, witnessed by this RN. Consent form placed in soft chart.    Electronically signed by Chary Giraldo RN on 7/8/2024 at 6:30 PM

## 2024-07-08 NOTE — PROGRESS NOTES
4 Eyes Skin Assessment     NAME:  Magan Funes  YOB: 1970  MEDICAL RECORD NUMBER:  [id number]    The patient is being assessed for  Admission    I agree that at least one RN has performed a thorough Head to Toe Skin Assessment on the patient. ALL assessment sites listed below have been assessed.      Areas assessed by both nurses:    Head, Face, Ears, Shoulders, Back, Chest, Arms, Elbows, Hands, Sacrum. Buttock, Coccyx, Ischium, and Legs. Feet and Heels        Does the Patient have a Wound? No noted wound(s)       Roberto Prevention initiated by RN: Yes  Wound Care Orders initiated by RN: No    Pressure Injury (Stage 3,4, Unstageable, DTI, NWPT, and Complex wounds) if present, place Wound referral order by RN under : No    New Ostomies, if present place, Ostomy referral order under : No     Nurse 1 eSignature: Electronically signed by Chary Giraldo RN on 7/8/24 at 9:49 AM EDT    **SHARE this note so that the co-signing nurse can place an eSignature**    Nurse 2 eSignature: Electronically signed by Tatiana Cerna RN on 7/8/24 at 6:31 PM EDT

## 2024-07-08 NOTE — PROGRESS NOTES
[facility]  Internal Medicine Residency / House Medicine Service    Attending Physician Statement  I have discussed the case, including pertinent history and exam findings with the resident and the team.  I have seen and examined the patient and the key elements of the encounter have been performed by me.  I agree with the assessment, plan and orders as documented by the resident.      A&O at baseline  Metabolic numbers improving  Had an episode of hematemesis  CT chest findings old  WBC count high   Etiology of infection unknown  Plan; Resume usual insulin    Remainder of medical problems as per resident note.      Andrzej Cline MD FRCP [facility]  Internal Medicine Residency Faculty

## 2024-07-08 NOTE — PROGRESS NOTES
Pharmacy Consultation Note  (Antibiotic Dosing and Monitoring)    Initial consult date: 7-8-2024  Consulting physician/provider: Dr. Mercedes  Drug: Vancomycin  Indication: bacteremia    Age/  Gender Height Weight IBW  Allergy Information   54 y.o./male 167.6 cm (5' 6\") 58.5 kg (129 lb)     Ideal body weight: 63.8 kg (140 lb 10.5 oz)   Metoprolol      Renal Function:  Recent Labs     07/07/24  1045 07/08/24  0515   BUN 55* 36*   CREATININE 1.9* 0.9     No intake or output data in the 24 hours ending 07/08/24 1219    Vancomycin Monitoring:  Trough:  No results for input(s): \"VANCOTROUGH\" in the last 72 hours.  Random:  No results for input(s): \"VANCORANDOM\" in the last 72 hours.    Vancomycin Administration Times:  Recent vancomycin administrations                     vancomycin (VANCOCIN) 1,000 mg in sodium chloride 0.9 % 250 mL IVPB ([id number]) (mg) 1,000 mg New Bag 07/08/24 0916    vancomycin (VANCOCIN) 1,250 mg in sodium chloride 0.9 % 250 mL IVPB (mg) 1,250 mg New Bag 07/07/24 1839                    Assessment:  55 yo/M, starting vanco & cefepime for bacteremia (BC 7/7: Staph species by PCT).  Recent admission: 6/23-7/2/2024.   Received vancomycin 1250 mg x1 on 7/7 @ 1839.  ID has been consulted.   Estimated Creatinine Clearance: 79 mL/min (based on SCr of 0.9 mg/dL).  To dose vancomycin, pharmacy will be utilizing [employer] calculation software for goal AUC/KOREY 400-600 mg/L-hr (predicted AUC/KOREY = 551, Tr = 14.5 mcg/mL)    Plan:  Start vancomycin 1000 mg q12h.   Will check vancomycin levels when appropriate.  Will continue to monitor renal function.   Pharmacy to follow.    Keron Kirkland, PharmD  7/8/2024  12:21 PM  [phone]

## 2024-07-08 NOTE — PROGRESS NOTES
Visited the patient and he stated that he is better but is tired to talk now. I prayed for him and assured him of our support.    CHRISTOPHE [email or web address].PH,[email or web address],[id number].

## 2024-07-08 NOTE — PROGRESS NOTES
[facility]  Internal Medicine Residency Program  Progress Note - House Team       Patient:  Magan Funes 54 y.o. male   MRN: [id number]       Date of Service: 7/8/2024    CC: Hyperglycemia (Patient sent from NH with hyperglycemia BGL > 400 . Patient also had a fever this am )    Overnight events: None    Subjective     Patient was seen and examined at bedside. He was lying down in bed in no distress. He complained of abdominal discomfort and nausea (last emesis was coffee-ground in the ED) but denied SOB, chest pain, diarrhea.    Objective     I & O - 24hr:  No intake or output data in the 24 hours ending 07/08/24 1448  Net IO Since Admission: No IO data has been entered for this period [07/08/24 1448]    Physical Exam  Vitals: BP (!) 146/93   Pulse 97   Temp 97.3 °F (36.3 °C) (Temporal)   Resp 16   Ht 1.676 m (5' 6\")   Wt 58.5 kg (129 lb)   SpO2 98%   BMI 20.82 kg/m²     General Appearance: alert, appears stated age, cooperative, and no distress  HEENT:  Head: Normal, normocephalic, atraumatic.  Lung: clear to auscultation bilaterally  Heart: S1, S2 normal and sinus tachycardia  Abdomen:  Soft, normal bowel sounds  Extremities:  extremities normal, atraumatic, no cyanosis or edema  Neurologic: Alert, oriented, thought content appropriate    Diet:   Diet NPO      Pertinent Labs & Imaging Studies     Labs    Recent Labs     07/07/24  0933 07/07/24  1726 07/08/24  0515   WBC 28.0*  --  22.4*   HGB 14.2 11.8* 13.3   HCT 42.9 36.2* 40.6   MCV 86.5  --  87.1   *  --  450     Recent Labs     07/07/24  0933 07/07/24  1045 07/08/24  0515 07/08/24  1204   NA  --  148* 150* 151*   K  --  3.9 4.1 3.8   CL  --  101 110* 112*   CO2  --  29 25 26   BUN  --  55* 36* 26*   CREATININE  --  1.9* 0.9 0.7   MG 2.3  --  2.4  --    PHOS  --   --  2.3*  --      Recent Labs     07/07/24  1045 07/08/24  0515 07/08/24  1204   GLUCOSE 301* 339* 271*     Recent Labs     07/08/24  0515   PROCAL 1.54*      Recent Labs     07/07/24  1200   TROPHS 62*     Recent Labs     07/07/24  1045   ALT 71*   AST 25   ALKPHOS 134*   BILITOT 1.3*   BILIDIR 0.3       Imaging Studies:    CT ABDOMEN PELVIS WO CONTRAST Additional Contrast? None   Final Result   1. There are no findings of renal atrophy or cortical thinning.   2. There are no findings of obstructive uropathy   3. The bladder is distended.  There is a Estrada catheter within urinary   bladder.   4. Retained stool within the sigmoid colon and rectum consistent constipation   5. There is no acute intra-abdominal inflammatory process.         XR CHEST PORTABLE   Final Result   No acute process.                Resident's Assessment and Plan     Assessment and Plan:    Nausea and vomiting likely 2/2 gastroparesis in the setting of T1DM  Hematemesis likely 2/2 Shyann-Wilkins tear  Staphylococcus bacteremia - single blood culture tube Gram stain showing GPC in clusters  Leukocytosis - improving  CRP 7, procalcitonin 1.54  KIRSTEN stage II likely 2/2 hypovolemia - resolved  Hypernatremia - worsening  Elevated troponin - 62 baseline 80  Transaminitis  T1DM - on glargine 15 units BID, lispro 10 units 3 times daily with meals, and medium dose sliding scale at home  History of undocumented paroxysmal AF  History of lacunar stroke 2/2023 with residual L UE and L LE weakness  History of PEA arrest 2020  HTN  Tobacco use disorder  History of polysubstance use disorder - cannabis and cocaine    General surgery and ID on board  EGD tentatively planned  N.p.o. and D5 0.45% saline  Continue glargine 15 units nightly and LDSS every 4 hours to prevent progression to DKA  IV PPI twice daily  Aspirin and Lovenox held for hematemesis; resume if it resolves and hemoglobin stays stable for the next 24 hours and if EGD is normal or canceled  Lisinopril held for KIRSTEN; resume on 7/9  Continue cefazolin and vancomycin  TTE planned to rule out endocarditis  Follow repeat blood cultures  Follow repeat BMP  for hypernatremia and adjust fluids accordingly        PT/OT evaluation: on board   DVT prophylaxis: PCD; held for hematemesis  GI prophylaxis: pantoprazole 40 mg IV BID   Diet:   Diet NPO   Bowel regimen: polyethylene glycol  Pain management: as needed  Code status: Full Code   Disposition: Continue Current Care  Family: updated as available    Migel Beavers MD, PGY-2  Attending physician: Dr. Cline

## 2024-07-08 NOTE — PROGRESS NOTES
GENERAL SURGERY  DAILY PROGRESS NOTE    Patient's Name/Date of Birth: Magan Funes / 1970    Date: 2024     Chief Complaint   Patient presents with    Hyperglycemia     Patient sent from NH with hyperglycemia BGL > 400 . Patient also had a fever this am         Subjective:    No acute events overnight.  Patient denies any melena or hematochezia.  States he did have 1 episode of dark emesis.     Hgb 13.3 from 11.8    Objective:  Last 24Hrs  Temp  Av °F (37.2 °C)  Min: 98.8 °F (37.1 °C)  Max: 99.8 °F (37.7 °C)  Resp  Av.9  Min: 0  Max: 23  Pulse  Av.1  Min: 95  Max: 160  Systolic (24hrs), Av , Min:100 , Max:178     Diastolic (24hrs), Av, Min:69, Max:124    SpO2  Av.5 %  Min: 74 %  Max: 100 %    No intake/output data recorded.      General: In no acute distress  Cardiovascular: Warm throughout, no edema  Respiratory: no respiratory distress, equal chest rise  Abdomen: soft,  nontender, nondistended        CBC  Recent Labs     24  0732 24  0933 24  1726 24  0515   WBC 9.0 28.0*  --  22.4*   RBC 4.21 4.96  --  4.66   HGB 11.8* 14.2 11.8* 13.3   HCT 36.6* 42.9 36.2* 40.6   MCV 86.9 86.5  --  87.1   MCH 28.0 28.6  --  28.5   MCHC 32.2 33.1  --  32.8   RDW 14.1 14.8  --  14.8   * 570*  --  450   MPV 9.3 9.2  --  9.0       CMP  Recent Labs     24  0732 24  1045    148*   K 4.8 3.9   CL 99 101   CO2 21* 29   BUN 18 55*   CREATININE 0.6* 1.9*   GLUCOSE  --  301*   CALCIUM 8.8 10.3*   BILITOT 0.9 1.3*   ALKPHOS 124 134*   AST 65* 25   ALT 89* 71*         Assessment/Plan:    Patient Active Problem List   Diagnosis    Sinus tachycardia    Tobacco abuse    Moderate nonproliferative diabetic retinopathy associated with type 1 diabetes mellitus (HCC)    Essential hypertension    Diabetic ketoacidosis without coma associated with type 1 diabetes mellitus (HCC)    Idiopathic peripheral neuropathy    Acute ischemic multifocal multiple vascular  BUN 26; creatinine 0.7; WBCs 22.4; hemoglobin 13.3  Films personally reviewed/interpreted--increased stool burden in colon on CT scan    Patient Active Problem List    Diagnosis Date Noted    Acute CVA (cerebrovascular accident) (HCC) 01/24/2023    Acute lacunar infarction (HCC) 01/23/2023    Left hemiparesis (HCC) 01/23/2023    Acute kidney injury (HCC) 07/07/2024    Systemic inflammatory response syndrome (HCC) 07/07/2024    Gastroenteritis and colitis, viral 01/09/2024    Poorly controlled type 1 diabetes mellitus (HCC) 01/09/2024    Failure to thrive in adult 10/20/2023    Bacteremia due to Staphylococcus 06/27/2023    Dietary noncompliance 06/26/2023    Hyperglycemia 06/26/2023    Starvation ketoacidosis 06/25/2023    DKA, type 1, not at goal ([facility]) 06/25/2023    Abnormal EKG 06/25/2023    Moderate protein-calorie malnutrition ([facility]) 01/08/2020    Cardiac arrest ([facility]) 01/04/2020    Hyperlipidemia     PAF (paroxysmal atrial fibrillation) ([facility])     Acute renal insufficiency     KIRSTEN (acute kidney injury) ([facility])     Metabolic acidosis     Lacunar stroke ([facility])     ETOH abuse     Left hemiplegia ([facility]) 11/12/2018    Acute ischemic multifocal multiple vascular territories stroke ([facility]) 11/10/2018    Idiopathic peripheral neuropathy 09/21/2016    Moderate nonproliferative diabetic retinopathy associated with type 1 diabetes mellitus ([facility]) 01/16/2016    Essential hypertension 01/16/2016    Diabetic ketoacidosis without coma associated with type 1 diabetes mellitus ([facility]) 01/16/2016    Sinus tachycardia 01/22/2014    Tobacco abuse 01/22/2014       Coffee-ground emesis--monitor H/H  Empiric PPI  Consider EGD if signs of ongoing bleeding    Keron Posada MD, FACS  7/8/2024  4:45 PM    NOTE: This report was transcribed using voice recognition software. Every effort was made to ensure accuracy; however, inadvertent computerized transcription errors may be present.

## 2024-07-08 NOTE — CONSULTS
[facility]  [facility]    Consultation Note     Admit Date: 7/7/2024  8:22 AM    Reason for Consult:   Bacteremia    Attending Physician:  Kamlesh Deluca MD     Chief Complaint: Fatigue, hyperglycemia    HISTORY OF PRESENT ILLNESS:   54-year-old male with past medical history of DM, HTN, previous CVA 02/24 with left upper and lower extremity weakness, alcohol abuse, status post cardiac arrest 2020, polysubstance abuse, HLD, A-fib, recent admission for DKA presented with hyperglycemia, fatigue and an episode of coffee-ground emesis.  He was found to be in KIRSTEN, leukocytosis, elevated procalcitonin.  Blood cultures came back positive for Staphylococcus species.  ID consulted for bacteremia.    Past Medical History:        Diagnosis Date    A-fib (HCC)     Alcoholism (HCC)     Cardiac arrest (HCC)     Cocaine abuse (HCC)     Diabetes mellitus (HCC)     Hypertension     Idiopathic peripheral neuropathy 09/21/2016    Lacunar stroke (HCC)     Marijuana abuse     S/P transesophageal echocardiogram ([facility]) 06/30/2023    appau     Past Surgical History:    No past surgical history on file.  Current Medications:   Scheduled Meds:   sucralfate  1 g Oral 4x Daily AC & HS    potassium phosphate IVPB (PERIPHERAL LINE)  15 mmol IntraVENous Once    vancomycin  1,000 mg IntraVENous Q12H    amLODIPine  10 mg Oral Daily    [Held by provider] aspirin  81 mg Oral Daily    atorvastatin  80 mg Oral Nightly    baclofen  10 mg Oral TID    DULoxetine  30 mg Oral Daily    vitamin D  50,000 Units Oral Weekly    ezetimibe  10 mg Oral Nightly    insulin lispro  10 Units SubCUTAneous TID AC    [Held by provider] lisinopril  10 mg Oral Daily    sodium chloride flush  5-40 mL IntraVENous 2 times per day    [Held by provider] enoxaparin  40 mg SubCUTAneous Daily    insulin glargine  15 Units SubCUTAneous Nightly    insulin lispro  0-4 Units SubCUTAneous Nightly    pantoprazole (PROTONIX) 40 mg in sodium chloride  complaint.    REVIEW OF SYSTEMS:    CONSTITUTIONAL:  No chills, fevers or night sweats. No loss of weight.  EYES:  No double vision or drainage from eyes, ears or throat.  HEENT:  No neck stiffness. No dysphagia. No drainage from eyes, ears or throat  RESPIRATORY:  No cough, productive sputum or hemoptysis.   CARDIOVASCULAR:  No chest pain, palpitations, orthopnea or dyspnea on exertion.  GASTROINTESTINAL:  No nausea, vomiting, diarrhea or constipation or hematochezia   GENITOURINARY:  No frequency burning dysuria or hematuria.  INTEGUMENT/BREAST:  No rash or breast masses.  HEMATOLOGIC/LYMPHATIC:  No lymphadenopathy or blood dyscrasics.   ALLERGIC/IMMUNOLOGIC:  No anaphylaxis.   ENDOCRINE:  No polyuria or polydipsia or temperature intolerance.    MUSCULOSKELETAL:  No myalgia or arthralgia.  Full ROM.  NEUROLOGICAL:  No focal motor sensory deficit.  BEHAVIOR/PSYCH:  No psychosis.     PHYSICAL EXAM:    Vitals:    BP (!) 146/93   Pulse 97   Temp 97.3 °F (36.3 °C) (Temporal)   Resp 16   Ht 1.676 m (5' 6\")   Wt 58.5 kg (129 lb)   SpO2 98%   BMI 20.82 kg/m²   Constitutional: The patient is awake, alert, and oriented.   Skin: Warm and dry. No rashes were noted. No jaundice.  HEENT: Eyes show round, and reactive pupils. Moist mucous membranes, no ulcerations, no thrush.   Neck: Supple to movements. No lymphadenopathy.   Chest: No use of accessory muscles to breathe. Symmetrical expansion. Auscultation reveals no wheezing, crackles, or rhonchi.   Cardiovascular: S1 and S2 are rhythmic and regular. No murmurs appreciated.   Abdomen: Positive bowel sounds to auscultation. Benign to palpation. No masses felt. No hepatosplenomegaly.  Genitourinary: No pain in the lower abdomen  Extremities: No clubbing, no cyanosis, no edema.  Musculoskeletal: No pain in range of motion of any joints  Neurological: Following commands, no focal neurodeficit  Lines: peripheral      CBC+dif:  Recent Labs     07/07/24  0933 07/07/24  [id number]

## 2024-07-09 ENCOUNTER — APPOINTMENT (OUTPATIENT)
Age: 54
DRG: 073 | End: 2024-07-09
Attending: INTERNAL MEDICINE
Payer: MEDICARE

## 2024-07-09 ENCOUNTER — ANESTHESIA EVENT (OUTPATIENT)
Dept: ENDOSCOPY | Age: 54
End: 2024-07-09
Payer: MEDICARE

## 2024-07-09 ENCOUNTER — ANESTHESIA (OUTPATIENT)
Dept: ENDOSCOPY | Age: 54
End: 2024-07-09
Payer: MEDICARE

## 2024-07-09 LAB
ACB COMPLEX DNA BLD POS QL NAA+NON-PROBE: NOT DETECTED
ANION GAP SERPL CALCULATED.3IONS-SCNC: 13 MMOL/L (ref 7–16)
B FRAGILIS DNA BLD POS QL NAA+NON-PROBE: NOT DETECTED
BASOPHILS # BLD: 0.03 K/UL (ref 0–0.2)
BASOPHILS NFR BLD: 0 % (ref 0–2)
BIOFIRE TEST COMMENT: ABNORMAL
BUN SERPL-MCNC: 17 MG/DL (ref 6–20)
C ALBICANS DNA BLD POS QL NAA+NON-PROBE: NOT DETECTED
C AURIS DNA BLD POS QL NAA+NON-PROBE: NOT DETECTED
C GATTII+NEOFOR DNA BLD POS QL NAA+N-PRB: NOT DETECTED
C GLABRATA DNA BLD POS QL NAA+NON-PROBE: NOT DETECTED
C KRUSEI DNA BLD POS QL NAA+NON-PROBE: NOT DETECTED
C PARAP DNA BLD POS QL NAA+NON-PROBE: NOT DETECTED
C TROPICLS DNA BLD POS QL NAA+NON-PROBE: NOT DETECTED
CALCIUM SERPL-MCNC: 9.3 MG/DL (ref 8.6–10.2)
CHLORIDE SERPL-SCNC: 110 MMOL/L (ref 98–107)
CO2 SERPL-SCNC: 25 MMOL/L (ref 22–29)
CREAT SERPL-MCNC: 0.6 MG/DL (ref 0.7–1.2)
E CLOAC COMP DNA BLD POS NAA+NON-PROBE: NOT DETECTED
E COLI DNA BLD POS QL NAA+NON-PROBE: NOT DETECTED
E FAECALIS DNA BLD POS QL NAA+NON-PROBE: NOT DETECTED
E FAECIUM DNA BLD POS QL NAA+NON-PROBE: NOT DETECTED
ECHO AO ASC DIAM: 3.4 CM
ECHO AO ASCENDING AORTA INDEX: 2.05 CM/M2
ECHO AO SINUS VALSALVA DIAM: 3.4 CM
ECHO AO SINUS VALSALVA INDEX: 2.05 CM/M2
ECHO AV AREA PEAK VELOCITY: 2.6 CM2
ECHO AV AREA VTI: 3.2 CM2
ECHO AV AREA/BSA PEAK VELOCITY: 1.6 CM2/M2
ECHO AV AREA/BSA VTI: 1.9 CM2/M2
ECHO AV CUSP MM: 2.2 CM
ECHO AV MEAN GRADIENT: 3 MMHG
ECHO AV MEAN VELOCITY: 0.8 M/S
ECHO AV PEAK GRADIENT: 5 MMHG
ECHO AV PEAK VELOCITY: 1.1 M/S
ECHO AV VELOCITY RATIO: 0.73
ECHO AV VTI: 22.1 CM
ECHO BSA: 1.65 M2
ECHO EST RA PRESSURE: 3 MMHG
ECHO LA DIAMETER INDEX: 1.75 CM/M2
ECHO LA DIAMETER: 2.9 CM
ECHO LA VOL A-L A2C: 21 ML (ref 18–58)
ECHO LA VOL A-L A4C: 25 ML (ref 18–58)
ECHO LA VOL MOD A2C: 21 ML (ref 18–58)
ECHO LA VOL MOD A4C: 23 ML (ref 18–58)
ECHO LA VOLUME AREA LENGTH: 25 ML
ECHO LA VOLUME INDEX A-L A2C: 13 ML/M2 (ref 16–34)
ECHO LA VOLUME INDEX A-L A4C: 15 ML/M2 (ref 16–34)
ECHO LA VOLUME INDEX AREA LENGTH: 15 ML/M2 (ref 16–34)
ECHO LA VOLUME INDEX MOD A2C: 13 ML/M2 (ref 16–34)
ECHO LA VOLUME INDEX MOD A4C: 14 ML/M2 (ref 16–34)
ECHO LV FRACTIONAL SHORTENING: 27 % (ref 28–44)
ECHO LV INTERNAL DIMENSION DIASTOLE INDEX: 2.23 CM/M2
ECHO LV INTERNAL DIMENSION DIASTOLIC: 3.7 CM (ref 4.2–5.9)
ECHO LV INTERNAL DIMENSION SYSTOLIC INDEX: 1.63 CM/M2
ECHO LV INTERNAL DIMENSION SYSTOLIC: 2.7 CM
ECHO LV IVSD: 0.9 CM (ref 0.6–1)
ECHO LV IVSS: 1.3 CM
ECHO LV MASS 2D: 120.8 G (ref 88–224)
ECHO LV MASS INDEX 2D: 72.8 G/M2 (ref 49–115)
ECHO LV POSTERIOR WALL DIASTOLIC: 1.2 CM (ref 0.6–1)
ECHO LV POSTERIOR WALL SYSTOLIC: 1.3 CM
ECHO LV RELATIVE WALL THICKNESS RATIO: 0.65
ECHO LVOT AREA: 3.8 CM2
ECHO LVOT AV VTI INDEX: 0.86
ECHO LVOT DIAM: 2.2 CM
ECHO LVOT MEAN GRADIENT: 1 MMHG
ECHO LVOT PEAK GRADIENT: 3 MMHG
ECHO LVOT PEAK VELOCITY: 0.8 M/S
ECHO LVOT STROKE VOLUME INDEX: 43.7 ML/M2
ECHO LVOT SV: 72.6 ML
ECHO LVOT VTI: 19.1 CM
ECHO MV A VELOCITY: 0.81 M/S
ECHO MV AREA VTI: 4.5 CM2
ECHO MV E DECELERATION TIME (DT): 132 MS
ECHO MV E VELOCITY: 0.61 M/S
ECHO MV E/A RATIO: 0.75
ECHO MV LVOT VTI INDEX: 0.84
ECHO MV MAX VELOCITY: 0.9 M/S
ECHO MV MEAN GRADIENT: 2 MMHG
ECHO MV MEAN VELOCITY: 0.6 M/S
ECHO MV PEAK GRADIENT: 3 MMHG
ECHO MV VTI: 16 CM
ECHO PVEIN PEAK D VELOCITY: 0.5 M/S
ECHO PVEIN PEAK S VELOCITY: 0.4 M/S
ECHO PVEIN S/D RATIO: 0.8
ECHO RIGHT VENTRICULAR SYSTOLIC PRESSURE (RVSP): 34 MMHG
ECHO RV INTERNAL DIMENSION: 2.5 CM
ECHO RV TAPSE: 2 CM (ref 1.7–?)
ECHO TV REGURGITANT MAX VELOCITY: 2.77 M/S
ECHO TV REGURGITANT PEAK GRADIENT: 31 MMHG
ENTEROBACTERALES DNA BLD POS NAA+N-PRB: NOT DETECTED
EOSINOPHIL # BLD: 0.05 K/UL (ref 0.05–0.5)
EOSINOPHILS RELATIVE PERCENT: 0 % (ref 0–6)
ERYTHROCYTE [DISTWIDTH] IN BLOOD BY AUTOMATED COUNT: 14.7 % (ref 11.5–15)
GFR, ESTIMATED: >90 ML/MIN/1.73M2
GLUCOSE BLD-MCNC: 172 MG/DL (ref 74–99)
GLUCOSE BLD-MCNC: 188 MG/DL (ref 74–99)
GLUCOSE BLD-MCNC: 229 MG/DL (ref 74–99)
GLUCOSE BLD-MCNC: 255 MG/DL (ref 74–99)
GLUCOSE BLD-MCNC: 258 MG/DL (ref 74–99)
GLUCOSE BLD-MCNC: 285 MG/DL (ref 74–99)
GLUCOSE SERPL-MCNC: 214 MG/DL (ref 74–99)
GP B STREP DNA BLD POS QL NAA+NON-PROBE: NOT DETECTED
HAEM INFLU DNA BLD POS QL NAA+NON-PROBE: NOT DETECTED
HCT VFR BLD AUTO: 39.5 % (ref 37–54)
HGB BLD-MCNC: 13.1 G/DL (ref 12.5–16.5)
IMM GRANULOCYTES # BLD AUTO: 0.05 K/UL (ref 0–0.58)
IMM GRANULOCYTES NFR BLD: 0 % (ref 0–5)
K OXYTOCA DNA BLD POS QL NAA+NON-PROBE: NOT DETECTED
KLEBSIELLA SP DNA BLD POS QL NAA+NON-PRB: NOT DETECTED
KLEBSIELLA SP DNA BLD POS QL NAA+NON-PRB: NOT DETECTED
L MONOCYTOG DNA BLD POS QL NAA+NON-PROBE: NOT DETECTED
LYMPHOCYTES NFR BLD: 1.43 K/UL (ref 1.5–4)
LYMPHOCYTES RELATIVE PERCENT: 11 % (ref 20–42)
MAGNESIUM SERPL-MCNC: 2.1 MG/DL (ref 1.6–2.6)
MCH RBC QN AUTO: 29.6 PG (ref 26–35)
MCHC RBC AUTO-ENTMCNC: 33.2 G/DL (ref 32–34.5)
MCV RBC AUTO: 89.2 FL (ref 80–99.9)
MICROORGANISM SPEC CULT: ABNORMAL
MICROORGANISM/AGENT SPEC: ABNORMAL
MONOCYTES NFR BLD: 0.77 K/UL (ref 0.1–0.95)
MONOCYTES NFR BLD: 6 % (ref 2–12)
N MEN DNA BLD POS QL NAA+NON-PROBE: NOT DETECTED
NEUTROPHILS NFR BLD: 82 % (ref 43–80)
NEUTS SEG NFR BLD: 10.66 K/UL (ref 1.8–7.3)
P AERUGINOSA DNA BLD POS NAA+NON-PROBE: NOT DETECTED
PLATELET # BLD AUTO: 348 K/UL (ref 130–450)
PMV BLD AUTO: 9.1 FL (ref 7–12)
POTASSIUM SERPL-SCNC: 4 MMOL/L (ref 3.5–5)
PROTEUS SP DNA BLD POS QL NAA+NON-PROBE: NOT DETECTED
RBC # BLD AUTO: 4.43 M/UL (ref 3.8–5.8)
S AUREUS DNA BLD POS QL NAA+NON-PROBE: NOT DETECTED
S AUREUS+CONS DNA BLD POS NAA+NON-PROBE: DETECTED
S EPIDERMIDIS DNA BLD POS QL NAA+NON-PRB: NOT DETECTED
S LUGDUNENSIS DNA BLD POS QL NAA+NON-PRB: NOT DETECTED
S MALTOPHILIA DNA BLD POS QL NAA+NON-PRB: NOT DETECTED
S MARCESCENS DNA BLD POS NAA+NON-PROBE: NOT DETECTED
S PNEUM DNA BLD POS QL NAA+NON-PROBE: NOT DETECTED
S PYO DNA BLD POS QL NAA+NON-PROBE: NOT DETECTED
SALMONELLA DNA BLD POS QL NAA+NON-PROBE: NOT DETECTED
SERVICE CMNT-IMP: ABNORMAL
SODIUM SERPL-SCNC: 148 MMOL/L (ref 132–146)
SPECIMEN DESCRIPTION: ABNORMAL
STREPTOCOCCUS DNA BLD POS NAA+NON-PROBE: NOT DETECTED
WBC OTHER # BLD: 13 K/UL (ref 4.5–11.5)

## 2024-07-09 PROCEDURE — 80048 BASIC METABOLIC PNL TOTAL CA: CPT

## 2024-07-09 PROCEDURE — 82962 GLUCOSE BLOOD TEST: CPT

## 2024-07-09 PROCEDURE — 6360000002 HC RX W HCPCS: Performed by: NURSE ANESTHETIST, CERTIFIED REGISTERED

## 2024-07-09 PROCEDURE — 0DD48ZX EXTRACTION OF ESOPHAGOGASTRIC JUNCTION, VIA NATURAL OR ARTIFICIAL OPENING ENDOSCOPIC, DIAGNOSTIC: ICD-10-PCS | Performed by: SURGERY

## 2024-07-09 PROCEDURE — 87106 FUNGI IDENTIFICATION YEAST: CPT

## 2024-07-09 PROCEDURE — 6370000000 HC RX 637 (ALT 250 FOR IP)

## 2024-07-09 PROCEDURE — 97530 THERAPEUTIC ACTIVITIES: CPT

## 2024-07-09 PROCEDURE — 2580000003 HC RX 258

## 2024-07-09 PROCEDURE — 88342 IMHCHEM/IMCYTCHM 1ST ANTB: CPT

## 2024-07-09 PROCEDURE — 93306 TTE W/DOPPLER COMPLETE: CPT | Performed by: INTERNAL MEDICINE

## 2024-07-09 PROCEDURE — 83735 ASSAY OF MAGNESIUM: CPT

## 2024-07-09 PROCEDURE — 7100000001 HC PACU RECOVERY - ADDTL 15 MIN: Performed by: SURGERY

## 2024-07-09 PROCEDURE — 2700000000 HC OXYGEN THERAPY PER DAY

## 2024-07-09 PROCEDURE — 88305 TISSUE EXAM BY PATHOLOGIST: CPT

## 2024-07-09 PROCEDURE — 36415 COLL VENOUS BLD VENIPUNCTURE: CPT

## 2024-07-09 PROCEDURE — 6360000002 HC RX W HCPCS

## 2024-07-09 PROCEDURE — 3700000001 HC ADD 15 MINUTES (ANESTHESIA): Performed by: SURGERY

## 2024-07-09 PROCEDURE — 7100000000 HC PACU RECOVERY - FIRST 15 MIN: Performed by: SURGERY

## 2024-07-09 PROCEDURE — 87102 FUNGUS ISOLATION CULTURE: CPT

## 2024-07-09 PROCEDURE — 2580000003 HC RX 258: Performed by: NURSE ANESTHETIST, CERTIFIED REGISTERED

## 2024-07-09 PROCEDURE — 88312 SPECIAL STAINS GROUP 1: CPT

## 2024-07-09 PROCEDURE — 0DB48ZX EXCISION OF ESOPHAGOGASTRIC JUNCTION, VIA NATURAL OR ARTIFICIAL OPENING ENDOSCOPIC, DIAGNOSTIC: ICD-10-PCS | Performed by: SURGERY

## 2024-07-09 PROCEDURE — 2709999900 HC NON-CHARGEABLE SUPPLY: Performed by: SURGERY

## 2024-07-09 PROCEDURE — 3609012400 HC EGD TRANSORAL BIOPSY SINGLE/MULTIPLE: Performed by: SURGERY

## 2024-07-09 PROCEDURE — 3700000000 HC ANESTHESIA ATTENDED CARE: Performed by: SURGERY

## 2024-07-09 PROCEDURE — 97166 OT EVAL MOD COMPLEX 45 MIN: CPT

## 2024-07-09 PROCEDURE — 97161 PT EVAL LOW COMPLEX 20 MIN: CPT

## 2024-07-09 PROCEDURE — 93306 TTE W/DOPPLER COMPLETE: CPT

## 2024-07-09 PROCEDURE — 0DB68ZX EXCISION OF STOMACH, VIA NATURAL OR ARTIFICIAL OPENING ENDOSCOPIC, DIAGNOSTIC: ICD-10-PCS | Performed by: SURGERY

## 2024-07-09 PROCEDURE — 85025 COMPLETE CBC W/AUTO DIFF WBC: CPT

## 2024-07-09 PROCEDURE — 2060000000 HC ICU INTERMEDIATE R&B

## 2024-07-09 PROCEDURE — 43239 EGD BIOPSY SINGLE/MULTIPLE: CPT | Performed by: SURGERY

## 2024-07-09 RX ORDER — SODIUM CHLORIDE 9 MG/ML
INJECTION, SOLUTION INTRAVENOUS CONTINUOUS PRN
Status: DISCONTINUED | OUTPATIENT
Start: 2024-07-09 | End: 2024-07-09 | Stop reason: SDUPTHER

## 2024-07-09 RX ORDER — INSULIN GLARGINE 100 [IU]/ML
18 INJECTION, SOLUTION SUBCUTANEOUS NIGHTLY
Status: DISCONTINUED | OUTPATIENT
Start: 2024-07-09 | End: 2024-07-12 | Stop reason: HOSPADM

## 2024-07-09 RX ORDER — PANTOPRAZOLE SODIUM 40 MG/1
40 TABLET, DELAYED RELEASE ORAL
Qty: 60 TABLET | Refills: 0 | Status: SHIPPED | OUTPATIENT
Start: 2024-07-09

## 2024-07-09 RX ORDER — INSULIN LISPRO 100 [IU]/ML
5 INJECTION, SOLUTION INTRAVENOUS; SUBCUTANEOUS ONCE
Status: COMPLETED | OUTPATIENT
Start: 2024-07-09 | End: 2024-07-09

## 2024-07-09 RX ORDER — PROPOFOL 10 MG/ML
INJECTION, EMULSION INTRAVENOUS PRN
Status: DISCONTINUED | OUTPATIENT
Start: 2024-07-09 | End: 2024-07-09 | Stop reason: SDUPTHER

## 2024-07-09 RX ORDER — SUCRALFATE 1 G/1
1 TABLET ORAL
Qty: 120 TABLET | Refills: 3 | Status: SHIPPED | OUTPATIENT
Start: 2024-07-09

## 2024-07-09 RX ADMIN — BACLOFEN 10 MG: 10 TABLET ORAL at 07:56

## 2024-07-09 RX ADMIN — BACLOFEN 10 MG: 10 TABLET ORAL at 16:36

## 2024-07-09 RX ADMIN — SUCRALFATE 1 G: 1 TABLET ORAL at 16:36

## 2024-07-09 RX ADMIN — INSULIN LISPRO 6 UNITS: 100 INJECTION, SOLUTION INTRAVENOUS; SUBCUTANEOUS at 12:02

## 2024-07-09 RX ADMIN — INSULIN LISPRO 6 UNITS: 100 INJECTION, SOLUTION INTRAVENOUS; SUBCUTANEOUS at 20:00

## 2024-07-09 RX ADMIN — SUCRALFATE 1 G: 1 TABLET ORAL at 12:02

## 2024-07-09 RX ADMIN — DULOXETINE HYDROCHLORIDE 30 MG: 30 CAPSULE, DELAYED RELEASE ORAL at 07:56

## 2024-07-09 RX ADMIN — VANCOMYCIN HYDROCHLORIDE 1000 MG: 1 INJECTION, POWDER, LYOPHILIZED, FOR SOLUTION INTRAVENOUS at 12:05

## 2024-07-09 RX ADMIN — INSULIN GLARGINE 18 UNITS: 100 INJECTION, SOLUTION SUBCUTANEOUS at 20:00

## 2024-07-09 RX ADMIN — SODIUM CHLORIDE: 9 INJECTION, SOLUTION INTRAVENOUS at 09:12

## 2024-07-09 RX ADMIN — EZETIMIBE 10 MG: 10 TABLET ORAL at 19:59

## 2024-07-09 RX ADMIN — PANTOPRAZOLE SODIUM 40 MG: 40 INJECTION, POWDER, FOR SOLUTION INTRAVENOUS at 19:58

## 2024-07-09 RX ADMIN — VANCOMYCIN HYDROCHLORIDE 1000 MG: 1 INJECTION, POWDER, LYOPHILIZED, FOR SOLUTION INTRAVENOUS at 20:07

## 2024-07-09 RX ADMIN — BACLOFEN 10 MG: 10 TABLET ORAL at 19:59

## 2024-07-09 RX ADMIN — INSULIN LISPRO 4 UNITS: 100 INJECTION, SOLUTION INTRAVENOUS; SUBCUTANEOUS at 00:24

## 2024-07-09 RX ADMIN — SUCRALFATE 1 G: 1 TABLET ORAL at 07:56

## 2024-07-09 RX ADMIN — SODIUM CHLORIDE, PRESERVATIVE FREE 10 ML: 5 INJECTION INTRAVENOUS at 20:00

## 2024-07-09 RX ADMIN — ONDANSETRON 4 MG: 2 INJECTION INTRAMUSCULAR; INTRAVENOUS at 16:39

## 2024-07-09 RX ADMIN — INSULIN LISPRO 6 UNITS: 100 INJECTION, SOLUTION INTRAVENOUS; SUBCUTANEOUS at 16:36

## 2024-07-09 RX ADMIN — SUCRALFATE 1 G: 1 TABLET ORAL at 19:59

## 2024-07-09 RX ADMIN — INSULIN LISPRO 5 UNITS: 100 INJECTION, SOLUTION INTRAVENOUS; SUBCUTANEOUS at 22:52

## 2024-07-09 RX ADMIN — ATORVASTATIN CALCIUM 80 MG: 40 TABLET, FILM COATED ORAL at 19:59

## 2024-07-09 RX ADMIN — PROPOFOL 240 MG: 10 INJECTION, EMULSION INTRAVENOUS at 09:16

## 2024-07-09 RX ADMIN — AMLODIPINE BESYLATE 10 MG: 10 TABLET ORAL at 07:56

## 2024-07-09 ASSESSMENT — PAIN - FUNCTIONAL ASSESSMENT: PAIN_FUNCTIONAL_ASSESSMENT: NONE - DENIES PAIN

## 2024-07-09 ASSESSMENT — LIFESTYLE VARIABLES: SMOKING_STATUS: 1

## 2024-07-09 NOTE — PROGRESS NOTES
GENERAL SURGERY  DAILY PROGRESS NOTE    Patient's Name/Date of Birth: Magan Funes / 1970    Date: 2024     Chief Complaint   Patient presents with    Hyperglycemia     Patient sent from NH with hyperglycemia BGL > 400 . Patient also had a fever this am         Subjective:    Reports 1 x dark emesis yesterday. No bloody or tarry stools.     Objective:  Last 24Hrs  Temp  Av.8 °F (36.6 °C)  Min: 97.3 °F (36.3 °C)  Max: 98.4 °F (36.9 °C)  Resp  Av.3  Min: 16  Max: 18  Pulse  Av.7  Min: 90  Max: 110  Systolic (24hrs), Av , Min:121 , Max:166     Diastolic (24hrs), Av, Min:75, Max:105    SpO2  Av.7 %  Min: 95 %  Max: 100 %    I/O last 3 completed shifts:  In: -   Out: 900 [Urine:900]      General: In no acute distress  Cardiovascular: Warm throughout, no edema  Respiratory: no respiratory distress, equal chest rise  Abdomen: soft,  nontender, nondistended        CBC  Recent Labs     24  0933 24  1726 24  0515   WBC 28.0*  --  22.4*   RBC 4.96  --  4.66   HGB 14.2 11.8* 13.3   HCT 42.9 36.2* 40.6   MCV 86.5  --  87.1   MCH 28.6  --  28.5   MCHC 33.1  --  32.8   RDW 14.8  --  14.8   *  --  450   MPV 9.2  --  9.0       CMP  Recent Labs     24  1045 24  0515 24  1204 24  1900   * 150* 151* 145   K 3.9 4.1 3.8 4.5    110* 112* 107   CO2 29 25 26 25   BUN 55* 36* 26* 20   CREATININE 1.9* 0.9 0.7 0.6*   GLUCOSE 301* 339* 271* 298*   CALCIUM 10.3* 9.2 9.1 8.8   BILITOT 1.3*  --   --   --    ALKPHOS 134*  --   --   --    AST 25  --   --   --    ALT 71*  --   --   --          Assessment/Plan:    Patient Active Problem List   Diagnosis    Sinus tachycardia    Tobacco abuse    Moderate nonproliferative diabetic retinopathy associated with type 1 diabetes mellitus (HCC)    Essential hypertension    Diabetic ketoacidosis without coma associated with type 1 diabetes mellitus (HCC)    Idiopathic peripheral neuropathy    Acute

## 2024-07-09 NOTE — PROGRESS NOTES
Physical Therapy  Physical Therapy Initial Assessment     Name: Magan Funes  : 1970  MRN: [id number]      Date of Service: 2024    Evaluating PT:  Bradley Castro PT, DPT    Room #:  4505/4505-B  Diagnosis:  Dehydration [E86.0]  Hypernatremia [E87.0]  Systemic inflammatory response syndrome (HCC) [R65.10]  Coffee ground emesis [K92.0]  Acute kidney injury (HCC) [N17.9]  History of MRSA infection [Z86.14]  PMHx/PSHx:  CVA, EtOH abuse, cardiac arrest, substance abuse, HLD, afib  Procedure/Surgery:  N/A  Precautions:  fall risk, cognition, L sided deficits, L wrist splint, L AFO, bed alarm  Equipment Needs:  TBD    SUBJECTIVE:    Pt admitted from [facility]. Pt reports he requires 2 person assistance to transfer to w/c, and mobilizes w/c independently PTA.    OBJECTIVE:   Initial Evaluation  Date: 24 Treatment Short Term/ Long Term   Goals   AM-PAC 6 Clicks      Was pt agreeable to Eval/treatment? yes     Does pt have pain? No pain     Bed Mobility  Rolling: NT  Supine to sit: min A  Sit to supine: min A  Scooting: min A  Rolling: mod I  Supine to sit: mod I  Sit to supine: mod I  Scooting: mod I   Transfers Sit to stand: min A  Stand to sit: min A  Stand pivot: NT  Sit to stand: mod I  Stand to sit: mod I  Stand pivot: mod I with AAD   Ambulation    2' with arm in arm A min A  (Side steps to HOB)  10'+ with AAD mod I   Stair negotiation: ascended and descended  NT       Strength/ROM:   LLE grossly 0/5  RLE grossly 4/5  LLE AROM limited by weakness  RLE AROM WFL    Balance:   Static Sitting: SBA  Dynamic Sitting: CGA  Static Standing: min A with arm in arm A  Dynamic Standing: min A with arm in arm A    Pt is A & O x 3  Sensation:  Pt denies numbness and tingling to extremities  Edema:  unremarkable    Vitals:  SpO2 and HR were stable during session    Therapeutic Exercises:    Bed mobility: supine<>sit, cued for EOB positioning  Transfers: STS x1, cued for hand placement and postural

## 2024-07-09 NOTE — PROGRESS NOTES
[facility]  Internal Medicine Residency Program  Progress Note - House Team 1    Patient:  Magan Funes 54 y.o. male MRN: [id number]     Date of Service: 7/9/2024     CC: Hyperglycemia (patient sent from NH blood glucose> 400.)  Overnight events: No acute overnight events    Subjective     Patient was seen and examined at the bedside this morning in no acute distress.  Abdominal discomfort and nausea is persistent.  Patient denies diarrhea, vomiting today.  Patient denies shortness of breath chest pain.  Patient was midnight n.p.o. plan for EGD.  Labs were reviewed.    Objective     Physical Exam:  Vitals: BP (!) 151/87   Pulse 88   Temp 98.8 °F (37.1 °C) (Oral)   Resp 18   Ht 1.676 m (5' 6\")   Wt 58.5 kg (129 lb)   SpO2 97%   BMI 20.82 kg/m²       General Appearance: alert, appears stated age, and cooperative  HEENT:  Head: Normocephalic, no lesions, without obvious abnormality.  Neck: no adenopathy, no carotid bruit, no JVD, supple, symmetrical, trachea midline, and thyroid not enlarged, symmetric, no tenderness/mass/nodules  Lung: clear to auscultation bilaterally  Heart: regular rate and rhythm, S1, S2 normal, no murmur, click, rub or gallop  Abdomen: soft, non-tender; bowel sounds normal; no masses,  no organomegaly  Extremities:  extremities normal, atraumatic, no cyanosis or edema  Musculokeletal: No joint swelling, no muscle tenderness. ROM normal in all joints of extremities.   Neurologic: Mental status: Alert, oriented, thought content appropriate    Diet : NPO    Subject  Pertinent Labs & Imaging Studies   chika  CBC:   Lab Results   Component Value Date/Time    WBC 13.0 07/09/2024 07:24 AM    RBC 4.43 07/09/2024 07:24 AM    HGB 13.1 07/09/2024 07:24 AM    HCT 39.5 07/09/2024 07:24 AM    MCV 89.2 07/09/2024 07:24 AM    MCH 29.6 07/09/2024 07:24 AM    MCHC 33.2 07/09/2024 07:24 AM    RDW 14.7 07/09/2024 07:24 AM     07/09/2024 07:24 AM    MPV 9.1 07/09/2024 07:24 AM      CBC with Differential:    Lab Results   Component Value Date/Time    WBC 13.0 07/09/2024 07:24 AM    RBC 4.43 07/09/2024 07:24 AM    HGB 13.1 07/09/2024 07:24 AM    HCT 39.5 07/09/2024 07:24 AM     07/09/2024 07:24 AM    MCV 89.2 07/09/2024 07:24 AM    MCH 29.6 07/09/2024 07:24 AM    MCHC 33.2 07/09/2024 07:24 AM    RDW 14.7 07/09/2024 07:24 AM    BANDSPCT 3 10/10/2010 07:20 PM    METASPCT 1.7 07/20/2020 12:12 PM    LYMPHOPCT 11 07/09/2024 07:24 AM    PROMYELOPCT 1.7 04/29/2022 09:41 PM    MONOPCT 6 07/09/2024 07:24 AM    MYELOPCT 2.6 01/04/2020 05:07 PM    EOSPCT 0 07/09/2024 07:24 AM    BASOPCT 0 07/09/2024 07:24 AM    MONOSABS 0.77 07/09/2024 07:24 AM    LYMPHSABS 1.43 07/09/2024 07:24 AM    EOSABS 0.05 07/09/2024 07:24 AM    BASOSABS 0.03 07/09/2024 07:24 AM     WBC:    Lab Results   Component Value Date/Time    WBC 13.0 07/09/2024 07:24 AM     Platelets:    Lab Results   Component Value Date/Time     07/09/2024 07:24 AM     Hemoglobin/Hematocrit:    Lab Results   Component Value Date/Time    HGB 13.1 07/09/2024 07:24 AM    HCT 39.5 07/09/2024 07:24 AM     BMP:    Lab Results   Component Value Date/Time     07/09/2024 07:24 AM    K 4.0 07/09/2024 07:24 AM    K 5.5 06/01/2023 05:38 PM     07/09/2024 07:24 AM    CO2 25 07/09/2024 07:24 AM    BUN 17 07/09/2024 07:24 AM    CREATININE 0.6 07/09/2024 07:24 AM    CALCIUM 9.3 07/09/2024 07:24 AM    GFRAA >60 08/10/2022 09:41 AM    LABGLOM >90 07/09/2024 07:24 AM    GLUCOSE 214 07/09/2024 07:24 AM    GLUCOSE 83 04/11/2012 03:35 AM     BUN/Creatinine:    Lab Results   Component Value Date/Time    BUN 17 07/09/2024 07:24 AM    CREATININE 0.6 07/09/2024 07:24 AM     Hepatic Function Panel:    Lab Results   Component Value Date/Time    ALKPHOS 134 07/07/2024 10:45 AM    ALT 71 07/07/2024 10:45 AM    AST 25 07/07/2024 10:45 AM    BILITOT 1.3 07/07/2024 10:45 AM    BILIDIR 0.3 07/07/2024 10:45 AM    IBILI 1.0 07/07/2024 10:45 AM     Calcium:

## 2024-07-09 NOTE — ANESTHESIA POSTPROCEDURE EVALUATION
Department of Anesthesiology  Postprocedure Note    Patient: Magan Funes  MRN: [id number]  YOB: 1970  Date of evaluation: 7/9/2024    Procedure Summary       Date: 07/09/24 Room / Location: [place] [id number] / [facility]    Anesthesia Start: 0912 Anesthesia Stop: 0940    Procedure: ESOPHAGOGASTRODUODENOSCOPY BIOPSY Diagnosis:       Hematemesis, unspecified whether nausea present      (Hematemesis, unspecified whether nausea present [K92.0])    Surgeons: Keron Posada MD Responsible Provider: Bertram Reynolds DO    Anesthesia Type: MAC ASA Status: 4            Anesthesia Type: No value filed.    Melissa Phase I: Melissa Score: 9    Melissa Phase II:      Anesthesia Post Evaluation    Patient location during evaluation: bedside  Patient participation: complete - patient cannot participate  Level of consciousness: awake and alert  Airway patency: patent  Nausea & Vomiting: no nausea and no vomiting  Cardiovascular status: blood pressure returned to baseline  Respiratory status: acceptable  Hydration status: euvolemic  Multimodal analgesia pain management approach    No notable events documented.

## 2024-07-09 NOTE — CARE COORDINATION
Transition of Care: Met with patient at bedside and called Rain (mother) and explained case management role. Patient came from [place] [facility] where patient was skilled. Will require Precert to return. Precert not started - no discharge until precert obtained. Destination and NIALL updated. Face Sheet, Ambulance Form, and Envelope in soft chart. Patient came to hospital with hyperglycemia, Nausea and coffee ground emesis. Had EGD with biopsy today. Current ID Plan: DC cefazolin. DC Vanc if repeat blood cultures are negative for 48 hours. CM/SW to follow. MT    Case Management Assessment  Initial Evaluation    Date/Time of Evaluation: 7/9/2024 4:41 PM  Assessment Completed by: Bertram Beth RN    If patient is discharged prior to next notation, then this note serves as note for discharge by case management.    Patient Name: Magan Funes                   YOB: 1970  Diagnosis: Dehydration [E86.0]  Hypernatremia [E87.0]  Systemic inflammatory response syndrome (HCC) [R65.10]  Coffee ground emesis [K92.0]  Acute kidney injury (HCC) [N17.9]  History of MRSA infection [Z86.14]                   Date / Time: 7/7/2024  8:22 AM    Patient Admission Status: Inpatient   Readmission Risk (Low < 19, Mod (19-27), High > 27): Readmission Risk Score: 22.7    Current PCP: Andrzej Cline MD  PCP verified by ? Yes    Chart Reviewed: Yes      History Provided by: Patient  Patient Orientation: Alert and Oriented    Patient Cognition: Alert    Hospitalization in the last 30 days (Readmission):  Yes    If yes, Readmission Assessment in  Navigator will be completed.    Advance Directives:      Code Status: Full Code   Patient's Primary Decision Maker is: Legal Next of Kin    Primary Decision Maker: Rain uFnes - Parent - [phone]    Secondary Decision Maker: Mat Funes - Brother/Sister - [phone]    Discharge Planning:    Patient lives with: Family Members (lives with mother) Type of Home:

## 2024-07-09 NOTE — PROGRESS NOTES
[facility]  Internal Medicine Residency / House Medicine Service    Attending Physician Statement  I have discussed the case, including pertinent history and exam findings with the resident and the team.  I have seen and examined the patient and the key elements of the encounter have been performed by me.  I agree with the assessment, plan and orders as documented by the resident.      Having endoscopy during rounds  No evidence for DKA, AG this Am 13  Plan: Continue insulin management     Remainder of medical problems as per resident note.      Andrzej Cline MD CP [facility]  Internal Medicine Residency Faculty

## 2024-07-09 NOTE — PROGRESS NOTES
OCCUPATIONAL THERAPY INITIAL EVALUATION    [facility] [address] [place], [place]       Date:2024                                                  Patient Name: Magan Funes  MRN: [id number]  : 1970  Room: [address] [place] [address]    Evaluating OT: Isaak Jolly OTR/L [id number]    Referring Provider: Migel Mercedes MD      Specific Provider Orders/Date: OT evaluation and treatment 24 9142    Diagnosis:  Dehydration [E86.0]  Hypernatremia [E87.0]  Systemic inflammatory response syndrome (HCC) [R65.10]  Coffee ground emesis [K92.0]  Acute kidney injury (HCC) [N17.9]  History of MRSA infection [Z86.14]      Pertinent Medical History:  has a past medical history of A-fib (HCC), Alcoholism (HCC), Cardiac arrest (HCC), Cocaine abuse (HCC), Diabetes mellitus (HCC), Hypertension, Idiopathic peripheral neuropathy, Lacunar stroke (HCC), Marijuana abuse, and S/P transesophageal echocardiogram (NETTA).   No past surgical history on file.    Pt admitted to the hospital  with nausea and coffee ground emesis     Orders received, chart reviewed, eval complete.     Precautions:  Fall Risk, hx of CVA - L valorie, cognition, +alarms, L AFO (not present)    Assessment of current deficits   [x] Functional mobility   [x]ADLs  [x] Strength               []Cognition   [x] Functional transfers   [] IADLs         [x] Safety Awareness   [x]Endurance   [] Fine Motor Coordination [x] Balance [] Vision/perception   []Sensation    [] Gross Motor Coordination [] ROM  [] Delirium                  [] Motor Control     OT PLAN OF CARE   OT POC based on physician orders, patient diagnosis and results of clinical assessment    Frequency/Duration 1-3 days/wk for 2 weeks PRN   Specific OT Treatment to include:   * Instruction/training on adapted ADL techniques and AE recommendations to increase functional independence within precautions       * Training on energy conservation

## 2024-07-09 NOTE — PROGRESS NOTES
Pharmacy Consultation Note  (Antibiotic Dosing and Monitoring)    Initial consult date: 7-8-2024  Consulting physician/provider: Dr. Mercedes  Drug: Vancomycin  Indication: bacteremia    Age/  Gender Height Weight IBW  Allergy Information   54 y.o./male 167.6 cm (5' 6\") 58.5 kg (129 lb)     Ideal body weight: 63.8 kg (140 lb 10.5 oz)   Metoprolol      Renal Function:  Recent Labs     07/08/24  1204 07/08/24  1900 07/09/24  0724   BUN 26* 20 17   CREATININE 0.7 0.6* 0.6*         Intake/Output Summary (Last 24 hours) at 7/9/2024 1227  Last data filed at 7/9/2024 0929  Gross per 24 hour   Intake 200 ml   Output 900 ml   Net -700 ml       Vancomycin Monitoring:  Trough:  No results for input(s): \"VANCOTROUGH\" in the last 72 hours.  Random:  No results for input(s): \"VANCORANDOM\" in the last 72 hours.    Vancomycin Administration Times:  Recent vancomycin administrations                     vancomycin (VANCOCIN) 1,000 mg in sodium chloride 0.9 % 250 mL IVPB ([id number]) (mg) 1,000 mg New Bag 07/09/24 1205     1,000 mg New Bag 07/08/24 2023     1,000 mg New Bag  0916    vancomycin (VANCOCIN) 1,250 mg in sodium chloride 0.9 % 250 mL IVPB (mg) 1,250 mg New Bag 07/07/24 1839             Assessment:  53 yo/M, starting vanco & cefepime for bacteremia (BC 7/7: Staph species by PCT).  Recent admission: 6/23-7/2/2024.   Received vancomycin 1250 mg x1 on 7/7 @ 1839.  ID has been consulted.   Estimated Creatinine Clearance: 118 mL/min (A) (based on SCr of 0.6 mg/dL (L)).  To dose vancomycin, pharmacy will be utilizing Gem[employer] calculation software for goal AUC/KOREY 400-600 mg/L-hr (predicted AUC/KOREY = 551, Tr = 14.5 mcg/mL).  7/9: day #3 vanco, day #2 cefazolin (started by ID on 7/8).   CoNS by PCR in BC set #2 only from 7/7; set #1 = NGTD.    Plan:  Continue vancomycin 1000 mg q12h.   Will check vancomycin levels when appropriate.  Will continue to monitor renal function.   Pharmacy to follow.    Keron Kirkland PharmD  7/9/2024  12:27

## 2024-07-09 NOTE — ANESTHESIA PRE PROCEDURE
Department of Anesthesiology  Preprocedure Note       Name:  Magan Funes   Age:  54 y.o.  :  1970                                          MRN:  [id number]         Date:  2024      Surgeon: Surgeon(s):  Keron Posada MD    Procedure: EGD    Medications prior to admission:   Prior to Admission medications    Medication Sig Start Date End Date Taking? Authorizing Provider   Ergocalciferol (VITAMIN D) 58020 units CAPS Take 50,000 Units by mouth once a week for 6 doses 24  Shawn Grayson MD   atorvastatin (LIPITOR) 80 MG tablet Take 1 tablet by mouth nightly 24   Andrzej Cline MD   baclofen (LIORESAL) 10 MG tablet Take 1 tablet by mouth 3 times daily 24   Andrzej Cline MD   ezetimibe (ZETIA) 10 MG tablet Take 1 tablet by mouth nightly  Patient not taking: Reported on 2024   Andrzej Cline MD   lisinopril (PRINIVIL;ZESTRIL) 10 MG tablet Take 1 tablet by mouth daily 24   Andrzej Cline MD   traZODone (DESYREL) 50 MG tablet Take 1 tablet by mouth nightly as needed for Sleep 24   Andrzej Cline MD   insulin glargine (LANTUS SOLOSTAR) 100 UNIT/ML injection pen Inject 30 Units into the skin nightly 24   Andrzej Cline MD   insulin lispro, 1 Unit Dial, (HUMALOG KWIKPEN) 100 UNIT/ML SOPN Inject 10 Units into the skin 3 times daily (before meals) 24   Andrzej Cline MD   Continuous Blood Gluc Sensor (FREESTYLE ELLE 3 SENSOR) MISC Apply one every 14 days 24   Andrzej Cline MD   Insulin Syringe-Needle U-100 (INSULIN SYRINGE .3CC/31GX5/16\") 31G X 5/16\" 0.3 ML MISC 1 each by Does not apply route daily 24   Andrzej Cline MD   DULoxetine (CYMBALTA) 30 MG extended release capsule Take 1 capsule by mouth daily  Patient not taking: Reported on 2024 1/15/24   Minerva Cooper MD   blood glucose monitor strips Test 3 times a day & as needed for symptoms of irregular blood glucose. Dispense sufficient

## 2024-07-09 NOTE — DISCHARGE INSTR - COC
Continuity of Care Form    Patient Name: Magan Funes   :  1970  MRN:  [id number]    Admit date:  2024  Discharge date:  2024    Code Status Order: Full Code   Advance Directives:     Admitting Physician:  Kamlesh Deluca MD  PCP: Andrzej Cline MD    Discharging Nurse: Juli matson  Discharging Hospital Unit/Room#: 4505/4505-B  Discharging Unit Phone Number: [phone]    Emergency Contact:   Extended Emergency Contact Information  Primary Emergency Contact: Rain Funes  Address: [address] [place] [address]  Home Phone: [phone]  Relation: Parent  Secondary Emergency Contact: Mat Funes  Home Phone: [phone]  Mobile Phone: [phone]  Relation: Brother/Sister   needed? No    Past Surgical History:  No past surgical history on file.    Immunization History:   Immunization History   Administered Date(s) Administered    COVID-19, MODERNA, ( formula), (age 12y+), IM, 50mcg/0.5mL 2023    COVID-19, PFIZER PURPLE top, DILUTE for use, (age 12 y+), 30mcg/0.3mL 2021, 03/15/2021, 2021    Influenza Virus Vaccine 2017    Pneumococcal, PPSV23, PNEUMOVAX 23, (age 2y+), SC/IM, 0.5mL 2015       Active Problems:  Patient Active Problem List   Diagnosis Code    Sinus tachycardia R00.0    Tobacco abuse Z72.0    Moderate nonproliferative diabetic retinopathy associated with type 1 diabetes mellitus (HCC) E10.3399    Essential hypertension I10    Diabetic ketoacidosis without coma associated with type 1 diabetes mellitus (HCC) E10.10    Idiopathic peripheral neuropathy G60.9    Acute ischemic multifocal multiple vascular territories stroke (HCC) I63.89    Left hemiplegia (HCC) G81.94    KIRSTEN (acute kidney injury) (HCC) N17.9    Metabolic acidosis E87.20    Lacunar stroke (HCC) I63.81    ETOH abuse F10.10    Acute renal insufficiency N28.9    Hyperlipidemia E78.5    PAF (paroxysmal atrial fibrillation)  (HCC) I48.0    Cardiac arrest (HCC) I46.9    Moderate protein-calorie malnutrition (HCC) E44.0    Acute lacunar infarction (HCC) I63.81    Left hemiparesis (HCC) G81.94    Acute CVA (cerebrovascular accident) (HCC) I63.9    Starvation ketoacidosis T73.0XXA, E87.29    DKA, type 1, not at goal (HCC) E10.10    Abnormal EKG R94.31    Dietary noncompliance Z91.119    Hyperglycemia R73.9    Bacteremia due to Staphylococcus R78.81, B95.8    Failure to thrive in adult R62.7    Gastroenteritis and colitis, viral A08.4    Poorly controlled type 1 diabetes mellitus (HCC) E10.65    Acute kidney injury (HCC) N17.9    Systemic inflammatory response syndrome (HCC) R65.10    Coffee ground emesis K92.0       Isolation/Infection:   Isolation            No Isolation          Patient Infection Status       None to display                     Nurse Assessment:  Last Vital Signs: BP (!) 151/87   Pulse 88   Temp 98.8 °F (37.1 °C) (Oral)   Resp 18   Ht 1.676 m (5' 6\")   Wt 58.5 kg (129 lb)   SpO2 96%   BMI 20.82 kg/m²     Last documented pain score (0-10 scale): Pain Level: 0  Last Weight:   Wt Readings from Last 1 Encounters:   07/08/24 58.5 kg (129 lb)     Mental Status:  oriented and alert    IV Access:  - None    Nursing Mobility/ADLs:  Walking   Assisted  Transfer  Assisted  Bathing  Assisted  Dressing  Assisted  Toileting  Assisted  Feeding  Independent  Med Admin  Assisted  Med Delivery   whole    Wound Care Documentation and Therapy:        Elimination:  Continence:   Bowel: No  Bladder: No  Urinary Catheter: None   Colostomy/Ileostomy/Ileal Conduit: No       Date of Last BM:7/12/2024    Intake/Output Summary (Last 24 hours) at 7/9/2024 1630  Last data filed at 7/9/2024 0929  Gross per 24 hour   Intake 200 ml   Output 900 ml   Net -700 ml     I/O last 3 completed shifts:  In: -   Out: 900 [Urine:900]    Safety Concerns:     At Risk for Falls and Aspiration Risk    Impairments/Disabilities:      Contractures -

## 2024-07-09 NOTE — PROGRESS NOTES
Patients IV in right wrist infiltrated.  IV was removed and multiple attempts were made to place new IV.  Called MICU to see if anyone would be able to come try and they were unable.  Will place order for IV team.

## 2024-07-09 NOTE — PROGRESS NOTES
[facility]  [facility]  Progress Note      Chief Complaint   Patient presents with    Hyperglycemia     Patient sent from NH with hyperglycemia BGL > 400 . Patient also had a fever this am        SUBJECTIVE:    Patient is tolerating medications. No reported adverse drug reactions.  No nausea, vomiting, diarrhea.    Review of systems:  As stated above in the chief complaint, otherwise negative.    Medications:  Scheduled Meds:   sucralfate  1 g Oral 4x Daily AC & HS    vancomycin  1,000 mg IntraVENous Q12H    ceFAZolin  2,000 mg IntraVENous Q8H    insulin lispro  0-12 Units SubCUTAneous Q4H    amLODIPine  10 mg Oral Daily    [Held by provider] aspirin  81 mg Oral Daily    atorvastatin  80 mg Oral Nightly    baclofen  10 mg Oral TID    DULoxetine  30 mg Oral Daily    vitamin D  50,000 Units Oral Weekly    ezetimibe  10 mg Oral Nightly    [Held by provider] insulin lispro  10 Units SubCUTAneous TID AC    [Held by provider] lisinopril  10 mg Oral Daily    sodium chloride flush  5-40 mL IntraVENous 2 times per day    [Held by provider] enoxaparin  40 mg SubCUTAneous Daily    insulin glargine  15 Units SubCUTAneous Nightly    pantoprazole (PROTONIX) 40 mg in sodium chloride (PF) 0.9 % 10 mL injection  40 mg IntraVENous Q12H     Continuous Infusions:   sodium chloride      sodium chloride      dextrose       PRN Meds:labetalol, hydrALAZINE, sodium chloride, albuterol, traZODone, sodium chloride flush, sodium chloride, ondansetron **OR** ondansetron, polyethylene glycol, acetaminophen **OR** acetaminophen, metoclopramide, glucose, dextrose bolus **OR** dextrose bolus, glucagon (rDNA), dextrose    OBJECTIVE:  BP (!) 151/87   Pulse 88   Temp 98.8 °F (37.1 °C) (Oral)   Resp 18   Ht 1.676 m (5' 6\")   Wt 58.5 kg (129 lb)   SpO2 96%   BMI 20.82 kg/m²   Temp  Av.8 °F (36.6 °C)  Min: 97.1 °F (36.2 °C)  Max: 98.8 °F (37.1 °C)  Constitutional: The patient is awake, alert, and oriented.    Skin: Warm and dry. No rashes were noted. No jaundice.  HEENT: Eyes show round, and reactive pupils. Moist mucous membranes, no ulcerations, no thrush.   Neck: Supple to movements. No lymphadenopathy.   Chest: No use of accessory muscles to breathe. Symmetrical expansion. Auscultation reveals no wheezing, crackles, or rhonchi.   Cardiovascular: S1 and S2 are rhythmic and regular. No murmurs appreciated.   Abdomen: Positive bowel sounds to auscultation. Benign to palpation. No masses felt. No hepatosplenomegaly.  Genitourinary: No pain in the lower abdomen  Extremities: No clubbing, no cyanosis, no edema.  Musculoskeletal: No pain in range of motion of any joints  Neurological: Following commands, no focal neurodeficit  Lines: peripheral    Laboratory and Tests Review:  Lab Results   Component Value Date    WBC 13.0 (H) 07/09/2024    WBC 22.4 (H) 07/08/2024    WBC 28.0 (H) 07/07/2024    HGB 13.1 07/09/2024    HCT 39.5 07/09/2024    MCV 89.2 07/09/2024     07/09/2024     Lab Results   Component Value Date    NEUTROABS 10.66 (H) 07/09/2024    NEUTROABS 19.86 (H) 07/08/2024    NEUTROABS 26.30 (H) 07/07/2024     No results found for: \"CRPHS\"  Lab Results   Component Value Date    ALT 71 (H) 07/07/2024    AST 25 07/07/2024    GGT 12 07/19/2012    ALKPHOS 134 (H) 07/07/2024    BILITOT 1.3 (H) 07/07/2024     Lab Results   Component Value Date/Time     07/09/2024 07:24 AM    K 4.0 07/09/2024 07:24 AM    K 5.5 06/01/2023 05:38 PM     07/09/2024 07:24 AM    CO2 25 07/09/2024 07:24 AM    BUN 17 07/09/2024 07:24 AM    CREATININE 0.6 07/09/2024 07:24 AM    CREATININE 0.6 07/08/2024 07:00 PM    CREATININE 0.7 07/08/2024 12:04 PM    GFRAA >60 08/10/2022 09:41 AM    LABGLOM >90 07/09/2024 07:24 AM    GLUCOSE 214 07/09/2024 07:24 AM    GLUCOSE 83 04/11/2012 03:35 AM    CALCIUM 9.3 07/09/2024 07:24 AM    BILITOT 1.3 07/07/2024 10:45 AM    ALKPHOS 134 07/07/2024 10:45 AM    AST 25 07/07/2024 10:45 AM    ALT 71  07/07/2024 10:45 AM     Lab Results   Component Value Date    CRP 7.0 (H) 07/08/2024    .0 (H) 01/08/2024    CRP 0.7 (H) 08/08/2022     Lab Results   Component Value Date    SEDRATE 5 07/08/2024     Radiology:      Microbiology:   Lab Results   Component Value Date/Time    BC 5 Days no growth 06/27/2023 07:59 AM    BC  06/25/2023 01:26 AM     This organism is only isolated from one set.  Susceptibility testing is not routinely performed.  Additional testing can be ordered by calling the  Microbiology Department.  Additional blood cultures may be obtained if  clinical suspicion of septicemia is high.      BC 5 Days no growth 05/11/2023 01:38 PM    ORG Staphylococcus hominis 06/25/2023 01:54 AM    ORG Staphylococcus hominis 06/25/2023 01:26 AM    ORG Streptococcus viridans group 06/25/2023 01:26 AM     Lab Results   Component Value Date/Time    BLOODCULT2 5 Days no growth 06/27/2023 07:59 AM    BLOODCULT2 5 Days no growth 05/11/2023 01:45 PM    BLOODCULT2 5 Days no growth 04/26/2023 03:36 PM    ORG Staphylococcus hominis 06/25/2023 01:54 AM    ORG Staphylococcus hominis 06/25/2023 01:26 AM    ORG Streptococcus viridans group 06/25/2023 01:26 AM     No results found for: \"WNDABS\"  Smear, Respiratory   Date Value Ref Range Status   04/30/2022   Final    Rare Polymorphonuclear leukocytes  Epithelial cells not seen  Few Gram negative rods  Few Gram positive diplococci           Component Value Date/Time    MPNEUMO Not Detected 07/08/2024 0515     CULTURE, RESPIRATORY   Date Value Ref Range Status   04/30/2022 Oral Pharyngeal Masoud present  Final     No results found for: \"CXCATHTIP\"  No results found for: \"BFCS\"  No results found for: \"CXSURG\"  Urine Culture, Routine   Date Value Ref Range Status   06/25/2023   Final    10 to 100,000 CFU/mL  Mixed masoud isolated. Further workup and sensitivity testing  is not routinely indicated and will not be performed.  Mixed masoud isolated includes:  Mixed gram positive

## 2024-07-10 LAB
ANION GAP SERPL CALCULATED.3IONS-SCNC: 11 MMOL/L (ref 7–16)
BASOPHILS # BLD: 0.02 K/UL (ref 0–0.2)
BASOPHILS NFR BLD: 0 % (ref 0–2)
BUN SERPL-MCNC: 13 MG/DL (ref 6–20)
CALCIUM SERPL-MCNC: 9 MG/DL (ref 8.6–10.2)
CHLORIDE SERPL-SCNC: 106 MMOL/L (ref 98–107)
CO2 SERPL-SCNC: 25 MMOL/L (ref 22–29)
CREAT SERPL-MCNC: 0.6 MG/DL (ref 0.7–1.2)
EOSINOPHIL # BLD: 0.19 K/UL (ref 0.05–0.5)
EOSINOPHILS RELATIVE PERCENT: 2 % (ref 0–6)
ERYTHROCYTE [DISTWIDTH] IN BLOOD BY AUTOMATED COUNT: 14.1 % (ref 11.5–15)
GFR, ESTIMATED: >90 ML/MIN/1.73M2
GLUCOSE BLD-MCNC: 125 MG/DL (ref 74–99)
GLUCOSE BLD-MCNC: 141 MG/DL (ref 74–99)
GLUCOSE BLD-MCNC: 163 MG/DL (ref 74–99)
GLUCOSE BLD-MCNC: 254 MG/DL (ref 74–99)
GLUCOSE BLD-MCNC: 258 MG/DL (ref 74–99)
GLUCOSE BLD-MCNC: 263 MG/DL (ref 74–99)
GLUCOSE SERPL-MCNC: 135 MG/DL (ref 74–99)
HCT VFR BLD AUTO: 39.7 % (ref 37–54)
HGB BLD-MCNC: 12.8 G/DL (ref 12.5–16.5)
IMM GRANULOCYTES # BLD AUTO: 0.04 K/UL (ref 0–0.58)
IMM GRANULOCYTES NFR BLD: 0 % (ref 0–5)
LYMPHOCYTES NFR BLD: 2.19 K/UL (ref 1.5–4)
LYMPHOCYTES RELATIVE PERCENT: 22 % (ref 20–42)
MAGNESIUM SERPL-MCNC: 2.1 MG/DL (ref 1.6–2.6)
MCH RBC QN AUTO: 28.3 PG (ref 26–35)
MCHC RBC AUTO-ENTMCNC: 32.2 G/DL (ref 32–34.5)
MCV RBC AUTO: 87.6 FL (ref 80–99.9)
MONOCYTES NFR BLD: 0.66 K/UL (ref 0.1–0.95)
MONOCYTES NFR BLD: 7 % (ref 2–12)
NEUTROPHILS NFR BLD: 69 % (ref 43–80)
NEUTS SEG NFR BLD: 6.74 K/UL (ref 1.8–7.3)
PLATELET # BLD AUTO: 352 K/UL (ref 130–450)
PMV BLD AUTO: 8.9 FL (ref 7–12)
POTASSIUM SERPL-SCNC: 3.6 MMOL/L (ref 3.5–5)
RBC # BLD AUTO: 4.53 M/UL (ref 3.8–5.8)
SODIUM SERPL-SCNC: 142 MMOL/L (ref 132–146)
WBC OTHER # BLD: 9.8 K/UL (ref 4.5–11.5)

## 2024-07-10 PROCEDURE — 2580000003 HC RX 258

## 2024-07-10 PROCEDURE — 6370000000 HC RX 637 (ALT 250 FOR IP)

## 2024-07-10 PROCEDURE — 99231 SBSQ HOSP IP/OBS SF/LOW 25: CPT | Performed by: INTERNAL MEDICINE

## 2024-07-10 PROCEDURE — 83735 ASSAY OF MAGNESIUM: CPT

## 2024-07-10 PROCEDURE — 99232 SBSQ HOSP IP/OBS MODERATE 35: CPT | Performed by: SURGERY

## 2024-07-10 PROCEDURE — 6360000002 HC RX W HCPCS

## 2024-07-10 PROCEDURE — 36415 COLL VENOUS BLD VENIPUNCTURE: CPT

## 2024-07-10 PROCEDURE — 85025 COMPLETE CBC W/AUTO DIFF WBC: CPT

## 2024-07-10 PROCEDURE — 80048 BASIC METABOLIC PNL TOTAL CA: CPT

## 2024-07-10 PROCEDURE — 82962 GLUCOSE BLOOD TEST: CPT

## 2024-07-10 PROCEDURE — 2060000000 HC ICU INTERMEDIATE R&B

## 2024-07-10 RX ORDER — PANTOPRAZOLE SODIUM 40 MG/1
40 TABLET, DELAYED RELEASE ORAL
Status: DISCONTINUED | OUTPATIENT
Start: 2024-07-11 | End: 2024-07-12 | Stop reason: HOSPADM

## 2024-07-10 RX ADMIN — VANCOMYCIN HYDROCHLORIDE 1000 MG: 1 INJECTION, POWDER, LYOPHILIZED, FOR SOLUTION INTRAVENOUS at 08:20

## 2024-07-10 RX ADMIN — AMLODIPINE BESYLATE 10 MG: 10 TABLET ORAL at 08:14

## 2024-07-10 RX ADMIN — SUCRALFATE 1 G: 1 TABLET ORAL at 22:29

## 2024-07-10 RX ADMIN — DULOXETINE HYDROCHLORIDE 30 MG: 30 CAPSULE, DELAYED RELEASE ORAL at 08:14

## 2024-07-10 RX ADMIN — PANTOPRAZOLE SODIUM 40 MG: 40 INJECTION, POWDER, FOR SOLUTION INTRAVENOUS at 08:14

## 2024-07-10 RX ADMIN — BACLOFEN 10 MG: 10 TABLET ORAL at 08:14

## 2024-07-10 RX ADMIN — SODIUM CHLORIDE, PRESERVATIVE FREE 10 ML: 5 INJECTION INTRAVENOUS at 08:15

## 2024-07-10 RX ADMIN — SUCRALFATE 1 G: 1 TABLET ORAL at 12:47

## 2024-07-10 RX ADMIN — PANTOPRAZOLE SODIUM 40 MG: 40 INJECTION, POWDER, FOR SOLUTION INTRAVENOUS at 22:28

## 2024-07-10 RX ADMIN — LABETALOL HYDROCHLORIDE 10 MG: 5 INJECTION, SOLUTION INTRAVENOUS at 08:14

## 2024-07-10 RX ADMIN — INSULIN LISPRO 6 UNITS: 100 INJECTION, SOLUTION INTRAVENOUS; SUBCUTANEOUS at 16:44

## 2024-07-10 RX ADMIN — BACLOFEN 10 MG: 10 TABLET ORAL at 13:54

## 2024-07-10 RX ADMIN — INSULIN GLARGINE 18 UNITS: 100 INJECTION, SOLUTION SUBCUTANEOUS at 22:29

## 2024-07-10 RX ADMIN — SUCRALFATE 1 G: 1 TABLET ORAL at 16:44

## 2024-07-10 RX ADMIN — INSULIN LISPRO 6 UNITS: 100 INJECTION, SOLUTION INTRAVENOUS; SUBCUTANEOUS at 22:30

## 2024-07-10 RX ADMIN — BACLOFEN 10 MG: 10 TABLET ORAL at 22:29

## 2024-07-10 RX ADMIN — SODIUM CHLORIDE, PRESERVATIVE FREE 10 ML: 5 INJECTION INTRAVENOUS at 22:30

## 2024-07-10 RX ADMIN — INSULIN LISPRO 6 UNITS: 100 INJECTION, SOLUTION INTRAVENOUS; SUBCUTANEOUS at 12:47

## 2024-07-10 RX ADMIN — ATORVASTATIN CALCIUM 80 MG: 40 TABLET, FILM COATED ORAL at 22:29

## 2024-07-10 RX ADMIN — SUCRALFATE 1 G: 1 TABLET ORAL at 08:14

## 2024-07-10 NOTE — PROGRESS NOTES
Clinical Pharmacy Note    Vancomycin was stopped and the Pharmacy vancomycin consult was canceled today by Dr. Simmons.    Clinical Pharmacy sign off.     Keron Kirkland PharmD  7/10/2024  2:09 PM  [phone]

## 2024-07-10 NOTE — PROGRESS NOTES
GENERAL SURGERY  DAILY PROGRESS NOTE    Patient's Name/Date of Birth: Magan Funes / 1970    Date: July 10, 2024     Chief Complaint   Patient presents with    Hyperglycemia     Patient sent from NH with hyperglycemia BGL > 400 . Patient also had a fever this am         Subjective:    Reports no emesis today. Tolerating diet. No abdominal pain. No bloody or tarry stools. Hb is stable 13.1 to 12.8. No acute bleeding.    Objective:  Last 24Hrs  Temp  Av.9 °F (36.6 °C)  Min: 97.4 °F (36.3 °C)  Max: 98.8 °F (37.1 °C)  Resp  Av.2  Min: 14  Max: 18  Pulse  Av.4  Min: 76  Max: 91  Systolic (24hrs), Av , Min:135 , Max:163     Diastolic (24hrs), Av, Min:87, Max:99    SpO2  Av.5 %  Min: 95 %  Max: 98 %    I/O last 3 completed shifts:  In: 200 [I.V.:200]  Out: 1000 [Urine:1000]      General: In no acute distress  Cardiovascular: Warm throughout, no edema  Respiratory: no respiratory distress, equal chest rise  Abdomen: soft,  nontender, nondistended        CBC  Recent Labs     24  0515 24  0724 07/10/24  0524   WBC 22.4* 13.0* 9.8   RBC 4.66 4.43 4.53   HGB 13.3 13.1 12.8   HCT 40.6 39.5 39.7   MCV 87.1 89.2 87.6   MCH 28.5 29.6 28.3   MCHC 32.8 33.2 32.2   RDW 14.8 14.7 14.1    348 352   MPV 9.0 9.1 8.9       CMP  Recent Labs     24  1900 24  0724 07/10/24  0524    148* 142   K 4.5 4.0 3.6    110* 106   CO2 25 25 25   BUN 20 17 13   CREATININE 0.6* 0.6* 0.6*   GLUCOSE 298* 214* 135*   CALCIUM 8.8 9.3 9.0         Assessment/Plan:    Patient Active Problem List   Diagnosis    Sinus tachycardia    Tobacco abuse    Moderate nonproliferative diabetic retinopathy associated with type 1 diabetes mellitus (HCC)    Essential hypertension    Diabetic ketoacidosis without coma associated with type 1 diabetes mellitus (HCC)    Idiopathic peripheral neuropathy    Acute ischemic multifocal multiple vascular territories stroke (HCC)    Left hemiplegia (HCC)

## 2024-07-10 NOTE — ACP (ADVANCE CARE PLANNING)
Advance Care Planning   Healthcare Decision Maker:    Primary Decision Maker: Rain Funes - Parent - [phone]    Secondary Decision Maker: Mat Funes - Brother/Sister - [phone]    Click here to complete Healthcare Decision Makers including selection of the Healthcare Decision Maker Relationship (ie \"Primary\").

## 2024-07-10 NOTE — PROGRESS NOTES
[facility]  [facility]  Progress Note      Chief Complaint   Patient presents with    Hyperglycemia     Patient sent from NH with hyperglycemia BGL > 400 . Patient also had a fever this am        SUBJECTIVE:    Patient is tolerating medications. No reported adverse drug reactions.  No nausea, vomiting, diarrhea.    Review of systems:  As stated above in the chief complaint, otherwise negative.    Medications:  Scheduled Meds:   insulin glargine  18 Units SubCUTAneous Nightly    sucralfate  1 g Oral 4x Daily AC & HS    vancomycin  1,000 mg IntraVENous Q12H    insulin lispro  0-12 Units SubCUTAneous Q4H    amLODIPine  10 mg Oral Daily    [Held by provider] aspirin  81 mg Oral Daily    atorvastatin  80 mg Oral Nightly    baclofen  10 mg Oral TID    DULoxetine  30 mg Oral Daily    vitamin D  50,000 Units Oral Weekly    ezetimibe  10 mg Oral Nightly    [Held by provider] insulin lispro  10 Units SubCUTAneous TID AC    [Held by provider] lisinopril  10 mg Oral Daily    sodium chloride flush  5-40 mL IntraVENous 2 times per day    [Held by provider] enoxaparin  40 mg SubCUTAneous Daily    pantoprazole (PROTONIX) 40 mg in sodium chloride (PF) 0.9 % 10 mL injection  40 mg IntraVENous Q12H     Continuous Infusions:   sodium chloride      sodium chloride      dextrose       PRN Meds:labetalol, hydrALAZINE, sodium chloride, albuterol, traZODone, sodium chloride flush, sodium chloride, ondansetron **OR** ondansetron, polyethylene glycol, acetaminophen **OR** acetaminophen, metoclopramide, glucose, dextrose bolus **OR** dextrose bolus, glucagon (rDNA), dextrose    OBJECTIVE:  BP (!) 147/90   Pulse 78   Temp 97.4 °F (36.3 °C) (Temporal)   Resp 18   Ht 1.676 m (5' 6\")   Wt 58.5 kg (129 lb)   SpO2 98%   BMI 20.82 kg/m²   Temp  Av.9 °F (36.6 °C)  Min: 97.4 °F (36.3 °C)  Max: 98.8 °F (37.1 °C)  Constitutional: The patient is awake, alert, and oriented.   Skin: Warm and dry. No rashes were

## 2024-07-10 NOTE — PROGRESS NOTES
[facility]  Internal Medicine Residency / House Medicine Service    Attending Physician Statement  I have discussed the case, including pertinent history and exam findings with the resident and the team.  I have seen and examined the patient and the key elements of the encounter have been performed by me.  I agree with the assessment, plan and orders as documented by the resident.      Alert and baseline  VS stable   BS contolled  AG close  Usual meds and Lantus restarted  Blood culture  probably a contaminant  SCope with severe Gastritis  Plan; Continue same            Follow diet    Remainder of medical problems as per resident note.      Andrzej Cline MD FRCP [facility]  Internal Medicine Residency Faculty

## 2024-07-10 NOTE — CARE COORDINATION
Social Work Discharge Planning:  Transition of care updates: Patient had  EGD yesterday, on vancomycin, waiting on blood cultures, and ID final plan. Patient is from [facility], skilled will need a precert to return, ambulance form in soft chart and NIALL updated. SW will continue to follow and assist.  Electronically signed by LUZ ELENA Gallagher on 7/10/2024 at 10:56 AM

## 2024-07-10 NOTE — PROGRESS NOTES
[facility]  Internal Medicine Residency Program  Progress Note - House Team 1    Patient:  Magan Funes 54 y.o. male MRN: [id number]     Date of Service: 7/10/2024     CC: Hyperglycemia, polyuria polydipsia, coffee-ground emesis  Overnight events: Blood glucose 258 Lantus increased to 18U MDSS    Subjective     I saw the patient this morning and overall he was doing well.  He denied any headache, chest pain, shortness of breath, abdominal pain, nausea or vomiting.    Objective     Physical Exam:  Vitals: BP (!) 147/90   Pulse 78   Temp 97.4 °F (36.3 °C) (Temporal)   Resp 18   Ht 1.676 m (5' 6\")   Wt 58.5 kg (129 lb)   SpO2 98%   BMI 20.82 kg/m²     I & O - 24hr: I/O this shift:  In: 120 [P.O.:120]  Out: 900 [Urine:900]   General Appearance: alert, appears stated age, and cooperative  HEENT:  Head: Normocephalic, no lesions, without obvious abnormality.  Neck: supple, symmetrical, trachea midline and thyroid not enlarged, symmetric, no tenderness/mass/nodules  Lung: clear to auscultation bilaterally  Heart: regular rate and rhythm, S1, S2 normal, no murmur, click, rub or gallop  Abdomen: soft, non-tender; bowel sounds normal; no masses,  no organomegaly  Extremities:  extremities normal, atraumatic, no cyanosis or edema  Musculokeletal: No joint swelling, no muscle tenderness. ROM normal in all joints of extremities.   Neurologic: Mental status: Alert, oriented, thought content appropriate  Subject  Pertinent Labs & Imaging Studies   chika  CBC:   Lab Results   Component Value Date/Time    WBC 9.8 07/10/2024 05:24 AM    RBC 4.53 07/10/2024 05:24 AM    HGB 12.8 07/10/2024 05:24 AM    HCT 39.7 07/10/2024 05:24 AM    MCV 87.6 07/10/2024 05:24 AM    MCH 28.3 07/10/2024 05:24 AM    MCHC 32.2 07/10/2024 05:24 AM    RDW 14.1 07/10/2024 05:24 AM     07/10/2024 05:24 AM    MPV 8.9 07/10/2024 05:24 AM     BMP:    Lab Results   Component Value Date/Time     07/10/2024 05:24 AM    K 3.6  Monitor BG, BMP      9.  Paroxysmal Afibrillation (undocumented)     10.  History of lacunar stroke 2/2023 with residual left upper and lower extremities weakness     11.  History of PEA arrest 2020     12.  Hypertension              -Continue Lipitor 80 Mg             - Continue Amlodipine             - Troponin trending down 62 --> 29     13.  History of polysubstance use disorder-cannabis and cocaine        PT/OT evaluation: Not indicated  Diet: Soft and bite-size; 4 carbs Choices; extremely weak  DVT prophylaxis/ GI prophylaxis: Protonix  Disposition: Continue care    Geoff Richard DO, PGY-1    Senior Resident Addendum:  I have seen and examined the patient with the intern. I have discussed the case, including pertinent history and exam findings with the intern. I agree with the assessment, plan and orders as documented above with the following additions:    Patient seen   status post EGD - showed severe gastritis at the gastric antrum and esophagitis at the GEJ, biopsy pending  On PPI twice daily  Coagulase-negative bacteremia in 1 bottle likely contaminant, ID following  TTE negative for endocarditis, IV vancomycin discontinued  Repeat blood cultures negative so far  Blood glucose controlled on current insulin regimen  Ensure rescue glucose (candy bar, oranges, Coke) at the bedside  Discharge planning ongoing    Shawn Grayson MD  7/10/2024  2:15 PM  Attending physician: Dr. Cline

## 2024-07-10 NOTE — PROGRESS NOTES
Pharmacy Consultation Note  (Antibiotic Dosing and Monitoring)    Initial consult date: 7-8-2024  Consulting physician/provider: Dr. Mercedes  Drug: Vancomycin  Indication: bacteremia    Age/  Gender Height Weight IBW  Allergy Information   54 y.o./male 167.6 cm (5' 6\") 58.5 kg (129 lb)     Ideal body weight: 63.8 kg (140 lb 10.5 oz)   Metoprolol      Renal Function:  Recent Labs     07/08/24  1900 07/09/24  0724 07/10/24  0524   BUN 20 17 13   CREATININE 0.6* 0.6* 0.6*         Intake/Output Summary (Last 24 hours) at 7/10/2024 1207  Last data filed at 7/10/2024 1133  Gross per 24 hour   Intake 120 ml   Output 1900 ml   Net -1780 ml         Vancomycin Monitoring:  Trough:  No results for input(s): \"VANCOTROUGH\" in the last 72 hours.  Random:  No results for input(s): \"VANCORANDOM\" in the last 72 hours.    Vancomycin Administration Times:  Recent vancomycin administrations                     vancomycin (VANCOCIN) 1,000 mg in sodium chloride 0.9 % 250 mL IVPB ([id number]) (mg) 1,000 mg New Bag 07/10/24 0820     1,000 mg New Bag 07/09/24 2007     1,000 mg New Bag  1205     1,000 mg New Bag 07/08/24 2023     1,000 mg New Bag  0916    vancomycin (VANCOCIN) 1,250 mg in sodium chloride 0.9 % 250 mL IVPB (mg) 1,250 mg New Bag 07/07/24 1839             Assessment:  55 yo/M, starting vanco & cefepime for bacteremia (BC 7/7: Staph species by PCT).  Recent admission: 6/23-7/2/2024.   Received vancomycin 1250 mg x1 on 7/7 @ 1839.  ID has been consulted.   Estimated Creatinine Clearance: 118 mL/min (A) (based on SCr of 0.6 mg/dL (L)).  To dose vancomycin, pharmacy will be utilizing [employer] calculation software for goal AUC/KOREY 400-600 mg/L-hr (predicted AUC/KOREY = 551, Tr = 14.5 mcg/mL).  7/9: day #3 vanco, day #2 cefazolin (started by ID on 7/8).   CoNS by PCR in BC set #2 only from 7/7; set #1 = NGTD.  7/10: day #4 vanco; cefazolin was stopped by ID on 7/9.  Repeat BCs (7/8) = NGTD.     Plan:  Continue vancomycin 1000 mg q12h.

## 2024-07-11 ENCOUNTER — APPOINTMENT (OUTPATIENT)
Dept: GENERAL RADIOLOGY | Age: 54
DRG: 073 | End: 2024-07-11
Payer: MEDICARE

## 2024-07-11 LAB
ANION GAP SERPL CALCULATED.3IONS-SCNC: 10 MMOL/L (ref 7–16)
BASOPHILS # BLD: 0.03 K/UL (ref 0–0.2)
BASOPHILS NFR BLD: 0 % (ref 0–2)
BUN SERPL-MCNC: 14 MG/DL (ref 6–20)
CALCIUM SERPL-MCNC: 8.8 MG/DL (ref 8.6–10.2)
CHLORIDE SERPL-SCNC: 100 MMOL/L (ref 98–107)
CO2 SERPL-SCNC: 26 MMOL/L (ref 22–29)
CREAT SERPL-MCNC: 0.6 MG/DL (ref 0.7–1.2)
EOSINOPHIL # BLD: 0.26 K/UL (ref 0.05–0.5)
EOSINOPHILS RELATIVE PERCENT: 3 % (ref 0–6)
ERYTHROCYTE [DISTWIDTH] IN BLOOD BY AUTOMATED COUNT: 13.4 % (ref 11.5–15)
GFR, ESTIMATED: >90 ML/MIN/1.73M2
GLUCOSE BLD-MCNC: 138 MG/DL (ref 74–99)
GLUCOSE BLD-MCNC: 183 MG/DL (ref 74–99)
GLUCOSE BLD-MCNC: 251 MG/DL (ref 74–99)
GLUCOSE BLD-MCNC: 307 MG/DL (ref 74–99)
GLUCOSE BLD-MCNC: 352 MG/DL (ref 74–99)
GLUCOSE SERPL-MCNC: 190 MG/DL (ref 74–99)
HCT VFR BLD AUTO: 38.1 % (ref 37–54)
HGB BLD-MCNC: 12.7 G/DL (ref 12.5–16.5)
IMM GRANULOCYTES # BLD AUTO: <0.03 K/UL (ref 0–0.58)
IMM GRANULOCYTES NFR BLD: 0 % (ref 0–5)
LYMPHOCYTES NFR BLD: 2.42 K/UL (ref 1.5–4)
LYMPHOCYTES RELATIVE PERCENT: 31 % (ref 20–42)
MAGNESIUM SERPL-MCNC: 1.8 MG/DL (ref 1.6–2.6)
MCH RBC QN AUTO: 28.6 PG (ref 26–35)
MCHC RBC AUTO-ENTMCNC: 33.3 G/DL (ref 32–34.5)
MCV RBC AUTO: 85.8 FL (ref 80–99.9)
MONOCYTES NFR BLD: 0.53 K/UL (ref 0.1–0.95)
MONOCYTES NFR BLD: 7 % (ref 2–12)
NEUTROPHILS NFR BLD: 58 % (ref 43–80)
NEUTS SEG NFR BLD: 4.54 K/UL (ref 1.8–7.3)
PLATELET # BLD AUTO: 320 K/UL (ref 130–450)
PMV BLD AUTO: 9.2 FL (ref 7–12)
POTASSIUM SERPL-SCNC: 3.3 MMOL/L (ref 3.5–5)
RBC # BLD AUTO: 4.44 M/UL (ref 3.8–5.8)
SODIUM SERPL-SCNC: 136 MMOL/L (ref 132–146)
WBC OTHER # BLD: 7.8 K/UL (ref 4.5–11.5)

## 2024-07-11 PROCEDURE — 80048 BASIC METABOLIC PNL TOTAL CA: CPT

## 2024-07-11 PROCEDURE — 6370000000 HC RX 637 (ALT 250 FOR IP)

## 2024-07-11 PROCEDURE — 85025 COMPLETE CBC W/AUTO DIFF WBC: CPT

## 2024-07-11 PROCEDURE — 2060000000 HC ICU INTERMEDIATE R&B

## 2024-07-11 PROCEDURE — 82962 GLUCOSE BLOOD TEST: CPT

## 2024-07-11 PROCEDURE — 83735 ASSAY OF MAGNESIUM: CPT

## 2024-07-11 PROCEDURE — 2500000003 HC RX 250 WO HCPCS: Performed by: PHYSICIAN ASSISTANT

## 2024-07-11 PROCEDURE — 74230 X-RAY XM SWLNG FUNCJ C+: CPT

## 2024-07-11 PROCEDURE — 92526 ORAL FUNCTION THERAPY: CPT

## 2024-07-11 PROCEDURE — 36415 COLL VENOUS BLD VENIPUNCTURE: CPT

## 2024-07-11 PROCEDURE — 92611 MOTION FLUOROSCOPY/SWALLOW: CPT

## 2024-07-11 PROCEDURE — 99238 HOSP IP/OBS DSCHRG MGMT 30/<: CPT | Performed by: INTERNAL MEDICINE

## 2024-07-11 PROCEDURE — 2580000003 HC RX 258

## 2024-07-11 RX ORDER — POTASSIUM CHLORIDE 20 MEQ/1
40 TABLET, EXTENDED RELEASE ORAL ONCE
Status: COMPLETED | OUTPATIENT
Start: 2024-07-11 | End: 2024-07-11

## 2024-07-11 RX ORDER — INSULIN LISPRO 100 [IU]/ML
6 INJECTION, SOLUTION INTRAVENOUS; SUBCUTANEOUS
Status: DISCONTINUED | OUTPATIENT
Start: 2024-07-12 | End: 2024-07-12

## 2024-07-11 RX ADMIN — INSULIN LISPRO 6 UNITS: 100 INJECTION, SOLUTION INTRAVENOUS; SUBCUTANEOUS at 11:43

## 2024-07-11 RX ADMIN — INSULIN LISPRO 10 UNITS: 100 INJECTION, SOLUTION INTRAVENOUS; SUBCUTANEOUS at 20:47

## 2024-07-11 RX ADMIN — BACLOFEN 10 MG: 10 TABLET ORAL at 08:09

## 2024-07-11 RX ADMIN — SUCRALFATE 1 G: 1 TABLET ORAL at 11:43

## 2024-07-11 RX ADMIN — BARIUM SULFATE 15 ML: 400 SUSPENSION ORAL at 14:02

## 2024-07-11 RX ADMIN — BACLOFEN 10 MG: 10 TABLET ORAL at 16:41

## 2024-07-11 RX ADMIN — SODIUM CHLORIDE, PRESERVATIVE FREE 10 ML: 5 INJECTION INTRAVENOUS at 08:10

## 2024-07-11 RX ADMIN — PANTOPRAZOLE SODIUM 40 MG: 40 TABLET, DELAYED RELEASE ORAL at 16:39

## 2024-07-11 RX ADMIN — EZETIMIBE 10 MG: 10 TABLET ORAL at 20:49

## 2024-07-11 RX ADMIN — AMLODIPINE BESYLATE 10 MG: 10 TABLET ORAL at 08:10

## 2024-07-11 RX ADMIN — SUCRALFATE 1 G: 1 TABLET ORAL at 05:46

## 2024-07-11 RX ADMIN — BARIUM SULFATE 15 ML: 400 PASTE ORAL at 14:01

## 2024-07-11 RX ADMIN — PANTOPRAZOLE SODIUM 40 MG: 40 TABLET, DELAYED RELEASE ORAL at 05:46

## 2024-07-11 RX ADMIN — SUCRALFATE 1 G: 1 TABLET ORAL at 16:39

## 2024-07-11 RX ADMIN — INSULIN GLARGINE 18 UNITS: 100 INJECTION, SOLUTION SUBCUTANEOUS at 20:49

## 2024-07-11 RX ADMIN — SUCRALFATE 1 G: 1 TABLET ORAL at 20:49

## 2024-07-11 RX ADMIN — INSULIN LISPRO 8 UNITS: 100 INJECTION, SOLUTION INTRAVENOUS; SUBCUTANEOUS at 16:39

## 2024-07-11 RX ADMIN — POTASSIUM CHLORIDE 40 MEQ: 1500 TABLET, EXTENDED RELEASE ORAL at 08:10

## 2024-07-11 RX ADMIN — SODIUM CHLORIDE, PRESERVATIVE FREE 10 ML: 5 INJECTION INTRAVENOUS at 20:49

## 2024-07-11 RX ADMIN — ATORVASTATIN CALCIUM 80 MG: 40 TABLET, FILM COATED ORAL at 20:49

## 2024-07-11 RX ADMIN — BACLOFEN 10 MG: 10 TABLET ORAL at 20:49

## 2024-07-11 RX ADMIN — BARIUM SULFATE 15 ML: 0.81 POWDER, FOR SUSPENSION ORAL at 14:02

## 2024-07-11 RX ADMIN — INSULIN LISPRO 2 UNITS: 100 INJECTION, SOLUTION INTRAVENOUS; SUBCUTANEOUS at 05:47

## 2024-07-11 RX ADMIN — DULOXETINE HYDROCHLORIDE 30 MG: 30 CAPSULE, DELAYED RELEASE ORAL at 08:10

## 2024-07-11 ASSESSMENT — PAIN DESCRIPTION - LOCATION: LOCATION: GENERALIZED

## 2024-07-11 ASSESSMENT — PAIN SCALES - GENERAL
PAINLEVEL_OUTOF10: 0
PAINLEVEL_OUTOF10: 5

## 2024-07-11 ASSESSMENT — PAIN DESCRIPTION - DESCRIPTORS: DESCRIPTORS: ACHING;DISCOMFORT;SORE

## 2024-07-11 NOTE — PROGRESS NOTES
[facility]  Internal Medicine Residency Program  Progress Note - House Team 1    Patient:  Magan Funes 54 y.o. male MRN: [id number]     Date of Service: 7/11/2024     CC: Hyperglycemia, polyuria, polydipsia, coffee-ground emesis  Overnight events: No acute event overnight.      Subjective     I saw the patient this morning and overall he was doing fine.  He had no complaints, he denied chest pain, headache, abdominal pain, nausea or vomiting.    Objective     Physical Exam:  Vitals: BP (!) 139/90   Pulse 90   Temp 97.6 °F (36.4 °C) (Infrared)   Resp 16   Ht 1.676 m (5' 6\")   Wt 58.5 kg (129 lb)   SpO2 94%   BMI 20.82 kg/m²     I & O - 24hr: I/O this shift:  In: 120 [P.O.:120]  Out: 500 [Urine:500]   General Appearance: alert, appears stated age, and cooperative  HEENT:  Head: Normocephalic, no lesions, without obvious abnormality.  Neck: supple, symmetrical, trachea midline and thyroid not enlarged, symmetric, no tenderness/mass/nodules  Lung: clear to auscultation bilaterally  Heart: regular rate and rhythm, S1, S2 normal, no murmur, click, rub or gallop  Abdomen: soft, non-tender; bowel sounds normal; no masses,  no organomegaly  Extremities:  extremities normal, atraumatic, no cyanosis or edema  Musculokeletal: No joint swelling, no muscle tenderness. ROM normal in all joints of extremities.   Neurologic: Mental status: Alert, oriented, thought content appropriate  Subject  Pertinent Labs & Imaging Studies   chika  CBC:   Lab Results   Component Value Date/Time    WBC 7.8 07/11/2024 04:47 AM    RBC 4.44 07/11/2024 04:47 AM    HGB 12.7 07/11/2024 04:47 AM    HCT 38.1 07/11/2024 04:47 AM    MCV 85.8 07/11/2024 04:47 AM    MCH 28.6 07/11/2024 04:47 AM    MCHC 33.3 07/11/2024 04:47 AM    RDW 13.4 07/11/2024 04:47 AM     07/11/2024 04:47 AM    MPV 9.2 07/11/2024 04:47 AM     BMP:    Lab Results   Component Value Date/Time     07/11/2024 04:47 AM    K 3.3 07/11/2024 04:47 AM     K 5.5 06/01/2023 05:38 PM     07/11/2024 04:47 AM    CO2 26 07/11/2024 04:47 AM    BUN 14 07/11/2024 04:47 AM    CREATININE 0.6 07/11/2024 04:47 AM    CALCIUM 8.8 07/11/2024 04:47 AM    GFRAA >60 08/10/2022 09:41 AM    LABGLOM >90 07/11/2024 04:47 AM    LABGLOM >60 01/15/2024 06:07 AM    GLUCOSE 190 07/11/2024 04:47 AM    GLUCOSE 83 04/11/2012 03:35 AM       Resident's Assessment and Plan     Magan Funes is a 54 y.o. male     1.  Nausea and vomiting likely secondary to gastroparesis in the setting of type 1 diabetes mellitus                  -Continue Zofran                 - Continue Sucralfate ,protonix                Discharge planning:    Waiting for auth from SNF                  2.  Hematemesis likely secondary to Shyann-Wilkins tear              -S/p EGD 7/9/2024  finndings: Severe inflammation was found in the gastric antrum. Biopsies were taken with a cold forceps for histology. Esophagitis was found at the gastroesophageal junction. Biopsies were taken with a cold forceps for histology  Biopsy result pending  -General surgery was consulted              Cont PPI/sucrlafate/nystatin  Await biopsy results  Monitor H/H  Dispo per primary service     3.  Staphylococcus bacteremia-Gram stain showing gram-positive cocci in clusters likely contaminant              -ID on board  DC vancomycin  TTE showed no evidence of endocarditis  Repeat blood cultures no growth so far  ID will sign off    4.  Leukocytosis improving              - resolved  - Most recent WBC 7.8                          5.  KIRSTEN staged II likely secondary to hypovolemia -resolved               - Most recent Cr 0.6      6.  Hypernatremia-improving              -Most recent sodium level 148 >145>150>142             - Monitor BMP     7.  Transaminitis           -  AST 71. TB 1.3     8.  Type 1 diabetes mellitus              - Most recent POC glucose trending up 258 >255>172                - Continue on Insulin                 - Monitor

## 2024-07-11 NOTE — PROGRESS NOTES
[facility]  [facility]  Progress Note      Chief Complaint   Patient presents with    Hyperglycemia     Patient sent from NH with hyperglycemia BGL > 400 . Patient also had a fever this am        SUBJECTIVE:    Patient is tolerating medications. No reported adverse drug reactions.  No nausea, vomiting, diarrhea.    Review of systems:  As stated above in the chief complaint, otherwise negative.    Medications:  Scheduled Meds:   pantoprazole  40 mg Oral BID AC    insulin glargine  18 Units SubCUTAneous Nightly    sucralfate  1 g Oral 4x Daily AC & HS    insulin lispro  0-12 Units SubCUTAneous Q4H    amLODIPine  10 mg Oral Daily    [Held by provider] aspirin  81 mg Oral Daily    atorvastatin  80 mg Oral Nightly    baclofen  10 mg Oral TID    DULoxetine  30 mg Oral Daily    vitamin D  50,000 Units Oral Weekly    ezetimibe  10 mg Oral Nightly    [Held by provider] insulin lispro  10 Units SubCUTAneous TID AC    [Held by provider] lisinopril  10 mg Oral Daily    sodium chloride flush  5-40 mL IntraVENous 2 times per day    [Held by provider] enoxaparin  40 mg SubCUTAneous Daily     Continuous Infusions:   sodium chloride      sodium chloride      dextrose       PRN Meds:labetalol, hydrALAZINE, sodium chloride, albuterol, traZODone, sodium chloride flush, sodium chloride, ondansetron **OR** ondansetron, polyethylene glycol, acetaminophen **OR** acetaminophen, metoclopramide, glucose, dextrose bolus **OR** dextrose bolus, glucagon (rDNA), dextrose    OBJECTIVE:  /80   Pulse 80   Temp 97.5 °F (36.4 °C) (Infrared)   Resp 18   Ht 1.676 m (5' 6\")   Wt 58.5 kg (129 lb)   SpO2 98%   BMI 20.82 kg/m²   Temp  Av.4 °F (36.3 °C)  Min: 97.1 °F (36.2 °C)  Max: 97.7 °F (36.5 °C)  Constitutional: The patient is awake, alert, and oriented.   Skin: Warm and dry. No rashes were noted. No jaundice.  HEENT: Eyes show round, and reactive pupils. Moist mucous membranes, no ulcerations, no thrush.      Lab Results   Component Value Date    SEDRATE 5 07/08/2024    SEDRATE 36 (H) 01/08/2024    SEDRATE 5 08/08/2022     Radiology:      Microbiology:   Lab Results   Component Value Date/Time    BC 5 Days no growth 06/27/2023 07:59 AM    BC  06/25/2023 01:26 AM     This organism is only isolated from one set.  Susceptibility testing is not routinely performed.  Additional testing can be ordered by calling the  Microbiology Department.  Additional blood cultures may be obtained if  clinical suspicion of septicemia is high.      BC 5 Days no growth 05/11/2023 01:38 PM    ORG Staphylococcus hominis 06/25/2023 01:54 AM    ORG Staphylococcus hominis 06/25/2023 01:26 AM    ORG Streptococcus viridans group 06/25/2023 01:26 AM     Lab Results   Component Value Date/Time    BLOODCULT2 5 Days no growth 06/27/2023 07:59 AM    BLOODCULT2 5 Days no growth 05/11/2023 01:45 PM    BLOODCULT2 5 Days no growth 04/26/2023 03:36 PM    ORG Staphylococcus hominis 06/25/2023 01:54 AM    ORG Staphylococcus hominis 06/25/2023 01:26 AM    ORG Streptococcus viridans group 06/25/2023 01:26 AM     No results found for: \"WNDABS\"  Smear, Respiratory   Date Value Ref Range Status   04/30/2022   Final    Rare Polymorphonuclear leukocytes  Epithelial cells not seen  Few Gram negative rods  Few Gram positive diplococci           Component Value Date/Time    MPNEUMO Not Detected 07/08/2024 0515     CULTURE, RESPIRATORY   Date Value Ref Range Status   04/30/2022 Oral Pharyngeal Masoud present  Final     No results found for: \"CXCATHTIP\"  No results found for: \"BFCS\"  No results found for: \"CXSURG\"  Urine Culture, Routine   Date Value Ref Range Status   06/25/2023   Final    10 to 100,000 CFU/mL  Mixed masoud isolated. Further workup and sensitivity testing  is not routinely indicated and will not be performed.  Mixed masoud isolated includes:  Mixed gram positive organisms     04/27/2023 Growth not present  Final   08/08/2022 <10,000 CFU/mL  Mixed gram  positive organisms    Final     MRSA Culture Only   Date Value Ref Range Status   06/25/2023 Methicillin resistant Staph aureus not isolated  Final   08/08/2022 Methicillin resistant Staph aureus not isolated  Final   01/04/2020 Methicillin resistant Staph aureus not isolated  Final       ASSESSMENT:  Coagulase-negative Staphylococcus bacteremia likely contaminant  Hyperglycemia  KIRSTEN improved with IV fluids  Leukocytosis improving, elevated procalcitonin  Polysubstance use  Previous history of CVA  Hematemesis status post EGD     Plan:    DC vancomycin  TTE showed no evidence of endocarditis  Repeat blood cultures no growth so far  ID will sign off    Srinath Simmons MD  2:12 PM  7/11/2024

## 2024-07-11 NOTE — PROGRESS NOTES
SPEECH/LANGUAGE PATHOLOGY  VIDEOFLUOROSCOPIC STUDY OF SWALLOWING (MBS)   and PLAN OF CARE    PATIENT NAME:  Magan Funes  (male)     MRN:  [id number]    :  1970  (54 y.o.)  STATUS:  Inpatient: Room 4505/4505-B    TODAY'S DATE:  2024  REFERRING PROVIDER:      SPECIFIC PROVIDER ORDER: FL modified barium swallow with video  Date of order:  7-10-24    REASON FOR REFERRAL: dysphagia   EVALUATING THERAPIST: Ree Burris SLP      RESULTS:      DYSPHAGIA DIAGNOSIS:  Clinical indicators of mild  oropharyngeal phase dysphagia     DIET RECOMMENDATIONS:  Regular consistency solids (IDDSI level 7) with  thin liquids (IDDSI level 0)    FEEDING RECOMMENDATIONS:    Assistance level:  Set-up is required for all oral intake     Compensatory strategies recommended: SINGLE cup sips  NO STRAW  Throat clear and re-swallow immediately     Discussed recommendations with:  patient nurse via phone    Laryngeal Penetration and Aspiration:  Penetration WITHOUT aspiration was observed in today's study with  thin liquid, mildly thick liquid (nectar)    SPEECH THERAPY  PLAN OF CARE   The dysphagia POC is established based on physician order and dysphagia diagnosis    Dysphagia therapy is not recommended following today's session due to independent with implementation of strategies/modifications.       Conditions Requiring Skilled Therapeutic Intervention for dysphagia:    Not applicable    SPECIFIC DYSPHAGIA INTERVENTIONS TO INCLUDE:     Not applicable no therapy warranted     Specific instructions for next treatment:  not applicable   Treatment Goals:    Short Term Goals:  Not applicable no therapy warranted     Long Term Goals:   Not applicable no therapy warranted      Patient/family Goal:    not applicable                    ADMITTING DIAGNOSIS: Dehydration [E86.0]  Hypernatremia [E87.0]  Systemic inflammatory response syndrome (HCC) [R65.10]  Coffee ground emesis [K92.0]  Acute kidney injury (HCC) [N17.9]  History of MRSA  infection [Z86.14]     VISIT DIAGNOSIS:   Visit Diagnoses         Codes    Dehydration     E86.0    Hypernatremia     E87.0    History of MRSA infection     Z86.14    Hematemesis, unspecified whether nausea present     K92.0                PATIENT REPORT/COMPLAINT: patient currently on modified diet.    PRIOR LEVEL OF SWALLOW FUNCTION:    Past History of Oropharyngeal Dysphagia?:  yes    Current Diet Order:  ADULT DIET; Dysphagia - Soft and Bite Sized; 4 carb choices (60 gm/meal); Extremely Thick (Pudding)    PROCEDURE:  Consistencies Administered During the Evaluation   Liquids: thin liquid and nectar thick liquid   Solids:  Pureed  and Hard solid      Method of Intake:   cup, spoon  Self fed, Fed by clinician      Position:   Sitting in the [facility] chair with head elevated above 75 degrees, Lateral position    INSTRUMENTAL ASSESSMENT:    ORAL PREP/ ORAL PHASE:    The oral stage of swallowing was within functional limits for consistencies administered     PHARYNGEAL PHASE:     ONSET TIME       Onset time of the pharyngeal swallow was adequate       PHARYNGEAL RESIDUALS        Vallecula/Pharyngeal Wall           Reduced tongue base retraction resulting in residuals in vallecula and/or posterior pharyngeal wall for thin liquid and nectar consistency liquid which cleared with spontaneous double swallow       Pyriform Sinuses      Residuals in the pyriform sinuses were noted due to pharyngeal weakness for thin liquid and nectar consistency liquid  which  cleared with spontaneous double swallow      LARYNGEAL PENETRATION   Laryngeal penetration occurred in the absence of aspiration DURING the swallow for thin liquid and nectar consistency liquid due to  delayed laryngeal closure which cleared from the laryngeal vestibule with a cued, re-directive throat clear . Laryngeal penetration was minimal and occurred inconsistently   an absent cough/throat clear was noted    ASPIRATION  Aspiration was not present during this    Diagnosis    Sinus tachycardia    Tobacco abuse    Moderate nonproliferative diabetic retinopathy associated with type 1 diabetes mellitus (HCC)    Essential hypertension    Diabetic ketoacidosis without coma associated with type 1 diabetes mellitus (HCC)    Idiopathic peripheral neuropathy    Acute ischemic multifocal multiple vascular territories stroke (HCC)    Left hemiplegia (HCC)    KIRSTEN (acute kidney injury) (HCC)    Metabolic acidosis    Lacunar stroke (HCC)    ETOH abuse    Acute renal insufficiency    Hyperlipidemia    PAF (paroxysmal atrial fibrillation) (HCC)    Cardiac arrest (HCC)    Moderate protein-calorie malnutrition (HCC)    Acute lacunar infarction (HCC)    Left hemiparesis (HCC)    Acute CVA (cerebrovascular accident) (HCC)    Starvation ketoacidosis    DKA, type 1, not at goal (HCC)    Abnormal EKG    Dietary noncompliance    Hyperglycemia    Bacteremia due to Staphylococcus    Failure to thrive in adult    Gastroenteritis and colitis, viral    Poorly controlled type 1 diabetes mellitus (HCC)    Acute kidney injury (HCC)    Systemic inflammatory response syndrome (HCC)    Coffee ground emesis       INTERVENTION    Speech Pathologist (SLP) completed education with the patient/family regarding procedure of Modified Barium Swallow Study prior to exam and then type of swallowing impairment following completion of MBSS.    Reviewed  diet recommendations --  Regular consistency solids (IDDSI level 7) with  thin liquids (IDDSI level 0)   Compensatory strategies including SINGLE cup sips  NO STRAW  Throat clear and re-swallow immediately were discussed to ensure safe PO intake.  Images from MBSS reviewed with patient/ family and education provided. Reviewed aspiration precautions. Encouraged patient and/or family to engage SLP in unstructured Q&A session relative to identified deficit areas; indicated understanding of all information provided via satisfactory verbal response.

## 2024-07-11 NOTE — PLAN OF CARE
Problem: Discharge Planning  Goal: Discharge to home or other facility with appropriate resources  7/11/2024 1017 by Margoth Guerrero RN  Outcome: Progressing  Flowsheets (Taken 7/11/2024 0815)  Discharge to home or other facility with appropriate resources: Identify barriers to discharge with patient and caregiver  7/11/2024 0450 by Tatiana Morales RN  Outcome: Progressing     Problem: Skin/Tissue Integrity  Goal: Absence of new skin breakdown  Description: 1.  Monitor for areas of redness and/or skin breakdown  2.  Assess vascular access sites hourly  3.  Every 4-6 hours minimum:  Change oxygen saturation probe site  4.  Every 4-6 hours:  If on nasal continuous positive airway pressure, respiratory therapy assess nares and determine need for appliance change or resting period.  7/11/2024 1017 by Margoth Guerrero RN  Outcome: Progressing  7/11/2024 0450 by Tatiana Morales RN  Outcome: Progressing     Problem: Safety - Adult  Goal: Free from fall injury  7/11/2024 1017 by Margoth Guerrero RN  Outcome: Progressing  7/11/2024 0450 by Tatiana Morales RN  Outcome: Progressing     Problem: ABCDS Injury Assessment  Goal: Absence of physical injury  7/11/2024 1017 by Margoth Guerrero RN  Outcome: Progressing  7/11/2024 0450 by Tatiana Morales RN  Outcome: Progressing     Problem: Chronic Conditions and Co-morbidities  Goal: Patient's chronic conditions and co-morbidity symptoms are monitored and maintained or improved  7/11/2024 1017 by Margoth Guerrero RN  Outcome: Progressing  Flowsheets (Taken 7/11/2024 0815)  Care Plan - Patient's Chronic Conditions and Co-Morbidity Symptoms are Monitored and Maintained or Improved: Monitor and assess patient's chronic conditions and comorbid symptoms for stability, deterioration, or improvement  7/11/2024 0450 by Tatiana Morales RN  Outcome: Progressing

## 2024-07-11 NOTE — PROGRESS NOTES
[facility]  Internal Medicine Residency / House Medicine Service    Attending Physician Statement  I have discussed the case, including pertinent history and exam findings with the resident and the team.  I have seen and examined the patient and the key elements of the encounter have been performed by me.  I agree with the assessment, plan and orders as documented by the resident.      Alert and baseline  BS control reviewed  VS stable  Meds reviewed  Plan: Discharge to [facility] today    Remainder of medical problems as per resident note.      Andrzej Cline MD  Internal Medicine Residency Faculty

## 2024-07-11 NOTE — CARE COORDINATION
CM Update: Met with patient at bedside to discuss transition of care plan. Discharge plan is [facility] at [facility]. Will require Precert to return. Precert started 7/10 - no discharge until precert obtained. Destination and NIALL updated. Face Sheet, Ambulance Form, and Envelope in soft chart. Patient came to hospital with hyperglycemia, Nausea and coffee ground emesis. EGD 7/9. CM/SW to follow. MT

## 2024-07-12 VITALS
DIASTOLIC BLOOD PRESSURE: 92 MMHG | TEMPERATURE: 98.8 F | SYSTOLIC BLOOD PRESSURE: 130 MMHG | HEART RATE: 71 BPM | RESPIRATION RATE: 18 BRPM | BODY MASS INDEX: 20.73 KG/M2 | OXYGEN SATURATION: 97 % | HEIGHT: 66 IN | WEIGHT: 129 LBS

## 2024-07-12 LAB
ANION GAP SERPL CALCULATED.3IONS-SCNC: 9 MMOL/L (ref 7–16)
BASOPHILS # BLD: 0.02 K/UL (ref 0–0.2)
BASOPHILS NFR BLD: 0 % (ref 0–2)
BUN SERPL-MCNC: 15 MG/DL (ref 6–20)
CALCIUM SERPL-MCNC: 8.3 MG/DL (ref 8.6–10.2)
CHLORIDE SERPL-SCNC: 101 MMOL/L (ref 98–107)
CO2 SERPL-SCNC: 25 MMOL/L (ref 22–29)
CREAT SERPL-MCNC: 0.6 MG/DL (ref 0.7–1.2)
EOSINOPHIL # BLD: 0.36 K/UL (ref 0.05–0.5)
EOSINOPHILS RELATIVE PERCENT: 4 % (ref 0–6)
ERYTHROCYTE [DISTWIDTH] IN BLOOD BY AUTOMATED COUNT: 13.2 % (ref 11.5–15)
GFR, ESTIMATED: >90 ML/MIN/1.73M2
GLUCOSE BLD-MCNC: 121 MG/DL (ref 74–99)
GLUCOSE BLD-MCNC: 130 MG/DL (ref 74–99)
GLUCOSE BLD-MCNC: 188 MG/DL (ref 74–99)
GLUCOSE BLD-MCNC: 271 MG/DL (ref 74–99)
GLUCOSE BLD-MCNC: 79 MG/DL (ref 74–99)
GLUCOSE SERPL-MCNC: 124 MG/DL (ref 74–99)
HCT VFR BLD AUTO: 36.8 % (ref 37–54)
HGB BLD-MCNC: 12.7 G/DL (ref 12.5–16.5)
IMM GRANULOCYTES # BLD AUTO: 0.04 K/UL (ref 0–0.58)
IMM GRANULOCYTES NFR BLD: 1 % (ref 0–5)
LYMPHOCYTES NFR BLD: 2.19 K/UL (ref 1.5–4)
LYMPHOCYTES RELATIVE PERCENT: 26 % (ref 20–42)
MAGNESIUM SERPL-MCNC: 2 MG/DL (ref 1.6–2.6)
MCH RBC QN AUTO: 29.3 PG (ref 26–35)
MCHC RBC AUTO-ENTMCNC: 34.5 G/DL (ref 32–34.5)
MCV RBC AUTO: 84.8 FL (ref 80–99.9)
MICROORGANISM SPEC CULT: NORMAL
MONOCYTES NFR BLD: 0.53 K/UL (ref 0.1–0.95)
MONOCYTES NFR BLD: 6 % (ref 2–12)
NEUTROPHILS NFR BLD: 63 % (ref 43–80)
NEUTS SEG NFR BLD: 5.31 K/UL (ref 1.8–7.3)
PLATELET # BLD AUTO: 308 K/UL (ref 130–450)
PMV BLD AUTO: 9.1 FL (ref 7–12)
POTASSIUM SERPL-SCNC: 3.5 MMOL/L (ref 3.5–5)
RBC # BLD AUTO: 4.34 M/UL (ref 3.8–5.8)
SERVICE CMNT-IMP: NORMAL
SODIUM SERPL-SCNC: 135 MMOL/L (ref 132–146)
SPECIMEN DESCRIPTION: NORMAL
WBC OTHER # BLD: 8.5 K/UL (ref 4.5–11.5)

## 2024-07-12 PROCEDURE — 6370000000 HC RX 637 (ALT 250 FOR IP)

## 2024-07-12 PROCEDURE — 82962 GLUCOSE BLOOD TEST: CPT

## 2024-07-12 PROCEDURE — 99238 HOSP IP/OBS DSCHRG MGMT 30/<: CPT | Performed by: INTERNAL MEDICINE

## 2024-07-12 PROCEDURE — 83735 ASSAY OF MAGNESIUM: CPT

## 2024-07-12 PROCEDURE — 85025 COMPLETE CBC W/AUTO DIFF WBC: CPT

## 2024-07-12 PROCEDURE — 2580000003 HC RX 258

## 2024-07-12 PROCEDURE — 36415 COLL VENOUS BLD VENIPUNCTURE: CPT

## 2024-07-12 PROCEDURE — 80048 BASIC METABOLIC PNL TOTAL CA: CPT

## 2024-07-12 RX ORDER — DULOXETIN HYDROCHLORIDE 30 MG/1
30 CAPSULE, DELAYED RELEASE ORAL DAILY
Qty: 30 CAPSULE | Refills: 3 | DISCHARGE
Start: 2024-07-12

## 2024-07-12 RX ORDER — AMLODIPINE BESYLATE 10 MG/1
10 TABLET ORAL DAILY
Qty: 30 TABLET | Refills: 3 | DISCHARGE
Start: 2024-07-12

## 2024-07-12 RX ORDER — INSULIN LISPRO 100 [IU]/ML
5 INJECTION, SOLUTION INTRAVENOUS; SUBCUTANEOUS
Status: DISCONTINUED | OUTPATIENT
Start: 2024-07-12 | End: 2024-07-12 | Stop reason: HOSPADM

## 2024-07-12 RX ORDER — POTASSIUM CHLORIDE 20 MEQ/1
40 TABLET, EXTENDED RELEASE ORAL ONCE
Status: COMPLETED | OUTPATIENT
Start: 2024-07-12 | End: 2024-07-12

## 2024-07-12 RX ADMIN — PANTOPRAZOLE SODIUM 40 MG: 40 TABLET, DELAYED RELEASE ORAL at 16:49

## 2024-07-12 RX ADMIN — INSULIN LISPRO 5 UNITS: 100 INJECTION, SOLUTION INTRAVENOUS; SUBCUTANEOUS at 12:28

## 2024-07-12 RX ADMIN — BACLOFEN 10 MG: 10 TABLET ORAL at 15:16

## 2024-07-12 RX ADMIN — PANTOPRAZOLE SODIUM 40 MG: 40 TABLET, DELAYED RELEASE ORAL at 06:55

## 2024-07-12 RX ADMIN — SODIUM CHLORIDE, PRESERVATIVE FREE 10 ML: 5 INJECTION INTRAVENOUS at 09:01

## 2024-07-12 RX ADMIN — POTASSIUM CHLORIDE 40 MEQ: 1500 TABLET, EXTENDED RELEASE ORAL at 09:00

## 2024-07-12 RX ADMIN — SUCRALFATE 1 G: 1 TABLET ORAL at 12:37

## 2024-07-12 RX ADMIN — AMLODIPINE BESYLATE 10 MG: 10 TABLET ORAL at 09:00

## 2024-07-12 RX ADMIN — BACLOFEN 10 MG: 10 TABLET ORAL at 09:00

## 2024-07-12 RX ADMIN — INSULIN LISPRO 6 UNITS: 100 INJECTION, SOLUTION INTRAVENOUS; SUBCUTANEOUS at 00:10

## 2024-07-12 RX ADMIN — SUCRALFATE 1 G: 1 TABLET ORAL at 16:49

## 2024-07-12 RX ADMIN — INSULIN LISPRO 5 UNITS: 100 INJECTION, SOLUTION INTRAVENOUS; SUBCUTANEOUS at 07:04

## 2024-07-12 RX ADMIN — DULOXETINE HYDROCHLORIDE 30 MG: 30 CAPSULE, DELAYED RELEASE ORAL at 09:00

## 2024-07-12 RX ADMIN — SUCRALFATE 1 G: 1 TABLET ORAL at 06:55

## 2024-07-12 NOTE — DISCHARGE INSTR - DIET

## 2024-07-12 NOTE — CARE COORDINATION
MONIK Discharge Order Noted: Update: Met with patient at bedside to discuss transition of care plan. Discharge plan is [facility]. Auth obtained. Destination and NIALL updated. Face Sheet, Ambulance Form, and Envelope in soft chart. Transport set with [facility] for 1630. Nursing Notified, Facility Notified. Left voicemail for Rain @ [phone]. MONIK/BENJAMÍN to follow. MT

## 2024-07-12 NOTE — DISCHARGE SUMMARY
[facility]  Discharge Summary    PCP: Andrzej Cline MD    Admit Date:7/7/2024  Discharge Date: 7/12/2024    Chief Complaint   Patient presents with    Hyperglycemia     Patient sent from NH with hyperglycemia BGL > 400 . Patient also had a fever this am          Admission Diagnosis:   1.Food intolerance likely secondary to gastroparesis in the setting of Type 1 diabetes mellitus  2.Leukocytosis reactive and hemoconcentration   3.Dehydration secondary to vomiting   4.Starvation ketogenesis   5.Possible Shyann-Wilkins  6.KIRSTEN likely pre-renal 2/2 hypovolemia   7.Hypernatremia in the setting of KIRSTEN                8.Elevated troponin 62 , his base is 80s  9.Hx of pansensitive E. coli UTI  10.Transaminitis   11.Type 1 diabetes mellitus    12.Hx of undocumented paroxysmal A-fib   13.History of lacunar stroke 2/2023     14.Hypertension    15.Tobacco use  disorder    16.History of polysubstance abuse  17. History of PEA arrest in 2020    Discharge Diagnosis:  1.  Nausea and vomiting likely secondary to gastroparesis in the setting of type 1 diabetes mellitus  2.  Hematemesis, EGD showed gastritis  3.  Staphylococcus bacteremia-Gram stain showing gram-positive cocci in clusters likely contaminant  4.  Leukocytosis resolved  5.  KIRSTEN staged II likely secondary to hypovolemia -resolved  6.  Hypernatremia-resolved  7.  Transaminitis  8.  Type 1 diabetes mellitus  9.  Paroxysmal Afibrillation (undocumented)  10.  History of polysubstance use disorder-cannabis and cocaine     Hospital Course:   Patient presented to ED on 07/07/2024 from nursing home with concerns of high blood sugar level >400 and one episode of coffee ground emesis. Relevant labs were sent, found to be concerning to uncontrolled DM, DKA ruled out ,B hydroxybutyrate mildly elevated on admission consistent with starvation ketosis,and Prerenal KIRSTEN and got admitted under internal medicine. Case was coordinated with  Refills: 3    Associated Diagnoses: Myalgia      lisinopril (PRINIVIL;ZESTRIL) 10 MG tablet Take 1 tablet by mouth daily  Qty: 90 tablet, Refills: 3      traZODone (DESYREL) 50 MG tablet Take 1 tablet by mouth nightly as needed for Sleep  Qty: 90 tablet, Refills: 3      insulin glargine (LANTUS SOLOSTAR) 100 UNIT/ML injection pen Inject 30 Units into the skin nightly  Qty: 5 Adjustable Dose Pre-filled Pen Syringe, Refills: 3      insulin lispro, 1 Unit Dial, (HUMALOG KWIKPEN) 100 UNIT/ML SOPN Inject 10 Units into the skin 3 times daily (before meals)  Qty: 5 Adjustable Dose Pre-filled Pen Syringe, Refills: 3      Continuous Blood Gluc Sensor (FREESTYLE ELLE 3 SENSOR) MISC Apply one every 14 days  Qty: 3 each, Refills: 3      Insulin Syringe-Needle U-100 (INSULIN SYRINGE .3CC/31GX5/16\") 31G X 5/16\" 0.3 ML MISC 1 each by Does not apply route daily  Qty: 100 each, Refills: 3      blood glucose monitor strips Test 3 times a day & as needed for symptoms of irregular blood glucose. Dispense sufficient amount for indicated testing frequency plus additional to accommodate PRN testing needs.  Qty: 900 strip, Refills: 3    Comments: (1) Identify specific brand and product.  (2) Include specific quantity.  (3) Include frequency.      insulin lispro (HUMALOG) 100 UNIT/ML SOLN injection vial 8 units with meals, plus sliding scale as below  **Medium Dose Corrective Algorithm**  Glucose: Dose:    No Insulin  151-200 2 Units  201-250 4 Units  251-300 6 Units  Over 300 8 Units and notify physician      docusate sodium (COLACE) 100 MG capsule Take 1 capsule by mouth daily      aspirin 81 MG EC tablet Take 1 tablet by mouth daily  Qty: 30 tablet, Refills: 3      albuterol sulfate  (90 Base) MCG/ACT inhaler Inhale 2 puffs into the lungs every 6 hours as needed for Wheezing or Shortness of Breath  Qty: 1 each, Refills: 1           STOP taking these medications       ezetimibe (ZETIA) 10 MG tablet Comments:   Reason for

## 2024-07-12 NOTE — PROGRESS NOTES
[facility]  Internal Medicine Residency / House Medicine Service    Attending Physician Statement  I have discussed the case, including pertinent history and exam findings with the resident and the team.  I have seen and examined the patient and the key elements of the encounter have been performed by me.  I agree with the assessment, plan and orders as documented by the resident.      A&O  VS stable  Constipated and MOM ordered  Plan: Review meds for discharge    Remainder of medical problems as per resident note.      Andrzej Cline MD FRCP [facility]  Internal Medicine Residency Faculty

## 2024-07-12 NOTE — PROGRESS NOTES
Nurse to nurse report called to [place]  house. Electronically signed by Juli Delacruz RN on 7/12/2024 at 3:55 PM

## 2024-07-12 NOTE — PLAN OF CARE
Problem: Discharge Planning  Goal: Discharge to home or other facility with appropriate resources  Outcome: Progressing  Flowsheets (Taken 7/12/2024 0800)  Discharge to home or other facility with appropriate resources: Identify barriers to discharge with patient and caregiver     Problem: Skin/Tissue Integrity  Goal: Absence of new skin breakdown  Description: 1.  Monitor for areas of redness and/or skin breakdown  2.  Assess vascular access sites hourly  3.  Every 4-6 hours minimum:  Change oxygen saturation probe site  4.  Every 4-6 hours:  If on nasal continuous positive airway pressure, respiratory therapy assess nares and determine need for appliance change or resting period.  Outcome: Progressing     Problem: Safety - Adult  Goal: Free from fall injury  Outcome: Progressing     Problem: ABCDS Injury Assessment  Goal: Absence of physical injury  Outcome: Progressing     Problem: Chronic Conditions and Co-morbidities  Goal: Patient's chronic conditions and co-morbidity symptoms are monitored and maintained or improved  Outcome: Progressing

## 2024-07-13 LAB
MICROORGANISM SPEC CULT: NORMAL
MICROORGANISM/AGENT SPEC: NORMAL
SERVICE CMNT-IMP: NORMAL
SPECIMEN DESCRIPTION: NORMAL

## 2024-07-13 NOTE — PROGRESS NOTES
Physician Progress Note      PATIENT:               ADRIANA CRAWLEY  CSN #:                  [id number]  :                       1970  ADMIT DATE:       2024 8:22 AM  DISCH DATE:        2024 5:14 PM  RESPONDING  PROVIDER #:        ARTURO BARONE          QUERY TEXT:    Patient admitted with hyperglycemia, nausea and vomiting, likely gastroparesis   in the setting on DMT1. Starvation ketogenesis noted per H&P. DKA documented   per surgery -10. Attending states no evidence of DKA on .  If possible,   please document in the progress notes and discharge summary if DKA was:    The medical record reflects the following:  Risk Factors: DM type 1  Clinical Indicators: H&P \"Starvation ketogenesis\" \"B-hydroxybutyrate: 2.49    pH: 7.4\"   Dr. Bush \"Patient in DKA and is critical from that standpoint\".   Dr. Cline \"No evidence for DKA, AG this Am 13\".  ABG showed pH 7.485 pCO2 39.9, Blood Glucose 419Carbon dioxide 21 Anion gap 18    Beta-Hydroxybutyrate 2.49.  Treatment: NS 2L bolus, then 1/2NS with KCL, then D51/2NS at 75 ml/hr, Humalog   per sliding scale, Lantus    Thank you, Mary Butcher RN BSN CDS  [phone]  Options provided:  -- DKA ruled out after study  -- DKA treated and resolved  -- DKA confirmed after study  -- Other - I will add my own diagnosis  -- Disagree - Not applicable / Not valid  -- Disagree - Clinically unable to determine / Unknown  -- Refer to Clinical Documentation Reviewer    PROVIDER RESPONSE TEXT:    DKA ruled out after study.    Query created by: Ramana Butcher on 7/10/2024 1:47 PM      Electronically signed by:  ARTURO BARONE 2024 10:51 PM

## 2024-07-15 LAB
ALBUMIN SERPL-MCNC: 4 G/DL (ref 3.5–5.2)
ALP SERPL-CCNC: 140 U/L (ref 40–129)
ALT SERPL-CCNC: 61 U/L (ref 0–40)
ANION GAP SERPL CALCULATED.3IONS-SCNC: 22 MMOL/L (ref 7–16)
AST SERPL-CCNC: 48 U/L (ref 0–39)
BASOPHILS # BLD: 0.06 K/UL (ref 0–0.2)
BASOPHILS NFR BLD: 1 % (ref 0–2)
BILIRUB SERPL-MCNC: 1.1 MG/DL (ref 0–1.2)
BUN SERPL-MCNC: 23 MG/DL (ref 6–20)
CALCIUM SERPL-MCNC: 9.2 MG/DL (ref 8.6–10.2)
CHLORIDE SERPL-SCNC: 96 MMOL/L (ref 98–107)
CO2 SERPL-SCNC: 18 MMOL/L (ref 22–29)
CREAT SERPL-MCNC: 0.7 MG/DL (ref 0.7–1.2)
EOSINOPHIL # BLD: 0.35 K/UL (ref 0.05–0.5)
EOSINOPHILS RELATIVE PERCENT: 3 % (ref 0–6)
ERYTHROCYTE [DISTWIDTH] IN BLOOD BY AUTOMATED COUNT: 13.7 % (ref 11.5–15)
GFR, ESTIMATED: >90 ML/MIN/1.73M2
GLUCOSE P FAST SERPL-MCNC: 486 MG/DL (ref 74–99)
HCT VFR BLD AUTO: 41.3 % (ref 37–54)
HGB BLD-MCNC: 13.6 G/DL (ref 12.5–16.5)
IMM GRANULOCYTES # BLD AUTO: 0.04 K/UL (ref 0–0.58)
IMM GRANULOCYTES NFR BLD: 0 % (ref 0–5)
LYMPHOCYTES NFR BLD: 1.81 K/UL (ref 1.5–4)
LYMPHOCYTES RELATIVE PERCENT: 17 % (ref 20–42)
MCH RBC QN AUTO: 28.9 PG (ref 26–35)
MCHC RBC AUTO-ENTMCNC: 32.9 G/DL (ref 32–34.5)
MCV RBC AUTO: 87.7 FL (ref 80–99.9)
MONOCYTES NFR BLD: 0.78 K/UL (ref 0.1–0.95)
MONOCYTES NFR BLD: 7 % (ref 2–12)
NEUTROPHILS NFR BLD: 71 % (ref 43–80)
NEUTS SEG NFR BLD: 7.56 K/UL (ref 1.8–7.3)
PLATELET # BLD AUTO: 439 K/UL (ref 130–450)
PMV BLD AUTO: 9.5 FL (ref 7–12)
POTASSIUM SERPL-SCNC: 4.7 MMOL/L (ref 3.5–5)
PROT SERPL-MCNC: 6.6 G/DL (ref 6.4–8.3)
RBC # BLD AUTO: 4.71 M/UL (ref 3.8–5.8)
SODIUM SERPL-SCNC: 136 MMOL/L (ref 132–146)
WBC OTHER # BLD: 10.6 K/UL (ref 4.5–11.5)

## 2024-07-17 LAB — SURGICAL PATHOLOGY REPORT: NORMAL

## 2024-07-24 LAB
MICROORGANISM SPEC CULT: ABNORMAL
MICROORGANISM/AGENT SPEC: ABNORMAL
SERVICE CMNT-IMP: ABNORMAL
SPECIMEN DESCRIPTION: ABNORMAL

## 2024-08-06 RX ORDER — DULOXETIN HYDROCHLORIDE 30 MG/1
30 CAPSULE, DELAYED RELEASE ORAL DAILY
Qty: 30 CAPSULE | Refills: 3 | Status: ON HOLD | OUTPATIENT
Start: 2024-08-06

## 2024-08-15 ENCOUNTER — APPOINTMENT (OUTPATIENT)
Dept: GENERAL RADIOLOGY | Age: 54
DRG: 177 | End: 2024-08-15
Payer: MEDICARE

## 2024-08-15 ENCOUNTER — HOSPITAL ENCOUNTER (INPATIENT)
Age: 54
LOS: 8 days | Discharge: OTHER INSTITUTION | DRG: 177 | End: 2024-08-23
Attending: STUDENT IN AN ORGANIZED HEALTH CARE EDUCATION/TRAINING PROGRAM | Admitting: INTERNAL MEDICINE
Payer: MEDICARE

## 2024-08-15 DIAGNOSIS — J18.9 PNEUMONIA DUE TO INFECTIOUS ORGANISM, UNSPECIFIED LATERALITY, UNSPECIFIED PART OF LUNG: Primary | ICD-10-CM

## 2024-08-15 DIAGNOSIS — N39.0 URINARY TRACT INFECTION WITHOUT HEMATURIA, SITE UNSPECIFIED: ICD-10-CM

## 2024-08-15 PROBLEM — J16.8 PNEUMONIA DUE TO OTHER SPECIFIED INFECTIOUS ORGANISMS: Status: ACTIVE | Noted: 2024-08-15

## 2024-08-15 LAB
ALBUMIN SERPL-MCNC: 3.3 G/DL (ref 3.5–5.2)
ALP SERPL-CCNC: 142 U/L (ref 40–129)
ALT SERPL-CCNC: 27 U/L (ref 0–40)
AMPHET UR QL SCN: NEGATIVE
ANION GAP SERPL CALCULATED.3IONS-SCNC: 12 MMOL/L (ref 7–16)
APAP SERPL-MCNC: <5 UG/ML (ref 10–30)
AST SERPL-CCNC: 23 U/L (ref 0–39)
B-OH-BUTYR SERPL-MCNC: 1.04 MMOL/L (ref 0.02–0.27)
BACTERIA URNS QL MICRO: ABNORMAL
BARBITURATES UR QL SCN: NEGATIVE
BASOPHILS # BLD: 0 K/UL (ref 0–0.2)
BASOPHILS NFR BLD: 0 % (ref 0–2)
BENZODIAZ UR QL: NEGATIVE
BILIRUB SERPL-MCNC: 0.9 MG/DL (ref 0–1.2)
BILIRUB UR QL STRIP: NEGATIVE
BUN SERPL-MCNC: 19 MG/DL (ref 6–20)
BUPRENORPHINE UR QL: NEGATIVE
CALCIUM SERPL-MCNC: 9 MG/DL (ref 8.6–10.2)
CANNABINOIDS UR QL SCN: NEGATIVE
CHLORIDE SERPL-SCNC: 93 MMOL/L (ref 98–107)
CLARITY UR: CLEAR
CO2 SERPL-SCNC: 23 MMOL/L (ref 22–29)
COCAINE UR QL SCN: NEGATIVE
COLOR UR: YELLOW
CREAT SERPL-MCNC: 0.7 MG/DL (ref 0.7–1.2)
CRP SERPL HS-MCNC: 290 MG/L (ref 0–5)
EOSINOPHIL # BLD: 0.35 K/UL (ref 0.05–0.5)
EOSINOPHILS RELATIVE PERCENT: 2 % (ref 0–6)
ERYTHROCYTE [DISTWIDTH] IN BLOOD BY AUTOMATED COUNT: 15.1 % (ref 11.5–15)
ERYTHROCYTE [SEDIMENTATION RATE] IN BLOOD BY WESTERGREN METHOD: 69 MM/HR (ref 0–15)
ETHANOLAMINE SERPL-MCNC: <10 MG/DL (ref 0–0.08)
FENTANYL UR QL: NEGATIVE
GFR, ESTIMATED: >90 ML/MIN/1.73M2
GGT SERPL-CCNC: 17 U/L (ref 10–71)
GLUCOSE BLD-MCNC: 281 MG/DL (ref 74–99)
GLUCOSE BLD-MCNC: 395 MG/DL (ref 74–99)
GLUCOSE SERPL-MCNC: 219 MG/DL (ref 74–99)
GLUCOSE UR STRIP-MCNC: NEGATIVE MG/DL
HCT VFR BLD AUTO: 32.6 % (ref 37–54)
HGB BLD-MCNC: 10.7 G/DL (ref 12.5–16.5)
HGB UR QL STRIP.AUTO: NEGATIVE
KETONES UR STRIP-MCNC: 15 MG/DL
LACTATE BLDV-SCNC: 0.6 MMOL/L (ref 0.5–2.2)
LEUKOCYTE ESTERASE UR QL STRIP: ABNORMAL
LIPASE SERPL-CCNC: 14 U/L (ref 13–60)
LYMPHOCYTES NFR BLD: 1.24 K/UL (ref 1.5–4)
LYMPHOCYTES RELATIVE PERCENT: 6 % (ref 20–42)
MCH RBC QN AUTO: 28.2 PG (ref 26–35)
MCHC RBC AUTO-ENTMCNC: 32.8 G/DL (ref 32–34.5)
MCV RBC AUTO: 86 FL (ref 80–99.9)
METHADONE UR QL: NEGATIVE
MONOCYTES NFR BLD: 1.06 K/UL (ref 0.1–0.95)
MONOCYTES NFR BLD: 5 % (ref 2–12)
NEUTROPHILS NFR BLD: 87 % (ref 43–80)
NEUTS SEG NFR BLD: 17.74 K/UL (ref 1.8–7.3)
NITRITE UR QL STRIP: NEGATIVE
OPIATES UR QL SCN: NEGATIVE
OXYCODONE UR QL SCN: NEGATIVE
PCP UR QL SCN: NEGATIVE
PH UR STRIP: 6 [PH] (ref 5–9)
PH VENOUS: 7.48 (ref 7.35–7.45)
PLATELET # BLD AUTO: 464 K/UL (ref 130–450)
PMV BLD AUTO: 8.9 FL (ref 7–12)
POTASSIUM SERPL-SCNC: 4.5 MMOL/L (ref 3.5–5)
PROCALCITONIN SERPL-MCNC: 0.56 NG/ML (ref 0–0.08)
PROT SERPL-MCNC: 6.4 G/DL (ref 6.4–8.3)
PROT UR STRIP-MCNC: 30 MG/DL
RBC # BLD AUTO: 3.79 M/UL (ref 3.8–5.8)
RBC # BLD: ABNORMAL 10*6/UL
RBC #/AREA URNS HPF: ABNORMAL /HPF
SALICYLATES SERPL-MCNC: <0.3 MG/DL (ref 0–30)
SARS-COV-2 RDRP RESP QL NAA+PROBE: NOT DETECTED
SODIUM SERPL-SCNC: 128 MMOL/L (ref 132–146)
SP GR UR STRIP: 1.02 (ref 1–1.03)
SPECIMEN DESCRIPTION: NORMAL
TEST INFORMATION: NORMAL
TOXIC TRICYCLIC SC,BLOOD: NEGATIVE
TROPONIN I SERPL HS-MCNC: 49 NG/L (ref 0–11)
UROBILINOGEN UR STRIP-ACNC: 0.2 EU/DL (ref 0–1)
WBC #/AREA URNS HPF: ABNORMAL /HPF
WBC OTHER # BLD: 20.4 K/UL (ref 4.5–11.5)
YEAST URNS QL MICRO: PRESENT

## 2024-08-15 PROCEDURE — 2580000003 HC RX 258

## 2024-08-15 PROCEDURE — 87899 AGENT NOS ASSAY W/OPTIC: CPT

## 2024-08-15 PROCEDURE — 6360000002 HC RX W HCPCS

## 2024-08-15 PROCEDURE — 87635 SARS-COV-2 COVID-19 AMP PRB: CPT

## 2024-08-15 PROCEDURE — 80179 DRUG ASSAY SALICYLATE: CPT

## 2024-08-15 PROCEDURE — 87081 CULTURE SCREEN ONLY: CPT

## 2024-08-15 PROCEDURE — 80307 DRUG TEST PRSMV CHEM ANLYZR: CPT

## 2024-08-15 PROCEDURE — 94640 AIRWAY INHALATION TREATMENT: CPT

## 2024-08-15 PROCEDURE — 84484 ASSAY OF TROPONIN QUANT: CPT

## 2024-08-15 PROCEDURE — 80053 COMPREHEN METABOLIC PANEL: CPT

## 2024-08-15 PROCEDURE — 2580000003 HC RX 258: Performed by: STUDENT IN AN ORGANIZED HEALTH CARE EDUCATION/TRAINING PROGRAM

## 2024-08-15 PROCEDURE — 93005 ELECTROCARDIOGRAM TRACING: CPT

## 2024-08-15 PROCEDURE — 87449 NOS EACH ORGANISM AG IA: CPT

## 2024-08-15 PROCEDURE — 83605 ASSAY OF LACTIC ACID: CPT

## 2024-08-15 PROCEDURE — 82010 KETONE BODYS QUAN: CPT

## 2024-08-15 PROCEDURE — 80143 DRUG ASSAY ACETAMINOPHEN: CPT

## 2024-08-15 PROCEDURE — 6370000000 HC RX 637 (ALT 250 FOR IP)

## 2024-08-15 PROCEDURE — 96365 THER/PROPH/DIAG IV INF INIT: CPT

## 2024-08-15 PROCEDURE — 71045 X-RAY EXAM CHEST 1 VIEW: CPT

## 2024-08-15 PROCEDURE — 84145 PROCALCITONIN (PCT): CPT

## 2024-08-15 PROCEDURE — G0480 DRUG TEST DEF 1-7 CLASSES: HCPCS

## 2024-08-15 PROCEDURE — 83690 ASSAY OF LIPASE: CPT

## 2024-08-15 PROCEDURE — 87077 CULTURE AEROBIC IDENTIFY: CPT

## 2024-08-15 PROCEDURE — 82800 BLOOD PH: CPT

## 2024-08-15 PROCEDURE — 87040 BLOOD CULTURE FOR BACTERIA: CPT

## 2024-08-15 PROCEDURE — 99285 EMERGENCY DEPT VISIT HI MDM: CPT

## 2024-08-15 PROCEDURE — 87154 CUL TYP ID BLD PTHGN 6+ TRGT: CPT

## 2024-08-15 PROCEDURE — 82962 GLUCOSE BLOOD TEST: CPT

## 2024-08-15 PROCEDURE — 85652 RBC SED RATE AUTOMATED: CPT

## 2024-08-15 PROCEDURE — 2060000000 HC ICU INTERMEDIATE R&B

## 2024-08-15 PROCEDURE — 6360000002 HC RX W HCPCS: Performed by: STUDENT IN AN ORGANIZED HEALTH CARE EDUCATION/TRAINING PROGRAM

## 2024-08-15 PROCEDURE — 85025 COMPLETE CBC W/AUTO DIFF WBC: CPT

## 2024-08-15 PROCEDURE — 81001 URINALYSIS AUTO W/SCOPE: CPT

## 2024-08-15 PROCEDURE — 87086 URINE CULTURE/COLONY COUNT: CPT

## 2024-08-15 PROCEDURE — 86140 C-REACTIVE PROTEIN: CPT

## 2024-08-15 PROCEDURE — 82977 ASSAY OF GGT: CPT

## 2024-08-15 RX ORDER — ATORVASTATIN CALCIUM 40 MG/1
80 TABLET, FILM COATED ORAL NIGHTLY
Status: DISCONTINUED | OUTPATIENT
Start: 2024-08-15 | End: 2024-08-23 | Stop reason: HOSPADM

## 2024-08-15 RX ORDER — ONDANSETRON 4 MG/1
4 TABLET, ORALLY DISINTEGRATING ORAL EVERY 8 HOURS PRN
Status: DISCONTINUED | OUTPATIENT
Start: 2024-08-15 | End: 2024-08-23 | Stop reason: HOSPADM

## 2024-08-15 RX ORDER — ASPIRIN 81 MG/1
81 TABLET ORAL DAILY
Status: DISCONTINUED | OUTPATIENT
Start: 2024-08-15 | End: 2024-08-23 | Stop reason: HOSPADM

## 2024-08-15 RX ORDER — SODIUM CHLORIDE 9 MG/ML
INJECTION, SOLUTION INTRAVENOUS PRN
Status: DISCONTINUED | OUTPATIENT
Start: 2024-08-15 | End: 2024-08-23 | Stop reason: HOSPADM

## 2024-08-15 RX ORDER — PANTOPRAZOLE SODIUM 40 MG/1
40 TABLET, DELAYED RELEASE ORAL DAILY
Status: ON HOLD | COMMUNITY
End: 2024-08-19

## 2024-08-15 RX ORDER — AMLODIPINE BESYLATE 10 MG/1
10 TABLET ORAL DAILY
Status: DISCONTINUED | OUTPATIENT
Start: 2024-08-15 | End: 2024-08-18

## 2024-08-15 RX ORDER — IPRATROPIUM BROMIDE AND ALBUTEROL SULFATE 2.5; .5 MG/3ML; MG/3ML
1 SOLUTION RESPIRATORY (INHALATION)
Status: DISCONTINUED | OUTPATIENT
Start: 2024-08-15 | End: 2024-08-20

## 2024-08-15 RX ORDER — METFORMIN HCL 500 MG
500 TABLET, EXTENDED RELEASE 24 HR ORAL
Status: ON HOLD | COMMUNITY
End: 2024-08-19

## 2024-08-15 RX ORDER — DOXYCYCLINE 100 MG/1
100 CAPSULE ORAL DAILY
Status: ON HOLD | COMMUNITY
Start: 2024-07-26 | End: 2024-08-23 | Stop reason: HOSPADM

## 2024-08-15 RX ORDER — ERGOCALCIFEROL 1.25 MG/1
50000 CAPSULE, LIQUID FILLED ORAL WEEKLY
Status: ON HOLD | COMMUNITY
End: 2024-08-19

## 2024-08-15 RX ORDER — THIAMINE HYDROCHLORIDE 100 MG/ML
100 INJECTION, SOLUTION INTRAMUSCULAR; INTRAVENOUS DAILY
Status: DISCONTINUED | OUTPATIENT
Start: 2024-08-15 | End: 2024-08-18

## 2024-08-15 RX ORDER — SODIUM CHLORIDE 0.9 % (FLUSH) 0.9 %
5-40 SYRINGE (ML) INJECTION EVERY 12 HOURS SCHEDULED
Status: DISCONTINUED | OUTPATIENT
Start: 2024-08-15 | End: 2024-08-23 | Stop reason: HOSPADM

## 2024-08-15 RX ORDER — INSULIN LISPRO 100 [IU]/ML
0-4 INJECTION, SOLUTION INTRAVENOUS; SUBCUTANEOUS NIGHTLY
Status: DISCONTINUED | OUTPATIENT
Start: 2024-08-15 | End: 2024-08-21

## 2024-08-15 RX ORDER — POLYETHYLENE GLYCOL 3350 17 G/17G
17 POWDER, FOR SOLUTION ORAL DAILY PRN
Status: DISCONTINUED | OUTPATIENT
Start: 2024-08-15 | End: 2024-08-23 | Stop reason: HOSPADM

## 2024-08-15 RX ORDER — SODIUM CHLORIDE 9 MG/ML
INJECTION, SOLUTION INTRAVENOUS CONTINUOUS
Status: DISCONTINUED | OUTPATIENT
Start: 2024-08-15 | End: 2024-08-16

## 2024-08-15 RX ORDER — GLUCAGON 1 MG/ML
1 KIT INJECTION PRN
Status: DISCONTINUED | OUTPATIENT
Start: 2024-08-15 | End: 2024-08-23 | Stop reason: HOSPADM

## 2024-08-15 RX ORDER — ACETAMINOPHEN 650 MG/1
650 SUPPOSITORY RECTAL EVERY 6 HOURS PRN
Status: DISCONTINUED | OUTPATIENT
Start: 2024-08-15 | End: 2024-08-23 | Stop reason: HOSPADM

## 2024-08-15 RX ORDER — ONDANSETRON 2 MG/ML
4 INJECTION INTRAMUSCULAR; INTRAVENOUS EVERY 6 HOURS PRN
Status: DISCONTINUED | OUTPATIENT
Start: 2024-08-15 | End: 2024-08-23 | Stop reason: HOSPADM

## 2024-08-15 RX ORDER — ENOXAPARIN SODIUM 100 MG/ML
40 INJECTION SUBCUTANEOUS DAILY
Status: DISCONTINUED | OUTPATIENT
Start: 2024-08-15 | End: 2024-08-23 | Stop reason: HOSPADM

## 2024-08-15 RX ORDER — DULOXETIN HYDROCHLORIDE 30 MG/1
30 CAPSULE, DELAYED RELEASE ORAL DAILY
Status: DISCONTINUED | OUTPATIENT
Start: 2024-08-15 | End: 2024-08-23 | Stop reason: HOSPADM

## 2024-08-15 RX ORDER — TRAZODONE HYDROCHLORIDE 50 MG/1
50 TABLET, FILM COATED ORAL NIGHTLY PRN
Status: DISCONTINUED | OUTPATIENT
Start: 2024-08-15 | End: 2024-08-23 | Stop reason: HOSPADM

## 2024-08-15 RX ORDER — 0.9 % SODIUM CHLORIDE 0.9 %
1000 INTRAVENOUS SOLUTION INTRAVENOUS ONCE
Status: COMPLETED | OUTPATIENT
Start: 2024-08-15 | End: 2024-08-15

## 2024-08-15 RX ORDER — SODIUM CHLORIDE 0.9 % (FLUSH) 0.9 %
5-40 SYRINGE (ML) INJECTION PRN
Status: DISCONTINUED | OUTPATIENT
Start: 2024-08-15 | End: 2024-08-23 | Stop reason: HOSPADM

## 2024-08-15 RX ORDER — ASPIRIN 81 MG/1
81 TABLET ORAL 2 TIMES DAILY
Status: ON HOLD | COMMUNITY
End: 2024-08-23 | Stop reason: HOSPADM

## 2024-08-15 RX ORDER — LISINOPRIL 5 MG/1
10 TABLET ORAL DAILY
Status: DISCONTINUED | OUTPATIENT
Start: 2024-08-15 | End: 2024-08-23 | Stop reason: HOSPADM

## 2024-08-15 RX ORDER — BACLOFEN 10 MG/1
10 TABLET ORAL 3 TIMES DAILY
Status: DISCONTINUED | OUTPATIENT
Start: 2024-08-15 | End: 2024-08-23 | Stop reason: HOSPADM

## 2024-08-15 RX ORDER — ACETAMINOPHEN 325 MG/1
650 TABLET ORAL EVERY 6 HOURS PRN
Status: DISCONTINUED | OUTPATIENT
Start: 2024-08-15 | End: 2024-08-23 | Stop reason: HOSPADM

## 2024-08-15 RX ORDER — FOLIC ACID 1 MG/1
1 TABLET ORAL DAILY
Status: DISCONTINUED | OUTPATIENT
Start: 2024-08-15 | End: 2024-08-23 | Stop reason: HOSPADM

## 2024-08-15 RX ORDER — DEXTROSE MONOHYDRATE 100 MG/ML
INJECTION, SOLUTION INTRAVENOUS CONTINUOUS PRN
Status: DISCONTINUED | OUTPATIENT
Start: 2024-08-15 | End: 2024-08-23 | Stop reason: HOSPADM

## 2024-08-15 RX ORDER — INSULIN LISPRO 100 [IU]/ML
0-4 INJECTION, SOLUTION INTRAVENOUS; SUBCUTANEOUS
Status: DISCONTINUED | OUTPATIENT
Start: 2024-08-15 | End: 2024-08-21

## 2024-08-15 RX ORDER — ERGOCALCIFEROL 1.25 MG/1
50000 CAPSULE, LIQUID FILLED ORAL WEEKLY
Status: DISCONTINUED | OUTPATIENT
Start: 2024-08-21 | End: 2024-08-23 | Stop reason: HOSPADM

## 2024-08-15 RX ORDER — INSULIN LISPRO 100 [IU]/ML
10 INJECTION, SOLUTION INTRAVENOUS; SUBCUTANEOUS 3 TIMES DAILY
Status: ON HOLD | COMMUNITY
End: 2024-08-19

## 2024-08-15 RX ORDER — INSULIN GLARGINE 100 [IU]/ML
10 INJECTION, SOLUTION SUBCUTANEOUS NIGHTLY
Status: DISCONTINUED | OUTPATIENT
Start: 2024-08-15 | End: 2024-08-16

## 2024-08-15 RX ORDER — BACLOFEN 10 MG/1
10 TABLET ORAL 2 TIMES DAILY
COMMUNITY

## 2024-08-15 RX ADMIN — THIAMINE HYDROCHLORIDE 100 MG: 100 INJECTION, SOLUTION INTRAMUSCULAR; INTRAVENOUS at 18:50

## 2024-08-15 RX ADMIN — AMPICILLIN SODIUM AND SULBACTAM SODIUM 3000 MG: 2; 1 INJECTION, POWDER, FOR SOLUTION INTRAMUSCULAR; INTRAVENOUS at 21:49

## 2024-08-15 RX ADMIN — ENOXAPARIN SODIUM 40 MG: 100 INJECTION SUBCUTANEOUS at 18:49

## 2024-08-15 RX ADMIN — SODIUM CHLORIDE 1000 ML: 9 INJECTION, SOLUTION INTRAVENOUS at 13:38

## 2024-08-15 RX ADMIN — AMPICILLIN SODIUM AND SULBACTAM SODIUM 3000 MG: 2; 1 INJECTION, POWDER, FOR SOLUTION INTRAMUSCULAR; INTRAVENOUS at 15:31

## 2024-08-15 RX ADMIN — IPRATROPIUM BROMIDE AND ALBUTEROL SULFATE 1 DOSE: 2.5; .5 SOLUTION RESPIRATORY (INHALATION) at 22:56

## 2024-08-15 RX ADMIN — INSULIN GLARGINE 10 UNITS: 100 INJECTION, SOLUTION SUBCUTANEOUS at 21:50

## 2024-08-15 RX ADMIN — INSULIN LISPRO 4 UNITS: 100 INJECTION, SOLUTION INTRAVENOUS; SUBCUTANEOUS at 18:58

## 2024-08-15 RX ADMIN — ATORVASTATIN CALCIUM 80 MG: 40 TABLET, FILM COATED ORAL at 21:49

## 2024-08-15 RX ADMIN — SODIUM CHLORIDE 1250 MG: 9 INJECTION, SOLUTION INTRAVENOUS at 18:56

## 2024-08-15 RX ADMIN — SODIUM CHLORIDE: 9 INJECTION, SOLUTION INTRAVENOUS at 18:54

## 2024-08-15 RX ADMIN — FOLIC ACID 1 MG: 1 TABLET ORAL at 18:50

## 2024-08-15 RX ADMIN — ASPIRIN 81 MG: 81 TABLET, COATED ORAL at 18:50

## 2024-08-15 ASSESSMENT — LIFESTYLE VARIABLES
HOW MANY STANDARD DRINKS CONTAINING ALCOHOL DO YOU HAVE ON A TYPICAL DAY: 1 OR 2
HOW OFTEN DO YOU HAVE A DRINK CONTAINING ALCOHOL: MONTHLY OR LESS

## 2024-08-15 ASSESSMENT — PAIN - FUNCTIONAL ASSESSMENT: PAIN_FUNCTIONAL_ASSESSMENT: NONE - DENIES PAIN

## 2024-08-15 NOTE — PROGRESS NOTES
Pharmacy Consultation Note  (Antibiotic Dosing and Monitoring)    Initial consult date: 8/15/2024  Consulting physician/provider: Janis Rivera MD.   Drug: Vancomycin  Indication: PNA    Age/  Gender Height Weight IBW  Allergy Information   54 y.o./male  167.6 cm 58.5 kg (128 lb 15.5 oz)     Ideal body weight: 63.8 kg (140 lb 10.5 oz)   Metoprolol      Renal Function:  Recent Labs     08/15/24  1049   BUN 19   CREATININE 0.7     No intake or output data in the 24 hours ending 08/15/24 1956    Vancomycin Monitoring:  Trough:  No results for input(s): \"VANCOTROUGH\" in the last 72 hours.  Random:  No results for input(s): \"VANCORANDOM\" in the last 72 hours.    Vancomycin Administration Times:  Recent vancomycin administrations                     vancomycin (VANCOCIN) 1,250 mg in sodium chloride 0.9 % 250 mL IVPB (Ojau2Jin) (mg) 1,250 mg New Bag 08/15/24 6367                    Assessment:  Patient is a 54 y.o. male who has been initiated on vancomycin for PNA.   Estimated Creatinine Clearance: 101 mL/min (based on SCr of 0.7 mg/dL).  To dose vancomycin, pharmacy will be utilizing [employer] calculation software for goal AUC/KOREY 400-600 mg/L-hr (predicted AUC/KOREY = 532, Tr =12.2 mcg/mL)    Plan:  Vanco 1250mg Q12H.   Will check vancomycin trough when indicated.   Will continue to monitor renal function   Pharmacy to follow      Temi Richardson, Patrizia, BCIDP, BCPS 8/15/2024 7:59 PM  [phone]

## 2024-08-15 NOTE — H&P
[facility]  Internal Medicine Residency Program  History and Physical    Patient:  Magan Funes 54 y.o. male MRN: [id number]     Date of Service: 8/15/2024    Hospital Day: 1      Chief complaint: had concerns including Fatigue (Patient c/o increased generalized weakness and nausea , hx DM bgl 240 . ).    History Obtained From:  patient    Date of Admission: 8/15/2024  History of Present Illness   Magan Funes is a 54 y.o. male with a PMH of T1DM, DKA, previous lacunar infarct with residual deficit, history of gastroparesis, transaminitis, and history of polysubstance use disorder.  Patient was brought to the ED due to a 2-day history of progressive weakness associated with fatigue and nausea.  This was associated with 1 episode of vomiting.  Patient also reports burning sensation on urination. Per EMS, patient was also having episodes of hyperglycemia at 240s.  He denies recent fever, cough, chills, shortness of breath, sore throat, chest pain or abdominal pain.  Of note, patient has had multiple admissions in the past due to his type I DM and DKA as well as concerns for GI bleeding.  His last hospital admission was 1 month ago and was subsequently discharged to SNF. Due to persistence of symptoms, he was brought to the ED.    In the ED, patient was afebrile with normal blood pressure, normal respiratory rate and oxygen saturation.  Laboratory workup was done which revealed an elevated blood glucose at 219.  He also had elevated WBC at 20.4 with predominance of neutrophils.  Urinalysis was positive for ketones and protein with moderate leukocyte esterase activity and WBC 21-50.  There was also presence of yeast and 1+ bacteria in the urine.  Troponin was elevated at 49 but this is the patient's baseline. Corrected sodium is 131.  Chest x-ray revealed consolidation at the right middle lobe consistent with a right middle lobe pneumonia.  Urine cultures and blood cultures were also ordered.  Mother     Cancer Maternal Grandmother     Diabetes type 2  Nephew        REVIEW OF SYSTEMS:    Review of Systems   Constitutional:  Positive for fatigue. Negative for chills, diaphoresis and fever.   HENT:  Positive for dental problem. Negative for rhinorrhea and sore throat.    Eyes:  Negative for discharge and itching.   Respiratory:  Negative for apnea, cough, chest tightness and shortness of breath.    Cardiovascular:  Negative for chest pain and leg swelling.   Gastrointestinal:  Positive for nausea and vomiting. Negative for blood in stool and constipation.   Genitourinary:  Positive for dysuria. Negative for difficulty urinating and flank pain.   Musculoskeletal:  Negative for arthralgias, back pain, joint swelling and neck pain.   Neurological:  Negative for dizziness and headaches.   Hematological:  Negative for adenopathy.   Psychiatric/Behavioral:  Negative for agitation and decreased concentration.         Physical Exam   Vitals: /69   Pulse 80   Temp 97.9 °F (36.6 °C)   Resp 18   SpO2 96%     Physical Exam  Constitutional:       Appearance: Normal appearance. He is ill-appearing.      Comments: Patient is drowsy and appears tired but able to answer questions.   HENT:      Head: Normocephalic.      Mouth/Throat:      Mouth: Mucous membranes are moist.      Dentition: Dental caries present.   Eyes:      Conjunctiva/sclera: Conjunctivae normal.      Pupils: Pupils are equal, round, and reactive to light.   Cardiovascular:      Rate and Rhythm: Normal rate and regular rhythm.      Pulses: Normal pulses.   Pulmonary:      Effort: Pulmonary effort is normal.      Breath sounds: Rales present.   Abdominal:      General: Abdomen is flat. Bowel sounds are normal.      Palpations: Abdomen is soft.   Musculoskeletal:        Arms:       Cervical back: Normal range of motion.      Comments: Contracture on left arm    Skin:     General: Skin is warm and dry.      Capillary Refill: Capillary refill takes  prior which could have likely caused aspiration  He was recently discharged from the hospital within 1 month, cannot totally rule out healthcare associated pneumonia.  He denies fever, chills, shortness of breath, or sore throat.  (+) Coarse crackles on the right lung field  COVID-negative  WBC 20.4, PMN's 87, procalcitonin 0.56,   CXR findings consistent with right middle lobe pneumonia  Follow pancultures, CRP, S. Pneumoniae Ag, Legionella Antigen urine  Continue Unasyn, started on vancomycin  On NPO  Monitor vital signs    Complicated Urinary Tract Infection  Patient reports dysuria  Leukocytosis on CBC with predominant neutrophils  Urinalysis: WBC 21-50, 1+ bacteria, + yeast, mod LE, 15 ketones  Follow urine culture  Continue antibiotics    Hyponatremia  Corrected sodium for hyperglycemia 130  On 0.9% sodium chloride infusion at 75 mL/h  Monitor BMP daily    Elevated Troponin, stable  Troponin 49, previously at 60 to 80s  Denies chest pain  Monitor troponin    Type 1 diabetes mellitus  Home med: Lantus 30 units nightly, lispro 10 units 3 times daily before meals  On CGM freestyle aleyda  Ordered Lantus 10 units and LDSSI  Monitor glucose every 4 hours    Elevated BHB likely secondary to starvation ketosis  BHB 1.04  Monitor    Elevated ALP    Ordered GGT    History of COPD  On DuoNeb    Vitamin D deficiency  Home med: Vitamin D 50,000 units weekly  Continue vitamin D    Polysubstance use disorder  Last alcohol use per patient was 2 weeks ago  Reports marijuana use  Ordered SDS and UDS  On [facility] protocol  On thiamine and folate    History of gastroparesis secondary to diabetes mellitus    History of CVA secondary to lacunar infarct  On aspirin 81 mg and Lipitor 80 mg    History of GI bleed  Home med: On Carafate    History of hypertension  Home med: Amlodipine 10 mg, lisinopril 10 mg    History of insomnia  Home med: Trazodone 50 mg as needed nightly    PT/OT: Not indicated at this time  DVT ppx:

## 2024-08-15 NOTE — ED PROVIDER NOTES
Magan Funes is a 54-year-old male present emergency department concern for increasing weakness, hyperglycemia, nausea.  Patient states that for 2 days he has felt diffusely weak with nausea.  He does have a history of DKA does have a history of diabetes does have a history of previous CVA and chronic left-sided weakness.  Patient denies chest pain, shortness of breath, fever, chills patient is not having abdominal pain patient has not tried anything for symptoms.    The history is provided by the patient, medical records and the EMS personnel.        Review of Systems   Constitutional:  Positive for fatigue. Negative for chills, diaphoresis and fever.   Eyes:  Negative for photophobia and visual disturbance.   Respiratory:  Negative for cough, chest tightness and shortness of breath.    Cardiovascular:  Negative for chest pain, palpitations and leg swelling.   Gastrointestinal:  Positive for nausea. Negative for abdominal distention, abdominal pain, diarrhea and vomiting.   Genitourinary:  Negative for dysuria.   Musculoskeletal:  Negative for back pain, neck pain and neck stiffness.   Skin:  Negative for pallor and rash.   Neurological:  Positive for weakness (diffuse no new weakness). Negative for headaches.   Psychiatric/Behavioral:  Negative for confusion.         Physical Exam  Vitals and nursing note reviewed.   Constitutional:       General: He is not in acute distress.     Appearance: He is ill-appearing.      Comments: Chronically ill in appearance   HENT:      Head: Normocephalic and atraumatic.   Eyes:      General: No scleral icterus.     Conjunctiva/sclera: Conjunctivae normal.      Pupils: Pupils are equal, round, and reactive to light.   Cardiovascular:      Rate and Rhythm: Normal rate and regular rhythm.      Comments: Intact DP PT pulses  Pulmonary:      Effort: Pulmonary effort is normal.      Breath sounds: Normal breath sounds.   Abdominal:      General: Bowel sounds are normal. There is no  distension.      Palpations: Abdomen is soft.      Tenderness: There is no abdominal tenderness. There is no guarding or rebound.   Musculoskeletal:      Cervical back: Normal range of motion and neck supple. No rigidity. No muscular tenderness.      Right lower leg: No edema.      Left lower leg: No edema.   Skin:     General: Skin is warm and dry.      Capillary Refill: Capillary refill takes less than 2 seconds.      Coloration: Skin is not pale.      Findings: No erythema or rash.   Neurological:      Mental Status: He is alert and oriented to person, place, and time.      Comments: Contracture to left upper extremity weakness to left lower extremity chronic unchanged for patient patient is able to move the right side he is alert and oriented to self, place,situation, and time   Psychiatric:         Mood and Affect: Mood normal.          Procedures     MDM  Number of Diagnoses or Management Options  Pneumonia due to infectious organism, unspecified laterality, unspecified part of lung  Urinary tract infection without hematuria, site unspecified  Diagnosis management comments: Magan Funes is a 54-year-old male present emerged part with concern for diffuse weakness, nausea, hyperglycemia  Vital signs reviewed interpreted patient was afebrile nontachycardic, normal blood pressure  Patient reported he felt diffusely weak and fatigued he was found to be hyperglycemic for EMS   Lab work was reviewed and interpreted, , bicarb normal, patient did not have elevated anion gap, patient did have leukocytosis  Found to have likely UTI and pna, possible aspiration pna, given unasyn, will be admitted for pna  Patient at his baseline mental status, diffusely weak will be admitted  Patient was tachycardic given IVF in ED stable     History provided by EMS and patient  Co morbidities include htn, dm, cva, hld, afib,   Differential diagnosis includes not limited to acs, pna, uti,   Social determents of health include stress     RBC 4.02 3.80 - 5.80 m/uL    Hemoglobin 11.2 (L) 12.5 - 16.5 g/dL    Hematocrit 35.8 (L) 37.0 - 54.0 %    MCV 89.1 80.0 - 99.9 fL    MCH 27.9 26.0 - 35.0 pg    MCHC 31.3 (L) 32.0 - 34.5 g/dL    RDW 15.7 (H) 11.5 - 15.0 %    Platelets 565 (H) 130 - 450 k/uL    MPV 9.7 7.0 - 12.0 fL    Neutrophils % 85 (H) 43.0 - 80.0 %    Lymphocytes % 6 (L) 20.0 - 42.0 %    Monocytes % 7 2.0 - 12.0 %    Eosinophils % 1 0 - 6 %    Basophils % 1 0.0 - 2.0 %    Immature Granulocytes % 1 0.0 - 5.0 %    Neutrophils Absolute 15.30 (H) 1.80 - 7.30 k/uL    Lymphocytes Absolute 1.07 (L) 1.50 - 4.00 k/uL    Monocytes Absolute 1.33 (H) 0.10 - 0.95 k/uL    Eosinophils Absolute 0.23 0.05 - 0.50 k/uL    Basophils Absolute 0.09 0.00 - 0.20 k/uL    Immature Granulocytes Absolute 0.09 0.00 - 0.58 k/uL   Basic Metabolic Panel   Result Value Ref Range    Sodium 130 (L) 132 - 146 mmol/L    Potassium 4.3 3.5 - 5.0 mmol/L    Chloride 96 (L) 98 - 107 mmol/L    CO2 19 (L) 22 - 29 mmol/L    Anion Gap 15 7 - 16 mmol/L    Glucose 318 (H) 74 - 99 mg/dL    BUN 12 6 - 20 mg/dL    Creatinine 0.6 (L) 0.70 - 1.20 mg/dL    Est, Glom Filt Rate >90 >60 mL/min/1.73m2    Calcium 8.9 8.6 - 10.2 mg/dL   Magnesium   Result Value Ref Range    Magnesium 1.9 1.6 - 2.6 mg/dL   Phosphorus   Result Value Ref Range    Phosphorus 2.7 2.5 - 4.5 mg/dL   POCT Glucose   Result Value Ref Range    POC Glucose 395 (H) 74 - 99 mg/dL   POCT Glucose   Result Value Ref Range    POC Glucose 281 (H) 74 - 99 mg/dL   POCT Glucose   Result Value Ref Range    POC Glucose 246 (H) 74 - 99 mg/dL   POCT Glucose   Result Value Ref Range    POC Glucose 338 (H) 74 - 99 mg/dL   POCT Glucose   Result Value Ref Range    POC Glucose 231 (H) 74 - 99 mg/dL   EKG 12 Lead   Result Value Ref Range    Ventricular Rate 90 BPM    Atrial Rate 90 BPM    P-R Interval 138 ms    QRS Duration 84 ms    Q-T Interval 374 ms    QTc Calculation (Bazett) 457 ms    P Axis 56 degrees    R Axis 45 degrees    T Axis 61 degrees

## 2024-08-16 PROBLEM — I63.81 ACUTE LACUNAR INFARCTION (HCC): Status: RESOLVED | Noted: 2023-01-23 | Resolved: 2024-08-16

## 2024-08-16 PROBLEM — I63.9 ACUTE CVA (CEREBROVASCULAR ACCIDENT) (HCC): Status: RESOLVED | Noted: 2023-01-24 | Resolved: 2024-08-16

## 2024-08-16 PROBLEM — I63.89 ACUTE ISCHEMIC MULTIFOCAL MULTIPLE VASCULAR TERRITORIES STROKE (HCC): Status: RESOLVED | Noted: 2018-11-10 | Resolved: 2024-08-16

## 2024-08-16 LAB
ANION GAP SERPL CALCULATED.3IONS-SCNC: 15 MMOL/L (ref 7–16)
BASOPHILS # BLD: 0.09 K/UL (ref 0–0.2)
BASOPHILS NFR BLD: 1 % (ref 0–2)
BUN SERPL-MCNC: 12 MG/DL (ref 6–20)
CALCIUM SERPL-MCNC: 8.9 MG/DL (ref 8.6–10.2)
CHLORIDE SERPL-SCNC: 96 MMOL/L (ref 98–107)
CO2 SERPL-SCNC: 19 MMOL/L (ref 22–29)
CREAT SERPL-MCNC: 0.6 MG/DL (ref 0.7–1.2)
EOSINOPHIL # BLD: 0.23 K/UL (ref 0.05–0.5)
EOSINOPHILS RELATIVE PERCENT: 1 % (ref 0–6)
ERYTHROCYTE [DISTWIDTH] IN BLOOD BY AUTOMATED COUNT: 15.7 % (ref 11.5–15)
GFR, ESTIMATED: >90 ML/MIN/1.73M2
GLUCOSE BLD-MCNC: 231 MG/DL (ref 74–99)
GLUCOSE BLD-MCNC: 246 MG/DL (ref 74–99)
GLUCOSE BLD-MCNC: 338 MG/DL (ref 74–99)
GLUCOSE SERPL-MCNC: 318 MG/DL (ref 74–99)
HCT VFR BLD AUTO: 35.8 % (ref 37–54)
HGB BLD-MCNC: 11.2 G/DL (ref 12.5–16.5)
IMM GRANULOCYTES # BLD AUTO: 0.09 K/UL (ref 0–0.58)
IMM GRANULOCYTES NFR BLD: 1 % (ref 0–5)
L PNEUMO1 AG UR QL IA.RAPID: NEGATIVE
LYMPHOCYTES NFR BLD: 1.07 K/UL (ref 1.5–4)
LYMPHOCYTES RELATIVE PERCENT: 6 % (ref 20–42)
MAGNESIUM SERPL-MCNC: 1.9 MG/DL (ref 1.6–2.6)
MCH RBC QN AUTO: 27.9 PG (ref 26–35)
MCHC RBC AUTO-ENTMCNC: 31.3 G/DL (ref 32–34.5)
MCV RBC AUTO: 89.1 FL (ref 80–99.9)
MICROORGANISM SPEC CULT: NO GROWTH
MONOCYTES NFR BLD: 1.33 K/UL (ref 0.1–0.95)
MONOCYTES NFR BLD: 7 % (ref 2–12)
NEUTROPHILS NFR BLD: 85 % (ref 43–80)
NEUTS SEG NFR BLD: 15.3 K/UL (ref 1.8–7.3)
PHOSPHATE SERPL-MCNC: 2.7 MG/DL (ref 2.5–4.5)
PLATELET # BLD AUTO: 565 K/UL (ref 130–450)
PMV BLD AUTO: 9.7 FL (ref 7–12)
POTASSIUM SERPL-SCNC: 4.3 MMOL/L (ref 3.5–5)
RBC # BLD AUTO: 4.02 M/UL (ref 3.8–5.8)
S PNEUM AG SPEC QL: NEGATIVE
SERVICE CMNT-IMP: NORMAL
SODIUM SERPL-SCNC: 130 MMOL/L (ref 132–146)
SPECIMEN DESCRIPTION: NORMAL
SPECIMEN SOURCE: NORMAL
WBC OTHER # BLD: 18.1 K/UL (ref 4.5–11.5)

## 2024-08-16 PROCEDURE — 94640 AIRWAY INHALATION TREATMENT: CPT

## 2024-08-16 PROCEDURE — 2580000003 HC RX 258

## 2024-08-16 PROCEDURE — 6360000002 HC RX W HCPCS

## 2024-08-16 PROCEDURE — 6370000000 HC RX 637 (ALT 250 FOR IP)

## 2024-08-16 PROCEDURE — 2060000000 HC ICU INTERMEDIATE R&B

## 2024-08-16 PROCEDURE — 80048 BASIC METABOLIC PNL TOTAL CA: CPT

## 2024-08-16 PROCEDURE — 84100 ASSAY OF PHOSPHORUS: CPT

## 2024-08-16 PROCEDURE — 82962 GLUCOSE BLOOD TEST: CPT

## 2024-08-16 PROCEDURE — 83735 ASSAY OF MAGNESIUM: CPT

## 2024-08-16 PROCEDURE — 85025 COMPLETE CBC W/AUTO DIFF WBC: CPT

## 2024-08-16 PROCEDURE — 36415 COLL VENOUS BLD VENIPUNCTURE: CPT

## 2024-08-16 PROCEDURE — 99223 1ST HOSP IP/OBS HIGH 75: CPT | Performed by: INTERNAL MEDICINE

## 2024-08-16 RX ORDER — INSULIN GLARGINE 100 [IU]/ML
30 INJECTION, SOLUTION SUBCUTANEOUS NIGHTLY
Status: DISCONTINUED | OUTPATIENT
Start: 2024-08-16 | End: 2024-08-19

## 2024-08-16 RX ORDER — FLUCONAZOLE 150 MG/1
150 TABLET ORAL ONCE
Status: COMPLETED | OUTPATIENT
Start: 2024-08-16 | End: 2024-08-16

## 2024-08-16 RX ORDER — INSULIN LISPRO 100 [IU]/ML
10 INJECTION, SOLUTION INTRAVENOUS; SUBCUTANEOUS
Status: DISCONTINUED | OUTPATIENT
Start: 2024-08-16 | End: 2024-08-21

## 2024-08-16 RX ADMIN — IPRATROPIUM BROMIDE AND ALBUTEROL SULFATE 1 DOSE: 2.5; .5 SOLUTION RESPIRATORY (INHALATION) at 15:55

## 2024-08-16 RX ADMIN — AMPICILLIN SODIUM AND SULBACTAM SODIUM 3000 MG: 2; 1 INJECTION, POWDER, FOR SOLUTION INTRAMUSCULAR; INTRAVENOUS at 04:23

## 2024-08-16 RX ADMIN — INSULIN GLARGINE 30 UNITS: 100 INJECTION, SOLUTION SUBCUTANEOUS at 20:53

## 2024-08-16 RX ADMIN — SODIUM CHLORIDE 1250 MG: 9 INJECTION, SOLUTION INTRAVENOUS at 09:00

## 2024-08-16 RX ADMIN — INSULIN LISPRO 3 UNITS: 100 INJECTION, SOLUTION INTRAVENOUS; SUBCUTANEOUS at 17:32

## 2024-08-16 RX ADMIN — SODIUM CHLORIDE, PRESERVATIVE FREE 10 ML: 5 INJECTION INTRAVENOUS at 20:54

## 2024-08-16 RX ADMIN — AMPICILLIN SODIUM AND SULBACTAM SODIUM 3000 MG: 2; 1 INJECTION, POWDER, FOR SOLUTION INTRAMUSCULAR; INTRAVENOUS at 15:30

## 2024-08-16 RX ADMIN — AMPICILLIN SODIUM AND SULBACTAM SODIUM 3000 MG: 2; 1 INJECTION, POWDER, FOR SOLUTION INTRAMUSCULAR; INTRAVENOUS at 09:30

## 2024-08-16 RX ADMIN — ATORVASTATIN CALCIUM 80 MG: 40 TABLET, FILM COATED ORAL at 20:52

## 2024-08-16 RX ADMIN — IPRATROPIUM BROMIDE AND ALBUTEROL SULFATE 1 DOSE: 2.5; .5 SOLUTION RESPIRATORY (INHALATION) at 06:41

## 2024-08-16 RX ADMIN — SODIUM CHLORIDE 1250 MG: 9 INJECTION, SOLUTION INTRAVENOUS at 22:26

## 2024-08-16 RX ADMIN — FLUCONAZOLE 150 MG: 150 TABLET ORAL at 14:00

## 2024-08-16 RX ADMIN — IPRATROPIUM BROMIDE AND ALBUTEROL SULFATE 1 DOSE: 2.5; .5 SOLUTION RESPIRATORY (INHALATION) at 19:12

## 2024-08-16 RX ADMIN — AMPICILLIN SODIUM AND SULBACTAM SODIUM 3000 MG: 2; 1 INJECTION, POWDER, FOR SOLUTION INTRAMUSCULAR; INTRAVENOUS at 20:52

## 2024-08-16 RX ADMIN — INSULIN LISPRO 10 UNITS: 100 INJECTION, SOLUTION INTRAVENOUS; SUBCUTANEOUS at 17:32

## 2024-08-16 ASSESSMENT — PAIN - FUNCTIONAL ASSESSMENT: PAIN_FUNCTIONAL_ASSESSMENT: NONE - DENIES PAIN

## 2024-08-16 NOTE — PROGRESS NOTES
Pt arrived to room 4501b. Pt oriented to room and call light. Educated on need to call for assistance. Denies pain. Warm and clammy skin, glucose wdl. Pt soiled and changed. Purewick in place due to incontinence. Distal penis is red and inflamed. Coccyx is red and does not vega. Spoke with mother, update given. Safety and comfort on going.

## 2024-08-16 NOTE — PROGRESS NOTES
*ATTENTION:  This note has been created by a medical student for educational purposes only.  Please do not refer to the content of this note for clinical decision-making, billing, or other purposes.  Please see attending physician’s note to obtain clinical information on this patient.*          [facility]  Internal Medicine Residency Program  Medical Student Progress Note - House Team 2      Patient:  Magan Funes 54 y.o. male   MRN: [id number]       Date of Service: 8/16/2024  Admission date: 8/15/2024 10:11 AM ; Hospital day: 1   CC: Fatigue (Patient c/o increased generalized weakness and nausea , hx DM bgl 240 . )   Right middle lobe pneumonia   Overnight events: no acute events overnight.     Subjective     Patient was admitted for weakness and right middle lobe pneumonia. Today, patient seen and examined at bedside this morning. He is awake, alert, and in no acute distress. He mentions that he feels about the same as yesterday, and he still endorses weakness. He denies any fever, chills, cough, trouble breathing, chest pain, or abdominal pain. He is also able to take a deep breath and denies pleuritic chest pain. He measures his blood sugar with a continuous glucose monitor and currently has one on left arm.       Objective   PHYSICAL EXAM  General Appearance: Frail, thin male. Awake and alert. Cooperative  HEENT: Normocephalic, atraumatic, poor dentition, mucus membranes moist   Neck: midline trachea  Lung: Coarse crackles heard in over right middle lung fields, left lung clear to auscultation, no increased work of breathing  Heart: regular rate and rhythm, no murmur  Abdomen: soft, bowel sounds normal; nontender   Extremities: no cyanosis or edema, contracture and weakness of the left arm  Musculokeletal: No joint swelling, weakness and reduced ROM in the left arm. Full ROM in other extremities.   Neurologic: Mental status: alert, oriented, no focal deficits  mg Oral Daily    aspirin  81 mg Oral Daily    atorvastatin  80 mg Oral Nightly    [Held by provider] baclofen  10 mg Oral TID    [Held by provider] DULoxetine  30 mg Oral Daily    [START ON 8/21/2024] vitamin D  50,000 Units Oral Weekly    [Held by provider] lisinopril  10 mg Oral Daily    thiamine  100 mg IntraVENous Daily    folic acid  1 mg Oral Daily    ipratropium 0.5 mg-albuterol 2.5 mg  1 Dose Inhalation Q4H WA RT    insulin lispro  0-4 Units SubCUTAneous TID WC    insulin lispro  0-4 Units SubCUTAneous Nightly    sodium chloride flush  5-40 mL IntraVENous 2 times per day    enoxaparin  40 mg SubCUTAneous Daily    ampicillin-sulbactam  3,000 mg IntraVENous Q6H    vancomycin  1,250 mg IntraVENous Q12H     PRN Meds: [Held by provider] traZODone, glucose, dextrose bolus **OR** dextrose bolus, glucagon (rDNA), dextrose, sodium chloride flush, sodium chloride, ondansetron **OR** ondansetron, polyethylene glycol, acetaminophen **OR** acetaminophen      Imaging studies     XR CHEST 1 VIEW   Final Result   1. Right middle lobe pneumonia.                Resident's Assessment and Plan     Assessment and Plan:    Generalized weakness likely secondary to infection     Leukocytosis likely secondary to infection: UTI, RML, rule out bacteremia   -CXR shows right middle lobe pneumonia   -UA had leukocytosis, bacteriuria, +leukocyte esterase and presence of yeast concerning for UTI. Blood culture no growth to date  -Blood culture noted staphylococcal but no specific species listed. Only 1/2, so far and patient has had false positive before. Continue to monitor and await culture.     Plan:   -Urine culture pending   -Single dose of diflucan oral for the yeast  -Continue Unasyn and Vancomycin-need to cover for aspiration pneumonia and health-care association pneumonia due to recent hospitalization 4 weeks ago and discharge to SNF  -Daily CBC  -Follow cultures    -Duneb q4 hours for dyspnea   -Monitor oxygen saturation     3.  Complicated UTI  -Patient complains of dysuria  -UA noted to have leukocytosis, bacteriuria, positive leukocyte esterase and presence of yeast     Plan:  -continue antibiotics as above  -Given one dose of diflucan  -Follow up urine culture     4. Type 1 diabetes mellitus  -Poorly controlled. Last A1C 11.7 (06/29/2024), blood glucose elevated 240s on arrival   -Resume home regimen of Lantus 30 units nightly, lispo 10 units 3 times daily before meals   -Monitor blood glucose every 4 hours-watch out for hypoglycemia/hyperglycemia  -Note: patient has went into DKA easily at previous hospitalizations. Patient needs both D5 or D10 fluids when NPO and insulin to prevent DKA   -Do not discontinue insulin    -Currently not in DKA, elevated beta hydroxybutyrate likely due to stress from infection or starvation ketosis. Monitor   -Daily BMP     5. History of gastrointestinal bleed   -Patient had hematemesis from his previous hospital admission  -Continue Protonix 40 mg     6. Hypertension  -BP currently low-normal at 101/61  -Home meds are amlodipine and lisinopril but currently holding these  -Monitor BP     7. Elevated troponin, stable  -Tropinin 49 then 49, no delta change so no concern for acute infarction     8. History of alcohol use disorder/history of polysubstance abuse   -Urine drug screen unremarkable  -CIWA assessment, no signs of Dts  -Patient states last drink was 2 weeks ago  -Monitor     9. History of CVA  -Continue aspirin and Lipitor home medications          Tobacco use per chart:  reports that he has been smoking cigarettes. He has a 22.5 pack-year smoking history. He has never used smokeless tobacco.  Alcohol use per chart:  reports that he does not currently use alcohol after a past usage of about 6.0 standard drinks of alcohol per week.  DVT prophylaxis: Lovenox  GI prophylaxis: Protonix   Diet:   ADULT DIET; Regular; 4 carb choices (60 gm/meal)   Pain management: as needed  Code status: Full Code

## 2024-08-16 NOTE — ED NOTES
Pt's brief was noted to be saturated and leaking onto linens. Linens were changed, richi care was provided, and a new brief was applied. Pt denies further needs at this time.

## 2024-08-16 NOTE — PROGRESS NOTES
4 Eyes Skin Assessment     NAME:  Magan Funes  YOB: 1970  MEDICAL RECORD NUMBER:  [id number]    The patient is being assessed for  Admission    I agree that at least one RN has performed a thorough Head to Toe Skin Assessment on the patient. ALL assessment sites listed below have been assessed.      Areas assessed by both nurses:            Does the Patient have a Wound? Yes wound(s) were present on assessment. LDA wound assessment was Initiated and completed by RN       Roberto Prevention initiated by RN: Yes  Wound Care Orders initiated by RN: Yes    Pressure Injury (Stage 3,4, Unstageable, DTI, NWPT, and Complex wounds) if present, place Wound referral order by RN under : No    New Ostomies, if present place, Ostomy referral order under : No     Nurse 1 eSignature: Electronically signed by Jakub Werner RN on 8/16/24 at 4:43 PM EDT    **SHARE this note so that the co-signing nurse can place an eSignature**    Nurse 2 eSignature: Electronically signed by Ivet Fernandez RN on 8/16/24 at 4:45 PM EDT

## 2024-08-16 NOTE — PROGRESS NOTES
[facility]  Internal Medicine Residency Program  Progress Note - House Team 2    Patient:  Magan Funes 54 y.o. male MRN: [id number]     Date of Service: 8/16/2024     CC: progressive weakness    Days since admission: 1    Subjective     Overnight events: No acute overnight events    He was seen and examined at bedside this morning in no acute distress. He mentioned that his weakness is improving but has increasing thirst. He denies fever, fatigue, nausea, urination, tremors, tingling, and numbness.  and is on insulin regimen.     Objective     Physical Exam:  Vitals: /61   Pulse 98   Temp 99.2 °F (37.3 °C) (Oral)   Resp 16   Ht 1.676 m (5' 6\")   Wt 63.5 kg (140 lb)   SpO2 92%   BMI 22.60 kg/m²     I & O - 24hr: No intake or output data in the 24 hours ending 08/16/24 0904   General Appearance: alert, appears stated age, and cooperative  HEENT:  Head: Normal, normocephalic, atraumatic. dental caries present  Neck: no adenopathy, no carotid bruit, no JVD, supple, symmetrical, trachea midline, and thyroid not enlarged, symmetric, no tenderness/mass/nodules  Lung: rales RML   Heart: regular rate and rhythm, S1, S2 normal, no murmur, click, rub or gallop  Abdomen: soft, non-tender; bowel sounds normal; no masses,  no organomegaly  Extremities:  extremities normal, atraumatic, no cyanosis or edema (+) contracture on the left arm  Musculokeletal: No joint swelling, no muscle tenderness. ROM normal in all joints of extremities.   Neurologic: Mental status: Alert, oriented, thought content appropriate    Pertinent Information & Imaging Studies, Consults     CBC:   Lab Results   Component Value Date/Time    WBC 20.4 08/15/2024 10:49 AM    RBC 3.79 08/15/2024 10:49 AM    HGB 10.7 08/15/2024 10:49 AM    HCT 32.6 08/15/2024 10:49 AM    MCV 86.0 08/15/2024 10:49 AM    MCH 28.2 08/15/2024 10:49 AM    MCHC 32.8 08/15/2024 10:49 AM    RDW 15.1 08/15/2024 10:49 AM     08/15/2024 10:49  ampicillin-sulbactam (UNASYN) 3,000 mg IV and vancomycin (VANCOCIN) 1,250 mg IV  Plan:  Continue antibiotic regimen  Daily CBCs  Follow on blood culture and urine culture    Complicated Urinary Tract Infection  Patient noted episodes of dysuria  UA was noted to have leukocytosis, bacteriuria, (+) leukocyte esterase, and presence of yeast  Urine culture pending  Plan:  Continue antibiotic regimen  Follow urine culture    Concern for pneumonia likely 2/2 aspiration pneumonia vs healthcare associated pneumonia  Patient had episodes of vomiting, which could potentially lead to aspiration pneumonia. Another risk factor would be his prior history of diabetic gastroparesis. He was previously admitted one month prior due to diabetic gastroparesis and was sent to skilled nursing [facility]. These are risk factors for healthcare associated pneumonia  He is afebrile and has stable vital signs  CXR shows right middle lobe pneumonia  WBC 20.4  On ampicillin-sulbactam (UNASYN) 3,000 mg IV and vancomycin (VANCOCIN) 1,250 mg IV  On ipratropium 0.5mg-albuterol 2.5mg (DUONEB) nebulization every 4 hours  Plan:  Continue anti-infective treatment  Continue breathing treatment  Watch out for episodes of desaturations  Give oxygenation support if SpO2 <92    Hyponatremia likely 2/2 hyperglycemia  Initial Na 128,  --> corrected Na 130  On 0.9% sodium chloride infusion  Plan:  Monitor BMP daily    Type 1 Diabetes Mellitus, uncontrolled  ; HgbA1c (06/29/2024) 11.7  Home regimen: Lantus 30 units nightly, lispro 10 units 3 times daily before meals   On insulin glargine (LANTUS) 10 units and insulin lispro (HUMALOG) LDSS   Plan:  Resumed home dose of lantus to 30 units. Continue insulin regimen. Adjust appropriately according to BG.  Monitor BG every 4 hours  Watch out for episodes of hyperglycemia/hypoglycemia    Ketosis likely 2/2 infection-induced, uncontrolled diabetes mellitus  BHB 1.04, presence of ketones in the urine  DKA

## 2024-08-16 NOTE — PROGRESS NOTES
Pharmacy Consultation Note  (Antibiotic Dosing and Monitoring)    Initial consult date: 8/15/2024  Consulting physician/provider: Janis Rivera MD.   Drug: Vancomycin  Indication: PNA    Age/  Gender Height Weight IBW  Allergy Information   54 y.o./male 167.6 cm (5' 6\")167.6 cm 58.5 kg (128 lb 15.5 oz)     Ideal body weight: 63.8 kg (140 lb 10.5 oz)   Metoprolol      Renal Function:  Recent Labs     08/15/24  1049 08/16/24  0856   BUN 19 12   CREATININE 0.7 0.6*     No intake or output data in the 24 hours ending 08/16/24 1334    Vancomycin Monitoring:  Trough:  No results for input(s): \"VANCOTROUGH\" in the last 72 hours.  Random:  No results for input(s): \"VANCORANDOM\" in the last 72 hours.    Vancomycin Administration Times:    Recent vancomycin administrations                     vancomycin (VANCOCIN) 1,250 mg in sodium chloride 0.9 % 250 mL IVPB ([id number]) (mg) 1,250 mg New Bag 08/15/24 5866                  Assessment:  Patient is a 54 y.o. male who has been initiated on vancomycin for PNA.   Estimated Creatinine Clearance: 128 mL/min (A) (based on SCr of 0.6 mg/dL (L)).  To dose vancomycin, pharmacy will be utilizing [employer] calculation software for goal AUC/KOREY 400-600 mg/L-hr (predicted AUC/KOREY = 532, Tr =12.2 mcg/mL)  8/16: Scr 0.6    Plan:  Continue vancomycin 1250mg IV Q12 hr  Will check vancomycin trough when indicated.   Will continue to monitor renal function   Pharmacy to follow      Thank you for the consult,     Aldo Cantu, PharmD, BCPS, BCCCP 8/16/2024 1:37 PM

## 2024-08-17 ENCOUNTER — APPOINTMENT (OUTPATIENT)
Dept: GENERAL RADIOLOGY | Age: 54
DRG: 177 | End: 2024-08-17
Payer: MEDICARE

## 2024-08-17 LAB
ACB COMPLEX DNA BLD POS QL NAA+NON-PROBE: NOT DETECTED
ANION GAP SERPL CALCULATED.3IONS-SCNC: 12 MMOL/L (ref 7–16)
B FRAGILIS DNA BLD POS QL NAA+NON-PROBE: NOT DETECTED
BASOPHILS # BLD: 0.05 K/UL (ref 0–0.2)
BASOPHILS NFR BLD: 0 % (ref 0–2)
BIOFIRE TEST COMMENT: ABNORMAL
BUN SERPL-MCNC: 7 MG/DL (ref 6–20)
C ALBICANS DNA BLD POS QL NAA+NON-PROBE: NOT DETECTED
C AURIS DNA BLD POS QL NAA+NON-PROBE: NOT DETECTED
C GATTII+NEOFOR DNA BLD POS QL NAA+N-PRB: NOT DETECTED
C GLABRATA DNA BLD POS QL NAA+NON-PROBE: NOT DETECTED
C KRUSEI DNA BLD POS QL NAA+NON-PROBE: NOT DETECTED
C PARAP DNA BLD POS QL NAA+NON-PROBE: NOT DETECTED
C TROPICLS DNA BLD POS QL NAA+NON-PROBE: NOT DETECTED
CALCIUM SERPL-MCNC: 9 MG/DL (ref 8.6–10.2)
CHLORIDE SERPL-SCNC: 96 MMOL/L (ref 98–107)
CO2 SERPL-SCNC: 24 MMOL/L (ref 22–29)
CREAT SERPL-MCNC: 0.5 MG/DL (ref 0.7–1.2)
DATE LAST DOSE: ABNORMAL
E CLOAC COMP DNA BLD POS NAA+NON-PROBE: NOT DETECTED
E COLI DNA BLD POS QL NAA+NON-PROBE: NOT DETECTED
E FAECALIS DNA BLD POS QL NAA+NON-PROBE: NOT DETECTED
E FAECIUM DNA BLD POS QL NAA+NON-PROBE: NOT DETECTED
ENTEROBACTERALES DNA BLD POS NAA+N-PRB: NOT DETECTED
EOSINOPHIL # BLD: 0.35 K/UL (ref 0.05–0.5)
EOSINOPHILS RELATIVE PERCENT: 2 % (ref 0–6)
ERYTHROCYTE [DISTWIDTH] IN BLOOD BY AUTOMATED COUNT: 14.9 % (ref 11.5–15)
GFR, ESTIMATED: >90 ML/MIN/1.73M2
GLUCOSE BLD-MCNC: 119 MG/DL (ref 74–99)
GLUCOSE BLD-MCNC: 177 MG/DL (ref 74–99)
GLUCOSE BLD-MCNC: 225 MG/DL (ref 74–99)
GLUCOSE BLD-MCNC: 92 MG/DL (ref 74–99)
GLUCOSE SERPL-MCNC: 111 MG/DL (ref 74–99)
GP B STREP DNA BLD POS QL NAA+NON-PROBE: NOT DETECTED
HAEM INFLU DNA BLD POS QL NAA+NON-PROBE: NOT DETECTED
HCT VFR BLD AUTO: 34.5 % (ref 37–54)
HGB BLD-MCNC: 11.2 G/DL (ref 12.5–16.5)
IMM GRANULOCYTES # BLD AUTO: 0.13 K/UL (ref 0–0.58)
IMM GRANULOCYTES NFR BLD: 1 % (ref 0–5)
K OXYTOCA DNA BLD POS QL NAA+NON-PROBE: NOT DETECTED
KLEBSIELLA SP DNA BLD POS QL NAA+NON-PRB: NOT DETECTED
KLEBSIELLA SP DNA BLD POS QL NAA+NON-PRB: NOT DETECTED
L MONOCYTOG DNA BLD POS QL NAA+NON-PROBE: NOT DETECTED
LYMPHOCYTES NFR BLD: 1.48 K/UL (ref 1.5–4)
LYMPHOCYTES RELATIVE PERCENT: 10 % (ref 20–42)
MAGNESIUM SERPL-MCNC: 1.7 MG/DL (ref 1.6–2.6)
MCH RBC QN AUTO: 28.5 PG (ref 26–35)
MCHC RBC AUTO-ENTMCNC: 32.5 G/DL (ref 32–34.5)
MCV RBC AUTO: 87.8 FL (ref 80–99.9)
MICROORGANISM SPEC CULT: ABNORMAL
MICROORGANISM SPEC CULT: NORMAL
MICROORGANISM/AGENT SPEC: ABNORMAL
MONOCYTES NFR BLD: 1.09 K/UL (ref 0.1–0.95)
MONOCYTES NFR BLD: 7 % (ref 2–12)
N MEN DNA BLD POS QL NAA+NON-PROBE: NOT DETECTED
NEUTROPHILS NFR BLD: 79 % (ref 43–80)
NEUTS SEG NFR BLD: 11.9 K/UL (ref 1.8–7.3)
P AERUGINOSA DNA BLD POS NAA+NON-PROBE: NOT DETECTED
PHOSPHATE SERPL-MCNC: 2.6 MG/DL (ref 2.5–4.5)
PLATELET # BLD AUTO: 537 K/UL (ref 130–450)
PMV BLD AUTO: 9.2 FL (ref 7–12)
POTASSIUM SERPL-SCNC: 4.1 MMOL/L (ref 3.5–5)
PROTEUS SP DNA BLD POS QL NAA+NON-PROBE: NOT DETECTED
RBC # BLD AUTO: 3.93 M/UL (ref 3.8–5.8)
S AUREUS DNA BLD POS QL NAA+NON-PROBE: NOT DETECTED
S AUREUS+CONS DNA BLD POS NAA+NON-PROBE: DETECTED
S EPIDERMIDIS DNA BLD POS QL NAA+NON-PRB: NOT DETECTED
S LUGDUNENSIS DNA BLD POS QL NAA+NON-PRB: NOT DETECTED
S MALTOPHILIA DNA BLD POS QL NAA+NON-PRB: NOT DETECTED
S MARCESCENS DNA BLD POS NAA+NON-PROBE: NOT DETECTED
S PNEUM DNA BLD POS QL NAA+NON-PROBE: NOT DETECTED
S PYO DNA BLD POS QL NAA+NON-PROBE: NOT DETECTED
SALMONELLA DNA BLD POS QL NAA+NON-PROBE: NOT DETECTED
SERVICE CMNT-IMP: ABNORMAL
SODIUM SERPL-SCNC: 132 MMOL/L (ref 132–146)
SPECIMEN DESCRIPTION: ABNORMAL
SPECIMEN DESCRIPTION: NORMAL
STREPTOCOCCUS DNA BLD POS NAA+NON-PROBE: NOT DETECTED
TME LAST DOSE: ABNORMAL H
VANCOMYCIN DOSE: ABNORMAL MG
VANCOMYCIN TROUGH SERPL-MCNC: 16.2 UG/ML (ref 5–16)
WBC OTHER # BLD: 15 K/UL (ref 4.5–11.5)

## 2024-08-17 PROCEDURE — 80202 ASSAY OF VANCOMYCIN: CPT

## 2024-08-17 PROCEDURE — 6360000002 HC RX W HCPCS

## 2024-08-17 PROCEDURE — 36415 COLL VENOUS BLD VENIPUNCTURE: CPT

## 2024-08-17 PROCEDURE — 2580000003 HC RX 258

## 2024-08-17 PROCEDURE — 2060000000 HC ICU INTERMEDIATE R&B

## 2024-08-17 PROCEDURE — 6370000000 HC RX 637 (ALT 250 FOR IP)

## 2024-08-17 PROCEDURE — 84100 ASSAY OF PHOSPHORUS: CPT

## 2024-08-17 PROCEDURE — 85025 COMPLETE CBC W/AUTO DIFF WBC: CPT

## 2024-08-17 PROCEDURE — 80048 BASIC METABOLIC PNL TOTAL CA: CPT

## 2024-08-17 PROCEDURE — 82962 GLUCOSE BLOOD TEST: CPT

## 2024-08-17 PROCEDURE — 83735 ASSAY OF MAGNESIUM: CPT

## 2024-08-17 PROCEDURE — 94640 AIRWAY INHALATION TREATMENT: CPT

## 2024-08-17 PROCEDURE — 71045 X-RAY EXAM CHEST 1 VIEW: CPT

## 2024-08-17 PROCEDURE — 99232 SBSQ HOSP IP/OBS MODERATE 35: CPT | Performed by: INTERNAL MEDICINE

## 2024-08-17 RX ORDER — MAGNESIUM SULFATE 1 G/100ML
1000 INJECTION INTRAVENOUS ONCE
Status: COMPLETED | OUTPATIENT
Start: 2024-08-17 | End: 2024-08-17

## 2024-08-17 RX ORDER — GUAIFENESIN/DEXTROMETHORPHAN 100-10MG/5
10 SYRUP ORAL EVERY 4 HOURS PRN
Status: DISCONTINUED | OUTPATIENT
Start: 2024-08-17 | End: 2024-08-23 | Stop reason: HOSPADM

## 2024-08-17 RX ADMIN — AMPICILLIN SODIUM AND SULBACTAM SODIUM 3000 MG: 2; 1 INJECTION, POWDER, FOR SOLUTION INTRAMUSCULAR; INTRAVENOUS at 03:41

## 2024-08-17 RX ADMIN — PANTOPRAZOLE SODIUM 40 MG: 40 INJECTION, POWDER, FOR SOLUTION INTRAVENOUS at 10:45

## 2024-08-17 RX ADMIN — MAGNESIUM SULFATE HEPTAHYDRATE 1000 MG: 1 INJECTION, SOLUTION INTRAVENOUS at 12:09

## 2024-08-17 RX ADMIN — INSULIN GLARGINE 30 UNITS: 100 INJECTION, SOLUTION SUBCUTANEOUS at 21:01

## 2024-08-17 RX ADMIN — SODIUM CHLORIDE, PRESERVATIVE FREE 10 ML: 5 INJECTION INTRAVENOUS at 21:02

## 2024-08-17 RX ADMIN — AMPICILLIN SODIUM AND SULBACTAM SODIUM 3000 MG: 2; 1 INJECTION, POWDER, FOR SOLUTION INTRAMUSCULAR; INTRAVENOUS at 20:57

## 2024-08-17 RX ADMIN — IPRATROPIUM BROMIDE AND ALBUTEROL SULFATE 1 DOSE: 2.5; .5 SOLUTION RESPIRATORY (INHALATION) at 15:28

## 2024-08-17 RX ADMIN — INSULIN LISPRO 10 UNITS: 100 INJECTION, SOLUTION INTRAVENOUS; SUBCUTANEOUS at 10:43

## 2024-08-17 RX ADMIN — IPRATROPIUM BROMIDE AND ALBUTEROL SULFATE 1 DOSE: 2.5; .5 SOLUTION RESPIRATORY (INHALATION) at 18:30

## 2024-08-17 RX ADMIN — ASPIRIN 81 MG: 81 TABLET, COATED ORAL at 10:43

## 2024-08-17 RX ADMIN — SODIUM CHLORIDE 1250 MG: 9 INJECTION, SOLUTION INTRAVENOUS at 11:57

## 2024-08-17 RX ADMIN — THIAMINE HYDROCHLORIDE 100 MG: 100 INJECTION, SOLUTION INTRAMUSCULAR; INTRAVENOUS at 10:43

## 2024-08-17 RX ADMIN — SODIUM CHLORIDE, PRESERVATIVE FREE 10 ML: 5 INJECTION INTRAVENOUS at 10:45

## 2024-08-17 RX ADMIN — ATORVASTATIN CALCIUM 80 MG: 40 TABLET, FILM COATED ORAL at 21:02

## 2024-08-17 RX ADMIN — AMPICILLIN SODIUM AND SULBACTAM SODIUM 3000 MG: 2; 1 INJECTION, POWDER, FOR SOLUTION INTRAMUSCULAR; INTRAVENOUS at 10:42

## 2024-08-17 RX ADMIN — IPRATROPIUM BROMIDE AND ALBUTEROL SULFATE 1 DOSE: 2.5; .5 SOLUTION RESPIRATORY (INHALATION) at 08:15

## 2024-08-17 RX ADMIN — ENOXAPARIN SODIUM 40 MG: 100 INJECTION SUBCUTANEOUS at 10:51

## 2024-08-17 RX ADMIN — IPRATROPIUM BROMIDE AND ALBUTEROL SULFATE 1 DOSE: 2.5; .5 SOLUTION RESPIRATORY (INHALATION) at 13:56

## 2024-08-17 RX ADMIN — INSULIN LISPRO 5 UNITS: 100 INJECTION, SOLUTION INTRAVENOUS; SUBCUTANEOUS at 17:52

## 2024-08-17 RX ADMIN — FOLIC ACID 1 MG: 1 TABLET ORAL at 10:43

## 2024-08-17 RX ADMIN — SODIUM CHLORIDE 1250 MG: 9 INJECTION, SOLUTION INTRAVENOUS at 22:22

## 2024-08-17 RX ADMIN — AMPICILLIN SODIUM AND SULBACTAM SODIUM 3000 MG: 2; 1 INJECTION, POWDER, FOR SOLUTION INTRAMUSCULAR; INTRAVENOUS at 16:28

## 2024-08-17 NOTE — PROGRESS NOTES
[facility]  Internal Medicine Residency / House Medicine Service    Attending Physician Statement  I have discussed the case, including pertinent history and exam findings with the resident and the team.  I have seen and examined the patient and the key elements of the encounter have been performed by me.  I agree with the assessment, plan and orders as documented by the resident.      Case Discussed During AM Rounds   Admitted for concern of Pneumonia    ? Aspiration PNA vs. HAP- initiated on Vancomycin and Unasyn for aspiration coverage   Clinically improving on regimen   Now with non-productive cough and pleuritic chest pain related to coughing    Feeling improved overall compared to admission     Pneumonia- ? Aspiration vs. [facility] Associated   Empirically initiated on Atbs to cover [facility] PNA   Continue combination of Vancomycin/Unasyn   Repeat CXR    Follow respiratory response   Flutter valve for non-productive cough   Aerosols    Aggressive pulmonary toilet    Follow and de-escalate regimen as able     Positive Blood Culture   Coag negative staph   Likely contaminant   Vancomycin continued for now    MRSA culture screening pending     Complicated UTI    Continue current management   Follow cultures     Hyponatremia- resolved     Type I DM    Remains with stable glycemic findings   Avoid hypoglycemia   Continue regimen and intensify   NO signs of DKA on this admission- GAP remains closed     Polysubstance Abuse    Gastroparesis- stable     Hypertension- continue inpatient management       Remainder of medical problems as per resident note.  Attending note is in collaboration with resident-physician Dr. Rivera progress note on 8/17/2024.    Kamlesh Deluca MD  8/17/24    Internal Medicine Residency Faculty

## 2024-08-17 NOTE — PROGRESS NOTES
*ATTENTION:  This note has been created by a medical student for educational purposes only.  Please do not refer to the content of this note for clinical decision-making, billing, or other purposes.  Please see attending physician’s note to obtain clinical information on this patient.*          [facility]  Internal Medicine Residency Program  Medical Student Progress Note - House Team 2      Patient:  Magan Funes 54 y.o. male   MRN: [id number]       Date of Service: 8/17/2024  Admission date: 8/15/2024 10:11 AM ; Hospital day: 2   CC: Fatigue (Patient c/o increased generalized weakness and nausea , hx DM bgl 240 . )     Overnight events: no acute events overnight.     Subjective     Patient was seen and examined today at bedside. Patient states that he is doing okay and feels better than yesterday. He does report a new, dry, non-productive cough and pleuritic chest pain located in the right upper chest. He says that it now hurts to take a deep breath. He also still endorses dysuria and weakness and increased thirst. He reports that he has been eating regularly. He denies any shortness of breath, chest pain, abdominal pain, nausea, vomiting, diarrhea, fever, or chills.       Objective   PHYSICAL EXAM  General Appearance: awake, alert, receiving breathing treatment, in no acute distress  HEENT: Normocephalic, atraumatic, anicteric sclera. Poor dentition. Mucus membranes moist   Neck: midline trachea  Lung: Coarse crackles present over right upper chest, near area of right middle lobe, improved from yesterday. Left lung clear to auscultation. No wheezes  Heart: regular rate and rhythm, no murmur  Abdomen: soft, bowel sounds normal; no tenderness  Extremities: no cyanosis or edema  Musculokeletal: No joint swelling, contracture of left arm-moves other extremities spontaneously, full ROM of legs,   Neurologic: Mental status: awake, alert, oriented X4    CARDIOVASCULAR  Vitals: /86   Pulse  swab/respiratory panel back then can de-escalate to oral agents  -Robitussin cough syrup added for patient's dry cough  -Duoneb q4   -Follow up blood culture  -Monitor oxygen saturation   -Daily CBC     2. Complicated UTI   -UA had leukocytosis, bacteriuria, +leukocyte esterase and presence of yeast concerning for UTI. Urine culture no growth to date  -Single dose of diflucan given 2024 for yeast on urinalysis   -Patient still complains of dysuria     Plan:  -Continue antibiotics as above   -Follow up urine culture  -Consider repeat urinalysis     3. Leukocytosis likely secondary to infection: RML pneumonia,  UTI, rule out bacteremia   --Blood culture 1/2 noted staphylococcal but no specific species listed. Patient has had positive blood culture before for staph hominis at previous admissions and was determined to be contaminant. So, may be contaminant this time as well  -Leukocytosis improvin.4>18>15   -Patient afebrile    Plan:  -Continue to monitor and await blood culture results   -Daily CBC        4. Type 1 diabetes mellitus  -Poorly controlled. Last A1C 11.7 (2024), blood glucose elevated 240s on arrival   -Resume home regimen of Lantus 30 units nightly, lispo 10 units 3 times daily before meals   -Monitor blood glucose every 4 hours-watch out for hypoglycemia/hyperglycemia  -Note: patient has went into DKA easily at previous hospitalizations. Patient needs both D5 or D10 fluids when NPO and insulin to prevent DKA   -Do not discontinue insulin    -Currently not in DKA, elevated beta hydroxybutyrate on admission likely due to stress from infection or starvation ketosis. Monitor   -Daily BMP     Plan:  -Lantus 30 units nightly, lispo 10 units 3 times daily before meals (patient's home regimen)+ insulin sliding scale   -Monitor blood glucose every 4 hours, watch for signs of DKA  -Watch fluid status     5. History of gastrointestinal bleed   -Patient had hematemesis from his previous hospital

## 2024-08-17 NOTE — PROGRESS NOTES
Pharmacy Consultation Note  (Antibiotic Dosing and Monitoring)    Initial consult date: 8/15/2024  Consulting physician/provider: Janis Rivera MD.   Drug: Vancomycin  Indication: PNA    Age/  Gender Height Weight IBW  Allergy Information   54 y.o./male 167.6 cm (5' 6\")167.6 cm 58.5 kg (128 lb 15.5 oz)     Ideal body weight: 63.8 kg (140 lb 10.5 oz)   Metoprolol      Renal Function:  Recent Labs     08/15/24  1049 08/16/24  0856 08/17/24  0406   BUN 19 12 7   CREATININE 0.7 0.6* 0.5*       Intake/Output Summary (Last 24 hours) at 8/17/2024 0753  Last data filed at 8/17/2024 0602  Gross per 24 hour   Intake --   Output 1215 ml   Net -1215 ml       Vancomycin Monitoring:  Trough:  No results for input(s): \"VANCOTROUGH\" in the last 72 hours.  Random:  No results for input(s): \"VANCORANDOM\" in the last 72 hours.    Vancomycin Administration Times:  Recent vancomycin administrations                     vancomycin (VANCOCIN) 1,250 mg in sodium chloride 0.9 % 250 mL IVPB ([id number]) (mg) 1,250 mg New Bag 08/16/24 2226    vancomycin (VANCOCIN) 1,250 mg in sodium chloride 0.9 % 250 mL IVPB ([id number]) (mg) 1,250 mg New Bag 08/16/24 0900    vancomycin (VANCOCIN) 1,250 mg in sodium chloride 0.9 % 250 mL IVPB ([id number]) (mg) 1,250 mg New Bag 08/15/24 1856                      Assessment:  Patient is a 54 y.o. male who has been initiated on vancomycin for PNA.   Estimated Creatinine Clearance: 153 mL/min (A) (based on SCr of 0.5 mg/dL (L)).  To dose vancomycin, pharmacy will be utilizing [employer] calculation software for goal AUC/KOREY 400-600 mg/L-hr (predicted AUC/KOREY = 532, Tr =12.2 mcg/mL)  8/17: Scr 0.5 (stable)    Plan:  Continue vancomycin 1250mg IV Q12 hr  Will check vancomycin trough with 21:00 dose tonight and will adjust as indicated  Will continue to monitor renal function   Pharmacy to follow    Brianda Garnica, PharmD, BCPS, BCCCP  8/17/2024  7:57 AM

## 2024-08-17 NOTE — PROGRESS NOTES
[facility]  Internal Medicine Residency Program  Progress Note - House Team       Patient:  Magan Funes 54 y.o. male   MRN: [id number]       Date of Service: 8/17/2024    CC: weakness, nausea and fatigue  Overnight events: none     Subjective     Patient was seen and examined at bedside today. He is awake, alert and not in distress. He reports improvement of overall symptoms but notes non-productive cough today. This is associated with pleuritic chest pain on the right chest wall. He reports good appetite and is able to eat. He still notes presence of dysuria.       Objective       Physical Exam  Vitals: /85   Pulse 91   Temp 98.7 °F (37.1 °C) (Oral)   Resp 18   Ht 1.676 m (5' 6\")   Wt 63.5 kg (140 lb)   SpO2 97%   BMI 22.60 kg/m²     I & O - 24hr: No intake/output data recorded.   General Appearance: alert, appears stated age, and cooperative  HEENT:  Head: Normal, normocephalic, atraumatic.  Neck: no adenopathy, no carotid bruit, and no JVD  Lung: (+) coarse crackles on the right   Heart: regular rate and rhythm, S1, S2 normal, no murmur, click, rub or gallop  Abdomen: soft, non-tender; bowel sounds normal; no masses,  no organomegaly  Extremities:  extremities normal, atraumatic, no cyanosis or edema  Musculokeletal: No joint swelling, no muscle tenderness. ROM normal in all joints of extremities.   Neurologic: Mental status: Alert, oriented, thought content appropriate    Diet:   ADULT DIET; Regular; 4 carb choices (60 gm/meal)      Pertinent Labs & Imaging Studies     Labs    CBC with Differential:    Lab Results   Component Value Date/Time    WBC 15.0 08/17/2024 04:06 AM    RBC 3.93 08/17/2024 04:06 AM    HGB 11.2 08/17/2024 04:06 AM    HCT 34.5 08/17/2024 04:06 AM     08/17/2024 04:06 AM    MCV 87.8 08/17/2024 04:06 AM    MCH 28.5 08/17/2024 04:06 AM    MCHC 32.5 08/17/2024 04:06 AM    RDW 14.9 08/17/2024 04:06 AM    BANDSPCT 3 10/10/2010 07:20 PM    METASPCT 1.7  well as concerns for GI bleeding.  His last hospital admission was 1 month ago and was subsequently discharged to SNF ([facility])    In the ED, patient was afebrile with normal blood pressure, normal respiratory rate and oxygen saturation.  Laboratory workup was done which revealed an elevated blood glucose at 219.  He also had elevated WBC at 20.4 with predominance of neutrophils.  Urinalysis was positive for ketones and protein with moderate leukocyte esterase activity and WBC 21-50.  There was also presence of yeast and 1+ bacteria in the urine.  Troponin was elevated at 49 but this is the patient's baseline. Corrected sodium is 131.  Chest x-ray revealed consolidation at the right middle lobe consistent with a right middle lobe pneumonia.  Urine cultures and blood cultures were also ordered. Patient was given 1 dose of Unasyn IV and sodium chloride 0.9% bolus 1 L.     Throughout his hospital admission, patient grew S. Aureus on his blood culture. He had elevated procalcitonin and CRP. Patient was started on Unasyn, Vancocin and Diflucan for covergae of healthcare-associated pneumonia and yeast infection on urinalysis. Patient was initially on a LDSS, but home insulin dose was restarted given presistently elevated glucose. Patient has reported improvement of symptoms.       Assessment and Plan:    Leukocytosis, likely 2/2 Aspiration PNA versus Healthcare Associated Pneumonia, improving  Cough, new-onset   Patient reports new onset non-productive cough associated with pleuritic chest pain  Reports symptom improvement despite cough  Still with presence of coarse crackles on the right, improving from admission  WBC 20.4> 18.1> 15.0  sPO2: 94-97%  CXR findings consistent with right middle lobe pneumonia  (+) blood culture: S. Aureus likely contaminant  S. Pneumoniae Ag negative, Legionella Antigen urine negative  Follow pancultures  Continue Unasyn, Vancomycin  Ordered Robitussin DM q4hrs prn for cough  For

## 2024-08-18 LAB
ANION GAP SERPL CALCULATED.3IONS-SCNC: 11 MMOL/L (ref 7–16)
BASOPHILS # BLD: 0.04 K/UL (ref 0–0.2)
BASOPHILS NFR BLD: 0 % (ref 0–2)
BUN SERPL-MCNC: 6 MG/DL (ref 6–20)
CALCIUM SERPL-MCNC: 8.8 MG/DL (ref 8.6–10.2)
CHLORIDE SERPL-SCNC: 98 MMOL/L (ref 98–107)
CO2 SERPL-SCNC: 25 MMOL/L (ref 22–29)
CREAT SERPL-MCNC: 0.5 MG/DL (ref 0.7–1.2)
EOSINOPHIL # BLD: 0.26 K/UL (ref 0.05–0.5)
EOSINOPHILS RELATIVE PERCENT: 2 % (ref 0–6)
ERYTHROCYTE [DISTWIDTH] IN BLOOD BY AUTOMATED COUNT: 14.9 % (ref 11.5–15)
GFR, ESTIMATED: >90 ML/MIN/1.73M2
GLUCOSE BLD-MCNC: 186 MG/DL (ref 74–99)
GLUCOSE BLD-MCNC: 294 MG/DL (ref 74–99)
GLUCOSE BLD-MCNC: 323 MG/DL (ref 74–99)
GLUCOSE BLD-MCNC: 94 MG/DL (ref 74–99)
GLUCOSE SERPL-MCNC: 139 MG/DL (ref 74–99)
HCT VFR BLD AUTO: 30.9 % (ref 37–54)
HGB BLD-MCNC: 10.3 G/DL (ref 12.5–16.5)
IMM GRANULOCYTES # BLD AUTO: 0.15 K/UL (ref 0–0.58)
IMM GRANULOCYTES NFR BLD: 1 % (ref 0–5)
LYMPHOCYTES NFR BLD: 1.38 K/UL (ref 1.5–4)
LYMPHOCYTES RELATIVE PERCENT: 10 % (ref 20–42)
MAGNESIUM SERPL-MCNC: 1.8 MG/DL (ref 1.6–2.6)
MCH RBC QN AUTO: 28.5 PG (ref 26–35)
MCHC RBC AUTO-ENTMCNC: 33.3 G/DL (ref 32–34.5)
MCV RBC AUTO: 85.6 FL (ref 80–99.9)
MONOCYTES NFR BLD: 1.2 K/UL (ref 0.1–0.95)
MONOCYTES NFR BLD: 9 % (ref 2–12)
NEUTROPHILS NFR BLD: 77 % (ref 43–80)
NEUTS SEG NFR BLD: 10.34 K/UL (ref 1.8–7.3)
PHOSPHATE SERPL-MCNC: 2.7 MG/DL (ref 2.5–4.5)
PLATELET # BLD AUTO: 551 K/UL (ref 130–450)
PMV BLD AUTO: 8.5 FL (ref 7–12)
POTASSIUM SERPL-SCNC: 3.7 MMOL/L (ref 3.5–5)
PROCALCITONIN SERPL-MCNC: 0.13 NG/ML (ref 0–0.08)
RBC # BLD AUTO: 3.61 M/UL (ref 3.8–5.8)
SODIUM SERPL-SCNC: 134 MMOL/L (ref 132–146)
WBC OTHER # BLD: 13.4 K/UL (ref 4.5–11.5)

## 2024-08-18 PROCEDURE — 2060000000 HC ICU INTERMEDIATE R&B

## 2024-08-18 PROCEDURE — 82962 GLUCOSE BLOOD TEST: CPT

## 2024-08-18 PROCEDURE — 6370000000 HC RX 637 (ALT 250 FOR IP)

## 2024-08-18 PROCEDURE — 2580000003 HC RX 258

## 2024-08-18 PROCEDURE — 80048 BASIC METABOLIC PNL TOTAL CA: CPT

## 2024-08-18 PROCEDURE — 84145 PROCALCITONIN (PCT): CPT

## 2024-08-18 PROCEDURE — 83735 ASSAY OF MAGNESIUM: CPT

## 2024-08-18 PROCEDURE — 84100 ASSAY OF PHOSPHORUS: CPT

## 2024-08-18 PROCEDURE — 99231 SBSQ HOSP IP/OBS SF/LOW 25: CPT | Performed by: INTERNAL MEDICINE

## 2024-08-18 PROCEDURE — 94669 MECHANICAL CHEST WALL OSCILL: CPT

## 2024-08-18 PROCEDURE — 85025 COMPLETE CBC W/AUTO DIFF WBC: CPT

## 2024-08-18 PROCEDURE — 6360000002 HC RX W HCPCS

## 2024-08-18 PROCEDURE — 94640 AIRWAY INHALATION TREATMENT: CPT

## 2024-08-18 PROCEDURE — 36415 COLL VENOUS BLD VENIPUNCTURE: CPT

## 2024-08-18 PROCEDURE — 87040 BLOOD CULTURE FOR BACTERIA: CPT

## 2024-08-18 RX ORDER — LANOLIN ALCOHOL/MO/W.PET/CERES
100 CREAM (GRAM) TOPICAL DAILY
Status: DISCONTINUED | OUTPATIENT
Start: 2024-08-19 | End: 2024-08-23 | Stop reason: HOSPADM

## 2024-08-18 RX ORDER — AMLODIPINE BESYLATE 5 MG/1
5 TABLET ORAL DAILY
Status: DISCONTINUED | OUTPATIENT
Start: 2024-08-18 | End: 2024-08-20

## 2024-08-18 RX ADMIN — IPRATROPIUM BROMIDE AND ALBUTEROL SULFATE 1 DOSE: 2.5; .5 SOLUTION RESPIRATORY (INHALATION) at 15:15

## 2024-08-18 RX ADMIN — INSULIN LISPRO 10 UNITS: 100 INJECTION, SOLUTION INTRAVENOUS; SUBCUTANEOUS at 18:06

## 2024-08-18 RX ADMIN — THIAMINE HYDROCHLORIDE 100 MG: 100 INJECTION, SOLUTION INTRAMUSCULAR; INTRAVENOUS at 09:04

## 2024-08-18 RX ADMIN — IPRATROPIUM BROMIDE AND ALBUTEROL SULFATE 1 DOSE: 2.5; .5 SOLUTION RESPIRATORY (INHALATION) at 07:28

## 2024-08-18 RX ADMIN — ASPIRIN 81 MG: 81 TABLET, COATED ORAL at 09:03

## 2024-08-18 RX ADMIN — IPRATROPIUM BROMIDE AND ALBUTEROL SULFATE 1 DOSE: 2.5; .5 SOLUTION RESPIRATORY (INHALATION) at 11:17

## 2024-08-18 RX ADMIN — IPRATROPIUM BROMIDE AND ALBUTEROL SULFATE 1 DOSE: 2.5; .5 SOLUTION RESPIRATORY (INHALATION) at 21:20

## 2024-08-18 RX ADMIN — AMPICILLIN SODIUM AND SULBACTAM SODIUM 3000 MG: 2; 1 INJECTION, POWDER, FOR SOLUTION INTRAMUSCULAR; INTRAVENOUS at 09:16

## 2024-08-18 RX ADMIN — INSULIN LISPRO 10 UNITS: 100 INJECTION, SOLUTION INTRAVENOUS; SUBCUTANEOUS at 14:40

## 2024-08-18 RX ADMIN — FOLIC ACID 1 MG: 1 TABLET ORAL at 09:03

## 2024-08-18 RX ADMIN — SODIUM CHLORIDE, PRESERVATIVE FREE 10 ML: 5 INJECTION INTRAVENOUS at 09:09

## 2024-08-18 RX ADMIN — ENOXAPARIN SODIUM 40 MG: 100 INJECTION SUBCUTANEOUS at 09:03

## 2024-08-18 RX ADMIN — DULOXETINE HYDROCHLORIDE 30 MG: 30 CAPSULE, DELAYED RELEASE ORAL at 09:03

## 2024-08-18 RX ADMIN — AMPICILLIN SODIUM AND SULBACTAM SODIUM 3000 MG: 2; 1 INJECTION, POWDER, FOR SOLUTION INTRAMUSCULAR; INTRAVENOUS at 03:28

## 2024-08-18 RX ADMIN — PANTOPRAZOLE SODIUM 40 MG: 40 INJECTION, POWDER, FOR SOLUTION INTRAVENOUS at 09:04

## 2024-08-18 RX ADMIN — ATORVASTATIN CALCIUM 80 MG: 40 TABLET, FILM COATED ORAL at 20:39

## 2024-08-18 RX ADMIN — AMPICILLIN SODIUM AND SULBACTAM SODIUM 3000 MG: 2; 1 INJECTION, POWDER, FOR SOLUTION INTRAMUSCULAR; INTRAVENOUS at 20:39

## 2024-08-18 RX ADMIN — AMLODIPINE BESYLATE 5 MG: 5 TABLET ORAL at 12:31

## 2024-08-18 RX ADMIN — INSULIN LISPRO 3 UNITS: 100 INJECTION, SOLUTION INTRAVENOUS; SUBCUTANEOUS at 18:06

## 2024-08-18 RX ADMIN — AMPICILLIN SODIUM AND SULBACTAM SODIUM 3000 MG: 2; 1 INJECTION, POWDER, FOR SOLUTION INTRAMUSCULAR; INTRAVENOUS at 16:11

## 2024-08-18 RX ADMIN — INSULIN LISPRO 2 UNITS: 100 INJECTION, SOLUTION INTRAVENOUS; SUBCUTANEOUS at 14:39

## 2024-08-18 RX ADMIN — SODIUM CHLORIDE 1250 MG: 9 INJECTION, SOLUTION INTRAVENOUS at 10:35

## 2024-08-18 NOTE — PROGRESS NOTES
Pharmacy Consultation Note  (Antibiotic Dosing and Monitoring)    Initial consult date: 8/15/2024  Consulting physician/provider: Janis Rivera MD.   Drug: Vancomycin  Indication: PNA    Age/  Gender Height Weight IBW  Allergy Information   54 y.o./male 167.6 cm (5' 6\")167.6 cm 58.5 kg (128 lb 15.5 oz)     Ideal body weight: 63.8 kg (140 lb 10.5 oz)   Metoprolol      Renal Function:  Recent Labs     08/16/24  0856 08/17/24  0406 08/18/24  0532   BUN 12 7 6   CREATININE 0.6* 0.5* 0.5*       Intake/Output Summary (Last 24 hours) at 8/18/2024 0907  Last data filed at 8/18/2024 0732  Gross per 24 hour   Intake --   Output 1400 ml   Net -1400 ml       Vancomycin Monitoring:  Trough:    Recent Labs     08/17/24 2024   VANCOTROUGH 16.2*     Random:  No results for input(s): \"VANCORANDOM\" in the last 72 hours.    Vancomycin Administration Times:  Recent vancomycin administrations                     vancomycin (VANCOCIN) 1,250 mg in sodium chloride 0.9 % 250 mL IVPB (Deto6Ukl) (mg) 1,250 mg New Bag 08/17/24 2222     1,250 mg New Bag  1157     1,250 mg New Bag 08/16/24 2226    vancomycin (VANCOCIN) 1,250 mg in sodium chloride 0.9 % 250 mL IVPB (Paox5Fhi) (mg) 1,250 mg New Bag 08/16/24 0900    vancomycin (VANCOCIN) 1,250 mg in sodium chloride 0.9 % 250 mL IVPB (Rcnk4Xyq) (mg) 1,250 mg New Bag 08/15/24 1856                        Assessment:  Patient is a 54 y.o. male who has been initiated on vancomycin for PNA.   Estimated Creatinine Clearance: 153 mL/min (A) (based on SCr of 0.5 mg/dL (L)).  To dose vancomycin, pharmacy will be utilizing [employer] calculation software for goal AUC/KOREY 400-600 mg/L-hr (predicted AUC/KOREY = 532, Tr =12.2 mcg/mL)  8/18: Scr 0.5 (stable); vancomycin level 16.2 mcg/mL last night    Plan:  Continue vancomycin 1250mg IV Q12 hr  Will continue to check levels as needed  Will continue to monitor renal function   Pharmacy to follow    Brianda Garnica, PharmD, BCPS, BCCCP  8/18/2024  9:13  AM

## 2024-08-18 NOTE — PROGRESS NOTES
[facility]  Internal Medicine Residency Program  Progress Note - House Team       Patient:  Magan Funes 54 y.o. male   MRN: [id number]       Date of Service: 8/18/2024    CC: weakness, nausea and fatigue  Overnight events: none     Subjective     Patient was seen and examined at bedside today. He is awake, alert and not in distress. Patient still reports nonproductive cough but notes resolution of pleuritic chest pain associated with cough. He also denies dysuria and has noted improvements with his breathing He does not complain of fever, chills, dizziness or headache. Still no update from . Checked patient's soft chart and no records of him coming from nursing home. Patient was discharged to [facility] facility on previous admission on 7/7/24.       Objective       Physical Exam  Vitals: BP (!) 143/89   Pulse 93   Temp 99.1 °F (37.3 °C) (Oral)   Resp 18   Ht 1.676 m (5' 6\")   Wt 63.5 kg (140 lb)   SpO2 92%   BMI 22.60 kg/m²     I & O - 24hr: I/O this shift:  In: -   Out: 1400 [Urine:1400]   General Appearance: alert, appears stated age, and cooperative  HEENT:  Head: Normal, normocephalic, atraumatic.  Neck: no adenopathy, no carotid bruit, and no JVD  Lung: (+) coarse crackles on the right>left  Heart: regular rate and rhythm, S1, S2 normal, no murmur, click, rub or gallop  Abdomen: soft, non-tender; bowel sounds normal; no masses,  no organomegaly  Extremities:  extremities normal, atraumatic, no cyanosis or edema  Musculokeletal: No joint swelling, no muscle tenderness. ROM normal in all joints of extremities.   Neurologic: Mental status: Alert, oriented, thought content appropriate    Diet:   ADULT DIET; Regular; 4 carb choices (60 gm/meal)      Pertinent Labs & Imaging Studies     Labs    CBC with Differential:    Lab Results   Component Value Date/Time    WBC 13.4 08/18/2024 05:32 AM    RBC 3.61 08/18/2024 05:32 AM    HGB 10.3 08/18/2024 05:32 AM    HCT 30.9  insomnia  Home med: Trazodone 50 mg as needed nightly    15. Positive Blood Culture   A. Positive blood culture for coagulase negative staph on 1 specimen bottle   B. Likely contaminant but previous history of positive blood culture for S. Hominis on single specimen bottle as well   C. Repeat blood culture      PT/OT evaluation: not indicated at this time   DVT prophylaxis: Lovenox  GI prophylaxis: Protonix  Diet:   ADULT DIET; Regular; 4 carb choices (60 gm/meal)   Bowel regimen: none  Pain management: as needed  Code status: Full Code   Disposition: Continue Current Care  Family: updated as available    Janis Rivera MD, PGY-1   Attending physician: Dr. Kamlesh Deluca

## 2024-08-18 NOTE — PROGRESS NOTES
[facility]  Internal Medicine Residency / House Medicine Service    Attending Physician Statement  I have discussed the case, including pertinent history and exam findings with the resident and the team.  I have seen and examined the patient and the key elements of the encounter have been performed by me.  I agree with the assessment, plan and orders as documented by the resident.      Case Discussed During AM Rounds   No overnight events- remains without fever and remains on RA   CXR without change- RML PNA noted    Glucose stable while inpatient    WBCs continue to trend down with management    Continue current management as discussed     Pneumonia- ? Aspiration vs. Healthcare Associated   Empirically initiated on Atbs to cover Healthcare Associated PNA   MRSA colonization is negative    De-escalating to Unasyn alone   Monitor for continued improvement- if concerns- will resume Vancomycin   Flutter valve for non-productive cough   Aerosols    Aggressive pulmonary toilet    Follow and de-escalate regimen as able     Positive Blood Culture   Coag negative staph   Likely contaminant   Repeat Blood culture    Holding Vancomycin    MRSA culture screening nares- negative     Complicated UTI    Continue current management   Follow cultures     Hyponatremia- resolved     Type I DM    Remains with stable glycemic findings   Avoid hypoglycemia   Continue regimen and intensify   NO signs of DKA on this admission- GAP remains closed     Polysubstance Abuse    Gastroparesis- stable     Hypertension- continue inpatient management     Normocytic Anemia       Remainder of medical problems as per resident note.  Attending note is in collaboration with resident-physician Dr. Rivera progress note on 8/18/2024.    Kamlesh Deluca MD  8/18/24    Internal Medicine Residency Faculty

## 2024-08-19 ENCOUNTER — APPOINTMENT (OUTPATIENT)
Dept: GENERAL RADIOLOGY | Age: 54
DRG: 177 | End: 2024-08-19
Payer: MEDICARE

## 2024-08-19 LAB
ANION GAP SERPL CALCULATED.3IONS-SCNC: 12 MMOL/L (ref 7–16)
BASOPHILS # BLD: 0.07 K/UL (ref 0–0.2)
BASOPHILS NFR BLD: 1 % (ref 0–2)
BUN SERPL-MCNC: 8 MG/DL (ref 6–20)
CALCIUM SERPL-MCNC: 9.1 MG/DL (ref 8.6–10.2)
CHLORIDE SERPL-SCNC: 95 MMOL/L (ref 98–107)
CO2 SERPL-SCNC: 26 MMOL/L (ref 22–29)
CREAT SERPL-MCNC: 0.5 MG/DL (ref 0.7–1.2)
EKG ATRIAL RATE: 90 BPM
EKG P AXIS: 56 DEGREES
EKG P-R INTERVAL: 138 MS
EKG Q-T INTERVAL: 374 MS
EKG QRS DURATION: 84 MS
EKG QTC CALCULATION (BAZETT): 457 MS
EKG R AXIS: 45 DEGREES
EKG T AXIS: 61 DEGREES
EKG VENTRICULAR RATE: 90 BPM
EOSINOPHIL # BLD: 0.39 K/UL (ref 0.05–0.5)
EOSINOPHILS RELATIVE PERCENT: 3 % (ref 0–6)
ERYTHROCYTE [DISTWIDTH] IN BLOOD BY AUTOMATED COUNT: 14.9 % (ref 11.5–15)
GFR, ESTIMATED: >90 ML/MIN/1.73M2
GLUCOSE BLD-MCNC: 158 MG/DL (ref 74–99)
GLUCOSE BLD-MCNC: 214 MG/DL (ref 74–99)
GLUCOSE BLD-MCNC: 274 MG/DL (ref 74–99)
GLUCOSE BLD-MCNC: 321 MG/DL (ref 74–99)
GLUCOSE SERPL-MCNC: 219 MG/DL (ref 74–99)
HCT VFR BLD AUTO: 32.6 % (ref 37–54)
HGB BLD-MCNC: 10.9 G/DL (ref 12.5–16.5)
IMM GRANULOCYTES # BLD AUTO: 0.14 K/UL (ref 0–0.58)
IMM GRANULOCYTES NFR BLD: 1 % (ref 0–5)
LYMPHOCYTES NFR BLD: 1.68 K/UL (ref 1.5–4)
LYMPHOCYTES RELATIVE PERCENT: 14 % (ref 20–42)
MAGNESIUM SERPL-MCNC: 1.9 MG/DL (ref 1.6–2.6)
MCH RBC QN AUTO: 29.1 PG (ref 26–35)
MCHC RBC AUTO-ENTMCNC: 33.4 G/DL (ref 32–34.5)
MCV RBC AUTO: 86.9 FL (ref 80–99.9)
MONOCYTES NFR BLD: 1.25 K/UL (ref 0.1–0.95)
MONOCYTES NFR BLD: 10 % (ref 2–12)
NEUTROPHILS NFR BLD: 71 % (ref 43–80)
NEUTS SEG NFR BLD: 8.62 K/UL (ref 1.8–7.3)
PHOSPHATE SERPL-MCNC: 2.9 MG/DL (ref 2.5–4.5)
PLATELET # BLD AUTO: 579 K/UL (ref 130–450)
PMV BLD AUTO: 8.5 FL (ref 7–12)
POTASSIUM SERPL-SCNC: 4 MMOL/L (ref 3.5–5)
RBC # BLD AUTO: 3.75 M/UL (ref 3.8–5.8)
SODIUM SERPL-SCNC: 133 MMOL/L (ref 132–146)
WBC OTHER # BLD: 12.2 K/UL (ref 4.5–11.5)

## 2024-08-19 PROCEDURE — 97161 PT EVAL LOW COMPLEX 20 MIN: CPT

## 2024-08-19 PROCEDURE — 94669 MECHANICAL CHEST WALL OSCILL: CPT

## 2024-08-19 PROCEDURE — 2580000003 HC RX 258

## 2024-08-19 PROCEDURE — 6370000000 HC RX 637 (ALT 250 FOR IP)

## 2024-08-19 PROCEDURE — 6360000002 HC RX W HCPCS

## 2024-08-19 PROCEDURE — 82962 GLUCOSE BLOOD TEST: CPT

## 2024-08-19 PROCEDURE — 94640 AIRWAY INHALATION TREATMENT: CPT

## 2024-08-19 PROCEDURE — 80048 BASIC METABOLIC PNL TOTAL CA: CPT

## 2024-08-19 PROCEDURE — 85025 COMPLETE CBC W/AUTO DIFF WBC: CPT

## 2024-08-19 PROCEDURE — 99231 SBSQ HOSP IP/OBS SF/LOW 25: CPT | Performed by: INTERNAL MEDICINE

## 2024-08-19 PROCEDURE — 36415 COLL VENOUS BLD VENIPUNCTURE: CPT

## 2024-08-19 PROCEDURE — 71045 X-RAY EXAM CHEST 1 VIEW: CPT

## 2024-08-19 PROCEDURE — 97530 THERAPEUTIC ACTIVITIES: CPT

## 2024-08-19 PROCEDURE — 83735 ASSAY OF MAGNESIUM: CPT

## 2024-08-19 PROCEDURE — 2060000000 HC ICU INTERMEDIATE R&B

## 2024-08-19 PROCEDURE — 97535 SELF CARE MNGMENT TRAINING: CPT

## 2024-08-19 PROCEDURE — 97166 OT EVAL MOD COMPLEX 45 MIN: CPT

## 2024-08-19 PROCEDURE — 84100 ASSAY OF PHOSPHORUS: CPT

## 2024-08-19 RX ORDER — INSULIN GLARGINE 100 [IU]/ML
15 INJECTION, SOLUTION SUBCUTANEOUS 2 TIMES DAILY
Status: DISCONTINUED | OUTPATIENT
Start: 2024-08-19 | End: 2024-08-21

## 2024-08-19 RX ADMIN — INSULIN LISPRO 3 UNITS: 100 INJECTION, SOLUTION INTRAVENOUS; SUBCUTANEOUS at 08:53

## 2024-08-19 RX ADMIN — INSULIN LISPRO 10 UNITS: 100 INJECTION, SOLUTION INTRAVENOUS; SUBCUTANEOUS at 12:45

## 2024-08-19 RX ADMIN — INSULIN GLARGINE 15 UNITS: 100 INJECTION, SOLUTION SUBCUTANEOUS at 08:53

## 2024-08-19 RX ADMIN — INSULIN LISPRO 10 UNITS: 100 INJECTION, SOLUTION INTRAVENOUS; SUBCUTANEOUS at 08:54

## 2024-08-19 RX ADMIN — PANTOPRAZOLE SODIUM 40 MG: 40 INJECTION, POWDER, FOR SOLUTION INTRAVENOUS at 08:55

## 2024-08-19 RX ADMIN — ATORVASTATIN CALCIUM 80 MG: 40 TABLET, FILM COATED ORAL at 20:09

## 2024-08-19 RX ADMIN — AMPICILLIN SODIUM AND SULBACTAM SODIUM 3000 MG: 2; 1 INJECTION, POWDER, FOR SOLUTION INTRAMUSCULAR; INTRAVENOUS at 20:10

## 2024-08-19 RX ADMIN — INSULIN LISPRO 2 UNITS: 100 INJECTION, SOLUTION INTRAVENOUS; SUBCUTANEOUS at 12:44

## 2024-08-19 RX ADMIN — IPRATROPIUM BROMIDE AND ALBUTEROL SULFATE 1 DOSE: 2.5; .5 SOLUTION RESPIRATORY (INHALATION) at 18:46

## 2024-08-19 RX ADMIN — INSULIN LISPRO 10 UNITS: 100 INJECTION, SOLUTION INTRAVENOUS; SUBCUTANEOUS at 17:12

## 2024-08-19 RX ADMIN — IPRATROPIUM BROMIDE AND ALBUTEROL SULFATE 1 DOSE: 2.5; .5 SOLUTION RESPIRATORY (INHALATION) at 15:19

## 2024-08-19 RX ADMIN — INSULIN GLARGINE 15 UNITS: 100 INJECTION, SOLUTION SUBCUTANEOUS at 20:09

## 2024-08-19 RX ADMIN — LISINOPRIL 10 MG: 5 TABLET ORAL at 08:55

## 2024-08-19 RX ADMIN — ASPIRIN 81 MG: 81 TABLET, COATED ORAL at 08:54

## 2024-08-19 RX ADMIN — FOLIC ACID 1 MG: 1 TABLET ORAL at 08:55

## 2024-08-19 RX ADMIN — IPRATROPIUM BROMIDE AND ALBUTEROL SULFATE 1 DOSE: 2.5; .5 SOLUTION RESPIRATORY (INHALATION) at 08:26

## 2024-08-19 RX ADMIN — AMLODIPINE BESYLATE 5 MG: 5 TABLET ORAL at 08:55

## 2024-08-19 RX ADMIN — IPRATROPIUM BROMIDE AND ALBUTEROL SULFATE 1 DOSE: 2.5; .5 SOLUTION RESPIRATORY (INHALATION) at 11:21

## 2024-08-19 RX ADMIN — Medication 100 MG: at 08:55

## 2024-08-19 RX ADMIN — AMPICILLIN SODIUM AND SULBACTAM SODIUM 3000 MG: 2; 1 INJECTION, POWDER, FOR SOLUTION INTRAMUSCULAR; INTRAVENOUS at 15:46

## 2024-08-19 RX ADMIN — AMPICILLIN SODIUM AND SULBACTAM SODIUM 3000 MG: 2; 1 INJECTION, POWDER, FOR SOLUTION INTRAMUSCULAR; INTRAVENOUS at 04:54

## 2024-08-19 RX ADMIN — INSULIN LISPRO 1 UNITS: 100 INJECTION, SOLUTION INTRAVENOUS; SUBCUTANEOUS at 17:13

## 2024-08-19 RX ADMIN — AMPICILLIN SODIUM AND SULBACTAM SODIUM 3000 MG: 2; 1 INJECTION, POWDER, FOR SOLUTION INTRAMUSCULAR; INTRAVENOUS at 09:03

## 2024-08-19 RX ADMIN — DULOXETINE HYDROCHLORIDE 30 MG: 30 CAPSULE, DELAYED RELEASE ORAL at 08:55

## 2024-08-19 NOTE — PROGRESS NOTES
[facility]  Internal Medicine Residency / House Medicine Service    Attending Physician Statement  I have discussed the case, including pertinent history and exam findings with the resident and the team.  I have seen and examined the patient and the key elements of the encounter have been performed by me.  I agree with the assessment, plan and orders as documented by the resident.      Case Discussed During AM Rounds   No overnight events   Leukocytosis continues to improve    Procalcitonin trending down    Glucose worsening in last 24 hours- avoiding hypoglycemia but need improvement while inpatient    Still complaining of non-productive cough; denies any pleuritic chest pain- which has improved steadily    Recommending PT/OT assessment given improvement for DC planning- Case Management reassessment      Pneumonia-  RML in nature- likely aspiration vs. HAP    Empirically initiated on Atbs with Vancomycin/Unasyn and clinically improving    MRSA colonization is negative    De-escalated to Unasyn alone on 8/18    Continue aerosols   Up to chair encouraged today    Flutter valve use    Aggressive pulmonary toilet    Patient has improved with Aspiration PNA management- will eventually transition to Augmentin to complete course    No findings of dysphagia on exam currently- consider video fluoroscopy if recurrent concerns in future     Positive Blood Culture- likely contaminant    Coag negative staph   Repeat blood cultures negative at 24 hours     Complicated UTI on presentation    Unasyn management ongoing    No growth in urine culture thus far     Type I DM    Recent increase in glucose in last 24 hours    Avoid hypoglycemia   Continue regimen and intensify today    NO signs of DKA on this admission- GAP remains closed     Polysubstance Abuse   No signs of withdrawal currently     Gastroparesis- stable    Tolerating diet     Hypertension- uncontrolled inpatient    BP at times labile   Added home

## 2024-08-19 NOTE — PROGRESS NOTES
Physical Therapy  Physical Therapy Initial Assessment     Name: Magan Funes  : 1970  MRN: [id number]      Date of Service: 2024    Evaluating PT:  Bradley Castro PT, DPT    Room #:  4501/4501-B  Diagnosis:  Pneumonia due to other specified infectious organisms [J16.8]  PMHx/PSHx:  DM, CVA, substance abuse  Procedure/Surgery:  N/A  Precautions:  fall risk, L sided deficits, bed alarm  Equipment Owned: w/c, hemiwalker  Equipment Needs:  TBD    SUBJECTIVE:    Pt lives with mother in a 2nd floor apt with 3+10 steps to access (pt is carried up steps in w/c by brother and nephew). Pt ambulated mobilizes in w/c, completes transfers with assist from family PTA.    OBJECTIVE:   Initial Evaluation  Date: 24 Treatment Short Term/ Long Term   Goals   AM-PAC 6 Clicks 10/24     Was pt agreeable to Eval/treatment? yes     Does pt have pain? No pain     Bed Mobility  Rolling: min A  Supine to sit: max A  Sit to supine: max A  Scooting: max A  Rolling: SBA  Supine to sit: min A  Sit to supine: min A  Scooting: SBA   Transfers Sit to stand: max A  Stand to sit: max A  Stand pivot: NT  Sit to stand: min A  Stand to sit: min A  Stand pivot: min A with AAD   Ambulation    NT  5'+ with AAD min A   Stair negotiation: ascended and descended  NT       Strength/ROM:   RLE grossly 4/5  LLE grossly 1/5  RLE AROM WFL  LLE AROM limited by weakness    Balance:   Static Sitting: SBA with RUE support  Dynamic Sitting: CGA with RUE support  Static Standing: max A with no AD  Dynamic Standing: NT    Pt is A & O x 3  Sensation:  Pt denies numbness and tingling to extremities  Edema:  unremarkable    Vitals:  SpO2 and HR were stable during session    Therapeutic Exercises:    Bed mobility: supine<>sit, cued for EOB positioning  Transfers: STS x1, cued for hand placement and postural correction  BLE AROM    Patient education  Pt educated on role of PT, importance of functional mobility during hospital stay, safety with functional  mobility    Patient response to education:   Pt verbalized understanding Pt demonstrated skill Pt requires further education in this area   yes partial yes     ASSESSMENT:    Conditions Requiring Skilled Therapeutic Intervention:    [x]Decreased strength     []Decreased ROM  [x]Decreased functional mobility  [x]Decreased balance   [x]Decreased endurance   [x]Decreased posture  []Decreased sensation  [x]Decreased coordination   []Decreased vision  [x]Decreased safety awareness   []Increased pain       Comments:  Pt supine in bed upon entering, pt agreeable to participate with encouragement. Pt instructed to transfer to EOB, completing transfer with assist of LLE and trunk. Pt sitting upright with fair sitting balance. Pt with no c/o dizziness with position change. Pt then cued for hand placement and instructed to stand from EOB. Pt standing with poor balance with arm in arm A. Pt cued for postural correction, unable to advance BLE. Pt demonstrated limited tolerance to functional mobility. Pt was assisted back to supine at end of session. Pt positioned for comfort with all needs met and call bell in reach prior to exiting.    Treatment:  Patient practiced and was instructed in the following treatment:    Bed mobility training - pt given verbal and tactile cues to facilitate proper sequencing and safety during rolling and supine>sit as well as provided with physical assistance to complete task   Sitting EOB for >5 minutes for upright tolerance, postural awareness and BLE ROM  STS transfer training - pt educated on proper hand and foot placement, safety and sequencing, and use of verbal and tactile cues to safely complete sit<>stand transfers with physical assistance to complete task safely     Pt's/ family goals   1. Return home    Prognosis is fair for reaching above PT goals.    Patient and or family understand(s) diagnosis, prognosis, and plan of care.  yes    PHYSICAL THERAPY PLAN OF CARE:    PT POC is established  based on physician order and patient diagnosis     Referring provider/PT Order:  Shawn Grayson MD   Diagnosis:  Pneumonia due to other specified infectious organisms [J16.8]  Specific instructions for next treatment:  Progress as tolerated    Current Treatment Recommendations:     [x] Strengthening to improve independence with functional mobility   [x] ROM to improve independence with functional mobility   [x] Balance Training to improve static/dynamic balance and to reduce fall risk  [x] Endurance Training to improve activity tolerance during functional mobility   [x] Transfer Training to improve safety and independence with all functional transfers   [x] Gait Training to improve gait mechanics, endurance and assess need for appropriate assistive device  [] Stair Training in preparation for safe discharge home and/or into the community   [x] Positioning to prevent skin breakdown and contractures  [x] Safety and Education Training   [x] Patient/Caregiver Education   [] HEP  [] Other     PT long term treatment goals are located in above grid    Frequency of treatments: 2-5x/week x 1-2 weeks.    Time in  0905  Time out  0925    Total Treatment Time  10 minutes     Evaluation Time includes thorough review of current medical information, gathering information on past medical history/social history and prior level of function, completion of standardized testing/informal observation of tasks, assessment of data and education on plan of care and goals.    CPT codes:  [x] Low Complexity PT evaluation 06284  [] Moderate Complexity PT evaluation 46860  [] High Complexity PT evaluation 83830  [] PT Re-evaluation 42283  [] Gait training 82469 -- minutes  [] Manual therapy 87282 -- minutes  [x] Therapeutic activities 47776 10 minutes  [] Therapeutic exercises 12387 -- minutes  [] Neuromuscular reeducation 65228 -- minutes     Bradley Castro, PT, DPT  [id number]

## 2024-08-19 NOTE — CARE COORDINATION
Transition of Care: pt from [facility]. Spoke to mother and sister and they would like pt to return to [facility] at [place]. They are working with facility to apply for medicaid. Lucy updated and to follow. Therapy to eval pt today. Will ask facility to initiate precert asap. NIALL/destination completed. Transport envelope on soft chart (TF)        JENNIFER ClementeN,RN  Case Management  [phone]

## 2024-08-19 NOTE — DISCHARGE INSTR - COC
Continuity of Care Form    Patient Name: Magan Funes   :  1970  MRN:  [id number]    Admit date:  8/15/2024  Discharge date:  24    Code Status Order: Full Code   Advance Directives:   Advance Care Flowsheet Documentation             Admitting Physician:  Alban Urban Jr., DO  PCP: Andrzej Cline MD    Discharging Nurse: GLADIS  Discharging Hospital Unit/Room#: 4501/4501-B  Discharging Unit Phone Number: ***    Emergency Contact:   Extended Emergency Contact Information  Primary Emergency Contact: Rain Funes  Address: [address] [place] [address]  Home Phone: [phone]  Relation: Parent  Secondary Emergency Contact: Mat Funes  Home Phone: [phone]  Mobile Phone: [phone]  Relation: Brother/Sister   needed? No    Past Surgical History:  Past Surgical History:   Procedure Laterality Date    UPPER GASTROINTESTINAL ENDOSCOPY N/A 2024    ESOPHAGOGASTRODUODENOSCOPY BIOPSY performed by Keron Posada [place] [facility]       Immunization History:   Immunization History   Administered Date(s) Administered    COVID-19, MODERNA, (- formula), (age 12y+), IM, 50mcg/0.5mL 2023    COVID-19, PFIZER PURPLE top, DILUTE for use, (age 12 y+), 30mcg/0.3mL 2021, 03/15/2021, 2021    Influenza Virus Vaccine 2017    Pneumococcal, PPSV23, PNEUMOVAX 23, (age 2y+), SC/IM, 0.5mL 2015       Active Problems:  Patient Active Problem List   Diagnosis Code    Tobacco abuse Z72.0    Moderate nonproliferative diabetic retinopathy associated with type 1 diabetes mellitus (HCC) E10.3399    Essential hypertension I10    Diabetic ketoacidosis without coma associated with type 1 diabetes mellitus (HCC) E10.10    Idiopathic peripheral neuropathy G60.9    Left hemiplegia (HCC) G81.94    KIRSTEN (acute kidney injury) (HCC) N17.9    Metabolic acidosis E87.20    Lacunar stroke (HCC) I63.81    ETOH abuse F10.10    Acute renal  Continuous Blood Gluc Sensor (FREESTYLE ELLE 3 SENSOR) MISC Comments:   Reason for Stopping:         Insulin Syringe-Needle U-100 (INSULIN SYRINGE .3CC/31GX5/16\") 31G X 5/16\" 0.3 ML MISC Comments:   Reason for Stopping:         blood glucose monitor strips Comments:   Reason for Stopping:         insulin lispro (HUMALOG) 100 UNIT/ML SOLN injection vial Comments:   Reason for Stopping:               Activity: activity as tolerated  Diet: diabetic diet    Follow-up appointments:   Follow up with your PCP with your pending labs, JAK2 mutation panel  Follow up with your PCP for adjusting the insulin and need for the insulin pump in the future.  Outpatient follow up needs: repeat CXR 2 weeks and 4 weeks    Patient Instructions:    1. Take your medications as prescribed.   2. Risk of aspiration; always be upright when eating and drinking      Darwin Kumar MD  PGY 1   11:30 AM 8/23/2024       PHYSICIAN SIGNATURE:  Electronically signed by Mohamud Montelongo MD on 8/20/24 at 5:45 AM EDT

## 2024-08-19 NOTE — PROGRESS NOTES
[facility]  Internal Medicine Residency Program  Progress Note - House Team 2    Patient:  Magan Funes 54 y.o. male MRN: [id number]     Date of Service: 8/19/2024     CC: progressive weakness    Days since admission: 4    Subjective     Overnight events: No acute overnight events    He was seen and examined at bedside this morning in no acute distress. He still noted persistence of nonproductive cough but occasional. He denies pleuritic chest pain, dysuria, fever, chills, dizziness, headache. He is able to urinate and move his bowels appropriately. PT/OT consulted and awaiting for evaluation.     Objective     Physical Exam:  Vitals: /80   Pulse 94   Temp 98.4 °F (36.9 °C) (Oral)   Resp 21   Ht 1.676 m (5' 6\")   Wt 63.5 kg (140 lb)   SpO2 96%   BMI 22.60 kg/m²     I & O - 24hr:   Intake/Output Summary (Last 24 hours) at 8/19/2024 1139  Last data filed at 8/18/2024 1700  Gross per 24 hour   Intake 960 ml   Output 450 ml   Net 510 ml      General Appearance: alert, appears stated age, and cooperative  HEENT:  Head: Normal, normocephalic, atraumatic.  Neck: no adenopathy, no carotid bruit, no JVD, supple, symmetrical, trachea midline, and thyroid not enlarged, symmetric, no tenderness/mass/nodules  Lung: rales right>left  Heart: regular rate and rhythm, S1, S2 normal, no murmur, click, rub or gallop  Abdomen: soft, non-tender; bowel sounds normal; no masses,  no organomegaly  Extremities:  extremities normal, atraumatic, no cyanosis or edema  Musculokeletal: No joint swelling, no muscle tenderness. ROM normal in all joints of extremities.   Neurologic: Mental status: Alert, oriented, thought content appropriate    Pertinent Information & Imaging Studies, Consults     CBC:   Lab Results   Component Value Date/Time    WBC 12.2 08/19/2024 04:20 AM    RBC 3.75 08/19/2024 04:20 AM    HGB 10.9 08/19/2024 04:20 AM    HCT 32.6 08/19/2024 04:20 AM    MCV 86.9 08/19/2024 04:20 AM    MCH 29.1  steadily improving  MRSA Nares negative  Initially on empiric antibiotic treatment with ampicillin-sulbactam (UNASYN) 3,000 mg IV, vancomycin (VANCOCIN) 1,250 mg IV   Antibiotic therapy de-escalated On ampicillin-sulbactam (UNASYN) 3,000 mg IV.   On ipratropium 0.5mg-albuterol 2.5mg (DUONEB) nebulization every 4 hours and guaifenesin-dextromethorpan (ROBITUSSIN DM) 10mL every 4 hours.   On flutter valve daily for non-productive cough.   Plan:  Continue anti-infective treatment. Possible de-escalation of antibiotic (to augmentin) on discharge  Continue breathing treatment  Watch out for episodes of desaturations  Give oxygenation support if SpO2 <92      Leukocytosis likely 2/2 infection, resolving  WBC 12.2  Blood culture has noted coagulase negative staphylococcal sp. Legionella antigen, strep pneumoniae antigen, COVID-19, MRSA nares, and urine cultures were negative and had no growth   Antibiotic therapy de-escalated to ampicillin-sulbactam (UNASYN) 3,000 mg IV.   Plan:  Continue anti-infective treatment  Daily CBCs   Follow cultures    Complicated Urinary Tract Infection, resolved  Patient reported resolution of dysuria. I/O -410  Urine culture had no growth  Plan:  Continue anti-infective treatment  Follow cultures    Generalized Weakness likely 2/2 infection, resolved  Patient noted overall improvement of symptoms.   He was afebrile and vital signs are stable  Procalc (0.56) and the inflammatory markers on admission were elevated (, sed rate 69)  UA was noted to have leukocytosis, bacteriuria, (+) leukocyte esterase, and presence of yeast, concerning UTI  Blood culture has noted coagulase negative staphylococcal sp. Urine culture no growth  Antibiotic therapy de-escalated to ampicillin-sulbactam (UNASYN) 3,000 mg IV.   Plan:  Continue anti-infective treatment  Daily CBCs   Follow cultures    Type 1 Diabetes Mellitus, controlled   ; HgbA1c (06/29/2024) 11.7  Home regimen: Lantus 30 units nightly,

## 2024-08-19 NOTE — PROGRESS NOTES
OCCUPATIONAL THERAPY INITIAL EVALUATION    [facility] [address] [place], [place]       Date:2024                                                  Patient Name: Magan Funes  MRN: [id number]  : 1970  Room: [address] [place] [address]    Evaluating OT: Isaak Jolly OTR/L [id number]    Referring Provider: Shawn Grayson MD      Specific Provider Orders/Date: OT evaluation and treatment 24 1015    Diagnosis:  Pneumonia due to other specified infectious organisms [J16.8]      Pertinent Medical History:  has a past medical history of Acute ischemic multifocal multiple vascular territories stroke (HCC), Alcoholism (HCC), Cardiac arrest (HCC), Cocaine abuse (HCC), Diabetes mellitus (HCC), Hypertension, Idiopathic peripheral neuropathy, Lacunar stroke (HCC), Marijuana abuse, and S/P transesophageal echocardiogram (NETTA).   Past Surgical History:   Procedure Laterality Date    UPPER GASTROINTESTINAL ENDOSCOPY N/A 2024    ESOPHAGOGASTRODUODENOSCOPY BIOPSY performed by Keron Posada MD at [facility]       Pt admitted to the hospital 8/15 nausea, weakness, and hyperglycemia     Orders received, chart reviewed, eval complete.     Precautions:  Fall Risk, hx of CVA with L sided weakness, L palm protector     Assessment of current deficits   [x] Functional mobility   [x]ADLs  [x] Strength               []Cognition   [x] Functional transfers   [] IADLs         [x] Safety Awareness   [x]Endurance   [] Fine Motor Coordination [x] Balance [] Vision/perception   []Sensation    [] Gross Motor Coordination [x] ROM  [] Delirium                  [] Motor Control     OT PLAN OF CARE   OT POC based on physician orders, patient diagnosis and results of clinical assessment    Frequency/Duration 2-4 sessions over 2 weeks PRN   Specific OT Treatment to include:   * Instruction/training on adapted ADL techniques and AE recommendations to increase functional  independence within precautions       * Training on energy conservation strategies, correct breathing pattern and techniques to improve independence/tolerance for self-care routine  * Functional transfer/mobility training/DME recommendations for increased independence, safety, and fall prevention  * Patient/Family education to increase follow through with safety techniques and functional independence  * Recommendation of environmental modifications for increased safety with functional transfers/mobility and ADLs  * Therapeutic exercise to improve motor endurance, ROM, and functional strength for ADLs/functional transfers  * Therapeutic activities to facilitate/challenge dynamic balance, stand tolerance for increased safety and independence with ADLs  * Positioning to improve skin integrity, interaction with environment and functional independence  * Delirium prevention/treatment    Recommended Adaptive Equipment: TBD      Home Living:  Pt lives with family - mother    in a 1 level apt on the 2 floor with 3+10  stairs to enter  - Pt reports family bumps up in wc PRN   Bedroom setup: main level  couch   Bathroom setup: main level  BSC   Equipment owned: quad cane, BSC , L AFO - does not wear, L palm protector, shower chair, valorie walker     Prior Level of Function:  Pt A with ADLs , A with IADLs, and completed functional mobility pivots to wc or BSC   Driving: no   Occupation/leisure: watching TV     Pleasant and cooperative throughout session     Pain Level: denies pain   Location: n/a  OT provided: n/a    Cognition: A&O: grossly 3/4    Follows single step directions: Good   Memory: Fair +   Sequencing: Fair   Problem solving: Fair   Judgement/safety: Fair   Attention:  Fair +     Functional Assessment: AM-PAC Inpatient Daily Activity Raw Score: 12 /24     Initial Eval Status  Date: 8/19/2024   Treatment Status  Date: STG=LTG  Time frame: 10-14 days   Feeding Set up  Breakfast tray   Mod I    Grooming Min A     Set up

## 2024-08-19 NOTE — ACP (ADVANCE CARE PLANNING)
Advance Care Planning   Healthcare Decision Maker:    Primary Decision Maker: Rain Funes - Parent - [phone]    Secondary Decision Maker: Vy Gonzalez - Brother/Sister - [phone]    Supplemental (Other) Decision Maker: Mat Funes - Brother/Sister - [phone]    Click here to complete Healthcare Decision Makers including selection of the Healthcare Decision Maker Relationship (ie \"Primary\").

## 2024-08-19 NOTE — PROGRESS NOTES
*ATTENTION:  This note has been created by a medical student for educational purposes only.  Please do not refer to the content of this note for clinical decision-making, billing, or other purposes.  Please see attending physician’s note to obtain clinical information on this patient.*          [facility]  Internal Medicine Residency Program  Medical Student Progress Note - House Team 2      Patient:  Magan Funes 54 y.o. male   MRN: [id number]       Date of Service: 8/19/2024  Admission date: 8/15/2024 10:11 AM ; Hospital day: 4   CC: Fatigue (Patient c/o increased generalized weakness and nausea , hx DM bgl 240 . )     Overnight events: no acute events overnight.     Subjective     Patient seen and examined this morning at the bedside. Patient reports feeling better. He still endorses a non-productive cough but denies any chest pain associated with the cough or pleuritic chest pain. He is able to take a deep breath. He reports that he has not been using his flutter valve. He denies chest pain, trouble breathing, abdominal pain, diarrhea, dysuria, fever, or chills. He reports good PO intake. Of note, patient states that he lives at home with his mother and is able to complete all his ADLs but at baseline, he uses a wheelchair to get around.       Objective   PHYSICAL EXAM  General Appearance: Awake, alert, in no acute distress, but appears frail   HEENT: Normocephalic, atraumatic, anicteric sclera. Moist mucus membranes  Neck: midline trachea  Lung: Coarse crackles present in right upper/middle lung fields but improving. Left lung clear to auscultation, no wheezing. No increased work of breathing. On room air.   Heart: regular rate and rhythm, no murmur  Abdomen: soft, non-tender, non-distended. bowel sounds normal;   Extremities: no cyanosis or edema, no tenderness. Contracture of left arm   Musculokeletal: No joint swelling  Neurologic: Mental status: Awake, alert, oriented  °C)  Recent Labs     08/17/24  0406 08/18/24  0532 08/19/24  0420   WBC 15.0* 13.4* 12.2*       Results       Procedure Component Value Units Date/Time    Culture, Blood 1 [[id number]] Collected: 08/18/24 1143    Order Status: Completed Specimen: Blood Updated: 08/18/24 1237     Specimen Description .BLOOD LEFT     Special Requests          Culture NO GROWTH <24 HRS    Culture, Blood 2 [[id number]] Collected: 08/18/24 1143    Order Status: Completed Specimen: Blood Updated: 08/18/24 1239     Specimen Description .BLOOD .ARM     Special Requests Site: Blood     Culture NO GROWTH <24 HRS    Culture, MRSA, Screening [[id number]] Collected: 08/15/24 1715    Order Status: Completed Specimen: Nares Updated: 08/17/24 1037     Specimen Description .NARES     Culture NEGATIVE FOR: METHICILLIN RESISTANT STAPHYLOCOCCUS AUREUS    Culture, Respiratory [[id number]]     Order Status: Sent Specimen: Sputum Expectorated     LEGIONELLA ANTIGEN, URINE [[id number]]     Order Status: Canceled Specimen: Urine     STREP PNEUMONIAE ANTIGEN [[id number]]     Order Status: Canceled Specimen: Urine, clean catch     Culture, Blood 2 [[id number]] Collected: 08/15/24 1442    Order Status: Completed Specimen: Blood Updated: 08/18/24 1504     Specimen Description .BLOOD .ARM     Special Requests Site: Blood     Culture NO GROWTH 3 DAYS    Culture, Blood 1 [[id number]]  (Abnormal) Collected: 08/15/24 1421    Order Status: Completed Specimen: Blood Updated: 08/17/24 1135     Specimen Description .BLOOD .ARM     Special Requests          [employer] test comment AEROBIC BLD BOTTLE     Direct Exam DIRECT GRAM STAIN FROM BOTTLE: GRAM POSITIVE COCCI IN CLUSTERS     Comment: Direct Gram Stain from bottle result called to and read back by: NAVEEN DOLAN RN 8/16/2024   AT 0835           Culture STAPHYLOCOCCUS SPECIES, COAGULASE NEGATIVE A single positive blood culture of coagulase negative Staphylocci, diphtheroids,micrococci, Cutibacterium, viridans

## 2024-08-19 NOTE — PROGRESS NOTES
Spiritual Health Assessment/Progress Note  Y  [facility] [place]    (P) Initial Encounter,  ,  ,      Name: Magan Funes MRN: [id number]    Age: 54 y.o.     Sex: male   Language: English   Religious: Non-Taoist   Pneumonia due to other specified infectious organisms     Date: 8/19/2024                           Spiritual Assessment [place] in [facility]        Referral/Consult From: (P) Rounding   Encounter Overview/Reason: (P) Initial Encounter  Service Provided For: (P) Patient    Mary, Belief, Meaning:   Patient is connected with a mary tradition or spiritual practice  Family/Friends No family/friends present      Importance and Influence:  Patient has spiritual/personal beliefs that influence decisions regarding their health  Family/Friends no family/friends present    Community:  Patient feels well-supported. Support system includes: Other: Parents and sibilings  Family/Friends Other: No family present    Assessment and Plan of Care:     Patient Interventions include: Facilitated expression of thoughts and feelings and Affirmed coping skills/support systems  Family/Friends Interventions include: Other: No family present    Patient Plan of Care: No spiritual needs identified for follow-up and No future visits per patient/family request  Family/Friends Plan of Care: Other: No family present    Electronically signed by HAZEL PLASCENCIA on 8/19/2024 at 2:51 PM

## 2024-08-19 NOTE — PROGRESS NOTES
Pharmacy Consultation Note  (Antibiotic Dosing and Monitoring)    Note vancomycin discontinued. Clinical pharmacy will sign-off. Please re-consult if we can be of further assistance.    Gabriela Pompa, PharmD, BCPS, BCCCP 8/19/2024 10:03 AM

## 2024-08-19 NOTE — PROGRESS NOTES
Physician Progress Note      PATIENT:               ADRIANA CRAWLEY  CSN #:                  [id number]  :                       1970  ADMIT DATE:       8/15/2024 10:11 AM  DISCH DATE:  RESPONDING  PROVIDER #:        Kamlesh Deluca MD          QUERY TEXT:    Pt admitted with pneumonia and UTI. Pt noted to have WBC 20.4 procal 0.56 with   heartrate over 100. If possible, please document in the progress notes and   discharge summary if you are evaluating and /or treating any of the following:    The medical record reflects the following:  Risk Factors: Pneumonia, UTI  Clinical Indicators: Lab Findings WBC 20.4  18.1  12.2 procal 0.56  VS   findings 97.9 80 18 110/69   99.9 110 22 135/70  98.2 80 15 115/68 94%  per   H&P presenting with fatigue -Leukocytosis, likely 2/2 Aspiration PNA versus   Healthcare Associated Pneumonia-He was recently discharged from the hospital   within 1 month, cannot totally rule out healthcare associated pneumonia;   progress note  De-escalating to Unasyn alone  Treatment: IV antibiotics, serial labs and VS monitoring    Thank you  Tracy RODRIGUEZN, RN, CCDS  Clinical Documentation Improvement  Options provided:  -- Sepsis, present on admission  -- pneumonia and UTI without Sepsis  -- Other - I will add my own diagnosis  -- Disagree - Not applicable / Not valid  -- Disagree - Clinically unable to determine / Unknown  -- Refer to Clinical Documentation Reviewer    PROVIDER RESPONSE TEXT:    This patient has pneumonia and UTI without Sepsis.    Query created by: Tracy Pierre on 2024 8:39 AM      Electronically signed by:  Kamlesh Deluca MD 2024 8:50 AM

## 2024-08-20 ENCOUNTER — APPOINTMENT (OUTPATIENT)
Dept: GENERAL RADIOLOGY | Age: 54
DRG: 177 | End: 2024-08-20
Payer: MEDICARE

## 2024-08-20 LAB
ANION GAP SERPL CALCULATED.3IONS-SCNC: 12 MMOL/L (ref 7–16)
BASOPHILS # BLD: 0.05 K/UL (ref 0–0.2)
BASOPHILS NFR BLD: 0 % (ref 0–2)
BUN SERPL-MCNC: 14 MG/DL (ref 6–20)
CALCIUM SERPL-MCNC: 9.1 MG/DL (ref 8.6–10.2)
CHLORIDE SERPL-SCNC: 97 MMOL/L (ref 98–107)
CO2 SERPL-SCNC: 25 MMOL/L (ref 22–29)
CREAT SERPL-MCNC: 0.6 MG/DL (ref 0.7–1.2)
EOSINOPHIL # BLD: 0.55 K/UL (ref 0.05–0.5)
EOSINOPHILS RELATIVE PERCENT: 5 % (ref 0–6)
ERYTHROCYTE [DISTWIDTH] IN BLOOD BY AUTOMATED COUNT: 15.2 % (ref 11.5–15)
GFR, ESTIMATED: >90 ML/MIN/1.73M2
GLUCOSE BLD-MCNC: 143 MG/DL (ref 74–99)
GLUCOSE BLD-MCNC: 168 MG/DL (ref 74–99)
GLUCOSE BLD-MCNC: 197 MG/DL (ref 74–99)
GLUCOSE BLD-MCNC: 88 MG/DL (ref 74–99)
GLUCOSE SERPL-MCNC: 127 MG/DL (ref 74–99)
HCT VFR BLD AUTO: 32.1 % (ref 37–54)
HGB BLD-MCNC: 10.6 G/DL (ref 12.5–16.5)
IMM GRANULOCYTES # BLD AUTO: 0.16 K/UL (ref 0–0.58)
IMM GRANULOCYTES NFR BLD: 1 % (ref 0–5)
LYMPHOCYTES NFR BLD: 1.92 K/UL (ref 1.5–4)
LYMPHOCYTES RELATIVE PERCENT: 16 % (ref 20–42)
MAGNESIUM SERPL-MCNC: 1.9 MG/DL (ref 1.6–2.6)
MCH RBC QN AUTO: 29.2 PG (ref 26–35)
MCHC RBC AUTO-ENTMCNC: 33 G/DL (ref 32–34.5)
MCV RBC AUTO: 88.4 FL (ref 80–99.9)
MICROORGANISM SPEC CULT: NORMAL
MONOCYTES NFR BLD: 1.03 K/UL (ref 0.1–0.95)
MONOCYTES NFR BLD: 8 % (ref 2–12)
NEUTROPHILS NFR BLD: 70 % (ref 43–80)
NEUTS SEG NFR BLD: 8.57 K/UL (ref 1.8–7.3)
PHOSPHATE SERPL-MCNC: 3.2 MG/DL (ref 2.5–4.5)
PLATELET # BLD AUTO: 599 K/UL (ref 130–450)
PMV BLD AUTO: 8.4 FL (ref 7–12)
POTASSIUM SERPL-SCNC: 4.1 MMOL/L (ref 3.5–5)
PROCALCITONIN SERPL-MCNC: 0.08 NG/ML (ref 0–0.08)
RBC # BLD AUTO: 3.63 M/UL (ref 3.8–5.8)
SERVICE CMNT-IMP: NORMAL
SODIUM SERPL-SCNC: 134 MMOL/L (ref 132–146)
SPECIMEN DESCRIPTION: NORMAL
WBC OTHER # BLD: 12.3 K/UL (ref 4.5–11.5)

## 2024-08-20 PROCEDURE — 85025 COMPLETE CBC W/AUTO DIFF WBC: CPT

## 2024-08-20 PROCEDURE — 84100 ASSAY OF PHOSPHORUS: CPT

## 2024-08-20 PROCEDURE — 6360000002 HC RX W HCPCS

## 2024-08-20 PROCEDURE — 2580000003 HC RX 258

## 2024-08-20 PROCEDURE — 82962 GLUCOSE BLOOD TEST: CPT

## 2024-08-20 PROCEDURE — 84145 PROCALCITONIN (PCT): CPT

## 2024-08-20 PROCEDURE — 2060000000 HC ICU INTERMEDIATE R&B

## 2024-08-20 PROCEDURE — 6370000000 HC RX 637 (ALT 250 FOR IP)

## 2024-08-20 PROCEDURE — 36415 COLL VENOUS BLD VENIPUNCTURE: CPT

## 2024-08-20 PROCEDURE — 80048 BASIC METABOLIC PNL TOTAL CA: CPT

## 2024-08-20 PROCEDURE — 71045 X-RAY EXAM CHEST 1 VIEW: CPT

## 2024-08-20 PROCEDURE — 83735 ASSAY OF MAGNESIUM: CPT

## 2024-08-20 PROCEDURE — 94640 AIRWAY INHALATION TREATMENT: CPT

## 2024-08-20 PROCEDURE — 99232 SBSQ HOSP IP/OBS MODERATE 35: CPT | Performed by: INTERNAL MEDICINE

## 2024-08-20 RX ORDER — AMLODIPINE BESYLATE 5 MG/1
5 TABLET ORAL DAILY
Status: CANCELLED | DISCHARGE
Start: 2024-08-20

## 2024-08-20 RX ORDER — AMLODIPINE BESYLATE 10 MG/1
10 TABLET ORAL DAILY
Status: DISCONTINUED | OUTPATIENT
Start: 2024-08-21 | End: 2024-08-23 | Stop reason: HOSPADM

## 2024-08-20 RX ORDER — IPRATROPIUM BROMIDE AND ALBUTEROL SULFATE 2.5; .5 MG/3ML; MG/3ML
1 SOLUTION RESPIRATORY (INHALATION) EVERY 4 HOURS PRN
Status: DISCONTINUED | OUTPATIENT
Start: 2024-08-20 | End: 2024-08-23 | Stop reason: HOSPADM

## 2024-08-20 RX ADMIN — INSULIN GLARGINE 15 UNITS: 100 INJECTION, SOLUTION SUBCUTANEOUS at 20:46

## 2024-08-20 RX ADMIN — AMPICILLIN SODIUM AND SULBACTAM SODIUM 3000 MG: 2; 1 INJECTION, POWDER, FOR SOLUTION INTRAMUSCULAR; INTRAVENOUS at 05:05

## 2024-08-20 RX ADMIN — DULOXETINE HYDROCHLORIDE 30 MG: 30 CAPSULE, DELAYED RELEASE ORAL at 09:09

## 2024-08-20 RX ADMIN — LISINOPRIL 10 MG: 5 TABLET ORAL at 09:09

## 2024-08-20 RX ADMIN — INSULIN GLARGINE 15 UNITS: 100 INJECTION, SOLUTION SUBCUTANEOUS at 09:14

## 2024-08-20 RX ADMIN — PANTOPRAZOLE SODIUM 40 MG: 40 INJECTION, POWDER, FOR SOLUTION INTRAVENOUS at 09:08

## 2024-08-20 RX ADMIN — SODIUM CHLORIDE, PRESERVATIVE FREE 10 ML: 5 INJECTION INTRAVENOUS at 20:46

## 2024-08-20 RX ADMIN — IPRATROPIUM BROMIDE AND ALBUTEROL SULFATE 1 DOSE: 2.5; .5 SOLUTION RESPIRATORY (INHALATION) at 16:10

## 2024-08-20 RX ADMIN — ASPIRIN 81 MG: 81 TABLET, COATED ORAL at 09:09

## 2024-08-20 RX ADMIN — Medication 100 MG: at 09:09

## 2024-08-20 RX ADMIN — INSULIN LISPRO 10 UNITS: 100 INJECTION, SOLUTION INTRAVENOUS; SUBCUTANEOUS at 12:34

## 2024-08-20 RX ADMIN — AMPICILLIN SODIUM AND SULBACTAM SODIUM 3000 MG: 2; 1 INJECTION, POWDER, FOR SOLUTION INTRAMUSCULAR; INTRAVENOUS at 20:45

## 2024-08-20 RX ADMIN — ATORVASTATIN CALCIUM 80 MG: 40 TABLET, FILM COATED ORAL at 20:46

## 2024-08-20 RX ADMIN — AMPICILLIN SODIUM AND SULBACTAM SODIUM 3000 MG: 2; 1 INJECTION, POWDER, FOR SOLUTION INTRAMUSCULAR; INTRAVENOUS at 09:10

## 2024-08-20 RX ADMIN — IPRATROPIUM BROMIDE AND ALBUTEROL SULFATE 1 DOSE: 2.5; .5 SOLUTION RESPIRATORY (INHALATION) at 07:44

## 2024-08-20 RX ADMIN — FOLIC ACID 1 MG: 1 TABLET ORAL at 09:09

## 2024-08-20 RX ADMIN — IPRATROPIUM BROMIDE AND ALBUTEROL SULFATE 1 DOSE: 2.5; .5 SOLUTION RESPIRATORY (INHALATION) at 11:50

## 2024-08-20 RX ADMIN — AMPICILLIN SODIUM AND SULBACTAM SODIUM 3000 MG: 2; 1 INJECTION, POWDER, FOR SOLUTION INTRAMUSCULAR; INTRAVENOUS at 15:17

## 2024-08-20 RX ADMIN — AMLODIPINE BESYLATE 5 MG: 5 TABLET ORAL at 09:09

## 2024-08-20 RX ADMIN — INSULIN LISPRO 10 UNITS: 100 INJECTION, SOLUTION INTRAVENOUS; SUBCUTANEOUS at 09:13

## 2024-08-20 NOTE — RT PROTOCOL NOTE
RT Inhaler-Nebulizer Bronchodilator Protocol Note    There is a bronchodilator order in the chart from a provider indicating to follow the RT Bronchodilator Protocol and there is an “Initiate RT Inhaler-Nebulizer Bronchodilator Protocol” order as well (see protocol at bottom of note).    CXR Findings:  XR CHEST PORTABLE    Result Date: 8/20/2024  Continuum pattern of progression of pneumonic process more likely towards the right lower lobe.  Similar findings as observed on the study of August 19.     The findings from the last RT Protocol Assessment were as follows:   History Pulmonary Disease: Smoker 15 pack years or more  Respiratory Pattern: Regular pattern and RR 12-20 bpm  Breath Sounds: Slightly diminished and/or crackles  Cough: Strong, spontaneous, non-productive  Indication for Bronchodilator Therapy: None  Bronchodilator Assessment Score: 3    Aerosolized bronchodilator medication orders have been revised according to the RT Inhaler-Nebulizer Bronchodilator Protocol below.    Respiratory Therapist to perform RT Therapy Protocol Assessment initially then follow the protocol.  Repeat RT Therapy Protocol Assessment PRN for score 0-3 or on second treatment, BID, and PRN for scores above 3.    No Indications - adjust the frequency to every 6 hours PRN wheezing or bronchospasm, if no treatments needed after 48 hours then discontinue using Per Protocol order mode.     If indication present, adjust the RT bronchodilator orders based on the Bronchodilator Assessment Score as indicated below.  Use Inhaler orders unless patient has one or more of the following: on home nebulizer, not able to hold breath for 10 seconds, is not alert and oriented, cannot activate and use MDI correctly, or respiratory rate 25 breaths per minute or more, then use the equivalent nebulizer order(s) with same Frequency and PRN reasons based on the score.  If a patient is on this medication at home then do not decrease Frequency below that

## 2024-08-20 NOTE — CARE COORDINATION
CM Update: received a call from Lucy with [employer]. The business office spoke to sister Vy regarding admission and medicaid application. They recently cashed out a 401 K that they need to spend down. They will work with them regarding next steps. Guardian requires $2842 up front to cover copays for 2 weeks. He is in his copay days and family is aware. Left message for sister to make payments arrangements. NIALL/destination completed. Transport envelope on soft chart (TF)       TASHA Clemente,RN  Case Management  [phone]

## 2024-08-20 NOTE — PROGRESS NOTES
Specimen Description .NASOPHARYNGEAL SWAB     SARS-CoV-2, Rapid Not Detected     Comment:       Rapid NATT:  Negative results should be treated as presumptive and, if inconsistent with clinical signs   and symptoms or necessary for patient management,  should be tested with an alternative molecular assay. Negative results do not preclude   SARS-CoV-2 infection and   should not be used as the sole basis for patient management decisions.   This test has been authorized by the FDA under an Emergency Use Authorization (EUA) for use   by authorized laboratories.        Fact sheet for Healthcare Providers: https://www.fda.gov/media/583224/download  Fact sheet for Patients: https://www.fda.gov/media/891712/download          Methodology: Isothermal Nucleic Acid Amplification                   Antibiotic  Days  Day started   Ampicillin-sulbactam (UNASYN) 3,000 mg  5 08/15/2024                             OXYGENATION: None    DIET: ADULT DIET; Regular; 4 carb choices (60 gm/meal)       Resident's Assessment and Plan     Magan Funes is a 54 y.o. male,  has a past medical history of Acute ischemic multifocal multiple vascular territories stroke (HCC), Alcoholism (HCC), Cardiac arrest (HCC), Cocaine abuse (HCC), Diabetes mellitus (HCC), Hypertension, Idiopathic peripheral neuropathy, Lacunar stroke (HCC), Marijuana abuse, and S/P transesophageal echocardiogram (NETTA).  came here with CC   Chief Complaint   Patient presents with    Fatigue     Patient c/o increased generalized weakness and nausea , hx DM bgl 240 .         SUMMARY OF HOSPITAL STAY: Briefly, he presented to the emergency department with a 2-day history of progressive weakness, fatigue, nausea, and one episode of vomiting. He also reported burning with urination. On EMS evaluation, he was hyperglycemic (BG 240s). He denied fever, cough, or other respiratory symptoms. He had a history of multiple hospital admissions, mostly due to uncontrolled diabetes. He was last  was previously admitted one month prior and was sent to skilled nursing facility. This is a risk factor for healthcare associated pneumonia  He is afebrile and has stable vital signs   CXR shows right middle lobe pneumonia. On repeat CXR, mild interval increase in right middle lobe  WBC 12.2, steadily improving  MRSA Nares negative  Initially on empiric antibiotic treatment with ampicillin-sulbactam (UNASYN) 3,000 mg IV, vancomycin (VANCOCIN) 1,250 mg IV   Antibiotic therapy de-escalated On ampicillin-sulbactam (UNASYN) 3,000 mg IV.   On ipratropium 0.5mg-albuterol 2.5mg (DUONEB) nebulization every 4 hours and guaifenesin-dextromethorpan (ROBITUSSIN DM) 10mL every 4 hours.   On flutter valve daily for non-productive cough.   Plan:  Continue anti-infective treatment.   For CXR and repeat procalcitonin  Continue breathing treatment  Watch out for episodes of desaturations  Give oxygenation support if SpO2 <92      Leukocytosis likely 2/2 infection, resolving  WBC 12.3  Blood culture has noted coagulase negative staphylococcal sp. Legionella antigen, strep pneumoniae antigen, COVID-19, MRSA nares, and urine cultures were negative and had no growth   Antibiotic therapy de-escalated to ampicillin-sulbactam (UNASYN) 3,000 mg IV.   Plan:  Continue anti-infective treatment  Daily CBCs   Follow cultures    Complicated Urinary Tract Infection, resolved  Patient reported resolution of dysuria. I/O -410  Urine culture had no growth  Plan:  Continue anti-infective treatment   Follow cultures     Generalized Weakness likely 2/2 infection, resolved  Patient noted overall improvement of symptoms.   He was afebrile and vital signs are stable  Procalc (0.56) and the inflammatory markers on admission were elevated (, sed rate 69)  UA was noted to have leukocytosis, bacteriuria, (+) leukocyte esterase, and presence of yeast, concerning UTI  Blood culture has noted coagulase negative staphylococcal sp. Urine culture no  growth  Antibiotic therapy de-escalated to ampicillin-sulbactam (UNASYN) 3,000 mg IV.   Plan:  Continue anti-infective treatment  Daily CBCs   Follow cultures    Type 1 Diabetes Mellitus, controlled   ; HgbA1c (06/29/2024) 11.7  Home regimen: Lantus 30 units nightly, lispro 10 units 3 times daily before meals   Switched back to Lantus 15 units twice daily  Plan:  Continue insulin regimen  Monitor BG every 4 hours  Watch out for episodes of hyperglycemia/hypoglycemia    Ketosis likely 2/2 infection-induced, uncontrolled diabetes mellitus, resolved  BHB 1.04, presence of ketones in the urine  DKA unlikely as he does not have metabolic acidosis and AG is within normal limits  On insulin home regimen  Antibiotic therapy de-escalated to ampicillin-sulbactam (UNASYN) 3,000 mg IV.   Plan:  Continue antibiotic and insulin regimen   Monitor BMPs daily       History of Gastrointestinal Bleed   Patient had hematemesis from his previous hospital admission  On pantoprazole (PROTONIX) 40 mg  Plan:  Continue GI prophylaxis  Watch out for episodes of GI bleed    Hypertension, controlled  /96  On amlodipine (NORVASC) 5mg once daily and Lisinopril (PRINIVIL) 10 mg once daily  Plan:  Monitor for episodes of hypertension/hypotension     Elevated Troponin, stable   Troponin 49 --> 49, no delta change  Patient has no chest pain  Plan:  Troponin trended     Hyponatremia likely 2/2 hyperglycemia, resolved  Initial Na 128,  --> corrected Na 130   Na 133  Plan:  Daily BMPs     Vitamin D Deficiency   Vitamin D 25 (06/23/2024): 18.7   On Vitamin D 50,000 units weekly   Plan:  Continue Vitamin D regimen       History of Chronic Obstructive Pulmonary Disease, stable  Patient denies shortness of breath   On ipratropium 0.5mg-albuterol 2.5mg (DUONEB) nebulization every 4 hours  Plan:  Continue breathing treatment    History of Polysubstance Abuse   Urine Drug Screen was unremarkable   Plan:  Watch out for withdrawal symptoms, if

## 2024-08-20 NOTE — PROGRESS NOTES
Specimen: Blood Updated: 08/19/24 1237     Specimen Description .BLOOD LEFT     Special Requests          Culture NO GROWTH 1 DAY    Culture, Blood 2 [[id number]] Collected: 08/18/24 1143    Order Status: Completed Specimen: Blood Updated: 08/19/24 1238     Specimen Description .BLOOD .ARM     Special Requests Site: Blood     Culture NO GROWTH 1 DAY    Culture, MRSA, Screening [[id number]] Collected: 08/15/24 1715    Order Status: Completed Specimen: Nares Updated: 08/17/24 1037     Specimen Description .NARES     Culture NEGATIVE FOR: METHICILLIN RESISTANT STAPHYLOCOCCUS AUREUS    Culture, Respiratory [[id number]]     Order Status: Sent Specimen: Sputum Expectorated     LEGIONELLA ANTIGEN, URINE [[id number]]     Order Status: Canceled Specimen: Urine     STREP PNEUMONIAE ANTIGEN [[id number]]     Order Status: Canceled Specimen: Urine, clean catch     Culture, Blood 2 [[id number]] Collected: 08/15/24 1442    Order Status: Completed Specimen: Blood Updated: 08/19/24 1504     Specimen Description .BLOOD .ARM     Special Requests Site: Blood     Culture NO GROWTH 4 DAYS    Culture, Blood 1 [[id number]]  (Abnormal) Collected: 08/15/24 1421    Order Status: Completed Specimen: Blood Updated: 08/17/24 1135     Specimen Description .BLOOD .ARM     Special Requests          [employer] test comment AEROBIC BLD BOTTLE     Direct Exam DIRECT GRAM STAIN FROM BOTTLE: GRAM POSITIVE COCCI IN CLUSTERS     Comment: Direct Gram Stain from bottle result called to and read back by: NAVEEN DOLAN RN 8/16/2024   AT 0835           Culture STAPHYLOCOCCUS SPECIES, COAGULASE NEGATIVE A single positive blood culture of coagulase negative Staphylocci, diphtheroids,micrococci, Cutibacterium, viridans Streptocci, Bacillus, or Lactobacillus species should be interpreted with caution and viewed as a likely skin contaminant.       Candida auris Not Detected     Candida albicans Not Detected     Candida glabrata Not Detected     Maarh krusei Not  08/15/24 1251    Order Status: Completed Specimen: Urine, straight catheter Updated: 08/16/24 1029     Source .URINE     Strep pneumo Ag NEGATIVE     Comment:       Presumptive Negative  suggests no current or recent pneumococcal infection. Infection due to Strep pneumoniae   cannot be ruled out since the antigen present in the sample may be below the detection limit   of the test.         COVID-19, Rapid [[id number]] Collected: 08/15/24 1049    Order Status: Completed Specimen: Nasopharyngeal Swab Updated: 08/15/24 1118     Specimen Description .NASOPHARYNGEAL SWAB     SARS-CoV-2, Rapid Not Detected     Comment:       Rapid NATT:  Negative results should be treated as presumptive and, if inconsistent with clinical signs   and symptoms or necessary for patient management,  should be tested with an alternative molecular assay. Negative results do not preclude   SARS-CoV-2 infection and   should not be used as the sole basis for patient management decisions.   This test has been authorized by the FDA under an Emergency Use Authorization (EUA) for use   by authorized laboratories.        Fact sheet for Healthcare Providers: https://www.fda.gov/media/934831/download  Fact sheet for Patients: https://www.fda.gov/media/858677/download          Methodology: Isothermal Nucleic Acid Amplification                  COVID-19 Labs:  Lab Results   Component Value Date/Time    COVID19 Not Detected 08/15/2024 10:49 AM    COVID19 Not Detected 07/08/2024 05:15 AM       Giardia   Giardia Ag, Stl   Date Value Ref Range Status   01/07/2024 NEGATIVE NEGATIVE Final       Medications     Continuous Infusions:   dextrose      sodium chloride       Scheduled Meds:   insulin glargine  15 Units SubCUTAneous BID    amLODIPine  5 mg Oral Daily    thiamine  100 mg Oral Daily    pantoprazole (PROTONIX) 40 mg in sodium chloride (PF) 0.9 % 10 mL injection  40 mg IntraVENous Daily    insulin lispro  10 Units SubCUTAneous TID WC    aspirin  81 mg Oral  do not need vancomycin to cover for MRSA.   -No hypoxia. Patient has been saturating well 93-97% on room air   -No evidence of swallowing problems, unclear cause of recurrent aspiration pneumonia      Plan:  -Repeat CXR to evaluate pneumonia  -Repeat procal   -Robutssin q4 PRN for cough   -Continue Pulmonary hygiene-encourage use of flutter valve   -Monitor CBC and oxygen saturation  -Duoneb Q4 hours while awake   -De-escelate antibiotics to Augmentin on discharge to complete course.       2. Hypertension  -Patient has history of hypertension. Home medications amlodipine and lisinopril   -Amlodipine and lisinopril initially held on admission due to low-normal blood pressure but restarted        Plan:  -Continue patient's home blood pressure medications-lisinopril and amlodipine   -Amlodipine increased from 5 mg to 10 mg, which is patient's home dose  -Monitor BP      3. Generalized weakness likely secondary to infection  -Patient uses wheelchair at baseline  -Was discharged to [facility] after previous admission on 7/12/2024  -PT AM-PAC score 10/24 and OT AM-PAC score 12/24      Plan:  -Plan to discharge to [facility]      4. Positive blood culture-rule out bacteremia, likely contaminant   -One blood culture from admission positive for coagulase negative staph, likely a contaminant.  -Patient had positive blood cultures in past admissions, Staph hominis, assumed to be contaminant  -WBC down trending, patient clinically improving    -Repeat blood cultures X2-no growth to date     Plan:  -Likely contaminant. No further management needed     5. Type 1 Diabetes Mellitus  -Poorly controlled. Last A1C 11.7 (06/29/2024)  -Home regimen is Lantus 30 units nightly, lispro 10 units 3 times daily before meals   -Anion gap closed at 12, no signs of DKA  -Note: patient has went into DKA easily at previous hospitalizations. Patient needs both D5 or D10 fluids when NPO and insulin to prevent DKA   -Continue lantus 15 units BID as blood

## 2024-08-20 NOTE — DISCHARGE INSTRUCTIONS
Internal medicine    Follow ups  Please follow up with the internal medicine clinic at [facility] within 14 days of discharge from your rehabilitation facility   Please keep all other follow up appointments:  No future appointments.     Changes in healthcare   Please take all medications as indicated  Diet: diabetic diet   Activity: activity as tolerated  New Medications started during this hospital stay  ANTIBIOTIC***  Folic acid (FOLVITE) 1mg tab once daily  Guaifenesin-Dextromethorphan (ROBITUSSIN DM) 100-10mg/5mL syrup as needed for productive cough  Changes to your medications  Amlodipine 5mg tab once daily. Discontinue 10 mg ***  Aspirin 81mg tab once daily. Do not take twice daily  ***  Medications you should stop taking   ***  Additional labs, testing or imaging needed after discharge   ***  Even if you are feeling better and not having symptoms do not stop taking antibiotic earlier then prescribed ***  Please contact us if you have any concerns, wish to change or make an appointment:  Internal medicine clinic   Phone: [phone]  Fax: [phone]  [facility] [address]  Should you have further questions in regards to this visit, you can review your clinical note and after visit summary document on your [employer] account.     Other than any new prescriptions given to you today, the list of home medications on this After Visit Summary are based on information provided to us from you and your healthcare providers. This information, including the list, dose, and frequency of medications is only as accurate as the information you provided. If you have any questions or concerns about your home medications, please contact your Primary Care Physician for further clarification.

## 2024-08-20 NOTE — DISCHARGE INSTR - DIET

## 2024-08-20 NOTE — PROGRESS NOTES
[facility]  Internal Medicine Residency / House Medicine Service    Attending Physician Statement  I have discussed the case, including pertinent history and exam findings with the resident and the team.  I have seen and examined the patient and the key elements of the encounter have been performed by me.  I agree with the assessment, plan and orders as documented by the resident.      Case Discussed During AM Rounds   No overnight events    Continued respiratory improvement    Tolerating diet    Cough is persistent and remains non-productive with intermittent pleuritic chest pain    Patient is clinically improving    Need to improve head of bed at 30 degrees to reduce risk of aspiration as discussed and adjusted patient in bed during assessment- continued concerns- need up to chair as well with assistance    Glucose stable without hypoglycemia    Persistent leukocytosis but stable   Will repeat a procalcitonin and CXR     Pneumonia-  RML in nature- clinically stable today    Empirically initiated on Atbs with Vancomycin/Unasyn   MRSA colonization is negative    De-escalated to Unasyn alone on 8/18    Continue aerosols   Up to chair encouraged today    Head of bed at 30 degrees    Flutter valve use    Aggressive pulmonary toilet    Patient has improved with Aspiration PNA management   No findings of dysphagia on exam currently- consider repeat video fluoroscopy if recurrent concerns in future    Speech eval was previously addressed on last admission in July     Positive Blood Culture- likely contaminant    Coag negative staph   Repeat blood cultures negative at 24 hours     Complicated UTI on presentation    Unasyn management ongoing    No growth in urine culture thus far     Type I DM    Avoid hypoglycemia   Continue regimen and adjust as needs are noted    NO signs of DKA on this admission- GAP remains closed     Polysubstance Abuse   No signs of withdrawal currently     Gastroparesis-

## 2024-08-21 LAB
ANION GAP SERPL CALCULATED.3IONS-SCNC: 12 MMOL/L (ref 7–16)
BASOPHILS # BLD: 0.05 K/UL (ref 0–0.2)
BASOPHILS NFR BLD: 1 % (ref 0–2)
BUN SERPL-MCNC: 15 MG/DL (ref 6–20)
CALCIUM SERPL-MCNC: 8.8 MG/DL (ref 8.6–10.2)
CHLORIDE SERPL-SCNC: 97 MMOL/L (ref 98–107)
CO2 SERPL-SCNC: 23 MMOL/L (ref 22–29)
CREAT SERPL-MCNC: 0.6 MG/DL (ref 0.7–1.2)
EOSINOPHIL # BLD: 0.49 K/UL (ref 0.05–0.5)
EOSINOPHILS RELATIVE PERCENT: 5 % (ref 0–6)
ERYTHROCYTE [DISTWIDTH] IN BLOOD BY AUTOMATED COUNT: 15.2 % (ref 11.5–15)
GFR, ESTIMATED: >90 ML/MIN/1.73M2
GLUCOSE BLD-MCNC: 109 MG/DL (ref 74–99)
GLUCOSE BLD-MCNC: 243 MG/DL (ref 74–99)
GLUCOSE BLD-MCNC: 314 MG/DL (ref 74–99)
GLUCOSE BLD-MCNC: 345 MG/DL (ref 74–99)
GLUCOSE BLD-MCNC: 85 MG/DL (ref 74–99)
GLUCOSE SERPL-MCNC: 357 MG/DL (ref 74–99)
HCT VFR BLD AUTO: 32.9 % (ref 37–54)
HGB BLD-MCNC: 10.9 G/DL (ref 12.5–16.5)
IMM GRANULOCYTES # BLD AUTO: 0.15 K/UL (ref 0–0.58)
IMM GRANULOCYTES NFR BLD: 1 % (ref 0–5)
LYMPHOCYTES NFR BLD: 2.29 K/UL (ref 1.5–4)
LYMPHOCYTES RELATIVE PERCENT: 22 % (ref 20–42)
MAGNESIUM SERPL-MCNC: 1.8 MG/DL (ref 1.6–2.6)
MCH RBC QN AUTO: 29.2 PG (ref 26–35)
MCHC RBC AUTO-ENTMCNC: 33.1 G/DL (ref 32–34.5)
MCV RBC AUTO: 88.2 FL (ref 80–99.9)
MONOCYTES NFR BLD: 0.8 K/UL (ref 0.1–0.95)
MONOCYTES NFR BLD: 8 % (ref 2–12)
NEUTROPHILS NFR BLD: 64 % (ref 43–80)
NEUTS SEG NFR BLD: 6.81 K/UL (ref 1.8–7.3)
PATH REV BLD -IMP: NORMAL
PHOSPHATE SERPL-MCNC: 3.3 MG/DL (ref 2.5–4.5)
PLATELET # BLD AUTO: 657 K/UL (ref 130–450)
PMV BLD AUTO: 8.2 FL (ref 7–12)
POTASSIUM SERPL-SCNC: 4.3 MMOL/L (ref 3.5–5)
RBC # BLD AUTO: 3.73 M/UL (ref 3.8–5.8)
SODIUM SERPL-SCNC: 132 MMOL/L (ref 132–146)
WBC OTHER # BLD: 10.6 K/UL (ref 4.5–11.5)

## 2024-08-21 PROCEDURE — 82962 GLUCOSE BLOOD TEST: CPT

## 2024-08-21 PROCEDURE — 6370000000 HC RX 637 (ALT 250 FOR IP)

## 2024-08-21 PROCEDURE — 80048 BASIC METABOLIC PNL TOTAL CA: CPT

## 2024-08-21 PROCEDURE — 6360000002 HC RX W HCPCS

## 2024-08-21 PROCEDURE — 2580000003 HC RX 258

## 2024-08-21 PROCEDURE — 2060000000 HC ICU INTERMEDIATE R&B

## 2024-08-21 PROCEDURE — 99231 SBSQ HOSP IP/OBS SF/LOW 25: CPT | Performed by: INTERNAL MEDICINE

## 2024-08-21 PROCEDURE — 85025 COMPLETE CBC W/AUTO DIFF WBC: CPT

## 2024-08-21 PROCEDURE — 83735 ASSAY OF MAGNESIUM: CPT

## 2024-08-21 PROCEDURE — 84100 ASSAY OF PHOSPHORUS: CPT

## 2024-08-21 PROCEDURE — 36415 COLL VENOUS BLD VENIPUNCTURE: CPT

## 2024-08-21 RX ORDER — INSULIN LISPRO 100 [IU]/ML
0-4 INJECTION, SOLUTION INTRAVENOUS; SUBCUTANEOUS NIGHTLY
Status: DISCONTINUED | OUTPATIENT
Start: 2024-08-21 | End: 2024-08-23 | Stop reason: HOSPADM

## 2024-08-21 RX ORDER — INSULIN GLARGINE 100 [IU]/ML
20 INJECTION, SOLUTION SUBCUTANEOUS 2 TIMES DAILY
Status: DISCONTINUED | OUTPATIENT
Start: 2024-08-21 | End: 2024-08-22

## 2024-08-21 RX ORDER — INSULIN LISPRO 100 [IU]/ML
0-8 INJECTION, SOLUTION INTRAVENOUS; SUBCUTANEOUS
Status: DISCONTINUED | OUTPATIENT
Start: 2024-08-21 | End: 2024-08-23 | Stop reason: HOSPADM

## 2024-08-21 RX ORDER — INSULIN LISPRO 100 [IU]/ML
10 INJECTION, SOLUTION INTRAVENOUS; SUBCUTANEOUS
Status: DISCONTINUED | OUTPATIENT
Start: 2024-08-21 | End: 2024-08-23 | Stop reason: HOSPADM

## 2024-08-21 RX ADMIN — INSULIN LISPRO 10 UNITS: 100 INJECTION, SOLUTION INTRAVENOUS; SUBCUTANEOUS at 12:23

## 2024-08-21 RX ADMIN — AMPICILLIN SODIUM AND SULBACTAM SODIUM 3000 MG: 2; 1 INJECTION, POWDER, FOR SOLUTION INTRAMUSCULAR; INTRAVENOUS at 02:53

## 2024-08-21 RX ADMIN — Medication 100 MG: at 09:17

## 2024-08-21 RX ADMIN — ERGOCALCIFEROL 50000 UNITS: 1.25 CAPSULE ORAL at 09:19

## 2024-08-21 RX ADMIN — INSULIN LISPRO 2 UNITS: 100 INJECTION, SOLUTION INTRAVENOUS; SUBCUTANEOUS at 12:23

## 2024-08-21 RX ADMIN — INSULIN GLARGINE 20 UNITS: 100 INJECTION, SOLUTION SUBCUTANEOUS at 09:18

## 2024-08-21 RX ADMIN — FOLIC ACID 1 MG: 1 TABLET ORAL at 09:17

## 2024-08-21 RX ADMIN — INSULIN GLARGINE 20 UNITS: 100 INJECTION, SOLUTION SUBCUTANEOUS at 21:37

## 2024-08-21 RX ADMIN — ASPIRIN 81 MG: 81 TABLET, COATED ORAL at 09:17

## 2024-08-21 RX ADMIN — PANTOPRAZOLE SODIUM 40 MG: 40 INJECTION, POWDER, FOR SOLUTION INTRAVENOUS at 12:22

## 2024-08-21 RX ADMIN — LISINOPRIL 10 MG: 5 TABLET ORAL at 09:17

## 2024-08-21 RX ADMIN — SODIUM CHLORIDE, PRESERVATIVE FREE 10 ML: 5 INJECTION INTRAVENOUS at 21:38

## 2024-08-21 RX ADMIN — AMPICILLIN SODIUM AND SULBACTAM SODIUM 3000 MG: 2; 1 INJECTION, POWDER, FOR SOLUTION INTRAMUSCULAR; INTRAVENOUS at 14:37

## 2024-08-21 RX ADMIN — ATORVASTATIN CALCIUM 80 MG: 40 TABLET, FILM COATED ORAL at 21:37

## 2024-08-21 RX ADMIN — INSULIN LISPRO 8 UNITS: 100 INJECTION, SOLUTION INTRAVENOUS; SUBCUTANEOUS at 09:18

## 2024-08-21 RX ADMIN — INSULIN LISPRO 10 UNITS: 100 INJECTION, SOLUTION INTRAVENOUS; SUBCUTANEOUS at 09:18

## 2024-08-21 RX ADMIN — AMLODIPINE BESYLATE 10 MG: 10 TABLET ORAL at 09:17

## 2024-08-21 RX ADMIN — DULOXETINE HYDROCHLORIDE 30 MG: 30 CAPSULE, DELAYED RELEASE ORAL at 09:17

## 2024-08-21 RX ADMIN — AMOXICILLIN AND CLAVULANATE POTASSIUM 1 TABLET: 875; 125 TABLET, FILM COATED ORAL at 21:37

## 2024-08-21 RX ADMIN — ENOXAPARIN SODIUM 40 MG: 100 INJECTION SUBCUTANEOUS at 09:17

## 2024-08-21 ASSESSMENT — PAIN SCALES - GENERAL
PAINLEVEL_OUTOF10: 0
PAINLEVEL_OUTOF10: 0

## 2024-08-21 NOTE — CARE COORDINATION
CM Update: Pt to discharge to [facility] once sister makes payment arrangements with business office.[facility] requires $2842 up front to cover copays for 2 weeks. He is in his copay days and family is aware. Left message for sister to make payments arrangements. NIALL/destination completed. Transport envelope on soft chart (TF)

## 2024-08-21 NOTE — PROGRESS NOTES
Not Detected     Enterobacter Cloacae Complex Not Detected     Enterobacterales Not Detected     Klebsiella oxytoca Not Detected     Klebsiella aerogenes Not Detected     Klebsiella pneumoniae group Not Detected     Pseudomonas aeruginosa Not Detected     Proteus spp Not Detected     Salmonella species Not Detected     SERRATIA MARCESCENS Not Detected     Stenotrophomonas maltophilia Not Detected     Haemophilus influenzae Not Detected     Listeria monocytogenes Not Detected     Bacteroides fragilis Not Detected     Neisseria Meningitidis, NMNI Not Detected     Staphylococcus spp DETECTED     Staphylococcus Aureus Not Detected     Staphylococcus epidermidis Not Detected     Staphylococcus lugdunensis Not Detected     Streptococcus spp Not Detected     Enterococcus faecalis Not Detected     Enterococcus faecium Not Detected     Streptococcus agalactiae (Group B) Not Detected     Streptococcus pneumoniae Not Detected     Streptococcus pyogenes Not Detected    Culture, Urine [[id number]] Collected: 08/15/24 1251    Order Status: Completed Specimen: Urine, clean catch Updated: 08/16/24 1107     Specimen Description .CLEAN CATCH URINE     Special Requests Site: Urine     Culture NO GROWTH    LEGIONELLA ANTIGEN, URINE [[id number]] Collected: 08/15/24 1251    Order Status: Completed Specimen: Urine Updated: 08/16/24 1029     Legionella Pneumophilia Ag, Urine NEGATIVE     Comment:       L. pneumophila serogroup 1 antigen not detected.  A negative result does not exclude infection with Leginella pnemophila serogroup 1 nor does   it rule out other microbial-caused respiratory infections of disease caused by other   serogroups of Legionella pneumophila.         STREP PNEUMONIAE ANTIGEN [[id number]] Collected: 08/15/24 1251    Order Status: Completed Specimen: Urine, straight catheter Updated: 08/16/24 1029     Source .URINE     Strep pneumo Ag NEGATIVE     Comment:       Presumptive Negative  suggests no current or recent    Patient presents with    Fatigue     Patient c/o increased generalized weakness and nausea , hx DM bgl 240 .         SUMMARY OF HOSPITAL STAY: Briefly, he  presented to the emergency department with a 2-day history of progressive weakness, fatigue, nausea, and one episode of vomiting. He also reported burning with urination. On EMS evaluation, he was hyperglycemic (BG 240s). He denied fever, cough, or other respiratory symptoms. He had a history of multiple hospital admissions, mostly due to uncontrolled diabetes. He was last admitted one month prior due to diabetic gastroparesis and was discharged stable to skilled nursing facility.     At the ED, he was afebrile with stable vital signs. Lab work revealed hyperglycemia (219 mg/dL), leukocytosis (WBC 20.4, neutrophil predominance), anemia (Hgb 10.7), hyponatremia (Na 128, Corrected Na 130), elevated beta-hydroxybutyrate (BHB 1.04), Venous blood gas was elevated (pH 7.480), AG 12. Troponin was also elevated at 49 but had no delta change on repeat. UA revealed leukocytosis, bacteriuria, ketonuria, proteinuria,  (+) leukocyte esterase, and presence of yeast. UDS was unremarkable. CXR showed right middle lobe pneumonia. He was given ampicillin-sulbactam (UNASYN) 3,000 mg IV and a fluid bolus. He was admitted for further management.      During the course of his hospital stay, procalcitonin (0.56) and the inflammatory markers (, sed rate 69) were elevated. He was given ampicillin-sulbactam (UNASYN) 3,000 mg IV, vancomycin (VANCOCIN) 1,250 mg IV, and one dose fluconazole (DIFLUCAN) 150 mg for healthcare associated pneumonia and yeast infection on urinalysis. He was initially placed on a low dose sliding scale, but home insulin dose was restarted given persistently elevated blood glucose. Blood culture noted coagulase negative staphylococcus sp., likely contaminant, and legionella antigen, strep pneumoniae antigen, COVID-19, MRSA nares, and urine cultures had no

## 2024-08-21 NOTE — PROGRESS NOTES
[facility]  Internal Medicine Residency Program  Progress Note - House Team 1    Patient:  Magan Funes 54 y.o. male MRN: [id number]     Date of Service: 8/21/2024     CC: Fatigue  Overnight events: No events overnight     Subjective     Patient was seen and examined this morning at the bedside. He was not in acute distress. He is alert, oriented to time place and person. He has good appetite and eating well. He is clinically better. Denies any fresh complaints, chest pain, cough is decreased with no shortness of breath. Denies any fever, chills , rigor. Has normal bowel and bladder habit. Labs were reviewed and updated. Patient has completed with the antibiotics UNYSYN Day 7 with 2 dosage left so transitioned to per oral Augmentin for 2 doses. He is ready to be discharged to guardian, waiting for the approval from .     Objective     Physical Exam:  Vitals: /76   Pulse 94   Temp 97.6 °F (36.4 °C) (Oral)   Resp 11   Ht 1.676 m (5' 6\")   Wt 63.5 kg (140 lb)   SpO2 96%   BMI 22.60 kg/m²     I & O - 24hr: I/O this shift:  In: 120 [P.O.:120]  Out: -    General Appearance: alert, appears stated age, and cooperative  HEENT:  Head: Normocephalic, no lesions, without obvious abnormality.  Neck: no adenopathy, no carotid bruit, no JVD, supple, symmetrical, trachea midline, and thyroid not enlarged, symmetric, no tenderness/mass/nodules  Lung: clear to auscultation bilaterally  Heart: regular rate and rhythm, S1, S2 normal, no murmur, click, rub or gallop  Abdomen: soft, non-tender; bowel sounds normal; no masses,  no organomegaly  Extremities:  extremities normal, atraumatic, no cyanosis or edema  Musculokeletal: No joint swelling, no muscle tenderness. ROM normal in all joints of extremities.   Neurologic: Mental status: Alert, oriented, thought content appropriate  Subject  Pertinent Labs & Imaging Studies   chika  CBC with Differential:    Lab Results   Component Value  exam:->monitor pneumonia    FINDINGS:  There is an area of oval consolidation with ill-defined margins in the mid  lower aspect of the right lung surround by vague ill-defined ground-glass  opacity.  The overall involvement had progressive spread since the study  August 15.  This can relate developing pneumonia.  There is some improvement  in the areas of atelectasis towards the right lower lobe that was seen  initially on the study of August 15.    There is no pleural effusions.    There is no pneumothorax.    Left lungs expanded.  Left-sided pleural space are free.    Has normal size.  Mediastinum appears unremarkable.    Impression  Continuum pattern of progression of pneumonic process more likely towards the  right lower lobe.  Similar findings as observed on the study of August 19.            Resident's Assessment and Plan     Magan Funes is a 54 y.o. male        Right Middle lobe pneumonia -Resolving   Procalc normal, Counts normal, Repeat Chest Xray stable, clinically stable   - Day 7 Unsyn IV; transistioned to Augmentin 875 PO for 2 doses   - Continue with Breathing treatment DUONEB and Guafensin   - F/u with CBC if patient stay overnight   - Continue with flutter valve   - Diet resumed; watch out for aspiration   - PT/OT 10/24; ambulate as tolerated        Diabetes melitus Type 1 uncontrolled -ketosis resolved   Last Hba1c 11.7 (06/29/2024), current blood glucose 300 range.   Plan   - Increased the Insulin lantus to 20 U; lispro to 10 U  - Monitor for BG ,BMP daily  - Continue on the diabetic diet        Hypertension- stable   - Current /76   - Continue on Lisinopril 10 mg   - Continue on Amlodipine 10 mg      Normocytic normochromic anemia    - hb level at range of 10 gm/dl ; no active bleeding currently  - Monitor hb and hct      Thrombocytosis likely reactive ?  - platelets 657 currently, has been chronically high platelets    Plan  - f/u iron panel and PBS outpatient      Hx of GI  bleed   -Continue on protonix       Hx of Diabetic Gastroparesis, stable   - Continue small frequent diets   - Not in need for prokinetics      Hx of CVA   - Continue on Aspirin 81 MG   - Continue on Lipitor 80 MG      Hx of COPD- stable   -Continue with DUOneb       Generalized Weakness likely 2/2 infection, resolved       Complicated Urinary Tract Infection, resolved       PT/OT evaluation: To evaluate  DVT prophylaxis/ GI prophylaxis: Enoxaparin/ Protonix  Disposition: SNF;[facility]ian facility      Darwin Kumar MD, PGY-1  Attending physician: Dr. Deluca

## 2024-08-21 NOTE — PROGRESS NOTES
*ATTENTION:  This note has been created by a medical student for educational purposes only.  Please do not refer to the content of this note for clinical decision-making, billing, or other purposes.  Please see attending physician’s note to obtain clinical information on this patient.*        [facility]  Internal Medicine Residency Program  Medical Student Progress Note - House Team 2      Patient:  Magan Funes 54 y.o. male   MRN: [id number]       Date of Service: 8/21/2024  Admission date: 8/15/2024 10:11 AM ; Hospital day: 6   CC: Fatigue (Patient c/o increased generalized weakness and nausea , hx DM bgl 240 . )     Overnight events: no acute events overnight.     Subjective     Patient seen and examined at bedside today. He was eating breakfast and head of bed was elevated to 30 degrees. Patient states that he is feeling good and is close to back to his baseline. He feels ready to leave the hospital and go back to [facility]. Patient denies any pleuritic chest pain today and reports that his cough is almost gone. He denies any fever, chills, abdominal pain, diarrhea, nausea, vomiting, chest pain, or shortness of breath. Patient is stable and ready to be discharge to [facility].       Objective   PHYSICAL EXAM  General Appearance: Awake, alert, oriented X4. Frail appearing   HEENT: Normocephalic, atraumatic  Neck: midline trachea  Lung: Few crackles heard in right lung, much improved. Left lung clear to auscultation. No wheezing, no increased work of breathing.   Heart: regular rate and rhythm, no murmur  Abdomen: soft, non-tender, non-distended, bowel sounds normal;   Extremities: no cyanosis or edema, contracture of left arm   Musculokeletal: No joint swelling  Neurologic: Mental status: awake, alert, oriented X4    CARDIOVASCULAR  Vitals: BP (!) 144/89   Pulse 82   Temp 98 °F (36.7 °C) (Axillary)   Resp 18   Ht 1.676 m (5' 6\")   Wt 63.5 kg (140 lb)   SpO2 94%   BMI 22.60 kg/m²    08/20/24 1237     Specimen Description .BLOOD LEFT     Special Requests          Culture NO GROWTH 2 DAYS    Culture, Blood 2 [[id number]] Collected: 08/18/24 1143    Order Status: Completed Specimen: Blood Updated: 08/20/24 1238     Specimen Description .BLOOD .ARM     Special Requests Site: Blood     Culture NO GROWTH 2 DAYS    Culture, MRSA, Screening [[id number]] Collected: 08/15/24 1715    Order Status: Completed Specimen: Nares Updated: 08/17/24 1037     Specimen Description .NARES     Culture NEGATIVE FOR: METHICILLIN RESISTANT STAPHYLOCOCCUS AUREUS    Culture, Respiratory [[id number]]     Order Status: Sent Specimen: Sputum Expectorated     LEGIONELLA ANTIGEN, URINE [[id number]]     Order Status: Canceled Specimen: Urine     STREP PNEUMONIAE ANTIGEN [[id number]]     Order Status: Canceled Specimen: Urine, clean catch     Culture, Blood 2 [[id number]] Collected: 08/15/24 1442    Order Status: Completed Specimen: Blood Updated: 08/20/24 1511     Specimen Description .BLOOD .ARM     Special Requests Site: Blood     Culture NO GROWTH 5 DAYS    Culture, Blood 1 [[id number]]  (Abnormal) Collected: 08/15/24 1421    Order Status: Completed Specimen: Blood Updated: 08/17/24 1135     Specimen Description .BLOOD .ARM     Special Requests          [employer] test comment AEROBIC BLD BOTTLE     Direct Exam DIRECT GRAM STAIN FROM BOTTLE: GRAM POSITIVE COCCI IN CLUSTERS     Comment: Direct Gram Stain from bottle result called to and read back by: NAVEEN DOLAN RN 8/16/2024   AT 0835           Culture STAPHYLOCOCCUS SPECIES, COAGULASE NEGATIVE A single positive blood culture of coagulase negative Staphylocci, diphtheroids,micrococci, Cutibacterium, viridans Streptocci, Bacillus, or Lactobacillus species should be interpreted with caution and viewed as a likely skin contaminant.       Candida auris Not Detected     Candida albicans Not Detected     Candida glabrata Not Detected     Candida krusei Not Detected     Candida  linear infiltrate in the superior aspect   of the right middle lobe adjacent to the minor fissure, stable right middle   lobe pneumonia.   2. No new infiltrates identified.         XR CHEST 1 VIEW   Final Result   1. Right middle lobe pneumonia.                Resident's Assessment and Plan     Assessment and Plan:  Assessment:  Right middle lobe pneumonia, improving   -Clinically patient feels much improved, less crackles heard in right lung  -Leukocytosis resolved, procal normal, repeat CXR stable from 8/19/2024 but still shows infiltrate   -Received 6 days of Unasyn     Plan:  -Stable for discharge back to [facility] today as soon as precert is ready   -X-ray at 2 weeks and 4 weeks to check for resolution of infiltrate   -Stop unasyn and transition to discharge on Augmentin to finish 7 day course (1 day left)  -If patient not discharged until tomorrow then no antibiotics needed on discharge   -Encourage patient to eat with head of bed at least 30 degrees.     2. Hypertension  -On lisinopril 10 mg and amlodipine 10 mg which is home dose     Plan:  -Continue home lisinopril and amlodipine on discharge     3. Blood culture positive on admission, likely contaminant   -Repeat blood cultures show no growth to date     Plan:  -No further management needed     4. T1DM   -Poorly controlled. Last A1C 11.7 (06/29/2024)  -Anion gap remains closed at 12, no signs of DKA   -Fasting blood sugars elevated to 300s, but total blood sugar range from 80s-300s   -Patient eating scheduled meals and fast food     Plan:  -Changed Lantus to 20 BID  -Continue lispro 10 units TID with meals   -Monitor for hypoglycemia     5. Thrombocytosis  -Platelets 657, have been continually increasing since admission 464>657  -Initially thought to be secondary to reactive thrombocytosis, but platelets have no downtrended   -Note: patient has had thrombocytosis in the past but never in 600s  -Iron studies and peripheral blood smear ordered-follow up to

## 2024-08-21 NOTE — PROGRESS NOTES
[facility]  Internal Medicine Residency / House Medicine Service    Attending Physician Statement  I have discussed the case, including pertinent history and exam findings with the resident and the team.  I have seen and examined the patient and the key elements of the encounter have been performed by me.  I agree with the assessment, plan and orders as documented by the resident.      Case Discussed During AM Rounds   Procal trending down to normal limits   Remains afebrile at this time    BP remains elevated despite escalation of meds over last 24 hours- follow today    Glucose elevations noted and adjustment in insulin regimen today    Leukocytosis resolved    Continued clinical improvement    DC planning     Pneumonia-  RML in nature- clinically stable today    Empirically initiated on Atbs with Vancomycin/Unasyn   MRSA colonization is negative    De-escalated to Unasyn alone on 8/18    Continue aerosols   Up to chair encouraged today    Head of bed at 30 degrees    Flutter valve use    Aggressive pulmonary toilet    Patient has improved with Aspiration PNA management   No findings of dysphagia on exam currently- consider repeat video fluoroscopy if recurrent concerns in future    Speech eval was previously addressed on last admission in July    Complete 7 days atbs     Positive Blood Culture- likely contaminant    Coag negative staph   Repeat blood cultures negative    Complicated UTI on presentation- complete 7 day treatment    Unasyn management ongoing    No growth in urine culture thus far    Dysuria is resolved     Type I DM    Avoid hypoglycemia   Continue regimen and adjust as needs are noted    NO signs of DKA on this admission- GAP remains closed     Polysubstance Abuse   No signs of withdrawal currently     Gastroparesis- stable    Tolerating diet     Hypertension- uncontrolled inpatient    BP at times labile   Increase amlodipine today to home dose     Normocytic Anemia    H/H  remains stable     Thrombocytosis- likely reactive in nature    Continue to monitor     Hyponatremia- resolved     Disposition- DC planning- precert pending to [facility]    Remainder of medical problems as per resident note.  Attending note is in collaboration with resident-physician on 8/21/2024.    Kamlesh Deluca MD  8/21/24    Internal Medicine Residency Faculty

## 2024-08-22 PROBLEM — E43 SEVERE PROTEIN-CALORIE MALNUTRITION (HCC): Chronic | Status: ACTIVE | Noted: 2024-08-22

## 2024-08-22 PROBLEM — E43 SEVERE PROTEIN-CALORIE MALNUTRITION (HCC): Status: ACTIVE | Noted: 2020-01-08

## 2024-08-22 LAB
ANION GAP SERPL CALCULATED.3IONS-SCNC: 12 MMOL/L (ref 7–16)
BASOPHILS # BLD: 0.05 K/UL (ref 0–0.2)
BASOPHILS # BLD: 0.09 K/UL (ref 0–0.2)
BASOPHILS NFR BLD: 0 % (ref 0–2)
BASOPHILS NFR BLD: 1 % (ref 0–2)
BUN SERPL-MCNC: 19 MG/DL (ref 6–20)
CALCIUM SERPL-MCNC: 9.1 MG/DL (ref 8.6–10.2)
CHLORIDE SERPL-SCNC: 99 MMOL/L (ref 98–107)
CO2 SERPL-SCNC: 22 MMOL/L (ref 22–29)
CREAT SERPL-MCNC: 0.6 MG/DL (ref 0.7–1.2)
EOSINOPHIL # BLD: 0.63 K/UL (ref 0.05–0.5)
EOSINOPHIL # BLD: 0.64 K/UL (ref 0.05–0.5)
EOSINOPHILS RELATIVE PERCENT: 5 % (ref 0–6)
EOSINOPHILS RELATIVE PERCENT: 5 % (ref 0–6)
ERYTHROCYTE [DISTWIDTH] IN BLOOD BY AUTOMATED COUNT: 15.8 % (ref 11.5–15)
ERYTHROCYTE [DISTWIDTH] IN BLOOD BY AUTOMATED COUNT: 15.8 % (ref 11.5–15)
FERRITIN SERPL-MCNC: 256 NG/ML
GFR, ESTIMATED: >90 ML/MIN/1.73M2
GLUCOSE BLD-MCNC: 100 MG/DL (ref 74–99)
GLUCOSE BLD-MCNC: 141 MG/DL (ref 74–99)
GLUCOSE BLD-MCNC: 157 MG/DL (ref 74–99)
GLUCOSE BLD-MCNC: 182 MG/DL (ref 74–99)
GLUCOSE BLD-MCNC: 55 MG/DL (ref 74–99)
GLUCOSE SERPL-MCNC: 98 MG/DL (ref 74–99)
HCT VFR BLD AUTO: 33.1 % (ref 37–54)
HCT VFR BLD AUTO: 36 % (ref 37–54)
HGB BLD-MCNC: 10.8 G/DL (ref 12.5–16.5)
HGB BLD-MCNC: 11.8 G/DL (ref 12.5–16.5)
IMM GRANULOCYTES # BLD AUTO: 0.18 K/UL (ref 0–0.58)
IMM GRANULOCYTES # BLD AUTO: 0.2 K/UL (ref 0–0.58)
IMM GRANULOCYTES NFR BLD: 1 % (ref 0–5)
IMM GRANULOCYTES NFR BLD: 2 % (ref 0–5)
IRON SATN MFR SERPL: 24 % (ref 20–55)
IRON SERPL-MCNC: 64 UG/DL (ref 59–158)
LYMPHOCYTES NFR BLD: 2.25 K/UL (ref 1.5–4)
LYMPHOCYTES NFR BLD: 2.66 K/UL (ref 1.5–4)
LYMPHOCYTES RELATIVE PERCENT: 16 % (ref 20–42)
LYMPHOCYTES RELATIVE PERCENT: 21 % (ref 20–42)
MAGNESIUM SERPL-MCNC: 1.9 MG/DL (ref 1.6–2.6)
MCH RBC QN AUTO: 28.3 PG (ref 26–35)
MCH RBC QN AUTO: 29.1 PG (ref 26–35)
MCHC RBC AUTO-ENTMCNC: 32.6 G/DL (ref 32–34.5)
MCHC RBC AUTO-ENTMCNC: 32.8 G/DL (ref 32–34.5)
MCV RBC AUTO: 86.9 FL (ref 80–99.9)
MCV RBC AUTO: 88.7 FL (ref 80–99.9)
MONOCYTES NFR BLD: 0.58 K/UL (ref 0.1–0.95)
MONOCYTES NFR BLD: 0.8 K/UL (ref 0.1–0.95)
MONOCYTES NFR BLD: 4 % (ref 2–12)
MONOCYTES NFR BLD: 6 % (ref 2–12)
NEUTROPHILS NFR BLD: 65 % (ref 43–80)
NEUTROPHILS NFR BLD: 73 % (ref 43–80)
NEUTS SEG NFR BLD: 10.11 K/UL (ref 1.8–7.3)
NEUTS SEG NFR BLD: 8.17 K/UL (ref 1.8–7.3)
PATH REV BLD -IMP: NORMAL
PHOSPHATE SERPL-MCNC: 3.2 MG/DL (ref 2.5–4.5)
PLATELET # BLD AUTO: 710 K/UL (ref 130–450)
PLATELET # BLD AUTO: 726 K/UL (ref 130–450)
PMV BLD AUTO: 7.9 FL (ref 7–12)
PMV BLD AUTO: 8.1 FL (ref 7–12)
POTASSIUM SERPL-SCNC: 4.4 MMOL/L (ref 3.5–5)
RBC # BLD AUTO: 3.81 M/UL (ref 3.8–5.8)
RBC # BLD AUTO: 4.06 M/UL (ref 3.8–5.8)
SODIUM SERPL-SCNC: 133 MMOL/L (ref 132–146)
TIBC SERPL-MCNC: 265 UG/DL (ref 250–450)
WBC OTHER # BLD: 12.6 K/UL (ref 4.5–11.5)
WBC OTHER # BLD: 13.8 K/UL (ref 4.5–11.5)

## 2024-08-22 PROCEDURE — 82962 GLUCOSE BLOOD TEST: CPT

## 2024-08-22 PROCEDURE — 84100 ASSAY OF PHOSPHORUS: CPT

## 2024-08-22 PROCEDURE — 6360000002 HC RX W HCPCS

## 2024-08-22 PROCEDURE — 80048 BASIC METABOLIC PNL TOTAL CA: CPT

## 2024-08-22 PROCEDURE — 6370000000 HC RX 637 (ALT 250 FOR IP)

## 2024-08-22 PROCEDURE — 85025 COMPLETE CBC W/AUTO DIFF WBC: CPT

## 2024-08-22 PROCEDURE — 2060000000 HC ICU INTERMEDIATE R&B

## 2024-08-22 PROCEDURE — 99231 SBSQ HOSP IP/OBS SF/LOW 25: CPT | Performed by: INTERNAL MEDICINE

## 2024-08-22 PROCEDURE — 2580000003 HC RX 258

## 2024-08-22 PROCEDURE — 83550 IRON BINDING TEST: CPT

## 2024-08-22 PROCEDURE — 83735 ASSAY OF MAGNESIUM: CPT

## 2024-08-22 PROCEDURE — 83540 ASSAY OF IRON: CPT

## 2024-08-22 PROCEDURE — 82728 ASSAY OF FERRITIN: CPT

## 2024-08-22 PROCEDURE — 36415 COLL VENOUS BLD VENIPUNCTURE: CPT

## 2024-08-22 RX ORDER — INSULIN GLARGINE 100 [IU]/ML
15 INJECTION, SOLUTION SUBCUTANEOUS 2 TIMES DAILY
Status: DISCONTINUED | OUTPATIENT
Start: 2024-08-22 | End: 2024-08-23 | Stop reason: HOSPADM

## 2024-08-22 RX ADMIN — ASPIRIN 81 MG: 81 TABLET, COATED ORAL at 08:25

## 2024-08-22 RX ADMIN — INSULIN LISPRO 10 UNITS: 100 INJECTION, SOLUTION INTRAVENOUS; SUBCUTANEOUS at 12:10

## 2024-08-22 RX ADMIN — INSULIN LISPRO 10 UNITS: 100 INJECTION, SOLUTION INTRAVENOUS; SUBCUTANEOUS at 17:29

## 2024-08-22 RX ADMIN — AMLODIPINE BESYLATE 10 MG: 10 TABLET ORAL at 08:25

## 2024-08-22 RX ADMIN — DULOXETINE HYDROCHLORIDE 30 MG: 30 CAPSULE, DELAYED RELEASE ORAL at 08:25

## 2024-08-22 RX ADMIN — ENOXAPARIN SODIUM 40 MG: 100 INJECTION SUBCUTANEOUS at 08:25

## 2024-08-22 RX ADMIN — AMOXICILLIN AND CLAVULANATE POTASSIUM 1 TABLET: 875; 125 TABLET, FILM COATED ORAL at 08:25

## 2024-08-22 RX ADMIN — Medication 16 G: at 08:18

## 2024-08-22 RX ADMIN — FOLIC ACID 1 MG: 1 TABLET ORAL at 08:25

## 2024-08-22 RX ADMIN — ATORVASTATIN CALCIUM 80 MG: 40 TABLET, FILM COATED ORAL at 20:21

## 2024-08-22 RX ADMIN — LISINOPRIL 10 MG: 5 TABLET ORAL at 08:25

## 2024-08-22 RX ADMIN — SODIUM CHLORIDE, PRESERVATIVE FREE 10 ML: 5 INJECTION INTRAVENOUS at 08:26

## 2024-08-22 RX ADMIN — PANTOPRAZOLE SODIUM 40 MG: 40 INJECTION, POWDER, FOR SOLUTION INTRAVENOUS at 08:24

## 2024-08-22 RX ADMIN — Medication 100 MG: at 08:25

## 2024-08-22 ASSESSMENT — PAIN SCALES - GENERAL: PAINLEVEL_OUTOF10: 0

## 2024-08-22 NOTE — PROGRESS NOTES
Comprehensive Nutrition Assessment    Type and Reason for Visit:  Initial, RD Nutrition Re-Screen/LOS, Wound    Nutrition Recommendations/Plan:   Continue Diet.    Will Start ONS and monitor.       Malnutrition Assessment:  Malnutrition Status:  Severe malnutrition (08/22/24 1230)    Context:  Chronic Illness     Findings of the 6 clinical characteristics of malnutrition:  Energy Intake:  75% or less estimated energy requirements for 1 month or longer  Weight Loss:  No significant weight loss     Body Fat Loss:  Severe body fat loss Orbital, Triceps, Buccal region   Muscle Mass Loss:  Severe muscle mass loss Temples (temporalis), Clavicles (pectoralis & deltoids), Thigh (quadriceps), Hand (interosseous)  Fluid Accumulation:  No significant fluid accumulation     Strength:  Not Performed    Nutrition Assessment:    Pt adm from SNF w/ weakness, nausea and hyperglycemia x ~2d pta.  PMHx Severe Malnutrition (6/26), T1DM, FTT, hx polysub/ETOH abuse, HTN, COPD, cardiac arrest, Lacunar Stroke/Lt hemiplegia, DKA, gastroparesis, recent adm for DKA/KIRSTEN/SIRS/GIB (7/2024).  Adm w/ Leukocytosis 2/2 PNA, complicated UTI and hyponatremia.  At risk d/t currently meets criteria for Severe Malnutrition w/ increased needs for wound healing.  Will Start ONS and monitor.    Nutrition Related Findings:    A&O, poor dentition, Abd/BS WDL, no edema, -I/O's, low Cr, hyperglycemia Wound Type: Pressure Injury       Current Nutrition Intake & Therapies:    Average Meal Intake: %  Average Supplements Intake: None Ordered  ADULT DIET; Regular; 4 carb choices (60 gm/meal)    Anthropometric Measures:  Height: 167.6 cm (5' 6\")  Ideal Body Weight (IBW): 142 lbs (65 kg)    Admission Body Weight: 58.1 kg (128 lb) (actual 8/15)  Current Body Weight: 59 kg (130 lb) (bed 8/22),   IBW. Weight Source: Bed Scale  Current BMI (kg/m2): 21  Usual Body Weight: 61.2 kg (135 lb) (actual 1/8/24 per EMR;)  % Weight Change (Calculated): -3.7  Weight  Adjustment For: No Adjustment                 BMI Categories: Normal Weight (BMI 18.5-24.9)    Estimated Daily Nutrient Needs:  Energy Requirements Based On: Formula  Weight Used for Energy Requirements: Current  Energy (kcal/day): MSJx1.2SF per CBW= 0685-2899  Weight Used for Protein Requirements: Current  Protein (g/day): 1.3-1.5gm/kg CBW= 75-90  Method Used for Fluid Requirements: 1 ml/kcal  Fluid (ml/day): 9402-5864    Nutrition Diagnosis:   Severe malnutrition, In context of chronic illness related to cognitive or neurological impairment (2/2 hx CVA w/ hemiplegia and COPD) as evidenced by Criteria as identified in malnutrition assessment    Nutrition Interventions:   Food and/or Nutrient Delivery: Continue Current Diet, Start Oral Nutrition Supplement  Nutrition Education/Counseling: Education initiated (ONS options/benefits for wound healing)  Coordination of Nutrition Care: Continue to monitor while inpatient       Goals:     Goals: PO intake 75% or greater, by next RD assessment       Nutrition Monitoring and Evaluation:   Behavioral-Environmental Outcomes: None Identified  Food/Nutrient Intake Outcomes: Food and Nutrient Intake, Supplement Intake  Physical Signs/Symptoms Outcomes: Biochemical Data, Chewing or Swallowing, GI Status, Fluid Status or Edema, Skin, Weight, Nutrition Focused Physical Findings    Discharge Planning:    Continue Oral Nutrition Supplement     Bertram Valdivia RD, LD  Contact: ext [phone]

## 2024-08-22 NOTE — PROGRESS NOTES
*ATTENTION:  This note has been created by a medical student for educational purposes only.  Please do not refer to the content of this note for clinical decision-making, billing, or other purposes.  Please see attending physician’s note to obtain clinical information on this patient.*          [facility]  Internal Medicine Residency Program  Medical Student Progress Note - House Team 2      Patient:  Magan Funes 54 y.o. male   MRN: [id number]       Date of Service: 8/22/2024  Admission date: 8/15/2024 10:11 AM ; Hospital day: 7   CC: Fatigue (Patient c/o increased generalized weakness and nausea , hx DM bgl 240 . )     Overnight events: no acute events overnight.     Subjective     Patient seen and examined at bedside this morning. During morning rounds, patient noted to be hypoglycemic at 55 this morning despite being mostly hyperglycemic since admission. Patient received 4 glucose tabs, orange juice, and ate breakfast. Repeat blood glucose was then 144 and patient was asymptomatic.     Patient stated that he is doing well this morning and feels better than yesterday. He denies any cough, shortness of breath, chest pain, trouble breathing, dizziness, or lightheadedness, abdominal pain, nausea, or vomiting. Patient has been accepted at [facility] and is just waiting for payment to be discharged. He is clinically improved and finished his course of antibiotics.       Objective   PHYSICAL EXAM  General Appearance: awake, alert, in no acute distress. Frail appearing  HEENT: Normocephalic, atraumatic  Neck: midline trachea  Lung: Some crackles still in right lung, left lung clear to auscultation. No increased work of breathing.   Heart: regular rate and rhythm, no murmur  Abdomen: soft, non-tender, non-distended,  bowel sounds normal;   Extremities: no cyanosis or edema, contracture of left arm   Musculokeletal: No joint swelling  Neurologic: Mental status: awake, alert, and in no acute distress  Staphylocci, diphtheroids,micrococci, Cutibacterium, viridans Streptocci, Bacillus, or Lactobacillus species should be interpreted with caution and viewed as a likely skin contaminant.       Candida auris Not Detected     Candida albicans Not Detected     Candida glabrata Not Detected     Candida krusei Not Detected     Candida parapsilosis Not Detected     Candida tropicalis Not Detected     Cryptococcus neoformans/gattii Not Detected     Acinetobacter calcoaceticus-baumannii complex Not Detected     Escherichia Coli Not Detected     Enterobacter Cloacae Complex Not Detected     Enterobacterales Not Detected     Klebsiella oxytoca Not Detected     Klebsiella aerogenes Not Detected     Klebsiella pneumoniae group Not Detected     Pseudomonas aeruginosa Not Detected     Proteus spp Not Detected     Salmonella species Not Detected     SERRATIA MARCESCENS Not Detected     Stenotrophomonas maltophilia Not Detected     Haemophilus influenzae Not Detected     Listeria monocytogenes Not Detected     Bacteroides fragilis Not Detected     Neisseria Meningitidis, NMNI Not Detected     Staphylococcus spp DETECTED     Staphylococcus Aureus Not Detected     Staphylococcus epidermidis Not Detected     Staphylococcus lugdunensis Not Detected     Streptococcus spp Not Detected     Enterococcus faecalis Not Detected     Enterococcus faecium Not Detected     Streptococcus agalactiae (Group B) Not Detected     Streptococcus pneumoniae Not Detected     Streptococcus pyogenes Not Detected    Culture, Urine [[id number]] Collected: 08/15/24 1251    Order Status: Completed Specimen: Urine, clean catch Updated: 08/16/24 1107     Specimen Description .CLEAN CATCH URINE     Special Requests Site: Urine     Culture NO GROWTH    LEGIONELLA ANTIGEN, URINE [[id number]] Collected: 08/15/24 1251    Order Status: Completed Specimen: Urine Updated: 08/16/24 1029     Legionella Pneumophilia Ag, Urine NEGATIVE     Comment:       L. pneumophila

## 2024-08-22 NOTE — PROGRESS NOTES
[facility]  Internal Medicine Residency / House Medicine Service    Attending Physician Statement  I have discussed the case, including pertinent history and exam findings with the resident and the team.  I have seen and examined the patient and the key elements of the encounter have been performed by me.  I agree with the assessment, plan and orders as documented by the resident.      Case Discussed During AM Rounds   Noting episode of hypoglycemia this am after being significantly hyperglycemic the past 48 hours   Lantus was held and hypoglycemia protocol took place   Repeat bedside was 144 and patient is asymptomatic   Respiratory status stable   Concern as wbcs and thrombocytosis continues to increase    However- clinically patient remains stable   Will need serial CBCs to assess upon DC     Pneumonia-  RML in nature- clinically improving    Empirically initiated on Atbs with Vancomycin/Unasyn   MRSA colonization is negative    De-escalated to Unasyn alone on 8/18    Continue aerosols   Up to chair encouraged today    Head of bed at 30 degrees    Flutter valve use    Aggressive pulmonary toilet    Patient has improved with Aspiration PNA management   No findings of dysphagia on exam currently- consider repeat video fluoroscopy if recurrent concerns in future    Speech eval was previously addressed on last admission in July    Complete 7 days atbs     Type I DM    AM hypoglycemia noted    Currently improved   De-escalated basal insulin   Avoid additional hypoglycemia   Patient tolerating full diet     Positive Blood Culture- likely contaminant    Coag negative staph   Repeat blood cultures negative    Complicated UTI on presentation- completed management     Polysubstance Abuse   No signs of withdrawal currently     Gastroparesis- stable    Tolerating diet     Hypertension- uncontrolled inpatient    BP at times labile   Increase amlodipine today to home dose     Normocytic Anemia    H/H  remains stable     Thrombocytosis- likely reactive in nature    Platelets continue to increase today    Hx of Recurrent disease noted with serial CBC monitoring   Continue to follow at this time- anticipate improvement with continued clinical improvement     Hyponatremia- resolved     Disposition- DC planning- precert pending to [facility]    Remainder of medical problems as per resident note.  Attending note is in collaboration with resident-physician on 8/22/2024.    Kamlesh Deluca MD  8/22/24    Internal Medicine Residency Faculty

## 2024-08-22 NOTE — CARE COORDINATION
CM Update: Left another message for sister regarding payment to [facility]. Pt is medically stable to transfer and I did tell her she needs to take care of this ASAP as pt can not stay here. We will need to move to a different facility if arrangements aren't made. Call placed to Lucy to see if she can reach out as well. After payment is made facility can accept. He will need therapy evals and precert to admit (TF)        JENNIFER ClementeN,RN  Case Management  [phone]

## 2024-08-23 VITALS
OXYGEN SATURATION: 98 % | RESPIRATION RATE: 16 BRPM | HEIGHT: 66 IN | TEMPERATURE: 98.3 F | BODY MASS INDEX: 20.89 KG/M2 | SYSTOLIC BLOOD PRESSURE: 116 MMHG | HEART RATE: 95 BPM | WEIGHT: 130 LBS | DIASTOLIC BLOOD PRESSURE: 74 MMHG

## 2024-08-23 LAB
ANION GAP SERPL CALCULATED.3IONS-SCNC: 11 MMOL/L (ref 7–16)
BASOPHILS # BLD: 0.06 K/UL (ref 0–0.2)
BASOPHILS NFR BLD: 1 % (ref 0–2)
BUN SERPL-MCNC: 23 MG/DL (ref 6–20)
CALCIUM SERPL-MCNC: 9.4 MG/DL (ref 8.6–10.2)
CHLORIDE SERPL-SCNC: 94 MMOL/L (ref 98–107)
CO2 SERPL-SCNC: 25 MMOL/L (ref 22–29)
CREAT SERPL-MCNC: 0.6 MG/DL (ref 0.7–1.2)
EOSINOPHIL # BLD: 0.56 K/UL (ref 0.05–0.5)
EOSINOPHILS RELATIVE PERCENT: 6 % (ref 0–6)
ERYTHROCYTE [DISTWIDTH] IN BLOOD BY AUTOMATED COUNT: 15.5 % (ref 11.5–15)
GFR, ESTIMATED: >90 ML/MIN/1.73M2
GLUCOSE BLD-MCNC: 307 MG/DL (ref 74–99)
GLUCOSE BLD-MCNC: 330 MG/DL (ref 74–99)
GLUCOSE BLD-MCNC: 347 MG/DL (ref 74–99)
GLUCOSE SERPL-MCNC: 320 MG/DL (ref 74–99)
HCT VFR BLD AUTO: 33.2 % (ref 37–54)
HGB BLD-MCNC: 11.2 G/DL (ref 12.5–16.5)
IMM GRANULOCYTES # BLD AUTO: 0.16 K/UL (ref 0–0.58)
IMM GRANULOCYTES NFR BLD: 2 % (ref 0–5)
LYMPHOCYTES NFR BLD: 2.32 K/UL (ref 1.5–4)
LYMPHOCYTES RELATIVE PERCENT: 23 % (ref 20–42)
MAGNESIUM SERPL-MCNC: 2 MG/DL (ref 1.6–2.6)
MCH RBC QN AUTO: 29.7 PG (ref 26–35)
MCHC RBC AUTO-ENTMCNC: 33.7 G/DL (ref 32–34.5)
MCV RBC AUTO: 88.1 FL (ref 80–99.9)
MICROORGANISM SPEC CULT: NORMAL
MICROORGANISM SPEC CULT: NORMAL
MONOCYTES NFR BLD: 0.59 K/UL (ref 0.1–0.95)
MONOCYTES NFR BLD: 6 % (ref 2–12)
NEUTROPHILS NFR BLD: 63 % (ref 43–80)
NEUTS SEG NFR BLD: 6.35 K/UL (ref 1.8–7.3)
PHOSPHATE SERPL-MCNC: 2.9 MG/DL (ref 2.5–4.5)
PLATELET # BLD AUTO: 671 K/UL (ref 130–450)
PMV BLD AUTO: 8.1 FL (ref 7–12)
POTASSIUM SERPL-SCNC: 4.5 MMOL/L (ref 3.5–5)
RBC # BLD AUTO: 3.77 M/UL (ref 3.8–5.8)
SERVICE CMNT-IMP: NORMAL
SERVICE CMNT-IMP: NORMAL
SODIUM SERPL-SCNC: 130 MMOL/L (ref 132–146)
SPECIMEN DESCRIPTION: NORMAL
SPECIMEN DESCRIPTION: NORMAL
WBC OTHER # BLD: 10 K/UL (ref 4.5–11.5)

## 2024-08-23 PROCEDURE — 6370000000 HC RX 637 (ALT 250 FOR IP)

## 2024-08-23 PROCEDURE — 81270 JAK2 GENE: CPT

## 2024-08-23 PROCEDURE — 97530 THERAPEUTIC ACTIVITIES: CPT

## 2024-08-23 PROCEDURE — 36415 COLL VENOUS BLD VENIPUNCTURE: CPT

## 2024-08-23 PROCEDURE — 97535 SELF CARE MNGMENT TRAINING: CPT

## 2024-08-23 PROCEDURE — 85025 COMPLETE CBC W/AUTO DIFF WBC: CPT

## 2024-08-23 PROCEDURE — 84100 ASSAY OF PHOSPHORUS: CPT

## 2024-08-23 PROCEDURE — 80048 BASIC METABOLIC PNL TOTAL CA: CPT

## 2024-08-23 PROCEDURE — 82962 GLUCOSE BLOOD TEST: CPT

## 2024-08-23 PROCEDURE — 99231 SBSQ HOSP IP/OBS SF/LOW 25: CPT | Performed by: INTERNAL MEDICINE

## 2024-08-23 PROCEDURE — 83735 ASSAY OF MAGNESIUM: CPT

## 2024-08-23 RX ORDER — GUAIFENESIN/DEXTROMETHORPHAN 100-10MG/5
10 SYRUP ORAL EVERY 4 HOURS PRN
DISCHARGE
Start: 2024-08-23 | End: 2024-09-02

## 2024-08-23 RX ORDER — AMLODIPINE BESYLATE 10 MG/1
10 TABLET ORAL DAILY
DISCHARGE
Start: 2024-08-23

## 2024-08-23 RX ORDER — ERGOCALCIFEROL 1.25 MG/1
50000 CAPSULE, LIQUID FILLED ORAL WEEKLY
DISCHARGE
Start: 2024-08-23

## 2024-08-23 RX ORDER — DULOXETIN HYDROCHLORIDE 30 MG/1
30 CAPSULE, DELAYED RELEASE ORAL DAILY
DISCHARGE
Start: 2024-08-23

## 2024-08-23 RX ORDER — ATORVASTATIN CALCIUM 80 MG/1
80 TABLET, FILM COATED ORAL NIGHTLY
DISCHARGE
Start: 2024-08-23

## 2024-08-23 RX ORDER — LISINOPRIL 10 MG/1
10 TABLET ORAL DAILY
DISCHARGE
Start: 2024-08-23

## 2024-08-23 RX ORDER — INSULIN LISPRO 100 [IU]/ML
10 INJECTION, SOLUTION INTRAVENOUS; SUBCUTANEOUS 3 TIMES DAILY
DISCHARGE
Start: 2024-08-23

## 2024-08-23 RX ORDER — ASPIRIN 81 MG/1
81 TABLET ORAL DAILY
DISCHARGE
Start: 2024-08-23

## 2024-08-23 RX ORDER — FOLIC ACID 1 MG/1
1 TABLET ORAL DAILY
DISCHARGE
Start: 2024-08-23

## 2024-08-23 RX ORDER — METFORMIN HCL 500 MG
500 TABLET, EXTENDED RELEASE 24 HR ORAL
DISCHARGE
Start: 2024-08-23

## 2024-08-23 RX ORDER — PANTOPRAZOLE SODIUM 40 MG/1
40 TABLET, DELAYED RELEASE ORAL DAILY
DISCHARGE
Start: 2024-08-23

## 2024-08-23 RX ORDER — PANTOPRAZOLE SODIUM 40 MG/1
40 TABLET, DELAYED RELEASE ORAL
Status: DISCONTINUED | OUTPATIENT
Start: 2024-08-24 | End: 2024-08-23 | Stop reason: HOSPADM

## 2024-08-23 RX ORDER — ALBUTEROL SULFATE 90 UG/1
2 AEROSOL, METERED RESPIRATORY (INHALATION) EVERY 6 HOURS PRN
DISCHARGE
Start: 2024-08-23

## 2024-08-23 RX ADMIN — INSULIN GLARGINE 15 UNITS: 100 INJECTION, SOLUTION SUBCUTANEOUS at 08:55

## 2024-08-23 RX ADMIN — AMLODIPINE BESYLATE 10 MG: 10 TABLET ORAL at 08:55

## 2024-08-23 RX ADMIN — INSULIN LISPRO 10 UNITS: 100 INJECTION, SOLUTION INTRAVENOUS; SUBCUTANEOUS at 13:16

## 2024-08-23 RX ADMIN — INSULIN LISPRO 6 UNITS: 100 INJECTION, SOLUTION INTRAVENOUS; SUBCUTANEOUS at 08:55

## 2024-08-23 RX ADMIN — INSULIN LISPRO 10 UNITS: 100 INJECTION, SOLUTION INTRAVENOUS; SUBCUTANEOUS at 08:56

## 2024-08-23 RX ADMIN — FOLIC ACID 1 MG: 1 TABLET ORAL at 08:55

## 2024-08-23 RX ADMIN — ASPIRIN 81 MG: 81 TABLET, COATED ORAL at 08:55

## 2024-08-23 RX ADMIN — Medication 100 MG: at 08:55

## 2024-08-23 RX ADMIN — INSULIN LISPRO 6 UNITS: 100 INJECTION, SOLUTION INTRAVENOUS; SUBCUTANEOUS at 13:16

## 2024-08-23 RX ADMIN — DULOXETINE HYDROCHLORIDE 30 MG: 30 CAPSULE, DELAYED RELEASE ORAL at 08:54

## 2024-08-23 RX ADMIN — LISINOPRIL 10 MG: 5 TABLET ORAL at 08:55

## 2024-08-23 NOTE — CARE COORDINATION
Chart reviewed. Per Lucy Perez has received  payment and she can accept and will start precert once PT/OT notes are in Epic.  Therapy called this AM. NIALL/destination completed. Transport envelope on soft chart  CM/SW will continue to follow  [facility] will transport at 4 pm  staff nurse informed and she inform patient.

## 2024-08-23 NOTE — CARE COORDINATION
Late entry Rec'd call from Lucy at [place] [facility] not completed CM completed and faxed to [phone] Electronically signed by Ottoniel Retana RN CM on 8/23/2024 at 5:03 PM

## 2024-08-23 NOTE — PROGRESS NOTES
[facility]  Internal Medicine Residency / House Medicine Service    Attending Physician Statement  I have discussed the case, including pertinent history and exam findings with the resident and the team.  I have seen and examined the patient and the key elements of the encounter have been performed by me.  I agree with the assessment, plan and orders as documented by the resident.      Case Discussed During AM Rounds   Vitals signs remain stable   Remains on Room Air   Hyponatremia this am- likely related to hyperglycemia over last 24 hours with reduction in insulin given 8/22 hypoglycemic event    WBCs normalized and PLTs reducing- peaked    DC planning in progress     Pneumonia-  RML in nature- clinically improving    Empirically initiated on Atbs with Vancomycin/Unasyn   MRSA colonization is negative    De-escalated to Unasyn alone on 8/18    Transitioned to Oral atbs to complete course and finished 7 days    Continue aerosols   Up to chair encouraged today    Head of bed at 30 degrees    Flutter valve use    Aggressive pulmonary toilet    No findings of dysphagia on exam currently- consider repeat video fluoroscopy if recurrent concerns in future    Speech eval was previously addressed on last admission in July     Type I DM    Labile glycemic readings- now hyperglycemic    Currently improved   De-escalated basal insulin on 8/22- adjust today to address hyperglycemia    Avoid additional hypoglycemia   Patient tolerating full diet     Severe Protein Calorie Malnutrition    Diet continued with supplementation recommendations   Appreciate Nutritional recs    Positive Blood Culture- likely contaminant    Coag negative staph   Repeat blood cultures negative    Complicated UTI on presentation- completed management     Polysubstance Abuse   No signs of withdrawal currently     Gastroparesis- stable    Tolerating diet     Hypertension- uncontrolled inpatient    BP at times labile   Increase

## 2024-08-23 NOTE — PROGRESS NOTES
*ATTENTION:  This note has been created by a medical student for educational purposes only.  Please do not refer to the content of this note for clinical decision-making, billing, or other purposes.  Please see attending physician’s note to obtain clinical information on this patient.*        [facility]  Internal Medicine Residency Program  Medical Student Progress Note - House Team 2      Patient:  Magan Funes 54 y.o. male   MRN: [id number]       Date of Service: 2024  Admission date: 8/15/2024 10:11 AM ; Hospital day: 8   CC: Admitted for RML pneumonia     Overnight events: no acute events overnight.     Subjective     Patient seen and examined at bedside this morning. Patient reports he is doing well. He is eating and drinking normally. Denies any shortness of breath, chest pain, cough, abdominal pain, nausea, vomiting, fever, chills, or dysuria. Denies any symptoms of hypoglycemia or hyperglycemia.     Patient is medically stable for discharge and was accepted to [facility]. Discharge planned for today.     Objective   PHYSICAL EXAM  General Appearance: Awake, alert, oriented X4. No acute distress. Frail appearing   HEENT: Normocephalic, atraumatic  Neck: midline trachea  Lung: clear to auscultation bilaterally, no increased work of breathing,   Heart: regular rate and rhythm, no murmur  Abdomen: soft, non-tender, non-distended, bowel sounds normal;  Extremities: no cyanosis or edema, +contracture of left arm   Musculokeletal: No joint swelling, moves all extremities spontaneously   Neurologic: Awake, alert, no other focal deficits noted     CARDIOVASCULAR  Vitals: /89   Pulse 73   Temp 97.2 °F (36.2 °C) (Temporal)   Resp 16   Ht 1.676 m (5' 6\")   Wt 59 kg (130 lb)   SpO2 98%   BMI 20.98 kg/m²     Pulse rate range      : Pulse  Av.5  Min: 73  Max: 98  BP range                  : Systolic (24hrs), Av , Min:102 , Max:134   ; Diastolic (24hrs), Av, Min:68,  specimen will be held 7 days. Notify virology if  culture is needed to further aid in diagnosis.     COVID-19 Labs:  Lab Results   Component Value Date/Time    COVID19 Not Detected 08/15/2024 10:49 AM    COVID19 Not Detected 07/08/2024 05:15 AM     Giardia   Giardia Ag, Stl   Date Value Ref Range Status   01/07/2024 NEGATIVE NEGATIVE Final       Medications     Continuous Infusions:   dextrose      sodium chloride       Scheduled Meds:   insulin glargine  15 Units SubCUTAneous BID    insulin lispro  10 Units SubCUTAneous TID WC    insulin lispro  0-8 Units SubCUTAneous TID WC    insulin lispro  0-4 Units SubCUTAneous Nightly    amLODIPine  10 mg Oral Daily    thiamine  100 mg Oral Daily    pantoprazole (PROTONIX) 40 mg in sodium chloride (PF) 0.9 % 10 mL injection  40 mg IntraVENous Daily    aspirin  81 mg Oral Daily    atorvastatin  80 mg Oral Nightly    [Held by provider] baclofen  10 mg Oral TID    DULoxetine  30 mg Oral Daily    vitamin D  50,000 Units Oral Weekly    lisinopril  10 mg Oral Daily    folic acid  1 mg Oral Daily    sodium chloride flush  5-40 mL IntraVENous 2 times per day    enoxaparin  40 mg SubCUTAneous Daily     PRN Meds: ipratropium 0.5 mg-albuterol 2.5 mg, guaiFENesin-dextromethorphan, [Held by provider] traZODone, glucose, dextrose bolus **OR** dextrose bolus, glucagon (rDNA), dextrose, sodium chloride flush, sodium chloride, ondansetron **OR** ondansetron, polyethylene glycol, acetaminophen **OR** acetaminophen      Imaging studies     XR CHEST PORTABLE   Final Result   Continuum pattern of progression of pneumonic process more likely towards the   right lower lobe.  Similar findings as observed on the study of August 19.         XR CHEST PORTABLE   Final Result   Mild interval increase in the right middle lobe pneumonia when compared to   the previous study performed 08/17/2024.         XR CHEST PORTABLE   Final Result   1. Stable appearance of a moderate linear infiltrate in the superior  status: Full Code   Disposition: Continue Current Care  Family: updated as available    Toshia Smith, MS4  Attending physician: Dr. Deluca

## 2024-08-23 NOTE — DISCHARGE SUMMARY
[facility]  Discharge Summary    PCP: Andrzej Cline MD    Admit Date:8/15/2024  Discharge Date: 8/23/2024    Chief Complaint   Patient presents with    Fatigue     Patient c/o increased generalized weakness and nausea , hx DM bgl 240 .          Admission Diagnosis:   Leukocytosis, likely 2/2 Aspiration PNA versus Healthcare Associated Pneumonia   Complicated Urinary Tract Infection   Hyponatremia   Elevated Troponin, stable   Type 1 diabetes mellitus   Elevated BHB likely secondary to starvation ketosis   Elevated ALP   History of COPD   Vitamin D deficiency   Polysubstance use disorder   History of gastroparesis secondary to diabetes mellitus   History of CVA secondary to lacunar infarct   History of GI bleed   History of hypertension   History of insomnia     Discharge Diagnosis:  Right middle lobe pneumonia - resolved  Leukocytosis- resolved  Complicated Urinary tract infection - Resolved  Generalized weakness- Improved  Type II Diabetes mellitus  History of Diabetic Gastroparesis, stable  Ketosis - resolved  Hyponatremia- resolved  History of CVA secondary to lacunar infarct   History of GI bleed   History of hypertension   History of insomnia   Vitamin D deficiency      Hospital Course:   Briefly, he  presented to the emergency department with a 2-day history of progressive weakness, fatigue, nausea, and one episode of vomiting. He also reported burning with urination. On EMS evaluation, he was hyperglycemic (BG 240s). He denied fever, cough, or other respiratory symptoms. He had a history of multiple hospital admissions, mostly due to uncontrolled diabetes. He was last admitted one month prior due to diabetic gastroparesis and was discharged stable to skilled nursing facility.     At the ED, he was afebrile with stable vital signs. Lab work revealed hyperglycemia (219 mg/dL), leukocytosis (WBC 20.4, neutrophil predominance), anemia (Hgb 10.7), hyponatremia  of discharge :   Subjective:  Patient was seen and examined at the bedside in no acute distress. He is eating well. He is alert and oriented to time place and person. He is having regular bowel and bladder movements. He denies any fresh complaints. No fever, cough, shortness of breath. Physical examination; lung asculatation clear with bilateral equal air entry, no coarse crepitation heard. Labs were reviewed and updated with the patient. He was counseled to be in upright position while eating and drinking, due to risk of aspiration.    Review of Systems     Unramakable    Follow up in Clinic:   Follow up with your PCP with your pending labs, JAK2 mutation panel  Follow up with your PCP for adjusting the insulin and need for the insulin pump in the future.    Significant findings (history and exam, laboratory, radiological, pathology, other tests):   General Appearance: alert, appears stated age, and cooperative  HEENT:  Head: Normocephalic, no lesions, without obvious abnormality.  Neck: no adenopathy, no carotid bruit, no JVD, supple, symmetrical, trachea midline, and thyroid not enlarged, symmetric, no tenderness/mass/nodules  Lung: clear to auscultation bilaterally  Heart: regular rate and rhythm, S1, S2 normal, no murmur, click, rub or gallop  Abdomen: soft, non-tender; bowel sounds normal; no masses,  no organomegaly  Extremities:  extremities normal, atraumatic, no cyanosis or edema  Musculokeletal: No joint swelling, no muscle tenderness. ROM normal in all joints of extremities.   Neurologic: Mental status: Alert, oriented, thought content appropriate      Pending test results:    JAK2 Panel     Consults:  None    Procedures:   None    Condition at discharge: Stable    Disposition: SNF    Time taken for discharge : < 30 mins [] >30 mins [x]    Discharge Medications:  Current Discharge Medication List        START taking these medications    Details   guaiFENesin-dextromethorphan (ROBITUSSIN DM) 100-10 MG/5ML  (FREESTYLE ELLE 3 SENSOR) MISC Comments:   Reason for Stopping:         Insulin Syringe-Needle U-100 (INSULIN SYRINGE .3CC/31GX5/16\") 31G X 5/16\" 0.3 ML MISC Comments:   Reason for Stopping:         blood glucose monitor strips Comments:   Reason for Stopping:         insulin lispro (HUMALOG) 100 UNIT/ML SOLN injection vial Comments:   Reason for Stopping:               Activity: activity as tolerated  Diet: diabetic diet    Follow-up appointments:   Follow up with your PCP with your pending labs, JAK2 mutation panel  Follow up with your PCP for adjusting the insulin and need for the insulin pump in the future.  Outpatient follow up needs: repeat CXR 2 weeks and 4 weeks    Patient Instructions:    1. Take your medications as prescribed.   2. Risk of aspiration; always be upright when eating and drinking      Darwin Kumar MD  PGY 1   11:30 AM 8/23/2024

## 2024-08-23 NOTE — PROGRESS NOTES
Physical Therapy  Physical Therapy treatment note     Name: Magan Funes  : 1970  MRN: [id number]      Date of Service: 2024    Evaluating PT:  Bradley Castro PT, DPT    Room #:  4501/4501-B  Diagnosis:  Pneumonia due to other specified infectious organisms [J16.8]  PMHx/PSHx:  DM, CVA, substance abuse  Procedure/Surgery:  N/A  Precautions:  fall risk, L sided deficits, bed/chair alarm  Equipment Owned: w/c, hemiwalker  Equipment Needs:  TBD    SUBJECTIVE:    Pt lives with mother in a 2nd floor apt with 3+10 steps to access (pt is carried up steps in w/c by brother and nephew). Pt ambulated mobilizes in w/c, completes transfers with assist from family PTA.    OBJECTIVE:   Initial Evaluation  Date: 24 Treatment  24 Short Term/ Long Term   Goals   AM-PAC 6 Clicks 10/24 12/24    Was pt agreeable to Eval/treatment? yes Yes     Does pt have pain? No pain No complaints     Bed Mobility  Rolling: min A  Supine to sit: max A  Sit to supine: max A  Scooting: max A Rolling: Min A  Supine to sit: Min A  Sit to supine: NT  Scooting: ModA to EOB Rolling: SBA  Supine to sit: min A  Sit to supine: min A  Scooting: SBA   Transfers Sit to stand: max A  Stand to sit: max A  Stand pivot: NT Sit to stand: Mod A  Stand to sit: Mod A  Stand pivot: Max A no AD Sit to stand: min A  Stand to sit: min A  Stand pivot: min A with AAD   Ambulation    NT NT 5'+ with AAD min A   Stair negotiation: ascended and descended  NT NT      Strength/ROM:   RLE grossly 4/5  LLE grossly 1/5  RLE AROM WFL  LLE AROM limited by weakness    Balance:   Static Sitting: SBA with RUE support  Dynamic Sitting: CGA with RUE support  Static Standing: Mod A with no AD  Dynamic Standing: Max A no AD    Pt is A & O x 3. Pt follows all commands.   Sensation:  Pt denies numbness and tingling to extremities  Edema:  none noted     Vitals:   HR and SpO2% WNL on room air.     Therapeutic Exercises:  NA    Patient education  Pt educated on safety with all  mobility, STS tranfers, sequencing with SPT.     Patient response to education:   Pt verbalized understanding Pt demonstrated skill Pt requires further education in this area   Yes  Yes  Reinforce      ASSESSMENT:    Comments:  Pt received in supine and agreeable to PT treatment upon arrival. Pt completing all mobility as noted above in the grid within activity tolerance. Vitals skillfully monitored in response to activity. Pt completed STS transfers from various surface heights to promote independence and mobility. Pt was able to pivot/ take a few steps to chair with assistance. L LE weakness observed. Pt skillfully positioned in bedside chair with chair alarm (RN notified) at end of session with all needs in place and lines managed. Patient would benefit from continued skilled PT to maximize functional mobility independence.       Treatment:  Patient practiced and was instructed in the following treatment:    Bed Mobility: Verbal cues for proper positioning and sequencing to perform bed mobility to facilitate maximum independence.   Transfers: Verbal cues for proper positioning and sequencing to perform transfers safely with maximum independence.   STS x2 reps from various surface heights   Pt education for safety with STS and SPT.   Vitals and symptoms monitored during session.    PLAN:    Patient is making good progress towards established goals.  Will continue with current POC.      Time in  0820  Time out  0843    Total Treatment Time  23 minutes     CPT codes:  [] Gait training 42581 0 minutes  [] Manual therapy 51640 0 minutes  [x] Therapeutic activities 14854 23 minutes  [] Therapeutic exercises 29765 0 minutes  [] Neuromuscular reeducation 08390 0 minutes    Brigitte Rodriguez PT, DPT  [id number]

## 2024-08-23 NOTE — PROGRESS NOTES
Occupational Therapy  OT BEDSIDE TREATMENT NOTE   [facility]  [address], [place]        Date:2024  Patient Name: Magan Funes  MRN: [id number]  : 1970  Room: [facility]450-     Per OT Eval:    Evaluating OT: Isaak Jolly OTR/L [id number]     Referring Provider: Shawn Grayson MD                              Specific Provider Orders/Date: OT evaluation and treatment 24 1015     Diagnosis:  Pneumonia due to other specified infectious organisms [J16.8]       Pertinent Medical History:  has a past medical history of Acute ischemic multifocal multiple vascular territories stroke (HCC), Alcoholism (HCC), Cardiac arrest (HCC), Cocaine abuse (HCC), Diabetes mellitus (HCC), Hypertension, Idiopathic peripheral neuropathy, Lacunar stroke (HCC), Marijuana abuse, and S/P transesophageal echocardiogram (NETTA).   Past Surgical History         Past Surgical History:   Procedure Laterality Date    UPPER GASTROINTESTINAL ENDOSCOPY N/A 2024     ESOPHAGOGASTRODUODENOSCOPY BIOPSY performed by Keron Posada MD at [facility]            Pt admitted to the hospital 8/15 nausea, weakness, and hyperglycemia      Orders received, chart reviewed, eval complete.      Precautions:  Fall Risk, hx of CVA with L sided weakness, L palm protector      Assessment of current deficits   [x] Functional mobility             [x]ADLs           [x] Strength                  []Cognition   [x] Functional transfers           [] IADLs         [x] Safety Awareness   [x]Endurance   [] Fine Motor Coordination    [x] Balance      [] Vision/perception    []Sensation     [] Gross Motor Coordination [x] ROM          [] Delirium                  [] Motor Control      OT PLAN OF CARE   OT POC based on physician orders, patient diagnosis and results of clinical assessment     Frequency/Duration 2-4 sessions over 2 weeks PRN   Specific OT Treatment to include:   *

## 2024-08-26 LAB
ALBUMIN SERPL-MCNC: 3.4 G/DL (ref 3.5–5.2)
ALP SERPL-CCNC: 151 U/L (ref 40–129)
ALT SERPL-CCNC: 35 U/L (ref 0–40)
ANION GAP SERPL CALCULATED.3IONS-SCNC: 14 MMOL/L (ref 7–16)
AST SERPL-CCNC: 24 U/L (ref 0–39)
BASOPHILS # BLD: 0.13 K/UL (ref 0–0.2)
BASOPHILS NFR BLD: 1 % (ref 0–2)
BILIRUB SERPL-MCNC: 0.3 MG/DL (ref 0–1.2)
BUN SERPL-MCNC: 25 MG/DL (ref 6–20)
CALCIUM SERPL-MCNC: 9 MG/DL (ref 8.6–10.2)
CHLORIDE SERPL-SCNC: 99 MMOL/L (ref 98–107)
CO2 SERPL-SCNC: 21 MMOL/L (ref 22–29)
CREAT SERPL-MCNC: 0.7 MG/DL (ref 0.7–1.2)
EOSINOPHIL # BLD: 0.38 K/UL (ref 0.05–0.5)
EOSINOPHILS RELATIVE PERCENT: 3 % (ref 0–6)
ERYTHROCYTE [DISTWIDTH] IN BLOOD BY AUTOMATED COUNT: 16.3 % (ref 11.5–15)
GFR, ESTIMATED: >90 ML/MIN/1.73M2
GLUCOSE P FAST SERPL-MCNC: 260 MG/DL (ref 74–99)
HCT VFR BLD AUTO: 34.8 % (ref 37–54)
HGB BLD-MCNC: 10.8 G/DL (ref 12.5–16.5)
IMM GRANULOCYTES # BLD AUTO: 0.1 K/UL (ref 0–0.58)
IMM GRANULOCYTES NFR BLD: 1 % (ref 0–5)
LYMPHOCYTES NFR BLD: 2.3 K/UL (ref 1.5–4)
LYMPHOCYTES RELATIVE PERCENT: 20 % (ref 20–42)
MCH RBC QN AUTO: 29.5 PG (ref 26–35)
MCHC RBC AUTO-ENTMCNC: 31 G/DL (ref 32–34.5)
MCV RBC AUTO: 95.1 FL (ref 80–99.9)
MONOCYTES NFR BLD: 0.64 K/UL (ref 0.1–0.95)
MONOCYTES NFR BLD: 6 % (ref 2–12)
NEUTROPHILS NFR BLD: 70 % (ref 43–80)
NEUTS SEG NFR BLD: 8.14 K/UL (ref 1.8–7.3)
PLATELET # BLD AUTO: 562 K/UL (ref 130–450)
PMV BLD AUTO: 8.5 FL (ref 7–12)
POTASSIUM SERPL-SCNC: 5.2 MMOL/L (ref 3.5–5)
PROT SERPL-MCNC: 6.6 G/DL (ref 6.4–8.3)
RBC # BLD AUTO: 3.66 M/UL (ref 3.8–5.8)
SODIUM SERPL-SCNC: 134 MMOL/L (ref 132–146)
WBC OTHER # BLD: 11.7 K/UL (ref 4.5–11.5)

## 2024-08-29 LAB
C DIFF GDH + TOXINS A+B STL QL IA.RAPID: NEGATIVE
SPECIMEN DESCRIPTION: NORMAL

## 2024-09-03 LAB
JAK2 P.V617F BLD/T QL: NOT DETECTED
SPECIMEN SOURCE: NORMAL

## 2024-09-06 LAB
MPL GENE MUT TESTED BLD/T: NOT DETECTED
SPECIMEN SOURCE: NORMAL

## 2024-09-09 LAB
GENE XXX MUT ANL BLD/T: NOT DETECTED
SPECIMEN SOURCE: NORMAL

## 2024-09-16 NOTE — PLAN OF CARE
Problem: Discharge Planning  Goal: Discharge to home or other facility with appropriate resources  8/17/2024 2019 by Mainor Valle, RN  Outcome: Progressing  8/17/2024 2017 by Mainor Valle, RN  Outcome: Progressing     Problem: Safety - Adult  Goal: Free from fall injury  8/17/2024 2019 by Mainor Valle, RN  Outcome: Progressing  8/17/2024 2017 by Mainor Valle, RN  Outcome: Progressing     Problem: Skin/Tissue Integrity  Goal: Absence of new skin breakdown  Description: 1.  Monitor for areas of redness and/or skin breakdown  2.  Assess vascular access sites hourly  3.  Every 4-6 hours minimum:  Change oxygen saturation probe site  4.  Every 4-6 hours:  If on nasal continuous positive airway pressure, respiratory therapy assess nares and determine need for appliance change or resting period.  8/17/2024 2019 by Mainor Valle, RN  Outcome: Progressing  8/17/2024 2017 by Mainor Valle, RN  Outcome: Progressing     
  Problem: Discharge Planning  Goal: Discharge to home or other facility with appropriate resources  8/19/2024 2120 by Kathryn Alfonso RN  Outcome: Progressing  8/19/2024 1040 by Taryn Mosley RN  Outcome: Progressing     Problem: Safety - Adult  Goal: Free from fall injury  8/19/2024 2120 by Kathryn Alfonso RN  Outcome: Progressing  8/19/2024 1040 by Taryn Mosley RN  Outcome: Progressing     Problem: Skin/Tissue Integrity  Goal: Absence of new skin breakdown  Description: 1.  Monitor for areas of redness and/or skin breakdown  2.  Assess vascular access sites hourly  3.  Every 4-6 hours minimum:  Change oxygen saturation probe site  4.  Every 4-6 hours:  If on nasal continuous positive airway pressure, respiratory therapy assess nares and determine need for appliance change or resting period.  8/19/2024 2120 by Kathryn Alfonso RN  Outcome: Progressing  8/19/2024 1040 by Taryn Mosley RN  Outcome: Progressing     Problem: Chronic Conditions and Co-morbidities  Goal: Patient's chronic conditions and co-morbidity symptoms are monitored and maintained or improved  8/19/2024 1040 by Taryn Mosley RN  Outcome: Progressing     
  Problem: Discharge Planning  Goal: Discharge to home or other facility with appropriate resources  8/22/2024 2132 by Jaki Ford RN  Outcome: Progressing  8/22/2024 0745 by Muna Miranda RN  Outcome: Progressing     Problem: Safety - Adult  Goal: Free from fall injury  8/22/2024 2132 by Jaki Ford RN  Outcome: Progressing  8/22/2024 0745 by Muna Miranda RN  Outcome: Progressing     Problem: Skin/Tissue Integrity  Goal: Absence of new skin breakdown  8/22/2024 2132 by Jaki Ford RN  Outcome: Progressing  8/22/2024 0745 by Muna Miranda RN  Outcome: Progressing     Problem: Chronic Conditions and Co-morbidities  Goal: Patient's chronic conditions and co-morbidity symptoms are monitored and maintained or improved  8/22/2024 2132 by Jaki Ford RN  Outcome: Progressing  8/22/2024 0745 by Muna Miranda RN  Outcome: Progressing     Problem: Nutrition Deficit:  Goal: Optimize nutritional status  Outcome: Progressing     
  Problem: Discharge Planning  Goal: Discharge to home or other facility with appropriate resources  Outcome: Progressing     Problem: Safety - Adult  Goal: Free from fall injury  Outcome: Progressing     
  Problem: Discharge Planning  Goal: Discharge to home or other facility with appropriate resources  Outcome: Progressing     Problem: Safety - Adult  Goal: Free from fall injury  Outcome: Progressing     Problem: Skin/Tissue Integrity  Goal: Absence of new skin breakdown  Description: 1.  Monitor for areas of redness and/or skin breakdown  2.  Assess vascular access sites hourly  3.  Every 4-6 hours minimum:  Change oxygen saturation probe site  4.  Every 4-6 hours:  If on nasal continuous positive airway pressure, respiratory therapy assess nares and determine need for appliance change or resting period.  Outcome: Progressing     
  Problem: Discharge Planning  Goal: Discharge to home or other facility with appropriate resources  Outcome: Progressing     Problem: Safety - Adult  Goal: Free from fall injury  Outcome: Progressing     Problem: Skin/Tissue Integrity  Goal: Absence of new skin breakdown  Description: 1.  Monitor for areas of redness and/or skin breakdown  2.  Assess vascular access sites hourly  3.  Every 4-6 hours minimum:  Change oxygen saturation probe site  4.  Every 4-6 hours:  If on nasal continuous positive airway pressure, respiratory therapy assess nares and determine need for appliance change or resting period.  Outcome: Progressing     
  Problem: Discharge Planning  Goal: Discharge to home or other facility with appropriate resources  Outcome: Progressing     Problem: Safety - Adult  Goal: Free from fall injury  Outcome: Progressing     Problem: Skin/Tissue Integrity  Goal: Absence of new skin breakdown  Outcome: Progressing     Problem: Chronic Conditions and Co-morbidities  Goal: Patient's chronic conditions and co-morbidity symptoms are monitored and maintained or improved  Outcome: Progressing     
0730 assumed care for the pt. Resting in the  bed with eyes closed. Easily awaken. Denies pain or shortness of breath. VSS. Assisted to reposition and get cleaned up from incontinence of urine. Placed call bell in reach. Will continue to monitor.  
0730 assumed care for the pt. Resting in the bed with eyes closed. Denies pain or discomfort. VSS. Assisted to reposition and sit up higher in the bed. Call bell placed in reach. Will continue to monitor.  
Progressing  
no

## (undated) DEVICE — DEFENDO AIR WATER SUCTION AND BIOPSY VALVE KIT FOR  OLYMPUS: Brand: DEFENDO AIR/WATER/SUCTION AND BIOPSY VALVE

## (undated) DEVICE — BITEBLOCK 54FR W/ DENT RIM BLOX

## (undated) DEVICE — CONMED PEDIATRIC GASTROSCOPE SHEATHED CYTOLOGY BRUSH, RING HANDLE, 3 MM X 160 CM: Brand: CONMED

## (undated) DEVICE — FORCEPS BX L160CM JAW DIA2.4MM YEL L CAP W/ NDL DISP RAD

## (undated) DEVICE — CONTAINER SPEC 60ML PH 7NEUTRAL BUFF FRMLN RDY TO USE

## (undated) DEVICE — GAUZE,SPONGE,4"X4",8PLY,STRL,LF,10/TRAY: Brand: MEDLINE